# Patient Record
Sex: MALE | Race: WHITE | NOT HISPANIC OR LATINO | Employment: OTHER | URBAN - METROPOLITAN AREA
[De-identification: names, ages, dates, MRNs, and addresses within clinical notes are randomized per-mention and may not be internally consistent; named-entity substitution may affect disease eponyms.]

---

## 2017-01-23 LAB
CHOLEST/HDLC SERPL: NORMAL {RATIO}
NON-HDL-CHOL (CHOL-HDL) (HISTORICAL): NORMAL

## 2017-01-24 ENCOUNTER — ALLSCRIPTS OFFICE VISIT (OUTPATIENT)
Dept: OTHER | Facility: OTHER | Age: 77
End: 2017-01-24

## 2017-01-24 ENCOUNTER — HOSPITAL ENCOUNTER (OUTPATIENT)
Dept: RADIOLOGY | Facility: CLINIC | Age: 77
Discharge: HOME | End: 2017-01-24
Payer: MEDICARE

## 2017-01-24 DIAGNOSIS — M54.9 DORSALGIA: ICD-10-CM

## 2017-01-24 DIAGNOSIS — M54.41 LOW BACK PAIN WITH RIGHT-SIDED SCIATICA: ICD-10-CM

## 2017-01-24 DIAGNOSIS — M47.26 OTHER SPONDYLOSIS WITH RADICULOPATHY, LUMBAR REGION: ICD-10-CM

## 2017-01-24 DIAGNOSIS — M41.56 OTHER SECONDARY SCOLIOSIS, LUMBAR REGION: ICD-10-CM

## 2017-01-24 PROCEDURE — 72110 X-RAY EXAM L-2 SPINE 4/>VWS: CPT

## 2017-01-25 ENCOUNTER — ALLSCRIPTS OFFICE VISIT (OUTPATIENT)
Dept: OTHER | Facility: OTHER | Age: 77
End: 2017-01-25

## 2017-03-07 ENCOUNTER — ALLSCRIPTS OFFICE VISIT (OUTPATIENT)
Dept: OTHER | Facility: OTHER | Age: 77
End: 2017-03-07

## 2017-06-06 ENCOUNTER — ALLSCRIPTS OFFICE VISIT (OUTPATIENT)
Dept: OTHER | Facility: OTHER | Age: 77
End: 2017-06-06

## 2017-06-16 ENCOUNTER — ALLSCRIPTS OFFICE VISIT (OUTPATIENT)
Dept: OTHER | Facility: OTHER | Age: 77
End: 2017-06-16

## 2017-06-16 DIAGNOSIS — J44.9 CHRONIC OBSTRUCTIVE PULMONARY DISEASE (HCC): ICD-10-CM

## 2017-06-16 DIAGNOSIS — E78.5 HYPERLIPIDEMIA: ICD-10-CM

## 2017-06-16 DIAGNOSIS — Z12.5 ENCOUNTER FOR SCREENING FOR MALIGNANT NEOPLASM OF PROSTATE: ICD-10-CM

## 2017-06-16 DIAGNOSIS — I10 ESSENTIAL (PRIMARY) HYPERTENSION: ICD-10-CM

## 2017-12-04 ENCOUNTER — ALLSCRIPTS OFFICE VISIT (OUTPATIENT)
Dept: OTHER | Facility: OTHER | Age: 77
End: 2017-12-04

## 2017-12-04 DIAGNOSIS — E78.00 PURE HYPERCHOLESTEROLEMIA: ICD-10-CM

## 2017-12-04 DIAGNOSIS — J44.9 CHRONIC OBSTRUCTIVE PULMONARY DISEASE (HCC): ICD-10-CM

## 2017-12-04 DIAGNOSIS — I10 ESSENTIAL (PRIMARY) HYPERTENSION: ICD-10-CM

## 2017-12-05 NOTE — PROGRESS NOTES
Assessment    1  Benign essential hypertension (401 1) (I10)   2  Hyperlipidemia (272 4) (E78 5)   · MIXED   3  Chronic obstructive pulmonary disease (496) (J44 9)    Plan  Benign essential hypertension, Chronic obstructive pulmonary disease, Hypercholesterolemia    · (1) TSH WITH FT4 REFLEX; Status:Active; Requested for:45Zyt8322;    · Follow-up visit in 4 Months Evaluation and Treatment  Follow-up  Status: Hold For - Scheduling Requested for: 56DXK9393  Chronic obstructive pulmonary disease    · Complete PFT; Status:Active; Requested for:53Cmd9936;     Discussion/Summary  Discussion Summary:   See med list discussed  Counseling Documentation With Imm: The patient was counseled regarding diagnostic results,-- instructions for management,-- risk factor reductions,-- prognosis  Chief Complaint  Chief Complaint Free Text Note Form: pt here for f/u HTN, review blood work      History of Present Illness  Hyperlipidemia (Follow-Up): The patient states his hyperlipidemia has been stable since the last visit  Comorbid Illnesses: hypertension  Symptoms: The patient is currently asymptomatic  Associated symptoms include no focal neurologic deficits-- and-- no memory loss  Lifestyle: Diet: He consumes a diverse and healthy diet  Weight Issues: He does not have any weight concerns  Exercise: He does not exercise regularly  Smoking: He does not use tobacco Alcohol: He denies alcohol use  Drug Use: He denies drug use  Medications: the patient is not adherent with his medication regimen  -- He denies medication side effects  Medication(s): a statin  The patient is doing well with his hyperlipidemia goals  The patient is due for a lipid panel  Hypertension (Follow-Up): The patient presents for follow-up of essential hypertension  The patient states he has been doing well with his blood pressure control since the last visit  He has no significant interval events  Symptoms: The patient is currently asymptomatic    Home monitoring: The patient checks his blood pressure sporadically  Medications: the patient is adherent with his medication regimen  -- He denies medication side effects  The patient is due for a serum creatinine  Review of Systems  Complete-Male:  Constitutional: No fever or chills, feels well, no tiredness, no recent weight gain or weight loss  Eyes: No complaints of eye pain, no red eyes, no discharge from eyes, no itchy eyes  ENT: no complaints of earache, no hearing loss, no nosebleeds, no nasal discharge, no sore throat, no hoarseness  Cardiovascular: No complaints of slow heart rate, no fast heart rate, no chest pain, no palpitations, no leg claudication, no lower extremity  Respiratory: No complaints of shortness of breath, no wheezing, no cough, no SOB on exertion, no orthopnea or PND  Gastrointestinal: No complaints of abdominal pain, no constipation, no nausea or vomiting, no diarrhea or bloody stools  Genitourinary: No complaints of dysuria, no incontinence, no hesitancy, no nocturia, no genital lesion, no testicular pain  Musculoskeletal: joint stiffness, but-- no arthralgias  Neurological: No compliants of headache, no confusion, no convulsions, no numbness or tingling, no dizziness or fainting, no limb weakness, no difficulty walking  Psychiatric: Is not suicidal, no sleep disturbances, no anxiety or depression, no change in personality, no emotional problems  Endocrine: No complaints of proptosis, no hot flashes, no muscle weakness, no erectile dysfunction, no deepening of the voice, no feelings of weakness  Hematologic/Lymphatic: No complaints of swollen glands, no swollen glands in the neck, does not bleed easily, no easy bruising  PHQ-9 Depression Scale: Over the past 2 weeks, how often have you been bothered by the following problems? 1 ) Little interest or pleasure in doing things? Not at all   2 ) Feeling down, depressed or hopeless?  Not at all   3 ) Trouble falling asleep or sleeping too much? Not at all   4 ) Feeling tired or having little energy? Not at all   5 ) Poor appetite or overeating? Not at all   6 ) Feeling bad about yourself, or that you are a failure, or have let yourself or your family down? Not at all   7 ) Trouble concentrating on things, such as reading a newspaper or watching television? Not at all   8 ) Moving or speaking so slowly that other people could have noticed, or the opposite, moving or speaking faster than usual? Not at all  How difficult have these problems made it for you to do your work, take care of things at home, or get along with people? Not at all  Score 0    Preventive Quality 65 Older:  Preventive Quality 65 and Older: Falls Risk: The patient fell 1 times in the past 12 months  The patient is currently asymptomatic Symptoms Include: Urinary Incontinence Symptoms includes: nocturia, but no urinary incontinence, no incomplete bladder emptying, no urinary frequency, no urinary urgency, no urinary hesitancy, no dysuria, no straining, no weak stream, no intermittent stream, no post-void dribbling, no vaginal pressure and no vaginal dryness       Active Problems  1  Acute left-sided low back pain with left-sided sciatica (724 2,724 3) (M54 42)   2  Back pain (724 5) (M54 9)   3  Benign essential hypertension (401 1) (I10)   4  Bradycardia (427 89) (R00 1)   5  Chronic obstructive pulmonary disease (496) (J44 9)   6  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 (724 2,724 3,338 29) (M54 41,G89 29)   7  Hypercholesterolemia (272 0) (E78 00)   8  Hyperlipidemia (272 4) (E78 5)   9  Hypothyroidism (244 9) (E03 9)   10  Osteoarthritis of spine with radiculopathy, lumbar region (721 3) (M47 26)   11  Other secondary scoliosis, 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)   12  S/P CABG (coronary artery bypass graft) (V45 81) (Z95 1)    Past Medical History    1  History of Acute bronchitis, unspecified organism (466 0) (J20 9)   2   History of Benign Prostatic Hypertrophy Without Urinary Obstruction With Other Lower Urinary Tract Symptoms (600 00)   3  History of Hip pain, unspecified laterality   4  History of chest pain (V13 89) (Z87 898)   5  History of chronic obstructive lung disease (V12 69) (Z87 09)   6  History of coronary atherosclerosis (V12 59) (Z86 79)   7  History of dehydration (V12 29) (Z86 39)   8  History of psoriasis (V13 3) (Z87 2)   9  History of shortness of breath (V13 89) (Z87 898)   10  History of tinea cruris (V12 09) (Z86 19)   11  History of Screening PSA (prostate specific antigen) (V76 44) (Z12 5)   12  History of Trochanteric bursitis, unspecified laterality  Active Problems And Past Medical History Reviewed: The active problems and past medical history were reviewed and updated today  Surgical History  1  History of CABG   2  History of Transurethral Resection Of Prostate (TURP)  Surgical History Reviewed: The surgical history was reviewed and updated today  Family History  Mother    1  Family history of Lung Cancer (V16 1)  Father    2  Family history of    3  Family history of sepsis (V18 8) (Z83 1)  Family History Reviewed: The family history was reviewed and updated today  Social History     · Caffeine Use   · Daily alcohol use   · Former smoker (V15 82) (Y84 828)   · Marital History - Currently    · Never Used Drugs   · Occupation:  Social History Reviewed: The social history was reviewed and updated today  Current Meds   1  Aspirin 81 MG Oral Tablet Chewable; USE AS DIRECTED; Therapy: (Recorded:25Wmr6166) to Recorded   2  Atorvastatin Calcium 40 MG Oral Tablet; TAKE 1 TABLET DAILY; Therapy: 53Qwj6967 to (Evaluate:54Ksa5410)  Requested for: 70Vpt1898; Last Rx:13Zfs5465 Ordered   3  Lisinopril 10 MG Oral Tablet; Take 1 tablet daily; Therapy: 21Mar4433 to (Evaluate:89Ilb8694)  Requested for: 67Afn9569; Last Rx:14Qgf4357 Ordered   4   Metoprolol Tartrate 25 MG Oral Tablet; TAKE 1 TABLET TWICE DAILY  Requested for: 87Yvb4324; Last Rx:85Eun8641 Ordered   5  Omeprazole 20 MG Oral Capsule Delayed Release Recorded  Medication List Reviewed: The medication list was reviewed and updated today  Allergies  1  Penicillins  2  No Known Environmental Allergies   3  No Known Food Allergies    Vitals  Vital Signs    Recorded: 24GMB1126 10:04AM   Temperature 98 3 F, Tympanic   Heart Rate 76   Systolic 372, LUE, Sitting   Diastolic 60, LUE, Sitting   Height 5 ft 6 5 in   Weight 181 lb 2 oz   BMI Calculated 28 8   BSA Calculated 1 93   O2 Saturation 99, RA       Physical Exam   Constitutional  General appearance: No acute distress, well appearing and well nourished  Eyes  Conjunctiva and lids: No swelling, erythema, or discharge  Pupils and irises: Equal, round and reactive to light  Ears, Nose, Mouth, and Throat  External inspection of ears and nose: Normal    Otoscopic examination: Tympanic membrance translucent with normal light reflex  Canals patent without erythema  Nasal mucosa, septum, and turbinates: Normal without edema or erythema  Oropharynx: Abnormal  -- upper dentures  Pulmonary  Auscultation of lungs: Abnormal  -- decreased breath sounds  Cardiovascular  Palpation of heart: Normal PMI, no thrills  Auscultation of heart: Normal rate and rhythm, normal S1 and S2, without murmurs  Examination of extremities for edema and/or varicosities: Normal    Carotid pulses: Normal    Abdomen  Abdomen: Non-tender, no masses  Liver and spleen: No hepatomegaly or splenomegaly  Skin  Skin and subcutaneous tissue: Normal without rashes or lesions  Neurologic  Cranial nerves: Cranial nerves 2-12 intact  Reflexes: 2+ and symmetric  Sensation: No sensory loss     Psychiatric  Orientation to person, place and time: Normal    Mood and affect: Normal          Results/Data  (1) PSA (SCREEN) (Dx V76 44 Screen for Prostate Cancer) 87IDU0494 12:00AM Pavan Beavers     Test Name Result Flag Reference   PSA 0 39         Summary / No summary entered :    No summary entered  Documents attached :    [address]  Work - [facility], [facility]; Enc: [id number] - Image Encounter - [facility], team -    (Interventional Medicine) (Additional Information Document)  (1) LIPID PANEL, FASTING 93BEH1023 12:00AM Milagro Poll     Test Name Result Flag Reference   LDL CHOLESTEROL CALCULATED 66         Summary / No summary entered :    No summary entered  Documents attached :    [address]  Work - [facility], team; Enc: [id number] - Image Encounter - [facility], team -    (Interventional Medicine) (Additional Information Document)  Summary / No summary entered :    No summary entered  Documents attached :    [address]  Work - [facility], team; Enc: [id number] - Image Encounter - [facility], team -    (Interventional Medicine) (Additional Information Document)  (1) COMPREHENSIVE METABOLIC PANEL [id number] 56:00LW Milagro Poll     Test Name Result Flag Reference   GLUCOSE,RANDM 85         Summary / No summary entered :    No summary entered  Documents attached :    [address]  [facility]; Enc: [id number] - [facility] -    (Interventional Medicine) (Additional Information Document)  (1) PSA (SCREEN) (Dx V76 44 Screen for Prostate Cancer) 09OUN4456 12:00AM Milagro Poll     Test Name Result Flag Reference   PSA 0 39         Summary / No summary entered :    No summary entered  Documents attached :    [address]  [facility]; Enc: [id number] - [facility] -    (Interventional Medicine) (Additional Information Document)  (1) LIPID PANEL, FASTING [id number] 12:00AM Milagro Poll     Test Name Result Flag Reference   LDL CHOLESTEROL CALCULATED 66         Health Management  Depression screening   *[facility]-Depression Screening; every 1 year; Last 63NWV9960; Next Due: 92TAM7924;  Overdue  History of Need for diphtheria-tetanus-pertussis (Tdap) vaccine, adult/adolescent   Adacel 5-2-15 5 LF-MCG/0 5 Intramuscular Suspension; every 1 year; Next Due: 62UXT2418; Overdue  History of Screening for other and unspecified genitourinary condition   *[facility] - Urinary Incontinence Screen (Dx Z13 89 Screen for UI); every 1 year; Last 02TQY5392; NextDue: 83XOZ9367; Overdue  History of Special screening for other neurological conditions   *[facility] - Fall Risk Assessment  (Dx Z13 89 Screen for Neurologic Disorder); every 1 year; DPTB20BYZ2344; Next Due: 06AET8162; Overdue    Signatures   Electronically signed by :  Cristóbal Cordon MD; Dec  4 2017 10:37AM EST                       (Author)

## 2018-01-12 VITALS
WEIGHT: 179.5 LBS | DIASTOLIC BLOOD PRESSURE: 78 MMHG | BODY MASS INDEX: 28.17 KG/M2 | SYSTOLIC BLOOD PRESSURE: 140 MMHG | HEIGHT: 67 IN | HEART RATE: 66 BPM

## 2018-01-12 NOTE — RESULT NOTES
Verified Results  * MRI LUMBAR SPINE [address] [id number] 05:28PM César Edmund     Test Name Result Flag Reference   MRI LUMBAR SPINE WO CONTRAST (Report)     MRI LUMBAR SPINE WITHOUT CONTRAST     INDICATION: Low back pain going down both legs     COMPARISON: None  TECHNIQUE: Sagittal T1, sagittal T2, sagittal inversion recovery, axial T1 and axial T2, coronal T2       IMAGE QUALITY: Diagnostic     FINDINGS:     ALIGNMENT: Mild dextroscoliosis thoracolumbar junction  Moderate levoscoliosis mid lumbar spine  MARROW SIGNAL: Multilevel degenerative endplate changes noted  No bone marrow edema or focally suspicious marrow lesions  DISTAL CORD AND CONUS: Normal size and signal within the distal cord and conus  The conus ends at the L1 level  PARASPINAL SOFT TISSUES: 1 7 cm T2 hyperintense lesion left kidney likely a renal cyst      SACRUM: Normal signal within the sacrum  No evidence of insufficiency or stress fracture  LOWER THORACIC DISC SPACES: There is loss of disc height and signal  There is no focal disc herniation, central canal or neural foraminal narrowing  LUMBAR DISC SPACES:        L1-L2: Left paracentral disc herniation, protrusion type  Mild central canal narrowing  Neural foramina bilaterally patent  L2-L3: There is a left neural foraminal disc herniation, protrusion type  There is bilateral facet hypertrophy  Mild left neural foraminal narrowing  Central canal and right neural foramen patent  L3-L4: There is a left neural foraminal disc herniation, protrusion type  Moderate left neural foraminal narrowing  No central canal narrowing  Right neural foramen minimally narrowed  L4-L5: Bilateral facet hypertrophy noted, right greater than left  There is a right neural foraminal disc protrusion  Severe right neural foraminal narrowing  Moderate central canal narrowing  Mild left neural foraminal narrowing  L5-S1:  There is bilateral facet hypertrophy   There is a disc osteophyte complex  Central canal patent  Mild to moderate bilateral neural foraminal narrowing  IMPRESSION:     Mild to moderate S-shaped scoliosis and scattered spondylotic changes       Workstation performed: [id number]     Signed by:    Hunter Dowling MD   11/4/16

## 2018-01-13 ENCOUNTER — HOSPITAL ENCOUNTER (INPATIENT)
Facility: HOSPITAL | Age: 78
LOS: 2 days | Discharge: HOME | DRG: 068 | End: 2018-01-15
Attending: EMERGENCY MEDICINE | Admitting: INTERNAL MEDICINE
Payer: MEDICARE

## 2018-01-13 ENCOUNTER — APPOINTMENT (INPATIENT)
Dept: CT IMAGING | Facility: HOSPITAL | Age: 78
DRG: 068 | End: 2018-01-13
Payer: MEDICARE

## 2018-01-13 ENCOUNTER — OFFICE VISIT (OUTPATIENT)
Dept: URGENT CARE | Facility: MEDICAL CENTER | Age: 78
End: 2018-01-13
Payer: MEDICARE

## 2018-01-13 ENCOUNTER — APPOINTMENT (INPATIENT)
Dept: MRI IMAGING | Facility: HOSPITAL | Age: 78
DRG: 068 | End: 2018-01-13
Payer: MEDICARE

## 2018-01-13 ENCOUNTER — APPOINTMENT (EMERGENCY)
Dept: RADIOLOGY | Facility: HOSPITAL | Age: 78
DRG: 068 | End: 2018-01-13
Payer: MEDICARE

## 2018-01-13 ENCOUNTER — GENERIC CONVERSION - ENCOUNTER (OUTPATIENT)
Dept: OTHER | Facility: OTHER | Age: 78
End: 2018-01-13

## 2018-01-13 ENCOUNTER — APPOINTMENT (EMERGENCY)
Dept: CT IMAGING | Facility: HOSPITAL | Age: 78
DRG: 068 | End: 2018-01-13
Payer: MEDICARE

## 2018-01-13 VITALS
WEIGHT: 177.8 LBS | SYSTOLIC BLOOD PRESSURE: 130 MMHG | HEART RATE: 58 BPM | BODY MASS INDEX: 27.91 KG/M2 | HEIGHT: 67 IN | TEMPERATURE: 96.1 F | OXYGEN SATURATION: 95 % | DIASTOLIC BLOOD PRESSURE: 60 MMHG

## 2018-01-13 VITALS
TEMPERATURE: 97.8 F | BODY MASS INDEX: 28.93 KG/M2 | SYSTOLIC BLOOD PRESSURE: 116 MMHG | HEIGHT: 66 IN | RESPIRATION RATE: 18 BRPM | HEART RATE: 58 BPM | WEIGHT: 180 LBS | DIASTOLIC BLOOD PRESSURE: 64 MMHG | OXYGEN SATURATION: 98 %

## 2018-01-13 DIAGNOSIS — G45.9 TIA (TRANSIENT ISCHEMIC ATTACK): Primary | ICD-10-CM

## 2018-01-13 DIAGNOSIS — I25.10 ATHEROSCLEROSIS OF CORONARY ARTERY WITHOUT ANGINA PECTORIS, UNSPECIFIED VESSEL OR LESION TYPE, UNSPECIFIED WHETHER NATIVE OR TRANSPLANTED HEART: ICD-10-CM

## 2018-01-13 DIAGNOSIS — I65.22 CAROTID STENOSIS, LEFT: ICD-10-CM

## 2018-01-13 DIAGNOSIS — H53.9 VISUAL CHANGES: ICD-10-CM

## 2018-01-13 PROBLEM — E03.9 HYPOTHYROID: Status: RESOLVED | Noted: 2018-01-13 | Resolved: 2018-01-13

## 2018-01-13 PROBLEM — E03.9 HYPOTHYROID: Status: ACTIVE | Noted: 2018-01-13

## 2018-01-13 PROBLEM — J43.9 PULMONARY EMPHYSEMA (HCC): Status: ACTIVE | Noted: 2018-01-13

## 2018-01-13 PROBLEM — K21.9 GERD (GASTROESOPHAGEAL REFLUX DISEASE): Status: ACTIVE | Noted: 2018-01-13

## 2018-01-13 PROBLEM — M54.31 SCIATICA OF RIGHT SIDE: Status: ACTIVE | Noted: 2018-01-13

## 2018-01-13 LAB
ANION GAP SERPL CALCULATED.3IONS-SCNC: 8 MMOL/L (ref 4–13)
BASOPHILS # BLD AUTO: 0.02 THOUSANDS/ΜL (ref 0–0.1)
BASOPHILS NFR BLD AUTO: 0 % (ref 0–1)
BILIRUB UR QL STRIP: NEGATIVE
BUN SERPL-MCNC: 21 MG/DL (ref 5–25)
CALCIUM SERPL-MCNC: 8.9 MG/DL (ref 8.3–10.1)
CHLORIDE SERPL-SCNC: 103 MMOL/L (ref 100–108)
CLARITY UR: CLEAR
CO2 SERPL-SCNC: 29 MMOL/L (ref 21–32)
COLOR UR: YELLOW
CREAT SERPL-MCNC: 1.25 MG/DL (ref 0.6–1.3)
CRP SERPL QL: 3.1 MG/L
EOSINOPHIL # BLD AUTO: 0.1 THOUSAND/ΜL (ref 0–0.61)
EOSINOPHIL NFR BLD AUTO: 2 % (ref 0–6)
ERYTHROCYTE [DISTWIDTH] IN BLOOD BY AUTOMATED COUNT: 13.3 % (ref 11.6–15.1)
EST. AVERAGE GLUCOSE BLD GHB EST-MCNC: 97 MG/DL
GFR SERPL CREATININE-BSD FRML MDRD: 55 ML/MIN/1.73SQ M
GLUCOSE SERPL-MCNC: 113 MG/DL (ref 65–140)
GLUCOSE SERPL-MCNC: 78 MG/DL (ref 65–140)
GLUCOSE UR STRIP-MCNC: NEGATIVE MG/DL
HBA1C MFR BLD: 5 % (ref 4.2–6.3)
HCT VFR BLD AUTO: 43.3 % (ref 36.5–49.3)
HCYS SERPL-SCNC: 11.4 UMOL/L (ref 5.3–14.2)
HGB BLD-MCNC: 13.7 G/DL (ref 12–17)
HGB UR QL STRIP.AUTO: NEGATIVE
KETONES UR STRIP-MCNC: NEGATIVE MG/DL
LEUKOCYTE ESTERASE UR QL STRIP: NEGATIVE
LYMPHOCYTES # BLD AUTO: 0.98 THOUSANDS/ΜL (ref 0.6–4.47)
LYMPHOCYTES NFR BLD AUTO: 20 % (ref 14–44)
MCH RBC QN AUTO: 30 PG (ref 26.8–34.3)
MCHC RBC AUTO-ENTMCNC: 31.6 G/DL (ref 31.4–37.4)
MCV RBC AUTO: 95 FL (ref 82–98)
MONOCYTES # BLD AUTO: 0.47 THOUSAND/ΜL (ref 0.17–1.22)
MONOCYTES NFR BLD AUTO: 10 % (ref 4–12)
NEUTROPHILS # BLD AUTO: 3.32 THOUSANDS/ΜL (ref 1.85–7.62)
NEUTS SEG NFR BLD AUTO: 68 % (ref 43–75)
NITRITE UR QL STRIP: NEGATIVE
NT-PROBNP SERPL-MCNC: 73 PG/ML
PH UR STRIP.AUTO: 6 [PH] (ref 4.5–8)
PLATELET # BLD AUTO: 206 THOUSANDS/UL (ref 149–390)
PMV BLD AUTO: 10.1 FL (ref 8.9–12.7)
POTASSIUM SERPL-SCNC: 4.2 MMOL/L (ref 3.5–5.3)
PROT UR STRIP-MCNC: NEGATIVE MG/DL
RBC # BLD AUTO: 4.57 MILLION/UL (ref 3.88–5.62)
SODIUM SERPL-SCNC: 140 MMOL/L (ref 136–145)
SP GR UR STRIP.AUTO: 1.01 (ref 1–1.03)
TROPONIN I SERPL-MCNC: <0.02 NG/ML
UROBILINOGEN UR QL STRIP.AUTO: 0.2 E.U./DL
WBC # BLD AUTO: 4.89 THOUSAND/UL (ref 4.31–10.16)

## 2018-01-13 PROCEDURE — 70498 CT ANGIOGRAPHY NECK: CPT

## 2018-01-13 PROCEDURE — 85025 COMPLETE CBC W/AUTO DIFF WBC: CPT | Performed by: EMERGENCY MEDICINE

## 2018-01-13 PROCEDURE — 99285 EMERGENCY DEPT VISIT HI MDM: CPT

## 2018-01-13 PROCEDURE — 70496 CT ANGIOGRAPHY HEAD: CPT

## 2018-01-13 PROCEDURE — 81003 URINALYSIS AUTO W/O SCOPE: CPT | Performed by: NURSE PRACTITIONER

## 2018-01-13 PROCEDURE — 83520 IMMUNOASSAY QUANT NOS NONAB: CPT | Performed by: NURSE PRACTITIONER

## 2018-01-13 PROCEDURE — 82948 REAGENT STRIP/BLOOD GLUCOSE: CPT

## 2018-01-13 PROCEDURE — 80048 BASIC METABOLIC PNL TOTAL CA: CPT | Performed by: EMERGENCY MEDICINE

## 2018-01-13 PROCEDURE — 86140 C-REACTIVE PROTEIN: CPT | Performed by: NURSE PRACTITIONER

## 2018-01-13 PROCEDURE — 70551 MRI BRAIN STEM W/O DYE: CPT

## 2018-01-13 PROCEDURE — 93005 ELECTROCARDIOGRAM TRACING: CPT | Performed by: NURSE PRACTITIONER

## 2018-01-13 PROCEDURE — 96360 HYDRATION IV INFUSION INIT: CPT

## 2018-01-13 PROCEDURE — 93005 ELECTROCARDIOGRAM TRACING: CPT

## 2018-01-13 PROCEDURE — 83090 ASSAY OF HOMOCYSTEINE: CPT | Performed by: NURSE PRACTITIONER

## 2018-01-13 PROCEDURE — 71045 X-RAY EXAM CHEST 1 VIEW: CPT

## 2018-01-13 PROCEDURE — 83036 HEMOGLOBIN GLYCOSYLATED A1C: CPT | Performed by: NURSE PRACTITIONER

## 2018-01-13 PROCEDURE — 36415 COLL VENOUS BLD VENIPUNCTURE: CPT | Performed by: EMERGENCY MEDICINE

## 2018-01-13 PROCEDURE — 96361 HYDRATE IV INFUSION ADD-ON: CPT

## 2018-01-13 PROCEDURE — 83880 ASSAY OF NATRIURETIC PEPTIDE: CPT | Performed by: EMERGENCY MEDICINE

## 2018-01-13 PROCEDURE — 84484 ASSAY OF TROPONIN QUANT: CPT | Performed by: EMERGENCY MEDICINE

## 2018-01-13 PROCEDURE — 70450 CT HEAD/BRAIN W/O DYE: CPT

## 2018-01-13 RX ORDER — PANTOPRAZOLE SODIUM 40 MG/1
40 TABLET, DELAYED RELEASE ORAL
Status: DISCONTINUED | OUTPATIENT
Start: 2018-01-14 | End: 2018-01-15 | Stop reason: HOSPADM

## 2018-01-13 RX ORDER — ASPIRIN 81 MG/1
81 TABLET, CHEWABLE ORAL DAILY
Status: DISCONTINUED | OUTPATIENT
Start: 2018-01-13 | End: 2018-01-15 | Stop reason: HOSPADM

## 2018-01-13 RX ORDER — ASPIRIN 81 MG/1
81 TABLET, CHEWABLE ORAL DAILY
COMMUNITY
End: 2019-01-25 | Stop reason: SDUPTHER

## 2018-01-13 RX ORDER — DOCUSATE SODIUM 100 MG/1
100 CAPSULE, LIQUID FILLED ORAL 2 TIMES DAILY
Status: DISCONTINUED | OUTPATIENT
Start: 2018-01-13 | End: 2018-01-15 | Stop reason: HOSPADM

## 2018-01-13 RX ORDER — ONDANSETRON 2 MG/ML
4 INJECTION INTRAMUSCULAR; INTRAVENOUS EVERY 6 HOURS PRN
Status: DISCONTINUED | OUTPATIENT
Start: 2018-01-13 | End: 2018-01-15 | Stop reason: HOSPADM

## 2018-01-13 RX ORDER — LISINOPRIL 10 MG/1
10 TABLET ORAL DAILY
COMMUNITY
Start: 2016-09-15 | End: 2018-08-14 | Stop reason: SDUPTHER

## 2018-01-13 RX ORDER — ALBUTEROL SULFATE 90 UG/1
2 AEROSOL, METERED RESPIRATORY (INHALATION) EVERY 4 HOURS PRN
Status: DISCONTINUED | OUTPATIENT
Start: 2018-01-13 | End: 2018-01-15 | Stop reason: HOSPADM

## 2018-01-13 RX ORDER — ATORVASTATIN CALCIUM 40 MG/1
40 TABLET, FILM COATED ORAL EVERY EVENING
Status: DISCONTINUED | OUTPATIENT
Start: 2018-01-13 | End: 2018-01-15 | Stop reason: HOSPADM

## 2018-01-13 RX ORDER — ACETAMINOPHEN 325 MG/1
650 TABLET ORAL EVERY 4 HOURS PRN
Status: DISCONTINUED | OUTPATIENT
Start: 2018-01-13 | End: 2018-01-15 | Stop reason: HOSPADM

## 2018-01-13 RX ORDER — ASPIRIN 81 MG/1
81 TABLET, CHEWABLE ORAL DAILY
Status: ON HOLD | COMMUNITY
End: 2018-01-13 | Stop reason: SDUPTHER

## 2018-01-13 RX ORDER — ATORVASTATIN CALCIUM 40 MG/1
40 TABLET, FILM COATED ORAL DAILY
COMMUNITY
End: 2018-03-30 | Stop reason: SDUPTHER

## 2018-01-13 RX ORDER — LISINOPRIL 10 MG/1
10 TABLET ORAL DAILY
Status: DISCONTINUED | OUTPATIENT
Start: 2018-01-13 | End: 2018-01-15 | Stop reason: HOSPADM

## 2018-01-13 RX ORDER — ASPIRIN 81 MG/1
324 TABLET, CHEWABLE ORAL ONCE
Status: COMPLETED | OUTPATIENT
Start: 2018-01-13 | End: 2018-01-13

## 2018-01-13 RX ADMIN — ASPIRIN 81 MG 324 MG: 81 TABLET ORAL at 11:19

## 2018-01-13 RX ADMIN — LISINOPRIL 10 MG: 10 TABLET ORAL at 16:17

## 2018-01-13 RX ADMIN — IOHEXOL 85 ML: 350 INJECTION, SOLUTION INTRAVENOUS at 20:09

## 2018-01-13 RX ADMIN — ENOXAPARIN SODIUM 40 MG: 40 INJECTION SUBCUTANEOUS at 16:17

## 2018-01-13 RX ADMIN — ASPIRIN 81 MG 81 MG: 81 TABLET ORAL at 16:17

## 2018-01-13 RX ADMIN — ATORVASTATIN CALCIUM 40 MG: 40 TABLET, FILM COATED ORAL at 17:37

## 2018-01-13 RX ADMIN — SODIUM CHLORIDE 1000 ML: 0.9 INJECTION, SOLUTION INTRAVENOUS at 11:10

## 2018-01-13 RX ADMIN — METOPROLOL TARTRATE 12.5 MG: 25 TABLET ORAL at 20:31

## 2018-01-13 RX ADMIN — SODIUM CHLORIDE 500 ML: 0.9 INJECTION, SOLUTION INTRAVENOUS at 18:07

## 2018-01-13 RX ADMIN — DOCUSATE SODIUM 100 MG: 100 CAPSULE, LIQUID FILLED ORAL at 17:37

## 2018-01-13 NOTE — CONSULTS
Consultation - Neurology   Antonia Saini 68 y o  male MRN: [id number]  Unit/Bed#: -01 Encounter: [id number]      Assessment/Plan   Assessment/Plan:   3 68year old male admitted after episode of L sided visual disturbance   - Visual disturbance resolved, but noted on exam to have slight drift of LUE and L deltoid weakness noted  - Will check MRI brain to evaluate for acute ischemia  - Check CTA head and neck with and without contrast to evaluate vasculature  - Check TSH, fasting lipid panel, A1c  Will hold off on checking API as patient received loading dose of ASA in ED     - Check echocardiogram    - Continue on ASA 81 mg QD and atorvastatin 40 mg QPM     - Monitor on telemetry for arrhythmia     - Allow for permissive hypertension, recommend decrease Metoprolol to  12 5 mg BID for now     - PT/OT evaluations  - Monitor exam and notify with changes  History of Present Illness     Reason for Consult / Principal Problem: TIA    HPI: Antonia Saini is a 68 y o  right handed male who presents with complaints of visual disturbance  He states that he was sitting talking with his brother and friend and noticed that he could not see out of the top portion of his eye  He notes that it seemed as if a curtain was covering his eye  He notes that he was seeing silver spots with his right eye at the same time  He states that the symptoms resolved on their own  He is not sure if his face was drooped at the time as he did not look at himself in the mirror and no one mentioned it to him  He states he thought it might be his blood pressure being elevated so he went to an urgent care and from there EMS was called and he was brought to the hospital for evaluation  He does not think he had any weakness of his arms or legs at that time and has never had similar symptoms  He states he has never had stroke or TIA and he takes ASA 81 mg QD       Inpatient consult to Neurology  Consult performed by: Romina Kelly JOANNE  Consult ordered by: Dennis Wagner          Review of Systems   Constitutional: Negative for chills, fatigue and fever  HENT: Negative for trouble swallowing  Eyes: Positive for visual disturbance  Respiratory: Negative for shortness of breath  Gastrointestinal: Negative for abdominal pain, nausea and vomiting  Musculoskeletal: Negative for back pain and neck pain  Neurological: Negative for dizziness, syncope, speech difficulty, weakness, light-headedness, numbness and headaches  Psychiatric/Behavioral: Negative for confusion  Historical Information   Past Medical History:   Diagnosis Date    Arthritis     Emphysema lung ([address] )     Hyperlipidemia     Hypertension      Past Surgical History:   Procedure Laterality Date    CORONARY ARTERY BYPASS GRAFT      x2 2001    HERNIA REPAIR      TRANSURETHRAL RESECTION OF PROSTATE       Social History   History   Alcohol Use    0 6 oz/week    1 Glasses of wine per week     History   Drug Use No     History   Smoking Status    Former Smoker    Packs/day: 1 00    Years: 3 00    Types: Cigarettes   Smokeless Tobacco    Never Used     Family History:   Family History   Problem Relation Age of Onset    Lung cancer Mother     Other Father      sepsis       Review of previous medical records was completed  Per notes, patient follows with PCP closely for HTN, HLD and prior CABG   He also follows with cardiology team      Meds/Allergies   Scheduled Meds:  aspirin 81 mg Oral Daily   atorvastatin 40 mg Oral QPM   docusate sodium 100 mg Oral BID   enoxaparin 40 mg Subcutaneous Daily   lisinopril 10 mg Oral Daily   metoprolol tartrate 25 mg Oral Q12H [address]   [START ON 1/14/2018] pantoprazole 40 mg Oral Early Morning     Continuous Infusions:   PRN Meds:   acetaminophen    albuterol    ondansetron      Allergies   Allergen Reactions    Penicillins        Objective   Vitals:Blood pressure 135/65, pulse 72, temperature 97 9 °F (36 6 °C), temperature source Oral, resp  rate 18, height 5' 8" (1 727 m), weight 82 3 kg (181 lb 7 oz), SpO2 100 %  ,Body mass index is 27 59 kg/m²  No intake or output data in the 24 hours ending 01/13/18 1536    Invasive Devices: Invasive Devices     Peripheral Intravenous Line            Peripheral IV 01/13/18 Left Antecubital less than 1 day    Peripheral IV 01/13/18 Right Antecubital less than 1 day                Physical Exam   Constitutional: He is oriented to person, place, and time  He appears well-developed and well-nourished  No distress  HENT:   Head: Normocephalic and atraumatic  Eyes: Conjunctivae and EOM are normal  Pupils are equal, round, and reactive to light  No scleral icterus  Neck: Normal range of motion  Neck supple  No bruits noted     Cardiovascular: Normal rate and regular rhythm  Pulmonary/Chest: Effort normal and breath sounds normal    Abdominal: Soft  He exhibits no distension  There is no tenderness  Musculoskeletal: Normal range of motion  He exhibits no edema  Neurological: He is alert and oriented to person, place, and time  He has a normal Finger-Nose-Finger Test and a normal Heel to [employer]    Reflex Scores:       Bicep reflexes are 2+ on the right side and 2+ on the left side  Brachioradialis reflexes are 2+ on the right side and 2+ on the left side  Patellar reflexes are 2+ on the right side and 2+ on the left side  Achilles reflexes are 0 on the right side and 0 on the left side  Skin: Skin is warm and dry  No rash noted  He is not diaphoretic  No erythema  No pallor  Psychiatric: He has a normal mood and affect  His speech is normal and behavior is normal      Neurologic Exam     Mental Status   Oriented to person, place, and time  Oriented to person  Oriented to place  Oriented to time  Registration: recalls 3 of 3 objects  Recall at 5 minutes: recalls 3 of 3 objects (With cues)     Attention: normal  Concentration: normal    Speech: speech is normal   Level of consciousness: alert  Knowledge: good  Normal comprehension  Able to spell word "world" forward but not backward  Cranial Nerves     CN II   Right visual field deficit: none  Left visual field deficit: none     CN III, IV, VI   Pupils are equal, round, and reactive to light  Extraocular motions are normal    Right pupil: Size: 3 mm  Shape: regular  Reactivity: brisk  Consensual response: intact  Left pupil: Size: 3 mm  Shape: regular  Reactivity: brisk  Consensual response: intact  Nystagmus: none   Diplopia: none  Ophthalmoparesis: none  Upgaze: normal  Downgaze: normal  Conjugate gaze: present    CN V   Right facial sensation deficit: none  Left facial sensation deficit: none    CN VII   Right facial weakness: none  Left facial weakness: Slight flattening of nasolabial fold      CN VIII   Hearing: intact    CN IX, X   Palate: symmetric    CN XI   Right sternocleidomastoid strength: normal  Left sternocleidomastoid strength: normal    CN XII   Tongue: not atrophic  Fasciculations: absent  Tongue deviation: none    Motor Exam   Muscle bulk: normal  Overall muscle tone: normal  Right arm tone: normal  Left arm tone: normal  Right arm pronator drift: absent  Left arm pronator drift: present  Right leg tone: normal  Left leg tone: normalMotor strength 5/5 B/L UE and LE except for L deltoid 4/5     Sensory Exam   Light touch normal    Right arm light touch: normal  Left arm light touch: normal  Right leg light touch: normal  Left leg light touch: normal  Vibration normal    Right arm vibration: normal  Left arm vibration: normal  Right leg vibration: normal  Left leg vibration: normal  Pinprick normal    Right arm pinprick: normal  Left arm pinprick: normal  Right leg pinprick: normal  Left leg pinprick: normal    Gait, Coordination, and Reflexes     Coordination   Finger to nose coordination: normal  Heel to shin coordination: normal    Tremor   Resting tremor: absent  Intention tremor: absent  Action tremor: absent    Reflexes   Right brachioradialis: 2+  Left brachioradialis: 2+  Right biceps: 2+  Left biceps: 2+  Right patellar: 2+  Left patellar: 2+  Right achilles: 0  Left achilles: 0  Right plantar: normal  Left plantar: upgoingGait deferred          Lab Results:   Recent Results (from the past 24 hour(s))   Fingerstick Glucose (POCT)    Collection Time: 01/13/18 10:18 AM   Result Value Ref Range    POC Glucose 113 65 - 140 mg/dl   CBC and differential    Collection Time: 01/13/18 11:18 AM   Result Value Ref Range    WBC 4 89 4 31 - 10 16 Thousand/uL    RBC 4 57 3 88 - 5 62 Million/uL    Hemoglobin 13 7 12 0 - 17 0 g/dL    Hematocrit 43 3 36 5 - 49 3 %    MCV 95 82 - 98 fL    MCH 30 0 26 8 - 34 3 pg    MCHC 31 6 31 4 - 37 4 g/dL    RDW 13 3 11 6 - 15 1 %    MPV 10 1 8 9 - 12 7 fL    Platelets 350 130 - 954 Thousands/uL    Neutrophils Relative 68 43 - 75 %    Lymphocytes Relative 20 14 - 44 %    Monocytes Relative 10 4 - 12 %    Eosinophils Relative 2 0 - 6 %    Basophils Relative 0 0 - 1 %    Neutrophils Absolute 3 32 1 85 - 7 62 Thousands/µL    Lymphocytes Absolute 0 98 0 60 - 4 47 Thousands/µL    Monocytes Absolute 0 47 0 17 - 1 22 Thousand/µL    Eosinophils Absolute 0 10 0 00 - 0 61 Thousand/µL    Basophils Absolute 0 02 0 00 - 0 10 Thousands/µL   Basic metabolic panel    Collection Time: 01/13/18 11:18 AM   Result Value Ref Range    Sodium 140 136 - 145 mmol/L    Potassium 4 2 3 5 - 5 3 mmol/L    Chloride 103 100 - 108 mmol/L    CO2 29 21 - 32 mmol/L    Anion Gap 8 4 - 13 mmol/L    BUN 21 5 - 25 mg/dL    Creatinine 1 25 0 60 - 1 30 mg/dL    Glucose 78 65 - 140 mg/dL    Calcium 8 9 8 3 - 10 1 mg/dL    eGFR 55 ml/min/1 73sq m   Troponin I    Collection Time: 01/13/18 11:18 AM   Result Value Ref Range    Troponin I <0 02 <=0 04 ng/mL   B-type natriuretic peptide    Collection Time: 01/13/18 11:18 AM   Result Value Ref Range    NT-proBNP 73 <450 pg/mL       Imaging Studies: I have personally reviewed pertinent reports  and I have personally reviewed pertinent films in PACS  CTH- No acute intracranial abnormality       VTE Prophylaxis: Enoxaparin (Lovenox)

## 2018-01-13 NOTE — CONSULTS
This 59-year-old man presented today with visual disturbance in his left eye and possibly some left-sided weakness  He was admitted for evaluation of possible CVA  Past ocular history includes a retinal detachment in the right eye followed by cataract and cataract extraction followed by cornea problems which were addressed with a partial lateral tarsorrhaphy  He also had laser done in his left eye  The patient reports that his symptoms have resolved and his vision is now baseline which is fairly limited in both eyes  He reports that his left eye visual acuity has gradually worsened over the last year  He is seen in [facility]  He was last seen 1 year ago  On examination is visual acuity is count fingers in each eye  His pupils are 1 5 mm in the right eye 2 5 mm in the left eye  There is no afferent pupil defect  There is a 6 mm partial lateral tarsorrhaphy noted in the right eye  The remaining penlight exam is unremarkable  Intra-ocular pressures are 12 in the right and 13 in the left  The eyes were dilated at 16 30  The right eye exhibits a posterior chamber intraocular lens the vitreous cavity is clear the optic nerve is pink and flat the macula is unremarkable the periphery shows widespread pan retinal photocoagulation  The left eye shows a moderate nuclear sclerotic cataract  The vitreous cavity is clear  The disc macula and vessels are unremarkable  He has widespread panretinal photocoagulation in the left eye as well  Impression amaurosis fugax  Cataract OS,  Corneal exposure OD  I see no acute ocular pathology  He can be discharged to office follow-up from my standpoint  Please reconsult prn for new sx

## 2018-01-13 NOTE — ASSESSMENT & PLAN NOTE
· Status post CABG x4 in 2001  · C/w asa and statin   · The patient follow up with Dr Candido Jett after discharge

## 2018-01-13 NOTE — H&P
Progress Note - Lenny Fleming 1940, 68 y o  male MRN: [id number]    Unit/Bed#: -01 Encounter: [id number]    Primary Care Provider: Cynthia Navarro MD   Date and time admitted to hospital: 1/13/2018 10:55 AM        Visual changes   Assessment & Plan    · Visual changes with left sided hemiparesis   · Stroke pathway  · Consult neurology  · Consult Ophthalmology  · CT head (-)  · Check MRI of the brain  · C/w asa 81 mg and Lipitor 40 mg  · Check lipid panel  · Monitor neuro checks  · Check A1c, CRP, homocystine level, UA, TSH  · Check echo  · PT/OT eval  · Monitor on telemetry, troponin on admission 0 02        Sciatica of right side   Assessment & Plan    · Supportive care  · Cane while ambulating        GERD (gastroesophageal reflux disease)   Assessment & Plan    · C/w PPI        Pulmonary emphysema (HCC)   Assessment & Plan    · Add albuterol p r n  Hyperlipemia   Assessment & Plan    · Check lipid panel  · C/w lipitor 40mg daily         Hypertension   Assessment & Plan    · C/w lisinopril and metoprolol        CAD (coronary atherosclerotic disease)   Assessment & Plan    · Status post CABG x4 in 2001  · C/w asa and statin   · The patient follow up with Dr Suman Orozco after discharge            VTE Prophylaxis: Enoxaparin (Lovenox)  / sequential compression device   Code Status: full code   POLST: POLST form is not discussed and not completed at this time  Discussion with family: message left for luz maria gaspar     Anticipated Length of Stay:  Patient will be admitted on an Inpatient basis with an anticipated length of stay of  Greater than 2 midnights  Justification for Hospital Stay: CVA r/o     Total Time for Visit, including Counseling / Coordination of Care: 1 hour  Greater than 50% of this total time spent on direct patient counseling and coordination of care  Chief Complaint:   Visual changes     History of Present Illness:     Lenny Fleming is a 68 y o  male with a PMH of HTN, HLD, Former smoker, COPD/emphysema who presents with visual changes  Patient reports that he was sitting with his brother and friend around 10:00 a m  when he noticed that he could no longer see on the top portion of his left eye and the bottom portion was blurry, his right eye he was seeing different colors mainly Silver when he would look at different objects  He reported that he kept rubbing his eyes to see if this would improve and it lasted approximately 10 minutes  The symptoms resolved spontaneously  He did become diaphoretic with the event  He went to a care now facility to be examined and they called EMS to bring him to the hospital for further evaluation  The patient noted after the fact that he was feeling cold on his whole left side from the top of his head to the bottom of his toes  The patient now reports that his left side just feels funny    Patient reports that he wears glasses and his last eye exam was approximately 2 years ago  Patient denies any lightheadedness, dizziness, syncope, speech difficulty, swallowing difficulties, tremors or seziures  Review of Systems:    Review of Systems   Constitutional: Negative for appetite change, chills, fever and unexpected weight change  HENT: Negative for congestion, ear pain, sore throat and trouble swallowing  Eyes: Positive for visual disturbance  Respiratory: Negative for cough, shortness of breath and wheezing  Cardiovascular: Negative for chest pain, palpitations and leg swelling  Gastrointestinal: Negative for abdominal pain, blood in stool, constipation, diarrhea, nausea and vomiting  Genitourinary: Negative for difficulty urinating, dysuria, frequency and hematuria  Musculoskeletal: Positive for back pain (hx of sciatica )  Negative for joint swelling and myalgias  Skin: Negative for rash  Allergic/Immunologic: Negative for environmental allergies and food allergies     Neurological: Positive for weakness (left sided) and numbness (left sided)  Negative for dizziness, tremors, seizures, syncope, speech difficulty, light-headedness and headaches  Psychiatric/Behavioral: Negative for suicidal ideas  The patient is not nervous/anxious  Past Medical and Surgical History:     Past Medical History:   Diagnosis Date    Arthritis     Emphysema lung ([address] )     Hyperlipidemia     Hypertension        Past Surgical History:   Procedure Laterality Date    CORONARY ARTERY BYPASS GRAFT      x2 2001    HERNIA REPAIR      TRANSURETHRAL RESECTION OF PROSTATE         Meds/Allergies:    Prior to Admission medications    Medication Sig Start Date End Date Taking? Authorizing Provider   esomeprazole (NexIUM) 20 mg capsule Take 20 mg by mouth every morning before breakfast   Yes Historical Provider, MD   lisinopril (ZESTRIL) 10 mg tablet Take by mouth 9/15/16  Yes Historical Provider, MD   aspirin 81 mg chewable tablet Chew 81 mg daily  1/13/18 Yes Historical Provider, MD   aspirin 81 mg chewable tablet Chew    Historical Provider, MD   atorvastatin (LIPITOR) 40 mg tablet Take by mouth    Historical Provider, MD   metoprolol tartrate (LOPRESSOR) 25 mg tablet Take by mouth    Historical Provider, MD   atorvastatin (LIPITOR) 40 mg tablet Take 40 mg by mouth daily  1/13/18  Historical Provider, MD   metoprolol tartrate (LOPRESSOR) 50 mg tablet Take 50 mg by mouth 2 (two) times a day  1/13/18  Historical Provider, MD   pantoprazole (PROTONIX) 40 mg tablet Take 40 mg by mouth daily  1/13/18  Historical Provider, MD     I have reviewed home medications with patient personally  Allergies: Allergies   Allergen Reactions    Penicillins        Social History:     Marital Status:     Occupation: retired  Patient Pre-hospital Living Situation: lives with granddaughter and daughter  Patient Pre-hospital Level of Mobility: walks with a cane   Patient Pre-hospital Diet Restrictions: none  Substance Use History:   History   Alcohol Use    0 6 oz/week  1 Glasses of wine per week     History   Smoking Status    Former Smoker    Packs/day: 1 00    Years: 3 00    Types: Cigarettes   Smokeless Tobacco    Never Used     History   Drug Use No       Family History:    non-contributory    Physical Exam:     Vitals:   Blood Pressure: 135/65 (01/13/18 1453)  Pulse: 72 (01/13/18 1453)  Temperature: 97 9 °F (36 6 °C) (01/13/18 1453)  Temp Source: Oral (01/13/18 1453)  Respirations: 18 (01/13/18 1453)  Height: 5' 8" (172 7 cm) (01/13/18 1453)  Weight - Scale: 82 3 kg (181 lb 7 oz) (01/13/18 1101)  SpO2: 100 % (01/13/18 1453)    Physical Exam   Constitutional: He is oriented to person, place, and time  He appears well-developed  He is cooperative  No distress  HENT:   Head: Normocephalic and atraumatic  Mouth/Throat: Oropharynx is clear and moist  No oropharyngeal exudate  Eyes: Conjunctivae and EOM are normal  Pupils are equal, round, and reactive to light  Right eye exhibits no discharge  No scleral icterus  Neck: Neck supple  No JVD present  Carotid bruit is not present  Cardiovascular: Normal rate, regular rhythm, S1 normal, S2 normal, normal heart sounds and intact distal pulses  Exam reveals no gallop and no friction rub  No murmur heard  Pulmonary/Chest: Effort normal  No respiratory distress  He has no wheezes  He has no rhonchi  He has no rales  Diminished b/l upper and lower    Abdominal: Soft  Bowel sounds are normal  He exhibits no distension  There is no tenderness  There is no guarding  Musculoskeletal: He exhibits edema (mild lower extremity)  He exhibits no tenderness  Neurological: He is alert and oriented to person, place, and time  Left sided hemiparesis upper extremity  Weak left hand    Skin: Skin is warm and dry  No rash noted  He is not diaphoretic  No erythema  Scab midline-middle back from previous staph infection   Psychiatric: He has a normal mood and affect  His behavior is normal  His mood appears not anxious  He does not exhibit a depressed mood  Additional Data:     Lab Results: I have personally reviewed pertinent reports  Results from last 7 days  Lab Units 01/13/18  1118   WBC Thousand/uL 4 89   HEMOGLOBIN g/dL 13 7   HEMATOCRIT % 43 3   PLATELETS Thousands/uL 206   NEUTROS PCT % 68   LYMPHS PCT % 20   MONOS PCT % 10   EOS PCT % 2       Results from last 7 days  Lab Units 01/13/18  1118   SODIUM mmol/L 140   POTASSIUM mmol/L 4 2   CHLORIDE mmol/L 103   CO2 mmol/L 29   BUN mg/dL 21   CREATININE mg/dL 1 25   CALCIUM mg/dL 8 9   GLUCOSE RANDOM mg/dL 78           Imaging: I have personally reviewed pertinent reports  XR chest 1 view portable   Final Result by Lord Bayron MD (01/13 3083)      No active disease  Emphysema  Workstation performed: [id number]         CT head without contrast   Final Result by Aby Michael MD (01/13 7991)      No acute intracranial abnormality  Workstation performed: [id number]             EKG, Pathology, and Other Studies Reviewed on Admission:   EKG: Sinus rhythm with sinus arrhythmia, Right bundle branch block-  No significant changes from August 2016    [facility] Reviewed: Yes     ** Please Note: This note has been constructed using a voice recognition system   **

## 2018-01-13 NOTE — ASSESSMENT & PLAN NOTE
· Visual changes with left sided hemiparesis   · Stroke pathway  · Consult neurology  · Consult Ophthalmology  · CT head (-)  · Check MRI of the brain  · C/w asa 81 mg and Lipitor 40 mg  · Check lipid panel  · Monitor neuro checks  · Check A1c, CRP, homocystine level, UA, TSH  · Check echo  · PT/OT eval  · Monitor on telemetry, troponin on admission 0 02

## 2018-01-13 NOTE — ED NOTES
Spoke with pt daughter Melissa Cintron, updated on pts status        Axel Quiroga, RN  01/13/18 8121

## 2018-01-13 NOTE — ED PROVIDER NOTES
History  Chief Complaint   Patient presents with    Visual Field Change     pt reports vision changes approx 1000 when he states "i saw metal in my vision"  pt went to [facility] ems [facility] called  Pt noted coldness felling on left side  repoerts trouble expressing words  pt has weakened  on left side  History provided by:  Patient  Neurologic Problem   Location:  Left sided sxs, visual field changes noted, lasted for 15-30mins and resolved at this time,   Severity:  Moderate  Onset quality:  Gradual  Duration:  15 minutes  Timing:  Rare  Progression:  Resolved  Chronicity:  New  Context:  Symptoms started earlier today  Last 15-30 minutes  Associated symptoms: no abdominal pain, no chest pain, no congestion, no cough, no diarrhea, no fever, no headaches, no loss of consciousness, no myalgias, no nausea, no rash, no rhinorrhea, no shortness of breath, no sore throat and no wheezing        Prior to Admission Medications   Prescriptions Last Dose Informant Patient Reported? Taking?   aspirin 81 mg chewable tablet   Yes No   Sig: Chew   atorvastatin (LIPITOR) 40 mg tablet   Yes No   Sig: Take by mouth   esomeprazole (NexIUM) 20 mg capsule   Yes Yes   Sig: Take 20 mg by mouth every morning before breakfast   lisinopril (ZESTRIL) 10 mg tablet   Yes Yes   Sig: Take by mouth   metoprolol tartrate (LOPRESSOR) 25 mg tablet   Yes No   Sig: Take by mouth      Facility-Administered Medications: None       Past Medical History:   Diagnosis Date    Arthritis     Emphysema lung ([address] )     Hyperlipidemia     Hypertension        Past Surgical History:   Procedure Laterality Date    CORONARY ARTERY BYPASS GRAFT      x2 2001    HERNIA REPAIR      TRANSURETHRAL RESECTION OF PROSTATE         Family History   Problem Relation Age of Onset    Lung cancer Mother     Other Father      sepsis     I have reviewed and agree with the history as documented      Social History   Substance Use Topics    Smoking status: Former Smoker     Packs/day: 1 00     Years: 3 00     Types: Cigarettes    Smokeless tobacco: Never Used    Alcohol use 0 6 oz/week     1 Glasses of wine per week        Review of Systems   Constitutional: Negative for chills and fever  HENT: Negative for congestion, rhinorrhea, sore throat and trouble swallowing  Eyes: Negative for pain  Respiratory: Negative for cough, shortness of breath, wheezing and stridor  Cardiovascular: Negative for chest pain and leg swelling  Gastrointestinal: Negative for abdominal pain, diarrhea and nausea  Endocrine: Negative for polyuria  Genitourinary: Negative for dysuria, flank pain and urgency  Musculoskeletal: Negative for joint swelling, myalgias and neck stiffness  Skin: Negative for rash  Allergic/Immunologic: Negative for immunocompromised state  Neurological: Positive for facial asymmetry (Since resolved) and numbness  Negative for dizziness, loss of consciousness, syncope, weakness and headaches  Psychiatric/Behavioral: Negative for confusion and suicidal ideas  All other systems reviewed and are negative  Physical Exam  ED Triage Vitals [01/13/18 1055]   Temperature Pulse Respirations Blood Pressure SpO2   98 °F (36 7 °C) 72 18 141/67 100 %      Temp Source Heart Rate Source Patient Position - Orthostatic VS BP Location FiO2 (%)   Oral Monitor Lying Right arm --      Pain Score       No Pain           Orthostatic Vital Signs  Vitals:    01/13/18 1200 01/13/18 1315 01/13/18 1453 01/13/18 1600   BP: 119/65 141/68 135/65 138/66   Pulse: 56 70 72 72   Patient Position - Orthostatic VS: Lying Lying Sitting Lying       Physical Exam   Constitutional: He is oriented to person, place, and time  He appears well-developed and well-nourished  HENT:   Head: Normocephalic and atraumatic  Eyes: EOM are normal  Pupils are equal, round, and reactive to light  Neck: Normal range of motion  Neck supple     Cardiovascular: Normal rate and regular rhythm  Exam reveals no friction rub  No murmur heard  Pulmonary/Chest: Breath sounds normal  No respiratory distress  He has no wheezes  He has no rales  Abdominal: Soft  Bowel sounds are normal  He exhibits no distension  There is no tenderness  Musculoskeletal: Normal range of motion  He exhibits no edema or tenderness  Neurological: He is alert and oriented to person, place, and time  GCS 15  AAOx4  No focal neuro deficits  CN II-XII intact  PERRL  EOMI  Selena Burow No pronator drift   strength 5/5 bilaterally  B/L UE strength 5/5 throughout  Finger to nose normal  Cerebellar function normal  Ambulates without difficulty  B/L LE strength 5/5 throughout  Gross sensation to b/l upper and lower extremities intact  Skin: Skin is warm  No rash noted  Psychiatric: He has a normal mood and affect  Nursing note and vitals reviewed        ED Medications  Medications   acetaminophen (TYLENOL) tablet 650 mg (not administered)   docusate sodium (COLACE) capsule 100 mg (not administered)   ondansetron (ZOFRAN) injection 4 mg (not administered)   atorvastatin (LIPITOR) tablet 40 mg (not administered)   aspirin chewable tablet 81 mg (81 mg Oral Given 1/13/18 1617)   enoxaparin (LOVENOX) subcutaneous injection 40 mg (40 mg Subcutaneous Given 1/13/18 1617)   lisinopril (ZESTRIL) tablet 10 mg (10 mg Oral Given 1/13/18 1617)   pantoprazole (PROTONIX) EC tablet 40 mg (not administered)   albuterol (PROVENTIL HFA,VENTOLIN HFA) inhaler 2 puff (not administered)   metoprolol tartrate (LOPRESSOR) partial tablet 12 5 mg (not administered)   sodium chloride 0 9 % bolus 1,000 mL (1,000 mL Intravenous New Bag 1/13/18 1110)   aspirin chewable tablet 324 mg (324 mg Oral Given 1/13/18 1119)       Diagnostic Studies  Results Reviewed     Procedure Component Value Units Date/Time    Basic metabolic panel [[id number]] Collected:  01/13/18 1118    Lab Status:  Final result Specimen:  Blood from Arm, Left Updated:  01/13/18 1151 Sodium 140 mmol/L      Potassium 4 2 mmol/L      Chloride 103 mmol/L      CO2 29 mmol/L      Anion Gap 8 mmol/L      BUN 21 mg/dL      Creatinine 1 25 mg/dL      Glucose 78 mg/dL      Calcium 8 9 mg/dL      eGFR 55 ml/min/1 73sq m     Narrative:         National Kidney Disease Education Program recommendations are as follows:  GFR calculation is accurate only with a steady state creatinine  Chronic Kidney disease less than 60 ml/min/1 73 sq  meters  Kidney failure less than 15 ml/min/1 73 sq  meters  B-type natriuretic peptide [[id number]]  (Normal) Collected:  01/13/18 1118    Lab Status:  Final result Specimen:  Blood from Arm, Left Updated:  01/13/18 1151     NT-proBNP 73 pg/mL     Troponin I [[id number]]  (Normal) Collected:  01/13/18 1118    Lab Status:  Final result Specimen:  Blood from Arm, Left Updated:  01/13/18 1149     Troponin I <0 02 ng/mL     Narrative:         [employer] Chemistry analyzer 99% cutoff is > 0 04 ng/mL in network labs    o cTnI 99% cutoff is useful only when applied to patients in the clinical setting of myocardial ischemia  o cTnI 99% cutoff should be interpreted in the context of clinical history, ECG findings and possibly cardiac imaging to establish correct diagnosis  o cTnI 99% cutoff may be suggestive but clearly not indicative of a coronary event without the clinical setting of myocardial ischemia      CBC and differential [[id number]]  (Normal) Collected:  01/13/18 1118    Lab Status:  Final result Specimen:  Blood from Arm, Left Updated:  01/13/18 1125     WBC 4 89 Thousand/uL      RBC 4 57 Million/uL      Hemoglobin 13 7 g/dL      Hematocrit 43 3 %      MCV 95 fL      MCH 30 0 pg      MCHC 31 6 g/dL      RDW 13 3 %      MPV 10 1 fL      Platelets 575 Thousands/uL      Neutrophils Relative 68 %      Lymphocytes Relative 20 %      Monocytes Relative 10 %      Eosinophils Relative 2 %      Basophils Relative 0 %      Neutrophils Absolute 3 32 Thousands/µL      Lymphocytes Absolute 0 98 Thousands/µL      Monocytes Absolute 0 47 Thousand/µL      Eosinophils Absolute 0 10 Thousand/µL      Basophils Absolute 0 02 Thousands/µL                  XR chest 1 view portable   Final Result by Brenda Fermin MD (01/13 5447)      No active disease  Emphysema  Workstation performed: [id number]         CT head without contrast   Final Result by Tami Rangel MD (01/13 115)      No acute intracranial abnormality  Workstation performed: [id number]         CTA head and neck w wo contrast    (Results Pending)   MRI brain wo contrast    (Results Pending)              Procedures  Procedures       Phone Contacts  ED Phone Contact    ED Course  ED Course              NIH Stroke Scale    Flowsheet Row Most Recent Value   Level of Consciousness (1a )  0 Filed at: 01/13/2018 1600   LOC Questions (1b )  0 Filed at: 01/13/2018 1600   LOC Commands (1c )  0 Filed at: 01/13/2018 1600   Best Gaze (2 )  0 Filed at: 01/13/2018 1600   Visual (3 )  0 Filed at: 01/13/2018 1600   Facial Palsy (4 )  0 Filed at: 01/13/2018 1600   Motor Arm, Left (5a )  0 [pt is slightly weaker than right ] Filed at: 01/13/2018 1600   Motor Arm, Right (5b )  0 Filed at: 01/13/2018 1600   Motor Leg, Left (6a )  0 [pt is slighty weaker than right ] Filed at: 01/13/2018 1600   Motor Leg, Right (6b )  0 Filed at: 01/13/2018 1600   Limb Ataxia (7 )  0 Filed at: 01/13/2018 1600   Sensory (8 )  0 Filed at: 01/13/2018 1600   Best Language (9 )  0 Filed at: 01/13/2018 1600   Dysarthria (10 )  0 Filed at: 01/13/2018 1600   Extinction and Inattention (11 ) (Formerly Neglect)  0 Filed at: 01/13/2018 1600   Total  0 Filed at: 01/13/2018 1600                        MDM  Number of Diagnoses or Management Options  TIA (transient ischemic attack): new and requires workup  Diagnosis management comments: 49-year-old male presents emergency department noted symptoms of TIA the last approximately 15-30 minutes    Doubt initial stroke at this point time since the Symptoms have since resolved  Aspirin given in the emergency department CT head of the contrast essentially unremarkable plan  inpatient management for workup and evaluation           Amount and/or Complexity of Data Reviewed  Clinical lab tests: reviewed and ordered  Tests in the radiology section of CPT®: ordered and reviewed  Review and summarize past medical records: yes      CritCare Time    Disposition  Final diagnoses:   TIA (transient ischemic attack)     Time reflects when diagnosis was documented in both MDM as applicable and the Disposition within this note     Time User Action Codes Description Comment    1/13/2018 12:47 PM Zechariah House Add [G45 9] TIA (transient ischemic attack)     1/13/2018  2:53 PM Jere Stewart Add [H53 9] Visual changes       ED Disposition     ED Disposition Condition Comment    Admit        Follow-up Information    None       Current Discharge Medication List      CONTINUE these medications which have NOT CHANGED    Details   esomeprazole (NexIUM) 20 mg capsule Take 20 mg by mouth every morning before breakfast      lisinopril (ZESTRIL) 10 mg tablet Take by mouth      aspirin 81 mg chewable tablet Chew      atorvastatin (LIPITOR) 40 mg tablet Take by mouth      metoprolol tartrate (LOPRESSOR) 25 mg tablet Take by mouth           No discharge procedures on file      ED Provider  Electronically Signed by           Brian Herrera DO  01/13/18 9908

## 2018-01-14 ENCOUNTER — APPOINTMENT (INPATIENT)
Dept: ULTRASOUND IMAGING | Facility: HOSPITAL | Age: 78
DRG: 068 | End: 2018-01-14
Payer: MEDICARE

## 2018-01-14 VITALS
WEIGHT: 180.38 LBS | BODY MASS INDEX: 28.99 KG/M2 | DIASTOLIC BLOOD PRESSURE: 76 MMHG | HEART RATE: 85 BPM | SYSTOLIC BLOOD PRESSURE: 149 MMHG | HEIGHT: 66 IN

## 2018-01-14 VITALS
HEIGHT: 67 IN | WEIGHT: 180.25 LBS | DIASTOLIC BLOOD PRESSURE: 83 MMHG | BODY MASS INDEX: 28.29 KG/M2 | SYSTOLIC BLOOD PRESSURE: 128 MMHG | HEART RATE: 69 BPM

## 2018-01-14 PROBLEM — G45.9 TIA (TRANSIENT ISCHEMIC ATTACK): Status: ACTIVE | Noted: 2018-01-13

## 2018-01-14 PROBLEM — I65.22 STENOSIS OF LEFT CAROTID ARTERY: Status: ACTIVE | Noted: 2018-01-14

## 2018-01-14 LAB
ANION GAP SERPL CALCULATED.3IONS-SCNC: 6 MMOL/L (ref 4–13)
BUN SERPL-MCNC: 18 MG/DL (ref 5–25)
CALCIUM SERPL-MCNC: 8.6 MG/DL (ref 8.3–10.1)
CHLORIDE SERPL-SCNC: 109 MMOL/L (ref 100–108)
CHOLEST SERPL-MCNC: 165 MG/DL (ref 50–200)
CO2 SERPL-SCNC: 28 MMOL/L (ref 21–32)
CREAT SERPL-MCNC: 1.05 MG/DL (ref 0.6–1.3)
ERYTHROCYTE [DISTWIDTH] IN BLOOD BY AUTOMATED COUNT: 13.2 % (ref 11.6–15.1)
GFR SERPL CREATININE-BSD FRML MDRD: 68 ML/MIN/1.73SQ M
GLUCOSE SERPL-MCNC: 79 MG/DL (ref 65–140)
HCT VFR BLD AUTO: 39.1 % (ref 36.5–49.3)
HDLC SERPL-MCNC: 44 MG/DL (ref 40–60)
HGB BLD-MCNC: 12.2 G/DL (ref 12–17)
LDLC SERPL CALC-MCNC: 105 MG/DL (ref 0–100)
MCH RBC QN AUTO: 29.7 PG (ref 26.8–34.3)
MCHC RBC AUTO-ENTMCNC: 31.2 G/DL (ref 31.4–37.4)
MCV RBC AUTO: 95 FL (ref 82–98)
PLATELET # BLD AUTO: 164 THOUSANDS/UL (ref 149–390)
PMV BLD AUTO: 10.5 FL (ref 8.9–12.7)
POTASSIUM SERPL-SCNC: 4.2 MMOL/L (ref 3.5–5.3)
RBC # BLD AUTO: 4.11 MILLION/UL (ref 3.88–5.62)
SODIUM SERPL-SCNC: 143 MMOL/L (ref 136–145)
T4 SERPL-MCNC: 9 UG/DL (ref 4.7–13.3)
TRIGL SERPL-MCNC: 81 MG/DL
TSH SERPL DL<=0.05 MIU/L-ACNC: 4.47 UIU/ML (ref 0.36–3.74)
WBC # BLD AUTO: 3.69 THOUSAND/UL (ref 4.31–10.16)

## 2018-01-14 PROCEDURE — 80048 BASIC METABOLIC PNL TOTAL CA: CPT | Performed by: NURSE PRACTITIONER

## 2018-01-14 PROCEDURE — 84436 ASSAY OF TOTAL THYROXINE: CPT | Performed by: INTERNAL MEDICINE

## 2018-01-14 PROCEDURE — G8979 MOBILITY GOAL STATUS: HCPCS

## 2018-01-14 PROCEDURE — 84443 ASSAY THYROID STIM HORMONE: CPT | Performed by: PHYSICIAN ASSISTANT

## 2018-01-14 PROCEDURE — 80061 LIPID PANEL: CPT | Performed by: NURSE PRACTITIONER

## 2018-01-14 PROCEDURE — 93880 EXTRACRANIAL BILAT STUDY: CPT

## 2018-01-14 PROCEDURE — G8978 MOBILITY CURRENT STATUS: HCPCS

## 2018-01-14 PROCEDURE — 97163 PT EVAL HIGH COMPLEX 45 MIN: CPT

## 2018-01-14 PROCEDURE — 85027 COMPLETE CBC AUTOMATED: CPT | Performed by: NURSE PRACTITIONER

## 2018-01-14 PROCEDURE — G8980 MOBILITY D/C STATUS: HCPCS

## 2018-01-14 RX ORDER — SIMETHICONE 80 MG
80 TABLET,CHEWABLE ORAL EVERY 6 HOURS PRN
Status: DISCONTINUED | OUTPATIENT
Start: 2018-01-14 | End: 2018-01-15 | Stop reason: HOSPADM

## 2018-01-14 RX ADMIN — ATORVASTATIN CALCIUM 40 MG: 40 TABLET, FILM COATED ORAL at 17:35

## 2018-01-14 RX ADMIN — PANTOPRAZOLE SODIUM 40 MG: 40 TABLET, DELAYED RELEASE ORAL at 05:37

## 2018-01-14 RX ADMIN — SIMETHICONE CHEW TAB 80 MG 80 MG: 80 TABLET ORAL at 22:12

## 2018-01-14 RX ADMIN — METOPROLOL TARTRATE 12.5 MG: 25 TABLET ORAL at 08:05

## 2018-01-14 RX ADMIN — ASPIRIN 81 MG 81 MG: 81 TABLET ORAL at 08:05

## 2018-01-14 RX ADMIN — LISINOPRIL 10 MG: 10 TABLET ORAL at 08:05

## 2018-01-14 NOTE — PROGRESS NOTES
Progress Note - Lissa Goldsmith 1940, 68 y o  male MRN: [id number]    Unit/Bed#: -01 Encounter: [id number]    Primary Care Provider: Eli Lambert MD   Date and time admitted to hospital: 1/13/2018 10:55 AM        * TIA (transient ischemic attack)   Assessment & Plan    · Visual changes with left sided hemiparesis   · Stroke pathway  · Neurology consult appreciated  · Ophthalmology consult appreciated- amaurosis fugax  · CT head (-)  · MRI of the brain-no acute intracranial abnormality  · CTA of the head and neck- No acute intracranial disease  72% stenosis just above the left ICA origin   No large vessel flow restrictive disease elsewhere within the head or neck   Anterior communicating artery patent, small left posterior communicating artery patent  · Vascular consulted- pt is amendable to elective Left CEA  · Check carotid duplex  · C/w asa 81 mg and Lipitor 40 mg  · Cholesterol- 165; trig 81; HDL- 44;   · Monitor neuro checks  · A1c 5 0  · CRP- 3 1  · homocystine level 11 4  · UA (-)  · TSH- 4 46, check t4  · Echo pending  · PT eval appreciated, pulmonary family's report  · Continue monitoring on telemetry        Stenosis of left carotid artery   Assessment & Plan    · CTA of the head and neck showing 72% stenosis of the left ICA  · Vascular consult appreciated planned for elective left CEA  · Pending carotid duplex  · Continue with aspirin statin        Hyperlipidemia   Assessment & Plan    · Cholesterol- 165; trig 81; HDL- 44;   · Continue with Lipitor 40 mg         Sciatica of right side   Assessment & Plan    · Supportive care  · Cane while ambulating        GERD (gastroesophageal reflux disease)   Assessment & Plan    · C/w PPI        Pulmonary emphysema (HCC)   Assessment & Plan    · Stable  ·  albuterol p r n          Hypertension   Assessment & Plan    · C/w lisinopril and metoprolol (metoprolol decreased to allow for permissive htn)        CAD (coronary atherosclerotic disease)   Assessment & Plan    · Status post CABG x4 in   · C/w asa and statin   · The patient follow up with Dr June Washington after discharge          VTE Pharmacologic Prophylaxis:   Pharmacologic: Enoxaparin (Lovenox)  Mechanical VTE Prophylaxis in Place: Yes    Patient Centered Rounds: I have performed bedside rounds with nursing staff today  Discussions with Specialists or Other Care Team Provider: d/w RN  D/w Pipe BATRES with neurology     Education and Discussions with Family / Patient: d/w patient  Message left for granddaughter Krista Scott- [phone]    Time Spent for Care: 30 minutes  More than 50% of total time spent on counseling and coordination of care as described above  Current Length of Stay: 1 day(s)    Current Patient Status: Inpatient   Certification Statement: The patient will continue to require additional inpatient hospital stay due to w/u for CEA    Discharge Plan: not stable for discharge    Code Status: Level 1 - Full Code      Subjective:   Pt denies any complaints  Visual changes resolved and left sided weakness and coldness resolved  Objective:     Vitals:   Temp (24hrs), Av 1 °F (36 7 °C), Min:97 8 °F (36 6 °C), Max:98 5 °F (36 9 °C)    HR:  [55-85] 55  Resp:  [18] 18  BP: (108-149)/(53-74) 110/53  SpO2:  [97 %-100 %] 97 %  Body mass index is 27 59 kg/m²  Input and Output Summary (last 24 hours): Intake/Output Summary (Last 24 hours) at 18 1337  Last data filed at 18 1300   Gross per 24 hour   Intake              540 ml   Output             2600 ml   Net            -2060 ml       Physical Exam:     Physical Exam   Constitutional: He is oriented to person, place, and time  He appears well-developed  No distress  Neck: Neck supple  Cardiovascular: Normal rate, regular rhythm, normal heart sounds and intact distal pulses  No murmur heard  Pulmonary/Chest: Effort normal and breath sounds normal  No respiratory distress  He has no wheezes   He has no rales  Abdominal: Soft  Bowel sounds are normal  He exhibits no distension  There is no tenderness  There is no rebound  Musculoskeletal: He exhibits no edema or tenderness  Neurological: He is alert and oriented to person, place, and time  No cranial nerve deficit  Skin: Skin is warm and dry  No rash noted  He is not diaphoretic  No erythema  Psychiatric: He has a normal mood and affect  Additional Data:     Labs:      Results from last 7 days  Lab Units 01/14/18  0434 01/13/18  1118   WBC Thousand/uL 3 69* 4 89   HEMOGLOBIN g/dL 12 2 13 7   HEMATOCRIT % 39 1 43 3   PLATELETS Thousands/uL 164 206   NEUTROS PCT %  --  68   LYMPHS PCT %  --  20   MONOS PCT %  --  10   EOS PCT %  --  2       Results from last 7 days  Lab Units 01/14/18  0434   SODIUM mmol/L 143   POTASSIUM mmol/L 4 2   CHLORIDE mmol/L 109*   CO2 mmol/L 28   BUN mg/dL 18   CREATININE mg/dL 1 05   CALCIUM mg/dL 8 6   GLUCOSE RANDOM mg/dL 79           * I Have Reviewed All Lab Data Listed Above  * Additional Pertinent Lab Tests Reviewed: All Labs Within Last 24 Hours Reviewed    Imaging:    Imaging Reports Reviewed Today Include:  CT head, CTA of the head and neck, MRI of the brain    Recent Cultures (last 7 days):           Last 24 Hours Medication List:     aspirin 81 mg Oral Daily   atorvastatin 40 mg Oral QPM   docusate sodium 100 mg Oral BID   enoxaparin 40 mg Subcutaneous Daily   lisinopril 10 mg Oral Daily   metoprolol tartrate 12 5 mg Oral Q12H ADRIANE   pantoprazole 40 mg Oral Early Morning        Today, Patient Was Seen By: MYLENE Connelly    ** Please Note: Dictation voice to text software may have been used in the creation of this document   **

## 2018-01-14 NOTE — ASSESSMENT & PLAN NOTE
· Visual changes with left sided hemiparesis   · Stroke pathway  · Neurology consult appreciated  · Ophthalmology consult appreciated- amaurosis fugax  · CT head (-)  · MRI of the brain-no acute intracranial abnormality  · CTA of the head and neck- No acute intracranial disease   72% stenosis just above the left ICA origin   No large vessel flow restrictive disease elsewhere within the head or neck   Anterior communicating artery patent, small left posterior communicating artery patent  · Vascular consulted- pt is amendable to elective Left CEA  · Check carotid duplex  · C/w asa 81 mg and Lipitor 40 mg  · Cholesterol- 165; trig 81; HDL- 44;   · Monitor neuro checks  · A1c 5 0  · CRP- 3 1  · homocystine level 11 4  · UA (-)  · TSH- 4 46, check t4  · Echo pending  · PT eval appreciated, pulmonary family's report  · Continue monitoring on telemetry

## 2018-01-14 NOTE — PHYSICAL THERAPY NOTE
PHYSICAL THERAPY EVALUATION  NAME:  Sp Rg  DATE: 01/14/18    AGE:   68 y o  Mrn:   [id number]  ADMIT DX:  TIA (transient ischemic attack) [G45 9]  Weakness [R53 1]    Past Medical History:   Diagnosis Date    Arthritis     Emphysema lung ([address] )     Hyperlipidemia     Hypertension      Length Of Stay: 1  Performed at least 2 patient identifiers during session: Name, Pasquale Butler and wristband    PHYSICAL THERAPY EVALUATION :    01/14/18 1052   Note Type   Note type Eval only   Pain Assessment   Pain Assessment No/denies pain  (at rest, but reports increasing pain with ambulation)   Effect of Pain on Daily Activities limits amb distance  Pt reports trunk flexion usually decreases pain   Home Living   Type of [address] [facility] Two level; Able to live on main level with bedroom/bathroom; Performs ADLs on one level; Other (Comment)  (no ARLENE)   Home Equipment Cane  (PRN)   Additional Comments Pt reports that he plans to move in to a senior apt in [facility] in the future   Prior Function   Level of [place] Independent with ADLs and functional mobility   Lives With Family;Daughter  (granddaughter and daughter)   ADL Assistance Independent   IADLs Independent  (drives)   Falls in the last 6 months >10  (reports >20 falls in year, attributes it to "Sciatic nerve")   Vocational Retired   Restrictions/Precautions   [place] Bearing Precautions Per Order No   Other Precautions Fall Risk;Impulsive   General   Additional Pertinent History Per surgery consult: 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; does appear anatomically amenable to L CEA   Family/Caregiver Present No   Cognition   Overall Cognitive Status WFL   Arousal/Participation Cooperative   Orientation Level Oriented X4   Following Commands Follows one step commands with increased time or repetition  (w/MMT)   RUE Strength   RUE Overall Strength Within Functional Limits - able to perform ADL tasks with strength   LUE Strength   LUE Overall Strength Within Functional Limits - able to perform ADL tasks with strength   RLE Assessment   RLE Assessment (negative SLR and piriformis)   Strength RLE   R Hip Flexion 4+/5   R Knee Extension 4+/5   R Ankle Dorsiflexion 4+/5   LLE Assessment   LLE Assessment (negative SLR and piriformis)   Strength LLE   L Hip Flexion 4+/5   L Knee Extension 4+/5   L Ankle Dorsiflexion 4+/5   Coordination   Movements are Fluid and Coordinated 1   Sensation WFL   Light Touch   RLE Light Touch Grossly intact   LLE Light Touch Grossly intact   Bed Mobility   Supine to Sit 6  Modified independent   Additional items Assist x 1   Sit to Supine 6  Modified independent   Additional items Assist x 1   Transfers   Sit to Stand 6  Modified independent   Additional items Assist x 1   Stand to Sit 6  Modified independent   Additional items Assist x 1   Ambulation/Elevation   Gait pattern Decreased R stance; Inconsistent johnathan; Forward Flexion   Gait Assistance 5  Supervision   Additional items Assist x 1   Assistive Device Straight cane  (in R hand)   Distance 250'   Stair Management Assistance Not tested  (as pt reports not having stairs at home)   Balance   Static Sitting Normal   Static Standing Fair +   Ambulatory Fair +   Endurance Deficit   Endurance Deficit No   Activity Tolerance   Activity Tolerance Patient limited by pain   Nurse Made Aware spoke to RN prior to session   Assessment   Prognosis Fair   Problem List Decreased endurance; Impaired balance;Decreased mobility;Pain  (gait deviations)   Goals   Patient Goals to go home   Plan   Treatment/Interventions Spoke to nursing   PT Frequency Other (Comment)  (DC from IPPT services)   Recommendation   Recommendation Home with family support  (recommend physican f/u re: chronic "back pain"/falls upon DC)   Equipment Recommended Cane   Barthel Index   Feeding 10   Bathing 5   Grooming Score 5   Dressing Score 5   Bladder Score 10   Bowels Score 10   Toilet Use Score 10   Transfers (Bed/Chair) Score 15   Mobility (Level Surface) Score 10   Stairs Score 0   Barthel Index Score 80   (Please find full objective findings from PT assessment regarding body systems outlined above)  Assessment: Pt is a 68 y o  male seen for PT evaluation s/p admit to [facility] on 1/13/2018 w/ Visual changes  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  Order placed for PT  Prior to admission, pt was independent w/ all functional mobility w/ prn use of cane, lived with daughter and granddaughter and had one floor set up w/o stairs, drove  Upon evaluation: Pt requires no physical assistance for bed mobility, transfers, and ambulation with cane  Pt's clinical presentation is currently unstable/unpredictable given the functional mobility deficits above, especially impaired balance, gait deviations, pain and fall risk, combined with medical complications of abnormal WBCs and multiple falls  From PT/mobility standpoint, recommendation at time of d/c would be home with family support and with cane in order to maximize pt's functional independence and consistency w/ mobility in order to facilitate return to PLOF  Additionally would recommend pt follow up with physician re: his back pain and multiple falls  No further IPPT indicated at this time as pt is close to his mobility baseline    The following objective measures were performed on IE: Barthel Index 80/100  Comorbidities affecting pt's physical performance at time of assessment include: HTN, COPD and former smoker, and back pain with multiple falls (about 20 per year)  Personal factors affecting pt at time of IE include: recent fall(s)    Yaritza Li, PT, DPT

## 2018-01-14 NOTE — PROGRESS NOTES
Progress Note - Neurology   Munir Layton 68 y o  male MRN: [id number]  Unit/Bed#: -01 Encounter: [id number]    Assessment/Plan:   3 68year old male admitted after episode of L sided visual disturbance likely amaurosis fugax   - MRI brain completed and no acute ischemia noted  Await formal radiology reading, but likely will need vascular surgery consultation if CTA shows L carotid artery narrowing     - Hemoglobin A1c noted to be 5 0  Fasting lipid panel reviewed, cholesterol 165, triglycerides 81, HDL 44,   - Continue on ASA and statin  - Blood pressures reviewed, continue with anti-hypertensive regimen  Avoid hypotension     - Await echocardiogram   - Continue to monitor on telemetry for arrhythmia     - PT/OT evaluations  - Monitor exam and notify with changes  Subjective: Munir Layton is a 68year old male admitted to the hospital after episode of L sided visual disturbance which has resolved  He notes that he slept well overnight and feels as though his left side is stronger today  He denies any visual complaints       ROS:  History obtained from the patient  General ROS: negative for - chills, fatigue or fever  Ophthalmic ROS: negative for - blurry vision, decreased vision or double vision  Respiratory ROS: negative for - shortness of breath  Cardiovascular ROS: negative for - chest pain  Gastrointestinal ROS: negative for - abdominal pain or nausea/vomiting  Musculoskeletal ROS: negative for - muscular weakness  Neurological ROS: negative for - confusion, dizziness, headaches, numbness/tingling, speech problems, visual changes or weakness    Medications  Scheduled Meds:  aspirin 81 mg Oral Daily   atorvastatin 40 mg Oral QPM   docusate sodium 100 mg Oral BID   enoxaparin 40 mg Subcutaneous Daily   lisinopril 10 mg Oral Daily   metoprolol tartrate 12 5 mg Oral Q12H ADRIANE   pantoprazole 40 mg Oral Early Morning     Continuous Infusions:   PRN Meds:   acetaminophen    albuterol   ondansetron    Vitals: Blood pressure 145/74, pulse 58, temperature 97 8 °F (36 6 °C), temperature source Oral, resp  rate 18, height 5' 8" (1 727 m), weight 82 3 kg (181 lb 7 oz), SpO2 99 %  ,Body mass index is 27 59 kg/m²      Physical Exam: /74   Pulse 58   Temp 97 8 °F (36 6 °C) (Oral)   Resp 18   Ht 5' 8" (1 727 m)   Wt 82 3 kg (181 lb 7 oz)   SpO2 99%   BMI 27 59 kg/m²   General appearance: alert and oriented, in no acute distress  Head: Normocephalic, without obvious abnormality, atraumatic  Eyes: negative findings: lids and lashes normal, conjunctivae and sclerae normal, pupils equal, round, reactive to light and accomodation and visual fields full to confrontation  Lungs: clear to auscultation bilaterally  Heart: regular rate and rhythm  Abdomen: soft, non-tender; bowel sounds normal; no masses,  no organomegaly  Extremities: extremities normal, warm and well-perfused; no cyanosis, clubbing, or edema  Skin: Skin color, texture, turgor normal  No rashes or lesions  Neurologic: Mental status: Alert, oriented, thought content appropriate  Cranial nerves: II: visual field normal, II: pupils equal, round, reactive to light and accommodation, III,VII: ptosis no ptosis noted, III,IV,VI: extraocular muscles extra-ocular motions intact, VII: upper facial muscle function normal bilaterally, VII: lower facial muscle function normal bilaterally, XII: tongue strength normal  Sensory: normal, Grossly intact to crude touch  Motor: Motor strength 5/5 B/L UE and LE except for LUE deltoid 4+/5  Coordination: finger to nose normal bilaterally    Labs  Recent Results (from the past 24 hour(s))   Fingerstick Glucose (POCT)    Collection Time: 01/13/18 10:18 AM   Result Value Ref Range    POC Glucose 113 65 - 140 mg/dl   CBC and differential    Collection Time: 01/13/18 11:18 AM   Result Value Ref Range    WBC 4 89 4 31 - 10 16 Thousand/uL    RBC 4 57 3 88 - 5 62 Million/uL    Hemoglobin 13 7 12 0 - 17 0 g/dL Hematocrit 43 3 36 5 - 49 3 %    MCV 95 82 - 98 fL    MCH 30 0 26 8 - 34 3 pg    MCHC 31 6 31 4 - 37 4 g/dL    RDW 13 3 11 6 - 15 1 %    MPV 10 1 8 9 - 12 7 fL    Platelets 587 194 - 035 Thousands/uL    Neutrophils Relative 68 43 - 75 %    Lymphocytes Relative 20 14 - 44 %    Monocytes Relative 10 4 - 12 %    Eosinophils Relative 2 0 - 6 %    Basophils Relative 0 0 - 1 %    Neutrophils Absolute 3 32 1 85 - 7 62 Thousands/µL    Lymphocytes Absolute 0 98 0 60 - 4 47 Thousands/µL    Monocytes Absolute 0 47 0 17 - 1 22 Thousand/µL    Eosinophils Absolute 0 10 0 00 - 0 61 Thousand/µL    Basophils Absolute 0 02 0 00 - 0 10 Thousands/µL   Basic metabolic panel    Collection Time: 01/13/18 11:18 AM   Result Value Ref Range    Sodium 140 136 - 145 mmol/L    Potassium 4 2 3 5 - 5 3 mmol/L    Chloride 103 100 - 108 mmol/L    CO2 29 21 - 32 mmol/L    Anion Gap 8 4 - 13 mmol/L    BUN 21 5 - 25 mg/dL    Creatinine 1 25 0 60 - 1 30 mg/dL    Glucose 78 65 - 140 mg/dL    Calcium 8 9 8 3 - 10 1 mg/dL    eGFR 55 ml/min/1 73sq m   Troponin I    Collection Time: 01/13/18 11:18 AM   Result Value Ref Range    Troponin I <0 02 <=0 04 ng/mL   B-type natriuretic peptide    Collection Time: 01/13/18 11:18 AM   Result Value Ref Range    NT-proBNP 73 <450 pg/mL   C-reactive protein    Collection Time: 01/13/18  5:59 PM   Result Value Ref Range    CRP 3 1 (H) <3 0 mg/L   Homocysteine, serum    Collection Time: 01/13/18  5:59 PM   Result Value Ref Range    Homocyst(e)ine, P/S 11 4 5 3 - 14 2 umol/L   Hemoglobin A1c    Collection Time: 01/13/18  5:59 PM   Result Value Ref Range    Hemoglobin A1C 5 0 4 2 - 6 3 %    EAG 97 mg/dl   Urinalysis with reflex to microscopic    Collection Time: 01/13/18  8:16 PM   Result Value Ref Range    Color, UA Yellow     Clarity, UA Clear     Specific [place], UA 1 010 1 003 - 1 030    pH, UA 6 0 4 5 - 8 0    Leukocytes, UA Negative Negative    Nitrite, UA Negative Negative    Protein, UA Negative Negative mg/dl Glucose, UA Negative Negative mg/dl    Ketones, UA Negative Negative mg/dl    Urobilinogen, UA 0 2 0 2, 1 0 E U /dl E U /dl    Bilirubin, UA Negative Negative    Blood, UA Negative Negative   Lipid Panel with Direct LDL reflex    Collection Time: 01/14/18  4:34 AM   Result Value Ref Range    Cholesterol 165 50 - 200 mg/dL    Triglycerides 81 <=150 mg/dL    HDL, Direct 44 40 - 60 mg/dL    LDL Calculated 105 (H) 0 - 100 mg/dL   Basic metabolic panel    Collection Time: 01/14/18  4:34 AM   Result Value Ref Range    Sodium 143 136 - 145 mmol/L    Potassium 4 2 3 5 - 5 3 mmol/L    Chloride 109 (H) 100 - 108 mmol/L    CO2 28 21 - 32 mmol/L    Anion Gap 6 4 - 13 mmol/L    BUN 18 5 - 25 mg/dL    Creatinine 1 05 0 60 - 1 30 mg/dL    Glucose 79 65 - 140 mg/dL    Calcium 8 6 8 3 - 10 1 mg/dL    eGFR 68 ml/min/1 73sq m   CBC (With Platelets)    Collection Time: 01/14/18  4:34 AM   Result Value Ref Range    WBC 3 69 (L) 4 31 - 10 16 Thousand/uL    RBC 4 11 3 88 - 5 62 Million/uL    Hemoglobin 12 2 12 0 - 17 0 g/dL    Hematocrit 39 1 36 5 - 49 3 %    MCV 95 82 - 98 fL    MCH 29 7 26 8 - 34 3 pg    MCHC 31 2 (L) 31 4 - 37 4 g/dL    RDW 13 2 11 6 - 15 1 %    Platelets 944 801 - 123 Thousands/uL    MPV 10 5 8 9 - 12 7 fL   TSH, 3rd generation    Collection Time: 01/14/18  4:34 AM   Result Value Ref Range    TSH 3RD GENERATON 4 466 (H) 0 358 - 3 740 uIU/mL     Imaging   Personally reviewed images with attending neurologist, MRI brain with no evidence of acute ischemia  CTA head and neck completed, appears to show narrowing of L carotid artery near bulb  Will await formal radiology reading  VTE Prophylaxis: Enoxaparin (Lovenox)    Counseling / Coordination of Care  Total time spent today 25 minutes  Greater than 50% of total time was spent with the patient and / or family counseling and / or coordination of care  A description of the counseling / coordination of care: Discussed results of imaging thus far   Discussed that will await formal radiology read of CTA and at that point will determine need for vascular surgery consultation  He expressed understanding  Will continue on ASA and statin for now

## 2018-01-14 NOTE — CONSULTS
Consult Note - Vascular Surgery   [facility]: [phone]    Assessment:  Left eye amaurosis fugax  HTN  HLD  CAD s/p CABG (2001)  Emphysema    Plan:  -Patient describes symptoms consistent with amaurosis fugax to the left eye  In setting of L ICA stenosis would recommend proceeding with L CEA  Will arrange outpt followup for surgical planning  Case discussed with neurology  -Due to cardiac history of HTN and CABG would require cardiac risk stratification  No history of cardiac arrhythmia/Afib/PAfib echocardiogram pending  -Continue aspirin and statin  ______________________________________________________________________    Consulting Service: [facility]    Chief Complaint: TIA/Left carotid stenosis    HPI: Arleth Sifuenteskeeper is a 68 y o  male with HTN, HLD, CAD s/p CABG, emphysema, chronic lower back pain with right sided sciatica requiring cane for ambulation, currently admitted for stroke workup  Patient states that yesterday at 10am he suddenly lost vision only to the top half of left eye visual field  He says it was like his eye lid was half closed, but he doesn't think it was  At this same time he states his vision was out of focus in both eyes  He states he rubbed his eyes and the vision returned to normal within 15 minutes  He came directly to [facility] for evaluation  He states he drove himself after his vision returned to normal   He denies any lateralizing weakness or numbness  He states that he was told by doctors that his left side was not as strong, but he feels normal   Denies any chest pain, SOB, palpitations  No history of Afib/PAFib  He is a former smoker, quit 1952      Review of Systems:  General: Left eye visual disturbance, resolved  Cardiovascular: no chest pain or dyspnea on exertion  Respiratory: no cough, shortness of breath, or wheezing  Gastrointestinal: no abdominal pain, change in bowel habits, or black or bloody stools  Genitourinary ROS: no dysuria, trouble voiding, or hematuria  Musculoskeletal ROS: negative  Neurological ROS: Left eye visual disturbance consistent with amaurosis fugax  Hematological and Lymphatic ROS: negative  Dermatological ROS: negative  Psychological ROS: negative  Ophthalmic ROS: Left eye visual disturbance  ENT ROS: negative    Past Medical History:  Past Medical History:   Diagnosis Date    Arthritis     Emphysema lung ([address] )     Hyperlipidemia     Hypertension        Past Surgical History:  Past Surgical History:   Procedure Laterality Date    CORONARY ARTERY BYPASS GRAFT      x2 2001    HERNIA REPAIR      TRANSURETHRAL RESECTION OF PROSTATE         Social History:  History   Alcohol Use    0 6 oz/week    1 Glasses of wine per week     History   Drug Use No     History   Smoking Status    Former Smoker    Packs/day: 1 00    Years: 3 00    Types: Cigarettes   Smokeless Tobacco    Never Used       Family History:  Family History   Problem Relation Age of Onset    Lung cancer Mother     Other Father      sepsis       Allergies:   Allergies   Allergen Reactions    Penicillins        Medications:  Current Facility-Administered Medications   Medication Dose Route Frequency    acetaminophen (TYLENOL) tablet 650 mg  650 mg Oral Q4H PRN    albuterol (PROVENTIL HFA,VENTOLIN HFA) inhaler 2 puff  2 puff Inhalation Q4H PRN    aspirin chewable tablet 81 mg  81 mg Oral Daily    atorvastatin (LIPITOR) tablet 40 mg  40 mg Oral QPM    docusate sodium (COLACE) capsule 100 mg  100 mg Oral BID    enoxaparin (LOVENOX) subcutaneous injection 40 mg  40 mg Subcutaneous Daily    lisinopril (ZESTRIL) tablet 10 mg  10 mg Oral Daily    metoprolol tartrate (LOPRESSOR) partial tablet 12 5 mg  12 5 mg Oral Q12H ADRIANE    ondansetron (ZOFRAN) injection 4 mg  4 mg Intravenous Q6H PRN    pantoprazole (PROTONIX) EC tablet 40 mg  40 mg Oral Early Morning       Vitals:  BP      Temp      Pulse     Resp      SpO2        I/Os:  I/O last 3 completed shifts:  In: -   Out: 1000 [Urine:1000]  I/O this shift:  In: 240 [P O :240]  Out: -     Lab Results and Cultures:   CBC with diff:   Lab Results   Component Value Date    WBC 3 69 (L) 2018    HGB 12 2 2018    HCT 39 1 2018    MCV 95 2018     2018    MCH 29 7 2018    MCHC 31 2 (L) 2018    RDW 13 2 2018    MPV 10 5 2018   ,   BMP/CMP:  Lab Results   Component Value Date     2018    K 4 2 2018     (H) 2018    CO2 28 2018    ANIONGAP 6 2018    BUN 18 2018    CREATININE 1 05 2018    GLUCOSE 79 2018    CALCIUM 8 6 2018    AST 17 2016    ALT 23 2016    ALKPHOS 56 2016    PROT 6 2 (L) 2016    ALBUMIN 3 2 (L) 2016    BILITOT 0 80 2016    EGFR 68 2018   ,   Lipid Panel:   Lab Results   Component Value Date    CHOL 165 2018    CHOL 154 2017   ,   Coags: No results found for: PT, PTT, INR,     Blood Culture: No results found for: BLOODCX,   Urinalysis:   Lab Results   Component Value Date    COLORU Yellow 2018    CLARITYU Clear 2018    SPECGRAV 1 010 2018    PHUR 6 0 2018    LEUKOCYTESUR Negative 2018    NITRITE Negative 2018    PROTEINUA Negative 2018    GLUCOSEU Negative 2018    KETONESU Negative 2018    BILIRUBINUR Negative 2018    BLOODU Negative 2018   ,   Urine Culture: No results found for: URINECX,   Wound Culure: No results found for: WOUNDCULT    Imagin17 CTA head/neck  FINDINGS:  NONCONTRAST BRAIN     PARENCHYMA:  No acute disease    Dense vascular calcification, age-appropriate volume loss           VENTRICLES AND EXTRA-AXIAL SPACES:  Normal for patient's age      VISUALIZED ORBITS AND PARANASAL SINUSES:  Opacified hypoplastic left maxillary sinus      CALVARIUM AND EXTRACRANIAL SOFT TISSUES:   Normal         CERVICAL VASCULATURE     AORTIC ARCH AND GREAT VESSELS:  Normal aortic arch and great vessel origins  Normal visualized subclavian vessels           RIGHT VERTEBRAL ARTERY CERVICAL SEGMENT:  Normal origin  The vessel is normal in caliber throughout the neck      LEFT VERTEBRAL ARTERY CERVICAL SEGMENT:  Left vertebral artery arises independently from the arch  The vessel is normal in caliber throughout the neck      RIGHT EXTRACRANIAL CAROTID SEGMENT:  Normal caliber common carotid artery  Minor thickening of the vessel walls of the sense of the bifurcation where dense calcification in the common and internal carotid artery are noted without significant stenosis  Cervical ICA normal      LEFT EXTRACRANIAL CAROTID SEGMENT:  Normal origin  Minor wall thickening as the vessel ascends to the bifurcation  Calcification within the distal common carotid artery extending asymmetrically, 16 mm into the internal carotid where complex calcific and lucent plaque narrows the vessel to a minimum of 1 2 mm on image 154  Vessel above the stenosis approaches 4 3 mm on image 117  This equates to a 72% origin stenosis           NASCET criteria was used to determine the degree of internal carotid artery diameter stenosis        INTRACRANIAL VASCULATURE      INTERNAL CAROTID ARTERIES:  Advanced atherosclerotic changes of both cavernous carotid arteries was scattered calcification  Ophthalmic artery origins normal   ICA terminus normal      ANTERIOR CIRCULATION:  Right A1 is dominant, anterior communicating arteries patent  Proximal A2 segments normal      MIDDLE CEREBRAL ARTERY CIRCULATION:  M1 segment and middle cerebral artery branches demonstrate normal enhancement bilaterally      DISTAL VERTEBRAL ARTERIES:  Right vertebral artery dominant, minor calcification intracranially  PICA origins and vertebrobasilar junction normal      BASILAR ARTERY:  Basilar artery is normal in caliber    Normal superior cerebellar arteries      POSTERIOR CEREBRAL ARTERIES: Both posterior cerebral arteries arises from the basilar tip  Both arteries demonstrate normal flow-related enhancement  Left posterior communicating artery normal      DURAL VENOUS SINUSES:  Normal         NON VASCULAR ANATOMY     BONY STRUCTURES:  No acute osseous abnormality      SOFT TISSUES OF THE NECK:  Normal              THORACIC INLET: Emphysematous changes with bilateral apical pleural thickening     IMPRESSION:     No acute intracranial disease      72% stenosis just above the left ICA origin  No large vessel flow restrictive disease elsewhere within the head or neck  Anterior communicating artery patent, small left posterior communicating artery patent  1/13/18 MRI brain  FINDINGS:     BRAIN PARENCHYMA:  No acute disease  There is no acute ischemia, intracranial mass, mass effect or edema  No abnormal hemosiderin deposition        VENTRICLES:  The ventricles are normal in size and contour      SELLA AND PITUITARY GLAND:  Normal      ORBITS:  Normal      PARANASAL SINUSES:  Left maxillary sinus is opacified, and hypoplastic  Trace mucosal disease elsewhere      VASCULATURE:  Evaluation of the major intracranial vasculature demonstrates appropriate flow voids      CALVARIUM AND SKULL BASE:  Normal      EXTRACRANIAL SOFT TISSUES:  Normal      IMPRESSION:     No acute ischemia, no intracranial mass  No overt orbital pathology  Physical Exam:    General appearance: alert and oriented, in no acute distress  Head: Normocephalic, without obvious abnormality, atraumatic  Eyes: conjunctivae/corneas clear  PERRL, EOM's intact  Fundi benign  Throat: lips, mucosa, and tongue normal; teeth and gums normal  Neck: no adenopathy, no JVD and supple, symmetrical, trachea midline  Back: symmetric, no curvature  ROM normal  No CVA tenderness    Lungs: clear to auscultation bilaterally  Chest wall: no tenderness  Heart: regular rate and rhythm, S1, S2 normal, no murmur, click, rub or gallop  Abdomen: soft, non-tender; bowel sounds normal; no masses,  no organomegaly  Genitalia: deferred  Rectal: deferred  Extremities: extremities normal, warm and well-perfused; no cyanosis, clubbing, or edema  Skin: Skin color, texture, turgor normal  No rashes or lesions  Neurologic: No focal deficits observed, moves both upper and lower extremities equal and strong with muscle strength 5/5, no dysarthria, tongue is midline     Pulse exam:  Radial: Right: 2+ Left[de-identified] 2+  DP: Right: 2+ Left: 2+      MYLENE Donohue  1/14/2018

## 2018-01-14 NOTE — ASSESSMENT & PLAN NOTE
· CTA of the head and neck showing 72% stenosis of the left ICA  · Vascular consult appreciated planned for elective left CEA  · Pending carotid duplex  · Continue with aspirin statin

## 2018-01-14 NOTE — ASSESSMENT & PLAN NOTE
· Status post CABG x4 in 2001  · C/w asa and statin   · The patient follow up with Dr Maren Cowan after discharge

## 2018-01-15 ENCOUNTER — APPOINTMENT (INPATIENT)
Dept: NON INVASIVE DIAGNOSTICS | Facility: HOSPITAL | Age: 78
DRG: 068 | End: 2018-01-15
Payer: MEDICARE

## 2018-01-15 VITALS
RESPIRATION RATE: 18 BRPM | OXYGEN SATURATION: 97 % | TEMPERATURE: 98.4 F | BODY MASS INDEX: 27.5 KG/M2 | HEIGHT: 68 IN | HEART RATE: 63 BPM | SYSTOLIC BLOOD PRESSURE: 115 MMHG | DIASTOLIC BLOOD PRESSURE: 56 MMHG | WEIGHT: 181.44 LBS

## 2018-01-15 LAB
ATRIAL RATE: 74 BPM
ATRIAL RATE: 74 BPM
P AXIS: 78 DEGREES
P AXIS: 87 DEGREES
PR INTERVAL: 130 MS
PR INTERVAL: 136 MS
QRS AXIS: 78 DEGREES
QRS AXIS: 78 DEGREES
QRSD INTERVAL: 122 MS
QRSD INTERVAL: 122 MS
QT INTERVAL: 394 MS
QT INTERVAL: 398 MS
QTC INTERVAL: 426 MS
QTC INTERVAL: 441 MS
T WAVE AXIS: 73 DEGREES
T WAVE AXIS: 78 DEGREES
VENTRICULAR RATE: 70 BPM
VENTRICULAR RATE: 74 BPM

## 2018-01-15 PROCEDURE — C8929 TTE W OR WO FOL WCON,DOPPLER: HCPCS

## 2018-01-15 RX ORDER — ALBUTEROL SULFATE 90 UG/1
2 AEROSOL, METERED RESPIRATORY (INHALATION) EVERY 4 HOURS PRN
Qty: 1 INHALER | Refills: 0 | Status: SHIPPED | OUTPATIENT
Start: 2018-01-15 | End: 2018-10-07

## 2018-01-15 RX ADMIN — LISINOPRIL 10 MG: 10 TABLET ORAL at 10:44

## 2018-01-15 RX ADMIN — ASPIRIN 81 MG 81 MG: 81 TABLET ORAL at 10:44

## 2018-01-15 RX ADMIN — METOPROLOL TARTRATE 12.5 MG: 25 TABLET ORAL at 10:44

## 2018-01-15 RX ADMIN — ENOXAPARIN SODIUM 40 MG: 40 INJECTION SUBCUTANEOUS at 10:45

## 2018-01-15 RX ADMIN — PANTOPRAZOLE SODIUM 40 MG: 40 TABLET, DELAYED RELEASE ORAL at 05:41

## 2018-01-15 RX ADMIN — PERFLUTREN 0.5 ML/MIN: 6.52 INJECTION, SUSPENSION INTRAVENOUS at 10:31

## 2018-01-15 NOTE — ASSESSMENT & PLAN NOTE
· CTA of the head and neck showing 72% stenosis of the left ICA  · appreciate involvement from vascular team   Patient will follow up in their office in the next 1 week and will have outpatient cardiac clearance and subsequently will be planned for his surgery  · Pending carotid duplex  · Continue with aspirin statin

## 2018-01-15 NOTE — ASSESSMENT & PLAN NOTE
· Status post CABG x4 in 2001, no recent concerns  · 2D echocardiogram done today can be followed up with Cardiology as an outpatient  · C/w asa and statin   · Preoperative clearance can be obtained as an outpatient and an appointment has been made by Cardiology for January 31st

## 2018-01-15 NOTE — DISCHARGE SUMMARY
Discharge- Chadwick Torres 1940, 68 y o  male MRN: [id number]    Unit/Bed#: -01 Encounter: [id number]    Primary Care Provider: Madeline Lynch MD   Date and time admitted to hospital: 1/13/2018 10:55 AM  * TIA (transient ischemic attack)   Assessment & Plan    · Visual changes with left sided hemiparesis   · Stroke pathway  · Neurology consult appreciated  · Ophthalmology consult appreciated- amaurosis fugax  · CT head (-)  · MRI of the brain-no acute intracranial abnormality  · CTA of the head and neck- No acute intracranial disease  72% stenosis just above the left ICA origin   No large vessel flow restrictive disease elsewhere within the head or neck   Anterior communicating artery patent, small left posterior communicating artery patent  · Vascular consulted- pt is amendable to elective Left CEA  · Awaiting results of carotid duplex  · C/w asa 81 mg and Lipitor 40 mg  · Cholesterol- 165; trig 81; HDL- 44;   · Monitor neuro checks  · A1c 5 0  · CRP- 3 1  · homocystine level 11 4  · UA (-)  · TSH- 4 46, check t4  · Echo pending result  · PT eval appreciated, pulmonary family's report          Stenosis of left carotid artery   Assessment & Plan    · CTA of the head and neck showing 72% stenosis of the left ICA  · appreciate involvement from vascular team   Patient will follow up in their office in the next 1 week and will have outpatient cardiac clearance and subsequently will be planned for his surgery  · Pending carotid duplex  · Continue with aspirin statin        Pulmonary emphysema (HCC)   Assessment & Plan    · Stable  ·  albuterol p r n          Hypertension   Assessment & Plan    · C/w lisinopril and metoprolol (metoprolol decreased to allow for permissive htn)        CAD (coronary atherosclerotic disease)   Assessment & Plan    · Status post CABG x4 in 2001, no recent concerns  · 2D echocardiogram done today can be followed up with Cardiology as an outpatient  · C/w asa and statin · Preoperative clearance can be obtained as an outpatient and an appointment has been made by Cardiology for January 31st            Consultations During Hospital Stay:  · Neurology  · Vascular surgery  · Ophthalmology    Procedures Performed:     · See above    Significant Findings / Test Results:     · See above    Incidental Findings:   · None     Test Results Pending at Discharge (will require follow up):   · 2D echocardiogram results  · Carotid Doppler results     Outpatient Tests Requested:  · Outpatient cardiac clearance already scheduled    Complications:  None    Reason for Admission:  Left eye amaurosis    Hospital Course: Lana Hall is a 68 y o  male patient who originally presented to the hospital on 1/13/2018 due to visual disturbance consistent with amaurosis fugax of the left eye  He was admitted to the hospital and placed on stroke pathway, seen in consultation by Neurology and also Ophthalmology  He had workup including CT scan of the head which was unremarkable, MRI of the brain which was unremarkable, and then a CTA showed left carotid stenosis of 72%  Carotid Dopplers were subsequently performed and vascular surgery is planning for an elective left carotid endarterectomy as an outpatient  He will need cardiac clearance given his prior history of CABG and this has been arranged in 2 weeks  A 2D echocardiogram was also performed here but can be interpreted and followed up at that cardiology visit  He had no evidence of paroxysmal AFib on monitoring  He will continue with aspirin and statin  Patient is feeling completely fine and is anxious for discharge  Physical therapy felt he would be stable for return home with family support  Please see above list of diagnoses and related plan for additional information       Condition at Discharge: stable     Discharge Day Visit / Exam:     Subjective:  Patient says he had a great night sleep and has absolutely no complaints or concerns including any change in vision, headache, change in strength or sensation  Vitals: Blood Pressure: 115/56 (01/15/18 0702)  Pulse: 63 (01/15/18 0702)  Temperature: 98 4 °F (36 9 °C) (01/15/18 0702)  Temp Source: Oral (01/15/18 2957)  Respirations: 18 (01/15/18 0702)  Height: 5' 8" (172 7 cm) (01/13/18 1453)  Weight - Scale: 82 3 kg (181 lb 7 oz) (01/13/18 1101)  SpO2: 97 % (01/15/18 0702)  Exam:   Physical Exam   Constitutional: He appears well-developed and well-nourished  No distress  HENT:   Head: Normocephalic and atraumatic  Eyes: Conjunctivae are normal  Right eye exhibits no discharge  Left eye exhibits no discharge  No scleral icterus  Neck: Neck supple  Cardiovascular: Normal rate, regular rhythm and normal heart sounds  No murmur heard  Pulmonary/Chest: Effort normal  No respiratory distress  He has no wheezes  He has no rales  Decreased breath sounds throughout all lung fields consistent with emphysema but no evidence of respiratory distress, cough, wheezing   Abdominal: Soft  Bowel sounds are normal  He exhibits no distension  There is no tenderness  Musculoskeletal: He exhibits no edema, tenderness or deformity  Neurological: He is alert  Awake alert and interactive he has no focal deficits noted   Skin: Skin is warm and dry  No rash noted  He is not diaphoretic  No erythema  No pallor  Psychiatric: He has a normal mood and affect  His behavior is normal  Judgment and thought content normal    Nursing note and vitals reviewed  Discussion with Family:  Called patient's son and left a message on the machine; also called granddaughter Aaliyah Brown and left a message    Discharge instructions/Information to patient and family:   See after visit summary for information provided to patient and family  Provisions for Follow-Up Care:  See after visit summary for information related to follow-up care and any pertinent home health orders        Disposition:     Home    For Discharges to [facility]F:   · Not Applicable to this Patient - Not Applicable to this Patient    Planned Readmission: for CEA, date depending on Vascular     Discharge Statement:  I spent 50 minutes discharging the patient  This time was spent on the day of discharge  I had direct contact with the patient on the day of discharge  Greater than 50% of the total time was spent examining patient, answering all patient questions, arranging and discussing plan of care with patient as well as directly providing post-discharge instructions  Additional time then spent on discharge activities  Bedside nursing rounds performed  Case discussed with Cardiology, Neurology and vascular surgery and case management  Discharge Medications:  See after visit summary for reconciled discharge medications provided to patient and family        ** Please Note: This note has been constructed using a voice recognition system **

## 2018-01-15 NOTE — PROGRESS NOTES
Progress Note - Neurology   Javier Tapia 68 y o  male MRN: [id number]  Unit/Bed#: -01 Encounter: [id number]    Assessment/Plan:  3 68year old male admitted after episode of L sided visual disturbance likely amaurosis fugax   - Carotid dopplers pending, vascular surgery team following and planning for CEA   - Cardiology consult pending for pre-surgical eval given prior history of CABG   - Continue on ASA 81 mg QD and atorvastatin 40 mg QPM     - Continue to monitor on telemetry    - Echocardiogram pending     - Blood pressure goal normotension, blood pressures reviewed and are acceptable on current anti-hypertensive regimen    - Reviewed labs, hemoglobin A1c 5 0, fasting lipid panel: total cholesterol 165, triglycerides 81, HDL 44,      - PT/OT    - Monitor exam and notify with changes  - Attempted to contact POA via telephone but unable to reach her, will try again this afternoon  Subjective: Javier Tapia is a 68year old male admitted to the hospital after episode of L sided visual disturbance which has resolved  He notes he is feeling well and denies complaints  Denies recurrence of symptoms       ROS:  History obtained from the patient  General ROS: negative for - chills, fatigue or fever  Ophthalmic ROS: negative for - blurry vision, decreased vision or double vision  Respiratory ROS: negative for - shortness of breath  Cardiovascular ROS: negative for - chest pain  Gastrointestinal ROS: negative for - abdominal pain or nausea/vomiting  Musculoskeletal ROS: negative for - muscular weakness  Neurological ROS: negative for - confusion, dizziness, headaches, numbness/tingling, speech problems, visual changes or weakness    Medications  Scheduled Meds:  aspirin 81 mg Oral Daily   atorvastatin 40 mg Oral QPM   docusate sodium 100 mg Oral BID   enoxaparin 40 mg Subcutaneous Daily   lisinopril 10 mg Oral Daily   metoprolol tartrate 12 5 mg Oral Q12H ADRIANE   pantoprazole 40 mg Oral Early Morning Continuous Infusions:   PRN Meds:   acetaminophen    albuterol    ondansetron    simethicone    Vitals: Blood pressure 115/56, pulse 63, temperature 98 4 °F (36 9 °C), temperature source Oral, resp  rate 18, height 5' 8" (1 727 m), weight 82 3 kg (181 lb 7 oz), SpO2 97 %  ,Body mass index is 27 59 kg/m²  Physical Exam: /56   Pulse 63   Temp 98 4 °F (36 9 °C) (Oral)   Resp 18   Ht 5' 8" (1 727 m)   Wt 82 3 kg (181 lb 7 oz)   SpO2 97%   BMI 27 59 kg/m²   General appearance: alert and oriented, in no acute distress  Head: Normocephalic, without obvious abnormality, atraumatic  Eyes: negative findings: lids and lashes normal, conjunctivae and sclerae normal, pupils equal, round, reactive to light and accomodation and visual fields full to confrontation  Lungs: clear to auscultation bilaterally  Heart: regular rate and rhythm  Abdomen: soft, non-tender; bowel sounds normal; no masses,  no organomegaly  Extremities: extremities normal, warm and well-perfused; no cyanosis, clubbing, or edema  Skin: Skin color, texture, turgor normal  No rashes or lesions  Neurologic: Mental status: Alert, oriented, thought content appropriate  Cranial nerves: II: visual field normal, II: pupils equal, round, reactive to light and accommodation, III,VII: ptosis no ptosis noted, III,IV,VI: extraocular muscles extra-ocular motions intact, V: facial light touch sensation normal bilaterally, VII: upper facial muscle function normal bilaterally, VII: lower facial muscle function normal bilaterally, XII: tongue strength normal  Sensory: normal, Grossly intact to crude touch  Motor: Motor strength 5/5 B/L UE and LE  Coordination: finger to nose normal bilaterally    Labs  No results found for this or any previous visit (from the past 24 hour(s))  Imaging   Personally reviewed images and reports in PACs  CTA head and neck with and without contrast- No acute intracranial disease  72% stenosis just above the L ICA origin  No large vessel flow restrictive disease elsewhere within the head or neck  Anterior communicating artery patent, small left posterior communicating artery patent  VTE Prophylaxis: Enoxaparin (Lovenox)    Counseling / Coordination of Care  Total time spent today 20 minutes  Greater than 50% of total time was spent with the patient and / or family counseling and / or coordination of care  A description of the counseling / coordination of care: Discussed plan of care with patient at bedside  Will plan to continue with pre-surgical work-up as per vascular surgery team  Carotid dopplers completed and results pending, echocardiogram pending as well as cardiology eval  He expressed understanding  Did attempt to contact patient's POA via telephone but unable to reach her, will try again this afternoon

## 2018-01-15 NOTE — PROGRESS NOTES
Progress Note - Vascular Surgery     Assessment:    1  Left eye amaurosis fugax  2  Carotid artery stenosis with significant left internal carotid artery disease greater than 70% by CTA       Plan for elective left carotid endarterectomy to be scheduled on the outpatient setting  3  Coronary artery disease status S/P CABG x 3 1991  4  Hypertension  5  Hypercholesterolemia   6  Emphysema  7  Ambulatory dysfunction/limited functional status due to sciatica    Plan: This is a 45-year-old gentleman with a history of cardiovascular and pulmonary disease who was admitted to the hospital for transient ischemic attack  The patient presented with complaint of transient visual field defect involving in the left upper visual field  His symptoms lasted for 15 minutes and resolved prior to admission to the hospital   Workup in the hospital is significant for high-grade left internal carotid artery stenosis on CTA of head and neck for which elective left carotid endarterectomy is recommended  He has not had a stroke by imaging studies  The patient is being seen by Neurology and Cardiology  There is no evidence of atrial fibrillation  Plan for elective left carotid endarterectomy to be scheduled on the outpatient setting  CTA reviewed and appears to show adequate anatomy for planned surgery  Carotid duplex is pending  The patient will be seen in our office by Dr Ghislaine Crockett, vascular surgery 01/17/2018 at 4:00 p m  at the [facility] for evaluation and coordination of elective L CEA  Patient's appointment was entered in his discharge information and he was given an appointment card  The surgery can be scheduled once he is risk stratified from cardiology  2D echo Doppler ordered for today and he has a cardiology appointment on 01/31/2018  Patient will continue with optimal medical therapy on aspirin, statin and ACE-I  The case/ thoughts/ recommendations were discussed with ILEANA  Subjective:    Re:  Transient LEFT eye visual field defect on day of presentation (1/13/2018) with loss of vision in the top half of the left eye for 15 minutes now resolved  Patient offers no new complaints  He has no further visual complaints  No dizziness, lightheadedness or weakness  He gives no history of prior TIAs or strokes  He has baseline ambulatory dysfunction but no new balance problems  He is out of bed ambulating  There he has known history of acute cardiopulmonary disease  No overt chest pain, pressure, tightness or increased shortness of breath  Cardiology evaluation to be performed on the outpatient setting  A 2D echo Doppler ordered for today      Vitals:  /56   Pulse 63   Temp 98 4 °F (36 9 °C) (Oral)   Resp 18   Ht 5' 8" (1 727 m)   Wt 82 3 kg (181 lb 7 oz)   SpO2 97%   BMI 27 59 kg/m²     I/Os: 1200/2600    Lab Results and Cultures:   Lab Results   Component Value Date    WBC 3 69 (L) 01/14/2018    HGB 12 2 01/14/2018    HCT 39 1 01/14/2018    MCV 95 01/14/2018     01/14/2018     Lab Results   Component Value Date    GLUCOSE 79 01/14/2018    CALCIUM 8 6 01/14/2018     01/14/2018    K 4 2 01/14/2018    CO2 28 01/14/2018     (H) 01/14/2018    BUN 18 01/14/2018    CREATININE 1 05 01/14/2018     No results found for: INR, PROTIME     Blood Culture: No results found for: BLOODCX,   Urinalysis:   Lab Results   Component Value Date    COLORU Yellow 01/13/2018    CLARITYU Clear 01/13/2018    SPECGRAV 1 010 01/13/2018    PHUR 6 0 01/13/2018    LEUKOCYTESUR Negative 01/13/2018    NITRITE Negative 01/13/2018    PROTEINUA Negative 01/13/2018    GLUCOSEU Negative 01/13/2018    KETONESU Negative 01/13/2018    BILIRUBINUR Negative 01/13/2018    BLOODU Negative 01/13/2018   ,   Urine Culture: No results found for: URINECX,   Wound Culure: No results found for: WOUNDCULT    Medications:  Current Facility-Administered Medications   Medication Dose Route Frequency    acetaminophen (TYLENOL) tablet 650 mg  650 mg Oral Q4H PRN    albuterol (PROVENTIL HFA,VENTOLIN HFA) inhaler 2 puff  2 puff Inhalation Q4H PRN    aspirin chewable tablet 81 mg  81 mg Oral Daily    atorvastatin (LIPITOR) tablet 40 mg  40 mg Oral QPM    docusate sodium (COLACE) capsule 100 mg  100 mg Oral BID    enoxaparin (LOVENOX) subcutaneous injection 40 mg  40 mg Subcutaneous Daily    lisinopril (ZESTRIL) tablet 10 mg  10 mg Oral Daily    metoprolol tartrate (LOPRESSOR) partial tablet 12 5 mg  12 5 mg Oral Q12H ADRIANE    ondansetron (ZOFRAN) injection 4 mg  4 mg Intravenous Q6H PRN    pantoprazole (PROTONIX) EC tablet 40 mg  40 mg Oral Early Morning    simethicone (MYLICON) chewable tablet 80 mg  80 mg Oral Q6H PRN       Imaging:    VAS Carotid 1/14:     CTA Head and Neck w/wo contrast 1/13/2018 was reviewed and shows high grade LICA lesion  The official report as follows:  No acute intracranial disease      72% stenosis just above the left ICA origin  No large vessel flow restrictive disease elsewhere within the head or neck  Anterior communicating artery patent, small left posterior communicating artery patent      MRI Brain 1/13/2018 review and negative  Official report: No acute ischemia, no intracranial mass  No overt orbital pathology  CT head 1/13/2018: no acute pathology per reportNormal study after vasodilation and low level exercise  There was  image artifact, without diagnostic evidence for perfusion abnormality  Left  ventricular systolic function was normal     MPI 08/15/2016 reported:  Normal study after vasodilation and low level exercise  There was  image artifact, without diagnostic evidence for perfusion abnormality     Left ventricular systolic function was normal       Physical Exam:    General appearance: alert and oriented, in no acute distress  Skin: Skin color, texture, turgor normal  No rashes or lesions  Neurologic: Grossly normal  Head: Normocephalic, without obvious abnormality, atraumatic  Eyes: conjunctivae/corneas clear  PERRL, EOM's intact  Fundi benign  Throat: lips, mucosa, and tongue normal; teeth and gums normal  Neck: no adenopathy, no JVD, supple, symmetrical, trachea midline, thyroid not enlarged, symmetric, no tenderness/mass/nodules and + LEFT carotid bruit  Lungs: Decreased BS L> R  No w/r/r  Chest wall: no tenderness  Heart: regular rate and rhythm, S1, S2 normal and 1-2/6 sm aortic area  Abdomen: soft, non-tender; bowel sounds normal; no masses,  no organomegaly  Extremities: extremities normal, warm and well-perfused; no cyanosis, clubbing, or edema        Out of bed and and ambulating  Gait stable  No ataxia        Pulse exam:  Radial: Right: 2+ Left[de-identified] 2+  Femoral: Right: 1+ Left: 1+  DP: Right: 2+ Left: 2+    [employer]  1/15/2018   [facility]

## 2018-01-15 NOTE — PLAN OF CARE
Problem: Potential for Falls  Goal: Patient will remain free of falls  INTERVENTIONS:  - Assess patient frequently for physical needs  -  Identify cognitive and physical deficits and behaviors that affect risk of falls  -  [place] fall precautions as indicated by assessment   - Educate patient/family on patient safety including physical limitations  - Instruct patient to call for assistance with activity based on assessment  - Modify environment to reduce risk of injury  - Consider OT/PT consult to assist with strengthening/mobility   Outcome: Progressing      Problem: NEUROSENSORY - ADULT  Goal: Achieves stable or improved neurological status  INTERVENTIONS  - Monitor and report changes in neurological status  - Initiate measures to prevent increased intracranial pressure  - Maintain blood pressure and fluid volume within ordered parameters to optimize cerebral perfusion  - Monitor temperature, glucose, and sodium or any other associated labs   Initiate appropriate interventions as ordered  - Monitor for seizure activity   - Administer anti-seizure medications as ordered   Outcome: Progressing    Goal: Achieves maximal functionality and self care  INTERVENTIONS  - Monitor swallowing and airway patency with patient fatigue and changes in neurological status  - Encourage and assist patient to increase activity and self care with guidance from rehab services  - Encourage visually impaired, hearing impaired and aphasic patients to use assistive/communication devices   Outcome: Progressing      Problem: CARDIOVASCULAR - ADULT  Goal: Maintains optimal cardiac output and hemodynamic stability  INTERVENTIONS:  - Monitor I/O, vital signs and rhythm  - Monitor for S/S and trends of decreased cardiac output i e  bleeding, hypotension  - Administer and titrate ordered vasoactive medications to optimize hemodynamic stability  - Assess quality of pulses, skin color and temperature  - Assess for signs of decreased coronary artery perfusion - ex  Angina  - Instruct patient to report change in severity of symptoms   Outcome: Progressing    Goal: Absence of cardiac dysrhythmias or at baseline rhythm  INTERVENTIONS:  - Continuous cardiac monitoring, monitor vital signs, obtain 12 lead EKG if indicated  - Administer antiarrhythmic and heart rate control medications as ordered  - Monitor electrolytes and administer replacement therapy as ordered   Outcome: Progressing      Problem: Neurological Deficit  Goal: Neurological status is stable or improving  Interventions:  - Monitor and assess patient's level of consciousness, motor function, sensory function, and level of assistance needed for ADLs  - Monitor and report changes from baseline  Collaborate with interdisciplinary team to initiate plan and implement interventions as ordered  - Provide and maintain a safe environment  - Utilize seizure precautions  - Utilize fall precautions  - Utilize aspiration precautions  - Utilize bleeding precautions  Outcome: Progressing      Problem: Activity Intolerance/Impaired Mobility  Goal: Mobility/activity is maintained at optimum level for patient  Interventions:  - Assess and monitor patient  barriers to mobility and need for assistive/adaptive devices  - Assess patient's emotional response to limitations  - Collaborate with interdisciplinary team and initiate plans and interventions as ordered  - Encourage independent activity per ability   - Maintain proper body alignment  - Perform active/passive rom as tolerated/ordered  - Plan activities to conserve energy   - Turn patient  Outcome: Progressing      Problem: Communication Impairment  Goal: Ability to express needs and understand communication  Assess patient's communication skills and ability to understand information  Patient will demonstrate use of effective communication techniques, alternative methods of communication and understanding even if not able to speak       - Encourage communication and provide alternate methods of communication as needed  - Collaborate with case management/ for discharge needs  - Include patient/family/caregiver in decisions related to communication  Outcome: Completed Date Met: 01/15/18      Problem: Potential for Aspiration  Goal: Non-ventilated patient's risk of aspiration is minimized  Assess and monitor vital signs, respiratory status, and labs (WBC)  Monitor for signs of aspiration (tachypnea, cough, rales, wheezing, cyanosis, fever)  - Assess and monitor patient's ability to swallow  - Place patient up in chair to eat if possible  - HOB up at 90 degrees to eat if unable to get patient up into chair   - Supervise patient during oral intake  - Instruct patient to take small bites  - Instruct patient to take small single sips when taking liquids  - Follow patient-specific strategies generated by speech pathologist   Outcome: Progressing      Problem: Nutrition  Goal: Nutrition/Hydration status is improving  Monitor and assess patient's nutrition/hydration status for malnutrition (ex- brittle hair, bruises, dry skin, pale skin and conjunctiva, muscle wasting, smooth red tongue, and disorientation)  Collaborate with interdisciplinary team and initiate plan and interventions as ordered  Monitor patient's weight and dietary intake as ordered or per policy  Utilize nutrition screening tool and intervene per policy  Determine patient's food preferences and provide high-protein, high-caloric foods as appropriate  - Assist patient with eating   - Allow adequate time for meals   - Encourage patient to take dietary supplement as ordered  - Collaborate with clinical nutritionist   - Include patient/family/caregiver in decisions related to nutrition    Outcome: Progressing

## 2018-01-15 NOTE — ASSESSMENT & PLAN NOTE
· Visual changes with left sided hemiparesis   · Stroke pathway  · Neurology consult appreciated  · Ophthalmology consult appreciated- amaurosis fugax  · CT head (-)  · MRI of the brain-no acute intracranial abnormality  · CTA of the head and neck- No acute intracranial disease   72% stenosis just above the left ICA origin   No large vessel flow restrictive disease elsewhere within the head or neck   Anterior communicating artery patent, small left posterior communicating artery patent  · Vascular consulted- pt is amendable to elective Left CEA  · Awaiting results of carotid duplex  · C/w asa 81 mg and Lipitor 40 mg  · Cholesterol- 165; trig 81; HDL- 44;   · Monitor neuro checks  · A1c 5 0  · CRP- 3 1  · homocystine level 11 4  · UA (-)  · TSH- 4 46, check t4  · Echo pending result  · PT eval appreciated, pulmonary family's report

## 2018-01-15 NOTE — CASE MANAGEMENT
Initial Clinical Review    Admission: Date/Time/Statement: 1/13/18 @ 1307     Orders Placed This Encounter   Procedures    Inpatient Admission (expected length of stay for this patient is greater than two midnights)     Standing Status:   Standing     Number of Occurrences:   1     Order Specific Question:   Admitting Physician     Answer:   Catrina Villa     Order Specific Question:   Level of Care     Answer:   Med Surg [16]     Order Specific Question:   Estimated length of stay     Answer:   More than 2 Midnights     Order Specific Question:   Certification     Answer:   I certify that inpatient services are medically necessary for this patient for a duration of greater than two midnights  See H&P and MD Progress Notes for additional information about the patient's course of treatment  ED: Date/Time/Mode of Arrival:   ED Arrival Information     Expected Arrival Acuity Means of Arrival Escorted By Service Admission Type    - 1/13/2018 10:55 [facility] [id number] [address]  Emergency    Arrival Complaint    -          Chief Complaint:   Chief Complaint   Patient presents with    Visual Field Change     pt reports vision changes approx 1000 when he states "i saw metal in my vision"  [facility]  Pt noted coldness felling on left side  repoerts trouble expressing words  pt has weakened  on left side  History of Illness: 68 y o  male with a PMH of HTN, HLD, Former smoker, COPD/emphysema who presents with visual changes  Patient reports that he was sitting with his brother and friend around 10:00 a m  when he noticed that he could no longer see on the top portion of his left eye and the bottom portion was blurry, his right eye he was seeing different colors mainly Silver when he would look at different objects  He reported that he kept rubbing his eyes to see if this would improve and it lasted approximately 10 minutes    The symptoms resolved spontaneously  He did become diaphoretic with the event  [facility] and they called EMS to bring him to the hospital for further evaluation  The patient noted after the fact that he was feeling cold on his whole left side from the top of his head to the bottom of his toes  The patient now reports that his left side just feels funny    Patient reports that he wears glasses and his last eye exam was approximately 2 years ago  Patient denies any lightheadedness, dizziness, syncope, speech difficulty, swallowing difficulties, tremors or seziures  ED Vital Signs:   ED Triage Vitals [01/13/18 1055]   Temperature Pulse Respirations Blood Pressure SpO2   98 °F (36 7 °C) 72 18 141/67 100 %      Temp Source Heart Rate Source Patient Position - Orthostatic VS BP Location FiO2 (%)   Oral Monitor Lying Right arm --      Pain Score       No Pain        Wt Readings from Last 1 Encounters:   01/13/18 82 3 kg (181 lb 7 oz)     Abnormal Labs/Diagnostic Test Results:     CXR: No active disease   emphysema  ED Treatment:   Medication Administration from 01/13/2018 1055 to 01/13/2018 1354       Date/Time Order Dose Route Action Action by Comments     01/13/2018 1110 sodium chloride 0 9 % bolus 1,000 mL 1,000 mL Intravenous New Bag Ella Shafer RN      01/13/2018 1119 aspirin chewable tablet 324 mg 324 mg Oral Given Ella Shafer RN         Physical Exam   Constitutional: He is oriented to person, place, and time  He appears well-developed and well-nourished  Cardiovascular: Normal rate and regular rhythm  Exam reveals no friction rub  No murmur heard  Pulmonary/Chest: Breath sounds normal  No respiratory distress  He has no wheezes  He has no rales  Neurological: He is alert and oriented to person, place, and time  GCS 15  AAOx4  No focal neuro deficits  CN II-XII intact  PERRL  EOMI  Sondra Pisano No pronator drift   strength 5/5 bilaterally   B/L UE strength 5/5 throughout  Finger to nose normal  Cerebellar function normal  Ambulates without difficulty  B/L LE strength 5/5 throughout  Gross sensation to b/l upper and lower extremities intact  Past Medical/Surgical History:    Active Ambulatory Problems     Diagnosis Date Noted    CAD (coronary atherosclerotic disease) 08/13/2016    Hypertension 08/13/2016    Hyperlipemia 08/13/2016    Atypical chest pain 08/13/2016    Hypothyroid 01/13/2018    GERD (gastroesophageal reflux disease) 01/13/2018    Sciatica of right side 01/13/2018    Hyperlipidemia     Em[facility] lung ([facility] [address] [facility]     Arthritis      Resolved Ambulatory Problems     Diagnosis Date Noted    No Resolved Ambulatory Problems     Past Medical History:   Diagnosis Date    Arthritis     Emphysema lung ([facility] [address] [facility]     Hyperlipidemia     Hypertension        Admitting Diagnosis: TIA (transient ischemic attack) [G45 9]  Weakness [R53 1]    Age/Sex: 68 y o  male    Assessment/Plan:     Visual changes   Assessment & Plan     · Visual changes with left sided hemiparesis   · Stroke pathway  · Consult neurology  · Consult Ophthalmology  · CT head (-)  · Check MRI of the brain  · C/w asa 81 mg and Lipitor 40 mg  § Check lipid panel  · Monitor neuro checks  · Check A1c, CRP, homocystine level, UA, TSH  · Check echo  · PT/OT eval  · Monitor on telemetry, troponin on admission 0 02       Sciatica of right side   Assessment & Plan     · Supportive care  · Cane while ambulating       GERD (gastroesophageal reflux disease)   Assessment & Plan     · C/w PPI       Pulmonary emphysema (HCC)   Assessment & Plan     · Add albuterol p r n        Hyperlipemia   Assessment & Plan     · Check lipid panel  · C/w lipitor 40mg daily        Hypertension   Assessment & Plan     · C/w lisinopril and metoprolol       CAD (coronary atherosclerotic disease)   Assessment & Plan     · Status post CABG x4 in 2001  · C/w asa and statin   · The patient follow up with Dr Miriam Granado after discharge           VTE Prophylaxis: Enoxaparin (Lovenox)  / sequential compression device   Code Status: full code   POLST: POLST form is not discussed and not completed at this time  Discussion with family: message left for son hubert      Anticipated Length of Stay:  Patient will be admitted on an Inpatient basis with an anticipated length of stay of  Greater than 2 midnights     Justification for Hospital Stay: CVA r/o        Admission Orders:  Inpatient/Tele  Continuous Cardiac Monitoring  Serial Cardiac Enzymes q6h x 3  Consult neuro r/e TIA  Bilateral Sequential Compression Device  OT/PT Consult  Echocardiogram  MRI of Brain  Carotid Dopplar  Dysphagia evaluation  Neuro Check q4h    Scheduled Meds:   aspirin 81 mg Oral Daily   atorvastatin 40 mg Oral QPM   docusate sodium 100 mg Oral BID   enoxaparin 40 mg Subcutaneous Daily   lisinopril 10 mg Oral Daily   metoprolol tartrate 12 5 mg Oral Q12H ADRIANE   pantoprazole 40 mg Oral Early Morning

## 2018-01-16 ENCOUNTER — GENERIC CONVERSION - ENCOUNTER (OUTPATIENT)
Dept: OTHER | Facility: OTHER | Age: 78
End: 2018-01-16

## 2018-01-16 LAB — TSH RECEP AB SER-ACNC: <0.5 IU/L (ref 0–1.75)

## 2018-01-16 NOTE — PROGRESS NOTES
Assessment   1  Vision changes (368 9) (H53 9)    Plan   Due to vision changes, weakness in left side he was sent to the ER via EMS  He also presented 10 minutes after symptoms started so we quickly referred him to emergency services  Chief Complaint   1  Eyesight Problems  Chief Complaint Free Text Note Form: Pt presents with L eye blurred vision that started at 10am , and felt tingling in head along with heaviness in head  BS- 112, EKG- NSR, VSS      History of Present Illness   HPI: 80-year-old male presents to the urgent care complaining of left eye blurriness in vision changes starting today  He reports an about 10 minutes before presentation at 10:00 a m  he [place] [employer] [place] and felt his left eye go blurry and could not see  He reports some pressure on his eye  He also reports feeling facial tingling on the left side of his face and his left arm feels weak  He denies a history of stroke  He does have a history he reports of some irregular heartbeat  He takes a baby aspirin  He does not feel short of breath or any chest pain right now  No nausea  Hospital Based Practices Required Assessment:      Pain Assessment      the patient states they do not have pain  Active Problems   1  Acute left-sided low back pain with left-sided sciatica (724 2,724 3) (M54 42)   2  Back pain (724 5) (M54 9)   3  Benign essential hypertension (401 1) (I10)   4  Bradycardia (427 89) (R00 1)   5  Chronic obstructive pulmonary disease (496) (J44 9)   6  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 (724 2,724 3,338 29)     (M54 41,G89 29)   7  Hypercholesterolemia (272 0) (E78 00)   8  Hyperlipidemia (272 4) (E78 5)   9  Hypothyroidism (244 9) (E03 9)   10  Need for influenza vaccination (V04 81) (Z23)   11  Osteoarthritis of spine with radiculopathy, lumbar region (721 3) (M47 26)   12  Other secondary scoliosis, 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)   13   S/P CABG (coronary artery bypass graft) (V45 81) (Z95 1)   14  Screening for genitourinary condition (V81 6) (Z13 89)   15  Special screening for other neurological conditions (V80 09) (Z13 89)    Past Medical History   1  History of Acute bronchitis, unspecified organism (466 0) (J20 9)   2  History of Benign Prostatic Hypertrophy Without Urinary Obstruction With Other Lower     Urinary Tract Symptoms (600 00)   3  Depression screen (V79 0) (Z13 89)   4  History of Hip pain, unspecified laterality   5  History of chest pain (V13 89) (Z87 898)   6  History of chronic obstructive lung disease (V12 69) (Z87 09)   7  History of coronary atherosclerosis (V12 59) (Z86 79)   8  History of dehydration (V12 29) (Z86 39)   9  History of psoriasis (V13 3) (Z87 2)   10  History of shortness of breath (V13 89) (Z87 898)   11  History of tinea cruris (V12 09) (Z86 19)   12  History of Screening PSA (prostate specific antigen) (V76 44) (Z12 5)   13  History of Trochanteric bursitis, unspecified laterality    Family History   Mother    1  Family history of Lung Cancer (V16 1)  Father    2  Family history of    3  Family history of sepsis (V18 8) (Z83 1)    Social History    · Caffeine Use   · Daily alcohol use   · Former smoker (V15 82) (X92 783)   · Marital History - Currently    · Never Used Drugs   · Occupation:    Surgical History   1  History of CABG   2  History of Transurethral Resection Of Prostate (TURP)    Current Meds    1  Aspirin 81 MG Oral Tablet Chewable; USE AS DIRECTED; Therapy: (Recorded:90Qov7956) to Recorded   2  Atorvastatin Calcium 40 MG Oral Tablet; TAKE 1 TABLET DAILY; Therapy: 43Sjo8222 to (Evaluate:97Key0644)  Requested for: 04Bkg4814; Last     Rx:37Cnj3593 Ordered   3  Lisinopril 10 MG Oral Tablet; Take 1 tablet daily; Therapy: 31Twi6229 to (Evaluate:78Vni1265)  Requested for: 76Yzc0717; Last     Rx:70Gkt3684 Ordered   4   Metoprolol Tartrate 25 MG Oral Tablet; TAKE 1 TABLET TWICE DAILY  Requested for:     98JTQ1272; Last Rx:19Kra8340; Status: ACTIVE - Retrospective Authorization Ordered   5  NexIUM 20 MG Oral Capsule Delayed Release; Therapy: (Recorded:67Kmf9265) to Recorded    Allergies   1  Penicillins  2  No Known Environmental Allergies   3  No Known Food Allergies    Vitals   Signs   Recorded: 71JGB7941 10:19AM   Heart Rate: 77  Respiration: 18  Systolic: 175  Diastolic: 68  O2 Saturation: 100  Pain Scale: 0    Physical Exam        Constitutional      General appearance: No acute distress, well appearing and well nourished  Eyes      Conjunctiva and lids: No swelling, erythema, or discharge  Pupils and irises: Equal, round and reactive to light  Pulmonary Some increased work of breathing but he says this is his baseline due to some emphysema  Auscultation of lungs: Clear to auscultation  Cardiovascular      Palpation of heart: Normal PMI, no thrills  Auscultation of heart: Abnormal  -- He is irregular on auscultation with what sounds like PVCs but became regular on EKG  Examination of extremities for edema and/or varicosities: Normal        Abdomen      Abdomen: Non-tender, no masses  Musculoskeletal      Gait and station: Abnormal  -- He required assistance to ambulate in and was unsteady  Neurologic      Cranial nerves: Cranial nerves 2-12 intact  Psychiatric      Orientation to person, place and time: Normal        Additional Exam:  He is weak 2 left  strength versus right  No facial droop her arm drop  He does have some weakness with left dorsiflexion versus right dorsiflexion  Sensation intact bilateral upper and lower extremities  Results/Data   ECG: A 12 lead ECG was performed and was normal -- No acute ischemia  Rhythm and rate: normal sinus rhythm        Future Appointments      Date/Time Provider Specialty Site   03/01/2018 09:30 AM Margot Mead MD Internal Medicine [facility]     Signatures    Electronically signed by : Constance Kim, [facility]; Jan 13 2018 10:34AM EST                       (Author)     Electronically signed by : ADRIANA Canchola ; Supa 15 2018 10:48AM EST                       (Co-author)

## 2018-01-17 ENCOUNTER — GENERIC CONVERSION - ENCOUNTER (OUTPATIENT)
Dept: OTHER | Facility: OTHER | Age: 78
End: 2018-01-17

## 2018-01-23 VITALS
SYSTOLIC BLOOD PRESSURE: 128 MMHG | RESPIRATION RATE: 18 BRPM | OXYGEN SATURATION: 100 % | HEART RATE: 77 BPM | DIASTOLIC BLOOD PRESSURE: 68 MMHG

## 2018-01-23 VITALS
HEART RATE: 76 BPM | BODY MASS INDEX: 28.43 KG/M2 | SYSTOLIC BLOOD PRESSURE: 128 MMHG | OXYGEN SATURATION: 99 % | TEMPERATURE: 98.3 F | WEIGHT: 181.13 LBS | HEIGHT: 67 IN | DIASTOLIC BLOOD PRESSURE: 60 MMHG

## 2018-01-23 NOTE — MISCELLANEOUS
Message   Recorded as Task   Date: 01/17/2018 04:27 PM, Created By: Juana Rosario   Task Name: Miscellaneous   Assigned To: vascular cardiac clearances,Team   Regarding Patient: Amy Neri, Status: Active   Comment:    Juana Rosario - 17 Jan 2018 4:27 PM     TASK CREATED  pt being scheduled for left cea   scheduled for cardiac clearance 3/31/18 with dr Tania Medina - 17 Jan 2018 4:29 PM     TASK EDITED   Beean Radha - 17 Jan 2018 4:41 PM     TASK EDITED  on board and in [address] [place] [address]        Active Problems    1  Acute left-sided low back pain with left-sided sciatica (724 2,724 3) (M54 42)   2  Back pain (724 5) (M54 9)   3  Benign essential hypertension (401 1) (I10)   4  Bradycardia (427 89) (R00 1)   5  CAD (coronary artery disease) (414 00) (I25 10)   6  Carotid stenosis (433 10) (I65 29)   7  Chronic obstructive pulmonary disease (496) (J44 9)   8  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 (724 2,724 3,338 29)   (M54 41,G89 29)   9  Hemiparesis (342 90) (G81 90)   10  Hypercholesterolemia (272 0) (E78 00)   11  Hyperlipidemia (272 4) (E78 5)   12  Hypothyroidism (244 9) (E03 9)   13  Need for influenza vaccination (V04 81) (Z23)   14  Osteoarthritis of spine with radiculopathy, lumbar region (721 3) (M47 26)   15  Other secondary scoliosis, 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)   16  Pulmonary emphysema (492 8) (J43 9)   17  S/P CABG (coronary artery bypass graft) (V45 81) (Z95 1)   18  Screening for genitourinary condition (V81 6) (Z13 89)   19  Special screening for other neurological conditions (V80 09) (Z13 89)   20  Stenosis of left carotid artery (433 10) (I65 22)   21  TIA (transient ischemic attack) (435 9) (G45 9)   22  Vision changes (368 9) (H53 9)    Current Meds   1  Albuterol 90 MCG/ACT AERS; INHALE 2 PUFFS Every 4 hours PRN; Therapy: (Recorded:16Jan2018) to Recorded   2  Aspirin 81 MG Oral Tablet Chewable; USE AS DIRECTED;    Therapy: (Recorded:15Sep2016) to Recorded   3  Atorvastatin Calcium 40 MG Oral Tablet; TAKE 1 TABLET DAILY; Therapy: 26Xtw7448 to (Evaluate:18Feb2018)  Requested for: 89Yiv1696; Last   Rx:15Cmb5407 Ordered   4  Lisinopril 10 MG Oral Tablet; Take 1 tablet daily; Therapy: 15Sep2016 to (Evaluate:37Jse2766)  Requested for: 58Ylk5297; Last   Rx:50Pfk9653 Ordered   5  Metoprolol Tartrate 25 MG Oral Tablet; TAKE 0 5 TABLET Every twelve hours; Therapy: (Recorded:16Jan2018) to Recorded   6  NexIUM 20 MG Oral Capsule Delayed Release (Esomeprazole Magnesium); Therapy: (Recorded:13Jan2018) to Recorded    Allergies    1  Penicillins    2  No Known Environmental Allergies   3   No Known Food Allergies    Signatures   Electronically signed by : Linda Jacques RN; Jan 17 2018  4:41PM EST                       (Author)

## 2018-01-23 NOTE — MISCELLANEOUS
History of Present Illness  TCM Communication [facility]: The patient is being contacted for follow-up after hospitalization and FARHAD scheduled 1/24/18 w/ [facility] in [facility]  He was hospitalized at [facility]  The date of admission: 1/13/18, date of discharge: 1/15/18  Diagnosis: TIA, stenosis of left Carotid artery, pulmonary emphysema, HTN, CAD  He was discharged to home  Medications reviewed and updated today  He scheduled a follow up appointment  Follow-up appointments with other specialists: cardiologist  The patient is currently asymptomatic  Counseling was provided to the patient  Communication performed and completed by kb      Active Problems    1  Acute left-sided low back pain with left-sided sciatica (724 2,724 3) (M54 42)   2  Back pain (724 5) (M54 9)   3  Benign essential hypertension (401 1) (I10)   4  Bradycardia (427 89) (R00 1)   5  CAD (coronary artery disease) (414 00) (I25 10)   6  Chronic obstructive pulmonary disease (496) (J44 9)   7  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 (724 2,724 3,338 29)   (M54 41,G89 29)   8  Hemiparesis (342 90) (G81 90)   9  Hypercholesterolemia (272 0) (E78 00)   10  Hyperlipidemia (272 4) (E78 5)   11  Hypothyroidism (244 9) (E03 9)   12  Need for influenza vaccination (V04 81) (Z23)   13  Osteoarthritis of spine with radiculopathy, lumbar region (721 3) (M47 26)   14  Other secondary scoliosis, 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)   15  Pulmonary emphysema (492 8) (J43 9)   16  S/P CABG (coronary artery bypass graft) (V45 81) (Z95 1)   17  Screening for genitourinary condition (V81 6) (Z13 89)   18  Special screening for other neurological conditions (V80 09) (Z13 89)   19  Stenosis of left carotid artery (433 10) (I65 22)   20  Vision changes (368 9) (H53 9)    Past Medical History    1  History of Acute bronchitis, unspecified organism (466 0) (J20 9)   2   History of Benign Prostatic Hypertrophy Without Urinary Obstruction With Other Lower   Urinary Tract Symptoms (600 00)   3  Depression screen (V79 0) (Z13 89)   4  History of Hip pain, unspecified laterality   5  History of chest pain (V13 89) (Z87 898)   6  History of chronic obstructive lung disease (V12 69) (Z87 09)   7  History of coronary atherosclerosis (V12 59) (Z86 79)   8  History of dehydration (V12 29) (Z86 39)   9  History of psoriasis (V13 3) (Z87 2)   10  History of shortness of breath (V13 89) (Z87 898)   11  History of tinea cruris (V12 09) (Z86 19)   12  History of Screening PSA (prostate specific antigen) (V76 44) (Z12 5)   13  History of Trochanteric bursitis, unspecified laterality    Surgical History    1  History of CABG   2  History of Transurethral Resection Of Prostate (TURP)    Family History  Mother    1  Family history of Lung Cancer (V16 1)  Father    2  Family history of    3  Family history of sepsis (V18 8) (Z83 1)    Social History    · Caffeine Use   · Daily alcohol use   · Former smoker (W26 89) (E97 061)   · Marital History - Currently    · Never Used Drugs   · Occupation:    Current Meds   1  Albuterol 90 MCG/ACT AERS; INHALE 2 PUFFS Every 4 hours PRN; Therapy: (Recorded:2018) to Recorded   2  Aspirin 81 MG Oral Tablet Chewable; USE AS DIRECTED; Therapy: (Recorded:2016) to Recorded   3  Atorvastatin Calcium 40 MG Oral Tablet; TAKE 1 TABLET DAILY; Therapy: 25Ebk2774 to (Evaluate:2018)  Requested for: 03Iln5743; Last   Rx:71Nji2088 Ordered   4  Lisinopril 10 MG Oral Tablet; Take 1 tablet daily; Therapy: 33Auz8652 to (Evaluate:35Zqh1796)  Requested for: 05Npt5760; Last   Rx:16Sms3201 Ordered   5  Metoprolol Tartrate 25 MG Oral Tablet; TAKE 0 5 TABLET Every twelve hours; Therapy: (Recorded:2018) to Recorded   6  NexIUM 20 MG Oral Capsule Delayed Release; Therapy: (Recorded:2018) to Recorded    Allergies    1  Penicillins    2  No Known Environmental Allergies   3   No Known Food Allergies    Health Management  Depression screening   *VB-Depression Screening; every 1 year; Last 69WWL4793; Next Due: 84DFJ9795; Active  History of Need for diphtheria-tetanus-pertussis (Tdap) vaccine, adult/adolescent   Adacel 5-2-15 5 LF-MCG/0 5 Intramuscular Suspension; every 1 year; Next Due: 04CCI2930; Overdue  History of Screening for other and unspecified genitourinary condition   *VB - Urinary Incontinence Screen (Dx Z13 89 Screen for UI); every 1 year; Last 56IDK5378; Next  Due: 03FNU1981; Active  Special screening for other neurological conditions   *VB - Fall Risk Assessment  (Dx Z13 89 Screen for Neurologic Disorder); every 1 year; Last  33LXZ7992; Next Due: 04JXN9088; Active    Future Appointments    Date/Time Provider Specialty Site   03/01/2018 09:30 AM Shari Barrera MD Internal Medicine [facility]     Signatures   Electronically signed by : Shar Peter, ; Jan 16 2018  5:15PM EST                       (Co-author)    Electronically signed by :  Fran Dandy, MD; Jan 23 2018 12:40PM EST                       (Author)

## 2018-01-24 ENCOUNTER — TELEPHONE (OUTPATIENT)
Dept: VASCULAR SURGERY | Facility: CLINIC | Age: 78
End: 2018-01-24

## 2018-01-24 ENCOUNTER — OFFICE VISIT (OUTPATIENT)
Dept: INTERNAL MEDICINE CLINIC | Age: 78
End: 2018-01-24
Payer: MEDICARE

## 2018-01-24 VITALS
HEIGHT: 67 IN | BODY MASS INDEX: 28.22 KG/M2 | SYSTOLIC BLOOD PRESSURE: 124 MMHG | DIASTOLIC BLOOD PRESSURE: 68 MMHG | HEART RATE: 58 BPM | TEMPERATURE: 98.3 F | WEIGHT: 179.8 LBS | OXYGEN SATURATION: 98 %

## 2018-01-24 VITALS
TEMPERATURE: 96.8 F | HEART RATE: 76 BPM | HEIGHT: 67 IN | SYSTOLIC BLOOD PRESSURE: 114 MMHG | RESPIRATION RATE: 14 BRPM | DIASTOLIC BLOOD PRESSURE: 72 MMHG | BODY MASS INDEX: 28.13 KG/M2 | WEIGHT: 179.25 LBS

## 2018-01-24 DIAGNOSIS — I10 ESSENTIAL HYPERTENSION: Primary | ICD-10-CM

## 2018-01-24 DIAGNOSIS — E78.2 MIXED HYPERLIPIDEMIA: ICD-10-CM

## 2018-01-24 PROCEDURE — 99496 TRANSJ CARE MGMT HIGH F2F 7D: CPT | Performed by: INTERNAL MEDICINE

## 2018-01-24 NOTE — PROGRESS NOTES
Assessment/Plan:    TIA   recently patient was admitted to [facility] with TIA symptoms  Found to have left carotid artery stenosis  Patient is scheduled for carotid artery  End arterectomy  patient is on echo training 81 mg and statins  Diagnoses and all orders for this visit:    Essential hypertension    Mixed hyperlipidemia         Continue present dose of statins  Will check lipid profile on next visit  Subjective:   No new complaints today  Patient ID: Nolvia Stuart is a 68 y o  male  Hypertension   This is a chronic problem  The current episode started more than 1 year ago  The problem is controlled  Pertinent negatives include no blurred vision, chest pain, headaches, malaise/fatigue, neck pain, palpitations or shortness of breath  Risk factors for coronary artery disease include dyslipidemia  There are no compliance problems  Hypertensive end-organ damage includes CVA  The following portions of the patient's history were reviewed and updated as appropriate: allergies, current medications, past family history, past medical history, past social history, past surgical history and problem list     Review of Systems   Constitutional: Negative for fatigue, fever and malaise/fatigue  HENT: Negative for congestion, ear discharge, ear pain, postnasal drip, sinus pressure, sore throat, tinnitus and trouble swallowing  Eyes: Negative for blurred vision, discharge, itching and visual disturbance  Respiratory: Negative for cough and shortness of breath  Cardiovascular: Negative for chest pain and palpitations  Gastrointestinal: Negative for abdominal pain, diarrhea, nausea and vomiting  Endocrine: Negative for cold intolerance and polyuria  Genitourinary: Negative for difficulty urinating, dysuria and urgency  Musculoskeletal: Negative for arthralgias and neck pain  Skin: Negative for rash  Allergic/Immunologic: Negative for environmental allergies  Neurological: Negative for dizziness, weakness and headaches  Psychiatric/Behavioral: The patient is not nervous/anxious  Objective:  /68 (BP Location: Left arm, Patient Position: Sitting, Cuff Size: Standard)   Pulse 58   Temp 98 3 °F (36 8 °C) (Tympanic)   Ht 5' 7" (1 702 m)   Wt 81 6 kg (179 lb 12 8 oz)   SpO2 98%   BMI 28 16 kg/m²      Physical Exam   Constitutional: He is oriented to person, place, and time  He appears well-developed  HENT:   Head: Normocephalic  Right Ear: External ear normal    Mouth/Throat: Oropharynx is clear and moist    Eyes: Pupils are equal, round, and reactive to light  No scleral icterus  Neck: Normal range of motion  Neck supple  No tracheal deviation present  No thyromegaly present  Cardiovascular: Normal rate, regular rhythm and normal heart sounds  Pulses:       Carotid pulses are 3+ on the right side, and 2+ on the left side with bruit  Pulmonary/Chest: Effort normal and breath sounds normal  No respiratory distress  He exhibits no tenderness  Abdominal: Soft  Bowel sounds are normal  He exhibits no mass  There is no tenderness  Musculoskeletal: Normal range of motion  Lymphadenopathy:     He has no cervical adenopathy  Neurological: He is alert and oriented to person, place, and time  No cranial nerve deficit  Skin: Skin is warm  Rash noted  Small rounded hyperemic area over mid back noted   Psychiatric: He has a normal mood and affect   His behavior is normal

## 2018-01-24 NOTE — TELEPHONE ENCOUNTER
Recorded as Task   Date: 01/17/2018 04:27 PM, Created By: Darleen Patel   Task Name: Miscellaneous   Assigned To: vascular cardiac clearances,Team   Regarding Patient: Brian Sierra, Status: Active   Comment:   Darleen Patel - 17 Jan 2018 4:27 PM    TASK CREATED  pt being scheduled for left cea  scheduled for cardiac clearance 3/31/18 with dr Bindu Covarrubias - 17 Jan 2018 4:29 PM    TASK EDITED   Jaqueline Grimm - 17 Jan 2018 4:41 PM    TASK EDITED  on board and in [address] [place] [address]

## 2018-01-30 PROBLEM — Z95.1 S/P CABG X 4: Status: ACTIVE | Noted: 2018-01-30

## 2018-01-30 PROBLEM — Z01.810 PRE-OPERATIVE CARDIOVASCULAR EXAMINATION: Status: ACTIVE | Noted: 2018-01-30

## 2018-01-31 ENCOUNTER — OFFICE VISIT (OUTPATIENT)
Dept: CARDIOLOGY CLINIC | Facility: CLINIC | Age: 78
End: 2018-01-31
Payer: MEDICARE

## 2018-01-31 VITALS
BODY MASS INDEX: 28.39 KG/M2 | DIASTOLIC BLOOD PRESSURE: 66 MMHG | HEART RATE: 60 BPM | HEIGHT: 67 IN | WEIGHT: 180.9 LBS | SYSTOLIC BLOOD PRESSURE: 104 MMHG

## 2018-01-31 DIAGNOSIS — I25.10 ATHEROSCLEROSIS OF NATIVE CORONARY ARTERY WITHOUT ANGINA PECTORIS, UNSPECIFIED WHETHER NATIVE OR TRANSPLANTED HEART: ICD-10-CM

## 2018-01-31 DIAGNOSIS — Z01.810 PRE-OPERATIVE CARDIOVASCULAR EXAMINATION: ICD-10-CM

## 2018-01-31 DIAGNOSIS — I10 ESSENTIAL HYPERTENSION: ICD-10-CM

## 2018-01-31 DIAGNOSIS — Z95.1 S/P CABG X 4: Primary | ICD-10-CM

## 2018-01-31 DIAGNOSIS — E78.2 MIXED HYPERLIPIDEMIA: ICD-10-CM

## 2018-01-31 DIAGNOSIS — I65.22 STENOSIS OF LEFT CAROTID ARTERY: ICD-10-CM

## 2018-01-31 PROCEDURE — 99214 OFFICE O/P EST MOD 30 MIN: CPT | Performed by: INTERNAL MEDICINE

## 2018-01-31 NOTE — PROGRESS NOTES
Cardiology Follow Up Visit    Teresa Tobar  1940  [id number]  [address] [facility] [address] [address]    1  S/P CABG x 4     2  Mixed hyperlipidemia     3  Stenosis of left carotid artery     4  Essential hypertension     5  Atherosclerosis of native coronary artery without angina pectoris, unspecified whether native or transplanted heart     6  Pre-operative cardiovascular examination         Interval History:   Patient is here for preoperative   CVA 1/2018 w amaurosis, admitted 1/13/18 cardiovascular consultation for left carotid endarterectomy  On January 13, 2018 he presented with amaurosis from the ophthalmologist's office  He was found to have significant left carotid stenosis and plans for symptomatic carotid endarterectomy is planned  From a cardiac standpoint he presented presumably with an inferior wall myocardial infarction in 2001  He underwent 4 vessel coronary bypass grafting surgery  He has been rather asymptomatic since    Nuclear stress test in 2016 no evidence of ischemia normal LVEF  Recent echocardiogram LVEF 55 percent with inferior wall hypokinesis  No significant valvar disease  Remains on hypo and statin atorvastatin 40 milligrams daily  Recent LDL as listed 102  Had been as low as 70        Patient Active Problem List   Diagnosis    CAD (coronary atherosclerotic disease)    Hypertension    Hyperlipemia    Atypical chest pain    Pulmonary emphysema ([address] )    Hypothyroid    GERD (gastroesophageal reflux disease)    TIA (transient ischemic attack)    Sciatica of right side    Hyperlipidemia    Emphysema lung ([address] )    Arthritis    Stenosis of left carotid artery    S/P CABG x 4    Pre-operative cardiovascular examination     Past Medical History:   Diagnosis Date    Arthritis     Benign prostatic hyperplasia without lower urinary tract symptoms     without Urinary Obstruction    Chronic obstructive lung disease (HCC)     Coronary arteriosclerosis     Emphysema lung (HCC)     Hyperlipidemia     Hypertension     Psoriasis      Social History     Social History    Marital status:       Spouse name: N/A    Number of children: 3    Years of education: N/A     Occupational History    retired       Social History Main Topics    Smoking status: Former Smoker     Packs/day: 1 00     Years: 3 00     Types: Cigarettes     Quit date: 1956    Smokeless tobacco: Never Used      Comment: Has a past history of cigarette smoking;Quit date 1956; 5 packs year history, per [employer]      Alcohol use 0 6 oz/week     1 Glasses of wine per week      Comment: Daily alchol use, per [employer]    Drug use: No    Sexual activity: Not on file     Other Topics Concern    Not on file     Social History Narrative    Lives with granddaughter and daughter     Caffeine use, He admits to consuming caffeine VIA coffee ( 8 servings per day), per [employer]    Martial History: Currently , per [employer]    Occupation: Retired (prior occupational:  repairman), per [employer]       Family History   Problem Relation Age of Onset    Lung cancer Mother     Other Father      sepsis     Past Surgical History:   Procedure Laterality Date    CORONARY ARTERY BYPASS GRAFT      x2 2001, 2005 per [employer]    HERNIA REPAIR      TRANSURETHRAL RESECTION OF PROSTATE         Current Outpatient Prescriptions:     albuterol (PROVENTIL HFA,VENTOLIN HFA) 90 mcg/act inhaler, Inhale 2 puffs every 4 (four) hours as needed for wheezing, Disp: 1 Inhaler, Rfl: 0    aspirin 81 mg chewable tablet, Chew, Disp: , Rfl:     atorvastatin (LIPITOR) 40 mg tablet, Take by mouth, Disp: , Rfl:     esomeprazole (NexIUM) 20 mg capsule, Take 20 mg by mouth every morning before breakfast, Disp: , Rfl:     lisinopril (ZESTRIL) 10 mg tablet, Take by mouth, Disp: , Rfl:     metoprolol tartrate (LOPRESSOR) 25 mg tablet, Take 0 5 tablets by mouth every 12 (twelve) hours (Patient taking differently: Take 25 mg by mouth every 12 (twelve) hours Take one full tablet every 12 hours ), Disp: 60 tablet, Rfl: 0  Allergies   Allergen Reactions    Penicillins        Labs:             Lab Results   Component Value Date    CHOL 165 01/14/2018    CHOL See scanned summary  08/15/2016     Lab Results   Component Value Date    HDL 44 01/14/2018    HDL See scanned summary  08/15/2016     Lab Results   Component Value Date    LDLCALC 105 (H) 01/14/2018    LDLCALC 72 08/14/2016     Lab Results   Component Value Date    TRIG 81 01/14/2018    TRIG See scanned summary  08/15/2016       Lab Results   Component Value Date    ALT 23 08/14/2016    ALT 24 08/13/2016    AST 17 08/14/2016    AST 21 08/13/2016             Lab Results   Component Value Date    NTBNP 73 01/13/2018     Imaging: Reviewed in epic        Review of Systems:  14 systems reviewed and negative, recent CVA, otherwise negative    Review of Systems    PHYSICAL EXAM:    Physical Exam    Vitals:    01/31/18 0943   BP: 104/66   Pulse: 60     Weight (last 2 days)     Date/Time   Weight    01/31/18 0943  82 1 (180 9)              Gen: No acute distress  HEENT: anicteric, mucous membranes moist  Neck: supple, no jugular venous distention, or carotid bruit  Heart: regular, normal s1 and s2, no murmur/rub or gallop  Lungs :clear to auscultation bilaterally, no rales/rhonchi or wheeze  Abdomen: soft nontender, normoactive bowel sounds, no organomegaly  Ext: warm and perfused, normal femoral pulses, no edema, clubbing  Skin: warm, no rashes  Neuro: AAO x 3, no focal findings  Psychiatric: normal affect  Musculoskeletal: no obvious joint deformities  Discussion/Summary:     1  Coronary artery disease, stable    A  Coronary bypass grafting x4, 2001, normal LVEF, echocardiogram January 2018  B  nuclear stress test 2016 no ischemia    2    Preoperative left carotid arterectomy, presented with amaurosis found to have left carotid artery stenosis    3  Hyperlipidemia, on potent statin atorvastatin 40    4  Hypertension, controlled    Plan  From a cardiac standpoint, would proceed as planned with left carotid arterectomy with no cardiac contraindications  He has normal left ventricular systolic function  No angina, of recent congestive heart failure or unstable arrhythmia  Continue current medications as listed  Encouraged sensible diet and continued exercise which he does  Routine follow-up recommended

## 2018-02-06 ENCOUNTER — OFFICE VISIT (OUTPATIENT)
Dept: NEUROLOGY | Facility: CLINIC | Age: 78
End: 2018-02-06
Payer: MEDICARE

## 2018-02-06 VITALS
WEIGHT: 180 LBS | HEART RATE: 57 BPM | DIASTOLIC BLOOD PRESSURE: 62 MMHG | HEIGHT: 67 IN | SYSTOLIC BLOOD PRESSURE: 124 MMHG | BODY MASS INDEX: 28.25 KG/M2

## 2018-02-06 DIAGNOSIS — G45.3 AMAUROSIS FUGAX: ICD-10-CM

## 2018-02-06 DIAGNOSIS — I65.22 STENOSIS OF LEFT CAROTID ARTERY: Primary | ICD-10-CM

## 2018-02-06 PROCEDURE — 99213 OFFICE O/P EST LOW 20 MIN: CPT | Performed by: PHYSICIAN ASSISTANT

## 2018-02-06 NOTE — PROGRESS NOTES
Patient ID: Claudia Robertson is a 68 y o  male  Assessment/Plan:    Stenosis of left carotid artery  He will plan to f/u with Dr Mei Getting re: Lilian Apgar  He is already cleared from a cardiac perspective on 18, Dr Sidra Boogie (h/o CABG)  He is neurologically cleared for this procedure as benefits of the procedure certainly outweigh risks  TIA (transient ischemic attack)  Continue 81 mg aspirin qd and 40 mg atorvastatin qd  F/u stroke specialist after CEA  We discussed the importance of keep BP around 342U/828E systolic  He is currently taking metoprolol and lisinopril for this  Diagnoses and all orders for this visit:    Stenosis of left carotid artery    Amaurosis fugax       Subjective:    HPI   Mr  Claudia Robertson is a 67 yo right handed male who presents for neurological f/u after a hospital admission for left sided amaurosis fugax  He has a h/o CABG x4, pulmonary emphysema and stenosis of LCA  He was admitted to [facility] through the ED, - 1/15/18, for left visual changes c/w AF of the left eye  Noted by neurology, "The left eye disturbance is suggestive of amaurosis fugax with transient retinal branch artery occlusion, resolved  The right eye a visual disturbance with positive phenomena is less well explained "    He states that he was sitting talking with his brother and friend and noticed that he could not see out of the top portion of his eye  He states he felt like his eyelid would not open  He notes that it seemed as if a curtain was covering his eye  He notes that he was seeing silver spots and blurriness with his right eye at the same time  He states that the symptoms resolved on their own, lasted probably several minutes per his account  He is not sure if his face was drooped at the time as he did not look at himself in the mirror and no one mentioned it to him   He states he thought it might be his blood pressure being elevated so he went to an urgent care and from there EMS was called and he was brought to the hospital for evaluation  He does not think he had any weakness of his arms or legs at that time and has never had similar symptoms  He states he has never had stroke or TIA and he takes ASA 81 mg QD  He feels well since d/c from hospital, with no recurrent signs/ sxs of stroke/ TIA and vision disturbance has completely resolved  He was seen by Ophthalmology in the hospital; no acute retinal detachment  CT scan of the head which was unremarkable, MRI of the brain which was unremarkable, and then a CTA showed left carotid stenosis of 72%  Carotid Dopplers were subsequently performed and vascular surgery is planning for an elective left carotid endarterectomy as an outpatient  LDL on 1/14/18 105, chol 165, trig 81, HDL 44   ---    The following portions of the patient's history were reviewed and updated as appropriate:   He  has a past medical history of Arthritis; Benign prostatic hyperplasia without lower urinary tract symptoms; Chronic obstructive lung disease ([address] ); Coronary arteriosclerosis; Emphysema lung ([address] ); Hyperlipidemia; Hypertension; and Psoriasis  He  does not have any pertinent problems on file  He  has a past surgical history that includes Hernia repair; Coronary artery bypass graft; and Transurethral resection of prostate  His family history includes Lung cancer in his mother; Other in his father  He  reports that he quit smoking about 62 years ago  His smoking use included Cigarettes  He has a 3 00 pack-year smoking history  He has never used smokeless tobacco  He reports that he drinks about 0 6 oz of alcohol per week   He reports that he does not use drugs    Current Outpatient Prescriptions   Medication Sig Dispense Refill    albuterol (PROVENTIL HFA,VENTOLIN HFA) 90 mcg/act inhaler Inhale 2 puffs every 4 (four) hours as needed for wheezing 1 Inhaler 0    aspirin 81 mg chewable tablet Chew      atorvastatin (LIPITOR) 40 mg tablet Take by mouth      esomeprazole (NexIUM) 20 mg capsule Take 20 mg by mouth every morning before breakfast      lisinopril (ZESTRIL) 10 mg tablet Take by mouth      metoprolol tartrate (LOPRESSOR) 25 mg tablet Take 0 5 tablets by mouth every 12 (twelve) hours (Patient taking differently: Take 25 mg by mouth every 12 (twelve) hours Take one full tablet every 12 hours ) 60 tablet 0     No current facility-administered medications for this visit  Current Outpatient Prescriptions on File Prior to Visit   Medication Sig    albuterol (PROVENTIL HFA,VENTOLIN HFA) 90 mcg/act inhaler Inhale 2 puffs every 4 (four) hours as needed for wheezing    aspirin 81 mg chewable tablet Chew    atorvastatin (LIPITOR) 40 mg tablet Take by mouth    esomeprazole (NexIUM) 20 mg capsule Take 20 mg by mouth every morning before breakfast    lisinopril (ZESTRIL) 10 mg tablet Take by mouth    metoprolol tartrate (LOPRESSOR) 25 mg tablet Take 0 5 tablets by mouth every 12 (twelve) hours (Patient taking differently: Take 25 mg by mouth every 12 (twelve) hours Take one full tablet every 12 hours )     No current facility-administered medications on file prior to visit  He is allergic to penicillins            Objective:    Blood pressure 124/62, pulse 57, height 5' 7" (1 702 m), weight 81 6 kg (180 lb)  Physical Exam  The patient is well-developed and well-nourished, and is in no apparent distress  The patient is pleasant and cooperative with the examination  Head is normocephalic  Oropharynx is clear and mucous membranes are moist  Neck is supple and non-tender, and there are no cervical bruits  There are some audible wheezes; respiration rate is normal at rest     Neurological Exam  On neurologic exam, the patient is alert and oriented to time and place  Speech is fluent and articulate, and the patient follows commands appropriately   Judgment and affect appear normal  Pupils are 2- 3 mm b/l, equally round and reactive to light, extraocular muscles are intact without nystagmus, visual fields are full to confrontation, and optic discs are sharp and flat bilaterally although there is cataract in the right eye  Face is symmetric, and tongue, uvula, and palate are midline  Facial sensation is normal and symmetric, in all 3 divisions of the trigeminal nerve  Hearing is intact  Neck flexor and extensor strength is 5/5  Motor examination reveals intact strength, tone, and bulk throughout  Negative pronator drift on both sides  Reflexes are 2+ and symmetric throughout, except 1+ in the ankles b/l  Toes are down going b/l  Sensation is intact to light touch in all 4 extremities  Proprioception is intact t/o  Coordination is intact on rapid alternating movement and finger-to-nose testing  Normal gait is WB; no ataxia  ROS:    Review of Systems   Constitutional: Negative  HENT: Negative  Eyes: Negative  Respiratory: Negative  Cardiovascular: Negative  Gastrointestinal: Negative  Endocrine: Negative  Genitourinary: Negative  Musculoskeletal: Negative  Skin: Negative  Allergic/Immunologic: Negative  Neurological: Negative  Hematological: Negative  Psychiatric/Behavioral: Negative  Review of systems, Past medical history, Surgical history, Family history, Social history and Medication history were reviewed and otherwise unremarkable from a neurological perspective

## 2018-02-06 NOTE — ASSESSMENT & PLAN NOTE
He will plan to f/u with Dr Tash Shah re: Darby Barbour  He is already cleared from a cardiac perspective on 1/31/18, Dr Gomes Necessary (h/o CABG)  He is neurologically cleared for this procedure as benefits of the procedure certainly outweigh risks

## 2018-02-07 NOTE — TELEPHONE ENCOUNTER
In Dr Francois Durham office note from 1/31,patient is cleared for his carotid endarterectomy  Notified surgery scheduling

## 2018-02-13 ENCOUNTER — PREP FOR PROCEDURE (OUTPATIENT)
Dept: VASCULAR SURGERY | Facility: CLINIC | Age: 78
End: 2018-02-13

## 2018-02-13 DIAGNOSIS — I65.22 STENOSIS OF LEFT CAROTID ARTERY: Primary | ICD-10-CM

## 2018-02-15 ENCOUNTER — ANESTHESIA EVENT (OUTPATIENT)
Dept: PERIOP | Facility: HOSPITAL | Age: 78
DRG: 039 | End: 2018-02-15
Payer: MEDICARE

## 2018-02-20 ENCOUNTER — ANESTHESIA (OUTPATIENT)
Dept: PERIOP | Facility: HOSPITAL | Age: 78
DRG: 039 | End: 2018-02-20
Payer: MEDICARE

## 2018-02-20 ENCOUNTER — HOSPITAL ENCOUNTER (INPATIENT)
Facility: HOSPITAL | Age: 78
LOS: 2 days | Discharge: HOME HEALTH | DRG: 039 | End: 2018-02-22
Attending: SURGERY | Admitting: SURGERY
Payer: MEDICARE

## 2018-02-20 ENCOUNTER — APPOINTMENT (OUTPATIENT)
Dept: NON INVASIVE DIAGNOSTICS | Facility: HOSPITAL | Age: 78
DRG: 039 | End: 2018-02-20
Payer: MEDICARE

## 2018-02-20 DIAGNOSIS — Z95.1 S/P CABG X 4: ICD-10-CM

## 2018-02-20 DIAGNOSIS — R33.9 URINARY RETENTION: Primary | ICD-10-CM

## 2018-02-20 DIAGNOSIS — I65.22 STENOSIS OF LEFT INTERNAL CAROTID ARTERY: ICD-10-CM

## 2018-02-20 DIAGNOSIS — R07.9 CHEST PAIN AT REST: ICD-10-CM

## 2018-02-20 LAB
ABO GROUP BLD: NORMAL
APTT PPP: 31 SECONDS (ref 23–35)
BLD GP AB SCN SERPL QL: POSITIVE
BLOOD GROUP ANTIBODIES SERPL: NORMAL
GLUCOSE SERPL-MCNC: 119 MG/DL (ref 65–140)
INR PPP: 1.06 (ref 0.86–1.16)
KCT BLD-ACNC: 194 SEC (ref 89–137)
KCT BLD-ACNC: 285 SEC (ref 89–137)
KELL GROUP AG RBC: NEGATIVE
PROTHROMBIN TIME: 13.8 SECONDS (ref 12.1–14.4)
RH BLD: NEGATIVE
SPECIMEN EXPIRATION DATE: NORMAL
SPECIMEN SOURCE: ABNORMAL
SPECIMEN SOURCE: ABNORMAL

## 2018-02-20 PROCEDURE — 86901 BLOOD TYPING SEROLOGIC RH(D): CPT | Performed by: SURGERY

## 2018-02-20 PROCEDURE — 93882 EXTRACRANIAL UNI/LTD STUDY: CPT

## 2018-02-20 PROCEDURE — 85610 PROTHROMBIN TIME: CPT | Performed by: STUDENT IN AN ORGANIZED HEALTH CARE EDUCATION/TRAINING PROGRAM

## 2018-02-20 PROCEDURE — 86850 RBC ANTIBODY SCREEN: CPT | Performed by: SURGERY

## 2018-02-20 PROCEDURE — 85730 THROMBOPLASTIN TIME PARTIAL: CPT | Performed by: STUDENT IN AN ORGANIZED HEALTH CARE EDUCATION/TRAINING PROGRAM

## 2018-02-20 PROCEDURE — 35301 RECHANNELING OF ARTERY: CPT | Performed by: SURGERY

## 2018-02-20 PROCEDURE — 86905 BLOOD TYPING RBC ANTIGENS: CPT

## 2018-02-20 PROCEDURE — 86900 BLOOD TYPING SEROLOGIC ABO: CPT | Performed by: SURGERY

## 2018-02-20 PROCEDURE — 03CL0ZZ EXTIRPATION OF MATTER FROM LEFT INTERNAL CAROTID ARTERY, OPEN APPROACH: ICD-10-PCS | Performed by: SURGERY

## 2018-02-20 PROCEDURE — 03UL0KZ SUPPLEMENT LEFT INTERNAL CAROTID ARTERY WITH NONAUTOLOGOUS TISSUE SUBSTITUTE, OPEN APPROACH: ICD-10-PCS | Performed by: SURGERY

## 2018-02-20 PROCEDURE — 82948 REAGENT STRIP/BLOOD GLUCOSE: CPT

## 2018-02-20 PROCEDURE — 86870 RBC ANTIBODY IDENTIFICATION: CPT | Performed by: SURGERY

## 2018-02-20 PROCEDURE — C1781 MESH (IMPLANTABLE): HCPCS | Performed by: SURGERY

## 2018-02-20 PROCEDURE — 85347 COAGULATION TIME ACTIVATED: CPT

## 2018-02-20 DEVICE — XENOSURE BIOLOGIC PATCH, 0.8CM X 8CM, EIFU
Type: IMPLANTABLE DEVICE | Site: CAROTID | Status: FUNCTIONAL
Brand: XENOSURE BIOLOGIC PATCH

## 2018-02-20 RX ORDER — CLINDAMYCIN PHOSPHATE 150 MG/ML
INJECTION, SOLUTION INTRAVENOUS AS NEEDED
Status: DISCONTINUED | OUTPATIENT
Start: 2018-02-20 | End: 2018-02-20 | Stop reason: SURG

## 2018-02-20 RX ORDER — BUPIVACAINE HYDROCHLORIDE AND EPINEPHRINE 5; 5 MG/ML; UG/ML
INJECTION, SOLUTION PERINEURAL AS NEEDED
Status: DISCONTINUED | OUTPATIENT
Start: 2018-02-20 | End: 2018-02-20 | Stop reason: HOSPADM

## 2018-02-20 RX ORDER — SODIUM CHLORIDE, SODIUM LACTATE, POTASSIUM CHLORIDE, CALCIUM CHLORIDE 600; 310; 30; 20 MG/100ML; MG/100ML; MG/100ML; MG/100ML
INJECTION, SOLUTION INTRAVENOUS CONTINUOUS PRN
Status: DISCONTINUED | OUTPATIENT
Start: 2018-02-20 | End: 2018-02-20 | Stop reason: SURG

## 2018-02-20 RX ORDER — EPHEDRINE SULFATE 50 MG/ML
INJECTION, SOLUTION INTRAVENOUS AS NEEDED
Status: DISCONTINUED | OUTPATIENT
Start: 2018-02-20 | End: 2018-02-20 | Stop reason: SURG

## 2018-02-20 RX ORDER — OXYCODONE HYDROCHLORIDE AND ACETAMINOPHEN 5; 325 MG/1; MG/1
1 TABLET ORAL EVERY 4 HOURS PRN
Status: DISCONTINUED | OUTPATIENT
Start: 2018-02-20 | End: 2018-02-21

## 2018-02-20 RX ORDER — HEPARIN SODIUM 1000 [USP'U]/ML
INJECTION, SOLUTION INTRAVENOUS; SUBCUTANEOUS AS NEEDED
Status: DISCONTINUED | OUTPATIENT
Start: 2018-02-20 | End: 2018-02-20 | Stop reason: SURG

## 2018-02-20 RX ORDER — GLYCOPYRROLATE 0.2 MG/ML
INJECTION INTRAMUSCULAR; INTRAVENOUS AS NEEDED
Status: DISCONTINUED | OUTPATIENT
Start: 2018-02-20 | End: 2018-02-20 | Stop reason: SURG

## 2018-02-20 RX ORDER — CHLORHEXIDINE GLUCONATE 0.12 MG/ML
15 RINSE ORAL ONCE
Status: COMPLETED | OUTPATIENT
Start: 2018-02-20 | End: 2018-02-20

## 2018-02-20 RX ORDER — LIDOCAINE HYDROCHLORIDE 10 MG/ML
INJECTION, SOLUTION INFILTRATION; PERINEURAL AS NEEDED
Status: DISCONTINUED | OUTPATIENT
Start: 2018-02-20 | End: 2018-02-20 | Stop reason: HOSPADM

## 2018-02-20 RX ORDER — LABETALOL HYDROCHLORIDE 5 MG/ML
10 INJECTION, SOLUTION INTRAVENOUS
Status: DISCONTINUED | OUTPATIENT
Start: 2018-02-20 | End: 2018-02-20 | Stop reason: HOSPADM

## 2018-02-20 RX ORDER — ONDANSETRON 2 MG/ML
4 INJECTION INTRAMUSCULAR; INTRAVENOUS EVERY 6 HOURS PRN
Status: DISCONTINUED | OUTPATIENT
Start: 2018-02-20 | End: 2018-02-22 | Stop reason: HOSPADM

## 2018-02-20 RX ORDER — FENTANYL CITRATE/PF 50 MCG/ML
25 SYRINGE (ML) INJECTION
Status: DISCONTINUED | OUTPATIENT
Start: 2018-02-20 | End: 2018-02-20 | Stop reason: HOSPADM

## 2018-02-20 RX ORDER — LABETALOL HYDROCHLORIDE 5 MG/ML
5 INJECTION, SOLUTION INTRAVENOUS
Status: DISCONTINUED | OUTPATIENT
Start: 2018-02-20 | End: 2018-02-21

## 2018-02-20 RX ORDER — CLOPIDOGREL BISULFATE 75 MG/1
75 TABLET ORAL DAILY
Status: DISCONTINUED | OUTPATIENT
Start: 2018-02-20 | End: 2018-02-22 | Stop reason: HOSPADM

## 2018-02-20 RX ORDER — ALBUTEROL SULFATE 90 UG/1
2 AEROSOL, METERED RESPIRATORY (INHALATION) EVERY 4 HOURS PRN
Status: DISCONTINUED | OUTPATIENT
Start: 2018-02-20 | End: 2018-02-22 | Stop reason: HOSPADM

## 2018-02-20 RX ORDER — ROCURONIUM BROMIDE 10 MG/ML
INJECTION, SOLUTION INTRAVENOUS AS NEEDED
Status: DISCONTINUED | OUTPATIENT
Start: 2018-02-20 | End: 2018-02-20 | Stop reason: SURG

## 2018-02-20 RX ORDER — LISINOPRIL 10 MG/1
10 TABLET ORAL DAILY
Status: DISCONTINUED | OUTPATIENT
Start: 2018-02-20 | End: 2018-02-21

## 2018-02-20 RX ORDER — METOCLOPRAMIDE HYDROCHLORIDE 5 MG/ML
5 INJECTION INTRAMUSCULAR; INTRAVENOUS ONCE AS NEEDED
Status: DISCONTINUED | OUTPATIENT
Start: 2018-02-20 | End: 2018-02-20 | Stop reason: HOSPADM

## 2018-02-20 RX ORDER — ONDANSETRON 2 MG/ML
4 INJECTION INTRAMUSCULAR; INTRAVENOUS ONCE AS NEEDED
Status: DISCONTINUED | OUTPATIENT
Start: 2018-02-20 | End: 2018-02-20 | Stop reason: HOSPADM

## 2018-02-20 RX ORDER — ONDANSETRON 2 MG/ML
INJECTION INTRAMUSCULAR; INTRAVENOUS AS NEEDED
Status: DISCONTINUED | OUTPATIENT
Start: 2018-02-20 | End: 2018-02-20 | Stop reason: SURG

## 2018-02-20 RX ORDER — PROPOFOL 10 MG/ML
INJECTION, EMULSION INTRAVENOUS AS NEEDED
Status: DISCONTINUED | OUTPATIENT
Start: 2018-02-20 | End: 2018-02-20 | Stop reason: SURG

## 2018-02-20 RX ORDER — HYDRALAZINE HYDROCHLORIDE 20 MG/ML
15 INJECTION INTRAMUSCULAR; INTRAVENOUS
Status: DISCONTINUED | OUTPATIENT
Start: 2018-02-20 | End: 2018-02-21

## 2018-02-20 RX ORDER — SODIUM CHLORIDE 9 MG/ML
75 INJECTION, SOLUTION INTRAVENOUS CONTINUOUS
Status: DISCONTINUED | OUTPATIENT
Start: 2018-02-20 | End: 2018-02-21

## 2018-02-20 RX ORDER — ATORVASTATIN CALCIUM 40 MG/1
40 TABLET, FILM COATED ORAL
Status: DISCONTINUED | OUTPATIENT
Start: 2018-02-21 | End: 2018-02-22 | Stop reason: HOSPADM

## 2018-02-20 RX ORDER — FENTANYL CITRATE 50 UG/ML
INJECTION, SOLUTION INTRAMUSCULAR; INTRAVENOUS AS NEEDED
Status: DISCONTINUED | OUTPATIENT
Start: 2018-02-20 | End: 2018-02-20 | Stop reason: SURG

## 2018-02-20 RX ORDER — PANTOPRAZOLE SODIUM 20 MG/1
20 TABLET, DELAYED RELEASE ORAL
Status: DISCONTINUED | OUTPATIENT
Start: 2018-02-20 | End: 2018-02-22 | Stop reason: HOSPADM

## 2018-02-20 RX ORDER — ASPIRIN 81 MG/1
81 TABLET, CHEWABLE ORAL DAILY
Status: DISCONTINUED | OUTPATIENT
Start: 2018-02-21 | End: 2018-02-22 | Stop reason: HOSPADM

## 2018-02-20 RX ORDER — LIDOCAINE HYDROCHLORIDE 10 MG/ML
INJECTION, SOLUTION INFILTRATION; PERINEURAL AS NEEDED
Status: DISCONTINUED | OUTPATIENT
Start: 2018-02-20 | End: 2018-02-20 | Stop reason: SURG

## 2018-02-20 RX ORDER — HEPARIN SODIUM 5000 [USP'U]/ML
5000 INJECTION, SOLUTION INTRAVENOUS; SUBCUTANEOUS EVERY 8 HOURS SCHEDULED
Status: DISCONTINUED | OUTPATIENT
Start: 2018-02-20 | End: 2018-02-22 | Stop reason: HOSPADM

## 2018-02-20 RX ORDER — SODIUM CHLORIDE 9 MG/ML
INJECTION, SOLUTION INTRAVENOUS CONTINUOUS PRN
Status: DISCONTINUED | OUTPATIENT
Start: 2018-02-20 | End: 2018-02-20 | Stop reason: SURG

## 2018-02-20 RX ADMIN — PHENYLEPHRINE HYDROCHLORIDE 50 MCG: 10 INJECTION INTRAVENOUS at 08:40

## 2018-02-20 RX ADMIN — GLYCOPYRROLATE 0.4 MG: 0.2 INJECTION, SOLUTION INTRAMUSCULAR; INTRAVENOUS at 10:59

## 2018-02-20 RX ADMIN — SODIUM CHLORIDE: 0.9 INJECTION, SOLUTION INTRAVENOUS at 07:09

## 2018-02-20 RX ADMIN — FENTANYL CITRATE 25 MCG: 50 INJECTION INTRAMUSCULAR; INTRAVENOUS at 14:16

## 2018-02-20 RX ADMIN — FENTANYL CITRATE 25 MCG: 50 INJECTION INTRAMUSCULAR; INTRAVENOUS at 12:23

## 2018-02-20 RX ADMIN — HEPARIN SODIUM 5000 UNITS: 5000 INJECTION, SOLUTION INTRAVENOUS; SUBCUTANEOUS at 21:44

## 2018-02-20 RX ADMIN — FENTANYL CITRATE 50 MCG: 50 INJECTION, SOLUTION INTRAMUSCULAR; INTRAVENOUS at 07:48

## 2018-02-20 RX ADMIN — HEPARIN SODIUM 5000 UNITS: 5000 INJECTION, SOLUTION INTRAVENOUS; SUBCUTANEOUS at 15:29

## 2018-02-20 RX ADMIN — OXYCODONE HYDROCHLORIDE AND ACETAMINOPHEN 1 TABLET: 5; 325 TABLET ORAL at 19:52

## 2018-02-20 RX ADMIN — SODIUM CHLORIDE, SODIUM LACTATE, POTASSIUM CHLORIDE, AND CALCIUM CHLORIDE: .6; .31; .03; .02 INJECTION, SOLUTION INTRAVENOUS at 07:55

## 2018-02-20 RX ADMIN — CHLORHEXIDINE GLUCONATE 15 ML: 1.2 RINSE ORAL at 07:11

## 2018-02-20 RX ADMIN — SODIUM CHLORIDE 75 ML/HR: 0.9 INJECTION, SOLUTION INTRAVENOUS at 13:11

## 2018-02-20 RX ADMIN — LIDOCAINE HYDROCHLORIDE 50 MG: 10 INJECTION, SOLUTION INFILTRATION; PERINEURAL at 07:48

## 2018-02-20 RX ADMIN — PROPOFOL 150 MG: 10 INJECTION, EMULSION INTRAVENOUS at 07:48

## 2018-02-20 RX ADMIN — DEXAMETHASONE SODIUM PHOSPHATE 10 MG: 10 INJECTION INTRAMUSCULAR; INTRAVENOUS at 09:30

## 2018-02-20 RX ADMIN — HEPARIN SODIUM 7000 UNITS: 1000 INJECTION INTRAVENOUS; SUBCUTANEOUS at 08:36

## 2018-02-20 RX ADMIN — FENTANYL CITRATE 50 MCG: 50 INJECTION, SOLUTION INTRAMUSCULAR; INTRAVENOUS at 08:21

## 2018-02-20 RX ADMIN — ROCURONIUM BROMIDE 50 MG: 10 INJECTION INTRAVENOUS at 07:48

## 2018-02-20 RX ADMIN — GLYCOPYRROLATE 0.2 MG: 0.2 INJECTION, SOLUTION INTRAMUSCULAR; INTRAVENOUS at 09:05

## 2018-02-20 RX ADMIN — ONDANSETRON 4 MG: 2 INJECTION INTRAMUSCULAR; INTRAVENOUS at 17:18

## 2018-02-20 RX ADMIN — CLINDAMYCIN PHOSPHATE 900 MG: 150 INJECTION, SOLUTION INTRAMUSCULAR; INTRAVENOUS at 08:15

## 2018-02-20 RX ADMIN — HEPARIN SODIUM 3000 UNITS: 1000 INJECTION INTRAVENOUS; SUBCUTANEOUS at 08:47

## 2018-02-20 RX ADMIN — EPHEDRINE SULFATE 5 MG: 50 INJECTION, SOLUTION INTRAMUSCULAR; INTRAVENOUS; SUBCUTANEOUS at 08:10

## 2018-02-20 RX ADMIN — FENTANYL CITRATE 25 MCG: 50 INJECTION INTRAMUSCULAR; INTRAVENOUS at 12:39

## 2018-02-20 RX ADMIN — CLOPIDOGREL BISULFATE 75 MG: 75 TABLET ORAL at 13:36

## 2018-02-20 RX ADMIN — ONDANSETRON 4 MG: 2 INJECTION INTRAMUSCULAR; INTRAVENOUS at 09:30

## 2018-02-20 RX ADMIN — NEOSTIGMINE METHYLSULFATE 4 MG: 1 INJECTION, SOLUTION INTRAMUSCULAR; INTRAVENOUS; SUBCUTANEOUS at 10:59

## 2018-02-20 NOTE — OP NOTE
OPERATIVE REPORT  PATIENT NAME: Haroon Jackson    :  1940  MRN: [id number]  Pt Location:  OR ROOM 06    SURGERY DATE: 2018    Surgeon(s) and Role:     * Moni Saunders MD - Primary     * Blanca Keyes - Assisting    Stenosis of left carotid artery [I65 22]    Post-Op Diagnosis Codes:     * Stenosis of left carotid artery [I65 22]      Procedure(s):  ENDARTERECTOMY ARTERY CAROTID WITH PATCH ANGIOPLASTY    Specimen(s):  * No specimens in log *    Estimated Blood Loss:   Minimal    Drains: none       Anesthesia Type:   General    Operative Indications:  Stenosis of left carotid artery []  a33-year-old with a history of a left visual event suspicious for amaurosis fugax found to have a critical left carotid artery stenosis  Operative Findings:  Critical stenosis of the proximal internal carotid artery secondary to friable hemorrhagic plaque  Of note 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 and early branching of the external carotid artery  Following endarterectomy intraoperative duplex showed wide patency of the common, internal and external carotid arteries without evidence of significant residual stenosis or intraluminal pathology  Upon awakening the patient was following commands and moving all 4 extremities with no evidence of neurologic deficit  Complications:   None    Procedure and Technique:    The patient was brought to the operating room and placed on the operating table in the supine position  The patient was identified by verbal confirmation and armband identification  The patient was prepped and draped in usual sterile fashion and a formal timeout was called  Chlorhexidine prep was utilized and an [place]an drape was placed  A transverse incision was made in a skin fold overlying the previously marked left carotid bifurcation  Dissection was carried through the subcutaneous tissue and platysma to the anterior border the sternocleidomastoid muscle   The Weitleaner retractor was inserted exposing the facial vein  The vein was doubly ligated and divided exposing the carotid artery  IV heparin was administered and an ACT obtained  Further heparin was administered as need to obtain a therapeutic ACT  With careful mobilization the proximal and distal carotid artery were dissected and encircled with Vesseloops  The external carotid artery was separately encircled with a vessel loop as was the superior Thyroid artery  Traction was placed on the vessel loops and an anterolateral arteriotomy was created in the common carotid artery extending it into the internal carotid artery with the Clinton scissors  The 8 [place] Suches Shunt was easily inserted first into the distal internal carotid artery with good backbleeding noted  The proximal end was placed in the common carotid artery with restoration of shunted flow  Standard endarterectomy was then performed  Plaque was removed from the common external and internal carotid artery to a good endpoint  A bovine pericardial patch was then sewn into the arteriotomy with running 6-0 Prolene suture  When the suture line was nearly completed the shunt was removed the vessels were flushed vigorously in both directions and the suture line tied down  Flow was restored first into the external and then the internal carotid artery  Duplex ultrasound was then brought to the field and insonation of the external/internal and common carotid arteries showed excellent waveforms and no technical defects  Fibrillar coagulant was placed in the operative field for added hemostasis  40mg of protamine was then administered and the wound edges were infiltrated with half percent Marcaine with epinephrine  When hemostasis was secured closure was performed with 3-0 Monocryl for the platysma and 4-0 Monocryl subcuticular skin closure with a Histoacryl bandage  The patient awoke moving all 4 extremities and following commandc       I was present for the entire procedure    Patient Disposition:  PACU      Vascular Quality Initiative - Carotid Endarterectomy     Urgency:  Elective    Anesthesia: General Type: Conventional    Side: left    Patch Type: Bovine Pericardial     If Prosthetic, Patch : Sharan    Shunt: Yes- Routine    Skin Prep: Chlorhexidine    Drain: no  Heparin: yes    Protamine: yes      Dextran: no     Re-explore artery after closure: no    Total Procedure time: 156min    Monitoring:   EEG: no   Stump Pressure: no   Other: no    Completion Study:   Doppler: no   Duplex: yes   Arteriogram: no    Concomitant Procedure:   Proximal Endovascular: no   Distal Endovascular: no   CABG: no   Other Arterial Op: no      SIGNATURE: Genesis Lomax MD  DATE: February 20, 2018  TIME: 11:07 AM

## 2018-02-20 NOTE — H&P (VIEW-ONLY)
Patient ID: Vladislav Deluna is a 68 y o  male  Assessment/Plan:    Stenosis of left carotid artery  He will plan to f/u with Dr No Reynolds re: Cinthia Guevara  He is already cleared from a cardiac perspective on 1/31/18, Dr Kathy Edwards (h/o CABG)  He is neurologically cleared for this procedure as benefits of the procedure certainly outweigh risks  TIA (transient ischemic attack)  Continue 81 mg aspirin qd and 40 mg atorvastatin qd  F/u stroke specialist after CEA  We discussed the importance of keep BP around 290F/328A systolic  He is currently taking metoprolol and lisinopril for this  Diagnoses and all orders for this visit:    Stenosis of left carotid artery    Amaurosis fugax       Subjective:    HPI   Mr  Vladislav Deluna is a 69 yo right handed male who presents for neurological f/u after a hospital admission for left sided amaurosis fugax  He has a h/o CABG x4, pulmonary emphysema and stenosis of LCA  He was admitted to [facility] through the ED, 1/13- 1/15/18, for left visual changes c/w AF of the left eye  Noted by neurology, "The left eye disturbance is suggestive of amaurosis fugax with transient retinal branch artery occlusion, resolved  The right eye a visual disturbance with positive phenomena is less well explained "    He states that he was sitting talking with his brother and friend and noticed that he could not see out of the top portion of his eye  He states he felt like his eyelid would not open  He notes that it seemed as if a curtain was covering his eye  He notes that he was seeing silver spots and blurriness with his right eye at the same time  He states that the symptoms resolved on their own, lasted probably several minutes per his account  He is not sure if his face was drooped at the time as he did not look at himself in the mirror and no one mentioned it to him   He states he thought it might be his blood pressure being elevated so he went to an urgent care and from there EMS was called and he was brought to the hospital for evaluation  He does not think he had any weakness of his arms or legs at that time and has never had similar symptoms  He states he has never had stroke or TIA and he takes ASA 81 mg QD  He feels well since d/c from hospital, with no recurrent signs/ sxs of stroke/ TIA and vision disturbance has completely resolved  He was seen by Ophthalmology in the hospital; no acute retinal detachment  CT scan of the head which was unremarkable, MRI of the brain which was unremarkable, and then a CTA showed left carotid stenosis of 72%  Carotid Dopplers were subsequently performed and vascular surgery is planning for an elective left carotid endarterectomy as an outpatient  LDL on 1/14/18 105, chol 165, trig 81, HDL 44   ---    The following portions of the patient's history were reviewed and updated as appropriate:   He  has a past medical history of Arthritis; Benign prostatic hyperplasia without lower urinary tract symptoms; Chronic obstructive lung disease ([address] ); Coronary arteriosclerosis; Emphysema lung ([address] ); Hyperlipidemia; Hypertension; and Psoriasis  He  does not have any pertinent problems on file  He  has a past surgical history that includes Hernia repair; Coronary artery bypass graft; and Transurethral resection of prostate  His family history includes Lung cancer in his mother; Other in his father  He  reports that he quit smoking about 62 years ago  His smoking use included Cigarettes  He has a 3 00 pack-year smoking history  He has never used smokeless tobacco  He reports that he drinks about 0 6 oz of alcohol per week   He reports that he does not use drugs    Current Outpatient Prescriptions   Medication Sig Dispense Refill    albuterol (PROVENTIL HFA,VENTOLIN HFA) 90 mcg/act inhaler Inhale 2 puffs every 4 (four) hours as needed for wheezing 1 Inhaler 0    aspirin 81 mg chewable tablet Chew      atorvastatin (LIPITOR) 40 mg tablet Take by mouth      esomeprazole (NexIUM) 20 mg capsule Take 20 mg by mouth every morning before breakfast      lisinopril (ZESTRIL) 10 mg tablet Take by mouth      metoprolol tartrate (LOPRESSOR) 25 mg tablet Take 0 5 tablets by mouth every 12 (twelve) hours (Patient taking differently: Take 25 mg by mouth every 12 (twelve) hours Take one full tablet every 12 hours ) 60 tablet 0     No current facility-administered medications for this visit  Current Outpatient Prescriptions on File Prior to Visit   Medication Sig    albuterol (PROVENTIL HFA,VENTOLIN HFA) 90 mcg/act inhaler Inhale 2 puffs every 4 (four) hours as needed for wheezing    aspirin 81 mg chewable tablet Chew    atorvastatin (LIPITOR) 40 mg tablet Take by mouth    esomeprazole (NexIUM) 20 mg capsule Take 20 mg by mouth every morning before breakfast    lisinopril (ZESTRIL) 10 mg tablet Take by mouth    metoprolol tartrate (LOPRESSOR) 25 mg tablet Take 0 5 tablets by mouth every 12 (twelve) hours (Patient taking differently: Take 25 mg by mouth every 12 (twelve) hours Take one full tablet every 12 hours )     No current facility-administered medications on file prior to visit  He is allergic to penicillins            Objective:    Blood pressure 124/62, pulse 57, height 5' 7" (1 702 m), weight 81 6 kg (180 lb)  Physical Exam  The patient is well-developed and well-nourished, and is in no apparent distress  The patient is pleasant and cooperative with the examination  Head is normocephalic  Oropharynx is clear and mucous membranes are moist  Neck is supple and non-tender, and there are no cervical bruits  There are some audible wheezes; respiration rate is normal at rest     Neurological Exam  On neurologic exam, the patient is alert and oriented to time and place  Speech is fluent and articulate, and the patient follows commands appropriately   Judgment and affect appear normal  Pupils are 2- 3 mm b/l, equally round and reactive to light, extraocular muscles are intact without nystagmus, visual fields are full to confrontation, and optic discs are sharp and flat bilaterally although there is cataract in the right eye  Face is symmetric, and tongue, uvula, and palate are midline  Facial sensation is normal and symmetric, in all 3 divisions of the trigeminal nerve  Hearing is intact  Neck flexor and extensor strength is 5/5  Motor examination reveals intact strength, tone, and bulk throughout  Negative pronator drift on both sides  Reflexes are 2+ and symmetric throughout, except 1+ in the ankles b/l  Toes are down going b/l  Sensation is intact to light touch in all 4 extremities  Proprioception is intact t/o  Coordination is intact on rapid alternating movement and finger-to-nose testing  Normal gait is WB; no ataxia  ROS:    Review of Systems   Constitutional: Negative  HENT: Negative  Eyes: Negative  Respiratory: Negative  Cardiovascular: Negative  Gastrointestinal: Negative  Endocrine: Negative  Genitourinary: Negative  Musculoskeletal: Negative  Skin: Negative  Allergic/Immunologic: Negative  Neurological: Negative  Hematological: Negative  Psychiatric/Behavioral: Negative  Review of systems, Past medical history, Surgical history, Family history, Social history and Medication history were reviewed and otherwise unremarkable from a neurological perspective

## 2018-02-20 NOTE — ANESTHESIA PROCEDURE NOTES
Arterial Line Insertion  Date/Time: 2/20/2018 8:00 AM  Performed by: Umesh Sneed  Authorized by: NARCISO Jeffries   Consent: Verbal consent obtained  Written consent obtained  Risks and benefits: risks, benefits and alternatives were discussed  Consent given by: patient  Patient understanding: patient states understanding of the procedure being performed  Patient consent: the patient's understanding of the procedure matches consent given  Procedure consent: procedure consent matches procedure scheduled  Required items: required blood products, implants, devices, and special equipment available  Patient identity confirmed: verbally with patient and arm band  Time out: Immediately prior to procedure a "time out" was called to verify the correct patient, procedure, equipment, support staff and site/side marked as required  Preparation: Patient was prepped and draped in the usual sterile fashion  Indications: multiple ABGs and hemodynamic monitoring  Orientation:  RightLocation: radial artery  Anesthesia: see MAR for details (GA)    Sedation:  Patient sedated: yes (GA)  Vitals: Vital signs were monitored during sedation    Remington's test normal: yes  Needle gauge: 20  Seldinger technique: Seldinger technique used  Number of attempts: 1  Post-procedure: dressing applied and chlorhexidine patch applied  Post-procedure CNS: normal  Patient tolerance: Patient tolerated the procedure well with no immediate complications

## 2018-02-20 NOTE — ANESTHESIA POSTPROCEDURE EVALUATION
Post-Op Assessment Note      CV Status:  Stable    Mental Status:  Alert and awake    Hydration Status:  Euvolemic    PONV Controlled:  Controlled    Airway Patency:  Patent    Post Op Vitals Reviewed: Yes          Staff: CRNA, other anesthesia staff       Comments: vss sv nonobstructed uneventful, maex4, smile equally, speech normal           /59 (02/20/18 1121)    Temp (!) 97 °F (36 1 °C) (02/20/18 1121)    Pulse 76 (02/20/18 1121)   Resp 18 (02/20/18 1121)    SpO2 99 % (02/20/18 1121)

## 2018-02-20 NOTE — ANESTHESIA PREPROCEDURE EVALUATION
Review of Systems/Medical History  Patient summary reviewed  Chart reviewed      Cardiovascular  Hyperlipidemia, Hypertension , Past MI > 6 months, CAD, CAD status: native vessel or graft,    Pulmonary  COPD severe- O2 dependent , No asthma: ,        GI/Hepatic            Endo/Other  History of thyroid disease , hyperthyroidism,      GYN       Hematology   Musculoskeletal    Arthritis     Neurology    TIA, CVA , no residual symptoms,    Psychology   Depression ,              Physical Exam    Airway    Mallampati score: II  TM Distance: >3 FB  Neck ROM: full     Dental       Cardiovascular      Pulmonary      Other Findings        Anesthesia Plan  ASA Score- 3     Anesthesia Type- general with ASA Monitors  Additional Monitors: arterial line  Airway Plan: ETT  Plan Factors- Patient instructed to abstain from smoking on day of procedure       Induction- intravenous  Postoperative Plan-     Informed Consent- Anesthetic plan and risks discussed with patient  I personally reviewed this patient with the CRNA  Discussed and agreed on the Anesthesia Plan with the CRNA  Arianna Rodrigez

## 2018-02-20 NOTE — CASE MANAGEMENT
Initial Clinical Review    Age/Sex: 68 y o  male admitted on 2/20/18 for elective surgery    Surgery Date: 2/20/18    Procedure: ENDARTERECTOMY ARTERY CAROTID WITH PATCH ANGIOPLASTY (Left Neck)    Anesthesia: General    Admission Orders: Date/Time/Statement: 2/20/18 @ 1106     Orders Placed This Encounter   Procedures    Inpatient Admission     Standing Status:   Standing     Number of Occurrences:   1     Order Specific Question:   Admitting Physician     Answer:   Ashia Rodriguez     Order Specific Question:   Level of Care     Answer:   Level 1 Stepdown [13]     Order Specific Question:   Estimated length of stay     Answer:   Inpatient Only Surgery       Vital Signs: BP 96/50 (BP Location: Left arm) Comment: Map71  Pulse 61   Temp 98 1 °F (36 7 °C) (Oral)   Resp 19   Ht 5' 7" (1 702 m)   Wt 78 kg (172 lb)   SpO2 97%   BMI 26 94 kg/m²     Diet:         Start     Ordered    02/20/18 1123  Diet Cardiac; Cardiac Step 1  Diet effective now     Question Answer Comment   Diet Type Cardiac    Cardiac Cardiac Step 1    RD to adjust diet per protocol? Yes        02/20/18 1129          Mobility: Up as tolerated       DVT Prophylaxis: Sequential compression device B/L    Scheduled Meds: Current Facility-Administered Medications:  [START ON 2/21/2018] aspirin 81 mg Oral Daily    [START ON 2/21/2018] atorvastatin 40 mg Oral Daily With Dinner    clopidogrel 75 mg Oral Daily    heparin (porcine) 5,000 Units Subcutaneous Q8H [address]    And       lisinopril 10 mg Oral Daily    metoprolol tartrate 12 5 mg Oral Q12H ADRIANE    pantoprazole 20 mg Oral Early Morning    sodium chloride 500 mL Intravenous Once for SBP <80      Continuous Infusions:  niCARdipine 1-15 mg/hr    phenylephine  mcg/min    sodium chloride 75 mL/hr Last Rate: 75 mL/hr (02/20/18 1311)     PRN Meds:       albuterol    labetalol **AND** hydrALAZINE **AND** niCARdipine    ondansetron

## 2018-02-21 ENCOUNTER — TELEPHONE (OUTPATIENT)
Dept: UROLOGY | Facility: AMBULATORY SURGERY CENTER | Age: 78
End: 2018-02-21

## 2018-02-21 LAB
ANION GAP SERPL CALCULATED.3IONS-SCNC: 7 MMOL/L (ref 4–13)
ATRIAL RATE: 62 BPM
BUN SERPL-MCNC: 16 MG/DL (ref 5–25)
CALCIUM SERPL-MCNC: 8.2 MG/DL (ref 8.3–10.1)
CHLORIDE SERPL-SCNC: 107 MMOL/L (ref 100–108)
CO2 SERPL-SCNC: 26 MMOL/L (ref 21–32)
CREAT SERPL-MCNC: 0.89 MG/DL (ref 0.6–1.3)
ERYTHROCYTE [DISTWIDTH] IN BLOOD BY AUTOMATED COUNT: 13.2 % (ref 11.6–15.1)
GFR SERPL CREATININE-BSD FRML MDRD: 82 ML/MIN/1.73SQ M
GLUCOSE SERPL-MCNC: 115 MG/DL (ref 65–140)
HCT VFR BLD AUTO: 33.8 % (ref 36.5–49.3)
HGB BLD-MCNC: 11.2 G/DL (ref 12–17)
MCH RBC QN AUTO: 30.8 PG (ref 26.8–34.3)
MCHC RBC AUTO-ENTMCNC: 33.1 G/DL (ref 31.4–37.4)
MCV RBC AUTO: 93 FL (ref 82–98)
P AXIS: 62 DEGREES
PLATELET # BLD AUTO: 165 THOUSANDS/UL (ref 149–390)
PMV BLD AUTO: 10.3 FL (ref 8.9–12.7)
POTASSIUM SERPL-SCNC: 4.3 MMOL/L (ref 3.5–5.3)
PR INTERVAL: 108 MS
QRS AXIS: 63 DEGREES
QRSD INTERVAL: 118 MS
QT INTERVAL: 430 MS
QTC INTERVAL: 436 MS
RBC # BLD AUTO: 3.64 MILLION/UL (ref 3.88–5.62)
SODIUM SERPL-SCNC: 140 MMOL/L (ref 136–145)
T WAVE AXIS: 70 DEGREES
TROPONIN I SERPL-MCNC: <0.02 NG/ML
VENTRICULAR RATE: 62 BPM
WBC # BLD AUTO: 8.88 THOUSAND/UL (ref 4.31–10.16)

## 2018-02-21 PROCEDURE — 51702 INSERT TEMP BLADDER CATH: CPT | Performed by: PHYSICIAN ASSISTANT

## 2018-02-21 PROCEDURE — 84484 ASSAY OF TROPONIN QUANT: CPT | Performed by: PHYSICIAN ASSISTANT

## 2018-02-21 PROCEDURE — 93005 ELECTROCARDIOGRAM TRACING: CPT

## 2018-02-21 PROCEDURE — 0T9B70Z DRAINAGE OF BLADDER WITH DRAINAGE DEVICE, VIA NATURAL OR ARTIFICIAL OPENING: ICD-10-PCS | Performed by: UROLOGY

## 2018-02-21 PROCEDURE — 99221 1ST HOSP IP/OBS SF/LOW 40: CPT | Performed by: NURSE PRACTITIONER

## 2018-02-21 PROCEDURE — 99222 1ST HOSP IP/OBS MODERATE 55: CPT | Performed by: INTERNAL MEDICINE

## 2018-02-21 PROCEDURE — 93010 ELECTROCARDIOGRAM REPORT: CPT | Performed by: INTERNAL MEDICINE

## 2018-02-21 PROCEDURE — 80048 BASIC METABOLIC PNL TOTAL CA: CPT | Performed by: SURGERY

## 2018-02-21 PROCEDURE — 85027 COMPLETE CBC AUTOMATED: CPT | Performed by: SURGERY

## 2018-02-21 RX ORDER — SIMETHICONE 80 MG
80 TABLET,CHEWABLE ORAL EVERY 6 HOURS PRN
Status: DISCONTINUED | OUTPATIENT
Start: 2018-02-21 | End: 2018-02-22 | Stop reason: HOSPADM

## 2018-02-21 RX ORDER — TAMSULOSIN HYDROCHLORIDE 0.4 MG/1
0.4 CAPSULE ORAL DAILY
Status: DISCONTINUED | OUTPATIENT
Start: 2018-02-21 | End: 2018-02-22 | Stop reason: HOSPADM

## 2018-02-21 RX ORDER — OXYCODONE HYDROCHLORIDE 5 MG/1
5 TABLET ORAL EVERY 4 HOURS PRN
Status: DISCONTINUED | OUTPATIENT
Start: 2018-02-21 | End: 2018-02-22 | Stop reason: HOSPADM

## 2018-02-21 RX ORDER — LIDOCAINE HYDROCHLORIDE 20 MG/ML
JELLY TOPICAL ONCE
Status: COMPLETED | OUTPATIENT
Start: 2018-02-21 | End: 2018-02-21

## 2018-02-21 RX ORDER — SODIUM CHLORIDE 9 MG/ML
100 INJECTION, SOLUTION INTRAVENOUS CONTINUOUS
Status: DISCONTINUED | OUTPATIENT
Start: 2018-02-21 | End: 2018-02-22 | Stop reason: HOSPADM

## 2018-02-21 RX ORDER — ACETAMINOPHEN 325 MG/1
650 TABLET ORAL EVERY 6 HOURS PRN
Status: DISCONTINUED | OUTPATIENT
Start: 2018-02-21 | End: 2018-02-22 | Stop reason: HOSPADM

## 2018-02-21 RX ADMIN — HEPARIN SODIUM 5000 UNITS: 5000 INJECTION, SOLUTION INTRAVENOUS; SUBCUTANEOUS at 21:44

## 2018-02-21 RX ADMIN — ATORVASTATIN CALCIUM 40 MG: 40 TABLET, FILM COATED ORAL at 17:04

## 2018-02-21 RX ADMIN — SODIUM CHLORIDE 75 ML/HR: 0.9 INJECTION, SOLUTION INTRAVENOUS at 02:56

## 2018-02-21 RX ADMIN — ACETAMINOPHEN 650 MG: 325 TABLET, FILM COATED ORAL at 21:44

## 2018-02-21 RX ADMIN — HEPARIN SODIUM 5000 UNITS: 5000 INJECTION, SOLUTION INTRAVENOUS; SUBCUTANEOUS at 14:33

## 2018-02-21 RX ADMIN — SODIUM CHLORIDE 125 ML/HR: 0.9 INJECTION, SOLUTION INTRAVENOUS at 11:12

## 2018-02-21 RX ADMIN — TAMSULOSIN HYDROCHLORIDE 0.4 MG: 0.4 CAPSULE ORAL at 09:42

## 2018-02-21 RX ADMIN — CLOPIDOGREL BISULFATE 75 MG: 75 TABLET ORAL at 09:42

## 2018-02-21 RX ADMIN — HEPARIN SODIUM 5000 UNITS: 5000 INJECTION, SOLUTION INTRAVENOUS; SUBCUTANEOUS at 05:31

## 2018-02-21 RX ADMIN — LIDOCAINE HYDROCHLORIDE: 20 JELLY TOPICAL at 09:34

## 2018-02-21 RX ADMIN — METOPROLOL TARTRATE 12.5 MG: 25 TABLET ORAL at 09:42

## 2018-02-21 RX ADMIN — PANTOPRAZOLE SODIUM 20 MG: 20 TABLET, DELAYED RELEASE ORAL at 05:31

## 2018-02-21 RX ADMIN — OXYCODONE HYDROCHLORIDE AND ACETAMINOPHEN 1 TABLET: 5; 325 TABLET ORAL at 05:40

## 2018-02-21 RX ADMIN — LISINOPRIL 10 MG: 10 TABLET ORAL at 09:41

## 2018-02-21 RX ADMIN — SODIUM CHLORIDE 100 ML/HR: 0.9 INJECTION, SOLUTION INTRAVENOUS at 20:05

## 2018-02-21 RX ADMIN — SIMETHICONE CHEW TAB 80 MG 80 MG: 80 TABLET ORAL at 12:15

## 2018-02-21 RX ADMIN — ASPIRIN 81 MG 81 MG: 81 TABLET ORAL at 09:41

## 2018-02-21 NOTE — PROGRESS NOTES
Pt was bladder scanned for 794 mL  Attempted to straight cath the pt after failing to void for a third time  Tried twice with no success  Catheter appeared to be coiling and patient was complaining of increased soreness  Spoke to blue sx and then to the house surgical PA on call to come and put a archibald in the patient   Urology cart obtained and positioned outside pt's room per house surgical PA's request

## 2018-02-21 NOTE — TELEPHONE ENCOUNTER
----- Message from Marisel Raman, [address] sent at 2/21/2018  1:02 PM EST -----  Please contact patient for non urgent hospital follow-up regarding postoperative urinary retention  Patient will have a void trial with [facility] visiting nurses but does have history of BPH status post TURP in the past and and will benefit from hospital follow-up in approximately 4 weeks  Provider is not prefer  Patient will be new to service  Known to Dr Niurka Fry and Dr Willie Ortiz remotely  Thank you

## 2018-02-21 NOTE — PROGRESS NOTES
Daily rounding completed with Yonatan Rodriguez  And vascular surgeon  Aware pt  Has had two failed DTV trials after straight caths  Per Elisabeth Mc with [facility] is to contact surgical PA to come place cristela  Surgical PA contact now, orders to be placed and will be up to see pt

## 2018-02-21 NOTE — PROGRESS NOTES
Vascular Surgery Note     Called by nursing stating that the pt's BP was 87/44  IVF of normal saline was order to be hung @125ml/hr  On recheck approx 30 minutes later BP was 103/42 w/ HR of 59  Pt denies any dizziness or light-headness at this time      Requesting [facility] for home help with the archibald catheter upon discharge which was discussed with case management     Rudy Lopez  11:40 AM  02/21/18

## 2018-02-21 NOTE — SOCIAL WORK
MCG Guide Used for Initial Round: CEA  Optimal GLOS: 1  Hospital Day: 1 day  DC Readiness:   Goal Length of Stay: 1 day postoperative  Note: Goal Length of Stay assumes optimal recovery, decision making, and care  Patients may be discharged to a lower level of care (either later than or sooner than the goal) when it is appropriate for their clinical status and care needs  Discharge Readiness  Return to top of Carotid Endarterectomy RRG - [facility]   Discharge readiness is indicated by patient meeting Recovery Milestones, including ALL of the following:   Hemodynamic stability   No evidence of myocardial ischemia or infarct   No bleeding or growing hematoma at operative site   No new neurologic deficits   No swallowing difficulty   No evidence of postoperative or surgical site infection   Pain absent or managed   Ambulatory   Oral hydration, medications, and diet   Discharge plans and education understood    Identified Barriers:  Urine retention, coude catheter, hypotension  Postoperative hypotension   Anticipate IV fluids and possible need for vasoactive agents  Expect brief stay extension    Urinary complications   Extended stay beyond goal length of stay for primary condition may be needed until ALL of the following are present:   Renal function (creatinine) at baseline, or decrease in creatinine consistent with renal function return   Voiding adequately or with urinary catheter, percutaneous suprapubic tube, or plan for intermittent self-catheterization, and management regimen in place that is performable at lower level of care   Urine output adequate   Fever absent or reduced   Infection absent or treatable at next level of care        Discussion Date (Time): 02/21/18 with Dr Ghislaine Crockett

## 2018-02-21 NOTE — CONSULTS
CONSULT    Patient Name: Antonia Saini  Patient MRN: [id number]  Date of Service: 2/21/2018   Date / Time Note Created: 2/21/2018 12:00 PM   Referring Provider: Toñito Lopez  Provider Creating Note: MYLENE Rothman    PCP: Israel Tyler  Attending Provider:  Bard Dot MD    Reason for Consult: Urinary Retention    HPI--Mr Grayson Morris is a 72-year-old male with  history of TURP by Dr Oliveira [facility] postoperative day 1 CEA  by vascular surgery who developed urinary retention requiring intermittent catheterizations several times for volumes exceeding 1 L  Nursing staff intermittently catheterized patient  per protocol and surgical PA inserted 18 Tristanian coude tip catheter  Due to multiple unsuccessful attempts  Patient denies any chronic  irritative or obstructive  symptoms since surgery 9 years ago; including urgency, frequency, nocturia, hesitancy, poor weak urinary stream, stranguria, urinary incontinence, hematuria, flank, testicular or groin pain, prior  history of nephrolithiasis or urologic surgical manipulation  Patient is nearing end of hospital stay and urologic consultation was requested for Fortune management  Patient is meant started on alpha blockade by primary team   Medication is new to patient's pharmacologic profile        Source:chart review and the patient         Patient Active Problem List   Diagnosis    CAD (coronary atherosclerotic disease)    Hypertension    Hyperlipemia    Atypical chest pain    Pulmonary emphysema ([address] )    Hypothyroid    GERD (gastroesophageal reflux disease)    TIA (transient ischemic attack)    Sciatica of right side    Hyperlipidemia    Emphysema lung ([facility] [address]    Arthritis    Stenosis of left carotid artery    S/P CABG x 4    Pre-operative cardiovascular examination       Impressions  Post-Operative Urinary Retention (Multi-factorial) secondary to recent administration of anesthesia, narcotics, recumbency and constipation  Recommendations  1  Maintain archibald catheter  2  Do not remove  Not Nsg managed 3  Continue Flomax 4  In interim, increase ambulation, wean narcotics, hydrate and treat constipation  5  Void trial to be determined by our group when patient is clinically stronger  6  Patient will require archibald catheter at home with visiting nurses service  No further  intervention is indicated during this hospital stay  Order was placed for justification of Archibald catheter; as well as catheter care for visiting nurses to follow  Void trial in 1 week with the [facility]  Patient can follow up with us at our [facility] or [facility] per request in several weeks  Continue Flomax as an outpatient  Past Medical History:   Diagnosis Date    Arthritis     Benign prostatic hyperplasia without lower urinary tract symptoms     without Urinary Obstruction    Chronic obstructive lung disease (HCC)     Coronary arteriosclerosis     Depression     Emphysema lung (HCC)     Emphysema of lung (HCC)     GERD (gastroesophageal reflux disease)     Hyperlipidemia     Hypertension     Myocardial infarction     Psoriasis     Psoriasis     Requires supplemental oxygen     at bedtime during high humid days only    Stroke [facility])     TIA 1/2018       Past Surgical History:   Procedure Laterality Date    COLONOSCOPY      CORONARY ARTERY BYPASS GRAFT      x2 2001, 2005 per Allscripts    HERNIA REPAIR      [place] THROMBOENDARTECTMY Mavis Deyvi INCIS Left 2/20/2018    Procedure: ENDARTERECTOMY ARTERY CAROTID WITH PATCH ANGIOPLASTY;  Surgeon: Edwin Gandhi MD;  [facility];  Service: Vascular    TRANSURETHRAL RESECTION OF PROSTATE         Family History   Problem Relation Age of Onset    Lung cancer Mother     Cancer Mother     Other Father      sepsis       Social History     Social History    Marital status:       Spouse name: N/A    Number of children: 3    Years of education: N/A     Occupational History    retired sergio      Social History Main Topics    Smoking status: Former Smoker     Packs/day: 1 00     Years: 3 00     Types: Cigarettes     Quit date: 1956    Smokeless tobacco: Never Used      Comment: Has a past history of cigarette smoking;Quit date 1956; 5 packs year history, per Allscripts      Alcohol use 0 6 oz/week     1 Glasses of wine per week      Comment: Daily alchol use,     Drug use: No    Sexual activity: Yes     Other Topics Concern    Not on file     Social History Narrative    Lives with granddaughter and daughter     Caffeine use, He admits to consuming caffeine VIA coffee ( 8 servings per day), per Allscripts    Martial History: Currently , per Allscripts    Occupation: Retired (prior occupational:  repairman), per [employer]        Allergies   Allergen Reactions    Penicillins Swelling       Review of Systems  10 point review of systems negative except as noted in HPI     Chart Review   Allergies, current medications, history, problem list    Vital Signs  Vitals:    02/21/18 1154   BP: (!) 103/42   Pulse: 63   Resp:    Temp:    SpO2:        Physical Exam  General appearance: alert and oriented, in no acute distress, appears stated age and cooperative  Head: Normocephalic, without obvious abnormality, atraumatic  Neck: no adenopathy, no carotid bruit, no JVD, supple, symmetrical, trachea midline, thyroid not enlarged, symmetric, no tenderness/mass/nodules and Incision clean dry and intact  Lungs: clear to auscultation bilaterally  Heart: regular rate and rhythm, S1, S2 normal, no murmur, click, rub or gallop  Abdomen: soft, non-tender; bowel sounds normal; no masses,  no organomegaly  Extremities: extremities normal, warm and well-perfused; no cyanosis, clubbing, or edema  Pulses: 2+ and symmetric  Neurologic: Grossly normal  Fortune patent for clear damien urine     Laboratory Studies  Lab Results   Component Value Date    HGBA1C 5 0 01/13/2018     02/21/2018    K 4 3 02/21/2018     02/21/2018    CO2 26 02/21/2018    GLUCOSE 115 02/21/2018    CREATININE 0 89 02/21/2018    BUN 16 02/21/2018    MG 2 2 08/14/2016         Imaging and Other Studies  )Vas Carotid Limited Study    Result Date: 2/20/2018  Narrative:  [facility] REPORT CLINICAL: Indications: Intra-operative ultrasound for Left carotid artery endarterectomy  Operative History CABG Risk Factors The patient has history of HTN, hyperlipidemia, carotid artery stenosis, stroke, MI, GERD, emphysema, COPD, CAD, and previous smoking (quit >10 years ago)  The patient's current BMI is 26 94, Weight (lb) is 172 lb and Height (in) is 67 in  Clinical: Intraoperative blood pressure: 117/60  FINDINGS:  Left         Impression     PSV  EDV (cm/s)  Prox  ICA    [place]idely Patent   62          27  Dist CCA                     83          21  Ext Carotid                 116          15     CONCLUSION:  Impression LEFT SIDE: Intra-op evaluation shows no evidence of mural thrombus, intimal flap or significant residual disease in the endarterectomized carotid arteries    SIGNATURE: Electronically Signed by: Mavis Dietz on 2018-02-20 02:53:21 PM        Medications   Scheduled Meds:  Current Facility-Administered Medications:  albuterol 2 puff Inhalation Q4H PRN Ab Daniels    aspirin 81 mg Oral Daily Ab Daniels    atorvastatin 40 mg Oral Daily With [facility]    clopidogrel 75 mg Oral Daily Ab Daniels    heparin (porcine) 5,000 Units Subcutaneous Q8H [address]    labetalol 5 mg Intravenous Q15 Min PRN Ab Daniels    And        hydrALAZINE 15 mg Intravenous Q15 Min PRN Ab Daniels    And        niCARdipine 1-15 mg/hr Intravenous Continuous PRN Ab Daniels    HYDROmorphone 0 5 mg Intravenous Q3H PRN Ene Linton MD    lisinopril 10 mg Oral Daily Ab Daniels    metoprolol tartrate 12 5 mg Oral Q12H [address]    ondansetron 4 mg Intravenous Q6H PRN Ab Daniels oxyCODONE-acetaminophen 1 tablet Oral Q4H PRN Gallo Amador MD    pantoprazole 20 mg Oral Early Morning Driscilla Mote    sodium chloride 500 mL Intravenous Once Driscilla Mote    Followed by        phenylephine  mcg/min Intravenous Titrated Driscilla Mote    simethicone 80 mg Oral Q6H PRN Driscilla Mote    sodium chloride 125 mL/hr Intravenous Continuous Driscilla Mote Last Rate: 125 mL/hr (02/21/18 1112)   tamsulosin 0 4 mg Oral Daily Gallo Amador MD      Continuous Infusions:  niCARdipine 1-15 mg/hr    phenylephine  mcg/min    sodium chloride 125 mL/hr Last Rate: 125 mL/hr (02/21/18 1112)     PRN Meds:   albuterol    labetalol **AND** hydrALAZINE **AND** niCARdipine    HYDROmorphone    ondansetron    oxyCODONE-acetaminophen    simethicone      Total time spent with patient 25 minutes, >50% spent counseling and/or coordination of care           )MYLENE Sandoval

## 2018-02-21 NOTE — CONSULTS
Consultation - Cardiology   Vanesa Mason 68 y o  male MRN: [id number]  Unit/Bed#: [facility] 408-01 Encounter: [id number]    Assessment/Plan     Assessment/Recommendations:  1  Chest Pain: no acute EKG changes and troponin level is negative thus far  Would continue to follow his troponin levels serially for every 8 hours x3 sets to rule out any evidence of ACS  2   CAD:  With history of MI and PCI to the RCA in 2001 and also bypass surgery x4 in 2001  Continued on medical therapy with aspirin, Plavix, statin, and beta-blocker  As above we will continue to follow serial troponins  3   Hypertension:  With variable values in blood pressure  Occasionally very low  Patient feels that it is related to his increased vagal response on bearing down  I have asked him to stop doing that so we can see if that is the real culprit  For now would continue on his cardiac medications  4   Hyperlipidemia:  Continued on statin  5  Carotid disease: status post CEA    History of Present Illness   Physician Requesting Consult: Escobar Arredondo MD  Reason for Consult / Principal Problem: evaluate chest pain    HPI: Vanesa Mason is a 68y o  year old male who presents with elective CEA with prior history of coronary artery disease with prior MI in 2001 requiring both PCI to the RCA along with 4 vessel bypass surgery both in the same year  He has been continued on medical therapy with aspirin, Plavix, statin, and beta-blocker  He is now postop day 1 after CEA and was telling the nursing staff that he had a sudden onset of chest pain that felt very similar to the pain he had with his heart attack  He associated no shortness of breath, lightheadedness, dizziness, nausea, diaphoresis with his chest pain  He has noted no recent orthopnea, PND, syncope, lower extremity edema  No recent weight changes   He was recently evaluated by Dr Aviva Cisneros at our practice to evaluate for any preop cardiovascular workup that may have been recommended  His last cardiac testing included a nuclear stress test and echocardiogram both in 2016 that were relatively unremarkable other than a mild wall motion abnormality in the inferior wall on the echocardiogram   He is currently now status pain free and the episode overall lasted about 10-15 minutes  EKG was completed at the time of pain along with troponin value  Inpatient consult to Cardiology  Consult performed by: Isra Parry ordered by: Jaz Isaacs          Review of Systems   Constitutional: Negative for activity change, appetite change, fatigue and fever  HENT: Negative for nosebleeds and sore throat  Eyes: Negative for photophobia and visual disturbance  Respiratory: Negative for cough, chest tightness, shortness of breath and wheezing  Cardiovascular: Positive for chest pain  Negative for palpitations and leg swelling  Gastrointestinal: Negative for abdominal pain, diarrhea, nausea and vomiting  Endocrine: Negative for polyuria  Genitourinary: Negative for dysuria, frequency and hematuria  Musculoskeletal: Negative for arthralgias, back pain and gait problem  Skin: Negative for pallor and rash  Neurological: Negative for dizziness, syncope, speech difficulty and light-headedness  Hematological: Does not bruise/bleed easily  Psychiatric/Behavioral: Negative for agitation, behavioral problems and confusion         Historical Information   Past Medical History:   Diagnosis Date    Arthritis     Benign prostatic hyperplasia without lower urinary tract symptoms     without Urinary Obstruction    CAD (coronary artery disease)     Chronic obstructive lung disease (HCC)     Coronary arteriosclerosis     Depression     Emphysema lung (HCC)     GERD (gastroesophageal reflux disease)     Hyperlipidemia     Hypertension     Myocardial infarction     Psoriasis     Requires supplemental oxygen     at bedtime during high humid days only    Stroke [facility]) TIA 1/2018     Past Surgical History:   Procedure Laterality Date    COLONOSCOPY      CORONARY ANGIOPLASTY  02/03/2001    PTCA of RCA    CORONARY ARTERY BYPASS GRAFT  02/07/2001    x4- [place]    HERNIA REPAIR      [place] THROMBOENDARTECTMY NECK,NECK INCIS Left 2/20/2018    Procedure: ENDARTERECTOMY ARTERY CAROTID WITH PATCH ANGIOPLASTY;  Surgeon: Zahra Pearl MD;  [facility];  Service: Vascular    TRANSURETHRAL RESECTION OF PROSTATE       History   Alcohol Use    0 6 oz/week    1 Glasses of wine per week     Comment: Daily alchol use,      History   Drug Use No     History   Smoking Status    Former Smoker    Packs/day: 1 00    Years: 3 00    Types: Cigarettes    Quit date: 1956   Smokeless Tobacco    Never Used     Comment: Has a past history of cigarette smoking;Quit date 1956; 5 packs year history, per Allscripts       Family History:   Family History   Problem Relation Age of Onset    Lung cancer Mother     Cancer Mother     Other Father      sepsis       Meds/Allergies   all current active meds have been reviewed and current meds:   Current Facility-Administered Medications   Medication Dose Route Frequency    acetaminophen (TYLENOL) tablet 650 mg  650 mg Oral Q6H PRN    albuterol (PROVENTIL HFA,VENTOLIN HFA) inhaler 2 puff  2 puff Inhalation Q4H PRN    aspirin chewable tablet 81 mg  81 mg Oral Daily    atorvastatin (LIPITOR) tablet 40 mg  40 mg Oral Daily With Dinner    clopidogrel (PLAVIX) tablet 75 mg  75 mg Oral Daily    heparin (porcine) subcutaneous injection 5,000 Units  5,000 Units Subcutaneous Q8H [facility]    metoprolol tartrate (LOPRESSOR) partial tablet 12 5 mg  12 5 mg Oral Q12H [facility]    ondansetron (ZOFRAN) injection 4 mg  4 mg Intravenous Q6H PRN    oxyCODONE (ROXICODONE) IR tablet 5 mg  5 mg Oral Q4H PRN    pantoprazole (PROTONIX) EC tablet 20 mg  20 mg Oral Early Morning    simethicone (MYLICON) chewable tablet 80 mg  80 mg Oral Q6H PRN    sodium chloride 0 9 % infusion  100 mL/hr Intravenous Continuous    tamsulosin (FLOMAX) capsule 0 4 mg  0 4 mg Oral Daily     Allergies   Allergen Reactions    Penicillins Swelling       Objective   Vitals: Blood pressure 103/60, pulse 82, temperature 98 2 °F (36 8 °C), temperature source Oral, resp  rate 20, height 5' 7" (1 702 m), weight 78 kg (172 lb), SpO2 96 %  Orthostatic Blood Pressures    Flowsheet Row Most Recent Value   Blood Pressure  103/60 [[id number]] filed at 02/21/2018 1509   Patient Position - Orthostatic VS  Sitting filed at 02/21/2018 1509            Intake/Output Summary (Last 24 hours) at 02/21/18 1539  Last data filed at 02/21/18 1429   Gross per 24 hour   Intake             2445 ml   Output             2400 ml   Net               45 ml       Invasive Devices     Peripheral Intravenous Line            Peripheral IV 02/20/18 Left Hand 1 day    Peripheral IV 02/20/18 Right Hand 1 day          Drain            Urethral Catheter Coude 18 Fr  less than 1 day                Physical Exam   Constitutional: He is oriented to person, place, and time  He appears well-developed and well-nourished  HENT:   Head: Normocephalic and atraumatic  Nose: Nose normal    Eyes: EOM are normal  Pupils are equal, round, and reactive to light  No scleral icterus  Neck: Normal range of motion  Neck supple  No JVD present  Cardiovascular: Normal rate and regular rhythm  Exam reveals no gallop and no friction rub  Murmur heard  Systolic murmur is present with a grade of 2/6   Pulmonary/Chest: Effort normal and breath sounds normal  No respiratory distress  He has no wheezes  He has no rales  Abdominal: Soft  Bowel sounds are normal  He exhibits no distension  There is no tenderness  Musculoskeletal: Normal range of motion  He exhibits no edema or deformity  Neurological: He is alert and oriented to person, place, and time  No cranial nerve deficit  Skin: Skin is warm and dry  No rash noted  He is not diaphoretic  Psychiatric: He has a normal mood and affect  His behavior is normal    Vitals reviewed  Lab Results:   I have personally reviewed pertinent lab results  CBC with diff:   Results from last 7 days  Lab Units 02/21/18  0530   WBC Thousand/uL 8 88   RBC Million/uL 3 64*   HEMOGLOBIN g/dL 11 2*   HEMATOCRIT % 33 8*   MCV fL 93   MCH pg 30 8   MCHC g/dL 33 1   RDW % 13 2   MPV fL 10 3   PLATELETS Thousands/uL 165     CMP:   Results from last 7 days  Lab Units 02/21/18  0530   SODIUM mmol/L 140   POTASSIUM mmol/L 4 3   CHLORIDE mmol/L 107   CO2 mmol/L 26   ANION GAP mmol/L 7   BUN mg/dL 16   CREATININE mg/dL 0 89   GLUCOSE RANDOM mg/dL 115   CALCIUM mg/dL 8 2*   EGFR ml/min/1 73sq m 82     Troponin:   0  Lab Value Date/Time   TROPONINI <0 02 02/21/2018 1419   TROPONINI <0 02 01/13/2018 1118   TROPONINI <0 02 08/14/2016 0247   TROPONINI <0 02 08/13/2016 2340   TROPONINI <0 02 08/13/2016 1910     BNP:   Results from last 7 days  Lab Units 02/21/18  0530   SODIUM mmol/L 140   POTASSIUM mmol/L 4 3   CHLORIDE mmol/L 107   CO2 mmol/L 26   ANION GAP mmol/L 7   BUN mg/dL 16   CREATININE mg/dL 0 89   GLUCOSE RANDOM mg/dL 115   CALCIUM mg/dL 8 2*   EGFR ml/min/1 73sq m 82     Coags:   Results from last 7 days  Lab Units 02/20/18  0634   PTT seconds 31   INR  1 06     Imaging: I have personally reviewed pertinent reports  and I have personally reviewed pertinent films in PACS  EKG: personally reviewed - normal sinus rhythm, RBBB    Code Status: Prior    Counseling / Coordination of Care  Total floor / unit time spent today 45 minutes  Greater than 50% of total time was spent with the patient and / or family counseling and / or coordination of care

## 2018-02-21 NOTE — RESTORATIVE TECHNICIAN NOTE
Restorative Specialist Mobility Note       Activity: Ambulate in mendoza, Chair     Assistive Device: Front wheel walker

## 2018-02-21 NOTE — PROGRESS NOTES
Pt  With low BP of 87/44  Pt  Without symptoms  Blue sx  Aware, IVF to be ordered  Pt  Provided with hand outs regarding archibald care at home  Pt  Declining to be educated on using leg bag

## 2018-02-21 NOTE — PROGRESS NOTES
Progress Note - General Surgery   Gena Emery 68 y o  male MRN: [id number]  Unit/Bed#: [facility] 408-01 Encounter: [id number]    Assessment:  Pt is a 68y o  M with Left carotid artery stenosis s/p 2/20 Left carotid endarterectomy    Plan:  Cardiac diet  D/c IVF  D/c radha  Urinary retention protocol  Flomax  IS/OOB/ambulate/Wean 02  ASA/Plavix/statin  SCD/SQH    Subjective/Objective   Chief Complaint:     Subjective: Straight cath'd x2 overnight  Complains of mild frontal HA which developed over the night and that he hurts  Has a small soft hematoma over left neck  On 2L NC, says he has home O2 and wears it at needed PRN  Objective:     Blood pressure 103/56, pulse 58, temperature 97 8 °F (36 6 °C), temperature source Oral, resp  rate 18, height 5' 7" (1 702 m), weight 78 kg (172 lb), SpO2 100 %  ,Body mass index is 26 94 kg/m²  Intake/Output Summary (Last 24 hours) at 02/21/18 0356  Last data filed at 02/21/18 0255   Gross per 24 hour   Intake             2380 ml   Output             1675 ml   Net              705 ml       Invasive Devices     Peripheral Intravenous Line            Peripheral IV 02/20/18 Left Hand less than 1 day    Peripheral IV 02/20/18 Right Hand less than 1 day          Arterial Line            Arterial Line 02/20/18 Right Radial less than 1 day                Physical Exam: NAD  Left neck incision c/d/i, small soft hematoma  CN II-VII intact  No motor-sensory deficits  No c/c/e    Lab, Imaging and other studies:I have personally reviewed pertinent lab results    , CBC: No results found for: WBC, HGB, HCT, MCV, PLT, ADJUSTEDWBC, MCH, MCHC, RDW, MPV, NRBC, CMP: No results found for: NA, K, CL, CO2, ANIONGAP, BUN, CREATININE, GLUCOSE, CALCIUM, AST, ALT, ALKPHOS, PROT, ALBUMIN, BILITOT, EGFR  VTE Pharmacologic Prophylaxis: Heparin  VTE Mechanical Prophylaxis: sequential compression device

## 2018-02-21 NOTE — PROGRESS NOTES
Pt  With c/o chest pain/jaw pain/left elbow pain  Valentina with blue sx  Notified  STAT EKG and troponin ordered and completed  Lizbeth Mantilla at bedside  Dr Deirdre Duncan with cardiology made aware  Fluids to be dropped down to 100ml/hr per blue sx

## 2018-02-21 NOTE — PROGRESS NOTES
Pt  C/o seeing "grey spots"  Pt  States he was in the chair in a diff  Position when he moved he saw the spots  BP check was 85/37  Neuro exam is WNL  Yvonne Wiley  With blue sx  Aware, per sx -continue with IVF and monitor pt  At this time

## 2018-02-21 NOTE — PROCEDURES
Called to see patient to insert a coude tip archibald catheter after several unsuccessful attempts to insert a catheter for urinary retention  Patient was bladder scanned for greater than 700 ml  I inserted an 18 Nepali coude tip catheter with no difficulty under sterile technique  Patient tolerated procedure well      Deion Medina PA-C

## 2018-02-21 NOTE — POST OP PROGRESS NOTES
Vascular Surgery  Progress Note    L CEA- POD #1    Called by Maryetta Meigs, RN -- pt c/o chest, jaw and arm pain  Pt seen & evaluated  He notes chest pain, b/l jaw pain, L elbow pain, and R arm numbness that started "like bam" while he was speaking to nutrition services  He denies SOB and notes having been using his IS w/o difficulty  He denies N/V and stated he tolerated his cottage cheese and fruit lunch just fine  His pain is noted to be similar to his heart attack years ago  Previously today, he has been borderline hypotensive w/ SBP 80-90s after receiving Metoprolol 12 5, Lisinopril 10mg, and Flomax w/ urinary retention/ failed void trial requiring archibald catheter insertion w/ mild improvement upon resumption of IVF hydration  He has been asymptomatic up to this point  Review of vitals since admission reveals SBPs 90s- 120s at best   Razia Sands notes checking his BPs daily= 125/65 w/ HR 60s  [address] Up office visit BPs 100-115s    Cardiac h/o & risk factors  · MI/ CAD w/ RCA PCI 2/3/2001 followed by CABG x4 2/7/2001  · HTN  · HLD  · Remote tobacco  · Age  · Sex    Echo 1/15/18- EF 55%  HK of basal inf wall  Tr MR  NM stess 8/15/16- neg  EF 79%    Last cardiac evaluation: office visit Dr Gregory Nelson 1/31/18-- cleared to proceed w/ L CEA       VSS- 92/52  HR 63   (-) O2 requiring w/O2 sat 94%  Gen: A &O x3  Nontoxic appearing sitting in bedside chair  Heart: S1/S2 (-) murmur/ rub/ gallop  Lungs: CTA b/l  Abd: soft, NT/ND  Extr: Nonthreatened  B/L Palp DP pulses  (-) edema  Neuro: Grossly intact  L neck incision: C/D/I w/ Histacryl- stable  EKG (reviewed w/ [facility]- Dr ADRIANA Lantigua): (-) overt ischemia  Trop <0 02      Imp/Plan:  Chest/ jaw/ arm pain  Borderline Hypotension  S/P L CEA- POD #1  -Serial trops  -Cardiology consult  -Cont to monitor  -already on ASA/ Plavix  -Cont Bblocker w/ BP parameters  -Lisinopril already on hold  -NTG nor Morphine given concern for worsening of BPs      Moshe Fernández, GABRIEL  2/21/2018

## 2018-02-21 NOTE — SOCIAL WORK
68yo male is POD#1 left CEA  He was hypotensive last night and this am  He is alert and oriented, independent ADLs, drives  He resides in [place] with his 2 granddaughters  Granddaughter/POA is Erica Alatorre at [phone]  Other granddaughter is Colten Hickman at [phone]  They reside in 2 story home with first floor setup and 0 ARLENE  He has a cane, RW, and electric wheelchair  His PCP is Dr Alphonse Pizano, [phone]  He has never had [address] or inpt rehab  He had coude archibald inserted today and anticipate he will d/c home with it  Jaun Jade He chose [facility] for home care, referral sent  Denies hx mental illness or D&A problems  Pt  Is home alone during the day as family works  [facility] [address] [facility] [place]  He is interested in [facility] to beds at discharge  He has a son Prashant Contreras  Who resides in [place], phone [phone]  CM reviewed d/c planning process including the following: identifying help at home, patient preference for d/c planning needs, Discharge Lounge, [facility] to [facility] program, availability of treatment team to discuss questions or concerns patient and/or family may have regarding understanding medications and recognizing signs and symptoms once discharged  CM also encouraged patient to follow up with all recommended appointments after discharge  Patient advised of importance for patient and family to participate in managing patients medical well being

## 2018-02-22 VITALS
TEMPERATURE: 97.9 F | OXYGEN SATURATION: 96 % | RESPIRATION RATE: 18 BRPM | WEIGHT: 172 LBS | BODY MASS INDEX: 27 KG/M2 | HEART RATE: 58 BPM | DIASTOLIC BLOOD PRESSURE: 58 MMHG | HEIGHT: 67 IN | SYSTOLIC BLOOD PRESSURE: 119 MMHG

## 2018-02-22 LAB
ANION GAP SERPL CALCULATED.3IONS-SCNC: 5 MMOL/L (ref 4–13)
BUN SERPL-MCNC: 14 MG/DL (ref 5–25)
CALCIUM SERPL-MCNC: 7.7 MG/DL (ref 8.3–10.1)
CHLORIDE SERPL-SCNC: 112 MMOL/L (ref 100–108)
CO2 SERPL-SCNC: 26 MMOL/L (ref 21–32)
CREAT SERPL-MCNC: 0.93 MG/DL (ref 0.6–1.3)
ERYTHROCYTE [DISTWIDTH] IN BLOOD BY AUTOMATED COUNT: 13.7 % (ref 11.6–15.1)
GFR SERPL CREATININE-BSD FRML MDRD: 79 ML/MIN/1.73SQ M
GLUCOSE SERPL-MCNC: 77 MG/DL (ref 65–140)
HCT VFR BLD AUTO: 30.4 % (ref 36.5–49.3)
HGB BLD-MCNC: 9.7 G/DL (ref 12–17)
MCH RBC QN AUTO: 30.8 PG (ref 26.8–34.3)
MCHC RBC AUTO-ENTMCNC: 31.9 G/DL (ref 31.4–37.4)
MCV RBC AUTO: 97 FL (ref 82–98)
PLATELET # BLD AUTO: 130 THOUSANDS/UL (ref 149–390)
PMV BLD AUTO: 10 FL (ref 8.9–12.7)
POTASSIUM SERPL-SCNC: 4 MMOL/L (ref 3.5–5.3)
RBC # BLD AUTO: 3.15 MILLION/UL (ref 3.88–5.62)
SODIUM SERPL-SCNC: 143 MMOL/L (ref 136–145)
WBC # BLD AUTO: 5.1 THOUSAND/UL (ref 4.31–10.16)

## 2018-02-22 PROCEDURE — G8988 SELF CARE GOAL STATUS: HCPCS

## 2018-02-22 PROCEDURE — G8989 SELF CARE D/C STATUS: HCPCS

## 2018-02-22 PROCEDURE — G8978 MOBILITY CURRENT STATUS: HCPCS

## 2018-02-22 PROCEDURE — G8979 MOBILITY GOAL STATUS: HCPCS

## 2018-02-22 PROCEDURE — G8987 SELF CARE CURRENT STATUS: HCPCS

## 2018-02-22 PROCEDURE — 80048 BASIC METABOLIC PNL TOTAL CA: CPT | Performed by: PHYSICIAN ASSISTANT

## 2018-02-22 PROCEDURE — 97162 PT EVAL MOD COMPLEX 30 MIN: CPT

## 2018-02-22 PROCEDURE — 85027 COMPLETE CBC AUTOMATED: CPT | Performed by: PHYSICIAN ASSISTANT

## 2018-02-22 PROCEDURE — 97110 THERAPEUTIC EXERCISES: CPT

## 2018-02-22 PROCEDURE — 97166 OT EVAL MOD COMPLEX 45 MIN: CPT

## 2018-02-22 PROCEDURE — 99024 POSTOP FOLLOW-UP VISIT: CPT | Performed by: SURGERY

## 2018-02-22 RX ORDER — OXYCODONE HYDROCHLORIDE 5 MG/1
5 TABLET ORAL EVERY 4 HOURS PRN
Qty: 30 TABLET | Refills: 0 | Status: SHIPPED | OUTPATIENT
Start: 2018-02-22 | End: 2018-03-04

## 2018-02-22 RX ORDER — CLOPIDOGREL BISULFATE 75 MG/1
75 TABLET ORAL DAILY
Qty: 60 TABLET | Refills: 0 | Status: SHIPPED | OUTPATIENT
Start: 2018-02-23 | End: 2018-03-30 | Stop reason: ALTCHOICE

## 2018-02-22 RX ORDER — ACETAMINOPHEN 325 MG/1
650 TABLET ORAL EVERY 6 HOURS PRN
Qty: 30 TABLET | Refills: 0 | Status: SHIPPED | OUTPATIENT
Start: 2018-02-22 | End: 2019-01-04

## 2018-02-22 RX ORDER — TAMSULOSIN HYDROCHLORIDE 0.4 MG/1
0.4 CAPSULE ORAL DAILY
Qty: 30 CAPSULE | Refills: 0 | Status: SHIPPED | OUTPATIENT
Start: 2018-02-23 | End: 2018-03-01

## 2018-02-22 RX ADMIN — TAMSULOSIN HYDROCHLORIDE 0.4 MG: 0.4 CAPSULE ORAL at 09:13

## 2018-02-22 RX ADMIN — HEPARIN SODIUM 5000 UNITS: 5000 INJECTION, SOLUTION INTRAVENOUS; SUBCUTANEOUS at 06:27

## 2018-02-22 RX ADMIN — SODIUM CHLORIDE 100 ML/HR: 0.9 INJECTION, SOLUTION INTRAVENOUS at 05:36

## 2018-02-22 RX ADMIN — ASPIRIN 81 MG 81 MG: 81 TABLET ORAL at 09:12

## 2018-02-22 RX ADMIN — PANTOPRAZOLE SODIUM 20 MG: 20 TABLET, DELAYED RELEASE ORAL at 06:27

## 2018-02-22 RX ADMIN — CLOPIDOGREL BISULFATE 75 MG: 75 TABLET ORAL at 09:12

## 2018-02-22 RX ADMIN — METOPROLOL TARTRATE 12.5 MG: 25 TABLET ORAL at 09:12

## 2018-02-22 NOTE — PROGRESS NOTES
Spoke with Deidra Montaño  With blue sx regarding pt  D/c medications  Some medications such as Lipitor and ASA does not have frequency listed  Nursing to instruct pt  To have him follow his at home schedule, will notify pt  Also, pt  Is to be discharged with archibald in place, verified with Deidra Montaño

## 2018-02-22 NOTE — SOCIAL WORK
Pt is cleared for d/c  Pt is accepted for services by [facility] for his aftercare  The pt and his granddaughter Cathy Pérez were both informed of d/c  Granddaughter will transport pt home later this day, pickup time TBD  No IMM required due to pt's LOS < 3 days  No chart copy required  CM to follow

## 2018-02-22 NOTE — DISCHARGE INSTRUCTIONS
Archibald Cath Care and void trial instructions---Maintain archibald catheter to straight drainage  May use leg bag and shower  May flush daily prn using Rochelle syringe and 120 ml NSS  May use more saline ad denise to prevent/treat cath obstruction  Remove catheter on 8th day post-hospital discharge by 0800 hrs  (If on weekend, wait until next business day)  Straight cath if no void or less than 200ml in by next AM & repeat process  If patient requires straight cath x2days, re-insert archibald and call for Urologist follow-up  Information above  Archibald can remain in place for up to 4 weeks at a time  Archibald placed at [facility] February 21, 2018               DISCHARGE INSTRUCTIONS                       CAROTID ENDARTERECTOMY  Following discharge from the hospital, you may have some questions about your operation, your activities or your general condition  These instructions may answer some of your questions and help you adjust during the first few weeks following your operation  ACTIVITY: Resume your normal activities and exercise schedule as tolerated  If you were driving prior to surgery, you may resume driving one week following discharge from the hospital  You may ride in a car upon discharge  DIET: Resume your normal diet  Try to eat low fat and low cholesterol foods  RECURRENT SYMPTOMS: If you develop any new numbness, weakness, blindness or difficulty with speech after discharge call 911 or go to an emergency department immediately  INCISION: Your surgeon may have chosen to use a type of adhesive glue to close your incision  The glue is used to cover the incision, assist in closure, and prevent contamination  This adhesive will darken and peel away on its own within one to two weeks  You may shower the day after your surgery, but do not scrub the incision  If you do not have this adhesive glue, you may include the operated area in a shower on the third day following surgery      It is normal to have swelling or discoloration around the incision  If increasing redness or pain develops, call our office immediately  Numbness in the region of the incision may occur following the surgery  This normally resolves in six to twelve months  FOLLOW UP STUDIES: Doppler ultrasound studies are very important to your post-operative care  Your surgeon will arrange them at your first postoperative visit  The first study is due 3 months after surgery  [phone] [facility] [phone]  [address] [facility] , [address], [facility] [place], [address]  [address], [place], [address]  [address], [facility], [address]  [address] [facility], [facility], [address]  [address]st  Fl[address], [address]  [facility], [address] [facility], [facility] [facility], [facility], [facility] [address]   [address] [facility], [address] [facility], [facility] [place], [address]  [address] [facility], [address] [facility], [address] [facility], [address]

## 2018-02-22 NOTE — PHYSICAL THERAPY NOTE
PHYSICAL THERAPY TREATMENT       02/22/18 1049   Pain Assessment   Pain Assessment No/denies pain   Pain Score No Pain   Restrictions/Precautions   Weight Bearing Precautions Per Order No   Other Precautions Fall Risk;Telemetry;Multiple lines;Hard of hearing; Impulsive   Cognition   Overall Cognitive Status WFL   Attention Attends with cues to redirect   Orientation Level Oriented X4   Memory Decreased recall of precautions   Following Commands Follows one step commands without difficulty   Subjective   Subjective agreable to therex   Endurance Deficit   Endurance Deficit No   Activity Tolerance   Activity Tolerance Patient limited by fatigue   Nurse Made Aware yes- jasmin- Georgie   Exercises   Hip Flexion Standing;10 reps   Knee AROM Long Arc Quad Sitting;15 reps;Right;Left  (x2)   Ankle Pumps Sitting;25 reps;Right;Left  (x2)   Balance Training 5 reps  (sit<>stand x 5 reps from chair )   Marching Sitting;15 reps  (x2)   Assessment   Prognosis Good   Problem List Decreased endurance; Impaired balance; Impaired judgement;Decreased safety awareness;Decreased mobility   Assessment Pt seen for skilled PT session following evaluation consisting of instruction in  therex/ HEP instruction; for increased LE strengthening to assist w/ increasing independence w/ transfers and gait  Pt tolerates therex w/o complaints or increase in pain  Will continue to  benefit from repeated instruction for correct technique and compliance     Goals   Patient Goals go home    STG Expiration Date 03/01/18   Treatment Day 0   Plan   Treatment/Interventions Therapeutic exercise;Patient/family training;Spoke to nursing;Spoke to case management;OT   PT Frequency 5x/wk   Recommendation   Recommendation Home with family support   Equipment Recommended Keila De La Paz   PT - OK to Discharge Yes     Neville Mead PT

## 2018-02-22 NOTE — PROGRESS NOTES
Progress Note - General Surgery   Teresa Tobar 68 y o  male MRN: [id number]  Unit/Bed#: [facility] 408-01 Encounter: [id number]    Assessment:  Pt is a 68y o  M with Left carotid artery stenosis s/p 2/20 Left carotid endarterectomy    Plan:  Cardiac diet  D/c IVF  Flomax/Fortune-outpt urology f/u  Cardiology-Troponin/EKG negative from yesterday  IS/OOB/ambulate/Wean 02  ASA/Plavix/statin  SCD/SQH  dispo planning    Subjective/Objective   Chief Complaint:     Subjective: Had chest and jaw pain yesterday, workup negative  DARCY overnight  Feels well this am, no SOB/CP, neck pain controlled  Ambulated on own  No neuro deficits  Objective:     Blood pressure 117/58, pulse 84, temperature 98 2 °F (36 8 °C), temperature source Oral, resp  rate 18, height 5' 7" (1 702 m), weight 78 kg (172 lb), SpO2 93 %  ,Body mass index is 26 94 kg/m²  Intake/Output Summary (Last 24 hours) at 02/22/18 0540  Last data filed at 02/22/18 0301   Gross per 24 hour   Intake          3400 83 ml   Output             3600 ml   Net          -199 17 ml       Invasive Devices     Peripheral Intravenous Line            Peripheral IV 02/20/18 Left Hand 1 day    Peripheral IV 02/20/18 Right Hand 1 day          Drain            Urethral Catheter Coude 18 Fr  less than 1 day                Physical Exam:   DARCY  AAOX3  Left neck incision c/d/i with small soft hematoma  Normal respiratory effort  Soft, NT, ND  No c/c/e    Lab, Imaging and other studies:I have personally reviewed pertinent lab results    , CBC: No results found for: WBC, HGB, HCT, MCV, PLT, ADJUSTEDWBC, MCH, MCHC, RDW, MPV, NRBC, CMP: No results found for: NA, K, CL, CO2, ANIONGAP, BUN, CREATININE, GLUCOSE, CALCIUM, AST, ALT, ALKPHOS, PROT, ALBUMIN, BILITOT, EGFR  VTE Pharmacologic Prophylaxis: Heparin  VTE Mechanical Prophylaxis: sequential compression device

## 2018-02-22 NOTE — PHYSICAL THERAPY NOTE
Physical Therapy Evaluation:     Patient Name: Vladislav CALABRESE Date: 2/22/2018     Problem List  Patient Active Problem List   Diagnosis    CAD (coronary atherosclerotic disease)    Hypertension    Hyperlipemia    Atypical chest pain    Pulmonary emphysema ([address] )    Hypothyroid    GERD (gastroesophageal reflux disease)    TIA (transient ischemic attack)    Sciatica of right side    Hyperlipidemia    Emphysema lung ([address] )    Arthritis    Stenosis of left carotid artery    S/P CABG x 4    Pre-operative cardiovascular examination        Past Medical History  Past Medical History:   Diagnosis Date    Arthritis     Benign prostatic hyperplasia without lower urinary tract symptoms     without Urinary Obstruction    CAD (coronary artery disease)     Chronic obstructive lung disease (HCC)     Coronary arteriosclerosis     Depression     Emphysema lung (HCC)     GERD (gastroesophageal reflux disease)     Hyperlipidemia     Hypertension     Myocardial infarction     Psoriasis     Requires supplemental oxygen     at bedtime during high humid days only    Stroke [facility])     TIA 1/2018        Past Surgical History  Past Surgical History:   Procedure Laterality Date    COLONOSCOPY      CORONARY ANGIOPLASTY  02/03/2001    PTCA of RCA    CORONARY ARTERY BYPASS GRAFT  02/07/2001    x4- [place]    HERNIA REPAIR      VT THROMBOENDARTECTMY Katarina Judit INCIS Left 2/20/2018    Procedure: ENDARTERECTOMY ARTERY CAROTID WITH PATCH ANGIOPLASTY;  Surgeon: Hima Longo MD;  [facility];  Service: Vascular    TRANSURETHRAL RESECTION OF PROSTATE           02/22/18 1040   Note Type   Note type Eval only   Pain Assessment   Pain Assessment No/denies pain   Pain Score No Pain   Home Living   Type of Home House   Home Layout Two level; Able to live on main level with bedroom/bathroom  (pt on 1st floor)   Home Equipment Walker;Cane;Wheelchair-electric;Electric scooter   Prior Function   Level of [place] Independent with ADLs and functional mobility   Lives With Family;Daughter  (monisha)   Receives Help From Family   ADL Assistance Independent   IADLs Independent   Falls in the last 6 months 0   Vocational Retired   Comments pt reports using RW vs "holding onto things in home"- uses scooter vs electric W/C in community at baseline- pt reports ongoing sciatic pain that is chronic    Restrictions/Precautions   Weight Bearing Precautions Per Order No   Other Precautions Telemetry; Fall Risk;Pain   Cognition   Overall Cognitive Status WFL   Attention Attends with cues to redirect   Orientation Level Oriented X4   Memory Decreased recall of precautions   Following Commands Follows one step commands without difficulty   Comments pleasant- impulsive w/ decerased safety awareness that is most likely baseline   RLE Assessment   RLE Assessment WFL   LLE Assessment   LLE Assessment WFL   Coordination   Movements are Fluid and Coordinated 1   Light Touch   RLE Light Touch Grossly intact   LLE Light Touch Grossly intact   Bed Mobility   Additional Comments OOB  to chair on arrival    Transfers   Sit to Stand 6  Modified independent   Additional items Verbal cues   Stand to Sit 6  Modified independent   Additional items Impulsive;Verbal cues   Stand pivot 6  Modified independent  (RW)   Ambulation/Elevation   Gait pattern Inconsistent johnathan   Gait Assistance 5  Supervision  (max cues for safety- impulsive )   Additional items Verbal cues   Assistive Device Rolling walker   Distance 200'x1 w/ RW- (DASH)- impulsive at times- cues for safety- attempts to leave go of RW at times- cues for safety only- poor management and awareness of lines (bahavior is likely baseline)-    Balance   Static Sitting Good   Dynamic Sitting Good   Static Standing Fair +   Dynamic Standing Fair +   Ambulatory Fair  (rw)   Endurance Deficit   Endurance Deficit No   Activity Tolerance   Activity Tolerance Patient limited by fatigue   Assessment   Prognosis Good   Problem List Decreased endurance; Impaired balance; Impaired judgement;Decreased safety awareness;Decreased mobility   Assessment Pt is 68 y o  male seen for PT evaluation s/p admit [facility]/20/2018  Pt presenting for neurological f/u after a hospital admission for left sided amaurosis fugax  He was admitted to [facility] through the ED, 1/13- 1/15/18, for left visual changes c/w AF of the left eye  He feels well since d/c from hospital, with no recurrent signs/ sxs of stroke/ TIA and vision disturbance has completely resolved  Pt is s/p ENDARTERECTOMY ARTERY CAROTID WITH PATCH ANGIOPLASTY dx'd w/ stenosis of left carotid artery  PT now consulted for assessment of mobility and d/c needs  PMhxh/o CABG x4, sciatic pain;  pulmonary emphysema and stenosis of LCA; arthritis, chronic obstructive lung disease, depression, emphysema, GERD, HLD, HTN, MI psoriasis, CVA  Prior to admission, pt was living w/ daughter and granddaughter in a multilevel home w/ o ARLENE and 1st setup- pt reports furniture walking in home vs using RW- uses scooter for  longer community distances pt denies falls in 6 month period but reports legs sometimes "gives out" 2* R sciatica  Upon evaluation, pt currently is requiring S for functional transfers and S for ambulation w/ RW  Pt is impulsive and has poor safety/ judgement that is likely baseline- PT instructed proper safety w/ RW throughout eval     Pt presents functioning mildly below baseline and currently w/ overall mobility deficits 2* to: limited flexibility;  generalized weakness/ deconditioning; decreased endurance; decreased activity tolerance; decreased coordination; impaired balance; gait deviations; decreased safety awareness; impaired safety and judgement; limited insight into current deficits; multiple lines  Pt currently at risk for falls    (Please find additional objective findings from PT assessment regarding body systems outlined above ) Pt will continue to benefit from skilled PT interventions to address stated impairments; to maximize functional potential; for ongoing pt/ family training; and DME needs  PT is currently recommending d/c home w/ ongoing family support when medically cleared- continued use of RW for bal ance and safety on d/c   Pt agreeable to plan and goals as stated on evaluation  Goals   Patient Goals go home    STG Expiration Date 03/01/18   Short Term Goal #1 In 10 days pt will complete: 1) Bed mobility skills with MI2) Functional transfers with MII 3) Ambulation with least restrictive AD MI x 350' without LOB and stable vitals  4) Stair training up/ down flight step/s with 1 rail/s  and S  5) Improve balance grades to Good 6) Improve LE strength grades by 1   7) LE HEP independently  8) PT for ongoing pt and family education; DME needs and D/C planning to promote highest level of function in least restrictive environment  Treatment Day 0   Plan   Treatment/Interventions Functional transfer training;LE strengthening/ROM; Elevations; Therapeutic exercise; Endurance training;Patient/family training;Equipment eval/education; Bed mobility;Gait training;Spoke to nursing;Spoke to MD;Spoke to case management;OT   PT Frequency 5x/wk   Recommendation   Recommendation Home with family support  (use of RW for safety on d/c )   Equipment Recommended Walker  (has at home )   PT - OK to Discharge Yes  (w/ family support when medically cleared )   Modified Sargent Scale   Modified Ander Scale 3   Barthel Index   Feeding 10   Bathing 5   Grooming Score 5   Dressing Score 10   Bladder Score 10   Bowels Score 10   Toilet Use Score 5   Transfers (Bed/Chair) Score 10   Mobility (Level Surface) Score 10   Stairs Score 0   Barthel Index Score 75   Umang Hung, PT

## 2018-02-22 NOTE — PLAN OF CARE
Problem: PHYSICAL THERAPY ADULT  Goal: Performs mobility at highest level of function for planned discharge setting  See evaluation for individualized goals  Treatment/Interventions: Functional transfer training, LE strengthening/ROM, Elevations, Therapeutic exercise, Endurance training, Patient/family training, Equipment eval/education, Bed mobility, Gait training, Spoke to nursing, Spoke to MD, Spoke to case management, OT  Equipment Recommended: Dede Garcia (has at home )       See flowsheet documentation for full assessment, interventions and recommendations  Prognosis: Good  Problem List: Decreased endurance, Impaired balance, Impaired judgement, Decreased safety awareness, Decreased mobility  Assessment: Pt is 68 y o  male seen for PT evaluation s/p admit to [facility] on 2/20/2018  Pt presenting for neurological f/u after a hospital admission for left sided amaurosis fugax  He was admitted to [facility] through the ED, 1/13- 1/15/18, for left visual changes c/w AF of the left eye  He feels well since d/c from hospital, with no recurrent signs/ sxs of stroke/ TIA and vision disturbance has completely resolved  Pt is s/p ENDARTERECTOMY ARTERY CAROTID WITH PATCH ANGIOPLASTY dx'd w/ stenosis of left carotid artery  PT now consulted for assessment of mobility and d/c needs  PMhxh/o CABG x4, sciatic pain;  pulmonary emphysema and stenosis of LCA; arthritis, chronic obstructive lung disease, depression, emphysema, GERD, HLD, HTN, MI psoriasis, CVA  Prior to admission, pt was living w/ daughter and granddaughter in a multilevel home w/ o ARLENE and 1st setup- pt reports furniture walking in home vs using RW- uses scooter for  longer community distances pt denies falls in 6 month period but reports legs sometimes "gives out" 2* R sciatica  Upon evaluation, pt currently is requiring S for functional transfers and S for ambulation w/ RW   Pt is impulsive and has poor safety/ judgement that is likely baseline- PT instructed proper safety w/ RW throughout eval     Pt presents functioning mildly below baseline and currently w/ overall mobility deficits 2* to: limited flexibility;  generalized weakness/ deconditioning; decreased endurance; decreased activity tolerance; decreased coordination; impaired balance; gait deviations; decreased safety awareness; impaired safety and judgement; limited insight into current deficits; multiple lines  Pt currently at risk for falls  (Please find additional objective findings from PT assessment regarding body systems outlined above ) Pt will continue to benefit from skilled PT interventions to address stated impairments; to maximize functional potential; for ongoing pt/ family training; and DME needs  PT is currently recommending d/c home w/ ongoing family support when medically cleared- continued use of RW for bal ance and safety on d/c   Pt agreeable to plan and goals as stated on evaluation  Recommendation: Home with family support (use of RW for safety on d/c )     PT - OK to Discharge: Yes (w/ family support when medically cleared )    See flowsheet documentation for full assessment

## 2018-02-22 NOTE — OCCUPATIONAL THERAPY NOTE
[address]  Evaluation     Patient Name: Arturo Arellano  RMDDT'Z Date: 2/22/2018  Problem List  Patient Active Problem List   Diagnosis    CAD (coronary atherosclerotic disease)    Hypertension    Hyperlipemia    Atypical chest pain    Pulmonary emphysema ([address] )    Hypothyroid    GERD (gastroesophageal reflux disease)    TIA (transient ischemic attack)    Sciatica of right side    Hyperlipidemia    Emphysema lung ([address] )    Arthritis    Stenosis of left carotid artery    S/P CABG x 4    Pre-operative cardiovascular examination     Past Medical History  Past Medical History:   Diagnosis Date    Arthritis     Benign prostatic hyperplasia without lower urinary tract symptoms     without Urinary Obstruction    CAD (coronary artery disease)     Chronic obstructive lung disease (HCC)     Coronary arteriosclerosis     Depression     Emphysema lung (HCC)     GERD (gastroesophageal reflux disease)     Hyperlipidemia     Hypertension     Myocardial infarction     Psoriasis     Requires supplemental oxygen     at bedtime during high humid days only    Stroke [facility])     TIA 1/2018     Past Surgical History  Past Surgical History:   Procedure Laterality Date    COLONOSCOPY      CORONARY ANGIOPLASTY  02/03/2001    PTCA of RCA    CORONARY ARTERY BYPASS GRAFT  02/07/2001    x4- [place]    HERNIA REPAIR      [place] THROMBOENDARTECTMY [place] [facility]iss[place] INCIS Left 2/20/2018    Procedure: ENDARTERECTOMY ARTERY CAROTID WITH PATCH ANGIOPLASTY;  Surgeon: Tc Marquez MD;  [facility];  Service: Vascular    TRANSURETHRAL RESECTION OF PROSTATE             02/22/18 0943   Note Type   Note type Eval only   Restrictions/Precautions   Weight Bearing Precautions Per Order No   Other Precautions Telemetry; Fall Risk;Pain   Pain Assessment   Pain Assessment No/denies pain   Pain Score No Pain   Home Living   Type of [address] [place] [address] Two level; Able to live on main level with bedroom/bathroom   Bathroom Shower/Tub Walk-in shower   Bathroom Toilet Raised   Bathroom Equipment Grab bars in shower;Grab bars around toilet; Shower chair   Bathroom Accessibility Accessible   Home Equipment Walker;Cane;Wheelchair-electric   Additional Comments Pt reports living in 2 story home w/ no ARLENE, and 1st floor setup   Prior Function   Level of [place] Independent with ADLs and functional mobility   Lives With Family;Daughter   Receives Help From Family   ADL Assistance Independent   IADLs Needs assistance   Falls in the last 6 months 0   Vocational Retired   Comments Pt reports being I w/ ADLS, assist for IADLS, Mod I w/ transfers and functional mobility PTA   Lifestyle   Autonomy Pt reports being I w/ ADLS, assist for IADLS, Mod I w/ transfers and functional mobility PTA   Reciprocal Relationships Pt lives w/ daughter and granddaughter - both work night shift and are home sleeping during the day   Service to Others Pt is retired   Intrinsic Gratification Pt enjoys going to [employer] [place]   Psychosocial   Psychosocial (WDL) WDL   ADL   Eating Assistance 7  Independent   Grooming Assistance 6  Modified Independent   [address] 6  Modified Independent   LB [address] 6  Modified Independent   [address] 6  Modified independent   [address] 6  Modified independent   [address]  6  Modified independent   [address] 6  Modified independent   Bed Mobility   Additional Comments Unable to assess, pt seated in chair prior to and end of session   Transfers   Sit to Stand 6  Modified independent   Additional items Increased time required;Armrests; Verbal cues   Stand to Sit 6  Modified independent   Additional items Increased time required   Toilet transfer 6  Modified independent   Additional items Increased time required;Verbal cues;Standard toilet   Functional Mobility   Functional Mobility 6  Modified independent   Additional Comments Pt ambulated short in room distance w/ Mod I , use of RW   Additional items Rolling walker   Balance   Static Sitting Good   Dynamic Sitting Fair   Static Standing Fair +   Ambulatory Fair   Activity Tolerance   Activity Tolerance Patient tolerated treatment well   Nurse Made Aware yes, Cheri Choi Assessment   RUE Assessment WFL   LUE Assessment   LUE Assessment WFL   Hand Function   Gross Motor Coordination Functional   Fine Motor Coordination Functional   Cognition   Overall Cognitive Status WFL   Arousal/Participation Alert; Responsive; Cooperative   Attention Within functional limits   Orientation Level Oriented X4   Memory Within functional limits   Following Commands Follows one step commands without difficulty   Comments Pt is pleasant and cooperative   Assessment   Limitation Decreased endurance;Decreased high-level ADLs   Prognosis Fair   Assessment Pt is a 67 y/o male seen for OT eval s/p adm to [facility] for neurological f/u after a hospital admission for left sided amaurosis fugax  He was admitted to [facility] through the ED, 1/13- 1/15/18, for left visual changes c/w AF of the left eye  He feels well since d/c from hospital, with no recurrent signs/ sxs of stroke/ TIA and vision disturbance has completely resolved  Pt is s/p ENDARTERECTOMY ARTERY CAROTID WITH PATCH ANGIOPLASTY dx'd w/ stenosis of left carotid artery  Comorbidities include a h/o arthritis, chronic obstructive lung disease, depression, emphysema, GERD, HLD, HTN, Mi, psoriasis, stroke  Pt with active OT orders and up as tolerated  Pt lives with daughter and granddaughter in 2 story home w/ no ARLENE and 1st floor setup  Pt was I w/  ADLS, requires assist for IADLS, drove, & required use of DME PTA including a shower chair, raised toilet seat, walker, cane and electric w/c  Pt is currently demonstrating the following occupational deficits: Mod I UB and LB ADLS, Mod I transfers and functional mobility   Pt with deficits and limitations in all baseline areas of occupation 2* decreased endurance, fatigue, decreased balance, and decreased activity tolerance and functional mobility  Pt scored overall 75/100 on the Barthel Index  Based on the aforementioned OT evaluation, functional performance deficits, and assessments, pt has been identified as a moderate complexity evaluation  Recommend pt d/c home w/ increased family support when medically stable  Pt appears to be functioning at baseline and will no longer benefit from immediate acute skilled OT servicse   D/C OT   Goals   Patient Goals to go home   Recommendation   OT Discharge Recommendation Home with family support   OT - OK to Discharge Yes  (when medically stable)   Barthel Index   Feeding 10   Bathing 5   Grooming Score 5   Dressing Score 10   Bladder Score 10   Bowels Score 10   Toilet Use Score 10   Transfers (Bed/Chair) Score 15   Mobility (Level Surface) Score 0   Stairs Score 0   Barthel Index Score 75   Modified Ander Scale   Modified Orlando Scale 2       Hanny Wright MOT, OTR/L

## 2018-02-22 NOTE — DISCHARGE SUMMARY
Discharge Summary    Arturo Arellano 68 y o  male MRN: [id number]    Unit/Bed#: [facility] 408-01 Encounter: [id number]    Admission Date: 2/20/2018     Discharge Date: 2/22/2018    Attending: Britney Francois    Consultants:     · Urology- Dr Jesenia Morgan  · [facility]    Admitting Diagnosis: Stenosis of left carotid artery [I65 22]    Principle Diagnosis: Stenosis of left carotid artery [I65 22]    Secondary Diagnosis:    · Urinary retention  · Chest Pain- work-up negative    Past Medical History:   Diagnosis Date    Arthritis     Benign prostatic hyperplasia without lower urinary tract symptoms     without Urinary Obstruction    CAD (coronary artery disease)     Chronic obstructive lung disease ([facility] )     Coronary arteriosclerosis     Depression     Em[facility]sema lung ([facility] )     GERD (gastroesophageal reflux disease)     Hyperlipidemia     Hypertension     Myocardial infarction     Psoriasis     Requires supplemental oxygen     at bedtime during high humid days only    Stroke [facility])     TIA 1/2018     Past Surgical History:   Procedure Laterality Date    COLONOSCOPY      CORONARY ANGIOPLASTY  02/03/2001    PTCA of RCA    CORONARY ARTERY BYPASS GRAFT  02/07/2001    x4- [place]    HERNIA REPAIR      [place] THROMBOENDARTECTMY [place] [employer] INCIS Left 2/20/2018    Procedure: ENDARTERECTOMY ARTERY CAROTID WITH PATCH ANGIOPLASTY;  Surgeon: Tc Marquez MD;  [facility];  Service: Vascular    TRANSURETHRAL RESECTION OF PROSTATE          Procedures Performed:     · Left CEA w/ bovine patch angioplasty 2/20- [place]    Orders Placed This Encounter   Procedures    Bladder catheterization       Discharge Medications:  See after visit summary for reconciled discharge medications provided to patient and family  Hospital Course:  41-year-old gentleman admitted electively to [facility] back on him on his 2/20/18 for treatment of left carotid stenosis    He underwent left carotid endarterectomy on the day of his admission as scheduled by Dr Earl Ambrose  He tolerated surgery well and following surgery he was transferred to step-down unit for recovery  His postoperative course was complicated by urinary retention which required straight catheterization time several and eventual coude placement  He was seen in consultation by Urology team and was recommended to continue with in place to leg bag upon discharge with VNA services  He will have catheter removed in one week and follow up with Dr Eryn Baeza as an outpatient  On postoperative day 1  He had complaints of chest pain radiating to his jaw  Cardiac workup was obtained  EKG was found to be without acute changes and troponin level was negative  He was seen in consultation by [facility] and was recommended to continue with his medical therapy  He also had some episodes of hypotension which were felt to be secondary to increased vagal response from bearing down due to sensation of air in the bladder  The patient's blood pressure remained normal following discontinuation of this practice  On pod 2  The patient was found to be hemodynamically stable and neurovascular exam was appropriate  He was able to ambulate without difficulty  He was tolerating a diet and his pain was well controlled  He was deemed appropriate for discharge home in the care of his family  He was given instructions for his discharge medications and prescription for pain medication  He was instructed to call with any questions concerns or changes in his condition  Condition at Discharge: stable     Provisions for Follow-Up Care:  See after visit summary for information related to follow-up care and any pertinent home health orders  Disposition: Home    Discharge instructions/Information to patient and family:   See after visit summary for information provided to patient and family      Planned Readmission: No    Discharge Statement   I spent 30 minutes discharging the patient  This time was spent on the day of discharge  I had direct contact with the patient on the day of discharge  Additional documentation is required if more than 30 minutes were spent on discharge

## 2018-02-23 ENCOUNTER — TRANSITIONAL CARE MANAGEMENT (OUTPATIENT)
Dept: INTERNAL MEDICINE CLINIC | Facility: CLINIC | Age: 78
End: 2018-02-23

## 2018-02-23 NOTE — TELEPHONE ENCOUNTER
Spoke with PT and he needs to check with his granddaughter because right now she transports him  PT asked if I can email him the details of an appointment and he will get back to me if he is able to do it  I scheduled for 3/7/2018 at 11:30am with Edilberto Hansen in [facility]

## 2018-02-24 DIAGNOSIS — R39.15 URGENCY OF URINATION: Primary | ICD-10-CM

## 2018-02-24 RX ORDER — OXYBUTYNIN CHLORIDE 10 MG/1
10 TABLET, EXTENDED RELEASE ORAL
Qty: 30 TABLET | Refills: 1 | Status: SHIPPED | OUTPATIENT
Start: 2018-02-24 | End: 2018-03-30 | Stop reason: ALTCHOICE

## 2018-02-25 ENCOUNTER — HOSPITAL ENCOUNTER (EMERGENCY)
Facility: HOSPITAL | Age: 78
Discharge: HOME | End: 2018-02-25
Attending: EMERGENCY MEDICINE
Payer: MEDICARE

## 2018-02-25 VITALS
BODY MASS INDEX: 27.72 KG/M2 | DIASTOLIC BLOOD PRESSURE: 61 MMHG | HEART RATE: 73 BPM | OXYGEN SATURATION: 98 % | RESPIRATION RATE: 16 BRPM | TEMPERATURE: 98.7 F | WEIGHT: 177 LBS | SYSTOLIC BLOOD PRESSURE: 134 MMHG

## 2018-02-25 DIAGNOSIS — R33.9 URINARY RETENTION: Primary | ICD-10-CM

## 2018-02-25 DIAGNOSIS — T83.9XXA FOLEY CATHETER PROBLEM (HCC): ICD-10-CM

## 2018-02-25 PROCEDURE — 99283 EMERGENCY DEPT VISIT LOW MDM: CPT

## 2018-02-25 NOTE — ED PROVIDER NOTES
History  Chief Complaint   Patient presents with    Penis Pain     Pt presents to ER with c/o's penile pain & wanting Archibald catheter out  Pt describes what sounds like bladder spasms  Pt also reports 5 lbs weight increase in 3 days  Healthy appearing adult presents in no distress  Patient had a surgery 5 days ago, archibald was placed due to post op retention  Now with irritation from archibald  They replaced the archibald 3 times because he failed multiple voiding trials  I discussed the plan with the patient and that there are 2 options, we can attempt a voiding trial for now, given that his chief complaint is simply irritation from the Archibald  Otherwise, if he feels avoiding trial we can either put the Archibald back in or do intermittent straight caths  No abdominal pain currently  No fevers, chills, sweats, CVA tenderness, shortness of breath  He did note some weight gain postop but is well-appearing, no crackles, no shortness of breath  The patient (and any family present) verbalized understanding of the discharge instructions and warnings that would necessitate return to the Emergency Department  Gave verbal in addition to written discharge instructions  Specifically highlighted areas of special concern regarding the written and verbal discharge instructions and return precautions  All questions were answered prior to discharge  Patient passed a voiding trial, will dc with archibald out  Prior to Admission Medications   Prescriptions Last Dose Informant Patient Reported?  Taking?   acetaminophen (TYLENOL) 325 mg tablet   No No   Sig: Take 2 tablets (650 mg total) by mouth every 6 (six) hours as needed for mild pain   albuterol (PROVENTIL HFA,VENTOLIN HFA) 90 mcg/act inhaler  Self No No   Sig: Inhale 2 puffs every 4 (four) hours as needed for wheezing   aspirin 81 mg chewable tablet  Self Yes No   Sig: Chew   atorvastatin (LIPITOR) 40 mg tablet  Self Yes No   Sig: Take by mouth clopidogrel (PLAVIX) 75 mg tablet   No No   Sig: Take 1 tablet (75 mg total) by mouth daily   esomeprazole (NexIUM) 20 mg capsule  Self Yes No   Sig: Take 20 mg by mouth every morning before breakfast   lisinopril (ZESTRIL) 10 mg tablet  Self Yes No   Sig: Take by mouth   metoprolol tartrate (LOPRESSOR) 25 mg tablet  Self No No   Sig: Take 0 5 tablets by mouth every 12 (twelve) hours   Patient taking differently: Take 25 mg by mouth every 12 (twelve) hours Take one full tablet every 12 hours    oxyCODONE (ROXICODONE) 5 mg immediate release tablet   No No   Sig: Take 1 tablet (5 mg total) by mouth every 4 (four) hours as needed for moderate pain or severe pain for up to 10 days Max Daily Amount: 30 mg   oxybutynin (DITROPAN-XL) 10 MG 24 hr tablet   No No   Sig: Take 1 tablet (10 mg total) by mouth daily at bedtime   tamsulosin (FLOMAX) 0 4 mg   No No   Sig: Take 1 capsule (0 4 mg total) by mouth daily      Facility-Administered Medications: None       Past Medical History:   Diagnosis Date    Arthritis     Benign prostatic hyperplasia without lower urinary tract symptoms     without Urinary Obstruction    CAD (coronary artery disease)     Chronic obstructive lung disease (HCC)     Coronary arteriosclerosis     Depression     Emphysema lung (HCC)     GERD (gastroesophageal reflux disease)     Hyperlipidemia     Hypertension     Myocardial infarction     Psoriasis     Requires supplemental oxygen     at bedtime during high humid days only    Stroke [facility])     TIA 1/2018       Past Surgical History:   Procedure Laterality Date    COLONOSCOPY      CORONARY ANGIOPLASTY  02/03/2001    PTCA of RCA    CORONARY ARTERY BYPASS GRAFT  02/07/2001    x4- [place]    HERNIA REPAIR      [place] THROMBOENDARTECTMY NECK,NECK INCIS Left 2/20/2018    Procedure: ENDARTERECTOMY ARTERY CAROTID WITH PATCH ANGIOPLASTY;  Surgeon: Crystal Ribera MD;  [facility];  Service: Vascular    TRANSURETHRAL RESECTION OF PROSTATE Family History   Problem Relation Age of Onset    Lung cancer Mother     Cancer Mother     Other Father      sepsis     I have reviewed and agree with the history as documented  Social History   Substance Use Topics    Smoking status: Former Smoker     Packs/day: 1 00     Years: 3 00     Types: Cigarettes     Quit date: 1956    Smokeless tobacco: Never Used      Comment: Has a past history of cigarette smoking;Quit date 1956; 5 packs year history, per [employer]      Alcohol use 0 6 oz/week     1 Glasses of wine per week      Comment: Daily alchol use,         Review of Systems   Constitutional: Negative for chills and fever  Genitourinary: Positive for penile pain  Negative for discharge, frequency, genital sores and hematuria  All other systems reviewed and are negative  Physical Exam  ED Triage Vitals [02/25/18 1824]   Temperature Pulse Respirations Blood Pressure SpO2   98 7 °F (37 1 °C) 73 16 134/61 98 %      Temp Source Heart Rate Source Patient Position - Orthostatic VS BP Location FiO2 (%)   Oral Monitor Sitting Left arm --      Pain Score       --           Orthostatic Vital Signs  Vitals:    02/25/18 1824   BP: 134/61   Pulse: 73   Patient Position - Orthostatic VS: Sitting       Physical Exam   Constitutional: He is oriented to person, place, and time  He appears well-developed and well-nourished  HENT:   Head: Normocephalic and atraumatic  Eyes: EOM are normal  Pupils are equal, round, and reactive to light  Neck: Normal range of motion  Neck supple  Cardiovascular: Normal rate, regular rhythm and normal heart sounds  No murmur heard  Pulmonary/Chest: Effort normal and breath sounds normal  No respiratory distress  He has no wheezes  Abdominal: Soft  Bowel sounds are normal  He exhibits no distension  There is no tenderness  Genitourinary: Penis normal    Genitourinary Comments: Fortune removed, will attempt voiding trial   Musculoskeletal: Normal range of motion  He exhibits no edema or tenderness  Neurological: He is alert and oriented to person, place, and time  No cranial nerve deficit  Coordination normal    Skin: Skin is warm and dry  He is not diaphoretic  No erythema  Psychiatric: He has a normal mood and affect  His behavior is normal    Nursing note and vitals reviewed  ED Medications  Medications - No data to display    Diagnostic Studies  Results Reviewed     None                 No orders to display              Procedures  Procedures       Phone Contacts  ED Phone Contact    ED Course  ED Course as of Feb 25 1930   Eladio Fill Feb 25, 2018 1927 Patient passed voiding trial will dc home  MDM  CritCare Time    Disposition  Final diagnoses:   Urinary retention   Fortune catheter problem ([facility] [address] )     Time reflects when diagnosis was documented in both MDM as applicable and the Disposition within this note     Time User Action Codes Description Comment    2/25/2018  7:28 PM Louie Fleming, [address] [R33 9] Urinary retention     2/25/2018  7:28 PM Louie Fleming, [address] [facility]  9XXA] Fortune catheter problem [facility])       ED Disposition     ED Disposition Condition Comment    Discharge  Clayton Nath discharge to home/self care  Condition at discharge: Good        Follow-up Information     Follow up With Specialties Details Why Contact Info    Owen Gregory MD Internal Medicine In 1 day  [address] [place] [address]  [phone]          Patient's Medications   Discharge Prescriptions    No medications on file     No discharge procedures on file      ED Provider  Electronically Signed by           Tammie Funez MD  02/25/18 8142

## 2018-02-27 ENCOUNTER — OFFICE VISIT (OUTPATIENT)
Dept: URGENT CARE | Facility: CLINIC | Age: 78
End: 2018-02-27
Payer: MEDICARE

## 2018-02-27 VITALS
SYSTOLIC BLOOD PRESSURE: 130 MMHG | TEMPERATURE: 98.7 F | HEIGHT: 68 IN | BODY MASS INDEX: 27.58 KG/M2 | WEIGHT: 182 LBS | DIASTOLIC BLOOD PRESSURE: 78 MMHG | RESPIRATION RATE: 18 BRPM | OXYGEN SATURATION: 97 %

## 2018-02-27 DIAGNOSIS — I10 ESSENTIAL HYPERTENSION: Primary | ICD-10-CM

## 2018-02-27 PROCEDURE — G0463 HOSPITAL OUTPT CLINIC VISIT: HCPCS | Performed by: PHYSICIAN ASSISTANT

## 2018-02-27 PROCEDURE — 99213 OFFICE O/P EST LOW 20 MIN: CPT | Performed by: PHYSICIAN ASSISTANT

## 2018-02-27 NOTE — PATIENT INSTRUCTIONS
Blood pressure ok here  Discussed checking at home, same time every day or if you don't fell well  Write them down and write down how you feel

## 2018-02-27 NOTE — PROGRESS NOTES
[facility]        NAME: Cindy Zurita is a 68 y o  male  : 1940    MRN: [id number]  DATE: 2018  TIME: 9:11 AM    Assessment and Plan   Essential hypertension [I10]  1  Essential hypertension           Patient Instructions      blood pressures are adequate here  We discussed how to use his wrist blood pressure cuff and when to take his blood pressure  He will take at the same time every day  He will also take it if he feels symptoms such as lightheaded or dizziness or headache  He has a family doctor appointment on Thursday  Follow up with PCP in 3-5 days  Proceed to  ER if symptoms worsen  Chief Complaint     Chief Complaint   Patient presents with    Hypertension     Pt states he wants his BP checked  D/c d from [facility] for stroke 3  days ago         History of Present Illness        40-year-old male reports for blood pressure check today  He has a wrist monitor home he has been using it but it was high in the 160s and he was concerned  He was recently discharged after TIA / amaurosis fugax  He had a carotid endarterectomy but he says there was not anything in his arteries  He has been checking his pressure  Multiple times a day  He denies chest pain shortness of breath or palpitations  No vision changes or headache  No facial weakness  No numbness or tingling  He says he feels very good but is concerned          Review of Systems   Review of Systems      Current Medications       Current Outpatient Prescriptions:     acetaminophen (TYLENOL) 325 mg tablet, Take 2 tablets (650 mg total) by mouth every 6 (six) hours as needed for mild pain, Disp: 30 tablet, Rfl: 0    albuterol (PROVENTIL HFA,VENTOLIN HFA) 90 mcg/act inhaler, Inhale 2 puffs every 4 (four) hours as needed for wheezing, Disp: 1 Inhaler, Rfl: 0    aspirin 81 mg chewable tablet, Chew, Disp: , Rfl:     atorvastatin (LIPITOR) 40 mg tablet, Take by mouth, Disp: , Rfl:     clopidogrel (PLAVIX) 75 mg tablet, Take 1 tablet (75 mg total) by mouth daily, Disp: 60 tablet, Rfl: 0    esomeprazole (NexIUM) 20 mg capsule, Take 20 mg by mouth every morning before breakfast, Disp: , Rfl:     lisinopril (ZESTRIL) 10 mg tablet, Take by mouth, Disp: , Rfl:     metoprolol tartrate (LOPRESSOR) 25 mg tablet, Take 0 5 tablets by mouth every 12 (twelve) hours (Patient taking differently: Take 25 mg by mouth every 12 (twelve) hours Take one full tablet every 12 hours ), Disp: 60 tablet, Rfl: 0    oxybutynin (DITROPAN-XL) 10 MG 24 hr tablet, Take 1 tablet (10 mg total) by mouth daily at bedtime, Disp: 30 tablet, Rfl: 1    oxyCODONE (ROXICODONE) 5 mg immediate release tablet, Take 1 tablet (5 mg total) by mouth every 4 (four) hours as needed for moderate pain or severe pain for up to 10 days Max Daily Amount: 30 mg, Disp: 30 tablet, Rfl: 0    tamsulosin (FLOMAX) 0 4 mg, Take 1 capsule (0 4 mg total) by mouth daily, Disp: 30 capsule, Rfl: 0    Current Allergies     Allergies as of 02/27/2018 - Reviewed 02/27/2018   Allergen Reaction Noted    Penicillins Swelling 08/13/2016            The following portions of the patient's history were reviewed and updated as appropriate: allergies, current medications, past family history, past medical history, past social history, past surgical history and problem list      Past Medical History:   Diagnosis Date    Arthritis     Benign prostatic hyperplasia without lower urinary tract symptoms     without Urinary Obstruction    CAD (coronary artery disease)     Chronic obstructive lung disease (HCC)     Coronary arteriosclerosis     Depression     Emphysema lung ([address] )     GERD (gastroesophageal reflux disease)     Hyperlipidemia     Hypertension     Myocardial infarction     Psoriasis     Requires supplemental oxygen     at bedtime during high humid days only    Stroke [facility])     TIA 1/2018       Past Surgical History:   Procedure Laterality Date    COLONOSCOPY      CORONARY ANGIOPLASTY  02/03/2001    PTCA of RCA    CORONARY ARTERY BYPASS GRAFT  02/07/2001    x4- [place]    HERNIA REPAIR      SD THROMBOENDARTECTMY [place] [facility] INCIS Left 2/20/2018    Procedure: ENDARTERECTOMY ARTERY CAROTID WITH PATCH ANGIOPLASTY;  Surgeon: Moni Saunders MD;  [facility];  Service: Vascular    TRANSURETHRAL RESECTION OF PROSTATE         Family History   Problem Relation Age of Onset    Lung cancer Mother     Cancer Mother     Other Father      sepsis         Medications have been verified  Objective   /78   Temp 98 7 °F (37 1 °C) (Oral)   Resp 18   Ht 5' 8" (1 727 m)   Wt 82 6 kg (182 lb)   SpO2 97%   BMI 27 67 kg/m²        Physical Exam     Physical Exam   Constitutional: He is oriented to person, place, and time  He appears well-developed and well-nourished  He appears distressed  Eyes: Pupils are equal, round, and reactive to light  Neck:     Left neck well-healed incision from carotid endarterectomy   Cardiovascular: Normal rate, regular rhythm and normal heart sounds  Pulmonary/Chest: Effort normal and breath sounds normal    Neurological: He is alert and oriented to person, place, and time  No cranial nerve deficit  Coordination normal    Skin: He is not diaphoretic

## 2018-03-01 ENCOUNTER — OFFICE VISIT (OUTPATIENT)
Dept: VASCULAR SURGERY | Facility: CLINIC | Age: 78
End: 2018-03-01

## 2018-03-01 ENCOUNTER — OFFICE VISIT (OUTPATIENT)
Dept: INTERNAL MEDICINE CLINIC | Age: 78
End: 2018-03-01
Payer: MEDICARE

## 2018-03-01 VITALS
HEART RATE: 60 BPM | WEIGHT: 184.4 LBS | DIASTOLIC BLOOD PRESSURE: 60 MMHG | SYSTOLIC BLOOD PRESSURE: 128 MMHG | HEIGHT: 67 IN | OXYGEN SATURATION: 98 % | BODY MASS INDEX: 28.94 KG/M2 | TEMPERATURE: 97.6 F

## 2018-03-01 VITALS
BODY MASS INDEX: 29.03 KG/M2 | RESPIRATION RATE: 20 BRPM | HEART RATE: 68 BPM | HEIGHT: 67 IN | DIASTOLIC BLOOD PRESSURE: 76 MMHG | WEIGHT: 185 LBS | SYSTOLIC BLOOD PRESSURE: 132 MMHG

## 2018-03-01 DIAGNOSIS — I25.10 ATHEROSCLEROSIS OF NATIVE CORONARY ARTERY WITHOUT ANGINA PECTORIS, UNSPECIFIED WHETHER NATIVE OR TRANSPLANTED HEART: ICD-10-CM

## 2018-03-01 DIAGNOSIS — I65.22 STENOSIS OF LEFT CAROTID ARTERY: Primary | ICD-10-CM

## 2018-03-01 DIAGNOSIS — I10 BENIGN ESSENTIAL HYPERTENSION: ICD-10-CM

## 2018-03-01 PROBLEM — G81.90 HEMIPARESIS (HCC): Status: ACTIVE | Noted: 2018-01-16

## 2018-03-01 PROBLEM — H53.9 VISION CHANGES: Status: ACTIVE | Noted: 2018-01-13

## 2018-03-01 PROBLEM — M54.9 BACK PAIN: Status: ACTIVE | Noted: 2017-01-24

## 2018-03-01 PROCEDURE — 99496 TRANSJ CARE MGMT HIGH F2F 7D: CPT | Performed by: NURSE PRACTITIONER

## 2018-03-01 PROCEDURE — 99024 POSTOP FOLLOW-UP VISIT: CPT | Performed by: SURGERY

## 2018-03-01 NOTE — PROGRESS NOTES
Assessment/Plan:    Stenosis of left carotid artery  Doing well following left carotid endarterectomy  Continue antiplatelet therapy for at least 2 months  There are no limitations at this time  Will plan standard duplex follow-up which will be scheduled in 3 months  Diagnoses and all orders for this visit:    Stenosis of left carotid artery  -     VAS carotid complete study; Future          Subjective:      Patient ID: Monica Nugent is a 68 y o  male  77-year-old approximately 2 weeks status post left carotid endarterectomy  He has no complaints  He has returned to his normal level of activity  The following portions of the patient's history were reviewed and updated as appropriate: allergies, current medications, past family history, past medical history, past social history, past surgical history and problem list     Review of Systems   Constitutional: Negative  HENT: Negative  Eyes: Negative  Respiratory: Negative  Cardiovascular: Negative  Gastrointestinal: Negative  Endocrine: Negative  Genitourinary: Negative  Musculoskeletal: Negative  Skin: Negative  Allergic/Immunologic: Negative  Negative for environmental allergies  Neurological: Negative  Hematological: Negative  Psychiatric/Behavioral: Negative  Objective:      /76 (BP Location: Right arm, Patient Position: Sitting, Cuff Size: Standard)   Pulse 68   Resp 20   Ht 5' 7" (1 702 m)   Wt 83 9 kg (185 lb)   BMI 28 98 kg/m²          Physical Exam   Constitutional: He is oriented to person, place, and time  Neck: Normal range of motion  No JVD present  Carotid bruit is not present  Left cervical incision is healing well  Cardiovascular: Normal rate, regular rhythm and normal heart sounds  Pulmonary/Chest: Effort normal and breath sounds normal    Neurological: He is alert and oriented to person, place, and time     Motor and sensory function is symmetric and grossly intact

## 2018-03-01 NOTE — TELEPHONE ENCOUNTER
Spoke with PT and confirmed appointment - he stated he will be there  I called Dr Misael Boss office and they will be faxing records to my attention

## 2018-03-01 NOTE — ASSESSMENT & PLAN NOTE
Doing well following left carotid endarterectomy  Continue antiplatelet therapy for at least 2 months  There are no limitations at this time  Will plan standard duplex follow-up which will be scheduled in 3 months

## 2018-03-01 NOTE — PATIENT INSTRUCTIONS
Continue your current medications  Keep your follow-up with vascular surgeon today  You will see urology on 3/7/18  Schedule a routine follow-up with Dr Marcellus Liriano

## 2018-03-01 NOTE — LETTER
March 1, 2018     Blanca Kam MD  [address] [place] [address]    Patient: Mumtaz Shin   YOB: 1940   Date of Visit: 3/1/2018       Dear Dr Donal Holt: Thank you for referring Mumtaz Shin to me for evaluation  Below are the relevant portions of my assessment and plan of care  Stenosis of left carotid artery  Doing well following left carotid endarterectomy  Continue antiplatelet therapy for at least 2 months  There are no limitations at this time  Will plan standard duplex follow-up which will be scheduled in 3 months  If you have questions, please do not hesitate to call me  I look forward to following Razia Sands along with you           Sincerely,        Aida Kirkpatrick MD        CC: No Recipients

## 2018-03-01 NOTE — PATIENT INSTRUCTIONS
Stenosis of left carotid artery  Doing well following left carotid endarterectomy  Continue antiplatelet therapy for at least 2 months  There are no limitations at this time  Will plan standard duplex follow-up which will be scheduled in 3 months

## 2018-03-01 NOTE — PROGRESS NOTES
Assessment/Plan:    Patient presents today for hospital follow-up  He was admitted to [facility] on 2/20/18 for an elective left carotid endarterectomy  The procedure went well  Patient did have a postop complications of urinary retention  He initially had a Fortune catheter in place  However since his discharge he was seen in the emergency department at that time he completed voiding trials and had a Fortune catheter discontinued  Patient has had no difficulties voiding since discontinuation of Fortune catheter  He has a follow-up with Urology scheduled  He will continue his follow-up with vascular surgery today    Patient is doing well  He will continue his current medications to include aspirin and Plavix  He will follow up with his specialist   And will schedule a routine follow-up with Dr Gabby Giang     Diagnoses and all orders for this visit:    Stenosis of left carotid artery    Atherosclerosis of native coronary artery without angina pectoris, unspecified whether native or transplanted heart    Benign essential hypertension        Subjective:      Patient ID: Yara Sabillon is a 68 y o  male  Patient presents today for a hospital follow-up  He was admitted to [facility] on 02/20/2018 for an elective left carotid stenosis  Patient underwent a left carotid endarterectomy by Dr Parul Kaufman  The procedure went well and patient was transferred to the step-down unit  His postop was complicated by urinary retention which required straight catheterization several times and eventually a coude catheter was placed  Patient was seen by Urology, with recommendations to maintain catheter and follow up outpatient with Urology  Additional complication on postoperative day 1 patient complained of chest pain radiating into his jaw  Cardiac workup was obtained EKG was found to be without acute changes and troponin level was negative    Patient was seen by [facility] with recommendations to continue current medication regimen  Additionally patient had episode of hypotension which was felt to be secondary to increased vagal response  On postop day 2  The patient was found hemodynamically stable and neurovascular exam was appropriate  Patient is ambulating without difficulty he was tolerating a diet and patient was discharged on 02/22/2018  On 02/25/2018 patient was seen at [facility] Emergency Department complaining of irritation from Archibald catheter  At this time his catheter was DC any voiding trial was completed  Patient was able to void without difficulty catheter remained out and patient was sent home  Since discharge from hospital patient has been urinating without difficulty  He has a good appetite  Denies fevers, chills, cough, cold, congestion  Denies abdominal pain, nausea, vomiting, diarrhea, constipation  Denies hematuria, dysuria, difficulty urinating  Denies chest pain, shortness of breath, dizziness, lightheadedness    Patient is doing well without any concerns  He has a follow-up appointment with vascular surgery today  He has a follow-up with Urology on 03/07/2018      HPI    Date and time hospital follow up call was made:  2/23/2018 12:32 PM  Hospital care reviewed:  Records reviewed  Patient was hopsitalized at:  [facility]  Date of admission:  2/20/18  Date of discharge:  2/22/18  Diagnosis:  stenosis of left Carotid artery, s/p left Carotid endarterectomy, urinary retention, chest pain  Were the patients medicaitons reviewed and updated:  Yes  Current symptoms:  Fatigue (Comment: Pt has indwelling archibald catheter)  Fatigue severity:  Mild  Post hospital issues:  None  Should patient be enrolled in anticoag monitoring?:  No  Scheduled for follow up?:  Yes  Patients specialists:  Urologist, Other (comment) (Comment: Surgeon)  Urologist's Name:  Dr Yomaira Loco  Other specialists Name:  Dr Derek Gunn  Did you obtain your prescribed medications:  Yes  Do you need help managing your perscriptions or medications:  No  Is transportation to your appointments needed:  No  I have advised the patient to call PCP with any new or worsening symptoms (please type in name along with any credentials):  Alonso Amor RN  Are you recieving outpatient services:  No  Are you recieving home care services:  Yes  Types of home care services:  Nurse visit  Are you using any community resources:  No  Have you fallen in the last 12 months:  No  Interperter language line required?:  No  Counseling:  Patient  Counseling topics:  Importance of RX compliance, Home health agency benefits       The following portions of the patient's history were reviewed and updated as appropriate: allergies, current medications, past family history, past medical history, past social history, past surgical history and problem list     Review of Systems   Constitutional: Negative for chills, fatigue and fever  HENT: Negative for congestion, ear pain, sinus pressure and sore throat  Respiratory: Negative for cough, chest tightness, shortness of breath and wheezing  Cardiovascular: Negative for chest pain, palpitations and leg swelling  Gastrointestinal: Negative for abdominal distention, abdominal pain, blood in stool, constipation, diarrhea, nausea and vomiting  Genitourinary: Negative for difficulty urinating, dysuria, frequency, hematuria, penile pain and urgency  Musculoskeletal: Negative for myalgias  Skin: Negative for rash  Neurological: Negative for dizziness, syncope, weakness, light-headedness and headaches  Psychiatric/Behavioral: Negative for confusion           Past Medical History:   Diagnosis Date    Arthritis     Benign prostatic hyperplasia without lower urinary tract symptoms     without Urinary Obstruction    CAD (coronary artery disease)     Chronic obstructive lung disease (HCC)     Coronary arteriosclerosis     Depression     Emphysema lung (HCC)     GERD (gastroesophageal reflux disease)     Hyperlipidemia     Hypertension     Myocardial infarction     Psoriasis     Requires supplemental oxygen     at bedtime during high humid days only    Stroke [facility])     TIA 1/2018       Current Outpatient Prescriptions:     acetaminophen (TYLENOL) 325 mg tablet, Take 2 tablets (650 mg total) by mouth every 6 (six) hours as needed for mild pain, Disp: 30 tablet, Rfl: 0    albuterol (PROVENTIL HFA,VENTOLIN HFA) 90 mcg/act inhaler, Inhale 2 puffs every 4 (four) hours as needed for wheezing, Disp: 1 Inhaler, Rfl: 0    aspirin 81 mg chewable tablet, Chew, Disp: , Rfl:     atorvastatin (LIPITOR) 40 mg tablet, Take by mouth, Disp: , Rfl:     clopidogrel (PLAVIX) 75 mg tablet, Take 1 tablet (75 mg total) by mouth daily, Disp: 60 tablet, Rfl: 0    esomeprazole (NexIUM) 20 mg capsule, Take 20 mg by mouth every morning before breakfast, Disp: , Rfl:     lisinopril (ZESTRIL) 10 mg tablet, Take by mouth, Disp: , Rfl:     metoprolol tartrate (LOPRESSOR) 25 mg tablet, Take 0 5 tablets by mouth every 12 (twelve) hours (Patient taking differently: Take 25 mg by mouth every 12 (twelve) hours Take one full tablet every 12 hours ), Disp: 60 tablet, Rfl: 0    oxybutynin (DITROPAN-XL) 10 MG 24 hr tablet, Take 1 tablet (10 mg total) by mouth daily at bedtime, Disp: 30 tablet, Rfl: 1    oxyCODONE (ROXICODONE) 5 mg immediate release tablet, Take 1 tablet (5 mg total) by mouth every 4 (four) hours as needed for moderate pain or severe pain for up to 10 days Max Daily Amount: 30 mg, Disp: 30 tablet, Rfl: 0    tamsulosin (FLOMAX) 0 4 mg, Take 1 capsule (0 4 mg total) by mouth daily, Disp: 30 capsule, Rfl: 0    Allergies   Allergen Reactions    Penicillins Swelling       Social History   Past Surgical History:   Procedure Laterality Date    COLONOSCOPY      CORONARY ANGIOPLASTY  02/03/2001    PTCA of RCA    CORONARY ARTERY BYPASS GRAFT  02/07/2001    x4- [place]    HERNIA REPAIR      [place] Mabel Jiménez INCIS Left 2/20/2018    Procedure: ENDARTERECTOMY ARTERY CAROTID WITH PATCH ANGIOPLASTY;  Surgeon: Dane Rodriguez MD;  [facility];  Service: Vascular    TRANSURETHRAL RESECTION OF PROSTATE       Family History   Problem Relation Age of Onset    Lung cancer Mother     Cancer Mother     Other Father      sepsis       Objective:  /60 (BP Location: Left arm, Patient Position: Sitting, Cuff Size: Large)   Pulse 60   Temp 97 6 °F (36 4 °C) (Tympanic)   Ht 5' 7" (1 702 m)   Wt 83 6 kg (184 lb 6 4 oz)   SpO2 98%   BMI 28 88 kg/m²      Physical Exam   Constitutional: He is oriented to person, place, and time  He appears well-developed and well-nourished  No distress  HENT:   Head: Normocephalic and atraumatic  Right Ear: Hearing, tympanic membrane, external ear and ear canal normal    Left Ear: Hearing, tympanic membrane, external ear and ear canal normal    Nose: Nose normal    Mouth/Throat: Uvula is midline, oropharynx is clear and moist and mucous membranes are normal    Neck: Normal range of motion  Neck supple  No tracheal deviation present  Cardiovascular: Normal rate and regular rhythm  Murmur (2/6 systolic) heard  Pulses:       Carotid pulses are 2+ on the right side, and 2+ on the left side  Well healing incision to left carotid  No sutures  No erythema  No drainage  No bruit  No pedal edema   Pulmonary/Chest: Effort normal and breath sounds normal  No respiratory distress  He has no wheezes  Musculoskeletal: Normal range of motion  Neurological: He is alert and oriented to person, place, and time  Skin: Skin is warm and dry  No rash noted  Psychiatric: He has a normal mood and affect  His behavior is normal  Judgment and thought content normal    Nursing note and vitals reviewed

## 2018-03-12 ENCOUNTER — TELEPHONE (OUTPATIENT)
Dept: VASCULAR SURGERY | Facility: CLINIC | Age: 78
End: 2018-03-12

## 2018-03-12 NOTE — TELEPHONE ENCOUNTER
Pt aware of Dr Mary Elliott response below  From: Sybil Barger   Sent: Monday, March 12, 2018 11:31 AM  To: Myriam Lipscomb  Subject: RE: Juan Peñaloza 1940     He can stop it  Please make sure he is taking 81mg ASA     Thanks               From: Myriam Lipscomb  Sent: Monday, March 12, 2018 11:16 AM  To: Sybil Barger  Subject: Juan Peñaloza 1940  Pt called and said that since starting the Plavix 5 days ago he has been having side effects  He said he gets chills, h/a, blurred vision and nightmares about him taking all his pills at once and then being with his wife once again  He said he can not take this medication  Is there something else he can take? Thanks!

## 2018-03-14 ENCOUNTER — OFFICE VISIT (OUTPATIENT)
Dept: UROLOGY | Facility: AMBULATORY SURGERY CENTER | Age: 78
End: 2018-03-14
Payer: MEDICARE

## 2018-03-14 VITALS
HEART RATE: 59 BPM | DIASTOLIC BLOOD PRESSURE: 78 MMHG | WEIGHT: 181.8 LBS | HEIGHT: 67 IN | SYSTOLIC BLOOD PRESSURE: 136 MMHG | BODY MASS INDEX: 28.53 KG/M2

## 2018-03-14 DIAGNOSIS — N40.1 BPH WITH OBSTRUCTION/LOWER URINARY TRACT SYMPTOMS: Primary | ICD-10-CM

## 2018-03-14 DIAGNOSIS — N13.8 BPH WITH OBSTRUCTION/LOWER URINARY TRACT SYMPTOMS: Primary | ICD-10-CM

## 2018-03-14 PROCEDURE — 99214 OFFICE O/P EST MOD 30 MIN: CPT | Performed by: PHYSICIAN ASSISTANT

## 2018-03-14 RX ORDER — TAMSULOSIN HYDROCHLORIDE 0.4 MG/1
0.4 CAPSULE ORAL
Qty: 30 CAPSULE | Refills: 11 | Status: SHIPPED | OUTPATIENT
Start: 2018-03-14 | End: 2018-10-07

## 2018-03-14 NOTE — PROGRESS NOTES
3/14/2018      Chief Complaint   Patient presents with   [facility] Urinary Retention     Hospital F/u       Assessment and Plan    68 y o  male seen in consultation by Maya Beard     1  Post operative urinary retention, likely chronic   - Patient defers all forms of bladder decompression, dicussed the risks of this including bladder infections, bladder stones, further damage to his detrusor muscle, and kidney disease, patient verbalizes understanding  - Will start Flomax and FU in 6 weeks for PVR  If PVR improves will continue Flomax, if not improved can discuss discontinuing  2  History of bladder cancer  - s/p biopsy with Dr Sudarshan Rodriguez revealing low grade urothelial papillary carcinoma with no definite evidence of invasion in 3/2012  - will need to discuss appropriate FU at his visit in 6 weeks    3  BPH s/p TURP in 2012    4  History of difficult archibald insertion requiring bladder neck incision for successful archibald placement 8/1/12      History of Present Illness  Monica Nugent is a 68 y o  male here for follow up evaluation of post op urinary retention status post left carotid endarterectemy 2/20/18  Had his archibald catheter removed prior to his visit today and presented with a PVR of 578mL  Did not urinate prior to his bladder scan and would not attempt urination  He has already failed multiple void trials  Denies all LUTs  Was given alpha blockade which he has not taken  Per the records obtained after the patients visit, the patient has had a prior urinary bladder biopsy revealing low-grade urothelial papule carcinoma with no definitive evidence of invasion 3/2012, BPH status post TURP 3/20/12, and a history of a difficult Archibald placement requiring bladder neck incision to place 8/1/12  Review of Systems   Constitutional: Negative for activity change, chills and fever  Gastrointestinal: Negative for abdominal distention and abdominal pain  Musculoskeletal: Negative for back pain and gait problem  Psychiatric/Behavioral: Negative for behavioral problems and confusion  Urinary Incontinence Screening    Flowsheet Row Most Recent Value   Urinary Incontinence   Urinary Incontinence? No   Incomplete emptying? No   Urinary frequency? No   Urinary urgency? No   Urinary hesitancy? No   Dysuria (painful difficult urination)? No   Nocturia (waking up to use the bathroom)? No   Straining (having to push to go)? No   Weak stream?  No   Intermittent stream?  No   Post void dribbling?   No        Past Medical History  Past Medical History:   Diagnosis Date    Arthritis     Benign prostatic hyperplasia without lower urinary tract symptoms     without Urinary Obstruction    CAD (coronary artery disease)     Chronic obstructive lung disease (HCC)     Coronary arteriosclerosis     Depression     Emphysema lung (HCC)     GERD (gastroesophageal reflux disease)     Hyperlipidemia     Hypertension     Myocardial infarction     Psoriasis     Requires supplemental oxygen     at bedtime during high humid days only    Stroke [facility])     TIA 1/2018       Past Social History  Past Surgical History:   Procedure Laterality Date    COLONOSCOPY      CORONARY ANGIOPLASTY  02/03/2001    PTCA of RCA    CORONARY ARTERY BYPASS GRAFT  02/07/2001    x4- [facility]    HERNIA REPAIR      [place] THROMBOENDARTECTMY NECK,NECK INCIS Left 2/20/2018    Procedure: ENDARTERECTOMY ARTERY CAROTID WITH PATCH ANGIOPLASTY;  Surgeon: Leanne Belcher MD;  [facility];  Service: Vascular    TRANSURETHRAL RESECTION OF PROSTATE       History   Smoking Status    Former Smoker    Packs/day: 1 00    Years: 3 00    Types: Cigarettes    Quit date: 1956   Smokeless Tobacco    Never Used     Comment: Has a past history of cigarette smoking;Quit date 1956; 5 packs year history, per [employer]         Past Family History  Family History   Problem Relation Age of Onset    Lung cancer Mother     Cancer Mother     Other Father      sepsis Past Social history  Social History     Social History    Marital status:       Spouse name: N/A    Number of children: 3    Years of education: N/A     Occupational History    retired       Social History Main Topics    Smoking status: Former Smoker     Packs/day: 1 00     Years: 3 00     Types: Cigarettes     Quit date: 1956    Smokeless tobacco: Never Used      Comment: Has a past history of cigarette smoking;Quit date 1956; 5 packs year history, per [employer]      Alcohol use No    Drug use: No    Sexual activity: Yes     Other Topics Concern    Not on file     Social History Narrative    Lives with granddaughter and daughter     Caffeine use, He admits to consuming caffeine VIA coffee ( 8 servings per day), per [employer]    Martial History: Currently , per [employer]    Occupation: Retired (prior occupational:  repairman), per [employer]        Current Medications  Current Outpatient Prescriptions   Medication Sig Dispense Refill    acetaminophen (TYLENOL) 325 mg tablet Take 2 tablets (650 mg total) by mouth every 6 (six) hours as needed for mild pain 30 tablet 0    albuterol (PROVENTIL HFA,VENTOLIN HFA) 90 mcg/act inhaler Inhale 2 puffs every 4 (four) hours as needed for wheezing 1 Inhaler 0    aspirin 81 mg chewable tablet Chew      atorvastatin (LIPITOR) 40 mg tablet Take by mouth      esomeprazole (NexIUM) 20 mg capsule Take 20 mg by mouth every morning before breakfast      lisinopril (ZESTRIL) 10 mg tablet Take by mouth      metoprolol tartrate (LOPRESSOR) 25 mg tablet Take 0 5 tablets by mouth every 12 (twelve) hours (Patient taking differently: Take 25 mg by mouth every 12 (twelve) hours Take one full tablet every 12 hours ) 60 tablet 0    clopidogrel (PLAVIX) 75 mg tablet Take 1 tablet (75 mg total) by mouth daily 60 tablet 0    oxybutynin (DITROPAN-XL) 10 MG 24 hr tablet Take 1 tablet (10 mg total) by mouth daily at bedtime 30 tablet 1     No current facility-administered medications for this visit  Allergies  Allergies   Allergen Reactions    Penicillins Swelling         The following portions of the patient's history were reviewed and updated as appropriate: allergies, current medications, past medical history, past social history, past surgical history and problem list       Vitals  Vitals:    03/14/18 1021   BP: 136/78   Pulse: 59   Weight: 82 5 kg (181 lb 12 8 oz)   Height: 5' 7" (1 702 m)       Physical Exam    Constitutional   General appearance: Patient is seated and in no acute distress, well appearing and well nourished  Head and Face   Head and face: Normal     Eyes   Conjunctiva and lids: No erythema, swelling or discharge  Ears, Nose, Mouth, and Throat   Hearing: Normal     Pulmonary   Respiratory effort: No increased work of breathing or signs of respiratory distress  Cardiovascular   Examination of extremities for edema and/or varicosities: Normal     Abdomen   Abdomen: Non-tender, no masses  Musculoskeletal   Gait and station: Normal  Skin   Skin and subcutaneous tissue: Warm, dry, and intact  No visible lesions or rashes  Psychiatric   Judgment and insight: Normal  Recent and remote memory:  Normal  Mood and affect: Normal      Results  No results found for this or any previous visit (from the past 1 hour(s))  ]  No results found for: PSA  Lab Results   Component Value Date    GLUCOSE 77 02/22/2018    CALCIUM 7 7 (L) 02/22/2018     02/22/2018    K 4 0 02/22/2018    CO2 26 02/22/2018     (H) 02/22/2018    BUN 14 02/22/2018    CREATININE 0 93 02/22/2018     Lab Results   Component Value Date    WBC 5 10 02/22/2018    HGB 9 7 (L) 02/22/2018    HCT 30 4 (L) 02/22/2018    MCV 97 02/22/2018     (L) 02/22/2018       Orders  No orders of the defined types were placed in this encounter

## 2018-03-23 ENCOUNTER — OFFICE VISIT (OUTPATIENT)
Dept: CARDIOLOGY CLINIC | Facility: CLINIC | Age: 78
End: 2018-03-23
Payer: MEDICARE

## 2018-03-23 VITALS
BODY MASS INDEX: 28.8 KG/M2 | HEIGHT: 67 IN | HEART RATE: 72 BPM | SYSTOLIC BLOOD PRESSURE: 118 MMHG | RESPIRATION RATE: 24 BRPM | WEIGHT: 183.5 LBS | DIASTOLIC BLOOD PRESSURE: 56 MMHG | OXYGEN SATURATION: 95 %

## 2018-03-23 DIAGNOSIS — I25.10 ATHEROSCLEROSIS OF NATIVE CORONARY ARTERY WITHOUT ANGINA PECTORIS, UNSPECIFIED WHETHER NATIVE OR TRANSPLANTED HEART: ICD-10-CM

## 2018-03-23 DIAGNOSIS — R07.9 CHEST PAIN, UNSPECIFIED TYPE: ICD-10-CM

## 2018-03-23 DIAGNOSIS — Z95.1 S/P CABG X 4: Primary | ICD-10-CM

## 2018-03-23 DIAGNOSIS — I10 ESSENTIAL HYPERTENSION: ICD-10-CM

## 2018-03-23 PROCEDURE — 99214 OFFICE O/P EST MOD 30 MIN: CPT | Performed by: INTERNAL MEDICINE

## 2018-03-23 PROCEDURE — 93000 ELECTROCARDIOGRAM COMPLETE: CPT | Performed by: INTERNAL MEDICINE

## 2018-03-23 NOTE — PROGRESS NOTES
Cardiology Follow Up Visit    Teresa Tobar  1940  [id number]  [address] [facility]  [address] [facility] [address]    1  S/P CABG x 4  POCT ECG   2  Atherosclerosis of native coronary artery without angina pectoris, unspecified whether native or transplanted heart  POCT ECG   3  Essential hypertension     4  Chest pain, unspecified type  POCT ECG       Interval History:      patient seen in routine follow-up  Had left carotid endarterectomy following an episode of amaurosis fugax  From a cardiac standpoint, had coronary bypass grafting surgery x4 in 2001 after presenting to  [facility] with inferior wall myocardial infarction   Per his report  Been rather asymptomatic since, has COPD and emphysema  Had nuclear stress testing 2016 no ischemia, ejection fraction 55% by echocardiogram January 2018  States on occasion, he feels the urge to take a deep breath  Patient with no angina or symptoms of coronary artery disease  Remains on high potent  statin for his vascular disease and aspirin therapy      Patient Active Problem List   Diagnosis    CAD (coronary atherosclerotic disease)    Hypertension    Hyperlipemia    Atypical chest pain    Pulmonary emphysema ([address] )    Hypothyroid    GERD (gastroesophageal reflux disease)    TIA (transient ischemic attack)    Sciatica of right side    Hyperlipidemia    Emphysema lung ([address] )    Arthritis    Stenosis of left carotid artery    S/P CABG x 4    Pre-operative cardiovascular examination    Acute left-sided low back pain with left-sided sciatica    Back pain    Benign essential hypertension    Chronic obstructive pulmonary disease (HCC)    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    Hemiparesis (HCC)    Bradycardia    Vision changes     Past Medical History:   Diagnosis Date    Arthritis     Benign prostatic hyperplasia without lower urinary tract symptoms     without Urinary Obstruction    CAD (coronary artery disease)     Chronic obstructive lung disease (HCC)     Coronary arteriosclerosis     Depression     Emphysema lung (HCC)     GERD (gastroesophageal reflux disease)     Hyperlipidemia     Hypertension     Myocardial infarction     Psoriasis     Requires supplemental oxygen     at bedtime during high humid days only    Stroke [facility])     TIA 1/2018     Social History     Social History    Marital status:       Spouse name: N/A    Number of children: 3    Years of education: N/A     Occupational History    retired       Social History Main Topics    Smoking status: Former Smoker     Packs/day: 1 00     Years: 3 00     Types: Cigarettes     Quit date: 1956    Smokeless tobacco: Never Used      Comment: Has a past history of cigarette smoking;Quit date 1956; 5 packs year history, per [employer]      Alcohol use No    Drug use: No    Sexual activity: Yes     Other Topics Concern    Not on file     Social History Narrative    Lives with granddaughter and daughter     Caffeine use, He admits to consuming caffeine VIA coffee ( 8 servings per day), per [employer]    Martial History: Currently , per [employer]    Occupation: Retired (prior occupational:  repairman), 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[employer]       Family History   Problem Relation Age of Onset    Lung cancer Mother     Cancer Mother     Other Father      sepsis     Past Surgical History:   Procedure Laterality Date    COLONOSCOPY      CORONARY ANGIOPLASTY  02/03/2001    PTCA of RCA    CORONARY ARTERY BYPASS GRAFT  02/07/2001    x4- [facility]    HERNIA REPAIR      [place] THROMBOENDARTECTMY [facility] INCIS Left 2/20/2018    Procedure: ENDARTERECTOMY ARTERY CAROTID WITH PATCH ANGIOPLASTY;  Surgeon: Jose Majano MD;  [facility];  Service: Vascular    TRANSURETHRAL RESECTION OF PROSTATE         Current Outpatient Prescriptions:     acetaminophen (TYLENOL) 325 mg tablet, Take 2 tablets (650 mg total) by mouth every 6 (six) hours as needed for mild pain, Disp: 30 tablet, Rfl: 0    albuterol (PROVENTIL HFA,VENTOLIN HFA) 90 mcg/act inhaler, Inhale 2 puffs every 4 (four) hours as needed for wheezing, Disp: 1 Inhaler, Rfl: 0    aspirin 81 mg chewable tablet, Chew, Disp: , Rfl:     atorvastatin (LIPITOR) 40 mg tablet, Take by mouth, Disp: , Rfl:     esomeprazole (NexIUM) 20 mg capsule, Take 20 mg by mouth every morning before breakfast, Disp: , Rfl:     lisinopril (ZESTRIL) 10 mg tablet, Take by mouth, Disp: , Rfl:     metoprolol tartrate (LOPRESSOR) 25 mg tablet, Take 0 5 tablets by mouth every 12 (twelve) hours (Patient taking differently: Take 25 mg by mouth every 12 (twelve) hours Take one full tablet every 12 hours ), Disp: 60 tablet, Rfl: 0    oxybutynin (DITROPAN-XL) 10 MG 24 hr tablet, Take 1 tablet (10 mg total) by mouth daily at bedtime, Disp: 30 tablet, Rfl: 1    tamsulosin (FLOMAX) 0 4 mg, Take 1 capsule (0 4 mg total) by mouth daily with dinner, Disp: 30 capsule, Rfl: 11    clopidogrel (PLAVIX) 75 mg tablet, Take 1 tablet (75 mg total) by mouth daily, Disp: 60 tablet, Rfl: 0  Allergies   Allergen Reactions    Penicillins Swelling       Labs:     Lab Results   Component Value Date     02/22/2018    K 4 0 02/22/2018     (H) 02/22/2018    CO2 26 02/22/2018    BUN 14 02/22/2018    CREATININE 0 93 02/22/2018    CREATININE 0 89 02/21/2018    GLUCOSE 77 02/22/2018    CALCIUM 7 7 (L) 02/22/2018       Lab Results   Component Value Date    WBC 5 10 02/22/2018    HGB 9 7 (L) 02/22/2018    HGB 11 2 (L) 02/21/2018    HCT 30 4 (L) 02/22/2018    HCT 33 8 (L) 02/21/2018     (L) 02/22/2018     02/21/2018       Lab Results   Component Value Date    CHOL 165 01/14/2018    CHOL See scanned summary  08/15/2016     Lab Results   Component Value Date    HDL 44 01/14/2018    HDL See scanned summary  08/15/2016     Lab Results   Component Value Date    LDLCALC 105 (H) 01/14/2018    LDLCALC 72 08/14/2016     Lab Results   Component Value Date    TRIG 81 01/14/2018    TRIG See scanned summary  08/15/2016       Lab Results   Component Value Date    ALT 23 08/14/2016    ALT 24 08/13/2016    AST 17 08/14/2016    AST 21 08/13/2016       Lab Results   Component Value Date    INR 1 06 02/20/2018       Lab Results   Component Value Date    NTBNP 73 01/13/2018       Lab Results   Component Value Date    HGBA1C 5 0 01/13/2018    HGBA1C 4 8 08/15/2016       Imaging: Reviewed in epic    ECG: Normal sinus rhythm, irbb, old ipwmi    Review of Systems:  14 systems reviewed and negative with exception of the above and the following fatigue    Review of Systems    PHYSICAL EXAM:    Physical Exam    Vitals:    03/23/18 1051   BP: 118/56   Pulse: 72   Resp: (!) 24   SpO2: 95%     Body mass index is 28 74 kg/m²  Weight (last 2 days)     Date/Time   Weight    03/23/18 1051  83 2 (183 5)              Gen: No acute distress  HEENT: anicteric, mucous membranes moist  Neck: supple, no jugular venous distention, or carotid bruit  Heart: regular, normal s1 and s2, no murmur/rub or gallop  Lungs :clear to auscultation bilaterally, no rales/rhonchi or wheeze  Abdomen: soft nontender, normoactive bowel sounds, no organomegaly  Ext: warm and perfused, normal femoral pulses, no edema, or clubbing  Skin: warm, no rashes  Neuro: AAO x 3, no focal findings  Psychiatric: normal affect  Musculoskeletal: no obvious joint deformities  Discussion/Summary:    1  CAD, asymptomatic  A  CABG x 4 2001, [facility], in setting inferoposterior wall MI  B  Normal LVEF echo 1/2018, no ischemia nuclear stress 2016    2  Recent left CEA for amaurosis fugax, stable    3  Hypertension, controlled    4  Hyperlipidemia, on high potent statin atorva 40    5  COPD, no recent exacerbations    Plan:  Cardiac status is stable, no med changes required at this point  Sensible diet and exercise as much as possible encouraged  Routine follow-up recommended with annual lipids

## 2018-03-27 RX ORDER — ATORVASTATIN CALCIUM 40 MG/1
TABLET, FILM COATED ORAL
Qty: 30 TABLET | Refills: 5 | OUTPATIENT
Start: 2018-03-27

## 2018-03-30 ENCOUNTER — OFFICE VISIT (OUTPATIENT)
Dept: INTERNAL MEDICINE CLINIC | Age: 78
End: 2018-03-30
Payer: MEDICARE

## 2018-03-30 VITALS
SYSTOLIC BLOOD PRESSURE: 98 MMHG | DIASTOLIC BLOOD PRESSURE: 60 MMHG | OXYGEN SATURATION: 94 % | RESPIRATION RATE: 22 BRPM | TEMPERATURE: 97.5 F | HEIGHT: 67 IN | BODY MASS INDEX: 28.94 KG/M2 | HEART RATE: 52 BPM | WEIGHT: 184.4 LBS

## 2018-03-30 DIAGNOSIS — E78.5 HYPERLIPIDEMIA, UNSPECIFIED HYPERLIPIDEMIA TYPE: Primary | ICD-10-CM

## 2018-03-30 DIAGNOSIS — I10 ESSENTIAL HYPERTENSION: ICD-10-CM

## 2018-03-30 DIAGNOSIS — J44.9 CHRONIC OBSTRUCTIVE PULMONARY DISEASE, UNSPECIFIED COPD TYPE (HCC): ICD-10-CM

## 2018-03-30 DIAGNOSIS — K21.9 GASTROESOPHAGEAL REFLUX DISEASE WITHOUT ESOPHAGITIS: ICD-10-CM

## 2018-03-30 DIAGNOSIS — I65.22 STENOSIS OF LEFT CAROTID ARTERY: ICD-10-CM

## 2018-03-30 DIAGNOSIS — D64.9 ANEMIA, UNSPECIFIED TYPE: ICD-10-CM

## 2018-03-30 PROCEDURE — 99214 OFFICE O/P EST MOD 30 MIN: CPT | Performed by: INTERNAL MEDICINE

## 2018-03-30 RX ORDER — ATORVASTATIN CALCIUM 40 MG/1
40 TABLET, FILM COATED ORAL DAILY
Qty: 90 TABLET | Refills: 1 | Status: SHIPPED | OUTPATIENT
Start: 2018-03-30 | End: 2018-11-20 | Stop reason: SDUPTHER

## 2018-03-30 RX ORDER — CHOLECALCIFEROL (VITAMIN D3) 125 MCG
1 CAPSULE ORAL DAILY
COMMUNITY
End: 2018-10-07

## 2018-03-30 NOTE — PROGRESS NOTES
Assessment/Plan:    1  Essential hypertension   repeat blood pressure is 100/62  We will continue with present regimen of metoprolol and lisinopril  Will continue to observe blood pressure  2  Postop anemia   will repeat CBC  And patient is already on multi vitamins daily  3  COPD   continue with Proventil inhaler     4  Status post left carotid endarterectomy   scar is healing well  Follow with the vascular surgeon as scheduled  5  Hyperlipidemia   continue with the Lipitor 40 mg daily  Will check lipid profile before next visit     Diagnoses and all orders for this visit:    Hyperlipidemia, unspecified hyperlipidemia type  -     atorvastatin (LIPITOR) 40 mg tablet; Take 1 tablet (40 mg total) by mouth daily  -     Lipid panel; Future    Gastroesophageal reflux disease without esophagitis    Chronic obstructive pulmonary disease, unspecified COPD type ([facility] )    Essential hypertension  -     Basic metabolic panel; Future    Stenosis of left carotid artery    Anemia, unspecified type  -     Iron; Future  -     CBC and differential; Future    Other orders  -     Lactobacillus (PROBIOTIC ACIDOPHILUS) CAPS; Take 1 capsule by mouth daily          Subjective:          Patient ID: Lissa Goldsmith is a 68 y o  male  Hypertension   This is a chronic problem  The current episode started more than 1 year ago  The problem is controlled  Pertinent negatives include no anxiety, chest pain, headaches, neck pain, palpitations, peripheral edema or shortness of breath  There are no associated agents to hypertension  Risk factors for coronary artery disease include dyslipidemia, male gender and smoking/tobacco exposure  Past treatments include beta blockers and ACE inhibitors  The current treatment provides significant improvement  Hypertensive end-organ damage includes CVA and PVD         The following portions of the patient's history were reviewed and updated as appropriate: allergies, current medications, past family history, past medical history, past social history, past surgical history and problem list     Review of Systems   Constitutional: Negative for fatigue and fever  HENT: Negative for congestion, ear discharge, ear pain, postnasal drip, sinus pressure, sore throat, tinnitus and trouble swallowing  Eyes: Negative for discharge, itching and visual disturbance  Respiratory: Negative for cough and shortness of breath  Cardiovascular: Negative for chest pain and palpitations  Gastrointestinal: Negative for abdominal pain, diarrhea, nausea and vomiting  Endocrine: Negative for cold intolerance and polyuria  Genitourinary: Negative for difficulty urinating, dysuria and urgency  Musculoskeletal: Negative for arthralgias and neck pain  Skin: Negative for rash  Allergic/Immunologic: Negative for environmental allergies  Neurological: Negative for dizziness, weakness and headaches  Psychiatric/Behavioral: Negative  The patient is not nervous/anxious            Past Medical History:   Diagnosis Date    Arthritis     Benign prostatic hyperplasia without lower urinary tract symptoms     without Urinary Obstruction    CAD (coronary artery disease)     Chronic obstructive lung disease (HCC)     Coronary arteriosclerosis     Depression     Emphysema lung (HCC)     GERD (gastroesophageal reflux disease)     Hyperlipidemia     Hypertension     Myocardial infarction     Psoriasis     Requires supplemental oxygen     at bedtime during high humid days only    Stroke [facility])     TIA 1/2018         Current Outpatient Prescriptions:     acetaminophen (TYLENOL) 325 mg tablet, Take 2 tablets (650 mg total) by mouth every 6 (six) hours as needed for mild pain, Disp: 30 tablet, Rfl: 0    albuterol (PROVENTIL HFA,VENTOLIN HFA) 90 mcg/act inhaler, Inhale 2 puffs every 4 (four) hours as needed for wheezing, Disp: 1 Inhaler, Rfl: 0    aspirin 81 mg chewable tablet, Chew 81 mg daily  , Disp: , Rfl:    atorvastatin (LIPITOR) 40 mg tablet, Take 1 tablet (40 mg total) by mouth daily, Disp: 90 tablet, Rfl: 1    esomeprazole (NexIUM) 20 mg capsule, Take 20 mg by mouth every morning before breakfast, Disp: , Rfl:     Lactobacillus (PROBIOTIC ACIDOPHILUS) CAPS, Take 1 capsule by mouth daily, Disp: , Rfl:     lisinopril (ZESTRIL) 10 mg tablet, Take 10 mg by mouth daily  , Disp: , Rfl:     metoprolol tartrate (LOPRESSOR) 25 mg tablet, Take 0 5 tablets by mouth every 12 (twelve) hours (Patient taking differently: Take 25 mg by mouth every 12 (twelve) hours Take one full tablet every 12 hours ), Disp: 60 tablet, Rfl: 0    tamsulosin (FLOMAX) 0 4 mg, Take 1 capsule (0 4 mg total) by mouth daily with dinner, Disp: 30 capsule, Rfl: 11    Allergies   Allergen Reactions    Penicillins Swelling       Social History   Past Surgical History:   Procedure Laterality Date    COLONOSCOPY      CORONARY ANGIOPLASTY  02/03/2001    PTCA of RCA    CORONARY ARTERY BYPASS GRAFT  02/07/2001    x4- [facility]    HERNIA REPAIR      [place] THROMBOENDARTECTMY NECK,NECK INCIS Left 2/20/2018    Procedure: ENDARTERECTOMY ARTERY CAROTID WITH PATCH ANGIOPLASTY;  Surgeon: Crystal Ribera MD;  [facility];  Service: Vascular    TRANSURETHRAL RESECTION OF PROSTATE       Family History   Problem Relation Age of Onset    Lung cancer Mother     Cancer Mother     Other Father      sepsis       Objective:  BP 98/60 (BP Location: Left arm, Patient Position: Sitting, Cuff Size: Adult)   Pulse (!) 52   Temp 97 5 °F (36 4 °C) (Oral)   Resp 22 Comment: QUINN noted when ambulating from waiting room to scale  Ht 5' 6 61" (1 692 m)   Wt 83 6 kg (184 lb 6 4 oz)   SpO2 94% Comment: Room Air  BMI 29 22 kg/m²   Body mass index is 29 22 kg/m²  Physical Exam   Constitutional: He appears well-developed  HENT:   Head: Normocephalic     Right Ear: External ear normal    Left Ear: External ear normal    Mouth/Throat: Oropharynx is clear and moist  Eyes: Pupils are equal, round, and reactive to light  No scleral icterus  Neck: Normal range of motion  Neck supple  No tracheal deviation present  No thyromegaly present  Cardiovascular: Normal rate, regular rhythm and normal heart sounds  No murmur heard  Pulmonary/Chest: Effort normal and breath sounds normal  No respiratory distress  He exhibits no tenderness  Decreased breath sounds throughout   Abdominal: Soft  Bowel sounds are normal  He exhibits no mass  There is no tenderness  Musculoskeletal: Normal range of motion  He exhibits no edema  Lymphadenopathy:     He has no cervical adenopathy  Neurological: He is alert  No cranial nerve deficit  Skin: Skin is warm  Left side of the neck scar from carotid surgery is healing well   Psychiatric: He has a normal mood and affect

## 2018-04-19 ENCOUNTER — TRANSCRIBE ORDERS (OUTPATIENT)
Dept: ADMINISTRATIVE | Facility: HOSPITAL | Age: 78
End: 2018-04-19

## 2018-04-19 ENCOUNTER — OFFICE VISIT (OUTPATIENT)
Dept: NEUROLOGY | Facility: CLINIC | Age: 78
End: 2018-04-19
Payer: MEDICARE

## 2018-04-19 ENCOUNTER — TELEPHONE (OUTPATIENT)
Dept: ADMINISTRATIVE | Facility: HOSPITAL | Age: 78
End: 2018-04-19

## 2018-04-19 VITALS
HEIGHT: 67 IN | DIASTOLIC BLOOD PRESSURE: 72 MMHG | SYSTOLIC BLOOD PRESSURE: 116 MMHG | WEIGHT: 182.5 LBS | HEART RATE: 47 BPM | BODY MASS INDEX: 28.64 KG/M2

## 2018-04-19 DIAGNOSIS — I10 ESSENTIAL HYPERTENSION: ICD-10-CM

## 2018-04-19 DIAGNOSIS — I65.22 OCCLUSION OF LEFT CAROTID ARTERY: Primary | ICD-10-CM

## 2018-04-19 DIAGNOSIS — I65.22 STENOSIS OF LEFT CAROTID ARTERY: ICD-10-CM

## 2018-04-19 DIAGNOSIS — J43.8 OTHER EMPHYSEMA (HCC): ICD-10-CM

## 2018-04-19 DIAGNOSIS — G45.3 AMAUROSIS FUGAX: Primary | ICD-10-CM

## 2018-04-19 DIAGNOSIS — E78.49 OTHER HYPERLIPIDEMIA: ICD-10-CM

## 2018-04-19 PROCEDURE — 99215 OFFICE O/P EST HI 40 MIN: CPT | Performed by: PSYCHIATRY & NEUROLOGY

## 2018-04-19 NOTE — PATIENT INSTRUCTIONS
Transient ischemic attack:  Delia Alex presents for follow-up with regard to his prior TIA which was described as left eye amaurosis fugax  He has not had any recurrent events  In the interval since his hospitalization he has been seen in our office, and has been following with the vascular surgery team who did perform a left carotid endarterectomy  He reports no new events concerning for recurrent TIA or stroke  He did have significant side effects on Plavix and this was stopped under the direction of the vascular surgery team   After stopping the Plavix his side effects dissipated after 2-3 days  Upon further review of his records, according to the [facility] system he does carry a diagnosis of atrial fibrillation  He reports that he does have a history of both a fast heart rate and a slow heart rate but we are not certain whether he has had any prior irregularity  Obviously the presence of atrial fibrillation may affect his overall stroke risk although I agree with the hospital records in that his amaurosis was likely related to the left internal carotid artery  - for secondary stroke prevention he should continue his current combination of aspirin, statin, and appropriate blood pressure control  -I will send a note to his cardiologist to ask that they look into whether not he actually has had any atrial fibrillation in order to determine if any medication changes necessary   - he should that he does get headaches at home from time to time and has purchased a pulse oximeter which indicates that his oxygen saturations may be in the 70s or high 60s with activity, and that that is when he tends to get his headaches  He intends to call his primary care physician in this regard    I did offer to refer him to see pulmonology formally but he would prefer to work with his family physician, and I agree that that is reasonable at this point     I will plan for him to return to the office to see us in 6 months time but I would be happy to see him sooner if the need should arise  If he has any stroke-like symptoms including sudden painless loss of vision, difficulty with speaking or swallowing, vertigo/ room spinning that does not quickly resolve, or weakness /numbness on 1 side of the body he should proceed to the nearest emergency room immediately  If, while working with his cardiologist, we find that he does have a history of documented atrial fibrillation we may have to discuss whether it would be appropriate to either monitor his heart rhythms moving forward or to make an adjustment in his medication to try and limit his risk for an ischemic stroke

## 2018-04-19 NOTE — PROGRESS NOTES
Patient ID: Layla Ugalde is a 68 y o  male  Assessment/Plan:    No problem-specific Assessment & Plan notes found for this encounter  Diagnoses and all orders for this visit:    Amaurosis fugax    Stenosis of left carotid artery    Other emphysema (HCC)    Essential hypertension    Other hyperlipidemia       Patient Instructions   Transient ischemic attack:  Vania Pratt presents for follow-up with regard to his prior TIA which was described as left eye amaurosis fugax  He has not had any recurrent events  In the interval since his hospitalization he has been seen in our office, and has been following with the vascular surgery team who did perform a left carotid endarterectomy  He reports no new events concerning for recurrent TIA or stroke  He did have significant side effects on Plavix and this was stopped under the direction of the vascular surgery team   After stopping the Plavix his side effects dissipated after 2-3 days  Upon further review of his records, according to the [facility] system he does carry a diagnosis of atrial fibrillation  He reports that he does have a history of both a fast heart rate and a slow heart rate but we are not certain whether he has had any prior irregularity  Obviously the presence of atrial fibrillation may affect his overall stroke risk although I agree with the hospital records in that his amaurosis was likely related to the left internal carotid artery      - for secondary stroke prevention he should continue his current combination of aspirin, statin, and appropriate blood pressure control  -I will send a note to his cardiologist to ask that they look into whether not he actually has had any atrial fibrillation in order to determine if any medication changes necessary   - he should that he does get headaches at home from time to time and has purchased a pulse oximeter which indicates that his oxygen saturations may be in the 70s or high 60s with activity, and that that is when he tends to get his headaches  He intends to call his primary care physician in this regard  I did offer to refer him to see pulmonology formally but he would prefer to work with his family physician, and I agree that that is reasonable at this point     I will plan for him to return to the office to see us in 6 months time but I would be happy to see him sooner if the need should arise  If he has any stroke-like symptoms including sudden painless loss of vision, difficulty with speaking or swallowing, vertigo/ room spinning that does not quickly resolve, or weakness /numbness on 1 side of the body he should proceed to the nearest emergency room immediately  If, while working with his cardiologist, we find that he does have a history of documented atrial fibrillation we may have to discuss whether it would be appropriate to either monitor his heart rhythms moving forward or to make an adjustment in his medication to try and limit his risk for an ischemic stroke  Subjective:    STEWART    Carey Seo presents for follow-up with regard to his prior TIA  He was initially admitted to the hospital with amaurosis fugax  His symptoms resolved but he was found to have significant left internal carotid artery stenosis which was felt to be causative  He received he subsequently received a left carotid endarterectomy and has followed up with the vascular surgery team     In reviewing his records from [facility] there is a mention of atrial fibrillation  This is not mentioned in his most recent cardiology note and the patient reports a prior history of both rapid heart beat and slow heartbeat, but is unclear in terms of whether not he had any irregular heartbeat/atrial fibrillation  He reports good medication compliance with no significant side effects/bleeding/bruising  He denies any new symptoms concerning for recurrent TIA or stroke    Originally, postoperatively, he was on a combination of aspirin and Plavix  He unfortunately experienced significant side effects on Plavix and therefore the medication was discontinued under the direction of the vascular surgery team     He reports that he has been getting intermittent headaches  He purchased a pulse oximeter and typically gets headaches and dizziness when his oxygen saturation drops into the low 70s or below 70%  His intention is to follow up with his primary care physician  I did offer to refer him to see a pulmonologist but he would prefer to see his primary care physician  He has an appointment the next month but I encouraged him to call prior to that for a potential evaluation and/or to receive home oxygen therapy  He does have a history of known emphysema  The following portions of the patient's history were reviewed and updated as appropriate: He  has a past medical history of Arthritis; Benign prostatic hyperplasia without lower urinary tract symptoms; CAD (coronary artery disease); Chronic obstructive lung disease ([address] ); Coronary arteriosclerosis; Depression; Emphysema lung ([address] ); GERD (gastroesophageal reflux disease); Hyperlipidemia; Hypertension; Myocardial infarction ([address] ); Psoriasis; Requires supplemental oxygen; and Stroke ([facility] [address] )    He   Patient Active Problem List    Diagnosis Date Noted    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    Pre-operative cardiovascular examination 01/30/2018    Hemiparesis ([address] ) 01/16/2018    Stenosis of left carotid artery 01/14/2018    Pulmonary emphysema ([address] ) 01/13/2018    Hypothyroid 01/13/2018    GERD (gastroesophageal reflux disease) 01/13/2018    TIA (transient ischemic attack) 01/13/2018    Sciatica of right side 01/13/2018    Vision changes 01/13/2018    Hyperlipidemia     Emphysema lung ([address] )     Arthritis     Back pain 01/24/2017    Acute left-sided low back pain with left-sided sciatica 10/25/2016    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 10/25/2016    Bradycardia 09/15/2016    CAD (coronary atherosclerotic disease) 08/13/2016    Hypertension 08/13/2016    Hyperlipemia 08/13/2016    Atypical chest pain 08/13/2016    Benign essential hypertension 04/24/2015    Chronic obstructive pulmonary disease ([facility] [address] 11/05/2013     He  has a past surgical history that includes Hernia repair; Transurethral resection of prostate; Colonoscopy; pr thromboendartectmy neck,neck incis (Left, 2/20/2018); Coronary angioplasty (02/03/2001); and Coronary artery bypass graft (02/07/2001)  His family history includes Cancer in his mother; Lung cancer in his mother; Other in his father  He  reports that he quit smoking about 62 years ago  His smoking use included Cigarettes  He has a 3 00 pack-year smoking history  He has never used smokeless tobacco  He reports that he does not drink alcohol or use drugs    Current Outpatient Prescriptions   Medication Sig Dispense Refill    acetaminophen (TYLENOL) 325 mg tablet Take 2 tablets (650 mg total) by mouth every 6 (six) hours as needed for mild pain 30 tablet 0    albuterol (PROVENTIL HFA,VENTOLIN HFA) 90 mcg/act inhaler Inhale 2 puffs every 4 (four) hours as needed for wheezing 1 Inhaler 0    aspirin 81 mg chewable tablet Chew 81 mg daily        atorvastatin (LIPITOR) 40 mg tablet Take 1 tablet (40 mg total) by mouth daily 90 tablet 1    esomeprazole (NexIUM) 20 mg capsule Take 20 mg by mouth every morning before breakfast      Lactobacillus (PROBIOTIC ACIDOPHILUS) CAPS Take 1 capsule by mouth daily      lisinopril (ZESTRIL) 10 mg tablet Take 10 mg by mouth daily        metoprolol tartrate (LOPRESSOR) 25 mg tablet Take 0 5 tablets by mouth every 12 (twelve) hours (Patient taking differently: Take 25 mg by mouth every 12 (twelve) hours Take one full tablet every 12 hours ) 60 tablet 0    tamsulosin (FLOMAX) 0 4 mg Take 1 capsule (0 4 mg total) by mouth daily with dinner 30 capsule 11     No current facility-administered medications for this visit  Current Outpatient Prescriptions on File Prior to Visit   Medication Sig    acetaminophen (TYLENOL) 325 mg tablet Take 2 tablets (650 mg total) by mouth every 6 (six) hours as needed for mild pain    albuterol (PROVENTIL HFA,VENTOLIN HFA) 90 mcg/act inhaler Inhale 2 puffs every 4 (four) hours as needed for wheezing    aspirin 81 mg chewable tablet Chew 81 mg daily      atorvastatin (LIPITOR) 40 mg tablet Take 1 tablet (40 mg total) by mouth daily    esomeprazole (NexIUM) 20 mg capsule Take 20 mg by mouth every morning before breakfast    Lactobacillus (PROBIOTIC ACIDOPHILUS) CAPS Take 1 capsule by mouth daily    lisinopril (ZESTRIL) 10 mg tablet Take 10 mg by mouth daily      metoprolol tartrate (LOPRESSOR) 25 mg tablet Take 0 5 tablets by mouth every 12 (twelve) hours (Patient taking differently: Take 25 mg by mouth every 12 (twelve) hours Take one full tablet every 12 hours )    tamsulosin (FLOMAX) 0 4 mg Take 1 capsule (0 4 mg total) by mouth daily with dinner     No current facility-administered medications on file prior to visit  He is allergic to penicillins            Objective:    Blood pressure 116/72, pulse (!) 47, height 5' 7" (1 702 m), weight 82 8 kg (182 lb 8 oz)  Physical Exam    Neurological Exam    At the time of my evaluation Marlyn Eye was awake, alert, and in no acute distress  Cranial nerves 2-12 were symmetrically intact  Strength was intact in the bilateral upper lower extremities  There was no ataxia  There were no lateralizing signs  His pulse was regular in the office today  His gait was narrow based and stable  ROS:    Review of Systems   Constitutional: Negative  Negative for appetite change and fever  HENT: Negative  Negative for hearing loss, tinnitus, trouble swallowing and voice change  Eyes: Negative  Negative for photophobia and pain  Respiratory: Positive for shortness of breath  Cardiovascular: Negative    Negative for palpitations  Gastrointestinal: Negative  Negative for nausea and vomiting  Endocrine: Negative  Negative for cold intolerance and heat intolerance  Genitourinary: Negative  Negative for dysuria, frequency and urgency  Musculoskeletal: Negative  Negative for myalgias and neck pain  Skin: Negative  Negative for rash  Neurological: Positive for headaches  Negative for dizziness, tremors, seizures, syncope, facial asymmetry, speech difficulty, weakness, light-headedness and numbness  Hematological: Negative  Does not bruise/bleed easily  Psychiatric/Behavioral: Negative  Negative for confusion, hallucinations and sleep disturbance

## 2018-04-19 NOTE — TELEPHONE ENCOUNTER
VQI:/LTFU:    Called patient to schedule 9 month CV and OV w/ Dr Angelo Holland  spoke w/ patient I was able to schedule 9 month CV patient started getting a little agitated over the phone and started to scream and seemed a bit confused as to why I was calling  I explained multiple times the reason for call  Patient was not being compliant over the phone he made it difficult to schedule OV   I asked patient if he had family member I could speak to and patient was getting upset I verbalized to patient office will call him back

## 2018-04-27 ENCOUNTER — PROCEDURE VISIT (OUTPATIENT)
Dept: UROLOGY | Facility: CLINIC | Age: 78
End: 2018-04-27
Payer: MEDICARE

## 2018-04-27 VITALS
BODY MASS INDEX: 27.89 KG/M2 | DIASTOLIC BLOOD PRESSURE: 70 MMHG | WEIGHT: 184 LBS | HEART RATE: 60 BPM | HEIGHT: 68 IN | SYSTOLIC BLOOD PRESSURE: 110 MMHG

## 2018-04-27 DIAGNOSIS — C67.9 MALIGNANT NEOPLASM OF URINARY BLADDER, UNSPECIFIED SITE (HCC): ICD-10-CM

## 2018-04-27 DIAGNOSIS — R33.8 URINARY RETENTION DUE TO BENIGN PROSTATIC HYPERPLASIA: Primary | ICD-10-CM

## 2018-04-27 DIAGNOSIS — N40.1 URINARY RETENTION DUE TO BENIGN PROSTATIC HYPERPLASIA: Primary | ICD-10-CM

## 2018-04-27 PROCEDURE — 99214 OFFICE O/P EST MOD 30 MIN: CPT | Performed by: UROLOGY

## 2018-04-27 NOTE — PATIENT INSTRUCTIONS
Cystoscopy   WHAT YOU NEED TO KNOW:   What do I need to know about a cystoscopy? A cystoscopy is a procedure to look inside of your urethra and bladder using a cystoscope  A cystoscope is a small tube with a light and magnifying camera on the end  The procedure is used to diagnose and treat conditions of the bladder, urethra, and prostate  The procedure is also done to remove stones or blood clots from the urethra or bladder  Your healthcare provider may do other tests, such as ureteroscopy, during a cystoscopy  How do I prepare for a cystoscopy? You may need to stop smoking several days before your procedure, if you are having general anesthesia  Tell your healthcare provider what medicines you take  Your healthcare provider will tell you what medicines to take and not to take on the day of your procedure  You may need to stop taking medicines such as anticoagulants, aspirin, and ibuprofen several days before your procedure  He may tell you stop eating after midnight the night before your procedure  You may be asked to drink a large amount of liquids before your procedure  Make plans for someone to drive you home after your procedure  What will happen during a cystoscopy? · You may be given general anesthesia to keep you asleep and pain free during your procedure  Your healthcare provider may give you anesthesia in your spine  With spinal anesthesia the lower part of your body will be numb  You will not feel pain during your procedure  Your healthcare provider may instead use local anesthesia that is put into your urethra and bladder  You will not feel pain, but you may be able to feel some pressure during your procedure  With local anesthesia, you may feel burning or need to urinate when the cystoscope is put in and removed  · You will be placed on your back and your feet may be placed in stirrups  The cystoscope will be will be placed through your urethra and into your bladder   The urologist will look at the walls of your urethra as the scope goes through to your bladder  Your bladder may be filled with an irrigation liquid to help your urologist see inside of your bladder more clearly  Medical tools may be used to remove tissue or stones  Your urologist may use a special tool to stop bleeding in your bladder  If there are blood clots in your bladder, your healthcare provider will inject an irrigation fluid into your bladder  Then he will use suction to remove the fluid and blood clots  What will happen after the cystoscopy? After you are fully awake, you will go home  After your cystoscopy, it is normal to have pink-colored urine  It is also normal to have an increased need to urinate  You may have burning when you urinate  If you had general anesthesia, it may take at least 24 hours before you feel like your usual self  What are the risks of a cystoscopy? You may bleed more than expected or develop an infection  Swelling caused by the cystoscopy may cause a blockage or slow urine flow  CARE AGREEMENT:   You have the right to help plan your care  Learn about your health condition and how it may be treated  Discuss treatment options with your caregivers to decide what care you want to receive  You always have the right to refuse treatment  The above information is an  only  It is not intended as medical advice for individual conditions or treatments  Talk to your doctor, nurse or pharmacist before following any medical regimen to see if it is safe and effective for you  © 2017 [facility] Information is for End User's use only and may not be sold, redistributed or otherwise used for commercial purposes  All illustrations and images included in CareNotes® are the copyrighted property of A D A M [employer] Inc  or [employer]ay[employer]

## 2018-04-27 NOTE — PROGRESS NOTES
Problem List Items Addressed This Visit     Urinary retention due to benign prostatic hyperplasia - Primary     Patient with a history of urinary retention and difficulty voiding, status post transurethral resection of prostate by Dr Cara Real some years ago  He was seen as an inpatient for large volume urinary retention which he attributes to a carotid endarterectomy  He is currently having no symptoms and states that he is voiding well with nocturia only once per night  He repeatedly states why am I here!?  Throughout today's visit  Plan:  I did recommend to the patient that we continue to follow him on a yearly basis but he does not wish to do this and will call us if he needs us  He insisted upon this plan of action and does not agree to come back for scheduled follow-up visits  Bladder cancer [facility])     Patient with a history of low-grade papillary urothelial carcinoma diagnosed by Dr Cara Real in 2012  He did undergo a surveillance cystoscopy regimen on a every 3 months basis then every 6 month basis that a yearly basis for some time and was then told that he no longer needed surveillance cystoscopy  I discussed with the patient today the natural history of bladder cancer and the 70% recurrence rate of all bladder cancers  I told him the benefits of cystoscopy and the need for this to ensure that his disease has not returned  After this discussion he does not want to have cystoscopy done as he feels that there is nothing wrong with him and he does not want to come for a follow-up visit with me and less he develops symptoms  I told him that this could mean that we miss disease within his bladder and he acknowledges this potential risk      Plan:  He will follow up on a p r n  basis per his insistence on this plan                   Assessment and plan:       Please see problem oriented charting for the assessment plan of today's urological complaints    Raiza Helm MD      Chief Complaint     Chief Complaint   Patient presents with    Benign Prostatic Hypertrophy    Bladder Cancer         History of Present Illness     Damian Henderson is a 68 y o  man with a past medical history as listed below who has a recent episode of postoperative urinary retention after carotid endarterectomy at the end of February 2018  He was started on Flomax as an inpatient and seen by our nurse practitioners and physician's assistant's while in the hospital for large volume urinary retention  His urologic history is significant for previous Transurethral resection of prostate as well as Transurethral resection of bladder tumor which revealed low-grade urothelial papillary carcinoma  He did undergo surveillance cystoscopies for a period of time but was discharged from surveillance cystoscopy per his report by Dr Abilio Cobos  I made him a visit to see me today for follow-up and for cystoscopy for his history of bladder cancer but upon coming to today's visit he is quite frustrated as to why he is even here in the Urology Office  He states multiple times I do not need this visit and he states that he is urinating well  He states that he urinates between 3 and 05:00 in the morning and then does not urinate again until 1 or 15:00 and has no bother  He attributes his urinary retention to the medications he received after surgery and states that this is fine now  He refuses a postvoid residual urine volume today and cystoscopy  He denies dysuria, incontinence, hesitancy of urination, gross hematuria, urgency, he has nocturia times once, he feels empty after voiding and states that his stream quality is good  I did discuss with him at length the natural history of bladder cancer and the need for continued surveillance cystoscopy but he refuses this recommendation  Detailed Urologic History     - please refer to HPI    Review of Systems     Review of Systems   Constitutional: Negative      HENT: Negative  Eyes: Negative  Respiratory: Negative  Cardiovascular: Negative  Gastrointestinal: Negative  Endocrine: Negative  Genitourinary:        As per HPI   Musculoskeletal: Negative  Skin: Negative  Allergic/Immunologic: Negative  Neurological: Negative  Hematological: Negative  Psychiatric/Behavioral: Negative  AUA SYMPTOM SCORE      Most Recent Value   AUA SYMPTOM SCORE   How often have you had a sensation of not emptying your bladder completely after you finished urinating? 0   How often have you had to urinate again less than two hours after you finished urinating? 0   How often have you found you stopped and started again several times when you urinate?  0   How often have you found it difficult to postpone urination? 0   How often have you had a weak urinary stream?  0   How often have you had to push or strain to begin urination? 0   How many times did you most typically get up to urinate from the time you went to bed at night until the time you got up in the morning?  0   Quality of Life: If you were to spend the rest of your life with your urinary condition just the way it is now, how would you feel about that?  1   AUA SYMPTOM SCORE  0            Allergies     Allergies   Allergen Reactions    Penicillins Swelling       Physical Exam     Physical Exam   Constitutional: He is oriented to person, place, and time  He appears well-developed and well-nourished  No distress  Irritated and cantankerous   HENT:   Head: Normocephalic and atraumatic  Right Ear: External ear normal    Left Ear: External ear normal    Nose: Nose normal    Scars present from left endarterectomy   Eyes: EOM are normal  Pupils are equal, round, and reactive to light  Right eye exhibits no discharge  Left eye exhibits no discharge  Neck: Normal range of motion  Neck supple  No tracheal deviation present  No thyromegaly present     Cardiovascular: Normal rate, regular rhythm and intact distal pulses  Pulmonary/Chest: Effort normal and breath sounds normal  No stridor  No respiratory distress  He has no wheezes  He exhibits no tenderness  Genitourinary:   Genitourinary Comments: The patient refuses multiple parts of the physical examination today despite my attempts to perform a full exam   Musculoskeletal: Normal range of motion  He exhibits no edema, tenderness or deformity  Lymphadenopathy:     He has no cervical adenopathy  Neurological: He is alert and oriented to person, place, and time  No cranial nerve deficit or sensory deficit  He exhibits normal muscle tone  Coordination normal    Skin: Skin is warm and dry  No rash noted  He is not diaphoretic  No erythema  No pallor  Psychiatric:   After some reassurance that I was listening to his story, he did become less agitated   Nursing note and vitals reviewed            Vital Signs  Vitals:    04/27/18 0930   BP: 110/70   BP Location: Left arm   Patient Position: Sitting   Cuff Size: Adult   Pulse: 60   Weight: 83 5 kg (184 lb)   Height: 5' 8" (1 727 m)         Current Medications       Current Outpatient Prescriptions:     acetaminophen (TYLENOL) 325 mg tablet, Take 2 tablets (650 mg total) by mouth every 6 (six) hours as needed for mild pain, Disp: 30 tablet, Rfl: 0    albuterol (PROVENTIL HFA,VENTOLIN HFA) 90 mcg/act inhaler, Inhale 2 puffs every 4 (four) hours as needed for wheezing, Disp: 1 Inhaler, Rfl: 0    aspirin 81 mg chewable tablet, Chew 81 mg daily  , Disp: , Rfl:     atorvastatin (LIPITOR) 40 mg tablet, Take 1 tablet (40 mg total) by mouth daily, Disp: 90 tablet, Rfl: 1    esomeprazole (NexIUM) 20 mg capsule, Take 20 mg by mouth every morning before breakfast, Disp: , Rfl:     Lactobacillus (PROBIOTIC ACIDOPHILUS) CAPS, Take 1 capsule by mouth daily, Disp: , Rfl:     lisinopril (ZESTRIL) 10 mg tablet, Take 10 mg by mouth daily  , Disp: , Rfl:     metoprolol tartrate (LOPRESSOR) 25 mg tablet, Take 0 5 tablets by mouth every 12 (twelve) hours (Patient taking differently: Take 25 mg by mouth every 12 (twelve) hours Take one full tablet every 12 hours ), Disp: 60 tablet, Rfl: 0    tamsulosin (FLOMAX) 0 4 mg, Take 1 capsule (0 4 mg total) by mouth daily with dinner, Disp: 30 capsule, Rfl: 11      Active Problems     Patient Active Problem List   Diagnosis    CAD (coronary atherosclerotic disease)    Hypertension    Hyperlipemia    Atypical chest pain    Pulmonary emphysema ([address] )    Hypothyroid    GERD (gastroesophageal reflux disease)    TIA (transient ischemic attack)    Sciatica of right side    Hyperlipidemia    Emphysema lung (HCC)    Arthritis    Stenosis of left carotid artery    S/P CABG x 4    Pre-operative cardiovascular examination    Acute left-sided low back pain with left-sided sciatica    Back pain    Benign essential hypertension    Chronic obstructive pulmonary disease (HCC)    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    Hemiparesis (HCC)    Bradycardia    Vision changes    Urinary retention due to benign prostatic hyperplasia    Bladder cancer [facility])         Past Medical History     Past Medical History:   Diagnosis Date    Arthritis     Benign prostatic hyperplasia without lower urinary tract symptoms     without Urinary Obstruction    CAD (coronary artery disease)     Chronic obstructive lung disease (HCC)     Coronary arteriosclerosis     Depression     Emphysema lung (HCC)     GERD (gastroesophageal reflux disease)     Hyperlipidemia     Hypertension     Myocardial infarction (HCC)     Psoriasis     Requires supplemental oxygen     at bedtime during high humid days only    Stroke [facility])     TIA 1/2018         Surgical History     Past Surgical History:   Procedure Laterality Date    COLONOSCOPY      CORONARY ANGIOPLASTY  02/03/2001    PTCA of RCA    CORONARY ARTERY BYPASS GRAFT  02/07/2001    x4- [place]    HERNIA REPAIR      ME THROMBOENDARTECTMY NECK,NECK INCIS Left 2/20/2018    Procedure: ENDARTERECTOMY ARTERY CAROTID WITH PATCH ANGIOPLASTY;  Surgeon: Alta Villagran MD;  [facility];  Service: Vascular    TRANSURETHRAL RESECTION OF PROSTATE           Family History     Family History   Problem Relation Age of Onset    Lung cancer Mother     Cancer Mother     Other Father      sepsis         Social History     Social History     History   Smoking Status    Former Smoker    Packs/day: 1 00    Years: 3 00    Types: Cigarettes    Quit date: 1956   Smokeless Tobacco    Never Used     Comment: Has a past history of cigarette smoking;Quit date 1956; 5 packs year history, per [employer]           Pertinent Lab Values     Lab Results   Component Value Date    CREATININE 0 93 02/22/2018       No results found for: PSA          Pertinent Imaging      No pertinentimaging for my review

## 2018-04-27 NOTE — ASSESSMENT & PLAN NOTE
Patient with a history of urinary retention and difficulty voiding, status post transurethral resection of prostate by Dr Nikki rPuett some years ago  He was seen as an inpatient for large volume urinary retention which he attributes to a carotid endarterectomy  He is currently having no symptoms and states that he is voiding well with nocturia only once per night  He repeatedly states why am I here!?  Throughout today's visit  Plan:  I did recommend to the patient that we continue to follow him on a yearly basis but he does not wish to do this and will call us if he needs us  He insisted upon this plan of action and does not agree to come back for scheduled follow-up visits

## 2018-04-27 NOTE — ASSESSMENT & PLAN NOTE
Patient with a history of low-grade papillary urothelial carcinoma diagnosed by Dr Soledad Polo in 2012  He did undergo a surveillance cystoscopy regimen on a every 3 months basis then every 6 month basis that a yearly basis for some time and was then told that he no longer needed surveillance cystoscopy  I discussed with the patient today the natural history of bladder cancer and the 70% recurrence rate of all bladder cancers  I told him the benefits of cystoscopy and the need for this to ensure that his disease has not returned  After this discussion he does not want to have cystoscopy done as he feels that there is nothing wrong with him and he does not want to come for a follow-up visit with me and less he develops symptoms  I told him that this could mean that we miss disease within his bladder and he acknowledges this potential risk      Plan:  He will follow up on a p r n  basis per his insistence on this plan

## 2018-04-27 NOTE — LETTER
April 27, 2018     Luisito Gregory, [address] [place] [address]    Patient: Vee Kong   YOB: 1940   Date of Visit: 4/27/2018       Dear Dr Agustin Bailey: Thank you for referring Vee Kong to me for evaluation  Below are my notes for this consultation  If you have questions, please do not hesitate to call me  I look forward to following your patient along with you  Sincerely,        Mira Lynn MD        CC: Keaton Vann, MYLENE  [address] [facility] GABRIEL Sheldon MD  4/27/2018 10:22 AM  Sign at close encounter       Problem List Items Addressed This Visit     Urinary retention due to benign prostatic hyperplasia - Primary     Patient with a history of urinary retention and difficulty voiding, status post transurethral resection of prostate by Dr Angel Denson some years ago  He was seen as an inpatient for large volume urinary retention which he attributes to a carotid endarterectomy  He is currently having no symptoms and states that he is voiding well with nocturia only once per night  He repeatedly states why am I here!?  Throughout today's visit  Plan:  I did recommend to the patient that we continue to follow him on a yearly basis but he does not wish to do this and will call us if he needs us  He insisted upon this plan of action and does not agree to come back for scheduled follow-up visits  Bladder cancer [facility])     Patient with a history of low-grade papillary urothelial carcinoma diagnosed by Dr Angel Denson in 2012  He did undergo a surveillance cystoscopy regimen on a every 3 months basis then every 6 month basis that a yearly basis for some time and was then told that he no longer needed surveillance cystoscopy  I discussed with the patient today the natural history of bladder cancer and the 70% recurrence rate of all bladder cancers    I told him the benefits of cystoscopy and the need for this to ensure that his disease has not returned  After this discussion he does not want to have cystoscopy done as he feels that there is nothing wrong with him and he does not want to come for a follow-up visit with me and less he develops symptoms  I told him that this could mean that we miss disease within his bladder and he acknowledges this potential risk  Plan:  He will follow up on a p r n  basis per his insistence on this plan                   Assessment and plan:       Please see problem oriented charting for the assessment plan of today's urological complaints    Alvaro Reese MD      Chief Complaint     Chief Complaint   Patient presents with    Benign Prostatic Hypertrophy    Bladder Cancer         History of Present Illness     Quinn Nava is a 68 y o  man with a past medical history as listed below who has a recent episode of postoperative urinary retention after carotid endarterectomy at the end of February 2018  He was started on Flomax as an inpatient and seen by our nurse practitioners and physician's assistant's while in the hospital for large volume urinary retention  His urologic history is significant for previous Transurethral resection of prostate as well as Transurethral resection of bladder tumor which revealed low-grade urothelial papillary carcinoma  He did undergo surveillance cystoscopies for a period of time but was discharged from surveillance cystoscopy per his report by Dr Sin Gavin  I made him a visit to see me today for follow-up and for cystoscopy for his history of bladder cancer but upon coming to today's visit he is quite frustrated as to why he is even here in the Urology Office  He states multiple times I do not need this visit and he states that he is urinating well  He states that he urinates between 3 and 05:00 in the morning and then does not urinate again until 1 or 15:00 and has no bother    He attributes his urinary retention to the medications he received after surgery and states that this is fine now  He refuses a postvoid residual urine volume today and cystoscopy  He denies dysuria, incontinence, hesitancy of urination, gross hematuria, urgency, he has nocturia times once, he feels empty after voiding and states that his stream quality is good  I did discuss with him at length the natural history of bladder cancer and the need for continued surveillance cystoscopy but he refuses this recommendation  Detailed Urologic History     - please refer to HPI    Review of Systems     Review of Systems   Constitutional: Negative  HENT: Negative  Eyes: Negative  Respiratory: Negative  Cardiovascular: Negative  Gastrointestinal: Negative  Endocrine: Negative  Genitourinary:        As per HPI   Musculoskeletal: Negative  Skin: Negative  Allergic/Immunologic: Negative  Neurological: Negative  Hematological: Negative  Psychiatric/Behavioral: Negative  AUA SYMPTOM SCORE      Most Recent Value   AUA SYMPTOM SCORE   How often have you had a sensation of not emptying your bladder completely after you finished urinating? 0   How often have you had to urinate again less than two hours after you finished urinating? 0   How often have you found you stopped and started again several times when you urinate?  0   How often have you found it difficult to postpone urination? 0   How often have you had a weak urinary stream?  0   How often have you had to push or strain to begin urination?   0   How many times did you most typically get up to urinate from the time you went to bed at night until the time you got up in the morning?  0   Quality of Life: If you were to spend the rest of your life with your urinary condition just the way it is now, how would you feel about that?  1   AUA SYMPTOM SCORE  0            Allergies     Allergies   Allergen Reactions    Penicillins Swelling       Physical Exam     Physical Exam   Constitutional: He is oriented to person, place, and time  He appears well-developed and well-nourished  No distress  Irritated and cantankerous   HENT:   Head: Normocephalic and atraumatic  Right Ear: External ear normal    Left Ear: External ear normal    Nose: Nose normal    Scars present from left endarterectomy   Eyes: EOM are normal  Pupils are equal, round, and reactive to light  Right eye exhibits no discharge  Left eye exhibits no discharge  Neck: Normal range of motion  Neck supple  No tracheal deviation present  No thyromegaly present  Cardiovascular: Normal rate, regular rhythm and intact distal pulses  Pulmonary/Chest: Effort normal and breath sounds normal  No stridor  No respiratory distress  He has no wheezes  He exhibits no tenderness  Genitourinary:   Genitourinary Comments: The patient refuses multiple parts of the physical examination today despite my attempts to perform a full exam   Musculoskeletal: Normal range of motion  He exhibits no edema, tenderness or deformity  Lymphadenopathy:     He has no cervical adenopathy  Neurological: He is alert and oriented to person, place, and time  No cranial nerve deficit or sensory deficit  He exhibits normal muscle tone  Coordination normal    Skin: Skin is warm and dry  No rash noted  He is not diaphoretic  No erythema  No pallor  Psychiatric:   After some reassurance that I was listening to his story, he did become less agitated   Nursing note and vitals reviewed            Vital Signs  Vitals:    04/27/18 0930   BP: 110/70   BP Location: Left arm   Patient Position: Sitting   Cuff Size: Adult   Pulse: 60   Weight: 83 5 kg (184 lb)   Height: 5' 8" (1 727 m)         Current Medications       Current Outpatient Prescriptions:     acetaminophen (TYLENOL) 325 mg tablet, Take 2 tablets (650 mg total) by mouth every 6 (six) hours as needed for mild pain, Disp: 30 tablet, Rfl: 0    albuterol (PROVENTIL HFA,VENTOLIN HFA) 90 mcg/act inhaler, Inhale 2 puffs every 4 (four) hours as needed for wheezing, Disp: 1 Inhaler, Rfl: 0    aspirin 81 mg chewable tablet, Chew 81 mg daily  , Disp: , Rfl:     atorvastatin (LIPITOR) 40 mg tablet, Take 1 tablet (40 mg total) by mouth daily, Disp: 90 tablet, Rfl: 1    esomeprazole (NexIUM) 20 mg capsule, Take 20 mg by mouth every morning before breakfast, Disp: , Rfl:     Lactobacillus (PROBIOTIC ACIDOPHILUS) CAPS, Take 1 capsule by mouth daily, Disp: , Rfl:     lisinopril (ZESTRIL) 10 mg tablet, Take 10 mg by mouth daily  , Disp: , Rfl:     metoprolol tartrate (LOPRESSOR) 25 mg tablet, Take 0 5 tablets by mouth every 12 (twelve) hours (Patient taking differently: Take 25 mg by mouth every 12 (twelve) hours Take one full tablet every 12 hours ), Disp: 60 tablet, Rfl: 0    tamsulosin (FLOMAX) 0 4 mg, Take 1 capsule (0 4 mg total) by mouth daily with dinner, Disp: 30 capsule, Rfl: 11      Active Problems     Patient Active Problem List   Diagnosis    CAD (coronary atherosclerotic disease)    Hypertension    Hyperlipemia    Atypical chest pain    Pulmonary emphysema ([address] )    Hypothyroid    GERD (gastroesophageal reflux disease)    TIA (transient ischemic attack)    Sciatica of right side    Hyperlipidemia    Emphysema lung (HCC)    Arthritis    Stenosis of left carotid artery    S/P CABG x 4    Pre-operative cardiovascular examination    Acute left-sided low back pain with left-sided sciatica    Back pain    Benign essential hypertension    Chronic obstructive pulmonary disease (HCC)    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    Hemiparesis (HCC)    Bradycardia    Vision changes    Urinary retention due to benign prostatic hyperplasia    Bladder cancer [facility])         Past Medical History     Past Medical History:   Diagnosis Date    Arthritis     Benign prostatic hyperplasia without lower urinary tract symptoms     without Urinary Obstruction    CAD (coronary artery disease)     Chronic obstructive lung disease ([facility] [address] )     Coronary arteriosclerosis     Depression     Emphysema lung (HCC)     GERD (gastroesophageal reflux disease)     Hyperlipidemia     Hypertension     Myocardial infarction (HCC)     Psoriasis     Requires supplemental oxygen     at bedtime during high humid days only    Stroke [facility])     TIA 1/2018         Surgical History     Past Surgical History:   Procedure Laterality Date    COLONOSCOPY      CORONARY ANGIOPLASTY  02/03/2001    PTCA of RCA    CORONARY ARTERY BYPASS GRAFT  02/07/2001    x4- [place]    HERNIA REPAIR      ID THROMBOENDARTECTMY Shayna Eans INCIS Left 2/20/2018    Procedure: ENDARTERECTOMY ARTERY CAROTID WITH PATCH ANGIOPLASTY;  Surgeon: Grace Ganser, MD;  [facility];  Service: Vascular    TRANSURETHRAL RESECTION OF PROSTATE           Family History     Family History   Problem Relation Age of Onset    Lung cancer Mother     Cancer Mother     Other Father      sepsis         Social History     Social History     History   Smoking Status    Former Smoker    Packs/day: 1 00    Years: 3 00    Types: Cigarettes    Quit date: 1956   Smokeless Tobacco    Never Used     Comment: Has a past history of cigarette smoking;Quit date 1956; 5 packs year history, per [employer]           Pertinent Lab Values     Lab Results   Component Value Date    CREATININE 0 93 02/22/2018       No results found for: PSA          Pertinent Imaging      No pertinentimaging for my review

## 2018-05-16 ENCOUNTER — TELEPHONE (OUTPATIENT)
Dept: VASCULAR SURGERY | Facility: CLINIC | Age: 78
End: 2018-05-16

## 2018-05-16 NOTE — TELEPHONE ENCOUNTER
Called pt, instructed that he no longer needs to take plavix and to continue taking 81 mg aspirin, per order from Dr Mason Hung  Pt verbalized understanding

## 2018-05-16 NOTE — TELEPHONE ENCOUNTER
Received patient refill request for plavix 75 mg, but according to last office visit note pt was supposed to stop plavix 2 moths after office visit, which would be 5/1  Please advise if okay to discontinue

## 2018-05-16 NOTE — TELEPHONE ENCOUNTER
Yes, from a vascular standpoint we try to use Plavix for 2 months post-op and then just continue on 81 ASA      Thanks, Rodolfo Gonzalez

## 2018-06-01 ENCOUNTER — OFFICE VISIT (OUTPATIENT)
Dept: INTERNAL MEDICINE CLINIC | Age: 78
End: 2018-06-01
Payer: MEDICARE

## 2018-06-01 VITALS
TEMPERATURE: 98.1 F | SYSTOLIC BLOOD PRESSURE: 118 MMHG | DIASTOLIC BLOOD PRESSURE: 62 MMHG | OXYGEN SATURATION: 97 % | HEIGHT: 67 IN | BODY MASS INDEX: 29.1 KG/M2 | WEIGHT: 185.4 LBS | HEART RATE: 53 BPM

## 2018-06-01 DIAGNOSIS — E78.2 MIXED HYPERLIPIDEMIA: ICD-10-CM

## 2018-06-01 DIAGNOSIS — I10 ESSENTIAL HYPERTENSION: Primary | ICD-10-CM

## 2018-06-01 DIAGNOSIS — J44.9 CHRONIC OBSTRUCTIVE PULMONARY DISEASE, UNSPECIFIED COPD TYPE (HCC): ICD-10-CM

## 2018-06-01 DIAGNOSIS — N18.2 CKD (CHRONIC KIDNEY DISEASE), STAGE II: ICD-10-CM

## 2018-06-01 PROCEDURE — 99214 OFFICE O/P EST MOD 30 MIN: CPT | Performed by: INTERNAL MEDICINE

## 2018-06-01 RX ORDER — BUDESONIDE AND FORMOTEROL FUMARATE DIHYDRATE 160; 4.5 UG/1; UG/1
2 AEROSOL RESPIRATORY (INHALATION) 2 TIMES DAILY
Qty: 1 INHALER | Refills: 0 | Status: SHIPPED | OUTPATIENT
Start: 2018-06-01 | End: 2018-06-01 | Stop reason: SDUPTHER

## 2018-06-01 RX ORDER — BUDESONIDE AND FORMOTEROL FUMARATE DIHYDRATE 160; 4.5 UG/1; UG/1
2 AEROSOL RESPIRATORY (INHALATION) 2 TIMES DAILY
Qty: 1 INHALER | Refills: 6 | Status: SHIPPED | OUTPATIENT
Start: 2018-06-01 | End: 2018-10-07

## 2018-06-01 NOTE — PROGRESS NOTES
Assessment/Plan:    1  Essential hypertension   blood pressure is well controlled will continue with present regimen of lisinopril, metoprolol     2  COPD   patient was supposed to be on albuterol inhaler but he only uses the p r n  Basis and also complains that make him sick  Will start on Symbicort 2 puffs b I d   Will also refer him to with the pulmonologist for further recommendation  3  Hyperlipidemia   continue with atorvastatin 40 mg daily     4  CKD stage 2   continue to monitor renal function       Diagnoses and all orders for this visit:    Essential hypertension  -     Basic metabolic panel; Future    Chronic obstructive pulmonary disease, unspecified COPD type ([facility] [address] )  -     Discontinue: budesonide-formoterol (SYMBICORT) 160-4 5 mcg/act inhaler; Inhale 2 puffs 2 (two) times a day  -     budesonide-formoterol (SYMBICORT) 160-4 5 mcg/act inhaler; Inhale 2 puffs 2 (two) times a day  -     Ambulatory referral to Pulmonology; Future    Mixed hyperlipidemia    CKD (chronic kidney disease), stage II  -     Basic metabolic panel; Future          Subjective:          Patient ID: Sumaya Rasmussen is a 68 y o  male  Hypertension   This is a chronic problem  The current episode started more than 1 year ago  The problem is controlled  Pertinent negatives include no anxiety, chest pain, headaches, malaise/fatigue, neck pain, palpitations, peripheral edema or shortness of breath  There are no associated agents to hypertension  Risk factors for coronary artery disease include dyslipidemia, smoking/tobacco exposure and male gender  Past treatments include ACE inhibitors and calcium channel blockers  Compliance problems include exercise  Hypertensive end-organ damage includes kidney disease, CAD/MI, CVA and PVD         The following portions of the patient's history were reviewed and updated as appropriate: allergies, current medications, past family history, past medical history, past social history, past surgical history and problem list     Review of Systems   Constitutional: Negative for fatigue, fever and malaise/fatigue  HENT: Negative for congestion, ear discharge, ear pain, postnasal drip, sinus pressure, sore throat, tinnitus and trouble swallowing  Eyes: Negative for discharge, itching and visual disturbance  Respiratory: Negative for cough and shortness of breath  Cardiovascular: Negative for chest pain and palpitations  Gastrointestinal: Negative for abdominal pain, diarrhea, nausea and vomiting  Endocrine: Negative for cold intolerance and polyuria  Genitourinary: Negative for difficulty urinating, dysuria and urgency  Musculoskeletal: Negative for arthralgias and neck pain  Skin: Negative for rash  Allergic/Immunologic: Negative for environmental allergies  Neurological: Negative for dizziness, weakness and headaches  Psychiatric/Behavioral: Negative  The patient is not nervous/anxious            Past Medical History:   Diagnosis Date    Arthritis     Benign prostatic hyperplasia without lower urinary tract symptoms     without Urinary Obstruction    CAD (coronary artery disease)     Chronic obstructive lung disease (HCC)     Coronary arteriosclerosis     Depression     Emphysema lung (HCC)     GERD (gastroesophageal reflux disease)     Hyperlipidemia     Hypertension     Myocardial infarction (HCC)     Psoriasis     Requires supplemental oxygen     at bedtime during high humid days only    Stroke [facility])     TIA 1/2018         Current Outpatient Prescriptions:     acetaminophen (TYLENOL) 325 mg tablet, Take 2 tablets (650 mg total) by mouth every 6 (six) hours as needed for mild pain, Disp: 30 tablet, Rfl: 0    albuterol (PROVENTIL HFA,VENTOLIN HFA) 90 mcg/act inhaler, Inhale 2 puffs every 4 (four) hours as needed for wheezing, Disp: 1 Inhaler, Rfl: 0    aspirin 81 mg chewable tablet, Chew 81 mg daily  , Disp: , Rfl:     atorvastatin (LIPITOR) 40 mg tablet, Take 1 tablet (40 mg total) by mouth daily, Disp: 90 tablet, Rfl: 1    budesonide-formoterol (SYMBICORT) 160-4 5 mcg/act inhaler, Inhale 2 puffs 2 (two) times a day, Disp: 1 Inhaler, Rfl: 6    esomeprazole (NexIUM) 20 mg capsule, Take 20 mg by mouth every morning before breakfast, Disp: , Rfl:     Lactobacillus (PROBIOTIC ACIDOPHILUS) CAPS, Take 1 capsule by mouth daily, Disp: , Rfl:     lisinopril (ZESTRIL) 10 mg tablet, Take 10 mg by mouth daily  , Disp: , Rfl:     metoprolol tartrate (LOPRESSOR) 25 mg tablet, Take 0 5 tablets by mouth every 12 (twelve) hours (Patient taking differently: Take 25 mg by mouth every 12 (twelve) hours Take one full tablet every 12 hours ), Disp: 60 tablet, Rfl: 0    tamsulosin (FLOMAX) 0 4 mg, Take 1 capsule (0 4 mg total) by mouth daily with dinner, Disp: 30 capsule, Rfl: 11    Allergies   Allergen Reactions    Penicillins Swelling       Social History   Past Surgical History:   Procedure Laterality Date    COLONOSCOPY      CORONARY ANGIOPLASTY  02/03/2001    PTCA of RCA    CORONARY ARTERY BYPASS GRAFT  02/07/2001    x4- [place]n    HERNIA REPAIR      PA THROMBOENDARTECTMY [place] INCIS Left 2/20/2018    Procedure: ENDARTERECTOMY ARTERY CAROTID WITH PATCH ANGIOPLASTY;  Surgeon: Johnny Ty MD;  [facility];  Service: Vascular    TRANSURETHRAL RESECTION OF PROSTATE       Family History   Problem Relation Age of Onset    Lung cancer Mother     Cancer Mother     Other Father      sepsis       Objective:  /62 (BP Location: Left arm, Patient Position: Sitting, Cuff Size: Adult)   Pulse (!) 53   Temp 98 1 °F (36 7 °C) (Tympanic)   Ht 5' 7 32" (1 71 m)   Wt 84 1 kg (185 lb 6 4 oz)   SpO2 97% Comment: room air  BMI 28 76 kg/m²   Body mass index is 28 76 kg/m²  Physical Exam   Constitutional: He appears well-developed  HENT:   Head: Normocephalic     Right Ear: External ear normal    Left Ear: External ear normal    Mouth/Throat: Oropharynx is clear and moist  Eyes: Pupils are equal, round, and reactive to light  No scleral icterus  Neck: Normal range of motion  Neck supple  No tracheal deviation present  No thyromegaly present  Cardiovascular: Normal rate, regular rhythm and normal heart sounds  No murmur heard  Pulmonary/Chest: Effort normal and breath sounds normal  No respiratory distress  He exhibits no tenderness  Abdominal: Soft  Bowel sounds are normal  He exhibits no mass  There is no tenderness  Musculoskeletal: Normal range of motion  He exhibits edema  Trace bilateral pedal edema noted   Lymphadenopathy:     He has no cervical adenopathy  Neurological: He is alert  No cranial nerve deficit  Skin: Skin is warm  Psychiatric: He has a normal mood and affect   His behavior is normal

## 2018-06-22 ENCOUNTER — HOSPITAL ENCOUNTER (OUTPATIENT)
Dept: NON INVASIVE DIAGNOSTICS | Facility: CLINIC | Age: 78
Discharge: HOME | End: 2018-06-22
Payer: MEDICARE

## 2018-06-22 DIAGNOSIS — I65.22 STENOSIS OF LEFT CAROTID ARTERY: ICD-10-CM

## 2018-06-22 PROCEDURE — 93880 EXTRACRANIAL BILAT STUDY: CPT

## 2018-06-22 PROCEDURE — 93880 EXTRACRANIAL BILAT STUDY: CPT | Performed by: SURGERY

## 2018-08-14 DIAGNOSIS — I10 ESSENTIAL HYPERTENSION: Primary | ICD-10-CM

## 2018-08-14 RX ORDER — LISINOPRIL 10 MG/1
TABLET ORAL
Qty: 90 TABLET | Refills: 3 | Status: SHIPPED | OUTPATIENT
Start: 2018-08-14 | End: 2018-10-17 | Stop reason: SINTOL

## 2018-09-10 ENCOUNTER — OFFICE VISIT (OUTPATIENT)
Dept: INTERNAL MEDICINE CLINIC | Age: 78
End: 2018-09-10
Payer: MEDICARE

## 2018-09-10 VITALS
SYSTOLIC BLOOD PRESSURE: 124 MMHG | WEIGHT: 181.6 LBS | BODY MASS INDEX: 28.5 KG/M2 | OXYGEN SATURATION: 98 % | DIASTOLIC BLOOD PRESSURE: 72 MMHG | HEIGHT: 67 IN | HEART RATE: 92 BPM | TEMPERATURE: 97.8 F

## 2018-09-10 DIAGNOSIS — N18.2 CKD (CHRONIC KIDNEY DISEASE), STAGE II: ICD-10-CM

## 2018-09-10 DIAGNOSIS — K21.9 GASTROESOPHAGEAL REFLUX DISEASE WITHOUT ESOPHAGITIS: Primary | ICD-10-CM

## 2018-09-10 DIAGNOSIS — E03.9 HYPOTHYROIDISM, UNSPECIFIED TYPE: ICD-10-CM

## 2018-09-10 DIAGNOSIS — Z23 NEED FOR INFLUENZA VACCINATION: ICD-10-CM

## 2018-09-10 DIAGNOSIS — J43.8 OTHER EMPHYSEMA (HCC): ICD-10-CM

## 2018-09-10 DIAGNOSIS — I10 ESSENTIAL HYPERTENSION: ICD-10-CM

## 2018-09-10 DIAGNOSIS — J44.9 CHRONIC OBSTRUCTIVE PULMONARY DISEASE, UNSPECIFIED COPD TYPE (HCC): ICD-10-CM

## 2018-09-10 PROBLEM — G81.90 HEMIPARESIS (HCC): Status: RESOLVED | Noted: 2018-01-16 | Resolved: 2018-09-10

## 2018-09-10 PROCEDURE — 99214 OFFICE O/P EST MOD 30 MIN: CPT | Performed by: INTERNAL MEDICINE

## 2018-09-10 PROCEDURE — 90662 IIV NO PRSV INCREASED AG IM: CPT

## 2018-09-10 PROCEDURE — G0008 ADMIN INFLUENZA VIRUS VAC: HCPCS

## 2018-09-10 NOTE — PROGRESS NOTES
Assessment/Plan:    1  Essential hypertension   blood pressure is well controlled on present regimen of lisinopril metoprolol  2  High TSH   will repeat thyroid function test before next visit  Patient is not on any medicine presently  3  Hyperlipidemia   lipid profile is in acceptable range  Will continue with Lipitor 40 mg daily  Will check lipid profile before next visit   4  COPD   continue with present regimen of Symbicort and albuterol inhaler  Patient is also being followed by pulmonologist   5  GERD   doing well on Nexium 20 mg capsule every other day  Advised to quit for the next few weeks and  And will decide whether he needs to continue it or not  Diagnoses and all orders for this visit:    Gastroesophageal reflux disease without esophagitis    Hypothyroidism, unspecified type  -     TSH, 3rd generation with Free T4 reflex; Future    Other emphysema (HCC)    [facility] disease, [facility]    Essential hypertension  -     Basic metabolic panel; Future  -     CBC; Future  -     Lipid Panel with Direct LDL reflex; Future    CKD (chronic kidney disease), stage II  -     Basic metabolic panel; Future          Subjective:          Patient ID: Zak Peña is a 66 y o  male  Hypertension   This is a chronic problem  The current episode started more than 1 year ago  The problem is controlled  Pertinent negatives include no anxiety, chest pain, headaches, neck pain, palpitations, peripheral edema, PND or shortness of breath  There are no associated agents to hypertension  Risk factors for coronary artery disease include dyslipidemia, male gender and smoking/tobacco exposure  Past treatments include ACE inhibitors and lifestyle changes  Compliance problems include exercise  Hypertensive end-organ damage includes kidney disease, CAD/MI and PVD  Identifiable causes of hypertension include chronic renal disease         The following portions of the patient's history were reviewed and updated as appropriate: allergies, current medications, past family history, past medical history, past social history, past surgical history and problem list     Review of Systems   Constitutional: Negative for fatigue and fever  HENT: Negative for congestion, ear discharge, ear pain, postnasal drip, sinus pressure, sore throat, tinnitus and trouble swallowing  Eyes: Negative for discharge, itching and visual disturbance  Respiratory: Negative for cough and shortness of breath  Cardiovascular: Negative for chest pain, palpitations and PND  Gastrointestinal: Negative for abdominal pain, diarrhea, nausea and vomiting  Endocrine: Negative for cold intolerance and polyuria  Genitourinary: Negative for difficulty urinating, dysuria and urgency  Musculoskeletal: Positive for arthralgias  Negative for neck pain  Skin: Negative for rash  Allergic/Immunologic: Negative for environmental allergies  Neurological: Negative for dizziness, weakness and headaches  Psychiatric/Behavioral: Negative for agitation and behavioral problems  The patient is not nervous/anxious            Past Medical History:   Diagnosis Date    Arthritis     Benign prostatic hyperplasia without lower urinary tract symptoms     without Urinary Obstruction    CAD (coronary artery disease)     Chronic obstructive lung disease (HCC)     Coronary arteriosclerosis     Depression     Emphysema lung (HCC)     GERD (gastroesophageal reflux disease)     Hyperlipidemia     Hypertension     Myocardial infarction (HCC)     Psoriasis     Requires supplemental oxygen     at bedtime during high humid days only    Stroke [facility])     TIA 1/2018         Current Outpatient Prescriptions:     acetaminophen (TYLENOL) 325 mg tablet, Take 2 tablets (650 mg total) by mouth every 6 (six) hours as needed for mild pain, Disp: 30 tablet, Rfl: 0    aspirin 81 mg chewable tablet, Chew 81 mg daily  , Disp: , Rfl:     atorvastatin (LIPITOR) 40 mg tablet, Take 1 tablet (40 mg total) by mouth daily, Disp: 90 tablet, Rfl: 1    budesonide-formoterol (SYMBICORT) 160-4 5 mcg/act inhaler, Inhale 2 puffs 2 (two) times a day, Disp: 1 Inhaler, Rfl: 6    doxycycline hyclate (VIBRAMYCIN) 100 mg capsule, Take 100 mg by mouth 2 (two) times a day Take with food, Disp: , Rfl: 0    esomeprazole (NexIUM) 20 mg capsule, Take 20 mg by mouth every morning before breakfast, Disp: , Rfl:     Lactobacillus (PROBIOTIC ACIDOPHILUS) CAPS, Take 1 capsule by mouth daily, Disp: , Rfl:     lisinopril (ZESTRIL) 10 mg tablet, TAKE 1 TABLET DAILY, Disp: 90 tablet, Rfl: 3    metoprolol tartrate (LOPRESSOR) 25 mg tablet, Take 0 5 tablets by mouth every 12 (twelve) hours (Patient taking differently: Take 25 mg by mouth every 12 (twelve) hours Take one full tablet every 12 hours ), Disp: 60 tablet, Rfl: 0    tamsulosin (FLOMAX) 0 4 mg, Take 1 capsule (0 4 mg total) by mouth daily with dinner, Disp: 30 capsule, Rfl: 11    albuterol (PROVENTIL HFA,VENTOLIN HFA) 90 mcg/act inhaler, Inhale 2 puffs every 4 (four) hours as needed for wheezing (Patient not taking: Reported on 9/10/2018 ), Disp: 1 Inhaler, Rfl: 0    Allergies   Allergen Reactions    Penicillins Swelling and Itching       Social History   Past Surgical History:   Procedure Laterality Date    COLONOSCOPY      CORONARY ANGIOPLASTY  02/03/2001    PTCA of RCA    CORONARY ARTERY BYPASS GRAFT  02/07/2001    x4- Alpern    HERNIA REPAIR      HI THROMBOENDARTECTMY NECK,NECK INCIS Left 2/20/2018    Procedure: ENDARTERECTOMY ARTERY CAROTID WITH PATCH ANGIOPLASTY;  Surgeon: Alta Villagran MD;  [facility];  Service: Vascular    TRANSURETHRAL RESECTION OF PROSTATE       Family History   Problem Relation Age of Onset    Lung cancer Mother     Cancer Mother     Other Father         sepsis       Objective:  /72 (BP Location: Left arm, Patient Position: Sitting, Cuff Size: Standard) Pulse 92   Temp 97 8 °F (36 6 °C) (Tympanic)   Ht 5' 7 32" (1 71 m)   Wt 82 4 kg (181 lb 9 6 oz)   SpO2 98%   BMI 28 17 kg/m²   Body mass index is 28 17 kg/m²  Physical Exam   Constitutional: He appears well-developed  HENT:   Head: Normocephalic  Right Ear: External ear normal    Left Ear: External ear normal    Mouth/Throat: Oropharynx is clear and moist    Eyes: Pupils are equal, round, and reactive to light  No scleral icterus  Neck: Normal range of motion  Neck supple  No tracheal deviation present  No thyromegaly present  Cardiovascular: Normal rate, regular rhythm and normal heart sounds  Pulmonary/Chest: Effort normal  No respiratory distress  He has no wheezes  He exhibits no tenderness  Decreased breath sounds throughout chest noted   Abdominal: Soft  Bowel sounds are normal  He exhibits no mass  There is no tenderness  Musculoskeletal: Normal range of motion  He exhibits no edema  Lymphadenopathy:     He has no cervical adenopathy  Neurological: He is alert  No cranial nerve deficit  Skin: Skin is warm  No erythema  Psychiatric: He has a normal mood and affect   His behavior is normal

## 2018-10-07 ENCOUNTER — APPOINTMENT (EMERGENCY)
Dept: RADIOLOGY | Facility: HOSPITAL | Age: 78
DRG: 871 | End: 2018-10-07
Payer: MEDICARE

## 2018-10-07 ENCOUNTER — HOSPITAL ENCOUNTER (INPATIENT)
Facility: HOSPITAL | Age: 78
LOS: 3 days | Discharge: HOME | DRG: 871 | End: 2018-10-10
Attending: EMERGENCY MEDICINE | Admitting: HOSPITALIST
Payer: MEDICARE

## 2018-10-07 DIAGNOSIS — J18.9 COMMUNITY ACQUIRED PNEUMONIA, UNSPECIFIED LATERALITY: ICD-10-CM

## 2018-10-07 DIAGNOSIS — I49.9 IRREGULAR HEART BEAT: ICD-10-CM

## 2018-10-07 DIAGNOSIS — J18.9 PNEUMONIA: Primary | ICD-10-CM

## 2018-10-07 DIAGNOSIS — J44.1 COPD EXACERBATION (HCC): ICD-10-CM

## 2018-10-07 DIAGNOSIS — J43.9 PULMONARY EMPHYSEMA, UNSPECIFIED EMPHYSEMA TYPE (HCC): ICD-10-CM

## 2018-10-07 DIAGNOSIS — R00.0 TACHYCARDIA: ICD-10-CM

## 2018-10-07 PROBLEM — A41.9 SEPSIS (HCC): Status: ACTIVE | Noted: 2018-10-07

## 2018-10-07 LAB
ALBUMIN SERPL BCP-MCNC: 2.8 G/DL (ref 3.5–5)
ALP SERPL-CCNC: 67 U/L (ref 46–116)
ALT SERPL W P-5'-P-CCNC: 24 U/L (ref 12–78)
ANION GAP SERPL CALCULATED.3IONS-SCNC: 7 MMOL/L (ref 4–13)
AST SERPL W P-5'-P-CCNC: 19 U/L (ref 5–45)
ATRIAL RATE: 153 BPM
BACTERIA UR QL AUTO: ABNORMAL /HPF
BASOPHILS # BLD AUTO: 0.02 THOUSANDS/ΜL (ref 0–0.1)
BASOPHILS NFR BLD AUTO: 0 % (ref 0–1)
BILIRUB SERPL-MCNC: 1.1 MG/DL (ref 0.2–1)
BILIRUB UR QL STRIP: NEGATIVE
BUN SERPL-MCNC: 19 MG/DL (ref 5–25)
CALCIUM SERPL-MCNC: 8.4 MG/DL (ref 8.3–10.1)
CHLORIDE SERPL-SCNC: 101 MMOL/L (ref 100–108)
CLARITY UR: CLEAR
CO2 SERPL-SCNC: 29 MMOL/L (ref 21–32)
COLOR UR: YELLOW
CREAT SERPL-MCNC: 1.29 MG/DL (ref 0.6–1.3)
EOSINOPHIL # BLD AUTO: 0.24 THOUSAND/ΜL (ref 0–0.61)
EOSINOPHIL NFR BLD AUTO: 3 % (ref 0–6)
ERYTHROCYTE [DISTWIDTH] IN BLOOD BY AUTOMATED COUNT: 13.2 % (ref 11.6–15.1)
GFR SERPL CREATININE-BSD FRML MDRD: 53 ML/MIN/1.73SQ M
GLUCOSE SERPL-MCNC: 113 MG/DL (ref 65–140)
GLUCOSE UR STRIP-MCNC: NEGATIVE MG/DL
HCT VFR BLD AUTO: 39.1 % (ref 36.5–49.3)
HGB BLD-MCNC: 12.3 G/DL (ref 12–17)
HGB UR QL STRIP.AUTO: NEGATIVE
IMM GRANULOCYTES # BLD AUTO: 0.03 THOUSAND/UL (ref 0–0.2)
IMM GRANULOCYTES NFR BLD AUTO: 0 % (ref 0–2)
KETONES UR STRIP-MCNC: NEGATIVE MG/DL
LACTATE SERPL-SCNC: 0.9 MMOL/L (ref 0.5–2)
LACTATE SERPL-SCNC: 2.3 MMOL/L (ref 0.5–2)
LEUKOCYTE ESTERASE UR QL STRIP: ABNORMAL
LYMPHOCYTES # BLD AUTO: 0.85 THOUSANDS/ΜL (ref 0.6–4.47)
LYMPHOCYTES NFR BLD AUTO: 11 % (ref 14–44)
MCH RBC QN AUTO: 30.1 PG (ref 26.8–34.3)
MCHC RBC AUTO-ENTMCNC: 31.5 G/DL (ref 31.4–37.4)
MCV RBC AUTO: 96 FL (ref 82–98)
MONOCYTES # BLD AUTO: 0.72 THOUSAND/ΜL (ref 0.17–1.22)
MONOCYTES NFR BLD AUTO: 9 % (ref 4–12)
NEUTROPHILS # BLD AUTO: 5.91 THOUSANDS/ΜL (ref 1.85–7.62)
NEUTS SEG NFR BLD AUTO: 77 % (ref 43–75)
NITRITE UR QL STRIP: NEGATIVE
NON-SQ EPI CELLS URNS QL MICRO: ABNORMAL /HPF
NRBC BLD AUTO-RTO: 0 /100 WBCS
NT-PROBNP SERPL-MCNC: 453 PG/ML
PH UR STRIP.AUTO: 6.5 [PH] (ref 4.5–8)
PLATELET # BLD AUTO: 246 THOUSANDS/UL (ref 149–390)
PLATELET # BLD AUTO: 270 THOUSANDS/UL (ref 149–390)
PMV BLD AUTO: 9.7 FL (ref 8.9–12.7)
PMV BLD AUTO: 9.8 FL (ref 8.9–12.7)
POTASSIUM SERPL-SCNC: 4.3 MMOL/L (ref 3.5–5.3)
PR INTERVAL: 134 MS
PROCALCITONIN SERPL-MCNC: 0.08 NG/ML
PROT SERPL-MCNC: 6.9 G/DL (ref 6.4–8.2)
PROT UR STRIP-MCNC: NEGATIVE MG/DL
QRS AXIS: 150 DEGREES
QRSD INTERVAL: 114 MS
QT INTERVAL: 256 MS
QTC INTERVAL: 408 MS
RBC # BLD AUTO: 4.08 MILLION/UL (ref 3.88–5.62)
RBC #/AREA URNS AUTO: ABNORMAL /HPF
SODIUM SERPL-SCNC: 137 MMOL/L (ref 136–145)
SP GR UR STRIP.AUTO: <=1.005 (ref 1–1.03)
T WAVE AXIS: 56 DEGREES
T4 FREE SERPL-MCNC: 1.19 NG/DL (ref 0.76–1.46)
TROPONIN I SERPL-MCNC: <0.02 NG/ML
TSH SERPL DL<=0.05 MIU/L-ACNC: 5.35 UIU/ML (ref 0.36–3.74)
UROBILINOGEN UR QL STRIP.AUTO: 4 E.U./DL
VENTRICULAR RATE: 153 BPM
WBC # BLD AUTO: 7.77 THOUSAND/UL (ref 4.31–10.16)
WBC #/AREA URNS AUTO: ABNORMAL /HPF

## 2018-10-07 PROCEDURE — 83605 ASSAY OF LACTIC ACID: CPT | Performed by: EMERGENCY MEDICINE

## 2018-10-07 PROCEDURE — 93005 ELECTROCARDIOGRAM TRACING: CPT

## 2018-10-07 PROCEDURE — 93010 ELECTROCARDIOGRAM REPORT: CPT | Performed by: INTERNAL MEDICINE

## 2018-10-07 PROCEDURE — 84439 ASSAY OF FREE THYROXINE: CPT | Performed by: PHYSICIAN ASSISTANT

## 2018-10-07 PROCEDURE — 87631 RESP VIRUS 3-5 TARGETS: CPT | Performed by: EMERGENCY MEDICINE

## 2018-10-07 PROCEDURE — 96374 THER/PROPH/DIAG INJ IV PUSH: CPT

## 2018-10-07 PROCEDURE — 83880 ASSAY OF NATRIURETIC PEPTIDE: CPT | Performed by: EMERGENCY MEDICINE

## 2018-10-07 PROCEDURE — 84443 ASSAY THYROID STIM HORMONE: CPT | Performed by: HOSPITALIST

## 2018-10-07 PROCEDURE — 96361 HYDRATE IV INFUSION ADD-ON: CPT

## 2018-10-07 PROCEDURE — 94760 N-INVAS EAR/PLS OXIMETRY 1: CPT

## 2018-10-07 PROCEDURE — 36415 COLL VENOUS BLD VENIPUNCTURE: CPT | Performed by: EMERGENCY MEDICINE

## 2018-10-07 PROCEDURE — 71045 X-RAY EXAM CHEST 1 VIEW: CPT

## 2018-10-07 PROCEDURE — 84145 PROCALCITONIN (PCT): CPT | Performed by: HOSPITALIST

## 2018-10-07 PROCEDURE — 99223 1ST HOSP IP/OBS HIGH 75: CPT | Performed by: HOSPITALIST

## 2018-10-07 PROCEDURE — 85025 COMPLETE CBC W/AUTO DIFF WBC: CPT | Performed by: EMERGENCY MEDICINE

## 2018-10-07 PROCEDURE — 80053 COMPREHEN METABOLIC PANEL: CPT | Performed by: EMERGENCY MEDICINE

## 2018-10-07 PROCEDURE — 99285 EMERGENCY DEPT VISIT HI MDM: CPT

## 2018-10-07 PROCEDURE — 85049 AUTOMATED PLATELET COUNT: CPT | Performed by: HOSPITALIST

## 2018-10-07 PROCEDURE — 87040 BLOOD CULTURE FOR BACTERIA: CPT | Performed by: EMERGENCY MEDICINE

## 2018-10-07 PROCEDURE — 81001 URINALYSIS AUTO W/SCOPE: CPT | Performed by: EMERGENCY MEDICINE

## 2018-10-07 PROCEDURE — 84484 ASSAY OF TROPONIN QUANT: CPT | Performed by: EMERGENCY MEDICINE

## 2018-10-07 RX ORDER — ACETAMINOPHEN 325 MG/1
650 TABLET ORAL EVERY 6 HOURS PRN
Status: DISCONTINUED | OUTPATIENT
Start: 2018-10-07 | End: 2018-10-10 | Stop reason: HOSPADM

## 2018-10-07 RX ORDER — ATORVASTATIN CALCIUM 40 MG/1
40 TABLET, FILM COATED ORAL DAILY
Status: DISCONTINUED | OUTPATIENT
Start: 2018-10-07 | End: 2018-10-10 | Stop reason: HOSPADM

## 2018-10-07 RX ORDER — LISINOPRIL 10 MG/1
10 TABLET ORAL DAILY
Status: DISCONTINUED | OUTPATIENT
Start: 2018-10-07 | End: 2018-10-10 | Stop reason: HOSPADM

## 2018-10-07 RX ORDER — ACETAMINOPHEN 325 MG/1
650 TABLET ORAL EVERY 6 HOURS PRN
Status: DISCONTINUED | OUTPATIENT
Start: 2018-10-07 | End: 2018-10-07 | Stop reason: SDUPTHER

## 2018-10-07 RX ORDER — LEVOFLOXACIN 5 MG/ML
750 INJECTION, SOLUTION INTRAVENOUS ONCE
Status: COMPLETED | OUTPATIENT
Start: 2018-10-07 | End: 2018-10-07

## 2018-10-07 RX ORDER — PREDNISONE 20 MG/1
40 TABLET ORAL DAILY
Status: DISCONTINUED | OUTPATIENT
Start: 2018-10-07 | End: 2018-10-10 | Stop reason: HOSPADM

## 2018-10-07 RX ORDER — PANTOPRAZOLE SODIUM 40 MG/1
40 TABLET, DELAYED RELEASE ORAL
Status: DISCONTINUED | OUTPATIENT
Start: 2018-10-07 | End: 2018-10-10 | Stop reason: HOSPADM

## 2018-10-07 RX ORDER — SODIUM CHLORIDE 9 MG/ML
100 INJECTION, SOLUTION INTRAVENOUS CONTINUOUS
Status: DISCONTINUED | OUTPATIENT
Start: 2018-10-07 | End: 2018-10-08

## 2018-10-07 RX ORDER — LEVALBUTEROL INHALATION SOLUTION 0.63 MG/3ML
0.63 SOLUTION RESPIRATORY (INHALATION) EVERY 8 HOURS PRN
Status: DISCONTINUED | OUTPATIENT
Start: 2018-10-07 | End: 2018-10-10 | Stop reason: HOSPADM

## 2018-10-07 RX ORDER — ACETAMINOPHEN 325 MG/1
650 TABLET ORAL EVERY 6 HOURS PRN
Status: DISCONTINUED | OUTPATIENT
Start: 2018-10-07 | End: 2018-10-07

## 2018-10-07 RX ORDER — ASPIRIN 81 MG/1
81 TABLET, CHEWABLE ORAL DAILY
Status: DISCONTINUED | OUTPATIENT
Start: 2018-10-07 | End: 2018-10-10 | Stop reason: HOSPADM

## 2018-10-07 RX ORDER — LEVOFLOXACIN 750 MG/1
750 TABLET ORAL EVERY OTHER DAY
Status: DISCONTINUED | OUTPATIENT
Start: 2018-10-09 | End: 2018-10-10 | Stop reason: HOSPADM

## 2018-10-07 RX ADMIN — SODIUM CHLORIDE 100 ML/HR: 0.9 INJECTION, SOLUTION INTRAVENOUS at 06:58

## 2018-10-07 RX ADMIN — METOPROLOL TARTRATE 25 MG: 25 TABLET ORAL at 08:30

## 2018-10-07 RX ADMIN — ACETAMINOPHEN 650 MG: 325 TABLET, FILM COATED ORAL at 12:36

## 2018-10-07 RX ADMIN — ASPIRIN 81 MG 81 MG: 81 TABLET ORAL at 08:30

## 2018-10-07 RX ADMIN — METOPROLOL TARTRATE 25 MG: 25 TABLET ORAL at 21:09

## 2018-10-07 RX ADMIN — LISINOPRIL 10 MG: 10 TABLET ORAL at 08:30

## 2018-10-07 RX ADMIN — LEVOFLOXACIN 750 MG: 5 INJECTION, SOLUTION INTRAVENOUS at 05:32

## 2018-10-07 RX ADMIN — SODIUM CHLORIDE 1000 ML: 0.9 INJECTION, SOLUTION INTRAVENOUS at 04:36

## 2018-10-07 RX ADMIN — ATORVASTATIN CALCIUM 40 MG: 40 TABLET, FILM COATED ORAL at 08:30

## 2018-10-07 RX ADMIN — ENOXAPARIN SODIUM 40 MG: 40 INJECTION SUBCUTANEOUS at 08:30

## 2018-10-07 RX ADMIN — SODIUM CHLORIDE 100 ML/HR: 0.9 INJECTION, SOLUTION INTRAVENOUS at 16:24

## 2018-10-07 RX ADMIN — PREDNISONE 40 MG: 20 TABLET ORAL at 08:30

## 2018-10-07 NOTE — H&P
H&P- Nolvia Stuart 1940, 66 y o  male MRN: [id number]    Unit/Bed#: ED 26 Encounter: [id number]    Primary Care Provider: Faustino Jon MD   Date and time admitted to hospital: 10/7/2018  4:08 AM        Sepsis [facility])   Assessment & Plan    Present on admission  Related to pneumonia  Management outlined in pneumonia section       CAP (community acquired pneumonia)   Assessment & Plan    Admit to the floor  Will start the patien on p o  Levaquin  Will send for culture and flu swab  Start patient on Xopenex and Atrovent around the clock  Patient has history of COPD, symptoms consistent with COPD exacerbation  Will start patient on p o  Prednisone 40 mg for 5 days     COPD exacerbation [facility])   Assessment & Plan    Patient has worsening shortness breath, and cough  Patient has COPD exacerbation related to pneumonia  Will start patient on prednisone and breathing treatment  Will treat pneumonia with PO Levaquin  Uses his wife oxygen during the night which gave him some improvement in his symptoms  Will obtain home oxygen protocol before discharge       Tachycardia   Assessment & Plan    Will check TSH  Will increase metoprolol 25 mg b i d  Hypertension   Assessment & Plan    - Increase metoprolol to 25mg BID for better control of HR  - Monitor and titrate as needed      CAD (coronary atherosclerotic disease)   Assessment & Plan    Continue with home medication     Pulmonary emphysema (HCC)resolved as of 10/7/2018   Assessment & Plan                 VTE Prophylaxis: Enoxaparin (Lovenox)  / sequential compression device   Code Status: full  Discussion with family:  No family at bedside    Anticipated Length of Stay:  Patient will be admitted on an Inpatient basis with an anticipated length of stay of   2 midnights  Justification for Hospital Stay:  Pneumonia/COPD exacerbation    Total Time for Visit, including Counseling / Coordination of Care: 45 minutes    Greater than 50% of this total time spent on direct patient counseling and coordination of care  Chief Complaint:   Shortness of breath for the last 2 days    History of Present Illness: Rehana Fletcher is a 66 y o  male with past medical history of COPD, CAD, hypertension, who presented to the ER with complain of shortness breath for the past 2 days  Current patient started as congestion and or runny nose, followed by nonproductive cough, shortness of breath  Patient complain fever and chills where he took ibuprofen for the arrival to ED  Workup was done in the ER which showed pneumonia and sepsis  Review of Systems:    Review of Systems   Constitutional: Negative  HENT: Positive for congestion and postnasal drip  Negative for sore throat  Eyes: Negative  Respiratory: Positive for cough and shortness of breath  Cardiovascular: Positive for palpitations  Negative for chest pain and leg swelling  Gastrointestinal: Negative  Genitourinary: Negative  Musculoskeletal: Negative  Neurological: Negative  Psychiatric/Behavioral: Negative          Past Medical and Surgical History:     Past Medical History:   Diagnosis Date    Arthritis     Benign prostatic hyperplasia without lower urinary tract symptoms     without Urinary Obstruction    CAD (coronary artery disease)     Chronic obstructive lung disease (HCC)     Coronary arteriosclerosis     Depression     Emphysema lung (HCC)     GERD (gastroesophageal reflux disease)     Hyperlipidemia     Hypertension     Myocardial infarction ([address] )     Psoriasis     Requires supplemental oxygen     at bedtime during high humid days only    Stroke [facility])     TIA 1/2018       Past Surgical History:   Procedure Laterality Date    COLONOSCOPY      CORONARY ANGIOPLASTY  02/03/2001    PTCA of RCA    CORONARY ARTERY BYPASS GRAFT  02/07/2001    x4- [place]pern    HERNIA REPAIR      AL THROMBOENDARTECTMY NECK,NECK INCIS Left 2/20/2018    Procedure: ENDARTERECTOMY ARTERY CAROTID WITH PATCH ANGIOPLASTY;  Surgeon: Angelica Zurita MD;  [facility];  Service: Vascular    TRANSURETHRAL RESECTION OF PROSTATE         Meds/Allergies:    Prior to Admission medications    Medication Sig Start Date End Date Taking? Authorizing Provider   acetaminophen (TYLENOL) 325 mg tablet Take 2 tablets (650 mg total) by mouth every 6 (six) hours as needed for mild pain 2/22/18  Yes Calsherine Lázaro, DO   aspirin 81 mg chewable tablet Chew 81 mg daily     Yes Historical Provider, MD   atorvastatin (LIPITOR) 40 mg tablet Take 1 tablet (40 mg total) by mouth daily 3/30/18  Yes Bharati Coker MD   esomeprazole (NexIUM) 20 mg capsule Take 20 mg by mouth every morning before breakfast   Yes Historical Provider, MD   lisinopril (ZESTRIL) 10 mg tablet TAKE 1 TABLET DAILY 8/14/18  Yes Bre Harvey,    metoprolol tartrate (LOPRESSOR) 25 mg tablet Take 0 5 tablets by mouth every 12 (twelve) hours  Patient taking differently: Take 25 mg by mouth every 12 (twelve) hours Take one full tablet every 12 hours  1/15/18  Yes Gal Rodriguez MD   albuterol (PROVENTIL HFA,VENTOLIN HFA) 90 mcg/act inhaler Inhale 2 puffs every 4 (four) hours as needed for wheezing  Patient not taking: Reported on 9/10/2018  1/15/18 10/7/18  Gal Rodriguez MD   budesonide-formoterol (SYMBICORT) 160-4 5 mcg/act inhaler Inhale 2 puffs 2 (two) times a day 6/1/18 10/7/18  Bharati Coker MD   doxycycline hyclate (VIBRAMYCIN) 100 mg capsule Take 100 mg by mouth 2 (two) times a day Take with food 7/19/18 10/7/18  Historical Provider, MD   Lactobacillus (PROBIOTIC ACIDOPHILUS) CAPS Take 1 capsule by mouth daily  10/7/18  Historical Provider, MD   tamsulosin (FLOMAX) 0 4 mg Take 1 capsule (0 4 mg total) by mouth daily with dinner 3/14/18 10/7/18  Rosario Beatty PA-C     I have reviewed home medications using allscripts  Allergies:    Allergies   Allergen Reactions    Penicillins Swelling and Itching       Social History:     Marital Status:      Substance Use History:   History   Alcohol Use No     History   Smoking Status    Former Smoker    Packs/day: 1 00    Years: 3 00    Types: Cigarettes    Quit date: 1956   Smokeless Tobacco    Never Used     Comment: Has a past history of cigarette smoking;Quit date 1956; 5 packs year history, per Allscripts       History   Drug Use No       Family History:    Family History   Problem Relation Age of Onset    Lung cancer Mother     Cancer Mother     Other Father         sepsis       Physical Exam:     Vitals:   Blood Pressure: 163/86 (10/07/18 0600)  Pulse: (!) 146 (10/07/18 0600)  Temperature: 98 5 °F (36 9 °C) (10/07/18 0424)  Respirations: 22 (10/07/18 0600)  Weight - Scale: 80 5 kg (177 lb 7 5 oz) (10/07/18 0417)  SpO2: 100 % (10/07/18 0600)    Physical Exam   Constitutional: He is oriented to person, place, and time  He appears well-developed and well-nourished  HENT:   Head: Normocephalic and atraumatic  Eyes: Pupils are equal, round, and reactive to light  EOM are normal    Neck: Normal range of motion  Neck supple  No JVD present  Cardiovascular: Exam reveals no friction rub  No murmur heard  Tachycardia   Pulmonary/Chest: Effort normal  He has wheezes (Expiratory)  Decreased breath sounds in the left lower lung zone   Abdominal: Soft  Bowel sounds are normal    Musculoskeletal: He exhibits no edema or deformity  Neurological: He is alert and oriented to person, place, and time  Skin: Skin is warm and dry  Psychiatric: He has a normal mood and affect  His behavior is normal          Additional Data:     Lab Results: I have personally reviewed pertinent reports          Results from last 7 days  Lab Units 10/07/18  0424   WBC Thousand/uL 7 77   HEMOGLOBIN g/dL 12 3   HEMATOCRIT % 39 1   PLATELETS Thousands/uL 270   NEUTROS ABS Thousands/µL 5 91   NEUTROS PCT % 77*   LYMPHS PCT % 11*   MONOS PCT % 9   EOS PCT % 3       Results from last 7 days  Lab Units 10/07/18  0424 SODIUM mmol/L 137   POTASSIUM mmol/L 4 3   CHLORIDE mmol/L 101   CO2 mmol/L 29   BUN mg/dL 19   CREATININE mg/dL 1 29   ANION GAP mmol/L 7   CALCIUM mg/dL 8 4   ALBUMIN g/dL 2 8*   TOTAL BILIRUBIN mg/dL 1 10*   ALK PHOS U/L 67   ALT U/L 24   AST U/L 19                   Results from last 7 days  Lab Units 10/07/18  0435   LACTIC ACID mmol/L 2 3*       Imaging: I have personally reviewed pertinent reports  and I have personally reviewed pertinent films in PACS    XR chest 1 view portable   ED Interpretation by Vaughn Zarate MD (10/07 2864)   Left sided PNA      Final Result by Francisco Javier Rivero DO (10/07 5793)      Superimposed on chronic pleural/parenchymal and emphysematous changes, there is a large dense ill-defined opacity in the left lower lung field, suspicious for infection in the appropriate clinical setting  Correlation with the patient's symptoms and    laboratory values recommended  Other nonacute findings as above  Workstation performed: [id number]             EKG, Pathology, and Other Studies Reviewed on Admission:   · EKG:  Sinus tachycardia    Allscripts / Epic Records Reviewed: Yes     ** Please Note: This note has been constructed using a voice recognition system   **

## 2018-10-07 NOTE — PROGRESS NOTES
Post admit progress note:    S:  Patient admitted overnight for community-acquired pneumonia, rule out flu  Sudden-onset chills, body aches, weakness on Friday  Progressed to shortness of breath, COPD exacerbation, and cough  Patient did endorse receiving his flu vaccination already this year  Currently feels okay, short of breath without chest pain or palpitations  Has a cough, not super productive  O:   Vitals:    10/07/18 0641 10/07/18 0900 10/07/18 1002 10/07/18 1100   BP:    96/69   BP Location:    Right arm   Pulse:    78   Resp:    20   Temp:    98 6 °F (37 °C)   TempSrc:    Oral   SpO2:  96% 96% 97%   Weight: 87 5 kg (192 lb 14 4 oz)      Height:         GEN: A+O x 3, WNWD in NAD  HEENT: MMM  CV: RRR, no obvious murmur  PULM: some end-exp wheezes, decreased generally at the bases L>R; supplemental O2 in place  ABDO: soft NTND NABSx4  EXT: no significant edema  NEURO: nonfocal    A/P:   #1 CAP - continue levaquin, follow up on cultures; respiratory protocol, ISB, wean O2 as able and assess needs prior to discharge as he is on O2 as needed and nocturnal at home (uses wife's); sepsis criteria met; lactic acidosis resolved with IVF  #2 COPD exacerbation - secondary to above  #3 Tachy - related to above, TSH elevated, T4 pending  #4 HTN - controlled, monitor  #5 CAD - asymptomatic, normal troponin    Total time spent approximately 40 minutes, non-billable services  I did speak to his daughter Laureano Thomason by phone to review current plan of care

## 2018-10-07 NOTE — ASSESSMENT & PLAN NOTE
Admit to the floor  Will start the patien on p o  Levaquin  Will send for culture and flu swab  Start patient on Xopenex and Atrovent around the clock  Patient has history of COPD, symptoms consistent with COPD exacerbation  Will start patient on p o   Prednisone 40 mg for 5 days

## 2018-10-07 NOTE — ASSESSMENT & PLAN NOTE
Patient has worsening shortness breath, and cough    Patient has COPD exacerbation related to pneumonia  Will start patient on prednisone and breathing treatment  Will treat pneumonia with PO Yecenia  Uses his wife oxygen during the night which gave him some improvement in his symptoms  Will obtain home oxygen protocol before discharge

## 2018-10-07 NOTE — RESPIRATORY THERAPY NOTE
RT Protocol Note  Arturo Arellano 66 y o  male MRN: [id number]  Unit/Bed#: -01 Encounter: [id number]    Assessment    Principal Problem:    Sepsis ([facility] )  Active Problems:    CAD (coronary atherosclerotic disease)    Hypertension    COPD exacerbation ([facility] )    CAP (community acquired pneumonia)    Tachycardia      Home Pulmonary Medications:  None        Past Medical History:   Diagnosis Date    Arthritis     Benign prostatic hyperplasia without lower urinary tract symptoms     without Urinary Obstruction    CAD (coronary artery disease)     Chronic obstructive lung disease (HCC)     Coronary arteriosclerosis     Depression     Emphysema lung (HCC)     GERD (gastroesophageal reflux disease)     Hyperlipidemia     Hypertension     Myocardial infarction ([place] [id number] )     Psoriasis     Requires supplemental oxygen     at bedtime during high humid days only    [facility]roke [facility])     TIA 1/2018     Social History     Social History    Marital status:       Spouse name: N/A    Number of children: 3    Years of education: N/A     Occupational History    retired sergio      Social History Main Topics    Smoking status: Former Smoker     Packs/day: 1 00     Years: 3 00     Types: Cigarettes     Quit date: 1956    Smokeless tobacco: Never Used      Comment: Has a past history of cigarette smoking;Quit date 1956; 5 packs year history, per [employer]      Alcohol use No    Drug use: No    Sexual activity: Yes     Other Topics Concern    None     Social History Narrative    Lives with granddaughter and daughter     Caffeine use, He admits to consuming caffeine VIA coffee ( 8 servings per day), per [employer]    Martial History: Currently , per [employer]    Occupation: Retired (prior occupational:  repairman), per [employer]        Subjective         Objective    Physical Exam:   Assessment Type: Assess only  General Appearance: Alert, Awake  Respiratory Pattern: Dyspnea with exertion  Chest Assessment: Chest expansion symmetrical  R Breath Sounds: Clear  L Breath Sounds: Diminished    Vitals:  Blood pressure 106/77, pulse 94, temperature 98 2 °F (36 8 °C), temperature source Oral, resp  rate 20, height 5' 8" (1 727 m), weight 87 5 kg (192 lb 14 4 oz), SpO2 96 %  Imaging and other studies: I have personally reviewed pertinent reports  Plan    Respiratory Plan: Mild Distress pathway        Assessed patient according to respiratory protocol  Patient admitted for sepsis, pneumonia, sob  Breath sounds are diminished on left and clear on right  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

## 2018-10-07 NOTE — ED PROVIDER NOTES
History  Chief Complaint   Patient presents with    Shortness of Breath     Pt c/o increased SOB since yesterday  Pt states that he also has been feeling achy and has been taking OTC medicine       History provided by:  Patient  Shortness of Breath   Severity:  Moderate  Onset quality:  Gradual  Duration:  2 days  Timing:  Constant  Progression:  Worsening  Chronicity:  New  Context: URI and weather changes    Relieved by:  Nothing  Worsened by:  Exertion  Ineffective treatments:  None tried  Associated symptoms: cough, fever and sputum production    Associated symptoms: no abdominal pain, no chest pain, no diaphoresis, no headaches, no hemoptysis, no syncope and no vomiting    Risk factors: no tobacco use        Prior to Admission Medications   Prescriptions Last Dose Informant Patient Reported? Taking?    Lactobacillus (PROBIOTIC ACIDOPHILUS) CAPS  Self Yes No   Sig: Take 1 capsule by mouth daily   acetaminophen (TYLENOL) 325 mg tablet  Self No No   Sig: Take 2 tablets (650 mg total) by mouth every 6 (six) hours as needed for mild pain   albuterol (PROVENTIL HFA,VENTOLIN HFA) 90 mcg/act inhaler  Self No No   Sig: Inhale 2 puffs every 4 (four) hours as needed for wheezing   Patient not taking: Reported on 9/10/2018    aspirin 81 mg chewable tablet  Self Yes No   Sig: Chew 81 mg daily     atorvastatin (LIPITOR) 40 mg tablet  Self No No   Sig: Take 1 tablet (40 mg total) by mouth daily   budesonide-formoterol (SYMBICORT) 160-4 5 mcg/act inhaler  Self No No   Sig: Inhale 2 puffs 2 (two) times a day   doxycycline hyclate (VIBRAMYCIN) 100 mg capsule   Yes No   Sig: Take 100 mg by mouth 2 (two) times a day Take with food   esomeprazole (NexIUM) 20 mg capsule  Self Yes No   Sig: Take 20 mg by mouth every morning before breakfast   lisinopril (ZESTRIL) 10 mg tablet   No No   Sig: TAKE 1 TABLET DAILY   metoprolol tartrate (LOPRESSOR) 25 mg tablet  Self No No   Sig: Take 0 5 tablets by mouth every 12 (twelve) hours Patient taking differently: Take 25 mg by mouth every 12 (twelve) hours Take one full tablet every 12 hours    tamsulosin (FLOMAX) 0 4 mg  Self No No   Sig: Take 1 capsule (0 4 mg total) by mouth daily with dinner      Facility-Administered Medications: None       Past Medical History:   Diagnosis Date    Arthritis     Benign prostatic hyperplasia without lower urinary tract symptoms     without Urinary Obstruction    CAD (coronary artery disease)     Chronic obstructive lung disease (HCC)     Coronary arteriosclerosis     Depression     Emphysema lung (HCC)     GERD (gastroesophageal reflux disease)     Hyperlipidemia     Hypertension     Myocardial infarction (HCC)     Psoriasis     Requires supplemental oxygen     at bedtime during high humid days only    Stroke [facility])     TIA 1/2018       Past Surgical History:   Procedure Laterality Date    COLONOSCOPY      CORONARY ANGIOPLASTY  02/03/2001    PTCA of RCA    CORONARY ARTERY BYPASS GRAFT  02/07/2001    x4- [place]    HERNIA REPAIR      AL THROMBOENDARTECTMY Elva Ho INCIS Left 2/20/2018    Procedure: ENDARTERECTOMY ARTERY CAROTID WITH PATCH ANGIOPLASTY;  Surgeon: Marian Jackson MD;  [facility]: [facility];  Service: Vascular    TRANSURETHRAL RESECTION OF PROSTATE         Family History   Problem Relation Age of Onset    Lung cancer Mother     Cancer Mother     Other Father         sepsis     I have reviewed and agree with the history as documented  Social History   Substance Use Topics    Smoking status: Former Smoker     Packs/day: 1 00     Years: 3 00     Types: Cigarettes     Quit date: 1956    Smokeless tobacco: Never Used      Comment: Has a past history of cigarette smoking;Quit date 1956; 5 packs year history, per [employer]      Alcohol use No        Review of Systems   Constitutional: Positive for fever  Negative for diaphoresis  Respiratory: Positive for cough, sputum production and shortness of breath   Negative for hemoptysis  Cardiovascular: Negative for chest pain and syncope  Gastrointestinal: Negative for abdominal pain and vomiting  Neurological: Negative for headaches  All other systems reviewed and are negative  Physical Exam  Physical Exam   Constitutional: He appears well-developed and well-nourished  HENT:   Head: Normocephalic and atraumatic  Mouth/Throat: Mucous membranes are dry  Eyes: Pupils are equal, round, and reactive to light  EOM are normal    Neck: Neck supple  Cardiovascular: An irregular rhythm present  Tachycardia present  Pulmonary/Chest: Tachypnea noted  No respiratory distress  Abdominal: Soft  Bowel sounds are normal  He exhibits no distension  There is no tenderness  Musculoskeletal: Normal range of motion  He exhibits no edema  Neurological: He is alert  No cranial nerve deficit  He exhibits normal muscle tone  Skin: Skin is warm  No rash noted  No erythema  Psychiatric: He has a normal mood and affect  Vitals reviewed        Vital Signs  ED Triage Vitals [10/07/18 0417]   Temp Pulse Respirations Blood Pressure SpO2   -- (!) 153 22 140/83 94 %      Temp src Heart Rate Source Patient Position - Orthostatic VS BP Location FiO2 (%)   -- Monitor Sitting Left arm --      Pain Score       No Pain           Vitals:    10/07/18 0417   BP: 140/83   Pulse: (!) 153   Patient Position - Orthostatic VS: Sitting       Visual Acuity      ED Medications  Medications - No data to display    Diagnostic Studies  Results Reviewed     Procedure Component Value Units Date/Time    Comprehensive metabolic panel [[id number]]     Lab Status:  No result Specimen:  Blood     CBC and differential [[id number]]     Lab Status:  No result Specimen:  Blood     Troponin I [[id number]]     Lab Status:  No result Specimen:  Blood                  X-ray chest 2 views    (Results Pending)              Procedures  ECG 12 Lead Documentation  Date/Time: 10/7/2018 4:32 AM  Performed by: Deep Fonseca DHIRAJ  Authorized by: Chani Barbosa     Indications / Diagnosis:  Sob, flu  Patient location:  ED  Rate:     ECG rate:  153    ECG rate assessment: tachycardic    Rhythm:     Rhythm: sinus tachycardia    Conduction:     Conduction: abnormal      Abnormal conduction: bifascicular block    T waves:     T waves: non-specific             Phone Contacts  ED Phone Contact    ED Course           Identification of Seniors at Risk      Most Recent Value   (ISAR) Identification of Seniors at Risk   Before the illness or injury that brought you to the Emergency, did you need someone to help you on a regular basis? 1 Filed at: 10/07/2018 0418   In the last 24 hours, have you needed more help than usual?  1 Filed at: 10/07/2018 0451   Have you been hospitalized for one or more nights during the past 6 months? 1 Filed at: 10/07/2018 0418   In general, do you see well?  0 Filed at: 10/07/2018 0418   In general, do you have serious problems with your memory? 1 Filed at: 10/07/2018 7110   Do you take more than three different medications every day? 1 Filed at: 10/07/2018 0418   ISAR Score  5 Filed at: 10/07/2018 0418                          MDM  Number of Diagnoses or Management Options  Pneumonia: new and requires workup  Tachycardia: new and requires workup     Amount and/or Complexity of Data Reviewed  Clinical lab tests: ordered and reviewed  Tests in the radiology section of CPT®: ordered and reviewed  Independent visualization of images, tracings, or specimens: yes (RIght sided PNA)    Risk of Complications, Morbidity, and/or Mortality  Presenting problems: high    Patient Progress  Patient progress: improved    CritCare Time    Disposition  Final diagnoses:   None     ED Disposition     None      Follow-up Information    None         Patient's Medications   Discharge Prescriptions    No medications on file     No discharge procedures on file      ED Provider  Electronically Signed by           Delano Flores MD  10/12/18 [place]

## 2018-10-08 LAB
ALBUMIN SERPL BCP-MCNC: 2.3 G/DL (ref 3.5–5)
ALP SERPL-CCNC: 54 U/L (ref 46–116)
ALT SERPL W P-5'-P-CCNC: 24 U/L (ref 12–78)
ANION GAP SERPL CALCULATED.3IONS-SCNC: 7 MMOL/L (ref 4–13)
AST SERPL W P-5'-P-CCNC: 18 U/L (ref 5–45)
BASOPHILS # BLD AUTO: 0.01 THOUSANDS/ΜL (ref 0–0.1)
BASOPHILS NFR BLD AUTO: 0 % (ref 0–1)
BILIRUB SERPL-MCNC: 0.5 MG/DL (ref 0.2–1)
BUN SERPL-MCNC: 16 MG/DL (ref 5–25)
CALCIUM SERPL-MCNC: 8.1 MG/DL (ref 8.3–10.1)
CHLORIDE SERPL-SCNC: 107 MMOL/L (ref 100–108)
CO2 SERPL-SCNC: 28 MMOL/L (ref 21–32)
CREAT SERPL-MCNC: 1.07 MG/DL (ref 0.6–1.3)
EOSINOPHIL # BLD AUTO: 0.08 THOUSAND/ΜL (ref 0–0.61)
EOSINOPHIL NFR BLD AUTO: 1 % (ref 0–6)
ERYTHROCYTE [DISTWIDTH] IN BLOOD BY AUTOMATED COUNT: 13 % (ref 11.6–15.1)
FLUAV AG SPEC QL: NORMAL
FLUBV AG SPEC QL: NORMAL
GFR SERPL CREATININE-BSD FRML MDRD: 66 ML/MIN/1.73SQ M
GLUCOSE SERPL-MCNC: 109 MG/DL (ref 65–140)
HCT VFR BLD AUTO: 33.7 % (ref 36.5–49.3)
HGB BLD-MCNC: 10.6 G/DL (ref 12–17)
IMM GRANULOCYTES # BLD AUTO: 0.04 THOUSAND/UL (ref 0–0.2)
IMM GRANULOCYTES NFR BLD AUTO: 1 % (ref 0–2)
LYMPHOCYTES # BLD AUTO: 0.84 THOUSANDS/ΜL (ref 0.6–4.47)
LYMPHOCYTES NFR BLD AUTO: 11 % (ref 14–44)
MCH RBC QN AUTO: 30.3 PG (ref 26.8–34.3)
MCHC RBC AUTO-ENTMCNC: 31.5 G/DL (ref 31.4–37.4)
MCV RBC AUTO: 96 FL (ref 82–98)
MONOCYTES # BLD AUTO: 0.57 THOUSAND/ΜL (ref 0.17–1.22)
MONOCYTES NFR BLD AUTO: 8 % (ref 4–12)
NEUTROPHILS # BLD AUTO: 5.96 THOUSANDS/ΜL (ref 1.85–7.62)
NEUTS SEG NFR BLD AUTO: 79 % (ref 43–75)
NRBC BLD AUTO-RTO: 0 /100 WBCS
PLATELET # BLD AUTO: 209 THOUSANDS/UL (ref 149–390)
PMV BLD AUTO: 9.7 FL (ref 8.9–12.7)
POTASSIUM SERPL-SCNC: 4.7 MMOL/L (ref 3.5–5.3)
PROT SERPL-MCNC: 6.1 G/DL (ref 6.4–8.2)
RBC # BLD AUTO: 3.5 MILLION/UL (ref 3.88–5.62)
RSV B RNA SPEC QL NAA+PROBE: NORMAL
SODIUM SERPL-SCNC: 142 MMOL/L (ref 136–145)
WBC # BLD AUTO: 7.5 THOUSAND/UL (ref 4.31–10.16)

## 2018-10-08 PROCEDURE — 85025 COMPLETE CBC W/AUTO DIFF WBC: CPT | Performed by: HOSPITALIST

## 2018-10-08 PROCEDURE — 80053 COMPREHEN METABOLIC PANEL: CPT | Performed by: HOSPITALIST

## 2018-10-08 PROCEDURE — 99232 SBSQ HOSP IP/OBS MODERATE 35: CPT | Performed by: PHYSICIAN ASSISTANT

## 2018-10-08 PROCEDURE — 93005 ELECTROCARDIOGRAM TRACING: CPT

## 2018-10-08 RX ORDER — METOPROLOL TARTRATE 5 MG/5ML
5 INJECTION INTRAVENOUS EVERY 6 HOURS PRN
Status: DISCONTINUED | OUTPATIENT
Start: 2018-10-08 | End: 2018-10-10 | Stop reason: HOSPADM

## 2018-10-08 RX ADMIN — LISINOPRIL 10 MG: 10 TABLET ORAL at 09:18

## 2018-10-08 RX ADMIN — METOPROLOL TARTRATE 25 MG: 25 TABLET ORAL at 21:54

## 2018-10-08 RX ADMIN — METOPROLOL TARTRATE 5 MG: 1 INJECTION, SOLUTION INTRAVENOUS at 16:32

## 2018-10-08 RX ADMIN — ATORVASTATIN CALCIUM 40 MG: 40 TABLET, FILM COATED ORAL at 09:18

## 2018-10-08 RX ADMIN — PREDNISONE 40 MG: 20 TABLET ORAL at 09:18

## 2018-10-08 RX ADMIN — PANTOPRAZOLE SODIUM 40 MG: 40 TABLET, DELAYED RELEASE ORAL at 05:32

## 2018-10-08 RX ADMIN — ASPIRIN 81 MG 81 MG: 81 TABLET ORAL at 09:18

## 2018-10-08 RX ADMIN — SODIUM CHLORIDE 100 ML/HR: 0.9 INJECTION, SOLUTION INTRAVENOUS at 13:00

## 2018-10-08 RX ADMIN — ENOXAPARIN SODIUM 40 MG: 40 INJECTION SUBCUTANEOUS at 09:18

## 2018-10-08 RX ADMIN — METOPROLOL TARTRATE 25 MG: 25 TABLET ORAL at 09:18

## 2018-10-08 NOTE — ASSESSMENT & PLAN NOTE
· Exacerbation secondary to CAP  · Continue prednisone, respiratory protocol, incentive spirometer  · Check ambulatory O2 assessment in the morning

## 2018-10-08 NOTE — ASSESSMENT & PLAN NOTE
· Continue oral Levaquin, renally adjusted, influenza/RSV (-) can discontinue precautions  · Continue treatment for acute COPD exacerbation as below, related to pneumonia  · Clinically improved, anticipate discharge in next 24 hours

## 2018-10-08 NOTE — PLAN OF CARE
CARDIOVASCULAR - ADULT     Maintains optimal cardiac output and hemodynamic stability Progressing     Absence of cardiac dysrhythmias or at baseline rhythm Progressing        DISCHARGE PLANNING     Discharge to home or other facility with appropriate resources Progressing        INFECTION - ADULT     Absence or prevention of progression during hospitalization Progressing     Absence of fever/infection during neutropenic period Progressing        Knowledge Deficit     Patient/family/caregiver demonstrates understanding of disease process, treatment plan, medications, and discharge instructions Progressing        MUSCULOSKELETAL - ADULT     Maintain or return mobility to safest level of function Progressing     Maintain proper alignment of affected body part Progressing        PAIN - ADULT     Verbalizes/displays adequate comfort level or baseline comfort level Progressing        Potential for Falls     Patient will remain free of falls Progressing        SAFETY ADULT     Maintain or return to baseline ADL function Progressing     Maintain or return mobility status to optimal level Progressing

## 2018-10-08 NOTE — PROGRESS NOTES
Progress Note - Malgorzata Singer 1940, 66 y o  male MRN: [id number]    Unit/Bed#: -01 Encounter: [id number]    Primary Care Provider: Tana Ward MD   Date and time admitted to hospital: 10/7/2018  4:08 AM    * Sepsis [facility])   Assessment & Plan    · Present on admission, secondary to pneumonia  · Flu/RSV negative by PCR  · Further care as below     CAP (community acquired pneumonia)   Assessment & Plan    · Continue oral Levaquin, renally adjusted, influenza/RSV (-) can discontinue precautions  · Continue treatment for acute COPD exacerbation as below, related to pneumonia  · Clinically improved, anticipate discharge in next 24 hours     COPD exacerbation (HCC)   Assessment & Plan    · Exacerbation secondary to CAP  · Continue prednisone, respiratory protocol, incentive spirometer  · Check ambulatory O2 assessment in the morning     CAD (coronary atherosclerotic disease)   Assessment & Plan    · Continue with home medication, asymptomatic     Tachycardia   Assessment & Plan    · TSH elevated, Free T4 normal     Hypertension   Assessment & Plan    · Increase metoprolol to 25mg BID for better control of HR  · Monitor and titrate as needed        VTE Pharmacologic Prophylaxis:   Pharmacologic: Enoxaparin (Lovenox)  Mechanical VTE Prophylaxis in Place: Yes    Patient Centered Rounds: I have performed bedside rounds with nursing staff today  Discussions with Specialists or Other Care Team Provider: CM    Education and Discussions with Family / Patient: d/w patient, no family present     Time Spent for Care: 20 minutes  More than 50% of total time spent on counseling and coordination of care as described above      Current Length of Stay: 1 day(s)    Current Patient Status: Inpatient   Certification Statement: The patient will continue to require additional inpatient hospital stay due to CAP treatment    Discharge Plan: anticipate d/c in 24 hours     Code Status: Level 1 - Full Code      Subjective: Patient seen examined, feeling better  Still on oxygen  He is worried that if we checked his oxygen in the hospital, that it will show "99 percent because there is more oxygen in the hospital"  He was advised that flu/rsv are negative, he responded "well that's because it's out of my system already"  Objective:     Vitals:   Temp (24hrs), Av 3 °F (36 8 °C), Min:97 6 °F (36 4 °C), Max:98 9 °F (37 2 °C)    HR:  [75-99] 78  Resp:  [20-22] 22  BP: (105-139)/(63-78) 135/63  SpO2:  [96 %-100 %] 100 %  Body mass index is 29 33 kg/m²  Input and Output Summary (last 24 hours): Intake/Output Summary (Last 24 hours) at 10/08/18 1348  Last data filed at 10/08/18 1300   Gross per 24 hour   Intake          3423 33 ml   Output             2750 ml   Net           673 33 ml       Physical Exam:     Physical Exam   Constitutional: Vital signs are normal  He appears well-developed and well-nourished  No distress  Cardiovascular: Normal rate, regular rhythm, S1 normal, S2 normal, normal heart sounds and intact distal pulses  No murmur heard  Pulmonary/Chest: Effort normal  No accessory muscle usage  No tachypnea  No respiratory distress  He has decreased breath sounds (improved)  He has no wheezes  He has no rhonchi  He has no rales  O2 via NC   Abdominal: Soft  Bowel sounds are normal  He exhibits no distension  There is no tenderness  Musculoskeletal: He exhibits no edema  Psychiatric: He has a normal mood and affect  Nursing note and vitals reviewed        Additional Data:     Labs:      Results from last 7 days  Lab Units 10/08/18  0456   WBC Thousand/uL 7 50   HEMOGLOBIN g/dL 10 6*   HEMATOCRIT % 33 7*   PLATELETS Thousands/uL 209   NEUTROS PCT % 79*   LYMPHS PCT % 11*   MONOS PCT % 8   EOS PCT % 1       Results from last 7 days  Lab Units 10/08/18  0456   SODIUM mmol/L 142   POTASSIUM mmol/L 4 7   CHLORIDE mmol/L 107   CO2 mmol/L 28   BUN mg/dL 16   CREATININE mg/dL 1 07   ANION GAP mmol/L 7 CALCIUM mg/dL 8 1*   ALBUMIN g/dL 2 3*   TOTAL BILIRUBIN mg/dL 0 50   ALK PHOS U/L 54   ALT U/L 24   AST U/L 18                   Results from last 7 days  Lab Units 10/07/18  0921 10/07/18  0622 10/07/18  0435   LACTIC ACID mmol/L  --  0 9 2 3*   PROCALCITONIN ng/ml 0 08  --   --            * I Have Reviewed All Lab Data Listed Above  * Additional Pertinent Lab Tests Reviewed: All Labs Within Last 24 Hours Reviewed    Imaging:    Imaging Reports Reviewed Today Include: none  Imaging Personally Reviewed by Myself Includes:  none    Recent Cultures (last 7 days):       Results from last 7 days  Lab Units 10/07/18  0435   INFLUENZA A PCR  None Detected   INFLUENZA B PCR  None Detected   RSV PCR  None Detected       Last 24 Hours Medication List:     Current Facility-Administered Medications:  acetaminophen 650 mg Oral Q6H PRN Jeremías Delgado MD    aspirin 81 mg Oral Daily Jeremías Delgado MD    atorvastatin 40 mg Oral Daily Jeremías Delgado MD    enoxaparin 40 mg Subcutaneous Daily Jeremías Delgado MD    levalbuterol 0 63 mg Nebulization Q8H PRN MD Marcelle Marsical ON 10/9/2018] levofloxacin 750 mg Oral Every Other Day Jeremías Delgado MD    lisinopril 10 mg Oral Daily Jeremías Delgado MD    metoprolol tartrate 25 mg Oral Q12H 52 [facility], MD    pantoprazole 40 mg Oral Early Morning Jeremías Delgado MD    predniSONE 40 mg Oral Daily Jeremías Delgado MD    sodium chloride 100 mL/hr Intravenous Continuous Jeremías Delgado MD Last Rate: 100 mL/hr (10/08/18 1300)        Today, Patient Was Seen By: Linden Uriarte PA-C    ** Please Note: Dictation voice to text software may have been used in the creation of this document   **

## 2018-10-08 NOTE — CASE MANAGEMENT
Initial Clinical Review    Admission: Date/Time/Statement: inpatient 10/7/18 @ 0544     Orders Placed This Encounter   Procedures    Inpatient Admission (expected length of stay for this patient is greater than two midnights)     Standing Status:   Standing     Number of Occurrences:   1     Order Specific Question:   Admitting Physician     Answer:   Sonia Driver     Order Specific Question:   Level of Care     Answer:   Med Surg [16]     Order Specific Question:   Estimated length of stay     Answer:   More than 2 Midnights     Order Specific Question:   Certification     Answer:   I certify that inpatient services are medically necessary for this patient for a duration of greater than two midnights  See H&P and MD Progress Notes for additional information about the patient's course of treatment  ED Arrival Information     Expected Arrival Acuity Means of Arrival Escorted By Service Admission Type    - 10/7/2018 04:04 Emergent Walk-In Self Hospitalist Emergency    Arrival Complaint    Difficulty Breathing        Chief Complaint   Patient presents with    Shortness of Breath     Pt c/o increased SOB since yesterday  Pt states that he also has been feeling achy and has been taking OTC medicine       History of Illness: 67 yo m to ED from home c/o sob x 2 days  Began with congestion & runny nose, then nonprod cough, and sob  +fever & chills, took ibuprofen  Workup showed sepsis & pneumonia     Tachycardia Decreased breath sounds in the left lower lung zone     Exp wheezes    ED Vital Signs:   ED Triage Vitals   Temperature Pulse Respirations Blood Pressure SpO2   10/07/18 0424 10/07/18 0417 10/07/18 0417 10/07/18 0417 10/07/18 0417   98 5 °F (36 9 °C) (!) 153 22 140/83 94 %      Temp Source Heart Rate Source Patient Position - Orthostatic VS BP Location FiO2 (%)   10/07/18 0637 10/07/18 0417 10/07/18 0417 10/07/18 0417 --   Oral Monitor Sitting Left arm       Pain Score       10/07/18 0417 No Pain        Wt Readings from Last 1 Encounters:   10/07/18 87 5 kg (192 lb 14 4 oz)       Vital Signs (abnormal): HR 92, 146, 94  RR 22, 22, 20, 20, 22, 22, 22, 20, 22    Abnormal Labs/Diagnostic Test Results:   Alb 2 8, 2 3   t bili 1 1   probnp 453    Lactic acid 2 3      Ca 8 1    t prot 6 1     hgb 12 3, 10 6   hct 39 1, 33 7  tsh 5 348     Flu negative  UA: tr leuks   4 urobilin   0-1 rbc   4-10 wbc  occ bacteria/epithelials   occ bacteria/epithelials  bld c&s pending  Flu negative  PCXR: Superimposed on chronic pleural/parenchymal and emphysematous changes, there is a large dense ill-defined opacity in the left lower lung field, suspicious for infection in the appropriate clinical setting    ED Treatment:   Medication Administration from 10/07/2018 0404 to 10/07/2018 1303       Date/Time Order Dose Route Action Action by Comments     10/07/2018 0436 sodium chloride 0 9 % bolus 1,000 mL 1,000 mL Intravenous [address] Iraida Simmons 25eight      10/07/2018 0532 levofloxacin (LEVAQUIN) IVPB (premix) 750 mg 750 mg Intravenous New Bag Iraida Simmons RN           Past Medical/Surgical History:    Active Ambulatory Problems     Diagnosis Date Noted    CAD (coronary atherosclerotic disease) 08/13/2016    Hypertension 08/13/2016    Hyperlipemia 08/13/2016    Atypical chest pain 08/13/2016    Hypothyroid 01/13/2018    GERD (gastroesophageal reflux disease) 01/13/2018    TIA (transient ischemic attack) 01/13/2018    Sciatica of right side 01/13/2018    Hyperlipidemia     Emphysema lung (HCC)     Arthritis     Stenosis of left carotid artery 01/14/2018    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    Pre-operative cardiovascular examination 01/30/2018    Acute left-sided low back pain with left-sided sciatica 10/25/2016    Back pain 01/24/2017    COPD exacerbation ([facility] [address] ) 11/05/2013    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 10/25/2016    Bradycardia 09/15/2016    Vision changes 01/13/2018    Urinary retention due to benign prostatic hyperplasia 04/27/2018    Bladder cancer ([place] [address] ) 04/27/2018    CKD (chronic kidney disease), stage II 06/01/2018     Resolved Ambulatory Problems     Diagnosis Date Noted    Pulmonary emphysema ([place] [address] ) 01/13/2018    Benign essential hypertension 04/24/2015    Hemiparesis ([place] [address] ) 01/16/2018     Past Medical History:   Diagnosis Date    Arthritis     Benign prostatic hyperplasia without lower urinary tract symptoms     CAD (coronary artery disease)     Chronic obstructive lung disease (HCC)     Coronary arteriosclerosis     Depression     Emphysema lung (HCC)     GERD (gastroesophageal reflux disease)     Hyperlipidemia     Hypertension     Myocardial infarction ([place] [address] )     Psoriasis     Requires supplemental oxygen     Stroke ([place] [address] )        Admitting Diagnosis: Pneumonia [J18 9]  SOB (shortness of breath) [R06 02]  Tachycardia [R00 0]    Age/Sex: 66 y o  male    Assessment/Plan: 67 yo m to ED from home admitted due to sepsis and pneumonia  PO levaquin, cultures, flu swab, xopenex/atrovent UDN's ATC, also has COPD exac, start prednisone  Evaluate for home o2  Patient will be admitted on an Inpatient basis with an anticipated length of stay of   2 midnights     Justification for Hospital Stay:  Pneumonia/COPD exacerbation       Admission Orders:  Scheduled Meds:   Current Facility-Administered Medications:  acetaminophen 650 mg Oral Q6H PRN   aspirin 81 mg Oral Daily   atorvastatin 40 mg Oral Daily   enoxaparin 40 mg Subcutaneous Daily   levalbuterol 0 63 mg Nebulization Q8H PRN   [START ON 10/9/2018] levofloxacin 750 mg Oral Every Other Day   lisinopril 10 mg Oral Daily   metoprolol tartrate 25 mg Oral Q12H ADRIANE   pantoprazole 40 mg Oral Early Morning   predniSONE 40 mg Oral Daily   sodium chloride 100 mL/hr Intravenous Continuous     Tele  oob as augusto  Elevate hob 30 degr or >  Aspiration prec  Incentive spirom hourly wa  I/O  scd's  Contact isolation  procalcitonin daily  respiratory protocol  Cardiac 2 3gm na diet

## 2018-10-08 NOTE — SOCIAL WORK
Met with pt at bedside  Pt alert  Pt reported that he lives in two story house  He stays on the first level  He stated that his granddaughter lives on the second level  He stated that there is not a need to go to the second floor  He is ADL independent  No DME needed  He uses a scooter PRN  He said that he has no hx of VNA and feels it is not needed at this time  He said that he has O2 at home  He said that he has a supplier but would not provide CM with his name  He said that he will not use DME company because he will get denied  He stated that he pays $25 a week for O2  He said that he will not get respiratory tested in the hospital because he knows his O2 will be stable  He said, "There is a ton of oxygen in the hospital and in the lung doctor office  Someone needs to follow me for a month at home and see why I need oxygen " CM explained the sats testing and encouraged him to work with respiratory  He uses [facility] in [place]  He manages his own meds  His granddaughter, Frida Santoyo is POA  Pt drives  CM reviewed discharge planning process including the following: identifying help at home, patient preference for discharge planning needs, pharmacy preference, and availability of treatment team to discuss questions or concerns patient and/or family may have regarding understanding medications and recognizing signs and symptoms once discharged  CM also encouraged patient to follow up with all recommended appointments after discharge  Patient advised of importance for patient and family to participate in managing patients medical well being  CM name and role reviewed  Discharge Checklist reviewed and CM will continue to monitor for progress toward discharge goals in nursing and provider rounds

## 2018-10-09 LAB
ATRIAL RATE: 308 BPM
ATRIAL RATE: 326 BPM
P AXIS: 238 DEGREES
P AXIS: 252 DEGREES
QRS AXIS: 12 DEGREES
QRS AXIS: 44 DEGREES
QRSD INTERVAL: 114 MS
QRSD INTERVAL: 138 MS
QT INTERVAL: 292 MS
QT INTERVAL: 390 MS
QTC INTERVAL: 426 MS
QTC INTERVAL: 441 MS
T WAVE AXIS: 0 DEGREES
T WAVE AXIS: 9 DEGREES
VENTRICULAR RATE: 128 BPM
VENTRICULAR RATE: 77 BPM

## 2018-10-09 PROCEDURE — 93010 ELECTROCARDIOGRAM REPORT: CPT | Performed by: INTERNAL MEDICINE

## 2018-10-09 PROCEDURE — 99232 SBSQ HOSP IP/OBS MODERATE 35: CPT | Performed by: PHYSICIAN ASSISTANT

## 2018-10-09 PROCEDURE — 93005 ELECTROCARDIOGRAM TRACING: CPT

## 2018-10-09 PROCEDURE — 99222 1ST HOSP IP/OBS MODERATE 55: CPT | Performed by: INTERNAL MEDICINE

## 2018-10-09 RX ORDER — DILTIAZEM HYDROCHLORIDE 120 MG/1
120 CAPSULE, COATED, EXTENDED RELEASE ORAL DAILY
Status: DISCONTINUED | OUTPATIENT
Start: 2018-10-09 | End: 2018-10-10

## 2018-10-09 RX ADMIN — LEVOFLOXACIN 750 MG: 750 TABLET, FILM COATED ORAL at 09:05

## 2018-10-09 RX ADMIN — DILTIAZEM HYDROCHLORIDE 120 MG: 120 CAPSULE, COATED, EXTENDED RELEASE ORAL at 11:52

## 2018-10-09 RX ADMIN — PANTOPRAZOLE SODIUM 40 MG: 40 TABLET, DELAYED RELEASE ORAL at 05:12

## 2018-10-09 RX ADMIN — ENOXAPARIN SODIUM 40 MG: 40 INJECTION SUBCUTANEOUS at 09:05

## 2018-10-09 RX ADMIN — ASPIRIN 81 MG 81 MG: 81 TABLET ORAL at 09:06

## 2018-10-09 RX ADMIN — METOPROLOL TARTRATE 5 MG: 1 INJECTION, SOLUTION INTRAVENOUS at 09:10

## 2018-10-09 RX ADMIN — RIVAROXABAN 20 MG: 20 TABLET, FILM COATED ORAL at 17:30

## 2018-10-09 RX ADMIN — PREDNISONE 40 MG: 20 TABLET ORAL at 09:05

## 2018-10-09 RX ADMIN — LISINOPRIL 10 MG: 10 TABLET ORAL at 09:05

## 2018-10-09 RX ADMIN — METOPROLOL TARTRATE 25 MG: 25 TABLET ORAL at 09:05

## 2018-10-09 RX ADMIN — ATORVASTATIN CALCIUM 40 MG: 40 TABLET, FILM COATED ORAL at 09:05

## 2018-10-09 NOTE — ASSESSMENT & PLAN NOTE
· Continue oral Levaquin, renally adjusted, influenza/RSV (-)   · Continue treatment for acute COPD exacerbation as below, related to pneumonia  · Clinically improved, though still tachy and awaiting recs from cardiology

## 2018-10-09 NOTE — ASSESSMENT & PLAN NOTE
· Increased metoprolol to 25mg BID for better control of HR @ admit, consider CCB instead due to respiratory issues  · Monitor and titrate as needed

## 2018-10-09 NOTE — PROGRESS NOTES
Progress Note - Arturo Arellano 1940, 66 y o  male MRN: [id number]    Unit/Bed#: -01 Encounter: [id number]    Primary Care Provider: Isaiah Ray MD   Date and time admitted to hospital: 10/7/2018  4:08 AM        * Sepsis [facility])   Assessment & Plan    · Present on admission, secondary to pneumonia  · Flu/RSV negative by PCR  · Further care as below     CAP (community acquired pneumonia)   Assessment & Plan    · Continue oral Levaquin, renally adjusted, influenza/RSV (-)   · Continue treatment for acute COPD exacerbation as below, related to pneumonia  · Clinically improved, though still tachy and awaiting recs from cardiology     COPD exacerbation ([facility] [address] )   Assessment & Plan    · Exacerbation secondary to CAP  · Continue prednisone x 5 days, respiratory protocol, incentive spirometer  · Check ambulatory O2 assessment today     CAD (coronary atherosclerotic disease)   Assessment & Plan    · Continue with home medication, asymptomatic     Tachycardia   Assessment & Plan    · TSH elevated, Free T4 normal  · ? afib on tele - cardiology consulted     Hypertension   Assessment & Plan    · Increased metoprolol to 25mg BID for better control of HR @ admit, consider CCB instead due to respiratory issues  · Monitor and titrate as needed        VTE Pharmacologic Prophylaxis:   Pharmacologic: Enoxaparin (Lovenox)  Mechanical VTE Prophylaxis in Place: Yes    Patient Centered Rounds: I have performed bedside rounds with nursing staff today  Discussions with Specialists or Other Care Team Provider: d/w cardiology re: rate control     Education and Discussions with Family / Patient: d/w patient re: current treatment plan    Time Spent for Care: 20 minutes  More than 50% of total time spent on counseling and coordination of care as described above      Current Length of Stay: 2 day(s)    Current Patient Status: Inpatient   Certification Statement: The patient will continue to require additional inpatient hospital stay due to cardiology consult today    Discharge Plan: pending cardiology eval today     Code Status: Level 1 - Full Code      Subjective:   Patient seen sitting at the side of bed, he feels well  Reports SOB better, denies CP or palpitations  Wants to go home, states "I expected to be going home"  Objective:     Vitals:   Temp (24hrs), Av 1 °F (36 7 °C), Min:97 6 °F (36 4 °C), Max:98 3 °F (36 8 °C)    Temp:  [97 6 °F (36 4 °C)-98 3 °F (36 8 °C)] 97 6 °F (36 4 °C)  HR:  [74-92] 76  Resp:  [16-20] 18  BP: ()/(53-83) 118/68  SpO2:  [95 %-100 %] 100 %  Body mass index is 29 33 kg/m²  Input and Output Summary (last 24 hours): Intake/Output Summary (Last 24 hours) at 10/09/18 0947  Last data filed at 10/09/18 0514   Gross per 24 hour   Intake             1685 ml   Output             1375 ml   Net              310 ml       Physical Exam:     Physical Exam   Constitutional: Vital signs are normal  He appears well-developed and well-nourished  No distress  Cardiovascular: S1 normal, S2 normal and normal heart sounds  Tachycardia present  No murmur heard  Pulmonary/Chest: [place] normal  No respiratory distress  He has decreased breath sounds  He has no wheezes  He has no rhonchi  He has no rales  Abdominal: Soft  Bowel sounds are normal  He exhibits no distension  There is no tenderness  Musculoskeletal: He exhibits no edema  Psychiatric: He has a normal mood and affect  Nursing note and vitals reviewed      Additional Data:     Labs:      Results from last 7 days  Lab Units 10/08/18  0456   WBC Thousand/uL 7 50   HEMOGLOBIN g/dL 10 6*   HEMATOCRIT % 33 7*   PLATELETS Thousands/uL 209   NEUTROS PCT % 79*   LYMPHS PCT % 11*   MONOS PCT % 8   EOS PCT % 1       Results from last 7 days  Lab Units 10/08/18  0456   SODIUM mmol/L 142   POTASSIUM mmol/L 4 7   CHLORIDE mmol/L 107   CO2 mmol/L 28   BUN mg/dL 16   CREATININE mg/dL 1 07   ANION GAP mmol/L 7   CALCIUM mg/dL 8 1*   ALBUMIN g/dL 2 3*   TOTAL BILIRUBIN mg/dL 0 50   ALK PHOS U/L 54   ALT U/L 24   AST U/L 18                   Results from last 7 days  Lab Units 10/07/18  0921 10/07/18  0622 10/07/18  0435   LACTIC ACID mmol/L  --  0 9 2 3*   PROCALCITONIN ng/ml 0 08  --   --            * I Have Reviewed All Lab Data Listed Above  * Additional Pertinent Lab Tests Reviewed: All Labs Within Last 24 Hours Reviewed    Imaging:    Imaging Reports Reviewed Today Include: none  Imaging Personally Reviewed by Myself Includes:  Tele - afib vs flutter? Tachy 150-160s    Recent Cultures (last 7 days):       Results from last 7 days  Lab Units 10/07/18  0435   BLOOD CULTURE  No Growth at 24 hrs  No Growth at 24 hrs  INFLUENZA A PCR  None Detected   INFLUENZA B PCR  None Detected   RSV PCR  None Detected       Last 24 Hours Medication List:     Current Facility-Administered Medications:  acetaminophen 650 mg Oral Q6H PRN Barb White MD   aspirin 81 mg Oral Daily Barb White MD   atorvastatin 40 mg Oral Daily Barb White MD   enoxaparin 40 mg Subcutaneous Daily Barb White MD   levalbuterol 0 63 mg Nebulization Q8H PRN Barb White MD   levofloxacin 750 mg Oral Every Other Day Barb White MD   lisinopril 10 mg Oral Daily Barb White MD   metoprolol 5 mg Intravenous Q6H PRN Jami Montano MD   metoprolol tartrate 25 mg Oral Q12H [facility]   pantoprazole 40 mg Oral Early Morning Barb White MD   predniSONE 40 mg Oral Daily Barb White MD        Today, Patient Was Seen By: Amira Garcia PA-C    ** Please Note: Dictation voice to text software may have been used in the creation of this document   **

## 2018-10-09 NOTE — PHYSICIAN ADVISOR
Current patient class: Inpatient  The patient is currently on Hospital Day: 2 at [address]      The patient was admitted to the hospital at [phone] on 10/7/18 for the following diagnosis:  Pneumonia [J18 9]  SOB (shortness of breath) [R06 02]  Tachycardia [R00 0]       There is documentation in the medical record of an expected length of stay of at least 2 midnights  The patient is therefore expected to satisfy the 2 midnight benchmark and given the 2 midnight presumption is appropriate for INPATIENT ADMISSION  Given this expectation of a satisfying stay, CMS instructs us that the patient is most often appropriate for inpatient admission under part A provided medical necessity is documented in the chart  After review of the relevant documentation, labs, vital signs and test results, the patient is appropriate for INPATIENT ADMISSION  Admission to the hospital as an inpatient is a complex decision making process which requires the practitioner to consider the patients presenting complaint, history and physical examination and all relevant testing  With this in mind, in this case, the patient was deemed appropriate for INPATIENT ADMISSION  After review of the documentation and testing available at the time of the admission I concur with this clinical determination of medical necessity  Rationale is as follows: The patient is a 66 yrs old Male who presented to the ED at 10/7/2018  4:08 AM with a chief complaint of Shortness of Breath (Pt c/o increased SOB since yesterday  Pt states that he also has been feeling achy and has been taking OTC medicine)     Given the need for further hospitalization, and along with the documentation of medical necessity present in the chart, the patient is appropriate for inpatient admission  The patient is expected to satisfy the 2 midnight benchmark, and will require further acute medical care   The patient does have comorbid conditions which increases the risk for significant adverse outcome  Given this the patient is appropriate for inpatient admission        The patients vitals on arrival were ED Triage Vitals   Temperature Pulse Respirations Blood Pressure SpO2   10/07/18 0424 10/07/18 0417 10/07/18 0417 10/07/18 0417 10/07/18 0417   98 5 °F (36 9 °C) (!) 153 22 140/83 94 %      Temp Source Heart Rate Source Patient Position - Orthostatic VS BP Location FiO2 (%)   10/07/18 0637 10/07/18 0417 10/07/18 0417 10/07/18 0417 --   Oral Monitor Sitting Left arm       Pain Score       10/07/18 0417       No Pain           Past Medical History:   Diagnosis Date    Arthritis     Benign prostatic hyperplasia without lower urinary tract symptoms     without Urinary Obstruction    CAD (coronary artery disease)     Chronic obstructive lung disease (HCC)     Coronary arteriosclerosis     Depression     Emphysema lung (HCC)     GERD (gastroesophageal reflux disease)     Hyperlipidemia     Hypertension     Myocardial infarction ([facility] [address] )     Psoriasis     Requires supplemental oxygen     at bedtime during high humid days only    Stroke [facility])     TIA 1/2018     Past Surgical History:   Procedure Laterality Date    COLONOSCOPY      CORONARY ANGIOPLASTY  02/03/2001    PTCA of RCA    CORONARY ARTERY BYPASS GRAFT  02/07/2001    x4- [place]    HERNIA REPAIR      MO THROMBOENDARTECTMY NECK,NECK INCIS Left 2/20/2018    Procedure: ENDARTERECTOMY ARTERY CAROTID WITH PATCH ANGIOPLASTY;  Surgeon: Joanna Herr MD;  Location: [facility];  Service: Vascular    TRANSURETHRAL RESECTION OF PROSTATE             Consults have been placed to:   None    Vitals:    10/08/18 1500 10/08/18 1900 10/08/18 2154 10/08/18 2300   BP: 128/71 110/62 121/67 95/53   BP Location: Right arm Right arm  Right arm   Pulse: 92 79 91 74   Resp: 20 18 18   Temp: 98 2 °F (36 8 °C) 98 3 °F (36 8 °C)  98 3 °F (36 8 °C)   TempSrc: Oral Oral  Oral   SpO2: 99% 95%  98%   Weight:       Height: Most recent labs:    Recent Labs      10/07/18   0424   10/08/18   0456   WBC  7 77   --   7 50   HGB  12 3   --   10 6*   HCT  39 1   --   33 7*   PLT  270   < >  209   K  4 3   --   4 7   NA  137   --   142   CALCIUM  8 4   --   8 1*   BUN  19   --   16   CREATININE  1 29   --   1 07   TROPONINI  <0 02   --    --    AST  19   --   18   ALT  24   --   24   ALKPHOS  67   --   54    < > = values in this interval not displayed         Scheduled Meds:  Current Facility-Administered Medications:  acetaminophen 650 mg Oral Q6H PRN Jillian Fried MD   aspirin 81 mg Oral Daily Jillian Fried MD   atorvastatin 40 mg Oral Daily Jillian Fried MD   enoxaparin 40 mg Subcutaneous Daily Jillian Fried MD   levalbuterol 0 63 mg Nebulization Q8H PRN MD Janet Razace Nadine ON 10/9/2018] levofloxacin 750 mg Oral Every Other Day Jillian Fried MD   lisinopril 10 mg Oral Daily Jillian Fried MD   metoprolol 5 mg Intravenous Q6H PRN Jackie Sosa MD   metoprolol tartrate 25 mg Oral Q12H 52 Celestina Leigh MD   pantoprazole 40 mg Oral Early Morning Jillian Fried MD   predniSONE 40 mg Oral Daily Jillian Fried MD     Continuous Infusions:   PRN Meds:   acetaminophen    levalbuterol    metoprolol    Surgical procedures (if appropriate):

## 2018-10-09 NOTE — CONSULTS
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 is a 66y o  year old male who has a history of CAD, CABG, PCI, COPD, depression, hypertension, hyperlipidemia, stroke  Patient came to the emergency room with complaints of shortness of breath for the past couple of days  Patient states he also had congestion, runny nose, nonproductive cough  He also had fevers and chills  In the emergency room he was found to have pneumonia/sepsis  Currently patient states he is feeling much better  Denies any chest pain, chest pressure, chest heaviness  Patient follows with Dr Matias Rodriguez as an outpt       REVIEW OF SYSTEMS:  Constitutional:  + fevers, chills  Eyes:  Denies change in visual acuity   HENT:  + congestion, runny nose  Respiratory:  + cough, shortness of breath  Cardiovascular:  Denies chest pain or edema   GI:  Denies abdominal pain, nausea, vomiting, bloody stools or diarrhea   :  Denies dysuria, frequency, difficulty in micturition and nocturia  Musculoskeletal:  Denies back pain or joint pain   Neurologic:  Denies headache, focal weakness or sensory changes   Endocrine:  Denies polyuria or polydipsia   Lymphatic:  Denies swollen glands   Psychiatric:  Denies depression or anxiety     Historical Information   Past Medical History:   Diagnosis Date    Arthritis     Benign prostatic hyperplasia without lower urinary tract symptoms     without Urinary Obstruction    CAD (coronary artery disease)     Chronic obstructive lung disease (HCC)     Coronary arteriosclerosis     Depression     Emphysema lung (HCC)     GERD (gastroesophageal reflux disease)  Hyperlipidemia     Hypertension     Myocardial infarction ([facility] [address] )     Psoriasis     Requires supplemental oxygen     at bedtime during high humid days only    Stroke [facility])     TIA 1/2018     Past Surgical History:   Procedure Laterality Date    COLONOSCOPY      CORONARY ANGIOPLASTY  02/03/2001    PTCA of RCA    CORONARY ARTERY BYPASS GRAFT  02/07/2001    x4- [place]    HERNIA REPAIR      GA THROMBOENDARTECTMY NECK,NECK INCIS Left 2/20/2018    Procedure: ENDARTERECTOMY ARTERY CAROTID WITH PATCH ANGIOPLASTY;  Surgeon: Leanne Belcher MD;  Location: [facility];  Service: Vascular    TRANSURETHRAL RESECTION OF PROSTATE       History   Alcohol Use No     History   Drug Use No     History   Smoking Status    Former Smoker    Packs/day: 1 00    Years: 3 00    Types: Cigarettes    Quit date: 1956   Smokeless Tobacco    Never Used     Comment: Has a past history of cigarette smoking;Quit date 1956; 5 packs year history, per [employer]         Family History: non-contributory    MEDS & ALLERGIES:  all current active meds have been reviewed and current meds: Current Facility-Administered Medications   Medication Dose Route Frequency    acetaminophen (TYLENOL) tablet 650 mg  650 mg Oral Q6H PRN    aspirin chewable tablet 81 mg  81 mg Oral Daily    atorvastatin (LIPITOR) tablet 40 mg  40 mg Oral Daily    diltiazem (CARDIZEM CD) 24 hr capsule 120 mg  120 mg Oral Daily    levalbuterol (XOPENEX) inhalation solution 0 63 mg  0 63 mg Nebulization Q8H PRN    levofloxacin (LEVAQUIN) tablet 750 mg  750 mg Oral Every Other Day    lisinopril (ZESTRIL) tablet 10 mg  10 mg Oral Daily    metoprolol (LOPRESSOR) injection 5 mg  5 mg Intravenous Q6H PRN    pantoprazole (PROTONIX) EC tablet 40 mg  40 mg Oral Early Morning    predniSONE tablet 40 mg  40 mg Oral Daily    rivaroxaban (XARELTO) tablet 20 mg  20 mg Oral Daily With Dinner        Allergies   Allergen Reactions    Penicillins Swelling and Itching OBJECTIVE:  Vitals:   Vitals:    10/09/18 0700   BP: 118/68   Pulse: 76   Resp: 18   Temp: 97 6 °F (36 4 °C)   SpO2: 100%     Body mass index is 29 33 kg/m²  Systolic (05YHY), CY , Min:95 , JMS:597     Diastolic (17JOU), LHZ:67, Min:53, Max:83      Intake/Output Summary (Last 24 hours) at 10/09/18 1117  Last data filed at 10/09/18 0514   Gross per 24 hour   Intake             1685 ml   Output             1375 ml   Net              310 ml     Weight (last 2 days)     Date/Time   Weight    10/07/18 0641  87 5 (192 9)    10/07/18 0417  80 5 (177 47)            Invasive Devices     Peripheral Intravenous Line            Peripheral IV 10/07/18 Left Antecubital 2 days                PHYSICAL EXAMS:  General:  Patient is not in acute distress, laying in the bed comfortably, awake, alert responding to commands  Head: Normocephalic, Atraumatic  HEENT: White sclera, pink conjunctiva,  PERRLA,pharynx benign  Neck:  Supple, no neck vein distention, carotids+2/+2 no bruits, thyromegaly, adenopathy  Respiratory:  Decreased breath sounds bilaterally  Cardiovascular:  PMI normal, S1-S2 normal, No  Murmurs, thrills, gallops, rubs  Irregularly irregular, tachycardic  GI:  Abdomen soft nontender   No hepatosplenomegaly, adenopathy, ascites,or rebound tenderness  Extremities: No edema, normal pulses, no calf tenderness, no joint deformities, no venous disease   Integument:  No skin rashes or ulceration  Lymphatic:  No cervical or inguinal lymphadenopathy  Neurologic:  Patient is awake alert, responding to command, well-oriented to time and place and person moving all extremities    LABORATORY RESULTS:    Results from last 7 days  Lab Units 10/07/18  0424   TROPONIN I ng/mL <0 02     CBC with diff:   Results from last 7 days  Lab Units 10/08/18  0456 10/07/18  0921 10/07/18  0424   WBC Thousand/uL 7 50  --  7 77   HEMOGLOBIN g/dL 10 6*  --  12 3   HEMATOCRIT % 33 7*  --  39 1   MCV fL 96  --  96   PLATELETS Thousands/uL 209 246 270   MCH pg 30 3  --  30 1   MCHC g/dL 31 5  --  31 5   RDW % 13 0  --  13 2   MPV fL 9 7 9 8 9 7   NRBC AUTO /100 WBCs 0  --  0       CMP:  Results from last 7 days  Lab Units 10/08/18  0456 10/07/18  0424   SODIUM mmol/L 142 137   POTASSIUM mmol/L 4 7 4 3   CHLORIDE mmol/L 107 101   CO2 mmol/L 28 29   BUN mg/dL 16 19   CREATININE mg/dL 1 07 1 29   CALCIUM mg/dL 8 1* 8 4   AST U/L 18 19   ALT U/L 24 24   ALK PHOS U/L 54 67   EGFR ml/min/1 73sq m 66 53       BMP:  Results from last 7 days  Lab Units 10/08/18  0456 10/07/18  0424   SODIUM mmol/L 142 137   POTASSIUM mmol/L 4 7 4 3   CHLORIDE mmol/L 107 101   CO2 mmol/L 28 29   BUN mg/dL 16 19   CREATININE mg/dL 1 07 1 29   CALCIUM mg/dL 8 1* 8 4         Results from last 7 days  Lab Units 10/07/18  0424   NT-PRO BNP pg/mL 453*                Results from last 7 days  Lab Units 10/07/18  0921 10/07/18  0424   TSH 3RD GENERATON uIU/mL  --  5 348*   FREE T4 ng/dL 1 19  --            Lipid Profile:   Lab Results   Component Value Date    CHOL See scanned summary  08/15/2016    CHOL See scanned summary  2016     Lab Results   Component Value Date    HDL 44 2018    HDL See scanned summary  08/15/2016    HDL 46 2016     Lab Results   Component Value Date    LDLCALC 105 (H) 2018    LDLCALC 72 2016     Lab Results   Component Value Date    TRIG 81 2018    TRIG See scanned summary   08/15/2016    TRIG 110 2016       Cardiac testing:   Results for orders placed during the hospital encounter of 18   Echo complete with contrast if indicated    Narrative [facility] [address] [facility]  [phone]    Transthoracic Echocardiogram  2D, M-mode, Doppler, and Color Doppler    Study date:  15-Supa-2018    Patient: Brendon Pizano  MR number: [id number]  Account number: [de-identified]  : 1940  Age: 68 years  Gender: Male  Status: Inpatient  Location: Bedside  Height: 68 in  Weight: 181 lb  BP: 115/ 56 mmHg    Indications: TIA    Diagnoses: G45 9 - Transient cerebral ischemic attack, unspecified    Sonographer:  PARVIN Kaufman  Primary Physician:  Ra Marcelo MD  Referring Physician:  Bustamante CRNP  Group:  [facility]  Interpreting Physician:  Stacie Wren MD    SUMMARY    LEFT VENTRICLE:  Systolic function was normal  Ejection fraction was estimated to be 55 %  There was hypokinesis of the basal inferior wall(s)  RIGHT VENTRICLE:  The size was normal   Systolic function was normal     IVC, HEPATIC VEINS:  The inferior vena cava was dilated  HISTORY: PRIOR HISTORY: HTN, HLD, Emphysema, CAD, TIA, CABG    PROCEDURE: The procedure was performed at the bedside  This was a routine study  The transthoracic approach was used  The study included complete 2D imaging, M-mode, complete spectral Doppler, and color Doppler  The heart rate was 54 bpm,  at the start of the study  Images were obtained from the parasternal, apical, subcostal, and suprasternal notch acoustic windows  Intravenous contrast ( 0 5 mL Definity in NSS) was administered to opacify the left ventricle  Echocardiographic views were limited due to lung interference  Image quality was adequate  LEFT VENTRICLE: Size was normal  Systolic function was normal  Ejection fraction was estimated to be 55 %  There was hypokinesis of the basal inferior wall(s)  Wall thickness was normal  No evidence of apical thrombus  RIGHT VENTRICLE: The size was normal  Systolic function was normal  Wall thickness was normal     LEFT ATRIUM: Size was normal     RIGHT ATRIUM: Size was normal     MITRAL VALVE: Valve structure was normal  There was normal leaflet separation  DOPPLER: The transmitral velocity was within the normal range  There was no evidence for stenosis  There was trace regurgitation  AORTIC VALVE: The valve was trileaflet   Leaflets exhibited mildly increased thickness, normal cuspal separation, and sclerosis  DOPPLER: Transaortic velocity was within the normal range  There was no evidence for stenosis  There was no  significant regurgitation  TRICUSPID VALVE: The valve structure was normal  There was normal leaflet separation  DOPPLER: The transtricuspid velocity was within the normal range  There was no evidence for stenosis  There was no significant regurgitation  The  tricuspid jet envelope definition was inadequate for estimation of RV systolic pressure  PULMONIC VALVE: Leaflets exhibited normal thickness, no calcification, and normal cuspal separation  DOPPLER: The transpulmonic velocity was within the normal range  There was no significant regurgitation  PERICARDIUM: There was no pericardial effusion  AORTA: The root exhibited normal size  SYSTEMIC VEINS: IVC: The inferior vena cava was dilated  SYSTEM MEASUREMENT TABLES    2D  %FS: 27 41 %  AV Diam: 3 43 cm  EDV(Teich): 102 58 ml  EF(Cube): 61 75 %  EF(Teich): 53 26 %  ESV(Cube): 39 83 ml  ESV(Teich): 47 94 ml  IVSd: 0 88 cm  LA Area: 12 14 cm2  LA Diam: 3 52 cm  LVEDV MOD A4C: 82 76 ml  LVEF MOD A4C: 48 86 %  LVESV MOD A4C: 42 32 ml  LVIDd: 4 7 cm  LVIDs: 3 42 cm  LVLd A4C: 7 74 cm  LVLs A4C: 6 38 cm  LVOT Diam: 2 2 cm  LVPWd: 1 11 cm  RA Area: 16 11 cm2  RV Diam: 3 41 cm  SV MOD A4C: 40 44 ml  SV(Cube): 64 29 ml  SV(Teich): 54 64 ml    MM  TAPSE: 2 08 cm    PW  MV A Dylan: 0 64 m/s  MV Dec [place]: 3 26 m/s2  MV DecT: 232 64 ms  MV E Dylan: 0 76 m/s  MV E/A Ratio: 1 18    In[employer]oci[employer] Commission Accredited Echocardiography Laboratory    Prepared and electronically signed by    Kelsey Cornell MD  Signed 15-Supa-2018 14:05:56       No results found for this or any previous visit  No procedure found  No results found for this or any previous visit  Imaging:   I have personally reviewed pertinent reports          EKG reviewed personally:  A flutter    Telemetry reviewed personally:   A flutter    Assessment/Plan:  1-new onset of atrial flutter, tachycardic at times  Will stop beta-blockers  Start Cardizem 120 mg  Discussed the need for anticoagulation to reduce the risk of stroke  Will start Xarelto  Pt CHADSVASC score = 6 (age 2, htn 1, stroke 2, cad 1)  Patient had echocardiogram January 2018 EF 55%, hypokinesis of the basal inferior wall noted  2-history of CAD/CABG/PCI, stable  Without anginal symptoms  Continue all medications  Monitor telemetry  Last echo January 2018  Last stress test 2016, negative for ischemia  3-hypertension, stable  Continue all medications    4-hyperlipidemia, continue with statins    Code Status: Level 1 - Full Code    Counseling / Coordination of Care  Total floor / unit time spent today 35 minutes  Greater than 50% of total time was spent with the patient and / or family counseling and / or coordination of care  A description of the counseling / coordination of care: Review of history, current assessment, development of a plan      GABRIEL Taylor  10/9/2018,11:17 AM

## 2018-10-09 NOTE — ASSESSMENT & PLAN NOTE
· Exacerbation secondary to CAP  · Continue prednisone x 5 days, respiratory protocol, incentive spirometer  · Check ambulatory O2 assessment today

## 2018-10-10 ENCOUNTER — TELEPHONE (OUTPATIENT)
Dept: INTERNAL MEDICINE CLINIC | Facility: CLINIC | Age: 78
End: 2018-10-10

## 2018-10-10 VITALS
DIASTOLIC BLOOD PRESSURE: 81 MMHG | OXYGEN SATURATION: 96 % | TEMPERATURE: 98.4 F | SYSTOLIC BLOOD PRESSURE: 152 MMHG | HEART RATE: 80 BPM | HEIGHT: 68 IN | RESPIRATION RATE: 20 BRPM | BODY MASS INDEX: 29.24 KG/M2 | WEIGHT: 192.9 LBS

## 2018-10-10 PROBLEM — A41.9 SEPSIS (HCC): Status: RESOLVED | Noted: 2018-10-07 | Resolved: 2018-10-10

## 2018-10-10 PROCEDURE — 99239 HOSP IP/OBS DSCHRG MGMT >30: CPT | Performed by: PHYSICIAN ASSISTANT

## 2018-10-10 PROCEDURE — 99232 SBSQ HOSP IP/OBS MODERATE 35: CPT | Performed by: INTERNAL MEDICINE

## 2018-10-10 PROCEDURE — 94761 N-INVAS EAR/PLS OXIMETRY MLT: CPT

## 2018-10-10 RX ORDER — DILTIAZEM HYDROCHLORIDE 240 MG/1
240 CAPSULE, COATED, EXTENDED RELEASE ORAL DAILY
Qty: 30 CAPSULE | Refills: 0 | Status: SHIPPED | OUTPATIENT
Start: 2018-10-11 | End: 2018-11-05 | Stop reason: SDUPTHER

## 2018-10-10 RX ORDER — DILTIAZEM HYDROCHLORIDE 120 MG/1
120 CAPSULE, COATED, EXTENDED RELEASE ORAL ONCE
Status: COMPLETED | OUTPATIENT
Start: 2018-10-10 | End: 2018-10-10

## 2018-10-10 RX ORDER — DILTIAZEM HYDROCHLORIDE 240 MG/1
240 CAPSULE, COATED, EXTENDED RELEASE ORAL DAILY
Status: DISCONTINUED | OUTPATIENT
Start: 2018-10-11 | End: 2018-10-10 | Stop reason: HOSPADM

## 2018-10-10 RX ORDER — PREDNISONE 20 MG/1
40 TABLET ORAL DAILY
Qty: 2 TABLET | Refills: 0 | Status: SHIPPED | OUTPATIENT
Start: 2018-10-11 | End: 2018-10-12 | Stop reason: ALTCHOICE

## 2018-10-10 RX ORDER — LEVOFLOXACIN 750 MG/1
750 TABLET ORAL EVERY 24 HOURS
Qty: 3 TABLET | Refills: 0 | Status: SHIPPED | OUTPATIENT
Start: 2018-10-10 | End: 2018-10-13

## 2018-10-10 RX ADMIN — PANTOPRAZOLE SODIUM 40 MG: 40 TABLET, DELAYED RELEASE ORAL at 05:21

## 2018-10-10 RX ADMIN — DILTIAZEM HYDROCHLORIDE 120 MG: 120 CAPSULE, COATED, EXTENDED RELEASE ORAL at 08:15

## 2018-10-10 RX ADMIN — PREDNISONE 40 MG: 20 TABLET ORAL at 08:15

## 2018-10-10 RX ADMIN — LISINOPRIL 10 MG: 10 TABLET ORAL at 08:15

## 2018-10-10 RX ADMIN — ATORVASTATIN CALCIUM 40 MG: 40 TABLET, FILM COATED ORAL at 08:16

## 2018-10-10 RX ADMIN — DILTIAZEM HYDROCHLORIDE 120 MG: 120 CAPSULE, COATED, EXTENDED RELEASE ORAL at 09:31

## 2018-10-10 RX ADMIN — ASPIRIN 81 MG 81 MG: 81 TABLET ORAL at 08:15

## 2018-10-10 NOTE — SOCIAL WORK
CM met with pt at bedside accompanied by his daughter  Pt needs home O2  Pt agreeable to [facility]  Pt wants O2 delivered to house  O2 script and resp  sat testing sent to [facility]  CM reviewed IMM  IMM placed in medical records  Pt's daughter transporting pt home upon discharge

## 2018-10-10 NOTE — ASSESSMENT & PLAN NOTE
· Exacerbation secondary to CAP  · Continue prednisone x 5 days total, respiratory protocol, incentive spirometer  · Check ambulatory O2 assessment today prior to discharge

## 2018-10-10 NOTE — PROGRESS NOTES
General Cardiology   Progress Note   Antonia Saini 66 y o  male MRN: [id number]  Unit/Bed#: -01 Encounter: [id number]      SUBJECTIVE:   No significant events overnight  I am feeling much better  I want to go home  REVIEW OF SYSTEMS:  Constitutional:  Denies fever or chills   Eyes:  Denies change in visual acuity   HENT:  Denies nasal congestion or sore throat   Respiratory:  Denies cough or shortness of breath   Cardiovascular:  Denies chest pain or edema   GI:  Denies abdominal pain, nausea, vomiting, bloody stools or diarrhea   :  Denies dysuria, frequency, difficulty in micturition and nocturia  Musculoskeletal:  Denies back pain or joint pain   Neurologic:  Denies headache, focal weakness or sensory changes   Endocrine:  Denies polyuria or polydipsia   Lymphatic:  Denies swollen glands   Psychiatric:  Denies depression or anxiety     OBJECTIVE:   Vitals:  Vitals:    10/10/18 0700   BP: 152/81   Pulse: 80   Resp: 20   Temp: 98 4 °F (36 9 °C)   SpO2: 96%     Body mass index is 29 33 kg/m²  Systolic (67XFG), UTX:385 , Min:128 , KOS:901     Diastolic (61VAS), WMI:80, Min:56, Max:81      Intake/Output Summary (Last 24 hours) at 10/10/18 1457  Last data filed at 10/10/18 0739   Gross per 24 hour   Intake             1080 ml   Output             1575 ml   Net             -495 ml     Weight (last 2 days) before discharge     None          Telemetry Review:  AFib/flutter    PHYSICAL EXAMS:  General:  Patient is not in acute distress, laying in the bed comfortably, awake, alert responding to commands  Head: Normocephalic, Atraumatic  HEENT:  Both pupils normal-size atraumatic, normocephalic, nonicteric  Neck:  JVP not raised  Trachea central  Respiratory:  Bronchovascular breathing all over the chest without any accompaniment  Cardiovascular:  S1-S2 normal   Irregularly irregular  GI:  Abdomen soft nontender   Liver and spleen normal size  Lymphatic:  No cervical or inguinal lymphadenopathy  Neurologic: Patient is awake alert, responding to command, well-oriented to time and place and person moving     LABORATORY RESULTS:    Results from last 7 days  Lab Units 10/07/18  0424   TROPONIN I ng/mL <0 02       CBC with diff:   Results from last 7 days  Lab Units 10/08/18  0456 10/07/18  0921 10/07/18  0424   WBC Thousand/uL 7 50  --  7 77   HEMOGLOBIN g/dL 10 6*  --  12 3   HEMATOCRIT % 33 7*  --  39 1   MCV fL 96  --  96   PLATELETS Thousands/uL 209 246 270   MCH pg 30 3  --  30 1   MCHC g/dL 31 5  --  31 5   RDW % 13 0  --  13 2   MPV fL 9 7 9 8 9 7   NRBC AUTO /100 WBCs 0  --  0       CMP:  Results from last 7 days  Lab Units 10/08/18  0456 10/07/18  0424   SODIUM mmol/L 142 137   POTASSIUM mmol/L 4 7 4 3   CHLORIDE mmol/L 107 101   CO2 mmol/L 28 29   BUN mg/dL 16 19   CREATININE mg/dL 1 07 1 29   CALCIUM mg/dL 8 1* 8 4   AST U/L 18 19   ALT U/L 24 24   ALK PHOS U/L 54 67   EGFR ml/min/1 73sq m 66 53       BMP:  Results from last 7 days  Lab Units 10/08/18  0456 10/07/18  0424   SODIUM mmol/L 142 137   POTASSIUM mmol/L 4 7 4 3   CHLORIDE mmol/L 107 101   CO2 mmol/L 28 29   BUN mg/dL 16 19   CREATININE mg/dL 1 07 1 29   CALCIUM mg/dL 8 1* 8 4         Results from last 7 days  Lab Units 10/07/18  0424   NT-PRO BNP pg/mL 453*                Results from last 7 days  Lab Units 10/07/18  0921 10/07/18  0424   TSH 3RD GENERATON uIU/mL  --  5 348*   FREE T4 ng/dL 1 19  --            Lipid Profile:   Lab Results   Component Value Date    CHOL See scanned summary  08/15/2016    CHOL See scanned summary  03/17/2016     Lab Results   Component Value Date    HDL 44 01/14/2018    HDL See scanned summary  08/15/2016    HDL 46 08/14/2016     Lab Results   Component Value Date    LDLCALC 105 (H) 01/14/2018    LDLCALC 72 08/14/2016     Lab Results   Component Value Date    TRIG 81 01/14/2018    TRIG See scanned summary   08/15/2016    TRIG 110 08/14/2016       Cardiac testing:  Results for orders placed during the hospital encounter of 18   Echo complete with contrast if indicated    Narrative [address] [facility]  [phone]    Transthoracic Echocardiogram  2D, M-mode, Doppler, and Color Doppler    Study date:  15-Supa-2018    Patient: Leeanne Reed  MR number: [id number]  Account number: [de-identified]  : 1940  Age: 68 years  Gender: Male  Status: Inpatient  Location: Bedside  Height: 68 in  Weight: 181 lb  BP: 115/ 56 mmHg    Indications: TIA    Diagnoses: G45 9 - Transient cerebral ischemic attack, unspecified    Sonographer:  PARVIN Vaughan  Primary Physician:  Joana Berman MD  Referring Physician:  Kirk CRNP  Group:  [facility]  Interpreting Physician:  Tyler Parish MD    SUMMARY    LEFT VENTRICLE:  Systolic function was normal  Ejection fraction was estimated to be 55 %  There was hypokinesis of the basal inferior wall(s)  RIGHT VENTRICLE:  The size was normal   Systolic function was normal     IVC, HEPATIC VEINS:  The inferior vena cava was dilated  HISTORY: PRIOR HISTORY: HTN, HLD, Emphysema, CAD, TIA, CABG    PROCEDURE: The procedure was performed at the bedside  This was a routine study  The transthoracic approach was used  The study included complete 2D imaging, M-mode, complete spectral Doppler, and color Doppler  The heart rate was 54 bpm,  at the start of the study  Images were obtained from the parasternal, apical, subcostal, and suprasternal notch acoustic windows  Intravenous contrast ( 0 5 mL Definity in NSS) was administered to opacify the left ventricle  Echocardiographic views were limited due to lung interference  Image quality was adequate  LEFT VENTRICLE: Size was normal  Systolic function was normal  Ejection fraction was estimated to be 55 %  There was hypokinesis of the basal inferior wall(s)  Wall thickness was normal  No evidence of apical thrombus      RIGHT VENTRICLE: The size was normal  Systolic function was normal  Wall thickness was normal     LEFT ATRIUM: Size was normal     RIGHT ATRIUM: Size was normal     MITRAL VALVE: Valve structure was normal  There was normal leaflet separation  DOPPLER: The transmitral velocity was within the normal range  There was no evidence for stenosis  There was trace regurgitation  AORTIC VALVE: The valve was trileaflet  Leaflets exhibited mildly increased thickness, normal cuspal separation, and sclerosis  DOPPLER: Transaortic velocity was within the normal range  There was no evidence for stenosis  There was no  significant regurgitation  TRICUSPID VALVE: The valve structure was normal  There was normal leaflet separation  DOPPLER: The transtricuspid velocity was within the normal range  There was no evidence for stenosis  There was no significant regurgitation  The  tricuspid jet envelope definition was inadequate for estimation of RV systolic pressure  PULMONIC VALVE: Leaflets exhibited normal thickness, no calcification, and normal cuspal separation  DOPPLER: The transpulmonic velocity was within the normal range  There was no significant regurgitation  PERICARDIUM: There was no pericardial effusion  AORTA: The root exhibited normal size  SYSTEMIC VEINS: IVC: The inferior vena cava was dilated      SYSTEM MEASUREMENT TABLES    2D  %FS: 27 41 %  AV Diam: 3 43 cm  EDV(Teich): 102 58 ml  EF(Cube): 61 75 %  EF(Teich): 53 26 %  ESV(Cube): 39 83 ml  ESV(Teich): 47 94 ml  IVSd: 0 88 cm  LA Area: 12 14 cm2  LA Diam: 3 52 cm  LVEDV MOD A4C: 82 76 ml  LVEF MOD A4C: 48 86 %  LVESV MOD A4C: 42 32 ml  LVIDd: 4 7 cm  LVIDs: 3 42 cm  LVLd A4C: 7 74 cm  LVLs A4C: 6 38 cm  LVOT Diam: 2 2 cm  LVPWd: 1 11 cm  RA Area: 16 11 cm2  RV Diam: 3 41 cm  SV MOD A4C: 40 44 ml  SV(Cube): 64 29 ml  SV(Teich): 54 64 ml    MM  TAPSE: 2 08 cm    PW  MV A Dylan: 0 64 m/s  MV Dec [place]: 3 26 m/s2  MV DecT: 232 64 ms  MV E Dylan: 0 76 m/s  MV E/A Ratio: 1 18    [employer] Commission Accredited Echocardiography Laboratory    Prepared and electronically signed by    Stacie Wren MD  Signed 15-Supa-2018 14:05:56       No results found for this or any previous visit  No results found for this or any previous visit  No procedure found  No results found for this or any previous visit  Meds/Allergies   all current active meds have been reviewed  No prescriptions prior to admission  No current facility-administered medications for this encounter  ASSESSMENT & PLAN   1-new onset of atrial flutter, tachycardic at times  Increase Cardizem from 120-240  Discussed the need for anticoagulation  Xarelto was started  Patient had echocardiogram January 2018, EF 55%  No need to repeat  2-history of CAD/CABG/PCI  Stable  Without anginal symptoms  Continue medications  3-hypertension, stable  Continue medications    4-hyperlipidemia, continue with statins    Patient is stable from a cardiac standpoint for discharge  Will follow up in the office            Caryn Ziegler PA-C  10/10/2018,2:57 PM    Portions of the record may have been created with voice recognition software   Occasional wrong word or "sound a like" substitutions may have occurred due to the inherent limitations of voice recognition software   Read the chart carefully and recognize, using context, where substitutions have occurred

## 2018-10-10 NOTE — TELEPHONE ENCOUNTER
FARHAD Scheduled for 10/19/18 in [facility] [address] with [address]  Admitted to [place] [address] in [facility] from 10/7-10/10 for possible pneumonia

## 2018-10-10 NOTE — ASSESSMENT & PLAN NOTE
· TSH elevated, Free T4 normal  · Tele showing A FLUTTER - cardiology input appreciated  · Now on Xarelto due to high CHADSVASC  · Metoprolol d/c'd and CCB started - increased to 240 mg Diltiazem at discharge  · Follow up cardiology in 2-3 weeks

## 2018-10-10 NOTE — DISCHARGE INSTRUCTIONS
Atrial Flutter   WHAT YOU NEED TO KNOW:   Atrial flutter is an irregular heartbeat  It reduces your heart's ability to pump blood, which means you do not get enough oxygen  An irregular heartbeat could lead to a life-threatening blood clot or stroke  DISCHARGE INSTRUCTIONS:   Call 911 for any of the following:   · You have any of the following signs of a stroke:      ¨ Numbness or drooping on one side of your face     ¨ Weakness in an arm or leg    ¨ Confusion or difficulty speaking    ¨ Dizziness, a severe headache, or vision loss    · You have any of the following signs of a heart attack:      ¨ Squeezing, pressure, or pain in your chest that lasts longer than 5 minutes or returns    ¨ Discomfort or pain in your back, neck, jaw, stomach, or arm     ¨ Trouble breathing    ¨ Nausea or vomiting    ¨ Lightheadedness or a sudden cold sweat, especially with chest pain or trouble breathing  Seek care immediately if:  You have any of the following signs of a blood clot:  · You feel lightheaded, are short of breath, and have chest pain  · You cough up blood  · You have swelling, redness, pain, or warmth in your arm or leg  Contact your cardiologist or healthcare provider if:   · Your heart rate is higher or lower than your cardiologist says it should be  · You are bruising and bleeding more easily  · You have questions or concerns about your condition or care  Medicines: You may need any of the following:  · Heart medicines  help control your heart rate and rhythm  · Blood thinners    help prevent blood clots  Examples of blood thinners include heparin and warfarin  Clots can cause strokes, heart attacks, and death  The following are general safety guidelines to follow while you are taking a blood thinner:    ¨ Watch for bleeding and bruising while you take blood thinners  Watch for bleeding from your gums or nose  Watch for blood in your urine and bowel movements   Use a soft washcloth on your skin, and a soft toothbrush to brush your teeth  This can keep your skin and gums from bleeding  If you shave, use an electric shaver  Do not play contact sports  ¨ Tell your dentist and other healthcare providers that you take anticoagulants  Wear a bracelet or necklace that says you take this medicine  ¨ Do not start or stop any medicines unless your healthcare provider tells you to  Many medicines cannot be used with blood thinners  ¨ Tell your healthcare provider right away if you forget to take the medicine, or if you take too much  ¨ Warfarin  is a blood thinner that you may need to take  The following are things you should be aware of if you take warfarin  § Foods and medicines can affect the amount of warfarin in your blood  Do not make major changes to your diet while you take warfarin  Warfarin works best when you eat about the same amount of vitamin K every day  Vitamin K is found in green leafy vegetables and certain other foods  Ask for more information about what to eat when you are taking warfarin  § You will need to see your healthcare provider for follow-up visits when you are on warfarin  You will need regular blood tests  These tests are used to decide how much medicine you need  · Take your medicine as directed  Contact your healthcare provider if you think your medicine is not helping or if you have side effects  Tell him or her if you are allergic to any medicine  Keep a list of the medicines, vitamins, and herbs you take  Include the amounts, and when and why you take them  Bring the list or the pill bottles to follow-up visits  Carry your medicine list with you in case of an emergency  Follow up with your cardiologist as directed: You may need ongoing tests or treatment  Write down your questions so you remember to ask them during your visits  Manage atrial flutter:   · Know your target heart rate    Learn how to take your pulse and monitor your heart rate  · Control your blood pressure  Take your blood pressure medicine as directed  · Do not smoke  Nicotine and other chemicals in cigarettes and cigars can cause heart and lung damage  Ask your healthcare provider for information if you currently smoke and need help to quit  E-cigarettes or smokeless tobacco still contain nicotine  Talk to your healthcare provider before you use these products  · Limit alcohol  Women should limit alcohol to 1 drink a day  Men should limit alcohol to 2 drinks a day  A drink of alcohol is 12 ounces of beer, 5 ounces of wine, or 1½ ounces of liquor  · Eat heart-healthy foods  Heart-healthy foods include fruits, vegetables, whole-grain breads, low-fat dairy products, beans, lean meats, and fish  Replace butter and margarine with heart-healthy oils such as olive oil and canola oil  · Maintain a healthy weight  Ask your healthcare provider how much you should weigh  Ask him to help you create a weight loss plan if you are overweight  © 2017 [employer]  Information is for End User's use only and may not be sold, redistributed or otherwise used for commercial purposes  All illustrations and images included in CareNotes® are the copyrighted property of [employer] D [employer] M [employer]  or [employer]  The above information is an  only  It is not intended as medical advice for individual conditions or treatments  Talk to your doctor, nurse or pharmacist before following any medical regimen to see if it is safe and effective for you  COPD: Prevent Exacerbations   AMBULATORY CARE:   An exacerbation of COPD  means your symptoms get much worse very quickly  Exacerbations can be triggered by infections such as a cold or the flu  Lung irritants such as air pollution, dust, fumes, or smoke can also trigger an exacerbation  Exacerbations of COPD can be life-threatening     Protect yourself from germs:  Germs can get into your lungs and cause an infection  An infection in your lungs can create more mucus and make it harder to breathe  An infection can also create swelling in your airways and prevent air from getting in  You can decrease your risk for infection by doing the following:  · Wash your hands often with soap and water  Carry germ-killing gel with you  You can use the gel to clean your hands when soap and water are not available  · Do not touch your eyes, nose, or mouth unless you have washed your hands first      · Always cover your mouth when you cough  Cough into a tissue or your shirtsleeve so you do not spread germs from your hands  · Try to avoid people who have a cold or the flu  If you are sick, stay away from others as much as possible  Do not smoke, and avoid others who smoke:  Nicotine and other substances can cause lung irritation or damage and make it harder for you to breathe  Do not use e-cigarettes or smokeless tobacco  They still contain nicotine  Ask your healthcare provider for information if you currently smoke and need help to quit  For support and more information:  · [employer]  Phone: 5- 030 - 471-1080  Web Address: [employer]  Use pursed-lip breathing any time you feel short of breath: Take a deep breath in through your nose  Slowly breathe out through your mouth with your lips pursed for twice as long as you inhaled  You can also practice this breathing pattern while you bend, lift, climb stairs, or exercise  It slows down your breathing and helps move more air in and out of your lungs  Take your medicines as directed: Your healthcare provider may prescribe medicine to treat an infection or help you breathe easier  It is important that you take your medicines as directed to prevent an exacerbation  Refill your medicines before you are out so that you do not miss a dose  Ask your healthcare provider if you have any questions on how to take your medicines     Avoid lung irritants:  Stay out of high altitudes and places with high humidity  Stay inside, or cover your mouth and nose with a scarf when you are outside during cold weather  Stay inside on days when air pollution or pollen counts are high  Do not use aerosol sprays such as deodorant, bug spray, and hair spray  Drink more liquids: This will help to keep your air passages moist and help you cough up mucus  Ask how much liquid to drink each day and which liquids are best for you  Ask about vaccines: Your healthcare provider may recommend that you get regular flu and pneumonia vaccines  Pneumonia can become life-threatening for a person who has COPD  Ask about other vaccines you may need  Ask your healthcare provider about the flu and pneumonia vaccines  All adults should get the flu (influenza) vaccine every year as soon as it becomes available  The pneumonia vaccine is given to adults aged 72 or older to prevent pneumococcal disease, such as pneumonia  Adults aged 23 to 59 years who are at high risk for pneumococcal disease also should get the pneumococcal vaccine  It may need to be repeated 1 or 5 years later  © 2017 [facility] [facility] Information is for End User's use only and may not be sold, redistributed or otherwise used for commercial purposes  All illustrations and images included in CareNotes® are the copyrighted property of [employer] M [employer] Inc  or [employer]  The above information is an  only  It is not intended as medical advice for individual conditions or treatments  Talk to your doctor, nurse or pharmacist before following any medical regimen to see if it is safe and effective for you  COPD: Breathing Exercises   AMBULATORY CARE:   Breathing exercises  may help manage your symptoms of COPD, such as shortness of breath and difficulty breathing  The exercises may strengthen the muscles you use to breathe  They may also help you get air easier when you are having trouble breathing   Your healthcare provider will tell you how often to do these exercises  Deep breathing and coughing:  Take a deep breath and hold it for as long as you can  Let the air out and then cough strongly  Deep breaths help open your airway  You may be given an incentive spirometer to help you take deep breaths  Put the plastic piece in your mouth and take a slow, deep breath  Then let the air out and cough  Repeat these steps 10 times every hour  This will decrease your risk for a lung infection  Pursed-lip breathing:  Pursed-lip breathing can be especially helpful before you start an activity  It can also be used any time you feel short of breath  You can do this exercise by following these steps:  · Breathe in through your nose  Use the muscles in your abdomen to help fill your lungs with air  · Slowly breathe out through your mouth with your lips slightly puckered  You should make a quiet hissing sound as you breathe out  · Try to take twice as long to breathe out as it did to breathe in  This helps you get rid of as much air from your lungs as possible  · Repeat this exercise several times  Once you are used to doing pursed-lip breathing, you can do it any time you need more air  Diaphragmatic breathing: This exercise will help strengthen the muscles that you use to breathe  You can do this exercise by following these steps:  · Place one hand just below your ribs  Place your other hand in the middle of your chest over your breastbone  · Breathe in slowly through your nose, as deeply as you can  · Breathe out slowly through pursed lips  As you breathe out, tighten the muscles just below your ribs  Use your hand to gently push in and up while you tighten the muscles  · Diaphragmatic breathing takes practice  You may need to practice this many times a day  Slowly increase the amount of time you spend during each practice session    Follow up with your healthcare provider as directed: Write down your questions so you remember to ask them during your visits  © 2017 [employer] [employer] Information is for End User's use only and may not be sold, redistributed or otherwise used for commercial purposes  All illustrations and images included in CareNotes® are the copyrighted property of [employer]  or [employer]  The above information is an  only  It is not intended as medical advice for individual conditions or treatments  Talk to your doctor, nurse or pharmacist before following any medical regimen to see if it is safe and effective for you

## 2018-10-10 NOTE — ASSESSMENT & PLAN NOTE
· Continue with home medications, asymptomatic  · BB switched to CCB due to reports of chronic respiratory issues, which could be exacerbated by the BB

## 2018-10-10 NOTE — RESPIRATORY THERAPY NOTE
Home Oxygen Qualifying Test       Patient name: Adriana Patel        : 1940   Date of Test:  October 10, 2018  Diagnosis:      Home Oxygen Test:    **Medicare Guidelines require item(s) 1-5 on all ambulatory patients or 1 and 2 on non-ambulatory patients  1   Baseline SPO2 on Room Air at rest 96 %  2   SPO2 during exercise on Room Air 85 %  During exercise monitor SpO2  If SPO2 increases >=89% with ambulation do not add supplemental             oxygen  If <= 88% on room air add O2 via NC and titrate patient  Patient must be ambulated with O2 and titrated to > 88% with exertion  3   SPO2 on Oxygen at rest 97 % 2 lpm     4   SPO2 during exercise on Oxygen  97% a liter flow of 2lpm     5   Exercise performed:          walking          [x]  Supplemental Home Oxygen is indicated  []  Client does not qualify for home oxygen  Respiratory Additional Notes- Pt sat dropped to 85% on RA during exercise  Put pt on 2L O2 and pt sat increased to 97%    Pt qualifies for home O2    Pradeep Diamond, RT

## 2018-10-10 NOTE — ASSESSMENT & PLAN NOTE
· Present on admission, secondary to pneumonia, likely precipitated by viral illness  · Flu/RSV negative by PCR  · Further care as below

## 2018-10-10 NOTE — DISCHARGE SUMMARY
Discharge- Lissa Goldsmith 1940, 66 y o  male MRN: [id number]    Unit/Bed#: -01 Encounter: [id number]    Primary Care Provider: Eli Lambert MD   Date and time admitted to hospital: 10/7/2018  4:08 AM      * Sepsis (HCC)resolved as of 10/10/2018   Assessment & Plan    · Present on admission, secondary to pneumonia, likely precipitated by viral illness  · Flu/RSV negative by PCR  · Further care as below     CAP (community acquired pneumonia)   Assessment & Plan    · Continue oral Levaquin 3 more days, influenza/RSV (-)   · Continue treatment for acute COPD exacerbation as below, related to pneumonia  · Clinically improved, tachycardia better     COPD exacerbation (HCC)   Assessment & Plan    · Exacerbation secondary to CAP  · Continue prednisone x 5 days total, respiratory protocol, incentive spirometer  · Check ambulatory O2 assessment today prior to discharge     CAD (coronary atherosclerotic disease)   Assessment & Plan    · Continue with home medications, asymptomatic  · BB switched to CCB due to reports of chronic respiratory issues, which could be exacerbated by the BB     Tachycardia   Assessment & Plan    · TSH elevated, Free T4 normal  · Tele showing A FLUTTER - cardiology input appreciated  · Now on Xarelto due to high CHADSVASC  · Metoprolol d/c'd and CCB started - increased to 240 mg Diltiazem at discharge  · Follow up cardiology in 2-3 weeks     Hypertension   Assessment & Plan    · Better, BB switched to CCB as above  · Follow up PCP for BP monitoring       Discharging Physician / Practitioner: Dominic Simpson PA-C  PCP: Eli Lambert MD  Admission Date:   Admission Orders     Ordered        10/07/18 0544  Inpatient Admission (expected length of stay for this patient is greater than two midnights)  Once             Discharge Date: 10/10/18    Resolved Problems  Date Reviewed: 10/10/2018          Resolved    * (Principal)Sepsis ([address] ) 10/10/2018     Resolved by  Dominic Simpson PA-C Consultations During Hospital Stay:  · Cardiology - aflutter     Procedures Performed:     · CXR    Significant Findings / Test Results:     · Aflutter  · LLL PNA, emphysematous changes    Incidental Findings:   · A flutter     Test Results Pending at Discharge (will require follow up):   · none     Outpatient Tests Requested:  · Follow up CXR in 6 weeks  · Follow up cardiology in 2-3 weeks  · Follow up PCP in 1-07 days    Complications:  A flutter    Reason for Admission: shortness of breath, "I have the flu"     Hospital Course: Vanesa Mason is a 66 y o  male patient who originally presented to the hospital on 10/7/2018 due to concerns that he had the flu  Patient reports sudden onset of fever, body aches, joint pains and muscle achiness the Friday prior to admission  He also reported increased oxygen requirement, though the patient is not prescribed oxygen  CAD, COPD, hypertension, hyperlipidemia, carotid artery disease status post carotid artery endarterectomy, stroke  Please see above list of diagnoses and related plan for additional information  Condition at Discharge: fair     Discharge Day Visit / Exam:     Subjective:  Patient seen examined, feeling much better  Denies any chest pain, shortness of breath or palpitations  He still has a cough, but overall is ready to go home  No dizziness or presyncopal sensations  Vitals: Blood Pressure: 152/81 (10/10/18 0700)  Pulse: 80 (10/10/18 0700)  Temperature: 98 4 °F (36 9 °C) (10/10/18 0700)  Temp Source: Oral (10/10/18 0700)  Respirations: 20 (10/10/18 0700)  Height: 5' 8" (172 7 cm) (10/07/18 8500)  Weight - Scale: 87 5 kg (192 lb 14 4 oz) (10/07/18 0641)  SpO2: 96 % (10/10/18 0700)  Exam:   Physical Exam   Constitutional: He is oriented to person, place, and time  Vital signs are normal  He appears well-developed  No distress  Cardiovascular: Normal rate, S1 normal, S2 normal and normal heart sounds  An irregular rhythm present  No murmur heard  Pulmonary/Chest: Effort normal  No respiratory distress  He has decreased breath sounds in the right lower field and the left lower field  He has no wheezes  He has no rhonchi  He has no rales  Seen at rest, no O2 in place   Abdominal: Soft  Bowel sounds are normal  He exhibits no distension  There is no tenderness  Musculoskeletal: He exhibits no edema  Neurological: He is alert and oriented to person, place, and time  Psychiatric: He has a normal mood and affect  Nursing note and vitals reviewed  Discussion with Family: d/w Nicole Burton, by phone    Discharge instructions/Information to patient and family:   See after visit summary for information provided to patient and family  Provisions for Follow-Up Care:  See after visit summary for information related to follow-up care and any pertinent home health orders  Disposition:     Home    Planned Readmission: none     Discharge Statement:  I spent approximately 40 minutes discharging the patient  This time was spent on the day of discharge  I had direct contact with the patient on the day of discharge  Greater than 50% of the total time was spent examining patient, answering all patient questions, arranging and discussing plan of care with patient as well as directly providing post-discharge instructions  Additional time then spent on discharge activities  Discharge Medications:  See after visit summary for reconciled discharge medications provided to patient and family        ** Please Note: This note has been constructed using a voice recognition system **

## 2018-10-11 ENCOUNTER — TRANSITIONAL CARE MANAGEMENT (OUTPATIENT)
Dept: INTERNAL MEDICINE CLINIC | Facility: CLINIC | Age: 78
End: 2018-10-11

## 2018-10-12 LAB
BACTERIA BLD CULT: NORMAL
BACTERIA BLD CULT: NORMAL

## 2018-10-15 ENCOUNTER — OFFICE VISIT (OUTPATIENT)
Dept: NEUROLOGY | Facility: CLINIC | Age: 78
End: 2018-10-15
Payer: MEDICARE

## 2018-10-15 VITALS
HEIGHT: 68 IN | BODY MASS INDEX: 28.64 KG/M2 | DIASTOLIC BLOOD PRESSURE: 68 MMHG | SYSTOLIC BLOOD PRESSURE: 108 MMHG | WEIGHT: 189 LBS | HEART RATE: 96 BPM

## 2018-10-15 DIAGNOSIS — I65.22 STENOSIS OF LEFT CAROTID ARTERY: ICD-10-CM

## 2018-10-15 DIAGNOSIS — Z86.73 HISTORY OF STROKE: ICD-10-CM

## 2018-10-15 DIAGNOSIS — I49.9 IRREGULAR HEART BEAT: ICD-10-CM

## 2018-10-15 DIAGNOSIS — R60.0 LOCALIZED EDEMA: ICD-10-CM

## 2018-10-15 DIAGNOSIS — E78.2 MIXED HYPERLIPIDEMIA: ICD-10-CM

## 2018-10-15 DIAGNOSIS — I10 ESSENTIAL HYPERTENSION: Primary | ICD-10-CM

## 2018-10-15 PROCEDURE — 99214 OFFICE O/P EST MOD 30 MIN: CPT | Performed by: PSYCHIATRY & NEUROLOGY

## 2018-10-15 NOTE — PROGRESS NOTES
Patient ID: Clayton Nath is a 66 y o  male  Assessment/Plan:    No problem-specific Assessment & Plan notes found for this encounter  Diagnoses and all orders for this visit:    Essential hypertension    Stenosis of left carotid artery    History of stroke    Mixed hyperlipidemia  -     Lipid Panel with Direct LDL reflex; Future    Irregular heart beat    Localized edema  -     Comprehensive metabolic panel; Future  -     B-Type Natriuretic Peptide; Future         Patient Instructions   Stroke: Baron Frausto presents for follow-up with regard to his prior stroke  In the interval since his last visit to the office he was recently hospitalized with pneumonia and sepsis  At that time he was documented to be in atrial flutter and he was transitioned to a combination of aspirin and Xarelto  He reports taking all of his medication as prescribed without any significant bleeding or bruising issues  It is reported that he has bilateral lower extremity edema although this seems somewhat better at this point in time  This may be related to his recent steroid use  He denies any symptoms concerning for recurrent TIA or stroke   -for secondary stroke prevention he should continue his current combination of aspirin, Xarelto (which must be taken with food), and appropriate blood pressure and glycemic control  He should also continue his statin medication   -I have sent a message to his cardiologist and vascular surgeon in order to determine if he still needs to take aspirin in addition to Xarelto but considering his history of both coronary artery and carotid artery disease I think it is likely that he will   -he should continue to be physically active in as much as he feels capable   -he should keep an eye on his salt intake I would like him to check his weight every day this week and reported to his primary care physician when he sees him on Friday    I will also request a cardiac BNP to be evaluated   -they reports some possible memory changes, but nothing over the significant  We discussed the importance of keeping an eye offer any dangerous behaviors and will plan for him to return to the office in 4 months time for memory testing in addition to a stroke follow-up  -his cholesterol panel was last checked in January of 2018 and was outside of the therapeutic range with an LDL cholesterol greater than 100  I will request that it be repeated and likely increase his statin medication if it remains elevated  Will plan to have him back in the office in 4 months time but we would be happy to see him sooner if the need should arise  If he has any stroke-like symptoms including sudden painless loss of vision, difficulty speaking or swallowing, vertigo/room spinning that does not quickly resolve, sudden weakness/numbness affecting 1 side of the body he should proceed to the nearest emergency room immediately  Considering his combination of Xarelto an aspirin, if he were to fall and strike his head and have any residual symptoms or to developed a sudden extremely severe unusual headache he should likewise proceed to the nearest emergency room  Subjective:    HPI    Aylin Theodore presents with his wife for follow-up with regard to his prior stroke  In the interval since his last visit to the office he was recently hospitalized for pneumonia/sepsis  During that hospitalization he was found to have atrial flutter and was transition to Xarelto  At this point in time he is also taking aspirin  He denies any significant bleeding or bruising issues  It is not clear but he does not think that he takes the Xarelto with food overall  They report no new symptoms concerning for recurrent TIA or stroke  He did have some concerns with bilateral lower extremity edema although he reports that this is somewhat better    He also reports some concerns with his memory overall, although his granddaughter who accompanied him today denied any dangerous behaviors (leaving the stove on, leaving the water running, etc), although he is forgetting how to get certain places and recently misplaced his check book  I personally reviewed his prior lab work including his most recent cholesterol panel from January of 2018 which showed an LDL outside of the therapeutic range  Objective:    Blood pressure 108/68, pulse 96, height 5' 8" (1 727 m), weight 85 7 kg (189 lb)  Physical Exam    Neurological Exam    General: Awake and alert, no acute distress  Eyes: Sclerae and conjunctiva clear  ENT: Normal appearing oropharynx  Neck:Supple  Cardiovascular:  No severe peripheral edema Respiratory:  Some pursed lip breathing with a degree of dyspnea and a minimally productive cough      Neurologic exam:  Awake and alert with appropriate mental status and language function  Cranial nerves II-XII were symmetrically intact bilaterally  Motor testing reveals 5/5 strength in the bilateral upper and lower extremities with no drift  Sensation is intact to light touch in the bilateral upper and lower extremities  Coordination testing is unremarkable  ROS:    Review of Systems   Constitutional: Negative  HENT: Negative  Eyes: Negative  Respiratory: Negative  Cardiovascular: Positive for leg swelling (just start )  Gastrointestinal: Negative  Musculoskeletal: Negative  Allergic/Immunologic: Negative  Neurological: Negative  Hematological: Negative  Psychiatric/Behavioral: Positive for confusion

## 2018-10-15 NOTE — PATIENT INSTRUCTIONS
Stroke: Bin Loyola presents for follow-up with regard to his prior stroke  In the interval since his last visit to the office he was recently hospitalized with pneumonia and sepsis  At that time he was documented to be in atrial flutter and he was transitioned to a combination of aspirin and Xarelto  He reports taking all of his medication as prescribed without any significant bleeding or bruising issues  It is reported that he has bilateral lower extremity edema although this seems somewhat better at this point in time  This may be related to his recent steroid use  He denies any symptoms concerning for recurrent TIA or stroke   -for secondary stroke prevention he should continue his current combination of aspirin, Xarelto (which must be taken with food), and appropriate blood pressure and glycemic control  He should also continue his statin medication   -I have sent a message to his cardiologist and vascular surgeon in order to determine if he still needs to take aspirin in addition to Xarelto but considering his history of both coronary artery and carotid artery disease I think it is likely that he will   -he should continue to be physically active in as much as he feels capable   -he should keep an eye on his salt intake I would like him to check his weight every day this week and reported to his primary care physician when he sees him on Friday  I will also request a cardiac BNP to be evaluated   -they reports some possible memory changes, but nothing over the significant  We discussed the importance of keeping an eye offer any dangerous behaviors and will plan for him to return to the office in 4 months time for memory testing in addition to a stroke follow-up  -his cholesterol panel was last checked in January of 2018 and was outside of the therapeutic range with an LDL cholesterol greater than 100   I will request that it be repeated and likely increase his statin medication if it remains elevated  Will plan to have him back in the office in 4 months time but we would be happy to see him sooner if the need should arise  If he has any stroke-like symptoms including sudden painless loss of vision, difficulty speaking or swallowing, vertigo/room spinning that does not quickly resolve, sudden weakness/numbness affecting 1 side of the body he should proceed to the nearest emergency room immediately  Considering his combination of Xarelto an aspirin, if he were to fall and strike his head and have any residual symptoms or to developed a sudden extremely severe unusual headache he should likewise proceed to the nearest emergency room

## 2018-10-17 ENCOUNTER — OFFICE VISIT (OUTPATIENT)
Dept: INTERNAL MEDICINE CLINIC | Age: 78
End: 2018-10-17
Payer: MEDICARE

## 2018-10-17 ENCOUNTER — TELEPHONE (OUTPATIENT)
Dept: INTERNAL MEDICINE CLINIC | Facility: CLINIC | Age: 78
End: 2018-10-17

## 2018-10-17 VITALS
DIASTOLIC BLOOD PRESSURE: 56 MMHG | OXYGEN SATURATION: 90 % | BODY MASS INDEX: 28.19 KG/M2 | SYSTOLIC BLOOD PRESSURE: 120 MMHG | TEMPERATURE: 98.8 F | WEIGHT: 185.4 LBS | HEART RATE: 75 BPM

## 2018-10-17 DIAGNOSIS — J01.10 SUBACUTE FRONTAL SINUSITIS: Primary | ICD-10-CM

## 2018-10-17 DIAGNOSIS — J30.9 ALLERGIC RHINITIS, UNSPECIFIED SEASONALITY, UNSPECIFIED TRIGGER: ICD-10-CM

## 2018-10-17 DIAGNOSIS — R05.3 CHRONIC COUGH: ICD-10-CM

## 2018-10-17 DIAGNOSIS — I48.0 PAROXYSMAL ATRIAL FIBRILLATION (HCC): ICD-10-CM

## 2018-10-17 PROCEDURE — 99214 OFFICE O/P EST MOD 30 MIN: CPT | Performed by: PHYSICIAN ASSISTANT

## 2018-10-17 RX ORDER — LORATADINE 10 MG/1
10 TABLET ORAL DAILY
Qty: 30 TABLET | Refills: 0
Start: 2018-10-17 | End: 2019-01-04 | Stop reason: ALTCHOICE

## 2018-10-17 RX ORDER — PREDNISONE 10 MG/1
TABLET ORAL
Qty: 20 TABLET | Refills: 0 | Status: SHIPPED | OUTPATIENT
Start: 2018-10-17 | End: 2018-11-07

## 2018-10-17 NOTE — PROGRESS NOTES
Assessment/Plan:       Diagnoses and all orders for this visit:    Subacute frontal sinusitis  Comments:  pt just completed course of levaquin  short pred taper  instructed to take with food in am   Orders:  -     loratadine (CLARITIN) 10 mg tablet; Take 1 tablet (10 mg total) by mouth daily  -     predniSONE 10 mg tablet; 4 tabs daily for 2 days then 3 tabs daily x 2 days then 2 tabs daily x 2 days then 1 tabs for 2 days  Take with food in am    Chronic cough  Comments:  d/c lisinopril  bp well controlled  monitor at home  ov friday, if increased would add losartan, will d/w Jammie    Allergic rhinitis, unspecified seasonality, unspecified trigger  Comments:  +antihistamine (claritin)    Paroxysmal atrial fibrillation (HCC)  Comments:  started on xarelto in hospital  denies excessive bruising or bleeding         Family members are present during exam and asking questions, all questions answered, no need for add'l pneumonia shot, pt had flu shot for season  Written instruction given to granddgt   Subjective:      Patient ID: Lana Hall is a 66 y o  male  65 y/o here for c/o sinus pressure and congestion  Pt reports this has been ongoing "for the whole season"  Pt has a cough - phlegm in back of throat, able to rinse it away with hot beverage, unable to expectorate  Patient was in hospital for pneumonia 10/7 - 10/10          The following portions of the patient's history were reviewed and updated as appropriate: allergies, current medications, past family history, past medical history, past social history, past surgical history and problem list     Review of Systems   Constitutional: Positive for activity change  Negative for appetite change, chills, diaphoresis, fatigue and fever  HENT: Positive for congestion, postnasal drip, rhinorrhea and sinus pressure  Negative for ear pain and facial swelling  Eyes: Negative for visual disturbance  Respiratory: Positive for cough   Negative for shortness of breath and wheezing  Cardiovascular: Negative for chest pain  Gastrointestinal: Negative for abdominal pain, constipation, diarrhea and nausea  Musculoskeletal: Positive for gait problem  Skin: Negative for rash and wound  Neurological: Negative for dizziness, light-headedness and headaches  Psychiatric/Behavioral: Negative for sleep disturbance  Past Medical History:   Diagnosis Date    Arthritis     Benign prostatic hyperplasia without lower urinary tract symptoms     without Urinary Obstruction    CAD (coronary artery disease)     Chronic obstructive lung disease (HCC)     Coronary arteriosclerosis     Depression     Emphysema lung (HCC)     GERD (gastroesophageal reflux disease)     Hyperlipidemia     Hypertension     Myocardial infarction (HCC)     Psoriasis     Requires supplemental oxygen     at bedtime during high humid days only    Stroke [facility])     TIA 1/2018         Current Outpatient Prescriptions:     acetaminophen (TYLENOL) 325 mg tablet, Take 2 tablets (650 mg total) by mouth every 6 (six) hours as needed for mild pain, Disp: 30 tablet, Rfl: 0    aspirin 81 mg chewable tablet, Chew 81 mg daily  , Disp: , Rfl:     atorvastatin (LIPITOR) 40 mg tablet, Take 1 tablet (40 mg total) by mouth daily, Disp: 90 tablet, Rfl: 1    diltiazem (CARDIZEM CD) 240 mg 24 hr capsule, Take 1 capsule (240 mg total) by mouth daily, Disp: 30 capsule, Rfl: 0    esomeprazole (NexIUM) 20 mg capsule, Take 20 mg by mouth as needed  , Disp: , Rfl:     rivaroxaban (XARELTO) 20 mg tablet, Take 1 tablet (20 mg total) by mouth daily with dinner, Disp: 30 tablet, Rfl: 1    loratadine (CLARITIN) 10 mg tablet, Take 1 tablet (10 mg total) by mouth daily, Disp: 30 tablet, Rfl: 0    predniSONE 10 mg tablet, 4 tabs daily for 2 days then 3 tabs daily x 2 days then 2 tabs daily x 2 days then 1 tabs for 2 days   Take with food in am, Disp: 20 tablet, Rfl: 0    Allergies   Allergen Reactions    Penicillins Swelling and Itching       Social History   Past Surgical History:   Procedure Laterality Date    COLONOSCOPY      CORONARY ANGIOPLASTY  02/03/2001    PTCA of RCA    CORONARY ARTERY BYPASS GRAFT  02/07/2001    x4- [place]    HERNIA REPAIR      MO THROMBOENDARTECTMY [facility]ar[facility] [place] INCIS Left 2/20/2018    Procedure: ENDARTERECTOMY ARTERY CAROTID WITH PATCH ANGIOPLASTY;  Surgeon: Hima Longo MD;  Location: [facility];  Service: Vascular    TRANSURETHRAL RESECTION OF PROSTATE       Family History   Problem Relation Age of Onset    Lung cancer Mother     Cancer Mother     Other Father         sepsis       Objective:  /56 (BP Location: Left arm, Patient Position: Sitting, Cuff Size: Standard)   Pulse 75   Temp 98 8 °F (37 1 °C) (Tympanic)   Wt 84 1 kg (185 lb 6 4 oz) Comment: with shoes  SpO2 90%   BMI 28 19 kg/m²        Physical Exam   Constitutional: He is oriented to person, place, and time  He appears well-developed and well-nourished  No distress  HENT:   Head: Normocephalic and atraumatic  Right Ear: External ear normal    Left Ear: External ear normal    TM with clear fluid R side    Eyes: Pupils are equal, round, and reactive to light  Conjunctivae and EOM are normal    Neck: Normal range of motion  Neck supple  Cardiovascular:   Murmur heard  Rate controlled    Pulmonary/Chest: Effort normal and breath sounds normal  No respiratory distress  He has no wheezes (no sig wheeze, rales )  He has no rales  Musculoskeletal: Normal range of motion  He exhibits no edema  Neurological: He is alert and oriented to person, place, and time  Skin: Skin is warm and dry  No rash noted  He is not diaphoretic  No erythema  Psychiatric: He has a normal mood and affect  His behavior is normal  Judgment and thought content normal    Vitals reviewed

## 2018-10-17 NOTE — TELEPHONE ENCOUNTER
Pt granddaughter called she said he is struggling with allergies  She uses claritin  She is asking if he is allowed to take that also  She didn't want it to interfere with his other medications  Please call her at [phone]

## 2018-10-18 ENCOUNTER — TELEPHONE (OUTPATIENT)
Dept: NEUROLOGY | Facility: CLINIC | Age: 78
End: 2018-10-18

## 2018-10-19 ENCOUNTER — OFFICE VISIT (OUTPATIENT)
Dept: INTERNAL MEDICINE CLINIC | Age: 78
End: 2018-10-19
Payer: MEDICARE

## 2018-10-19 VITALS
SYSTOLIC BLOOD PRESSURE: 124 MMHG | TEMPERATURE: 97.6 F | OXYGEN SATURATION: 88 % | BODY MASS INDEX: 27.7 KG/M2 | WEIGHT: 182.2 LBS | HEART RATE: 108 BPM | DIASTOLIC BLOOD PRESSURE: 70 MMHG

## 2018-10-19 DIAGNOSIS — R00.0 TACHYCARDIA: ICD-10-CM

## 2018-10-19 DIAGNOSIS — J18.9 COMMUNITY ACQUIRED PNEUMONIA, UNSPECIFIED LATERALITY: ICD-10-CM

## 2018-10-19 DIAGNOSIS — J44.1 COPD EXACERBATION (HCC): ICD-10-CM

## 2018-10-19 DIAGNOSIS — J43.9 PULMONARY EMPHYSEMA, UNSPECIFIED EMPHYSEMA TYPE (HCC): ICD-10-CM

## 2018-10-19 DIAGNOSIS — J18.9 PNEUMONIA DUE TO INFECTIOUS ORGANISM, UNSPECIFIED LATERALITY, UNSPECIFIED PART OF LUNG: Primary | ICD-10-CM

## 2018-10-19 DIAGNOSIS — E87.5 HYPERKALEMIA: ICD-10-CM

## 2018-10-19 DIAGNOSIS — I10 ESSENTIAL HYPERTENSION: ICD-10-CM

## 2018-10-19 DIAGNOSIS — I25.10 ATHEROSCLEROSIS OF NATIVE CORONARY ARTERY WITHOUT ANGINA PECTORIS, UNSPECIFIED WHETHER NATIVE OR TRANSPLANTED HEART: ICD-10-CM

## 2018-10-19 PROCEDURE — 99495 TRANSJ CARE MGMT MOD F2F 14D: CPT | Performed by: NURSE PRACTITIONER

## 2018-10-19 NOTE — ASSESSMENT & PLAN NOTE
Patient's COPD exacerbation was secondary to patient's community-acquired pneumonia  Patient is finishing up prednisone taper, and patient is encouraged to use incentive spirometer while at home  Patient continues to use 2 L oxygen while at home as needed  Patient and granddaughter requesting portable oxygen machine, will give script

## 2018-10-19 NOTE — ASSESSMENT & PLAN NOTE
Patient's potassium noted to be mildly elevated at 5 5, will continue to monitor will advise patient to get follow-up BMP in 2 weeks

## 2018-10-19 NOTE — ASSESSMENT & PLAN NOTE
Patient's TSH was elevated during hospitalization,  Patient's most recent TSH 2 68, will continue to monitor  Patient had episode of a flutter while in the hospital cardiology was consulted, patient is to continue Xarelto, patient's metoprolol was cc, and calcium channel blocker was started, patient was started on Cardizem 240 mg  Patient will follow up with Cardiology on Monday 10/22/2018 Dr America Norris

## 2018-10-19 NOTE — ASSESSMENT & PLAN NOTE
Patient is to continue with home medications  Patient's beta blocker, metoprolol was switched to a calcium channel blocker, due to reports of chronic respiratory issues which may have been exacerbated by beta blocker

## 2018-10-19 NOTE — PROGRESS NOTES
Assessment/Plan:    CAP (community acquired pneumonia)   The patient finished full course of oral Levaquin, influenza/ RSV was negative  Patient is to continue treatment for acute COPD exacerbation, related to pneumonia  Patient is to get follow-up chest x-ray in 6 weeks to ensure resolution of pneumonia  Patient's repeat blood work showed white blood cells 10 3, within normal limits  COPD exacerbation (HCC)    Patient's COPD exacerbation was secondary to patient's community-acquired pneumonia  Patient is finishing up prednisone taper, and patient is encouraged to use incentive spirometer while at home  Patient continues to use 2 L oxygen while at home as needed  Patient and granddaughter requesting portable oxygen machine, will give script  CAD (coronary atherosclerotic disease)    Patient is to continue with home medications  Patient's beta blocker, metoprolol was switched to a calcium channel blocker, due to reports of chronic respiratory issues which may have been exacerbated by beta blocker  Tachycardia    Patient's TSH was elevated during hospitalization,  Patient's most recent TSH 2 68, will continue to monitor  Patient had episode of a flutter while in the hospital cardiology was consulted, patient is to continue Xarelto, patient's metoprolol was cc, and calcium channel blocker was started, patient was started on Cardizem 240 mg  Patient will follow up with Cardiology on Monday 10/22/2018 Dr Miles Shin  Hypertension    Patient will follow up with Cardiology  Hyperkalemia   Patient's potassium noted to be mildly elevated at 5 5, will continue to monitor will advise patient to get follow-up BMP in 2 weeks         Diagnoses and all orders for this visit:    Pneumonia due to infectious organism, unspecified laterality, unspecified part of lung  -     XR chest pa & lateral; Future    Pulmonary emphysema, unspecified emphysema type ([facility] [address] )  -     Home Oxygen with Portability    Hyperkalemia  -     Basic metabolic panel; Future    Community acquired pneumonia, unspecified laterality    COPD exacerbation ([facility] [address] )    Atherosclerosis of native coronary artery without angina pectoris, unspecified whether native or transplanted heart    Tachycardia    Essential hypertension          Subjective:      Patient ID: Lenny Fleming is a 66 y o  male  Patient presents today for a transition of care management appointment, he presents today with his granddaughter who is the currently lives with  Patient was admitted to [facility] on 10/07/2018 and discharged on 10/10/2018  Patient presented to the hospital due to concerns that he may have had the flu  Patient had reports of sudden onset of fever, body aches, joint pains and muscle achiness the Friday prior to his admission to the hospital   He also reported increased oxygen requirement, however patient was not currently prescribed oxygen  Patient has history of CAD, COPD, hypertension, hyperlipidemia, carotid artery disease status post carotid artery endarterectomy, and stroke  Of note patient was noted to have a flutter while in the hospital, and Cardiology was consulted, metoprolol was DC dim patient was started on a calcium channel blocker patient will have follow-up with Cardiology on Monday 10/22/18  Patient states overall he is feeling much better today  Patient is requesting a more portable oxygen machine, as his old one is to heavy for him  The following portions of the patient's history were reviewed and updated as appropriate: allergies, current medications, past family history, past medical history, past social history, past surgical history and problem list     Review of Systems   Constitutional: Negative for activity change, appetite change, chills, diaphoresis and fever     HENT: Negative for congestion, ear discharge, ear pain, postnasal drip, rhinorrhea, sinus pain, sinus pressure and sore throat  Eyes: Negative for pain, discharge, itching and visual disturbance  Respiratory: Positive for shortness of breath (at times)  Negative for cough, chest tightness and wheezing  Cardiovascular: Negative for chest pain, palpitations and leg swelling  Gastrointestinal: Negative for abdominal pain, constipation, diarrhea, nausea and vomiting  Endocrine: Negative for polydipsia, polyphagia and polyuria  Genitourinary: Negative for difficulty urinating, dysuria and urgency  Musculoskeletal: Negative for arthralgias, back pain and neck pain  Skin: Negative for rash and wound  Neurological: Negative for dizziness, weakness, numbness and headaches           Past Medical History:   Diagnosis Date    Arthritis     Benign prostatic hyperplasia without lower urinary tract symptoms     without Urinary Obstruction    CAD (coronary artery disease)     Chronic obstructive lung disease (HCC)     Coronary arteriosclerosis     Depression     Emphysema lung (HCC)     GERD (gastroesophageal reflux disease)     Hyperlipidemia     Hypertension     Myocardial infarction (HCC)     Psoriasis     Requires supplemental oxygen     at bedtime during high humid days only    Stroke [facility])     TIA 1/2018         Current Outpatient Prescriptions:     acetaminophen (TYLENOL) 325 mg tablet, Take 2 tablets (650 mg total) by mouth every 6 (six) hours as needed for mild pain, Disp: 30 tablet, Rfl: 0    aspirin 81 mg chewable tablet, Chew 81 mg daily  , Disp: , Rfl:     atorvastatin (LIPITOR) 40 mg tablet, Take 1 tablet (40 mg total) by mouth daily, Disp: 90 tablet, Rfl: 1    diltiazem (CARDIZEM CD) 240 mg 24 hr capsule, Take 1 capsule (240 mg total) by mouth daily, Disp: 30 capsule, Rfl: 0    esomeprazole (NexIUM) 20 mg capsule, Take 20 mg by mouth every other day  , Disp: , Rfl:     loratadine (CLARITIN) 10 mg tablet, Take 1 tablet (10 mg total) by mouth daily, Disp: 30 tablet, Rfl: 0    predniSONE 10 mg tablet, 4 tabs daily for 2 days then 3 tabs daily x 2 days then 2 tabs daily x 2 days then 1 tabs for 2 days   Take with food in am, Disp: 20 tablet, Rfl: 0    rivaroxaban (XARELTO) 20 mg tablet, Take 1 tablet (20 mg total) by mouth daily with dinner, Disp: 30 tablet, Rfl: 1    Allergies   Allergen Reactions    Penicillins Swelling and Itching       Social History   Past Surgical History:   Procedure Laterality Date    COLONOSCOPY      CORONARY ANGIOPLASTY  02/03/2001    PTCA of RCA    CORONARY ARTERY BYPASS GRAFT  02/07/2001    x4- [facility]    HERNIA REPAIR      NV THROMBOENDARTECTMY NECK,NECK INCIS Left 2/20/2018    Procedure: ENDARTERECTOMY ARTERY CAROTID WITH PATCH ANGIOPLASTY;  Surgeon: Dene Carrel, MD;  Location: [facility];  Service: Vascular    TRANSURETHRAL RESECTION OF PROSTATE       Family History   Problem Relation Age of Onset    Lung cancer Mother     Cancer Mother     Other Father         sepsis       Objective:  /70 (BP Location: Left arm, Patient Position: Sitting, Cuff Size: Standard)   Pulse (!) 108   Temp 97 6 °F (36 4 °C) (Tympanic)   Wt 82 6 kg (182 lb 3 2 oz) Comment: with shoes  SpO2 (!) 88%   BMI 27 70 kg/m²     Recent Results (from the past 1344 hour(s))   ECG 12 lead    Collection Time: 10/07/18  4:15 AM   Result Value Ref Range    Ventricular Rate 153 BPM    Atrial Rate 153 BPM    NV Interval 134 ms    QRSD Interval 114 ms    QT Interval 256 ms    QTC Interval 408 ms    P Axis  degrees    QRS Axis 150 degrees    T Wave Axis 56 degrees   Comprehensive metabolic panel    Collection Time: 10/07/18  4:24 AM   Result Value Ref Range    Sodium 137 136 - 145 mmol/L    Potassium 4 3 3 5 - 5 3 mmol/L    Chloride 101 100 - 108 mmol/L    CO2 29 21 - 32 mmol/L    ANION GAP 7 4 - 13 mmol/L    BUN 19 5 - 25 mg/dL    Creatinine 1 29 0 60 - 1 30 mg/dL    Glucose 113 65 - 140 mg/dL    Calcium 8 4 8 3 - 10 1 mg/dL    AST 19 5 - 45 U/L    ALT 24 12 - 78 U/L    Alkaline Phosphatase 67 46 - 116 U/L    Total Protein 6 9 6 4 - 8 2 g/dL    Albumin 2 8 (L) 3 5 - 5 0 g/dL    Total Bilirubin 1 10 (H) 0 20 - 1 00 mg/dL    eGFR 53 ml/min/1 73sq m   CBC and differential    Collection Time: 10/07/18  4:24 AM   Result Value Ref Range    WBC 7 77 4 31 - 10 16 Thousand/uL    RBC 4 08 3 88 - 5 62 Million/uL    Hemoglobin 12 3 12 0 - 17 0 g/dL    Hematocrit 39 1 36 5 - 49 3 %    MCV 96 82 - 98 fL    MCH 30 1 26 8 - 34 3 pg    MCHC 31 5 31 4 - 37 4 g/dL    RDW 13 2 11 6 - 15 1 %    MPV 9 7 8 9 - 12 7 fL    Platelets 570 074 - 865 Thousands/uL    nRBC 0 /100 WBCs    Neutrophils Relative 77 (H) 43 - 75 %    Immat GRANS % 0 0 - 2 %    Lymphocytes Relative 11 (L) 14 - 44 %    Monocytes Relative 9 4 - 12 %    Eosinophils Relative 3 0 - 6 %    Basophils Relative 0 0 - 1 %    Neutrophils Absolute 5 91 1 85 - 7 62 Thousands/µL    Immature Grans Absolute 0 03 0 00 - 0 20 Thousand/uL    Lymphocytes Absolute 0 85 0 60 - 4 47 Thousands/µL    Monocytes Absolute 0 72 0 17 - 1 22 Thousand/µL    Eosinophils Absolute 0 24 0 00 - 0 61 Thousand/µL    Basophils Absolute 0 02 0 00 - 0 10 Thousands/µL   Troponin I    Collection Time: 10/07/18  4:24 AM   Result Value Ref Range    Troponin I <0 02 <=0 04 ng/mL   B-type natriuretic peptide    Collection Time: 10/07/18  4:24 AM   Result Value Ref Range    NT-proBNP 453 (H) <450 pg/mL   TSH, 3rd generation    Collection Time: 10/07/18  4:24 AM   Result Value Ref Range    TSH 3RD GENERATON 5 348 (H) 0 358 - 3 740 uIU/mL   Lactic acid, plasma    Collection Time: 10/07/18  4:35 AM   Result Value Ref Range    LACTIC ACID 2 3 (HH) 0 5 - 2 0 mmol/L   Blood culture #1    Collection Time: 10/07/18  4:35 AM   Result Value Ref Range    Blood Culture No Growth After 5 Days  Blood culture #2    Collection Time: 10/07/18  4:35 AM   Result Value Ref Range    Blood Culture No Growth After 5 Days      Influenza A/B and RSV by PCR (indicated for patients >2 mo of age)    Collection Time: 10/07/18  4:35 AM Result Value Ref Range    INFLU A PCR None Detected None Detected    INFLU B PCR None Detected None Detected    RSV PCR None Detected None Detected   Lactic acid, plasma    Collection Time: 10/07/18  6:22 AM   Result Value Ref Range    LACTIC ACID 0 9 0 5 - 2 0 mmol/L   Procalcitonin    Collection Time: 10/07/18  9:21 AM   Result Value Ref Range    Procalcitonin 0 08 <=0 25 ng/ml   Platelet count    Collection Time: 10/07/18  9:21 AM   Result Value Ref Range    Platelets 259 396 - 293 Thousands/uL    MPV 9 8 8 9 - 12 7 fL   T4, free    Collection Time: 10/07/18  9:21 AM   Result Value Ref Range    Free T4 1 19 0 76 - 1 46 ng/dL   UA w Reflex to Microscopic    Collection Time: 10/07/18 10:47 AM   Result Value Ref Range    Color, UA Yellow     Clarity, UA Clear     Specific Gravity, UA <=1 005 1 003 - 1 030    pH, UA 6 5 4 5 - 8 0    Leukocytes, UA Trace (A) Negative    Nitrite, UA Negative Negative    Protein, UA Negative Negative mg/dl    Glucose, UA Negative Negative mg/dl    Ketones, UA Negative Negative mg/dl    Urobilinogen, UA 4 0 (A) 0 2, 1 0 E U /dl E U /dl    Bilirubin, UA Negative Negative    Blood, UA Negative Negative   Urine Microscopic    Collection Time: 10/07/18 10:47 AM   Result Value Ref Range    RBC, UA 0-1 (A) None Seen, 0-5 /hpf    WBC, UA 4-10 (A) None Seen, 0-5, 5-55, 5-65 /hpf    Epithelial Cells Occasional None Seen, Occasional /hpf    Bacteria, UA Occasional None Seen, Occasional /hpf   CBC and differential    Collection Time: 10/08/18  4:56 AM   Result Value Ref Range    WBC 7 50 4 31 - 10 16 Thousand/uL    RBC 3 50 (L) 3 88 - 5 62 Million/uL    Hemoglobin 10 6 (L) 12 0 - 17 0 g/dL    Hematocrit 33 7 (L) 36 5 - 49 3 %    MCV 96 82 - 98 fL    MCH 30 3 26 8 - 34 3 pg    MCHC 31 5 31 4 - 37 4 g/dL    RDW 13 0 11 6 - 15 1 %    MPV 9 7 8 9 - 12 7 fL    Platelets 408 721 - 575 Thousands/uL    nRBC 0 /100 WBCs    Neutrophils Relative 79 (H) 43 - 75 %    Immat GRANS % 1 0 - 2 %    Lymphocytes Relative 11 (L) 14 - 44 %    Monocytes Relative 8 4 - 12 %    Eosinophils Relative 1 0 - 6 %    Basophils Relative 0 0 - 1 %    Neutrophils Absolute 5 96 1 85 - 7 62 Thousands/µL    Immature Grans Absolute 0 04 0 00 - 0 20 Thousand/uL    Lymphocytes Absolute 0 84 0 60 - 4 47 Thousands/µL    Monocytes Absolute 0 57 0 17 - 1 22 Thousand/µL    Eosinophils Absolute 0 08 0 00 - 0 61 Thousand/µL    Basophils Absolute 0 01 0 00 - 0 10 Thousands/µL   Comprehensive metabolic panel    Collection Time: 10/08/18  4:56 AM   Result Value Ref Range    Sodium 142 136 - 145 mmol/L    Potassium 4 7 3 5 - 5 3 mmol/L    Chloride 107 100 - 108 mmol/L    CO2 28 21 - 32 mmol/L    ANION GAP 7 4 - 13 mmol/L    BUN 16 5 - 25 mg/dL    Creatinine 1 07 0 60 - 1 30 mg/dL    Glucose 109 65 - 140 mg/dL    Calcium 8 1 (L) 8 3 - 10 1 mg/dL    AST 18 5 - 45 U/L    ALT 24 12 - 78 U/L    Alkaline Phosphatase 54 46 - 116 U/L    Total Protein 6 1 (L) 6 4 - 8 2 g/dL    Albumin 2 3 (L) 3 5 - 5 0 g/dL    Total Bilirubin 0 50 0 20 - 1 00 mg/dL    eGFR 66 ml/min/1 73sq m   ECG 12 lead    Collection Time: 10/08/18  2:53 PM   Result Value Ref Range    Ventricular Rate 128 BPM    Atrial Rate 308 BPM    NH Interval  ms    QRSD Interval 114 ms    QT Interval 292 ms    QTC Interval 426 ms    P Axis 252 degrees    QRS Axis 44 degrees    T Wave Axis 9 degrees   ECG 12 lead    Collection Time: 10/09/18 10:55 AM   Result Value Ref Range    Ventricular Rate 77 BPM    Atrial Rate 326 BPM    NH Interval  ms    QRSD Interval 138 ms    QT Interval 390 ms    QTC Interval 441 ms    P Axis 238 degrees    QRS Axis 12 degrees    T Wave Axis 0 degrees            Physical Exam   Constitutional: He is oriented to person, place, and time  He appears well-developed and well-nourished  No distress  HENT:   Head: Normocephalic and atraumatic     Right Ear: External ear normal    Left Ear: External ear normal    Nose: Nose normal    Mouth/Throat: Oropharynx is clear and moist  No oropharyngeal exudate  TM with clear fluid R side    Eyes: Pupils are equal, round, and reactive to light  Conjunctivae and EOM are normal  Right eye exhibits no discharge  Left eye exhibits no discharge  Neck: Normal range of motion  Neck supple  No thyromegaly present  Cardiovascular: Normal rate, regular rhythm, normal heart sounds and intact distal pulses  Exam reveals no gallop and no friction rub  No murmur heard  Rate controlled    Pulmonary/Chest: Effort normal  No stridor  No respiratory distress  He has decreased breath sounds in the left upper field, the left middle field and the left lower field  He has no wheezes  He has no rales  Abdominal: Soft  Bowel sounds are normal  He exhibits no distension  There is no tenderness  Musculoskeletal: Normal range of motion  He exhibits no edema  Lymphadenopathy:     He has no cervical adenopathy  Neurological: He is alert and oriented to person, place, and time  Skin: Skin is warm and dry  No rash noted  He is not diaphoretic  No erythema  Psychiatric: He has a normal mood and affect  His behavior is normal  Judgment and thought content normal    Vitals reviewed

## 2018-10-19 NOTE — ASSESSMENT & PLAN NOTE
The patient finished full course of oral Levaquin, influenza/ RSV was negative  Patient is to continue treatment for acute COPD exacerbation, related to pneumonia  Patient is to get follow-up chest x-ray in 6 weeks to ensure resolution of pneumonia  Patient's repeat blood work showed white blood cells 10 3, within normal limits

## 2018-10-22 ENCOUNTER — OFFICE VISIT (OUTPATIENT)
Dept: CARDIOLOGY CLINIC | Facility: CLINIC | Age: 78
End: 2018-10-22
Payer: MEDICARE

## 2018-10-22 VITALS
OXYGEN SATURATION: 99 % | BODY MASS INDEX: 27.37 KG/M2 | SYSTOLIC BLOOD PRESSURE: 120 MMHG | RESPIRATION RATE: 18 BRPM | HEIGHT: 68 IN | WEIGHT: 180.6 LBS | HEART RATE: 80 BPM | DIASTOLIC BLOOD PRESSURE: 70 MMHG

## 2018-10-22 DIAGNOSIS — Z76.89 ENCOUNTER TO ESTABLISH CARE: ICD-10-CM

## 2018-10-22 DIAGNOSIS — I48.92 ATRIAL FLUTTER (HCC): Primary | ICD-10-CM

## 2018-10-22 DIAGNOSIS — I48.92 ATRIAL FLUTTER, UNSPECIFIED TYPE (HCC): Primary | ICD-10-CM

## 2018-10-22 DIAGNOSIS — R09.02 HYPOXIA: ICD-10-CM

## 2018-10-22 DIAGNOSIS — I49.9 IRREGULAR HEART BEAT: ICD-10-CM

## 2018-10-22 PROCEDURE — 99215 OFFICE O/P EST HI 40 MIN: CPT | Performed by: INTERNAL MEDICINE

## 2018-10-22 RX ORDER — FUROSEMIDE 40 MG/1
40 TABLET ORAL DAILY
Qty: 30 TABLET | Refills: 3 | Status: SHIPPED | OUTPATIENT
Start: 2018-10-22 | End: 2018-11-19 | Stop reason: SDUPTHER

## 2018-10-22 NOTE — PROGRESS NOTES
Cardiology Outpatient Follow up Note    Cindy Zurita 66 y o  male MRN: [id number]    10/22/18          Assessment/Plan:  1  Atrial flutter with variable block  1  Daphnie Manzano is still currently in atrial flutter today with progressive symptoms of decreased exercise tolerance and hypoxia  His rate is controlled in the 80s  2  Will schedule for RENEE/DCCV next week  3  Continue diltiazem  and Xarelto for cva prevention   4  [id number]: 6  5  Will consider definitive management with flutter ablation if unsuccessful DCCV or recurrence    2  CAD with prior CABG ×4 2/2001  · LIMA to LAD, SVG to the RCA, sequential SVG to the diagonal and OM1  · Lexiscan 2016: No ischemia; normal LVEF  · Cont aspirin and atorvastatin  BB discontinued due to reported emphysema    3  Left carotid endarterectomy following an episode of amaurosis fugax  · Cont aspirin and atorvastatin  No acute concerns  4  Chronic hypoxic respiraotry failure- on 2L O2  · He was recently started on O2 in the hospital and is using 2L almost continuously  · Will start lasix 40mg PO daily  The atrial flutter could be contributing to his volume overload  · Additionally his recent PNA can be contributing factor  5  COPD/Emphysema  · Reported hx of coal exposure; no significant tobacco use reported  · Will refer to  Pulmonary associates    6  Dyslipidemia- cont atorvastatin        1  Irregular heart beat  Ambulatory referral to Cardiology       HPI:   Daphnie Manzano is a very pleasant 72-year-old man with a history of CAD s/p CABG and PCI as above who presents to establish care following a recent hospitalization  He was admitted from 10/7-10/10 with sepsis secondary to pneumonia and was found to be in atrial flutter with rapid ventricular response and variable block  His home dose of metoprolol was discontinued and he was started on diltiazem  mg daily at discharge as well as Xarelto for CVA prevention   Additionally he was treated with a course of Jimyaquin and was started on O2 prior to discharge for hypoxia  On presentation in the office today he is still in atrial flutter with variable block with heart rates in the 80s  He complains of progressive dyspnea on exertion and lower extremity edema to the point that he cannot walk to from the house to the car without the O2  He denies overt chest pain or palpitations  His daughter states that he is using the O2 continuously throughout the day  He does have a history of emphysema which he states is secondary to coal exposure and not tobacco abuse  He admits to only occasional tobacco use in the 1950s  He denies any other concerns today         Patient Active Problem List   Diagnosis    CAD (coronary atherosclerotic disease)    Hypertension    Hyperlipemia    Atypical chest pain    Hypothyroid    GERD (gastroesophageal reflux disease)    History of stroke    Sciatica of right side    Hyperlipidemia    Emphysema lung ([facility] [address] )    Arthritis    Stenosis of left carotid artery    S/P CABG x 4    Pre-operative cardiovascular examination    Acute left-sided low back pain with left-sided sciatica    Back pain    COPD exacerbation (HCC)    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    Bradycardia    Vision changes    Urinary retention due to benign prostatic hyperplasia    Bladder cancer ([facility] [address] )    CKD (chronic kidney disease), stage II    CAP (community acquired pneumonia)    Tachycardia    Pneumonia due to infectious organism    Irregular heart beat    Localized edema    Hyperkalemia       Allergies   Allergen Reactions    Penicillins Swelling and Itching         Current Outpatient Prescriptions:     acetaminophen (TYLENOL) 325 mg tablet, Take 2 tablets (650 mg total) by mouth every 6 (six) hours as needed for mild pain, Disp: 30 tablet, Rfl: 0    aspirin 81 mg chewable tablet, Chew 81 mg daily  , Disp: , Rfl:     atorvastatin (LIPITOR) 40 mg tablet, Take 1 tablet (40 mg total) by mouth daily, Disp: 90 tablet, Rfl: 1    diltiazem (CARDIZEM CD) 240 mg 24 hr capsule, Take 1 capsule (240 mg total) by mouth daily, Disp: 30 capsule, Rfl: 0    esomeprazole (NexIUM) 20 mg capsule, Take 20 mg by mouth every other day  , Disp: , Rfl:     loratadine (CLARITIN) 10 mg tablet, Take 1 tablet (10 mg total) by mouth daily, Disp: 30 tablet, Rfl: 0    predniSONE 10 mg tablet, 4 tabs daily for 2 days then 3 tabs daily x 2 days then 2 tabs daily x 2 days then 1 tabs for 2 days  Take with food in am, Disp: 20 tablet, Rfl: 0    rivaroxaban (XARELTO) 20 mg tablet, Take 1 tablet (20 mg total) by mouth daily with dinner, Disp: 30 tablet, Rfl: 1    Past Medical History:   Diagnosis Date    Arthritis     Benign prostatic hyperplasia without lower urinary tract symptoms     without Urinary Obstruction    CAD (coronary artery disease)     Chronic obstructive lung disease (HCC)     Coronary arteriosclerosis     Depression     Emphysema lung (HCC)     GERD (gastroesophageal reflux disease)     Hyperlipidemia     Hypertension     Myocardial infarction (HCC)     Psoriasis     Requires supplemental oxygen     at bedtime during high humid days only    Stroke [facility])     TIA 1/2018       Family History   Problem Relation Age of Onset    Lung cancer Mother     Cancer Mother     Other Father         sepsis       Past Surgical History:   Procedure Laterality Date    COLONOSCOPY      CORONARY ANGIOPLASTY  02/03/2001    PTCA of RCA    CORONARY ARTERY BYPASS GRAFT  02/07/2001    x4- [place]    HERNIA REPAIR      [place] THROMBOENDARTECTMY Keyla Gola INCIS Left 2/20/2018    Procedure: ENDARTERECTOMY ARTERY CAROTID WITH PATCH ANGIOPLASTY;  Surgeon: Mendel Angel, MD;  Location: [facility];  Service: Vascular    TRANSURETHRAL RESECTION OF PROSTATE         Social History     Social History    Marital status:       Spouse name: N/A    Number of children: 3    Years of education: N/A     Occupational History    retired sergio      Social History Main Topics    Smoking status: Former Smoker     Packs/day: 1 00     Years: 3 00     Types: Cigarettes     Quit date: 1956    Smokeless tobacco: Never Used      Comment: Has a past history of cigarette smoking;Quit date 1956; 5 packs year history, per [employer]      Alcohol use No    Drug use: No    Sexual activity: Yes     Other Topics Concern    Not on file     Social History Narrative    Lives with granddaughter and daughter     Caffeine use, He admits to consuming caffeine VIA coffee ( 8 servings per day), per [employer]    Martial History: Currently , per [employer]    Occupation: Retired (prior occupational:  repairman), per [employer]        Review of Systems   Constitution: Negative for chills, diaphoresis, fever, weakness, weight gain and weight loss  Cardiovascular: Positive for dyspnea on exertion and leg swelling  Negative for chest pain, claudication, irregular heartbeat, near-syncope, orthopnea, palpitations, paroxysmal nocturnal dyspnea and syncope  Respiratory: Positive for shortness of breath  Gastrointestinal: Negative for nausea and vomiting  Vitals: /70   Pulse 80   Resp 18   Ht 5' 8" (1 727 m)   Wt 81 9 kg (180 lb 9 6 oz)   SpO2 99%   BMI 27 46 kg/m²       Physical Exam:     GEN: Alert and oriented x 3, in no acute distress  Well appearing and well nourished  HEENT: Sclera anicteric, conjunctivae pink, mucous membranes moist  Oropharynx clear  NECK: Supple, no carotid bruits, minimal JVD  Trachea midline, no thyromegaly  HEART: Regular rhythm, normal S1 and S2, no murmurs, clicks, gallops or rubs  PMI nondisplaced, no thrills  LUNGS: Clear to auscultation bilaterally; no wheezes, rales, or rhonchi  No increased work of breathing or signs of respiratory distress  ABDOMEN: Soft, nontender, nondistended, normoactive bowel sounds     EXTREMITIES: Skin warm and well perfused, no clubbing, or cyanosis, 2+ edema  Lab Results:       Lab Results   Component Value Date    HGBA1C 5 0 01/13/2018    HGBA1C 4 8 08/15/2016     Lab Results   Component Value Date    CHOL See scanned summary  08/15/2016    CHOL See scanned summary  03/17/2016     Lab Results   Component Value Date    HDL 44 01/14/2018    HDL See scanned summary  08/15/2016    HDL 46 08/14/2016     Lab Results   Component Value Date    LDLCALC 105 (H) 01/14/2018    LDLCALC 72 08/14/2016     Lab Results   Component Value Date    TRIG 81 01/14/2018    TRIG See scanned summary   08/15/2016    TRIG 110 08/14/2016     No components found for: CHOLHDL

## 2018-10-23 ENCOUNTER — TELEPHONE (OUTPATIENT)
Dept: CARDIOLOGY CLINIC | Facility: CLINIC | Age: 78
End: 2018-10-23

## 2018-10-25 ENCOUNTER — TELEPHONE (OUTPATIENT)
Dept: CARDIOLOGY CLINIC | Facility: CLINIC | Age: 78
End: 2018-10-25

## 2018-10-25 DIAGNOSIS — I48.92 ATRIAL FLUTTER, UNSPECIFIED TYPE (HCC): Primary | ICD-10-CM

## 2018-10-31 ENCOUNTER — TELEPHONE (OUTPATIENT)
Dept: NON INVASIVE DIAGNOSTICS | Facility: HOSPITAL | Age: 78
End: 2018-10-31

## 2018-10-31 RX ORDER — SODIUM CHLORIDE 9 MG/ML
75 INJECTION, SOLUTION INTRAVENOUS CONTINUOUS
Status: CANCELLED | OUTPATIENT
Start: 2018-10-31

## 2018-11-01 ENCOUNTER — TELEPHONE (OUTPATIENT)
Dept: SURGERY | Facility: HOSPITAL | Age: 78
End: 2018-11-01

## 2018-11-02 ENCOUNTER — TELEPHONE (OUTPATIENT)
Dept: SURGERY | Facility: HOSPITAL | Age: 78
End: 2018-11-02

## 2018-11-02 ENCOUNTER — HOSPITAL ENCOUNTER (OUTPATIENT)
Dept: INTERVENTIONAL RADIOLOGY/VASCULAR | Facility: HOSPITAL | Age: 78
Discharge: HOME | End: 2018-11-02
Attending: INTERNAL MEDICINE | Admitting: INTERNAL MEDICINE
Payer: MEDICARE

## 2018-11-02 ENCOUNTER — ANESTHESIA (OUTPATIENT)
Dept: INTERVENTIONAL RADIOLOGY/VASCULAR | Facility: HOSPITAL | Age: 78
End: 2018-11-02

## 2018-11-02 ENCOUNTER — HOSPITAL ENCOUNTER (OUTPATIENT)
Dept: INTERVENTIONAL RADIOLOGY/VASCULAR | Facility: HOSPITAL | Age: 78
Discharge: HOME | End: 2018-11-02
Attending: INTERNAL MEDICINE
Payer: MEDICARE

## 2018-11-02 ENCOUNTER — ANESTHESIA EVENT (OUTPATIENT)
Dept: INTERVENTIONAL RADIOLOGY/VASCULAR | Facility: HOSPITAL | Age: 78
End: 2018-11-02

## 2018-11-02 VITALS
DIASTOLIC BLOOD PRESSURE: 65 MMHG | SYSTOLIC BLOOD PRESSURE: 121 MMHG | HEIGHT: 68 IN | TEMPERATURE: 99.3 F | BODY MASS INDEX: 26.13 KG/M2 | HEART RATE: 103 BPM | OXYGEN SATURATION: 96 % | RESPIRATION RATE: 18 BRPM | WEIGHT: 172.4 LBS

## 2018-11-02 DIAGNOSIS — I48.92 ATRIAL FLUTTER, UNSPECIFIED TYPE (HCC): ICD-10-CM

## 2018-11-02 DIAGNOSIS — I48.92 ATRIAL FLUTTER (HCC): ICD-10-CM

## 2018-11-02 LAB
ANION GAP SERPL CALCULATED.3IONS-SCNC: 6 MMOL/L (ref 4–13)
ATRIAL RATE: 101 BPM
ATRIAL RATE: 326 BPM
ATRIAL RATE: 326 BPM
ATRIAL RATE: 97 BPM
BASOPHILS # BLD AUTO: 0.03 THOUSANDS/ΜL (ref 0–0.1)
BASOPHILS NFR BLD AUTO: 0 % (ref 0–1)
BUN SERPL-MCNC: 22 MG/DL (ref 5–25)
CALCIUM SERPL-MCNC: 9 MG/DL (ref 8.3–10.1)
CHLORIDE SERPL-SCNC: 98 MMOL/L (ref 100–108)
CO2 SERPL-SCNC: 33 MMOL/L (ref 21–32)
CREAT SERPL-MCNC: 1.19 MG/DL (ref 0.6–1.3)
EOSINOPHIL # BLD AUTO: 0.34 THOUSAND/ΜL (ref 0–0.61)
EOSINOPHIL NFR BLD AUTO: 3 % (ref 0–6)
ERYTHROCYTE [DISTWIDTH] IN BLOOD BY AUTOMATED COUNT: 14.2 % (ref 11.6–15.1)
GFR SERPL CREATININE-BSD FRML MDRD: 58 ML/MIN/1.73SQ M
GLUCOSE P FAST SERPL-MCNC: 100 MG/DL (ref 65–99)
GLUCOSE SERPL-MCNC: 100 MG/DL (ref 65–140)
HCT VFR BLD AUTO: 37.9 % (ref 36.5–49.3)
HGB BLD-MCNC: 11.9 G/DL (ref 12–17)
IMM GRANULOCYTES # BLD AUTO: 0.08 THOUSAND/UL (ref 0–0.2)
IMM GRANULOCYTES NFR BLD AUTO: 1 % (ref 0–2)
LYMPHOCYTES # BLD AUTO: 0.76 THOUSANDS/ΜL (ref 0.6–4.47)
LYMPHOCYTES NFR BLD AUTO: 7 % (ref 14–44)
MCH RBC QN AUTO: 28.1 PG (ref 26.8–34.3)
MCHC RBC AUTO-ENTMCNC: 31.4 G/DL (ref 31.4–37.4)
MCV RBC AUTO: 90 FL (ref 82–98)
MONOCYTES # BLD AUTO: 0.88 THOUSAND/ΜL (ref 0.17–1.22)
MONOCYTES NFR BLD AUTO: 8 % (ref 4–12)
NEUTROPHILS # BLD AUTO: 8.47 THOUSANDS/ΜL (ref 1.85–7.62)
NEUTS SEG NFR BLD AUTO: 81 % (ref 43–75)
NRBC BLD AUTO-RTO: 0 /100 WBCS
P AXIS: 257 DEGREES
P AXIS: 269 DEGREES
P AXIS: 87 DEGREES
P AXIS: 88 DEGREES
PLATELET # BLD AUTO: 335 THOUSANDS/UL (ref 149–390)
PMV BLD AUTO: 9.3 FL (ref 8.9–12.7)
POTASSIUM SERPL-SCNC: 4.2 MMOL/L (ref 3.5–5.3)
PR INTERVAL: 140 MS
PR INTERVAL: 142 MS
QRS AXIS: 101 DEGREES
QRS AXIS: 102 DEGREES
QRS AXIS: 97 DEGREES
QRS AXIS: 98 DEGREES
QRSD INTERVAL: 104 MS
QRSD INTERVAL: 106 MS
QRSD INTERVAL: 116 MS
QRSD INTERVAL: 116 MS
QT INTERVAL: 268 MS
QT INTERVAL: 306 MS
QT INTERVAL: 310 MS
QT INTERVAL: 320 MS
QTC INTERVAL: 394 MS
QTC INTERVAL: 396 MS
QTC INTERVAL: 406 MS
QTC INTERVAL: 448 MS
RBC # BLD AUTO: 4.23 MILLION/UL (ref 3.88–5.62)
SODIUM SERPL-SCNC: 137 MMOL/L (ref 136–145)
T WAVE AXIS: -16 DEGREES
T WAVE AXIS: -20 DEGREES
T WAVE AXIS: 50 DEGREES
T WAVE AXIS: 59 DEGREES
VENTRICULAR RATE: 101 BPM
VENTRICULAR RATE: 126 BPM
VENTRICULAR RATE: 130 BPM
VENTRICULAR RATE: 97 BPM
WBC # BLD AUTO: 10.56 THOUSAND/UL (ref 4.31–10.16)

## 2018-11-02 PROCEDURE — 93010 ELECTROCARDIOGRAM REPORT: CPT | Performed by: INTERNAL MEDICINE

## 2018-11-02 PROCEDURE — 80048 BASIC METABOLIC PNL TOTAL CA: CPT | Performed by: INTERNAL MEDICINE

## 2018-11-02 PROCEDURE — 85025 COMPLETE CBC W/AUTO DIFF WBC: CPT | Performed by: INTERNAL MEDICINE

## 2018-11-02 PROCEDURE — 92960 CARDIOVERSION ELECTRIC EXT: CPT | Performed by: INTERNAL MEDICINE

## 2018-11-02 PROCEDURE — 93005 ELECTROCARDIOGRAM TRACING: CPT

## 2018-11-02 PROCEDURE — 93312 ECHO TRANSESOPHAGEAL: CPT

## 2018-11-02 RX ORDER — SODIUM CHLORIDE, SODIUM LACTATE, POTASSIUM CHLORIDE, CALCIUM CHLORIDE 600; 310; 30; 20 MG/100ML; MG/100ML; MG/100ML; MG/100ML
INJECTION, SOLUTION INTRAVENOUS CONTINUOUS PRN
Status: DISCONTINUED | OUTPATIENT
Start: 2018-11-02 | End: 2018-11-02 | Stop reason: SURG

## 2018-11-02 RX ORDER — PROPOFOL 10 MG/ML
INJECTION, EMULSION INTRAVENOUS AS NEEDED
Status: DISCONTINUED | OUTPATIENT
Start: 2018-11-02 | End: 2018-11-02 | Stop reason: SURG

## 2018-11-02 RX ORDER — SODIUM CHLORIDE 9 MG/ML
75 INJECTION, SOLUTION INTRAVENOUS CONTINUOUS
Status: DISCONTINUED | OUTPATIENT
Start: 2018-11-02 | End: 2018-11-06 | Stop reason: HOSPADM

## 2018-11-02 RX ADMIN — PROPOFOL 100 MG: 10 INJECTION, EMULSION INTRAVENOUS at 11:55

## 2018-11-02 RX ADMIN — PROPOFOL 20 MG: 10 INJECTION, EMULSION INTRAVENOUS at 11:58

## 2018-11-02 RX ADMIN — SODIUM CHLORIDE, SODIUM LACTATE, POTASSIUM CHLORIDE, AND CALCIUM CHLORIDE: .6; .31; .03; .02 INJECTION, SOLUTION INTRAVENOUS at 11:15

## 2018-11-02 RX ADMIN — PROPOFOL 20 MG: 10 INJECTION, EMULSION INTRAVENOUS at 12:01

## 2018-11-02 RX ADMIN — TOPICAL ANESTHETIC 1 SPRAY: 200 SPRAY DENTAL; PERIODONTAL at 11:50

## 2018-11-02 NOTE — ANESTHESIA PREPROCEDURE EVALUATION
Review of Systems/Medical History  Patient summary reviewed  Chart reviewed      Cardiovascular  EKG reviewed, Hyperlipidemia, Hypertension , Past MI > 6 months, CAD , CAD status: native vessel or graft,   Comment: LEFT VENTRICLE:  Systolic function was normal  Ejection fraction was estimated to be 55 %  There was hypokinesis of the basal inferior wall(s)      RIGHT VENTRICLE:  The size was normal   Systolic function was normal      IVC, HEPATIC VEINS:  The inferior vena cava was dilated ,  Pulmonary  Pneumonia, COPD severe- O2 dependent , No asthma ,        GI/Hepatic    GERD ,        Negative  ROS        Endo/Other  History of thyroid disease , hyperthyroidism,      GYN  Negative gynecology ROS          Hematology  Negative hematology ROS      Musculoskeletal    Arthritis     Neurology    TIA, CVA , no residual symptoms,    Psychology   Depression ,              Physical Exam    Airway    Mallampati score: II  TM Distance: >3 FB  Neck ROM: full     Dental       Cardiovascular      Pulmonary      Other Findings        Anesthesia Plan  ASA Score- 3     Anesthesia Type- IV sedation with anesthesia with ASA Monitors  Additional Monitors:   Airway Plan:         Plan Factors- Patient instructed to abstain from smoking on day of procedure       Induction- intravenous  Postoperative Plan-     Informed Consent- Anesthetic plan and risks discussed with patient  I personally reviewed this patient with the CRNA  Discussed and agreed on the Anesthesia Plan with the CRNA  Ben Barrett

## 2018-11-02 NOTE — H&P (VIEW-ONLY)
Cardiology Outpatient Follow up Note    Vladislav Deluna 66 y o  male MRN: [id number]    10/22/18          Assessment/Plan:  1  Atrial flutter with variable block  1  Junior Harvey is still currently in atrial flutter today with progressive symptoms of decreased exercise tolerance and hypoxia  His rate is controlled in the 80s  2  Will schedule for RENEE/DCCV next week  3  Continue diltiazem  and Xarelto for cva prevention   4  Kfjod4cain: 6  5  Will consider definitive management with flutter ablation if unsuccessful DCCV or recurrence    2  CAD with prior CABG ×4 2/2001  · LIMA to LAD, SVG to the RCA, sequential SVG to the diagonal and OM1  · Lexiscan 2016: No ischemia; normal LVEF  · Cont aspirin and atorvastatin  BB discontinued due to reported emphysema    3  Left carotid endarterectomy following an episode of amaurosis fugax  · Cont aspirin and atorvastatin  No acute concerns  4  Chronic hypoxic respiraotry failure- on 2L O2  · He was recently started on O2 in the hospital and is using 2L almost continuously  · Will start lasix 40mg PO daily  The atrial flutter could be contributing to his volume overload  · Additionally his recent PNA can be contributing factor  5  COPD/Emphysema  · Reported hx of coal exposure; no significant tobacco use reported  · Will refer to  Pulmonary associates    6  Dyslipidemia- cont atorvastatin        1  Irregular heart beat  Ambulatory referral to Cardiology       HPI:   Junior Harvey is a very pleasant 61-year-old man with a history of CAD s/p CABG and PCI as above who presents to establish care following a recent hospitalization  He was admitted from 10/7-10/10 with sepsis secondary to pneumonia and was found to be in atrial flutter with rapid ventricular response and variable block  His home dose of metoprolol was discontinued and he was started on diltiazem  mg daily at discharge as well as Xarelto for CVA prevention   Additionally he was treated with a course of Jimyaquin and was started on O2 prior to discharge for hypoxia  On presentation in the office today he is still in atrial flutter with variable block with heart rates in the 80s  He complains of progressive dyspnea on exertion and lower extremity edema to the point that he cannot walk to from the house to the car without the O2  He denies overt chest pain or palpitations  His daughter states that he is using the O2 continuously throughout the day  He does have a history of emphysema which he states is secondary to coal exposure and not tobacco abuse  He admits to only occasional tobacco use in the 1950s  He denies any other concerns today         Patient Active Problem List   Diagnosis    CAD (coronary atherosclerotic disease)    Hypertension    Hyperlipemia    Atypical chest pain    Hypothyroid    GERD (gastroesophageal reflux disease)    History of stroke    Sciatica of right side    Hyperlipidemia    Em[facility] lung [facility] )    Arthritis    Stenosis of left carotid artery    S/P CABG x 4    Pre-operative cardiovascular examination    Acute left-sided low back pain with left-sided sciatica    Back pain    COPD exacerbation (HCC)    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    Bradycardia    Vision changes    Urinary retention due to benign prostatic hyperplasia    Bladder cancer ([facility] )    CKD (chronic kidney disease), stage II    CAP (community acquired pneumonia)    Tachycardia    Pneumonia due to infectious organism    Irregular heart beat    Localized edema    Hyperkalemia       Allergies   Allergen Reactions    Penicillins Swelling and Itching         Current Outpatient Prescriptions:     acetaminophen (TYLENOL) 325 mg tablet, Take 2 tablets (650 mg total) by mouth every 6 (six) hours as needed for mild pain, Disp: 30 tablet, Rfl: 0    aspirin 81 mg chewable tablet, Chew 81 mg daily  , Disp: , Rfl:     atorvastatin (LIPITOR) 40 mg tablet, Take 1 tablet (40 mg total) by mouth daily, Disp: 90 tablet, Rfl: 1    diltiazem (CARDIZEM CD) 240 mg 24 hr capsule, Take 1 capsule (240 mg total) by mouth daily, Disp: 30 capsule, Rfl: 0    esomeprazole (NexIUM) 20 mg capsule, Take 20 mg by mouth every other day  , Disp: , Rfl:     loratadine (CLARITIN) 10 mg tablet, Take 1 tablet (10 mg total) by mouth daily, Disp: 30 tablet, Rfl: 0    predniSONE 10 mg tablet, 4 tabs daily for 2 days then 3 tabs daily x 2 days then 2 tabs daily x 2 days then 1 tabs for 2 days  Take with food in am, Disp: 20 tablet, Rfl: 0    rivaroxaban (XARELTO) 20 mg tablet, Take 1 tablet (20 mg total) by mouth daily with dinner, Disp: 30 tablet, Rfl: 1    Past Medical History:   Diagnosis Date    Arthritis     Benign prostatic hyperplasia without lower urinary tract symptoms     without Urinary Obstruction    CAD (coronary artery disease)     Chronic obstructive lung disease (HCC)     Coronary arteriosclerosis     Depression     Emphysema lung (HCC)     GERD (gastroesophageal reflux disease)     Hyperlipidemia     Hypertension     Myocardial infarction (HCC)     Psoriasis     Requires supplemental oxygen     at bedtime during high humid days only    Stroke [facility])     TIA 1/2018       Family History   Problem Relation Age of Onset    Lung cancer Mother     Cancer Mother     Other Father         sepsis       Past Surgical History:   Procedure Laterality Date    COLONOSCOPY      CORONARY ANGIOPLASTY  02/03/2001    PTCA of RCA    CORONARY ARTERY BYPASS GRAFT  02/07/2001    x4- [place]    HERNIA REPAIR      CA THROMBOENDARTECTMY Ozell Bongo INCIS Left 2/20/2018    Procedure: ENDARTERECTOMY ARTERY CAROTID WITH PATCH ANGIOPLASTY;  Surgeon: Jake Arguelles MD;  [facility]: [facility];  Service: Vascular    TRANSURETHRAL RESECTION OF PROSTATE         Social History     Social History    Marital status:       Spouse name: N/A    Number of children: 3    Years of education: N/A     Occupational History    retired sergio      Social History Main Topics    Smoking status: Former Smoker     Packs/day: 1 00     Years: 3 00     Types: Cigarettes     Quit date: 1956    Smokeless tobacco: Never Used      Comment: Has a past history of cigarette smoking;Quit date 1956; 5 packs year history, per [employer]      Alcohol use No    Drug use: No    Sexual activity: Yes     Other Topics Concern    Not on file     Social History Narrative    Lives with granddaughter and daughter     Caffeine use, He admits to consuming caffeine VIA coffee ( 8 servings per day), per [employer]    Martial History: Currently , per [employer]    Occupation: Retired (prior occupational:  repairman), per [employer]        Review of Systems   Constitution: Negative for chills, diaphoresis, fever, weakness, weight gain and weight loss  Cardiovascular: Positive for dyspnea on exertion and leg swelling  Negative for chest pain, claudication, irregular heartbeat, near-syncope, orthopnea, palpitations, paroxysmal nocturnal dyspnea and syncope  Respiratory: Positive for shortness of breath  Gastrointestinal: Negative for nausea and vomiting  Vitals: /70   Pulse 80   Resp 18   Ht 5' 8" (1 727 m)   Wt 81 9 kg (180 lb 9 6 oz)   SpO2 99%   BMI 27 46 kg/m²       Physical Exam:     GEN: Alert and oriented x 3, in no acute distress  Well appearing and well nourished  HEENT: Sclera anicteric, conjunctivae pink, mucous membranes moist  Oropharynx clear  NECK: Supple, no carotid bruits, minimal JVD  Trachea midline, no thyromegaly  HEART: Regular rhythm, normal S1 and S2, no murmurs, clicks, gallops or rubs  PMI nondisplaced, no thrills  LUNGS: Clear to auscultation bilaterally; no wheezes, rales, or rhonchi  No increased work of breathing or signs of respiratory distress  ABDOMEN: Soft, nontender, nondistended, normoactive bowel sounds     EXTREMITIES: Skin warm and well perfused, no clubbing, or cyanosis, 2+ edema  Lab Results:       Lab Results   Component Value Date    HGBA1C 5 0 01/13/2018    HGBA1C 4 8 08/15/2016     Lab Results   Component Value Date    CHOL See scanned summary  08/15/2016    CHOL See scanned summary  03/17/2016     Lab Results   Component Value Date    HDL 44 01/14/2018    HDL See scanned summary  08/15/2016    HDL 46 08/14/2016     Lab Results   Component Value Date    LDLCALC 105 (H) 01/14/2018    LDLCALC 72 08/14/2016     Lab Results   Component Value Date    TRIG 81 01/14/2018    TRIG See scanned summary   08/15/2016    TRIG 110 08/14/2016     No components found for: CHOLHDL

## 2018-11-02 NOTE — ANESTHESIA POSTPROCEDURE EVALUATION
Post-Op Assessment Note      CV Status:  Stable    Mental Status:  Alert and awake    Hydration Status:  Stable    PONV Controlled:  None    Airway Patency:  Patent and adequate    Post Op Vitals Reviewed: Yes          Staff: Anesthesiologist, CRNA           BP   102/59   Temp      Pulse 94 NSR   Resp   18   SpO2   100%

## 2018-11-02 NOTE — DISCHARGE INSTRUCTIONS
Cardioversion   WHAT YOU NEED TO KNOW:   Cardioversion is a procedure that uses medicine or electrical shocks to correct arrhythmias  An arrhythmias is a heartbeat that is too slow, too fast, or irregular  It may prevent your body from getting the blood and oxygen it needs  Your heart has 4 chambers, called the atria and ventricles  The atria are at the top of your heart, and the ventricles are at the bottom of your heart  Most arrhythmias that need cardioversion start in the atria  DISCHARGE INSTRUCTIONS:   Call 911 for any of the following:   · You have any of the following signs of a heart attack:      ¨ Squeezing, pressure, or pain in your chest that lasts longer than 5 minutes or returns    ¨ Discomfort or pain in your back, neck, jaw, stomach, or arm     ¨ Trouble breathing    ¨ Nausea or vomiting    ¨ Lightheadedness or a sudden cold sweat, especially with chest pain or trouble breathing    · You have any of the following signs of a stroke:      ¨ Numbness or drooping on one side of your face     ¨ Weakness in an arm or leg    ¨ Confusion or difficulty speaking    ¨ Dizziness, a severe headache, or vision loss    · You feel lightheaded, short of breath, and have chest pain  · You cough up blood  · You have trouble breathing  Seek care immediately if:   · You feel your heart beating fast or fluttering  · You feel weak or faint  · Your leg or arm is larger than usual, painful, and warm  Contact your healthcare provider if:   · Your skin is itchy, swollen, or you have a rash  · You have questions or concerns about your condition or care  Medicines: You may need any of the following:  · Heart medicines  help control your heart rate and rhythm  · Blood thinners    help prevent blood clots  Examples of blood thinners include heparin and warfarin  Clots can cause strokes, heart attacks, and death   The following are general safety guidelines to follow while you are taking a blood thinner:    ¨ Watch for bleeding and bruising while you take blood thinners  Watch for bleeding from your gums or nose  Watch for blood in your urine and bowel movements  Use a soft washcloth on your skin, and a soft toothbrush to brush your teeth  This can keep your skin and gums from bleeding  If you shave, use an electric shaver  Do not play contact sports  ¨ Tell your dentist and other healthcare providers that you take anticoagulants  Wear a bracelet or necklace that says you take this medicine  ¨ Do not start or stop any medicines unless your healthcare provider tells you to  Many medicines cannot be used with blood thinners  ¨ Tell your healthcare provider right away if you forget to take the medicine, or if you take too much  ¨ Warfarin  is a blood thinner that you may need to take  The following are things you should be aware of if you take warfarin  § Foods and medicines can affect the amount of warfarin in your blood  Do not make major changes to your diet while you take warfarin  Warfarin works best when you eat about the same amount of vitamin K every day  Vitamin K is found in green leafy vegetables and certain other foods  Ask for more information about what to eat when you are taking warfarin  § You will need to see your healthcare provider for follow-up visits when you are on warfarin  You will need regular blood tests  These tests are used to decide how much medicine you need  · Take your medicine as directed  Contact your healthcare provider if you think your medicine is not helping or if you have side effects  Tell him or her if you are allergic to any medicine  Keep a list of the medicines, vitamins, and herbs you take  Include the amounts, and when and why you take them  Bring the list or the pill bottles to follow-up visits  Carry your medicine list with you in case of an emergency  Self-care:   · Rest as directed  Do not drive for at least 24 hours   Ask your healthcare provider when you can return to your normal activities  · Check your heart rate and blood pressure as directed  Ask your healthcare provider what your heart rate and blood pressure should be  · Do not smoke  Nicotine and other chemicals in cigarettes and cigars can cause heart and lung damage  They can also increase your risk for another arrhythmia  Ask your healthcare provider for information if you currently smoke and need help to quit  E-cigarettes or smokeless tobacco still contain nicotine  Talk to your healthcare provider before you use these products  · Eat heart healthy foods  These include fruits, vegetables, whole-grain breads, low-fat dairy products, beans, lean meats, and fish  Replace butter and margarine with heart-healthy oils such as olive oil and canola oil  · Maintain a healthy weight  Ask your healthcare provider how much you should weigh  Ask him to help you create a weight loss plan if you are overweight  Follow up with your healthcare provider as directed:  Write down your questions so you remember to ask them during your visits  © 2017 [facility] Information is for End User's use only and may not be sold, redistributed or otherwise used for commercial purposes  All illustrations and images included in CareNotes® are the copyrighted property of [employer] , [employer]  or [employer]  The above information is an  only  It is not intended as medical advice for individual conditions or treatments  Talk to your doctor, nurse or pharmacist before following any medical regimen to see if it is safe and effective for you

## 2018-11-02 NOTE — INTERVAL H&P NOTE
Update: (This section must be completed if the H&P was completed greater than 24 hrs to procedure or admission)    H&P reviewed  After examining the patient, I find no changed to the H&P since it had been written  Patient re-evaluated   Accept as history and physical     Neal Magana MD/November 2, 2018/11:38 AM

## 2018-11-02 NOTE — PROCEDURES
Procedures     PRE-OP DIAGNOSIS:  Atrial flutter    PROCEDURE: ERNEE/DCCV    CONSENT: The nature of the procedure, risks and alternatives were discussed with the patient who gave informed consent  :  Preston Murray MD    ANESTHESIA:  Sedation provided by anesthesiology  ANTI-COAGULATION: Xarelto    ELECTRICAL PAD PLACEMENT: Anteroposteriorly interscapular region and upper sternum  IMPRESSION: There was no echocardiographic evidence of left atrial or left atrial appendage thrombus  Please see report for full details  Successful cardioversion following a single synchronized biphasic shock at 200J  Post Procedure Findings: Sinus rhythm documented by 12 lead ECG  COMPLICATIONS:  None

## 2018-11-05 DIAGNOSIS — I49.9 IRREGULAR HEART BEAT: ICD-10-CM

## 2018-11-05 RX ORDER — DILTIAZEM HYDROCHLORIDE 240 MG/1
240 CAPSULE, COATED, EXTENDED RELEASE ORAL DAILY
Qty: 30 CAPSULE | Refills: 11 | Status: ON HOLD | OUTPATIENT
Start: 2018-11-05 | End: 2018-11-09

## 2018-11-07 ENCOUNTER — HOSPITAL ENCOUNTER (INPATIENT)
Facility: HOSPITAL | Age: 78
LOS: 2 days | Discharge: HOME | DRG: 177 | End: 2018-11-09
Attending: EMERGENCY MEDICINE | Admitting: INTERNAL MEDICINE
Payer: MEDICARE

## 2018-11-07 ENCOUNTER — OFFICE VISIT (OUTPATIENT)
Dept: PULMONOLOGY | Facility: MEDICAL CENTER | Age: 78
End: 2018-11-07
Payer: MEDICARE

## 2018-11-07 ENCOUNTER — APPOINTMENT (EMERGENCY)
Dept: CT IMAGING | Facility: HOSPITAL | Age: 78
DRG: 177 | End: 2018-11-07
Payer: MEDICARE

## 2018-11-07 VITALS
SYSTOLIC BLOOD PRESSURE: 122 MMHG | HEIGHT: 68 IN | TEMPERATURE: 99.2 F | OXYGEN SATURATION: 82 % | BODY MASS INDEX: 26.07 KG/M2 | WEIGHT: 172 LBS | HEART RATE: 94 BPM | RESPIRATION RATE: 12 BRPM | DIASTOLIC BLOOD PRESSURE: 62 MMHG

## 2018-11-07 DIAGNOSIS — R93.89 ABNORMAL CT SCAN, CHEST: ICD-10-CM

## 2018-11-07 DIAGNOSIS — R06.02 SOB (SHORTNESS OF BREATH): Primary | ICD-10-CM

## 2018-11-07 DIAGNOSIS — J96.21 ACUTE ON CHRONIC RESPIRATORY FAILURE WITH HYPOXIA (HCC): ICD-10-CM

## 2018-11-07 DIAGNOSIS — D64.9 ANEMIA: ICD-10-CM

## 2018-11-07 DIAGNOSIS — J18.9 LEFT LOWER LOBE PNEUMONIA: ICD-10-CM

## 2018-11-07 DIAGNOSIS — J18.9 COMMUNITY ACQUIRED PNEUMONIA OF LEFT LOWER LOBE OF LUNG: ICD-10-CM

## 2018-11-07 DIAGNOSIS — J43.2 CENTRILOBULAR EMPHYSEMA (HCC): ICD-10-CM

## 2018-11-07 DIAGNOSIS — J44.1 COPD EXACERBATION (HCC): ICD-10-CM

## 2018-11-07 DIAGNOSIS — I49.9 IRREGULAR HEART BEAT: ICD-10-CM

## 2018-11-07 DIAGNOSIS — E87.1 HYPONATREMIA: ICD-10-CM

## 2018-11-07 DIAGNOSIS — J18.9 LEFT UPPER LOBE PNEUMONIA: Primary | ICD-10-CM

## 2018-11-07 DIAGNOSIS — J90 PLEURAL EFFUSION, LEFT: ICD-10-CM

## 2018-11-07 PROBLEM — I48.92 ATRIAL FLUTTER (HCC): Status: ACTIVE | Noted: 2018-10-07

## 2018-11-07 PROBLEM — E87.5 HYPERKALEMIA: Status: RESOLVED | Noted: 2018-10-19 | Resolved: 2018-11-07

## 2018-11-07 PROBLEM — J96.11 CHRONIC RESPIRATORY FAILURE WITH HYPOXIA (HCC): Status: ACTIVE | Noted: 2018-11-07

## 2018-11-07 LAB
ALBUMIN SERPL BCP-MCNC: 2 G/DL (ref 3.5–5)
ALP SERPL-CCNC: 82 U/L (ref 46–116)
ALT SERPL W P-5'-P-CCNC: 51 U/L (ref 12–78)
ANION GAP SERPL CALCULATED.3IONS-SCNC: 7 MMOL/L (ref 4–13)
APTT PPP: 44 SECONDS (ref 24–36)
AST SERPL W P-5'-P-CCNC: 30 U/L (ref 5–45)
BASOPHILS # BLD MANUAL: 0 THOUSAND/UL (ref 0–0.1)
BASOPHILS NFR MAR MANUAL: 0 % (ref 0–1)
BILIRUB SERPL-MCNC: 0.8 MG/DL (ref 0.2–1)
BUN SERPL-MCNC: 21 MG/DL (ref 5–25)
CALCIUM SERPL-MCNC: 8.6 MG/DL (ref 8.3–10.1)
CHLORIDE SERPL-SCNC: 97 MMOL/L (ref 100–108)
CO2 SERPL-SCNC: 28 MMOL/L (ref 21–32)
CREAT SERPL-MCNC: 1.1 MG/DL (ref 0.6–1.3)
EOSINOPHIL # BLD MANUAL: 0 THOUSAND/UL (ref 0–0.4)
EOSINOPHIL NFR BLD MANUAL: 0 % (ref 0–6)
ERYTHROCYTE [DISTWIDTH] IN BLOOD BY AUTOMATED COUNT: 14.2 % (ref 11.6–15.1)
GFR SERPL CREATININE-BSD FRML MDRD: 64 ML/MIN/1.73SQ M
GLUCOSE SERPL-MCNC: 113 MG/DL (ref 65–140)
HCT VFR BLD AUTO: 33.4 % (ref 36.5–49.3)
HGB BLD-MCNC: 10.6 G/DL (ref 12–17)
INR PPP: 2 (ref 0.86–1.17)
LACTATE SERPL-SCNC: 1.2 MMOL/L (ref 0.5–2)
LYMPHOCYTES # BLD AUTO: 0.35 THOUSAND/UL (ref 0.6–4.47)
LYMPHOCYTES # BLD AUTO: 3 % (ref 14–44)
MCH RBC QN AUTO: 27.5 PG (ref 26.8–34.3)
MCHC RBC AUTO-ENTMCNC: 31.7 G/DL (ref 31.4–37.4)
MCV RBC AUTO: 87 FL (ref 82–98)
MONOCYTES # BLD AUTO: 0.23 THOUSAND/UL (ref 0–1.22)
MONOCYTES NFR BLD: 2 % (ref 4–12)
NEUTROPHILS # BLD MANUAL: 11.14 THOUSAND/UL (ref 1.85–7.62)
NEUTS BAND NFR BLD MANUAL: 2 % (ref 0–8)
NEUTS SEG NFR BLD AUTO: 93 % (ref 43–75)
NRBC BLD AUTO-RTO: 0 /100 WBCS
NT-PROBNP SERPL-MCNC: 334 PG/ML
PLATELET # BLD AUTO: 337 THOUSANDS/UL (ref 149–390)
PLATELET BLD QL SMEAR: ADEQUATE
PMV BLD AUTO: 9.7 FL (ref 8.9–12.7)
POTASSIUM SERPL-SCNC: 4.4 MMOL/L (ref 3.5–5.3)
PROT SERPL-MCNC: 6.8 G/DL (ref 6.4–8.2)
PROTHROMBIN TIME: 22.1 SECONDS (ref 11.8–14.2)
RBC # BLD AUTO: 3.85 MILLION/UL (ref 3.88–5.62)
SODIUM SERPL-SCNC: 132 MMOL/L (ref 136–145)
TOTAL CELLS COUNTED SPEC: 100
TROPONIN I SERPL-MCNC: <0.02 NG/ML
WBC # BLD AUTO: 11.73 THOUSAND/UL (ref 4.31–10.16)

## 2018-11-07 PROCEDURE — 85027 COMPLETE CBC AUTOMATED: CPT | Performed by: EMERGENCY MEDICINE

## 2018-11-07 PROCEDURE — 94664 DEMO&/EVAL PT USE INHALER: CPT

## 2018-11-07 PROCEDURE — 83605 ASSAY OF LACTIC ACID: CPT | Performed by: EMERGENCY MEDICINE

## 2018-11-07 PROCEDURE — 93005 ELECTROCARDIOGRAM TRACING: CPT

## 2018-11-07 PROCEDURE — 99285 EMERGENCY DEPT VISIT HI MDM: CPT

## 2018-11-07 PROCEDURE — 71250 CT THORAX DX C-: CPT

## 2018-11-07 PROCEDURE — 94010 BREATHING CAPACITY TEST: CPT | Performed by: NURSE PRACTITIONER

## 2018-11-07 PROCEDURE — 87040 BLOOD CULTURE FOR BACTERIA: CPT | Performed by: EMERGENCY MEDICINE

## 2018-11-07 PROCEDURE — 96374 THER/PROPH/DIAG INJ IV PUSH: CPT

## 2018-11-07 PROCEDURE — 85610 PROTHROMBIN TIME: CPT | Performed by: EMERGENCY MEDICINE

## 2018-11-07 PROCEDURE — 87631 RESP VIRUS 3-5 TARGETS: CPT | Performed by: EMERGENCY MEDICINE

## 2018-11-07 PROCEDURE — 85730 THROMBOPLASTIN TIME PARTIAL: CPT | Performed by: EMERGENCY MEDICINE

## 2018-11-07 PROCEDURE — 96361 HYDRATE IV INFUSION ADD-ON: CPT

## 2018-11-07 PROCEDURE — 83880 ASSAY OF NATRIURETIC PEPTIDE: CPT | Performed by: EMERGENCY MEDICINE

## 2018-11-07 PROCEDURE — 94760 N-INVAS EAR/PLS OXIMETRY 1: CPT

## 2018-11-07 PROCEDURE — 80053 COMPREHEN METABOLIC PANEL: CPT | Performed by: EMERGENCY MEDICINE

## 2018-11-07 PROCEDURE — 99214 OFFICE O/P EST MOD 30 MIN: CPT | Performed by: NURSE PRACTITIONER

## 2018-11-07 PROCEDURE — 36415 COLL VENOUS BLD VENIPUNCTURE: CPT | Performed by: EMERGENCY MEDICINE

## 2018-11-07 PROCEDURE — 85007 BL SMEAR W/DIFF WBC COUNT: CPT | Performed by: EMERGENCY MEDICINE

## 2018-11-07 PROCEDURE — 99223 1ST HOSP IP/OBS HIGH 75: CPT | Performed by: INTERNAL MEDICINE

## 2018-11-07 PROCEDURE — 84484 ASSAY OF TROPONIN QUANT: CPT | Performed by: EMERGENCY MEDICINE

## 2018-11-07 RX ORDER — ACETAMINOPHEN 325 MG/1
650 TABLET ORAL EVERY 6 HOURS PRN
Status: DISCONTINUED | OUTPATIENT
Start: 2018-11-07 | End: 2018-11-09 | Stop reason: HOSPADM

## 2018-11-07 RX ORDER — FUROSEMIDE 40 MG/1
40 TABLET ORAL DAILY
Status: DISCONTINUED | OUTPATIENT
Start: 2018-11-08 | End: 2018-11-09 | Stop reason: HOSPADM

## 2018-11-07 RX ORDER — DOCUSATE SODIUM 100 MG/1
100 CAPSULE, LIQUID FILLED ORAL 2 TIMES DAILY PRN
Status: DISCONTINUED | OUTPATIENT
Start: 2018-11-07 | End: 2018-11-09 | Stop reason: HOSPADM

## 2018-11-07 RX ORDER — BENZONATATE 100 MG/1
100 CAPSULE ORAL 3 TIMES DAILY PRN
Status: DISCONTINUED | OUTPATIENT
Start: 2018-11-07 | End: 2018-11-09 | Stop reason: HOSPADM

## 2018-11-07 RX ORDER — LEVOFLOXACIN 5 MG/ML
750 INJECTION, SOLUTION INTRAVENOUS EVERY 24 HOURS
Status: DISCONTINUED | OUTPATIENT
Start: 2018-11-08 | End: 2018-11-09

## 2018-11-07 RX ORDER — DILTIAZEM HYDROCHLORIDE 180 MG/1
360 CAPSULE, COATED, EXTENDED RELEASE ORAL DAILY
Status: DISCONTINUED | OUTPATIENT
Start: 2018-11-08 | End: 2018-11-09 | Stop reason: HOSPADM

## 2018-11-07 RX ORDER — PANTOPRAZOLE SODIUM 20 MG/1
20 TABLET, DELAYED RELEASE ORAL
Status: DISCONTINUED | OUTPATIENT
Start: 2018-11-08 | End: 2018-11-09 | Stop reason: HOSPADM

## 2018-11-07 RX ORDER — LEVOFLOXACIN 5 MG/ML
750 INJECTION, SOLUTION INTRAVENOUS ONCE
Status: COMPLETED | OUTPATIENT
Start: 2018-11-07 | End: 2018-11-07

## 2018-11-07 RX ORDER — DILTIAZEM HYDROCHLORIDE 5 MG/ML
10 INJECTION INTRAVENOUS EVERY 4 HOURS PRN
Status: DISCONTINUED | OUTPATIENT
Start: 2018-11-07 | End: 2018-11-09 | Stop reason: HOSPADM

## 2018-11-07 RX ORDER — ONDANSETRON 2 MG/ML
4 INJECTION INTRAMUSCULAR; INTRAVENOUS EVERY 6 HOURS PRN
Status: DISCONTINUED | OUTPATIENT
Start: 2018-11-07 | End: 2018-11-09 | Stop reason: HOSPADM

## 2018-11-07 RX ORDER — SODIUM CHLORIDE FOR INHALATION 0.9 %
3 VIAL, NEBULIZER (ML) INHALATION EVERY 6 HOURS PRN
Status: DISCONTINUED | OUTPATIENT
Start: 2018-11-07 | End: 2018-11-09 | Stop reason: HOSPADM

## 2018-11-07 RX ORDER — GUAIFENESIN/DEXTROMETHORPHAN 100-10MG/5
10 SYRUP ORAL EVERY 4 HOURS PRN
Status: DISCONTINUED | OUTPATIENT
Start: 2018-11-07 | End: 2018-11-09 | Stop reason: HOSPADM

## 2018-11-07 RX ORDER — LEVALBUTEROL 1.25 MG/.5ML
1.25 SOLUTION, CONCENTRATE RESPIRATORY (INHALATION) EVERY 6 HOURS PRN
Status: DISCONTINUED | OUTPATIENT
Start: 2018-11-07 | End: 2018-11-09 | Stop reason: HOSPADM

## 2018-11-07 RX ORDER — LORATADINE 10 MG/1
10 TABLET ORAL DAILY
Status: DISCONTINUED | OUTPATIENT
Start: 2018-11-08 | End: 2018-11-09 | Stop reason: HOSPADM

## 2018-11-07 RX ORDER — ASPIRIN 81 MG/1
81 TABLET, CHEWABLE ORAL DAILY
Status: DISCONTINUED | OUTPATIENT
Start: 2018-11-08 | End: 2018-11-09 | Stop reason: HOSPADM

## 2018-11-07 RX ORDER — MAGNESIUM HYDROXIDE/ALUMINUM HYDROXICE/SIMETHICONE 120; 1200; 1200 MG/30ML; MG/30ML; MG/30ML
30 SUSPENSION ORAL EVERY 6 HOURS PRN
Status: DISCONTINUED | OUTPATIENT
Start: 2018-11-07 | End: 2018-11-09 | Stop reason: HOSPADM

## 2018-11-07 RX ORDER — ATORVASTATIN CALCIUM 40 MG/1
40 TABLET, FILM COATED ORAL DAILY
Status: DISCONTINUED | OUTPATIENT
Start: 2018-11-08 | End: 2018-11-09 | Stop reason: HOSPADM

## 2018-11-07 RX ADMIN — LEVOFLOXACIN 750 MG: 5 INJECTION, SOLUTION INTRAVENOUS at 11:53

## 2018-11-07 RX ADMIN — GUAIFENESIN AND DEXTROMETHORPHAN 10 ML: 100; 10 SYRUP ORAL at 23:52

## 2018-11-07 RX ADMIN — ACETAMINOPHEN 650 MG: 325 TABLET, FILM COATED ORAL at 23:52

## 2018-11-07 RX ADMIN — RIVAROXABAN 20 MG: 20 TABLET, FILM COATED ORAL at 19:28

## 2018-11-07 RX ADMIN — SODIUM CHLORIDE 1000 ML: 0.9 INJECTION, SOLUTION INTRAVENOUS at 10:23

## 2018-11-07 NOTE — RESPIRATORY THERAPY NOTE
RT Protocol Note  Malgorzata Singer 66 y o  male MRN: [id number]  Unit/Bed#: -01 Encounter: [id number]    Assessment    Principal Problem:    Atrial flutter ([facility] )  Active Problems:    Centrilobular emphysema ([facility] )    Chronic respiratory failure with hypoxia (HCC)    Abnormal CT scan, chest      Past Medical History:   Diagnosis Date    A-fib ([place] [address] )     Arthritis     Benign prostatic hyperplasia without lower urinary tract symptoms     without Urinary Obstruction    CAD (coronary artery disease)     Chronic obstructive lung disease (HCC)     Coronary arteriosclerosis     Depression     Emphysema lung (HCC)     GERD (gastroesophageal reflux disease)     Hyperlipidemia     Hypertension     Myocardial infarction ([place] [address] )     Psoriasis     Requires supplemental oxygen     at bedtime during high humid days only    Stroke [facility])     TIA 1/2018     Social History     Social History    Marital status:       Spouse name: N/A    Number of children: 3    Years of education: N/A     Occupational History    retired       Social History Main Topics    Smoking status: Former Smoker     Packs/day: 1 00     Years: 3 00     Types: Cigarettes     Quit date: 1956    Smokeless tobacco: Never Used      Comment: Has a past history of cigarette smoking;Quit date 1956; 5 packs year history, per [employer]      Alcohol use No    Drug use: No    Sexual activity: Yes     Other Topics Concern    None     Social History Narrative    Lives with granddaughter and daughter     Caffeine use, He admits to consuming caffeine VIA coffee ( 8 servings per day), per [employer]    Martial History: Currently , per [employer]    Occupation: Retired (prior occupational:  repairman), 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[employer]        Subjective         Objective    Physical Exam:   Assessment Type: Assess only  General Appearance: Alert, Awake  Respiratory Pattern: Dyspnea with exertion  Bilateral Breath Sounds: Diminished  Location Specific: Yes  Cough: Non-productive    Vitals:  Blood pressure 127/58, pulse 89, temperature 98 1 °F (36 7 °C), temperature source Oral, resp  rate 18, weight 78 kg (171 lb 15 3 oz), SpO2 98 %  Imaging and other studies: I have personally reviewed pertinent reports  Plan    Respiratory Plan: Home Bronchodilator Patient pathway        Resp Comments: Assessed patient per respiratory protocol  Patient denies for SOB at rest  No respiratory distress is noted at this time  Order  Q6PRN 1 25mg Xopenex with 3ml NSS for SOB and wheezing

## 2018-11-07 NOTE — ASSESSMENT & PLAN NOTE
Patient was discharged with supplemental oxygen  This has been acute and was hypoxic today when he presented into office  He also has irregular heart rate  I did note that he had is successful cardioversion on November 2nd  However heart rate is irregular and was up to 120 beats per minute  I am concerned that he is possibly back in atrial fibrillation  He is agreed to go to ER

## 2018-11-07 NOTE — ASSESSMENT & PLAN NOTE
Patient has history of centrilobular emphysema  He smoked for a very short time  He is currently not on any inhalers and this will be evaluated at his next visit  PFTs that were done shows severe obstructive defect on flow volume loop  Forced vital capacity is 1 61 L or 43% of predicted, FEV1 0 68 L or 25% and obstruction ratio is 42%  According to patient he has previously been on inhalers but has not used in some time  He refused any kind of inhaled maintenance corticosteroid and this will be evaluated next visit

## 2018-11-07 NOTE — PROGRESS NOTES
Progress Note - Gena Emery 1940, 66 y o  male MRN: [id number]    Unit/Bed#: ED 09 Encounter: [id number]    Primary Care Provider: Cathy Clifford MD   Date and time admitted to hospital: 11/7/2018  9:36 AM        * Atrial flutter [facility])   Assessment & Plan    · Patient transferred from pulmonary office to ER for tachycardia  · Patient reports tachycardia with exertion  · Will adjust cardiac meds for underlying a flutter  · Continue Xarelto     Abnormal CT scan, chest   Assessment & Plan    · CT scan noted for new changes from x-ray earlier last month  · Patient given CT does not have pneumonia  He feels he has a postnasal drip causing his cough  · No fevers chills night sweats  · Will continue IV antibiotics and check procalcitonin in a m  To assist with antibiotic management  · Patient agreeable to this plan     Centrilobular emphysema ([address] )   Assessment & Plan    · O2 dependent chronic hypoxic respiratory failure  · Continue pulmonary medications     Chronic respiratory failure with hypoxia (HCC)   Assessment & Plan    · Continue home oxygen       VTE Prophylaxis: Rivaroxaban (Xarelto)  / sequential compression device   Code Status:  DNR level 3  POLST: POLST form is not discussed and not completed at this time  Discussion with family:  Offer to call patient's family patient refused    Anticipated Length of Stay:  Patient will be admitted on an Inpatient basis with an anticipated length of stay of  greater than 2 midnights  Justification for Hospital Stay:  Patient with tachycardia and possible pneumonia on CT scanning requiring IV antibiotics will most likely require greater than 2 midnight stay    Total Time for Visit, including Counseling / Coordination of Care: 30 minutes  Greater than 50% of this total time spent on direct patient counseling and coordination of care  Chief Complaint:   My heart was beating fast    History of Present Illness:     Gena Emery is a 66 y o  male who presents with tachycardia  Patient recently discharged from [address] [place] [address] on October 10th for pneumonia  During the hospitalization was diagnosed with a flutter started on Xarelto and Cardizem  He was seen in the outpatient Cardiology Office on October 22nd continued on Xarelto and Cardizem  40 mg daily with plans for an outpatient RENEE and cardioversion  Patient was at outpatient Pulmonary office today noted to be tachycardic in the 120s and referred for admission  In the ER he was noted to have a abnormal CT scan of the chest   He does report a cough since discharge  He believes it is from postnasal drip  He is convinced that he does not have pneumonia  We did discuss new findings on CT scanning  Patient given antibiotics in the emergency room plan is to continue for now check procalcitonin a m  And also control heart rate    Review of Systems:    Review of Systems   Constitutional: Negative for chills, diaphoresis, fatigue, fever and unexpected weight change  HENT: Positive for postnasal drip, rhinorrhea and sinus pressure  Negative for sinus pain, sneezing, sore throat, tinnitus, trouble swallowing and voice change  Eyes: Negative for photophobia and visual disturbance  Respiratory: Positive for cough (Cough positive for clear phlegm ) and shortness of breath ( some shortness of breath with exertion)  Negative for choking, chest tightness, wheezing and stridor  Cardiovascular: Negative for chest pain, palpitations and leg swelling  Gastrointestinal: Negative for abdominal pain, blood in stool, constipation, diarrhea, nausea and vomiting  Endocrine: Negative for polyuria  Genitourinary: Negative for dysuria, frequency and urgency  Musculoskeletal: Negative for back pain, neck pain and neck stiffness  Skin: Negative for rash  Neurological: Negative for tremors, syncope and weakness  Hematological: Negative for adenopathy     Psychiatric/Behavioral: Negative for agitation and confusion  Past Medical and Surgical History:     Past Medical History:   Diagnosis Date    A-fib [facility])     Arthritis     Benign prostatic hyperplasia without lower urinary tract symptoms     without Urinary Obstruction    CAD (coronary artery disease)     Chronic obstructive lung disease (HCC)     Coronary arteriosclerosis     Depression     Emphysema lung (HCC)     GERD (gastroesophageal reflux disease)     Hyperlipidemia     Hypertension     Myocardial infarction (HCC)     Psoriasis     Requires supplemental oxygen     at bedtime during high humid days only    Stroke [facility])     TIA 1/2018       Past Surgical History:   Procedure Laterality Date    COLONOSCOPY      CORONARY ANGIOPLASTY  02/03/2001    PTCA of RCA    CORONARY ARTERY BYPASS GRAFT  02/07/2001    x4- [place]    HERNIA REPAIR      ME THROMBOENDARTECTMY Lynnette Broach INCIS Left 2/20/2018    Procedure: ENDARTERECTOMY ARTERY CAROTID WITH PATCH ANGIOPLASTY;  Surgeon: Rafia Coker MD;  [facility];  Service: Vascular    TRANSURETHRAL RESECTION OF PROSTATE         Meds/Allergies:    Prior to Admission medications    Medication Sig Start Date End Date Taking?  Authorizing Provider   acetaminophen (TYLENOL) 325 mg tablet Take 2 tablets (650 mg total) by mouth every 6 (six) hours as needed for mild pain 2/22/18  Yes Brian Sesay,    aspirin 81 mg chewable tablet Chew 81 mg daily     Yes Historical Provider, MD   atorvastatin (LIPITOR) 40 mg tablet Take 1 tablet (40 mg total) by mouth daily 3/30/18  Yes Liudmila Trejo MD   diltiazem (CARDIZEM CD) 240 mg 24 hr capsule Take 1 capsule (240 mg total) by mouth daily 11/5/18  Yes Tamiko Kaba MD   esomeprazole (NexIUM) 20 mg capsule Take 20 mg by mouth every other day     Yes Historical Provider, MD   furosemide (LASIX) 40 mg tablet Take 1 tablet (40 mg total) by mouth daily 10/22/18  Yes Louis Terry MD   loratadine (CLARITIN) 10 mg tablet Take 1 tablet (10 mg total) by mouth daily 10/17/18  Yes Inna Enriquez PA-C   rivaroxaban (XARELTO) 20 mg tablet Take 1 tablet (20 mg total) by mouth daily with dinner 10/10/18  Yes Malinda Kerr PA-C   predniSONE 10 mg tablet 4 tabs daily for 2 days then 3 tabs daily x 2 days then 2 tabs daily x 2 days then 1 tabs for 2 days  Take with food in am  Patient not taking: Reported on 11/7/2018  10/17/18 11/7/18  Elsa Cornelius PA-C     I have reviewed home medications using allscripts  Allergies: Allergies   Allergen Reactions    Penicillins Swelling and Itching       Social History:     Marital Status:    Occupation: no  Patient Pre-hospital Living Situation: no  Patient Pre-hospital Level of Mobility: no  Patient Pre-hospital Diet Restrictions: no  Substance Use History:   History   Alcohol Use No     History   Smoking Status    Former Smoker    Packs/day: 1 00    Years: 3 00    Types: Cigarettes    Quit date: 1956   Smokeless Tobacco    Never Used     Comment: Has a past history of cigarette smoking;Quit date 1956; 5 packs year history, per Allscripts       History   Drug Use No       Family History:    Family History   Problem Relation Age of Onset    Lung cancer Mother     Cancer Mother     Other Father         sepsis       Physical Exam:     Vitals:   Blood Pressure: 133/60 (11/07/18 1234)  Pulse: 101 (11/07/18 1234)  Temperature: 98 1 °F (36 7 °C) (11/07/18 0944)  Temp Source: Oral (11/07/18 0944)  Respirations: 18 (11/07/18 1234)  Weight - Scale: 78 kg (171 lb 15 3 oz) (11/07/18 0943)  SpO2: 92 % (11/07/18 1234)    Physical Exam   Constitutional: He is oriented to person, place, and time  No distress  HENT:   Head: Normocephalic and atraumatic  Mouth/Throat: No oropharyngeal exudate  Eyes: Pupils are equal, round, and reactive to light  EOM are normal  No scleral icterus  Neck: Neck supple  No JVD present  Cardiovascular: Normal heart sounds  Tachycardia present    Exam reveals no gallop and no friction rub  No murmur heard  Pulmonary/Chest: Effort normal  No respiratory distress  He has no wheezes  He has no rales  Abdominal: Soft  He exhibits no distension  There is no tenderness  There is no rebound and no guarding  Musculoskeletal: Normal range of motion  He exhibits no edema or tenderness  Lymphadenopathy:     He has no cervical adenopathy  Neurological: He is alert and oriented to person, place, and time  Skin: Skin is warm and dry  He is not diaphoretic  Psychiatric: He has a normal mood and affect  Additional Data:     Lab Results: I have personally reviewed pertinent reports  Results from last 7 days  Lab Units 11/07/18  1022 11/02/18  1058   WBC Thousand/uL 11 73* 10 56*   HEMOGLOBIN g/dL 10 6* 11 9*   HEMATOCRIT % 33 4* 37 9   PLATELETS Thousands/uL 337 335   BANDS PCT % 2  --    NEUTROS ABS Thousands/µL  --  8 47*   NEUTROS PCT %  --  81*   LYMPHS PCT %  --  7*   LYMPHO PCT % 3*  --    MONOS PCT %  --  8   MONO PCT % 2*  --    EOS PCT % 0 3       Results from last 7 days  Lab Units 11/07/18  1022   POTASSIUM mmol/L 4 4   CHLORIDE mmol/L 97*   CO2 mmol/L 28   BUN mg/dL 21   CREATININE mg/dL 1 10   ANION GAP mmol/L 7   CALCIUM mg/dL 8 6   ALBUMIN g/dL 2 0*   TOTAL BILIRUBIN mg/dL 0 80   ALK PHOS U/L 82   ALT U/L 51   AST U/L 30       Results from last 7 days  Lab Units 11/07/18  1022   INR  2 00*               Results from last 7 days  Lab Units 11/07/18  1131   LACTIC ACID mmol/L 1 2       Imaging: I have personally reviewed pertinent reports  CT chest without contrast   Final Result by Clau Reyes MD (11/07 1119)      Airspace consolidation seen in the left upper lobe, superior and posterior aspect of the left lower lobe suggest pneumonia          As compared to the previous radiograph from October 17, 2018 the airspace consolidation in the left upper and mid lung is new      Emphysema      Small to moderate left effusion   Follow-up suggested at 3 months to demonstrate resolution         Workstation performed: [id number]             EKG, Pathology, and Other Studies Reviewed on Admission:   · EKG:  Sinus rhythm with APCs tachycardic    Allscripts / Epic Records Reviewed: Yes     ** Please Note: This note has been constructed using a voice recognition system   **

## 2018-11-07 NOTE — ASSESSMENT & PLAN NOTE
· O2 dependent chronic hypoxic respiratory failure  · Continue pulmonary medications  · FEV1 noted to be 25% per last Pulmonary office visit today    · Patient currently on no maintenance inhalers and refuses maintenance steroids  · Will start respiratory protocol

## 2018-11-07 NOTE — ED PROVIDER NOTES
History  Chief Complaint   Patient presents with    Shortness of Breath     pt presents via hospital wheelchair for evaluation of low O2 sat  pt admitted for pneumonia approx 1 month ago and has been weak and short of breath since  sent from pulmonologist      68-year-old male presents to the emergency department for evaluation of shortness of breath  Patient states that he has had a hacking nonproductive cough  He coughs so hard overnight that he vomited  He is also having loose watery nonbloody stools  Patient has a history of emphysema  He was evaluated at the pulmonologist's office today and directed to the emergency department for concerns of pneumonia  He reports having a subjective fever last night  He denies chest pain  He does feel short of breath chronically and feels that his breathing is worse today  He does have a history of atrial flutter  He was admitted to the hospital in October for sepsis and community-acquired pneumonia  Patient denies increase in weight  No edema  History provided by:  Patient and medical records   used: No    Shortness of Breath   Severity:  Severe  Onset quality:  Gradual  Timing:  Constant  Progression:  Worsening  Chronicity:  Recurrent  Context: activity and URI    Relieved by:  Rest  Worsened by: Activity and coughing  Ineffective treatments:  Inhaler and sitting up  Associated symptoms: cough, fever and wheezing    Associated symptoms: no abdominal pain, no chest pain, no diaphoresis, no sore throat, no sputum production and no vomiting    Risk factors: no tobacco use        Prior to Admission Medications   Prescriptions Last Dose Informant Patient Reported?  Taking?   acetaminophen (TYLENOL) 325 mg tablet  Self No Yes   Sig: Take 2 tablets (650 mg total) by mouth every 6 (six) hours as needed for mild pain   aspirin 81 mg chewable tablet  Self Yes Yes   Sig: Chew 81 mg daily     atorvastatin (LIPITOR) 40 mg tablet  Self No Yes Sig: Take 1 tablet (40 mg total) by mouth daily   diltiazem (CARDIZEM CD) 240 mg 24 hr capsule   No Yes   Sig: Take 1 capsule (240 mg total) by mouth daily   esomeprazole (NexIUM) 20 mg capsule  Self Yes Yes   Sig: Take 20 mg by mouth every other day     furosemide (LASIX) 40 mg tablet   No Yes   Sig: Take 1 tablet (40 mg total) by mouth daily   loratadine (CLARITIN) 10 mg tablet  Self No Yes   Sig: Take 1 tablet (10 mg total) by mouth daily   rivaroxaban (XARELTO) 20 mg tablet  Self No Yes   Sig: Take 1 tablet (20 mg total) by mouth daily with dinner      Facility-Administered Medications: None       Past Medical History:   Diagnosis Date    [facility]fib ([facility] )     Arthritis     Benign prostatic hyperplasia without lower urinary tract symptoms     without Urinary Obstruction    CAD (coronary artery disease)     Chronic obstructive lung disease (HCC)     Coronary arteriosclerosis     Depression     Emphysema lung (HCC)     GERD (gastroesophageal reflux disease)     Hyperlipidemia     Hypertension     Myocardial infarction (HCC)     Psoriasis     Requires supplemental oxygen     at bedtime during high humid days only    Stroke [facility])     TIA 1/2018       Past Surgical History:   Procedure Laterality Date    COLONOSCOPY      CORONARY ANGIOPLASTY  02/03/2001    PTCA of RCA    CORONARY ARTERY BYPASS GRAFT  02/07/2001    x4- [facility]    HERNIA REPAIR      IA THROMBOENDARTECTMY NECK,NECK INCIS Left 2/20/2018    Procedure: ENDARTERECTOMY ARTERY CAROTID WITH PATCH ANGIOPLASTY;  Surgeon: Sheree Guadarrama MD;  Location: [facility];  Service: Vascular    TRANSURETHRAL RESECTION OF PROSTATE         Family History   Problem Relation Age of Onset    Lung cancer Mother     Cancer Mother     Other Father         sepsis     I have reviewed and agree with the history as documented      Social History   Substance Use Topics    Smoking status: Former Smoker     Packs/day: 1 00     Years: 3 00     Types: Cigarettes     Quit date: 26    Smokeless tobacco: Never Used      Comment: Has a past history of cigarette smoking;Quit date 1956; 5 packs year history, per Allscripts      Alcohol use No        Review of Systems   Constitutional: Positive for fever  Negative for appetite change and diaphoresis  HENT: Negative for sore throat  Respiratory: Positive for cough, shortness of breath and wheezing  Negative for sputum production  Cardiovascular: Negative for chest pain  Gastrointestinal: Negative for abdominal pain, nausea and vomiting  All other systems reviewed and are negative  Physical Exam  Physical Exam   Constitutional: He is oriented to person, place, and time  Vital signs are normal  He appears well-developed and well-nourished  No distress  HENT:   Head: Normocephalic and atraumatic  Eyes: Pupils are equal, round, and reactive to light  Conjunctivae and EOM are normal    Neck: Normal range of motion  Neck supple  Cardiovascular: Normal rate, regular rhythm, normal heart sounds and intact distal pulses  Pulmonary/Chest: Effort normal  No accessory muscle usage  No respiratory distress  He has decreased breath sounds in the left middle field and the left lower field  He has wheezes  He has rhonchi  He exhibits no tenderness  Abdominal: Soft  Normal appearance and bowel sounds are normal  He exhibits no distension  There is no tenderness  There is no rebound and no guarding  Musculoskeletal: Normal range of motion  He exhibits no edema, tenderness or deformity  Lymphadenopathy:     He has no cervical adenopathy  Neurological: He is alert and oriented to person, place, and time  He has normal strength and normal reflexes  Coordination normal    Skin: Skin is warm, dry and intact  No rash noted  Psychiatric: He has a normal mood and affect  His behavior is normal  Judgment and thought content normal    Nursing note and vitals reviewed        Vital Signs  ED Triage Vitals   Temperature Pulse Respirations Blood Pressure SpO2   11/07/18 0944 11/07/18 0943 11/07/18 0943 11/07/18 0943 11/07/18 0943   98 1 °F (36 7 °C) (!) 113 18 115/76 (!) 85 %      Temp Source Heart Rate Source Patient Position - Orthostatic VS BP Location FiO2 (%)   11/07/18 0944 11/07/18 0943 11/07/18 0943 11/07/18 0943 --   Oral Monitor Lying Left arm       Pain Score       11/07/18 0943       No Pain           Vitals:    11/07/18 1400 11/07/18 1430 11/07/18 1500 11/07/18 1620   BP: 109/58 123/64 141/68 127/58   Pulse: 102 102 102 100   Patient Position - Orthostatic VS: Lying Lying  Lying       Visual Acuity      ED Medications  Medications   sodium chloride 0 9 % bolus 1,000 mL (0 mL Intravenous Stopped 11/7/18 1340)   levofloxacin (LEVAQUIN) IVPB (premix) 750 mg (0 mg Intravenous Stopped 11/7/18 1323)       Diagnostic Studies  Results Reviewed     Procedure Component Value Units Date/Time    Lactic acid, plasma [[id number]]  (Normal) Collected:  11/07/18 1131    Lab Status:  Final result Specimen:  Blood from Arm, Right Updated:  11/07/18 1155     LACTIC ACID 1 2 mmol/L     Narrative:         Result may be elevated if tourniquet was used during collection  B-type natriuretic peptide [[id number]]  (Normal) Collected:  11/07/18 1022    Lab Status:  Final result Specimen:  Blood from Arm, Left Updated:  11/07/18 1152     NT-proBNP 334 pg/mL     Blood culture #2 [[id number]] Collected:  11/07/18 1131    Lab Status: In process Specimen:  Blood from Arm, Right Updated:  11/07/18 1135    CBC and differential [[id number]]  (Abnormal) Collected:  11/07/18 1022    Lab Status:  Final result Specimen:  Blood from Arm, Left Updated:  11/07/18 1113     WBC 11 73 (H) Thousand/uL      RBC 3 85 (L) Million/uL      Hemoglobin 10 6 (L) g/dL      Hematocrit 33 4 (L) %      MCV 87 fL      MCH 27 5 pg      MCHC 31 7 g/dL      RDW 14 2 %      MPV 9 7 fL      Platelets 433 Thousands/uL      nRBC 0 /100 WBCs     Narrative: This is an appended report  These results have been appended to a previously verified report  Troponin I [[id number]]  (Normal) Collected:  11/07/18 1022    Lab Status:  Final result Specimen:  Blood from Arm, Left Updated:  11/07/18 1103     Troponin I <0 02 ng/mL     Comprehensive metabolic panel [[id number]]  (Abnormal) Collected:  11/07/18 1022    Lab Status:  Final result Specimen:  Blood from Arm, Left Updated:  11/07/18 1056     Sodium 132 (L) mmol/L      Potassium 4 4 mmol/L      Chloride 97 (L) mmol/L      CO2 28 mmol/L      ANION GAP 7 mmol/L      BUN 21 mg/dL      Creatinine 1 10 mg/dL      Glucose 113 mg/dL      Calcium 8 6 mg/dL      AST 30 U/L      ALT 51 U/L      Alkaline Phosphatase 82 U/L      Total Protein 6 8 g/dL      Albumin 2 0 (L) g/dL      Total Bilirubin 0 80 mg/dL      eGFR 64 ml/min/1 73sq m     Narrative:         National Kidney Disease Education Program recommendations are as follows:  GFR calculation is accurate only with a steady state creatinine  Chronic Kidney disease less than 60 ml/min/1 73 sq  meters  Kidney failure less than 15 ml/min/1 73 sq  meters  Protime-INR [[id number]]  (Abnormal) Collected:  11/07/18 1022    Lab Status:  Final result Specimen:  Blood from Arm, Left Updated:  11/07/18 1052     Protime 22 1 (H) seconds      INR 2 00 (H)    APTT [[id number]]  (Abnormal) Collected:  11/07/18 1022    Lab Status:  Final result Specimen:  Blood from Arm, Left Updated:  11/07/18 1052     PTT 44 (H) seconds     Blood culture #1 [[id number]] Collected:  11/07/18 1021    Lab Status: In process Specimen:  Blood from Arm, Left Updated:  11/07/18 1032    Influenza A/B and RSV by PCR (indicated for patients >2 mo of age) [[id number]] Collected:  11/07/18 1022    Lab Status:   In process Specimen:  Nasopharyngeal from Nasopharyngeal Swab Updated:  11/07/18 1032                 CT chest without contrast   Final Result by Stephanie Sun MD (11/07 1119)      Airspace consolidation seen in the left upper lobe, superior and posterior aspect of the left lower lobe suggest pneumonia          As compared to the previous radiograph from October 17, 2018 the airspace consolidation in the left upper and mid lung is new      Emphysema      Small to moderate left effusion   Follow-up suggested at 3 months to demonstrate resolution         Workstation performed: [id number]                    Procedures  ECG 12 Lead Documentation  Date/Time: 11/7/2018 10:52 AM  Performed by: Sean Allred  Authorized by: SAMIRA MCPHERSON     Indications / Diagnosis:  SOB  ECG reviewed by me, the ED Provider: yes    Patient location:  ED  Previous ECG:     Previous ECG:  Compared to current    Comparison ECG info:  11/2/18    Similarity:  Changes noted  Interpretation:     Interpretation: abnormal    Rate:     ECG rate:  106    ECG rate assessment: tachycardic    Rhythm:     Rhythm: sinus tachycardia    Ectopy:     Ectopy: PVCs    Conduction:     Conduction: abnormal      Abnormal conduction: incomplete RBBB    T waves:     T waves: non-specific             Phone Contacts  ED Phone Contact    ED Course                               MDM  Number of Diagnoses or Management Options  Anemia: new and requires workup  Centrilobular emphysema ([address] ): new and requires workup  Hyponatremia: new and requires workup  Left lower lobe pneumonia ([address] ): new and requires workup  Left upper lobe pneumonia ([address] ): new and requires workup  Pleural effusion, left: established and worsening     Amount and/or Complexity of Data Reviewed  Clinical lab tests: ordered and reviewed  Tests in the radiology section of CPT®: ordered and reviewed  Tests in the medicine section of CPT®: ordered and reviewed  Decide to obtain previous medical records or to obtain history from someone other than the patient: yes  Discuss the patient with other providers: yes  Independent visualization of images, tracings, or specimens: yes    Patient Progress  Patient progress: stable    CritCare Time    Disposition  Final diagnoses:   Left upper lobe pneumonia (HCC)   Left lower lobe pneumonia (HCC)   Hyponatremia   Pleural effusion, left   Centrilobular emphysema (HCC)   Anemia     Time reflects when diagnosis was documented in both MDM as applicable and the Disposition within this note     Time User Action Codes Description Comment    11/7/2018 11:51 AM Anneliese Balbuenay Add [J18 1] Left upper lobe pneumonia ([address] )     11/7/2018 11:51 AM WhiteAnneliesey Add [J18 1] Left lower lobe pneumonia ([address] )     11/7/2018 11:51 AM White, Myra Add [T81 82XA] Emphysema (subcutaneous) (surgical) resulting from a procedure     11/7/2018 11:51 AM Anneliese Balbuenay Add [E87 1] Hyponatremia     11/7/2018 11:51 AM Anneliese Balbuenay Add [J90] Pleural effusion, left     11/7/2018 11:52 AM Esha, Myra Remove [T81 82XA] Emphysema (subcutaneous) (surgical) resulting from a procedure     11/7/2018 11:52 AM Anneliese Balbuenay Add [J43 2] Centrilobular emphysema ([facility] )     11/7/2018 11:52 AM Anneliese Balbuenay Add [D64 9] Anemia       ED Disposition     ED Disposition Condition Comment    Admit  Case was discussed with Dr Mahad Rangel and the patient's admission status was agreed to be Admission Status: inpatient status to the service of Dr Mahad Rangel     Follow-up Information    None         Patient's Medications   Discharge Prescriptions    No medications on file     No discharge procedures on file      ED Provider  Electronically Signed by           Tangela Troy DO  11/07/18 [id number]

## 2018-11-07 NOTE — ASSESSMENT & PLAN NOTE
· Patient transferred from pulmonary office to ER for tachycardia    · Patient reports tachycardia with exertion  · Will adjust cardiac meds for underlying a flutter  · Continue Xarelto

## 2018-11-07 NOTE — H&P
History physical - Adriana Resides 1940, 66 y o  male MRN: [id number]     Unit/Bed#: ED 09 Encounter: [id number]     Primary Care Provider: Tim Ponce MD   Date and time admitted to hospital: 11/7/2018  9:36 AM               * Atrial flutter [facility])   Assessment & Plan     · Patient transferred from pulmonary office to ER for tachycardia  · Patient reports tachycardia with exertion  · Will adjust cardiac meds for underlying a flutter  · Continue Xarelto      Abnormal CT scan, chest   Assessment & Plan     · CT scan noted for new changes from x-ray earlier last month  · Patient given CT does not have pneumonia  He feels he has a postnasal drip causing his cough  · No fevers chills night sweats  · Will continue IV antibiotics and check procalcitonin in a m  To assist with antibiotic management  · Patient agreeable to this plan      Centrilobular emphysema (HCC)   Assessment & Plan     · O2 dependent chronic hypoxic respiratory failure  · Continue pulmonary medications      Chronic respiratory failure with hypoxia (HCC)   Assessment & Plan     · Continue home oxygen         VTE Prophylaxis: Rivaroxaban (Xarelto)  / sequential compression device   Code Status:  DNR level 3  POLST: POLST form is not discussed and not completed at this time  Discussion with family:  Offer to call patient's family patient refused     Anticipated Length of Stay:  Patient will be admitted on an Inpatient basis with an anticipated length of stay of  greater than 2 midnights  Justification for Hospital Stay:  Patient with tachycardia and possible pneumonia on CT scanning requiring IV antibiotics will most likely require greater than 2 midnight stay     Total Time for Visit, including Counseling / Coordination of Care: 30 minutes    Greater than 50% of this total time spent on direct patient counseling and coordination of care      Chief Complaint:   My heart was beating fast     History of Present Illness:     Adriana Patel is a 66 y o  male who presents with tachycardia  Patient recently discharged from [address] [place] [address] on October 10th for pneumonia  During the hospitalization was diagnosed with a flutter started on Xarelto and Cardizem  He was seen in the outpatient Cardiology Office on October 22nd continued on Xarelto and Cardizem  40 mg daily with plans for an outpatient RENEE and cardioversion  Patient was at outpatient Pulmonary office today noted to be tachycardic in the 120s and referred for admission  In the ER he was noted to have a abnormal CT scan of the chest   He does report a cough since discharge  He believes it is from postnasal drip  He is convinced that he does not have pneumonia  We did discuss new findings on CT scanning  Patient given antibiotics in the emergency room plan is to continue for now check procalcitonin a m  And also control heart rate     Review of Systems:     Review of Systems   Constitutional: Negative for chills, diaphoresis, fatigue, fever and unexpected weight change  HENT: Positive for postnasal drip, rhinorrhea and sinus pressure  Negative for sinus pain, sneezing, sore throat, tinnitus, trouble swallowing and voice change  Eyes: Negative for photophobia and visual disturbance  Respiratory: Positive for cough (Cough positive for clear phlegm ) and shortness of breath ( some shortness of breath with exertion)  Negative for choking, chest tightness, wheezing and stridor  Cardiovascular: Negative for chest pain, palpitations and leg swelling  Gastrointestinal: Negative for abdominal pain, blood in stool, constipation, diarrhea, nausea and vomiting  Endocrine: Negative for polyuria  Genitourinary: Negative for dysuria, frequency and urgency  Musculoskeletal: Negative for back pain, neck pain and neck stiffness  Skin: Negative for rash  Neurological: Negative for tremors, syncope and weakness  Hematological: Negative for adenopathy  Psychiatric/Behavioral: Negative for agitation and confusion          Past Medical and Surgical History:      Medical History        Past Medical History:   Diagnosis Date    A-fib ([facility] [address] )      Arthritis      Benign prostatic hyperplasia without lower urinary tract symptoms       without Urinary Obstruction    CAD (coronary artery disease)      Chronic obstructive lung disease (HCC)      Coronary arteriosclerosis      Depression      Emphysema lung (HCC)      GERD (gastroesophageal reflux disease)      Hyperlipidemia      Hypertension      Myocardial infarction (HCC)      Psoriasis      Requires supplemental oxygen       at bedtime during high humid days only    Stroke [facility])       TIA 1/2018            Surgical History         Past Surgical History:   Procedure Laterality Date    COLONOSCOPY        CORONARY ANGIOPLASTY   02/03/2001     PTCA of RCA    CORONARY ARTERY BYPASS GRAFT   02/07/2001     x4- [place]    HERNIA REPAIR        IL THROMBOENDARTECTMY NECK,NECK INCIS Left 2/20/2018     Procedure: ENDARTERECTOMY ARTERY CAROTID WITH PATCH ANGIOPLASTY;  Surgeon: George Nieto MD;  Location: [facility];  Service: Vascular    TRANSURETHRAL RESECTION OF PROSTATE                Meds/Allergies:             Prior to Admission medications    Medication Sig Start Date End Date Taking?  Authorizing Provider   acetaminophen (TYLENOL) 325 mg tablet Take 2 tablets (650 mg total) by mouth every 6 (six) hours as needed for mild pain 2/22/18   Yes Calogero Lázaro, DO   aspirin 81 mg chewable tablet Chew 81 mg daily       Yes Historical Provider, MD   atorvastatin (LIPITOR) 40 mg tablet Take 1 tablet (40 mg total) by mouth daily 3/30/18   Yes Madeline Lynch MD   diltiazem (CARDIZEM CD) 240 mg 24 hr capsule Take 1 capsule (240 mg total) by mouth daily 11/5/18   Yes Familia Ryan MD   esomeprazole (NexIUM) 20 mg capsule Take 20 mg by mouth every other day       Yes Historical Provider, MD   furosemide (LASIX) 40 mg tablet Take 1 tablet (40 mg total) by mouth daily 10/22/18   Yes Daniel Leon MD   loratadine (CLARITIN) 10 mg tablet Take 1 tablet (10 mg total) by mouth daily 10/17/18   Yes Melchor Schuster PA-C   rivaroxaban (XARELTO) 20 mg tablet Take 1 tablet (20 mg total) by mouth daily with dinner 10/10/18   Yes Malinda Kerr PA-C   predniSONE 10 mg tablet 4 tabs daily for 2 days then 3 tabs daily x 2 days then 2 tabs daily x 2 days then 1 tabs for 2 days  Take with food in am  Patient not taking: Reported on 11/7/2018  10/17/18 11/7/18   Melchor Schuster PA-C      I have reviewed home medications using allscripts      Allergies: Allergies   Allergen Reactions    Penicillins Swelling and Itching         Social History:     Marital Status:    Occupation: no  Patient Pre-hospital Living Situation: no  Patient Pre-hospital Level of Mobility: no  Patient Pre-hospital Diet Restrictions: no  Substance Use History:       History   Alcohol Use No            History   Smoking Status    Former Smoker    Packs/day: 1 00    Years: 3 00    Types: Cigarettes    Quit date: 1956   Smokeless Tobacco    Never Used       Comment: Has a past history of cigarette smoking;Quit date 1956; 5 packs year history, per Allscripts            History   Drug Use No         Family History:           Family History   Problem Relation Age of Onset    Lung cancer Mother      Cancer Mother      Other Father           sepsis         Physical Exam:      Vitals:   Blood Pressure: 133/60 (11/07/18 1234)  Pulse: 101 (11/07/18 1234)  Temperature: 98 1 °F (36 7 °C) (11/07/18 0944)  Temp Source: Oral (11/07/18 0944)  Respirations: 18 (11/07/18 1234)  Weight - Scale: 78 kg (171 lb 15 3 oz) (11/07/18 0943)  SpO2: 92 % (11/07/18 1234)     Physical Exam   Constitutional: He is oriented to person, place, and time  No distress  HENT:   Head: Normocephalic and atraumatic  Mouth/Throat: No oropharyngeal exudate     Eyes: Pupils are equal, round, and reactive to light  EOM are normal  No scleral icterus  Neck: Neck supple  No JVD present  Cardiovascular: Normal heart sounds  Tachycardia present  Exam reveals no gallop and no friction rub  No murmur heard  Pulmonary/Chest: Effort normal  No respiratory distress  He has no wheezes  He has no rales  Abdominal: Soft  He exhibits no distension  There is no tenderness  There is no rebound and no guarding  Musculoskeletal: Normal range of motion  He exhibits no edema or tenderness  Lymphadenopathy:     He has no cervical adenopathy  Neurological: He is alert and oriented to person, place, and time  Skin: Skin is warm and dry  He is not diaphoretic     Psychiatric: He has a normal mood and affect       Additional Data:      Lab Results: I have personally reviewed pertinent reports           Results from last 7 days  Lab Units 11/07/18  1022 11/02/18  1058   WBC Thousand/uL 11 73* 10 56*   HEMOGLOBIN g/dL 10 6* 11 9*   HEMATOCRIT % 33 4* 37 9   PLATELETS Thousands/uL 337 335   BANDS PCT % 2  --    NEUTROS ABS Thousands/µL  --  8 47*   NEUTROS PCT %  --  81*   LYMPHS PCT %  --  7*   LYMPHO PCT % 3*  --    MONOS PCT %  --  8   MONO PCT % 2*  --    EOS PCT % 0 3         Results from last 7 days  Lab Units 11/07/18  1022   POTASSIUM mmol/L 4 4   CHLORIDE mmol/L 97*   CO2 mmol/L 28   BUN mg/dL 21   CREATININE mg/dL 1 10   ANION GAP mmol/L 7   CALCIUM mg/dL 8 6   ALBUMIN g/dL 2 0*   TOTAL BILIRUBIN mg/dL 0 80   ALK PHOS U/L 82   ALT U/L 51   AST U/L 30         Results from last 7 days  Lab Units 11/07/18  1022   INR   2 00*                 Results from last 7 days  Lab Units 11/07/18  1131   LACTIC ACID mmol/L 1 2         Imaging: I have personally reviewed pertinent reports        CT chest without contrast   Final Result by Radha Grissom MD (11/07 1119)       Airspace consolidation seen in the left upper lobe, superior and posterior aspect of the left lower lobe suggest pneumonia        As compared to the previous radiograph from October 17, 2018 the airspace consolidation in the left upper and mid lung is new       Emphysema       Small to moderate left effusion   Follow-up suggested at 3 months to demonstrate resolution           Workstation performed: [id number]                 EKG, Pathology, and Other Studies Reviewed on Admission:   · EKG:  Sinus rhythm with APCs tachycardic     Allscripts / Epic Records Reviewed: Yes      ** Please Note: This note has been constructed using a voice recognition system   **

## 2018-11-07 NOTE — PATIENT INSTRUCTIONS
I am sending you to the emergency room for evaluation  Year oxygen is somewhat low and heart rate is increased  You recently were treated for pneumonia  While I do not suspect that you have pneumonia you do need at least a chest x-ray and possibly CT of chest   Please follow-up 2 weeks

## 2018-11-07 NOTE — ASSESSMENT & PLAN NOTE
· CT scan noted for new changes from x-ray earlier last month  · Patient given CT does not have pneumonia  He feels he has a postnasal drip causing his cough  · No fevers chills night sweats  · Will continue IV antibiotics and check procalcitonin in a m  To assist with antibiotic management    · Patient agreeable to this plan

## 2018-11-07 NOTE — ASSESSMENT & PLAN NOTE
Patient was recently admitted to [facility] for pneumonia  He was treated with antibiotic and completed Levaquin  He was not a smoker but apparently was exposed to coal dust   His last chest x-ray that was done while hospitalized showed a large dense ill-defined opacity in the left lower lung field  He has had no repeat chest x-ray since that time although it is nearly 1 month  According to patient he has had cough that has worsened  He is not able to produce any sputum  I am sending him to ER for evaluation as he has been weak and mostly in bed  He is short of breath

## 2018-11-07 NOTE — PROGRESS NOTES
Assessment/Plan:  HPI:  Juan Borrego is a 20-year-old male who is new to the practice  He was hospitalized at [facility] [place] on October 7, 2018 for sepsis and community-acquired pneumonia  He has a history of coronary artery disease and noted to have tachycardia  His TSH was elevated T4 was normal and he was in a flutter  He was placed on Xarelto and Cardizem and was to follow up with Cardiology in 2-3 weeks  He was admitted as an inpatient  It was noted he had a left lower lobe pneumonia with emphysematous changes  It was suggested that he have a follow-up chest x-ray in 6 weeks and was also to be seen by his cardiologist   He did have a chest x-ray done on October 7, 2018  Problem List Items Addressed This Visit        Respiratory    Centrilobular emphysema ([address] )     Patient has history of centrilobular emphysema  He smoked for a very short time  He is currently not on any inhalers and this will be evaluated at his next visit  PFTs that were done shows severe obstructive defect on flow volume loop  Forced vital capacity is 1 61 L or 43% of predicted, FEV1 0 68 L or 25% and obstruction ratio is 42%  According to patient he has previously been on inhalers but has not used in some time  He refused any kind of inhaled maintenance corticosteroid and this will be evaluated next visit  CAP (community acquired pneumonia)     Patient was recently admitted to [facility] for pneumonia  He was treated with antibiotic and completed Levaquin  He was not a smoker but apparently was exposed to coal dust   His last chest x-ray that was done while hospitalized showed a large dense ill-defined opacity in the left lower lung field  He has had no repeat chest x-ray since that time although it is nearly 1 month  According to patient he has had cough that has worsened  He is not able to produce any sputum  I am sending him to ER for evaluation as he has been weak and mostly in bed    He is short of breath  Acute on chronic respiratory failure with hypoxia [facility])     Patient was discharged with supplemental oxygen  This has been acute and was hypoxic today when he presented into office  He also has irregular heart rate  I did note that he had is successful cardioversion on November 2nd  However heart rate is irregular and was up to 120 beats per minute  I am concerned that he is possibly back in atrial fibrillation  He is agreed to go to ER  Other Visit Diagnoses     SOB (shortness of breath)    -  Primary    Relevant Orders    POCT spirometry        there was a superimposed on chronic pleural parenchymal in emphysematous changes as well as it large dense ill-defined opacity in left lower lung field suspicious for infection  It is noted that during his admission he was seen by Cardiology  2D echo was done with ejection fraction 55%  Left ventricle thickness was normal   Right ventricle was normal systolic function normal   Tricuspid valve was normal   There was no significant regurgitation and tricuspid jet definition was an adequate for estimation of RV systolic pressure  Patient was seen as outpatient on October 22nd  He had a flutter with variable block  He was scheduled for RENEE  It is noted that on November 2, 2018 that T was done  There was no echo cardiographic evidence of left atrial left atrial thrombus  It is noted that he had successful cardioversion following a single synchronized biphasic shock  According to patient he was exposed to dust created from cold burning  Return in about 4 weeks (around 12/5/2018)  All questions are answered to the patient's satisfaction and understanding  He verbalizes understanding  He is encouraged to call with any further questions or concerns  Portions of the record may have been created with voice recognition software    Occasional wrong word or "sound a like" substitutions may have occurred due to the inherent limitations of voice recognition software  Read the chart carefully and recognize, using context, where substitutions have occurred  a    Electronically Signed by MYLENE Kruger    ______________________________________________________________________    Chief Complaint:   Chief Complaint   Patient presents with    Cough     productive ( clear )  pt states that he also has tickle in his throat     Shortness of Breath     on exertion    sinus issues        Patient ID: Rahat Moreira is a 66 y o  y o  male has a past medical history of A-fib ([facility] [address] ); Arthritis; Benign prostatic hyperplasia without lower urinary tract symptoms; CAD (coronary artery disease); Chronic obstructive lung disease ([facility] [address] ); Coronary arteriosclerosis; Depression; Emphysema lung ([facility] [address] ); GERD (gastroesophageal reflux disease); Hyperlipidemia; Hypertension; Myocardial infarction ([facility] [address] ); Psoriasis; Requires supplemental oxygen; and Stroke ([facility] [address] )  11/7/2018  Patient presents today for initial visit  Cough   This is a chronic problem  The current episode started more than 1 year ago  The problem has been gradually worsening  The cough is non-productive  Associated symptoms include shortness of breath and wheezing  The symptoms are aggravated by exercise  Risk factors for lung disease include occupational exposure  The treatment provided mild relief  His past medical history is significant for emphysema and pneumonia  Shortness of Breath   This is a chronic problem  The current episode started more than 1 year ago  The problem occurs constantly  The problem has been gradually worsening  Associated symptoms include wheezing  The symptoms are aggravated by fumes  The patient has no known risk factors for DVT/PE  He has tried rest for the symptoms  His past medical history is significant for pneumonia  Occupational/Exposure history:  Pets/Enviroment:  Travel history:  Review of Systems   Constitutional: Positive for appetite change and fatigue  HENT: Negative  Eyes: Negative  Respiratory: Positive for cough, shortness of breath and wheezing  Cardiovascular: Negative  Gastrointestinal: Negative  Endocrine: Negative  Genitourinary: Negative  Musculoskeletal: Negative  Skin: Negative  Allergic/Immunologic: Negative  Neurological: Negative  Hematological: Negative  Psychiatric/Behavioral: Negative  Social history: He reports that he quit smoking about 62 years ago  His smoking use included Cigarettes  He has a 3 00 pack-year smoking history  He has never used smokeless tobacco  He reports that he does not drink alcohol or use drugs      Past surgical history:   Past Surgical History:   Procedure Laterality Date    COLONOSCOPY      CORONARY ANGIOPLASTY  02/03/2001    PTCA of RCA    CORONARY ARTERY BYPASS GRAFT  02/07/2001    x4- [place]    HERNIA REPAIR      CT THROMBOENDARTECTMY Thuy Marco INCIS Left 2/20/2018    Procedure: ENDARTERECTOMY ARTERY CAROTID WITH PATCH ANGIOPLASTY;  Surgeon: Presley Underwood MD;  Location: [facility];  Service: Vascular    TRANSURETHRAL RESECTION OF PROSTATE       Family history:   Family History   Problem Relation Age of Onset    Lung cancer Mother     Cancer Mother     Other Father         sepsis       Immunization History   Administered Date(s) Administered    H1N1, All Formulations 1940, 01/09/2010    Influenza 1940, 09/30/2013, 09/10/2014, 08/26/2015, 09/10/2018    Influenza Split High Dose Preservative Free IM 07/18/2015, 10/25/2016, 12/04/2017    Influenza, high dose seasonal 0 5 mL 09/10/2018    Pneumococcal Conjugate 13-Valent 08/26/2015, 02/18/2016    Pneumococcal Polysaccharide PPV23 05/02/2008    Zoster 10/25/2013     Current Outpatient Prescriptions   Medication Sig Dispense Refill    acetaminophen (TYLENOL) 325 mg tablet Take 2 tablets (650 mg total) by mouth every 6 (six) hours as needed for mild pain 30 tablet 0    aspirin 81 mg chewable tablet Chew 81 mg daily        atorvastatin (LIPITOR) 40 mg tablet Take 1 tablet (40 mg total) by mouth daily 90 tablet 1    diltiazem (CARDIZEM CD) 240 mg 24 hr capsule Take 1 capsule (240 mg total) by mouth daily 30 capsule 11    esomeprazole (NexIUM) 20 mg capsule Take 20 mg by mouth every other day        furosemide (LASIX) 40 mg tablet Take 1 tablet (40 mg total) by mouth daily 30 tablet 3    loratadine (CLARITIN) 10 mg tablet Take 1 tablet (10 mg total) by mouth daily 30 tablet 0    rivaroxaban (XARELTO) 20 mg tablet Take 1 tablet (20 mg total) by mouth daily with dinner 30 tablet 1    predniSONE 10 mg tablet 4 tabs daily for 2 days then 3 tabs daily x 2 days then 2 tabs daily x 2 days then 1 tabs for 2 days  Take with food in am (Patient not taking: Reported on 11/7/2018 ) 20 tablet 0     No current facility-administered medications for this visit  Allergies: Penicillins    Objective:  Vitals:    11/07/18 0814 11/07/18 0816   BP: 122/62    BP Location: Left arm    Patient Position: Sitting    Cuff Size: Standard    Pulse: 94    Resp: 12    Temp: 99 2 °F (37 3 °C)    TempSrc: Oral    SpO2: 100% (!) 82%   Weight: 78 kg (172 lb)    Height: 5' 8" (1 727 m)    Oxygen Therapy  SpO2: (!) 82 %  Oxygen Therapy: Supplemental oxygen  O2 Delivery Method: Nasal cannula  O2 Flow Rate (L/min): 2 L/min    Wt Readings from Last 3 Encounters:   11/07/18 78 kg (172 lb)   11/02/18 78 2 kg (172 lb 6 4 oz)   10/22/18 81 9 kg (180 lb 9 6 oz)     Body mass index is 26 15 kg/m²  Physical Exam   Constitutional: He is oriented to person, place, and time  He appears well-developed  HENT:   Head: Atraumatic  Eyes: Pupils are equal, round, and reactive to light  Conjunctivae are normal    Neck: Normal range of motion  Cardiovascular:   Irregular   Pulmonary/Chest: Effort normal and breath sounds normal    Abdominal: Soft  Musculoskeletal: Normal range of motion     Neurological: He is alert and oriented to person, place, and time  Skin: Skin is dry  Psychiatric: He has a normal mood and affect   His behavior is normal  Thought content normal        Lab Review:   Hospital Outpatient Visit on 11/02/2018   Component Date Value    Ventricular Rate 11/02/2018 130     Atrial Rate 11/02/2018 326     QRSD Interval 11/02/2018 116     QT Interval 11/02/2018 268     QTC Interval 11/02/2018 394     P Axis 11/02/2018 257     QRS Axis 11/02/2018 102     T Wave [place] 11/02/2018 -16     Ventricular Rate 11/02/2018 101     Atrial Rate 11/02/2018 101     OK Interval 11/02/2018 140     QRSD Interval 11/02/2018 104     QT Interval 11/02/2018 306     QTC Interval 11/02/2018 396     P Axis 11/02/2018 87     QRS Axis 11/02/2018 98     T Wave Devine 11/02/2018 50     Ventricular Rate 11/02/2018 97     Atrial Rate 11/02/2018 97     OK Interval 11/02/2018 142     QRSD Interval 11/02/2018 106     QT Interval 11/02/2018 320     QTC Interval 11/02/2018 406     P Axis 11/02/2018 88     QRS Axis 11/02/2018 97     T Wave Axis 11/02/2018 59     Ventricular Rate 11/02/2018 126     Atrial Rate 11/02/2018 326     QRSD Interval 11/02/2018 116     QT Interval 11/02/2018 310     QTC Interval 11/02/2018 448     P Axis 11/02/2018 269     QRS Axis 11/02/2018 101     T Wave Devine 11/02/2018 -20    Hospital Outpatient Visit on 11/02/2018   Component Date Value    Sodium 11/02/2018 137     Potassium 11/02/2018 4 2     Chloride 11/02/2018 98*    CO2 11/02/2018 33*    ANION GAP 11/02/2018 6     BUN 11/02/2018 22     Creatinine 11/02/2018 1 19     Glucose 11/02/2018 100     Glucose, Fasting 11/02/2018 100*    Calcium 11/02/2018 9 0     eGFR 11/02/2018 58     WBC 11/02/2018 10 56*    RBC 11/02/2018 4 23     Hemoglobin 11/02/2018 11 9*    Hematocrit 11/02/2018 37 9     MCV 11/02/2018 90     MCH 11/02/2018 28 1     MCHC 11/02/2018 31 4     RDW 11/02/2018 14 2     MPV 11/02/2018 9 3     Platelets 86/81/9586 335     nRBC 11/02/2018 0     Neutrophils Relative 11/02/2018 81*    Immat GRANS % 11/02/2018 1     Lymphocytes Relative 11/02/2018 7*    Monocytes Relative 11/02/2018 8     Eosinophils Relative 11/02/2018 3     Basophils Relative 11/02/2018 0     Neutrophils Absolute 11/02/2018 8 47*    Immature Grans Absolute 11/02/2018 0 08     Lymphocytes Absolute 11/02/2018 0 76     Monocytes Absolute 11/02/2018 0 88     Eosinophils Absolute 11/02/2018 0 34     Basophils Absolute 11/02/2018 0 03    Admission on 10/07/2018, Discharged on 10/10/2018   Component Date Value    Sodium 10/07/2018 137     Potassium 10/07/2018 4 3     Chloride 10/07/2018 101     CO2 10/07/2018 29     ANION GAP 10/07/2018 7     BUN 10/07/2018 19     Creatinine 10/07/2018 1 29     Glucose 10/07/2018 113     Calcium 10/07/2018 8 4     AST 10/07/2018 19     ALT 10/07/2018 24     Alkaline Phosphatase 10/07/2018 67     Total Protein 10/07/2018 6 9     Albumin 10/07/2018 2 8*    Total Bilirubin 10/07/2018 1 10*    eGFR 10/07/2018 53     WBC 10/07/2018 7 77     RBC 10/07/2018 4 08     Hemoglobin 10/07/2018 12 3     Hematocrit 10/07/2018 39 1     MCV 10/07/2018 96     MCH 10/07/2018 30 1     MCHC 10/07/2018 31 5     RDW 10/07/2018 13 2     MPV 10/07/2018 9 7     Platelets 61/14/1546 270     nRBC 10/07/2018 0     Neutrophils Relative 10/07/2018 77*    Immat GRANS % 10/07/2018 0     Lymphocytes Relative 10/07/2018 11*    Monocytes Relative 10/07/2018 9     Eosinophils Relative 10/07/2018 3     Basophils Relative 10/07/2018 0     Neutrophils Absolute 10/07/2018 5 91     Immature Grans Absolute 10/07/2018 0 03     Lymphocytes Absolute 10/07/2018 0 85     Monocytes Absolute 10/07/2018 0 72     Eosinophils Absolute 10/07/2018 0 24     Basophils Absolute 10/07/2018 0 02     Troponin I 10/07/2018 <0 02     LACTIC ACID 10/07/2018 2 3*    Blood Culture 10/07/2018 No Growth After 5 Days       Blood Culture 10/07/2018 No Growth After 5 Days       Color, UA 10/07/2018 Yellow     Clarity, UA 10/07/2018 Clear     Specific Gravity, UA 10/07/2018 <=1 005     pH, UA 10/07/2018 6 5     Leukocytes, UA 10/07/2018 Trace*    Nitrite, UA 10/07/2018 Negative     Protein, UA 10/07/2018 Negative     Glucose, UA 10/07/2018 Negative     Ketones, UA 10/07/2018 Negative     Urobilinogen, UA 10/07/2018 4 0*    Bilirubin, UA 10/07/2018 Negative     Blood, UA 10/07/2018 Negative     NT-proBNP 10/07/2018 453*    INFLU A PCR 10/07/2018 None Detected     INFLU B PCR 10/07/2018 None Detected     RSV PCR 10/07/2018 None Detected     LACTIC ACID 10/07/2018 0 9     TSH 3RD GENERATON 10/07/2018 5 348*    Procalcitonin 10/07/2018 0 08     Platelets 82/85/2419 246     MPV 10/07/2018 9 8     Free T4 10/07/2018 1 19     RBC, UA 10/07/2018 0-1*    WBC, UA 10/07/2018 4-10*    Epithelial Cells 10/07/2018 Occasional     Bacteria, UA 10/07/2018 Occasional     Ventricular Rate 10/07/2018 153     Atrial Rate 10/07/2018 153     MN Interval 10/07/2018 134     QRSD Interval 10/07/2018 114     QT Interval 10/07/2018 256     QTC Interval 10/07/2018 408     QRS Axis 10/07/2018 150     T Wave Axis 10/07/2018 56     WBC 10/08/2018 7 50     RBC 10/08/2018 3 50*    Hemoglobin 10/08/2018 10 6*    Hematocrit 10/08/2018 33 7*    MCV 10/08/2018 96     MCH 10/08/2018 30 3     MCHC 10/08/2018 31 5     RDW 10/08/2018 13 0     MPV 10/08/2018 9 7     Platelets 02/58/4991 209     nRBC 10/08/2018 0     Neutrophils Relative 10/08/2018 79*    Immat GRANS % 10/08/2018 1     Lymphocytes Relative 10/08/2018 11*    Monocytes Relative 10/08/2018 8     Eosinophils Relative 10/08/2018 1     Basophils Relative 10/08/2018 0     Neutrophils Absolute 10/08/2018 5 96     Immature Grans Absolute 10/08/2018 0 04     Lymphocytes Absolute 10/08/2018 0 84     Monocytes Absolute 10/08/2018 0 57     Eosinophils Absolute 10/08/2018 0 08     Basophils Absolute 10/08/2018 0 01     Sodium 10/08/2018 142     Potassium 10/08/2018 4 7     Chloride 10/08/2018 107     CO2 10/08/2018 28     ANION GAP 10/08/2018 7     BUN 10/08/2018 16     Creatinine 10/08/2018 1 07     Glucose 10/08/2018 109     Calcium 10/08/2018 8 1*    AST 10/08/2018 18     ALT 10/08/2018 24     Alkaline Phosphatase 10/08/2018 54     Total Protein 10/08/2018 6 1*    Albumin 10/08/2018 2 3*    Total Bilirubin 10/08/2018 0 50     eGFR 10/08/2018 66     Ventricular Rate 10/09/2018 77     Atrial Rate 10/09/2018 326     QRSD Interval 10/09/2018 138     QT Interval 10/09/2018 390     QTC Interval 10/09/2018 441     P Axis 10/09/2018 238     QRS Axis 10/09/2018 12     T Wave Axis 10/09/2018 0     Ventricular Rate 10/08/2018 128     Atrial Rate 10/08/2018 308     QRSD Interval 10/08/2018 114     QT Interval 10/08/2018 292     QTC Interval 10/08/2018 426     P Axis 10/08/2018 252     QRS Axis 10/08/2018 44     T Wave Axis 10/08/2018 9        Diagnostics:  I have personally reviewed pertinent reports  *  Office Spirometry Results:  FEV1: 0 68 liters  FVC: 1 61 liters (43%)  FEV1/FVC: 42 2 %  ESS:    No results found

## 2018-11-08 LAB
ANION GAP SERPL CALCULATED.3IONS-SCNC: 4 MMOL/L (ref 4–13)
ATRIAL RATE: 106 BPM
BUN SERPL-MCNC: 19 MG/DL (ref 5–25)
CALCIUM SERPL-MCNC: 8.7 MG/DL (ref 8.3–10.1)
CHLORIDE SERPL-SCNC: 101 MMOL/L (ref 100–108)
CO2 SERPL-SCNC: 31 MMOL/L (ref 21–32)
CREAT SERPL-MCNC: 1.29 MG/DL (ref 0.6–1.3)
ERYTHROCYTE [DISTWIDTH] IN BLOOD BY AUTOMATED COUNT: 14.4 % (ref 11.6–15.1)
FLUAV AG SPEC QL: NORMAL
FLUBV AG SPEC QL: NORMAL
GFR SERPL CREATININE-BSD FRML MDRD: 53 ML/MIN/1.73SQ M
GLUCOSE SERPL-MCNC: 97 MG/DL (ref 65–140)
HCT VFR BLD AUTO: 29.4 % (ref 36.5–49.3)
HGB BLD-MCNC: 9.1 G/DL (ref 12–17)
L PNEUMO1 AG UR QL IA.RAPID: NEGATIVE
MCH RBC QN AUTO: 27.2 PG (ref 26.8–34.3)
MCHC RBC AUTO-ENTMCNC: 31 G/DL (ref 31.4–37.4)
MCV RBC AUTO: 88 FL (ref 82–98)
P AXIS: 81 DEGREES
PLATELET # BLD AUTO: 287 THOUSANDS/UL (ref 149–390)
PMV BLD AUTO: 9.7 FL (ref 8.9–12.7)
POTASSIUM SERPL-SCNC: 4.7 MMOL/L (ref 3.5–5.3)
PR INTERVAL: 126 MS
PROCALCITONIN SERPL-MCNC: 0.34 NG/ML
QRS AXIS: 100 DEGREES
QRSD INTERVAL: 108 MS
QT INTERVAL: 308 MS
QTC INTERVAL: 409 MS
RBC # BLD AUTO: 3.34 MILLION/UL (ref 3.88–5.62)
RSV B RNA SPEC QL NAA+PROBE: NORMAL
S PNEUM AG UR QL: NEGATIVE
SODIUM SERPL-SCNC: 136 MMOL/L (ref 136–145)
T WAVE AXIS: 58 DEGREES
VENTRICULAR RATE: 106 BPM
WBC # BLD AUTO: 7.93 THOUSAND/UL (ref 4.31–10.16)

## 2018-11-08 PROCEDURE — 87449 NOS EACH ORGANISM AG IA: CPT | Performed by: INTERNAL MEDICINE

## 2018-11-08 PROCEDURE — 93010 ELECTROCARDIOGRAM REPORT: CPT | Performed by: INTERNAL MEDICINE

## 2018-11-08 PROCEDURE — 99232 SBSQ HOSP IP/OBS MODERATE 35: CPT | Performed by: NURSE PRACTITIONER

## 2018-11-08 PROCEDURE — 99223 1ST HOSP IP/OBS HIGH 75: CPT | Performed by: INTERNAL MEDICINE

## 2018-11-08 PROCEDURE — 84145 PROCALCITONIN (PCT): CPT | Performed by: INTERNAL MEDICINE

## 2018-11-08 PROCEDURE — 80048 BASIC METABOLIC PNL TOTAL CA: CPT | Performed by: INTERNAL MEDICINE

## 2018-11-08 PROCEDURE — 85027 COMPLETE CBC AUTOMATED: CPT | Performed by: INTERNAL MEDICINE

## 2018-11-08 RX ORDER — GUAIFENESIN 600 MG
600 TABLET, EXTENDED RELEASE 12 HR ORAL EVERY 12 HOURS SCHEDULED
Status: DISCONTINUED | OUTPATIENT
Start: 2018-11-08 | End: 2018-11-09 | Stop reason: HOSPADM

## 2018-11-08 RX ADMIN — RIVAROXABAN 20 MG: 20 TABLET, FILM COATED ORAL at 17:13

## 2018-11-08 RX ADMIN — DILTIAZEM HYDROCHLORIDE 360 MG: 180 CAPSULE, EXTENDED RELEASE ORAL at 08:42

## 2018-11-08 RX ADMIN — FUROSEMIDE 40 MG: 40 TABLET ORAL at 08:42

## 2018-11-08 RX ADMIN — ATORVASTATIN CALCIUM 40 MG: 40 TABLET, FILM COATED ORAL at 08:42

## 2018-11-08 RX ADMIN — ASPIRIN 81 MG 81 MG: 81 TABLET ORAL at 08:42

## 2018-11-08 RX ADMIN — LEVOFLOXACIN 750 MG: 5 INJECTION, SOLUTION INTRAVENOUS at 11:39

## 2018-11-08 RX ADMIN — PANTOPRAZOLE SODIUM 20 MG: 20 TABLET, DELAYED RELEASE ORAL at 05:50

## 2018-11-08 RX ADMIN — GUAIFENESIN 600 MG: 600 TABLET, EXTENDED RELEASE ORAL at 14:35

## 2018-11-08 RX ADMIN — LORATADINE 10 MG: 10 TABLET ORAL at 08:42

## 2018-11-08 NOTE — ASSESSMENT & PLAN NOTE
· CT scan noted for new changes from x-ray earlier last month    · Continue IV Levaquin 750 mg daily, day #2  · Consulted pulmonology   · Added Mucinex  · Continue Robitussin, Xopenex nebs

## 2018-11-08 NOTE — UTILIZATION REVIEW
[address]  Utilization Review Department  Phone: [phone]; Fax [phone]  [email or web address]  ATTENTION: Please call with any questions or concerns to [phone]  and carefully listen to the prompts so that you are directed to the right person  Send all requests for admission clinical reviews, approved or denied determinations and any other requests to fax [phone]  All voicemails are confidential   Initial Clinical Review    Admission: Date/Time/Statement: INPATIENT ADMISSION 11/07/18 1153    Orders Placed This Encounter   Procedures    Inpatient Admission (expected length of stay for this patient is greater than two midnights)     Standing Status:   Standing     Number of Occurrences:   1     Order Specific Question:   Admitting Physician     Answer:   Joao Blackmon [[id number]]     Order Specific Question:   Level of Care     Answer:   Med Surg [[id number]]     Order Specific Question:   Estimated length of stay     Answer:   More than 2 Midnights     Order Specific Question:   Certification     Answer:   I certify that inpatient services are medically necessary for this patient for a duration of greater than two midnights  See H&P and MD Progress Notes for additional information about the patient's course of treatment  ED: Date/Time/Mode of Arrival:   ED Arrival Information     Expected Arrival Acuity Means of Arrival Escorted By Service Admission Type    - 11/7/2018 09:31 Emergent Walk-In Family Member General Medicine Emergency    Arrival Complaint    low o2 sat/heart rate increased          Chief Complaint:   Chief Complaint   Patient presents with    Shortness of Breath     pt presents via hospital wheelchair for evaluation of low O2 sat  pt admitted for pneumonia approx 1 month ago and has been weak and short of breath since  sent from pulmonologist        History of Illness: 66 y  o  male who presents with tachycardia   Patient recently discharged from [address] [facility] on October 10th for pneumonia   During the hospitalization was diagnosed with a flutter started on Xarelto and Karly Liu was seen in the outpatient Cardiology Office on October 22nd continued on Xarelto and Cardizem  40 mg daily with plans for an outpatient RENEE and cardioversion   Patient was at outpatient Pulmonary office today noted to be tachycardic in the 120s and referred for admission      ED Vital Signs:   ED Triage Vitals   Temperature Pulse Respirations Blood Pressure SpO2   11/07/18 0944 11/07/18 0943 11/07/18 0943 11/07/18 0943 11/07/18 0943   98 1 °F (36 7 °C) (!) 113 18 115/76 (!) 85 %      Temp Source Heart Rate Source Patient Position - Orthostatic VS BP Location FiO2 (%)   11/07/18 0944 11/07/18 0943 11/07/18 0943 11/07/18 0943 --   Oral Monitor Lying Left arm       Pain Score       11/07/18 0943       No Pain        Wt Readings from Last 1 Encounters:   11/07/18 78 kg (171 lb 15 3 oz)     Vital Signs (abnormal): 11/7 HR: 113, SpO2 85%     Abnormal Labs/Diagnostic Test Results:  11/7 , chloride 97, albumin 2 0,   WBC 11 73     RBC 3 85     Hemoglobin 10 6     Hematocrit 33 4      Protime 11 8 - 14 2 seconds 22 1     INR 0 86 - 1 17 2 00     PTT 44,   CT CHEST: Airspace consolidation seen in the left upper lobe, superior and posterior aspect of the left lower lobe suggest pneumonia     As compared to the previous radiograph from October 17, 2018 the airspace consolidation in the left upper and mid lung is new  Emphysema  Small to moderate left effusion  EKG: ECG rate:  106    ECG rate assessment: tachycardic    Rhythm:     Rhythm: sinus tachycardia    Ectopy:     Ectopy: PVCs    Conduction:     Conduction: abnormal      Abnormal conduction: incomplete RBBB    T waves:     T waves: non-specific      11/8:  RBC 3 88 - 5 62 Million/uL 3 34     Hemoglobin 12 0 - 17 0 g/dL 9 1     Hematocrit 36 5 - 49 3 % 29 4         ED Treatment:   Medication Administration from 11/07/2018 0931 to 11/07/2018 1818       Date/Time Order Dose Route Action Action by Comments     11/07/2018 1340 sodium chloride 0 9 % bolus 1,000 mL 0 mL Intravenous Stopped Rosangela Bush RN      11/07/2018 1023 sodium chloride 0 9 % bolus 1,000 mL 1,000 mL Intravenous New Bag Rosangela Bush RN      11/07/2018 1323 levofloxacin (LEVAQUIN) IVPB (premix) 750 mg 0 mg Intravenous Stopped Rosangela Bush RN      11/07/2018 1153 levofloxacin (LEVAQUIN) IVPB (premix) 750 mg 750 mg Intravenous New Bag Rosangela Bush RN           Past Medical/Surgical History:    Active Ambulatory Problems     Diagnosis Date Noted    CAD (coronary atherosclerotic disease) 08/13/2016    Hypertension 08/13/2016    Hyperlipemia 08/13/2016    Atypical chest pain 08/13/2016    Hypothyroid 01/13/2018    GERD (gastroesophageal reflux disease) 01/13/2018    History of stroke 01/13/2018    Sciatica of right side 01/13/2018    Hyperlipidemia     Centrilobular emphysema (HCC)     Arthritis     Stenosis of left carotid artery 01/14/2018    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    Pre-operative cardiovascular examination 01/30/2018    Acute left-sided low back pain with left-sided sciatica 10/25/2016    Back pain 01/24/2017    COPD exacerbation ([facility] ) 11/05/2013    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 10/25/2016    Vision changes 01/13/2018    Urinary retention due to benign prostatic hyperplasia 04/27/2018    Bladder cancer ([facility] [address] ) 04/27/2018    CKD (chronic kidney disease), stage II 06/01/2018    CAP (community acquired pneumonia) 10/07/2018    Atrial flutter ([address] ) 10/07/2018    Pneumonia due to infectious organism     Irregular heart beat     Localized edema 10/15/2018    Acute on chronic respiratory failure with hypoxia ([facility] [address] ) 11/07/2018       Past Medical History:   Diagnosis Date    A-fib ([facility] )     Arthritis     Benign prostatic hyperplasia without lower urinary tract symptoms     CAD (coronary artery disease)  Chronic obstructive lung disease (HCC)     Coronary arteriosclerosis     Depression     Emphysema lung (HCC)     GERD (gastroesophageal reflux disease)     Hyperlipidemia     Hypertension     Myocardial infarction (HCC)     Psoriasis     Requires supplemental oxygen     Stroke [facility])        Admitting Diagnosis: Hyponatremia [E87 1]  Anemia [D64 9]  Left upper lobe pneumonia (HCC) [J18 1]  Left lower lobe pneumonia (HCC) [J18 1]  Pleural effusion, left [J90]  Low O2 saturation [R79 81]  Centrilobular emphysema (HCC) [J43 2]    Age/Sex: 66 y o  male    Assessment/Plan: 11/7 Attending MD:  Atrial flutter [facility])   Assessment & Plan     · Patient transferred from pulmonary office to ER for tachycardia  · Patient reports tachycardia with exertion  · Will adjust cardiac meds for underlying a flutter  · Continue Xarelto      Abnormal CT scan, chest   Assessment & Plan     · CT scan noted for new changes from x-ray earlier last month  · Patient given CT does not have Tim Trina feels he has a postnasal drip causing his cough  · No fevers chills night sweats  · Will continue IV antibiotics and check procalcitonin in a m  To assist with antibiotic management    · Patient agreeable to this plan      Centrilobular emphysema (HCC)   Assessment & Plan     · O2 dependent chronic hypoxic respiratory failure  · Continue pulmonary medications      Chronic respiratory failure with hypoxia (HCC)   Assessment & Plan     · Continue home oxygen       Admission Orders:  Scheduled Meds:   Current Facility-Administered Medications:  acetaminophen 650 mg Oral Q6H PRN    aluminum-magnesium hydroxide-simethicone 30 mL Oral Q6H PRN    aspirin 81 mg Oral Daily    atorvastatin 40 mg Oral Daily    benzonatate 100 mg Oral TID PRN    dextromethorphan-guaiFENesin 10 mL Oral Q4H PRN    diltiazem 360 mg Oral Daily    diltiazem 10 mg Intravenous Q4H PRN    docusate sodium 100 mg Oral BID PRN    furosemide 40 mg Oral Daily guaiFENesin 600 mg Oral Q12H [facility]    levalbuterol 1 25 mg Nebulization Q6H PRN    And       sodium chloride 3 mL Nebulization Q6H PRN    levofloxacin 750 mg Intravenous Q24H Last Rate: 750 mg (11/08/18 1139)   loratadine 10 mg Oral Daily    ondansetron 4 mg Intravenous Q6H PRN    pantoprazole 20 mg Oral Early Morning    rivaroxaban 20 mg Oral Daily With Dinner      Continuous Infusions:    PRN Meds:   48 hr telemetry  CBC&Diff  cmp  Mag  Pro calcitonin  Sputum culture & gram stain  Inpatient to Pulmonology  Activity as tolerated

## 2018-11-08 NOTE — PHYSICIAN ADVISOR
Current patient class: Inpatient  The patient is currently on Hospital Day: 2 at [facility]        The patient was admitted to the hospital  on 11/7/18 at [phone] for the following diagnosis:  Hyponatremia [E87 1]  Anemia [D64 9]  Left upper lobe pneumonia (HCC) [J18 1]  Left lower lobe pneumonia ([address] ) [J18 1]  Pleural effusion, left [J90]  Low O2 saturation [R79 81]  Centrilobular emphysema ([address] ) [J43 2]       There is documentation in the medical record of an expected length of stay of at least 2 midnights  The patient is therefore expected to satisfy the 2 midnight benchmark and given the 2 midnight presumption is appropriate for INPATIENT ADMISSION  Given this expectation of a satisfying stay, CMS instructs us that the patient is most often appropriate for inpatient admission under part A provided medical necessity is documented in the chart  After review of the relevant documentation, labs, vital signs and test results, the patient is appropriate for INPATIENT ADMISSION  Admission to the hospital as an inpatient is a complex decision making process which requires the practitioner to consider the patients presenting complaint, history and physical examination and all relevant testing  With this in mind, in this case, the patient was deemed appropriate for INPATIENT ADMISSION  After review of the documentation and testing available at the time of the admission I concur with this clinical determination of medical necessity  The patient does have an inpatient admission within the previous 30 days  The patient was admitted on 10/7/18 and discharged on 10/10/18 as an inpatient  The patient therefore required readmission review  Rationale is as follows: The patient is a 66 yrs   Male who presented to the ED at 11/7/2018  9:36 AM with a chief complaint of Shortness of Breath (pt presents via hospital wheelchair for evaluation of low O2 sat   pt admitted for pneumonia approx 1 month ago and has been weak and short of breath since  sent from pulmonologist )     Patient admitted with a report of shortness of breath and a rapid heart rate  He was recently hospitalized for sepsis secondary to pneumonia and also had A  Flutter during that stay  He was seen by Cardiology and possible cardioversion was to be scheduled in the future  On this admission, he was again noted to be in rapid A  Flutter  CT of the chest revealed finding consistent with pneumonia  It would be appropriate to consider this admission as RELATED to the previous admission      The patients vitals on arrival were ED Triage Vitals   Temperature Pulse Respirations Blood Pressure SpO2   11/07/18 0944 11/07/18 0943 11/07/18 0943 11/07/18 0943 11/07/18 0943   98 1 °F (36 7 °C) (!) 113 18 115/76 (!) 85 %      Temp Source Heart Rate Source Patient Position - Orthostatic VS BP Location FiO2 (%)   11/07/18 0944 11/07/18 0943 11/07/18 0943 11/07/18 0943 --   Oral Monitor Lying Left arm       Pain Score       11/07/18 0943       No Pain           Past Medical History:   Diagnosis Date    A-fib [facility])     Arthritis     Benign prostatic hyperplasia without lower urinary tract symptoms     without Urinary Obstruction    CAD (coronary artery disease)     Chronic obstructive lung disease (HCC)     Coronary arteriosclerosis     Depression     Emphysema lung (HCC)     GERD (gastroesophageal reflux disease)     Hyperlipidemia     Hypertension     Myocardial infarction ([address] )     Psoriasis     Requires supplemental oxygen     at bedtime during high humid days only    Stroke [facility])     TIA 1/2018     Past Surgical History:   Procedure Laterality Date    COLONOSCOPY      CORONARY ANGIOPLASTY  02/03/2001    PTCA of RCA    CORONARY ARTERY BYPASS GRAFT  02/07/2001    x4- [place]    HERNIA REPAIR      [place] THROMBOENDARTECTMY Darolyn Hole INCIS Left 2/20/2018    Procedure: ENDARTERECTOMY ARTERY CAROTID WITH PATCH ANGIOPLASTY;  Surgeon: Remington Almaraz MD;  [facility]: [facility];  Service: Vascular    TRANSURETHRAL RESECTION OF PROSTATE             Consults have been placed to:   IP CONSULT TO PULMONOLOGY    Vitals:    11/07/18 1836 11/07/18 1848 11/07/18 2203 11/08/18 0714   BP:  111/59 109/56 116/56   BP Location:  Right arm Right arm Left arm   Pulse: 89 94 91 93   Resp: 18 18 18 18   Temp:  98 6 °F (37 °C) 98 6 °F (37 °C) 98 °F (36 7 °C)   TempSrc:  Oral Oral Oral   SpO2: 98% 94% 93% 97%   Weight:  78 kg (171 lb 15 3 oz)     Height:  5' 8" (1 727 m)         Most recent labs:    Recent Labs      11/07/18   1022  11/08/18   0443   WBC  11 73*  7 93   HGB  10 6*  9 1*   HCT  33 4*  29 4*   PLT  337  287   K  4 4  4 7   CALCIUM  8 6  8 7   BUN  21  19   CREATININE  1 10  1 29   INR  2 00*   --    TROPONINI  <0 02   --    AST  30   --    ALT  51   --    ALKPHOS  82   --        Scheduled Meds:  Current Facility-Administered Medications:  acetaminophen 650 mg Oral Q6H PRN Chelsea Marie MD    aluminum-magnesium hydroxide-simethicone 30 mL Oral Q6H PRN Alfredo Bass MD    aspirin 81 mg Oral Daily Alfredo Bass MD    atorvastatin 40 mg Oral Daily Alfredo Bass MD    benzonatate 100 mg Oral TID PRN Migdalia Hester PA-C    dextromethorphan-guaiFENesin 10 mL Oral Q4H PRN Migdalia Hester PA-C    diltiazem 360 mg Oral Daily Alfredo Bass MD    diltiazem 10 mg Intravenous Q4H PRN Alfredo Bass MD    docusate sodium 100 mg Oral BID PRN Alfredo Bass MD    furosemide 40 mg Oral Daily Alfredo Bass MD    guaiFENesin 600 mg Oral Q12H [facility] MYLENE Mejia    levalbuterol 1 25 mg Nebulization Q6H PRN Alfredo Bass MD    And        sodium chloride 3 mL Nebulization Q6H PRN Alfredo Bass MD    levofloxacin 750 mg Intravenous Q24H Alfredo Bass MD Last Rate: 750 mg (11/08/18 1139)   loratadine 10 mg Oral Daily Jermaine Marie MD    ondansetron 4 mg Intravenous Q6H PRN Alfredo Bass MD    pantoprazole 20 mg Oral Early Morning Elsa Devine MD    rivaroxaban 20 mg Oral Daily With Ese Queen MD      Continuous Infusions:   PRN Meds:   acetaminophen    aluminum-magnesium hydroxide-simethicone    benzonatate    dextromethorphan-guaiFENesin    diltiazem    docusate sodium    levalbuterol **AND** sodium chloride    ondansetron    Surgical procedures (if appropriate):

## 2018-11-08 NOTE — ASSESSMENT & PLAN NOTE
As evidenced by oxygen sat 85% on room air requiring supplemental oxygen and bronchodilators as needed  CT chest: Airspace consolidation seen in the left upper lobe, superior and posterior aspect of the left lower lobe suggest pneumonia  As compared to the previous radiograph from 10/17/18 the airspace consolidation in the left upper and mid lung is new  Emphysema  Small to moderate left effusion   Follow-up suggested at 3 months to demonstrate resolution  · Continue home oxygen  · Consulted pulmonology as patient not on home inhalers/ nebulizer

## 2018-11-08 NOTE — PROGRESS NOTES
Progress Note - Gena Emery 1940, 66 y o  male MRN: [id number]    Unit/Bed#: -01 Encounter: [id number]    Primary Care Provider: Cathy Clifford MD   Date and time admitted to hospital: 11/7/2018  9:36 AM        * Atrial flutter [facility])   Assessment & Plan    · Patient transferred from pulmonary office to ER for tachycardia  · Patient reports tachycardia with exertion  · EKG: Sinus tachycardia with PVCs  · Recent diagnosed of Atrial Flutter, s/p successful cardioversion, on Cardizem 24 hr capsule 240 mg daily  · Adjusted cardiac meds for underlying A flutter, increased Cardizem to 360 mg daily   · Continue Xarelto  · Outpatient cardiology follow-up      Acute on chronic respiratory failure with hypoxia ([address] )   Assessment & Plan    As evidenced by oxygen sat 85% on room air requiring supplemental oxygen and bronchodilators as needed  CT chest: Airspace consolidation seen in the left upper lobe, superior and posterior aspect of the left lower lobe suggest pneumonia  As compared to the previous radiograph from 10/17/18 the airspace consolidation in the left upper and mid lung is new  Emphysema  Small to moderate left effusion  Follow-up suggested at 3 months to demonstrate resolution  · Continue home oxygen  · Consulted pulmonology as patient not on home inhalers/ nebulizer      Abnormal CT scan, chest   Assessment & Plan    · CT scan noted for new changes from x-ray earlier last month  · Continue IV Levaquin 750 mg daily, day #2  · Consulted pulmonology   · Added Mucinex  · Continue Robitussin, Xopenex nebs      Centrilobular emphysema ([address] )   Assessment & Plan    · O2 dependent, chronic hypoxic respiratory failure  · Not on home pulmonary medications  · FEV1 noted to be 25% per last Pulmonary office visit    · Patient currently refusing maintenance inhalers or steroids as he thinks they will make him worse   · Patient with significant SOB during conversation  · Consulted pulmonology   · Consider need for steroids  · Continue respiratory protocol       VTE Pharmacologic Prophylaxis:   Pharmacologic: Rivaroxaban (Xarelto)  Mechanical VTE Prophylaxis in Place: Yes    Patient Centered Rounds: I have performed bedside rounds with nursing staff today  Discussions with Specialists or Other Care Team Provider: Nursing, KELVIN, discussed with pulm    Education and Discussions with Family / Patient: I have answered all questions to the best of my ability  Time Spent for Care: 20 minutes  More than 50% of total time spent on counseling and coordination of care as described above  Current Length of Stay: 1 day(s)    Current Patient Status: Inpatient   Certification Statement: The patient will continue to require additional inpatient hospital stay due to acute on chronic resp failure, PNA on IV ABX    Discharge Plan: Patient is not medically stable for discharge today  He is a readmission  Pending pulm consult - possible discharge in 24-48 hours  Code Status: Level 3 - DNAR and DNI      Subjective:   Continues with SOB, worse with exertion and conversation  Appetite fair  Denies HA, lightheadedness, or chest pain  Moist, non productive cough  Refusing inhalers or steroids as he thinks they will make 'my good lungs worse '     Objective:     Vitals:   Temp (24hrs), Av 4 °F (36 9 °C), Min:98 °F (36 7 °C), Max:98 6 °F (37 °C)    Temp:  [98 °F (36 7 °C)-98 6 °F (37 °C)] 98 °F (36 7 °C)  HR:  [] 93  Resp:  [17-18] 18  BP: (109-141)/(56-68) 116/56  SpO2:  [92 %-98 %] 97 %  Body mass index is 26 15 kg/m²  Input and Output Summary (last 24 hours): Intake/Output Summary (Last 24 hours) at 18 1214  Last data filed at 18 0714   Gross per 24 hour   Intake             1370 ml   Output              515 ml   Net              855 ml       Physical Exam:     Physical Exam   Constitutional: He is oriented to person, place, and time  He appears well-developed  No distress     HENT:   Head: Normocephalic  Neck: Normal range of motion  Cardiovascular: Normal rate and regular rhythm  Pulmonary/Chest: Effort normal  No respiratory distress  He has decreased breath sounds in the right lower field and the left lower field  He has wheezes in the right lower field and the left lower field  He has rhonchi in the left upper field and the left lower field  He has no rales  Abdominal: Soft  Bowel sounds are normal  He exhibits no distension  There is no tenderness  Musculoskeletal: Normal range of motion  He exhibits no edema or tenderness  Neurological: He is alert and oriented to person, place, and time  He has normal reflexes  Skin: Skin is warm and dry  No rash noted  He is not diaphoretic  Psychiatric: He has a normal mood and affect  Judgment normal    Nursing note and vitals reviewed  Additional Data:     Labs:      Results from last 7 days  Lab Units 11/08/18 0443 11/07/18  1022 11/02/18  1058   WBC Thousand/uL 7 93 11 73* 10 56*   HEMOGLOBIN g/dL 9 1* 10 6* 11 9*   HEMATOCRIT % 29 4* 33 4* 37 9   PLATELETS Thousands/uL 287 337 335   NEUTROS PCT %  --   --  81*   LYMPHS PCT %  --   --  7*   LYMPHO PCT %  --  3*  --    MONOS PCT %  --   --  8   MONO PCT %  --  2*  --    EOS PCT %  --  0 3       Results from last 7 days  Lab Units 11/08/18  0443 11/07/18  1022   POTASSIUM mmol/L 4 7 4 4   CHLORIDE mmol/L 101 97*   CO2 mmol/L 31 28   BUN mg/dL 19 21   CREATININE mg/dL 1 29 1 10   CALCIUM mg/dL 8 7 8 6   ALK PHOS U/L  --  82   ALT U/L  --  51   AST U/L  --  30       Results from last 7 days  Lab Units 11/07/18  1022   INR  2 00*       * I Have Reviewed All Lab Data Listed Above  * Additional Pertinent Lab Tests Reviewed:  All Labs Within Last 24 Hours Reviewed    Imaging:    Imaging Reports Reviewed Today Include: CT chest   Imaging Personally Reviewed by Myself Includes:  None     Recent Cultures (last 7 days):       Results from last 7 days  Lab Units 11/07/18  1022   INFLUENZA B PCR None Detected   RSV PCR  None Detected       Last 24 Hours Medication List:     Current Facility-Administered Medications:  acetaminophen 650 mg Oral Q6H PRN David Swanson MD    aluminum-magnesium hydroxide-simethicone 30 mL Oral Q6H PRN David Swanson MD    aspirin 81 mg Oral Daily David Swanson MD    atorvastatin 40 mg Oral Daily David Swanson MD    benzonatate 100 mg Oral TID PRN Carrington Montalvo PA-C    dextromethorphan-guaiFENesin 10 mL Oral Q4H PRN Carrington Montalvo PA-C    diltiazem 360 mg Oral Daily David Swanson MD    diltiazem 10 mg Intravenous Q4H PRN David Swanson MD    docusate sodium 100 mg Oral BID PRN David Swanson MD    furosemide 40 mg Oral Daily David Swanson MD    guaiFENesin 600 mg Oral Q12H [facility] MYLENE Sheehan    levalbuterol 1 25 mg Nebulization Q6H PRN David Swanson MD    And        sodium chloride 3 mL Nebulization Q6H PRN David Swanson MD    levofloxacin 750 mg Intravenous Q24H David Swanson MD Last Rate: 750 mg (11/08/18 1139)   loratadine 10 mg Oral Daily Jermaine Marie MD    ondansetron 4 mg Intravenous Q6H PRN David Swanson MD    pantoprazole 20 mg Oral Early Morning David Swanson MD    rivaroxaban 20 mg Oral Daily With Tawana Pinedo MD         Today, Patient Was Seen By: MYLENE Sheehan    ** Please Note: Dictation voice to text software may have been used in the creation of this document   **

## 2018-11-08 NOTE — ASSESSMENT & PLAN NOTE
· O2 dependent, chronic hypoxic respiratory failure  · Not on home pulmonary medications  · FEV1 noted to be 25% per last Pulmonary office visit    · Patient currently refusing maintenance inhalers or steroids as he thinks they will make him worse   · Patient with significant SOB during conversation  · Consulted pulmonology   · Consider need for steroids  · Continue respiratory protocol

## 2018-11-08 NOTE — ASSESSMENT & PLAN NOTE
· Patient transferred from pulmonary office to ER for tachycardia    · Patient reports tachycardia with exertion  · EKG: Sinus tachycardia with PVCs  · Recent diagnosed of Atrial Flutter, s/p successful cardioversion, on Cardizem 24 hr capsule 240 mg daily  · Adjusted cardiac meds for underlying A flutter, increased Cardizem to 360 mg daily   · Continue Xarelto  · Outpatient cardiology follow-up

## 2018-11-08 NOTE — CONSULTS
Consultation - Pulmonary Medicine   Lana Hall 66 y o  male MRN: [id number]  Unit/Bed#: -01 Encounter: [id number]      Assessment/Plan:    1  Abnormal CT of the chest secondary to emphysema and pneumonia      *  LLL small to moderate loculated effusions, parenchymal and emphysematous changes    2  Left upper lobe pneumonia      *  patient is agreeable to completing a 14 day course of PO Levaquin      *  continue to trend procalcitonin, if continues to decrease, will be suitable for discharge with p o  Antibiotics on 11/09/18      *  continue to monitor temperature/WBC      *  will need repeat imaging in 4-6 weeks      *  patient would like to follow up with Dr Effie Vazquez outpatient    3  Very severe COPD without exacerbation       *  patient refuses to use any inhalers or nebulizer treatments       *  will continue to monitor for any exacerbation        History of Present Illness   Physician Requesting Consult: Miroslava Trujillo MD  Reason for Consult / Principal Problem:  Emphysema, pleural effusion, left lower lobe pneumonia  Hx and PE limited by:  Nothing  Chief Complaint:  "I am a little short of breath when I walk around "  HPI: Lana Hall is a 66 y o  male who presented to [address] [place] [address] after being evaluated at his pulmonologist's office today  Harshal Ledbetter was having concern that he may have developed another pneumonia  Patient was recently treated for pneumonia on October 10, 2018, in which he completed a course of antibiotics  Patient has a history of AFib, CAD, emphysema, TIA, CABAG x4  Patient reports that he has been short of breath on exertion and has developed a productive cough  The patient appears frustrated that he was hospitalized for a recurring pneumonia  Patient stated' "I know you have pneumonia, and right now I do not " Patient stated he is very aware of the severity of his lung disease    He stated he became aware of this approximately 20 years ago, when he had open heart surgery  Patient stated that this disease began as a child as he would ride his bike through the [facility]  Patient reports that he refuses all inhalers or nebulizer treatments, "because it does not go into my bad lung and only goes into my good lung " Patient reports that he does not qualify for home O2 however he did acquire it, and he will use it as he deems necessary  Patient currently denies any fevers, chills, hemoptysis, chest pain, edema, palpitations       Inpatient consult to Pulmonology  Consult performed by: Genevieve Fernández ordered by: Chen Alvarenga          Review of Systems   Constitutional: Negative for activity change, appetite change and chills  HENT: Positive for congestion, postnasal drip and sinus pain  Respiratory: Positive for cough and shortness of breath  Negative for apnea, choking, chest tightness, wheezing and stridor  Cardiovascular: Negative for chest pain, palpitations and leg swelling  Gastrointestinal: Negative for abdominal distention, abdominal pain and anal bleeding  Genitourinary: Negative for difficulty urinating, dysuria and flank pain  Musculoskeletal: Negative for back pain and gait problem  Skin: Negative for color change and pallor  Neurological: Negative for dizziness, facial asymmetry and headaches  Psychiatric/Behavioral: Negative for agitation and behavioral problems         Historical Information   Past Medical History:   Diagnosis Date    A-fib [facility])     Arthritis     Benign prostatic hyperplasia without lower urinary tract symptoms     without Urinary Obstruction    CAD (coronary artery disease)     Chronic obstructive lung disease (HCC)     Coronary arteriosclerosis     Depression     Emphysema lung (HCC)     GERD (gastroesophageal reflux disease)     Hyperlipidemia     Hypertension     Myocardial infarction (HCC)     Psoriasis     Requires supplemental oxygen     at bedtime during high humid days only    Stroke [facility])     TIA 1/2018     Past Surgical History:   Procedure Laterality Date    COLONOSCOPY      CORONARY ANGIOPLASTY  02/03/2001    PTCA of RCA    CORONARY ARTERY BYPASS GRAFT  02/07/2001    x4- [place]    HERNIA REPAIR      WA THROMBOENDARTECTMY Eleno Rachel INCIS Left 2/20/2018    Procedure: ENDARTERECTOMY ARTERY CAROTID WITH PATCH ANGIOPLASTY;  Surgeon: Dasia Stewart MD;  Location: [facility];  Service: Vascular    TRANSURETHRAL RESECTION OF PROSTATE       Social History   History   Alcohol Use No     History   Drug Use No     History   Smoking Status    Former Smoker    Packs/day: 1 00    Years: 3 00    Types: Cigarettes    Quit date: 1956   Smokeless Tobacco    Never Used     Comment: Has a past history of cigarette smoking;Quit date 1956; 5 packs year history, per [employer]       Occupational History:      Family History: non-contributory    Meds/Allergies   pertinent pulmonary meds have been reviewed    Allergies   Allergen Reactions    Penicillins Swelling and Itching       Objective   Vitals: Blood pressure 116/56, pulse 93, temperature 98 °F (36 7 °C), temperature source Oral, resp  rate 18, height 5' 8" (1 727 m), weight 78 kg (171 lb 15 3 oz), SpO2 97 %  2L,Body mass index is 26 15 kg/m²  Intake/Output Summary (Last 24 hours) at 11/08/18 1524  Last data filed at 11/08/18 7782   Gross per 24 hour   Intake              220 ml   Output              515 ml   Net             -295 ml     Invasive Devices     Peripheral Intravenous Line            Peripheral IV 11/07/18 Left Forearm 1 day                Physical Exam   Constitutional: He is oriented to person, place, and time  He appears well-developed and well-nourished  HENT:   Head: Normocephalic and atraumatic  Neck: Normal range of motion  No tracheal deviation present  Cardiovascular: An irregular rhythm present  Tachycardia present  Exam reveals no gallop and no friction rub      No murmur heard   Pulmonary/Chest: No stridor  No respiratory distress  He has decreased breath sounds  He has no wheezes  He has no rales  He exhibits no tenderness  Abdominal: Soft  Bowel sounds are normal  He exhibits no distension  There is no tenderness  Musculoskeletal: Normal range of motion  He exhibits edema  He exhibits no deformity  +1 B/L LE edema   Neurological: He is alert and oriented to person, place, and time  Skin: Skin is warm and dry  Psychiatric: He has a normal mood and affect  His behavior is normal        Lab Results:   I have personally reviewed pertinent lab results  , ABG: No results found for: PHART, GDS7LWN, PO2ART, ULH6VOB, T8RMXINA, BEART, SOURCE, BNP: No results found for: BNP, CBC:   Lab Results   Component Value Date    WBC 7 93 11/08/2018    HGB 9 1 (L) 11/08/2018    HCT 29 4 (L) 11/08/2018    MCV 88 11/08/2018     11/08/2018    MCH 27 2 11/08/2018    MCHC 31 0 (L) 11/08/2018    RDW 14 4 11/08/2018    MPV 9 7 11/08/2018   , CMP:   Lab Results   Component Value Date    SODIUM 136 11/08/2018    K 4 7 11/08/2018     11/08/2018    CO2 31 11/08/2018    BUN 19 11/08/2018    CREATININE 1 29 11/08/2018    CALCIUM 8 7 11/08/2018    EGFR 53 11/08/2018     Imaging Studies: I have personally reviewed pertinent films in PACS     SHARONA airspace consolidation, left small to moderate loculated effusions, emphysema,     EKG, Pathology, and Other Studies: I have personally reviewed pertinent films in PACS     EKG 11/7/18- Sinus tachycardia    Pulmonary Results (PFTs, PSG): I have personally reviewed pertinent films in PACS     Spirometry 11/7/18- FVC- 43%  FEV1-25%   FEV1/FVC- 59%    VTE Prophylaxis: Venodynes    Code Status: Level 3 - DNAR and DNI    None    Portions of the record may have been created with voice recognition software  Occasional wrong word or "sound a like" substitutions may have occurred due to the inherent limitations of voice recognition software    Read the chart carefully and recognize, using context, where substitutions have occurred

## 2018-11-09 VITALS
WEIGHT: 171.96 LBS | RESPIRATION RATE: 18 BRPM | OXYGEN SATURATION: 96 % | HEART RATE: 86 BPM | SYSTOLIC BLOOD PRESSURE: 129 MMHG | DIASTOLIC BLOOD PRESSURE: 61 MMHG | BODY MASS INDEX: 26.06 KG/M2 | TEMPERATURE: 98.3 F | HEIGHT: 68 IN

## 2018-11-09 PROBLEM — J15.9 COMMUNITY ACQUIRED BACTERIAL PNEUMONIA: Status: ACTIVE | Noted: 2018-11-07

## 2018-11-09 LAB
ALBUMIN SERPL BCP-MCNC: 1.6 G/DL (ref 3.5–5)
ALP SERPL-CCNC: 92 U/L (ref 46–116)
ALT SERPL W P-5'-P-CCNC: 44 U/L (ref 12–78)
ANION GAP SERPL CALCULATED.3IONS-SCNC: 6 MMOL/L (ref 4–13)
AST SERPL W P-5'-P-CCNC: 39 U/L (ref 5–45)
BASOPHILS # BLD AUTO: 0.01 THOUSANDS/ΜL (ref 0–0.1)
BASOPHILS NFR BLD AUTO: 0 % (ref 0–1)
BILIRUB SERPL-MCNC: 0.5 MG/DL (ref 0.2–1)
BUN SERPL-MCNC: 16 MG/DL (ref 5–25)
CALCIUM SERPL-MCNC: 8.8 MG/DL (ref 8.3–10.1)
CHLORIDE SERPL-SCNC: 100 MMOL/L (ref 100–108)
CO2 SERPL-SCNC: 28 MMOL/L (ref 21–32)
CREAT SERPL-MCNC: 1.04 MG/DL (ref 0.6–1.3)
EOSINOPHIL # BLD AUTO: 0.41 THOUSAND/ΜL (ref 0–0.61)
EOSINOPHIL NFR BLD AUTO: 6 % (ref 0–6)
ERYTHROCYTE [DISTWIDTH] IN BLOOD BY AUTOMATED COUNT: 14.1 % (ref 11.6–15.1)
GFR SERPL CREATININE-BSD FRML MDRD: 68 ML/MIN/1.73SQ M
GLUCOSE SERPL-MCNC: 96 MG/DL (ref 65–140)
HCT VFR BLD AUTO: 31.5 % (ref 36.5–49.3)
HGB BLD-MCNC: 10 G/DL (ref 12–17)
IMM GRANULOCYTES # BLD AUTO: 0.06 THOUSAND/UL (ref 0–0.2)
IMM GRANULOCYTES NFR BLD AUTO: 1 % (ref 0–2)
LYMPHOCYTES # BLD AUTO: 0.6 THOUSANDS/ΜL (ref 0.6–4.47)
LYMPHOCYTES NFR BLD AUTO: 9 % (ref 14–44)
MAGNESIUM SERPL-MCNC: 2.3 MG/DL (ref 1.6–2.6)
MCH RBC QN AUTO: 27.3 PG (ref 26.8–34.3)
MCHC RBC AUTO-ENTMCNC: 31.7 G/DL (ref 31.4–37.4)
MCV RBC AUTO: 86 FL (ref 82–98)
MONOCYTES # BLD AUTO: 0.57 THOUSAND/ΜL (ref 0.17–1.22)
MONOCYTES NFR BLD AUTO: 9 % (ref 4–12)
NEUTROPHILS # BLD AUTO: 5.02 THOUSANDS/ΜL (ref 1.85–7.62)
NEUTS SEG NFR BLD AUTO: 75 % (ref 43–75)
NRBC BLD AUTO-RTO: 0 /100 WBCS
PLATELET # BLD AUTO: 333 THOUSANDS/UL (ref 149–390)
PMV BLD AUTO: 9.6 FL (ref 8.9–12.7)
POTASSIUM SERPL-SCNC: 3.9 MMOL/L (ref 3.5–5.3)
PROCALCITONIN SERPL-MCNC: 0.26 NG/ML
PROT SERPL-MCNC: 6.2 G/DL (ref 6.4–8.2)
RBC # BLD AUTO: 3.66 MILLION/UL (ref 3.88–5.62)
SODIUM SERPL-SCNC: 134 MMOL/L (ref 136–145)
WBC # BLD AUTO: 6.67 THOUSAND/UL (ref 4.31–10.16)

## 2018-11-09 PROCEDURE — 84145 PROCALCITONIN (PCT): CPT | Performed by: NURSE PRACTITIONER

## 2018-11-09 PROCEDURE — 80053 COMPREHEN METABOLIC PANEL: CPT | Performed by: NURSE PRACTITIONER

## 2018-11-09 PROCEDURE — 85025 COMPLETE CBC W/AUTO DIFF WBC: CPT | Performed by: NURSE PRACTITIONER

## 2018-11-09 PROCEDURE — 83735 ASSAY OF MAGNESIUM: CPT | Performed by: NURSE PRACTITIONER

## 2018-11-09 PROCEDURE — 99232 SBSQ HOSP IP/OBS MODERATE 35: CPT | Performed by: INTERNAL MEDICINE

## 2018-11-09 PROCEDURE — 99239 HOSP IP/OBS DSCHRG MGMT >30: CPT | Performed by: NURSE PRACTITIONER

## 2018-11-09 RX ORDER — LEVOFLOXACIN 750 MG/1
750 TABLET ORAL EVERY 24 HOURS
Qty: 11 TABLET | Refills: 0 | Status: SHIPPED | OUTPATIENT
Start: 2018-11-10 | End: 2018-11-21

## 2018-11-09 RX ORDER — DILTIAZEM HYDROCHLORIDE 360 MG/1
360 CAPSULE, EXTENDED RELEASE ORAL DAILY
Qty: 30 CAPSULE | Refills: 0 | Status: SHIPPED | OUTPATIENT
Start: 2018-11-09 | End: 2018-11-19 | Stop reason: SDUPTHER

## 2018-11-09 RX ORDER — LEVOFLOXACIN 5 MG/ML
750 INJECTION, SOLUTION INTRAVENOUS EVERY 24 HOURS
Status: COMPLETED | OUTPATIENT
Start: 2018-11-09 | End: 2018-11-09

## 2018-11-09 RX ORDER — ALBUTEROL SULFATE 90 UG/1
2 AEROSOL, METERED RESPIRATORY (INHALATION) EVERY 6 HOURS PRN
Qty: 8.5 G | Refills: 0 | Status: SHIPPED | OUTPATIENT
Start: 2018-11-09 | End: 2021-09-27

## 2018-11-09 RX ADMIN — LORATADINE 10 MG: 10 TABLET ORAL at 08:33

## 2018-11-09 RX ADMIN — ASPIRIN 81 MG 81 MG: 81 TABLET ORAL at 08:33

## 2018-11-09 RX ADMIN — FUROSEMIDE 40 MG: 40 TABLET ORAL at 08:33

## 2018-11-09 RX ADMIN — DILTIAZEM HYDROCHLORIDE 360 MG: 180 CAPSULE, EXTENDED RELEASE ORAL at 08:34

## 2018-11-09 RX ADMIN — PANTOPRAZOLE SODIUM 20 MG: 20 TABLET, DELAYED RELEASE ORAL at 05:19

## 2018-11-09 RX ADMIN — LEVOFLOXACIN 750 MG: 5 INJECTION, SOLUTION INTRAVENOUS at 10:30

## 2018-11-09 RX ADMIN — GUAIFENESIN 600 MG: 600 TABLET, EXTENDED RELEASE ORAL at 08:34

## 2018-11-09 RX ADMIN — ATORVASTATIN CALCIUM 40 MG: 40 TABLET, FILM COATED ORAL at 08:33

## 2018-11-09 NOTE — PROGRESS NOTES
Progress Note - Pulmonary   Ollen Slider 66 y o  male MRN: [id number]  Unit/Bed#: -01 Encounter: [id number]    Assessment/Plan:  Chronic hypoxic respiratory failure  Severe bronchiectatic lung with superimposed infection  Severe COPD   -given the procalcitonin decreased on Levaquin I feel comfortable discharging on a prolonged course of Levaquin  I would finish 14 days of antibiotics  He is certainly at risk for opportunistic infections that lung particularly Pseudomonas, MRSA, Aspergillus  He is not interested in pursuing any bronchoscopy or invasive procedures at this time  Will get a follow-up CT scan in 6-8 weeks  He certainly high risk for any bronchoscopy given the fact that he is an FEV1 o 23% predicted and refuses to take any inhaled therapy      Chief Complaint:    "I was coughing a lot throughout the evening "    Subjective: Junior Harvey was sitting in the chair eating his breakfast in a pleasant mood  He reports that he was coughing throughout the evening in which he brought up yellow/brown sputum  He reports that he feels his respiratory status is at his baseline  Patient is agreeable with 14 day course of antibiotic therapy  He expressed concern that if his lung does not improve, he is concerned it will become cancerous  Patient denies fevers, chills, hemoptysis, chest pain, palpations, and headache  Objective:    Vitals: Blood pressure 129/61, pulse 86, temperature 98 3 °F (36 8 °C), temperature source Oral, resp  rate 18, height 5' 8" (1 727 m), weight 78 kg (171 lb 15 3 oz), SpO2 96 %  3L,Body mass index is 26 15 kg/m²        Intake/Output Summary (Last 24 hours) at 11/09/18 0755  Last data filed at 11/09/18 0520   Gross per 24 hour   Intake                0 ml   Output             1275 ml   Net            -1275 ml       Invasive Devices     Peripheral Intravenous Line            Peripheral IV 11/07/18 Left Forearm 1 day                Physical Exam:   Physical Exam   Constitutional: He appears well-developed and well-nourished  HENT:   Head: Normocephalic and atraumatic  Neck: Normal range of motion  Neck supple  Cardiovascular: Normal rate and regular rhythm  Exam reveals no gallop and no friction rub  No murmur heard  Pulmonary/Chest: Effort normal  No accessory muscle usage  No tachypnea  No respiratory distress  He has decreased breath sounds  He has no wheezes  He has no rhonchi  He has no rales  Abdominal: Soft  Bowel sounds are normal  He exhibits no distension  There is no tenderness  Musculoskeletal: Normal range of motion  He exhibits edema  +1 B/L edema       Labs: I have personally reviewed pertinent lab results  , ABG: No results found for: PHART, TAT3FAI, PO2ART, ZGO9PNO, F0GZKYCJ, BEART, SOURCE, BNP: No results found for: BNP, CBC:   Lab Results   Component Value Date    WBC 6 67 11/09/2018    HGB 10 0 (L) 11/09/2018    HCT 31 5 (L) 11/09/2018    MCV 86 11/09/2018     11/09/2018    MCH 27 3 11/09/2018    MCHC 31 7 11/09/2018    RDW 14 1 11/09/2018    MPV 9 6 11/09/2018    NRBC 0 11/09/2018   , CMP:   Lab Results   Component Value Date    SODIUM 134 (L) 11/09/2018    K 3 9 11/09/2018     11/09/2018    CO2 28 11/09/2018    BUN 16 11/09/2018    CREATININE 1 04 11/09/2018    CALCIUM 8 8 11/09/2018    AST 39 11/09/2018    ALT 44 11/09/2018    ALKPHOS 92 11/09/2018    EGFR 68 11/09/2018     Imaging and other studies: I have personally reviewed pertinent films in PACS        SHARONA airspace consolidation, left small to moderate loculated effusions, emphysema

## 2018-11-09 NOTE — ASSESSMENT & PLAN NOTE
· O2 dependent, chronic hypoxic respiratory failure  · Not on home pulmonary medications as he refuses   · FEV1 noted to be 25% per last Pulmonary office visit    · Patient currently refusing maintenance inhalers or steroids as he thinks they will make him worse   · Patient with significant SOB during conversation  · Appreciate pulmonology consult    · Outpatient pulm follow-up

## 2018-11-09 NOTE — PLAN OF CARE
Problem: DISCHARGE PLANNING - CARE MANAGEMENT  Goal: Discharge to post-acute care or home with appropriate resources  INTERVENTIONS:  - Conduct assessment to determine patient/family and health care team treatment goals, and need for post-acute services based on payer coverage, community resources, and patient preferences, and barriers to discharge  - Address psychosocial, clinical, and financial barriers to discharge as identified in assessment in conjunction with the patient/family and health care team  - Arrange appropriate level of post-acute services according to patients   needs and preference and payer coverage in collaboration with the physician and health care team  - Communicate with and update the patient/family, physician, and health care team regarding progress on the discharge plan  - Arrange appropriate transportation to post-acute venues  Outcome: Progressing  LOS 2  BOT A BUNDLE;  READMISSION  Pt had been diagnosed with a flutter during last admission  Pt returned to the ED with tachycardia  Medications will be adjusted during this stay  CM reviewed discharge planning process including the following: identifying caregivers at home, preference for d/c planning needs, [facility] Meds to Bed program, availability of treatment team to discuss questions or concerns patient and/or family may have regarding diagnosis, plan of care, old or new medications and discharge planning   CM will continue to follow for care coordination and update assessment as necessary  CM name and role reviewed  Discharge Checklist reviewed and CM will continue to monitor for progress toward discharge goals in nursing and provider rounds  Cm met with pt to discuss DCP specifically transportation home  Pt states he needs transportation home as he does not have his O2 tank  Cm contacted [facility]  Cm will follow up with transport time

## 2018-11-09 NOTE — DISCHARGE INSTRUCTIONS
Tachycardia (Fast heartbeat) and Atrial Fibrillation:  Increase Cardizem to 360 mg daily and follow-up with outpatient cardiologist     Severe COPD/ community-acquired pneumonia:   Continue antibiotic (Levaquin 750 mg) daily for 11 more days to complete a 14-day course of therapy   Repeat a CT scan of the chest in 4-6 weeks  Follow-up with pulmonology in 2-4 weeks   Follow-up with primary care provider

## 2018-11-09 NOTE — ASSESSMENT & PLAN NOTE
· Patient transferred from pulmonary office to ER for tachycardia    · EKG: Sinus tachycardia with PVCs  · Recent diagnosed of Atrial Flutter, s/p successful cardioversion, on Cardizem 24 hr capsule 240 mg daily  · Adjusted cardiac meds for underlying A flutter  · Increased Cardizem to 360 mg daily   · Continue Xarelto  · Outpatient cardiology follow-up

## 2018-11-09 NOTE — PLAN OF CARE
Potential for Falls     Patient will remain free of falls Adequate for Discharge        Prexisting or High Potential for Compromised Skin Integrity     Skin integrity is maintained or improved Adequate for Discharge        RESPIRATORY - ADULT     Achieves optimal ventilation and oxygenation Adequate for Discharge

## 2018-11-09 NOTE — ASSESSMENT & PLAN NOTE
· POA, likely gram negative bacteria   · CT scan noted for new changes from x-ray earlier last month  · Blood cultures NTD  · Received IV Levaquin 750 mg daily for 3 doses  · Procalcitonin : 0 34 -> 0 26  · Afebrile without leukocytosis   · Per pulmonology, will discharge on Levaquin 750 mg po x11 more days to complete a 10-day course of therapy   · Will need repeat CT chest in 4-6 weeks  · Outpatient pulm follow-up

## 2018-11-09 NOTE — SOCIAL WORK
LOS 2   BOT A BUNDLE;  READMISSION  Pt had been diagnosed with a flutter during last admission  Pt returned to the ED with tachycardia  Medications will be adjusted during this stay  CM reviewed discharge planning process including the following: identifying caregivers at home, preference for d/c planning needs, [facility] Meds to Bed program, availability of treatment team to discuss questions or concerns patient and/or family may have regarding diagnosis, plan of care, old or new medications and discharge planning   CM will continue to follow for care coordination and update assessment as necessary  CM name and role reviewed  Discharge Checklist reviewed and CM will continue to monitor for progress toward discharge goals in nursing and provider rounds  Cm met with pt to discuss DCP specifically transportation home  Pt states he needs transportation home as he does not have his O2 tank  Cm contacted [facility]  Pt qualifies for HRR however he has an appointment already scheduled with his [facility] doctor on Friday 11/16  Patient identified as high risk for readmission (HRR)  PCP follow-up appointment information provided and transportation arrangements verified with patient and/or caregiver  AVS reviewed for appointment documentation prior to discharge  OP Care Coordination Team notified to support transitions of care

## 2018-11-09 NOTE — ASSESSMENT & PLAN NOTE
As evidenced by oxygen sat 85% on room air requiring supplemental oxygen and bronchodilators as needed  CT chest: Airspace consolidation seen in the left upper lobe, superior and posterior aspect of the left lower lobe suggest pneumonia  As compared to the previous radiograph from 10/17/18 the airspace consolidation in the left upper and mid lung is new  Emphysema  Small to moderate left effusion   Follow-up suggested at 3 months to demonstrate resolution  · Continue home oxygen  · Refusing nebulizer treatments or maintenance inhalers  · Outpatient pulmonology follow-up

## 2018-11-09 NOTE — SOCIAL WORK
[facility] is able to transport pt at 1330  Nursing and pt aware  No further cm interventions needed at this time

## 2018-11-09 NOTE — DISCHARGE SUMMARY
Discharge- Arleth  1940, 66 y o  male MRN: [id number]    Unit/Bed#: -01 Encounter: [id number]    Primary Care Provider: Kimberlee Nelson MD   Date and time admitted to hospital: 11/7/2018  9:36 AM        * Atrial flutter [facility])   Assessment & Plan    · Patient transferred from pulmonary office to ER for tachycardia  · EKG: Sinus tachycardia with PVCs  · Recent diagnosed of Atrial Flutter, s/p successful cardioversion, on Cardizem 24 hr capsule 240 mg daily  · Adjusted cardiac meds for underlying A flutter  · Increased Cardizem to 360 mg daily   · Continue Xarelto  · Outpatient cardiology follow-up      Community acquired bacterial pneumonia   Assessment & Plan    · POA, likely gram negative bacteria   · CT scan noted for new changes from x-ray earlier last month  · Blood cultures NTD  · Received IV Levaquin 750 mg daily for 3 doses  · Procalcitonin : 0 34 -> 0 26  · Afebrile without leukocytosis   · Per pulmonology, will discharge on Levaquin 750 mg po x11 more days to complete a 10-day course of therapy   · Will need repeat CT chest in 4-6 weeks  · Outpatient pulm follow-up        Acute on chronic respiratory failure with hypoxia ([address] )   Assessment & Plan    As evidenced by oxygen sat 85% on room air requiring supplemental oxygen and bronchodilators as needed  CT chest: Airspace consolidation seen in the left upper lobe, superior and posterior aspect of the left lower lobe suggest pneumonia  As compared to the previous radiograph from 10/17/18 the airspace consolidation in the left upper and mid lung is new  Emphysema  Small to moderate left effusion   Follow-up suggested at 3 months to demonstrate resolution  · Continue home oxygen  · Refusing nebulizer treatments or maintenance inhalers  · Outpatient pulmonology follow-up      Centrilobular emphysema ([address] )   Assessment & Plan    · O2 dependent, chronic hypoxic respiratory failure  · Not on home pulmonary medications as he refuses   · FEV1 noted to be 25% per last Pulmonary office visit  · Patient currently refusing maintenance inhalers or steroids as he thinks they will make him worse   · Patient with significant SOB during conversation  · Appreciate pulmonology consult    · Outpatient pulm follow-up          Discharging Physician / Practitioner: MYLENE Pruitt  PCP: Anthony Villagran MD  Admission Date:   Admission Orders     Ordered        11/07/18 1153  Inpatient Admission (expected length of stay for this patient is greater than two midnights)  Once             Discharge Date: 11/09/18    Resolved Problems  Date Reviewed: 11/9/2018    None          Consultations During Hospital Stay:  · Pulmonology : Dr Hamida Sanchez     Procedures Performed:     CT chest: Airspace consolidation seen in the left upper lobe, superior and posterior aspect of the left lower lobe suggest pneumonia       As compared to the previous radiograph from October 17, 2018 the airspace consolidation in the left upper and mid lung is new    Emphysema    Small to moderate left effusion  Follow-up suggested at 3 months to demonstrate resolution    Significant Findings / Test Results:     · As compared to the previous radiograph from October 17, 2018 the airspace consolidation in the left upper and mid lung is new  · Elevated procalcitonin level     Incidental Findings:   · None    Test Results Pending at Discharge (will require follow up): · Final blood cultures      Outpatient Tests Requested:  · None    Complications:  None    Reason for Admission: Tachycardia    Hospital Course: Cindy Zurita is a 66 y o  male patient who originally presented to the hospital on 11/7/2018 after referral from outpatient pulmonologist for tachycardia  Patient recently admitted to [address] [place] [address] on October 10th for Community Acquired Pneumonia and new on-set Atrial Flutter  He was managed with Levaquin and Cardizem  He underwent successful cardioversion   He returned to [facility] after outpatient pulmonologist noted tachycardia during follow-up appointment  Workup in [facility] ED included an EKG which revealed sinus tachycardia and PACs  His Cardizem 24 hr tablet was increased from 240 to 360 mg with good results  He was noted to have severe COPD with an FEV of 23% predicted, but he refuses to take any inhalers  A CT scan of the chest revealed new left upper and lower infiltrates along with an elevated procalcitonin level  A pulmonology consultation was obtained  He was managed on Levaquin and recommended to complete a longer course of therapy for 14 days  He is to repeat an outpatient CT chest in 6-8 weeks, continue supplemental oxygen, and follow-up with his pulmonologist  He is also to follow-up with his cardiologist      Please see above list of diagnoses and related plan for additional information  Condition at Discharge: stable     Discharge Day Visit / Exam:     Subjective:  Patient observed resting OOB in chair  Pleasant  States he feels better than when he first came to the hospital  He is agreeing to continue antibiotics and follow-up as an outpatient  He states he would like his 'left lung removed because it's bad and I don't want it to turn into cancer  It's best to take it out before it gets worse ' Denies HA, dizziness, lightheadedness, chest pain  Appetite good  Vitals: Blood Pressure: 129/61 (11/09/18 0655)  Pulse: 86 (11/09/18 0655)  Temperature: 98 3 °F (36 8 °C) (11/09/18 0655)  Temp Source: Oral (11/09/18 0655)  Respirations: 18 (11/09/18 0655)  Height: 5' 8" (172 7 cm) (11/07/18 1848)  Weight - Scale: 78 kg (171 lb 15 3 oz) (11/07/18 1848)  SpO2: 96 % (11/09/18 0655)  Exam:   Physical Exam   Constitutional: He is oriented to person, place, and time  He appears well-developed  No distress  HENT:   Head: Normocephalic  Neck: Normal range of motion  Cardiovascular: Normal rate and regular rhythm      Pulmonary/Chest: Effort normal  No respiratory distress  He has decreased breath sounds in the right lower field and the left lower field  He has no wheezes  He has no rhonchi  He has no rales  Abdominal: Soft  Bowel sounds are normal  He exhibits no distension  There is no tenderness  Musculoskeletal: Normal range of motion  He exhibits edema (+1 BLE)  He exhibits no tenderness  Neurological: He is alert and oriented to person, place, and time  Forgetful at times   Skin: Skin is warm and dry  No rash noted  He is not diaphoretic  Psychiatric: He has a normal mood and affect  His behavior is normal    Nursing note and vitals reviewed  Discussion with Family: Daughter     Discharge instructions/Information to patient and family:   See after visit summary for information provided to patient and family  Provisions for Follow-Up Care:  See after visit summary for information related to follow-up care and any pertinent home health orders  Disposition:     Home    For Discharges to [facility]:   · Not Applicable to this Patient - Not Applicable to this Patient    Planned Readmission: None     Discharge Statement:  I spent > 30 minutes discharging the patient  This time was spent on the day of discharge  I had direct contact with the patient on the day of discharge  Greater than 50% of the total time was spent examining patient, answering all patient questions, arranging and discussing plan of care with patient as well as directly providing post-discharge instructions  Additional time then spent on discharge activities  Discharge Medications:  See after visit summary for reconciled discharge medications provided to patient and family        ** Please Note: This note has been constructed using a voice recognition system **

## 2018-11-12 ENCOUNTER — TRANSITIONAL CARE MANAGEMENT (OUTPATIENT)
Dept: INTERNAL MEDICINE CLINIC | Facility: CLINIC | Age: 78
End: 2018-11-12

## 2018-11-12 LAB
BACTERIA BLD CULT: NORMAL
BACTERIA BLD CULT: NORMAL

## 2018-11-12 NOTE — PROGRESS NOTES
Office staff called Khushboo Mckeon to schedule hospital follow-up appointment  He voices that he is not tolerating his antibiotics and requested a call back  I called Khushboo Mckeon to discuss his symptoms  Khushboo Mckeon reports that when he takes his antibiotic he gets some abdominal discomfort  He also reports that he has intermittent dizziness and headaches  I advised him that he should be taking the Levaquin with food and he reported to me that he has been taking it with buttered bread  I advised him he should be taking a more substantial meal with his Levaquin  Given his headaches and dizziness, as well as abdominal discomfort, I advised him that if symptoms do not improve, he should call his PCP, present to an urgent care, or go to the ER for further evaluation  He declined at this time  I offered him a sick visit tomorrow in our office and he will call our office back later today once he knows if he can get a ride or not  He is aware to call us with further questions or concerns      MYLENE Muhammad

## 2018-11-13 ENCOUNTER — OFFICE VISIT (OUTPATIENT)
Dept: PULMONOLOGY | Facility: CLINIC | Age: 78
End: 2018-11-13
Payer: MEDICARE

## 2018-11-13 VITALS
OXYGEN SATURATION: 100 % | BODY MASS INDEX: 25.91 KG/M2 | TEMPERATURE: 98.7 F | HEIGHT: 68 IN | WEIGHT: 171 LBS | SYSTOLIC BLOOD PRESSURE: 128 MMHG | DIASTOLIC BLOOD PRESSURE: 68 MMHG | HEART RATE: 90 BPM

## 2018-11-13 DIAGNOSIS — J96.11 CHRONIC RESPIRATORY FAILURE WITH HYPOXIA (HCC): Primary | ICD-10-CM

## 2018-11-13 DIAGNOSIS — J44.9 COPD, SEVERE (HCC): ICD-10-CM

## 2018-11-13 DIAGNOSIS — J47.0 BRONCHIECTASIS WITH ACUTE LOWER RESPIRATORY INFECTION (HCC): ICD-10-CM

## 2018-11-13 PROBLEM — J96.12 CHRONIC RESPIRATORY FAILURE WITH HYPOXIA AND HYPERCAPNIA (HCC): Status: ACTIVE | Noted: 2018-11-13

## 2018-11-13 PROCEDURE — 99214 OFFICE O/P EST MOD 30 MIN: CPT | Performed by: PHYSICIAN ASSISTANT

## 2018-11-13 NOTE — ASSESSMENT & PLAN NOTE
· Continue Levaquin to complete full 14 days  · Overall, he seems to be tolerating the antibiotics fairly well  I have encouraged him to eat both prior to taking the Levaquin as well as after taking the Levaquin  He seems to feel better than he did a few days ago  He states he will try and call us if he feels that he is unable to tolerate the rest of this medication  · Images were reviewed with the patient's granddaughter today  · He continues to refuse bronchoscopy or any other invasive procedures at this time    · I have ordered a repeat CT of the chest in 6-8 weeks

## 2018-11-13 NOTE — PROGRESS NOTES
Pulmonary Follow Up Note   Cindy Lewis 66 y o  male MRN: [id number]  11/13/2018      Assessment:    Bronchiectasis with acute lower respiratory infection ([address] )  · Continue Levaquin to complete full 14 days  · Overall, he seems to be tolerating the antibiotics fairly well  I have encouraged him to eat both prior to taking the Levaquin as well as after taking the Levaquin  He seems to feel better than he did a few days ago  He states he will try and call us if he feels that he is unable to tolerate the rest of this medication  · Images were reviewed with the patient's granddaughter today  · He continues to refuse bronchoscopy or any other invasive procedures at this time  · I have ordered a repeat CT of the chest in 6-8 weeks    COPD, severe ([address] )  · Continues to refuse any inhalers  · He does have a rescue inhaler at home that he has not used  Chronic respiratory failure with hypoxia (HCC)  · Continue supplemental oxygen at 2 L nasal cannula to keep saturations greater than equal to 88%  Plan:    Diagnoses and all orders for this visit:    Chronic respiratory failure with hypoxia (HCC)    COPD, severe ([address] )    Bronchiectasis with acute lower respiratory infection ([address] )      ~He will follow-up as scheduled next month with myself  CT of the chest has been ordered  All questions were answered  He was instructed to call the office with any other questions he may have or if he develops any changes in his breathing  ~Granddaughter was updated as she was present for appointment today  History of Present Illness   HPI:  Cindy Lewis is a 66 y o  male who presents to the office this afternoon with his granddaughter as a sick visit  He was recently admitted to [facility] from November 7th through November 9th  Initially, he was transferred from [facility] pulmonary office to the emergency room for tachycardia    He was admitted to our [facility] in early October for community-acquired pneumonia new onset atrial flutter  At that time, he was managed with Levaquin and Cardizem and underwent successful cardioversion  During his most recent admission his Cardizem was adjusted and he was seen by us secondary to abnormal CT of the chest   He was noted to have severe bronchiectatic lung with a superimposed infection as well as severe COPD with an FEV1 of 23% of predicted  His procalcitonin decreased on Levaquin and it was felt that he was stable to be discharged home on a prolonged course to include 14 days  It was felt that he was at risk for opportunistic infections particularly Pseudomonas, MRSA and or Aspergillus  Bronchoscopy was discussed with him, however, he was not interested in pursuing this or any other invasive procedures  He has refused to take any inhalers in the past for his breathing  He does use supplemental oxygen at 2 L nasal cannula around the clock  Since being home, he is doing well from a breathing standpoint, however, he tells me the antibiotics are tear in me up    He has completed approximately half of the antibiotics that were given to him  He tells me 5-10 minutes after taking his pill his ears pop, he becomes dizzy and gets stomach cramps  He is not really eating much will taking this medication  He feels pretty good otherwise  He denies cough, sputum production, hemoptysis or bronchospasm  He denies resting shortness of breath, chest pain, pleurisy or lower extremity edema  He has chronic dyspnea on exertion which is about at its baseline  He has been afebrile  Review of Systems   Constitutional: Negative  HENT: Negative  Eyes: Negative  Respiratory: Positive for shortness of breath  Negative for apnea, cough, choking, chest tightness, wheezing and stridor  Cardiovascular: Negative  Gastrointestinal: Negative  Endocrine: Negative  Genitourinary: Negative  Musculoskeletal: Negative  Skin: Negative  Allergic/Immunologic: Negative  Neurological: Negative  Psychiatric/Behavioral: Negative          Historical Information   Past Medical History:   Diagnosis Date    A-[facility]     Arthritis     Benign prostatic hyperplasia without lower urinary tract symptoms     without Urinary Obstruction    CAD (coronary artery disease)     Chronic obstructive lung disease (HCC)     Coronary arteriosclerosis     Depression     Emphysema lung (HCC)     GERD (gastroesophageal reflux disease)     Hyperlipidemia     Hypertension     Myocardial infarction (HCC)     Psoriasis     Requires supplemental oxygen     at bedtime during high humid days only    Stroke [facility])     TIA 1/2018     Past Surgical History:   Procedure Laterality Date    COLONOSCOPY      CORONARY ANGIOPLASTY  02/03/2001    PTCA of RCA    CORONARY ARTERY BYPASS GRAFT  02/07/2001    x4- [place]    HERNIA REPAIR      IN THROMBOENDARTECTMY NECK,NECK INCIS Left 2/20/2018    Procedure: ENDARTERECTOMY ARTERY CAROTID WITH PATCH ANGIOPLASTY;  Surgeon: Hima Longo MD;  Location: [facility];  Service: Vascular    TRANSURETHRAL RESECTION OF PROSTATE       Family History   Problem Relation Age of Onset    Lung cancer Mother     Cancer Mother     Other Father         sepsis       History   Smoking Status    Former Smoker    Packs/day: 1 00    Years: 3 00    Types: Cigarettes    Quit date: 1956   Smokeless Tobacco    Never Used     Comment: Has a past history of cigarette smoking;Quit date 1956; 5 packs year history, per [employer]           Meds/Allergies     Current Outpatient Prescriptions:     acetaminophen (TYLENOL) 325 mg tablet, Take 2 tablets (650 mg total) by mouth every 6 (six) hours as needed for mild pain, Disp: 30 tablet, Rfl: 0    albuterol (PROAIR HFA) 90 mcg/act inhaler, Inhale 2 puffs every 6 (six) hours as needed for wheezing or shortness of breath, Disp: 8 5 g, Rfl: 0    aspirin 81 mg chewable tablet, Chew 81 mg daily  , Disp: , Rfl:     atorvastatin (LIPITOR) 40 mg tablet, Take 1 tablet (40 mg total) by mouth daily, Disp: 90 tablet, Rfl: 1    diltiazem (CARDIZEM CD) 360 MG 24 hr capsule, Take 1 capsule (360 mg total) by mouth daily, Disp: 30 capsule, Rfl: 0    esomeprazole (NexIUM) 20 mg capsule, Take 20 mg by mouth every other day  , Disp: , Rfl:     furosemide (LASIX) 40 mg tablet, Take 1 tablet (40 mg total) by mouth daily, Disp: 30 tablet, Rfl: 3    levofloxacin (LEVAQUIN) 750 mg tablet, Take 1 tablet (750 mg total) by mouth every 24 hours for 11 days, Disp: 11 tablet, Rfl: 0    loratadine (CLARITIN) 10 mg tablet, Take 1 tablet (10 mg total) by mouth daily (Patient taking differently: Take 10 mg by mouth daily as needed  ), Disp: 30 tablet, Rfl: 0    rivaroxaban (XARELTO) 20 mg tablet, Take 1 tablet (20 mg total) by mouth daily with dinner, Disp: 30 tablet, Rfl: 1  Allergies   Allergen Reactions    Penicillins Swelling and Itching       Vitals: Blood pressure 128/68, pulse 90, temperature 98 7 °F (37 1 °C), temperature source Tympanic, height 5' 8" (1 727 m), weight 77 6 kg (171 lb), SpO2 100 %  Body mass index is 26 kg/m²  Oxygen Therapy  SpO2: 100 %  Oxygen Therapy: Supplemental oxygen  O2 Delivery Method: Nasal cannula  O2 Flow Rate (L/min): 2 L/min    Physical Exam  Physical Exam   Constitutional: He is oriented to person, place, and time  He appears well-developed and well-nourished  No distress  HENT:   Head: Normocephalic and atraumatic  Wearing O2 via nasal cannula   Eyes: Pupils are equal, round, and reactive to light  Conjunctivae and EOM are normal  No scleral icterus  Neck: Normal range of motion  Neck supple  No JVD present  No tracheal deviation present  Cardiovascular: Regular rhythm and normal heart sounds  Exam reveals no gallop and no friction rub  No murmur heard  Tachycardic   Pulmonary/Chest: Effort normal  No stridor  No respiratory distress  He has no wheezes  He has no rales   He exhibits no tenderness  Decreased breath sounds throughout bilaterally  Abdominal: Soft  Bowel sounds are normal    Musculoskeletal: Normal range of motion  He exhibits no edema  Neurological: He is alert and oriented to person, place, and time  Skin: Skin is warm and dry  No rash noted  He is not diaphoretic  No pallor  Psychiatric: He has a normal mood and affect  His behavior is normal  Judgment and thought content normal    Vitals reviewed  Labs: I have personally reviewed pertinent lab results  , ABG: No results found for: PHART, [id number], PO2ART, CIF0NPG, H5SIOZPO, BEART, SOURCE, BNP: No results found for: BNP, CBC: No results found for: WBC, HGB, HCT, MCV, PLT, ADJUSTEDWBC, MCH, MCHC, RDW, MPV, NRBC, CMP: No results found for: SODIUM, K, CL, CO2, ANIONGAP, BUN, CREATININE, GLUCOSE, CALCIUM, AST, ALT, ALKPHOS, PROT, BILITOT, EGFR, PT/INR: No results found for: PT, INR, Troponin: No results found for: TROPONINI  Lab Results   Component Value Date    WBC 6 67 11/09/2018    HGB 10 0 (L) 11/09/2018    HCT 31 5 (L) 11/09/2018    MCV 86 11/09/2018     11/09/2018     Lab Results   Component Value Date    CALCIUM 8 8 11/09/2018    K 3 9 11/09/2018    CO2 28 11/09/2018     11/09/2018    BUN 16 11/09/2018    CREATININE 1 04 11/09/2018     No results found for: IGE  Lab Results   Component Value Date    ALT 44 11/09/2018    AST 39 11/09/2018    ALKPHOS 92 11/09/2018       Imaging and other studies: I have personally reviewed pertinent films in PACS     CTChest 11/7/18  Left upper lobe consolidation this is new compared to previous imaging done in October  Emphysema also noted small left pleural effusion  Pulmonary function testing:     In office spirometry done November 7, 2018 revealed FEV1 of 23% predicted

## 2018-11-13 NOTE — PATIENT INSTRUCTIONS
1  Continue Levaquin daily until gone  Please call us if you are unable to tolerate  Please eat prior to taking the pill as well as after taking the pill  2  Continue supplemental oxygen at 2 L via nasal cannula to keep saturations greater than equal to 88%  3  Obtain repeat CT of the chest in 6-8 weeks

## 2018-11-19 ENCOUNTER — OFFICE VISIT (OUTPATIENT)
Dept: CARDIOLOGY CLINIC | Facility: CLINIC | Age: 78
End: 2018-11-19
Payer: MEDICARE

## 2018-11-19 VITALS
RESPIRATION RATE: 18 BRPM | SYSTOLIC BLOOD PRESSURE: 120 MMHG | BODY MASS INDEX: 26.07 KG/M2 | HEIGHT: 68 IN | WEIGHT: 172 LBS | DIASTOLIC BLOOD PRESSURE: 66 MMHG | HEART RATE: 93 BPM | OXYGEN SATURATION: 98 %

## 2018-11-19 DIAGNOSIS — Z09 FOLLOW UP: Primary | ICD-10-CM

## 2018-11-19 DIAGNOSIS — I48.3 TYPICAL ATRIAL FLUTTER (HCC): ICD-10-CM

## 2018-11-19 DIAGNOSIS — I49.9 IRREGULAR HEART BEAT: ICD-10-CM

## 2018-11-19 PROCEDURE — 99215 OFFICE O/P EST HI 40 MIN: CPT | Performed by: INTERNAL MEDICINE

## 2018-11-19 PROCEDURE — 93000 ELECTROCARDIOGRAM COMPLETE: CPT | Performed by: INTERNAL MEDICINE

## 2018-11-19 RX ORDER — FUROSEMIDE 40 MG/1
40 TABLET ORAL DAILY
Qty: 6 TABLET | Refills: 6 | Status: SHIPPED | OUTPATIENT
Start: 2018-11-19 | End: 2019-01-25 | Stop reason: SDUPTHER

## 2018-11-19 RX ORDER — DILTIAZEM HYDROCHLORIDE 240 MG/1
240 CAPSULE, COATED, EXTENDED RELEASE ORAL DAILY
Qty: 60 CAPSULE | Refills: 6 | Status: SHIPPED | OUTPATIENT
Start: 2018-11-19 | End: 2018-11-19 | Stop reason: ALTCHOICE

## 2018-11-19 RX ORDER — DILTIAZEM HYDROCHLORIDE 240 MG/1
240 CAPSULE, COATED, EXTENDED RELEASE ORAL DAILY
Qty: 60 CAPSULE | Refills: 6 | Status: SHIPPED | OUTPATIENT
Start: 2018-11-19 | End: 2018-11-19 | Stop reason: SDUPTHER

## 2018-11-19 RX ORDER — DILTIAZEM HYDROCHLORIDE 360 MG/1
360 CAPSULE, EXTENDED RELEASE ORAL DAILY
Qty: 60 CAPSULE | Refills: 6 | Status: SHIPPED | OUTPATIENT
Start: 2018-11-19 | End: 2018-11-19 | Stop reason: SDUPTHER

## 2018-11-19 RX ORDER — DILTIAZEM HYDROCHLORIDE 240 MG/1
240 CAPSULE, COATED, EXTENDED RELEASE ORAL DAILY
Qty: 60 CAPSULE | Refills: 6 | Status: SHIPPED | OUTPATIENT
Start: 2018-11-19 | End: 2018-11-20 | Stop reason: SDUPTHER

## 2018-11-19 NOTE — PROGRESS NOTES
Cardiology Outpatient Follow up Note    Arturo Arellano 66 y o  male MRN: [id number]    11/19/18          Assessment/Plan:  1  Atrial flutter s/p DCCV- now in NSR  · Continue diltiazem  and Xarelto for cva prevention   · [id number]: 6  · Will consider definitive management with flutter ablation if recurrence    2  CAD with prior CABG ×4 2/2001  · LIMA to LAD, SVG to the RCA, sequential SVG to the diagonal and OM1  · Lexiscan 2016: No ischemia; normal LVEF  · Cont aspirin and atorvastatin  BB discontinued due to emphysema    3  Hx of Left CEA following an episode of amaurosis fugax  · Cont aspirin and atorvastatin  No acute concerns  4  Severe COPD/Bronchiectatic lung with chronic hypoxic respiraotry failure- on 2L O2  · On continuous O2-  2L  · Cont lasix 40mg PO daily  · Following with [facility]    5  Dyslipidemia- cont atorvastatin    Follow up: 3 months    1  Follow up  POCT ECG   2  Irregular heart beat  rivaroxaban (XARELTO) 20 mg tablet    DISCONTINUED: diltiazem (CARDIZEM CD) 360 MG 24 hr capsule    DISCONTINUED: diltiazem (CARDIZEM CD) 240 mg 24 hr capsule    DISCONTINUED: diltiazem (CARDIZEM CD) 240 mg 24 hr capsule    DISCONTINUED: diltiazem (CARDIZEM CD) 240 mg 24 hr capsule   3  Typical atrial flutter (HCC)  furosemide (LASIX) 40 mg tablet    diltiazem (CARDIZEM CD) 240 mg 24 hr capsule       HPI:   Joseph Davies is a very pleasant 68-year-old man with a history of CAD s/p CABG and PCI as above who presents for a routine follow up  Past medical hx:   · Typical Atrial flutter diagnosed during a hospitalization for for pneumonia from 10/7 to 10/10/18 and was started on diltiazem and Xarelto  He underwent elective RENEE/DCCV on 11/2  · RENEE: normal LV systolic function with no evidence of left atrial or left atrial appendage thrombus  He underwent a successful DCCV with conversion to normal sinus rhythm      On November 7th 2 seen by his outpatient pulmonologist who noted that he was short of breath and tachycardic  EKG reveals sinus tachycardia with PACs  He was sent to the emergency department at [facility] for further evaluation  At that time he was found to have a pneumonia and was seen by Pulmonary treated with Levaquin  He does have a history of severe COPD with an FEV1 of 23% predicted  He was discharged on a tapering course of Levaquin and the dose of his Cardizem was increased to 360 mg daily  He is seen as an outpatient felt to have bronchiectasis  He is not using oxygen at 2 L continuously  He denies any specific chest pain, progressive dyspnea, or palpitations  He has not taken his Lasix for few days as he ran out  On ECG today he is in normal sinus rhythm with right bundle branch block  He has had no recent palpitations  He denies any other active cardiac concerns at this time  Social hx: occasional tobacco use in the 1950s         Patient Active Problem List   Diagnosis    CAD (coronary atherosclerotic disease)    Hypertension    Hyperlipemia    Atypical chest pain    Hypothyroid    GERD (gastroesophageal reflux disease)    History of stroke    Sciatica of right side    Hyperlipidemia    Centrilobular emphysema (HCC)    Arthritis    Stenosis of left carotid artery    S/P CABG x 4    Pre-operative cardiovascular examination    Acute left-sided low back pain with left-sided sciatica    Back pain    COPD exacerbation ([address] )    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    Vision changes    Urinary retention due to benign prostatic hyperplasia    Bladder cancer ([address] )    CKD (chronic kidney disease), stage II    CAP (community acquired pneumonia)    Atrial flutter ([address] )    Pneumonia due to infectious organism    Irregular heart beat    Localized edema    Acute on chronic respiratory failure with hypoxia ([address] )    Community acquired bacterial pneumonia    Chronic respiratory failure with hypoxia (HCC)    COPD, severe ([address] )    Bronchiectasis with acute lower respiratory infection (HCC)       Allergies   Allergen Reactions    Penicillins Swelling and Itching         Current Outpatient Prescriptions:     acetaminophen (TYLENOL) 325 mg tablet, Take 2 tablets (650 mg total) by mouth every 6 (six) hours as needed for mild pain, Disp: 30 tablet, Rfl: 0    aspirin 81 mg chewable tablet, Chew 81 mg daily  , Disp: , Rfl:     atorvastatin (LIPITOR) 40 mg tablet, Take 1 tablet (40 mg total) by mouth daily, Disp: 90 tablet, Rfl: 1    esomeprazole (NexIUM) 20 mg capsule, Take 20 mg by mouth every other day  , Disp: , Rfl:     furosemide (LASIX) 40 mg tablet, Take 1 tablet (40 mg total) by mouth daily, Disp: 6 tablet, Rfl: 6    rivaroxaban (XARELTO) 20 mg tablet, Take 1 tablet (20 mg total) by mouth daily with dinner, Disp: 30 tablet, Rfl: 6    albuterol (PROAIR HFA) 90 mcg/act inhaler, Inhale 2 puffs every 6 (six) hours as needed for wheezing or shortness of breath (Patient not taking: Reported on 11/19/2018 ), Disp: 8 5 g, Rfl: 0    diltiazem (CARDIZEM CD) 240 mg 24 hr capsule, Take 1 capsule (240 mg total) by mouth daily, Disp: 60 capsule, Rfl: 6    levofloxacin (LEVAQUIN) 750 mg tablet, Take 1 tablet (750 mg total) by mouth every 24 hours for 11 days (Patient not taking: Reported on 11/19/2018 ), Disp: 11 tablet, Rfl: 0    loratadine (CLARITIN) 10 mg tablet, Take 1 tablet (10 mg total) by mouth daily (Patient not taking: Reported on 11/19/2018 ), Disp: 30 tablet, Rfl: 0    Past Medical History:   Diagnosis Date    A-fib ([facility] )     Arthritis     Benign prostatic hyperplasia without lower urinary tract symptoms     without Urinary Obstruction    CAD (coronary artery disease)     Chronic obstructive lung disease (HCC)     Coronary arteriosclerosis     Depression     Emphysema lung (HCC)     GERD (gastroesophageal reflux disease)     Hyperlipidemia     Hypertension     Myocardial infarction (HCC)     Psoriasis     Requires supplemental oxygen     at bedtime during high humid days only    Stroke [facility])     TIA 1/2018       Family History   Problem Relation Age of Onset    Lung cancer Mother     Cancer Mother     Other Father         sepsis       Past Surgical History:   Procedure Laterality Date    COLONOSCOPY      CORONARY ANGIOPLASTY  02/03/2001    PTCA of RCA    CORONARY ARTERY BYPASS GRAFT  02/07/2001    x4- [place]    HERNIA REPAIR      [facility] THROMBOENDARTECTMY [facility] [place] [facility] Left 2/20/2018    Procedure: ENDARTERECTOMY ARTERY CAROTID WITH PATCH ANGIOPLASTY;  Surgeon: Escobar Arredondo MD;  [facility];  Service: Vascular    TRANSURETHRAL RESECTION OF PROSTATE         Social History     Social History    Marital status:      Spouse name: N/A    Number of children: 3    Years of education: N/A     Occupational History    retired [employer]      Social History Main Topics    Smoking status: Former Smoker     Packs/day: 1 00     Years: 3 00     Types: Cigarettes     Quit date: 1956    Smokeless tobacco: Never Used      Comment: Has a past history of cigarette smoking;Quit date 1956; 5 packs year history, per [employer]      Alcohol use No    Drug use: No    Sexual activity: Yes     Other Topics Concern    Not on file     Social History Narrative    Lives with granddaughter and daughter     Caffeine use, He admits to consuming caffeine VIA coffee ( 8 servings per day), per [employer]    Martial History: Currently , per [employer]    Occupation: Retired (prior occupational:  repairman), per [employer]        Review of Systems   Constitution: Negative for chills, diaphoresis, fever, weakness, weight gain and weight loss  Cardiovascular: Positive for leg swelling  Negative for chest pain, claudication, dyspnea on exertion, irregular heartbeat, near-syncope, orthopnea, palpitations, paroxysmal nocturnal dyspnea and syncope  Respiratory: Negative for shortness of breath      Gastrointestinal: Negative for nausea and vomiting  Vitals: /66   Pulse 93   Resp 18   Ht 5' 8" (1 727 m)   Wt 78 kg (172 lb)   SpO2 98%   BMI 26 15 kg/m²       Physical Exam:     GEN: Alert and oriented x 3, in no acute distress  Well appearing and well nourished  HEENT: Sclera anicteric, conjunctivae pink, mucous membranes moist  Oropharynx clear  NECK: Supple, no carotid bruits, minimal JVD  Trachea midline, no thyromegaly  HEART: Regular rhythm, normal S1 and S2, no murmurs, clicks, gallops or rubs  PMI nondisplaced, no thrills  LUNGS: Clear to auscultation bilaterally; no wheezes, rales, or rhonchi  No increased work of breathing or signs of respiratory distress  On O2  ABDOMEN: Soft, nontender, nondistended, normoactive bowel sounds  EXTREMITIES: Skin warm and well perfused, no clubbing, or cyanosis, trace edema  Lab Results:       Lab Results   Component Value Date    HGBA1C 5 0 01/13/2018    HGBA1C 4 8 08/15/2016     Lab Results   Component Value Date    CHOL See scanned summary  08/15/2016    CHOL See scanned summary  03/17/2016     Lab Results   Component Value Date    HDL 44 01/14/2018    HDL See scanned summary  08/15/2016    HDL 46 08/14/2016     Lab Results   Component Value Date    LDLCALC 105 (H) 01/14/2018    LDLCALC 72 08/14/2016     Lab Results   Component Value Date    TRIG 81 01/14/2018    TRIG See scanned summary   08/15/2016    TRIG 110 08/14/2016     No results found for: CHOLHDL

## 2018-11-20 DIAGNOSIS — E78.5 HYPERLIPIDEMIA, UNSPECIFIED HYPERLIPIDEMIA TYPE: ICD-10-CM

## 2018-11-20 DIAGNOSIS — I48.3 TYPICAL ATRIAL FLUTTER (HCC): ICD-10-CM

## 2018-11-20 RX ORDER — ATORVASTATIN CALCIUM 40 MG/1
40 TABLET, FILM COATED ORAL DAILY
Qty: 90 TABLET | Refills: 3 | Status: SHIPPED | OUTPATIENT
Start: 2018-11-20 | End: 2019-01-25 | Stop reason: SDUPTHER

## 2018-11-20 RX ORDER — DILTIAZEM HYDROCHLORIDE 240 MG/1
240 CAPSULE, COATED, EXTENDED RELEASE ORAL DAILY
Qty: 90 CAPSULE | Refills: 3 | Status: SHIPPED | OUTPATIENT
Start: 2018-11-20 | End: 2019-01-25 | Stop reason: SDUPTHER

## 2018-11-20 NOTE — TELEPHONE ENCOUNTER
PT GRANDDAUGHTER RICHARD CALLED AND SAID [facility] GAVE PT DILTIAZEM 360MG (DR ALMARAZ CALLED [facility] YESTERDAY IN FRONT OF PT AND ASKED FOR 240MG) PLEASE SEND SCRIPT IN AGAIN THAT PT IS TO GET DILTIAZEM 240MG  RICHARD ALSO WANTS TO KNOW IF DR ALMARAZ WILL SEND IN A SCRIPT FOR ATORVASTATIN 40MG, IT IS CURRENTLY PRESCRIBED BY DR Jerry MEDEIROS  PLEASE CALL RICHARD AND LET HER KNOW IF DR ALMARAZ WILL SEND IN ATORVASTATIN FOR PT   Arden Nesbitt

## 2018-11-20 NOTE — TELEPHONE ENCOUNTER
S/w Kelsey and informed her that Diltiazem 240 mg was corrected and sent to pharmacy along with Atorvastatin 40 mg  Craig Borrero verbally understood

## 2018-11-27 ENCOUNTER — TRANSCRIBE ORDERS (OUTPATIENT)
Dept: ADMINISTRATIVE | Age: 78
End: 2018-11-27

## 2018-11-27 ENCOUNTER — APPOINTMENT (OUTPATIENT)
Dept: RADIOLOGY | Age: 78
End: 2018-11-27
Payer: MEDICARE

## 2018-11-27 DIAGNOSIS — J18.9 PNEUMONIA DUE TO INFECTIOUS ORGANISM, UNSPECIFIED LATERALITY, UNSPECIFIED PART OF LUNG: ICD-10-CM

## 2018-11-27 PROCEDURE — 71046 X-RAY EXAM CHEST 2 VIEWS: CPT

## 2018-11-30 ENCOUNTER — PATIENT OUTREACH (OUTPATIENT)
Dept: INTERNAL MEDICINE CLINIC | Facility: CLINIC | Age: 78
End: 2018-11-30

## 2018-12-03 ENCOUNTER — PATIENT OUTREACH (OUTPATIENT)
Dept: OTHER | Facility: HOSPITAL | Age: 78
End: 2018-12-03

## 2018-12-06 ENCOUNTER — OFFICE VISIT (OUTPATIENT)
Dept: INTERNAL MEDICINE CLINIC | Age: 78
End: 2018-12-06
Payer: MEDICARE

## 2018-12-06 VITALS
WEIGHT: 172.4 LBS | HEART RATE: 70 BPM | TEMPERATURE: 98.1 F | SYSTOLIC BLOOD PRESSURE: 104 MMHG | OXYGEN SATURATION: 98 % | BODY MASS INDEX: 26.13 KG/M2 | HEIGHT: 68 IN | DIASTOLIC BLOOD PRESSURE: 60 MMHG

## 2018-12-06 DIAGNOSIS — J43.2 CENTRILOBULAR EMPHYSEMA (HCC): ICD-10-CM

## 2018-12-06 DIAGNOSIS — E03.9 HYPOTHYROIDISM, UNSPECIFIED TYPE: Primary | ICD-10-CM

## 2018-12-06 DIAGNOSIS — E78.2 MIXED HYPERLIPIDEMIA: ICD-10-CM

## 2018-12-06 DIAGNOSIS — N18.2 CKD (CHRONIC KIDNEY DISEASE), STAGE II: ICD-10-CM

## 2018-12-06 DIAGNOSIS — I48.92 ATRIAL FLUTTER, UNSPECIFIED TYPE (HCC): ICD-10-CM

## 2018-12-06 PROCEDURE — 99214 OFFICE O/P EST MOD 30 MIN: CPT | Performed by: INTERNAL MEDICINE

## 2018-12-06 RX ORDER — DILTIAZEM HYDROCHLORIDE 360 MG/1
CAPSULE, EXTENDED RELEASE ORAL
Refills: 6 | COMMUNITY
Start: 2018-11-19 | End: 2019-01-04

## 2018-12-06 NOTE — ASSESSMENT & PLAN NOTE
Status post cardioversion and controlled on 240mg cardizem per cardiology  Rate is regular in the office today    - continue Cardizem 240 daily  - continue Xarelto with CHADsVASV score of 6

## 2018-12-06 NOTE — ASSESSMENT & PLAN NOTE
With chronic hypoxic respiratory failure  Stable on 2L NC  Not on any maintenance inhalers and only uses rescue inhaler in the summer time  Has been refusing any invasive procedures offered by Pulmonary team including bronchoscopy    RA sat 97% at rest   - continue to follow with pulmonolory  - no change to current regimen  - continue with oxygen supplementation

## 2018-12-06 NOTE — PROGRESS NOTES
[facility]    NAME: Michelle Moran  AGE: 66 y o  SEX: male    DATE OF ENCOUNTER: 12/6/2018    Assessment and Plan     Problem List Items Addressed This Visit        Endocrine    Hypothyroid - Primary     TSH was slightly high while hospitalized but T4 was WNL  - recheck TSH at next visit         Relevant Orders    TSH, 3rd generation with Free T4 reflex       Respiratory    Centrilobular emphysema ([address] )     With chronic hypoxic respiratory failure  Stable on 2L NC  Not on any maintenance inhalers and only uses rescue inhaler in the summer time  Has been refusing any invasive procedures offered by Pulmonary team including bronchoscopy  RA sat 97% at rest   - continue to follow with pulmonolory  - no change to current regimen  - continue with oxygen supplementation         Relevant Orders    CBC and differential       Cardiovascular and Mediastinum    Atrial flutter ([address] )     Status post cardioversion and controlled on 240mg cardizem per cardiology  Rate is regular in the office today  - continue Cardizem 240 daily  - continue Xarelto with CHADsVASV score of 6           Relevant Medications    diltiazem (CARDIZEM CD) 360 MG 24 hr capsule       Genitourinary    CKD (chronic kidney disease), stage II     Most recent BMP during hospitalization shows Cr WNL    - continue to monitor Cr regularly         Relevant Orders    Basic metabolic panel       Other    Hyperlipidemia     - check lipid panel  - continue statin         Relevant Orders    Lipid Panel with Direct LDL reflex        Health maintenance:  - UTD with colonoscopy this year  - received flu vaccine this year  - UTD with pneumococcal vaccines    Orders Placed This Encounter   Procedures    Lipid Panel with Direct LDL reflex    TSH, 3rd generation with Free T4 reflex    Basic metabolic panel    CBC and differential       - Barriers to treatment: none    Chief Complaint     Chief Complaint   Patient presents with    Follow-up pt  presents for 3 month follow up for HTN  Review Labs 11/9/18 and chest XRAY 11/27/18   pt  unsure what dose of Cardizem taking  History of Present Illness     Here for follow up after hospitalization  He was hospitalized one month ago at [facility] for tachycardia and was found to have frequent PVCs and recurrent PNA  In October he was diagnosed with new onset Aflutter and PNA  He was started on cardizem and underwent successful cardioversion  Since most recent hospitalization he completed a 14 day course of Levaquin  He uses 2L NC when walking around  He denies any SOB or palpitations  He has been following with pulmonlogy and cardiology  He reports he is doing well overall and has no complaints or concerns  The following portions of the patient's history were reviewed and updated as appropriate: allergies, current medications, past family history, past medical history, past social history, past surgical history and problem list     Review of Systems     Review of Systems   Constitutional: Negative for chills and fever  HENT: Negative for congestion and rhinorrhea  Eyes: Negative for visual disturbance  Respiratory: Positive for wheezing  Negative for cough and shortness of breath  Cardiovascular: Negative for chest pain, palpitations and leg swelling  Gastrointestinal: Negative for constipation and diarrhea  Genitourinary: Negative for dysuria and hematuria  Musculoskeletal: Negative for gait problem  Skin: Negative for rash  Neurological: Negative for dizziness and headaches  Psychiatric/Behavioral: Negative for behavioral problems and confusion         Active Problem List     Patient Active Problem List   Diagnosis    CAD (coronary atherosclerotic disease)    Hypertension    Hyperlipemia    Atypical chest pain    Hypothyroid    GERD (gastroesophageal reflux disease)    History of stroke    Sciatica of right side    Hyperlipidemia    Centrilobular emphysema (HCC)    Arthritis    Stenosis of left carotid artery    S/P CABG x 4    Pre-operative cardiovascular examination    Acute left-sided low back pain with left-sided sciatica    Back pain    COPD exacerbation (HCC)    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    Vision changes    Urinary retention due to benign prostatic hyperplasia    Bl[facility]    CKD (chronic kidney disease), stage II    CAP (community acquired pneumonia)    Atrial flutter ([facility] )    Pneumonia due to infectious organism    Irregular heart beat    Localized edema    Acute on chronic respiratory failure with hypoxia ([facility] )    Community acquired bacterial pneumonia    Chronic respiratory failure with hypoxia (HCC)    COPD, severe (HCC)    Bronchiectasis with acute lower respiratory infection ([facility] )       Objective     /60 (BP Location: Left arm, Patient Position: Sitting, Cuff Size: Standard)   Pulse 70   Temp 98 1 °F (36 7 °C) (Tympanic)   Ht 5' 8" (1 727 m)   Wt 78 2 kg (172 lb 6 4 oz)   SpO2 98%   BMI 26 21 kg/m²     Physical Exam   Constitutional: He is oriented to person, place, and time  He appears well-developed and well-nourished  No distress  HENT:   Head: Normocephalic and atraumatic  Eyes: Pupils are equal, round, and reactive to light  EOM are normal    Neck: No tracheal deviation present  Cardiovascular: Normal rate, regular rhythm and normal heart sounds  No murmur heard  Pulmonary/Chest: Effort normal  Stridor present  No respiratory distress  He has no wheezes  Diffuse decreased breath sounds   Abdominal: Soft  Bowel sounds are normal  He exhibits no distension  There is no tenderness  Musculoskeletal: He exhibits no edema or deformity  Neurological: He is alert and oriented to person, place, and time  Skin: Skin is warm and dry  No rash noted  Psychiatric: He has a normal mood and affect   His behavior is normal    Nursing note and vitals reviewed        Pertinent Laboratory/Diagnostic Studies:  CBC:   Lab Results   Component Value Date/Time    WBC 6 67 11/09/2018 04:46 AM    RBC 3 66 (L) 11/09/2018 04:46 AM    HGB 10 0 (L) 11/09/2018 04:46 AM    HCT 31 5 (L) 11/09/2018 04:46 AM    MCV 86 11/09/2018 04:46 AM    MCH 27 3 11/09/2018 04:46 AM    MCHC 31 7 11/09/2018 04:46 AM    RDW 14 1 11/09/2018 04:46 AM    MPV 9 6 11/09/2018 04:46 AM     11/09/2018 04:46 AM    NRBC 0 11/09/2018 04:46 AM    NEUTOPHILPCT 75 11/09/2018 04:46 AM    LYMPHOPCT 9 (L) 11/09/2018 04:46 AM    MONOPCT 9 11/09/2018 04:46 AM    EOSPCT 6 11/09/2018 04:46 AM    BASOPCT 0 11/09/2018 04:46 AM    NEUTROABS 5 02 11/09/2018 04:46 AM    LYMPHSABS 0 60 11/09/2018 04:46 AM    MONOSABS 0 57 11/09/2018 04:46 AM    EOSABS 0 41 11/09/2018 04:46 AM     Chemistry Profile:   Lab Results   Component Value Date/Time    K 3 9 11/09/2018 04:46 AM     11/09/2018 04:46 AM    CO2 28 11/09/2018 04:46 AM    BUN 16 11/09/2018 04:46 AM    CREATININE 1 04 11/09/2018 04:46 AM    GLUC 96 11/09/2018 04:46 AM    GLUF 100 (H) 11/02/2018 10:58 AM    CALCIUM 8 8 11/09/2018 04:46 AM    MG 2 3 11/09/2018 04:46 AM    AST 39 11/09/2018 04:46 AM    ALT 44 11/09/2018 04:46 AM    ALKPHOS 92 11/09/2018 04:46 AM    EGFR 68 11/09/2018 04:46 AM     Coagulation Studies:   Lab Results   Component Value Date/Time    PROTIME 22 1 (H) 11/07/2018 10:22 AM    INR 2 00 (H) 11/07/2018 10:22 AM    PTT 44 (H) 11/07/2018 10:22 AM     Endocrine Studies:     Results from last 6 Months  Lab Units 10/07/18  0921 10/07/18  0424   TSH 3RD GENERATON uIU/mL  --  5 348*   FREE T4 ng/dL 1 19  --        Current Medications     Current Outpatient Prescriptions:     acetaminophen (TYLENOL) 325 mg tablet, Take 2 tablets (650 mg total) by mouth every 6 (six) hours as needed for mild pain, Disp: 30 tablet, Rfl: 0    albuterol (PROAIR HFA) 90 mcg/act inhaler, Inhale 2 puffs every 6 (six) hours as needed for wheezing or shortness of breath, Disp: 8 5 g, Rfl: 0    aspirin 81 mg chewable tablet, Chew 81 mg daily  , Disp: , Rfl:     atorvastatin (LIPITOR) 40 mg tablet, Take 1 tablet (40 mg total) by mouth daily, Disp: 90 tablet, Rfl: 3    esomeprazole (NexIUM) 20 mg capsule, Take 20 mg by mouth every other day  , Disp: , Rfl:     furosemide (LASIX) 40 mg tablet, Take 1 tablet (40 mg total) by mouth daily, Disp: 6 tablet, Rfl: 6    rivaroxaban (XARELTO) 20 mg tablet, Take 1 tablet (20 mg total) by mouth daily with dinner, Disp: 30 tablet, Rfl: 6    diltiazem (CARDIZEM CD) 240 mg 24 hr capsule, Take 1 capsule (240 mg total) by mouth daily, Disp: 90 capsule, Rfl: 3    diltiazem (CARDIZEM CD) 360 MG 24 hr capsule, TAKE 1 CAPSULE (360 MG TOTAL) BY MOUTH DAILY, Disp: , Rfl: 6    loratadine (CLARITIN) 10 mg tablet, Take 1 tablet (10 mg total) by mouth daily (Patient not taking: Reported on 11/19/2018 ), Disp: 30 tablet, Rfl: 0    Health Maintenance     Health Maintenance   Topic Date Due    DTaP,Tdap,and Td Vaccines (1 - Tdap) 06/21/1961    Depression Screening PHQ  12/04/2018    Fall Risk  12/06/2019    INFLUENZA VACCINE  Completed    Pneumococcal PPSV23/PCV13 65+ Years / High and Highest Risk  Completed     Immunization History   Administered Date(s) Administered    H1N1, All Formulations 1940, 01/09/2010    Influenza 1940, 09/30/2013, 09/10/2014, 08/26/2015, 09/10/2018    Influenza Split High Dose Preservative Free IM 07/18/2015, 10/25/2016, 12/04/2017    Influenza, high dose seasonal 0 5 mL 09/10/2018    Pneumococcal Conjugate 13-Valent 08/26/2015, 02/18/2016    Pneumococcal Polysaccharide PPV23 05/02/2008    Zoster 10/25/2013       Ray Rosario, PGY 3  Internal Medicine Residency  12/6/2018 9:50 AM

## 2018-12-11 ENCOUNTER — OFFICE VISIT (OUTPATIENT)
Dept: PULMONOLOGY | Facility: CLINIC | Age: 78
End: 2018-12-11
Payer: MEDICARE

## 2018-12-11 VITALS
HEIGHT: 68 IN | TEMPERATURE: 98 F | DIASTOLIC BLOOD PRESSURE: 72 MMHG | BODY MASS INDEX: 26.22 KG/M2 | WEIGHT: 173 LBS | SYSTOLIC BLOOD PRESSURE: 113 MMHG | HEART RATE: 88 BPM | OXYGEN SATURATION: 99 %

## 2018-12-11 DIAGNOSIS — J44.9 COPD, SEVERE (HCC): ICD-10-CM

## 2018-12-11 DIAGNOSIS — J96.11 CHRONIC RESPIRATORY FAILURE WITH HYPOXIA (HCC): Primary | ICD-10-CM

## 2018-12-11 DIAGNOSIS — J47.0 BRONCHIECTASIS WITH ACUTE LOWER RESPIRATORY INFECTION (HCC): ICD-10-CM

## 2018-12-11 PROCEDURE — 99213 OFFICE O/P EST LOW 20 MIN: CPT | Performed by: PHYSICIAN ASSISTANT

## 2018-12-11 NOTE — PATIENT INSTRUCTIONS
1  Follow-up CT scan January 3, 2019 at 11:45 a m  at our [facility]  2  Continue supplemental oxygen at 2 L via nasal cannula with all activities an as needed to keep saturations greater than or equal to 88%  3  Rescue inhaler as needed  4   Continue incentive spirometry daily

## 2018-12-11 NOTE — ASSESSMENT & PLAN NOTE
· Appears stable in at his baseline  · He remains on no maintenance therapy at this time  He does have a rescue inhaler which she has not used since September    · Continue incentive spirometry at home

## 2018-12-11 NOTE — PROGRESS NOTES
Pulmonary Follow Up Note   Mumtaz Shin 66 y o  male MRN: [id number]  12/11/2018      Assessment:    Bronchiectasis with acute lower respiratory infection ([address] )  · He is much improved after completing 14 days of Levaquin  · He did have a repeat chest x-ray on November 27th that was unchanged from previous abnormal imaging  He currently is scheduled to undergo follow-up CT of the chest on January 3rd  I will call him with results when they are available to me  · He tells me that if imaging were to remain abnormal he would not want to pursue bronchoscopy or any other invasive procedures  COPD, severe ([address] )  · Appears stable in at his baseline  · He remains on no maintenance therapy at this time  He does have a rescue inhaler which she has not used since September  · Continue incentive spirometry at home    Chronic respiratory failure with hypoxia (HCC)  · Continue supplemental oxygen at 2 L nasal cannula as needed to keep saturations greater than or equal to 88%  He mainly uses this with all strenuous activities  He will continue to monitor her saturations intermittently at home  Plan:    Diagnoses and all orders for this visit:    Chronic respiratory failure with hypoxia (HCC)    COPD, severe ([address] )    Bronchiectasis with acute lower respiratory infection ([address] )      ~He will follow-up in 3 months or sooner if problems arise  I will call him with results of CT scan when they are available  All his questions were answered  He was instructed to call the office with any questions he may have or if he develops any changes in his breathing  History of Present Illness   HPI:  Mumtaz Shin is a 66 y o  male who presents to the office this morning for 1 month follow-up after recent acute lower respiratory infection  He was seen by myself on November 13th due to some mild intolerance to Levaquin    He again was admitted to [facility] from November 7th through November 9th after being transferred from our [place] pulmonary office due to tachycardia  Imaging showed severe bronchiectatic lung with a superimposed infection and was found to have severe COPD with an FEV1 of 23% of predicted  During his most recent admission, procalcitonin level decreased on Levaquin and it was felt that he was stable to be discharged home on a prolonged 14 day course of Levaquin  Bronchoscopy was discussed with him, however, he was not interested in pursuing this or any other invasive procedures  He was felt to be at risk for opportunistic infections including Pseudomonas, MRSA and Aspergillus  He has been refusing inhalers for his breathing and does use supplemental oxygen 2 L with all activities and as needed  Today he tells me I am 100% better    He is extremely happy and notes that he is back to his pulmonary baseline  He was able to complete the 14 days of antibiotics without any issues  He remains off all inhaler therapy and has only been using his supplemental oxygen with activities and as he feels he needs  He does intermittently check his pulse ox at home and it always remained above 88%  He has weaned himself from Claritin and has been using his incentive spirometer daily at home which he feels is helping him  He has not used a rescue inhaler since September  He is able to go shopping without any issues but notes that he does wear a mask when out in public to prevent spread of germs  He denies cough, sputum production, hemoptysis or bronchospasm  He denies resting shortness of breath, chest pain, pleurisy or lower extremity edema  He does have some mild dyspnea on exertion but this is unchanged and chronic  He has been afebrile  He has a minimal remote tobacco history smoking a pack a day for approximately 3 years, quitting in 1956  His mother did have lung cancer  A repeat CT of the chest is currently scheduled for January 3rd  Review of Systems   Constitutional: Negative      HENT: Negative  Eyes: Negative  Respiratory: Positive for shortness of breath  Negative for apnea, cough, choking, chest tightness, wheezing and stridor  Cardiovascular: Negative  Gastrointestinal: Negative  Endocrine: Negative  Genitourinary: Negative  Musculoskeletal: Negative  Skin: Negative  Allergic/Immunologic: Negative  Neurological: Negative  Psychiatric/Behavioral: Negative          Historical Information   Past Medical History:   Diagnosis Date    A-fib [facility])     Arthritis     Benign prostatic hyperplasia without lower urinary tract symptoms     without Urinary Obstruction    CAD (coronary artery disease)     Chronic obstructive lung disease (HCC)     Coronary arteriosclerosis     Depression     Emphysema lung (HCC)     GERD (gastroesophageal reflux disease)     Hyperlipidemia     Hypertension     Myocardial infarction (HCC)     Psoriasis     Requires supplemental oxygen     at bedtime during high humid days only    Stroke [facility])     TIA 1/2018     Past Surgical History:   Procedure Laterality Date    COLONOSCOPY      CORONARY ANGIOPLASTY  02/03/2001    PTCA of RCA    CORONARY ARTERY BYPASS GRAFT  02/07/2001    x4- [facility]    HERNIA REPAIR      [place] THROMBOENDARTECTMY NECK,NECK INCIS Left 2/20/2018    Procedure: ENDARTERECTOMY ARTERY CAROTID WITH PATCH ANGIOPLASTY;  Surgeon: George Nieto MD;  [facility];  Service: Vascular    TRANSURETHRAL RESECTION OF PROSTATE       Family History   Problem Relation Age of Onset    Lung cancer Mother     Cancer Mother     Other Father         sepsis       History   Smoking Status    Former Smoker    Packs/day: 1 00    Years: 3 00    Types: Cigarettes    Quit date: 1956   Smokeless Tobacco    Never Used     Comment: Has a past history of cigarette smoking;Quit date 1956; 5 packs year history, per [employer]           Meds/Allergies     Current Outpatient Prescriptions:     acetaminophen (TYLENOL) 325 mg tablet, Take 2 tablets (650 mg total) by mouth every 6 (six) hours as needed for mild pain, Disp: 30 tablet, Rfl: 0    albuterol (PROAIR HFA) 90 mcg/act inhaler, Inhale 2 puffs every 6 (six) hours as needed for wheezing or shortness of breath, Disp: 8 5 g, Rfl: 0    aspirin 81 mg chewable tablet, Chew 81 mg daily  , Disp: , Rfl:     atorvastatin (LIPITOR) 40 mg tablet, Take 1 tablet (40 mg total) by mouth daily, Disp: 90 tablet, Rfl: 3    diltiazem (CARDIZEM CD) 240 mg 24 hr capsule, Take 1 capsule (240 mg total) by mouth daily, Disp: 90 capsule, Rfl: 3    diltiazem (CARDIZEM CD) 360 MG 24 hr capsule, TAKE 1 CAPSULE (360 MG TOTAL) BY MOUTH DAILY, Disp: , Rfl: 6    esomeprazole (NexIUM) 20 mg capsule, Take 20 mg by mouth every other day  , Disp: , Rfl:     furosemide (LASIX) 40 mg tablet, Take 1 tablet (40 mg total) by mouth daily, Disp: 6 tablet, Rfl: 6    loratadine (CLARITIN) 10 mg tablet, Take 1 tablet (10 mg total) by mouth daily, Disp: 30 tablet, Rfl: 0    rivaroxaban (XARELTO) 20 mg tablet, Take 1 tablet (20 mg total) by mouth daily with dinner, Disp: 30 tablet, Rfl: 6  Allergies   Allergen Reactions    Penicillins Swelling and Itching       Vitals: Blood pressure 113/72, pulse 88, temperature 98 °F (36 7 °C), temperature source Tympanic, height 5' 8" (1 727 m), weight 78 5 kg (173 lb), SpO2 99 %  Body mass index is 26 3 kg/m²  Oxygen Therapy  SpO2: 99 %  Oxygen Therapy: None (Room air)    Physical Exam  Physical Exam   Constitutional: He is oriented to person, place, and time  He appears well-developed and well-nourished  No distress  HENT:   Head: Normocephalic and atraumatic  Eyes: Pupils are equal, round, and reactive to light  Conjunctivae and EOM are normal  No scleral icterus  Neck: Normal range of motion  Neck supple  No JVD present  No tracheal deviation present  Cardiovascular: Normal rate, regular rhythm and normal heart sounds  Exam reveals no gallop and no friction rub      No murmur heard  Pulmonary/Chest: Effort normal  No stridor  No respiratory distress  He has no wheezes  He has no rales  He exhibits no tenderness  Decreased breath sounds at the bases but clear to auscultation bilaterally   Abdominal: Soft  Bowel sounds are normal    Musculoskeletal: Normal range of motion  He exhibits no edema  Neurological: He is alert and oriented to person, place, and time  Skin: Skin is warm and dry  No rash noted  He is not diaphoretic  No pallor  Psychiatric: He has a normal mood and affect  His behavior is normal  Judgment and thought content normal    Vitals reviewed  Labs: I have personally reviewed pertinent lab results  , ABG: No results found for: PHART, JEI8PYR, PO2ART, NJM7FJL, T4WOKFFR, BEART, SOURCE, BNP: No results found for: BNP, CBC: No results found for: WBC, HGB, HCT, MCV, PLT, ADJUSTEDWBC, MCH, MCHC, RDW, MPV, NRBC, CMP: No results found for: SODIUM, K, CL, CO2, ANIONGAP, BUN, CREATININE, GLUCOSE, CALCIUM, AST, ALT, ALKPHOS, PROT, BILITOT, EGFR, PT/INR: No results found for: PT, INR, Troponin: No results found for: TROPONINI  Lab Results   Component Value Date    WBC 6 67 11/09/2018    HGB 10 0 (L) 11/09/2018    HCT 31 5 (L) 11/09/2018    MCV 86 11/09/2018     11/09/2018     Lab Results   Component Value Date    CALCIUM 8 8 11/09/2018    K 3 9 11/09/2018    CO2 28 11/09/2018     11/09/2018    BUN 16 11/09/2018    CREATININE 1 04 11/09/2018     No results found for: IGE  Lab Results   Component Value Date    ALT 44 11/09/2018    AST 39 11/09/2018    ALKPHOS 92 11/09/2018       Imaging and other studies: I have personally reviewed pertinent films in PACS     Follow up [facility] is scheduled for 1/3/19     PA & Lat CXR 11/27/18  Persistent left upper lobe consolidation  These findings were felt to be similar to the prior CT done in November  CTChest 11/7/18  New left upper lobe consolidation compared to previous imaging done in October    Small left pleural effusion and emphysema seen    Pulmonary function testing:      In office spirometry done November 7, 2018 revealed FEV1 of 23% of predicted

## 2018-12-11 NOTE — ASSESSMENT & PLAN NOTE
· He is much improved after completing 14 days of Levaquin  · He did have a repeat chest x-ray on November 27th that was unchanged from previous abnormal imaging  He currently is scheduled to undergo follow-up CT of the chest on January 3rd  I will call him with results when they are available to me  · He tells me that if imaging were to remain abnormal he would not want to pursue bronchoscopy or any other invasive procedures

## 2018-12-11 NOTE — ASSESSMENT & PLAN NOTE
· Continue supplemental oxygen at 2 L nasal cannula as needed to keep saturations greater than or equal to 88%  He mainly uses this with all strenuous activities  He will continue to monitor her saturations intermittently at home

## 2018-12-13 ENCOUNTER — PATIENT OUTREACH (OUTPATIENT)
Dept: INTERNAL MEDICINE CLINIC | Facility: CLINIC | Age: 78
End: 2018-12-13

## 2018-12-13 NOTE — PROGRESS NOTES
Outpatient Care Management Note: Outreached to pt and discussed the purpose of my calls  He declines outreach at this time  Stating that he needed a nurse in his home at one point when he had a reaction to an antibiotic, but otherwise feels like he is "back to his old self"  He has been going to scheduled appts  according to his EHR  He denies any needs

## 2019-01-03 ENCOUNTER — HOSPITAL ENCOUNTER (OUTPATIENT)
Dept: CT IMAGING | Facility: HOSPITAL | Age: 79
Discharge: HOME | End: 2019-01-03
Payer: MEDICARE

## 2019-01-03 DIAGNOSIS — R93.89 ABNORMAL CT SCAN, CHEST: ICD-10-CM

## 2019-01-03 PROCEDURE — 71250 CT THORAX DX C-: CPT

## 2019-01-04 ENCOUNTER — HOSPITAL ENCOUNTER (INPATIENT)
Facility: HOSPITAL | Age: 79
LOS: 4 days | Discharge: HOME HEALTH | DRG: 167 | End: 2019-01-08
Attending: EMERGENCY MEDICINE | Admitting: INTERNAL MEDICINE
Payer: MEDICARE

## 2019-01-04 ENCOUNTER — TELEPHONE (OUTPATIENT)
Dept: INTERNAL MEDICINE CLINIC | Age: 79
End: 2019-01-04

## 2019-01-04 DIAGNOSIS — J47.0 BRONCHIECTASIS WITH ACUTE LOWER RESPIRATORY INFECTION (HCC): ICD-10-CM

## 2019-01-04 DIAGNOSIS — I48.92 ATRIAL FLUTTER, UNSPECIFIED TYPE (HCC): ICD-10-CM

## 2019-01-04 DIAGNOSIS — J44.9 COPD, SEVERE (HCC): ICD-10-CM

## 2019-01-04 DIAGNOSIS — R93.89 ABNORMAL CT OF THE CHEST: Primary | ICD-10-CM

## 2019-01-04 LAB
ALBUMIN SERPL BCP-MCNC: 3.6 G/DL (ref 3.5–5)
ALP SERPL-CCNC: 87 U/L (ref 46–116)
ALT SERPL W P-5'-P-CCNC: 23 U/L (ref 12–78)
ANION GAP SERPL CALCULATED.3IONS-SCNC: 6 MMOL/L (ref 4–13)
AST SERPL W P-5'-P-CCNC: 17 U/L (ref 5–45)
BASOPHILS # BLD AUTO: 0.03 THOUSANDS/ΜL (ref 0–0.1)
BASOPHILS NFR BLD AUTO: 1 % (ref 0–1)
BILIRUB SERPL-MCNC: 0.89 MG/DL (ref 0.2–1)
BUN SERPL-MCNC: 24 MG/DL (ref 5–25)
CALCIUM SERPL-MCNC: 9.2 MG/DL (ref 8.3–10.1)
CHLORIDE SERPL-SCNC: 101 MMOL/L (ref 100–108)
CO2 SERPL-SCNC: 29 MMOL/L (ref 21–32)
CREAT SERPL-MCNC: 1.2 MG/DL (ref 0.6–1.3)
EOSINOPHIL # BLD AUTO: 0.09 THOUSAND/ΜL (ref 0–0.61)
EOSINOPHIL NFR BLD AUTO: 2 % (ref 0–6)
ERYTHROCYTE [DISTWIDTH] IN BLOOD BY AUTOMATED COUNT: 15.9 % (ref 11.6–15.1)
GFR SERPL CREATININE-BSD FRML MDRD: 58 ML/MIN/1.73SQ M
GLUCOSE SERPL-MCNC: 109 MG/DL (ref 65–140)
HCT VFR BLD AUTO: 36.2 % (ref 36.5–49.3)
HGB BLD-MCNC: 10.8 G/DL (ref 12–17)
IMM GRANULOCYTES # BLD AUTO: 0.03 THOUSAND/UL (ref 0–0.2)
IMM GRANULOCYTES NFR BLD AUTO: 1 % (ref 0–2)
LACTATE SERPL-SCNC: 1.8 MMOL/L (ref 0.5–2)
LYMPHOCYTES # BLD AUTO: 1.2 THOUSANDS/ΜL (ref 0.6–4.47)
LYMPHOCYTES NFR BLD AUTO: 20 % (ref 14–44)
MCH RBC QN AUTO: 25.2 PG (ref 26.8–34.3)
MCHC RBC AUTO-ENTMCNC: 29.8 G/DL (ref 31.4–37.4)
MCV RBC AUTO: 85 FL (ref 82–98)
MONOCYTES # BLD AUTO: 0.5 THOUSAND/ΜL (ref 0.17–1.22)
MONOCYTES NFR BLD AUTO: 8 % (ref 4–12)
NEUTROPHILS # BLD AUTO: 4.08 THOUSANDS/ΜL (ref 1.85–7.62)
NEUTS SEG NFR BLD AUTO: 68 % (ref 43–75)
NRBC BLD AUTO-RTO: 0 /100 WBCS
PLATELET # BLD AUTO: 303 THOUSANDS/UL (ref 149–390)
PMV BLD AUTO: 9.5 FL (ref 8.9–12.7)
POTASSIUM SERPL-SCNC: 4.1 MMOL/L (ref 3.5–5.3)
PROCALCITONIN SERPL-MCNC: <0.05 NG/ML
PROT SERPL-MCNC: 8.2 G/DL (ref 6.4–8.2)
RBC # BLD AUTO: 4.28 MILLION/UL (ref 3.88–5.62)
SODIUM SERPL-SCNC: 136 MMOL/L (ref 136–145)
WBC # BLD AUTO: 5.93 THOUSAND/UL (ref 4.31–10.16)

## 2019-01-04 PROCEDURE — 99223 1ST HOSP IP/OBS HIGH 75: CPT | Performed by: FAMILY MEDICINE

## 2019-01-04 PROCEDURE — 85025 COMPLETE CBC W/AUTO DIFF WBC: CPT | Performed by: EMERGENCY MEDICINE

## 2019-01-04 PROCEDURE — 83605 ASSAY OF LACTIC ACID: CPT | Performed by: EMERGENCY MEDICINE

## 2019-01-04 PROCEDURE — 80053 COMPREHEN METABOLIC PANEL: CPT | Performed by: EMERGENCY MEDICINE

## 2019-01-04 PROCEDURE — 84145 PROCALCITONIN (PCT): CPT | Performed by: EMERGENCY MEDICINE

## 2019-01-04 PROCEDURE — 99284 EMERGENCY DEPT VISIT MOD MDM: CPT

## 2019-01-04 PROCEDURE — 36415 COLL VENOUS BLD VENIPUNCTURE: CPT | Performed by: EMERGENCY MEDICINE

## 2019-01-04 RX ORDER — FUROSEMIDE 40 MG/1
40 TABLET ORAL DAILY
Status: DISCONTINUED | OUTPATIENT
Start: 2019-01-05 | End: 2019-01-08 | Stop reason: HOSPADM

## 2019-01-04 RX ORDER — ASPIRIN 81 MG/1
81 TABLET, CHEWABLE ORAL DAILY
Status: DISCONTINUED | OUTPATIENT
Start: 2019-01-05 | End: 2019-01-08 | Stop reason: HOSPADM

## 2019-01-04 RX ORDER — ALBUTEROL SULFATE 90 UG/1
2 AEROSOL, METERED RESPIRATORY (INHALATION) EVERY 6 HOURS PRN
Status: DISCONTINUED | OUTPATIENT
Start: 2019-01-04 | End: 2019-01-08 | Stop reason: HOSPADM

## 2019-01-04 RX ORDER — DILTIAZEM HYDROCHLORIDE 240 MG/1
240 CAPSULE, COATED, EXTENDED RELEASE ORAL DAILY
Status: DISCONTINUED | OUTPATIENT
Start: 2019-01-05 | End: 2019-01-08 | Stop reason: HOSPADM

## 2019-01-04 RX ORDER — PANTOPRAZOLE SODIUM 20 MG/1
20 TABLET, DELAYED RELEASE ORAL EVERY OTHER DAY
Status: DISCONTINUED | OUTPATIENT
Start: 2019-01-05 | End: 2019-01-08 | Stop reason: HOSPADM

## 2019-01-04 RX ORDER — ATORVASTATIN CALCIUM 40 MG/1
40 TABLET, FILM COATED ORAL
Status: DISCONTINUED | OUTPATIENT
Start: 2019-01-04 | End: 2019-01-08 | Stop reason: HOSPADM

## 2019-01-04 RX ORDER — ACETAMINOPHEN 325 MG/1
650 TABLET ORAL EVERY 6 HOURS PRN
Status: DISCONTINUED | OUTPATIENT
Start: 2019-01-04 | End: 2019-01-08 | Stop reason: HOSPADM

## 2019-01-04 RX ADMIN — RIVAROXABAN 20 MG: 20 TABLET, FILM COATED ORAL at 22:22

## 2019-01-04 RX ADMIN — ATORVASTATIN CALCIUM 40 MG: 40 TABLET, FILM COATED ORAL at 16:44

## 2019-01-04 NOTE — H&P
INTERNAL MEDICINE HISTORY AND PHYSICAL  ED 05 SOD Team C     NAME: Rubens Banks  AGE: 66 y o  SEX: male  : 1940   MRN: [id number]  ENCOUNTER: [id number]    DATE: 2019  TIME: 3:45 PM    Primary Care Physician: Chris Fernandez MD  Admitting Provider: Adina Severin, MD    Chief complaint: Abnormal CT scan     History of Present Illness     Rubens Banks 66year-old past medical history atrial flutter status post cardioversion on Xarelto, CAD status post CABG x4 in , history of left CEA, severe COPD/bronchiectasis with chronic hypoxic respiratory failure on 2 L nasal cannula presents for evaluation of abnormal CT chest   Patient was recently admitted at [facility] under [facility] service 2018 to 2018  At that time patient was treated for community-acquired pneumonia and completed 10 day course of therapy  Patient followed up outpatient with pulmonology and had repeat CT scan performed  CT chest showed 2 large air fluid filled cavities in left upper lobe consistent with infected bullae with increased fluid content from prior CT scanned as well as a small left-sided pleural effusion  These findings were discussed with Dr Basil Cottrell who recommended transfer to [facility] ED for evaluation  On admission, initial vital signs temperature 98 1°, pulse 75, respirations 18, /67, saturating 98% on room air  Initial labs unremarkable comparison to baseline values, procalcitonin less than 0 05  Review of Systems   Review of Systems   Constitutional: Negative for chills and fever  HENT: Negative for sore throat and trouble swallowing  Eyes: Negative for photophobia and visual disturbance  Respiratory: Negative for shortness of breath and wheezing  Cardiovascular: Negative for chest pain and palpitations  Gastrointestinal: Negative for constipation, diarrhea, nausea and vomiting  Genitourinary: Negative for difficulty urinating and dysuria     Musculoskeletal: Negative for arthralgias and myalgias  Skin: Negative for rash and wound  Neurological: Negative for dizziness, light-headedness and headaches  Past Medical History     Past Medical History:   Diagnosis Date    A-fib [facility])     Arthritis     Benign prostatic hyperplasia without lower urinary tract symptoms     without Urinary Obstruction    CAD (coronary artery disease)     Chronic obstructive lung disease (HCC)     Coronary arteriosclerosis     Depression     Emphysema lung (HCC)     GERD (gastroesophageal reflux disease)     Hyperlipidemia     Hypertension     Myocardial infarction (HCC)     Psoriasis     Requires supplemental oxygen     at bedtime during high humid days only    Stroke [facility])     TIA 1/2018       Past Surgical History     Past Surgical History:   Procedure Laterality Date    COLONOSCOPY      CORONARY ANGIOPLASTY  02/03/2001    PTCA of RCA    CORONARY ARTERY BYPASS GRAFT  02/07/2001    x4- [facility]    HERNIA REPAIR      [place] THROMBOENDARTECTMY Alexa Marcelino INCIS Left 2/20/2018    Procedure: ENDARTERECTOMY ARTERY CAROTID WITH PATCH ANGIOPLASTY;  Surgeon: Remington Almaraz MD;  [facility];  Service: Vascular    TRANSURETHRAL RESECTION OF PROSTATE         Social History     History   Alcohol Use    Yes     Comment: occasional wine  History   Drug Use No     History   Smoking Status    Former Smoker    Packs/day: 1 00    Years: 3 00    Types: Cigarettes    Quit date: 1956   Smokeless Tobacco    Never Used     Comment: Has a past history of cigarette smoking;Quit date 1956; 5 packs year history, per [employer]         Family History     Family History   Problem Relation Age of Onset    Lung cancer Mother     Cancer Mother     Other Father         sepsis       Medications Prior to Admission     Prior to Admission medications    Medication Sig Start Date End Date Taking?  Authorizing Provider   aspirin 81 mg chewable tablet Chew 81 mg daily     Yes Historical Provider, MD atorvastatin (LIPITOR) 40 mg tablet Take 1 tablet (40 mg total) by mouth daily 11/20/18  Yes Fabiola Arriaga MD   diltiazem (CARDIZEM CD) 240 mg 24 hr capsule Take 1 capsule (240 mg total) by mouth daily 11/20/18  Yes Fabiola Arriaga MD   esomeprazole (NexIUM) 20 mg capsule Take 20 mg by mouth every other day     Yes Historical Provider, MD   furosemide (LASIX) 40 mg tablet Take 1 tablet (40 mg total) by mouth daily 11/19/18  Yes Fabiola Arriaga MD   rivaroxaban (XARELTO) 20 mg tablet Take 1 tablet (20 mg total) by mouth daily with dinner 11/19/18  Yes Fabiola Arriaga MD   loratadine (CLARITIN) 10 mg tablet Take 1 tablet (10 mg total) by mouth daily 10/17/18 1/4/19 Yes Linda Santamaria PA-C   albuterol (PROAIR HFA) 90 mcg/act inhaler Inhale 2 puffs every 6 (six) hours as needed for wheezing or shortness of breath 11/9/18   MYLENE Minor   acetaminophen (TYLENOL) 325 mg tablet Take 2 tablets (650 mg total) by mouth every 6 (six) hours as needed for mild pain 2/22/18 1/4/19  Brian Sesay DO   diltiazem (CARDIZEM CD) 360 MG 24 hr capsule TAKE 1 CAPSULE (360 MG TOTAL) BY MOUTH DAILY 11/19/18 1/4/19  Historical Provider, MD       Allergies     Allergies   Allergen Reactions    Penicillins Swelling and Itching       Objective     Vitals:    01/04/19 1330 01/04/19 1400 01/04/19 1500 01/04/19 1530   BP: 127/77 128/73 116/57 122/70   BP Location: Right arm Right arm Right arm Right arm   Pulse: 76 76 74 76   Resp: 18 18 18 18   Temp:       TempSrc:       SpO2: 97% 98% 97% 97%   Weight:       Height:         Body mass index is 24 78 kg/m²  No intake or output data in the 24 hours ending 01/04/19 1545  Invasive Devices     Peripheral Intravenous Line            Peripheral IV 01/04/19 Right Antecubital less than 1 day                Physical Exam  Physical Exam   Constitutional: He is oriented to person, place, and time  He appears well-developed and well-nourished  No distress     HENT:   Head: Normocephalic and atraumatic  Mouth/Throat: Oropharynx is clear and moist  No oropharyngeal exudate  Eyes: Pupils are equal, round, and reactive to light  Conjunctivae and EOM are normal  No scleral icterus  Neck: Normal range of motion  Neck supple  Cardiovascular: Normal rate, regular rhythm and normal heart sounds  No murmur heard  Pulmonary/Chest: Effort normal  No respiratory distress  Decreased breath sounds right upper lobe   Abdominal: Soft  Bowel sounds are normal  He exhibits no distension  There is no tenderness  There is no guarding  Musculoskeletal: He exhibits no edema, tenderness or deformity  Lymphadenopathy:     He has no cervical adenopathy  Neurological: He is alert and oriented to person, place, and time  Skin: Skin is warm and dry  Capillary refill takes less than 2 seconds  He is not diaphoretic  Psychiatric: He has a normal mood and affect  His behavior is normal  Judgment and thought content normal    Vitals reviewed  Lab Results: I have personally reviewed pertinent reports  Imaging: I have personally reviewed pertinent films in PACS  Ct Chest Wo Contrast    Result Date: 1/4/2019  Narrative: CT CHEST WITHOUT IV CONTRAST INDICATION:   R93 89: Abnormal findings on diagnostic imaging of other specified body structures  History of COPD and bronchiectasis  Patient is a former smoker  Pneumonia complicated/unresolved  COMPARISON:  Chest CT from 11/7/2018, and chest x-ray from 11/27/2018  TECHNIQUE: CT examination of the chest was performed without intravenous contrast   Axial, sagittal, and coronal 2D reformatted images were created from the source data and submitted for interpretation  Radiation dose length product (DLP) for this visit:  237 mGy-cm     This examination, like all CT scans [facility], was performed utilizing techniques to minimize radiation dose exposure, including the use of iterative reconstruction and automated exposure control  FINDINGS: LUNGS:  Compared to 11/7/2018 chest CT, left upper lobe airspace consolidation has improved, and the left lower lobe airspace consolidation has resolved  However there are 2 large air-fluid filled cavities in the left upper lobe apex, consistent with infected bulla, with increased fluid content compared to prior chest x-rays and chest CT from November 2018 (series 2 images 10 through 28 ) The larger bulla measures 8 5 x 3 4 x 6 1 cm, and the smaller bulla measures 4 1 x 1 9 x 3 2 cm  Right lung remains clear  PLEURA:  Small water density left-sided pleural effusion  HEART/GREAT VESSELS:  Unremarkable for patient's age  MEDIASTINUM AND JOSE ARMANDO:  Unremarkable  CHEST WALL AND LOWER NECK:   Unremarkable  VISUALIZED STRUCTURES IN THE UPPER ABDOMEN:  Moderate hiatal hernia  OSSEOUS STRUCTURES:  No acute fracture or destructive osseous lesion  Impression: Compared to 11/7/2018 chest CT, left upper lobe airspace consolidation has improved, and the left lower lobe airspace consolidation has resolved  However there are 2 large air-fluid filled cavities in the left upper lobe apex, consistent with infected bulla, with increased fluid content compared to prior chest x-rays and chest CT from November 2018 (series 2 images 10 through 28 ) Small water density left-sided pleural effusion  I personally discussed this study with Dr Marcellus Liriano on 1/4/2019 at 9:38 AM  Workstation performed: [id number]       Microbiology: I have personally reviewed pertinent reports  Urinalysis:       Invalid input(s): URIBILINOGEN     Urine Micro:        EKG, Pathology, and Other Studies: I have personally reviewed pertinent reports        Medications given in Emergency Department       Assessment and Plan   Principal Problem:    Abnormal CT of the chest  Active Problems:    CAD (coronary atherosclerotic disease)    Hypertension    Hyperlipemia    Hypothyroid    GERD (gastroesophageal reflux disease)    History of stroke    Centrilobular emphysema (HCC)    S/P CABG x 4    CKD (chronic kidney disease), stage II    Chronic respiratory failure with hypoxia (HCC)    COPD, severe (HCC)    Abnormal CT scan chest  CT shows 2 large air fluid filled cavities in left upper lobe apex consistent with infected bulla and increased fluid content, left-sided pleural effusion when compared to prior  Currently patient is asymptomatic, nontoxic  Procalcitonin low  Bronchoscopy has been recommended in the past the patient but he has refused  Plan:  -hold off on antibiotics at this time  -consult pulmonology to discuss possible bronchoscopy/thoracentesis/biopsy for evaluation    Severe COPD with chronic hypoxic respiratory failure  Maintained with 2 L nasal cannula at baseline  Plan:  -continue albuterol as needed  -continue nasal cannula oxygen to maintain saturations greater than 88%    Atrial flutter  Status post cardioversion  Plan:  -continue Cardizem 240 mg daily  -continue Xarelto    CKD 2/3  Creatinine at baseline  Plan:  -follow-up BMP tomorrow    Hyperlipidemia  Plan:  -continue statin    CAD status post CABG x4  Stable, no signs of ACS  Plan:  -continue aspirin, statin    GERD  Plan:  -continue Nexium every other day      Code Status: Level 1 - Full Code  VTE Pharmacologic Prophylaxis: Reason for no pharmacologic prophylaxis xarelto   VTE Mechanical Prophylaxis: On xarelto  Admission Status: INPATIENT      Admission Time  I spent 30 mins admitting the patient  This involved direct patient contact where I performed a full history and physical, reviewing previous records, and reviewing laboratory and other diagnostic studies      Rome Reyes DO  Internal Medicine  PGY-2

## 2019-01-04 NOTE — ED PROVIDER NOTES
ASSESSMENT AND PLAN    Cindy Lewis is a 66 y o  male with a history of COPD, multiple episodes of pneumonia over the last several months, who presents for evaluation of an abnormal CT scan, which was obtained for routine follow-up given his multiple pneumonias and chronic lung disease  On arrival, the patient is hemodynamically stable, well-appearing, does not appear to be in acute distress  His oxygen saturations persistently in the high 90s  From a symptomatic standpoint, the patient is currently asymptomatic, however his outpatient CT scan is concerning, with air-fluid levels in the left apex, concerning for mass versus infection versus mucous plugging  The patient is not appropriate for discharge home given the CT finding, instead the patient will be admitted for further evaluation to the medicine team   He may benefit from bronchoscopy versus biopsy, as well as pulmonology consult if deemed appropriate by the admitting team   No emergency department treatment or interventions currently indicated based on my evaluation  I discussed this case in detail with the admitting medicine physician  I discussed this plan with the patient, and he is agreeable  History  Chief Complaint   Patient presents with    Evaluation of Abnormal Diagnostic Test     Patient received a phone call that he has infection or fluid overload in his lungs from yesterday  Patient states no discomfort at this time  This is a 70-year-old male who presents for evaluation of an abnormal CT scan  The patient currently has no symptoms  He was recently admitted for a pneumonia approximately a month ago, requiring antibiotics and a approximately a week long hospitalization  He follows with a pulmonologist, who ordered a noncontrast CT scan to follow his lungs status post a pneumonia episode  The patient currently states that he feels well, and actually feels better than he has in several weeks    He denies any cough or shortness of breath  He denies fevers or chills  on CT, the patient does have 2 large air-filled cavities and left upper lobe apex  There are air-fluid levels, as well as diffuse surrounding atelectasis  The official radiology read was as possible multiple infected bullae  Prior to Admission Medications   Prescriptions Last Dose Informant Patient Reported? Taking?    albuterol (PROAIR HFA) 90 mcg/act inhaler  Self No Yes   Sig: Inhale 2 puffs every 6 (six) hours as needed for wheezing or shortness of breath   aspirin 81 mg chewable tablet  Self Yes Yes   Sig: Chew 81 mg daily     atorvastatin (LIPITOR) 40 mg tablet  Self No Yes   Sig: Take 1 tablet (40 mg total) by mouth daily   diltiazem (CARDIZEM CD) 240 mg 24 hr capsule  Self No Yes   Sig: Take 1 capsule (240 mg total) by mouth daily   esomeprazole (NexIUM) 20 mg capsule  Self Yes Yes   Sig: Take 20 mg by mouth every other day     furosemide (LASIX) 40 mg tablet  Self No Yes   Sig: Take 1 tablet (40 mg total) by mouth daily   loratadine (CLARITIN) 10 mg tablet  Self No Yes   Sig: Take 1 tablet (10 mg total) by mouth daily   rivaroxaban (XARELTO) 20 mg tablet  Self No Yes   Sig: Take 1 tablet (20 mg total) by mouth daily with dinner      Facility-Administered Medications: None       Past Medical History:   Diagnosis Date    [facility]fib ([facility] )     Arthritis     Benign prostatic hyperplasia without lower urinary tract symptoms     without Urinary Obstruction    CAD (coronary artery disease)     Chronic obstructive lung disease (HCC)     Coronary arteriosclerosis     Depression     Emphysema lung (HCC)     GERD (gastroesophageal reflux disease)     Hyperlipidemia     Hypertension     Myocardial infarction (HCC)     Psoriasis     Requires supplemental oxygen     at bedtime during high humid days only    Stroke [facility])     TIA 1/2018       Past Surgical History:   Procedure Laterality Date    COLONOSCOPY      CORONARY ANGIOPLASTY  02/03/2001    PTCA of RCA    CORONARY ARTERY BYPASS GRAFT  02/07/2001    x4- [place]    HERNIA REPAIR      MN THROMBOENDARTECTMY Roberta Lu INCIS Left 2/20/2018    Procedure: ENDARTERECTOMY ARTERY CAROTID WITH PATCH ANGIOPLASTY;  Surgeon: Mone Mercedes MD;  Location: [facility];  Service: Vascular    TRANSURETHRAL RESECTION OF PROSTATE         Family History   Problem Relation Age of Onset    Lung cancer Mother     Cancer Mother     Other Father         sepsis     I have reviewed and agree with the history as documented  Social History   Substance Use Topics    Smoking status: Former Smoker     Packs/day: 1 00     Years: 3 00     Types: Cigarettes     Quit date: 1956    Smokeless tobacco: Never Used      Comment: Has a past history of cigarette smoking;Quit date 1956; 5 packs year history, per [employer]      Alcohol use Yes      Comment: occasional wine  Review of Systems   Constitutional: Negative for chills and fever  HENT: Negative for congestion and sinus pain  Eyes: Negative for photophobia and visual disturbance  Respiratory: Negative for cough and shortness of breath  Cardiovascular: Negative for chest pain and palpitations  Gastrointestinal: Negative for abdominal pain, diarrhea, nausea and vomiting  Genitourinary: Negative for dysuria and hematuria  Musculoskeletal: Negative for neck pain and neck stiffness  Skin: Negative for pallor and rash  Neurological: Negative for light-headedness and headaches         Physical Exam  ED Triage Vitals [01/04/19 1202]   Temperature Pulse Respirations Blood Pressure SpO2   98 1 °F (36 7 °C) 75 18 163/67 98 %      Temp Source Heart Rate Source Patient Position - Orthostatic VS BP Location FiO2 (%)   Oral Monitor Sitting Left arm --      Pain Score       No Pain           Orthostatic Vital Signs  Vitals:    01/04/19 1202 01/04/19 1305   BP: 163/67 136/75   Pulse: 75 78   Patient Position - Orthostatic VS: Sitting Lying       Physical Exam   Constitutional: He is oriented to person, place, and time  Resting comfortably on stretcher  Awake alert, well-appearing, no acute distress  Nontoxic in appearance  Appears stated age   HENT:   Head: Normocephalic and atraumatic  Mouth/Throat: Oropharynx is clear and moist  No oropharyngeal exudate  Eyes: Pupils are equal, round, and reactive to light  No scleral icterus  Neck: Normal range of motion  No JVD present  Cardiovascular: Normal rate, regular rhythm and normal heart sounds  No murmur heard  Pulmonary/Chest: [place] normal  No respiratory distress  He has no wheezes  He has no rales  Abdominal: Soft  He exhibits no distension  There is no tenderness  Musculoskeletal: Normal range of motion  He exhibits no edema  Neurological: He is alert and oriented to person, place, and time  He exhibits normal muscle tone  Skin: Skin is warm and dry  No rash noted  No pallor  ED Medications  Medications - No data to display    Diagnostic Studies  Results Reviewed     Procedure Component Value Units Date/Time    Comprehensive metabolic panel [[id number]] Collected:  01/04/19 1306    Lab Status:  Final result Specimen:  Blood from Arm, Right Updated:  01/04/19 1338     Sodium 136 mmol/L      Potassium 4 1 mmol/L      Chloride 101 mmol/L      CO2 29 mmol/L      ANION GAP 6 mmol/L      BUN 24 mg/dL      Creatinine 1 20 mg/dL      Glucose 109 mg/dL      Calcium 9 2 mg/dL      AST 17 U/L      ALT 23 U/L      Alkaline Phosphatase 87 U/L      Total Protein 8 2 g/dL      Albumin 3 6 g/dL      Total Bilirubin 0 89 mg/dL      eGFR 58 ml/min/1 73sq m     Narrative:         National Kidney Disease Education Program recommendations are as follows:  GFR calculation is accurate only with a steady state creatinine  Chronic Kidney disease less than 60 ml/min/1 73 sq  meters  Kidney failure less than 15 ml/min/1 73 sq  meters      CBC and differential [[id number]]  (Abnormal) Collected:  01/04/19 1306    Lab Status:  Final result Specimen:  Blood from Arm, Right Updated:  01/04/19 1320     WBC 5 93 Thousand/uL      RBC 4 28 Million/uL      Hemoglobin 10 8 (L) g/dL      Hematocrit 36 2 (L) %      MCV 85 fL      MCH 25 2 (L) pg      MCHC 29 8 (L) g/dL      RDW 15 9 (H) %      MPV 9 5 fL      Platelets 523 Thousands/uL      nRBC 0 /100 WBCs      Neutrophils Relative 68 %      Immat GRANS % 1 %      Lymphocytes Relative 20 %      Monocytes Relative 8 %      Eosinophils Relative 2 %      Basophils Relative 1 %      Neutrophils Absolute 4 08 Thousands/µL      Immature Grans Absolute 0 03 Thousand/uL      Lymphocytes Absolute 1 20 Thousands/µL      Monocytes Absolute 0 50 Thousand/µL      Eosinophils Absolute 0 09 Thousand/µL      Basophils Absolute 0 03 Thousands/µL     Lactic acid, plasma [[id number]] Collected:  01/04/19 1306    Lab Status: In process Specimen:  Blood from Arm, Right Updated:  01/04/19 1312    Procalcitonin [[id number]] Collected:  01/04/19 1306    Lab Status: In process Specimen:  Blood from Arm, Right Updated:  01/04/19 1312                 No orders to display         Procedures  Procedures      Phone Consults  ED Phone Contact    ED Course           Identification of Seniors at Risk      Most Recent Value   (ISAR) Identification of Seniors at Risk   Before the illness or injury that brought you to the Emergency, did you need someone to help you on a regular basis? 1 Filed at: 01/04/2019 1203   In the last 24 hours, have you needed more help than usual?  0 Filed at: 01/04/2019 1203   Have you been hospitalized for one or more nights during the past 6 months? 1 Filed at: 01/04/2019 1203   In general, do you see well?  0 Filed at: 01/04/2019 1203   In general, do you have serious problems with your memory? 0 Filed at: 01/04/2019 1203   Do you take more than three different medications every day?   1 Filed at: 01/04/2019 1203   ISAR Score  3 Filed at: 01/04/2019 1203                          [facility]  [place] Time    Disposition  Final diagnoses:   None     ED Disposition     None      Follow-up Information    None         Patient's Medications   Discharge Prescriptions    No medications on file     No discharge procedures on file  ED Provider  Attending physically available and evaluated Noman Erazo I managed the patient along with the ED Attending      Electronically Signed by         Eliane Carlisle MD  01/05/19 1096

## 2019-01-04 NOTE — TELEPHONE ENCOUNTER
Called patient and no response  message left that I did discuss CT scan finding with granddaughter  Advised her that Mr Grayson Morris should go to the emergency room for worsening  infectious cavities in lungs

## 2019-01-04 NOTE — TELEPHONE ENCOUNTER
Lanny Orellana called this morning and stated that he received a text message from our office to call us back  I stated to him that we don't send text messages out  He was very confused as to what to do  But he wanted to review the CT of the chest that he had done yesterday  I know we received a call from the radiology department this morning which Dr Romero Connor did speak with someone  If someone can please look into this and give Lanny Orellana a call back to discuss the results, thank you,

## 2019-01-04 NOTE — ED ATTENDING ATTESTATION
Vi Mclaughlin MD, saw and evaluated the patient  I have discussed the patient with the resident/non-physician practitioner and agree with the resident's/non-physician practitioner's findings, Plan of Care, and MDM as documented in the resident's/non-physician practitioner's note, except where noted  All available labs and Radiology studies were reviewed  At this point I agree with the current assessment done in the Emergency Department  I have conducted an independent evaluation of this patient a history and physical is as follows:  Pt hd fu CT of the chest to evaluate for the resolution of pneumonia  Patient states that he has been feeling well and has no complaints whatsoever  Patient denies cough or shortness of breath or fevers  Patient states that he was called with the results of the CT scan and called to come to the emergency room for question of continued infection    PE alert no distress heart regular lungs decreased breath sounds abdomen soft nondistended nontender extremities NAD MDM:  Will review CT check labs admit    Critical Care Time  CritCare Time    Procedures

## 2019-01-04 NOTE — PLAN OF CARE
DISCHARGE PLANNING     Discharge to home or other facility with appropriate resources Progressing        INFECTION - ADULT     Absence or prevention of progression during hospitalization Progressing     Absence of fever/infection during neutropenic period Progressing        Knowledge Deficit     Patient/family/caregiver demonstrates understanding of disease process, treatment plan, medications, and discharge instructions Progressing        PAIN - ADULT     Verbalizes/displays adequate comfort level or baseline comfort level Progressing

## 2019-01-05 LAB
ANION GAP SERPL CALCULATED.3IONS-SCNC: 6 MMOL/L (ref 4–13)
ATRIAL RATE: 64 BPM
ATRIAL RATE: 64 BPM
ATRIAL RATE: 66 BPM
BASOPHILS # BLD AUTO: 0.03 THOUSANDS/ΜL (ref 0–0.1)
BASOPHILS NFR BLD AUTO: 1 % (ref 0–1)
BUN SERPL-MCNC: 21 MG/DL (ref 5–25)
CALCIUM SERPL-MCNC: 9 MG/DL (ref 8.3–10.1)
CHLORIDE SERPL-SCNC: 104 MMOL/L (ref 100–108)
CO2 SERPL-SCNC: 29 MMOL/L (ref 21–32)
CREAT SERPL-MCNC: 1.08 MG/DL (ref 0.6–1.3)
EOSINOPHIL # BLD AUTO: 0.19 THOUSAND/ΜL (ref 0–0.61)
EOSINOPHIL NFR BLD AUTO: 4 % (ref 0–6)
ERYTHROCYTE [DISTWIDTH] IN BLOOD BY AUTOMATED COUNT: 15.8 % (ref 11.6–15.1)
GFR SERPL CREATININE-BSD FRML MDRD: 65 ML/MIN/1.73SQ M
GLUCOSE SERPL-MCNC: 79 MG/DL (ref 65–140)
HCT VFR BLD AUTO: 33.3 % (ref 36.5–49.3)
HGB BLD-MCNC: 10.1 G/DL (ref 12–17)
IMM GRANULOCYTES # BLD AUTO: 0.01 THOUSAND/UL (ref 0–0.2)
IMM GRANULOCYTES NFR BLD AUTO: 0 % (ref 0–2)
LYMPHOCYTES # BLD AUTO: 1.15 THOUSANDS/ΜL (ref 0.6–4.47)
LYMPHOCYTES NFR BLD AUTO: 27 % (ref 14–44)
MCH RBC QN AUTO: 25.6 PG (ref 26.8–34.3)
MCHC RBC AUTO-ENTMCNC: 30.3 G/DL (ref 31.4–37.4)
MCV RBC AUTO: 84 FL (ref 82–98)
MONOCYTES # BLD AUTO: 0.51 THOUSAND/ΜL (ref 0.17–1.22)
MONOCYTES NFR BLD AUTO: 12 % (ref 4–12)
NEUTROPHILS # BLD AUTO: 2.39 THOUSANDS/ΜL (ref 1.85–7.62)
NEUTS SEG NFR BLD AUTO: 56 % (ref 43–75)
NRBC BLD AUTO-RTO: 0 /100 WBCS
P AXIS: 91 DEGREES
P AXIS: 91 DEGREES
P AXIS: 94 DEGREES
PLATELET # BLD AUTO: 265 THOUSANDS/UL (ref 149–390)
PMV BLD AUTO: 10.5 FL (ref 8.9–12.7)
POTASSIUM SERPL-SCNC: 4 MMOL/L (ref 3.5–5.3)
PR INTERVAL: 132 MS
PR INTERVAL: 138 MS
PR INTERVAL: 142 MS
QRS AXIS: 90 DEGREES
QRS AXIS: 91 DEGREES
QRS AXIS: 93 DEGREES
QRSD INTERVAL: 118 MS
QRSD INTERVAL: 118 MS
QRSD INTERVAL: 122 MS
QT INTERVAL: 422 MS
QT INTERVAL: 426 MS
QT INTERVAL: 438 MS
QTC INTERVAL: 439 MS
QTC INTERVAL: 442 MS
QTC INTERVAL: 451 MS
RBC # BLD AUTO: 3.95 MILLION/UL (ref 3.88–5.62)
SODIUM SERPL-SCNC: 139 MMOL/L (ref 136–145)
T WAVE AXIS: 50 DEGREES
T WAVE AXIS: 51 DEGREES
T WAVE AXIS: 55 DEGREES
VENTRICULAR RATE: 64 BPM
VENTRICULAR RATE: 64 BPM
VENTRICULAR RATE: 66 BPM
WBC # BLD AUTO: 4.28 THOUSAND/UL (ref 4.31–10.16)

## 2019-01-05 PROCEDURE — 93010 ELECTROCARDIOGRAM REPORT: CPT | Performed by: INTERNAL MEDICINE

## 2019-01-05 PROCEDURE — 99222 1ST HOSP IP/OBS MODERATE 55: CPT | Performed by: INTERNAL MEDICINE

## 2019-01-05 PROCEDURE — 80048 BASIC METABOLIC PNL TOTAL CA: CPT | Performed by: INTERNAL MEDICINE

## 2019-01-05 PROCEDURE — 99223 1ST HOSP IP/OBS HIGH 75: CPT | Performed by: INTERNAL MEDICINE

## 2019-01-05 PROCEDURE — 85025 COMPLETE CBC W/AUTO DIFF WBC: CPT | Performed by: INTERNAL MEDICINE

## 2019-01-05 PROCEDURE — 93005 ELECTROCARDIOGRAM TRACING: CPT

## 2019-01-05 RX ADMIN — FUROSEMIDE 40 MG: 40 TABLET ORAL at 09:06

## 2019-01-05 RX ADMIN — ASPIRIN 81 MG 81 MG: 81 TABLET ORAL at 09:06

## 2019-01-05 RX ADMIN — ACETAMINOPHEN 650 MG: 325 TABLET, FILM COATED ORAL at 09:10

## 2019-01-05 RX ADMIN — DILTIAZEM HYDROCHLORIDE 240 MG: 240 CAPSULE, COATED, EXTENDED RELEASE ORAL at 09:06

## 2019-01-05 RX ADMIN — ATORVASTATIN CALCIUM 40 MG: 40 TABLET, FILM COATED ORAL at 16:37

## 2019-01-05 RX ADMIN — PANTOPRAZOLE SODIUM 20 MG: 20 TABLET, DELAYED RELEASE ORAL at 05:32

## 2019-01-05 RX ADMIN — ENOXAPARIN SODIUM 80 MG: 80 INJECTION SUBCUTANEOUS at 15:02

## 2019-01-05 NOTE — SOCIAL WORK
CM met with pt at bedside and introduced self and role with dcp  Pt reports he resides with his daughter and granddaughter/RAFITA Barry  Pt reports home is 2 stories with bedroom/bathroom on first floor  Pt independent with ADLs and independent with ADLs  Pt reports he uses a RW, scooter, or electric chair in the community depending the distance  Pt has O2 at home which is supplied by [facility]  Pt reports he is on 1 5-2L at night time and uses O2 PRN during the day  Pt reports he is retired and able to drive  Pt reports no hx of VNA or STR  No hx of MH or drug/alcohol abuse  Pt reports he currently uses [facility] in [place] but is going to change to [facility] in [place]  Pt requesting a visiting nurse at d/c  Pt preference is for [facility]  CM sent referral via [facility]  CM to follow  CM reviewed d/c planning process including the following: identifying help at home, patient preference for d/c planning needs, Discharge Lounge, [facility] Meds to Bed program, availability of treatment team to discuss questions or concerns patient and/or family may have regarding understanding medications and recognizing signs and symptoms once discharged  CM also encouraged patient to follow up with all recommended appointments after discharge  Patient advised of importance for patient and family to participate in managing patients medical well being

## 2019-01-05 NOTE — CONSULTS
Consultation - Electrophysiology-Cardiology (EP)   Lenny Fleming 66 y o  male MRN: [id number]  Unit/Bed#: [facility] 706-01 Encounter: [id number]      Inpatient consult to Cardiology  Consult performed by: [employer] Pain[employer]  Consult ordered by: Mercy Hawks          Assessment/Plan   1  Pre op risk stratification: using RcRI patient is at elevated risk for any invasive surgical procedures given his hx of CABG and CVA  His O2 dependence limiting his functional capacity to a MET <4 also elevated his risk  At this time there are no contraindications to proceeding with bronch from cardiac standpoint  Obtain ECG now  Will discuss further work up with attending  Likely nothing else to be done at this time  Unable to tolerate BB's due to his COPD  OK to hold Xarelto prior to any procedures and restart when OK with pulm  2  Pulmonary fluid cavities    * pulm evaluating    * planning bronch     3  CAD s/p CABG x 4   4  HLD  5  COPD  6  Typical atrial flutter    * s/p DCCV into NSR in nov 2018    * ecg pending    * CV is 6 ( Age >75, CAD, HTN, TIA)    * cardizem for rate control not BB's      History of Present Illness   Physician Requesting Consult: Dinora Mijares MD  Reason for Consult / Principal Problem: pre op risk evaluation     HPI: Lenny Fleming is a 66y o  year old male with a history of CAD s/p CABG x4 (2001 LIMA to LAD, SVG to RCA, D1 and OM1), typical atrial flutter s/p DCCV () AC w/ eliquis, ischemic TIA, severe COPD limiting BB use on 2L NC, hx left CEA who is HOD#1 after presenting to \[facility]"" after an abnormal CT chest result showed 2 large air fluid filled cavities in left upper lobe concerning for infection  Cardiology is being consulted for pre op risk stratification  Patient hospitalized in 2018 after being diagnosed with CAP in November 2018  During this hospitalization he underwent DCCV into NSR from typical atrial flutter being discharged home on Xarelto and antibiotics   He followed up with pulmonology in December 2018 where a CT of his chest was ordered and completed on 1-3-18  After this was read he was found to have 2 large air fluid filled cavities in left upper lobe apex consistent with infected bulba prompting ED eval and admission for further w/u  Patient denies any recent chest discomfort, LH, dizziness, fevers, palpitations, night sweats  Cardiology is being asked to see patient per family request prior to any invasive procedures  Review of Systems  ROS as noted above, otherwise 12 point review of systems was performed and is negative  Historical Information   Past Medical History:   Diagnosis Date    A-fib [facility])     Arthritis     Benign prostatic hyperplasia without lower urinary tract symptoms     without Urinary Obstruction    CAD (coronary artery disease)     Chronic obstructive lung disease (HCC)     Coronary arteriosclerosis     Depression     Emphysema lung (HCC)     GERD (gastroesophageal reflux disease)     Hyperlipidemia     Hypertension     Myocardial infarction (HCC)     Psoriasis     Requires supplemental oxygen     at bedtime during high humid days only    Stroke [facility])     TIA 1/2018     Past Surgical History:   Procedure Laterality Date    COLONOSCOPY      CORONARY ANGIOPLASTY  02/03/2001    PTCA of RCA    CORONARY ARTERY BYPASS GRAFT  02/07/2001    x4- [place]pern    HERNIA REPAIR      TX THROMBOENDARTECTMY Charlynn Mort INCIS Left 2/20/2018    Procedure: ENDARTERECTOMY ARTERY CAROTID WITH PATCH ANGIOPLASTY;  Surgeon: Genesis Lomax MD;  [facility];  Service: Vascular    TRANSURETHRAL RESECTION OF PROSTATE       History   Alcohol Use    Yes     Comment: occasional wine         History   Drug Use No     History   Smoking Status    Former Smoker    Packs/day: 1 00    Years: 3 00    Types: Cigarettes    Quit date: 1956   Smokeless Tobacco    Never Used     Comment: Has a past history of cigarette smoking;Quit date 1956; 5 packs year history, per [employer]       Family History: non-contributory    Meds/Allergies   Hospital Medications: Current Facility-Administered Medications   Medication Dose Route Frequency    acetaminophen (TYLENOL) tablet 650 mg  650 mg Oral Q6H PRN    albuterol (PROVENTIL HFA,VENTOLIN HFA) inhaler 2 puff  2 puff Inhalation Q6H PRN    aspirin chewable tablet 81 mg  81 mg Oral Daily    atorvastatin (LIPITOR) tablet 40 mg  40 mg Oral Daily With Dinner    diltiazem (CARDIZEM CD) 24 hr capsule 240 mg  240 mg Oral Daily    furosemide (LASIX) tablet 40 mg  40 mg Oral Daily    pantoprazole (PROTONIX) EC tablet 20 mg  20 mg Oral Every Other Day    rivaroxaban (XARELTO) tablet 20 mg  20 mg Oral Daily With Dinner     Home Medications:   Prescriptions Prior to Admission   Medication    aspirin 81 mg chewable tablet    atorvastatin (LIPITOR) 40 mg tablet    diltiazem (CARDIZEM CD) 240 mg 24 hr capsule    esomeprazole (NexIUM) 20 mg capsule    furosemide (LASIX) 40 mg tablet    rivaroxaban (XARELTO) 20 mg tablet    albuterol (PROAIR HFA) 90 mcg/act inhaler       Allergies   Allergen Reactions    Penicillins Swelling and Itching       Objective   Vitals: Blood pressure 131/72, pulse 75, temperature 97 8 °F (36 6 °C), temperature source Oral, resp  rate 20, height 5' 8" (1 727 m), weight 78 1 kg (172 lb 3 2 oz), SpO2 95 %  Orthostatic Blood Pressures      Most Recent Value   Blood Pressure  131/72 filed at 01/05/2019 0732   Patient Position - Orthostatic VS  Lying filed at 01/05/2019 0732            Intake/Output Summary (Last 24 hours) at 01/05/19 1235  Last data filed at 01/05/19 1234   Gross per 24 hour   Intake              418 ml   Output             2150 ml   Net            -1732 ml       Invasive Devices     Peripheral Intravenous Line            Peripheral IV 01/04/19 Right Antecubital less than 1 day                Physical Exam   Constitutional: He is oriented to person, place, and time   He appears well-developed and well-nourished  HENT:   Head: Normocephalic and atraumatic  Eyes: Pupils are equal, round, and reactive to light  EOM are normal    Neck: Normal range of motion  Neck supple  Cardiovascular: Normal rate and regular rhythm  Pulmonary/Chest: Effort normal and breath sounds normal    Abdominal: Soft  Bowel sounds are normal    Musculoskeletal: Normal range of motion  Neurological: He is alert and oriented to person, place, and time  Skin: Skin is warm and dry  Psychiatric: He has a normal mood and affect  Lab Results: I have personally reviewed pertinent lab results  Results from last 7 days  Lab Units 19  0548 19  1306   WBC Thousand/uL 4 28* 5 93   HEMOGLOBIN g/dL 10 1* 10 8*   HEMATOCRIT % 33 3* 36 2*   PLATELETS Thousands/uL 265 303       Results from last 7 days  Lab Units 19  0548 19  1306   POTASSIUM mmol/L 4 0 4 1   CHLORIDE mmol/L 104 101   CO2 mmol/L 29 29   BUN mg/dL 21 24   CREATININE mg/dL 1 08 1 20   CALCIUM mg/dL 9 0 9 2               Imaging: I have personally reviewed pertinent reports      ECHO:   Results for orders placed during the hospital encounter of 18   Echo complete with contrast if indicated    Narrative [facility] [address] [facility]  [phone]    Transthoracic Echocardiogram  2D, M-mode, Doppler, and Color Doppler    Study date:  15-Supa-2018    Patient: Dev Sahu  MR number: [id number]  Account number: [de-identified]  : 1940  Age: 68 years  Gender: Male  Status: Inpatient  Location: Bedside  Height: 68 in  Weight: 181 lb  BP: 115/ 56 mmHg    Indications: TIA    Diagnoses: G45 9 - Transient cerebral ischemic attack, unspecified    Sonographer:  Karry Phoenix, RCS  Primary Physician:  Donn Collazo MD  Referring Physician:  Contreras CRNP  Group:  [id number]  Interpreting Physician:  Alex Evans MD    SUMMARY    LEFT VENTRICLE:  Systolic function was normal  Ejection fraction was estimated to be 55 %  There was hypokinesis of the basal inferior wall(s)  RIGHT VENTRICLE:  The size was normal   Systolic function was normal     IVC, HEPATIC VEINS:  The inferior vena cava was dilated  HISTORY: PRIOR HISTORY: HTN, HLD, Emphysema, CAD, TIA, CABG    PROCEDURE: The procedure was performed at the bedside  This was a routine study  The transthoracic approach was used  The study included complete 2D imaging, M-mode, complete spectral Doppler, and color Doppler  The heart rate was 54 bpm,  at the start of the study  Images were obtained from the parasternal, apical, subcostal, and suprasternal notch acoustic windows  Intravenous contrast ( 0 5 mL Definity in NSS) was administered to opacify the left ventricle  Echocardiographic views were limited due to lung interference  Image quality was adequate  LEFT VENTRICLE: Size was normal  Systolic function was normal  Ejection fraction was estimated to be 55 %  There was hypokinesis of the basal inferior wall(s)  Wall thickness was normal  No evidence of apical thrombus  RIGHT VENTRICLE: The size was normal  Systolic function was normal  Wall thickness was normal     LEFT ATRIUM: Size was normal     RIGHT ATRIUM: Size was normal     MITRAL VALVE: Valve structure was normal  There was normal leaflet separation  DOPPLER: The transmitral velocity was within the normal range  There was no evidence for stenosis  There was trace regurgitation  AORTIC VALVE: The valve was trileaflet  Leaflets exhibited mildly increased thickness, normal cuspal separation, and sclerosis  DOPPLER: Transaortic velocity was within the normal range  There was no evidence for stenosis  There was no  significant regurgitation  TRICUSPID VALVE: The valve structure was normal  There was normal leaflet separation  DOPPLER: The transtricuspid velocity was within the normal range  There was no evidence for stenosis   There was no significant regurgitation  The  tricuspid jet envelope definition was inadequate for estimation of RV systolic pressure  PULMONIC VALVE: Leaflets exhibited normal thickness, no calcification, and normal cuspal separation  DOPPLER: The transpulmonic velocity was within the normal range  There was no significant regurgitation  PERICARDIUM: There was no pericardial effusion  AORTA: The root exhibited normal size  SYSTEMIC VEINS: IVC: The inferior vena cava was dilated  SYSTEM MEASUREMENT TABLES    2D  %FS: 27 41 %  AV Diam: 3 43 cm  EDV(Teich): 102 58 ml  EF(Cube): 61 75 %  EF(Teich): 53 26 %  ESV(Cube): 39 83 ml  ESV(Teich): 47 94 ml  IVSd: 0 88 cm  LA Area: 12 14 cm2  LA Diam: 3 52 cm  LVEDV MOD A4C: 82 76 ml  LVEF MOD A4C: 48 86 %  LVESV MOD A4C: 42 32 ml  LVIDd: 4 7 cm  LVIDs: 3 42 cm  LVLd A4C: 7 74 cm  LVLs A4C: 6 38 cm  LVOT Diam: 2 2 cm  LVPWd: 1 11 cm  RA Area: 16 11 cm2  RV Diam: 3 41 cm  SV MOD A4C: 40 44 ml  SV(Cube): 64 29 ml  SV(Teich): 54 64 ml    MM  TAPSE: 2 08 cm    PW  MV A Dylan: 0 64 m/s  MV Dec [place]: 3 26 m/s2  MV DecT: 232 64 ms  MV E Dylan: 0 76 m/s  MV E/A Ratio: 1 18    [facility] Accredited Echocardiography Laboratory    Prepared and electronically signed by    Marco Fontaine MD  Signed 15-Supa-2018 14:05:56         CATH/STRESS TEST (2016)   SUMMARY:  -  Stress results: There was resting hypertension with an appropriate blood  pressure response to stress  There was no chest pain during stress  -  ECG conclusions: The stress ECG was negative for ischemia  -  Perfusion imaging: There was a small, mildly severe, fixed myocardial  perfusion defect of the basal inferior wall due to diaphragm attenuation  -   Gated SPECT: The calculated left ventricular ejection fraction was 79 %  Left  ventricular ejection fraction was within normal limits by visual estimate  There was no left ventricular regional abnormality      IMPRESSIONS: Normal study after vasodilation and low level exercise  There was  image artifact, without diagnostic evidence for perfusion abnormality   Left  ventricular systolic function was normal      EKG:   Pending

## 2019-01-05 NOTE — PLAN OF CARE
DISCHARGE PLANNING     Discharge to home or other facility with appropriate resources Progressing        DISCHARGE PLANNING - CARE MANAGEMENT     Discharge to post-acute care or home with appropriate resources Progressing        INFECTION - ADULT     Absence or prevention of progression during hospitalization Progressing     Absence of fever/infection during neutropenic period Progressing        Knowledge Deficit     Patient/family/caregiver demonstrates understanding of disease process, treatment plan, medications, and discharge instructions Progressing        PAIN - ADULT     Verbalizes/displays adequate comfort level or baseline comfort level Progressing        Potential for Falls     Patient will remain free of falls Progressing        Prexisting or High Potential for Compromised Skin Integrity     Skin integrity is maintained or improved Progressing

## 2019-01-05 NOTE — CONSULTS
Consult Note - Pulmonary   Tonya Khan 66 y o  male MRN: [id number]  Unit/Bed#: [facility] 706-01 Encounter: [id number]      Reason for consultation: Severe left lung consolidations    Requesting physician: Dr Shelly Beard and plan:  The patient has the following medical problems:  1  Left lung consolidation and airspaces  Might be secondary to the pneumonia the patient had around October and November 2018  His 1st presentation was then  He does not have any significant smoking history  He worked as an   He had a bird that he gave away but the symptoms stayed the same  However, it was not suspicious for hypersensitivity pneumonitis  I suspect the changes are secondary to a pneumonia on top of history of significant pneumonias as a young man  He reports he was hospitalized in the 76s for what he called double pneumonia and needed the procedure, may bronchoscopy, for suction of the secretions in his lung  I recommend to continue with bronchoscopy for lavage to see what is the pathogen and if this is an atypical pathogen or acid-fast bacteria  Plan to do bronchoscopy on Monday as an inpatient  We will stop Xarelto now today Saturday 2:00 p m  and will start Lovenox full dose  2   Cardiac  Patient has reach history of ischemic heart disease status post CABG and atrial flutter status post cardioversion in November 2018  I recommend cardiac evaluation before the bronchoscopy and this is also requested by the patient per hour for  which is his granddaughter for consent for the procedure  Dann Montiel MD/PhD,  [facility]    History of Present Illness  Tonya Khan is a 66 y o  male with previous medical history of ischemic heart disease status post CABG, atrial flutter status post cardioversion on November 2018, hypertension, history of stroke, and shortness of Breath in the last months    He was hospitalized for community-acquired pneumonia on October 2018 in [facility]  On chest x-ray back then he did have this lung changes that we see today  He was seen in our clinic and he had another chest x-ray on November 2018 that showed severe left upper lobe consolidation with air space  This time he was hospitalized on January 4, 2018 due to a repeat chest CT that raised the concern for infected bulla  Occupational History:   Worked as an , retired    Social History:   Never smoked significantly  Smoked few cigarettes when he was younger  No pack years  Review of systems  Review of Systems   Constitutional: Negative for chills, fatigue and fever  HENT: Negative  Eyes: Negative  Respiratory: Positive for cough and shortness of breath  Negative for chest tightness  Cardiovascular: Negative  Gastrointestinal: Negative  Endocrine: Negative  Genitourinary: Negative  Musculoskeletal: Negative  Skin: Negative  Allergic/Immunologic: Negative  Neurological: Negative  Hematological: Negative  Psychiatric/Behavioral: Negative          Historical Information   Past Medical History:   Diagnosis Date    A-fib [facility])     Arthritis     Benign prostatic hyperplasia without lower urinary tract symptoms     without Urinary Obstruction    CAD (coronary artery disease)     Chronic obstructive lung disease (HCC)     Coronary arteriosclerosis     Depression     Emphysema lung (HCC)     GERD (gastroesophageal reflux disease)     Hyperlipidemia     Hypertension     Myocardial infarction ([address] )     Psoriasis     Requires supplemental oxygen     at bedtime during high humid days only    Stroke [facility])     TIA 1/2018     Past Surgical History:   Procedure Laterality Date    COLONOSCOPY      CORONARY ANGIOPLASTY  02/03/2001    PTCA of RCA    CORONARY ARTERY BYPASS GRAFT  02/07/2001    x4- Alpern    HERNIA REPAIR      VT THROMBOENDARTECTMY Katarina Judit INCIS Left 2/20/2018    Procedure: ENDARTERECTOMY ARTERY CAROTID WITH PATCH ANGIOPLASTY;  Surgeon: Mone Mercedes MD;  Location: [facility];  Service: Vascular    TRANSURETHRAL RESECTION OF PROSTATE       Family History   Problem Relation Age of Onset    Lung cancer Mother     Cancer Mother     Other Father         sepsis       Meds/Allergies   Current Facility-Administered Medications   Medication Dose Route Frequency    acetaminophen (TYLENOL) tablet 650 mg  650 mg Oral Q6H PRN    albuterol (PROVENTIL HFA,VENTOLIN HFA) inhaler 2 puff  2 puff Inhalation Q6H PRN    aspirin chewable tablet 81 mg  81 mg Oral Daily    atorvastatin (LIPITOR) tablet 40 mg  40 mg Oral Daily With Dinner    diltiazem (CARDIZEM CD) 24 hr capsule 240 mg  240 mg Oral Daily    furosemide (LASIX) tablet 40 mg  40 mg Oral Daily    pantoprazole (PROTONIX) EC tablet 20 mg  20 mg Oral Every Other Day    rivaroxaban (XARELTO) tablet 20 mg  20 mg Oral Daily With Dinner     Prescriptions Prior to Admission   Medication    aspirin 81 mg chewable tablet    atorvastatin (LIPITOR) 40 mg tablet    diltiazem (CARDIZEM CD) 240 mg 24 hr capsule    esomeprazole (NexIUM) 20 mg capsule    furosemide (LASIX) 40 mg tablet    rivaroxaban (XARELTO) 20 mg tablet    albuterol (PROAIR HFA) 90 mcg/act inhaler     Allergies   Allergen Reactions    Penicillins Swelling and Itching       Vitals: Blood pressure 131/72, pulse 75, temperature 97 8 °F (36 6 °C), temperature source Oral, resp  rate 20, height 5' 8" (1 727 m), weight 78 1 kg (172 lb 3 2 oz), SpO2 95 %  , Body mass index is 26 18 kg/m²  Physical Exam  Physical Exam   Constitutional: He is oriented to person, place, and time  He appears well-developed and well-nourished  He appears distressed  HENT:   Head: Normocephalic  Eyes: Pupils are equal, round, and reactive to light  Neck: Normal range of motion  JVD present  Cardiovascular: Normal rate and regular rhythm  Exam reveals no friction rub  Murmur heard    Pulmonary/Chest: Effort normal  No respiratory distress  He has rales  Abdominal: Soft  Musculoskeletal: Normal range of motion  He exhibits no edema  Neurological: He is alert and oriented to person, place, and time  Skin: Skin is warm  He is not diaphoretic  Psychiatric: He has a normal mood and affect  Intake/Output Summary (Last 24 hours) at 01/05/19 1330  Last data filed at 01/05/19 1234   Gross per 24 hour   Intake              418 ml   Output             2150 ml   Net            -1732 ml       Labs: I have personally reviewed pertinent lab results  Results from last 7 days  Lab Units 01/05/19  0548 01/04/19  1306   WBC Thousand/uL 4 28* 5 93   HEMOGLOBIN g/dL 10 1* 10 8*   HEMATOCRIT % 33 3* 36 2*   PLATELETS Thousands/uL 265 303   NEUTROS PCT % 56 68   MONOS PCT % 12 8      Results from last 7 days  Lab Units 01/05/19  0548 01/04/19  1306   POTASSIUM mmol/L 4 0 4 1   CHLORIDE mmol/L 104 101   CO2 mmol/L 29 29   BUN mg/dL 21 24   CREATININE mg/dL 1 08 1 20   CALCIUM mg/dL 9 0 9 2   ALK PHOS U/L  --  87   ALT U/L  --  23   AST U/L  --  17                    Results from last 7 days  Lab Units 01/04/19  1306   LACTIC ACID mmol/L 1 8       0  Lab Value Date/Time   TROPONINI <0 02 11/07/2018 1022   TROPONINI <0 02 10/07/2018 0424   TROPONINI <0 02 02/21/2018 2137   TROPONINI <0 02 02/21/2018 1753   TROPONINI <0 02 02/21/2018 1419   TROPONINI <0 02 01/13/2018 1118   TROPONINI <0 02 08/14/2016 0247   TROPONINI <0 02 08/13/2016 2340   TROPONINI <0 02 08/13/2016 1910       Imaging and other studies: I have personally reviewed pertinent films in PACS  The patient had chest CT on January 3, 2018 that showed left upper lobe consolidation with airspace  The left lower lobe consolidation seen previously in November 2018 resolved  There are 2 large air-fluid cavities in the left upper lobe apex which looks like infected bulla      Chuck Tejada MD/PhD,  [facility]

## 2019-01-05 NOTE — RESTORATIVE TECHNICIAN NOTE
Restorative Specialist Mobility Note       Activity: Ambulate in mendoza, Ambulate in room, Bathroom privileges, Dangle, Stand at bedside (Educated/encouraged pt to ambulate with assistance 3-4 x's/day  Bed alarm on   Pt callbell, phone/tray within reach )     Assistive Device: None       Thais Mina BS, Restorative Technician, [employer] [facility]

## 2019-01-06 LAB
APTT PPP: 43 SECONDS (ref 26–38)
INR PPP: 1.24 (ref 0.86–1.17)
PROTHROMBIN TIME: 15.7 SECONDS (ref 11.8–14.2)

## 2019-01-06 PROCEDURE — 85730 THROMBOPLASTIN TIME PARTIAL: CPT | Performed by: INTERNAL MEDICINE

## 2019-01-06 PROCEDURE — 99232 SBSQ HOSP IP/OBS MODERATE 35: CPT | Performed by: INTERNAL MEDICINE

## 2019-01-06 PROCEDURE — 85610 PROTHROMBIN TIME: CPT | Performed by: INTERNAL MEDICINE

## 2019-01-06 PROCEDURE — 99232 SBSQ HOSP IP/OBS MODERATE 35: CPT | Performed by: FAMILY MEDICINE

## 2019-01-06 RX ADMIN — ASPIRIN 81 MG 81 MG: 81 TABLET ORAL at 07:39

## 2019-01-06 RX ADMIN — ENOXAPARIN SODIUM 80 MG: 80 INJECTION SUBCUTANEOUS at 14:04

## 2019-01-06 RX ADMIN — ATORVASTATIN CALCIUM 40 MG: 40 TABLET, FILM COATED ORAL at 16:55

## 2019-01-06 RX ADMIN — DILTIAZEM HYDROCHLORIDE 240 MG: 240 CAPSULE, COATED, EXTENDED RELEASE ORAL at 07:39

## 2019-01-06 RX ADMIN — ENOXAPARIN SODIUM 80 MG: 80 INJECTION SUBCUTANEOUS at 00:12

## 2019-01-06 RX ADMIN — FUROSEMIDE 40 MG: 40 TABLET ORAL at 07:39

## 2019-01-06 NOTE — PROGRESS NOTES
Progress Note - Hosseni Overall 66 y o  male MRN: [id number]    Unit/Bed#: [facility] 706-01 Encounter: [id number]      Assessment:  Principal Problem:    Abnormal CT of the chest  Active Problems:    CAD (coronary atherosclerotic disease)    Hypertension    Hyperlipemia    Hypothyroid    GERD (gastroesophageal reflux disease)    History of stroke    Centrilobular emphysema (HCC)    S/P CABG x 4    CKD (chronic kidney disease), stage II    Chronic respiratory failure with hypoxia (HCC)    COPD, severe (HCC)     Abnormal CT scan chest  CT shows 2 large air fluid filled cavities in left upper lobe apex consistent with infected bulla and increased fluid content, left-sided pleural effusion when compared to prior  Currently patient is asymptomatic, nontoxic  Procalcitonin low  Bronchoscopy has been recommended in the past the patient but he has refused  Plan:  -hold off on antibiotics at this time  -consult pulmonology to discuss possible bronchoscopy/thoracentesis/biopsy for evaluation     Severe COPD with chronic hypoxic respiratory failure  Maintained with 2 L nasal cannula at baseline  Plan:  -continue albuterol as needed  -continue nasal cannula oxygen to maintain saturations greater than 88%     Atrial flutter  Status post cardioversion  Plan:  -continue Cardizem 240 mg daily  -continue Xarelto     CKD 2/3  Creatinine at baseline  Plan:  -follow-up BMP tomorrow     Hyperlipidemia  Plan:  -continue statin     CAD status post CABG x4  Stable, no signs of ACS  Plan:  -continue aspirin, statin     GERD  Plan:  -continue Nexium every other day        Disposition, continue with present medical care as above  For bronchoscopy  tomorrow  Not on any antibiotics and doing well  Has been cleared by Cardiology  Discussed with nurse on rounds today  Subjective:   Doing well, no events overnight  Ambulating without any problems  Denies shortness of breath chest pain or nausea vomiting  Eating and drinking well  Cardiology note seen and appreciated  Schedule for bronchoscopy tomorrow  Afebrile off antibiotics  Objective:     Vitals: Blood pressure 107/74, pulse 70, temperature 98 3 °F (36 8 °C), temperature source Oral, resp  rate 16, height 5' 8" (1 727 m), weight 78 1 kg (172 lb 3 2 oz), SpO2 98 %  ,Body mass index is 26 18 kg/m²  Intake/Output Summary (Last 24 hours) at 01/06/19 1301  Last data filed at 01/06/19 1155   Gross per 24 hour   Intake              330 ml   Output             1185 ml   Net             -855 ml       Physical Exam:     Constitutional:  Well developed, well nourished, no acute distress, non-toxic appearance   Eyes:  PERRL, conjunctiva normal , non icteric sclera  HENT:  Atraumatic, oropharynx moist  Neck-  supple   Respiratory:  CTA b/l, normal breath sounds, no rales, no wheezing   Cardiovascular:  RRR, no murmurs, no LE edema b/l  GI:  Soft, nondistended, normal bowel sounds x 4, nontender, no organomegaly, no mass, no rebound, no guarding   Neurologic:  no focal deficits noted   Psychiatric:  Speech and behavior appropriate , AAO x 3      Invasive Devices     Peripheral Intravenous Line            Peripheral IV 01/04/19 Right Antecubital 2 days                Lab, Imaging and other studies: I have personally reviewed pertinent reports      VTE Pharmacologic Prophylaxis: Reason for no pharmacologic prophylaxis Xarelto, currently on hold for procedure  VTE Mechanical Prophylaxis: sequential compression device

## 2019-01-06 NOTE — PROGRESS NOTES
Pulmonary follow up   Hossein Overall 66 y o  male MRN: [id number]  Unit/Bed#: [facility] 706-01 Encounter: [id number]      Subjective  No change from yesterday  Objective  Vitals: Blood pressure 107/74, pulse 70, temperature 98 3 °F (36 8 °C), temperature source Oral, resp  rate 16, height 5' 8" (1 727 m), weight 78 1 kg (172 lb 3 2 oz), SpO2 98 %  , Body mass index is 26 18 kg/m²  Physical Exam   Constitutional: He is oriented to person, place, and time  He appears well-developed and well-nourished  No distress  HENT:   Head: Normocephalic and atraumatic  Eyes: Pupils are equal, round, and reactive to light  EOM are normal    Neck: Normal range of motion  Neck supple  No JVD present  Cardiovascular: Normal rate, regular rhythm and normal heart sounds  Pulmonary/Chest: Effort normal  No respiratory distress  He has rales  Abdominal: Soft  Musculoskeletal: Normal range of motion  He exhibits no edema or deformity  Neurological: He is alert and oriented to person, place, and time  Skin: Skin is warm  He is not diaphoretic  Psychiatric: He has a normal mood and affect  Assessment  1  Left left consolidations and suspected infected bulla  Status post 14 days of Levaquin as an outpatient  2   History of cardiac disease including ischemic heart disease and atrial flutter status post cardioversion in November 2018  Plan  1  Plan bronchoscopy tomorrow, cleared from the cardiac standpoint  2   NPO starting minute  3   Last dose of Lovenox tonight  4   Hold Lovenox tomorrow morning  Resume Xarelto and stop Lovenox after the bronchoscopy  5   After bronchoscopy the patient can be discharged and continue his management as an outpatient with the results of the bronchoscopy and antibiotic treatment per sensitivity  I have personally seen and examined the patient       Nito Rodriguez MD/PhD  [facility]

## 2019-01-06 NOTE — RESTORATIVE TECHNICIAN NOTE
Restorative Specialist Mobility Note       Activity: Ambulate in mendoza, Ambulate in room, Bathroom privileges, Dangle, Stand at bedside (Educated/encouraged pt to ambulate with assistance 3-4 x's/day   Pt callbell, phone/tray within reach )     Assistive Device: None       Mell FUNES, Restorative Technician, [employer]

## 2019-01-07 ENCOUNTER — ANESTHESIA (INPATIENT)
Dept: GASTROENTEROLOGY | Facility: HOSPITAL | Age: 79
DRG: 167 | End: 2019-01-07
Payer: MEDICARE

## 2019-01-07 ENCOUNTER — ANESTHESIA EVENT (INPATIENT)
Dept: GASTROENTEROLOGY | Facility: HOSPITAL | Age: 79
DRG: 167 | End: 2019-01-07
Payer: MEDICARE

## 2019-01-07 LAB — GLUCOSE SERPL-MCNC: 92 MG/DL (ref 65–140)

## 2019-01-07 PROCEDURE — 31629 BRONCHOSCOPY/NEEDLE BX EACH: CPT | Performed by: INTERNAL MEDICINE

## 2019-01-07 PROCEDURE — 88305 TISSUE EXAM BY PATHOLOGIST: CPT | Performed by: PATHOLOGY

## 2019-01-07 PROCEDURE — 99233 SBSQ HOSP IP/OBS HIGH 50: CPT | Performed by: INTERNAL MEDICINE

## 2019-01-07 PROCEDURE — 0B9G8ZX DRAINAGE OF LEFT UPPER LUNG LOBE, VIA NATURAL OR ARTIFICIAL OPENING ENDOSCOPIC, DIAGNOSTIC: ICD-10-PCS | Performed by: INTERNAL MEDICINE

## 2019-01-07 PROCEDURE — 88112 CYTOPATH CELL ENHANCE TECH: CPT | Performed by: PATHOLOGY

## 2019-01-07 PROCEDURE — 0B9H8ZX DRAINAGE OF LUNG LINGULA, VIA NATURAL OR ARTIFICIAL OPENING ENDOSCOPIC, DIAGNOSTIC: ICD-10-PCS | Performed by: INTERNAL MEDICINE

## 2019-01-07 PROCEDURE — 87116 MYCOBACTERIA CULTURE: CPT | Performed by: INTERNAL MEDICINE

## 2019-01-07 PROCEDURE — 87070 CULTURE OTHR SPECIMN AEROBIC: CPT | Performed by: INTERNAL MEDICINE

## 2019-01-07 PROCEDURE — 82948 REAGENT STRIP/BLOOD GLUCOSE: CPT

## 2019-01-07 PROCEDURE — 0BD98ZX EXTRACTION OF LINGULA BRONCHUS, VIA NATURAL OR ARTIFICIAL OPENING ENDOSCOPIC, DIAGNOSTIC: ICD-10-PCS | Performed by: INTERNAL MEDICINE

## 2019-01-07 PROCEDURE — 99232 SBSQ HOSP IP/OBS MODERATE 35: CPT | Performed by: INTERNAL MEDICINE

## 2019-01-07 PROCEDURE — 31624 DX BRONCHOSCOPE/LAVAGE: CPT | Performed by: INTERNAL MEDICINE

## 2019-01-07 PROCEDURE — 31623 DX BRONCHOSCOPE/BRUSH: CPT | Performed by: INTERNAL MEDICINE

## 2019-01-07 PROCEDURE — 87206 SMEAR FLUORESCENT/ACID STAI: CPT | Performed by: INTERNAL MEDICINE

## 2019-01-07 PROCEDURE — 87102 FUNGUS ISOLATION CULTURE: CPT | Performed by: INTERNAL MEDICINE

## 2019-01-07 PROCEDURE — 87252 VIRUS INOCULATION TISSUE: CPT | Performed by: INTERNAL MEDICINE

## 2019-01-07 RX ORDER — LIDOCAINE HYDROCHLORIDE 10 MG/ML
INJECTION, SOLUTION INFILTRATION; PERINEURAL AS NEEDED
Status: DISCONTINUED | OUTPATIENT
Start: 2019-01-07 | End: 2019-01-07 | Stop reason: SURG

## 2019-01-07 RX ORDER — SODIUM CHLORIDE 9 MG/ML
INJECTION, SOLUTION INTRAVENOUS CONTINUOUS PRN
Status: DISCONTINUED | OUTPATIENT
Start: 2019-01-07 | End: 2019-01-07 | Stop reason: SURG

## 2019-01-07 RX ORDER — PROPOFOL 10 MG/ML
INJECTION, EMULSION INTRAVENOUS AS NEEDED
Status: DISCONTINUED | OUTPATIENT
Start: 2019-01-07 | End: 2019-01-07 | Stop reason: SURG

## 2019-01-07 RX ADMIN — PROPOFOL 25 MG: 10 INJECTION, EMULSION INTRAVENOUS at 12:18

## 2019-01-07 RX ADMIN — PROPOFOL 25 MG: 10 INJECTION, EMULSION INTRAVENOUS at 12:11

## 2019-01-07 RX ADMIN — PROPOFOL 25 MG: 10 INJECTION, EMULSION INTRAVENOUS at 12:03

## 2019-01-07 RX ADMIN — PROPOFOL 25 MG: 10 INJECTION, EMULSION INTRAVENOUS at 12:01

## 2019-01-07 RX ADMIN — PROPOFOL 25 MG: 10 INJECTION, EMULSION INTRAVENOUS at 11:51

## 2019-01-07 RX ADMIN — ACETAMINOPHEN 650 MG: 325 TABLET, FILM COATED ORAL at 22:23

## 2019-01-07 RX ADMIN — PROPOFOL 25 MG: 10 INJECTION, EMULSION INTRAVENOUS at 11:45

## 2019-01-07 RX ADMIN — PROPOFOL 50 MG: 10 INJECTION, EMULSION INTRAVENOUS at 11:44

## 2019-01-07 RX ADMIN — PROPOFOL 25 MG: 10 INJECTION, EMULSION INTRAVENOUS at 11:59

## 2019-01-07 RX ADMIN — PROPOFOL 25 MG: 10 INJECTION, EMULSION INTRAVENOUS at 12:24

## 2019-01-07 RX ADMIN — PROPOFOL 25 MG: 10 INJECTION, EMULSION INTRAVENOUS at 12:26

## 2019-01-07 RX ADMIN — PANTOPRAZOLE SODIUM 20 MG: 20 TABLET, DELAYED RELEASE ORAL at 06:29

## 2019-01-07 RX ADMIN — ATORVASTATIN CALCIUM 40 MG: 40 TABLET, FILM COATED ORAL at 15:36

## 2019-01-07 RX ADMIN — ASPIRIN 81 MG 81 MG: 81 TABLET ORAL at 15:35

## 2019-01-07 RX ADMIN — PROPOFOL 25 MG: 10 INJECTION, EMULSION INTRAVENOUS at 11:55

## 2019-01-07 RX ADMIN — PROPOFOL 25 MG: 10 INJECTION, EMULSION INTRAVENOUS at 12:07

## 2019-01-07 RX ADMIN — DILTIAZEM HYDROCHLORIDE 240 MG: 240 CAPSULE, COATED, EXTENDED RELEASE ORAL at 08:48

## 2019-01-07 RX ADMIN — ACETAMINOPHEN 650 MG: 325 TABLET, FILM COATED ORAL at 15:58

## 2019-01-07 RX ADMIN — PROPOFOL 25 MG: 10 INJECTION, EMULSION INTRAVENOUS at 11:53

## 2019-01-07 RX ADMIN — SODIUM CHLORIDE: 0.9 INJECTION, SOLUTION INTRAVENOUS at 12:27

## 2019-01-07 RX ADMIN — PROPOFOL 25 MG: 10 INJECTION, EMULSION INTRAVENOUS at 11:47

## 2019-01-07 RX ADMIN — PROPOFOL 25 MG: 10 INJECTION, EMULSION INTRAVENOUS at 11:49

## 2019-01-07 RX ADMIN — PROPOFOL 25 MG: 10 INJECTION, EMULSION INTRAVENOUS at 11:57

## 2019-01-07 RX ADMIN — SODIUM CHLORIDE: 0.9 INJECTION, SOLUTION INTRAVENOUS at 10:50

## 2019-01-07 RX ADMIN — LIDOCAINE HYDROCHLORIDE 80 MG: 10 INJECTION, SOLUTION INFILTRATION; PERINEURAL at 11:44

## 2019-01-07 RX ADMIN — FUROSEMIDE 40 MG: 40 TABLET ORAL at 15:35

## 2019-01-07 NOTE — OP NOTE
OPERATIVE REPORT  PATIENT NAME: Hossein Rosas    :  1940  MRN: [id number]  Pt Location: [facility] GI ROOM 03    SURGERY DATE: 2019    Surgeon(s) and Role:     Tunde Curtis MD - Primary    Preop Diagnosis:  Abnormal CT of the chest [R93 89]    Post-Op Diagnosis Codes:     * Abnormal CT of the chest [R93 89]    Procedure(s) (LRB):  BRONCHOSCOPY FLEXIBLE (Left)    Specimen(s):  ID Type Source Tests Collected by Time Destination   1 : left lingula endobronchial lesion FNA Tissue FNA NON-GYNECOLOGIC CYTOLOGY Harry Hayden MD 2019 1213    2 : endobronchial lesion bx Tissue Lung, Left Lingula TISSUE EXAM Harry Hayden MD 2019 1216    3 : endobronchial lesion Brushing Lung, Left Lingula Brushing NON-GYNECOLOGIC CYTOLOGY Harry Hayden MD 2019 1222    4 :  Washing Lung, Left Washing NON-GYNECOLOGIC CYTOLOGY Harry Hayden MD 2019 1228    A :  Lavage Lung, Left Upper Lobe Bronchial Lavage FUNGAL CULTURE, VIRUS CULTURE, BRONCHIAL CULTURE AND GRAM STAIN, AFB CULTURE WITH STAIN Harry Hayden MD 2019 1227            Anesthesia Type:   Conscious Sedation     Operative Indications:  Abnormal CT of the chest [C86 40]    Complications:   None    Procedure and Technique:  After informed consent is obtained from the patient, the patient was brought to the bronchoscopy room  A time-out was taken and the patient was identified by myself and nursing  The CT scan of the chest was reviewed prior to the start of the procedure  The upper airways were anesthetized using aerosolized lidocaine  Both nostrils were anesthetized using viscous lidocaine and Q-tips  Cardiges sedation was provided by Anesthesia  The scope was passed through the right nares without difficulty  The vocal cords are normal   The trachea is normal   The right bronchial tree was inspected  There was no endobronchial lesions or secretions  The left bronchial tree was inspected    The left main bronchus is normal   No endobronchial lesions or secretions  The left upper lobe bronchus was patent and there was no endobronchial lesions or secretions  The lingula bronchus had endobronchial lesion  Several passes were done by the fine-needle  Also biopsies were taken by the forceps  Bronchial washings were also done  Finally cytology brushing was done from that area  There was bleeding at times which was controlled by saline washings and saline mixed with epinephrine  After that the scope was wedged into the apical posterior segment of the left upper lobe  Bronchioalveolar lavage was done  The left lower lobe bronchus mucosa was thick and a regular due to chronic inflammation  Lesions or secretions  Was sent for cultures  The patient tolerated the procedure well  Patient Disposition:  The patient will be observed in the recovery area and transferred to the [facility] floor when stable      SIGNATURE: Rudy Bernard MD  DATE: January 7, 2019  TIME: 5:46 PM

## 2019-01-07 NOTE — ANESTHESIA PREPROCEDURE EVALUATION
Review of Systems/Medical History  Patient summary reviewed  Chart reviewed      Cardiovascular  Hyperlipidemia, Hypertension , Past MI > 6 months, CAD , CAD status: 3VD, History of CABG,    Pulmonary  Pneumonia, COPD moderate- medication dependent ,        GI/Hepatic    GERD ,             Endo/Other  History of thyroid disease , hypothyroidism,      GYN       Hematology  Negative hematology ROS      Musculoskeletal    Arthritis     Neurology    CVA ,    Psychology   Depression , being treated for depression,              Physical Exam    Airway    Mallampati score: II  TM Distance: >3 FB  Neck ROM: full     Dental   upper dentures,     Cardiovascular  Rhythm: regular, Rate: normal,     Pulmonary  Breath sounds clear to auscultation, Decreased breath sounds,     Other Findings        Anesthesia Plan  ASA Score- 3     Anesthesia Type- IV sedation with anesthesia with ASA Monitors  Additional Monitors:   Airway Plan:     Comment: CONSCIOUS sedation  Plan Factors-    Induction- intravenous  Postoperative Plan- Plan for postoperative opioid use  Informed Consent- Anesthetic plan and risks discussed with patient  I personally reviewed this patient with the CRNA  Discussed and agreed on the Anesthesia Plan with the AVANI Garcia

## 2019-01-07 NOTE — PROGRESS NOTES
Progress Note - Pulmonary   Rehana Fletcher 66 y o  male MRN: [id number]  Unit/Bed#: -01 Encounter: [id number]    Assessment/Plan:    1  Chronic hypoxic respiratory failure likely secondary to left lung consolidations and air spacing       *  patient currently on 2 L- 100%, home O2-2 L upon ambulation and p r n        *  titrate O2 accordingly keeping O2 sat above 88%       *  will need aggressive pulmonary toileting including IS, deep breathing coughing, and OOB as tolerated    2  Abnormal chest CT secondary to atypical vs AFB      *  left upper lobe airspace consolidation, 2 large air fluid filled cavities left upper lobe apex consistent with infected bulla, left-sided pleural effusion      *  patient reports black coal exposure throughout his childhood      *  bronchoscopy with lavage schedule for today to determine pathogen      *  can likely be discharged post bronchoscopy, will determine outpatient management pending pathology    3  Very severe COPD without acute exacerbation       *  FEV1 23%       *  patient often times refuses to use/prescribed schedule inhalers or nebulizers       *  home regimen ProAir q 6h p r n  Outpatient per discharge instructions      Chief Complaint:    "I want to be discharged"    Subjective: Nick Mark it was sitting comfortably on his bed  He reports that he feels that he is at his respiratory baseline  He is anxious to be discharged today and reports that he follows closely with Dr Jaron Parnell outpatient  Patient reports that he occasion becomes dyspneic upon exertion which is his baseline  He currently denies any fever, chills, cough, hemoptysis, nausea, vomiting, chest pain, palpitations, or headache  Objective:    Vitals: Blood pressure 127/62, pulse 70, temperature 97 5 °F (36 4 °C), temperature source Oral, resp  rate 18, height 5' 8" (1 727 m), weight 78 1 kg (172 lb 3 2 oz), SpO2 100 %  2L,Body mass index is 26 18 kg/m²        Intake/Output Summary (Last 24 hours) at 01/07/19 1873  Last data filed at 01/07/19 0600   Gross per 24 hour   Intake             1210 ml   Output              750 ml   Net              460 ml       Invasive Devices     Peripheral Intravenous Line            Peripheral IV 01/04/19 Right Antecubital 2 days                Physical Exam:   Physical Exam   Constitutional: He is oriented to person, place, and time  He appears well-developed  HENT:   Head: Normocephalic and atraumatic  Neck: Normal range of motion  Neck supple  No JVD present  Cardiovascular: Normal rate, regular rhythm and normal heart sounds  Exam reveals no gallop and no friction rub  No murmur heard  Pulmonary/Chest: No accessory muscle usage or stridor  No tachypnea and no bradypnea  No respiratory distress  He has no decreased breath sounds  He has no wheezes  He has no rhonchi  He has rales in the left lower field  He exhibits no tenderness  Abdominal: Soft  Bowel sounds are normal  He exhibits no distension  There is no tenderness  Musculoskeletal: Normal range of motion  He exhibits no edema or deformity  Neurological: He is alert and oriented to person, place, and time  Skin: Skin is warm and dry  Psychiatric: He has a normal mood and affect  His behavior is normal        Labs:  I have personally reviewed pertinent lab results from 01/05/2015    Imaging and other studies: I have personally reviewed pertinent films in PACS     CT chest 01/03/2019 left upper lobe airspace consolidation, 2 large air fluid filled cavities left upper lobe apex consistent with infected bulla, left-sided pleural effusion

## 2019-01-07 NOTE — DISCHARGE INSTRUCTIONS
Flexible Bronchoscopy   WHAT YOU NEED TO KNOW:   A flexible bronchoscopy is a procedure to look inside your respiratory system  Healthcare providers use a bronchoscope, which is a soft, bendable tube with a light and tiny camera on the end  Pictures of your respiratory system appear on a monitor during the procedure  DISCHARGE INSTRUCTIONS:   Medicines:   · Pain medicine: You may be given medicine to take away or decrease pain  Do not wait until the pain is severe before you take your medicine  · Take your medicine as directed  Contact your healthcare provider if you think your medicine is not helping or if you have side effects  Tell him or her if you are allergic to any medicine  Keep a list of the medicines, vitamins, and herbs you take  Include the amounts, and when and why you take them  Bring the list or the pill bottles to follow-up visits  Carry your medicine list with you in case of an emergency  Follow up with your healthcare provider as directed: Ask when you should expect the results of your procedure  Write down your questions so you remember to ask them during your visits  Self-care:   · Do not cough or clear your throat for a few days  This will help prevent bleeding  · Do not smoke  Smoking can cause severe damage to your airway  Ask your healthcare provider for information if you need help quitting  Contact your healthcare provider if:   · You have a fever  · Your pain does not go away, even after you take medicine  · You have new symptoms or your symptoms are worse than before  · You have questions or concerns about your condition or care  Seek care immediately or call 911 if:   · You cannot swallow  · You cough up blood  · You are confused and dizzy or feel like you are going to faint  · You have shortness of breath  · Your lips or nails turn blue      · You have chest pain or feel like your heart is beating faster than normal   © 2017 [employer] [address] [facility] is for End User's use only and may not be sold, redistributed or otherwise used for commercial purposes  All illustrations and images included in CareNotes® are the copyrighted property of A D A M [employer] Inc  or [employer]  The above information is an  only  It is not intended as medical advice for individual conditions or treatments  Talk to your doctor, nurse or pharmacist before following any medical regimen to see if it is safe and effective for you

## 2019-01-07 NOTE — PROGRESS NOTES
IM Residency Progress Note   Unit/Bed#: -01 Encounter: [id number]  SOD Team C       Tonya Khan 66 y o  male [id number]    Hospital Stay Days: 3      Assessment/Plan:    Principal Problem:    Abnormal CT of the chest  Active Problems:    CAD (coronary atherosclerotic disease)    Hypertension    Hyperlipemia    Hypothyroid    GERD (gastroesophageal reflux disease)    History of stroke    Centrilobular emphysema (HCC)    S/P CABG x 4    CKD (chronic kidney disease), stage II    Chronic respiratory failure with hypoxia (HCC)    COPD, severe (HCC)    Abnormal CT chest   -Bronchoscopy today     Severe COPD with chronic hypoxic respiratory failure   -Maintain SpO2 on his home O2   -Continue home meds     Atrial flutter   -Continue home meds   -Restart Xarelto tonight after the procedure     CKD 3  -No labs for today   -Follow BMP one week after DC     Hyperlipidemia   -Continue Statin     CAD sp CABG x4   -Continue Aspirin and Statin     GERD   -continue nexium every other day as home med     Disposition: Bronchoscopy today, likely DC later today or jess morning  Subjective: Pt was seen and examined this morning  He reported that he is doing well  He denied any complaints  Reported that his truck is in the parking lot and he would like to drive home himself  I explained to him that is not possible  Vitals: Temp (24hrs), Av °F (36 7 °C), Min:98 °F (36 7 °C), Max:98 °F (36 7 °C)  Current: Temperature: 98 °F (36 7 °C)  Vitals:    19 2131 19 0723 19 1537 19 0030   BP: 125/61 107/74 117/68 128/59   BP Location: Left arm Left arm Left arm Left arm   Pulse: 60 70 59 57   Resp: 16 16 16 18   Temp: 97 8 °F (36 6 °C) 98 3 °F (36 8 °C) 98 °F (36 7 °C) 98 °F (36 7 °C)   TempSrc: Oral Oral Oral Oral   SpO2: 100% 98% 99% 100%   Weight:       Height:        Body mass index is 26 18 kg/m²  I/O last 24 hours:   In: 1330 [P O :1330]  Out: 750 [Urine:750]    Physical Exam   Constitutional: He is oriented to person, place, and time  He appears well-developed and well-nourished  No distress  Hard of hearing    HENT:   Head: Normocephalic and atraumatic  Eyes: Conjunctivae are normal  Right eye exhibits no discharge  Left eye exhibits no discharge  Neck: Neck supple  No JVD present  Cardiovascular: Normal rate and regular rhythm  No murmur heard  Pulmonary/Chest: No respiratory distress  He has no wheezes  Abdominal: Soft  He exhibits no distension  There is no tenderness  There is no rebound and no guarding  Musculoskeletal: Normal range of motion  He exhibits no edema  Neurological: He is alert and oriented to person, place, and time  No cranial nerve deficit  Skin: Skin is warm and dry  No rash noted  He is not diaphoretic  No erythema  Invasive Devices     Peripheral Intravenous Line            Peripheral IV 01/04/19 Right Antecubital 2 days                          Labs: No results found for this or any previous visit (from the past 24 hour(s))  Radiology Results: I have personally reviewed pertinent reports  Other Diagnostic Testing:   I have personally reviewed pertinent reports          Active Meds:   Current Facility-Administered Medications   Medication Dose Route Frequency    acetaminophen (TYLENOL) tablet 650 mg  650 mg Oral Q6H PRN    albuterol (PROVENTIL HFA,VENTOLIN HFA) inhaler 2 puff  2 puff Inhalation Q6H PRN    aspirin chewable tablet 81 mg  81 mg Oral Daily    atorvastatin (LIPITOR) tablet 40 mg  40 mg Oral Daily With Dinner    diltiazem (CARDIZEM CD) 24 hr capsule 240 mg  240 mg Oral Daily    furosemide (LASIX) tablet 40 mg  40 mg Oral Daily    pantoprazole (PROTONIX) EC tablet 20 mg  20 mg Oral Every Other Day         VTE Pharmacologic Prophylaxis: Enoxaparin (Lovenox)  VTE Mechanical Prophylaxis: sequential compression device    Yolanda Rincon DO

## 2019-01-07 NOTE — ANESTHESIA POSTPROCEDURE EVALUATION
Post-Op Assessment Note      CV Status:  Stable    Mental Status:  Awake    Hydration Status:  Euvolemic    PONV Controlled:  Controlled    Airway Patency:  Patent    Post Op Vitals Reviewed: Yes          Staff: CRNA           /55 (01/07/19 1238)    Temp      Pulse 65 (01/07/19 1238)   Resp 18 (01/07/19 1238)    SpO2 95 % (01/07/19 1238)

## 2019-01-07 NOTE — UTILIZATION REVIEW
Initial Clinical Review    Admission: Date/Time/Statement: 1/4/19 @ 1514     Orders Placed This Encounter   Procedures    Inpatient Admission (expected length of stay for this patient is greater than two midnights)     Standing Status:   Standing     Number of Occurrences:   1     Order Specific Question:   Admitting Physician     Answer:   Rohith Longoria [[id number]]     Order Specific Question:   Level of Care     Answer:   Med Surg [[id number]]     Order Specific Question:   Estimated length of stay     Answer:   More than 2 Midnights     Order Specific Question:   Certification     Answer:   I certify that inpatient services are medically necessary for this patient for a duration of greater than two midnights  See H&P and MD Progress Notes for additional information about the patient's course of treatment  ED: Date/Time/Mode of Arrival:   ED Arrival Information     Expected Arrival Acuity Means of Arrival Escorted By Service Admission Type    - 1/4/2019 11:52 Urgent Walk-In Self General Medicine Urgent    Arrival Complaint    fluid overload          Chief Complaint:   Chief Complaint   Patient presents with    Evaluation of Abnormal Diagnostic Test     Patient received a phone call that he has infection or fluid overload in his lungs from yesterday  Patient states no discomfort at this time  History of Illness: 35-year-old past medical history atrial flutter status post cardioversion on Xarelto, CAD status post CABG x4 in 2001, history of left CEA, severe COPD/bronchiectasis with chronic hypoxic respiratory failure on 2 L nasal cannula presents for evaluation of abnormal CT chest   Patient was recently admitted at [facility] under [facility] service 11/7/2018 to 11/9/2018  At that time patient was treated for community-acquired pneumonia and completed 10 day course of therapy  Patient followed up outpatient with pulmonology and had repeat CT scan performed    CT chest showed 2 large air fluid filled cavities in left upper lobe consistent with infected bullae with increased fluid content from prior CT scanned as well as a small left-sided pleural effusion  These findings were discussed with Dr Wilfred Rincon who recommended transfer to [facility] ED for evaluation        ED Vital Signs:   ED Triage Vitals [01/04/19 1202]   Temperature Pulse Respirations Blood Pressure SpO2   98 1 °F (36 7 °C) 75 18 163/67 98 %      Temp Source Heart Rate Source Patient Position - Orthostatic VS BP Location FiO2 (%)   Oral Monitor Sitting Left arm --      Pain Score       No Pain        Wt Readings from Last 1 Encounters:   01/04/19 78 1 kg (172 lb 3 2 oz)       Vital Signs (abnormal): WNL    Abnormal Labs/Diagnostic Test Results: HGB 10 8, HCT 36 2    CT CHEST 1/4 -- Compared to 11/7/2018 chest CT, left upper lobe airspace consolidation has improved, and the left lower lobe airspace consolidation has resolved  However there are 2 large air-fluid filled cavities in the left upper lobe apex, consistent with infected bulla, with increased fluid content compared to prior chest x-rays and chest CT from November 2018 (series 2 images 10 through 28 )   Small water density left-sided pleural effusion  ED Treatment:   Medication Administration from 01/04/2019 1152 to 01/04/2019 1722       Date/Time Order Dose Route Action     01/04/2019 1644 atorvastatin (LIPITOR) tablet 40 mg 40 mg Oral Given     01/04/2019 1644 rivaroxaban (XARELTO) tablet 20 mg 0 mg Oral Hold       Past Medical/Surgical History:    Active Ambulatory Problems     Diagnosis Date Noted    CAD (coronary atherosclerotic disease) 08/13/2016    Hypertension 08/13/2016    Hyperlipemia 08/13/2016    Atypical chest pain 08/13/2016    Hypothyroid 01/13/2018    GERD (gastroesophageal reflux disease) 01/13/2018    History of stroke 01/13/2018    Sciatica of right side 01/13/2018    Hyperlipidemia     Centrilobular emphysema (HCC)     Arthritis     Stenosis of left carotid artery 01/14/2018    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    Pre-operative cardiovascular examination 01/30/2018    Acute left-sided low back pain with left-sided sciatica 10/25/2016    Back pain 01/24/2017    COPD exacerbation ([address] ) 11/05/2013    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 10/25/2016    Vision changes 01/13/2018    Urinary retention due to benign prostatic hyperplasia 04/27/2018    Bladder cancer ([address] ) 04/27/2018    CKD (chronic kidney disease), stage II 06/01/2018    CAP (community acquired pneumonia) 10/07/2018    Atrial flutter ([address] ) 10/07/2018    Pneumonia due to infectious organism     Irregular heart beat     Localized edema 10/15/2018    Acute on chronic respiratory failure with hypoxia ([address] ) 11/07/2018    Community acquired bacterial pneumonia 11/07/2018    Chronic respiratory failure with hypoxia ([address] ) 11/13/2018    COPD, severe ([address] ) 11/13/2018    Bronchiectasis with acute lower respiratory infection ([address] ) 11/13/2018     Resolved Ambulatory Problems     Diagnosis Date Noted    Pulmonary emphysema ([address] ) 01/13/2018    Benign essential hypertension 04/24/2015    Hemiparesis ([address] ) 01/16/2018    Bradycardia 09/15/2016    Sepsis ([address] ) 10/07/2018    Hyperkalemia 10/19/2018     Past Medical History:   Diagnosis Date    A-fib ([address] )     Arthritis     Benign prostatic hyperplasia without lower urinary tract symptoms     CAD (coronary artery disease)     Chronic obstructive lung disease (HCC)     Coronary arteriosclerosis     Depression     Emphysema lung (HCC)     GERD (gastroesophageal reflux disease)     Hyperlipidemia     Hypertension     Myocardial infarction ([address] )     Psoriasis     Requires supplemental oxygen     Stroke ([address] )        Admitting Diagnosis: Abnormal CT of the chest [R93 89]  Bronchiectasis with acute lower respiratory infection ([address] ) [J47 0]  Fluid overload [E87 70]    Age/Sex: 66 y o  male    Assessment/Plan:   Abnormal CT scan chest  CT shows 2 large air fluid filled cavities in left upper lobe apex consistent with infected bulla and increased fluid content, left-sided pleural effusion when compared to prior  Currently patient is asymptomatic, nontoxic  Procalcitonin low  Bronchoscopy has been recommended in the past the patient but he has refused  Plan:  -hold off on antibiotics at this time  -consult pulmonology to discuss possible bronchoscopy/thoracentesis/biopsy for evaluation     Severe COPD with chronic hypoxic respiratory failure  Maintained with 2 L nasal cannula at baseline  Plan:  -continue albuterol as needed  -continue nasal cannula oxygen to maintain saturations greater than 88%     Atrial flutter  Status post cardioversion  Plan:  -continue Cardizem 240 mg daily  -continue Xarelto         Admission Orders:  Scheduled Meds:   Current Facility-Administered Medications:  acetaminophen 650 mg Oral Q6H PRN   albuterol 2 puff Inhalation Q6H PRN   aspirin 81 mg Oral Daily   atorvastatin 40 mg Oral Daily With Dinner   diltiazem 240 mg Oral Daily   enoxaparin 80 mg Subcutaneous Q12H ADRIANE   furosemide 40 mg Oral Daily   pantoprazole 20 mg Oral Every Other Day     TELEM  CONSULT PULMONARY, CARDIOLOGY  REG DIET  I/O  UP AS JUAN      PULM CONSULT:  1  Left lung consolidation and airspaces  Might be secondary to the pneumonia the patient had around October and November 2018  His 1st presentation was then  He does not have any significant smoking history  He worked as an   He had a bird that he gave away but the symptoms stayed the same  However, it was not suspicious for hypersensitivity pneumonitis  I suspect the changes are secondary to a pneumonia on top of history of significant pneumonias as a young man  He reports he was hospitalized in the 76s for what he called double pneumonia and needed the procedure, may bronchoscopy, for suction of the secretions in his lung    I recommend to continue with bronchoscopy for lavage to see what is the pathogen and if this is an atypical pathogen or acid-fast bacteria  Plan to do bronchoscopy on Monday as an inpatient  We will stop Xarelto now today Saturday 2:00 p m  and will start Lovenox full dose

## 2019-01-07 NOTE — PHYSICIAN ADVISOR
Current patient class: Inpatient  The patient is currently on Hospital Day: 4 at [facility]      The patient was admitted to the hospital at [phone] on 1/4/19 for the following diagnosis:  Abnormal CT of the chest [R93 89]  Bronchiectasis with acute lower respiratory infection ([address] ) [J47 0]  Fluid overload [E87 70]       There is documentation in the medical record of an expected length of stay of at least 2 midnights  The patient is therefore expected to satisfy the 2 midnight benchmark and given the 2 midnight presumption is appropriate for INPATIENT ADMISSION  Given this expectation of a satisfying stay, CMS instructs us that the patient is most often appropriate for inpatient admission under part A provided medical necessity is documented in the chart  After review of the relevant documentation, labs, vital signs and test results, the patient is appropriate for INPATIENT ADMISSION  Admission to the hospital as an inpatient is a complex decision making process which requires the practitioner to consider the patients presenting complaint, history and physical examination and all relevant testing  With this in mind, in this case, the patient was deemed appropriate for INPATIENT ADMISSION  After review of the documentation and testing available at the time of the admission I concur with this clinical determination of medical necessity  Rationale is as follows: The patient is a 66 yrs old Male who presented to the ED at 1/4/2019 12:46 PM with a chief complaint of Evaluation of Abnormal Diagnostic Test (Patient received a phone call that he has infection or fluid overload in his lungs from yesterday  Patient states no discomfort at this time )     Given the need for further hospitalization, and along with the documentation of medical necessity present in the chart, the patient is appropriate for inpatient admission    The patient is expected to satisfy the 2 midnight benchmark, and will require further acute medical care  The patient does have comorbid conditions which increases the risk for significant adverse outcome  Given this the patient is appropriate for inpatient admission        The patients vitals on arrival were ED Triage Vitals [01/04/19 1202]   Temperature Pulse Respirations Blood Pressure SpO2   98 1 °F (36 7 °C) 75 18 163/67 98 %      Temp Source Heart Rate Source Patient Position - Orthostatic VS BP Location FiO2 (%)   Oral Monitor Sitting Left arm --      Pain Score       No Pain           Past Medical History:   Diagnosis Date    A-fib (HCC)     Arthritis     Benign prostatic hyperplasia without lower urinary tract symptoms     without Urinary Obstruction    CAD (coronary artery disease)     Chronic obstructive lung disease (HCC)     Coronary arteriosclerosis     Depression     Emphysema lung (HCC)     GERD (gastroesophageal reflux disease)     Hyperlipidemia     Hypertension     Myocardial infarction (HCC)     Psoriasis     Requires supplemental oxygen     at bedtime during high humid days only    Stroke [facility])     TIA 1/2018     Past Surgical History:   Procedure Laterality Date    COLONOSCOPY      CORONARY ANGIOPLASTY  02/03/2001    PTCA of RCA    CORONARY ARTERY BYPASS GRAFT  02/07/2001    x4- [place]    HERNIA REPAIR      FL THROMBOENDARTECTMY NECK,NECK INCIS Left 2/20/2018    Procedure: ENDARTERECTOMY ARTERY CAROTID WITH PATCH ANGIOPLASTY;  Surgeon: Moni Saunders MD;  Location: [facility];  Service: Vascular    TRANSURETHRAL RESECTION OF PROSTATE             Consults have been placed to:   IP CONSULT TO CASE MANAGEMENT  IP CONSULT TO PULMONOLOGY  IP CONSULT TO CARDIOLOGY    Vitals:    01/05/19 1526 01/05/19 2131 01/06/19 0723 01/06/19 1537   BP: 121/72 125/61 107/74 117/68   BP Location: Left arm Left arm Left arm Left arm   Pulse: 66 60 70 59   Resp: 18 16 16 16   Temp: 97 5 °F (36 4 °C) 97 8 °F (36 6 °C) 98 3 °F (36 8 °C) 98 °F (36 7 °C)   TempSrc: Oral Oral Oral Oral   SpO2: 99% 100% 98% 99%   Weight:       Height:           Most recent labs:    Recent Labs      01/04/19   1306  01/05/19   0548  01/06/19   0531   WBC  5 93  4 28*   --    HGB  10 8*  10 1*   --    HCT  36 2*  33 3*   --    PLT  303  265   --    K  4 1  4 0   --    CALCIUM  9 2  9 0   --    BUN  24  21   --    CREATININE  1 20  1 08   --    INR   --    --   1 24*   AST  17   --    --    ALT  23   --    --    ALKPHOS  87   --    --        Scheduled Meds:  Current Facility-Administered Medications:  acetaminophen 650 mg Oral Q6H PRN Sharyle Laila, DO   albuterol 2 puff Inhalation Q6H PRN Inderyle Laila, DO   aspirin 81 mg Oral Daily Sharyle Laila, DO   atorvastatin 40 mg Oral Daily With [employer], DO   diltiazem 240 mg Oral Daily Sharyle Laila, DO   enoxaparin 80 mg Subcutaneous Q12H Macy Duarte MD   furosemide 40 mg Oral Daily Sharyle Laila, DO   pantoprazole 20 mg Oral Every Other Day Sharyle Laila, DO     Continuous Infusions:   PRN Meds:   acetaminophen    albuterol    Surgical procedures (if appropriate):  Procedure(s):  BRONCHOSCOPY FLEXIBLE

## 2019-01-08 VITALS
RESPIRATION RATE: 18 BRPM | BODY MASS INDEX: 26.1 KG/M2 | WEIGHT: 172.2 LBS | OXYGEN SATURATION: 100 % | DIASTOLIC BLOOD PRESSURE: 60 MMHG | HEART RATE: 86 BPM | TEMPERATURE: 99.3 F | HEIGHT: 68 IN | SYSTOLIC BLOOD PRESSURE: 124 MMHG

## 2019-01-08 PROCEDURE — 99239 HOSP IP/OBS DSCHRG MGMT >30: CPT | Performed by: INTERNAL MEDICINE

## 2019-01-08 RX ORDER — AMOXICILLIN 250 MG
1 CAPSULE ORAL
Status: DISCONTINUED | OUTPATIENT
Start: 2019-01-08 | End: 2019-01-08 | Stop reason: HOSPADM

## 2019-01-08 RX ADMIN — RIVAROXABAN 20 MG: 20 TABLET, FILM COATED ORAL at 11:17

## 2019-01-08 RX ADMIN — SENNOSIDES AND DOCUSATE SODIUM 1 TABLET: 8.6; 5 TABLET ORAL at 09:54

## 2019-01-08 RX ADMIN — DILTIAZEM HYDROCHLORIDE 240 MG: 240 CAPSULE, COATED, EXTENDED RELEASE ORAL at 09:41

## 2019-01-08 RX ADMIN — ASPIRIN 81 MG 81 MG: 81 TABLET ORAL at 09:40

## 2019-01-08 RX ADMIN — FUROSEMIDE 40 MG: 40 TABLET ORAL at 09:40

## 2019-01-08 NOTE — DISCHARGE SUMMARY
[facility] CENTRAL Discharge Summary - Guzman Mason 66 y o  male MRN: [id number]    [address] [facility]  Room / Bed: [place] 562/-32 Encounter: [id number]    BRIEF OVERVIEW    Admitting Provider: Harshal Love MD  Discharge Provider: No att  providers found  Primary Care Physician at Discharge: Dr Jennifer Fontanez     Discharge To: Home with family    Admission Date: 1/4/2019     Discharge Date: 1/8/2019 11:45 AM    Primary Discharge Diagnosis  Principal Problem:    Abnormal CT of the chest  Active Problems:    CAD (coronary atherosclerotic disease)    Hypertension    Hyperlipemia    Hypothyroid    GERD (gastroesophageal reflux disease)    History of stroke    Centrilobular emphysema (HCC)    S/P CABG x 4    CKD (chronic kidney disease), stage II    Chronic respiratory failure with hypoxia (HCC)    COPD, severe (HCC)  Resolved Problems:    * No resolved hospital problems   Esther Campbell- Cardiology      Therapeutic Operative Procedures Performed: none     Diagnostic Procedures Performed: Bronchoscopy 1/7/19    Discharge Disposition: Home/Self Care  Discharged With Lines: no    Test Results Pending at Discharge: Bronchoscopy results, cultures and tissue cultures     Outpatient Follow-Up: Please follow up with Dr Jennifer Fontanez PCP at his office within one week  Follow up with consulting providers: Please follow up with Dr Kenyatta Philip in her office as the appt shows up on AVS  Active Issues Requiring Follow-up: Labs     Code Status: Level 1 - Full Code  Advance Directive and Living Will: <no information>  Power of :    POLST:      Medications   Your Medications    Morning Afternoon Evening Bedtime As Needed   albuterol 90 mcg/act inhaler   Commonly known as: PROAIR HFA   Inhale 2 puffs every 6 (six) hours as needed for wheezing or shortness of breath   Refills: 0          aspirin 81 mg chewable tablet   Chew 81 mg daily Refills: 0   Next Dose Due: 1/9          atorvastatin 40 mg tablet   Commonly known as: LIPITOR   Take 1 tablet (40 mg total) by mouth daily   Refills: 3   Next Dose Due: 1/8          diltiazem 240 mg 24 hr capsule   Commonly known as: CARDIZEM CD   Take 1 capsule (240 mg total) by mouth daily   Refills: 3   Next Dose Due: 1/9          esomeprazole 20 mg capsule   Commonly known as: NexIUM   Take 20 mg by mouth every other day   Refills: 0   Notes to patient: Continue as before          furosemide 40 mg tablet   Commonly known as: LASIX   Take 1 tablet (40 mg total) by mouth daily   Refills: 6   Next Dose Due: 1/9          rivaroxaban 20 mg tablet   Commonly known as: XARELTO   Take 1 tablet (20 mg total) by mouth daily with dinner   Refills: 6   Next Dose Due: 1/9                 Allergies  Allergies   Allergen Reactions    Penicillins Swelling and Itching     Discharge Diet: carb controlled   Activity restrictions: none    [address] [facility]    This is a 69-year-old male with past medical history of atrial flutter status post cardioversion on Xarelto, CAD status post CABG x4 in 2001, history of left CEA, severe COPD with chronic hypoxic respiratory failure on 2 L nasal cannula who presented to [facility] as a direct admit for evaluation of abnormal CT  The patient had been admitted to [facility] for pneumonia after which he followed up with outpatient pulmonology  The patient had outpatient imaging to document resolution of the said pneumonia however it was noted the patient had 2 large air fluid filled cavities in left upper lobe consistent with infective be related with increased fluid content from prior CT showed increased left  Sided pleural effusion  This was discussed with patient's PCP who referred patient to [facility] for admission    At [facility] but the patient was evaluated by pulmonology who recommended the patient should have bronchoscopy, patient also had a cardiology evaluation because of his risk factors and prior medical history  Cardiology cleared the patient for the bronchoscopy, the patient had a bronchoscopy on January 7, 2019 without any complications  Patient was deemed stable to be discharged on January 8, 2019  This morning patient was seen and examined, he reported that he was doing well and did not feel sick anymore  Patient reported that his only complaint was that he had not had a bowel movement in the last 2 days  He stated that usually he goes to the bathroom every day and he was having bowel movement on another unit in the hospital however for the last 2 days he had to spend most of his time in bed and had not had a bowel movement  Patient did have breakfast this morning, he denied having nausea, vomiting, fever, chills  Patient reported that he felt like he did have to go and walking around with make him go  Patient was given 1 dose of this stool softeners which patient agreed to  At this time patient was deemed stable to be discharged, he was discharged home with home VNA and PT  Patient remained stable throughout his stay at the hospital, all parties were in agreement  Presenting Problem/History of Present Illness  Principal Problem:    Abnormal CT of the chest  Active Problems:    CAD (coronary atherosclerotic disease)    Hypertension    Hyperlipemia    Hypothyroid    GERD (gastroesophageal reflux disease)    History of stroke    Centrilobular emphysema (HCC)    S/P CABG x 4    CKD (chronic kidney disease), stage II    Chronic respiratory failure with hypoxia (HCC)    COPD, severe (HCC)  Resolved Problems:    * No resolved hospital problems  *    Physical Exam   Constitutional: He is oriented to person, place, and time  He appears well-developed and well-nourished  No distress  NC in place   Hard of hearing    HENT:   Head: Normocephalic and atraumatic     Eyes: Conjunctivae are normal  Right eye exhibits no discharge  Left eye exhibits no discharge  Neck: Neck supple  No JVD present  Cardiovascular: Normal rate and regular rhythm  No murmur heard  Pulmonary/Chest: No respiratory distress  He has no wheezes  Abdominal: Soft  He exhibits no distension  There is no tenderness  There is no rebound and no guarding  Musculoskeletal: Normal range of motion  He exhibits no edema  Neurological: He is alert and oriented to person, place, and time  No cranial nerve deficit  Skin: Skin is warm and dry  No rash noted  He is not diaphoretic  No erythema  Discharge Condition: good    Discharge  Statement   I spent 45 minutes minutes discharging the patient  This time was spent on the day of discharge  I had direct contact with the patient on the day of discharge  Additional documentation is required if more than 30 minutes were spent on discharge

## 2019-01-08 NOTE — PROGRESS NOTES
Pt c/o 10/10 left sided upper abd pain  VS stable  Resident with SOD was called and came to see the pt at bedside  See new orders  Will continue to monitor

## 2019-01-08 NOTE — PROGRESS NOTES
Patient with sudden 10/10 left-sided abdominal pain, nursing made us OD aware  Evaluated the patient bedside  Patient is unable to describe the pain, states it is worse with movement  He has never had pain like this before  Pain improving and is now 4/10  It is worse with motion  His abdominal exam is without rebound or guarding  Most consistent with muscle spasm  Aqua K pad for now, Tylenol  Continue to monitor clinically  Patient vital signs within normal limits still saturating well on nasal cannula  He is nontoxic appearing  Denies chest pain

## 2019-01-09 ENCOUNTER — TELEPHONE (OUTPATIENT)
Dept: INTERNAL MEDICINE CLINIC | Facility: CLINIC | Age: 79
End: 2019-01-09

## 2019-01-09 ENCOUNTER — TRANSITIONAL CARE MANAGEMENT (OUTPATIENT)
Dept: INTERNAL MEDICINE CLINIC | Facility: CLINIC | Age: 79
End: 2019-01-09

## 2019-01-09 LAB
BACTERIA BRONCH AEROBE CULT: NORMAL
GRAM STN SPEC: NORMAL
GRAM STN SPEC: NORMAL

## 2019-01-09 NOTE — TELEPHONE ENCOUNTER
[facility] called to make aware that he refused home care services and will be going back to work   # [phone] these services were ordered in the hospital by dr Mosqueda Serum

## 2019-01-11 NOTE — PROGRESS NOTES
Cardiology Progress Note - Monica Nugent 66 y o  male MRN: [id number]    Unit/Bed#: [facility] 706-01 Encounter: [id number]      Assessment/Recommendations:  1  Preop risk stratification:  Patient has elevated risk due to prior cardiac history, however at this time he is not in active heart failure, rhythm is stable, and does not appear to have symptoms suggestive of active ischemia  Proceed with low cardiac risk bronchoscopy without any further cardiac intervention  2  Pulmonary fluid cavities: For bronchoscopy by Pulmonary  3  Coronary artery disease:  Status post CABG x4  Continued on aspirin and statin  Severity of his COPD limits the use of beta-blockers  4  Hyperlipidemia:  Continue on statin  Number 5  COPD  6  Typical atrial flutter:  Status post cardioversion in November 2018  Continued on Xarelto as outpatient, however this is currently held for upcoming bronchoscopy  Continued on Cardizem for rate control  Subjective:   Patient seen and examined  No significant events overnight   ; pertinent negatives - chest pain, chest pressure/discomfort, dyspnea, irregular heart beat, near-syncope and palpitations  Objective:     Vitals: Blood pressure 107/74, pulse 70, temperature 98 3 °F (36 8 °C), temperature source Oral, resp  rate 16, height 5' 8" (1 727 m), weight 78 1 kg (172 lb 3 2 oz), SpO2 98 %  , Body mass index is 26 18 kg/m² , Orthostatic Blood Pressures      Most Recent Value   Blood Pressure  107/74 filed at 01/06/2019 5477   Patient Position - Orthostatic VS  Sitting filed at 01/06/2019 0723            Intake/Output Summary (Last 24 hours) at 01/06/19 0931  Last data filed at 01/06/19 0815   Gross per 24 hour   Intake              330 ml   Output             1185 ml   Net             -855 ml       Physical Exam:    GEN: Monica Nugent appears well, alert and oriented x 3, pleasant and cooperative   HEENT: pupils equal, round, and reactive to light; extraocular muscles intact  NECK: supple, no carotid bruits   HEART: regular rhythm, normal S1 and S2, no murmurs, clicks, gallops or rubs   LUNGS: clear to auscultation bilaterally; no wheezes, rales, or rhonchi   ABDOMEN: normal bowel sounds, soft, no tenderness, no distention  EXTREMITIES: peripheral pulses normal; no clubbing, cyanosis, or edema  NEURO: no focal findings   SKIN: normal without suspicious lesions on exposed skin    Medications:      Current Facility-Administered Medications:     acetaminophen (TYLENOL) tablet 650 mg, 650 mg, Oral, Q6H PRN, Evangelista Rosales DO, 650 mg at 01/05/19 0910    albuterol (PROVENTIL HFA,VENTOLIN HFA) inhaler 2 puff, 2 puff, Inhalation, Q6H PRN, Evangelista Rosales DO    aspirin chewable tablet 81 mg, 81 mg, Oral, Daily, Evangelista Rosales DO, 81 mg at 01/06/19 0739    atorvastatin (LIPITOR) tablet 40 mg, 40 mg, Oral, Daily With Joyce Roberts DO, 40 mg at 01/05/19 1637    diltiazem (CARDIZEM CD) 24 hr capsule 240 mg, 240 mg, Oral, Daily, Evangelista Rosales DO, 240 mg at 01/06/19 0739    enoxaparin (LOVENOX) subcutaneous injection 80 mg, 80 mg, Subcutaneous, Q12H [address], Jamal Ronquillo MD, 80 mg at 01/06/19 0012    furosemide (LASIX) tablet 40 mg, 40 mg, Oral, Daily, Evangelista Rosales DO, 40 mg at 01/06/19 0739    pantoprazole (PROTONIX) EC tablet 20 mg, 20 mg, Oral, Every Other Day, Evangelista Rosales DO, 20 mg at 01/05/19 0532     Labs & Results:          Results from last 7 days  Lab Units 01/05/19  0548 01/04/19  1306   WBC Thousand/uL 4 28* 5 93   HEMOGLOBIN g/dL 10 1* 10 8*   HEMATOCRIT % 33 3* 36 2*   PLATELETS Thousands/uL 265 303           Results from last 7 days  Lab Units 01/05/19  0548 01/04/19  1306   POTASSIUM mmol/L 4 0 4 1   CHLORIDE mmol/L 104 101   CO2 mmol/L 29 29   BUN mg/dL 21 24   CREATININE mg/dL 1 08 1 20   CALCIUM mg/dL 9 0 9 2   ALK PHOS U/L  --  87   ALT U/L  --  23   AST U/L  --  17       Results from last 7 days  Lab Units 01/06/19  0531   INR  1 24*   PTT seconds 43* Echo: personally reviewed - normal ejection fraction with hypokinesis of the basal inferior wall      EKG personally reviewed by Franny Bermudez MD  none...

## 2019-01-18 ENCOUNTER — OFFICE VISIT (OUTPATIENT)
Dept: NEUROLOGY | Facility: CLINIC | Age: 79
End: 2019-01-18
Payer: MEDICARE

## 2019-01-18 VITALS
SYSTOLIC BLOOD PRESSURE: 120 MMHG | BODY MASS INDEX: 26.64 KG/M2 | HEART RATE: 75 BPM | DIASTOLIC BLOOD PRESSURE: 60 MMHG | WEIGHT: 175.8 LBS | HEIGHT: 68 IN

## 2019-01-18 DIAGNOSIS — I65.22 STENOSIS OF LEFT CAROTID ARTERY: ICD-10-CM

## 2019-01-18 DIAGNOSIS — Z86.73 HISTORY OF STROKE: ICD-10-CM

## 2019-01-18 DIAGNOSIS — I10 ESSENTIAL HYPERTENSION: ICD-10-CM

## 2019-01-18 DIAGNOSIS — E78.2 MIXED HYPERLIPIDEMIA: ICD-10-CM

## 2019-01-18 DIAGNOSIS — I48.92 ATRIAL FLUTTER, UNSPECIFIED TYPE (HCC): Primary | ICD-10-CM

## 2019-01-18 PROCEDURE — 99214 OFFICE O/P EST MOD 30 MIN: CPT | Performed by: PSYCHIATRY & NEUROLOGY

## 2019-01-18 NOTE — PATIENT INSTRUCTIONS
Stroke: Daphnie Manzano presents for follow-up with regard to his prior stroke  He reports no new events concerning for recurrent TIA or stroke  Since his last visit in the office he has actually had 2 hospitalizations  Both of them are related to his pneumonia and lung issues as well as atrial flutter  He takes his medications as prescribed and remains under the care of his granddaughter  He reports no significant bleeding or bruising issues  He has not had any falls   -for secondary stroke prevention he should continue his current combination of Xarelto, statin, and appropriate blood pressure and glycemic control  Notably he should continue to take Xarelto with food   -I reviewed his recent lab work  His cholesterol panel from October of 2018 in the [facility] actually shows excellent control  I will continue to defer to the good judgment of his family physician for monitoring in terms of his blood sugar and cholesterol numbers   -he reports that he needs to get back to see his cardiologist in order to discuss having a possible ablation for his atrial flutter  I agree that this would not be unreasonable  He has an appointment with Dr Aylin Comer set for March of 2019    -I have asked him to bring this note with him when he sees the vascular surgery team on January 29th  I will continue to defer to their good judgment in terms of monitoring for his carotid arteries  I will look to their team as well as the cardiology team in terms of whether not he needs ongoing treatment with aspirin in addition to Xarelto  Strictly from a stroke prevention standpoint he would be able to continue with anticoagulation without the addition of aspirin, however considering his history of significant carotid artery and coronary artery disease I think it is likely he will also need aspirin      Headaches:  He reports that his sinuses are not a problem currently and that his headaches have resolved for the time being     I will plan for him to return to the office to see us in 6 months time but I would be happy to see him sooner if the need should arise  If he has any symptoms concerning for recurrent TIA or stroke such as sudden painless loss of vision or double vision, difficulty speaking or swallowing, vertigo/room spinning that does not quickly resolve, or weakness/numbness affecting 1 side of the body he should proceed to the nearest emergency room by ambulance immediately  If he has any fall in which he strikes his head and has residual symptoms or if he were to developed a sudden extremely severe unusual headache he should also be seen at the nearest emergency room

## 2019-01-18 NOTE — PROGRESS NOTES
Patient ID: Lenny Fleming is a 66 y o  male  Assessment/Plan:    No problem-specific Assessment & Plan notes found for this encounter  Diagnoses and all orders for this visit:    Atrial flutter, unspecified type [facility])    Essential hypertension    Stenosis of left carotid artery    History of stroke    Mixed hyperlipidemia         Patient Instructions   Stroke: Lanny Orellana presents for follow-up with regard to his prior stroke  He reports no new events concerning for recurrent TIA or stroke  Since his last visit in the office he has actually had 2 hospitalizations  Both of them are related to his pneumonia and lung issues as well as atrial flutter  He takes his medications as prescribed and remains under the care of his granddaughter  He reports no significant bleeding or bruising issues  He has not had any falls   -for secondary stroke prevention he should continue his current combination of Xarelto, statin, and appropriate blood pressure and glycemic control  Notably he should continue to take Xarelto with food   -I reviewed his recent lab work  His cholesterol panel from October of 2018 in the [facility] actually shows excellent control  I will continue to defer to the good judgment of his family physician for monitoring in terms of his blood sugar and cholesterol numbers   -he reports that he needs to get back to see his cardiologist in order to discuss having a possible ablation for his atrial flutter  I agree that this would not be unreasonable  He has an appointment with Dr Raul Moore set for March of 2019    -I have asked him to bring this note with him when he sees the vascular surgery team on January 29th  I will continue to defer to their good judgment in terms of monitoring for his carotid arteries  I will look to their team as well as the cardiology team in terms of whether not he needs ongoing treatment with aspirin in addition to Xarelto    Strictly from a stroke prevention standpoint he would be able to continue with anticoagulation without the addition of aspirin, however considering his history of significant carotid artery and coronary artery disease I think it is likely he will also need aspirin  Headaches:  He reports that his sinuses are not a problem currently and that his headaches have resolved for the time being  I will plan for him to return to the office to see us in 6 months time but I would be happy to see him sooner if the need should arise  If he has any symptoms concerning for recurrent TIA or stroke such as sudden painless loss of vision or double vision, difficulty speaking or swallowing, vertigo/room spinning that does not quickly resolve, or weakness/numbness affecting 1 side of the body he should proceed to the nearest emergency room by ambulance immediately  If he has any fall in which he strikes his head and has residual symptoms or if he were to developed a sudden extremely severe unusual headache he should also be seen at the nearest emergency room  Subjective:    STEWART     Susi Wasserman presents unaccompanied today for a follow-up evaluation with regard to his prior stroke  In the interval since his last visit to the office he presented twice to the hospital with shortness of breath  He had at least 1 bout of community-acquired pneumonia and also has had issues with atrial flutter  He has also been evaluated with bronchoscopy  He reports that he is overall doing well at home  He denies any new events concerning for recurrent TIA or stroke  He takes his medications as prescribed and reports that he remains under the care of his granddaughter  He denies any significant bleeding or bruising  He had questions with regard to his diagnosis of atrial fibrillation and the planned cardiac procedure that would help to decrease his burden of AFib    Upon review of the notes it appears that he is referring to a cardiac ablation and we reviewed some of the physiology behind that procedure  He has an appointment with Dr Vivek Silva in March of this year  He does have an appointment coming up with the vascular surgery team at the end of January and otherwise continues to work with his primary care physician  Past Medical History:   Diagnosis Date    A-fib [facility])     Arthritis     Benign prostatic hyperplasia without lower urinary tract symptoms     without Urinary Obstruction    CAD (coronary artery disease)     Chronic obstructive lung disease (HCC)     Coronary arteriosclerosis     Depression     Emphysema lung (HCC)     GERD (gastroesophageal reflux disease)     Hyperlipidemia     Hypertension     Myocardial infarction (HCC)     Psoriasis     Requires supplemental oxygen     at bedtime during high humid days only    Stroke [facility])     TIA 1/2018       Social History     Social History    Marital status:      Spouse name: N/A    Number of children: 3    Years of education: N/A     Occupational History    retired sergio      Social History Main Topics    Smoking status: Former Smoker     Packs/day: 1 00     Years: 3 00     Types: Cigarettes     Quit date: 1956    Smokeless tobacco: Never Used      Comment: Has a past history of cigarette smoking;Quit date 1956; 5 packs year history, per [employer]      Alcohol use Yes      Comment: occasional wine        Drug use: No    Sexual activity: Yes     Other Topics Concern    None     Social History Narrative    Lives with granddaughter and daughter     Caffeine use, He admits to consuming caffeine VIA coffee ( 8 servings per day), per [employer]    Martial History: Currently , per [employer]    Occupation: Retired (prior occupational:  repairman), per [employer]        Family History   Problem Relation Age of Onset    Lung cancer Mother     Cancer Mother     Other Father         sepsis         Current Outpatient Prescriptions:     albuterol (PROAIR HFA) 90 mcg/act inhaler, Inhale 2 puffs every 6 (six) hours as needed for wheezing or shortness of breath, Disp: 8 5 g, Rfl: 0    aspirin 81 mg chewable tablet, Chew 81 mg daily  , Disp: , Rfl:     atorvastatin (LIPITOR) 40 mg tablet, Take 1 tablet (40 mg total) by mouth daily, Disp: 90 tablet, Rfl: 3    diltiazem (CARDIZEM CD) 240 mg 24 hr capsule, Take 1 capsule (240 mg total) by mouth daily, Disp: 90 capsule, Rfl: 3    esomeprazole (NexIUM) 20 mg capsule, Take 20 mg by mouth every other day  , Disp: , Rfl:     furosemide (LASIX) 40 mg tablet, Take 1 tablet (40 mg total) by mouth daily, Disp: 6 tablet, Rfl: 6    rivaroxaban (XARELTO) 20 mg tablet, Take 1 tablet (20 mg total) by mouth daily with dinner, Disp: 30 tablet, Rfl: 6      Objective:    Blood pressure 120/60, pulse 75, height 5' 8" (1 727 m), weight 79 7 kg (175 lb 12 8 oz)  Physical Exam    Neurological Exam    The at the time of my evaluation he was awake, alert, and in no distress  His speech was fluent with no dysarthria or aphasia  Cranial nerves 2-12 were symmetrically intact  His thought process was circumferential but reasonable  Motor testing revealed 5/5 strength in the bilateral upper and lower extremities  There is no ataxia  There was no tremor  Sensation was intact to light touch in the bilateral upper and lower extremities  Notably he became minimally winded during the course of our exam even though he was seated throughout the entirety of the testing  He was able to carry on relatively animated conversation, however, without any significant difficulty  ROS:    Review of Systems   Constitutional: Negative  HENT: Negative  Eyes: Negative  Respiratory: Negative  Cardiovascular: Negative  Gastrointestinal: Negative  Endocrine: Negative  Genitourinary: Negative  Musculoskeletal: Negative  Skin: Negative  Neurological: Negative  Hematological: Negative  Psychiatric/Behavioral: Negative        As reported by MA

## 2019-01-21 ENCOUNTER — OFFICE VISIT (OUTPATIENT)
Dept: INTERNAL MEDICINE CLINIC | Age: 79
End: 2019-01-21
Payer: MEDICARE

## 2019-01-21 VITALS
WEIGHT: 173.8 LBS | SYSTOLIC BLOOD PRESSURE: 108 MMHG | DIASTOLIC BLOOD PRESSURE: 64 MMHG | HEART RATE: 70 BPM | BODY MASS INDEX: 26.43 KG/M2 | OXYGEN SATURATION: 98 % | TEMPERATURE: 97.8 F

## 2019-01-21 DIAGNOSIS — N18.2 CKD (CHRONIC KIDNEY DISEASE), STAGE II: ICD-10-CM

## 2019-01-21 DIAGNOSIS — R93.89 ABNORMAL CT OF THE CHEST: Primary | ICD-10-CM

## 2019-01-21 DIAGNOSIS — J44.9 COPD, SEVERE (HCC): ICD-10-CM

## 2019-01-21 DIAGNOSIS — I48.92 ATRIAL FLUTTER, UNSPECIFIED TYPE (HCC): ICD-10-CM

## 2019-01-21 PROCEDURE — 99495 TRANSJ CARE MGMT MOD F2F 14D: CPT | Performed by: NURSE PRACTITIONER

## 2019-01-21 NOTE — ASSESSMENT & PLAN NOTE
"It was felt that this might be secondary to the pneumonia the patient had around October and November 2018  He does not have any significant smoking history  He worked as an   However, it was not suspicious for hypersensitivity pneumonitis  I suspect the changes are secondary to a pneumonia on top of history of significant pneumonias as a young man  He reports he was hospitalized in the 76s for what he called double pneumonia and needed the procedure, may bronchoscopy, for suction of the secretions in his lung  I recommend to continue with bronchoscopy for lavage to see what is the pathogen and if this is an atypical pathogen or acid-fast bacteria " Dr Maria L Mendez awaiting rest of results, patient has follow-up set up with Pulmonary

## 2019-01-21 NOTE — PROGRESS NOTES
Assessment/Plan:    Abnormal CT of the chest  "It was felt that this might be secondary to the pneumonia the patient had around October and November 2018  He does not have any significant smoking history  He worked as an   However, it was not suspicious for hypersensitivity pneumonitis  I suspect the changes are secondary to a pneumonia on top of history of significant pneumonias as a young man  He reports he was hospitalized in the 76s for what he called double pneumonia and needed the procedure, may bronchoscopy, for suction of the secretions in his lung  I recommend to continue with bronchoscopy for lavage to see what is the pathogen and if this is an atypical pathogen or acid-fast bacteria " Dr Don Price awaiting rest of results, patient has follow-up set up with Pulmonary  COPD, severe ([address] )   The patient is maintained on 2 L of oxygen nasal cannula at baseline  Patient is to continue to use albuterol as needed and to continue to use nasal cannula oxygen to maintain saturations greater than 88%  Atrial flutter ([address] )    Patient is status post cardioversion, patient will continue Cardizem 240 mg daily, and continue with Xarelto  Patient has follow-up with Cardiology as well as Vascular surgery  CKD (chronic kidney disease), stage II    Patient's creatinine currently at baseline, plan will be to recheck BMP prior to patient's follow-up with Dr Sebastien Garcia  Diagnoses and all orders for this visit:    Abnormal CT of the chest    COPD, severe (HCC)    Atrial flutter, unspecified type ([address] )    CKD (chronic kidney disease), stage II          Subjective:      Patient ID: Antonia Saini is a 66 y o  male  Patient presents today for transition of care appointment, patient was admitted on 01/04/2019 and discharged on 01/08/2019  Patient has no new complaints today, denies shortness of breath, denies chest pain      Details of Hospital Course below from Dr Gardner:     "This is a 66-year-old male with past medical history of atrial flutter status post cardioversion on Xarelto, CAD status post CABG x4 in 2001, history of left CEA, severe COPD with chronic hypoxic respiratory failure on 2 L nasal cannula who presented to [facility] as a direct admit for evaluation of abnormal CT  The patient had been admitted to [facility] for pneumonia after which he followed up with outpatient pulmonology  The patient had outpatient imaging to document resolution of the said pneumonia however it was noted the patient had 2 large air fluid filled cavities in left upper lobe consistent with infective be related with increased fluid content from prior CT showed increased left  Sided pleural effusion  This was discussed with patient's PCP who referred patient to [facility] for admission  At [facility] but the patient was evaluated by pulmonology who recommended the patient should have bronchoscopy, patient also had a cardiology evaluation because of his risk factors and prior medical history  Cardiology cleared the patient for the bronchoscopy, the patient had a bronchoscopy on January 7, 2019 without any complications  Patient was deemed stable to be discharged on January 8, 2019       This morning patient was seen and examined, he reported that he was doing well and did not feel sick anymore  Patient reported that his only complaint was that he had not had a bowel movement in the last 2 days  He stated that usually he goes to the bathroom every day and he was having bowel movement on another unit in the hospital however for the last 2 days he had to spend most of his time in bed and had not had a bowel movement  Patient did have breakfast this morning, he denied having nausea, vomiting, fever, chills  Patient reported that he felt like he did have to go and walking around with make him go  Patient was given 1 dose of this stool softeners which patient agreed to     At this time patient was deemed stable to be discharged, he was discharged home with home VNA and PT  Patient remained stable throughout his stay at the hospital, all parties were in agreement "      TCM Call (since 12/21/2018)     Date and time call was made  1/9/2019 10:05 AM    Hospital care reviewed  Records reviewed    Patient was hospitialized at  [facility]    Date of Admission  01/04/19    Date of discharge  01/08/19    Diagnosis  Abnormal CT chest, CAD, HTN, HLD,hypothyroid,GERD,history of stroke, centrilobular emphysema, s/p CABG, COPD    Disposition  Home    Were the patients medications reviewed and updated  Yes (Med list updated with discharge instructions )    Current Symptoms  Shortness of breath (On home oxygen  He monitors his pulse ox and adjusts the oxygen  At the time of this call his pulse ox = 99% on oxygen 3 L/min   )      TCM Call (since 12/21/2018)     Post hospital issues  -- (He is not happy with his oxygen conserver  He would like a continuous flow unit  He will call [facility] to change equipment )    Should patient be enrolled in anticoag monitoring? No (Discharged on Xarelto)    Scheduled for follow up? Yes    Patients specialists  Neurologist; Other (comment)    Neurologist name  Dr Christina mosley 1/18/19    Neurologist contact #  [phone]    Other specialists names  [address] [place] [address]    Other specialists contcat #  [phone]    Did you obtain your prescribed medications  Yes    Do you need help managing your prescriptions or medications  No    Is transportation to your appointment needed  No    I have advised the patient to call PCP with any new or worsening symptoms  Driscilla Babinski, RN    Living Arrangements  Family members; Children    Are you recieving home care services  Yes    Types of home care services  Other (comment)    Comment  He is expecting a home eval for safety to be done    "Other than that, I don't need a nurse to come "    Have you fallen in the last 12 months  No    Interperter language line needed  No    Comments  TCM appt scheduled on 1/21/19 @ 1300 in [place] with Dr Ace Goodman  Office address given to pt  The following portions of the patient's history were reviewed and updated as appropriate: allergies, current medications, past family history, past medical history, past social history, past surgical history and problem list     Review of Systems   Constitutional: Negative for activity change, appetite change, chills, diaphoresis and fever  HENT: Negative for congestion, ear discharge, ear pain, postnasal drip, rhinorrhea, sinus pain, sinus pressure and sore throat  Eyes: Negative for pain, discharge, itching and visual disturbance  Respiratory: Negative for cough, chest tightness, shortness of breath and wheezing  Cardiovascular: Negative for chest pain, palpitations and leg swelling  Gastrointestinal: Negative for abdominal pain, blood in stool, constipation, diarrhea, nausea and vomiting  Endocrine: Negative for polydipsia, polyphagia and polyuria  Genitourinary: Negative for difficulty urinating, dysuria, hematuria and urgency  Musculoskeletal: Negative for arthralgias, back pain and neck pain  Skin: Negative for rash and wound  Neurological: Negative for dizziness, weakness, numbness and headaches           Past Medical History:   Diagnosis Date    A-fib [facility])     Arthritis     Benign prostatic hyperplasia without lower urinary tract symptoms     without Urinary Obstruction    CAD (coronary artery disease)     Chronic obstructive lung disease (HCC)     Coronary arteriosclerosis     Depression     Emphysema lung (HCC)     GERD (gastroesophageal reflux disease)     Hyperlipidemia     Hypertension     Myocardial infarction (HCC)     Psoriasis     Requires supplemental oxygen     at bedtime during high humid days only    Stroke [facility])     TIA 1/2018 Current Outpatient Prescriptions:     albuterol (PROAIR HFA) 90 mcg/act inhaler, Inhale 2 puffs every 6 (six) hours as needed for wheezing or shortness of breath, Disp: 8 5 g, Rfl: 0    aspirin 81 mg chewable tablet, Chew 81 mg daily  , Disp: , Rfl:     atorvastatin (LIPITOR) 40 mg tablet, Take 1 tablet (40 mg total) by mouth daily, Disp: 90 tablet, Rfl: 3    diltiazem (CARDIZEM CD) 240 mg 24 hr capsule, Take 1 capsule (240 mg total) by mouth daily, Disp: 90 capsule, Rfl: 3    esomeprazole (NexIUM) 20 mg capsule, Take 20 mg by mouth every other day  , Disp: , Rfl:     furosemide (LASIX) 40 mg tablet, Take 1 tablet (40 mg total) by mouth daily, Disp: 6 tablet, Rfl: 6    rivaroxaban (XARELTO) 20 mg tablet, Take 1 tablet (20 mg total) by mouth daily with dinner, Disp: 30 tablet, Rfl: 6    Allergies   Allergen Reactions    Penicillins Swelling and Itching       Social History   Past Surgical History:   Procedure Laterality Date    COLONOSCOPY      CORONARY ANGIOPLASTY  02/03/2001    PTCA of RCA    CORONARY ARTERY BYPASS GRAFT  02/07/2001    x4- [place]    HERNIA REPAIR      AL BRONCHOSCOPY,DIAGNOSTIC Left 1/7/2019    Procedure: BRONCHOSCOPY FLEXIBLE;  Surgeon: Karly Lizama MD;  [facility]: [facility];   Service: Pulmonary    AL THROMBOENDARTECTMY NECK,NECK INCIS Left 2/20/2018    Procedure: ENDARTERECTOMY ARTERY CAROTID WITH PATCH ANGIOPLASTY;  Surgeon: Zana Jett MD;  Location: [facility];  Service: Vascular    TRANSURETHRAL RESECTION OF PROSTATE       Family History   Problem Relation Age of Onset    Lung cancer Mother     Cancer Mother     Other Father         sepsis       Objective:  /64 (BP Location: Left arm, Patient Position: Sitting, Cuff Size: Standard)   Pulse 70   Temp 97 8 °F (36 6 °C) (Tympanic)   Wt 78 8 kg (173 lb 12 8 oz)   SpO2 98%   BMI 26 43 kg/m²     Recent Results (from the past 1344 hour(s))   CBC and differential    Collection Time: 01/04/19  1:06 PM   Result Value Ref Range    WBC 5 93 4 31 - 10 16 Thousand/uL    RBC 4 28 3 88 - 5 62 Million/uL    Hemoglobin 10 8 (L) 12 0 - 17 0 g/dL    Hematocrit 36 2 (L) 36 5 - 49 3 %    MCV 85 82 - 98 fL    MCH 25 2 (L) 26 8 - 34 3 pg    MCHC 29 8 (L) 31 4 - 37 4 g/dL    RDW 15 9 (H) 11 6 - 15 1 %    MPV 9 5 8 9 - 12 7 fL    Platelets 454 182 - 688 Thousands/uL    nRBC 0 /100 WBCs    Neutrophils Relative 68 43 - 75 %    Immat GRANS % 1 0 - 2 %    Lymphocytes Relative 20 14 - 44 %    Monocytes Relative 8 4 - 12 %    Eosinophils Relative 2 0 - 6 %    Basophils Relative 1 0 - 1 %    Neutrophils Absolute 4 08 1 85 - 7 62 Thousands/µL    Immature Grans Absolute 0 03 0 00 - 0 20 Thousand/uL    Lymphocytes Absolute 1 20 0 60 - 4 47 Thousands/µL    Monocytes Absolute 0 50 0 17 - 1 22 Thousand/µL    Eosinophils Absolute 0 09 0 00 - 0 61 Thousand/µL    Basophils Absolute 0 03 0 00 - 0 10 Thousands/µL   Comprehensive metabolic panel    Collection Time: 01/04/19  1:06 PM   Result Value Ref Range    Sodium 136 136 - 145 mmol/L    Potassium 4 1 3 5 - 5 3 mmol/L    Chloride 101 100 - 108 mmol/L    CO2 29 21 - 32 mmol/L    ANION GAP 6 4 - 13 mmol/L    BUN 24 5 - 25 mg/dL    Creatinine 1 20 0 60 - 1 30 mg/dL    Glucose 109 65 - 140 mg/dL    Calcium 9 2 8 3 - 10 1 mg/dL    AST 17 5 - 45 U/L    ALT 23 12 - 78 U/L    Alkaline Phosphatase 87 46 - 116 U/L    Total Protein 8 2 6 4 - 8 2 g/dL    Albumin 3 6 3 5 - 5 0 g/dL    Total Bilirubin 0 89 0 20 - 1 00 mg/dL    eGFR 58 ml/min/1 73sq m   Lactic acid, plasma    Collection Time: 01/04/19  1:06 PM   Result Value Ref Range    LACTIC ACID 1 8 0 5 - 2 0 mmol/L   Procalcitonin    Collection Time: 01/04/19  1:06 PM   Result Value Ref Range    Procalcitonin <0 05 <=0 25 ng/ml   Basic metabolic panel    Collection Time: 01/05/19  5:48 AM   Result Value Ref Range    Sodium 139 136 - 145 mmol/L    Potassium 4 0 3 5 - 5 3 mmol/L    Chloride 104 100 - 108 mmol/L    CO2 29 21 - 32 mmol/L    ANION GAP 6 4 - 13 mmol/L BUN 21 5 - 25 mg/dL    Creatinine 1 08 0 60 - 1 30 mg/dL    Glucose 79 65 - 140 mg/dL    Calcium 9 0 8 3 - 10 1 mg/dL    eGFR 65 ml/min/1 73sq m   CBC and differential    Collection Time: 01/05/19  5:48 AM   Result Value Ref Range    WBC 4 28 (L) 4 31 - 10 16 Thousand/uL    RBC 3 95 3 88 - 5 62 Million/uL    Hemoglobin 10 1 (L) 12 0 - 17 0 g/dL    Hematocrit 33 3 (L) 36 5 - 49 3 %    MCV 84 82 - 98 fL    MCH 25 6 (L) 26 8 - 34 3 pg    MCHC 30 3 (L) 31 4 - 37 4 g/dL    RDW 15 8 (H) 11 6 - 15 1 %    MPV 10 5 8 9 - 12 7 fL    Platelets 536 491 - 337 Thousands/uL    nRBC 0 /100 WBCs    Neutrophils Relative 56 43 - 75 %    Immat GRANS % 0 0 - 2 %    Lymphocytes Relative 27 14 - 44 %    Monocytes Relative 12 4 - 12 %    Eosinophils Relative 4 0 - 6 %    Basophils Relative 1 0 - 1 %    Neutrophils Absolute 2 39 1 85 - 7 62 Thousands/µL    Immature Grans Absolute 0 01 0 00 - 0 20 Thousand/uL    Lymphocytes Absolute 1 15 0 60 - 4 47 Thousands/µL    Monocytes Absolute 0 51 0 17 - 1 22 Thousand/µL    Eosinophils Absolute 0 19 0 00 - 0 61 Thousand/µL    Basophils Absolute 0 03 0 00 - 0 10 Thousands/µL   ECG 12 lead    Collection Time: 01/05/19  1:07 PM   Result Value Ref Range    Ventricular Rate 64 BPM    Atrial Rate 64 BPM    HI Interval 142 ms    QRSD Interval 118 ms    QT Interval 426 ms    QTC Interval 439 ms    P Axis 94 degrees    QRS Axis 93 degrees    T Wave Axis 51 degrees   ECG 12 lead    Collection Time: 01/05/19  1:13 PM   Result Value Ref Range    Ventricular Rate 66 BPM    Atrial Rate 66 BPM    HI Interval 132 ms    QRSD Interval 118 ms    QT Interval 422 ms    QTC Interval 442 ms    P Axis 91 degrees    QRS Axis 91 degrees    T Wave Axis 55 degrees   ECG 12 lead    Collection Time: 01/05/19  1:16 PM   Result Value Ref Range    Ventricular Rate 64 BPM    Atrial Rate 64 BPM    HI Interval 138 ms    QRSD Interval 122 ms    QT Interval 438 ms    QTC Interval 451 ms    P Axis 91 degrees    QRS Axis 90 degrees    T Wave Axis 50 degrees   Protime-INR    Collection Time: 01/06/19  5:31 AM   Result Value Ref Range    Protime 15 7 (H) 11 8 - 14 2 seconds    INR 1 24 (H) 0 86 - 1 17   APTT    Collection Time: 01/06/19  5:31 AM   Result Value Ref Range    PTT 43 (H) 26 - 38 seconds   Non-gynecologic cytology    Collection Time: 01/07/19 12:13 PM   Result Value Ref Range    Case Report       Non-gynecologic Cytology                          Case: [id number]                                  Authorizing Provider:  Bhanu Stover MD          Collected:           01/07/2019 1213              Ordering Location:     [address] [place] [address]      Received:            01/07/2019 [address] [place] [address] Endoscopy                                                           Pathologist:           Lisa Lee MD                                                                 Specimens:   A) - Lung, Left Lingula, endobronchial lesion                                                       B) - Lung, Left Lingula Brushing, endobronchial lesion                                              C) - Lung, Left Washing                                                                             D) - Lung, Left Lingula, endobronchial lesion, cell block                                           E) - Lung, Left Washing, cell block                                                        Final Diagnosis       A,D  Lung, Left Lingula, endobronchial lesion (ThinPrep and cell block preparations):  Negative for malignancy  Few benign bronchial cells, benign squamous cells and macrophages  Satisfactory for evaluation  B  Lung, Left Lingula Brushing, endobronchial lesion:  Negative for malignancy  Benign bronchial cells  Satisfactory for evaluation  C,E  Lung, Left Washing (ThinPrep and cell block preparations): Atypical cellular changes seen    Atypical epithelial cells in a background of benign bronchial cells, benign squamous cells and macrophages  Satisfactory for evaluation  Gross Description       A  Lung, Left Lingula, endobronchial lesion: 20ml, slightly bloody, clear, received in CytoLyt     B  Lung, Left Lingula Brushing, endobronchial lesion: 20ml, colorless, clear, received in CytoLyt     C  Lung, Left Washing, : 40ml, slightly bloody, cloudy, received in CytoLyt     D  Lung, Left Lingula, endobronchial lesion, cell block: Minimal cell button, white  E  Lung, Left Washing, cell block: Minimal cell button, red  Additional Information       [employer]'s FDA approved ,  and ThinPrep Imaging Duo System are utilized with strict adherence to the 's instruction manual to prepare gynecologic and non-gynecologic cytology specimens for the production of ThinPrep slides as well as for gynecologic ThinPrep imaging  These processes have been validated by our laboratory and/or by the   These tests were developed and their performance characteristics determined by [facility] or [address] [place] [address]  They may not be cleared or approved by the [place]  Food and Drug Administration  The FDA has determined that such clearance or approval is not necessary  These tests are used for clinical purposes  They should not be regarded as investigational or for research  This laboratory has been approved by [place], designated as a high-complexity laboratory and is qualified to perform these tests         Tissue Exam    Collection Time: 01/07/19 12:16 PM   Result Value Ref Range    Case Report       Surgical Pathology Report                         Case: [id number]                                   Authorizing Provider:  Anirudh Burrell MD          Collected:           01/07/2019 1216              Ordering Location:     [address] [place] [address]      Received:            01/07/2019 [address] [place] [address] Pathologist:           Kalani Russell MD                                                         Specimen:    Lung, Left Lingula, endobronchial lesion bx                                                Final Diagnosis       A  Endobronchial lesion biopsy:  - Scant portions of benign bronchial mucosa & submucosa with foci of thickened basement membrane, a nonspecific finding often seen in chronic bronchitis  - No granulomata and no evidence of malignancy  Additional Information       All controls performed with the immunohistochemical stains reported above reacted appropriately  These tests were developed and their performance characteristics determined by [facility] or [facility]  They may not be cleared or approved by the [place]  Food and Drug Administration  The FDA has determined that such clearance or approval is not necessary  These tests are used for clinical purposes  They should not be regarded as investigational or for research  This laboratory has been approved by [place] [id number], designated as a high-complexity laboratory and is qualified to perform these tests  Gross Description       A  The specimen is received in formalin, labeled with the patient's name and hospital number, and is designated "endobronchial lesion biopsy  The specimen consists of 4 tan-red, focally friable soft tissue fragments ranging from 0 1-0 2 cm in greatest dimension  The entire specimen may not survive processing due to the size and delicate nature of the tissue  The specimen is entirely submitted in a screened cassette  Note: The estimated total formalin fixation time based upon information provided by the submitting clinician and the standard processing schedule is 14 hours         Mejia     Fungal culture    Collection Time: 01/07/19 12:27 PM   Result Value Ref Range    Fungus (Mycology) Culture No growth to date    Bronchial culture and Gram stain    Collection Time: 01/07/19 12:27 PM   Result Value Ref Range    Bronchial Culture 1+ Growth of      Gram Stain Result Rare Polys     Gram Stain Result No bacteria seen    AFB Culture with Stain    Collection Time: 01/07/19 12:27 PM   Result Value Ref Range    AFB Culture No growth to date     AFB Stain No acid fast bacilli seen    Fingerstick Glucose (POCT)    Collection Time: 01/07/19  2:15 PM   Result Value Ref Range    POC Glucose 92 65 - 140 mg/dl            Physical Exam   Constitutional: He is oriented to person, place, and time  He appears well-developed and well-nourished  No distress  Hard of hearing    HENT:   Head: Normocephalic and atraumatic  Right Ear: External ear normal    Left Ear: External ear normal    Nose: Nose normal    Mouth/Throat: Oropharynx is clear and moist  No oropharyngeal exudate  Eyes: Pupils are equal, round, and reactive to light  Conjunctivae and EOM are normal  Right eye exhibits no discharge  Left eye exhibits no discharge  Neck: Normal range of motion  Neck supple  No thyromegaly present  Cardiovascular: Normal rate, regular rhythm, normal heart sounds and intact distal pulses  Exam reveals no gallop and no friction rub  No murmur heard  Pulmonary/Chest: Effort normal and breath sounds normal  No stridor  No respiratory distress  He has no wheezes  He has no rales  Abdominal: Soft  Bowel sounds are normal  He exhibits no distension  There is no tenderness  Lymphadenopathy:     He has no cervical adenopathy  Neurological: He is alert and oriented to person, place, and time  Skin: Skin is warm and dry  No rash noted  He is not diaphoretic  No erythema  Psychiatric: He has a normal mood and affect   His behavior is normal  Judgment and thought content normal

## 2019-01-21 NOTE — ASSESSMENT & PLAN NOTE
Patient's creatinine currently at baseline, plan will be to recheck BMP prior to patient's follow-up with Dr Kim Stephenson

## 2019-01-21 NOTE — ASSESSMENT & PLAN NOTE
Patient is status post cardioversion, patient will continue Cardizem 240 mg daily, and continue with Xarelto  Patient has follow-up with Cardiology as well as Vascular surgery

## 2019-01-21 NOTE — ASSESSMENT & PLAN NOTE
The patient is maintained on 2 L of oxygen nasal cannula at baseline  Patient is to continue to use albuterol as needed and to continue to use nasal cannula oxygen to maintain saturations greater than 88%

## 2019-01-25 ENCOUNTER — OFFICE VISIT (OUTPATIENT)
Dept: CARDIOLOGY CLINIC | Facility: CLINIC | Age: 79
End: 2019-01-25
Payer: MEDICARE

## 2019-01-25 VITALS
HEART RATE: 88 BPM | WEIGHT: 174.6 LBS | BODY MASS INDEX: 26.46 KG/M2 | RESPIRATION RATE: 18 BRPM | SYSTOLIC BLOOD PRESSURE: 138 MMHG | DIASTOLIC BLOOD PRESSURE: 66 MMHG | HEIGHT: 68 IN | OXYGEN SATURATION: 98 %

## 2019-01-25 DIAGNOSIS — I49.9 IRREGULAR HEART BEAT: ICD-10-CM

## 2019-01-25 DIAGNOSIS — Z09 FOLLOW UP: Primary | ICD-10-CM

## 2019-01-25 DIAGNOSIS — E78.5 HYPERLIPIDEMIA, UNSPECIFIED HYPERLIPIDEMIA TYPE: ICD-10-CM

## 2019-01-25 DIAGNOSIS — I48.3 TYPICAL ATRIAL FLUTTER (HCC): ICD-10-CM

## 2019-01-25 PROCEDURE — 93000 ELECTROCARDIOGRAM COMPLETE: CPT | Performed by: INTERNAL MEDICINE

## 2019-01-25 PROCEDURE — 99214 OFFICE O/P EST MOD 30 MIN: CPT | Performed by: INTERNAL MEDICINE

## 2019-01-25 RX ORDER — FUROSEMIDE 40 MG/1
40 TABLET ORAL DAILY
Qty: 60 TABLET | Refills: 3 | Status: SHIPPED | OUTPATIENT
Start: 2019-01-25 | End: 2019-07-29 | Stop reason: SDUPTHER

## 2019-01-25 RX ORDER — ASPIRIN 81 MG/1
81 TABLET, CHEWABLE ORAL DAILY
Qty: 60 TABLET | Refills: 3 | Status: SHIPPED | OUTPATIENT
Start: 2019-01-25 | End: 2019-07-29 | Stop reason: SDUPTHER

## 2019-01-25 RX ORDER — DILTIAZEM HYDROCHLORIDE 240 MG/1
240 CAPSULE, COATED, EXTENDED RELEASE ORAL DAILY
Qty: 90 CAPSULE | Refills: 3 | Status: SHIPPED | OUTPATIENT
Start: 2019-01-25 | End: 2019-07-29 | Stop reason: SDUPTHER

## 2019-01-25 RX ORDER — ATORVASTATIN CALCIUM 40 MG/1
40 TABLET, FILM COATED ORAL DAILY
Qty: 90 TABLET | Refills: 3 | Status: SHIPPED | OUTPATIENT
Start: 2019-01-25 | End: 2019-07-29 | Stop reason: SDUPTHER

## 2019-01-25 NOTE — PROGRESS NOTES
Cardiology Outpatient Follow up Note    Gena Emery 66 y o  male MRN: [id number]    01/25/19          Assessment/Plan:  1  Atrial flutter s/p DCCV- now in NSR  · Continue diltiazem  and Xarelto for cva prevention   · [id number]: 6  · Will consider definitive management with flutter ablation if recurrence     2  CAD with prior CABG ×4 2/2001  · LIMA to LAD, SVG to the RCA, sequential SVG to the diagonal and OM1  · Lexiscan 2016: No ischemia; normal LVEF  · Cont aspirin and atorvastatin  BB discontinued due to emphysema     3  Hx of Left CEA following an episode of amaurosis fugax  · Cont aspirin and atorvastatin  No acute concerns      4  Chronic diastolic heart failure  · Currently euvolemic  Cont lasix 40mg PO daily  5  Severe COPD/Bronchiectatic lung with chronic hypoxic respiraotry failure- on 2L O2  · On continuous O2-  2L  · Recent admission for bronchoscopy- tissue pathology negative for malignancy and granuloma  · Following with [facility]     5  Dyslipidemia- cont atorvastatin    6  Hx of prior CVA    Follow up: 3    1  Follow up  POCT ECG       HPI: Gena Emery is a 66y o  year old male with a history of CAD s/p CABG and PCI as above who presents for a routine follow up       Past medical hx:   · Typical Atrial flutter diagnosed during a hospitalization for pneumonia from 10/7 to 10/10/18 and was started on diltiazem and Xarelto  He underwent elective RENEE/DCCV on 11/2  · RENEE: normal LV systolic function with no evidence of left atrial or left atrial appendage thrombus  He underwent a successful DCCV with conversion to normal sinus rhythm      Bin Loyola was recently admitted at [facility] after an outpatient CT scan revealed 2 large air-fluid filled cavities in the left upper lobe consistent with infected bullae  He underwent bronchoscopy and lavage  Tissue pathology was negative for granuloma or malignancy  He did well following his procedure and was discharged    He is otherwise doing well from a cardiac perspective today and denies chest pain, dyspnea, and palpitations  On ECG today he is in normal sinus rhythm with right bundle branch block  He denies any other active cardiac concerns at this time      Social hx: occasional tobacco use in the 1950s       Patient Active Problem List   Diagnosis    CAD (coronary atherosclerotic disease)    Hypertension    Hyperlipemia    Atypical chest pain    Hypothyroid    GERD (gastroesophageal reflux disease)    History of stroke    Sciatica of right side    Hyperlipidemia    [facility]rilobular emphysema ([facility]    Arthritis    Stenosis of left carotid artery    S/P CABG x 4    Pre-operative cardiovascular examination    Acute left-sided low back pain with left-sided sciatica    Back pain    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    Vision changes    Urinary retention due to benign prostatic hyperplasia    Bladder cancer ([facility] [address] )    CKD (chronic kidney disease), stage II    CAP (community acquired pneumonia)    [facility]    Pneumonia due to infectious organism    Irregular heart beat    Localized edema    Acute on chronic respiratory failure with hypoxia [facility]    Community acquired bacterial pneumonia    Chronic respiratory failure with hypoxia (HCC)    COPD, severe (HCC)    Bronchiectasis with acute lower respiratory infection (HCC)    Abnormal CT of the chest       Allergies   Allergen Reactions    Penicillins Swelling and Itching         Current Outpatient Prescriptions:     albuterol (PROAIR HFA) 90 mcg/act inhaler, Inhale 2 puffs every 6 (six) hours as needed for wheezing or shortness of breath, Disp: 8 5 g, Rfl: 0    aspirin 81 mg chewable tablet, Chew 81 mg daily  , Disp: , Rfl:     atorvastatin (LIPITOR) 40 mg tablet, Take 1 tablet (40 mg total) by mouth daily, Disp: 90 tablet, Rfl: 3    diltiazem (CARDIZEM CD) 240 mg 24 hr capsule, Take 1 capsule (240 mg total) by mouth daily, Disp: 90 capsule, Rfl: 3    esomeprazole (NexIUM) 20 mg capsule, Take 20 mg by mouth every other day  , Disp: , Rfl:     furosemide (LASIX) 40 mg tablet, Take 1 tablet (40 mg total) by mouth daily, Disp: 6 tablet, Rfl: 6    rivaroxaban (XARELTO) 20 mg tablet, Take 1 tablet (20 mg total) by mouth daily with dinner, Disp: 30 tablet, Rfl: 6    Past Medical History:   Diagnosis Date    [facility]     Arthritis     Benign prostatic hyperplasia without lower urinary tract symptoms     without Urinary Obstruction    CAD (coronary artery disease)     Chronic obstructive lung disease (HCC)     Coronary arteriosclerosis     Depression     Emphysema lung (HCC)     GERD (gastroesophageal reflux disease)     Hyperlipidemia     Hypertension     Myocardial infarction (HCC)     Psoriasis     Requires supplemental oxygen     at bedtime during high humid days only    Stroke [facility])     TIA 1/2018       Family History   Problem Relation Age of Onset    Lung cancer Mother     Cancer Mother     Other Father         sepsis       Past Surgical History:   Procedure Laterality Date    COLONOSCOPY      CORONARY ANGIOPLASTY  02/03/2001    PTCA of RCA    CORONARY ARTERY BYPASS GRAFT  02/07/2001    x4- [facility]    HERNIA REPAIR      IA BRONCHOSCOPY,DIAGNOSTIC Left 1/7/2019    Procedure: BRONCHOSCOPY FLEXIBLE;  Surgeon: Manan Jose MD;  [facility]; Service: Pulmonary    IA THROMBOENDARTECTMY NECK,NECK INCIS Left 2/20/2018    Procedure: ENDARTERECTOMY ARTERY CAROTID WITH PATCH ANGIOPLASTY;  Surgeon: Tc Marquez MD;  [facility];  Service: Vascular    TRANSURETHRAL RESECTION OF PROSTATE         Social History     Social History    Marital status:       Spouse name: N/A    Number of children: 3    Years of education: N/A     Occupational History    retired       Social History Main Topics    Smoking status: Former Smoker     Packs/day: 1 00     Years: 3 00     Types: Cigarettes     Quit date: 26    Smokeless tobacco: Never Used      Comment: Has a past history of cigarette smoking;Quit date 1956; 5 packs year history, per [employer]      Alcohol use Yes      Comment: occasional wine   Drug use: No    Sexual activity: Yes     Other Topics Concern    Not on file     Social History Narrative    Lives with granddaughter and daughter     Caffeine use, He admits to consuming caffeine VIA coffee ( 8 servings per day), per [employer]    Martial History: Currently , per [employer]    Occupation: Retired (prior occupational:  repairman), per [employer]        Review of Systems   Constitution: Negative for diaphoresis, weight gain and weight loss  HENT: Negative for congestion  Cardiovascular: Negative for chest pain, dyspnea on exertion, irregular heartbeat, leg swelling, near-syncope, orthopnea, palpitations, paroxysmal nocturnal dyspnea and syncope  Respiratory: Negative for shortness of breath  Hematologic/Lymphatic: Does not bruise/bleed easily  Skin: Negative for rash  Musculoskeletal: Negative for myalgias  Gastrointestinal: Negative for nausea and vomiting  Neurological: Negative for excessive daytime sleepiness and light-headedness  Psychiatric/Behavioral: The patient is not nervous/anxious  Vitals: /66   Pulse 88   Resp 18   Ht 5' 8" (1 727 m)   Wt 79 2 kg (174 lb 9 6 oz)   SpO2 98%   BMI 26 55 kg/m²       Physical Exam:     GEN: Alert and oriented x 3, in no acute distress  Well appearing and well nourished  HEENT: Sclera anicteric, conjunctivae pink, mucous membranes moist  Oropharynx clear  NECK: Supple, no carotid bruits, no significant JVD  Trachea midline, no thyromegaly  HEART: Regular rhythm, normal S1 and S2, no murmurs, clicks, gallops or rubs  PMI nondisplaced, no thrills  LUNGS: Clear to auscultation bilaterally; no wheezes, rales, or rhonchi   No increased work of breathing or signs of respiratory distress  ABDOMEN: Soft, nontender, nondistended, normoactive bowel sounds  EXTREMITIES: Skin warm and well perfused, no clubbing, cyanosis, or edema  NEURO: No focal findings  Normal speech  Mood and affect normal    SKIN: Normal without suspicious lesions on exposed skin  Lab Results:       Lab Results   Component Value Date    HGBA1C 5 0 01/13/2018    HGBA1C 4 8 08/15/2016     Lab Results   Component Value Date    CHOL See scanned summary  08/15/2016    CHOL See scanned summary  03/17/2016     Lab Results   Component Value Date    HDL 44 01/14/2018    HDL See scanned summary  08/15/2016    HDL 46 08/14/2016     Lab Results   Component Value Date    LDLCALC 105 (H) 01/14/2018    LDLCALC 72 08/14/2016     Lab Results   Component Value Date    TRIG 81 01/14/2018    TRIG See scanned summary   08/15/2016    TRIG 110 08/14/2016     No results found for: CHOLHDL

## 2019-01-28 ENCOUNTER — TELEPHONE (OUTPATIENT)
Dept: ADMINISTRATIVE | Facility: HOSPITAL | Age: 79
End: 2019-01-28

## 2019-01-28 NOTE — TELEPHONE ENCOUNTER
L/m to cx pts   appt for 1/29 in [facility] due to weather, offered 9-1 on Wednesday if available for make up day

## 2019-02-11 LAB — FUNGUS SPEC CULT: NORMAL

## 2019-02-26 LAB
MYCOBACTERIUM SPEC CULT: NORMAL
RHODAMINE-AURAMINE STN SPEC: NORMAL

## 2019-02-27 ENCOUNTER — OFFICE VISIT (OUTPATIENT)
Dept: PULMONOLOGY | Facility: CLINIC | Age: 79
End: 2019-02-27
Payer: MEDICARE

## 2019-02-27 VITALS
HEART RATE: 76 BPM | WEIGHT: 173.2 LBS | TEMPERATURE: 98.1 F | SYSTOLIC BLOOD PRESSURE: 128 MMHG | DIASTOLIC BLOOD PRESSURE: 64 MMHG | HEIGHT: 68 IN | BODY MASS INDEX: 26.25 KG/M2 | OXYGEN SATURATION: 97 %

## 2019-02-27 DIAGNOSIS — R93.89 ABNORMAL CT OF THE CHEST: ICD-10-CM

## 2019-02-27 DIAGNOSIS — J96.11 CHRONIC RESPIRATORY FAILURE WITH HYPOXIA (HCC): Primary | ICD-10-CM

## 2019-02-27 DIAGNOSIS — J44.9 COPD, SEVERE (HCC): ICD-10-CM

## 2019-02-27 PROCEDURE — 99214 OFFICE O/P EST MOD 30 MIN: CPT | Performed by: PHYSICIAN ASSISTANT

## 2019-02-27 NOTE — ASSESSMENT & PLAN NOTE
· CT scan done January 3, 2019 was reviewed today  · Bronchoscopy results were reviewed with the patient and his granddaughter  AFB has not been isolated after 6 weeks  Culture showed 1+ growth of mixed respiratory narda and Gram stain was negative for bacteria  Viral and fungal cultures were normal   Tissue exam showed nonspecific findings often seen in chronic bronchitis  No malignancy was noted  Cytology was negative for malignancy, however, washing of the left upper lobe did show some atypical cellular changes  · It has been approximately 2 months since his last imaging and I will repeat CT of the chest in the next few weeks to compare  He has known bronchiectasis

## 2019-02-27 NOTE — ASSESSMENT & PLAN NOTE
· He will continue supplemental oxygen at 2 L with all activities  Saturation should be kept greater than equal to 88%  He will continue to spot check his pulse ox at home

## 2019-02-27 NOTE — PATIENT INSTRUCTIONS
1  Obtain follow-up CT of the chest  2  Rescue inhaler as needed  3  If postnasal drip continues to be an issue can start Flonase 2 sprays each nostril daily  4  Continue supplemental oxygen at 2 L with all activities and keep saturations greater than or equal to 88%

## 2019-02-27 NOTE — PROGRESS NOTES
Pulmonary Follow Up Note   Vanesa Mason 66 y o  male MRN: [id number]  2/27/2019      Assessment:    Abnormal CT of the chest  · CT scan done January 3, 2019 was reviewed today  · Bronchoscopy results were reviewed with the patient and his granddaughter  AFB has not been isolated after 6 weeks  Culture showed 1+ growth of mixed respiratory narda and Gram stain was negative for bacteria  Viral and fungal cultures were normal   Tissue exam showed nonspecific findings often seen in chronic bronchitis  No malignancy was noted  Cytology was negative for malignancy, however, washing of the left upper lobe did show some atypical cellular changes  · It has been approximately 2 months since his last imaging and I will repeat CT of the chest in the next few weeks to compare  He has known bronchiectasis  COPD, severe ([address] )  · Appears at baseline  · He has been refusing all inhaler therapy  He does not even use a rescue inhaler at this point  He is not interested in starting any new medications at this time  · He will continue using incentive spirometer at home    Chronic respiratory failure with hypoxia ([address] )  · He will continue supplemental oxygen at 2 L with all activities  Saturation should be kept greater than equal to 88%  He will continue to spot check his pulse ox at home  Plan:    Diagnoses and all orders for this visit:    Chronic respiratory failure with hypoxia (HCC)    COPD, severe (HCC)    Abnormal CT of the chest  -     CT chest without contrast; Future      -Pt wishes to follow-up soon after CT is complete  He will call us if he develops any problems in the interim  All his questions were answered  -Granddaughter was updated today appointment  History of Present Illness   HPI:  Vanesa Mason is a 66 y o  male who presents to the office this morning with his granddaughter for bronchoscopy results  Mr Tom Sands was last seen by myself in December 2018   At that time, he was 1 month post acute lower respiratory infection  He has known bronchiectasis and severe COPD with an FEV1 of 23% of predicted  He was hospitalized in November and treated with a 14 day course of Levaquin with improvement in his symptoms  Bronchoscopy was discussed with him at that time, however, he was not interested in pursuing that or any other invasive procedures  There was concern as it was felt he was at risk for opportunistic infections including Pseudomonas, MRSA and Aspergillus  During his appointment with me, he was doing well from a pulmonary standpoint and was at his baseline  He was scheduled for a follow-up CT in early January which he had  Per the patient, he was called not soon after his scan by his PCP and told to get to the hospital immediately  He was admitted on January 4th to [facility] and seen by us in pulmonary consultation  His repeat CT scan showed improvement of previous pneumonia, however, had 2 large air-fluid filled cavities in the left upper lobe thought to be infected bulla which was increased from previous imaging  He underwent bronchoscopy on January 7th without any complications and was discharged home on January 8th  Overall, he has been doing well from a pulmonary standpoint and feels that he is at his baseline  He denies cough, sputum production, hemoptysis or bronchospasm  He denies resting shortness of breath, chest pain, pleurisy or lower extremity edema  He has been afebrile  He does have dyspnea on exertion and does wear oxygen with ambulation  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 significant issues  He does complain of some sinus issues and runny nose, however, he refuses to take any medications for this  He has refused all inhalers in the past and does not even use a rescue inhaler    He does intermittently check his pulse ox at home in saturations typically remain above 88% unless he is ambulating without of supplemental oxygen and then he does occasionally dropped to 86%  He has minimal remote tobacco history smoking pack a day for approximately 3 years  He quit in 1956  He does have a family history of lung cancer in his mother  Review of Systems   Constitutional: Negative  HENT: Positive for postnasal drip and rhinorrhea  Negative for congestion, sinus pressure, sinus pain, sneezing, sore throat, trouble swallowing and voice change  Eyes: Negative  Wearing glasses   Respiratory: Positive for shortness of breath  Negative for apnea, cough, choking, chest tightness, wheezing and stridor  Chronic dyspnea on exertion   Cardiovascular: Negative  Gastrointestinal: Negative  Endocrine: Negative  Genitourinary: Negative  Musculoskeletal: Negative  Skin: Negative  Allergic/Immunologic: Negative  Neurological: Negative  Psychiatric/Behavioral: Negative  Historical Information   Past Medical History:   Diagnosis Date    A-fib [facility])     Arthritis     Benign prostatic hyperplasia without lower urinary tract symptoms     without Urinary Obstruction    CAD (coronary artery disease)     Chronic obstructive lung disease (HCC)     Coronary arteriosclerosis     Depression     Emphysema lung (HCC)     GERD (gastroesophageal reflux disease)     Hyperlipidemia     Hypertension     Myocardial infarction (HCC)     Psoriasis     Requires supplemental oxygen     at bedtime during high humid days only    Stroke [facility])     TIA 1/2018     Past Surgical History:   Procedure Laterality Date    COLONOSCOPY      CORONARY ANGIOPLASTY  02/03/2001    PTCA of RCA    CORONARY ARTERY BYPASS GRAFT  02/07/2001    x4- [facility]    HERNIA REPAIR      LA BRONCHOSCOPY,DIAGNOSTIC Left 1/7/2019    Procedure: BRONCHOSCOPY FLEXIBLE;  Surgeon: Paulino Rene MD;  [facility];   Service: Pulmonary    LA THROMBOENDARTECTMY Danna Carnes INCIS Left 2/20/2018 Procedure: ENDARTERECTOMY ARTERY CAROTID WITH PATCH ANGIOPLASTY;  Surgeon: Angelica Zurita MD;  [facility];  Service: Vascular    TRANSURETHRAL RESECTION OF PROSTATE       Family History   Problem Relation Age of Onset    Lung cancer Mother     Cancer Mother     Other Father         sepsis       Social History     Tobacco Use   Smoking Status Former Smoker    Packs/day: 1 00    Years: 3 00    Pack years: 3 00    Types: Cigarettes    Last attempt to quit: Amy Nava Years since quittin 2   Smokeless Tobacco Never Used   Tobacco Comment    Has a past history of cigarette smoking;Quit date ; 5 packs year history, per [employer]           Meds/Allergies     Current Outpatient Medications:     albuterol (PROAIR HFA) 90 mcg/act inhaler, Inhale 2 puffs every 6 (six) hours as needed for wheezing or shortness of breath, Disp: 8 5 g, Rfl: 0    aspirin 81 mg chewable tablet, Chew 1 tablet (81 mg total) daily, Disp: 60 tablet, Rfl: 3    atorvastatin (LIPITOR) 40 mg tablet, Take 1 tablet (40 mg total) by mouth daily, Disp: 90 tablet, Rfl: 3    diltiazem (CARDIZEM CD) 240 mg 24 hr capsule, Take 1 capsule (240 mg total) by mouth daily, Disp: 90 capsule, Rfl: 3    esomeprazole (NexIUM) 20 mg capsule, Take 20 mg by mouth every other day  , Disp: , Rfl:     furosemide (LASIX) 40 mg tablet, Take 1 tablet (40 mg total) by mouth daily, Disp: 60 tablet, Rfl: 3    rivaroxaban (XARELTO) 20 mg tablet, Take 1 tablet (20 mg total) by mouth daily with dinner, Disp: 30 tablet, Rfl: 3  Allergies   Allergen Reactions    Penicillins Swelling and Itching       Vitals: Blood pressure 128/64, pulse 76, temperature 98 1 °F (36 7 °C), temperature source Tympanic, height 5' 8" (1 727 m), weight 78 6 kg (173 lb 3 2 oz), SpO2 97 %  Body mass index is 26 33 kg/m²  Oxygen Therapy  SpO2: 97 %  Oxygen Therapy: None (Room air)    Physical Exam  Physical Exam   Constitutional: He is oriented to person, place, and time   He appears well-developed and well-nourished  No distress  HENT:   Head: Normocephalic and atraumatic  Eyes: Pupils are equal, round, and reactive to light  Conjunctivae and EOM are normal  No scleral icterus  Wearing glasses   Neck: Normal range of motion  Neck supple  No JVD present  No tracheal deviation present  Cardiovascular: Normal rate, regular rhythm and normal heart sounds  Exam reveals no gallop and no friction rub  No murmur heard  Pulmonary/Chest: Effort normal  No stridor  No respiratory distress  He has no wheezes  He has no rales  He exhibits no tenderness  Decreased breath sounds throughout bilaterally   Abdominal: Soft  Bowel sounds are normal    Musculoskeletal: Normal range of motion  He exhibits no edema  Neurological: He is alert and oriented to person, place, and time  Skin: Skin is warm and dry  No rash noted  He is not diaphoretic  No pallor  Psychiatric: He has a normal mood and affect  His behavior is normal  Judgment and thought content normal    Vitals reviewed  Labs: I have personally reviewed pertinent lab results  , ABG: No results found for: PHART, XAH1VWN, PO2ART, QAP6VFS, C2JGTEKM, BEART, SOURCE, BNP: No results found for: BNP, CBC: No results found for: WBC, HGB, HCT, MCV, PLT, ADJUSTEDWBC, MCH, MCHC, RDW, MPV, NRBC, CMP: No results found for: SODIUM, K, CL, CO2, ANIONGAP, BUN, CREATININE, GLUCOSE, CALCIUM, AST, ALT, ALKPHOS, PROT, BILITOT, EGFR, PT/INR: No results found for: PT, INR, Troponin: No results found for: TROPONINI  Lab Results   Component Value Date    WBC 4 28 (L) 01/05/2019    HGB 10 1 (L) 01/05/2019    HCT 33 3 (L) 01/05/2019    MCV 84 01/05/2019     01/05/2019     Lab Results   Component Value Date    CALCIUM 9 0 01/05/2019    K 4 0 01/05/2019    CO2 29 01/05/2019     01/05/2019    BUN 21 01/05/2019    CREATININE 1 08 01/05/2019     No results found for: IGE  Lab Results   Component Value Date    ALT 23 01/04/2019    AST 17 01/04/2019    ALKPHOS 87 01/04/2019       Bronchoscopy results  AFB negative x 6 weeks  Culture with 1+ growth of mixed respiratory narda  Gram stain showing no bacteria  Viral and fungal cultures normal  Tissue exam shows nonspecific findings often seen in chronic bronchitis, no malignancy  Cytology negative for malignancy, however, washing of SHARONA with some atypical cellular changes    Imaging and other studies: I have personally reviewed pertinent films in PACS     CTChest 1/3/19  When compared to previous scan done in early November left upper lobe consolidation has improved and left lower lobe consolidation has resolved  There are 2 large air-fluid filled cavities in the left upper lobe thought to be consistent with infected bulla  Small left pleural effusion  Pulmonary function testing:    In office spirometry done November 7, 2018 revealed FEV1 of 23% of predicted

## 2019-02-27 NOTE — ASSESSMENT & PLAN NOTE
· Appears at baseline  · He has been refusing all inhaler therapy  He does not even use a rescue inhaler at this point  He is not interested in starting any new medications at this time    · He will continue using incentive spirometer at home

## 2019-03-06 ENCOUNTER — HOSPITAL ENCOUNTER (OUTPATIENT)
Dept: CT IMAGING | Facility: HOSPITAL | Age: 79
Discharge: HOME | End: 2019-03-06
Payer: MEDICARE

## 2019-03-06 DIAGNOSIS — R93.89 ABNORMAL CT OF THE CHEST: ICD-10-CM

## 2019-03-06 PROCEDURE — 71250 CT THORAX DX C-: CPT

## 2019-04-03 ENCOUNTER — OFFICE VISIT (OUTPATIENT)
Dept: PULMONOLOGY | Facility: CLINIC | Age: 79
End: 2019-04-03
Payer: MEDICARE

## 2019-04-03 VITALS
HEIGHT: 68 IN | SYSTOLIC BLOOD PRESSURE: 118 MMHG | OXYGEN SATURATION: 97 % | RESPIRATION RATE: 18 BRPM | HEART RATE: 72 BPM | DIASTOLIC BLOOD PRESSURE: 62 MMHG | TEMPERATURE: 98.2 F | WEIGHT: 182 LBS | BODY MASS INDEX: 27.58 KG/M2

## 2019-04-03 DIAGNOSIS — J96.11 CHRONIC RESPIRATORY FAILURE WITH HYPOXIA (HCC): Primary | ICD-10-CM

## 2019-04-03 DIAGNOSIS — R93.89 ABNORMAL CT OF THE CHEST: ICD-10-CM

## 2019-04-03 DIAGNOSIS — J44.9 COPD, SEVERE (HCC): ICD-10-CM

## 2019-04-03 PROCEDURE — 99214 OFFICE O/P EST MOD 30 MIN: CPT | Performed by: PHYSICIAN ASSISTANT

## 2019-04-29 ENCOUNTER — OFFICE VISIT (OUTPATIENT)
Dept: INTERNAL MEDICINE CLINIC | Age: 79
End: 2019-04-29
Payer: MEDICARE

## 2019-04-29 VITALS
SYSTOLIC BLOOD PRESSURE: 122 MMHG | DIASTOLIC BLOOD PRESSURE: 64 MMHG | BODY MASS INDEX: 27.25 KG/M2 | WEIGHT: 179.2 LBS | TEMPERATURE: 98.4 F | OXYGEN SATURATION: 96 % | HEART RATE: 62 BPM

## 2019-04-29 DIAGNOSIS — I48.92 ATRIAL FLUTTER, UNSPECIFIED TYPE (HCC): ICD-10-CM

## 2019-04-29 DIAGNOSIS — I10 ESSENTIAL HYPERTENSION: ICD-10-CM

## 2019-04-29 DIAGNOSIS — K21.9 GASTROESOPHAGEAL REFLUX DISEASE WITHOUT ESOPHAGITIS: Primary | ICD-10-CM

## 2019-04-29 DIAGNOSIS — J44.9 COPD, SEVERE (HCC): ICD-10-CM

## 2019-04-29 DIAGNOSIS — J43.2 CENTRILOBULAR EMPHYSEMA (HCC): ICD-10-CM

## 2019-04-29 DIAGNOSIS — H91.93 BILATERAL HEARING LOSS, UNSPECIFIED HEARING LOSS TYPE: ICD-10-CM

## 2019-04-29 PROBLEM — J96.21 ACUTE ON CHRONIC RESPIRATORY FAILURE WITH HYPOXIA (HCC): Status: RESOLVED | Noted: 2018-11-07 | Resolved: 2019-04-29

## 2019-04-29 PROCEDURE — 99214 OFFICE O/P EST MOD 30 MIN: CPT | Performed by: INTERNAL MEDICINE

## 2019-05-31 PROBLEM — H93.13 TINNITUS AURIUM, BILATERAL: Status: ACTIVE | Noted: 2019-05-31

## 2019-05-31 PROBLEM — H90.3 SENSORINEURAL HEARING LOSS (SNHL) OF BOTH EARS: Status: ACTIVE | Noted: 2019-05-31

## 2019-06-05 ENCOUNTER — TELEPHONE (OUTPATIENT)
Dept: CARDIOLOGY CLINIC | Facility: CLINIC | Age: 79
End: 2019-06-05

## 2019-06-06 ENCOUNTER — HOSPITAL ENCOUNTER (OUTPATIENT)
Dept: CT IMAGING | Facility: HOSPITAL | Age: 79
Discharge: HOME | End: 2019-06-06
Payer: MEDICARE

## 2019-06-06 DIAGNOSIS — R93.89 ABNORMAL CT OF THE CHEST: ICD-10-CM

## 2019-06-06 PROCEDURE — 71250 CT THORAX DX C-: CPT

## 2019-06-24 ENCOUNTER — OFFICE VISIT (OUTPATIENT)
Dept: INTERNAL MEDICINE CLINIC | Age: 79
End: 2019-06-24
Payer: MEDICARE

## 2019-06-24 VITALS
SYSTOLIC BLOOD PRESSURE: 166 MMHG | DIASTOLIC BLOOD PRESSURE: 60 MMHG | WEIGHT: 177.6 LBS | OXYGEN SATURATION: 98 % | BODY MASS INDEX: 27 KG/M2 | HEART RATE: 78 BPM | TEMPERATURE: 97.8 F

## 2019-06-24 DIAGNOSIS — Z00.00 MEDICARE ANNUAL WELLNESS VISIT, SUBSEQUENT: Primary | ICD-10-CM

## 2019-06-24 PROCEDURE — G0439 PPPS, SUBSEQ VISIT: HCPCS | Performed by: NURSE PRACTITIONER

## 2019-07-29 ENCOUNTER — OFFICE VISIT (OUTPATIENT)
Dept: CARDIOLOGY CLINIC | Facility: CLINIC | Age: 79
End: 2019-07-29
Payer: MEDICARE

## 2019-07-29 VITALS
DIASTOLIC BLOOD PRESSURE: 68 MMHG | BODY MASS INDEX: 26.98 KG/M2 | RESPIRATION RATE: 18 BRPM | OXYGEN SATURATION: 99 % | HEIGHT: 68 IN | HEART RATE: 63 BPM | WEIGHT: 178 LBS | SYSTOLIC BLOOD PRESSURE: 118 MMHG

## 2019-07-29 DIAGNOSIS — I49.9 IRREGULAR HEART BEAT: ICD-10-CM

## 2019-07-29 DIAGNOSIS — I48.3 TYPICAL ATRIAL FLUTTER (HCC): ICD-10-CM

## 2019-07-29 DIAGNOSIS — D64.9 ANEMIA: Primary | ICD-10-CM

## 2019-07-29 DIAGNOSIS — E78.5 HYPERLIPIDEMIA, UNSPECIFIED HYPERLIPIDEMIA TYPE: ICD-10-CM

## 2019-07-29 DIAGNOSIS — I25.10 ATHEROSCLEROSIS OF NATIVE CORONARY ARTERY OF NATIVE HEART WITHOUT ANGINA PECTORIS: ICD-10-CM

## 2019-07-29 PROCEDURE — 99214 OFFICE O/P EST MOD 30 MIN: CPT | Performed by: INTERNAL MEDICINE

## 2019-07-29 RX ORDER — ASPIRIN 81 MG/1
81 TABLET, CHEWABLE ORAL DAILY
Qty: 60 TABLET | Refills: 6 | Status: SHIPPED | OUTPATIENT
Start: 2019-07-29

## 2019-07-29 RX ORDER — DILTIAZEM HYDROCHLORIDE 240 MG/1
240 CAPSULE, COATED, EXTENDED RELEASE ORAL DAILY
Qty: 90 CAPSULE | Refills: 6 | Status: SHIPPED | OUTPATIENT
Start: 2019-07-29 | End: 2020-01-27

## 2019-07-29 RX ORDER — FUROSEMIDE 40 MG/1
40 TABLET ORAL DAILY
Qty: 60 TABLET | Refills: 6 | Status: SHIPPED | OUTPATIENT
Start: 2019-07-29 | End: 2020-05-19 | Stop reason: SDUPTHER

## 2019-07-29 RX ORDER — ATORVASTATIN CALCIUM 40 MG/1
40 TABLET, FILM COATED ORAL DAILY
Qty: 90 TABLET | Refills: 6 | Status: SHIPPED | OUTPATIENT
Start: 2019-07-29 | End: 2020-05-19 | Stop reason: SDUPTHER

## 2019-07-29 NOTE — Clinical Note
Hi he has some microcytic anemia  His hemoglobin appears stable for the past year, but overall decreased  He denies melena or hematochezia  My concern is that he is on blood thinners  He may just need iron   I believe he has followed up with GI in the past  Thanks Maricarmen Wayne

## 2019-07-29 NOTE — PROGRESS NOTES
Cardiology Outpatient Follow up Note    Monika Capone 78 y o  male MRN: [id number]    07/29/19          Assessment/Plan:  1  Microcytic anemia- his hemoglobin has been stable for the past year, will repeat CBC; he was advised to follow up closely with his PCP on 7/31    2  Atrial flutter s/p DCCV- now in NSR  · Continue diltiazem    · [id number]: 6- Cont Xarelto  · Will consider definitive management with flutter ablation if recurrence     2  CAD with prior CABG ×4 2/2001  · LIMA to LAD, SVG to the RCA, sequential SVG to the diagonal and OM1  · Lexiscan 2016: No ischemia; normal LVEF  · Cont aspirin and atorvastatin  BB discontinued due to emphysema     3  Hx of Left CEA following an episode of amaurosis fugax  · Cont aspirin and atorvastatin  No acute concerns      4  Chronic diastolic heart failure  · Currently euvolemic  Cont lasix 40mg PO daily  5  Severe COPD/Bronchiectatic lung with chronic hypoxic respiraotry failure- on 2L O2  · On continuous O2-  2L  · Following with [facility]     5  Dyslipidemia- cont atorvastatin    6  Hx of prior CVA- Cont aspirin and atorvastatin      1  Anemia  CBC   2  Irregular heart beat  rivaroxaban (XARELTO) 20 mg tablet   3  Typical atrial flutter (HCC)  furosemide (LASIX) 40 mg tablet    diltiazem (CARDIZEM CD) 240 mg 24 hr capsule    aspirin 81 mg chewable tablet   4  Hyperlipidemia, unspecified hyperlipidemia type  atorvastatin (LIPITOR) 40 mg tablet   5  Atherosclerosis of native coronary artery of native heart without angina pectoris         HPI: Monika Capone is a 78y o  year old male with a history of CAD s/p CABG and PCI as above who presents for a routine follow up       Past medical hx:   · Typical Atrial flutter diagnosed during a hospitalization for pneumonia from 10/7 to 10/10/18 and was started on diltiazem and Xarelto  He underwent elective RENEE/DCCV on 11/2     · RENEE: normal LV systolic function with no evidence of left atrial or left atrial appendage thrombus  He underwent a successful DCCV with conversion to normal sinus rhythm  · He was [facility] at [facility] in 1/2019 after a CT scan revealed 2 large air-fluid filled cavities in the left upper lobe consistent with infected bullae  He underwent bronchoscopy and lavage  Tissue pathology was negative for granuloma or malignancy  He is doing well from a cardiac perspective today  He has been complaint with and tolerating his medications  Recent blood work in June revealed microcytic anemia although his hemoglobin has been stable at about 10 for the past year  He denies chest pain, dyspnea on exertion, palpitations, presyncope, melena, and hematochezia  He is able to accomplish his ADLs without limitation  He denies any other concerns at this time          Patient Active Problem List   Diagnosis    CAD (coronary atherosclerotic disease)    Hypertension    Hyperlipemia    Atypical chest pain    Hypothyroid    GERD (gastroesophageal reflux disease)    History of stroke    Sciatica of right side    Hyperlipidemia    Centrilobular emphysema (HCC)    Arthritis    Stenosis of left carotid artery    S/P CABG x 4    Pre-operative cardiovascular examination    Acute left-sided low back pain with left-sided sciatica    Back pain    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    Vision changes    Urinary retention due to benign prostatic hyperplasia    Bladder cancer ([address] )    CKD (chronic kidney disease), stage II    CAP (community acquired pneumonia)    Atrial flutter ([address] )    Pneumonia due to infectious organism    Irregular heart beat    Localized edema    Community acquired bacterial pneumonia    Chronic respiratory failure with hypoxia (HCC)    COPD, severe ([address] )    Bronchiectasis with acute lower respiratory infection ([address] )    Abnormal CT of the chest    Tinnitus aurium, bilateral    Sensorineural hearing loss (SNHL) of both ears    Medicare annual wellness visit, subsequent       Allergies   Allergen Reactions    Penicillins Swelling and Itching         Current Outpatient Medications:     albuterol (PROAIR HFA) 90 mcg/act inhaler, Inhale 2 puffs every 6 (six) hours as needed for wheezing or shortness of breath, Disp: 8 5 g, Rfl: 0    aspirin 81 mg chewable tablet, Chew 1 tablet (81 mg total) daily, Disp: 60 tablet, Rfl: 6    atorvastatin (LIPITOR) 40 mg tablet, Take 1 tablet (40 mg total) by mouth daily, Disp: 90 tablet, Rfl: 6    diltiazem (CARDIZEM CD) 240 mg 24 hr capsule, Take 1 capsule (240 mg total) by mouth daily, Disp: 90 capsule, Rfl: 6    esomeprazole (NexIUM) 20 mg capsule, Take 20 mg by mouth every other day  , Disp: , Rfl:     furosemide (LASIX) 40 mg tablet, Take 1 tablet (40 mg total) by mouth daily, Disp: 60 tablet, Rfl: 6    rivaroxaban (XARELTO) 20 mg tablet, Take 1 tablet (20 mg total) by mouth daily with dinner, Disp: 30 tablet, Rfl: 6    Past Medical History:   Diagnosis Date    A-fib ([facility] )     Arthritis     Benign prostatic hyperplasia without lower urinary tract symptoms     without Urinary Obstruction    CAD (coronary artery disease)     Chronic obstructive lung disease (HCC)     Coronary arteriosclerosis     Depression     Emphysema lung (HCC)     GERD (gastroesophageal reflux disease)     Hyperlipidemia     Hypertension     Myocardial infarction (HCC)     Psoriasis     Requires supplemental oxygen     at bedtime during high humid days only    Stroke [facility])     TIA 1/2018       Family History   Problem Relation Age of Onset    Lung cancer Mother     Cancer Mother     Other Father         sepsis       Past Surgical History:   Procedure Laterality Date    COLONOSCOPY      CORONARY ANGIOPLASTY  02/03/2001    PTCA of RCA    CORONARY ARTERY BYPASS GRAFT  02/07/2001    x4- [place]    HERNIA REPAIR      NC BRONCHOSCOPY,DIAGNOSTIC Left 1/7/2019    Procedure: BRONCHOSCOPY FLEXIBLE;  Surgeon: Be Hussein MD;  Location: [facility]; Service: Pulmonary    AK THROMBOENDARTECTMY NECK,NECK INCIS Left 2018    Procedure: ENDARTERECTOMY ARTERY CAROTID WITH PATCH ANGIOPLASTY;  Surgeon: Coty Armstrong MD;  [facility];  Service: Vascular    TRANSURETHRAL RESECTION OF PROSTATE         Social History     Socioeconomic History    Marital status:      Spouse name: Not on file    Number of children: 3    Years of education: Not on file    Highest education level: Not on file   Occupational History    Occupation: retired    Social Needs    Financial resource strain: Not on file    Food insecurity:     Worry: Not on file     Inability: Not on file   Chef Surfing needs:     Medical: Not on file     Non-medical: Not on file   Tobacco Use    Smoking status: Former Smoker     Packs/day: 1 00     Years: 3 00     Pack years: 3 00     Types: Cigarettes     Last attempt to quit:      Years since quittin 6    Smokeless tobacco: Never Used    Tobacco comment: Has a past history of cigarette smoking;Quit date ; 5 packs year history, per [employer]     Substance and Sexual Activity    Alcohol use: Yes     Comment: occasional wine        Drug use: No    Sexual activity: Yes   Lifestyle    Physical activity:     Days per week: Not on file     Minutes per session: Not on file    Stress: Not on file   Relationships    Social connections:     Talks on phone: Not on file     Gets together: Not on file     Attends Denominational service: Not on file     Active member of club or organization: Not on file     Attends meetings of clubs or organizations: Not on file     Relationship status: Not on file    Intimate partner violence:     Fear of current or ex partner: Not on file     Emotionally abused: Not on file     Physically abused: Not on file     Forced sexual activity: Not on file   Other Topics Concern    Not on file   Social History Narrative    Lives with granddaughter and daughter     Caffeine use, He admits to consuming caffeine VIA coffee ( 8 servings per day), per [employer]    Martial History: Currently , per [employer]    Occupation: Retired (prior occupational:  repairman), per [employer]        Review of Systems   Constitution: Negative for diaphoresis, weight gain and weight loss  HENT: Negative for congestion  Cardiovascular: Negative for chest pain, dyspnea on exertion, irregular heartbeat, leg swelling, near-syncope, orthopnea, palpitations, paroxysmal nocturnal dyspnea and syncope  Respiratory: Negative for shortness of breath, sleep disturbances due to breathing and snoring  Hematologic/Lymphatic: Does not bruise/bleed easily  Skin: Negative for rash  Musculoskeletal: Negative for myalgias  Gastrointestinal: Negative for nausea and vomiting  Neurological: Negative for excessive daytime sleepiness and light-headedness  Psychiatric/Behavioral: The patient is not nervous/anxious  Vitals: /68   Pulse 63   Resp 18   Ht 5' 8" (1 727 m)   Wt 80 7 kg (178 lb)   SpO2 99%   BMI 27 06 kg/m²       Physical Exam:     GEN: Alert and oriented x 3, in no acute distress  Well appearing and well nourished  HEENT: Sclera anicteric, conjunctivae pink, mucous membranes moist  Oropharynx clear  NECK: Supple, no carotid bruits, no significant JVD  Trachea midline, no thyromegaly  HEART: Regular rhythm, normal S1 and S2, no murmurs, clicks, gallops or rubs  PMI nondisplaced, no thrills  LUNGS: Clear to auscultation bilaterally; no wheezes, rales, or rhonchi  No increased work of breathing or signs of respiratory distress  ABDOMEN: Soft, nontender, nondistended, normoactive bowel sounds  EXTREMITIES: Skin warm and well perfused, no clubbing, cyanosis, or edema  NEURO: No focal findings  Normal speech  Mood and affect normal    SKIN: Normal without suspicious lesions on exposed skin        Lab Results:       Lab Results   Component Value Date    HGBA1C 5 0 01/13/2018    HGBA1C 4 8 08/15/2016     Lab Results   Component Value Date    CHOL See scanned summary  08/15/2016    CHOL See scanned summary  03/17/2016     Lab Results   Component Value Date    HDL 44 01/14/2018    HDL See scanned summary  08/15/2016    HDL 46 08/14/2016     Lab Results   Component Value Date    LDLCALC 105 (H) 01/14/2018    LDLCALC 72 08/14/2016     Lab Results   Component Value Date    TRIG 81 01/14/2018    TRIG See scanned summary   08/15/2016    TRIG 110 08/14/2016     No results found for: CHOLHDL

## 2019-07-30 ENCOUNTER — LAB (OUTPATIENT)
Dept: LAB | Facility: CLINIC | Age: 79
End: 2019-07-30
Payer: MEDICARE

## 2019-07-30 ENCOUNTER — OFFICE VISIT (OUTPATIENT)
Dept: NEUROLOGY | Facility: CLINIC | Age: 79
End: 2019-07-30
Payer: MEDICARE

## 2019-07-30 VITALS — SYSTOLIC BLOOD PRESSURE: 130 MMHG | HEART RATE: 71 BPM | DIASTOLIC BLOOD PRESSURE: 70 MMHG

## 2019-07-30 DIAGNOSIS — I10 ESSENTIAL HYPERTENSION: ICD-10-CM

## 2019-07-30 DIAGNOSIS — D64.9 ANEMIA: ICD-10-CM

## 2019-07-30 DIAGNOSIS — I48.92 ATRIAL FLUTTER, UNSPECIFIED TYPE (HCC): Primary | ICD-10-CM

## 2019-07-30 DIAGNOSIS — Z86.73 HISTORY OF STROKE: ICD-10-CM

## 2019-07-30 DIAGNOSIS — E78.2 MIXED HYPERLIPIDEMIA: ICD-10-CM

## 2019-07-30 DIAGNOSIS — I65.22 STENOSIS OF LEFT CAROTID ARTERY: ICD-10-CM

## 2019-07-30 LAB
ERYTHROCYTE [DISTWIDTH] IN BLOOD BY AUTOMATED COUNT: 19.6 % (ref 11.6–15.1)
HCT VFR BLD AUTO: 36.6 % (ref 36.5–49.3)
HGB BLD-MCNC: 10.1 G/DL (ref 12–17)
MCH RBC QN AUTO: 21.5 PG (ref 26.8–34.3)
MCHC RBC AUTO-ENTMCNC: 27.6 G/DL (ref 31.4–37.4)
MCV RBC AUTO: 78 FL (ref 82–98)
PLATELET # BLD AUTO: 305 THOUSANDS/UL (ref 149–390)
PMV BLD AUTO: 10.4 FL (ref 8.9–12.7)
RBC # BLD AUTO: 4.69 MILLION/UL (ref 3.88–5.62)
WBC # BLD AUTO: 5.28 THOUSAND/UL (ref 4.31–10.16)

## 2019-07-30 PROCEDURE — 36415 COLL VENOUS BLD VENIPUNCTURE: CPT

## 2019-07-30 PROCEDURE — 85027 COMPLETE CBC AUTOMATED: CPT

## 2019-07-30 PROCEDURE — 99213 OFFICE O/P EST LOW 20 MIN: CPT | Performed by: PSYCHIATRY & NEUROLOGY

## 2019-07-30 NOTE — PATIENT INSTRUCTIONS
Stroke: Amrita Ansari presents for follow-up with regard to his prior history of stroke  In the interval since his last visit to the office he reports no new events concerning for recurrent TIA or stroke  He takes his medications as prescribed  He denies any bleeding or bruising issues or melanotic stools  He has followed up with his cardiologist to indicated in their note that the Xarelto is related to stroke prevention for atrial fibrillation but that the aspirin is related to coronary artery disease  Going on the premise that he requires both for cardiac reasons then I suggest that dual therapy is reasonable for him in spite of the increased bleeding risk   -for ongoing stroke prevention he should continue his current combination of Xarelto taken once daily with food, Lipitor, and appropriate blood pressure and glycemic control  -we will defer to the good judgment of his primary care team for monitoring of his cholesterol panel and blood sugar numbers  -we will defer to his cardiologist with regard to the need for aspirin ongoing basis but I agree that it appears reasonable at this point in time  He is being evaluated currently for some potential low iron/anemia  He should continue to follow this with his primary care team   -he should continue to be physically active in as much as he feels capable of doing so  -his most recent lab work shows very good control of his cholesterol on his current regimen of Lipitor  This does not need to be increased at this point in time  From my standpoint he is doing extremely well  I will plan for him to return to the office to see us in 8 months time but I would be happy to see him sooner if the need should arise    If he has any symptoms concerning for TIA or stroke such as sudden painless loss of vision or double vision, difficulty speaking or swallowing, vertigo/room spinning that does not quickly resolve, or weakness/numbness affecting 1 side of the face or body he should proceed by ambulance to the nearest emergency room immediately  If he were to fall and strike his head and have any residual symptoms or to developed a sudden extremely severe unusual headache he should also be seen at the nearest emergency room

## 2019-07-31 ENCOUNTER — OFFICE VISIT (OUTPATIENT)
Dept: INTERNAL MEDICINE CLINIC | Age: 79
End: 2019-07-31
Payer: MEDICARE

## 2019-07-31 VITALS
TEMPERATURE: 98.1 F | OXYGEN SATURATION: 98 % | SYSTOLIC BLOOD PRESSURE: 130 MMHG | WEIGHT: 180 LBS | HEART RATE: 70 BPM | DIASTOLIC BLOOD PRESSURE: 64 MMHG | BODY MASS INDEX: 27.37 KG/M2

## 2019-07-31 DIAGNOSIS — Z87.19 HISTORY OF DIVERTICULOSIS: ICD-10-CM

## 2019-07-31 DIAGNOSIS — E53.8 DEFICIENCY OF OTHER SPECIFIED B GROUP VITAMINS: ICD-10-CM

## 2019-07-31 DIAGNOSIS — D64.9 ANEMIA, UNSPECIFIED TYPE: Primary | ICD-10-CM

## 2019-07-31 DIAGNOSIS — D53.9 NUTRITIONAL ANEMIA: ICD-10-CM

## 2019-07-31 DIAGNOSIS — Z86.010 HISTORY OF COLONIC POLYPS: ICD-10-CM

## 2019-07-31 DIAGNOSIS — K21.9 GASTROESOPHAGEAL REFLUX DISEASE WITHOUT ESOPHAGITIS: ICD-10-CM

## 2019-07-31 PROCEDURE — 99214 OFFICE O/P EST MOD 30 MIN: CPT | Performed by: INTERNAL MEDICINE

## 2019-07-31 RX ORDER — FERROUS SULFATE TAB EC 324 MG (65 MG FE EQUIVALENT) 324 (65 FE) MG
324 TABLET DELAYED RESPONSE ORAL
Qty: 30 TABLET | Refills: 1 | Status: SHIPPED | OUTPATIENT
Start: 2019-07-31 | End: 2019-10-23 | Stop reason: SDUPTHER

## 2019-07-31 NOTE — PATIENT INSTRUCTIONS
Anemia   AMBULATORY CARE:   Anemia  is a low number of red blood cells or a low amount of hemoglobin in your red blood cells  Hemoglobin is a protein that helps carry oxygen throughout your body  Red blood cells use iron to create hemoglobin  Anemia may develop if your body does not have enough iron  It may also develop if your body does not make enough red blood cells or they die faster than your body can make them  Common symptoms include the following:   · Chest pain or a fast heartbeat    · Lightheadedness, dizziness, or shortness of breath    · Cold or pale skin    · Tiredness, weakness, or confusion  Call 911 or have someone call 911 for any of the following:   · You lose consciousness  · You have severe chest pain  Seek care immediately if:   · You have dark or bloody bowel movements  Contact your healthcare provider if:   · Your symptoms are worse, even after treatment  · You have questions or concerns about your condition or care  Treatment for anemia  may include any of the following:  · Iron or folic acid supplements  help increase your red blood cell and hemoglobin levels  · Vitamin B12 injections  may help boost your red blood cell level and decrease your symptoms  Ask your healthcare provider how to inject B12  Prevent anemia:  Eat healthy foods rich in iron and vitamin C  Nuts, meat, dark leafy green vegetables, and beans are high in iron and protein  Vitamin C helps your body absorb iron  Foods rich in vitamin C include oranges and other citrus fruits  Ask your healthcare provider for a list of other foods that are high in iron or vitamin C  Ask if you need to be on a special diet  Follow up with your healthcare provider as directed:  Write down your questions so you remember to ask them during your visits  © 2017 [facility] Information is for End User's use only and may not be sold, redistributed or otherwise used for commercial purposes   All illustrations and images included in CareNotes® are the copyrighted property of [employer]  or [employer]  The above information is an  only  It is not intended as medical advice for individual conditions or treatments  Talk to your doctor, nurse or pharmacist before following any medical regimen to see if it is safe and effective for you

## 2019-07-31 NOTE — PROGRESS NOTES
Assessment/Plan:    Anemia  - hemoglobin is down to 10 1 and has gradually been reducing over the years  His hemoglobin was normal at 13 8 on May 31st, 2018  - MCV is low at 78, RDW is elevated at 19 6 this is consistent with iron deficiency anemia   -will order an iron panel, ferritin, folate, vitamin B12   - will start patient on ferrous sulfate 325 mg daily before breakfast  - patient was counseled on the importance of taking a multivitamin tablet and he was also counseled to eat a more healthy diet  -follow-up in 1 month     GERD  - stable  -continue with esomeprazole    History of colonic polyps  - status post polypectomy 3 years ago  - patient however denies a history of black tarry stools and rectal bleeding  -will order Hemoccult test     Diagnoses and all orders for this visit:    Anemia, unspecified type  -     Iron Panel (Includes Iron Saturation, Iron, and TIBC); Future  -     Ferritin; Future  -     Folate; Future  -     Vitamin B12; Future  -     ferrous sulfate 324 (65 Fe) mg; Take 1 tablet (324 mg total) by mouth daily before breakfast  -     Occult blood 1-3, stool; Future    Deficiency of other specified B group vitamins   -     Folate; Future    Nutritional anemia   -     Vitamin B12; Future    History of colonic polyps  -     Occult blood 1-3, stool; Future    History of diverticulosis  -     Occult blood 1-3, stool; Future    Gastroesophageal reflux disease without esophagitis          Subjective:      Patient ID: Corie Suero is a 78 y o  male  HPI  Patient presents because he went to see his neurologist yesterday and was told that the result of his CBC was abnormal and that he should go and see his PCP  A review of his labs shows that his hemoglobin is down to 10 1  He admits to shortness of breath which is chronic and secondary to his emphysema but he denies worsening of his shortness of breath    He denies palpitations, fatigue, weight loss, dizziness, headaches, chest pain, nausea, vomiting, abdominal pain, diarrhea, constipation, black tarry stools, rectal bleeding, hematuria, hemoptysis, epistaxis or other bleeding  He is on Xarelto and aspirin  Patient states that for the past month he has not been eating as well as he used to because his granddaughter has not been cooking for him and she used to in the past   He also states that he is not on any multivitamins  He used to drink Ensure which he does not drink anymore also  He does have a history of bladder cancer but states that he has been cancer free for years  Of note, patient had a colonoscopy done on October 11th, 2016 and was found to have a polyp status post polypectomy and diverticulosis  He does have a history of GERD and currently takes esomeprazole  Patient also wants his hearing aid form signed to enable him get hearing aids  The following portions of the patient's history were reviewed and updated as appropriate:   He  has a past medical history of A-fib ([address] ), Arthritis, Benign prostatic hyperplasia without lower urinary tract symptoms, CAD (coronary artery disease), Chronic obstructive lung disease ([address] ), Coronary arteriosclerosis, Depression, Emphysema lung ([address] ), GERD (gastroesophageal reflux disease), Hyperlipidemia, Hypertension, Myocardial infarction ([address] ), Psoriasis, Requires supplemental oxygen, and Stroke ([address] )    He   Patient Active Problem List    Diagnosis Date Noted    Anemia 07/31/2019    Medicare annual wellness visit, subsequent 06/24/2019    Tinnitus aurium, bilateral 05/31/2019    Sensorineural hearing loss (SNHL) of both ears 05/31/2019    Abnormal CT of the chest 01/04/2019    Chronic respiratory failure with hypoxia ([address] ) 11/13/2018    COPD, severe ([address] ) 11/13/2018    Bronchiectasis with acute lower respiratory infection ([address] ) 11/13/2018    Community acquired bacterial pneumonia 11/07/2018    Localized edema 10/15/2018    Pneumonia due to infectious organism     Irregular heart beat  CAP (community acquired pneumonia) 10/07/2018    Atrial flutter ([facility] ) 10/07/2018    CKD (chronic kidney disease), stage II 06/01/2018    Urinary retention due to benign prostatic hyperplasia 04/27/2018    [facility] 04/27/2018    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    Pre-operative cardiovascular examination 01/30/2018    Stenosis of left carotid artery 01/14/2018    Hypothyroid 01/13/2018    GERD (gastroesophageal reflux disease) 01/13/2018    History of stroke 01/13/2018    Sciatica of right side 01/13/2018    Vision changes 01/13/2018    Hyperlipidemia     Centrilobular emphysema (HCC)     Arthritis     Back pain 01/24/2017    Acute left-sided low back pain with left-sided sciatica 10/25/2016    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 10/25/2016    CAD (coronary atherosclerotic disease) 08/13/2016    Hypertension 08/13/2016    Hyperlipemia 08/13/2016    Atypical chest pain 08/13/2016     He  has a past surgical history that includes Hernia repair; Transurethral resection of prostate; Colonoscopy; pr thromboendartectmy neck,neck incis (Left, 2/20/2018); Coronary angioplasty (02/03/2001); Coronary artery bypass graft (02/07/2001); and pr bronchoscopy,diagnostic (Left, 1/7/2019)  His family history includes Cancer in his mother; Lung cancer in his mother; Other in his father  He  reports that he quit smoking about 63 years ago  His smoking use included cigarettes  He has a 3 00 pack-year smoking history  He has never used smokeless tobacco  He reports that he drinks alcohol  He reports that he does not use drugs    Current Outpatient Medications   Medication Sig Dispense Refill    albuterol (PROAIR HFA) 90 mcg/act inhaler Inhale 2 puffs every 6 (six) hours as needed for wheezing or shortness of breath 8 5 g 0    aspirin 81 mg chewable tablet Chew 1 tablet (81 mg total) daily 60 tablet 6    atorvastatin (LIPITOR) 40 mg tablet Take 1 tablet (40 mg total) by mouth daily 90 tablet 6    diltiazem (CARDIZEM CD) 240 mg 24 hr capsule Take 1 capsule (240 mg total) by mouth daily 90 capsule 6    esomeprazole (NexIUM) 20 mg capsule Take 20 mg by mouth every other day        furosemide (LASIX) 40 mg tablet Take 1 tablet (40 mg total) by mouth daily 60 tablet 6    rivaroxaban (XARELTO) 20 mg tablet Take 1 tablet (20 mg total) by mouth daily with dinner 30 tablet 6    ferrous sulfate 324 (65 Fe) mg Take 1 tablet (324 mg total) by mouth daily before breakfast 30 tablet 1     No current facility-administered medications for this visit  Current Outpatient Medications on File Prior to Visit   Medication Sig    albuterol (PROAIR HFA) 90 mcg/act inhaler Inhale 2 puffs every 6 (six) hours as needed for wheezing or shortness of breath    aspirin 81 mg chewable tablet Chew 1 tablet (81 mg total) daily    atorvastatin (LIPITOR) 40 mg tablet Take 1 tablet (40 mg total) by mouth daily    diltiazem (CARDIZEM CD) 240 mg 24 hr capsule Take 1 capsule (240 mg total) by mouth daily    esomeprazole (NexIUM) 20 mg capsule Take 20 mg by mouth every other day      furosemide (LASIX) 40 mg tablet Take 1 tablet (40 mg total) by mouth daily    rivaroxaban (XARELTO) 20 mg tablet Take 1 tablet (20 mg total) by mouth daily with dinner     No current facility-administered medications on file prior to visit  He is allergic to penicillins       Review of Systems   Constitutional: Negative for activity change, chills, fatigue, fever and unexpected weight change  HENT: Positive for hearing loss  Negative for ear pain, postnasal drip, rhinorrhea, sinus pressure and sore throat  Eyes: Negative for pain  Respiratory: Positive for shortness of breath (Chronic)  Negative for cough, choking, chest tightness and wheezing  Cardiovascular: Negative for chest pain, palpitations and leg swelling  Gastrointestinal: Negative for abdominal pain, constipation, diarrhea, nausea and vomiting     Genitourinary: Negative for dysuria and hematuria  Musculoskeletal: Negative for arthralgias, back pain, gait problem, joint swelling, myalgias and neck stiffness  Skin: Negative for pallor and rash  Neurological: Negative for dizziness, tremors, seizures, syncope, light-headedness and headaches  Hematological: Negative for adenopathy  Psychiatric/Behavioral: Negative for behavioral problems           Past Medical History:   Diagnosis Date    A-fib [facility])     Arthritis     Benign prostatic hyperplasia without lower urinary tract symptoms     without Urinary Obstruction    CAD (coronary artery disease)     Chronic obstructive lung disease (HCC)     Coronary arteriosclerosis     Depression     Emphysema lung (HCC)     GERD (gastroesophageal reflux disease)     Hyperlipidemia     Hypertension     Myocardial infarction (HCC)     Psoriasis     Requires supplemental oxygen     at bedtime during high humid days only    Stroke [facility])     TIA 1/2018         Current Outpatient Medications:     albuterol (PROAIR HFA) 90 mcg/act inhaler, Inhale 2 puffs every 6 (six) hours as needed for wheezing or shortness of breath, Disp: 8 5 g, Rfl: 0    aspirin 81 mg chewable tablet, Chew 1 tablet (81 mg total) daily, Disp: 60 tablet, Rfl: 6    atorvastatin (LIPITOR) 40 mg tablet, Take 1 tablet (40 mg total) by mouth daily, Disp: 90 tablet, Rfl: 6    diltiazem (CARDIZEM CD) 240 mg 24 hr capsule, Take 1 capsule (240 mg total) by mouth daily, Disp: 90 capsule, Rfl: 6    esomeprazole (NexIUM) 20 mg capsule, Take 20 mg by mouth every other day  , Disp: , Rfl:     furosemide (LASIX) 40 mg tablet, Take 1 tablet (40 mg total) by mouth daily, Disp: 60 tablet, Rfl: 6    rivaroxaban (XARELTO) 20 mg tablet, Take 1 tablet (20 mg total) by mouth daily with dinner, Disp: 30 tablet, Rfl: 6    ferrous sulfate 324 (65 Fe) mg, Take 1 tablet (324 mg total) by mouth daily before breakfast, Disp: 30 tablet, Rfl: 1    Allergies   Allergen Reactions    Penicillins Swelling and Itching       Social History   Past Surgical History:   Procedure Laterality Date    COLONOSCOPY      CORONARY ANGIOPLASTY  02/03/2001    PTCA of RCA    CORONARY ARTERY BYPASS GRAFT  02/07/2001    x4- [place]    HERNIA REPAIR      MI BRONCHOSCOPY,DIAGNOSTIC Left 1/7/2019    Procedure: Joseph Mac;  Surgeon: Uziel Jha MD;  [facility]; Service: Pulmonary    MI THROMBOENDARTECTMY NECK,NECK INCIS Left 2/20/2018    Procedure: ENDARTERECTOMY ARTERY CAROTID WITH PATCH ANGIOPLASTY;  Surgeon: Grace Ganser, MD;  [facility];  Service: Vascular    TRANSURETHRAL RESECTION OF PROSTATE       Family History   Problem Relation Age of Onset    Lung cancer Mother     Cancer Mother     Other Father         sepsis       Objective:  /64 (BP Location: Left arm, Patient Position: Sitting, Cuff Size: Standard)   Pulse 70   Temp 98 1 °F (36 7 °C) (Tympanic)   Wt 81 6 kg (180 lb)   SpO2 98%   BMI 27 37 kg/m²        Physical Exam   Constitutional: He is oriented to person, place, and time  He appears well-developed and well-nourished  No distress  HENT:   Head: Normocephalic and atraumatic  Right Ear: External ear normal    Left Ear: External ear normal    Nose: Nose normal    Mouth/Throat: Oropharynx is clear and moist  No oropharyngeal exudate  Patient's hearing is impaired   Eyes: Pupils are equal, round, and reactive to light  Conjunctivae and EOM are normal  Right eye exhibits no discharge  Left eye exhibits no discharge  No scleral icterus  Neck: Normal range of motion  Neck supple  No JVD present  No tracheal deviation present  No thyromegaly present  Cardiovascular: Normal rate, regular rhythm and intact distal pulses  Exam reveals no gallop and no friction rub  Murmur (3/6 systolic murmur maximal in aortic valve region) heard  Pulmonary/Chest: Effort normal and breath sounds normal  No respiratory distress  He has no wheezes   He has no rales  He exhibits no tenderness  Abdominal: Soft  Bowel sounds are normal  He exhibits no distension and no mass  There is no tenderness  There is no rebound and no guarding  Musculoskeletal: Normal range of motion  He exhibits no edema, tenderness or deformity  Lymphadenopathy:     He has no cervical adenopathy  Neurological: He is alert and oriented to person, place, and time  He has normal reflexes  A cranial nerve deficit (Impaired hearing) is present  He exhibits normal muscle tone  Coordination normal    Skin: Skin is warm and dry  No rash noted  He is not diaphoretic  No erythema  No pallor  Psychiatric: He has a normal mood and affect   His behavior is normal

## 2019-08-05 DIAGNOSIS — R93.89 ABNORMAL CHEST CT: Primary | ICD-10-CM

## 2019-08-05 NOTE — PROGRESS NOTES
Spoke with patient by phone  He was to see me later this afternoon and unfortunately he showed up for and 1/2 hours prior to his appointment  Unfortunately, I was unable to see him as I had patient is scheduled  He initially told staff he would return later for his appointment at schedule time  He then called the office stating he was not returning this afternoon to be seen because I do not have any money    I called the patient and spoke with him by phone  He is having a hard time financially at this time as he has been low needing quite a bit of money to his granddaughter and other relatives and now is unable to pay his own bills  He states his medical insurance will be taken away from him  I reviewed recent CT scan with him which shows continued clearing of left upper lobe process  I previously reviewed these images with Dr Maria R Bañuelos  Plan was to repeat a CT scan in 6 months  He states he otherwise is feeling well from a pulmonary standpoint  Plan at this time will be to repeat a CT scan in early December with follow-up after  He was instructed to call the office with any questions or if he develops any changes in his breathing  We will schedule CT scan for him at [facility] and send him paperwork  All his questions were answered

## 2019-08-06 ENCOUNTER — TELEPHONE (OUTPATIENT)
Dept: PULMONOLOGY | Facility: CLINIC | Age: 79
End: 2019-08-06

## 2019-08-06 NOTE — TELEPHONE ENCOUNTER
Pt showed up at the [address] upset that [facility] would not give him his oxygen  I called [facility] and spoke with Blaise Holland, who explained to me the pt was told he needs to call billing to make a payment arrangement on a balance that he has  Blaise Holland also stated that pt needs a 6 minute walk for oxygen recert    I  Called pt leaving him a message explaining what has to be done and also informing him that I have scheduled him a 6 min walk test for tomorrow at the [facility] office

## 2019-09-09 ENCOUNTER — OFFICE VISIT (OUTPATIENT)
Dept: INTERNAL MEDICINE CLINIC | Age: 79
End: 2019-09-09
Payer: MEDICARE

## 2019-09-09 VITALS
SYSTOLIC BLOOD PRESSURE: 124 MMHG | HEIGHT: 68 IN | TEMPERATURE: 97.6 F | HEART RATE: 69 BPM | OXYGEN SATURATION: 96 % | BODY MASS INDEX: 27.83 KG/M2 | DIASTOLIC BLOOD PRESSURE: 60 MMHG | WEIGHT: 183.6 LBS

## 2019-09-09 DIAGNOSIS — C67.9 MALIGNANT NEOPLASM OF URINARY BLADDER, UNSPECIFIED SITE (HCC): ICD-10-CM

## 2019-09-09 DIAGNOSIS — I10 ESSENTIAL HYPERTENSION: ICD-10-CM

## 2019-09-09 DIAGNOSIS — Z95.1 S/P CABG X 4: ICD-10-CM

## 2019-09-09 DIAGNOSIS — E03.9 HYPOTHYROIDISM, UNSPECIFIED TYPE: ICD-10-CM

## 2019-09-09 DIAGNOSIS — K21.9 GASTROESOPHAGEAL REFLUX DISEASE WITHOUT ESOPHAGITIS: Primary | ICD-10-CM

## 2019-09-09 DIAGNOSIS — I48.92 ATRIAL FLUTTER, UNSPECIFIED TYPE (HCC): ICD-10-CM

## 2019-09-09 DIAGNOSIS — J44.9 COPD, SEVERE (HCC): ICD-10-CM

## 2019-09-09 DIAGNOSIS — N18.2 CKD (CHRONIC KIDNEY DISEASE), STAGE II: ICD-10-CM

## 2019-09-09 DIAGNOSIS — J43.2 CENTRILOBULAR EMPHYSEMA (HCC): ICD-10-CM

## 2019-09-09 DIAGNOSIS — D64.9 ANEMIA, UNSPECIFIED TYPE: ICD-10-CM

## 2019-09-09 PROBLEM — Z01.810 PRE-OPERATIVE CARDIOVASCULAR EXAMINATION: Status: RESOLVED | Noted: 2018-01-30 | Resolved: 2019-09-09

## 2019-09-09 PROBLEM — J18.9 CAP (COMMUNITY ACQUIRED PNEUMONIA): Status: RESOLVED | Noted: 2018-10-07 | Resolved: 2019-09-09

## 2019-09-09 PROBLEM — J15.9 COMMUNITY ACQUIRED BACTERIAL PNEUMONIA: Status: RESOLVED | Noted: 2018-11-07 | Resolved: 2019-09-09

## 2019-09-09 PROCEDURE — 1124F ACP DISCUSS-NO DSCNMKR DOCD: CPT | Performed by: INTERNAL MEDICINE

## 2019-09-09 PROCEDURE — 99214 OFFICE O/P EST MOD 30 MIN: CPT | Performed by: INTERNAL MEDICINE

## 2019-09-09 NOTE — PROGRESS NOTES
Assessment/Plan:    1  Chronic atrial fibrillation  Continue with the Xarelto and diltiazem    2  Iron deficiency anemia  An level is still 30  Advised to take 2 iron tablets instead of 1  Will repeat CBC before next visit    3  Gastroesophageal reflux disease  Continue with the Nexium 20 mg every other day    4  Hyperlipidemia  Continue with Lipitor 40 mg daily  Will check lipid profile before next visit    5  Severe COPD  Will continue with present inhaler  Patient is also being followed by pulmonologist        There are no diagnoses linked to this encounter  Subjective:          Patient ID: Yanet Bullard is a 78 y o  male  Patient is here for regular follow-up  Does have blood work done last week and would like to discuss results  Still with mild shortness of breath with exertion  He does use home oxygen when hematology level is high  Patient also being followed by pulmonologist       The following portions of the patient's history were reviewed and updated as appropriate: allergies, current medications, past family history, past medical history, past social history, past surgical history and problem list     Review of Systems   Constitutional: Negative for fatigue and fever  HENT: Negative for congestion, ear discharge, ear pain, postnasal drip, sinus pressure, sore throat, tinnitus and trouble swallowing  Eyes: Negative for discharge, itching and visual disturbance  Respiratory: Positive for shortness of breath  Negative for cough  Cardiovascular: Negative for chest pain and palpitations  Gastrointestinal: Negative for abdominal pain, diarrhea, nausea and vomiting  Endocrine: Negative for cold intolerance and polyuria  Genitourinary: Negative for difficulty urinating, dysuria and urgency  Musculoskeletal: Negative for arthralgias and neck pain  Skin: Negative for rash  Allergic/Immunologic: Negative for environmental allergies     Neurological: Negative for dizziness, weakness and headaches  Psychiatric/Behavioral: The patient is not nervous/anxious            Past Medical History:   Diagnosis Date    A-fib [facility])     Arthritis     Benign prostatic hyperplasia without lower urinary tract symptoms     without Urinary Obstruction    CAD (coronary artery disease)     Chronic obstructive lung disease (HCC)     Coronary arteriosclerosis     Depression     Emphysema lung (HCC)     GERD (gastroesophageal reflux disease)     Hyperlipidemia     Hypertension     Myocardial infarction (HCC)     Psoriasis     Requires supplemental oxygen     at bedtime during high humid days only    Stroke [facility])     TIA 1/2018         Current Outpatient Medications:     albuterol (PROAIR HFA) 90 mcg/act inhaler, Inhale 2 puffs every 6 (six) hours as needed for wheezing or shortness of breath, Disp: 8 5 g, Rfl: 0    aspirin 81 mg chewable tablet, Chew 1 tablet (81 mg total) daily, Disp: 60 tablet, Rfl: 6    atorvastatin (LIPITOR) 40 mg tablet, Take 1 tablet (40 mg total) by mouth daily, Disp: 90 tablet, Rfl: 6    diltiazem (CARDIZEM CD) 240 mg 24 hr capsule, Take 1 capsule (240 mg total) by mouth daily, Disp: 90 capsule, Rfl: 6    esomeprazole (NexIUM) 20 mg capsule, Take 20 mg by mouth every other day  , Disp: , Rfl:     ferrous sulfate 324 (65 Fe) mg, Take 1 tablet (324 mg total) by mouth daily before breakfast, Disp: 30 tablet, Rfl: 1    furosemide (LASIX) 40 mg tablet, Take 1 tablet (40 mg total) by mouth daily, Disp: 60 tablet, Rfl: 6    rivaroxaban (XARELTO) 20 mg tablet, Take 1 tablet (20 mg total) by mouth daily with dinner, Disp: 30 tablet, Rfl: 6    Allergies   Allergen Reactions    Penicillins Swelling and Itching       Social History   Past Surgical History:   Procedure Laterality Date    COLONOSCOPY      CORONARY ANGIOPLASTY  02/03/2001    PTCA of RCA    CORONARY ARTERY BYPASS GRAFT  02/07/2001    x4- [place]    HERNIA REPAIR      IN BRONCHOSCOPY,DIAGNOSTIC Left 1/7/2019    Procedure: Natty Stigler;  Surgeon: Pj Armando MD;  [facility]; Service: Pulmonary    TX THROMBOENDARTECTMY NECK,NECK INCIS Left 2/20/2018    Procedure: ENDARTERECTOMY ARTERY CAROTID WITH PATCH ANGIOPLASTY;  Surgeon: Helena Duncan MD;  [facility];  Service: Vascular    TRANSURETHRAL RESECTION OF PROSTATE       Family History   Problem Relation Age of Onset    Lung cancer Mother     Cancer Mother     Other Father         sepsis       Objective:  /60 (BP Location: Left arm, Patient Position: Sitting, Cuff Size: Standard)   Pulse 69   Temp 97 6 °F (36 4 °C) (Tympanic)   Ht 5' 8" (1 727 m)   Wt 83 3 kg (183 lb 9 6 oz)   SpO2 96%   BMI 27 92 kg/m²   Body mass index is 27 92 kg/m²  Physical Exam   Constitutional: He appears well-developed  HENT:   Head: Normocephalic  Right Ear: External ear normal    Left Ear: External ear normal    Mouth/Throat: Oropharynx is clear and moist    Eyes: Pupils are equal, round, and reactive to light  No scleral icterus  Neck: Normal range of motion  Neck supple  No tracheal deviation present  No thyromegaly present  Cardiovascular: Normal rate, regular rhythm and normal heart sounds  Pulmonary/Chest: Effort normal  No respiratory distress  He exhibits no tenderness  Decreased breath sounds throughout the lungs   Abdominal: Soft  Bowel sounds are normal  He exhibits no mass  There is no tenderness  Musculoskeletal: Normal range of motion  He exhibits no edema  Lymphadenopathy:     He has no cervical adenopathy  Neurological: He is alert  No cranial nerve deficit  Skin: Skin is warm  Psychiatric: He has a normal mood and affect

## 2019-10-02 DIAGNOSIS — D64.9 ANEMIA, UNSPECIFIED TYPE: ICD-10-CM

## 2019-10-02 RX ORDER — FERROUS SULFATE TAB EC 324 MG (65 MG FE EQUIVALENT) 324 (65 FE) MG
TABLET DELAYED RESPONSE ORAL
Qty: 30 TABLET | Refills: 1 | OUTPATIENT
Start: 2019-10-02

## 2019-10-04 ENCOUNTER — TELEPHONE (OUTPATIENT)
Dept: VASCULAR SURGERY | Facility: CLINIC | Age: 79
End: 2019-10-04

## 2019-10-04 DIAGNOSIS — I65.23 CAROTID STENOSIS, BILATERAL: Primary | ICD-10-CM

## 2019-10-10 ENCOUNTER — TELEPHONE (OUTPATIENT)
Dept: INTERNAL MEDICINE CLINIC | Facility: CLINIC | Age: 79
End: 2019-10-10

## 2019-10-23 ENCOUNTER — OFFICE VISIT (OUTPATIENT)
Dept: INTERNAL MEDICINE CLINIC | Age: 79
End: 2019-10-23
Payer: MEDICARE

## 2019-10-23 VITALS
TEMPERATURE: 98.9 F | HEIGHT: 68 IN | WEIGHT: 185.8 LBS | BODY MASS INDEX: 28.16 KG/M2 | OXYGEN SATURATION: 99 % | DIASTOLIC BLOOD PRESSURE: 80 MMHG | HEART RATE: 90 BPM | SYSTOLIC BLOOD PRESSURE: 122 MMHG

## 2019-10-23 DIAGNOSIS — I48.92 ATRIAL FLUTTER, UNSPECIFIED TYPE (HCC): ICD-10-CM

## 2019-10-23 DIAGNOSIS — R93.89 ABNORMAL CT OF THE CHEST: ICD-10-CM

## 2019-10-23 DIAGNOSIS — J44.9 COPD, SEVERE (HCC): Primary | ICD-10-CM

## 2019-10-23 DIAGNOSIS — Z23 NEED FOR INFLUENZA VACCINATION: ICD-10-CM

## 2019-10-23 DIAGNOSIS — D64.9 ANEMIA, UNSPECIFIED TYPE: ICD-10-CM

## 2019-10-23 DIAGNOSIS — I10 ESSENTIAL HYPERTENSION: ICD-10-CM

## 2019-10-23 PROCEDURE — 99214 OFFICE O/P EST MOD 30 MIN: CPT | Performed by: INTERNAL MEDICINE

## 2019-10-23 PROCEDURE — 90662 IIV NO PRSV INCREASED AG IM: CPT | Performed by: INTERNAL MEDICINE

## 2019-10-23 PROCEDURE — G0008 ADMIN INFLUENZA VIRUS VAC: HCPCS | Performed by: INTERNAL MEDICINE

## 2019-10-23 RX ORDER — FERROUS SULFATE TAB EC 324 MG (65 MG FE EQUIVALENT) 324 (65 FE) MG
324 TABLET DELAYED RESPONSE ORAL
Qty: 30 TABLET | Refills: 1 | Status: SHIPPED | OUTPATIENT
Start: 2019-10-23 | End: 2020-05-19 | Stop reason: HOSPADM

## 2019-10-23 RX ORDER — FERROUS SULFATE TAB EC 324 MG (65 MG FE EQUIVALENT) 324 (65 FE) MG
324 TABLET DELAYED RESPONSE ORAL
Qty: 90 TABLET | Refills: 1 | Status: CANCELLED | OUTPATIENT
Start: 2019-10-23

## 2019-10-23 NOTE — PATIENT INSTRUCTIONS
COPD, Ambulatory Care   GENERAL INFORMATION:   COPD (chronic obstructive pulmonary disease)  is a lung disease that makes it hard for you to breathe  COPD is usually a result of lung damage caused by years of irritation and inflammation  COPD limits air flow in your lungs  Smoking, pollution, genetics, or a history of lung infections can increase your risk for COPD  Common symptoms include the following:   · Shortness of breath     · A dry cough     · Coughing fits that bring up mucus from your lungs     · Wheezing and chest tightness  Seek immediate care for the following symptoms:   · Confusion, dizziness, or lightheadedness    · Red, swollen, warm arm or leg    · Shortness of breath or chest pain    · Coughing up blood  Treatment for COPD  may include medicines to help decrease swelling and inflammation in your lungs  Medicines may also help open your airways or treat and infection  You may need pulmonary rehabilitation to help you manage your symptoms and improve your quality of life  You may need extra oxygen to help you breathe easier  Manage COPD and prevent an exacerbation:   · Do not smoke, and avoid others who smoke  If you smoke, it is never too late to quit  You may have fewer exacerbations  Ask for information about medicines and support programs that can help you quit  · Avoid triggers that make your symptoms worse  Cold weather and sudden temperature changes can trigger an exacerbation  Fumes from cars and chemicals, air pollution, and perfume can also increase your symptoms  · Use pursed-lip breathing when you feel short of breath  Take a deep breath in through your nose  Slowly breathe out through your mouth with your lips pursed for twice as long as you inhaled  You can also practice this breathing pattern while you bend, lift, climb stairs, or exercise  Pursed-lip breathing slows down your breathing and helps move more air in and out of your lungs             · Exercise for at least 20 minutes each day  Exercise can help increase your energy and decrease shortness of breath  Ask about the best exercise plan for you  · Prevent infections that can be dangerous when you have COPD  Get a flu vaccine every year as soon as it becomes available  Ask if you should also get other vaccines, such as those given to prevent pneumonia and tetanus  Avoid people who are sick, and wash your hands often  Follow up with your healthcare provider as directed:  Write down your questions so you remember to ask them during your visits  CARE AGREEMENT:   You have the right to help plan your care  Learn about your health condition and how it may be treated  Discuss treatment options with your caregivers to decide what care you want to receive  You always have the right to refuse treatment  The above information is an  only  It is not intended as medical advice for individual conditions or treatments  Talk to your doctor, nurse or pharmacist before following any medical regimen to see if it is safe and effective for you  © 2014 [address] is for End User's use only and may not be sold, redistributed or otherwise used for commercial purposes  All illustrations and images included in CareNotes® are the copyrighted property of A D [employer] M [employer] Inc  or [employer]

## 2019-10-23 NOTE — PROGRESS NOTES
INTERNAL MEDICINE OFFICE VISIT    NAME: Morena Gomez  AGE: 78 y o  SEX: male    DATE OF ENCOUNTER: 10/23/2019    Assessment and Plan     1  Anemia, unspecified type  Hgb 13  MCV 80  Asymptomatic  · Ferrous sulfate 324 mg daily    2  COPD, severe ([address] )  Asymptomatic  Currently uses 2L NC at home during the winter  · Albuterol 2 puff every 6 hours as needed for wheezing/SOB    3  Essential hypertension   mmHg  · Cardizem 240 mg daily  · Lasix 40 mg daily     4  Atrial flutter, unspecified type ([facility] )  Rate controlled  Asymptomatic  Xarelto not covered by insurance  Was given 90-day supply of alternative NOAC by Cardiology  · Cardizem as above  · ASA 81 mg daily  · Follow-up with Cardiology   · Will likely start Yash    Chief Complaint     Chief Complaint   Patient presents with    Follow-up     6 week follow up     GERD    Hypothyroidism    Hypertension       History of Present Illness     Patient is a very pleasant 72-year-old male with a past medical history significant for COPD, hypothyroidism, GERD, hypertension, atrial flutter who presents today for a follow-up on chronic conditions  Recent laboratory analysis grossly unremarkable  The patient has no complaints at this time  He is up-to-date on all screening and vaccination  He reports compliance with all medications as prescribed  The patient is a never smoker  He reports occasional alcohol use  He denies illicit drug use  Review of Systems     Review of Systems   Constitutional: Negative  HENT: Negative  Eyes: Negative  Respiratory: Negative  Cardiovascular: Negative  Gastrointestinal: Negative  Endocrine: Negative  Genitourinary: Negative  Musculoskeletal: Negative  Skin: Negative  Allergic/Immunologic: Negative  Neurological: Negative  Hematological: Negative  Psychiatric/Behavioral: Negative          Active Problem List     Patient Active Problem List   Diagnosis    CAD (coronary atherosclerotic disease)    Hypertension    Hyperlipemia    Atypical chest pain    Hypothyroid    GERD (gastroesophageal reflux disease)    History of stroke    Sciatica of right side    Hyperlipidemia    Centrilobular emphysema (HCC)    Arthritis    Stenosis of left carotid artery    S/P CABG x 4    Acute left-sided low back pain with left-sided sciatica    Back pain    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    Vision changes    Urinary retention due to benign prostatic hyperplasia    Bladder cancer (HCC)    CKD (chronic kidney disease), stage II    Atrial flutter (HCC)    Irregular heart beat    Localized edema    Chronic respiratory failure with hypoxia (HCC)    COPD, severe (HCC)    Bronchiectasis with acute lower respiratory infection (HCC)    Abnormal CT of the chest    Tinnitus aurium, bilateral    Sensorineural hearing loss (SNHL) of both ears    Medicare annual wellness visit, subsequent    Anemia       Objective     /80 (BP Location: Left arm, Patient Position: Sitting, Cuff Size: Adult)   Pulse 90   Temp 98 9 °F (37 2 °C) (Oral)   Ht 5' 8" (1 727 m)   Wt 84 3 kg (185 lb 12 8 oz)   SpO2 99%   BMI 28 25 kg/m²     Physical Exam   Constitutional: He is oriented to person, place, and time  He appears well-developed and well-nourished  No distress  HENT:   Head: Normocephalic and atraumatic  Nose: Nose normal    Mouth/Throat: No oropharyngeal exudate  Eyes: Conjunctivae are normal  Right eye exhibits no discharge  Left eye exhibits no discharge  No scleral icterus  Neck: Neck supple  No JVD present  Cardiovascular: Normal rate, regular rhythm, normal heart sounds and intact distal pulses  Exam reveals no gallop and no friction rub  No murmur heard  Pulmonary/Chest: Effort normal and breath sounds normal  No respiratory distress  He has no wheezes  He has no rales  Abdominal: Soft  Bowel sounds are normal  He exhibits no distension  There is no tenderness  There is no rebound and no guarding  Musculoskeletal: Normal range of motion  He exhibits no edema  Neurological: He is alert and oriented to person, place, and time  Skin: Skin is warm and dry  He is not diaphoretic  No erythema  Psychiatric: He has a normal mood and affect           Current Medications     Current Outpatient Medications:     aspirin 81 mg chewable tablet, Chew 1 tablet (81 mg total) daily, Disp: 60 tablet, Rfl: 6    atorvastatin (LIPITOR) 40 mg tablet, Take 1 tablet (40 mg total) by mouth daily, Disp: 90 tablet, Rfl: 6    diltiazem (CARDIZEM CD) 240 mg 24 hr capsule, Take 1 capsule (240 mg total) by mouth daily, Disp: 90 capsule, Rfl: 6    esomeprazole (NexIUM) 20 mg capsule, Take 20 mg by mouth every other day  , Disp: , Rfl:     ferrous sulfate 324 (65 Fe) mg, Take 1 tablet (324 mg total) by mouth daily before breakfast, Disp: 30 tablet, Rfl: 1    furosemide (LASIX) 40 mg tablet, Take 1 tablet (40 mg total) by mouth daily, Disp: 60 tablet, Rfl: 6    albuterol (PROAIR HFA) 90 mcg/act inhaler, Inhale 2 puffs every 6 (six) hours as needed for wheezing or shortness of breath (Patient not taking: Reported on 10/23/2019), Disp: 8 5 g, Rfl: 0    rivaroxaban (XARELTO) 20 mg tablet, Take 1 tablet (20 mg total) by mouth daily with dinner (Patient not taking: Reported on 10/23/2019), Disp: 30 tablet, Rfl: 6    Health Maintenance     Health Maintenance   Topic Date Due    HEPATITIS B VACCINES (1 of 3 - Risk 3-dose series) 06/21/1959    INFLUENZA VACCINE  07/01/2019    DTaP,Tdap,and Td Vaccines (1 - Tdap) 04/24/2020 (Originally 6/21/1961)    BMI: Followup Plan  04/29/2020    Fall Risk  06/24/2020    Depression Screening PHQ  06/24/2020    Medicare Annual Wellness Visit (AWV)  06/24/2020    BMI: Adult  09/09/2020    Pneumococcal Vaccine: 65+ Years  Completed    Pneumococcal Vaccine: Pediatrics (0 to 5 Years) and At-Risk Patients (6 to 59 Years)  Aged Miki Immunization History   Administered Date(s) Administered    H1N1, All Formulations 1940, 01/09/2010    INFLUENZA 1940, 09/30/2013, 09/10/2014, 08/26/2015, 09/10/2018    Influenza Split High Dose Preservative Free IM 07/18/2015, 10/25/2016, 12/04/2017    Influenza, high dose seasonal 0 5 mL 09/10/2018    Pneumococcal Conjugate 13-Valent 08/26/2015, 02/18/2016    Pneumococcal Polysaccharide PPV23 05/02/2008    Zoster 10/25/2013       Danny Macdonald, DO  Internal Medicine  PGY-2  10/23/2019 2:07 PM

## 2019-10-30 ENCOUNTER — OFFICE VISIT (OUTPATIENT)
Dept: OBGYN CLINIC | Facility: CLINIC | Age: 79
End: 2019-10-30
Payer: MEDICARE

## 2019-10-30 VITALS
HEIGHT: 68 IN | SYSTOLIC BLOOD PRESSURE: 130 MMHG | BODY MASS INDEX: 28.04 KG/M2 | WEIGHT: 185 LBS | DIASTOLIC BLOOD PRESSURE: 88 MMHG | HEART RATE: 90 BPM

## 2019-10-30 DIAGNOSIS — M51.36 DDD (DEGENERATIVE DISC DISEASE), LUMBAR: Primary | ICD-10-CM

## 2019-10-30 PROCEDURE — 99213 OFFICE O/P EST LOW 20 MIN: CPT | Performed by: ORTHOPAEDIC SURGERY

## 2019-10-30 NOTE — ASSESSMENT & PLAN NOTE
Patient presents for evaluation of chronic lower back and leg symptoms  He reports primarily lower back pain with intermittent pain down the right leg  He states in the past he has been diagnosed with spinal stenosis and was recommended to consider an injection  His current pain is worsened is packed and it is in his right leg  Denies kwabena weakness  He does report increased pain with activity including walking and prolonged standing  Does not report incontinence of bowel or bladder  Has not tried any injections recently  On physical exam he appears stated age in well-developed in no acute distress  Awake alert and oriented x3  Affect is appropriate  Mood is appropriate  He is tender to palpation minimally over the center lumbosacral spine without crepitus  No step-offs appreciated  5/5 strength L2-S1 bilaterally  Sensation is grossly intact to light touch L2-S1 bilaterally  Negative modified straight leg raise bilaterally  Negative Julian's test bilaterally  Reflexes are hypoactive at L4 and S1 symmetrically  Lower extremities are warm with brisk capillary refill    MRI lumbar spine from 2016 is reviewed and this demonstrates multilevel lumbar DDD from L2-S1 with areas of scattered foraminal stenosis  Assessment and plan  Axial back pain in the setting of multilevel lumbar DDD    Current MRIs outdated and patient will require an updated MRI in preparation for injections with pain management

## 2019-10-30 NOTE — PROGRESS NOTES
Assessment/Plan:    Multilevel lumbar DDD  · MRI of lumbar spine ordered for further evaluation  · Consider referral to pain management for injections  · Follow up after lumbar MRI       Problem List Items Addressed This Visit        Musculoskeletal and Integument    DDD (degenerative disc disease), lumbar - Primary     Patient presents for evaluation of chronic lower back and leg symptoms  He reports primarily lower back pain with intermittent pain down the right leg  He states in the past he has been diagnosed with spinal stenosis and was recommended to consider an injection  His current pain is worsened is packed and it is in his right leg  Denies kwabena weakness  He does report increased pain with activity including walking and prolonged standing  Does not report incontinence of bowel or bladder  Has not tried any injections recently  On physical exam he appears stated age in well-developed in no acute distress  Awake alert and oriented x3  Affect is appropriate  Mood is appropriate  He is tender to palpation minimally over the center lumbosacral spine without crepitus  No step-offs appreciated  5/5 strength L2-S1 bilaterally  Sensation is grossly intact to light touch L2-S1 bilaterally  Negative modified straight leg raise bilaterally  Negative Julian's test bilaterally  Reflexes are hypoactive at L4 and S1 symmetrically  Lower extremities are warm with brisk capillary refill    MRI lumbar spine from 2016 is reviewed and this demonstrates multilevel lumbar DDD from L2-S1 with areas of scattered foraminal stenosis  Assessment and plan  Axial back pain in the setting of multilevel lumbar DDD  Current MRIs outdated and patient will require an updated MRI in preparation for injections with pain management         Relevant Orders    MRI lumbar spine wo contrast            Subjective:      Patient ID: Joel Ruiz is a 78 y o  male      STEWART Henderson is a 78year old male who presents to the office for evaluation of low back pain ongoing for multiple years  He is experiencing midline low back pain and numbness down his right leg  He is experiencing constant low back pain  His pain increases with activities including prolonged ambulation and is relieved with forward bending  He denies any lower extremity weakness  He has tried oral medications and chiropractor without relief  He also notes history of cervical trauma with bilateral hand numbness  The following portions of the patient's history were reviewed and updated as appropriate: current medications, past family history, past medical history, past social history, past surgical history and problem list     Review of Systems   Constitutional: Negative for fever and unexpected weight change  HENT: Negative for hearing loss, nosebleeds and sore throat  Eyes: Negative for pain, redness and visual disturbance  Respiratory: Negative for cough, shortness of breath and wheezing  Cardiovascular: Negative for chest pain, palpitations and leg swelling  Gastrointestinal: Negative for abdominal pain, nausea and vomiting  Endocrine: Negative for polydipsia and polyuria  Genitourinary: Negative for dysuria and hematuria  Musculoskeletal: Positive for back pain  Skin: Negative for rash and wound  Neurological: Negative for dizziness and headaches  Psychiatric/Behavioral: Negative for agitation and suicidal ideas  Objective:      /88   Pulse 90   Ht 5' 8" (1 727 m)   Wt 83 9 kg (185 lb)   BMI 28 13 kg/m²          Physical Exam   Constitutional: He is oriented to person, place, and time  He appears well-developed and well-nourished  HENT:   Head: Normocephalic and atraumatic  Eyes: Pupils are equal, round, and reactive to light  Neck: Neck supple  Cardiovascular: Intact distal pulses  Pulmonary/Chest: Breath sounds normal    Neurological: He is alert and oriented to person, place, and time     Skin: Skin is warm and dry    Psychiatric: He has a normal mood and affect   His behavior is normal        Patient ambulates without assistance  Non-tender to palpation   Modified straight leg raise negative bilaterally  Strength L2-S1 5/5 bilaterally  Sensation L2-S1 intact bilaterally  Reflexes hypoactive at L4 and at S1    Imaging  Lumbar MRI from 2015 was reviewed    Scribe Attestation    I,:   Darryle Dikes am acting as a scribe while in the presence of the attending physician :        I,:   Jackson Gamble MD personally performed the services described in this documentation    as scribed in my presence :

## 2019-11-11 ENCOUNTER — OFFICE VISIT (OUTPATIENT)
Dept: CARDIOLOGY CLINIC | Facility: CLINIC | Age: 79
End: 2019-11-11
Payer: MEDICARE

## 2019-11-11 ENCOUNTER — TELEPHONE (OUTPATIENT)
Dept: INTERNAL MEDICINE CLINIC | Facility: CLINIC | Age: 79
End: 2019-11-11

## 2019-11-11 ENCOUNTER — TELEPHONE (OUTPATIENT)
Dept: CARDIOLOGY CLINIC | Facility: CLINIC | Age: 79
End: 2019-11-11

## 2019-11-11 ENCOUNTER — TELEPHONE (OUTPATIENT)
Dept: INTERNAL MEDICINE CLINIC | Age: 79
End: 2019-11-11

## 2019-11-11 ENCOUNTER — ANTICOAG VISIT (OUTPATIENT)
Dept: CARDIOLOGY CLINIC | Facility: CLINIC | Age: 79
End: 2019-11-11

## 2019-11-11 VITALS
BODY MASS INDEX: 28.49 KG/M2 | HEIGHT: 68 IN | RESPIRATION RATE: 18 BRPM | SYSTOLIC BLOOD PRESSURE: 114 MMHG | DIASTOLIC BLOOD PRESSURE: 68 MMHG | OXYGEN SATURATION: 96 % | WEIGHT: 188 LBS | HEART RATE: 68 BPM

## 2019-11-11 DIAGNOSIS — R01.1 MURMUR: ICD-10-CM

## 2019-11-11 DIAGNOSIS — Z79.01 LONG TERM (CURRENT) USE OF ANTICOAGULANTS: Primary | ICD-10-CM

## 2019-11-11 DIAGNOSIS — R01.1 MURMUR, CARDIAC: ICD-10-CM

## 2019-11-11 DIAGNOSIS — I48.92 ATRIAL FLUTTER (HCC): Primary | ICD-10-CM

## 2019-11-11 DIAGNOSIS — I50.30 DIASTOLIC HEART FAILURE (HCC): ICD-10-CM

## 2019-11-11 DIAGNOSIS — I48.3 TYPICAL ATRIAL FLUTTER (HCC): ICD-10-CM

## 2019-11-11 DIAGNOSIS — I25.810 ATHEROSCLEROSIS OF CORONARY ARTERY BYPASS GRAFT OF NATIVE HEART WITHOUT ANGINA PECTORIS: ICD-10-CM

## 2019-11-11 PROCEDURE — 99214 OFFICE O/P EST MOD 30 MIN: CPT | Performed by: INTERNAL MEDICINE

## 2019-11-11 RX ORDER — WARFARIN SODIUM 5 MG/1
TABLET ORAL
Qty: 30 TABLET | Refills: 6 | Status: SHIPPED | OUTPATIENT
Start: 2019-11-11 | End: 2020-05-07

## 2019-11-11 NOTE — TELEPHONE ENCOUNTER
Jerry Lyn from [address] [place] [address] Isabella called on 11/6/19 with request for recent office notes and PFT test results from patient indicating the need for oxygen  The patient has not had PFT done in our office  I called the patient on 11/6/19 and left a voicemail explaining the need for PFT in order to have Medicare cover his oxygen costs  Patient has not returned my call as of 11/11/19  Form from [address] [place], [place] [address]antonio Mason has been scanned into his chart

## 2019-11-11 NOTE — PROGRESS NOTES
Cardiology Outpatient Follow up Note    Moises Aguirre 78 y o  male MRN: [id number]    11/11/19          Assessment/Plan:  1  Atrial flutter s/p DCCV- now in NSR  · Continue diltiazem    · [id number]: 6- due to financial reasons, he is interested in switching to coumadin  Will enroll him in coumadin clinic      2  CAD with prior CABG ×4 2/2001  · LIMA to LAD, SVG to the RCA, sequential SVG to the diagonal and OM1  · Lexiscan 2016: No ischemia; normal LVEF  · Cont aspirin and atorvastatin  BB discontinued due to emphysema     3  Hx of Left CEA following an episode of amaurosis fugax  · Cont aspirin and atorvastatin  No acute concerns      4  Chronic diastolic heart failure  · Currently euvolemic  Cont lasix 40mg PO daily  5  Severe COPD/Bronchiectatic lung- follows with [facility]     5  Dyslipidemia- cont atorvastatin    6  Hx of prior CVA- Cont aspirin and atorvastatin    7  Systolic murmur- check TTE      1  Atrial flutter (HCC)  warfarin (COUMADIN) 5 mg tablet   2  Murmur  Echo complete with contrast if indicated   3  Typical atrial flutter ([address] )     4  Murmur, cardiac     5  Atherosclerosis of coronary artery bypass graft of native heart without angina pectoris     6  Diastolic heart failure (HCC)         HPI: Moises Aguirre is a 78y o  year old male with a history of CAD s/p CABG and PCI as above who presents for a routine follow up       Past medical hx:   · Typical Atrial flutter diagnosed during a hospitalization for pneumonia from 10/7 to 10/10/18 and was started on diltiazem and Xarelto  He underwent elective RENEE/DCCV on 11/2  · RENEE: normal LV systolic function with no evidence of left atrial or left atrial appendage thrombus  He underwent a successful DCCV with conversion to normal sinus rhythm  · He was admitted at [facility] in 1/2019 after a CT scan revealed 2 large air-fluid filled cavities in the left upper lobe consistent with infected bullae  He underwent bronchoscopy and lavage    Tissue pathology was negative for granuloma or malignancy  He is doing reasonably well from a cardiac perspective today  He states that Xarelto is too expensive and wishes to switch to Coumadin  He denies chest pain, palpitations, dyspnea on exertion, lower extremity edema or any other concerns at this time  He has been compliant with his medications and tolerating them without side effect      Murmur        Patient Active Problem List   Diagnosis    CAD (coronary atherosclerotic disease)    Hypertension    Hyperlipemia    Atypical chest pain    Hypothyroid    GERD (gastroesophageal reflux disease)    History of stroke    Sciatica of right side    Hyperlipidemia    [facility]    Arthritis    Stenosis of left carotid artery    S/P CABG x 4    Acute left-sided low back pain with left-sided sciatica    Back pain    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    Vision changes    Urinary retention due to benign prostatic hyperplasia    Bladder cancer ([facility] )    CKD (chronic kidney disease), stage II    Atrial flutter (HCC)    Irregular heart beat    Localized edema    Chronic respiratory failure with hypoxia (HCC)    COPD, severe (HCC)    Bronchiectasis with acute lower respiratory infection (HCC)    Abnormal CT of the chest    Tinnitus aurium, bilateral    Sensorineural hearing loss (SNHL) of both ears    Medicare annual wellness visit, subsequent    Anemia    DDD (degenerative disc disease), lumbar       Allergies   Allergen Reactions    Penicillins Swelling and Itching         Current Outpatient Medications:     aspirin 81 mg chewable tablet, Chew 1 tablet (81 mg total) daily, Disp: 60 tablet, Rfl: 6    atorvastatin (LIPITOR) 40 mg tablet, Take 1 tablet (40 mg total) by mouth daily, Disp: 90 tablet, Rfl: 6    diltiazem (CARDIZEM CD) 240 mg 24 hr capsule, Take 1 capsule (240 mg total) by mouth daily, Disp: 90 capsule, Rfl: 6    esomeprazole (NexIUM) 20 mg capsule, Take 20 mg by mouth every other day  , Disp: , Rfl:     ferrous sulfate 324 (65 Fe) mg, Take 1 tablet (324 mg total) by mouth daily before breakfast, Disp: 30 tablet, Rfl: 1    furosemide (LASIX) 40 mg tablet, Take 1 tablet (40 mg total) by mouth daily, Disp: 60 tablet, Rfl: 6    albuterol (PROAIR HFA) 90 mcg/act inhaler, Inhale 2 puffs every 6 (six) hours as needed for wheezing or shortness of breath (Patient not taking: Reported on 11/11/2019), Disp: 8 5 g, Rfl: 0    warfarin (COUMADIN) 5 mg tablet, Take as instructed by the coumadin clinic, Disp: 30 tablet, Rfl: 6    Past Medical History:   Diagnosis Date    [facility]     Arthritis     Benign prostatic hyperplasia without lower urinary tract symptoms     without Urinary Obstruction    CAD (coronary artery disease)     Chronic obstructive lung disease (HCC)     Coronary arteriosclerosis     Depression     Emphysema lung (HCC)     GERD (gastroesophageal reflux disease)     Hyperlipidemia     Hypertension     Myocardial infarction (HCC)     Psoriasis     Requires supplemental oxygen     at bedtime during high humid days only    Stroke [facility])     TIA 1/2018       Family History   Problem Relation Age of Onset    Lung cancer Mother     Cancer Mother     Other Father         sepsis       Past Surgical History:   Procedure Laterality Date    COLONOSCOPY      CORONARY ANGIOPLASTY  02/03/2001    PTCA of RCA    CORONARY ARTERY BYPASS GRAFT  02/07/2001    x4- [place]    HERNIA REPAIR      UT BRONCHOSCOPY,DIAGNOSTIC Left 1/7/2019    Procedure: BRONCHOSCOPY FLEXIBLE;  Surgeon: Karyna Knott MD;  Location: [facility];   Service: Pulmonary    UT THROMBOENDARTECTMY NECK,NECK INCIS Left 2/20/2018    Procedure: ENDARTERECTOMY ARTERY CAROTID WITH PATCH ANGIOPLASTY;  Surgeon: Igor Alvarenga MD;  [facility];  Service: Vascular    TRANSURETHRAL RESECTION OF PROSTATE         Social History     Socioeconomic History    Marital status:      Spouse name: Not on file    Number of children: 3    Years of education: Not on file    Highest education level: Not on file   Occupational History    Occupation: retired    Social Needs    Financial resource strain: Not on file    Food insecurity:     Worry: Not on file     Inability: Not on file   Truzip needs:     Medical: Not on file     Non-medical: Not on file   Tobacco Use    Smoking status: Former Smoker     Packs/day: 1 00     Years: 3 00     Pack years: 3 00     Types: Cigarettes     Last attempt to quit:      Years since quittin 9    Smokeless tobacco: Never Used    Tobacco comment: Has a past history of cigarette smoking;Quit date ; 5 packs year history, per [employer]     Substance and Sexual Activity    Alcohol use: Yes     Comment: occasional wine   Drug use: No    Sexual activity: Yes   Lifestyle    Physical activity:     Days per week: Not on file     Minutes per session: Not on file    Stress: Not on file   Relationships    Social connections:     Talks on phone: Not on file     Gets together: Not on file     Attends [employer] service: Not on file     Active member of club or organization: Not on file     Attends meetings of clubs or organizations: Not on file     Relationship status: Not on file    Intimate partner violence:     Fear of current or ex partner: Not on file     Emotionally abused: Not on file     Physically abused: Not on file     Forced sexual activity: Not on file   Other Topics Concern    Not on file   Social History Narrative    Lives with granddaughter and daughter     Caffeine use, He admits to consuming caffeine VIA coffee ( 8 servings per day), per [employer]    Martial History: Currently , per [employer]    Occupation: Retired (prior occupational:  repairman), per [employer]        Review of Systems   Constitution: Negative for diaphoresis, weight gain and weight loss     HENT: Negative for congestion  Cardiovascular: Negative for chest pain, dyspnea on exertion, irregular heartbeat, leg swelling, near-syncope, orthopnea, palpitations, paroxysmal nocturnal dyspnea and syncope  Respiratory: Negative for shortness of breath, sleep disturbances due to breathing and snoring  Hematologic/Lymphatic: Does not bruise/bleed easily  Skin: Negative for rash  Musculoskeletal: Negative for myalgias  Gastrointestinal: Negative for nausea and vomiting  Neurological: Negative for excessive daytime sleepiness and light-headedness  Psychiatric/Behavioral: The patient is not nervous/anxious  Vitals: /68   Pulse 68   Resp 18   Ht 5' 8" (1 727 m)   Wt 85 3 kg (188 lb)   SpO2 96%   BMI 28 59 kg/m²       Physical Exam:     GEN: Alert and oriented x 3, in no acute distress  Well appearing and well nourished  HEENT: Sclera anicteric, conjunctivae pink, mucous membranes moist  Oropharynx clear  NECK: Supple, no carotid bruits, no significant JVD  Trachea midline, no thyromegaly  HEART: Regular rhythm, normal S1 and T3,7/7 CHANDAN, no clicks, gallops or rubs  PMI nondisplaced, no thrills  LUNGS: Clear to auscultation bilaterally; no wheezes, rales, or rhonchi  No increased work of breathing or signs of respiratory distress  ABDOMEN: Soft, nontender, nondistended, normoactive bowel sounds  EXTREMITIES: Skin warm and well perfused, no clubbing, cyanosis, or edema  NEURO: No focal findings  Normal speech  Mood and affect normal    SKIN: Normal without suspicious lesions on exposed skin  Lab Results:       Lab Results   Component Value Date    HGBA1C 5 0 01/13/2018    HGBA1C 4 8 08/15/2016     Lab Results   Component Value Date    CHOL See scanned summary  08/15/2016    CHOL See scanned summary  03/17/2016     Lab Results   Component Value Date    HDL 44 01/14/2018    HDL See scanned summary   08/15/2016    HDL 46 08/14/2016     Lab Results   Component Value Date    LDLCALC 105 (H) 01/14/2018    LDLCALC 72 08/14/2016     Lab Results   Component Value Date    TRIG 81 01/14/2018    TRIG See scanned summary   08/15/2016    TRIG 110 08/14/2016     No results found for: CHOLHDL

## 2019-11-14 ENCOUNTER — TELEPHONE (OUTPATIENT)
Dept: CARDIOLOGY CLINIC | Facility: CLINIC | Age: 79
End: 2019-11-14

## 2019-11-14 ENCOUNTER — APPOINTMENT (OUTPATIENT)
Dept: LAB | Facility: CLINIC | Age: 79
End: 2019-11-14
Payer: MEDICARE

## 2019-11-14 LAB
INR PPP: 1.14 (ref 0.84–1.19)
PROTHROMBIN TIME: 14.2 SECONDS (ref 11.6–14.5)

## 2019-11-14 PROCEDURE — 85610 PROTHROMBIN TIME: CPT

## 2019-11-14 PROCEDURE — 36415 COLL VENOUS BLD VENIPUNCTURE: CPT

## 2019-11-14 NOTE — TELEPHONE ENCOUNTER
Pt came in the office today confused when he should go for his INR  I told him that he is to go on Monday  He also said that he had diarrhea last night and this am  Told him he can take imodium if need be

## 2019-11-26 ENCOUNTER — TELEPHONE (OUTPATIENT)
Dept: CARDIOLOGY CLINIC | Facility: CLINIC | Age: 79
End: 2019-11-26

## 2019-11-27 ENCOUNTER — APPOINTMENT (OUTPATIENT)
Dept: LAB | Facility: CLINIC | Age: 79
End: 2019-11-27
Payer: MEDICARE

## 2019-11-27 ENCOUNTER — TRANSCRIBE ORDERS (OUTPATIENT)
Dept: LAB | Facility: HOSPITAL | Age: 79
End: 2019-11-27

## 2019-11-29 ENCOUNTER — ANTICOAG VISIT (OUTPATIENT)
Dept: CARDIOLOGY CLINIC | Facility: CLINIC | Age: 79
End: 2019-11-29

## 2019-11-29 NOTE — PROGRESS NOTES
Notes recorded by Jay Rae MD on 11/29/2019 at 10:50 AM EST  Stay on same dose of Coumadin   Recheck on Monday  LM for patient to stay on same dose and recheck on Monday

## 2019-12-04 ENCOUNTER — TELEPHONE (OUTPATIENT)
Dept: ADMINISTRATIVE | Facility: HOSPITAL | Age: 79
End: 2019-12-04

## 2019-12-04 ENCOUNTER — TELEPHONE (OUTPATIENT)
Dept: CARDIOLOGY CLINIC | Facility: CLINIC | Age: 79
End: 2019-12-04

## 2019-12-04 NOTE — TELEPHONE ENCOUNTER
[]Select All               Information and warnings  Appointment selection checkbox  Date Time Mundo Providers Departments Visit Type                    [x] 12/17/19 11:30  [facility] [[id number]] [facility] [[id number]] RES REVIEW [[id number]]     pt was a no show for testing - called pt to rs testing and ov - left message

## 2019-12-04 NOTE — TELEPHONE ENCOUNTER
Pt walked in to [facility], wanted to make everyone aware of his new Phone number [phone]  Pt also wanted to make [facility] aware that he is getting his INR drawn tomorrow

## 2019-12-05 ENCOUNTER — APPOINTMENT (OUTPATIENT)
Dept: LAB | Facility: HOSPITAL | Age: 79
End: 2019-12-05
Attending: INTERNAL MEDICINE
Payer: MEDICARE

## 2019-12-05 ENCOUNTER — ANTICOAG VISIT (OUTPATIENT)
Dept: CARDIOLOGY CLINIC | Facility: CLINIC | Age: 79
End: 2019-12-05

## 2019-12-05 DIAGNOSIS — Z79.01 LONG TERM (CURRENT) USE OF ANTICOAGULANTS: ICD-10-CM

## 2019-12-05 LAB
INR PPP: 2.78 (ref 0.84–1.19)
PROTHROMBIN TIME: 29.7 SECONDS (ref 11.6–14.5)

## 2019-12-05 PROCEDURE — 85610 PROTHROMBIN TIME: CPT

## 2019-12-05 PROCEDURE — 36415 COLL VENOUS BLD VENIPUNCTURE: CPT

## 2019-12-06 ENCOUNTER — HOSPITAL ENCOUNTER (OUTPATIENT)
Dept: CT IMAGING | Facility: HOSPITAL | Age: 79
Discharge: HOME | End: 2019-12-06
Payer: MEDICARE

## 2019-12-06 DIAGNOSIS — R93.89 ABNORMAL CHEST CT: ICD-10-CM

## 2019-12-06 PROCEDURE — 71250 CT THORAX DX C-: CPT

## 2019-12-12 ENCOUNTER — TELEPHONE (OUTPATIENT)
Dept: CARDIOLOGY CLINIC | Facility: CLINIC | Age: 79
End: 2019-12-12

## 2019-12-12 NOTE — TELEPHONE ENCOUNTER
Pt called & said that he has no transportation to go to the [facility] to get his blood drawn for his PT INR & would like to be set up to do it at home

## 2019-12-12 NOTE — TELEPHONE ENCOUNTER
lmom stating that he can't get the home machine until after the 90day ankur period of being on Coumadin  It is a medicare guideline  I also told him that I can't set him up for vna because it is not skilled nursing and they won't get reimbursed  Also can't set him up with home phlebotomy because he needs to be home bound  Told pt that until he completes the 90 ankur period he will have to go to the lab  Will start the paper work for the home machine so it is ready to released on day 91

## 2019-12-16 ENCOUNTER — TELEPHONE (OUTPATIENT)
Dept: PULMONOLOGY | Facility: CLINIC | Age: 79
End: 2019-12-16

## 2019-12-16 NOTE — TELEPHONE ENCOUNTER
Left message for Mr  Jalil Servin as he was a no show for his appointment earlier this afternoon with me  He did not show for his last scheduled appointment with me either  I briefly reviewed CT findings on message, telling him that scan continues to show improvement in SHARONA cavitary consolidation & decreased airspace opacity & gas  Left effusion has resolved  I have asked him to call the office to reschedule an appointment to further review imaging and decide upon timing of repeat imaging (6months -1 year with earliest imaging to be done in June 2020)  Office number left on machine

## 2019-12-17 ENCOUNTER — APPOINTMENT (OUTPATIENT)
Dept: LAB | Facility: HOSPITAL | Age: 79
End: 2019-12-17
Attending: INTERNAL MEDICINE
Payer: MEDICARE

## 2019-12-17 ENCOUNTER — ANTICOAG VISIT (OUTPATIENT)
Dept: CARDIOLOGY CLINIC | Facility: CLINIC | Age: 79
End: 2019-12-17

## 2019-12-17 DIAGNOSIS — Z79.01 LONG TERM (CURRENT) USE OF ANTICOAGULANTS: ICD-10-CM

## 2019-12-17 LAB
INR PPP: 2.32 (ref 0.84–1.19)
PROTHROMBIN TIME: 25.8 SECONDS (ref 11.6–14.5)

## 2019-12-17 PROCEDURE — 85610 PROTHROMBIN TIME: CPT

## 2019-12-17 PROCEDURE — 36415 COLL VENOUS BLD VENIPUNCTURE: CPT

## 2019-12-19 ENCOUNTER — TELEPHONE (OUTPATIENT)
Dept: PULMONOLOGY | Facility: CLINIC | Age: 79
End: 2019-12-19

## 2019-12-19 NOTE — TELEPHONE ENCOUNTER
Telephone note- Pulmonary Medicine   Katlyn [place] 78 y o  male MRN: [id number]    Reason for call:    Call back   Pertinent details:    Left message    Again stated   Left upper lobe cavitary consolidation improvement as well as decrease in airspace opacity in gas and left effusion has resolved    I did re-emphasize importance of him following in the office for repeat imaging    MalcomMYLENE Connolly

## 2019-12-19 NOTE — TELEPHONE ENCOUNTER
Patient called asking about his results  I advised him Tae Bridges called him on Monday and left a message  He said he never got it  I did schedule him for a follow up on 1/7  Can you call him to go over the results

## 2019-12-26 ENCOUNTER — TELEPHONE (OUTPATIENT)
Dept: ADMINISTRATIVE | Facility: HOSPITAL | Age: 79
End: 2019-12-26

## 2020-01-07 ENCOUNTER — OFFICE VISIT (OUTPATIENT)
Dept: PULMONOLOGY | Facility: CLINIC | Age: 80
End: 2020-01-07
Payer: MEDICARE

## 2020-01-07 ENCOUNTER — DOCUMENTATION (OUTPATIENT)
Dept: PULMONOLOGY | Facility: CLINIC | Age: 80
End: 2020-01-07

## 2020-01-07 VITALS
HEIGHT: 68 IN | WEIGHT: 186 LBS | BODY MASS INDEX: 28.19 KG/M2 | HEART RATE: 71 BPM | TEMPERATURE: 97.4 F | OXYGEN SATURATION: 96 % | DIASTOLIC BLOOD PRESSURE: 82 MMHG | SYSTOLIC BLOOD PRESSURE: 130 MMHG

## 2020-01-07 DIAGNOSIS — J44.9 COPD, SEVERE (HCC): ICD-10-CM

## 2020-01-07 DIAGNOSIS — R93.89 ABNORMAL CHEST CT: Primary | ICD-10-CM

## 2020-01-07 DIAGNOSIS — R93.89 ABNORMAL CT OF THE CHEST: ICD-10-CM

## 2020-01-07 DIAGNOSIS — J96.11 CHRONIC RESPIRATORY FAILURE WITH HYPOXIA (HCC): ICD-10-CM

## 2020-01-07 PROCEDURE — 99214 OFFICE O/P EST MOD 30 MIN: CPT | Performed by: PHYSICIAN ASSISTANT

## 2020-01-07 PROCEDURE — 94618 PULMONARY STRESS TESTING: CPT | Performed by: PHYSICIAN ASSISTANT

## 2020-01-07 NOTE — ASSESSMENT & PLAN NOTE
· He does have supplemental oxygen at home which she uses at 2 L as he feels he needs  · He has been contacted by his supply company stating he will need to start pain for his oxygen  We called [facility] today and they informed us he needed a 6 MWT to re-qualify for oxygen  · 6 minute walk test was done on room air  Results were reviewed with patient  He started at 98% on room air with a heart rate of 73  He was able to ambulate for a total of 207 meters and saturations ranged between 99 and 95%  Does not need supplemental oxygen and order was placed for it to be discontinued

## 2020-01-07 NOTE — ASSESSMENT & PLAN NOTE
· Stable and at baseline  · He continues to refuse all inhaler therapy  He is not interested in starting any medications  · He will continue to use his incentive spirometer intermittently at home

## 2020-01-07 NOTE — PROGRESS NOTES
Pulmonary Follow Up Note   Ambrosio Chappell 78 y o  male MRN: [id number]  1/7/2020      Assessment:    Abnormal CT of the chest  · Most recent CT scan done in early December was reviewed with the patient today  There continues to be further clearing of the left upper lobe consolidation when compared to previous imaging done in June  Prior left effusion has now resolved  · I discussed repeat imaging with Mr Heidy Bloom today  I would recommend a repeat CT scan in 6-12 months  He would prefer to reimage closer to the six-month chris  I have placed an order  He will follow up after that scan is complete  I told him that if left upper lobe consolidation continues to improve then at that time we will plan to reimage him 1 year from then  COPD, severe ([address] )  · Stable and at baseline  · He continues to refuse all inhaler therapy  He is not interested in starting any medications  · He will continue to use his incentive spirometer intermittently at home  Chronic respiratory failure with hypoxia (HCC)  · He does have supplemental oxygen at home which she uses at 2 L as he feels he needs  · He has been contacted by his supply company stating he will need to start pain for his oxygen  We called [facility] today and they informed us he needed a 6 MWT to re-qualify for oxygen  · 6 minute walk test was done on room air  Results were reviewed with patient  He started at 98% on room air with a heart rate of 73  He was able to ambulate for a total of 207 meters and saturations ranged between 99 and 95%  Does not need supplemental oxygen and order was placed for it to be discontinued          Plan:    Diagnoses and all orders for this visit:    Abnormal chest CT  -     CT chest without contrast; Future    Chronic respiratory failure with hypoxia (HCC)  -     POCT 6 minute walk  -     Discontinue home oxygen    COPD, severe ([address] )  -     Discontinue home oxygen    Abnormal CT of the chest      -He will follow up in 6 months after CT scan is complete or sooner if problems arise  All questions were answered  He was instructed to call the office with any questions or changes in his breathing  History of Present Illness   HPI:  Stalin Tsang is a 78 y o  male who presents to the office this afternoon for follow-up of his recent CT scan  He was last seen by myself in April and at that time was doing very well from a pulmonary standpoint  His CT scan continued to improve and it was recommended that he have a repeat scan in 6 months with follow-up  He has a significant past medical history notable for a hospitalization in December of 2018 which was secondary to a lower respiratory infection  He has known bronchiectasis and severe COPD with an FEV1 of 23% of predicted back in November of 2018  During that hospitalization, he was treated with a 14 day course of Levaquin and his symptoms improved  Bronchoscopy was discussed, however, he was uninterested in pursuing that or any other invasive procedures  At that time, he was felt to be at high risk for opportunistic infections including MRSA, Pseudomonas and Aspergillus  He underwent a follow-up CT scan in early January 2019 and later that day was called by his family physician and told to go directly to the emergency room  He was admitted on January 4th and seen by us in pulmonary consultation due to his worsening abnormal CT of the chest   He was found to have areas of pneumonia that had improved, however, he had a large air-fluid cavity in his left upper lobe which was thought to be an infected bulla  He ultimately ended up undergoing bronchoscopy on January 7th without any complications and was discharged home the next day  All cultures and cytology from bronchoscopy were negative  He has undergone multiple repeat CT scans that continue to slowly show further clearing of the left upper lobe consolidation      Again, he continues to do well from a pulmonary standpoint and feels he is at his pulmonary baseline  He still is quite active, however, 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  He does work at a local flea market in [employer]  He denies cough, sputum production, hemoptysis or bronchospasm  He denies resting shortness of breath, pleurisy or fevers  He denies chest pain  His lower extremity edema is much improved with water pills  He has chronic dyspnea on exertion especially when ambulating stairs  He does have oxygen at home which she rarely uses and tells me that his oxygen supply company has been requesting money from him  He continues to refuse inhalers and has done so for quite some time  He does not even have a rescue inhaler  He has a minimal remote tobacco history smoking a pack a day for 3 years  He quit in 1956  He does have a family history of lung cancer as his mother passed from this  Review of Systems   Constitutional: Negative  HENT: Negative  Eyes: Negative  Respiratory: Positive for shortness of breath  Negative for apnea, cough, choking, chest tightness, wheezing and stridor  Cardiovascular: Negative  Gastrointestinal: Negative  Endocrine: Negative  Genitourinary: Negative  Musculoskeletal: Negative  Skin: Negative  Allergic/Immunologic: Negative  Neurological: Negative  Psychiatric/Behavioral: Negative          Historical Information   Past Medical History:   Diagnosis Date    A-fib ([facility] )     Arthritis     Benign prostatic hyperplasia without lower urinary tract symptoms     without Urinary Obstruction    CAD (coronary artery disease)     Chronic obstructive lung disease (HCC)     Coronary arteriosclerosis     Depression     Emphysema lung (HCC)     GERD (gastroesophageal reflux disease)     Hyperlipidemia     Hypertension     Myocardial infarction (HCC)     Psoriasis     Requires supplemental oxygen     at bedtime during high humid days only    Stroke [facility])     TIA 2018     Past Surgical History:   Procedure Laterality Date    COLONOSCOPY      CORONARY ANGIOPLASTY  2001    PTCA of RCA    CORONARY ARTERY BYPASS GRAFT  02/07/2001    x4- [facility]    HERNIA REPAIR      FL BRONCHOSCOPY,DIAGNOSTIC Left 2019    Procedure: Oral Alicia;  Surgeon: Elenita Ortega MD;  [facility];   Service: Pulmonary    FL THROMBOENDARTECTMY NECK,NECK INCIS Left 2018    Procedure: ENDARTERECTOMY ARTERY CAROTID WITH PATCH ANGIOPLASTY;  Surgeon: Lucien Ward MD;  [facility];  Service: Vascular    TRANSURETHRAL RESECTION OF PROSTATE       Family History   Problem Relation Age of Onset    Lung cancer Mother     Cancer Mother     Other Father         sepsis       Social History     Tobacco Use   Smoking Status Former Smoker    Packs/day: 1 00    Years: 3     Pack years: 3 00    Types: Cigarettes    Last attempt to quit: Teresa Temple Years since quittin 0   Smokeless Tobacco Never Used   Tobacco Comment    Has a past history of cigarette smoking;Quit date ; 5 packs year history, per [employer]           Meds/Allergies     Current Outpatient Medications:     albuterol (PROAIR HFA) 90 mcg/act inhaler, Inhale 2 puffs every 6 (six) hours as needed for wheezing or shortness of breath (Patient not taking: Reported on 2019), Disp: 8 5 g, Rfl: 0    aspirin 81 mg chewable tablet, Chew 1 tablet (81 mg total) daily, Disp: 60 tablet, Rfl: 6    atorvastatin (LIPITOR) 40 mg tablet, Take 1 tablet (40 mg total) by mouth daily, Disp: 90 tablet, Rfl: 6    diltiazem (CARDIZEM CD) 240 mg 24 hr capsule, Take 1 capsule (240 mg total) by mouth daily, Disp: 90 capsule, Rfl: 6    esomeprazole (NexIUM) 20 mg capsule, Take 20 mg by mouth every other day  , Disp: , Rfl:     ferrous sulfate 324 (65 Fe) mg, Take 1 tablet (324 mg total) by mouth daily before breakfast, Disp: 30 tablet, Rfl: 1    furosemide (LASIX) 40 mg tablet, Take 1 tablet (40 mg total) by mouth daily, Disp: 60 tablet, Rfl: 6    warfarin (COUMADIN) 5 mg tablet, Take as instructed by the coumadin clinic, Disp: 30 tablet, Rfl: 6  Allergies   Allergen Reactions    Penicillins Swelling and Itching       Vitals: Blood pressure 130/82, pulse 71, temperature (!) 97 4 °F (36 3 °C), temperature source Oral, height 5' 8" (1 727 m), weight 84 4 kg (186 lb), SpO2 96 %  Body mass index is 28 28 kg/m²  Oxygen Therapy  SpO2: 96 %  Oxygen Therapy: None (Room air)    Physical Exam  Physical Exam   Constitutional: He is oriented to person, place, and time  He appears well-developed and well-nourished  No distress  HENT:   Head: Normocephalic and atraumatic  Eyes: Pupils are equal, round, and reactive to light  Conjunctivae and EOM are normal  No scleral icterus  Neck: Normal range of motion  Neck supple  No JVD present  No tracheal deviation present  Cardiovascular: Normal rate, regular rhythm and normal heart sounds  Exam reveals no gallop and no friction rub  No murmur heard  Pulmonary/Chest: Effort normal  No stridor  No respiratory distress  He has no wheezes  He has no rales  He exhibits no tenderness  Decreased breath sounds throughout bilaterally but clear to auscultation   Abdominal: Soft  Bowel sounds are normal    Musculoskeletal: Normal range of motion  He exhibits no edema  Neurological: He is alert and oriented to person, place, and time  Skin: Skin is warm and dry  No rash noted  He is not diaphoretic  No pallor  Psychiatric: He has a normal mood and affect  His behavior is normal  Judgment and thought content normal    Vitals reviewed  Labs: I have personally reviewed pertinent lab results  , ABG: No results found for: PHART, KHB0LEX, PO2ART, DSH5CZO, B7ASGPTE, BEART, SOURCE, BNP: No results found for: BNP, CBC: No results found for: WBC, HGB, HCT, MCV, PLT, ADJUSTEDWBC, MCH, MCHC, RDW, MPV, NRBC, CMP: No results found for: SODIUM, K, CL, CO2, ANIONGAP, BUN, CREATININE, GLUCOSE, CALCIUM, AST, ALT, ALKPHOS, PROT, BILITOT, EGFR, PT/INR: No results found for: PT, INR, Troponin: No results found for: TROPONINI,   Lab Results   Component Value Date    WBC 5 28 07/30/2019    HGB 10 1 (L) 07/30/2019    HCT 36 6 07/30/2019    MCV 78 (L) 07/30/2019     07/30/2019     Lab Results   Component Value Date    CALCIUM 9 0 01/05/2019    K 4 0 01/05/2019    CO2 29 01/05/2019     01/05/2019    BUN 21 01/05/2019    CREATININE 1 08 01/05/2019     No results found for: IGE  Lab Results   Component Value Date    ALT 23 01/04/2019    AST 17 01/04/2019    ALKPHOS 87 01/04/2019       Imaging and other studies: I have personally reviewed pertinent films in PACS     CTChest 12/6/19  Improving left upper lobe consolidation with decreased airspace opacity in gas  Previously seen left pleural effusion has now resolved  Severe central lobar emphysema large hiatal hernia which are unchanged  Pulmonary function testing: In office spirometry done 11/7/18  FEV1 23% predicted    6 MWT done today 1/7/20  6 minute walk test was done in the office today on room air  Room air resting saturation 98% with a heart rate of 73  He was able to ambulate for a total of 207 meters or 6 minutes on room air  Saturations ranged between 95 and 99%  He does not need supplemental oxygen at rest or with activities

## 2020-01-07 NOTE — ASSESSMENT & PLAN NOTE
· Most recent CT scan done in early December was reviewed with the patient today  There continues to be further clearing of the left upper lobe consolidation when compared to previous imaging done in June  Prior left effusion has now resolved  · I discussed repeat imaging with Mr Jimy Chance today  I would recommend a repeat CT scan in 6-12 months  He would prefer to reimage closer to the six-month chris  I have placed an order  He will follow up after that scan is complete  I told him that if left upper lobe consolidation continues to improve then at that time we will plan to reimage him 1 year from then

## 2020-01-09 ENCOUNTER — ANTICOAG VISIT (OUTPATIENT)
Dept: CARDIOLOGY CLINIC | Facility: CLINIC | Age: 80
End: 2020-01-09

## 2020-01-09 ENCOUNTER — APPOINTMENT (OUTPATIENT)
Dept: LAB | Facility: HOSPITAL | Age: 80
End: 2020-01-09
Attending: INTERNAL MEDICINE
Payer: MEDICARE

## 2020-01-21 ENCOUNTER — ANTICOAG VISIT (OUTPATIENT)
Dept: CARDIOLOGY CLINIC | Facility: CLINIC | Age: 80
End: 2020-01-21

## 2020-01-21 ENCOUNTER — APPOINTMENT (OUTPATIENT)
Dept: LAB | Facility: HOSPITAL | Age: 80
End: 2020-01-21
Attending: INTERNAL MEDICINE
Payer: MEDICARE

## 2020-01-21 NOTE — PROGRESS NOTES
lmom asking if he was holding for some reason   Instructed to take 10 mg x 2 days then resume reg dose of 5mg daily and retest again in 1 week

## 2020-01-26 DIAGNOSIS — I48.3 TYPICAL ATRIAL FLUTTER (HCC): ICD-10-CM

## 2020-01-27 RX ORDER — DILTIAZEM HYDROCHLORIDE 240 MG/1
CAPSULE, EXTENDED RELEASE ORAL
Qty: 90 CAPSULE | Refills: 0 | Status: SHIPPED | OUTPATIENT
Start: 2020-01-27 | End: 2020-02-10 | Stop reason: SDUPTHER

## 2020-01-28 ENCOUNTER — OFFICE VISIT (OUTPATIENT)
Dept: INTERNAL MEDICINE CLINIC | Age: 80
End: 2020-01-28
Payer: MEDICARE

## 2020-01-28 VITALS
BODY MASS INDEX: 29.66 KG/M2 | SYSTOLIC BLOOD PRESSURE: 124 MMHG | OXYGEN SATURATION: 95 % | HEIGHT: 67 IN | TEMPERATURE: 98.3 F | DIASTOLIC BLOOD PRESSURE: 64 MMHG | WEIGHT: 189 LBS | HEART RATE: 72 BPM

## 2020-01-28 DIAGNOSIS — N18.2 CKD (CHRONIC KIDNEY DISEASE), STAGE II: ICD-10-CM

## 2020-01-28 DIAGNOSIS — I25.810 ATHEROSCLEROSIS OF CORONARY ARTERY BYPASS GRAFT OF NATIVE HEART WITHOUT ANGINA PECTORIS: ICD-10-CM

## 2020-01-28 DIAGNOSIS — I48.92 ATRIAL FLUTTER, UNSPECIFIED TYPE (HCC): ICD-10-CM

## 2020-01-28 DIAGNOSIS — I10 ESSENTIAL HYPERTENSION: ICD-10-CM

## 2020-01-28 DIAGNOSIS — C67.9 MALIGNANT NEOPLASM OF URINARY BLADDER, UNSPECIFIED SITE (HCC): ICD-10-CM

## 2020-01-28 DIAGNOSIS — I65.22 STENOSIS OF LEFT CAROTID ARTERY: ICD-10-CM

## 2020-01-28 DIAGNOSIS — I50.32 CHRONIC DIASTOLIC HEART FAILURE (HCC): ICD-10-CM

## 2020-01-28 DIAGNOSIS — J43.2 CENTRILOBULAR EMPHYSEMA (HCC): ICD-10-CM

## 2020-01-28 DIAGNOSIS — K21.9 GASTROESOPHAGEAL REFLUX DISEASE WITHOUT ESOPHAGITIS: Primary | ICD-10-CM

## 2020-01-28 PROBLEM — E03.9 HYPOTHYROID: Status: RESOLVED | Noted: 2018-01-13 | Resolved: 2020-01-28

## 2020-01-28 PROCEDURE — 99214 OFFICE O/P EST MOD 30 MIN: CPT | Performed by: INTERNAL MEDICINE

## 2020-01-28 NOTE — PROGRESS NOTES
Assessment/Plan:    1  Essential hypertension  Blood pressure is well controlled on present dose of diltiazem 240 mg along with the Lasix 40 mg daily  2  Chronic atrial fibrillation/flutter  Heart rate is well controlled  Will continue with the present dose of diltiazem  Patient is also on Coumadin for long-term anticoagulation  Patient is also being followed by cardiologist    3  COPD/emphysema  Stable on present inhaler  Also being followed by pulmonologist    4  CKD stage 2  Renal function is stable  Slightly better than previous 1  Will continue to monitor     Diagnoses and all orders for this visit:    Gastroesophageal reflux disease without esophagitis    Centrilobular emphysema (HCC)    Atherosclerosis of coronary artery bypass graft of native heart without angina pectoris  -     Lipid Panel with Direct LDL reflex; Future    Essential hypertension  -     Lipid Panel with Direct LDL reflex; Future    Stenosis of left carotid artery    Atrial flutter, unspecified type (HCC)    CKD (chronic kidney disease), stage II  -     Basic metabolic panel; Future  -     CBC; Future    Chronic diastolic heart failure (HCC)    Malignant neoplasm of urinary bladder, unspecified site [facility])               Subjective:          Patient ID: Clark Vega is a 78 y o  male  Patient came to the office for regular follow-up  Does have blood work last month and would like to discuss results  He also kept his appointment with his other specialists including pulmonologist   No new complaints  The following portions of the patient's history were reviewed and updated as appropriate: allergies, current medications, past family history, past medical history, past social history, past surgical history and problem list     Review of Systems   Constitutional: Negative for fatigue and fever  HENT: Positive for hearing loss   Negative for congestion, ear discharge, ear pain, postnasal drip, sinus pressure, sore throat, tinnitus and trouble swallowing  Eyes: Negative for discharge, itching and visual disturbance  Respiratory: Positive for shortness of breath  Negative for cough  Cardiovascular: Negative for chest pain and palpitations  Gastrointestinal: Negative for abdominal pain, diarrhea, nausea and vomiting  Endocrine: Negative for cold intolerance and polyuria  Genitourinary: Negative for difficulty urinating, dysuria and urgency  Musculoskeletal: Negative for arthralgias and neck pain  Skin: Negative for rash  Allergic/Immunologic: Negative for environmental allergies  Neurological: Negative for dizziness, weakness and headaches  Psychiatric/Behavioral: Negative for agitation and behavioral problems  The patient is not nervous/anxious            Past Medical History:   Diagnosis Date    A-fib [facility])     Arthritis     Benign prostatic hyperplasia without lower urinary tract symptoms     without Urinary Obstruction    CAD (coronary artery disease)     Chronic obstructive lung disease (HCC)     Coronary arteriosclerosis     Depression     Emphysema lung (HCC)     GERD (gastroesophageal reflux disease)     Hyperlipidemia     Hypertension     Myocardial infarction (HCC)     Psoriasis     Requires supplemental oxygen     at bedtime during high humid days only    Stroke [facility])     TIA 1/2018         Current Outpatient Medications:     albuterol (PROAIR HFA) 90 mcg/act inhaler, Inhale 2 puffs every 6 (six) hours as needed for wheezing or shortness of breath, Disp: 8 5 g, Rfl: 0    aspirin 81 mg chewable tablet, Chew 1 tablet (81 mg total) daily, Disp: 60 tablet, Rfl: 6    atorvastatin (LIPITOR) 40 mg tablet, Take 1 tablet (40 mg total) by mouth daily, Disp: 90 tablet, Rfl: 6    CARTIA  MG 24 hr capsule, take 1 capsule by mouth once daily, Disp: 90 capsule, Rfl: 0    esomeprazole (NexIUM) 20 mg capsule, Take 20 mg by mouth every other day  , Disp: , Rfl:     ferrous sulfate 324 (65 Fe) mg, Take 1 tablet (324 mg total) by mouth daily before breakfast, Disp: 30 tablet, Rfl: 1    furosemide (LASIX) 40 mg tablet, Take 1 tablet (40 mg total) by mouth daily, Disp: 60 tablet, Rfl: 6    warfarin (COUMADIN) 5 mg tablet, Take as instructed by the coumadin clinic, Disp: 30 tablet, Rfl: 6    Allergies   Allergen Reactions    Penicillins Swelling and Itching       Social History   Past Surgical History:   Procedure Laterality Date    COLONOSCOPY      CORONARY ANGIOPLASTY  02/03/2001    PTCA of RCA    CORONARY ARTERY BYPASS GRAFT  02/07/2001    x4- [facility]    HERNIA REPAIR      ID BRONCHOSCOPY,DIAGNOSTIC Left 1/7/2019    Procedure: BRONCHOSCOPY FLEXIBLE;  Surgeon: Sterling Barrera MD;  [facility]; Service: Pulmonary    ID THROMBOENDARTECTMY NECK,NECK INCIS Left 2/20/2018    Procedure: ENDARTERECTOMY ARTERY CAROTID WITH PATCH ANGIOPLASTY;  Surgeon: Stuart Gaines MD;  [facility];  Service: Vascular    TRANSURETHRAL RESECTION OF PROSTATE       Family History   Problem Relation Age of Onset    Lung cancer Mother     Cancer Mother     Other Father         sepsis       Objective:  /64 (BP Location: Left arm, Patient Position: Sitting, Cuff Size: Adult)   Pulse 72   Temp 98 3 °F (36 8 °C) (Tympanic)   Ht 5' 7 01" (1 702 m)   Wt 85 7 kg (189 lb)   SpO2 95%   BMI 29 59 kg/m²   Body mass index is 29 59 kg/m²  Physical Exam   Constitutional: He appears well-developed  HENT:   Head: Normocephalic  Right Ear: External ear normal    Left Ear: External ear normal    Nose: Nose normal    Mouth/Throat: Oropharynx is clear and moist    Bilateral hearing aid noted  Eyes: Pupils are equal, round, and reactive to light  Conjunctivae and EOM are normal  No scleral icterus  Neck: Normal range of motion  Neck supple  No tracheal deviation present  No thyromegaly present  Cardiovascular: Normal rate, regular rhythm and normal heart sounds  No murmur heard    Pulmonary/Chest: Effort normal  No respiratory distress  He has no wheezes  He has rales  He exhibits no tenderness  Decreased breath sounds throughout lung field noted   Abdominal: Soft  Bowel sounds are normal  He exhibits no mass  There is no tenderness  Musculoskeletal: Normal range of motion  He exhibits no edema  Lymphadenopathy:     He has no cervical adenopathy  Neurological: He is alert  No cranial nerve deficit  Skin: Skin is warm and dry  No rash noted  No erythema  Psychiatric: He has a normal mood and affect   His behavior is normal  Judgment and thought content normal

## 2020-02-04 ENCOUNTER — APPOINTMENT (OUTPATIENT)
Dept: LAB | Facility: HOSPITAL | Age: 80
End: 2020-02-04
Attending: INTERNAL MEDICINE
Payer: MEDICARE

## 2020-02-04 ENCOUNTER — ANTICOAG VISIT (OUTPATIENT)
Dept: CARDIOLOGY CLINIC | Facility: CLINIC | Age: 80
End: 2020-02-04

## 2020-02-04 NOTE — PROGRESS NOTES
S/w pt  States he did not miss any pills or is holding   Instructed to take 7 5mg m/w/f and 5mg the rest and retest again in 1 week

## 2020-02-10 DIAGNOSIS — I48.3 TYPICAL ATRIAL FLUTTER (HCC): ICD-10-CM

## 2020-02-10 NOTE — TELEPHONE ENCOUNTER
Patient came into office, stating that he received the wrong medication, which was Diltiazem ER(CD) 240 mg and previous bottle stated patient was taking Cartia  mg  Patient stated that Diltiazem ER(CD) is giving him symptoms such as diarrhea and that pharmacy stated meds were both the same  Advised patient meds were the same as well and demanded that med was changed  Denied any other symptoms  Patient stated that he wanted med sent with how it was sent in before  Pending

## 2020-02-11 ENCOUNTER — ANTICOAG VISIT (OUTPATIENT)
Dept: CARDIOLOGY CLINIC | Facility: CLINIC | Age: 80
End: 2020-02-11

## 2020-02-11 ENCOUNTER — APPOINTMENT (OUTPATIENT)
Dept: LAB | Facility: HOSPITAL | Age: 80
End: 2020-02-11
Attending: INTERNAL MEDICINE
Payer: MEDICARE

## 2020-02-11 DIAGNOSIS — Z79.01 LONG TERM (CURRENT) USE OF ANTICOAGULANTS: ICD-10-CM

## 2020-02-11 LAB
INR PPP: 3.24 (ref 0.84–1.19)
PROTHROMBIN TIME: 33.5 SECONDS (ref 11.6–14.5)

## 2020-02-11 PROCEDURE — 36415 COLL VENOUS BLD VENIPUNCTURE: CPT

## 2020-02-11 PROCEDURE — 85610 PROTHROMBIN TIME: CPT

## 2020-02-11 NOTE — PROGRESS NOTES
S/w pt   Instructed to take 7 5mg on Monday as he did already and 5 mg the rest and retest again in 1 week

## 2020-02-12 RX ORDER — DILTIAZEM HYDROCHLORIDE 240 MG/1
240 CAPSULE, COATED, EXTENDED RELEASE ORAL DAILY
Qty: 90 CAPSULE | Refills: 3 | Status: SHIPPED | OUTPATIENT
Start: 2020-02-12 | End: 2020-04-02

## 2020-02-13 ENCOUNTER — TELEPHONE (OUTPATIENT)
Dept: NEUROLOGY | Facility: CLINIC | Age: 80
End: 2020-02-13

## 2020-02-13 NOTE — TELEPHONE ENCOUNTER
1st attempt to reschedule appointment from 3/3/2020 to 3/17/2020 or 5/19/2020 with Ana Hernandez in [facility]  No answer  LMOM

## 2020-02-14 NOTE — TELEPHONE ENCOUNTER
2nd attempt to reschedule appointment from 3/3/2020 to 3/17/2020 or 5/19/2020 with Teresa Stallworth in [facility]   No answer   LMOM

## 2020-02-18 ENCOUNTER — ANTICOAG VISIT (OUTPATIENT)
Dept: CARDIOLOGY CLINIC | Facility: CLINIC | Age: 80
End: 2020-02-18

## 2020-02-18 ENCOUNTER — APPOINTMENT (OUTPATIENT)
Dept: LAB | Facility: HOSPITAL | Age: 80
End: 2020-02-18
Attending: INTERNAL MEDICINE
Payer: MEDICARE

## 2020-02-18 DIAGNOSIS — Z79.01 LONG TERM (CURRENT) USE OF ANTICOAGULANTS: ICD-10-CM

## 2020-02-18 LAB
INR PPP: 2.99 (ref 0.84–1.19)
PROTHROMBIN TIME: 31.5 SECONDS (ref 11.6–14.5)

## 2020-02-18 PROCEDURE — 36415 COLL VENOUS BLD VENIPUNCTURE: CPT

## 2020-02-18 PROCEDURE — 85610 PROTHROMBIN TIME: CPT

## 2020-02-18 NOTE — PROGRESS NOTES
Called to check order status on his home machine  Pt was called Friday by [employer] asking  the pt call them back to discuss costs before sending out the machine   I called the pt back and lmom asking him to call [facility] at [phone] opt 1

## 2020-03-03 ENCOUNTER — APPOINTMENT (OUTPATIENT)
Dept: LAB | Facility: HOSPITAL | Age: 80
End: 2020-03-03
Attending: INTERNAL MEDICINE
Payer: MEDICARE

## 2020-03-03 ENCOUNTER — ANTICOAG VISIT (OUTPATIENT)
Dept: CARDIOLOGY CLINIC | Facility: CLINIC | Age: 80
End: 2020-03-03

## 2020-03-03 DIAGNOSIS — Z79.01 LONG TERM (CURRENT) USE OF ANTICOAGULANTS: ICD-10-CM

## 2020-03-03 LAB
INR PPP: 2.09 (ref 0.84–1.19)
PROTHROMBIN TIME: 23.7 SECONDS (ref 11.6–14.5)

## 2020-03-03 PROCEDURE — 85610 PROTHROMBIN TIME: CPT

## 2020-03-03 PROCEDURE — 36415 COLL VENOUS BLD VENIPUNCTURE: CPT

## 2020-03-24 ENCOUNTER — TELEPHONE (OUTPATIENT)
Dept: NEUROLOGY | Facility: CLINIC | Age: 80
End: 2020-03-24

## 2020-03-25 ENCOUNTER — OFFICE VISIT (OUTPATIENT)
Dept: OBGYN CLINIC | Facility: CLINIC | Age: 80
End: 2020-03-25
Payer: MEDICARE

## 2020-03-25 VITALS
HEART RATE: 114 BPM | WEIGHT: 185 LBS | SYSTOLIC BLOOD PRESSURE: 117 MMHG | BODY MASS INDEX: 29.03 KG/M2 | DIASTOLIC BLOOD PRESSURE: 73 MMHG | HEIGHT: 67 IN

## 2020-03-25 DIAGNOSIS — M51.36 DDD (DEGENERATIVE DISC DISEASE), LUMBAR: Primary | ICD-10-CM

## 2020-03-25 DIAGNOSIS — M48.061 SPINAL STENOSIS OF LUMBAR REGION, UNSPECIFIED WHETHER NEUROGENIC CLAUDICATION PRESENT: ICD-10-CM

## 2020-03-25 PROCEDURE — 4040F PNEUMOC VAC/ADMIN/RCVD: CPT | Performed by: ORTHOPAEDIC SURGERY

## 2020-03-25 PROCEDURE — 3074F SYST BP LT 130 MM HG: CPT | Performed by: ORTHOPAEDIC SURGERY

## 2020-03-25 PROCEDURE — 3008F BODY MASS INDEX DOCD: CPT | Performed by: ORTHOPAEDIC SURGERY

## 2020-03-25 PROCEDURE — 1160F RVW MEDS BY RX/DR IN RCRD: CPT | Performed by: ORTHOPAEDIC SURGERY

## 2020-03-25 PROCEDURE — 1036F TOBACCO NON-USER: CPT | Performed by: ORTHOPAEDIC SURGERY

## 2020-03-25 PROCEDURE — 3078F DIAST BP <80 MM HG: CPT | Performed by: ORTHOPAEDIC SURGERY

## 2020-03-25 PROCEDURE — 99213 OFFICE O/P EST LOW 20 MIN: CPT | Performed by: ORTHOPAEDIC SURGERY

## 2020-03-25 NOTE — PROGRESS NOTES
Assessment/Plan:    Patient presents for follow-up regarding chronic axial back pain  He reports pain that is worse when he ambulates and states that he has to lean forward to get relief    He has not done physical therapy recently  He has not updated MRI from 2016    On clinical exam today he has no reproducible tenderness to palpation over the lumbosacral spine  He is neurologically stable with 5/5 strength L2-S1  Negative straight leg raise  I recommend physical therapy  If no improvement we will consider lumbar injections in the way of trigger point/facet blocks and possibly epidurals if his MRI is updated    No problem-specific Assessment & Plan notes found for this encounter  Chronic low back pain with right leg pain  · Start physical therapy once able pending Covid-19  · Follow up in 6 weeks       Problem List Items Addressed This Visit        Musculoskeletal and Integument    DDD (degenerative disc disease), lumbar - Primary      Other Visit Diagnoses     Spinal stenosis of lumbar region, unspecified whether neurogenic claudication present                Subjective:      Patient ID: Carlos Adams is a 78 y o  male  HPI   The patient presents for follow up of low back and right leg  He did not have new MRI with last lumbar MRI in 2016  Today he complains of low back pain with right leg pain  Most activity aggravates  Lumbar flexion alleviates  He has tried oral medications and chiropractic care with limited benefit  The following portions of the patient's history were reviewed and updated as appropriate: allergies, current medications, past family history, past medical history, past social history, past surgical history and problem list     Review of Systems   Constitutional: Negative for chills, fever and unexpected weight change  HENT: Negative for hearing loss, nosebleeds and sore throat  Eyes: Negative for pain, redness and visual disturbance     Respiratory: Negative for cough, shortness of breath and wheezing  Cardiovascular: Negative for chest pain, palpitations and leg swelling  Gastrointestinal: Negative for abdominal pain, nausea and vomiting  Endocrine: Negative for polydipsia and polyuria  Genitourinary: Negative for difficulty urinating and hematuria  Skin: Negative for rash and wound  Psychiatric/Behavioral: Negative for decreased concentration and suicidal ideas  The patient is not nervous/anxious  Objective:      /73   Pulse (!) 114   Ht 5' 7" (1 702 m)   Wt 83 9 kg (185 lb)   BMI 28 98 kg/m²          Physical Exam   Constitutional: He is oriented to person, place, and time  He appears well-developed and well-nourished  HENT:   Right Ear: External ear normal    Left Ear: External ear normal    Nose: Nose normal    Eyes: Conjunctivae are normal    Neck: Normal range of motion  Pulmonary/Chest: Effort normal    Neurological: He is alert and oriented to person, place, and time  Skin: Skin is warm and dry  Psychiatric: He has a normal mood and affect  His behavior is normal  Judgment and thought content normal        Patient ambulates without assistance  Tender to palpation: none  Modified straight leg raise negative bilaterally   Strength L2-S1 5/5 bilaterally   Sensation L2-S1 intact bilaterally     Imagin lumbar MRI reviewed        Scribe Attestation    I,:   Joni Rodrigues am acting as a scribe while in the presence of the attending physician :        I,:   Supa Dowling MD personally performed the services described in this documentation    as scribed in my presence :

## 2020-03-26 ENCOUNTER — ANTICOAG VISIT (OUTPATIENT)
Dept: CARDIOLOGY CLINIC | Facility: CLINIC | Age: 80
End: 2020-03-26

## 2020-03-26 ENCOUNTER — APPOINTMENT (OUTPATIENT)
Dept: LAB | Facility: HOSPITAL | Age: 80
End: 2020-03-26
Attending: INTERNAL MEDICINE
Payer: MEDICARE

## 2020-03-26 DIAGNOSIS — Z79.01 LONG TERM (CURRENT) USE OF ANTICOAGULANTS: ICD-10-CM

## 2020-03-26 LAB
INR PPP: 2.39 (ref 0.84–1.19)
PROTHROMBIN TIME: 26.4 SECONDS (ref 11.6–14.5)

## 2020-03-26 PROCEDURE — 85610 PROTHROMBIN TIME: CPT

## 2020-03-26 PROCEDURE — 36415 COLL VENOUS BLD VENIPUNCTURE: CPT

## 2020-03-31 NOTE — TELEPHONE ENCOUNTER
I see she has albuterol on her list but states she is not taking it. She needs to take it and if no improvement she will need to have a visit.    monisha aware  She also wants Dr Chandni Spear to know that PCP has d/c lisinopril

## 2020-04-02 DIAGNOSIS — I48.3 TYPICAL ATRIAL FLUTTER (HCC): ICD-10-CM

## 2020-04-02 RX ORDER — DILTIAZEM HYDROCHLORIDE 240 MG/1
CAPSULE, EXTENDED RELEASE ORAL
Qty: 90 CAPSULE | Refills: 3 | Status: SHIPPED | OUTPATIENT
Start: 2020-04-02 | End: 2020-04-27

## 2020-04-08 ENCOUNTER — TELEPHONE (OUTPATIENT)
Dept: NEUROLOGY | Facility: CLINIC | Age: 80
End: 2020-04-08

## 2020-04-14 ENCOUNTER — TELEPHONE (OUTPATIENT)
Dept: NEUROLOGY | Facility: CLINIC | Age: 80
End: 2020-04-14

## 2020-04-14 ENCOUNTER — APPOINTMENT (OUTPATIENT)
Dept: LAB | Facility: HOSPITAL | Age: 80
End: 2020-04-14
Attending: INTERNAL MEDICINE
Payer: MEDICARE

## 2020-04-14 ENCOUNTER — ANTICOAG VISIT (OUTPATIENT)
Dept: CARDIOLOGY CLINIC | Facility: CLINIC | Age: 80
End: 2020-04-14

## 2020-04-15 ENCOUNTER — TELEPHONE (OUTPATIENT)
Dept: CARDIOLOGY CLINIC | Facility: CLINIC | Age: 80
End: 2020-04-15

## 2020-04-25 DIAGNOSIS — I48.3 TYPICAL ATRIAL FLUTTER (HCC): ICD-10-CM

## 2020-04-27 RX ORDER — DILTIAZEM HYDROCHLORIDE 240 MG/1
CAPSULE, EXTENDED RELEASE ORAL
Qty: 90 CAPSULE | Refills: 3 | Status: SHIPPED | OUTPATIENT
Start: 2020-04-27 | End: 2021-05-03

## 2020-04-28 ENCOUNTER — ANTICOAG VISIT (OUTPATIENT)
Dept: CARDIOLOGY CLINIC | Facility: CLINIC | Age: 80
End: 2020-04-28

## 2020-04-28 ENCOUNTER — APPOINTMENT (OUTPATIENT)
Dept: LAB | Facility: HOSPITAL | Age: 80
End: 2020-04-28
Attending: INTERNAL MEDICINE
Payer: MEDICARE

## 2020-05-07 DIAGNOSIS — I48.92 ATRIAL FLUTTER (HCC): ICD-10-CM

## 2020-05-07 RX ORDER — WARFARIN SODIUM 5 MG/1
TABLET ORAL
Qty: 30 TABLET | Refills: 6 | Status: SHIPPED | OUTPATIENT
Start: 2020-05-07 | End: 2020-06-01

## 2020-05-14 ENCOUNTER — TELEPHONE (OUTPATIENT)
Dept: CARDIOLOGY CLINIC | Facility: CLINIC | Age: 80
End: 2020-05-14

## 2020-05-18 ENCOUNTER — APPOINTMENT (OUTPATIENT)
Dept: LAB | Facility: HOSPITAL | Age: 80
End: 2020-05-18
Payer: MEDICARE

## 2020-05-18 ENCOUNTER — TELEPHONE (OUTPATIENT)
Dept: CARDIOLOGY CLINIC | Facility: CLINIC | Age: 80
End: 2020-05-18

## 2020-05-18 DIAGNOSIS — N18.2 CKD (CHRONIC KIDNEY DISEASE), STAGE II: ICD-10-CM

## 2020-05-18 DIAGNOSIS — I25.810 ATHEROSCLEROSIS OF CORONARY ARTERY BYPASS GRAFT OF NATIVE HEART WITHOUT ANGINA PECTORIS: ICD-10-CM

## 2020-05-18 DIAGNOSIS — I10 ESSENTIAL HYPERTENSION: ICD-10-CM

## 2020-05-18 LAB
ANION GAP SERPL CALCULATED.3IONS-SCNC: 10 MMOL/L (ref 4–13)
BUN SERPL-MCNC: 30 MG/DL (ref 5–25)
CALCIUM SERPL-MCNC: 9.1 MG/DL (ref 8.3–10.1)
CHLORIDE SERPL-SCNC: 105 MMOL/L (ref 100–108)
CHOLEST SERPL-MCNC: 145 MG/DL (ref 50–200)
CO2 SERPL-SCNC: 30 MMOL/L (ref 21–32)
CREAT SERPL-MCNC: 1.29 MG/DL (ref 0.6–1.3)
ERYTHROCYTE [DISTWIDTH] IN BLOOD BY AUTOMATED COUNT: 14.8 % (ref 11.6–15.1)
GFR SERPL CREATININE-BSD FRML MDRD: 52 ML/MIN/1.73SQ M
GLUCOSE P FAST SERPL-MCNC: 73 MG/DL (ref 65–99)
HCT VFR BLD AUTO: 45.4 % (ref 36.5–49.3)
HDLC SERPL-MCNC: 53 MG/DL
HGB BLD-MCNC: 13.9 G/DL (ref 12–17)
LDLC SERPL CALC-MCNC: 74 MG/DL (ref 0–100)
MCH RBC QN AUTO: 28.4 PG (ref 26.8–34.3)
MCHC RBC AUTO-ENTMCNC: 30.6 G/DL (ref 31.4–37.4)
MCV RBC AUTO: 93 FL (ref 82–98)
PLATELET # BLD AUTO: 243 THOUSANDS/UL (ref 149–390)
PMV BLD AUTO: 10.6 FL (ref 8.9–12.7)
POTASSIUM SERPL-SCNC: 4 MMOL/L (ref 3.5–5.3)
RBC # BLD AUTO: 4.89 MILLION/UL (ref 3.88–5.62)
SODIUM SERPL-SCNC: 145 MMOL/L (ref 136–145)
TRIGL SERPL-MCNC: 89 MG/DL
WBC # BLD AUTO: 5.27 THOUSAND/UL (ref 4.31–10.16)

## 2020-05-18 PROCEDURE — 80048 BASIC METABOLIC PNL TOTAL CA: CPT

## 2020-05-18 PROCEDURE — 85027 COMPLETE CBC AUTOMATED: CPT

## 2020-05-18 PROCEDURE — 80061 LIPID PANEL: CPT

## 2020-05-19 ENCOUNTER — OFFICE VISIT (OUTPATIENT)
Dept: INTERNAL MEDICINE CLINIC | Age: 80
End: 2020-05-19
Payer: MEDICARE

## 2020-05-19 VITALS
OXYGEN SATURATION: 97 % | TEMPERATURE: 98.4 F | BODY MASS INDEX: 28.5 KG/M2 | DIASTOLIC BLOOD PRESSURE: 68 MMHG | WEIGHT: 181.6 LBS | HEART RATE: 70 BPM | HEIGHT: 67 IN | SYSTOLIC BLOOD PRESSURE: 112 MMHG

## 2020-05-19 DIAGNOSIS — I10 ESSENTIAL HYPERTENSION: ICD-10-CM

## 2020-05-19 DIAGNOSIS — E78.2 MIXED HYPERLIPIDEMIA: ICD-10-CM

## 2020-05-19 DIAGNOSIS — I50.32 CHRONIC DIASTOLIC HEART FAILURE (HCC): ICD-10-CM

## 2020-05-19 DIAGNOSIS — K21.9 GASTROESOPHAGEAL REFLUX DISEASE WITHOUT ESOPHAGITIS: Primary | ICD-10-CM

## 2020-05-19 DIAGNOSIS — E78.5 HYPERLIPIDEMIA, UNSPECIFIED HYPERLIPIDEMIA TYPE: ICD-10-CM

## 2020-05-19 DIAGNOSIS — I48.92 ATRIAL FLUTTER, UNSPECIFIED TYPE (HCC): ICD-10-CM

## 2020-05-19 DIAGNOSIS — J43.2 CENTRILOBULAR EMPHYSEMA (HCC): ICD-10-CM

## 2020-05-19 DIAGNOSIS — I48.3 TYPICAL ATRIAL FLUTTER (HCC): ICD-10-CM

## 2020-05-19 PROCEDURE — 3008F BODY MASS INDEX DOCD: CPT | Performed by: INTERNAL MEDICINE

## 2020-05-19 PROCEDURE — 3078F DIAST BP <80 MM HG: CPT | Performed by: INTERNAL MEDICINE

## 2020-05-19 PROCEDURE — 1160F RVW MEDS BY RX/DR IN RCRD: CPT | Performed by: INTERNAL MEDICINE

## 2020-05-19 PROCEDURE — 1036F TOBACCO NON-USER: CPT | Performed by: INTERNAL MEDICINE

## 2020-05-19 PROCEDURE — 3074F SYST BP LT 130 MM HG: CPT | Performed by: INTERNAL MEDICINE

## 2020-05-19 PROCEDURE — 99214 OFFICE O/P EST MOD 30 MIN: CPT | Performed by: INTERNAL MEDICINE

## 2020-05-19 PROCEDURE — 4040F PNEUMOC VAC/ADMIN/RCVD: CPT | Performed by: INTERNAL MEDICINE

## 2020-05-19 RX ORDER — ATORVASTATIN CALCIUM 40 MG/1
40 TABLET, FILM COATED ORAL DAILY
Qty: 90 TABLET | Refills: 3 | Status: SHIPPED | OUTPATIENT
Start: 2020-05-19 | End: 2020-05-29 | Stop reason: SDUPTHER

## 2020-05-19 RX ORDER — DILTIAZEM HYDROCHLORIDE 240 MG/1
240 CAPSULE, COATED, EXTENDED RELEASE ORAL DAILY
Qty: 90 CAPSULE | Refills: 3 | Status: CANCELLED | OUTPATIENT
Start: 2020-05-19

## 2020-05-19 RX ORDER — FUROSEMIDE 40 MG/1
40 TABLET ORAL DAILY
Qty: 60 TABLET | Refills: 6 | Status: SHIPPED | OUTPATIENT
Start: 2020-05-19 | End: 2021-05-27 | Stop reason: SDUPTHER

## 2020-05-29 ENCOUNTER — OFFICE VISIT (OUTPATIENT)
Dept: CARDIOLOGY CLINIC | Facility: CLINIC | Age: 80
End: 2020-05-29
Payer: MEDICARE

## 2020-05-29 VITALS
HEART RATE: 71 BPM | OXYGEN SATURATION: 95 % | HEIGHT: 67 IN | BODY MASS INDEX: 28.56 KG/M2 | RESPIRATION RATE: 18 BRPM | WEIGHT: 182 LBS | SYSTOLIC BLOOD PRESSURE: 120 MMHG | DIASTOLIC BLOOD PRESSURE: 70 MMHG

## 2020-05-29 DIAGNOSIS — I25.810 ATHEROSCLEROSIS OF CORONARY ARTERY BYPASS GRAFT OF NATIVE HEART WITHOUT ANGINA PECTORIS: ICD-10-CM

## 2020-05-29 DIAGNOSIS — I48.92 ATRIAL FLUTTER, UNSPECIFIED TYPE (HCC): Primary | ICD-10-CM

## 2020-05-29 DIAGNOSIS — E78.5 HYPERLIPIDEMIA, UNSPECIFIED HYPERLIPIDEMIA TYPE: ICD-10-CM

## 2020-05-29 DIAGNOSIS — I50.32 CHRONIC DIASTOLIC HEART FAILURE (HCC): ICD-10-CM

## 2020-05-29 PROCEDURE — 3008F BODY MASS INDEX DOCD: CPT | Performed by: INTERNAL MEDICINE

## 2020-05-29 PROCEDURE — 99214 OFFICE O/P EST MOD 30 MIN: CPT | Performed by: INTERNAL MEDICINE

## 2020-05-29 PROCEDURE — 3074F SYST BP LT 130 MM HG: CPT | Performed by: INTERNAL MEDICINE

## 2020-05-29 PROCEDURE — 1036F TOBACCO NON-USER: CPT | Performed by: INTERNAL MEDICINE

## 2020-05-29 PROCEDURE — 1160F RVW MEDS BY RX/DR IN RCRD: CPT | Performed by: INTERNAL MEDICINE

## 2020-05-29 PROCEDURE — 3078F DIAST BP <80 MM HG: CPT | Performed by: INTERNAL MEDICINE

## 2020-05-29 PROCEDURE — 4040F PNEUMOC VAC/ADMIN/RCVD: CPT | Performed by: INTERNAL MEDICINE

## 2020-05-29 RX ORDER — ATORVASTATIN CALCIUM 40 MG/1
40 TABLET, FILM COATED ORAL DAILY
Qty: 90 TABLET | Refills: 3 | Status: SHIPPED | OUTPATIENT
Start: 2020-05-29 | End: 2021-06-29 | Stop reason: SDUPTHER

## 2020-06-01 DIAGNOSIS — I48.92 ATRIAL FLUTTER (HCC): ICD-10-CM

## 2020-06-01 RX ORDER — WARFARIN SODIUM 5 MG/1
TABLET ORAL
Qty: 30 TABLET | Refills: 6 | Status: SHIPPED | OUTPATIENT
Start: 2020-06-01 | End: 2021-04-05 | Stop reason: SDUPTHER

## 2020-06-02 ENCOUNTER — APPOINTMENT (OUTPATIENT)
Dept: LAB | Facility: HOSPITAL | Age: 80
End: 2020-06-02
Attending: INTERNAL MEDICINE
Payer: MEDICARE

## 2020-06-02 ENCOUNTER — ANTICOAG VISIT (OUTPATIENT)
Dept: CARDIOLOGY CLINIC | Facility: CLINIC | Age: 80
End: 2020-06-02

## 2020-06-23 ENCOUNTER — APPOINTMENT (OUTPATIENT)
Dept: LAB | Facility: HOSPITAL | Age: 80
End: 2020-06-23
Attending: INTERNAL MEDICINE
Payer: MEDICARE

## 2020-06-23 ENCOUNTER — ANTICOAG VISIT (OUTPATIENT)
Dept: CARDIOLOGY CLINIC | Facility: CLINIC | Age: 80
End: 2020-06-23

## 2020-07-07 ENCOUNTER — HOSPITAL ENCOUNTER (OUTPATIENT)
Dept: CT IMAGING | Facility: HOSPITAL | Age: 80
Discharge: HOME | End: 2020-07-07
Payer: MEDICARE

## 2020-07-07 DIAGNOSIS — R93.89 ABNORMAL CHEST CT: ICD-10-CM

## 2020-07-07 PROCEDURE — 71250 CT THORAX DX C-: CPT

## 2020-08-08 ENCOUNTER — APPOINTMENT (OUTPATIENT)
Dept: LAB | Facility: HOSPITAL | Age: 80
End: 2020-08-08
Attending: INTERNAL MEDICINE
Payer: MEDICARE

## 2020-08-10 ENCOUNTER — TELEPHONE (OUTPATIENT)
Dept: CARDIOLOGY CLINIC | Facility: CLINIC | Age: 80
End: 2020-08-10

## 2020-08-10 NOTE — TELEPHONE ENCOUNTER
His phone # is not a working number  I left a message on his granddaughters phone to have him call me

## 2020-08-12 ENCOUNTER — ANTICOAG VISIT (OUTPATIENT)
Dept: CARDIOLOGY CLINIC | Facility: CLINIC | Age: 80
End: 2020-08-12

## 2020-08-12 NOTE — PROGRESS NOTES
Called x 3  # is still not working   Sent my chart message to take 7 5mg x 2 days then resume reg dose and retest again in 2 weeks

## 2020-08-19 ENCOUNTER — OFFICE VISIT (OUTPATIENT)
Dept: INTERNAL MEDICINE CLINIC | Age: 80
End: 2020-08-19
Payer: MEDICARE

## 2020-08-19 VITALS
TEMPERATURE: 99.3 F | DIASTOLIC BLOOD PRESSURE: 70 MMHG | WEIGHT: 181.8 LBS | HEART RATE: 63 BPM | HEIGHT: 67 IN | OXYGEN SATURATION: 98 % | SYSTOLIC BLOOD PRESSURE: 130 MMHG | BODY MASS INDEX: 28.53 KG/M2

## 2020-08-19 DIAGNOSIS — J96.11 CHRONIC RESPIRATORY FAILURE WITH HYPOXIA (HCC): ICD-10-CM

## 2020-08-19 DIAGNOSIS — I25.810 ATHEROSCLEROSIS OF CORONARY ARTERY BYPASS GRAFT OF NATIVE HEART WITHOUT ANGINA PECTORIS: ICD-10-CM

## 2020-08-19 DIAGNOSIS — Z95.1 S/P CABG X 4: ICD-10-CM

## 2020-08-19 DIAGNOSIS — E78.2 MIXED HYPERLIPIDEMIA: ICD-10-CM

## 2020-08-19 DIAGNOSIS — I10 ESSENTIAL HYPERTENSION: ICD-10-CM

## 2020-08-19 DIAGNOSIS — I48.92 ATRIAL FLUTTER, UNSPECIFIED TYPE (HCC): ICD-10-CM

## 2020-08-19 DIAGNOSIS — J43.2 CENTRILOBULAR EMPHYSEMA (HCC): ICD-10-CM

## 2020-08-19 DIAGNOSIS — Z00.00 MEDICARE ANNUAL WELLNESS VISIT, SUBSEQUENT: ICD-10-CM

## 2020-08-19 DIAGNOSIS — K21.9 GASTROESOPHAGEAL REFLUX DISEASE WITHOUT ESOPHAGITIS: Primary | ICD-10-CM

## 2020-08-19 PROCEDURE — 1160F RVW MEDS BY RX/DR IN RCRD: CPT | Performed by: INTERNAL MEDICINE

## 2020-08-19 PROCEDURE — 1036F TOBACCO NON-USER: CPT | Performed by: INTERNAL MEDICINE

## 2020-08-19 PROCEDURE — 1170F FXNL STATUS ASSESSED: CPT | Performed by: INTERNAL MEDICINE

## 2020-08-19 PROCEDURE — 99214 OFFICE O/P EST MOD 30 MIN: CPT | Performed by: INTERNAL MEDICINE

## 2020-08-19 PROCEDURE — 3075F SYST BP GE 130 - 139MM HG: CPT | Performed by: INTERNAL MEDICINE

## 2020-08-19 PROCEDURE — G0438 PPPS, INITIAL VISIT: HCPCS | Performed by: INTERNAL MEDICINE

## 2020-08-19 PROCEDURE — 4040F PNEUMOC VAC/ADMIN/RCVD: CPT | Performed by: INTERNAL MEDICINE

## 2020-08-19 PROCEDURE — 3078F DIAST BP <80 MM HG: CPT | Performed by: INTERNAL MEDICINE

## 2020-08-19 PROCEDURE — 3008F BODY MASS INDEX DOCD: CPT | Performed by: INTERNAL MEDICINE

## 2020-08-19 PROCEDURE — 1125F AMNT PAIN NOTED PAIN PRSNT: CPT | Performed by: INTERNAL MEDICINE

## 2020-08-19 NOTE — PROGRESS NOTES
Assessment/Plan:    1  COPD  S stable  Will continue with present combination of inhalers    2  Chronic atrial fibrillation/flutter  Rate is well controlled  Patient is on Coumadin for long-term anticoagulation  Also being followed by cardiologist    3  CKD stage III  Renal function is relatively stable  Will continue to monitor    4  Hyperlipidemia  Continue with Lipitor 40 mg daily  Will check lipid profile before next visit    5  GERD  On Nexium 20 mg daily  Being followed by gastroenterologist         Diagnoses and all orders for this visit:    Gastroesophageal reflux disease without esophagitis    Chronic respiratory failure with hypoxia ([address] )    Centrilobular emphysema ([facility] [address] )    Atherosclerosis of coronary artery bypass graft of native heart without angina pectoris    Essential hypertension  -     CBC; Future  -     Basic metabolic panel; Future    Atrial flutter, unspecified type ([facility] [address] )    Mixed hyperlipidemia  -     Lipid Panel with Direct LDL reflex; Future    S/P CABG x 4    Medicare annual wellness visit, subsequent               Subjective:          Patient ID: Malgorzata Singer is a [de-identified] y o  male  Patient is here for regular follow-up  No blood work prior to this visit  No new complaints  The following portions of the patient's history were reviewed and updated as appropriate: allergies, current medications, past family history, past medical history, past social history, past surgical history and problem list     Review of Systems   Constitutional: Negative for fatigue and fever  HENT: Negative for congestion, ear discharge, ear pain, postnasal drip, sinus pressure, sore throat, tinnitus and trouble swallowing  Eyes: Negative for discharge, itching and visual disturbance  Respiratory: Negative for cough and shortness of breath  Cardiovascular: Negative for chest pain and palpitations  Gastrointestinal: Negative for abdominal pain, diarrhea, nausea and vomiting     Endocrine: Negative for cold intolerance and polyuria  Genitourinary: Negative for difficulty urinating, dysuria and urgency  Musculoskeletal: Positive for arthralgias  Negative for neck pain  Skin: Negative for rash  Allergic/Immunologic: Negative for environmental allergies  Neurological: Negative for dizziness, weakness and headaches  Psychiatric/Behavioral: The patient is not nervous/anxious            Past Medical History:   Diagnosis Date    A-fib [facility])     Arthritis     Benign prostatic hyperplasia without lower urinary tract symptoms     without Urinary Obstruction    CAD (coronary artery disease)     Chronic obstructive lung disease (HCC)     Coronary arteriosclerosis     Depression     Emphysema lung (HCC)     GERD (gastroesophageal reflux disease)     Hyperlipidemia     Hypertension     Myocardial infarction (HCC)     Psoriasis     Requires supplemental oxygen     at bedtime during high humid days only    Stroke [facility])     TIA 1/2018         Current Outpatient Medications:     aspirin 81 mg chewable tablet, Chew 1 tablet (81 mg total) daily, Disp: 60 tablet, Rfl: 6    atorvastatin (LIPITOR) 40 mg tablet, Take 1 tablet (40 mg total) by mouth daily, Disp: 90 tablet, Rfl: 3    CARTIA  MG 24 hr capsule, take 1 capsule by mouth once daily, Disp: 90 capsule, Rfl: 3    esomeprazole (NexIUM) 20 mg capsule, Take 20 mg by mouth every other day  , Disp: , Rfl:     furosemide (LASIX) 40 mg tablet, Take 1 tablet (40 mg total) by mouth daily, Disp: 60 tablet, Rfl: 6    warfarin (Jantoven) 5 mg tablet, take 1 tablet by mouth once daily or as directed BY COUMADIN CLINIC, Disp: 30 tablet, Rfl: 6    albuterol (PROAIR HFA) 90 mcg/act inhaler, Inhale 2 puffs every 6 (six) hours as needed for wheezing or shortness of breath (Patient not taking: Reported on 5/19/2020), Disp: 8 5 g, Rfl: 0    Allergies   Allergen Reactions    Penicillins Swelling and Itching       Social History   Past Surgical History:   Procedure Laterality Date    COLONOSCOPY      CORONARY ANGIOPLASTY  02/03/2001    PTCA of RCA    CORONARY ARTERY BYPASS GRAFT  02/07/2001    x4- [place]    HERNIA REPAIR      IN BRONCHOSCOPY,DIAGNOSTIC Left 1/7/2019    Procedure: Marcia Mccarthy;  Surgeon: Reinaldo Valles MD;  [facility]; Service: Pulmonary    IN THROMBOENDARTECTMY NECK,NECK INCIS Left 2/20/2018    Procedure: ENDARTERECTOMY ARTERY CAROTID WITH PATCH ANGIOPLASTY;  Surgeon: Mendel Angel, MD;  [facility];  Service: Vascular    TRANSURETHRAL RESECTION OF PROSTATE       Family History   Problem Relation Age of Onset    Lung cancer Mother     Cancer Mother     Other Father         sepsis       Objective:  /70 (BP Location: Left arm, Patient Position: Sitting, Cuff Size: Large)   Pulse 63   Temp 99 3 °F (37 4 °C) (Temporal)   Ht 5' 7" (1 702 m) Comment: shoes on  Wt 82 5 kg (181 lb 12 8 oz) Comment: shoes on  SpO2 98%   BMI 28 47 kg/m²   Body mass index is 28 47 kg/m²  Physical Exam  Constitutional:       Appearance: He is well-developed  HENT:      Head: Normocephalic  Right Ear: Tympanic membrane and ear canal normal       Left Ear: Tympanic membrane and ear canal normal       Nose: Nose normal       Mouth/Throat:      Mouth: Mucous membranes are moist    Eyes:      General: No scleral icterus  Conjunctiva/sclera: Conjunctivae normal       Pupils: Pupils are equal, round, and reactive to light  Neck:      Musculoskeletal: Normal range of motion and neck supple  Thyroid: No thyromegaly  Trachea: No tracheal deviation  Cardiovascular:      Rate and Rhythm: Normal rate  Rhythm irregular  Heart sounds: Normal heart sounds  No murmur  Pulmonary:      Effort: Pulmonary effort is normal  No respiratory distress  Breath sounds: Normal breath sounds  Chest:      Chest wall: No tenderness     Abdominal:      General: Bowel sounds are normal       Palpations: Abdomen is soft  There is no mass  Tenderness: There is no abdominal tenderness  Musculoskeletal: Normal range of motion  Lymphadenopathy:      Cervical: No cervical adenopathy  Skin:     General: Skin is warm  Neurological:      General: No focal deficit present  Mental Status: He is alert and oriented to person, place, and time  Cranial Nerves: No cranial nerve deficit  Psychiatric:         Mood and Affect: Mood normal          Behavior: Behavior normal          Thought Content:  Thought content normal          Judgment: Judgment normal

## 2020-08-19 NOTE — PROGRESS NOTES
Assessment and Plan:     Problem List Items Addressed This Visit     None           Preventive health issues were discussed with patient, and age appropriate screening tests were ordered as noted in patient's After Visit Summary  Personalized health advice and appropriate referrals for health education or preventive services given if needed, as noted in patient's After Visit Summary       History of Present Illness:     Patient presents for Medicare Annual Wellness visit    Patient Care Team:  Eli Lambert MD as PCP - GABRIEL Sylvester MD     Problem List:     Patient Active Problem List   Diagnosis    CAD (coronary atherosclerotic disease)    Hypertension    Hyperlipemia    Atypical chest pain    GERD (gastroesophageal reflux disease)    History of stroke    Sciatica of right side    Hyperlipidemia    Centrilobular emphysema ([address] )    Arthritis    Stenosis of left carotid artery    S/P CABG x 4    Acute left-sided low back pain with left-sided sciatica    Back pain    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    Vision changes    Urinary retention due to benign prostatic hyperplasia    Bladder cancer ([address] )    CKD (chronic kidney disease), stage II    Atrial flutter ([address] )    Irregular heart beat    Localized edema    Chronic respiratory failure with hypoxia ([address] )    COPD, severe ([address] )    Bronchiectasis with acute lower respiratory infection ([address] )    Abnormal CT of the chest    Tinnitus aurium, bilateral    Sensorineural hearing loss (SNHL) of both ears    Medicare annual wellness visit, subsequent    Anemia    DDD (degenerative disc disease), lumbar    Chronic diastolic heart failure ([address] )      Past Medical and Surgical History:     Past Medical History:   Diagnosis Date    A-fib [facility] [address] )     Arthritis     Benign prostatic hyperplasia without lower urinary tract symptoms     without Urinary Obstruction    CAD (coronary artery disease)     Chronic obstructive lung disease ([facility] [address] )     Coronary arteriosclerosis     Depression     Emphysema lung (HCC)     GERD (gastroesophageal reflux disease)     Hyperlipidemia     Hypertension     Myocardial infarction (HCC)     Psoriasis     Requires supplemental oxygen     at bedtime during high humid days only    Stroke [facility])     TIA 2018     Past Surgical History:   Procedure Laterality Date    COLONOSCOPY      CORONARY ANGIOPLASTY  2001    PTCA of RCA    CORONARY ARTERY BYPASS GRAFT  02/07/2001    x4- [place]    HERNIA REPAIR      MI BRONCHOSCOPY,DIAGNOSTIC Left 2019    Procedure: BRONCHOSCOPY FLEXIBLE;  Surgeon: Jillian Farris MD;  [facility]; Service: Pulmonary    MI THROMBOENDARTECTMY NECK,NECK INCIS Left 2018    Procedure: ENDARTERECTOMY ARTERY CAROTID WITH PATCH ANGIOPLASTY;  Surgeon: Moni Saunders MD;  [facility];  Service: Vascular    TRANSURETHRAL RESECTION OF PROSTATE        Family History:     Family History   Problem Relation Age of Onset    Lung cancer Mother     Cancer Mother     Other Father         sepsis      Social History:        Social History     Socioeconomic History    Marital status:       Spouse name: Not on file    Number of children: 3    Years of education: Not on file    Highest education level: Not on file   Occupational History    Occupation: retired [employer]   Social Needs    Financial resource strain: Not on file    Food insecurity     Worry: Not on file     Inability: Not on file   [employer] needs     Medical: Not on file     Non-medical: Not on file   Tobacco Use    Smoking status: Former Smoker     Packs/day: 1 00     Years: 3 00     Pack years: 3 00     Types: Cigarettes     Last attempt to quit:      Years since quittin 6    Smokeless tobacco: Never Used    Tobacco comment: Has a past history of cigarette smoking;Quit date ; 5 packs year history, per [employer]     Substance and Sexual Activity    Alcohol use: Yes     Frequency: Monthly or less     Comment: occasional wine        Drug use: No    Sexual activity: Yes   Lifestyle    Physical activity     Days per week: Not on file     Minutes per session: Not on file    Stress: Not on file   Relationships    Social connections     Talks on phone: Not on file     Gets together: Not on file     Attends Confucianist service: Not on file     Active member of club or organization: Not on file     Attends meetings of clubs or organizations: Not on file     Relationship status: Not on file    Intimate partner violence     Fear of current or ex partner: Not on file     Emotionally abused: Not on file     Physically abused: Not on file     Forced sexual activity: Not on file   Other Topics Concern    Not on file   Social History Narrative    Lives with granddaughter and daughter     Caffeine use, He admits to consuming caffeine VIA coffee ( 8 servings per day), per [employer]    Martial History: Currently , per [employer]    Occupation: Retired (prior occupational:  repairman), per [employer]       Medications and Allergies:     Current Outpatient Medications   Medication Sig Dispense Refill    albuterol (PROAIR HFA) 90 mcg/act inhaler Inhale 2 puffs every 6 (six) hours as needed for wheezing or shortness of breath (Patient not taking: Reported on 5/19/2020) 8 5 g 0    aspirin 81 mg chewable tablet Chew 1 tablet (81 mg total) daily 60 tablet 6    atorvastatin (LIPITOR) 40 mg tablet Take 1 tablet (40 mg total) by mouth daily 90 tablet 3    CARTIA  MG 24 hr capsule take 1 capsule by mouth once daily 90 capsule 3    esomeprazole (NexIUM) 20 mg capsule Take 20 mg by mouth every other day        furosemide (LASIX) 40 mg tablet Take 1 tablet (40 mg total) by mouth daily 60 tablet 6    warfarin (Jantoven) 5 mg tablet take 1 tablet by mouth once daily or as directed BY COUMADIN CLINIC 30 tablet 6     No current facility-administered medications for this visit  Allergies   Allergen Reactions    Penicillins Swelling and Itching      Immunizations:     Immunization History   Administered Date(s) Administered    H1N1, All Formulations 1940, 01/09/2010    INFLUENZA 1940, 09/30/2013, 09/10/2014, 08/26/2015, 09/10/2018    Influenza Split High Dose Preservative Free IM 07/18/2015, 10/25/2016, 12/04/2017    Influenza, high dose seasonal 0 5 mL 09/10/2018, 10/23/2019    Pneumococcal Conjugate 13-Valent 08/26/2015, 02/18/2016    Pneumococcal Polysaccharide PPV23 05/02/2008    Zoster 10/25/2013      Health Maintenance: There are no preventive care reminders to display for this patient  Topic Date Due    DTaP,Tdap,and Td Vaccines (1 - Tdap) 06/21/1961    Influenza Vaccine  07/01/2020      Medicare Health Risk Assessment:     There were no vitals taken for this visit  Lashonda Zhu is here for his Subsequent Wellness visit  Last Medicare Wellness visit information reviewed, patient interviewed and updates made to the record today  Health Risk Assessment:   Patient rates overall health as excellent  Patient feels that their physical health rating is slightly better  Eyesight was rated as same  Hearing was rated as slightly worse  Patient feels that their emotional and mental health rating is same  Pain experienced in the last 7 days has been none  Patient states that he has experienced no weight loss or gain in last 6 months  Depression Screening:   PHQ-2 Score: 0      Fall Risk Screening: In the past year, patient has experienced: no history of falling in past year      Home Safety:  Patient does not have trouble with stairs inside or outside of their home  Patient has working smoke alarms and has no working carbon monoxide detector  Home safety hazards include: none  Nutrition:   Current diet is Regular   Avoids foods due to gas and diarrhea     Medications:   Patient is not currently taking any over-the-counter supplements  Patient is able to manage medications  Activities of Daily Living (ADLs)/Instrumental Activities of Daily Living (IADLs):   Walk and transfer into and out of bed and chair?: Yes  Dress and groom yourself?: Yes    Bathe or shower yourself?: Yes    Feed yourself? Yes  Do your laundry/housekeeping?: Yes  Manage your money, pay your bills and track your expenses?: Yes  Make your own meals?: Yes    Do your own shopping?: Yes    Previous Hospitalizations:   Any hospitalizations or ED visits within the last 12 months?: No      Advance Care Planning:   Living will: Yes    Durable POA for healthcare: Yes    Advanced directive: Yes    Five wishes given: Yes      Cognitive Screening:   Provider or family/friend/caregiver concerned regarding cognition?: No    PREVENTIVE SCREENINGS      Cardiovascular Screening:    General: Screening Not Indicated and History Lipid Disorder      Diabetes Screening:     General: Screening Current      Colorectal Cancer Screening:     General: Screening Not Indicated      Prostate Cancer Screening:    General: Screening Not Indicated      Osteoporosis Screening:    General: Patient Declines      Abdominal Aortic Aneurysm (AAA) Screening:    Risk factors include: tobacco use        General: Screening Not Indicated      Lung Cancer Screening:     General: Screening Not Indicated      Hepatitis C Screening:    General: Screening Not Indicated    Other Counseling Topics:   Alcohol use counseling, car/seat belt/driving safety, skin self-exam, sunscreen and calcium and vitamin D intake and regular weightbearing exercise         Tana Ward MD

## 2020-08-19 NOTE — PATIENT INSTRUCTIONS

## 2020-10-05 ENCOUNTER — CLINICAL SUPPORT (OUTPATIENT)
Dept: INTERNAL MEDICINE CLINIC | Age: 80
End: 2020-10-05
Payer: MEDICARE

## 2020-10-05 DIAGNOSIS — Z23 NEED FOR IMMUNIZATION AGAINST INFLUENZA: Primary | ICD-10-CM

## 2020-10-05 PROCEDURE — G0008 ADMIN INFLUENZA VIRUS VAC: HCPCS

## 2020-10-05 PROCEDURE — 90662 IIV NO PRSV INCREASED AG IM: CPT

## 2020-11-16 ENCOUNTER — OFFICE VISIT (OUTPATIENT)
Dept: INTERNAL MEDICINE CLINIC | Age: 80
End: 2020-11-16
Payer: MEDICARE

## 2020-11-16 VITALS
WEIGHT: 178 LBS | SYSTOLIC BLOOD PRESSURE: 122 MMHG | TEMPERATURE: 97.2 F | HEIGHT: 67 IN | BODY MASS INDEX: 27.94 KG/M2 | HEART RATE: 66 BPM | DIASTOLIC BLOOD PRESSURE: 56 MMHG | OXYGEN SATURATION: 97 %

## 2020-11-16 DIAGNOSIS — E78.2 MIXED HYPERLIPIDEMIA: ICD-10-CM

## 2020-11-16 DIAGNOSIS — I50.32 CHRONIC DIASTOLIC HEART FAILURE (HCC): ICD-10-CM

## 2020-11-16 DIAGNOSIS — K21.9 GASTROESOPHAGEAL REFLUX DISEASE WITHOUT ESOPHAGITIS: Primary | ICD-10-CM

## 2020-11-16 DIAGNOSIS — I25.810 ATHEROSCLEROSIS OF CORONARY ARTERY BYPASS GRAFT OF NATIVE HEART WITHOUT ANGINA PECTORIS: ICD-10-CM

## 2020-11-16 DIAGNOSIS — J44.9 COPD, SEVERE (HCC): ICD-10-CM

## 2020-11-16 DIAGNOSIS — N18.2 CKD (CHRONIC KIDNEY DISEASE), STAGE II: ICD-10-CM

## 2020-11-16 DIAGNOSIS — Z95.1 S/P CABG X 4: ICD-10-CM

## 2020-11-16 PROCEDURE — 99214 OFFICE O/P EST MOD 30 MIN: CPT | Performed by: INTERNAL MEDICINE

## 2021-01-01 ENCOUNTER — TELEPHONE (OUTPATIENT)
Dept: CARDIOLOGY CLINIC | Facility: CLINIC | Age: 81
End: 2021-01-01

## 2021-01-11 ENCOUNTER — TELEPHONE (OUTPATIENT)
Dept: CARDIOLOGY CLINIC | Facility: CLINIC | Age: 81
End: 2021-01-11

## 2021-01-11 NOTE — TELEPHONE ENCOUNTER
Pt dropped off form he needs Dr Daisy Castano to fill out for a handicap plate  He is requesting a plate only & not a tag   Please advise, call pt when complete     (form left on [facility] desk)

## 2021-01-26 ENCOUNTER — TELEPHONE (OUTPATIENT)
Dept: CARDIOLOGY CLINIC | Facility: CLINIC | Age: 81
End: 2021-01-26

## 2021-01-26 NOTE — TELEPHONE ENCOUNTER
Called pt to remind him that he is over due to have his INR checked  He states he stopped the Warfarin because he did not want to go to the outpt lab to have his blood work done due to the virus  I told him that was not a good idea to stop his medications on his own and advised to restart the Warfarin  I did offer him the home INR machine in the past, but pt declined due to the supply cost  I also offered him [facility] home phlebotomy  Pt states he is driving and can't take down the #  States he will call me back when he returns home

## 2021-01-29 DIAGNOSIS — Z79.01 LONG TERM (CURRENT) USE OF ANTICOAGULANTS: Primary | ICD-10-CM

## 2021-01-29 NOTE — TELEPHONE ENCOUNTER
S/w patient and informed that he should have form filled out by PCP or Pulmonologist since he did not fit criteria for cardiology  Patient verbally understood and stated that he will  from [facility]

## 2021-02-08 ENCOUNTER — OFFICE VISIT (OUTPATIENT)
Dept: INTERNAL MEDICINE CLINIC | Age: 81
End: 2021-02-08
Payer: MEDICARE

## 2021-02-08 VITALS
TEMPERATURE: 98.2 F | HEART RATE: 82 BPM | OXYGEN SATURATION: 97 % | WEIGHT: 183.1 LBS | BODY MASS INDEX: 28.74 KG/M2 | SYSTOLIC BLOOD PRESSURE: 102 MMHG | DIASTOLIC BLOOD PRESSURE: 56 MMHG | HEIGHT: 67 IN

## 2021-02-08 DIAGNOSIS — I25.810 ATHEROSCLEROSIS OF CORONARY ARTERY BYPASS GRAFT OF NATIVE HEART WITHOUT ANGINA PECTORIS: ICD-10-CM

## 2021-02-08 DIAGNOSIS — J43.2 CENTRILOBULAR EMPHYSEMA (HCC): ICD-10-CM

## 2021-02-08 DIAGNOSIS — I48.92 ATRIAL FLUTTER, UNSPECIFIED TYPE (HCC): ICD-10-CM

## 2021-02-08 DIAGNOSIS — J44.9 COPD, SEVERE (HCC): ICD-10-CM

## 2021-02-08 DIAGNOSIS — E78.2 MIXED HYPERLIPIDEMIA: Primary | ICD-10-CM

## 2021-02-08 DIAGNOSIS — I10 ESSENTIAL HYPERTENSION: ICD-10-CM

## 2021-02-08 DIAGNOSIS — K21.9 GASTROESOPHAGEAL REFLUX DISEASE WITHOUT ESOPHAGITIS: ICD-10-CM

## 2021-02-08 PROCEDURE — 99214 OFFICE O/P EST MOD 30 MIN: CPT | Performed by: INTERNAL MEDICINE

## 2021-02-08 NOTE — PROGRESS NOTES
Assessment/Plan:    GERD (gastroesophageal reflux disease)  Reports no symptoms in he last 6 months  Does not take medication for this   Controled with diet  Hyperlipidemia  Lipid profile from November within target range  Continue with atorvastatin 40 mg   Continue with low-fat diet    COPD, severe (HCC)  Uses albuterol during summer time  Refers no symptoms at the moment  CAD (coronary atherosclerotic disease)  Status post CABG  Stable  Being followed by cardiologist   -refers no chest pain    -symptom free  Hypertension  Stable on   furosemide 40mg  Diltiazem 240mg     Atrial flutter (HCC)  History of a fib/flutter   No palpitations presently  Takes warfarin        Diagnoses and all orders for this visit:    Mixed hyperlipidemia    Gastroesophageal reflux disease without esophagitis          Subjective: No complaints at this time  Chief Complaint   Patient presents with    Follow-up     3 month fu- BMI FU NEEDED--  COPD         Patient ID: Stalin Tsang is a [de-identified] y o  male  [de-identified] yo male with extensive pmh  Comes to regular check up  No complaints at the moment  Review of Systems   Constitutional: Negative  HENT: Negative  Eyes: Negative  Respiratory: Negative  Negative for cough, chest tightness, shortness of breath and wheezing  Cardiovascular: Negative  Negative for chest pain, palpitations and leg swelling  Gastrointestinal: Negative  Endocrine: Negative  Genitourinary: Negative  Musculoskeletal: Negative  Skin: Negative  Allergic/Immunologic: Negative  Neurological: Negative  Hematological: Negative  Psychiatric/Behavioral: Negative            Objective:      /56 (BP Location: Left arm, Patient Position: Sitting, Cuff Size: Standard)   Pulse 82   Temp 98 2 °F (36 8 °C) (Temporal)   Ht 5' 7" (1 702 m) Comment: shoes on  Wt 83 1 kg (183 lb 1 6 oz) Comment: shoes on  SpO2 97%   BMI 28 68 kg/m²          Physical Exam  Constitutional: General: He is not in acute distress  Appearance: Normal appearance  He is obese  HENT:      Head: Normocephalic  Nose: Nose normal       Mouth/Throat:      Mouth: Mucous membranes are moist    Eyes:      Extraocular Movements: Extraocular movements intact  Conjunctiva/sclera: Conjunctivae normal       Pupils: Pupils are equal, round, and reactive to light  Neck:      Musculoskeletal: Normal range of motion  Cardiovascular:      Rate and Rhythm: Normal rate and regular rhythm  Pulses: Normal pulses  Heart sounds: Normal heart sounds  No murmur  Pulmonary:      Effort: Pulmonary effort is normal  No respiratory distress  Breath sounds: Normal breath sounds  No wheezing or rales  Abdominal:      Palpations: Abdomen is soft  Musculoskeletal: Normal range of motion  Skin:     General: Skin is warm  Capillary Refill: Capillary refill takes less than 2 seconds  Neurological:      General: No focal deficit present  Mental Status: He is alert and oriented to person, place, and time     Psychiatric:         Mood and Affect: Mood normal          Behavior: Behavior normal

## 2021-02-08 NOTE — PROGRESS NOTES
Assessment/Plan:    GERD (gastroesophageal reflux disease)  Reports no symptoms in he last 6 months  Does not take medication for this   Controled with diet  Hyperlipidemia  Lipid profile from November within target range  Continue with atorvastatin 40 mg   Continue with low-fat diet    COPD, severe (HCC)  Uses albuterol during summer time  Refers no symptoms at the moment  CAD (coronary atherosclerotic disease)  Status post CABG  Stable  Being followed by cardiologist   -refers no chest pain    -symptom free  Hypertension  Stable on   furosemide 40mg  Diltiazem 240mg     Atrial flutter (HCC)  History of a fib/flutter   No palpitations presently  Takes warfarin        Diagnoses and all orders for this visit:    Mixed hyperlipidemia  -     Lipid Panel with Direct LDL reflex; Future  -     ALT; Future  -     AST; Future    Gastroesophageal reflux disease without esophagitis    Centrilobular emphysema (HCC)    COPD, severe (HCC)    Atherosclerosis of coronary artery bypass graft of native heart without angina pectoris    Essential hypertension  -     CBC; Future  -     Basic metabolic panel; Future    Atrial flutter, unspecified type ([address] )          BMI Counseling: Body mass index is 28 68 kg/m²  The BMI is above normal  Nutrition recommendations include decreasing portion sizes, encouraging healthy choices of fruits and vegetables, decreasing fast food intake and consuming healthier snacks  Exercise recommendations include vigorous physical activity 75 minutes/week  No pharmacotherapy was ordered  Subjective:          Patient ID: Layton Bravo is a [de-identified] y o  male  Is here for regular follow-up  No blood work prior to this visit  No new complaints        The following portions of the patient's history were reviewed and updated as appropriate: allergies, current medications, past family history, past medical history, past social history, past surgical history and problem list     Review of Systems   Constitutional: Negative for fatigue and fever  HENT: Negative for congestion, ear discharge, ear pain, postnasal drip, sinus pressure, sore throat, tinnitus and trouble swallowing  Eyes: Negative for discharge, itching and visual disturbance  Respiratory: Negative for cough and shortness of breath  Cardiovascular: Negative for chest pain and palpitations  Gastrointestinal: Negative for abdominal pain, diarrhea, nausea and vomiting  Endocrine: Negative for cold intolerance and polyuria  Genitourinary: Negative for difficulty urinating, dysuria and urgency  Musculoskeletal: Positive for arthralgias  Negative for neck pain  Skin: Negative for rash  Allergic/Immunologic: Negative for environmental allergies  Neurological: Negative for dizziness, weakness and headaches  Psychiatric/Behavioral: Negative for agitation, behavioral problems and confusion  The patient is not nervous/anxious            Past Medical History:   Diagnosis Date    A-fib [facility])     Arthritis     Benign prostatic hyperplasia without lower urinary tract symptoms     without Urinary Obstruction    CAD (coronary artery disease)     Chronic obstructive lung disease (HCC)     Coronary arteriosclerosis     Depression     Emphysema lung (HCC)     GERD (gastroesophageal reflux disease)     Hyperlipidemia     Hypertension     Myocardial infarction (HCC)     Psoriasis     Requires supplemental oxygen     at bedtime during high humid days only    Stroke [facility])     TIA 1/2018         Current Outpatient Medications:     aspirin 81 mg chewable tablet, Chew 1 tablet (81 mg total) daily, Disp: 60 tablet, Rfl: 6    atorvastatin (LIPITOR) 40 mg tablet, Take 1 tablet (40 mg total) by mouth daily, Disp: 90 tablet, Rfl: 3    CARTIA  MG 24 hr capsule, take 1 capsule by mouth once daily, Disp: 90 capsule, Rfl: 3    furosemide (LASIX) 40 mg tablet, Take 1 tablet (40 mg total) by mouth daily, Disp: 60 tablet, Rfl: 6    warfarin (Jantoven) 5 mg tablet, take 1 tablet by mouth once daily or as directed BY COUMADIN CLINIC, Disp: 30 tablet, Rfl: 6    albuterol (PROAIR HFA) 90 mcg/act inhaler, Inhale 2 puffs every 6 (six) hours as needed for wheezing or shortness of breath (Patient not taking: Reported on 5/19/2020), Disp: 8 5 g, Rfl: 0    esomeprazole (NexIUM) 20 mg capsule, Take 20 mg by mouth every other day  , Disp: , Rfl:     Allergies   Allergen Reactions    Penicillins Swelling and Itching       Social History   Past Surgical History:   Procedure Laterality Date    COLONOSCOPY      CORONARY ANGIOPLASTY  02/03/2001    PTCA of RCA    CORONARY ARTERY BYPASS GRAFT  02/07/2001    x4- [place]    HERNIA REPAIR      SC BRONCHOSCOPY,DIAGNOSTIC Left 1/7/2019    Procedure: BRONCHOSCOPY FLEXIBLE;  Surgeon: Jaye Toribio MD;  Location: [facility]; Service: Pulmonary    SC THROMBOENDARTECTMY NECK,NECK INCIS Left 2/20/2018    Procedure: ENDARTERECTOMY ARTERY CAROTID WITH PATCH ANGIOPLASTY;  Surgeon: Danny Fallon MD;  Location: [facility];  Service: Vascular    TRANSURETHRAL RESECTION OF PROSTATE       Family History   Problem Relation Age of Onset    Lung cancer Mother     Cancer Mother     Other Father         sepsis       Objective:  /56 (BP Location: Left arm, Patient Position: Sitting, Cuff Size: Standard)   Pulse 82   Temp 98 2 °F (36 8 °C) (Temporal)   Ht 5' 7" (1 702 m) Comment: shoes on  Wt 83 1 kg (183 lb 1 6 oz) Comment: shoes on  SpO2 97%   BMI 28 68 kg/m²   Body mass index is 28 68 kg/m²  Physical Exam  Constitutional:       Appearance: He is well-developed  HENT:      Head: Normocephalic  Right Ear: External ear normal       Left Ear: External ear normal       Nose: Nose normal       Mouth/Throat:      Pharynx: No posterior oropharyngeal erythema  Eyes:      General: No scleral icterus  Pupils: Pupils are equal, round, and reactive to light     Neck:      Musculoskeletal: Normal range of motion and neck supple  Thyroid: No thyromegaly  Trachea: No tracheal deviation  Cardiovascular:      Rate and Rhythm: Normal rate and regular rhythm  Heart sounds: Normal heart sounds  Pulmonary:      Effort: Pulmonary effort is normal  No respiratory distress  Breath sounds: Normal breath sounds  Chest:      Chest wall: No tenderness  Abdominal:      General: Bowel sounds are normal       Palpations: Abdomen is soft  There is no mass  Tenderness: There is no abdominal tenderness  Musculoskeletal: Normal range of motion  Right lower leg: No edema  Left lower leg: No edema  Lymphadenopathy:      Cervical: No cervical adenopathy  Skin:     General: Skin is warm  Neurological:      Mental Status: He is alert and oriented to person, place, and time  Cranial Nerves: No cranial nerve deficit  Psychiatric:         Mood and Affect: Mood normal          Behavior: Behavior normal          Thought Content:  Thought content normal          Judgment: Judgment normal

## 2021-02-08 NOTE — ASSESSMENT & PLAN NOTE
Lipid profile from November within target range     Continue with atorvastatin 40 mg   Continue with low-fat diet

## 2021-02-12 ENCOUNTER — LAB (OUTPATIENT)
Dept: LAB | Facility: CLINIC | Age: 81
End: 2021-02-12
Payer: MEDICARE

## 2021-02-12 DIAGNOSIS — E78.2 MIXED HYPERLIPIDEMIA: ICD-10-CM

## 2021-02-12 DIAGNOSIS — I10 ESSENTIAL HYPERTENSION: ICD-10-CM

## 2021-02-12 DIAGNOSIS — Z23 ENCOUNTER FOR IMMUNIZATION: ICD-10-CM

## 2021-02-15 ENCOUNTER — ANTICOAG VISIT (OUTPATIENT)
Dept: CARDIOLOGY CLINIC | Facility: CLINIC | Age: 81
End: 2021-02-15

## 2021-03-11 ENCOUNTER — ANTICOAG VISIT (OUTPATIENT)
Dept: CARDIOLOGY CLINIC | Facility: CLINIC | Age: 81
End: 2021-03-11

## 2021-03-11 ENCOUNTER — APPOINTMENT (EMERGENCY)
Dept: CT IMAGING | Facility: HOSPITAL | Age: 81
End: 2021-03-11
Payer: MEDICARE

## 2021-03-11 ENCOUNTER — HOSPITAL ENCOUNTER (EMERGENCY)
Facility: HOSPITAL | Age: 81
Discharge: HOME | End: 2021-03-12
Attending: EMERGENCY MEDICINE
Payer: MEDICARE

## 2021-03-11 VITALS
SYSTOLIC BLOOD PRESSURE: 142 MMHG | DIASTOLIC BLOOD PRESSURE: 79 MMHG | HEART RATE: 90 BPM | OXYGEN SATURATION: 98 % | RESPIRATION RATE: 18 BRPM | TEMPERATURE: 97.6 F

## 2021-03-11 DIAGNOSIS — R31.9 HEMATURIA: Primary | ICD-10-CM

## 2021-03-11 LAB
ALBUMIN SERPL BCP-MCNC: 3.5 G/DL (ref 3.5–5)
ALP SERPL-CCNC: 78 U/L (ref 46–116)
ALT SERPL W P-5'-P-CCNC: 30 U/L (ref 12–78)
ANION GAP SERPL CALCULATED.3IONS-SCNC: 11 MMOL/L (ref 4–13)
APTT PPP: 41 SECONDS (ref 23–37)
AST SERPL W P-5'-P-CCNC: 24 U/L (ref 5–45)
BASOPHILS # BLD AUTO: 0.04 THOUSANDS/ΜL (ref 0–0.1)
BASOPHILS NFR BLD AUTO: 1 % (ref 0–1)
BILIRUB DIRECT SERPL-MCNC: 0.19 MG/DL (ref 0–0.2)
BILIRUB SERPL-MCNC: 0.79 MG/DL (ref 0.2–1)
BUN SERPL-MCNC: 41 MG/DL (ref 5–25)
CALCIUM SERPL-MCNC: 9.3 MG/DL (ref 8.3–10.1)
CHLORIDE SERPL-SCNC: 104 MMOL/L (ref 100–108)
CK SERPL-CCNC: 97 U/L (ref 39–308)
CO2 SERPL-SCNC: 29 MMOL/L (ref 21–32)
CREAT SERPL-MCNC: 1.26 MG/DL (ref 0.6–1.3)
EOSINOPHIL # BLD AUTO: 0.13 THOUSAND/ΜL (ref 0–0.61)
EOSINOPHIL NFR BLD AUTO: 2 % (ref 0–6)
ERYTHROCYTE [DISTWIDTH] IN BLOOD BY AUTOMATED COUNT: 14.4 % (ref 11.6–15.1)
FLUAV RNA RESP QL NAA+PROBE: NEGATIVE
FLUBV RNA RESP QL NAA+PROBE: NEGATIVE
GFR SERPL CREATININE-BSD FRML MDRD: 54 ML/MIN/1.73SQ M
GLUCOSE SERPL-MCNC: 106 MG/DL (ref 65–140)
HCT VFR BLD AUTO: 41.5 % (ref 36.5–49.3)
HGB BLD-MCNC: 13.5 G/DL (ref 12–17)
IMM GRANULOCYTES # BLD AUTO: 0.03 THOUSAND/UL (ref 0–0.2)
IMM GRANULOCYTES NFR BLD AUTO: 1 % (ref 0–2)
INR PPP: 2.41 (ref 0.84–1.19)
LACTATE SERPL-SCNC: 1 MMOL/L (ref 0.5–2)
LYMPHOCYTES # BLD AUTO: 1.34 THOUSANDS/ΜL (ref 0.6–4.47)
LYMPHOCYTES NFR BLD AUTO: 22 % (ref 14–44)
MCH RBC QN AUTO: 30.1 PG (ref 26.8–34.3)
MCHC RBC AUTO-ENTMCNC: 32.5 G/DL (ref 31.4–37.4)
MCV RBC AUTO: 93 FL (ref 82–98)
MONOCYTES # BLD AUTO: 0.65 THOUSAND/ΜL (ref 0.17–1.22)
MONOCYTES NFR BLD AUTO: 11 % (ref 4–12)
NEUTROPHILS # BLD AUTO: 3.96 THOUSANDS/ΜL (ref 1.85–7.62)
NEUTS SEG NFR BLD AUTO: 63 % (ref 43–75)
NRBC BLD AUTO-RTO: 0 /100 WBCS
PLATELET # BLD AUTO: 206 THOUSANDS/UL (ref 149–390)
PMV BLD AUTO: 9.9 FL (ref 8.9–12.7)
POTASSIUM SERPL-SCNC: 4.1 MMOL/L (ref 3.5–5.3)
PROT SERPL-MCNC: 7.5 G/DL (ref 6.4–8.2)
PROTHROMBIN TIME: 25.1 SECONDS (ref 11.6–14.5)
RBC # BLD AUTO: 4.48 MILLION/UL (ref 3.88–5.62)
RSV RNA RESP QL NAA+PROBE: NEGATIVE
SARS-COV-2 RNA RESP QL NAA+PROBE: NEGATIVE
SODIUM SERPL-SCNC: 144 MMOL/L (ref 136–145)
WBC # BLD AUTO: 6.15 THOUSAND/UL (ref 4.31–10.16)

## 2021-03-11 PROCEDURE — 80076 HEPATIC FUNCTION PANEL: CPT | Performed by: EMERGENCY MEDICINE

## 2021-03-11 PROCEDURE — 85025 COMPLETE CBC W/AUTO DIFF WBC: CPT | Performed by: EMERGENCY MEDICINE

## 2021-03-11 PROCEDURE — 85610 PROTHROMBIN TIME: CPT | Performed by: EMERGENCY MEDICINE

## 2021-03-11 PROCEDURE — 96360 HYDRATION IV INFUSION INIT: CPT

## 2021-03-11 PROCEDURE — 96361 HYDRATE IV INFUSION ADD-ON: CPT

## 2021-03-11 PROCEDURE — 0241U HB NFCT DS VIR RESP RNA 4 TRGT: CPT | Performed by: EMERGENCY MEDICINE

## 2021-03-11 PROCEDURE — 82550 ASSAY OF CK (CPK): CPT | Performed by: EMERGENCY MEDICINE

## 2021-03-11 PROCEDURE — 83605 ASSAY OF LACTIC ACID: CPT | Performed by: EMERGENCY MEDICINE

## 2021-03-11 PROCEDURE — 36415 COLL VENOUS BLD VENIPUNCTURE: CPT | Performed by: EMERGENCY MEDICINE

## 2021-03-11 PROCEDURE — 74176 CT ABD & PELVIS W/O CONTRAST: CPT

## 2021-03-11 PROCEDURE — 99284 EMERGENCY DEPT VISIT MOD MDM: CPT

## 2021-03-11 PROCEDURE — 85730 THROMBOPLASTIN TIME PARTIAL: CPT | Performed by: EMERGENCY MEDICINE

## 2021-03-11 PROCEDURE — 80048 BASIC METABOLIC PNL TOTAL CA: CPT | Performed by: EMERGENCY MEDICINE

## 2021-03-11 RX ADMIN — SODIUM CHLORIDE 1000 ML: 0.9 INJECTION, SOLUTION INTRAVENOUS at 22:37

## 2021-03-12 LAB
BACTERIA UR QL AUTO: ABNORMAL /HPF
BILIRUB UR QL STRIP: NEGATIVE
CLARITY UR: ABNORMAL
COLOR UR: YELLOW
GLUCOSE UR STRIP-MCNC: NEGATIVE MG/DL
HGB UR QL STRIP.AUTO: ABNORMAL
KETONES UR STRIP-MCNC: NEGATIVE MG/DL
LEUKOCYTE ESTERASE UR QL STRIP: NEGATIVE
NITRITE UR QL STRIP: NEGATIVE
NON-SQ EPI CELLS URNS QL MICRO: ABNORMAL /HPF
PH UR STRIP.AUTO: 6.5 [PH]
PROT UR STRIP-MCNC: ABNORMAL MG/DL
RBC #/AREA URNS AUTO: ABNORMAL /HPF
SP GR UR STRIP.AUTO: 1.02 (ref 1–1.03)
UROBILINOGEN UR QL STRIP.AUTO: 0.2 E.U./DL
WBC #/AREA URNS AUTO: ABNORMAL /HPF

## 2021-03-12 PROCEDURE — 96361 HYDRATE IV INFUSION ADD-ON: CPT

## 2021-03-12 PROCEDURE — 81001 URINALYSIS AUTO W/SCOPE: CPT | Performed by: EMERGENCY MEDICINE

## 2021-03-12 PROCEDURE — 99282 EMERGENCY DEPT VISIT SF MDM: CPT | Performed by: EMERGENCY MEDICINE

## 2021-03-12 NOTE — ED NOTES
Pt will not allow a cath for urine retaining  States he is not retaining and he knows if he has to go or not  Sent ua to lab        Charly Cano RN  03/12/21 [id number]

## 2021-03-12 NOTE — ED PROVIDER NOTES
History  Chief Complaint   Patient presents with    Blood in Urine     pt c/o blood in urine that started tonight, sample from home brown in color  pt denies any burning or pain when urinating at this time  pt denies any bladder or kidney problems       History provided by:  Patient   used: No    Blood in Urine  This is a new problem  The current episode started today  The problem has been gradually worsening since onset  He describes the hematuria as gross hematuria  The hematuria occurs during the initial portion of his urinary stream  He reports no clotting in his urine stream  His pain is at a severity of 0/10  He is experiencing no pain  He describes his urine color as tea colored  Irritative symptoms do not include frequency or urgency  Obstructive symptoms include dribbling  Pertinent negatives include no abdominal pain, chills, dysuria, fever, nausea or vomiting  Prior to Admission Medications   Prescriptions Last Dose Informant Patient Reported? Taking?    CARTIA  MG 24 hr capsule  Self No No   Sig: take 1 capsule by mouth once daily   albuterol (PROAIR HFA) 90 mcg/act inhaler  Self No No   Sig: Inhale 2 puffs every 6 (six) hours as needed for wheezing or shortness of breath   Patient not taking: Reported on 5/19/2020   aspirin 81 mg chewable tablet  Self No No   Sig: Chew 1 tablet (81 mg total) daily   atorvastatin (LIPITOR) 40 mg tablet   No No   Sig: Take 1 tablet (40 mg total) by mouth daily   esomeprazole (NexIUM) 20 mg capsule  Self Yes No   Sig: Take 20 mg by mouth every other day     furosemide (LASIX) 40 mg tablet  Self No No   Sig: Take 1 tablet (40 mg total) by mouth daily   warfarin (Jantoven) 5 mg tablet   No No   Sig: take 1 tablet by mouth once daily or as directed BY COUMADIN CLINIC      Facility-Administered Medications: None       Past Medical History:   Diagnosis Date    [facility]     Arthritis     Benign prostatic hyperplasia without lower urinary tract symptoms     without Urinary Obstruction    CAD (coronary artery disease)     Chronic obstructive lung disease (HCC)     Coronary arteriosclerosis     Depression     Emphysema lung (HCC)     GERD (gastroesophageal reflux disease)     Hyperlipidemia     Hypertension     Myocardial infarction (HCC)     Psoriasis     Requires supplemental oxygen     at bedtime during high humid days only    Stroke [facility])     TIA 2018       Past Surgical History:   Procedure Laterality Date    COLONOSCOPY      CORONARY ANGIOPLASTY  2001    PTCA of RCA    CORONARY ARTERY BYPASS GRAFT  02/07/2001    x4- [facility]    HERNIA REPAIR      MO BRONCHOSCOPY,DIAGNOSTIC Left 2019    Procedure: Tasia Loomis;  Surgeon: Jaye Toribio MD;  [facility]; Service: Pulmonary    MO THROMBOENDARTECTMY NECK,NECK INCIS Left 2018    Procedure: ENDARTERECTOMY ARTERY CAROTID WITH PATCH ANGIOPLASTY;  Surgeon: Danny Fallon MD;  [facility];  Service: Vascular    TRANSURETHRAL RESECTION OF PROSTATE         Family History   Problem Relation Age of Onset    Lung cancer Mother     Cancer Mother     Other Father         sepsis     I have reviewed and agree with the history as documented  E-Cigarette/Vaping    E-Cigarette Use Never User      E-Cigarette/Vaping Substances     Social History     Tobacco Use    Smoking status: Former Smoker     Packs/day: 1 00     Years: 3 00     Pack years: 3 00     Types: Cigarettes     Quit date:      Years since quittin 2    Smokeless tobacco: Never Used    Tobacco comment: Has a past history of cigarette smoking;Quit date ; 5 packs year history, per [employer]     Substance Use Topics    Alcohol use: Yes     Frequency: Monthly or less     Comment: occasional wine   Drug use: No       Review of Systems   Constitutional: Negative for chills and fever  HENT: Negative for ear pain and sore throat      Eyes: Negative for pain and visual disturbance  Respiratory: Negative for cough and shortness of breath  Cardiovascular: Negative for chest pain and palpitations  Gastrointestinal: Negative for abdominal pain, nausea and vomiting  Genitourinary: Positive for hematuria  Negative for dysuria, frequency and urgency  Musculoskeletal: Negative for arthralgias and back pain  Skin: Negative for color change and rash  Neurological: Negative for seizures and syncope  All other systems reviewed and are negative  Physical Exam  Physical Exam  Vitals signs and nursing note reviewed  Constitutional:       Appearance: He is well-developed  HENT:      Head: Normocephalic and atraumatic  Eyes:      Conjunctiva/sclera: Conjunctivae normal    Neck:      Musculoskeletal: Neck supple  Cardiovascular:      Rate and Rhythm: Normal rate and regular rhythm  Heart sounds: No murmur  Pulmonary:      Effort: Pulmonary effort is normal  No respiratory distress  Breath sounds: Normal breath sounds  Abdominal:      Palpations: Abdomen is soft  Tenderness: There is no abdominal tenderness  Musculoskeletal: Normal range of motion  General: No swelling or tenderness  Right lower leg: No edema  Left lower leg: No edema  Skin:     General: Skin is warm and dry  Capillary Refill: Capillary refill takes less than 2 seconds  Neurological:      General: No focal deficit present  Mental Status: He is alert and oriented to person, place, and time           Vital Signs  ED Triage Vitals [03/11/21 2138]   Temperature Pulse Respirations Blood Pressure SpO2   97 6 °F (36 4 °C) 90 18 142/79 98 %      Temp Source Heart Rate Source Patient Position - Orthostatic VS BP Location FiO2 (%)   Oral Monitor Sitting Left arm --      Pain Score       --           Vitals:    03/11/21 2138   BP: 142/79   Pulse: 90   Patient Position - Orthostatic VS: Sitting         Visual Acuity      ED Medications  Medications   sodium chloride 0 9 % bolus 1,000 mL (0 mL Intravenous Stopped 3/12/21 0225)       Diagnostic Studies  Results Reviewed     Procedure Component Value Units Date/Time    Urine Microscopic [[id number]]  (Abnormal) Collected: 03/12/21 0114    Lab Status: Final result Specimen: Urine, Clean Catch Updated: 03/12/21 0151     RBC, UA Innumerable /hpf      WBC, UA 0-1 /hpf      Epithelial Cells Occasional /hpf      Bacteria, UA Occasional /hpf     UA w Reflex to Microscopic w Reflex to Culture [[id number]]  (Abnormal) Collected: 03/12/21 0114    Lab Status: Final result Specimen: Urine, Clean Catch Updated: 03/12/21 0130     Color, UA Yellow     Clarity, UA Cloudy     Specific [place], UA 1 020     pH, UA 6 5     Leukocytes, UA Negative     Nitrite, UA Negative     Protein, UA Trace mg/dl      Glucose, UA Negative mg/dl      Ketones, UA Negative mg/dl      Urobilinogen, UA 0 2 E U /dl      Bilirubin, UA Negative     Blood, UA Large    APTT [[id number]]  (Abnormal) Collected: 03/11/21 2250    Lab Status: Final result Specimen: Blood from Arm, Right Updated: 03/11/21 2311     PTT 41 seconds     Protime-INR [[id number]]  (Abnormal) Collected: 03/11/21 2250    Lab Status: Final result Specimen: Blood from Arm, Right Updated: 03/11/21 2311     Protime 25 1 seconds      INR 2 41    COVID19, Influenza A/B, RSV PCR, [facility] [[id number]]  (Normal) Collected: 03/11/21 2220    Lab Status: Final result Specimen: Nares from Nasopharyngeal Swab Updated: 03/11/21 2308     SARS-CoV-2 Negative     INFLUENZA A PCR Negative     INFLUENZA B PCR Negative     RSV PCR Negative    Narrative: This test has been authorized by FDA under an EUA (Emergency Use Assay) for use by authorized laboratories  Clinical caution and judgement should be used with the interpretation of these results with consideration of the clinical impression and other laboratory testing    Testing reported as "Positive" or "Negative" has been proven to be accurate according to standard laboratory validation requirements  All testing is performed with control materials showing appropriate reactivity at standard intervals  Lactic acid [[id number]]  (Normal) Collected: 03/11/21 2217    Lab Status: Final result Specimen: Blood from Arm, Right Updated: 03/11/21 2244     LACTIC ACID 1 0 mmol/L     Narrative:      Result may be elevated if tourniquet was used during collection      Basic metabolic panel [[id number]]  (Abnormal) Collected: 03/11/21 2217    Lab Status: Final result Specimen: Blood from Arm, Right Updated: 03/11/21 2241     Sodium 144 mmol/L      Potassium 4 1 mmol/L      Chloride 104 mmol/L      CO2 29 mmol/L      ANION GAP 11 mmol/L      BUN 41 mg/dL      Creatinine 1 26 mg/dL      Glucose 106 mg/dL      Calcium 9 3 mg/dL      eGFR 54 ml/min/1 73sq m     Narrative:      [place]mber[place] guidelines for Chronic Kidney Disease (CKD):     Stage 1 with normal or high GFR (GFR > 90 mL/min/1 73 square meters)    Stage 2 Mild CKD (GFR = 60-89 mL/min/1 73 square meters)    Stage 3A Moderate CKD (GFR = 45-59 mL/min/1 73 square meters)    Stage 3B Moderate CKD (GFR = 30-44 mL/min/1 73 square meters)    Stage 4 Severe CKD (GFR = 15-29 mL/min/1 73 square meters)    Stage 5 End Stage CKD (GFR <15 mL/min/1 73 square meters)  Note: GFR calculation is accurate only with a steady state creatinine    Hepatic function panel [[id number]]  (Normal) Collected: 03/11/21 2217    Lab Status: Final result Specimen: Blood from Arm, Right Updated: 03/11/21 2241     Total Bilirubin 0 79 mg/dL      Bilirubin, Direct 0 19 mg/dL      Alkaline Phosphatase 78 U/L      AST 24 U/L      ALT 30 U/L      Total Protein 7 5 g/dL      Albumin 3 5 g/dL     CK Total with Reflex CKMB [[id number]]  (Normal) Collected: 03/11/21 2217    Lab Status: Final result Specimen: Blood from Arm, Right Updated: 03/11/21 2241     Total CK 97 U/L     CBC and differential [[id number]] Collected: 03/11/21 2217    Lab Status: Final result Specimen: Blood from Arm, Right Updated: 03/11/21 2224     WBC 6 15 Thousand/uL      RBC 4 48 Million/uL      Hemoglobin 13 5 g/dL      Hematocrit 41 5 %      MCV 93 fL      MCH 30 1 pg      MCHC 32 5 g/dL      RDW 14 4 %      MPV 9 9 fL      Platelets 138 Thousands/uL      nRBC 0 /100 WBCs      Neutrophils Relative 63 %      Immat GRANS % 1 %      Lymphocytes Relative 22 %      Monocytes Relative 11 %      Eosinophils Relative 2 %      Basophils Relative 1 %      Neutrophils Absolute 3 96 Thousands/µL      Immature Grans Absolute 0 03 Thousand/uL      Lymphocytes Absolute 1 34 Thousands/µL      Monocytes Absolute 0 65 Thousand/µL      Eosinophils Absolute 0 13 Thousand/µL      Basophils Absolute 0 04 Thousands/µL                  CT abdomen pelvis wo contrast   Final Result by Jose Ramon Evans MD (03/12 0054)   1  No evidence of nephrolithiasis or obstructive uropathy  2   Bladder diverticula without evidence of mass or cystitis  No hemorrhage within the bladder  Workstation performed: [id number]                 Procedures  Procedures         ED Course                             SBIRT 20yo+      Most Recent Value   SBIRT (22 yo +)   In order to provide better care to our patients, we are screening all of our patients for alcohol and drug use  Would it be okay to ask you these screening questions? No Filed at: 03/11/2021 2205                    [facility]  Number of Diagnoses or Management Options  Hematuria: new and requires workup  Diagnosis management comments: 61-year-old male presented for evaluation of new onset hematuria noticed today  He had no other complaints, denied abdominal pain  Patient states that he does not think he has any problem with obstructive uropathy and seems to empty his bladder several times daily  He was noted to have a full bladder on CT scan and a bladder diverticulum was also noted    In the emergency department he did seem to have some possible symptoms of obstructive uropathy with dribbling, and I discussed this possibility with the patient as well as the possibility of of Fortune catheter for decompression but patient refused  Other laboratories studies seem relatively unremarkable and there is no sign of hydronephrosis on CT scan  Patient was signed out to Dr Mike Joshi pending urinalysis  Hematuria may be secondary to Coumadin use  At this point will plan for discharge with Flomax and Urology follow-up provided there is no sign of UTI  Amount and/or Complexity of Data Reviewed  Clinical lab tests: reviewed and ordered  Tests in the radiology section of CPT®: ordered and reviewed  Review and summarize past medical records: yes  Independent visualization of images, tracings, or specimens: yes        Disposition  Final diagnoses:   Hematuria     Time reflects when diagnosis was documented in both MDM as applicable and the Disposition within this note     Time User Action Codes Description Comment    3/12/2021 12:58 AM Kim Mad L Add [R31 9] Hematuria     3/12/2021 12:58 AM Flint Lidia Add [R33 9] Urinary retention     3/12/2021  1:06 AM Bernardino Lidia Remove [R33 9] Urinary retention     3/12/2021  1:09 AM Bernardino Lidia Add [N39 0] UTI (urinary tract infection)     3/12/2021  1:10 AM Flint Lidia Remove [N39 0] UTI (urinary tract infection)       ED Disposition     ED Disposition Condition Date/Time Comment    Discharge Stable Fri Mar 12, 2021  1:09 AM Joel Ruiz discharge to home/self care              Follow-up Information     Follow up With Specialties Details Why Contact Info    Selena Serrato MD Internal Medicine   [address] [facility] [address]  [phone]            Discharge Medication List as of 3/12/2021  1:59 AM      CONTINUE these medications which have NOT CHANGED    Details   albuterol (PROAIR HFA) 90 mcg/act inhaler Inhale 2 puffs every 6 (six) hours as needed for wheezing or shortness of breath, Starting Fri 11/9/2018, Normal      aspirin 81 mg chewable tablet Chew 1 tablet (81 mg total) daily, Starting Mon 7/29/2019, Normal      atorvastatin (LIPITOR) 40 mg tablet Take 1 tablet (40 mg total) by mouth daily, Starting Fri 5/29/2020, Normal      CARTIA  MG 24 hr capsule take 1 capsule by mouth once daily, Normal      esomeprazole (NexIUM) 20 mg capsule Take 20 mg by mouth every other day  , Historical Med      furosemide (LASIX) 40 mg tablet Take 1 tablet (40 mg total) by mouth daily, Starting Tue 5/19/2020, Normal      warfarin (Jantoven) 5 mg tablet take 1 tablet by mouth once daily or as directed BY COUMADIN CLINIC, Normal           No discharge procedures on file      PDMP Review     None          ED Provider  Electronically Signed by           Keith Rader MD  03/12/21 0451

## 2021-03-15 ENCOUNTER — OFFICE VISIT (OUTPATIENT)
Dept: CARDIOLOGY CLINIC | Facility: CLINIC | Age: 81
End: 2021-03-15
Payer: MEDICARE

## 2021-03-15 VITALS
HEART RATE: 70 BPM | BODY MASS INDEX: 29.63 KG/M2 | DIASTOLIC BLOOD PRESSURE: 72 MMHG | OXYGEN SATURATION: 95 % | RESPIRATION RATE: 18 BRPM | WEIGHT: 188.8 LBS | SYSTOLIC BLOOD PRESSURE: 130 MMHG | HEIGHT: 67 IN

## 2021-03-15 DIAGNOSIS — I48.92 ATRIAL FLUTTER, UNSPECIFIED TYPE (HCC): Primary | ICD-10-CM

## 2021-03-15 DIAGNOSIS — I25.810 ATHEROSCLEROSIS OF CORONARY ARTERY BYPASS GRAFT OF NATIVE HEART WITHOUT ANGINA PECTORIS: ICD-10-CM

## 2021-03-15 DIAGNOSIS — R01.1 MURMUR, CARDIAC: ICD-10-CM

## 2021-03-15 DIAGNOSIS — R31.9 HEMATURIA, UNSPECIFIED TYPE: ICD-10-CM

## 2021-03-15 PROCEDURE — 99214 OFFICE O/P EST MOD 30 MIN: CPT | Performed by: INTERNAL MEDICINE

## 2021-03-15 NOTE — PROGRESS NOTES
Cardiology Outpatient Follow up Note    Poly Patino [address] o  male MRN: [id number]    03/15/21          Assessment:  1  Hematuria   2  Paroxysmal atrial flutter s/p DCCV   3  CAD s/p CABG ×4 (2/2001)  4  Chronic HFpEF  5  Hx of CVA  6  Hx of Left CEA   7  Dyslipidemia   8  Severe COPD  9  Systolic murmur     Plan:  · Will refer to Urology for evaluation for hematuria  He was advised to notify us with recurrent hematuria  · He is in NSR; Continue diltiazem ; [id number]: 6- cont coumadin   · Cont aspirin and atorvastatin  · He is euvolemic; cont lasix 40mg PO daily  · Repeat TTE and carotid duplex pending  1  Atrial flutter, unspecified type ([address] )     2  Atherosclerosis of coronary artery bypass graft of native heart without angina pectoris     3  Hematuria, unspecified type  Ambulatory referral to Urology   4  Murmur, cardiac  Echo complete with contrast if indicated       HPI: Poly Patino is a 89[address]  year old male with a history of CAD s/p CABG and PCI who presents for a routine follow up       Past medical hx:   · CAD s/p CABG x4 (2001): LIMA-LAD, SVG-RCA, sequential SVG-Diag/OM1  · [employer] [employer] 2016: No ischemia; normal LVEF  · Typical Atrial flutter diagnosed during a hospitalization for pneumonia in 10/2018 and was started on diltiazem and Xarelto  He underwent RENEE/DCCV with conversion to NSR on 11/2/2018  He recently presented to the ED on 3/12/21 for evaluation for hematuria  CT abdomen pelvis revealed no evidence of nephrolithiasis or obstructive uropathy  There was evidence of bladder diverticula without mass or cystitis  He was referred to Urology as an outpatient however has not yet established care  He denies recurrent episodes and has been tolerating oral anticoagulation  His hemoglobin was stable  He has otherwise been doing well from a cardiac standpoint  He is euvolemic on Lasix    TTE and carotid duplex were ordered during previous appointments however he has not scheduled testing  He denies any other concerns cardiac concerns at this time  He is tolerating his medications without side effect      Patient Active Problem List   Diagnosis    CAD (coronary atherosclerotic disease)    Hypertension    Hyperlipemia    Atypical chest pain    GERD (gastroesophageal reflux disease)    History of stroke    Sciatica of right side    Hyperlipidemia    Centrilobular emphysema (HCC)    Arthritis    Stenosis of left carotid artery    S/P CABG x 4    Acute left-sided low back pain with left-sided sciatica    Back pain    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    Vision changes    Urinary retention due to benign prostatic hyperplasia    Bladder cancer ([address] )    CKD (chronic kidney disease), stage II    Atrial flutter (HCC)    Irregular heart beat    Localized edema    Chronic respiratory failure with hypoxia (HCC)    COPD, severe (HCC)    Bronchiectasis with acute lower respiratory infection (HCC)    Abnormal CT of the chest    Tinnitus aurium, bilateral    Sensorineural hearing loss (SNHL) of both ears    Medicare annual wellness visit, subsequent    Anemia    DDD (degenerative disc disease), lumbar    Chronic diastolic heart failure (HCC)       Allergies   Allergen Reactions    Penicillins Swelling and Itching         Current Outpatient Medications:     albuterol (PROAIR HFA) 90 mcg/act inhaler, Inhale 2 puffs every 6 (six) hours as needed for wheezing or shortness of breath, Disp: 8 5 g, Rfl: 0    aspirin 81 mg chewable tablet, Chew 1 tablet (81 mg total) daily, Disp: 60 tablet, Rfl: 6    atorvastatin (LIPITOR) 40 mg tablet, Take 1 tablet (40 mg total) by mouth daily, Disp: 90 tablet, Rfl: 3    CARTIA  MG 24 hr capsule, take 1 capsule by mouth once daily, Disp: 90 capsule, Rfl: 3    furosemide (LASIX) 40 mg tablet, Take 1 tablet (40 mg total) by mouth daily, Disp: 60 tablet, Rfl: 6    warfarin (Jantoven) 5 mg tablet, take 1 tablet by mouth once daily or as directed BY COUMADIN CLINIC, Disp: 30 tablet, Rfl: 6    esomeprazole (NexIUM) 20 mg capsule, Take 20 mg by mouth every other day  , Disp: , Rfl:     Past Medical History:   Diagnosis Date    [facility]     Arthritis     Benign prostatic hyperplasia without lower urinary tract symptoms     without Urinary Obstruction    CAD (coronary artery disease)     Chronic obstructive lung disease (HCC)     Coronary arteriosclerosis     Depression     Emphysema lung (HCC)     GERD (gastroesophageal reflux disease)     Hyperlipidemia     Hypertension     Myocardial infarction (HCC)     Psoriasis     Requires supplemental oxygen     at bedtime during high humid days only    Stroke [facility])     TIA 1/2018       Family History   Problem Relation Age of Onset    Lung cancer Mother     Cancer Mother     Other Father         sepsis       Past Surgical History:   Procedure Laterality Date    COLONOSCOPY      CORONARY ANGIOPLASTY  02/03/2001    PTCA of RCA    CORONARY ARTERY BYPASS GRAFT  02/07/2001    x4- [place]    HERNIA REPAIR      CO BRONCHOSCOPY,DIAGNOSTIC Left 1/7/2019    Procedure: BRONCHOSCOPY FLEXIBLE;  Surgeon: Dada Treadwell MD;  [facility]; Service: Pulmonary    CO THROMBOENDARTECTMY NECK,NECK INCIS Left 2/20/2018    Procedure: ENDARTERECTOMY ARTERY CAROTID WITH PATCH ANGIOPLASTY;  Surgeon: Ric Gao MD;  [facility];  Service: Vascular    TRANSURETHRAL RESECTION OF PROSTATE         Social History     Socioeconomic History    Marital status:       Spouse name: Not on file    Number of children: 3    Years of education: Not on file    Highest education level: Not on file   Occupational History    Occupation: retired    Social Needs    Financial resource strain: Not on file    Food insecurity     Worry: Not on file     Inability: Not on file   [employer] needs     Medical: Not on file     Non-medical: Not on file   Tobacco Use    Smoking status: Former Smoker     Packs/day: 1 00     Years: 3 00     Pack years: 3 00     Types: Cigarettes     Quit date:      Years since quittin 2    Smokeless tobacco: Never Used    Tobacco comment: Has a past history of cigarette smoking;Quit date ; 5 packs year history, per [employer]     Substance and Sexual Activity    Alcohol use: Yes     Frequency: Monthly or less     Comment: occasional wine   Drug use: No    Sexual activity: Yes   Lifestyle    Physical activity     Days per week: Not on file     Minutes per session: Not on file    Stress: Not on file   Relationships    Social connections     Talks on phone: Not on file     Gets together: Not on file     Attends Oriental orthodox service: Not on file     Active member of club or organization: Not on file     Attends meetings of clubs or organizations: Not on file     Relationship status: Not on file    Intimate partner violence     Fear of current or ex partner: Not on file     Emotionally abused: Not on file     Physically abused: Not on file     Forced sexual activity: Not on file   Other Topics Concern    Not on file   Social History Narrative    Lives with granddaughter and daughter     Caffeine use, He admits to consuming caffeine VIA coffee ( 8 servings per day), per [employer]    Martial History: Currently , per [employer]    Occupation: Retired (prior occupational:  repairman), per [employer]        Review of Systems   Constitution: Negative for diaphoresis, weight gain and weight loss  HENT: Negative for congestion  Cardiovascular: Negative for chest pain, dyspnea on exertion, irregular heartbeat, leg swelling, near-syncope, orthopnea, palpitations, paroxysmal nocturnal dyspnea and syncope  Respiratory: Negative for shortness of breath, sleep disturbances due to breathing and snoring  Hematologic/Lymphatic: Does not bruise/bleed easily  Skin: Negative for rash     Musculoskeletal: Negative for myalgias  Gastrointestinal: Negative for nausea and vomiting  Genitourinary: Positive for hematuria  Neurological: Negative for excessive daytime sleepiness and light-headedness  Psychiatric/Behavioral: The patient is not nervous/anxious  Vitals: /72   Pulse 70   Resp 18   Ht 5' 7" (1 702 m)   Wt 85 6 kg (188 lb 12 8 oz)   SpO2 95%   BMI 29 57 kg/m²       Physical Exam:     GEN: Alert and oriented x 3, in no acute distress  Well appearing and well nourished  HEENT: Sclera anicteric, conjunctivae pink, mucous membranes moist  Oropharynx clear  NECK: Supple, no carotid bruits, no significant JVD  Trachea midline, no thyromegaly  HEART: Regular rhythm, normal S1 and S2, 3/6 CHANDAN RUSB, no clicks, gallops or rubs  PMI nondisplaced, no thrills  LUNGS: Clear to auscultation bilaterally; no wheezes, rales, or rhonchi  No increased work of breathing or signs of respiratory distress  ABDOMEN: Soft, nontender, nondistended, normoactive bowel sounds  EXTREMITIES: Skin warm and well perfused, no clubbing, cyanosis, or edema  NEURO: No focal findings  Normal speech  Mood and affect normal    SKIN: Normal without suspicious lesions on exposed skin  Lab Results:       Lab Results   Component Value Date    HGBA1C 5 0 01/13/2018    HGBA1C 4 8 08/15/2016     Lab Results   Component Value Date    CHOL See scanned summary  08/15/2016    CHOL See scanned summary  03/17/2016     Lab Results   Component Value Date    HDL 53 05/18/2020    HDL 44 01/14/2018    HDL See scanned summary  08/15/2016     Lab Results   Component Value Date    LDLCALC 74 05/18/2020    [address] (H) 01/14/2018    LDLCALC 72 08/14/2016     Lab Results   Component Value Date    TRIG 89 05/18/2020    TRIG 81 01/14/2018    TRIG See scanned summary   08/15/2016     No results found for: CHOLHDL

## 2021-03-26 ENCOUNTER — TELEPHONE (OUTPATIENT)
Dept: INTERNAL MEDICINE CLINIC | Age: 81
End: 2021-03-26

## 2021-04-03 ENCOUNTER — TELEPHONE (OUTPATIENT)
Dept: INTERNAL MEDICINE CLINIC | Facility: CLINIC | Age: 81
End: 2021-04-03

## 2021-04-03 NOTE — TELEPHONE ENCOUNTER
The patient was called, but was not available  Daughter will like me to call back tomorrow or Monday

## 2021-04-05 DIAGNOSIS — I48.92 ATRIAL FLUTTER (HCC): ICD-10-CM

## 2021-04-05 RX ORDER — WARFARIN SODIUM 5 MG/1
TABLET ORAL
Qty: 30 TABLET | Refills: 6 | Status: CANCELLED | OUTPATIENT
Start: 2021-04-05

## 2021-04-05 RX ORDER — WARFARIN SODIUM 5 MG/1
TABLET ORAL
Qty: 30 TABLET | Refills: 6 | Status: SHIPPED | OUTPATIENT
Start: 2021-04-05 | End: 2021-09-30

## 2021-05-01 DIAGNOSIS — I48.3 TYPICAL ATRIAL FLUTTER (HCC): ICD-10-CM

## 2021-05-03 ENCOUNTER — OFFICE VISIT (OUTPATIENT)
Dept: INTERNAL MEDICINE CLINIC | Age: 81
End: 2021-05-03
Payer: MEDICARE

## 2021-05-03 ENCOUNTER — APPOINTMENT (OUTPATIENT)
Dept: LAB | Facility: CLINIC | Age: 81
End: 2021-05-03
Payer: MEDICARE

## 2021-05-03 VITALS
HEIGHT: 67 IN | BODY MASS INDEX: 29.19 KG/M2 | TEMPERATURE: 97 F | SYSTOLIC BLOOD PRESSURE: 114 MMHG | OXYGEN SATURATION: 97 % | HEART RATE: 61 BPM | DIASTOLIC BLOOD PRESSURE: 66 MMHG | WEIGHT: 186 LBS

## 2021-05-03 DIAGNOSIS — I48.3 TYPICAL ATRIAL FLUTTER (HCC): ICD-10-CM

## 2021-05-03 DIAGNOSIS — I10 ESSENTIAL HYPERTENSION: ICD-10-CM

## 2021-05-03 DIAGNOSIS — K21.9 GASTROESOPHAGEAL REFLUX DISEASE WITHOUT ESOPHAGITIS: ICD-10-CM

## 2021-05-03 DIAGNOSIS — J44.9 COPD, SEVERE (HCC): ICD-10-CM

## 2021-05-03 DIAGNOSIS — I25.810 ATHEROSCLEROSIS OF CORONARY ARTERY BYPASS GRAFT OF NATIVE HEART WITHOUT ANGINA PECTORIS: ICD-10-CM

## 2021-05-03 DIAGNOSIS — I48.92 ATRIAL FLUTTER, UNSPECIFIED TYPE (HCC): ICD-10-CM

## 2021-05-03 DIAGNOSIS — E78.2 MIXED HYPERLIPIDEMIA: ICD-10-CM

## 2021-05-03 LAB
ALT SERPL W P-5'-P-CCNC: 24 U/L (ref 12–78)
ANION GAP SERPL CALCULATED.3IONS-SCNC: 2 MMOL/L (ref 4–13)
AST SERPL W P-5'-P-CCNC: 20 U/L (ref 5–45)
BUN SERPL-MCNC: 27 MG/DL (ref 5–25)
CALCIUM SERPL-MCNC: 9.1 MG/DL (ref 8.3–10.1)
CHLORIDE SERPL-SCNC: 109 MMOL/L (ref 100–108)
CHOLEST SERPL-MCNC: 135 MG/DL (ref 50–200)
CO2 SERPL-SCNC: 30 MMOL/L (ref 21–32)
CREAT SERPL-MCNC: 1.24 MG/DL (ref 0.6–1.3)
ERYTHROCYTE [DISTWIDTH] IN BLOOD BY AUTOMATED COUNT: 13.8 % (ref 11.6–15.1)
GFR SERPL CREATININE-BSD FRML MDRD: 55 ML/MIN/1.73SQ M
GLUCOSE SERPL-MCNC: 82 MG/DL (ref 65–140)
HCT VFR BLD AUTO: 44.3 % (ref 36.5–49.3)
HDLC SERPL-MCNC: 53 MG/DL
HGB BLD-MCNC: 13.5 G/DL (ref 12–17)
LDLC SERPL CALC-MCNC: 51 MG/DL (ref 0–100)
MCH RBC QN AUTO: 28.8 PG (ref 26.8–34.3)
MCHC RBC AUTO-ENTMCNC: 30.5 G/DL (ref 31.4–37.4)
MCV RBC AUTO: 95 FL (ref 82–98)
PLATELET # BLD AUTO: 259 THOUSANDS/UL (ref 149–390)
PMV BLD AUTO: 10.7 FL (ref 8.9–12.7)
POTASSIUM SERPL-SCNC: 4.3 MMOL/L (ref 3.5–5.3)
RBC # BLD AUTO: 4.68 MILLION/UL (ref 3.88–5.62)
SODIUM SERPL-SCNC: 141 MMOL/L (ref 136–145)
TRIGL SERPL-MCNC: 154 MG/DL
WBC # BLD AUTO: 5.18 THOUSAND/UL (ref 4.31–10.16)

## 2021-05-03 PROCEDURE — 80048 BASIC METABOLIC PNL TOTAL CA: CPT

## 2021-05-03 PROCEDURE — 85027 COMPLETE CBC AUTOMATED: CPT

## 2021-05-03 PROCEDURE — 36415 COLL VENOUS BLD VENIPUNCTURE: CPT

## 2021-05-03 PROCEDURE — 84450 TRANSFERASE (AST) (SGOT): CPT

## 2021-05-03 PROCEDURE — 99214 OFFICE O/P EST MOD 30 MIN: CPT | Performed by: INTERNAL MEDICINE

## 2021-05-03 PROCEDURE — 80061 LIPID PANEL: CPT

## 2021-05-03 PROCEDURE — 84460 ALANINE AMINO (ALT) (SGPT): CPT

## 2021-05-03 RX ORDER — DILTIAZEM HYDROCHLORIDE 240 MG/1
240 CAPSULE, COATED, EXTENDED RELEASE ORAL DAILY
Qty: 90 CAPSULE | Refills: 3 | Status: SHIPPED | OUTPATIENT
Start: 2021-05-03 | End: 2021-07-07 | Stop reason: ALTCHOICE

## 2021-05-03 RX ORDER — DILTIAZEM HYDROCHLORIDE 240 MG/1
CAPSULE, EXTENDED RELEASE ORAL
Qty: 90 CAPSULE | Refills: 3 | Status: SHIPPED | OUTPATIENT
Start: 2021-05-03 | End: 2021-07-07 | Stop reason: ALTCHOICE

## 2021-05-03 NOTE — PROGRESS NOTES
Assessment/Plan:    1  GERD  Has intermittent acid reflux with meals, not on medication and refuses to start any  Controlled with diet  Uses TUMS as needed     2  Hyperlipidemia  Continue Atorvastatin 40mg daily and a low-fat diet     3  COPD, severe  Asymptomatic   Continue albuterol inhaler as needed  Uses it most during summer time     4  CAD  S/p CABG  Stable  Follows with cardiology  Asymptomatic without chest pain   Continue ASA and Atorvastatin     5  Paroxysmal atrial flutter  No palpitations at the moment  On Coumadin and Diltiazem     6  Hypertension  Stable on Furosemide 40mg and Diltiazem 240mg         Diagnoses and all orders for this visit:    Mixed hyperlipidemia    Gastroesophageal reflux disease without esophagitis    COPD, severe ([address] )    Essential hypertension    Atrial flutter, unspecified type ([facility] [address] )    Atherosclerosis of coronary artery bypass graft of native heart without angina pectoris          BMI Counseling: Body mass index is 29 13 kg/m²  The BMI is above normal  Nutrition recommendations include encouraging healthy choices of fruits and vegetables and moderation in carbohydrate intake  Exercise recommendations include moderate physical activity 150 minutes/week  No pharmacotherapy was ordered  Subjective:          Patient ID: Nixon Lopez is a [de-identified] y o  male  [de-identified] male who presented for a follow-up visit  No complaints at today's visit  Labs were drawn earlier this morning and are currently unavailable so will follow-up with patient if any abnormality is noted  The following portions of the patient's history were reviewed and updated as appropriate: allergies, current medications, past family history, past medical history, past social history, past surgical history and problem list     Review of Systems   Constitutional: Negative for appetite change, chills, fatigue and fever     HENT: Negative for ear discharge, ear pain, facial swelling, postnasal drip, rhinorrhea, sinus pressure, sore throat and trouble swallowing  Eyes: Negative for pain, discharge, itching and visual disturbance  Respiratory: Negative for cough, shortness of breath and wheezing  Cardiovascular: Negative for chest pain and palpitations  Gastrointestinal: Negative for abdominal distention, abdominal pain, constipation, diarrhea, nausea and vomiting  Endocrine: Negative for cold intolerance, heat intolerance and polyuria  Genitourinary: Negative for difficulty urinating, dysuria, frequency, hematuria and urgency  Musculoskeletal: Negative for arthralgias, gait problem, joint swelling and myalgias  Skin: Negative for color change, pallor and rash  Neurological: Negative for dizziness, speech difficulty, weakness, light-headedness, numbness and headaches  Psychiatric/Behavioral: Negative for behavioral problems, confusion and sleep disturbance  The patient is not nervous/anxious            Past Medical History:   Diagnosis Date    A-fib [facility])     Arthritis     Benign prostatic hyperplasia without lower urinary tract symptoms     without Urinary Obstruction    CAD (coronary artery disease)     Chronic obstructive lung disease (HCC)     Coronary arteriosclerosis     Depression     Emphysema lung (HCC)     GERD (gastroesophageal reflux disease)     Hyperlipidemia     Hypertension     Myocardial infarction (HCC)     Psoriasis     Requires supplemental oxygen     at bedtime during high humid days only    Stroke [facility])     TIA 1/2018         Current Outpatient Medications:     albuterol (PROAIR HFA) 90 mcg/act inhaler, Inhale 2 puffs every 6 (six) hours as needed for wheezing or shortness of breath, Disp: 8 5 g, Rfl: 0    aspirin 81 mg chewable tablet, Chew 1 tablet (81 mg total) daily, Disp: 60 tablet, Rfl: 6    atorvastatin (LIPITOR) 40 mg tablet, Take 1 tablet (40 mg total) by mouth daily, Disp: 90 tablet, Rfl: 3    CARTIA  MG 24 hr capsule, take 1 capsule by mouth once daily, Disp: 90 capsule, Rfl: 3    furosemide (LASIX) 40 mg tablet, Take 1 tablet (40 mg total) by mouth daily, Disp: 60 tablet, Rfl: 6    warfarin (Jantoven) 5 mg tablet, take 1 tablet by mouth once daily or as directed BY COUMADIN CLINIC, Disp: 30 tablet, Rfl: 6    esomeprazole (NexIUM) 20 mg capsule, Take 20 mg by mouth every other day  , Disp: , Rfl:     Allergies   Allergen Reactions    Penicillins Swelling and Itching       Social History   Past Surgical History:   Procedure Laterality Date    COLONOSCOPY      CORONARY ANGIOPLASTY  02/03/2001    PTCA of RCA    CORONARY ARTERY BYPASS GRAFT  02/07/2001    x4- [facility]    HERNIA REPAIR      RI BRONCHOSCOPY,DIAGNOSTIC Left 1/7/2019    Procedure: BRONCHOSCOPY FLEXIBLE;  Surgeon: Janelle Kwon MD;  [facility]; Service: Pulmonary    RI THROMBOENDARTECTMY NECK,NECK INCIS Left 2/20/2018    Procedure: ENDARTERECTOMY ARTERY CAROTID WITH PATCH ANGIOPLASTY;  Surgeon: Rodríguez Mckeon MD;  [facility];  Service: Vascular    TRANSURETHRAL RESECTION OF PROSTATE       Family History   Problem Relation Age of Onset    Lung cancer Mother     Cancer Mother     Other Father         sepsis       Objective:  /66 (BP Location: Left arm, Patient Position: Sitting, Cuff Size: Standard)   Pulse 61   Temp (!) 97 °F (36 1 °C) (Temporal)   Ht 5' 7" (1 702 m) Comment: shoes on  Wt 84 4 kg (186 lb) Comment: shoes on  SpO2 97%   BMI 29 13 kg/m²   Body mass index is 29 13 kg/m²  Physical Exam  Constitutional:       Appearance: He is well-developed  HENT:      Head: Normocephalic and atraumatic  Right Ear: There is impacted cerumen  Left Ear: There is impacted cerumen  Eyes:      Conjunctiva/sclera: Conjunctivae normal       Pupils: Pupils are equal, round, and reactive to light  Neck:      Musculoskeletal: Normal range of motion  Cardiovascular:      Rate and Rhythm: Normal rate and regular rhythm        Heart sounds: Murmur (systolic) present  No friction rub  No gallop  Pulmonary:      Effort: Pulmonary effort is normal  No respiratory distress  Breath sounds: Normal breath sounds  No wheezing or rales  Abdominal:      General: Bowel sounds are normal  There is no distension  Palpations: Abdomen is soft  Tenderness: There is no abdominal tenderness  Musculoskeletal: Normal range of motion  General: No tenderness  Right lower leg: Edema (trace) present  Left lower leg: Edema (trace) present  Skin:     General: Skin is warm and dry  Capillary Refill: Capillary refill takes less than 2 seconds  Findings: No erythema  Neurological:      Mental Status: He is alert and oriented to person, place, and time  Cranial Nerves: No cranial nerve deficit  Sensory: No sensory deficit        Deep Tendon Reflexes: Reflexes normal

## 2021-05-03 NOTE — TELEPHONE ENCOUNTER
Ambar Bennett called from [facility] to request a refill for pt for CARTIA  MG 24 hr capsule     For a 90 day supply  Please send to pharmacy on file

## 2021-05-03 NOTE — PROGRESS NOTES
Assessment/Plan:    No problem-specific Assessment & Plan notes found for this encounter  1  GERD  Has intermittent acid reflux with meals, not on medication and refuses to start any  Controlled with diet  Uses TUMS as needed    2  Hyperlipidemia  Continue Atorvastatin 40mg daily and a low-fat diet    3  COPD, severe  Asymptomatic  Continue albuterol inhaler as needed  Uses it most during summer time    4  CAD  S/p CABG  Stable  Follows with cardiology  Asymptomatic without chest pain   Continue ASA and Atorvastatin    5  Paroxysmal atrial flutter  No palpitations at the moment  On Coumadin and Diltiazem    6  Hypertension  Stable on Furosemide 40mg and Diltiazem 240mg    Diagnoses and all orders for this visit:    Mixed hyperlipidemia    Gastroesophageal reflux disease without esophagitis    COPD, severe ([address] )    Essential hypertension    Atrial flutter, unspecified type ([address] )    Atherosclerosis of coronary artery bypass graft of native heart without angina pectoris        Subjective:      Patient ID: Sin Bell is a [de-identified] y o  male  [de-identified] male who presented for a follow-up visit  No complaints at today's visit  The following portions of the patient's history were reviewed and updated as appropriate: allergies, current medications, past family history, past medical history, past social history, past surgical history and problem list     Review of Systems   Constitutional: Negative for appetite change, chills, fatigue and fever  HENT: Negative for ear discharge, ear pain, facial swelling, postnasal drip, rhinorrhea, sinus pressure, sore throat and trouble swallowing  Eyes: Negative for pain, discharge, itching and visual disturbance  Respiratory: Negative for cough, shortness of breath and wheezing  Cardiovascular: Negative for chest pain and palpitations  Gastrointestinal: Negative for abdominal distention, abdominal pain, constipation, diarrhea, nausea and vomiting     Endocrine: Negative for cold intolerance, heat intolerance and polyuria  Genitourinary: Negative for difficulty urinating, dysuria, frequency, hematuria and urgency  Musculoskeletal: Negative for arthralgias, gait problem, joint swelling and myalgias  Skin: Negative for color change, pallor and rash  Neurological: Negative for dizziness, speech difficulty, weakness, light-headedness, numbness and headaches  Psychiatric/Behavioral: Negative for behavioral problems, confusion and sleep disturbance  The patient is not nervous/anxious  Objective:      /66 (BP Location: Left arm, Patient Position: Sitting, Cuff Size: Standard)   Pulse 61   Temp (!) 97 °F (36 1 °C) (Temporal)   Ht 5' 7" (1 702 m) Comment: shoes on  Wt 84 4 kg (186 lb) Comment: shoes on  SpO2 97%   BMI 29 13 kg/m²        Physical Exam  Constitutional:       Appearance: He is well-developed  HENT:      Head: Normocephalic and atraumatic  Eyes:      Conjunctiva/sclera: Conjunctivae normal       Pupils: Pupils are equal, round, and reactive to light  Comments: Mild cerumen bilaterally   Neck:      Musculoskeletal: Normal range of motion  Cardiovascular:      Rate and Rhythm: Normal rate and regular rhythm  Heart sounds: Murmur (systolic) present  No friction rub  No gallop  Pulmonary:      Effort: Pulmonary effort is normal  No respiratory distress  Breath sounds: Normal breath sounds  No wheezing or rales  Abdominal:      General: Bowel sounds are normal  There is no distension  Palpations: Abdomen is soft  Tenderness: There is no abdominal tenderness  Musculoskeletal: Normal range of motion  General: No swelling or tenderness  Right lower leg: Edema (trace) present  Left lower leg: Edema (trace) present  Skin:     General: Skin is warm and dry  Capillary Refill: Capillary refill takes less than 2 seconds  Findings: No erythema  Neurological:      General: No focal deficit present  Mental Status: He is alert and oriented to person, place, and time  Cranial Nerves: No cranial nerve deficit  Sensory: No sensory deficit        Deep Tendon Reflexes: Reflexes normal

## 2021-05-04 ENCOUNTER — ANTICOAG VISIT (OUTPATIENT)
Dept: CARDIOLOGY CLINIC | Facility: CLINIC | Age: 81
End: 2021-05-04

## 2021-05-27 DIAGNOSIS — I48.3 TYPICAL ATRIAL FLUTTER (HCC): ICD-10-CM

## 2021-05-27 RX ORDER — FUROSEMIDE 40 MG/1
40 TABLET ORAL DAILY
Qty: 60 TABLET | Refills: 6 | Status: SHIPPED | OUTPATIENT
Start: 2021-05-27 | End: 2022-01-24 | Stop reason: HOSPADM

## 2021-06-29 DIAGNOSIS — E78.5 HYPERLIPIDEMIA, UNSPECIFIED HYPERLIPIDEMIA TYPE: ICD-10-CM

## 2021-06-29 RX ORDER — ATORVASTATIN CALCIUM 40 MG/1
40 TABLET, FILM COATED ORAL DAILY
Qty: 90 TABLET | Refills: 3 | Status: SHIPPED | OUTPATIENT
Start: 2021-06-29

## 2021-07-06 ENCOUNTER — TELEPHONE (OUTPATIENT)
Dept: CARDIOLOGY CLINIC | Facility: CLINIC | Age: 81
End: 2021-07-06

## 2021-07-07 DIAGNOSIS — I48.91 ATRIAL FIBRILLATION, UNSPECIFIED TYPE (HCC): Primary | ICD-10-CM

## 2021-07-07 RX ORDER — DILTIAZEM HYDROCHLORIDE 240 MG/1
240 CAPSULE, COATED, EXTENDED RELEASE ORAL DAILY
Qty: 60 CAPSULE | Refills: 3 | Status: SHIPPED | OUTPATIENT
Start: 2021-07-07

## 2021-09-22 ENCOUNTER — APPOINTMENT (OUTPATIENT)
Dept: LAB | Facility: CLINIC | Age: 81
End: 2021-09-22
Payer: MEDICARE

## 2021-09-22 ENCOUNTER — HOSPITAL ENCOUNTER (EMERGENCY)
Facility: HOSPITAL | Age: 81
Discharge: HOME | End: 2021-09-22
Attending: EMERGENCY MEDICINE | Admitting: EMERGENCY MEDICINE
Payer: MEDICARE

## 2021-09-22 ENCOUNTER — APPOINTMENT (EMERGENCY)
Dept: CT IMAGING | Facility: HOSPITAL | Age: 81
End: 2021-09-22
Payer: MEDICARE

## 2021-09-22 ENCOUNTER — TELEPHONE (OUTPATIENT)
Dept: CARDIOLOGY CLINIC | Facility: CLINIC | Age: 81
End: 2021-09-22

## 2021-09-22 VITALS
DIASTOLIC BLOOD PRESSURE: 78 MMHG | HEIGHT: 67 IN | SYSTOLIC BLOOD PRESSURE: 142 MMHG | OXYGEN SATURATION: 98 % | RESPIRATION RATE: 20 BRPM | HEART RATE: 72 BPM | WEIGHT: 145.72 LBS | BODY MASS INDEX: 22.87 KG/M2 | TEMPERATURE: 98.6 F

## 2021-09-22 DIAGNOSIS — R51.9 HEADACHE: ICD-10-CM

## 2021-09-22 DIAGNOSIS — R31.9 HEMATURIA: Primary | ICD-10-CM

## 2021-09-22 LAB
ALBUMIN SERPL BCP-MCNC: 3.4 G/DL (ref 3.5–5)
ALP SERPL-CCNC: 81 U/L (ref 46–116)
ALT SERPL W P-5'-P-CCNC: 28 U/L (ref 12–78)
ANION GAP SERPL CALCULATED.3IONS-SCNC: 7 MMOL/L (ref 4–13)
APTT PPP: 63 SECONDS (ref 23–37)
AST SERPL W P-5'-P-CCNC: 20 U/L (ref 5–45)
BACTERIA UR QL AUTO: ABNORMAL /HPF
BASOPHILS # BLD AUTO: 0.04 THOUSANDS/ΜL (ref 0–0.1)
BASOPHILS NFR BLD AUTO: 1 % (ref 0–1)
BILIRUB DIRECT SERPL-MCNC: 0.15 MG/DL (ref 0–0.2)
BILIRUB SERPL-MCNC: 0.67 MG/DL (ref 0.2–1)
BILIRUB UR QL STRIP: NEGATIVE
BUN SERPL-MCNC: 36 MG/DL (ref 5–25)
CALCIUM SERPL-MCNC: 9.3 MG/DL (ref 8.3–10.1)
CHLORIDE SERPL-SCNC: 97 MMOL/L (ref 100–108)
CLARITY UR: ABNORMAL
CO2 SERPL-SCNC: 32 MMOL/L (ref 21–32)
COLOR UR: YELLOW
CREAT SERPL-MCNC: 1.5 MG/DL (ref 0.6–1.3)
EOSINOPHIL # BLD AUTO: 0.22 THOUSAND/ΜL (ref 0–0.61)
EOSINOPHIL NFR BLD AUTO: 3 % (ref 0–6)
ERYTHROCYTE [DISTWIDTH] IN BLOOD BY AUTOMATED COUNT: 15 % (ref 11.6–15.1)
GFR SERPL CREATININE-BSD FRML MDRD: 43 ML/MIN/1.73SQ M
GLUCOSE SERPL-MCNC: 91 MG/DL (ref 65–140)
GLUCOSE UR STRIP-MCNC: NEGATIVE MG/DL
HCT VFR BLD AUTO: 38.7 % (ref 36.5–49.3)
HGB BLD-MCNC: 12 G/DL (ref 12–17)
HGB UR QL STRIP.AUTO: ABNORMAL
IMM GRANULOCYTES # BLD AUTO: 0.02 THOUSAND/UL (ref 0–0.2)
IMM GRANULOCYTES NFR BLD AUTO: 0 % (ref 0–2)
INR PPP: 2.7 (ref 0.84–1.19)
KETONES UR STRIP-MCNC: NEGATIVE MG/DL
LACTATE SERPL-SCNC: 1.5 MMOL/L (ref 0.5–2)
LEUKOCYTE ESTERASE UR QL STRIP: ABNORMAL
LYMPHOCYTES # BLD AUTO: 1.49 THOUSANDS/ΜL (ref 0.6–4.47)
LYMPHOCYTES NFR BLD AUTO: 21 % (ref 14–44)
MCH RBC QN AUTO: 26.8 PG (ref 26.8–34.3)
MCHC RBC AUTO-ENTMCNC: 31 G/DL (ref 31.4–37.4)
MCV RBC AUTO: 87 FL (ref 82–98)
MONOCYTES # BLD AUTO: 0.66 THOUSAND/ΜL (ref 0.17–1.22)
MONOCYTES NFR BLD AUTO: 9 % (ref 4–12)
NEUTROPHILS # BLD AUTO: 4.64 THOUSANDS/ΜL (ref 1.85–7.62)
NEUTS SEG NFR BLD AUTO: 66 % (ref 43–75)
NITRITE UR QL STRIP: NEGATIVE
NON-SQ EPI CELLS URNS QL MICRO: ABNORMAL /HPF
NRBC BLD AUTO-RTO: 0 /100 WBCS
PH UR STRIP.AUTO: 5 [PH]
PLATELET # BLD AUTO: 265 THOUSANDS/UL (ref 149–390)
PMV BLD AUTO: 9.7 FL (ref 8.9–12.7)
POTASSIUM SERPL-SCNC: 4.2 MMOL/L (ref 3.5–5.3)
PROT SERPL-MCNC: 7.4 G/DL (ref 6.4–8.2)
PROT UR STRIP-MCNC: ABNORMAL MG/DL
PROTHROMBIN TIME: 27.3 SECONDS (ref 11.6–14.5)
RBC # BLD AUTO: 4.47 MILLION/UL (ref 3.88–5.62)
RBC #/AREA URNS AUTO: ABNORMAL /HPF
SODIUM SERPL-SCNC: 136 MMOL/L (ref 136–145)
SP GR UR STRIP.AUTO: <=1.005 (ref 1–1.03)
TROPONIN I SERPL-MCNC: <0.02 NG/ML
UROBILINOGEN UR QL STRIP.AUTO: 0.2 E.U./DL
WBC # BLD AUTO: 7.07 THOUSAND/UL (ref 4.31–10.16)
WBC #/AREA URNS AUTO: ABNORMAL /HPF

## 2021-09-22 PROCEDURE — 83605 ASSAY OF LACTIC ACID: CPT | Performed by: EMERGENCY MEDICINE

## 2021-09-22 PROCEDURE — 85025 COMPLETE CBC W/AUTO DIFF WBC: CPT | Performed by: EMERGENCY MEDICINE

## 2021-09-22 PROCEDURE — 93005 ELECTROCARDIOGRAM TRACING: CPT

## 2021-09-22 PROCEDURE — 81001 URINALYSIS AUTO W/SCOPE: CPT | Performed by: EMERGENCY MEDICINE

## 2021-09-22 PROCEDURE — 85730 THROMBOPLASTIN TIME PARTIAL: CPT | Performed by: EMERGENCY MEDICINE

## 2021-09-22 PROCEDURE — 85610 PROTHROMBIN TIME: CPT | Performed by: EMERGENCY MEDICINE

## 2021-09-22 PROCEDURE — 80048 BASIC METABOLIC PNL TOTAL CA: CPT | Performed by: EMERGENCY MEDICINE

## 2021-09-22 PROCEDURE — 84484 ASSAY OF TROPONIN QUANT: CPT | Performed by: EMERGENCY MEDICINE

## 2021-09-22 PROCEDURE — 99284 EMERGENCY DEPT VISIT MOD MDM: CPT

## 2021-09-22 PROCEDURE — 99284 EMERGENCY DEPT VISIT MOD MDM: CPT | Performed by: EMERGENCY MEDICINE

## 2021-09-22 PROCEDURE — 80076 HEPATIC FUNCTION PANEL: CPT | Performed by: EMERGENCY MEDICINE

## 2021-09-22 PROCEDURE — 70450 CT HEAD/BRAIN W/O DYE: CPT

## 2021-09-22 NOTE — ED PROVIDER NOTES
History  Chief Complaint   Patient presents with    Headache     pt presents stating he "feels faint, my urine is pink, i feel like im going to pass out"      80 male who presents to ED for several concerns  First is JENSEN  He does not recall onset  States it is mild diffuse and aching  No associated focal neuro deficit but his second concern is generalized weakness since yesterday  Occurs in context of his 3rd concern which is hematuria since last night  He notes one prior episode of hematuria without clear etiology  No difficulty urinating  No abd pain or distension  No fever, chills, vomiting, flank pain or dysuria/urgency/frequency  There are no aggravating or alleviating factors  He has no other concerns at this time  Prior to Admission Medications   Prescriptions Last Dose Informant Patient Reported? Taking?    albuterol (PROAIR HFA) 90 mcg/act inhaler  Self No No   Sig: Inhale 2 puffs every 6 (six) hours as needed for wheezing or shortness of breath   Patient not taking: Reported on 6/10/2021   aspirin 81 mg chewable tablet  Self No No   Sig: Chew 1 tablet (81 mg total) daily   atorvastatin (LIPITOR) 40 mg tablet   No No   Sig: Take 1 tablet (40 mg total) by mouth daily   diltiazem (CARDIZEM CD) 240 mg 24 hr capsule   No No   Sig: Take 1 capsule (240 mg total) by mouth daily   esomeprazole (NexIUM) 20 mg capsule  Self Yes No   Sig: Take 20 mg by mouth every other day     furosemide (LASIX) 40 mg tablet   No No   Sig: Take 1 tablet (40 mg total) by mouth daily   warfarin (Jantoven) 5 mg tablet   No No   Sig: take 1 tablet by mouth once daily or as directed BY COUMADIN CLINIC      Facility-Administered Medications: None       Past Medical History:   Diagnosis Date    [facility]     Arthritis     Benign prostatic hyperplasia without lower urinary tract symptoms     without Urinary Obstruction    CAD (coronary artery disease)     Chronic obstructive lung disease (HCC)     Coronary arteriosclerosis  Depression     Emphysema lung (HCC)     GERD (gastroesophageal reflux disease)     Hyperlipidemia     Hypertension     Myocardial infarction (HCC)     Psoriasis     Requires supplemental oxygen     at bedtime during high humid days only    Stroke [facility])     TIA 1/2018       Past Surgical History:   Procedure Laterality Date    COLONOSCOPY      CORONARY ANGIOPLASTY  02/03/2001    PTCA of RCA    CORONARY ARTERY BYPASS GRAFT  02/07/2001    x4- [facility]    HERNIA REPAIR      PA BRONCHOSCOPY,DIAGNOSTIC Left 1/7/2019    Procedure: Arlean Paci;  Surgeon: Gualberto Lee MD;  [facility]; Service: Pulmonary    PA THROMBOENDARTECTMY NECK,NECK INCIS Left 2/20/2018    Procedure: ENDARTERECTOMY ARTERY CAROTID WITH PATCH ANGIOPLASTY;  Surgeon: Hilton Harada, MD;  [facility];  Service: Vascular    TRANSURETHRAL RESECTION OF PROSTATE         Family History   Problem Relation Age of Onset    Lung cancer Mother     Cancer Mother     Other Father         sepsis     I have reviewed and agree with the history as documented  E-Cigarette/Vaping    E-Cigarette Use Never User      E-Cigarette/Vaping Substances     Social History     Tobacco Use    Smoking status: Unknown If Ever Smoked    Smokeless tobacco: Never Used   Vaping Use    Vaping Use: Never used   Substance Use Topics    Alcohol use: Yes     Comment: occasional wine   Drug use: No       Review of Systems   Genitourinary: Positive for hematuria  Neurological: Positive for weakness and headaches  All other systems reviewed and are negative  Physical Exam  Physical Exam  Vitals and nursing note reviewed  Constitutional:       General: He is not in acute distress  Appearance: Normal appearance  He is well-developed  He is not ill-appearing, toxic-appearing or diaphoretic  HENT:      Head: Normocephalic and atraumatic     Eyes:      Conjunctiva/sclera: Conjunctivae normal       Pupils: Pupils are equal, round, and reactive to light  Neck:      Vascular: No JVD  Cardiovascular:      Rate and Rhythm: Normal rate and regular rhythm  Heart sounds: Normal heart sounds  No murmur heard  No friction rub  No gallop  Pulmonary:      Effort: Pulmonary effort is normal  No respiratory distress  Breath sounds: Normal breath sounds  No stridor  No wheezing, rhonchi or rales  Chest:      Chest wall: No tenderness  Abdominal:      General: There is no distension  Palpations: Abdomen is soft  Tenderness: There is no abdominal tenderness  Musculoskeletal:         General: No tenderness or deformity  Normal range of motion  Cervical back: Normal range of motion and neck supple  Skin:     General: Skin is warm and dry  Capillary Refill: Capillary refill takes less than 2 seconds  Coloration: Skin is not jaundiced or pale  Findings: No bruising, erythema, lesion or rash  Neurological:      General: No focal deficit present  Mental Status: He is alert and oriented to person, place, and time  Cranial Nerves: No cranial nerve deficit  Sensory: No sensory deficit  Motor: No weakness or abnormal muscle tone        Coordination: Coordination normal       Gait: Gait normal          Vital Signs  ED Triage Vitals [09/22/21 1740]   Temperature Pulse Respirations Blood Pressure SpO2   98 6 °F (37 °C) 88 18 169/73 99 %      Temp Source Heart Rate Source Patient Position - Orthostatic VS BP Location FiO2 (%)   Temporal Monitor Sitting Left arm --      Pain Score       --           Vitals:    09/22/21 1740 09/22/21 2030   BP: 169/73 142/78   Pulse: 88 72   Patient Position - Orthostatic VS: Sitting Lying         Visual Acuity      ED Medications  Medications - No data to display    Diagnostic Studies  Results Reviewed     Procedure Component Value Units Date/Time    Urine Microscopic [[id number]]  (Abnormal) Collected: 09/22/21 1935    Lab Status: Final result Specimen: Urine, Other Updated: 09/22/21 1956     RBC, UA 4-10 /hpf      WBC, UA 2-4 /hpf      Epithelial Cells None Seen /hpf      Bacteria, UA None Seen /hpf     UA (URINE) with reflex to Scope [[id number]]  (Abnormal) Collected: 09/22/21 1935    Lab Status: Final result Specimen: Urine, Other Updated: 09/22/21 1943     Color, UA Yellow     Clarity, UA Slightly Cloudy     Specific Gravity, UA <=1 005     pH, UA 5 0     Leukocytes, UA Small     Nitrite, UA Negative     Protein, UA Trace mg/dl      Glucose, UA Negative mg/dl      Ketones, UA Negative mg/dl      Urobilinogen, UA 0 2 E U /dl      Bilirubin, UA Negative     Blood, UA Large    Troponin I [[id number]]  (Normal) Collected: 09/22/21 1804    Lab Status: Final result Specimen: Blood from Arm, Right Updated: 09/22/21 1828     Troponin I <0 02 ng/mL     Lactic acid [[id number]]  (Normal) Collected: 09/22/21 1804    Lab Status: Final result Specimen: Blood from Arm, Right Updated: 09/22/21 1828     LACTIC ACID 1 5 mmol/L     Narrative:      Result may be elevated if tourniquet was used during collection      Basic metabolic panel [[id number]]  (Abnormal) Collected: 09/22/21 1804    Lab Status: Final result Specimen: Blood from Arm, Right Updated: 09/22/21 1827     Sodium 136 mmol/L      Potassium 4 2 mmol/L      Chloride 97 mmol/L      CO2 32 mmol/L      ANION GAP 7 mmol/L      BUN 36 mg/dL      Creatinine 1 50 mg/dL      Glucose 91 mg/dL      Calcium 9 3 mg/dL      eGFR 43 ml/min/1 73sq m     Narrative:      [place]mberly guidelines for Chronic Kidney Disease (CKD):     Stage 1 with normal or high GFR (GFR > 90 mL/min/1 73 square meters)    Stage 2 Mild CKD (GFR = 60-89 mL/min/1 73 square meters)    Stage 3A Moderate CKD (GFR = 45-59 mL/min/1 73 square meters)    Stage 3B Moderate CKD (GFR = 30-44 mL/min/1 73 square meters)    Stage 4 Severe CKD (GFR = 15-29 mL/min/1 73 square meters)    Stage 5 End Stage CKD (GFR <15 mL/min/1 73 square meters)  Note: GFR calculation is accurate only with a steady state creatinine    Hepatic function panel [[id number]]  (Abnormal) Collected: 09/22/21 1804    Lab Status: Final result Specimen: Blood from Arm, Right Updated: 09/22/21 1827     Total Bilirubin 0 67 mg/dL      Bilirubin, Direct 0 15 mg/dL      Alkaline Phosphatase 81 U/L      AST 20 U/L      ALT 28 U/L      Total Protein 7 4 g/dL      Albumin 3 4 g/dL     Protime-INR [[id number]]  (Abnormal) Collected: 09/22/21 1804    Lab Status: Final result Specimen: Blood from Arm, Right Updated: 09/22/21 1822     Protime 27 3 seconds      INR 2 70    APTT [[id number]]  (Abnormal) Collected: 09/22/21 1804    Lab Status: Final result Specimen: Blood from Arm, Right Updated: 09/22/21 1822     PTT 63 seconds     CBC and differential [[id number]]  (Abnormal) Collected: 09/22/21 1804    Lab Status: Final result Specimen: Blood from Arm, Right Updated: 09/22/21 1813     WBC 7 07 Thousand/uL      RBC 4 47 Million/uL      Hemoglobin 12 0 g/dL      Hematocrit 38 7 %      MCV 87 fL      MCH 26 8 pg      MCHC 31 0 g/dL      RDW 15 0 %      MPV 9 7 fL      Platelets 158 Thousands/uL      nRBC 0 /100 WBCs      Neutrophils Relative 66 %      Immat GRANS % 0 %      Lymphocytes Relative 21 %      Monocytes Relative 9 %      Eosinophils Relative 3 %      Basophils Relative 1 %      Neutrophils Absolute 4 64 Thousands/µL      Immature Grans Absolute 0 02 Thousand/uL      Lymphocytes Absolute 1 49 Thousands/µL      Monocytes Absolute 0 66 Thousand/µL      Eosinophils Absolute 0 22 Thousand/µL      Basophils Absolute 0 04 Thousands/µL                  CT head without contrast   Final Result by Lovette Ganser, MD (09/22 1858)      No acute intracranial abnormality                    Workstation performed: [id number]                    Procedures  Procedures         ED Course                                           MDM    Disposition  Final diagnoses:   Hematuria   Headache     Time reflects when diagnosis was documented in both MDM as applicable and the Disposition within this note     Time User Action Codes Description Comment    9/22/2021  8:23 PM Cliff Radha Add [R31 9] Hematuria     9/22/2021  8:23 PM Cliff Radha Add [R51 9] Headache       ED Disposition     ED Disposition Condition Date/Time Comment    Discharge Stable Wed Sep 22, 2021  8:23 PM Dalphine Bulla discharge to home/self care  Follow-up Information     Follow up With Specialties Details Why Contact Info    Dane Cespedes MD Urology In 1 week for further evaluation of your bloody urine [id number]  [facility]  [phone]            Discharge Medication List as of 9/22/2021  8:23 PM      CONTINUE these medications which have NOT CHANGED    Details   albuterol (PROAIR HFA) 90 mcg/act inhaler Inhale 2 puffs every 6 (six) hours as needed for wheezing or shortness of breath, Starting Fri 11/9/2018, Normal      aspirin 81 mg chewable tablet Chew 1 tablet (81 mg total) daily, Starting Mon 7/29/2019, Normal      atorvastatin (LIPITOR) 40 mg tablet Take 1 tablet (40 mg total) by mouth daily, Starting Tue 6/29/2021, Normal      diltiazem (CARDIZEM CD) 240 mg 24 hr capsule Take 1 capsule (240 mg total) by mouth daily, Starting Wed 7/7/2021, Normal      esomeprazole (NexIUM) 20 mg capsule Take 20 mg by mouth every other day  , Historical Med      furosemide (LASIX) 40 mg tablet Take 1 tablet (40 mg total) by mouth daily, Starting Thu 5/27/2021, Normal      warfarin (Jantoven) 5 mg tablet take 1 tablet by mouth once daily or as directed BY COUMADIN CLINIC, Normal           No discharge procedures on file      PDMP Review     None          ED Provider  Electronically Signed by           Chace Giles MD  09/22/21 6726

## 2021-09-23 ENCOUNTER — ANTICOAG VISIT (OUTPATIENT)
Dept: CARDIOLOGY CLINIC | Facility: CLINIC | Age: 81
End: 2021-09-23

## 2021-09-23 NOTE — PROGRESS NOTES
Rec'd a 2nd result of 2 7 from the ed for small trace of hematuria  He was told by the ER not to hold the warfarin   Advised to start taking 2 5mg q Sun, 5mg the rest and retest in 1 week

## 2021-09-23 NOTE — PROGRESS NOTES
9/24/2021      Chief Complaint   Patient presents with   Alize Pearl Hematuria         Assessment and Plan    80 y o  male -- New patient    1  Gross Hematuria  2  History of low-grade urothelial papillary carcinoma   3  BPH s/p TURP many years ago  - UA today unable to be obtained   - Last scheduled cystoscopy (4/27/18) with Dr Marylu Franco, however, patient refused cystoscopy at that time  - Discussed recurrence of gross hematuria and need for repeat cystoscopy to assess bladder for potential recurrence  - Discussed adequate hydration  - Follow up for ASAP cysto and STAT CT scan  - Will call with any questions or concerns  - ER precautions given  - All questions answered; patient understands and agrees with plan        History of Present Illness  Megan Sky is a 80 y o  male new patient here for evaluation of gross hematuria  Patient has history of low grade urothelial carcinoma s/p negative surveillance cystoscopies  Patient elected to stop surveillance in 2017  Patient also has history of BPH with LUTS s/p TURP many years ago  Patient had abdominal pain 1-2 weeks ago  This was intermittent  States he has been having hematuria at the beginning of his stream since that time  Denies clots  Denies fever, chills, nausea, vomiting, weight loss, bone pain  States he drinks approximately 160 oz of water daily  This has helped with the hematuria  Still taking warfarin and aspirin  Doing well from urinary standpoint from TURP  UA unable to give sample today    Review of Systems   Constitutional: Negative for activity change, appetite change, chills and fever  HENT: Negative for congestion and trouble swallowing  Respiratory: Negative for cough and shortness of breath  Cardiovascular: Negative for chest pain, palpitations and leg swelling  Gastrointestinal: Negative for abdominal pain, constipation, diarrhea, nausea and vomiting  Genitourinary: Positive for hematuria   Negative for difficulty urinating, dysuria, flank pain, frequency and urgency  Musculoskeletal: Negative for back pain and gait problem  Skin: Negative for wound  Allergic/Immunologic: Negative for immunocompromised state  Neurological: Negative for dizziness and syncope  Hematological: Does not bruise/bleed easily  Psychiatric/Behavioral: Negative for confusion  All other systems reviewed and are negative  Vitals  Vitals:    09/24/21 0820   BP: 100/80   Weight: 66 2 kg (146 lb)   Height: 5' 7" (1 702 m)       Physical Exam  Constitutional:       Appearance: Normal appearance  HENT:      Head: Normocephalic  Pulmonary:      Effort: Pulmonary effort is normal    Musculoskeletal:      Cervical back: Normal range of motion  Skin:     General: Skin is warm and dry  Neurological:      General: No focal deficit present  Mental Status: He is alert and oriented to person, place, and time  Psychiatric:         Mood and Affect: Mood normal          Behavior: Behavior normal          Thought Content: Thought content normal          Judgment: Judgment normal            Past History  Past Medical History:   Diagnosis Date    [facility]     Arthritis     Benign prostatic hyperplasia without lower urinary tract symptoms     without Urinary Obstruction    CAD (coronary artery disease)     Chronic obstructive lung disease (HCC)     Coronary arteriosclerosis     Depression     Emphysema lung (HCC)     GERD (gastroesophageal reflux disease)     Hyperlipidemia     Hypertension     Myocardial infarction (HCC)     Psoriasis     Requires supplemental oxygen     at bedtime during high humid days only    Stroke [facility])     TIA 1/2018     Social History     Socioeconomic History    Marital status:       Spouse name: None    Number of children: 3    Years of education: None    Highest education level: None   Occupational History    Occupation: retired    Tobacco Use    Smoking status: Former Smoker Types: Cigarettes    Smokeless tobacco: Never Used   Vaping Use    Vaping Use: Never used   Substance and Sexual Activity    Alcohol use: Yes     Comment: occasional wine   Drug use: No    Sexual activity: Yes   Other Topics Concern    None   Social History Narrative    Lives with granddaughter and daughter     Caffeine use, He admits to consuming caffeine VIA coffee ( 8 servings per day), per [employer]    Martial History: Currently , per [employer]    Occupation: Retired (prior occupational:  repairman), per [address] Strain:     Difficulty of Paying Living Expenses:    Food Insecurity:     Worried About [address] in the Last Year:    [address] in the Last Year:    Transportation Needs:     Lack of Transportation (Medical):      Lack of Transportation (Non-Medical):    Physical Activity:     Days of Exercise per Week:     Minutes of Exercise per Session:    Stress:     Feeling of Stress :    Social Connections:     Frequency of Communication with Friends and Family:     Frequency of Social Gatherings with Friends and Family:     Attends Church Services:     Active Member of Clubs or Organizations:     Attends Club or Organization Meetings:     Marital Status:    Intimate Partner Violence:     Fear of Current or Ex-Partner:     Emotionally Abused:     Physically Abused:     Sexually Abused:      Social History     Tobacco Use   Smoking Status Former Smoker    Types: Cigarettes   Smokeless Tobacco Never Used     Family History   Problem Relation Age of Onset    Lung cancer Mother     Cancer Mother     Other Father         sepsis       The following portions of the patient's history were reviewed and updated as appropriate: allergies, current medications, past medical history, past social history, past surgical history and problem list     Results  No results found for this or any previous visit (from the past 1 hour(s))  ]  No results found for: PSA  Lab Results   Component Value Date    CALCIUM 9 3 09/22/2021    K 4 2 09/22/2021    CO2 32 09/22/2021    CL 97 (L) 09/22/2021    BUN 36 (H) 09/22/2021    CREATININE 1 50 (H) 09/22/2021     Lab Results   Component Value Date    WBC 7 07 09/22/2021    HGB 12 0 09/22/2021    HCT 38 7 09/22/2021    MCV 87 09/22/2021     09/22/2021       Michael Garrido PA-C

## 2021-09-24 ENCOUNTER — PREP FOR PROCEDURE (OUTPATIENT)
Dept: UROLOGY | Facility: CLINIC | Age: 81
End: 2021-09-24

## 2021-09-24 ENCOUNTER — HOSPITAL ENCOUNTER (OUTPATIENT)
Dept: CT IMAGING | Facility: HOSPITAL | Age: 81
Discharge: HOME | End: 2021-09-24
Payer: MEDICARE

## 2021-09-24 ENCOUNTER — TELEPHONE (OUTPATIENT)
Dept: UROLOGY | Facility: CLINIC | Age: 81
End: 2021-09-24

## 2021-09-24 ENCOUNTER — OFFICE VISIT (OUTPATIENT)
Dept: UROLOGY | Facility: CLINIC | Age: 81
End: 2021-09-24
Payer: MEDICARE

## 2021-09-24 VITALS
DIASTOLIC BLOOD PRESSURE: 80 MMHG | WEIGHT: 146 LBS | BODY MASS INDEX: 22.91 KG/M2 | SYSTOLIC BLOOD PRESSURE: 100 MMHG | HEIGHT: 67 IN

## 2021-09-24 DIAGNOSIS — Z01.810 PREOP CARDIOVASCULAR EXAM: ICD-10-CM

## 2021-09-24 DIAGNOSIS — C67.9 MALIGNANT NEOPLASM OF URINARY BLADDER, UNSPECIFIED SITE (HCC): Primary | ICD-10-CM

## 2021-09-24 DIAGNOSIS — Z01.818 PREOP EXAMINATION: ICD-10-CM

## 2021-09-24 DIAGNOSIS — R39.89 SUSPECTED UTI: ICD-10-CM

## 2021-09-24 DIAGNOSIS — C67.9 MALIGNANT NEOPLASM OF URINARY BLADDER, UNSPECIFIED SITE (HCC): ICD-10-CM

## 2021-09-24 PROCEDURE — G1004 CDSM NDSC: HCPCS

## 2021-09-24 PROCEDURE — 74178 CT ABD&PLV WO CNTR FLWD CNTR: CPT

## 2021-09-24 PROCEDURE — 99204 OFFICE O/P NEW MOD 45 MIN: CPT | Performed by: PHYSICIAN ASSISTANT

## 2021-09-24 RX ORDER — CIPROFLOXACIN 2 MG/ML
400 INJECTION, SOLUTION INTRAVENOUS ONCE
Status: CANCELLED | OUTPATIENT
Start: 2021-09-24 | End: 2021-09-24

## 2021-09-24 RX ADMIN — IOHEXOL 100 ML: 350 INJECTION, SOLUTION INTRAVENOUS at 11:11

## 2021-09-24 NOTE — TELEPHONE ENCOUNTER
Can offer 09/29/2021 at 1:00 or 3:30 with Dr Veronique Pavon in [facility]  If patient denies, please tell him that we will call him back with an appointment in the [facility]

## 2021-09-24 NOTE — TELEPHONE ENCOUNTER
----- Message from Jennifer Parker PA-C sent at 9/24/2021 12:45 PM EDT -----  Patient CT showing lesion in bladder, possible recurrence  Patient needs ASAP cysto sooner than 10/12, this can be with anyone  May need to travel to [facility]  I will also place orders for turbt and bilateral retrogrades per recommendation from Dr Red Loaiza   Thank you

## 2021-09-24 NOTE — TELEPHONE ENCOUNTER
Patient has Medicare primary and Masluciano Mack as secondary  NO authorization for CT scan is required

## 2021-09-26 LAB
ATRIAL RATE: 81 BPM
P AXIS: 90 DEGREES
PR INTERVAL: 146 MS
QRS AXIS: 102 DEGREES
QRSD INTERVAL: 122 MS
QT INTERVAL: 368 MS
QTC INTERVAL: 427 MS
T WAVE AXIS: 54 DEGREES
VENTRICULAR RATE: 81 BPM

## 2021-09-26 PROCEDURE — 93010 ELECTROCARDIOGRAM REPORT: CPT | Performed by: INTERNAL MEDICINE

## 2021-09-27 ENCOUNTER — TELEPHONE (OUTPATIENT)
Dept: UROLOGY | Facility: AMBULATORY SURGERY CENTER | Age: 81
End: 2021-09-27

## 2021-09-27 ENCOUNTER — OFFICE VISIT (OUTPATIENT)
Dept: INTERNAL MEDICINE CLINIC | Age: 81
End: 2021-09-27
Payer: MEDICARE

## 2021-09-27 VITALS
HEART RATE: 66 BPM | DIASTOLIC BLOOD PRESSURE: 74 MMHG | BODY MASS INDEX: 23.37 KG/M2 | SYSTOLIC BLOOD PRESSURE: 112 MMHG | HEIGHT: 67 IN | TEMPERATURE: 98 F | OXYGEN SATURATION: 99 % | WEIGHT: 148.9 LBS

## 2021-09-27 DIAGNOSIS — Z00.00 MEDICARE ANNUAL WELLNESS VISIT, SUBSEQUENT: ICD-10-CM

## 2021-09-27 DIAGNOSIS — J43.2 CENTRILOBULAR EMPHYSEMA (HCC): ICD-10-CM

## 2021-09-27 DIAGNOSIS — K21.9 GASTROESOPHAGEAL REFLUX DISEASE WITHOUT ESOPHAGITIS: ICD-10-CM

## 2021-09-27 DIAGNOSIS — Z23 NEED FOR INFLUENZA VACCINATION: ICD-10-CM

## 2021-09-27 DIAGNOSIS — I25.810 ATHEROSCLEROSIS OF CORONARY ARTERY BYPASS GRAFT OF NATIVE HEART WITHOUT ANGINA PECTORIS: Primary | ICD-10-CM

## 2021-09-27 DIAGNOSIS — I50.32 CHRONIC DIASTOLIC HEART FAILURE (HCC): ICD-10-CM

## 2021-09-27 DIAGNOSIS — C67.9 MALIGNANT NEOPLASM OF URINARY BLADDER, UNSPECIFIED SITE (HCC): ICD-10-CM

## 2021-09-27 DIAGNOSIS — N18.31 STAGE 3A CHRONIC KIDNEY DISEASE (HCC): ICD-10-CM

## 2021-09-27 DIAGNOSIS — E78.2 MIXED HYPERLIPIDEMIA: ICD-10-CM

## 2021-09-27 PROCEDURE — G0008 ADMIN INFLUENZA VIRUS VAC: HCPCS

## 2021-09-27 PROCEDURE — G0439 PPPS, SUBSEQ VISIT: HCPCS | Performed by: INTERNAL MEDICINE

## 2021-09-27 PROCEDURE — 99214 OFFICE O/P EST MOD 30 MIN: CPT | Performed by: INTERNAL MEDICINE

## 2021-09-27 PROCEDURE — 90662 IIV NO PRSV INCREASED AG IM: CPT

## 2021-09-27 PROCEDURE — 1123F ACP DISCUSS/DSCN MKR DOCD: CPT | Performed by: INTERNAL MEDICINE

## 2021-09-27 NOTE — PROGRESS NOTES
Assessment and Plan:     Problem List Items Addressed This Visit     None           Preventive health issues were discussed with patient, and age appropriate screening tests were ordered as noted in patient's After Visit Summary  Personalized health advice and appropriate referrals for health education or preventive services given if needed, as noted in patient's After Visit Summary       History of Present Illness:     Patient presents for Medicare Annual Wellness visit    Patient Care Team:  Maria T Burrell MD as PCP - GABRIEL Ballard MD     Problem List:     Patient Active Problem List   Diagnosis    CAD (coronary atherosclerotic disease)    Hypertension    Hyperlipemia    Atypical chest pain    GERD (gastroesophageal reflux disease)    History of stroke    Sciatica of right side    Hyperlipidemia    Centrilobular emphysema ([address] )    Arthritis    Stenosis of left carotid artery    S/P CABG x 4    Acute left-sided low back pain with left-sided sciatica    Back pain    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    Vision changes    Urinary retention due to benign prostatic hyperplasia    Bladder cancer ([address] )    CKD (chronic kidney disease), stage II    Atrial flutter ([address] )    Irregular heart beat    Localized edema    Chronic respiratory failure with hypoxia ([address] )    COPD, severe ([address] )    Bronchiectasis with acute lower respiratory infection ([address] )    Abnormal CT of the chest    Tinnitus aurium, bilateral    Sensorineural hearing loss (SNHL) of both ears    Medicare annual wellness visit, subsequent    Anemia    DDD (degenerative disc disease), lumbar    Chronic diastolic heart failure ([address] )      Past Medical and Surgical History:     Past Medical History:   Diagnosis Date    [facility]     Arthritis     Benign prostatic hyperplasia without lower urinary tract symptoms     without Urinary Obstruction    CAD (coronary artery disease)     [facility]     Coronary arteriosclerosis     Depression     Emphysema lung (HCC)     GERD (gastroesophageal reflux disease)     Hyperlipidemia     Hypertension     Myocardial infarction (HCC)     Psoriasis     Requires supplemental oxygen     at bedtime during high humid days only    Stroke [facility])     TIA 1/2018     Past Surgical History:   Procedure Laterality Date    COLONOSCOPY      CORONARY ANGIOPLASTY  02/03/2001    PTCA of RCA    CORONARY ARTERY BYPASS GRAFT  02/07/2001    x4- [facility]    HERNIA REPAIR      CT BRONCHOSCOPY,DIAGNOSTIC Left 1/7/2019    Procedure: BRONCHOSCOPY FLEXIBLE;  Surgeon: Jose Clements MD;  [facility]; Service: Pulmonary    CT THROMBOENDARTECTMY NECK,NECK INCIS Left 2/20/2018    Procedure: ENDARTERECTOMY ARTERY CAROTID WITH PATCH ANGIOPLASTY;  Surgeon: Estrellita Mcwilliams MD;  [facility];  Service: Vascular    TRANSURETHRAL RESECTION OF PROSTATE        Family History:     Family History   Problem Relation Age of Onset    Lung cancer Mother     Cancer Mother     Other Father         sepsis      Social History:     Social History     Socioeconomic History    Marital status:      Spouse name: Not on file    Number of children: 3    Years of education: Not on file    Highest education level: Not on file   Occupational History    Occupation: retired    Tobacco Use    Smoking status: Former Smoker     Types: Cigarettes    Smokeless tobacco: Never Used   Vaping Use    Vaping Use: Never used   Substance and Sexual Activity    Alcohol use: Yes     Comment: occasional wine        Drug use: No    Sexual activity: Yes   Other Topics Concern    Not on file   Social History Narrative    Lives with granddaughter and daughter     Caffeine use, He admits to consuming caffeine VIA coffee ( 8 servings per day), per [employer]    Martial History: Currently , per [employer]    Occupation: Retired (prior occupational:  repairman), per [employer]      Social Determinants of Health     Financial Resource Strain:     Difficulty of Paying Living Expenses:    Food Insecurity:     Worried About Running Out of Food in the Last Year:     [address] in the Last Year:    Transportation Needs:     Lack of Transportation (Medical):  Lack of Transportation (Non-Medical):    Physical Activity:     Days of Exercise per Week:     Minutes of Exercise per Session:    Stress:     Feeling of Stress :    Social Connections:     Frequency of Communication with Friends and Family:     Frequency of Social Gatherings with Friends and Family:     Attends [employer] Services:     Active Member of Clubs or Organizations:     Attends Club or Organization Meetings:     Marital Status:    Intimate Partner Violence:     Fear of Current or Ex-Partner:     Emotionally Abused:     Physically Abused:     Sexually Abused:       Medications and Allergies:     Current Outpatient Medications   Medication Sig Dispense Refill    albuterol (PROAIR HFA) 90 mcg/act inhaler Inhale 2 puffs every 6 (six) hours as needed for wheezing or shortness of breath (Patient not taking: Reported on 6/10/2021) 8 5 g 0    aspirin 81 mg chewable tablet Chew 1 tablet (81 mg total) daily 60 tablet 6    atorvastatin (LIPITOR) 40 mg tablet Take 1 tablet (40 mg total) by mouth daily 90 tablet 3    diltiazem (CARDIZEM CD) 240 mg 24 hr capsule Take 1 capsule (240 mg total) by mouth daily 60 capsule 3    esomeprazole (NexIUM) 20 mg capsule Take 20 mg by mouth every other day   (Patient not taking: Reported on 9/24/2021)      furosemide (LASIX) 40 mg tablet Take 1 tablet (40 mg total) by mouth daily 60 tablet 6    warfarin (Jantoven) 5 mg tablet take 1 tablet by mouth once daily or as directed BY COUMADIN CLINIC 30 tablet 6     No current facility-administered medications for this visit       Allergies   Allergen Reactions    Penicillins Swelling and Itching      Immunizations:     Immunization History   Administered Date(s) Administered    H1N1, All Formulations 1940, 01/09/2010    INFLUENZA 1940, 09/30/2013, 09/10/2014, 08/26/2015, 09/10/2018    Influenza Split High Dose Preservative Free IM 07/18/2015, 10/25/2016, 12/04/2017    Influenza, high dose seasonal 0 7 mL 09/10/2018, 10/23/2019, 10/05/2020    Pneumococcal Conjugate 13-Valent 08/26/2015, 02/18/2016    Pneumococcal Polysaccharide PPV23 05/02/2008    Zoster 10/25/2013      Health Maintenance: There are no preventive care reminders to display for this patient  Topic Date Due    COVID-19 Vaccine (1) Never done    DTaP,Tdap,and Td Vaccines (1 - Tdap) Never done    Influenza Vaccine (1) 09/01/2021      Medicare Health Risk Assessment:     There were no vitals taken for this visit  Remington Cortes is here for his Subsequent Wellness visit  Health Risk Assessment:   Patient rates overall health as good  Patient feels that their physical health rating is same  Patient is very satisfied with their life  Eyesight was rated as same  Hearing was rated as same  Patient feels that their emotional and mental health rating is much better  Patients states they are never, rarely angry  Patient states they are sometimes unusually tired/fatigued  Pain experienced in the last 7 days has been some  Patient's pain rating has been 5/10  Patient states that he has experienced no weight loss or gain in last 6 months  Depression Screening:   PHQ-2 Score: 0      Fall Risk Screening: In the past year, patient has experienced: no history of falling in past year      Home Safety:  Patient does not have trouble with stairs inside or outside of their home  Patient has working smoke alarms and has working carbon monoxide detector  Home safety hazards include: none  Nutrition:   Current diet is Regular  Medications:   Patient is currently taking over-the-counter supplements   OTC medications include: see medication list  Patient is able to manage medications  Activities of Daily Living (ADLs)/Instrumental Activities of Daily Living (IADLs):   Walk and transfer into and out of bed and chair?: Yes  Dress and groom yourself?: Yes    Bathe or shower yourself?: Yes    Feed yourself? Yes  Do your laundry/housekeeping?: Yes  Manage your money, pay your bills and track your expenses?: Yes  Make your own meals?: Yes    Do your own shopping?: Yes    Previous Hospitalizations:   Any hospitalizations or ED visits within the last 12 months?: No      Advance Care Planning:   Living will: No    Durable POA for healthcare: Yes    Advanced directive: No    Advanced directive counseling given: Yes      Cognitive Screening:   Provider or family/friend/caregiver concerned regarding cognition?: No    PREVENTIVE SCREENINGS      Cardiovascular Screening:    General: Screening Not Indicated and History Lipid Disorder      Diabetes Screening:     General: Screening Current      Colorectal Cancer Screening:     General: Screening Not Indicated      Prostate Cancer Screening:    General: Screening Not Indicated      Osteoporosis Screening:      Due for: DXA Axial      Abdominal Aortic Aneurysm (AAA) Screening:    Risk factors include: tobacco use        General: Screening Current      Lung Cancer Screening:     General: Screening Not Indicated      Hepatitis C Screening:    General: Screening Not Indicated    Screening, Brief Intervention, and Referral to Treatment (SBIRT)    Screening  Typical number of drinks in a day: 0  Typical number of drinks in a week: 3  Interpretation: Low risk drinking behavior      Single Item Drug Screening:  How often have you used an illegal drug (including marijuana) or a prescription medication for non-medical reasons in the past year? never    Single Item Drug Screen Score: 0  Interpretation: Negative screen for possible drug use disorder    Other Counseling Topics:   Calcium and vitamin D intake and regular weightbearing exercise         Za Brennan MD

## 2021-09-27 NOTE — PROGRESS NOTES
Assessment/Plan:     Diagnoses and all orders for this visit:    Atherosclerosis of coronary artery bypass graft of native heart without angina pectoris   stable coronary artery disease status post coronary artery bypass graft  Gastroesophageal reflux disease without esophagitis    Centrilobular emphysema (HCC)   patient is not smoking anymore no complaints of shortness of breath except on exertion  Mixed hyperlipidemia   follow-up the lipid panel  Malignant neoplasm of urinary bladder, unspecified site [facility])   he is followed up by the Urology  Stage 3a chronic kidney disease ([facility] )  -     Basic metabolic panel; Future  -     CBC and differential; Future   renal function need to be followed up  Chronic diastolic heart failure (HCC)   no signs or symptoms of CHF  Need for influenza vaccination  -     influenza vaccine, high-dose, PF 0 7 mL (FLUZONE HIGH-DOSE)             M*Modal software was used to dictate this note  It may contain errors with dictating incorrect words or incorrect spelling  Please contact the provider directly with any questions  Subjective:   Chief Complaint   Patient presents with    Follow-up    Medicare Wellness Visit        Patient ID: Azalea Abreu is a 80 y o  male  HPI   80 years young gentleman is here today for the regular follow-up and also for the Medicare wellness visit he is doing well no new complaints shortness of breath on exertion he is under the care of Urology for possible bladder cancer I have reviewed his CT scan which was done recently and also the blood workup which was done recently   mild renal insufficiency which is slightly more than the last time that need to be followed up   complaining of shortness of breath since he change his the blood pressure medication or heart medication he does not know which 1 he is talking about I think it is from diltiazem CD most likely different depression was given he will be talking to his cardiologist for that    The following portions of the patient's history were reviewed and updated as appropriate: allergies, current medications, past family history, past medical history, past social history, past surgical history and problem list     Review of Systems   Constitutional: Positive for fatigue  Negative for appetite change and fever  HENT: Negative for congestion, ear pain, hearing loss, nosebleeds, sneezing, tinnitus and voice change  Eyes: Negative for pain, discharge and redness  Respiratory: Positive for shortness of breath  Negative for cough, chest tightness and wheezing  Cardiovascular: Negative for chest pain, palpitations and leg swelling  Gastrointestinal: Negative for abdominal pain, blood in stool, constipation, diarrhea, nausea and vomiting  Genitourinary: Negative for difficulty urinating, dysuria, hematuria and urgency  Musculoskeletal: Negative for arthralgias, back pain, gait problem and joint swelling  Skin: Negative for rash and wound  Allergic/Immunologic: Negative for environmental allergies  Neurological: Negative for dizziness, tremors, seizures, weakness, light-headedness and numbness  Hematological: Negative for adenopathy  Does not bruise/bleed easily  Psychiatric/Behavioral: Negative for behavioral problems and confusion  The patient is not nervous/anxious            Past Medical History:   Diagnosis Date    A-fib [facility])     Arthritis     Benign prostatic hyperplasia without lower urinary tract symptoms     without Urinary Obstruction    CAD (coronary artery disease)     Chronic obstructive lung disease (HCC)     Coronary arteriosclerosis     Depression     Emphysema lung (HCC)     GERD (gastroesophageal reflux disease)     Hyperlipidemia     Hypertension     Myocardial infarction (HCC)     Psoriasis     Requires supplemental oxygen     at bedtime during high humid days only    Stroke [facility])     TIA 1/2018         Current Outpatient Medications:     aspirin 81 mg chewable tablet, Chew 1 tablet (81 mg total) daily, Disp: 60 tablet, Rfl: 6    atorvastatin (LIPITOR) 40 mg tablet, Take 1 tablet (40 mg total) by mouth daily, Disp: 90 tablet, Rfl: 3    diltiazem (CARDIZEM CD) 240 mg 24 hr capsule, Take 1 capsule (240 mg total) by mouth daily, Disp: 60 capsule, Rfl: 3    furosemide (LASIX) 40 mg tablet, Take 1 tablet (40 mg total) by mouth daily, Disp: 60 tablet, Rfl: 6    warfarin (Jantoven) 5 mg tablet, take 1 tablet by mouth once daily or as directed BY COUMADIN CLINIC, Disp: 30 tablet, Rfl: 6    albuterol (PROAIR HFA) 90 mcg/act inhaler, Inhale 2 puffs every 6 (six) hours as needed for wheezing or shortness of breath (Patient not taking: Reported on 6/10/2021), Disp: 8 5 g, Rfl: 0    esomeprazole (NexIUM) 20 mg capsule, Take 20 mg by mouth every other day   (Patient not taking: Reported on 9/24/2021), Disp: , Rfl:     Allergies   Allergen Reactions    Penicillins Swelling and Itching       Social History   Past Surgical History:   Procedure Laterality Date    COLONOSCOPY      CORONARY ANGIOPLASTY  02/03/2001    PTCA of RCA    CORONARY ARTERY BYPASS GRAFT  02/07/2001    x4- [facility]    HERNIA REPAIR      OR BRONCHOSCOPY,DIAGNOSTIC Left 1/7/2019    Procedure: Layla Warren;  Surgeon: Emilia Jara MD;  [facility];   Service: Pulmonary    OR THROMBOENDARTECTMY NECK,NECK INCIS Left 2/20/2018    Procedure: ENDARTERECTOMY ARTERY CAROTID WITH PATCH ANGIOPLASTY;  Surgeon: Albaro Carrillo MD;  [facility];  Service: Vascular    TRANSURETHRAL RESECTION OF PROSTATE       Family History   Problem Relation Age of Onset    Lung cancer Mother     Cancer Mother     Other Father         sepsis       Objective:  /74 (BP Location: Left arm, Patient Position: Sitting, Cuff Size: Adult)   Pulse 66   Temp 98 °F (36 7 °C) (Temporal)   Ht 5' 7" (1 702 m)   Wt 67 5 kg (148 lb 14 4 oz)   SpO2 99%   BMI 23 32 kg/m²        Physical Exam  Constitutional: Appearance: He is well-developed  HENT:      Right Ear: External ear normal       Nose: Nose normal    Eyes:      Conjunctiva/sclera: Conjunctivae normal       Pupils: Pupils are equal, round, and reactive to light  Neck:      Thyroid: No thyromegaly  Vascular: No JVD  Cardiovascular:      Rate and Rhythm: Normal rate  Rhythm irregular  Heart sounds: Murmur heard  Systolic murmur is present with a grade of 1/6  Pulmonary:      Breath sounds: Normal breath sounds  Abdominal:      General: Bowel sounds are normal       Palpations: Abdomen is soft  Musculoskeletal:         General: Normal range of motion  Cervical back: Normal range of motion  Lymphadenopathy:      Cervical: No cervical adenopathy  Skin:     General: Skin is dry  Neurological:      Mental Status: He is alert and oriented to person, place, and time  Deep Tendon Reflexes: Reflexes are normal and symmetric     Psychiatric:         Behavior: Behavior normal

## 2021-09-27 NOTE — PATIENT INSTRUCTIONS
Medicare Preventive Visit Patient Instructions  Thank you for completing your Welcome to Medicare Visit or Medicare Annual Wellness Visit today  Your next wellness visit will be due in one year (9/28/2022)  The screening/preventive services that you may require over the next 5-10 years are detailed below  Some tests may not apply to you based off risk factors and/or age  Screening tests ordered at today's visit but not completed yet may show as past due  Also, please note that scanned in results may not display below  Preventive Screenings:  Service Recommendations Previous Testing/Comments   Colorectal Cancer Screening  · Colonoscopy    · Fecal Occult Blood Test (FOBT)/Fecal Immunochemical Test (FIT)  · Fecal DNA/Cologuard Test  · Flexible Sigmoidoscopy Age: 54-65 years old   Colonoscopy: every 10 years (May be performed more frequently if at higher risk)  OR  FOBT/FIT: every 1 year  OR  Cologuard: every 3 years  OR  Sigmoidoscopy: every 5 years  Screening may be recommended earlier than age 48 if at higher risk for colorectal cancer  Also, an individualized decision between you and your healthcare provider will decide whether screening between the ages of 74-80 would be appropriate  Colonoscopy: Not on file  FOBT/FIT: Not on file  Cologuard: Not on file  Sigmoidoscopy: Not on file          Prostate Cancer Screening Individualized decision between patient and health care provider in men between ages of 53-78   Medicare will cover every 12 months beginning on the day after your 50th birthday PSA: No results in last 5 years           Hepatitis C Screening Once for adults born between [facility]  More frequently in patients at high risk for Hepatitis C Hep C Antibody: Not on file        Diabetes Screening 1-2 times per year if you're at risk for diabetes or have pre-diabetes Fasting glucose: 73 mg/dL   A1C: 5 0 %        Cholesterol Screening Once every 5 years if you don't have a lipid disorder   May order more often based on risk factors  Lipid panel: 05/03/2021           Other Preventive Screenings Covered by Medicare:  1  Abdominal Aortic Aneurysm (AAA) Screening: covered once if your at risk  You're considered to be at risk if you have a family history of AAA or a male between the age of 73-68 who smoking at least 100 cigarettes in your lifetime  2  Lung Cancer Screening: covers low dose CT scan once per year if you meet all of the following conditions: (1) Age 50-69; (2) No signs or symptoms of lung cancer; (3) Current smoker or have quit smoking within the last 15 years; (4) You have a tobacco smoking history of at least 30 pack years (packs per day x number of years you smoked); (5) You get a written order from a healthcare provider  3  Glaucoma Screening: covered annually if you're considered high risk: (1) You have diabetes OR (2) Family history of glaucoma OR (3)  aged 48 and older OR (3)  American aged 72 and older  3  Osteoporosis Screening: covered every 2 years if you meet one of the following conditions: (1) Have a vertebral abnormality; (2) On glucocorticoid therapy for more than 3 months; (3) Have primary hyperparathyroidism; (4) On osteoporosis medications and need to assess response to drug therapy  5  HIV Screening: covered annually if you're between the age of 12-76  Also covered annually if you are younger than 13 and older than 72 with risk factors for HIV infection  For pregnant patients, it is covered up to 3 times per pregnancy      Immunizations:  Immunization Recommendations   Influenza Vaccine Annual influenza vaccination during flu season is recommended for all persons aged >= 6 months who do not have contraindications   Pneumococcal Vaccine (Prevnar and Pneumovax)  * Prevnar = PCV13  * Pneumovax = PPSV23 Adults 25-60 years old: 1-3 doses may be recommended based on certain risk factors  Adults 72 years old: Prevnar (PCV13) vaccine recommended followed by Pneumovax (PPSV23) vaccine  If already received PPSV23 since turning 65, then PCV13 recommended at least one year after PPSV23 dose  Hepatitis B Vaccine 3 dose series if at intermediate or high risk (ex: diabetes, end stage renal disease, liver disease)   Tetanus (Td) Vaccine - COST NOT COVERED BY MEDICARE PART B Following completion of primary series, a booster dose should be given every 10 years to maintain immunity against tetanus  Td may also be given as tetanus wound prophylaxis  Tdap Vaccine - COST NOT COVERED BY MEDICARE PART B Recommended at least once for all adults  For pregnant patients, recommended with each pregnancy  Shingles Vaccine (Shingrix) - COST NOT COVERED BY MEDICARE PART B  2 shot series recommended in those aged 48 and above     Health Maintenance Due:  There are no preventive care reminders to display for this patient  Immunizations Due:      Topic Date Due    COVID-19 Vaccine (1) Never done    DTaP,Tdap,and Td Vaccines (1 - Tdap) Never done    Influenza Vaccine (1) 09/01/2021     Advance Directives   What are advance directives? Advance directives are legal documents that state your wishes and plans for medical care  These plans are made ahead of time in case you lose your ability to make decisions for yourself  Advance directives can apply to any medical decision, such as the treatments you want, and if you want to donate organs  What are the types of advance directives? There are many types of advance directives, and each state has rules about how to use them  You may choose a combination of any of the following:  · Living will: This is a written record of the treatment you want  You can also choose which treatments you do not want, which to limit, and which to stop at a certain time  This includes surgery, medicine, IV fluid, and tube feedings  · Durable power of  for healthcare [facility]):   This is a written record that states who you want to make healthcare choices for you when you are unable to make them for yourself  This person, called a proxy, is usually a family member or a friend  You may choose more than 1 proxy  · Do not resuscitate (DNR) order:  A DNR order is used in case your heart stops beating or you stop breathing  It is a request not to have certain forms of treatment, such as CPR  A DNR order may be included in other types of advance directives  · Medical directive: This covers the care that you want if you are in a coma, near death, or unable to make decisions for yourself  You can list the treatments you want for each condition  Treatment may include pain medicine, surgery, blood transfusions, dialysis, IV or tube feedings, and a ventilator (breathing machine)  · Values history: This document has questions about your views, beliefs, and how you feel and think about life  This information can help others choose the care that you would choose  Why are advance directives important? An advance directive helps you control your care  Although spoken wishes may be used, it is better to have your wishes written down  Spoken wishes can be misunderstood, or not followed  Treatments may be given even if you do not want them  An advance directive may make it easier for your family to make difficult choices about your care  © Copyright [employer] 2018 Information is for End User's use only and may not be sold, redistributed or otherwise used for commercial purposes   All illustrations and images included in CareNotes® are the copyrighted property of [employer]  or [address] [place] [address] [employer]

## 2021-09-27 NOTE — TELEPHONE ENCOUNTER
I called pt this afternoon to discuss scheduling his cysto/Nicolás  RGP/ TURBT with Dr Zeb Bennett at the [facility] on 10/21/21  There was no answer and pt 's voicemail has not been set up so I was not able to leave a message, I will try to reach pt again later this week  I then tried to contact pt 's emergency contact Adam Saunders to discuss possibly scheduling the procedure with her  There was no answer on her phone either but I was able to leave a voicemail asking her or Amrita Ansari to call our office back to schedule procedure

## 2021-09-28 ENCOUNTER — OFFICE VISIT (OUTPATIENT)
Dept: CARDIOLOGY CLINIC | Facility: CLINIC | Age: 81
End: 2021-09-28
Payer: MEDICARE

## 2021-09-28 VITALS
BODY MASS INDEX: 28.88 KG/M2 | SYSTOLIC BLOOD PRESSURE: 112 MMHG | WEIGHT: 184 LBS | DIASTOLIC BLOOD PRESSURE: 62 MMHG | HEART RATE: 74 BPM | OXYGEN SATURATION: 99 % | HEIGHT: 67 IN | RESPIRATION RATE: 19 BRPM

## 2021-09-28 DIAGNOSIS — R01.1 MURMUR, CARDIAC: ICD-10-CM

## 2021-09-28 DIAGNOSIS — C67.9 MALIGNANT NEOPLASM OF URINARY BLADDER, UNSPECIFIED SITE (HCC): ICD-10-CM

## 2021-09-28 DIAGNOSIS — Z01.818 PREOP EXAMINATION: ICD-10-CM

## 2021-09-28 DIAGNOSIS — I25.810 ATHEROSCLEROSIS OF CORONARY ARTERY BYPASS GRAFT OF NATIVE HEART WITHOUT ANGINA PECTORIS: Primary | ICD-10-CM

## 2021-09-28 DIAGNOSIS — Z01.810 PREOP CARDIOVASCULAR EXAM: ICD-10-CM

## 2021-09-28 PROCEDURE — 99215 OFFICE O/P EST HI 40 MIN: CPT | Performed by: INTERNAL MEDICINE

## 2021-09-28 NOTE — TELEPHONE ENCOUNTER
After speaking with Lakshmi Tellez at Dr Miguel Ángel Nolasco office I called pt 's granddaughter, Hosea Jay to discuss scheduling pt for surgery with Dr Seth Lozano at the [facility] on 10/21/21  There was no answer so I did leave another voicemail asking her to call our office back to schedule surgery  I then tried to call pt to discuss scheduling surgery and he did not answer either and his voicemail is not set up so I could not leave a voicemail  I will continue to try and reach out to pt and his granddaughter to schedule this procedure

## 2021-09-28 NOTE — PROGRESS NOTES
Cardiology Outpatient Follow up Note    Saravanan Watts 80 y o  male MRN: [id number]    09/28/21          Assessment:  1  Hematuria/bladder mass  2  Paroxysmal atrial flutter s/p DCCV   3  CAD s/p CABG ×4 (2/2001)  4  Chronic HFpEF  5  Hx of CVA  6  Hx of Left CEA   7  Dyslipidemia   8  Severe COPD  9  Systolic murmur     Plan:  · He follows with Urology for hematuria and bladder mass  He denies significant hematuria at this time  Referral for Watchman device was discussed to eventually discontinue oral anticoagulation  He wants to consider it further  · He is in NSR; Continue diltiazem   · [id number]: 6- cont coumadin   · Cont aspirin and atorvastatin  · He is euvolemic; cont lasix 40mg PO daily  · Repeat TTE and carotid duplex pending  Addendum 11/29/21: he is scheduled for cystoscopy  Recent TTE revealed EF: 60% with mild aortic stenosis  He is at moderate risk of MACE  No further cardiac testing indicated prior to the procedure  Aspirin and warfarin can be held 5 days prior to the procedure if needed    Follow up: 2 months or sooner as needed    1  Atherosclerosis of coronary artery bypass graft of native heart without angina pectoris         HPI: Saravanan Watts is a 80y o  year old male with a history of CAD s/p CABG and PCI who presents for a routine follow up       Past medical hx:   · CAD s/p CABG x4 (2001): LIMA-LAD, SVG-RCA, sequential SVG-Diag/OM1  · Manjit García 2016: No ischemia; normal LVEF  · Typical Atrial flutter diagnosed during a hospitalization for pneumonia in 10/2018 and was started on diltiazem and Xarelto  He underwent RENEE/DCCV with conversion to NSR on 11/2/2018  · He presented to the ED on 3/12/21 with hematuria  CT abdomen pelvis revealed no evidence of nephrolithiasis or obstructive uropathy  There was evidence of bladder diverticula without mass or cystitis  He recently established care with Urology  Repeat CT revealed a recurrent bladder mass   He has upcoming Urology evaluation for cystoscopy  He denies significant hematuria at this time  He has also noted mild dyspnea on exertion  TTE was ordered on his last visit to evaluate new systolic murmur however not performed  He has been euvolemic  He denies chest pain or any other cardiac concerns at this time  He has been tolerating his medications without side effect             Patient Active Problem List   Diagnosis    CAD (coronary atherosclerotic disease)    Hypertension    Hyperlipemia    Atypical chest pain    GERD (gastroesophageal reflux disease)    History of stroke    Sciatica of right side    Hyperlipidemia    Centrilobular emphysema (HCC)    Arthritis    Stenosis of left carotid artery    S/P CABG x 4    Acute left-sided low back pain with left-sided sciatica    Back pain    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    Vision changes    Urinary retention due to benign prostatic hyperplasia    Bladder cancer ([facility] [address] [facility]    CKD (chronic kidney disease), stage II    Atrial flutter (HCC)    Irregular heart beat    Localized edema    Chronic respiratory failure with hypoxia (HCC)    COPD, severe (HCC)    Bronchiectasis with acute lower respiratory infection (HCC)    Abnormal CT of the chest    Tinnitus aurium, bilateral    Sensorineural hearing loss (SNHL) of both ears    Medicare annual wellness visit, subsequent    Anemia    DDD (degenerative disc disease), lumbar    Chronic diastolic heart failure (HCC)       Allergies   Allergen Reactions    Penicillins Swelling and Itching         Current Outpatient Medications:     aspirin 81 mg chewable tablet, Chew 1 tablet (81 mg total) daily, Disp: 60 tablet, Rfl: 6    atorvastatin (LIPITOR) 40 mg tablet, Take 1 tablet (40 mg total) by mouth daily, Disp: 90 tablet, Rfl: 3    diltiazem (CARDIZEM CD) 240 mg 24 hr capsule, Take 1 capsule (240 mg total) by mouth daily, Disp: 60 capsule, Rfl: 3    furosemide (LASIX) 40 mg tablet, Take 1 tablet (40 mg total) by mouth daily, Disp: 60 tablet, Rfl: 6    warfarin (Jantoven) 5 mg tablet, take 1 tablet by mouth once daily or as directed BY COUMADIN CLINIC, Disp: 30 tablet, Rfl: 6    Past Medical History:   Diagnosis Date    A-fib ([facility] [address] )     Arthritis     Benign prostatic hyperplasia without lower urinary tract symptoms     without Urinary Obstruction    CAD (coronary artery disease)     Chronic obstructive lung disease (HCC)     Coronary arteriosclerosis     Depression     Emphysema lung (HCC)     GERD (gastroesophageal reflux disease)     Hyperlipidemia     Hypertension     Myocardial infarction (HCC)     Psoriasis     Requires supplemental oxygen     at bedtime during high humid days only    Stroke [facility])     TIA 1/2018       Family History   Problem Relation Age of Onset    Lung cancer Mother     Cancer Mother     Other Father         sepsis       Past Surgical History:   Procedure Laterality Date    COLONOSCOPY      CORONARY ANGIOPLASTY  02/03/2001    PTCA of RCA    CORONARY ARTERY BYPASS GRAFT  02/07/2001    x4- [place]    HERNIA REPAIR      IN BRONCHOSCOPY,DIAGNOSTIC Left 1/7/2019    Procedure: BRONCHOSCOPY FLEXIBLE;  Surgeon: Linda Mtz MD;  [facility]; Service: Pulmonary    IN THROMBOENDARTECTMY NECK,NECK INCIS Left 2/20/2018    Procedure: ENDARTERECTOMY ARTERY CAROTID WITH PATCH ANGIOPLASTY;  Surgeon: Osorio Mead MD;  [facility];  Service: Vascular    TRANSURETHRAL RESECTION OF PROSTATE         Social History     Socioeconomic History    Marital status:      Spouse name: Not on file    Number of children: 3    Years of education: Not on file    Highest education level: Not on file   Occupational History    Occupation: retired    Tobacco Use    Smoking status: Former Smoker     Types: Cigarettes    Smokeless tobacco: Never Used   Vaping Use    Vaping Use: Never used   Substance and Sexual Activity    Alcohol use:  Yes Comment: occasional wine   Drug use: No    Sexual activity: Yes   Other Topics Concern    Not on file   Social History Narrative    Lives with granddaughter and daughter     Caffeine use, He admits to consuming caffeine VIA coffee ( 8 servings per day), per [employer]    Martial History: Currently , per [employer]    Occupation: Retired (prior occupational:  repairman), per [address] Strain:     Difficulty of Paying Living Expenses:    Food Insecurity:     Worried About [address] in the Last Year:    [address] in the Last Year:    Transportation Needs:     Lack of Transportation (Medical):  Lack of Transportation (Non-Medical):    Physical Activity:     Days of Exercise per Week:     Minutes of Exercise per Session:    Stress:     Feeling of Stress :    Social Connections:     Frequency of Communication with Friends and Family:     Frequency of Social Gatherings with Friends and Family:     Attends Rastafari Services:     Active Member of Clubs or Organizations:     Attends Club or Organization Meetings:     Marital Status:    Intimate Partner Violence:     Fear of Current or Ex-Partner:     Emotionally Abused:     Physically Abused:     Sexually Abused:        Review of Systems   Constitutional: Negative for diaphoresis, weight gain and weight loss  HENT: Negative for congestion  Cardiovascular: Positive for dyspnea on exertion  Negative for chest pain, irregular heartbeat, leg swelling, near-syncope, orthopnea, palpitations, paroxysmal nocturnal dyspnea and syncope  Respiratory: Negative for shortness of breath, sleep disturbances due to breathing and snoring  Hematologic/Lymphatic: Does not bruise/bleed easily  Skin: Negative for rash  Musculoskeletal: Negative for myalgias  Gastrointestinal: Negative for nausea and vomiting     Neurological: Negative for excessive daytime sleepiness and light-headedness  Psychiatric/Behavioral: The patient is not nervous/anxious  Vitals: /62 (BP Location: Left arm, Patient Position: Sitting, Cuff Size: Standard)   Pulse 74   Resp 19   Ht 5' 7" (1 702 m)   Wt 83 5 kg (184 lb)   SpO2 99%   BMI 28 82 kg/m²       Physical Exam:     GEN: Alert and oriented x 3, in no acute distress  Well appearing and well nourished  HEENT: Sclera anicteric, conjunctivae pink, mucous membranes moist  Oropharynx clear  NECK: Supple, no carotid bruits, no significant JVD  Trachea midline, no thyromegaly  HEART: Regular rhythm, normal S1 and S2, 3/6 CHANDAN, no clicks, gallops or rubs  PMI nondisplaced, no thrills  LUNGS: Clear to auscultation bilaterally; no wheezes, rales, or rhonchi  No increased work of breathing or signs of respiratory distress  ABDOMEN: Soft, nontender, nondistended, normoactive bowel sounds  EXTREMITIES: Skin warm and well perfused, no clubbing, cyanosis, or edema  NEURO: No focal findings  Normal speech  Mood and affect normal    SKIN: Normal without suspicious lesions on exposed skin  Lab Results:       Lab Results   Component Value Date    HGBA1C 5 0 01/13/2018    HGBA1C 4 8 08/15/2016     Lab Results   Component Value Date    CHOL See scanned summary  08/15/2016    CHOL See scanned summary   03/17/2016     Lab Results   Component Value Date    HDL 53 05/03/2021    HDL 53 05/18/2020    HDL 44 01/14/2018     Lab Results   Component Value Date    LDLCALC 51 05/03/2021    LDLCALC 74 05/18/2020    LDLCALC 105 (H) 01/14/2018     Lab Results   Component Value Date    TRIG 154 (H) 05/03/2021    TRIG 89 05/18/2020    TRIG 81 01/14/2018     No results found for: CHOLHDL

## 2021-09-29 ENCOUNTER — TELEPHONE (OUTPATIENT)
Dept: CARDIOLOGY CLINIC | Facility: CLINIC | Age: 81
End: 2021-09-29

## 2021-09-29 ENCOUNTER — PREP FOR PROCEDURE (OUTPATIENT)
Dept: UROLOGY | Facility: AMBULATORY SURGERY CENTER | Age: 81
End: 2021-09-29

## 2021-09-29 DIAGNOSIS — R39.89 SUSPECTED UTI: ICD-10-CM

## 2021-09-29 DIAGNOSIS — Z01.818 PREOP EXAMINATION: ICD-10-CM

## 2021-09-29 DIAGNOSIS — C67.9 MALIGNANT NEOPLASM OF URINARY BLADDER, UNSPECIFIED SITE (HCC): Primary | ICD-10-CM

## 2021-09-29 DIAGNOSIS — Z01.810 PREOP CARDIOVASCULAR EXAM: ICD-10-CM

## 2021-09-29 NOTE — TELEPHONE ENCOUNTER
I spoke with pt this afternoon and scheduled him for surgery with Dr Ariadna Waddell at the [facility] on 10/21/21  I verbally went over all of pt s' pre op instructions and prep information with pt  He is aware that he needs to have a urine culture and labs done  The 1st week in October  Per pt 's request I am mailing him a copy of his surgical packet and instructed him to call our office with any questions or concerns regarding this procedure

## 2021-09-30 DIAGNOSIS — I48.92 ATRIAL FLUTTER (HCC): ICD-10-CM

## 2021-09-30 RX ORDER — WARFARIN SODIUM 5 MG/1
TABLET ORAL
Qty: 30 TABLET | Refills: 6 | Status: SHIPPED | OUTPATIENT
Start: 2021-09-30 | End: 2022-01-14 | Stop reason: ALTCHOICE

## 2021-10-04 NOTE — TELEPHONE ENCOUNTER
Called and spoke to patient  Informed patient I was calling to schedule 2 week post-op after surgery  Patient stated he never got the letter from Narinder Clark  Patient stated he put 25 cents in the mail for the mail man, mail man dropped mail off except for [facility] involving the surgery details  Informed patient I would have [place] [facility] resend them and call patient if he has any concerns  Patient asked when surgery was scheduled  Informed patient it is scheduled for 10/21/21  Patient thankful for call  Informed patient we will see him on 10/12/21 at 1:30 with Dr Amador for Cystoscopy  Patient confirmed  Also scheduled patient for 2 week post op for 11/10/21 at 11:00 with Dr Amador in [address] office  Patient confirmed appointments

## 2021-10-05 NOTE — TELEPHONE ENCOUNTER
I attempted to return pt 's call to inform him that his packet was mailed out last Thursday 9/30/21 and he should at least wait a week before requesting a new surgical packet  There was no answer on pt 's cell phone and his voicemail is not set up  I then called his grand daughter Rosette Burroughs to try and give her this information  Once again there was no answer so I did leave a message on Kelsey's voicemail and also informed her that pt 's cardiologist has confirmed that pt can hold hiw Warfarin and Aspirin 1 week prior to surgery  I asked Rosette Burroughs to call our office back to confirm that she has received this information

## 2021-10-08 ENCOUNTER — ANTICOAG VISIT (OUTPATIENT)
Dept: CARDIOLOGY CLINIC | Facility: CLINIC | Age: 81
End: 2021-10-08

## 2021-10-08 ENCOUNTER — APPOINTMENT (OUTPATIENT)
Dept: LAB | Facility: HOSPITAL | Age: 81
End: 2021-10-08
Payer: MEDICARE

## 2021-10-08 DIAGNOSIS — R39.89 SUSPECTED UTI: ICD-10-CM

## 2021-10-08 DIAGNOSIS — C67.9 MALIGNANT NEOPLASM OF URINARY BLADDER, UNSPECIFIED SITE (HCC): ICD-10-CM

## 2021-10-08 DIAGNOSIS — Z01.818 PREOP EXAMINATION: ICD-10-CM

## 2021-10-08 LAB
ANION GAP SERPL CALCULATED.3IONS-SCNC: 9 MMOL/L (ref 4–13)
BUN SERPL-MCNC: 25 MG/DL (ref 5–25)
CALCIUM SERPL-MCNC: 9 MG/DL (ref 8.3–10.1)
CHLORIDE SERPL-SCNC: 104 MMOL/L (ref 100–108)
CO2 SERPL-SCNC: 28 MMOL/L (ref 21–32)
CREAT SERPL-MCNC: 1.14 MG/DL (ref 0.6–1.3)
ERYTHROCYTE [DISTWIDTH] IN BLOOD BY AUTOMATED COUNT: 14.9 % (ref 11.6–15.1)
GFR SERPL CREATININE-BSD FRML MDRD: 60 ML/MIN/1.73SQ M
GLUCOSE P FAST SERPL-MCNC: 101 MG/DL (ref 65–99)
HCT VFR BLD AUTO: 36.8 % (ref 36.5–49.3)
HGB BLD-MCNC: 11 G/DL (ref 12–17)
MCH RBC QN AUTO: 26.3 PG (ref 26.8–34.3)
MCHC RBC AUTO-ENTMCNC: 29.9 G/DL (ref 31.4–37.4)
MCV RBC AUTO: 88 FL (ref 82–98)
PLATELET # BLD AUTO: 301 THOUSANDS/UL (ref 149–390)
PMV BLD AUTO: 9.5 FL (ref 8.9–12.7)
POTASSIUM SERPL-SCNC: 5 MMOL/L (ref 3.5–5.3)
RBC # BLD AUTO: 4.18 MILLION/UL (ref 3.88–5.62)
SODIUM SERPL-SCNC: 141 MMOL/L (ref 136–145)
WBC # BLD AUTO: 5.83 THOUSAND/UL (ref 4.31–10.16)

## 2021-10-08 PROCEDURE — 80048 BASIC METABOLIC PNL TOTAL CA: CPT

## 2021-10-08 PROCEDURE — 85027 COMPLETE CBC AUTOMATED: CPT

## 2021-10-08 PROCEDURE — 87086 URINE CULTURE/COLONY COUNT: CPT

## 2021-10-09 LAB — BACTERIA UR CULT: NORMAL

## 2021-10-12 ENCOUNTER — PROCEDURE VISIT (OUTPATIENT)
Dept: UROLOGY | Facility: CLINIC | Age: 81
End: 2021-10-12
Payer: MEDICARE

## 2021-10-12 VITALS
SYSTOLIC BLOOD PRESSURE: 178 MMHG | WEIGHT: 184 LBS | BODY MASS INDEX: 28.88 KG/M2 | HEIGHT: 67 IN | DIASTOLIC BLOOD PRESSURE: 80 MMHG

## 2021-10-12 DIAGNOSIS — R93.5 ABNORMAL CT SCAN, PELVIS: ICD-10-CM

## 2021-10-12 DIAGNOSIS — C67.9 MALIGNANT NEOPLASM OF URINARY BLADDER, UNSPECIFIED SITE (HCC): Primary | ICD-10-CM

## 2021-10-12 DIAGNOSIS — J44.9 COPD, SEVERE (HCC): ICD-10-CM

## 2021-10-12 DIAGNOSIS — Z95.1 S/P CABG X 4: ICD-10-CM

## 2021-10-12 DIAGNOSIS — Z71.2 ENCOUNTER TO DISCUSS TEST RESULTS: ICD-10-CM

## 2021-10-12 DIAGNOSIS — I25.810 ATHEROSCLEROSIS OF CORONARY ARTERY BYPASS GRAFT OF NATIVE HEART WITHOUT ANGINA PECTORIS: ICD-10-CM

## 2021-10-12 PROCEDURE — 52000 CYSTOURETHROSCOPY: CPT | Performed by: UROLOGY

## 2021-10-12 PROCEDURE — 88112 CYTOPATH CELL ENHANCE TECH: CPT | Performed by: PATHOLOGY

## 2021-10-12 PROCEDURE — 99214 OFFICE O/P EST MOD 30 MIN: CPT | Performed by: UROLOGY

## 2021-10-12 RX ORDER — CIPROFLOXACIN 500 MG/1
500 TABLET, FILM COATED ORAL EVERY 12 HOURS SCHEDULED
Qty: 6 TABLET | Refills: 0 | Status: SHIPPED | OUTPATIENT
Start: 2021-10-12 | End: 2021-10-15

## 2021-10-18 ENCOUNTER — ANESTHESIA EVENT (OUTPATIENT)
Dept: PERIOP | Facility: HOSPITAL | Age: 81
End: 2021-10-18
Payer: MEDICARE

## 2021-10-20 NOTE — TELEPHONE ENCOUNTER
I spoke with pt this afternoon and informed him that we can not proceed with his surgery that was scheduled or tomorrow because he refused to have the cardiac workup/Echo thatn was recommended by his cardiologist in order to clear him for surgery  Pt got upset and stated that he can not do the echo and explained that he can not run on a treadmill he will bleed to death  I did try to inform him that he will not need to run on a treadmill to have this echo done  Before I could finish explaining pt told me to stop talking and said he knows that in order to have this echo  done he will need to run on a treadmill and open the tumor on his bladder and he will bleed to death  I did try to explain that this will not happen  Pt then confirmed that he will call his cardiologist office and schedule the echo  For now pt has been rescheduled to 11/30 for his procedure  Pt will call our office back once the echo is scheduled to confirm when it is being done

## 2021-10-21 ENCOUNTER — ANESTHESIA (OUTPATIENT)
Dept: PERIOP | Facility: HOSPITAL | Age: 81
End: 2021-10-21
Payer: MEDICARE

## 2021-10-25 ENCOUNTER — PREP FOR PROCEDURE (OUTPATIENT)
Dept: UROLOGY | Facility: AMBULATORY SURGERY CENTER | Age: 81
End: 2021-10-25

## 2021-10-25 ENCOUNTER — TELEPHONE (OUTPATIENT)
Dept: OTHER | Facility: OTHER | Age: 81
End: 2021-10-25

## 2021-10-25 DIAGNOSIS — C67.9 MALIGNANT NEOPLASM OF URINARY BLADDER, UNSPECIFIED SITE (HCC): Primary | ICD-10-CM

## 2021-10-25 DIAGNOSIS — R39.89 SUSPECTED UTI: ICD-10-CM

## 2021-10-25 NOTE — TELEPHONE ENCOUNTER
I spoke with pt this afternoon and confirmed his new procedure date with Dr Catherine Vidal at the [facility] on 11/30/21  I verbally went over all of pt 's pre op instructions and prep information with him  He is aware that he needs to have another urine culture 3 weeks prior to surgery  Per pt 's request I am mailing him a new surgical packet and instructed him to call our office with any questions or concerns regarding this procedure

## 2021-10-25 NOTE — TELEPHONE ENCOUNTER
Returned call to patient  Patient reports this morning after urinating he developed a sharp pain in his bladder while walking out of the bathroom  Patient reports pain was short lived and has not had any further pain or additional symptoms since  Reports urinating without difficulty  Advised patient on proper hydration and to contact office if pain return  Patient also scheduled for follow up to surgery  12/15/21 at 1:00 in the [facility] office with Dr Serge Goff

## 2021-10-25 NOTE — TELEPHONE ENCOUNTER
Celia Vu MA [facility] 34 minutes ago (1:56 PM)   Celia Vu MA 35 minutes ago (1:56 PM)   BR     Pt is requesting a call back   States he is getting sharp bladder pains         Documentation    Dre Ivis [phone]  Celia Vu MA

## 2021-11-10 NOTE — TELEPHONE ENCOUNTER
Called and spoke to patient  Patient stated he had some blood in his urine  I Informed patient that is common with the diagnosis  Patient just wanted to ensure he wasn't loosing too much blood  Asked patient how many times he had blood in the urine  Patient stated once  Informed patient to continue staying hydrated with plenty of water  Patient stated he has surgery on 11/30/21 with Dr Boyd Obregon  Patient asked how does he talk to him  Informed patient that Dr Boyd Obregon is seeing patients in office or in surgery  If patient has questions for Dr Boyd Obregon he can ask us and we are able to route to provider for further recommendations  Patient had no questions at this time  Patient was thankful for call

## 2021-11-15 ENCOUNTER — HOSPITAL ENCOUNTER (OUTPATIENT)
Dept: NON INVASIVE DIAGNOSTICS | Facility: CLINIC | Age: 81
Discharge: HOME | End: 2021-11-15
Payer: MEDICARE

## 2021-11-15 VITALS
HEIGHT: 67 IN | DIASTOLIC BLOOD PRESSURE: 80 MMHG | SYSTOLIC BLOOD PRESSURE: 178 MMHG | HEART RATE: 74 BPM | WEIGHT: 184 LBS | BODY MASS INDEX: 28.88 KG/M2

## 2021-11-15 DIAGNOSIS — R01.1 MURMUR, CARDIAC: ICD-10-CM

## 2021-11-15 PROCEDURE — 93306 TTE W/DOPPLER COMPLETE: CPT

## 2021-11-15 NOTE — TELEPHONE ENCOUNTER
Patient with known bladder cancer history, pending TURBT with Gemcitabine instillation with Dr Omayra Granados on 11/30/21  Per notes patient to have urine culture 3 weeks prior to surgery  No recent results noted in chart  Active order for urine culture remains in chart  Returned call to patient  Patient reports urinary urgency/frequency  Reports that he had a terrible night last night and was up several times  When questioned about pain, patient reports he gets bladder pain when he doesn't urinate right after getting the first urge  Patient also reports only drinking about 48 ounces of water daily  Discussed importance of hydrating better to help with the symptoms  Patient to have pre op urine culture obtained tomorrow  Will continue to monitor symptoms and contact office with worsening symptoms or trouble emptying bladder  Otherwise will await urine culture results

## 2021-11-15 NOTE — TELEPHONE ENCOUNTER
Pls contact patient in regards to taking meds for discomfort, thank you  Johnson Chandler on echo being done

## 2021-11-15 NOTE — TELEPHONE ENCOUNTER
Patient called in to inform he took his Echo test today  Patient asked if he can take anything for the discomfort at night    Patient can be reached at [phone]

## 2021-11-16 ENCOUNTER — APPOINTMENT (OUTPATIENT)
Dept: LAB | Facility: CLINIC | Age: 81
End: 2021-11-16
Payer: MEDICARE

## 2021-11-16 DIAGNOSIS — C67.9 MALIGNANT NEOPLASM OF URINARY BLADDER, UNSPECIFIED SITE (HCC): ICD-10-CM

## 2021-11-16 DIAGNOSIS — Z01.818 PREOP EXAMINATION: ICD-10-CM

## 2021-11-16 DIAGNOSIS — R39.89 SUSPECTED UTI: ICD-10-CM

## 2021-11-16 LAB
AORTIC ROOT: 2.9 CM
AORTIC VALVE MEAN VELOCITY: 14 M/S
APICAL FOUR CHAMBER EJECTION FRACTION: 62 %
AV AREA BY CONTINUOUS VTI: 1.8 CM2
AV AREA PEAK VELOCITY: 1.6 CM2
AV LVOT MEAN GRADIENT: 2 MMHG
AV LVOT PEAK GRADIENT: 5 MMHG
AV MEAN GRADIENT: 9 MMHG
AV PEAK GRADIENT: 19 MMHG
AV VALVE AREA: 1.83 CM2
DOP CALC AO VTI: 42.55 CM
DOP CALC LVOT AREA: 3.14 CM2
DOP CALC LVOT DIAMETER: 2 CM
DOP CALC LVOT PEAK VEL VTI: 24.76 CM
DOP CALC LVOT PEAK VEL: 1.1 M/S
DOP CALC LVOT STROKE INDEX: 37.9 ML/M2
DOP CALC LVOT STROKE VOLUME: 77.75 CM3
E WAVE DECELERATION TIME: 286 MS
FRACTIONAL SHORTENING: 29 % (ref 28–44)
INTERVENTRICULAR SEPTUM IN DIASTOLE (PARASTERNAL SHORT AXIS VIEW): 1.3 CM
LEFT ATRIUM AREA SYSTOLE SINGLE PLANE A4C: 17.9 CM2
LEFT INTERNAL DIMENSION IN SYSTOLE: 2.9 CM (ref 2.1–4)
LEFT VENTRICULAR INTERNAL DIMENSION IN DIASTOLE: 4.1 CM (ref 4.83–7.19)
LEFT VENTRICULAR POSTERIOR WALL IN END DIASTOLE: 1.2 CM
LEFT VENTRICULAR STROKE VOLUME: 41 ML
MITRAL REGURGITATION PEAK VELOCITY: 4.01 M/S
MITRAL VALVE REGURGITANT PEAK GRADIENT: 64 MMHG
MV E'TISSUE VEL-LAT: 9 CM/S
MV E'TISSUE VEL-SEP: 10 CM/S
MV PEAK A VEL: 0.9 M/S
MV PEAK E VEL: 87 CM/S
MV STENOSIS PRESSURE HALF TIME: 0 MS
RIGHT ATRIUM AREA SYSTOLE A4C: 15.7 CM2
RIGHT VENTRICLE ID DIMENSION: 3.1 CM
SL CV LV EF: 60
SL CV PED ECHO LEFT VENTRICLE DIASTOLIC VOLUME (MOD BIPLANE) 2D: 73 ML
SL CV PED ECHO LEFT VENTRICLE SYSTOLIC VOLUME (MOD BIPLANE) 2D: 31 ML
TRICUSPID VALVE S': 1.1 CM/S
Z-SCORE OF LEFT VENTRICULAR DIMENSION IN END SYSTOLE: -3.62

## 2021-11-16 PROCEDURE — 87086 URINE CULTURE/COLONY COUNT: CPT

## 2021-11-16 PROCEDURE — 93306 TTE W/DOPPLER COMPLETE: CPT | Performed by: INTERNAL MEDICINE

## 2021-11-19 LAB — BACTERIA UR CULT: NORMAL

## 2021-11-30 ENCOUNTER — APPOINTMENT (OUTPATIENT)
Dept: RADIOLOGY | Facility: HOSPITAL | Age: 81
End: 2021-11-30
Payer: MEDICARE

## 2021-11-30 ENCOUNTER — TELEPHONE (OUTPATIENT)
Dept: UROLOGY | Facility: CLINIC | Age: 81
End: 2021-11-30

## 2021-11-30 ENCOUNTER — HOSPITAL ENCOUNTER (OUTPATIENT)
Facility: HOSPITAL | Age: 81
Setting detail: OUTPATIENT SURGERY
Discharge: HOME | End: 2021-11-30
Attending: UROLOGY | Admitting: UROLOGY
Payer: MEDICARE

## 2021-11-30 VITALS
SYSTOLIC BLOOD PRESSURE: 171 MMHG | DIASTOLIC BLOOD PRESSURE: 76 MMHG | OXYGEN SATURATION: 100 % | BODY MASS INDEX: 27.87 KG/M2 | WEIGHT: 183.86 LBS | TEMPERATURE: 97.8 F | HEART RATE: 78 BPM | HEIGHT: 68 IN | RESPIRATION RATE: 18 BRPM

## 2021-11-30 DIAGNOSIS — C67.9 MALIGNANT NEOPLASM OF URINARY BLADDER, UNSPECIFIED SITE (HCC): ICD-10-CM

## 2021-11-30 DIAGNOSIS — C67.8 MALIGNANT NEOPLASM OF OVERLAPPING SITES OF BLADDER (HCC): Primary | ICD-10-CM

## 2021-11-30 PROBLEM — I48.91 ATRIAL FIBRILLATION (HCC): Status: ACTIVE | Noted: 2021-11-30

## 2021-11-30 PROBLEM — Z99.81 OXYGEN DEPENDENT: Status: ACTIVE | Noted: 2021-11-30

## 2021-11-30 LAB
APTT PPP: 27 SECONDS (ref 23–37)
INR PPP: 1.08 (ref 0.84–1.19)
PROTHROMBIN TIME: 13.6 SECONDS (ref 11.6–14.5)

## 2021-11-30 PROCEDURE — NC001 PR NO CHARGE: Performed by: UROLOGY

## 2021-11-30 PROCEDURE — 88307 TISSUE EXAM BY PATHOLOGIST: CPT | Performed by: PATHOLOGY

## 2021-11-30 PROCEDURE — C1769 GUIDE WIRE: HCPCS | Performed by: UROLOGY

## 2021-11-30 PROCEDURE — 88342 IMHCHEM/IMCYTCHM 1ST ANTB: CPT | Performed by: PATHOLOGY

## 2021-11-30 PROCEDURE — C1758 CATHETER, URETERAL: HCPCS | Performed by: UROLOGY

## 2021-11-30 PROCEDURE — 85730 THROMBOPLASTIN TIME PARTIAL: CPT | Performed by: PHYSICIAN ASSISTANT

## 2021-11-30 PROCEDURE — 52235 CYSTOSCOPY AND TREATMENT: CPT | Performed by: UROLOGY

## 2021-11-30 PROCEDURE — 85610 PROTHROMBIN TIME: CPT | Performed by: PHYSICIAN ASSISTANT

## 2021-11-30 RX ORDER — ROCURONIUM BROMIDE 10 MG/ML
INJECTION, SOLUTION INTRAVENOUS AS NEEDED
Status: DISCONTINUED | OUTPATIENT
Start: 2021-11-30 | End: 2021-11-30

## 2021-11-30 RX ORDER — PROPOFOL 10 MG/ML
INJECTION, EMULSION INTRAVENOUS AS NEEDED
Status: DISCONTINUED | OUTPATIENT
Start: 2021-11-30 | End: 2021-11-30

## 2021-11-30 RX ORDER — SUCCINYLCHOLINE/SOD CL,ISO/PF 100 MG/5ML
SYRINGE (ML) INTRAVENOUS AS NEEDED
Status: DISCONTINUED | OUTPATIENT
Start: 2021-11-30 | End: 2021-11-30

## 2021-11-30 RX ORDER — OXYCODONE HYDROCHLORIDE 5 MG/1
5 TABLET ORAL EVERY 4 HOURS PRN
Status: DISCONTINUED | OUTPATIENT
Start: 2021-11-30 | End: 2021-11-30 | Stop reason: HOSPADM

## 2021-11-30 RX ORDER — OXYCODONE HYDROCHLORIDE 5 MG/1
10 TABLET ORAL EVERY 4 HOURS PRN
Status: DISCONTINUED | OUTPATIENT
Start: 2021-11-30 | End: 2021-11-30 | Stop reason: HOSPADM

## 2021-11-30 RX ORDER — FENTANYL CITRATE 50 UG/ML
INJECTION, SOLUTION INTRAMUSCULAR; INTRAVENOUS AS NEEDED
Status: DISCONTINUED | OUTPATIENT
Start: 2021-11-30 | End: 2021-11-30

## 2021-11-30 RX ORDER — FENTANYL CITRATE/PF 50 MCG/ML
25 SYRINGE (ML) INJECTION
Status: DISCONTINUED | OUTPATIENT
Start: 2021-11-30 | End: 2021-11-30 | Stop reason: HOSPADM

## 2021-11-30 RX ORDER — DOCUSATE SODIUM 100 MG/1
100 CAPSULE, LIQUID FILLED ORAL 3 TIMES DAILY
Qty: 21 CAPSULE | Refills: 0 | Status: SHIPPED | OUTPATIENT
Start: 2021-11-30 | End: 2021-12-09 | Stop reason: ALTCHOICE

## 2021-11-30 RX ORDER — HYDROMORPHONE HCL/PF 1 MG/ML
0.2 SYRINGE (ML) INJECTION
Status: DISCONTINUED | OUTPATIENT
Start: 2021-11-30 | End: 2021-11-30 | Stop reason: HOSPADM

## 2021-11-30 RX ORDER — ONDANSETRON 2 MG/ML
4 INJECTION INTRAMUSCULAR; INTRAVENOUS EVERY 6 HOURS PRN
Status: DISCONTINUED | OUTPATIENT
Start: 2021-11-30 | End: 2021-11-30 | Stop reason: HOSPADM

## 2021-11-30 RX ORDER — MAGNESIUM HYDROXIDE 1200 MG/15ML
LIQUID ORAL AS NEEDED
Status: DISCONTINUED | OUTPATIENT
Start: 2021-11-30 | End: 2021-11-30 | Stop reason: HOSPADM

## 2021-11-30 RX ORDER — ACETAMINOPHEN 325 MG/1
650 TABLET ORAL EVERY 6 HOURS PRN
Status: DISCONTINUED | OUTPATIENT
Start: 2021-11-30 | End: 2021-11-30 | Stop reason: HOSPADM

## 2021-11-30 RX ORDER — PHENAZOPYRIDINE HYDROCHLORIDE 100 MG/1
100 TABLET, FILM COATED ORAL
Status: DISCONTINUED | OUTPATIENT
Start: 2021-11-30 | End: 2021-11-30 | Stop reason: HOSPADM

## 2021-11-30 RX ORDER — OXYCODONE HYDROCHLORIDE 5 MG/1
5 TABLET ORAL EVERY 4 HOURS PRN
Qty: 8 TABLET | Refills: 0 | Status: SHIPPED | OUTPATIENT
Start: 2021-11-30 | End: 2021-12-05

## 2021-11-30 RX ORDER — SULFAMETHOXAZOLE AND TRIMETHOPRIM 400; 80 MG/1; MG/1
1 TABLET ORAL EVERY 12 HOURS SCHEDULED
Qty: 14 TABLET | Refills: 0 | Status: SHIPPED | OUTPATIENT
Start: 2021-11-30 | End: 2021-12-07

## 2021-11-30 RX ORDER — ONDANSETRON 2 MG/ML
4 INJECTION INTRAMUSCULAR; INTRAVENOUS ONCE AS NEEDED
Status: DISCONTINUED | OUTPATIENT
Start: 2021-11-30 | End: 2021-11-30 | Stop reason: HOSPADM

## 2021-11-30 RX ORDER — DEXAMETHASONE SODIUM PHOSPHATE 4 MG/ML
INJECTION, SOLUTION INTRA-ARTICULAR; INTRALESIONAL; INTRAMUSCULAR; INTRAVENOUS; SOFT TISSUE AS NEEDED
Status: DISCONTINUED | OUTPATIENT
Start: 2021-11-30 | End: 2021-11-30

## 2021-11-30 RX ORDER — ALBUTEROL SULFATE 2.5 MG/3ML
2.5 SOLUTION RESPIRATORY (INHALATION) ONCE AS NEEDED
Status: DISCONTINUED | OUTPATIENT
Start: 2021-11-30 | End: 2021-11-30 | Stop reason: HOSPADM

## 2021-11-30 RX ORDER — ONDANSETRON 2 MG/ML
INJECTION INTRAMUSCULAR; INTRAVENOUS AS NEEDED
Status: DISCONTINUED | OUTPATIENT
Start: 2021-11-30 | End: 2021-11-30

## 2021-11-30 RX ORDER — LIDOCAINE HYDROCHLORIDE 10 MG/ML
INJECTION, SOLUTION EPIDURAL; INFILTRATION; INTRACAUDAL; PERINEURAL AS NEEDED
Status: DISCONTINUED | OUTPATIENT
Start: 2021-11-30 | End: 2021-11-30

## 2021-11-30 RX ORDER — CIPROFLOXACIN 2 MG/ML
400 INJECTION, SOLUTION INTRAVENOUS ONCE
Status: COMPLETED | OUTPATIENT
Start: 2021-11-30 | End: 2021-11-30

## 2021-11-30 RX ORDER — SODIUM CHLORIDE, SODIUM LACTATE, POTASSIUM CHLORIDE, CALCIUM CHLORIDE 600; 310; 30; 20 MG/100ML; MG/100ML; MG/100ML; MG/100ML
125 INJECTION, SOLUTION INTRAVENOUS CONTINUOUS
Status: DISCONTINUED | OUTPATIENT
Start: 2021-11-30 | End: 2021-11-30 | Stop reason: HOSPADM

## 2021-11-30 RX ORDER — ATROPA BELLADONNA AND OPIUM 16.2; 6 MG/1; MG/1
SUPPOSITORY RECTAL AS NEEDED
Status: DISCONTINUED | OUTPATIENT
Start: 2021-11-30 | End: 2021-11-30 | Stop reason: HOSPADM

## 2021-11-30 RX ORDER — OXYBUTYNIN CHLORIDE 5 MG/1
5 TABLET ORAL 3 TIMES DAILY PRN
Status: DISCONTINUED | OUTPATIENT
Start: 2021-11-30 | End: 2021-11-30 | Stop reason: HOSPADM

## 2021-11-30 RX ORDER — PHENAZOPYRIDINE HYDROCHLORIDE 200 MG/1
200 TABLET, FILM COATED ORAL
Qty: 6 TABLET | Refills: 0 | Status: SHIPPED | OUTPATIENT
Start: 2021-11-30 | End: 2021-12-02

## 2021-11-30 RX ADMIN — FENTANYL CITRATE 50 MCG: 50 INJECTION, SOLUTION INTRAMUSCULAR; INTRAVENOUS at 10:37

## 2021-11-30 RX ADMIN — Medication 100 MG: at 10:24

## 2021-11-30 RX ADMIN — SUGAMMADEX 100 MG: 100 INJECTION, SOLUTION INTRAVENOUS at 11:10

## 2021-11-30 RX ADMIN — FENTANYL CITRATE 50 MCG: 50 INJECTION, SOLUTION INTRAMUSCULAR; INTRAVENOUS at 10:22

## 2021-11-30 RX ADMIN — ONDANSETRON 4 MG: 2 INJECTION INTRAMUSCULAR; INTRAVENOUS at 11:07

## 2021-11-30 RX ADMIN — DEXAMETHASONE SODIUM PHOSPHATE 4 MG: 4 INJECTION, SOLUTION INTRAMUSCULAR; INTRAVENOUS at 10:26

## 2021-11-30 RX ADMIN — CIPROFLOXACIN 400 MG: 2 INJECTION, SOLUTION INTRAVENOUS at 10:14

## 2021-11-30 RX ADMIN — SODIUM CHLORIDE, SODIUM LACTATE, POTASSIUM CHLORIDE, AND CALCIUM CHLORIDE 125 ML/HR: .6; .31; .03; .02 INJECTION, SOLUTION INTRAVENOUS at 09:23

## 2021-11-30 RX ADMIN — LIDOCAINE HYDROCHLORIDE 50 MG: 10 INJECTION, SOLUTION EPIDURAL; INFILTRATION; INTRACAUDAL; PERINEURAL at 10:22

## 2021-11-30 RX ADMIN — PROPOFOL 120 MG: 10 INJECTION, EMULSION INTRAVENOUS at 10:24

## 2021-11-30 RX ADMIN — ROCURONIUM BROMIDE 20 MG: 10 INJECTION, SOLUTION INTRAVENOUS at 10:37

## 2021-11-30 RX ADMIN — PROPOFOL 50 MG: 10 INJECTION, EMULSION INTRAVENOUS at 10:37

## 2021-12-01 ENCOUNTER — HOSPITAL ENCOUNTER (EMERGENCY)
Facility: HOSPITAL | Age: 81
Discharge: HOME | End: 2021-12-01
Attending: EMERGENCY MEDICINE | Admitting: EMERGENCY MEDICINE
Payer: MEDICARE

## 2021-12-01 VITALS
WEIGHT: 201.5 LBS | TEMPERATURE: 97.9 F | SYSTOLIC BLOOD PRESSURE: 122 MMHG | HEART RATE: 110 BPM | BODY MASS INDEX: 30.64 KG/M2 | OXYGEN SATURATION: 97 % | DIASTOLIC BLOOD PRESSURE: 65 MMHG | RESPIRATION RATE: 19 BRPM

## 2021-12-01 DIAGNOSIS — T83.9XXA PROBLEM WITH FOLEY CATHETER, INITIAL ENCOUNTER (HCC): Primary | ICD-10-CM

## 2021-12-01 PROCEDURE — 99282 EMERGENCY DEPT VISIT SF MDM: CPT

## 2021-12-01 PROCEDURE — 99284 EMERGENCY DEPT VISIT MOD MDM: CPT | Performed by: EMERGENCY MEDICINE

## 2021-12-02 ENCOUNTER — TELEPHONE (OUTPATIENT)
Dept: UROLOGY | Facility: AMBULATORY SURGERY CENTER | Age: 81
End: 2021-12-02

## 2021-12-03 ENCOUNTER — PROCEDURE VISIT (OUTPATIENT)
Dept: UROLOGY | Facility: CLINIC | Age: 81
End: 2021-12-03
Payer: MEDICARE

## 2021-12-03 VITALS — BODY MASS INDEX: 30.64 KG/M2 | HEIGHT: 68 IN

## 2021-12-03 DIAGNOSIS — C67.9 MALIGNANT NEOPLASM OF URINARY BLADDER, UNSPECIFIED SITE (HCC): Primary | ICD-10-CM

## 2021-12-03 PROCEDURE — 51700 IRRIGATION OF BLADDER: CPT

## 2021-12-06 ENCOUNTER — PROCEDURE VISIT (OUTPATIENT)
Dept: UROLOGY | Facility: CLINIC | Age: 81
End: 2021-12-06
Payer: MEDICARE

## 2021-12-06 VITALS
HEIGHT: 68 IN | DIASTOLIC BLOOD PRESSURE: 82 MMHG | BODY MASS INDEX: 27.43 KG/M2 | WEIGHT: 181 LBS | SYSTOLIC BLOOD PRESSURE: 142 MMHG

## 2021-12-06 DIAGNOSIS — C67.9 MALIGNANT NEOPLASM OF URINARY BLADDER, UNSPECIFIED SITE (HCC): Primary | ICD-10-CM

## 2021-12-06 LAB — POST-VOID RESIDUAL VOLUME, ML POC: 157 ML

## 2021-12-06 PROCEDURE — 51798 US URINE CAPACITY MEASURE: CPT

## 2021-12-09 ENCOUNTER — OFFICE VISIT (OUTPATIENT)
Dept: INTERNAL MEDICINE CLINIC | Age: 81
End: 2021-12-09
Payer: MEDICARE

## 2021-12-09 VITALS
HEIGHT: 68 IN | TEMPERATURE: 98.2 F | SYSTOLIC BLOOD PRESSURE: 122 MMHG | WEIGHT: 181.9 LBS | OXYGEN SATURATION: 98 % | BODY MASS INDEX: 27.57 KG/M2 | DIASTOLIC BLOOD PRESSURE: 64 MMHG | HEART RATE: 66 BPM

## 2021-12-09 DIAGNOSIS — K59.09 OTHER CONSTIPATION: Primary | ICD-10-CM

## 2021-12-09 DIAGNOSIS — L60.2 OVERGROWN TOENAILS: ICD-10-CM

## 2021-12-09 DIAGNOSIS — C67.8 MALIGNANT NEOPLASM OF OVERLAPPING SITES OF BLADDER (HCC): ICD-10-CM

## 2021-12-09 PROBLEM — B35.1 ONYCHOMYCOSIS OF TOENAIL: Status: RESOLVED | Noted: 2021-12-09 | Resolved: 2021-12-09

## 2021-12-09 PROBLEM — B35.1 ONYCHOMYCOSIS OF TOENAIL: Status: ACTIVE | Noted: 2021-12-09

## 2021-12-09 PROCEDURE — 99213 OFFICE O/P EST LOW 20 MIN: CPT | Performed by: INTERNAL MEDICINE

## 2021-12-09 RX ORDER — DOCUSATE SODIUM 100 MG/1
100 CAPSULE, LIQUID FILLED ORAL 3 TIMES DAILY
Qty: 21 CAPSULE | Refills: 0 | Status: SHIPPED | OUTPATIENT
Start: 2021-12-09 | End: 2021-12-13

## 2021-12-09 RX ORDER — POLYETHYLENE GLYCOL 3350 17 G/17G
17 POWDER, FOR SOLUTION ORAL DAILY
Qty: 7 EACH | Refills: 0 | Status: SHIPPED | OUTPATIENT
Start: 2021-12-09 | End: 2021-12-13

## 2021-12-10 ENCOUNTER — TELEPHONE (OUTPATIENT)
Dept: CARDIOLOGY CLINIC | Facility: CLINIC | Age: 81
End: 2021-12-10

## 2021-12-13 ENCOUNTER — OFFICE VISIT (OUTPATIENT)
Dept: INTERNAL MEDICINE CLINIC | Age: 81
End: 2021-12-13
Payer: MEDICARE

## 2021-12-13 ENCOUNTER — APPOINTMENT (OUTPATIENT)
Dept: RADIOLOGY | Facility: CLINIC | Age: 81
End: 2021-12-13
Payer: MEDICARE

## 2021-12-13 VITALS
OXYGEN SATURATION: 98 % | DIASTOLIC BLOOD PRESSURE: 58 MMHG | TEMPERATURE: 97.7 F | SYSTOLIC BLOOD PRESSURE: 124 MMHG | HEIGHT: 68 IN | BODY MASS INDEX: 27.13 KG/M2 | WEIGHT: 179 LBS | HEART RATE: 90 BPM

## 2021-12-13 DIAGNOSIS — Z98.890 HISTORY OF BILATERAL INGUINAL HERNIA REPAIR: ICD-10-CM

## 2021-12-13 DIAGNOSIS — K59.09 OTHER CONSTIPATION: ICD-10-CM

## 2021-12-13 DIAGNOSIS — K40.91 UNILATERAL RECURRENT INGUINAL HERNIA WITHOUT OBSTRUCTION OR GANGRENE: ICD-10-CM

## 2021-12-13 DIAGNOSIS — R10.32 LEFT LOWER QUADRANT ABDOMINAL PAIN: Primary | ICD-10-CM

## 2021-12-13 DIAGNOSIS — Z87.19 HISTORY OF BILATERAL INGUINAL HERNIA REPAIR: ICD-10-CM

## 2021-12-13 DIAGNOSIS — C67.8 MALIGNANT NEOPLASM OF OVERLAPPING SITES OF BLADDER (HCC): ICD-10-CM

## 2021-12-13 PROCEDURE — 74018 RADEX ABDOMEN 1 VIEW: CPT

## 2021-12-13 PROCEDURE — 99214 OFFICE O/P EST MOD 30 MIN: CPT | Performed by: INTERNAL MEDICINE

## 2021-12-15 ENCOUNTER — OFFICE VISIT (OUTPATIENT)
Dept: UROLOGY | Facility: CLINIC | Age: 81
End: 2021-12-15
Payer: MEDICARE

## 2021-12-15 VITALS
DIASTOLIC BLOOD PRESSURE: 78 MMHG | HEIGHT: 68 IN | SYSTOLIC BLOOD PRESSURE: 130 MMHG | BODY MASS INDEX: 26.83 KG/M2 | WEIGHT: 177 LBS

## 2021-12-15 DIAGNOSIS — Z71.2 ENCOUNTER TO DISCUSS TEST RESULTS: ICD-10-CM

## 2021-12-15 DIAGNOSIS — C67.8 CANCER OF OVERLAPPING SITES OF BLADDER (HCC): Primary | ICD-10-CM

## 2021-12-15 DIAGNOSIS — C67.9 MALIGNANT NEOPLASM OF URINARY BLADDER, UNSPECIFIED SITE (HCC): ICD-10-CM

## 2021-12-15 DIAGNOSIS — K40.30 NON-RECURRENT UNILATERAL INGUINAL HERNIA WITH OBSTRUCTION WITHOUT GANGRENE: ICD-10-CM

## 2021-12-15 PROCEDURE — 99214 OFFICE O/P EST MOD 30 MIN: CPT | Performed by: UROLOGY

## 2021-12-16 ENCOUNTER — TELEPHONE (OUTPATIENT)
Dept: HEMATOLOGY ONCOLOGY | Facility: CLINIC | Age: 81
End: 2021-12-16

## 2021-12-16 ENCOUNTER — TELEPHONE (OUTPATIENT)
Dept: OTHER | Facility: HOSPITAL | Age: 81
End: 2021-12-16

## 2021-12-17 ENCOUNTER — TELEPHONE (OUTPATIENT)
Dept: HEMATOLOGY ONCOLOGY | Facility: CLINIC | Age: 81
End: 2021-12-17

## 2021-12-18 ENCOUNTER — HOSPITAL ENCOUNTER (EMERGENCY)
Facility: HOSPITAL | Age: 81
Discharge: HOME | End: 2021-12-18
Attending: EMERGENCY MEDICINE | Admitting: EMERGENCY MEDICINE
Payer: MEDICARE

## 2021-12-18 ENCOUNTER — APPOINTMENT (EMERGENCY)
Dept: CT IMAGING | Facility: HOSPITAL | Age: 81
End: 2021-12-18
Payer: MEDICARE

## 2021-12-18 VITALS
DIASTOLIC BLOOD PRESSURE: 77 MMHG | OXYGEN SATURATION: 99 % | RESPIRATION RATE: 18 BRPM | SYSTOLIC BLOOD PRESSURE: 160 MMHG | TEMPERATURE: 98.2 F | HEART RATE: 82 BPM

## 2021-12-18 DIAGNOSIS — K59.00 CONSTIPATION, UNSPECIFIED CONSTIPATION TYPE: Primary | ICD-10-CM

## 2021-12-18 DIAGNOSIS — N13.30 HYDRONEPHROSIS, UNSPECIFIED HYDRONEPHROSIS TYPE: ICD-10-CM

## 2021-12-18 DIAGNOSIS — N32.89 BLADDER MASS: ICD-10-CM

## 2021-12-18 LAB
ANION GAP SERPL CALCULATED.3IONS-SCNC: 8 MMOL/L (ref 4–13)
BASOPHILS # BLD AUTO: 0.04 THOUSANDS/ΜL (ref 0–0.1)
BASOPHILS NFR BLD AUTO: 1 % (ref 0–1)
BUN SERPL-MCNC: 26 MG/DL (ref 5–25)
CALCIUM SERPL-MCNC: 9.4 MG/DL (ref 8.3–10.1)
CHLORIDE SERPL-SCNC: 97 MMOL/L (ref 100–108)
CO2 SERPL-SCNC: 30 MMOL/L (ref 21–32)
CREAT SERPL-MCNC: 1.67 MG/DL (ref 0.6–1.3)
EOSINOPHIL # BLD AUTO: 0.2 THOUSAND/ΜL (ref 0–0.61)
EOSINOPHIL NFR BLD AUTO: 3 % (ref 0–6)
ERYTHROCYTE [DISTWIDTH] IN BLOOD BY AUTOMATED COUNT: 15.8 % (ref 11.6–15.1)
GFR SERPL CREATININE-BSD FRML MDRD: 37 ML/MIN/1.73SQ M
GLUCOSE SERPL-MCNC: 105 MG/DL (ref 65–140)
HCT VFR BLD AUTO: 34 % (ref 36.5–49.3)
HGB BLD-MCNC: 9.8 G/DL (ref 12–17)
IMM GRANULOCYTES # BLD AUTO: 0.03 THOUSAND/UL (ref 0–0.2)
IMM GRANULOCYTES NFR BLD AUTO: 1 % (ref 0–2)
LYMPHOCYTES # BLD AUTO: 1.09 THOUSANDS/ΜL (ref 0.6–4.47)
LYMPHOCYTES NFR BLD AUTO: 17 % (ref 14–44)
MCH RBC QN AUTO: 21.6 PG (ref 26.8–34.3)
MCHC RBC AUTO-ENTMCNC: 28.8 G/DL (ref 31.4–37.4)
MCV RBC AUTO: 75 FL (ref 82–98)
MONOCYTES # BLD AUTO: 0.68 THOUSAND/ΜL (ref 0.17–1.22)
MONOCYTES NFR BLD AUTO: 10 % (ref 4–12)
NEUTROPHILS # BLD AUTO: 4.48 THOUSANDS/ΜL (ref 1.85–7.62)
NEUTS SEG NFR BLD AUTO: 68 % (ref 43–75)
NRBC BLD AUTO-RTO: 0 /100 WBCS
PLATELET # BLD AUTO: 399 THOUSANDS/UL (ref 149–390)
PMV BLD AUTO: 9.7 FL (ref 8.9–12.7)
POTASSIUM SERPL-SCNC: 3.8 MMOL/L (ref 3.5–5.3)
RBC # BLD AUTO: 4.53 MILLION/UL (ref 3.88–5.62)
SODIUM SERPL-SCNC: 135 MMOL/L (ref 136–145)
WBC # BLD AUTO: 6.52 THOUSAND/UL (ref 4.31–10.16)

## 2021-12-18 PROCEDURE — 74176 CT ABD & PELVIS W/O CONTRAST: CPT

## 2021-12-18 PROCEDURE — G1004 CDSM NDSC: HCPCS

## 2021-12-18 PROCEDURE — 96360 HYDRATION IV INFUSION INIT: CPT

## 2021-12-18 PROCEDURE — 99284 EMERGENCY DEPT VISIT MOD MDM: CPT | Performed by: EMERGENCY MEDICINE

## 2021-12-18 PROCEDURE — 99284 EMERGENCY DEPT VISIT MOD MDM: CPT

## 2021-12-18 PROCEDURE — 85025 COMPLETE CBC W/AUTO DIFF WBC: CPT | Performed by: EMERGENCY MEDICINE

## 2021-12-18 PROCEDURE — 36415 COLL VENOUS BLD VENIPUNCTURE: CPT | Performed by: EMERGENCY MEDICINE

## 2021-12-18 PROCEDURE — 80048 BASIC METABOLIC PNL TOTAL CA: CPT | Performed by: EMERGENCY MEDICINE

## 2021-12-18 RX ORDER — MAGNESIUM CARB/ALUMINUM HYDROX 105-160MG
296 TABLET,CHEWABLE ORAL ONCE
Status: COMPLETED | OUTPATIENT
Start: 2021-12-18 | End: 2021-12-18

## 2021-12-18 RX ORDER — BISACODYL 10 MG
10 SUPPOSITORY, RECTAL RECTAL DAILY PRN
Qty: 12 SUPPOSITORY | Refills: 0 | Status: SHIPPED | OUTPATIENT
Start: 2021-12-18

## 2021-12-18 RX ORDER — MAGNESIUM CARB/ALUMINUM HYDROX 105-160MG
296 TABLET,CHEWABLE ORAL ONCE AS NEEDED
Qty: 296 ML | Refills: 3 | Status: SHIPPED | OUTPATIENT
Start: 2021-12-18

## 2021-12-18 RX ADMIN — MAGNESIUM CITRATE 296 ML: 1.75 LIQUID ORAL at 22:24

## 2021-12-18 RX ADMIN — SODIUM CHLORIDE 1000 ML: 0.9 INJECTION, SOLUTION INTRAVENOUS at 19:39

## 2021-12-20 ENCOUNTER — TELEPHONE (OUTPATIENT)
Dept: UROLOGY | Facility: CLINIC | Age: 81
End: 2021-12-20

## 2021-12-21 ENCOUNTER — TELEPHONE (OUTPATIENT)
Dept: UROLOGY | Facility: AMBULATORY SURGERY CENTER | Age: 81
End: 2021-12-21

## 2021-12-28 PROCEDURE — 99284 EMERGENCY DEPT VISIT MOD MDM: CPT

## 2021-12-29 ENCOUNTER — CONSULT (OUTPATIENT)
Dept: HEMATOLOGY ONCOLOGY | Facility: CLINIC | Age: 81
End: 2021-12-29
Payer: MEDICARE

## 2021-12-29 ENCOUNTER — APPOINTMENT (EMERGENCY)
Dept: CT IMAGING | Facility: HOSPITAL | Age: 81
End: 2021-12-29
Payer: MEDICARE

## 2021-12-29 ENCOUNTER — HOSPITAL ENCOUNTER (EMERGENCY)
Facility: HOSPITAL | Age: 81
Discharge: HOME | End: 2021-12-29
Attending: EMERGENCY MEDICINE | Admitting: EMERGENCY MEDICINE
Payer: MEDICARE

## 2021-12-29 ENCOUNTER — TELEPHONE (OUTPATIENT)
Dept: HEMATOLOGY ONCOLOGY | Facility: CLINIC | Age: 81
End: 2021-12-29

## 2021-12-29 VITALS
BODY MASS INDEX: 27.46 KG/M2 | OXYGEN SATURATION: 97 % | TEMPERATURE: 99.3 F | SYSTOLIC BLOOD PRESSURE: 138 MMHG | HEART RATE: 113 BPM | RESPIRATION RATE: 18 BRPM | DIASTOLIC BLOOD PRESSURE: 82 MMHG | HEIGHT: 68 IN | WEIGHT: 181.2 LBS

## 2021-12-29 VITALS
HEART RATE: 90 BPM | OXYGEN SATURATION: 98 % | TEMPERATURE: 97.9 F | SYSTOLIC BLOOD PRESSURE: 131 MMHG | RESPIRATION RATE: 20 BRPM | DIASTOLIC BLOOD PRESSURE: 68 MMHG

## 2021-12-29 DIAGNOSIS — R33.8 ACUTE URINARY RETENTION: Primary | ICD-10-CM

## 2021-12-29 DIAGNOSIS — C67.8 CANCER OF OVERLAPPING SITES OF BLADDER (HCC): ICD-10-CM

## 2021-12-29 DIAGNOSIS — D50.9 MICROCYTIC ANEMIA: ICD-10-CM

## 2021-12-29 DIAGNOSIS — N18.2 CKD (CHRONIC KIDNEY DISEASE), STAGE II: ICD-10-CM

## 2021-12-29 DIAGNOSIS — N13.30 HYDROURETERONEPHROSIS: ICD-10-CM

## 2021-12-29 DIAGNOSIS — Z97.8 STATUS POST INSERTION OF FOLEY CATHETER: ICD-10-CM

## 2021-12-29 DIAGNOSIS — D50.9 MICROCYTIC ANEMIA: Primary | ICD-10-CM

## 2021-12-29 LAB
2HR DELTA HS TROPONIN: 2 NG/L
ALBUMIN SERPL BCP-MCNC: 3.6 G/DL (ref 3.5–5)
ALP SERPL-CCNC: 83 U/L (ref 46–116)
ALT SERPL W P-5'-P-CCNC: 21 U/L (ref 12–78)
ANION GAP SERPL CALCULATED.3IONS-SCNC: 7 MMOL/L (ref 4–13)
ANION GAP SERPL CALCULATED.3IONS-SCNC: 9 MMOL/L (ref 4–13)
APTT PPP: 30 SECONDS (ref 23–37)
AST SERPL W P-5'-P-CCNC: 14 U/L (ref 5–45)
BACTERIA UR QL AUTO: ABNORMAL /HPF
BASOPHILS # BLD AUTO: 0.05 THOUSANDS/ΜL (ref 0–0.1)
BASOPHILS NFR BLD AUTO: 1 % (ref 0–1)
BILIRUB SERPL-MCNC: 0.42 MG/DL (ref 0.2–1)
BILIRUB UR QL STRIP: NEGATIVE
BUN SERPL-MCNC: 21 MG/DL (ref 5–25)
BUN SERPL-MCNC: 23 MG/DL (ref 5–25)
CALCIUM SERPL-MCNC: 8 MG/DL (ref 8.3–10.1)
CALCIUM SERPL-MCNC: 8.9 MG/DL (ref 8.3–10.1)
CARDIAC TROPONIN I PNL SERPL HS: 5 NG/L
CARDIAC TROPONIN I PNL SERPL HS: 7 NG/L
CHLORIDE SERPL-SCNC: 102 MMOL/L (ref 100–108)
CHLORIDE SERPL-SCNC: 107 MMOL/L (ref 100–108)
CK SERPL-CCNC: 48 U/L (ref 39–308)
CLARITY UR: CLEAR
CO2 SERPL-SCNC: 27 MMOL/L (ref 21–32)
CO2 SERPL-SCNC: 28 MMOL/L (ref 21–32)
COLOR UR: YELLOW
CREAT SERPL-MCNC: 1.51 MG/DL (ref 0.6–1.3)
CREAT SERPL-MCNC: 1.72 MG/DL (ref 0.6–1.3)
EOSINOPHIL # BLD AUTO: 0.3 THOUSAND/ΜL (ref 0–0.61)
EOSINOPHIL NFR BLD AUTO: 4 % (ref 0–6)
ERYTHROCYTE [DISTWIDTH] IN BLOOD BY AUTOMATED COUNT: 16.2 % (ref 11.6–15.1)
GFR SERPL CREATININE-BSD FRML MDRD: 36 ML/MIN/1.73SQ M
GFR SERPL CREATININE-BSD FRML MDRD: 42 ML/MIN/1.73SQ M
GLUCOSE SERPL-MCNC: 103 MG/DL (ref 65–140)
GLUCOSE SERPL-MCNC: 94 MG/DL (ref 65–140)
GLUCOSE UR STRIP-MCNC: NEGATIVE MG/DL
HCT VFR BLD AUTO: 35.2 % (ref 36.5–49.3)
HGB BLD-MCNC: 10.1 G/DL (ref 12–17)
HGB UR QL STRIP.AUTO: ABNORMAL
IMM GRANULOCYTES # BLD AUTO: 0.02 THOUSAND/UL (ref 0–0.2)
IMM GRANULOCYTES NFR BLD AUTO: 0 % (ref 0–2)
INR PPP: 0.96 (ref 0.84–1.19)
KETONES UR STRIP-MCNC: NEGATIVE MG/DL
LEUKOCYTE ESTERASE UR QL STRIP: ABNORMAL
LIPASE SERPL-CCNC: 111 U/L (ref 73–393)
LYMPHOCYTES # BLD AUTO: 1.57 THOUSANDS/ΜL (ref 0.6–4.47)
LYMPHOCYTES NFR BLD AUTO: 21 % (ref 14–44)
MCH RBC QN AUTO: 21.5 PG (ref 26.8–34.3)
MCHC RBC AUTO-ENTMCNC: 28.7 G/DL (ref 31.4–37.4)
MCV RBC AUTO: 75 FL (ref 82–98)
MONOCYTES # BLD AUTO: 0.75 THOUSAND/ΜL (ref 0.17–1.22)
MONOCYTES NFR BLD AUTO: 10 % (ref 4–12)
MUCOUS THREADS UR QL AUTO: ABNORMAL
NEUTROPHILS # BLD AUTO: 4.81 THOUSANDS/ΜL (ref 1.85–7.62)
NEUTS SEG NFR BLD AUTO: 64 % (ref 43–75)
NITRITE UR QL STRIP: NEGATIVE
NON-SQ EPI CELLS URNS QL MICRO: ABNORMAL /HPF
NRBC BLD AUTO-RTO: 0 /100 WBCS
PH UR STRIP.AUTO: 5.5 [PH]
PLATELET # BLD AUTO: 369 THOUSANDS/UL (ref 149–390)
PMV BLD AUTO: 9.8 FL (ref 8.9–12.7)
POTASSIUM SERPL-SCNC: 3.9 MMOL/L (ref 3.5–5.3)
POTASSIUM SERPL-SCNC: 3.9 MMOL/L (ref 3.5–5.3)
PROT SERPL-MCNC: 7.6 G/DL (ref 6.4–8.2)
PROT UR STRIP-MCNC: NEGATIVE MG/DL
PROTHROMBIN TIME: 12.8 SECONDS (ref 11.6–14.5)
RBC # BLD AUTO: 4.7 MILLION/UL (ref 3.88–5.62)
RBC #/AREA URNS AUTO: ABNORMAL /HPF
SODIUM SERPL-SCNC: 139 MMOL/L (ref 136–145)
SODIUM SERPL-SCNC: 141 MMOL/L (ref 136–145)
SP GR UR STRIP.AUTO: 1.02 (ref 1–1.03)
UROBILINOGEN UR QL STRIP.AUTO: 0.2 E.U./DL
WBC # BLD AUTO: 7.5 THOUSAND/UL (ref 4.31–10.16)
WBC #/AREA URNS AUTO: ABNORMAL /HPF

## 2021-12-29 PROCEDURE — 99204 OFFICE O/P NEW MOD 45 MIN: CPT | Performed by: INTERNAL MEDICINE

## 2021-12-29 PROCEDURE — 82728 ASSAY OF FERRITIN: CPT

## 2021-12-29 PROCEDURE — 74178 CT ABD&PLV WO CNTR FLWD CNTR: CPT

## 2021-12-29 PROCEDURE — 85025 COMPLETE CBC W/AUTO DIFF WBC: CPT | Performed by: EMERGENCY MEDICINE

## 2021-12-29 PROCEDURE — 96361 HYDRATE IV INFUSION ADD-ON: CPT

## 2021-12-29 PROCEDURE — 83690 ASSAY OF LIPASE: CPT | Performed by: EMERGENCY MEDICINE

## 2021-12-29 PROCEDURE — 80048 BASIC METABOLIC PNL TOTAL CA: CPT | Performed by: PHYSICIAN ASSISTANT

## 2021-12-29 PROCEDURE — 85610 PROTHROMBIN TIME: CPT | Performed by: PHYSICIAN ASSISTANT

## 2021-12-29 PROCEDURE — 85730 THROMBOPLASTIN TIME PARTIAL: CPT | Performed by: PHYSICIAN ASSISTANT

## 2021-12-29 PROCEDURE — 36415 COLL VENOUS BLD VENIPUNCTURE: CPT

## 2021-12-29 PROCEDURE — 99285 EMERGENCY DEPT VISIT HI MDM: CPT | Performed by: PHYSICIAN ASSISTANT

## 2021-12-29 PROCEDURE — 83550 IRON BINDING TEST: CPT

## 2021-12-29 PROCEDURE — G1004 CDSM NDSC: HCPCS

## 2021-12-29 PROCEDURE — 87086 URINE CULTURE/COLONY COUNT: CPT | Performed by: PHYSICIAN ASSISTANT

## 2021-12-29 PROCEDURE — 83540 ASSAY OF IRON: CPT

## 2021-12-29 PROCEDURE — 84484 ASSAY OF TROPONIN QUANT: CPT | Performed by: EMERGENCY MEDICINE

## 2021-12-29 PROCEDURE — 99215 OFFICE O/P EST HI 40 MIN: CPT | Performed by: NURSE PRACTITIONER

## 2021-12-29 PROCEDURE — 96360 HYDRATION IV INFUSION INIT: CPT

## 2021-12-29 PROCEDURE — 80053 COMPREHEN METABOLIC PANEL: CPT | Performed by: EMERGENCY MEDICINE

## 2021-12-29 PROCEDURE — 81001 URINALYSIS AUTO W/SCOPE: CPT | Performed by: PHYSICIAN ASSISTANT

## 2021-12-29 PROCEDURE — 82550 ASSAY OF CK (CPK): CPT | Performed by: PHYSICIAN ASSISTANT

## 2021-12-29 RX ORDER — LIDOCAINE HYDROCHLORIDE 20 MG/ML
1 JELLY TOPICAL ONCE
Status: COMPLETED | OUTPATIENT
Start: 2021-12-29 | End: 2021-12-29

## 2021-12-29 RX ORDER — CEPHALEXIN 500 MG/1
500 CAPSULE ORAL 4 TIMES DAILY
Qty: 20 CAPSULE | Refills: 0 | Status: SHIPPED | OUTPATIENT
Start: 2021-12-29 | End: 2021-12-29 | Stop reason: CLARIF

## 2021-12-29 RX ORDER — CEPHALEXIN 250 MG/1
500 CAPSULE ORAL ONCE
Status: COMPLETED | OUTPATIENT
Start: 2021-12-29 | End: 2021-12-29

## 2021-12-29 RX ORDER — ONDANSETRON HYDROCHLORIDE 8 MG/1
8 TABLET, FILM COATED ORAL EVERY 8 HOURS PRN
Qty: 20 TABLET | Refills: 0 | Status: SHIPPED | OUTPATIENT
Start: 2021-12-29 | End: 2022-01-24 | Stop reason: HOSPADM

## 2021-12-29 RX ORDER — CEPHALEXIN 500 MG/1
500 CAPSULE ORAL EVERY 6 HOURS SCHEDULED
Qty: 20 CAPSULE | Refills: 0 | Status: SHIPPED | OUTPATIENT
Start: 2021-12-29 | End: 2022-01-04

## 2021-12-29 RX ADMIN — IODIXANOL 85 ML: 320 INJECTION, SOLUTION INTRAVASCULAR at 06:08

## 2021-12-29 RX ADMIN — CEPHALEXIN 500 MG: 250 CAPSULE ORAL at 10:02

## 2021-12-29 RX ADMIN — SODIUM CHLORIDE 1000 ML: 0.9 INJECTION, SOLUTION INTRAVENOUS at 05:27

## 2021-12-29 RX ADMIN — LIDOCAINE HYDROCHLORIDE 1 APPLICATION: 20 JELLY TOPICAL at 07:11

## 2021-12-30 ENCOUNTER — HOSPITAL ENCOUNTER (OUTPATIENT)
Dept: RADIOLOGY | Facility: HOSPITAL | Age: 81
Discharge: HOME | End: 2021-12-30
Payer: MEDICARE

## 2021-12-30 DIAGNOSIS — C67.8 CANCER OF OVERLAPPING SITES OF BLADDER (HCC): ICD-10-CM

## 2021-12-30 LAB
BACTERIA UR CULT: NORMAL
FERRITIN SERPL-MCNC: 6 NG/ML (ref 8–388)
IRON SATN MFR SERPL: 5 % (ref 20–50)
IRON SERPL-MCNC: 16 UG/DL (ref 65–175)
TIBC SERPL-MCNC: 341 UG/DL (ref 250–450)

## 2021-12-30 PROCEDURE — 71046 X-RAY EXAM CHEST 2 VIEWS: CPT

## 2022-01-03 ENCOUNTER — OFFICE VISIT (OUTPATIENT)
Dept: GASTROENTEROLOGY | Facility: CLINIC | Age: 82
End: 2022-01-03
Payer: MEDICARE

## 2022-01-03 ENCOUNTER — OFFICE VISIT (OUTPATIENT)
Dept: INTERNAL MEDICINE CLINIC | Age: 82
End: 2022-01-03
Payer: MEDICARE

## 2022-01-03 VITALS
SYSTOLIC BLOOD PRESSURE: 122 MMHG | OXYGEN SATURATION: 98 % | WEIGHT: 181 LBS | BODY MASS INDEX: 27.43 KG/M2 | DIASTOLIC BLOOD PRESSURE: 60 MMHG | TEMPERATURE: 98 F | HEART RATE: 65 BPM | HEIGHT: 68 IN

## 2022-01-03 VITALS
HEART RATE: 74 BPM | DIASTOLIC BLOOD PRESSURE: 70 MMHG | SYSTOLIC BLOOD PRESSURE: 142 MMHG | BODY MASS INDEX: 27.28 KG/M2 | WEIGHT: 180 LBS | HEIGHT: 68 IN

## 2022-01-03 DIAGNOSIS — R19.4 CHANGE IN BOWEL HABITS: Primary | ICD-10-CM

## 2022-01-03 DIAGNOSIS — E78.2 MIXED HYPERLIPIDEMIA: ICD-10-CM

## 2022-01-03 DIAGNOSIS — I10 PRIMARY HYPERTENSION: ICD-10-CM

## 2022-01-03 DIAGNOSIS — I48.11 LONGSTANDING PERSISTENT ATRIAL FIBRILLATION (HCC): ICD-10-CM

## 2022-01-03 DIAGNOSIS — R10.84 GENERALIZED ABDOMINAL PAIN: ICD-10-CM

## 2022-01-03 DIAGNOSIS — I50.32 CHRONIC DIASTOLIC HEART FAILURE (HCC): ICD-10-CM

## 2022-01-03 DIAGNOSIS — N18.31 STAGE 3A CHRONIC KIDNEY DISEASE (HCC): ICD-10-CM

## 2022-01-03 DIAGNOSIS — K21.9 GASTROESOPHAGEAL REFLUX DISEASE WITHOUT ESOPHAGITIS: Primary | ICD-10-CM

## 2022-01-03 DIAGNOSIS — Z86.010 HISTORY OF COLON POLYPS: ICD-10-CM

## 2022-01-03 DIAGNOSIS — I25.810 ATHEROSCLEROSIS OF CORONARY ARTERY BYPASS GRAFT OF NATIVE HEART WITHOUT ANGINA PECTORIS: ICD-10-CM

## 2022-01-03 DIAGNOSIS — N18.2 CKD (CHRONIC KIDNEY DISEASE), STAGE II: ICD-10-CM

## 2022-01-03 DIAGNOSIS — C67.8 CANCER OF OVERLAPPING SITES OF BLADDER (HCC): ICD-10-CM

## 2022-01-03 DIAGNOSIS — J43.2 CENTRILOBULAR EMPHYSEMA (HCC): ICD-10-CM

## 2022-01-03 DIAGNOSIS — D50.9 MICROCYTIC ANEMIA: ICD-10-CM

## 2022-01-03 PROCEDURE — 99204 OFFICE O/P NEW MOD 45 MIN: CPT | Performed by: PHYSICIAN ASSISTANT

## 2022-01-03 PROCEDURE — 99214 OFFICE O/P EST MOD 30 MIN: CPT | Performed by: INTERNAL MEDICINE

## 2022-01-03 NOTE — PROGRESS NOTES
[facility] - Outpatient Consultation  Parth Serrano 80 y o  male MRN: [id number]  Encounter: [id number]          ASSESSMENT AND PLAN:      1  Change in bowel habits  2  Generalized abdominal pain  3  Microcytic anemia  4  History of colon polyps  5  Dysphagia    Patient presents with complaints of constipation and gas pains x couple months  He is currently utilizing prune juice with benefit  He had a colonoscopy in 2016 and a polyp was removed  He also has bladder cancer and is s/p TURBT in November  He will be beginning chemotherapy in a few weeks  He has an iron deficiency anemia as well  Recent HGB 10 1  He also reports a history of dysphagia for solids which is generally alleviated by drinking water with his meals  CT A/P 12/29 showed hydroureteronephrosis and a distended bladder; large hiatal hernia, fecalization of the bowels  Will plan for EGD/colonoscopy to investigate  Recommend he begin taking Benefiber daily and if needed Miralax 1 capful daily  ______________________________________________________________________    HPI:  Patient is an 80year old male with a history of bladder cancer s/p TURBT in November, A-fib, CAD, and COPD who presents to the office with complaints of abdominal pain and constipation  Patient reports that he has been struggling with constipation over the past couple of months  He also reports abdominal discomfort and gas discomfort  He has a microcytic anemia  He denies rectal bleeding  He had a colonoscopy 5 years ago and a polyp was removed  He also reports a history of intermittent dysphagia which is generally alleviated by drinking water  He reports he began drinking prune juice which does seem to be helping his bowel movements  He denies hematuria  He was recently in the ER for urinary retention  He reports he is not currently on Coumadin  He denies any family history of esophageal, stomach, or colon cancer        REVIEW OF SYSTEMS:    CONSTITUTIONAL: Denies any fever, chills, rigors, and weight loss  HEENT: No earache or tinnitus  Denies hearing loss or visual disturbances  CARDIOVASCULAR: No chest pain or palpitations  RESPIRATORY: Denies any cough, hemoptysis, shortness of breath or dyspnea on exertion  GASTROINTESTINAL: As noted in the History of Present Illness  GENITOURINARY: No problems with urination  Denies any hematuria or dysuria  NEUROLOGIC: No dizziness or vertigo, denies headaches  MUSCULOSKELETAL: Denies any muscle or joint pain  SKIN: Denies skin rashes or itching  ENDOCRINE: Denies excessive thirst  Denies intolerance to heat or cold  PSYCHOSOCIAL: Denies depression or anxiety  Denies any recent memory loss  Historical Information   Past Medical History:   Diagnosis Date    A-fib [facility])     Arthritis     Benign prostatic hyperplasia without lower urinary tract symptoms     without Urinary Obstruction    Bladder cancer (HCC)     CAD (coronary artery disease)     Cancer (HCC)     Chronic obstructive lung disease (HCC)     Coronary arteriosclerosis     Depression     Emphysema lung (HCC)     GERD (gastroesophageal reflux disease)     Hyperlipidemia     Hypertension     Irregular heart beat     Myocardial infarction ([address] )     Psoriasis     Requires supplemental oxygen     at bedtime during high humid days only    Stroke [facility])     TIA 1/2018     Past Surgical History:   Procedure Laterality Date    COLONOSCOPY      CORONARY ANGIOPLASTY  02/03/2001    PTCA of RCA    CORONARY ARTERY BYPASS GRAFT  02/07/2001    x4- [place]    HERNIA REPAIR      CT BRONCHOSCOPY,DIAGNOSTIC Left 1/7/2019    Procedure: BRONCHOSCOPY FLEXIBLE;  Surgeon: Audie Bee MD;  [facility];   Service: Pulmonary    CT CYSTOURETHROSCOPY,FULGUR <0 5 CM LESN N/A 11/30/2021    Procedure: TRANSURETHRAL RESECTION OF BLADDER TUMOR (TURBT) with "large";  Surgeon: Alvarado Ho MD;  [facility]; Service: Urology    AK CYSTOURETHROSCOPY,URETER CATHETER Bilateral 11/30/2021    Procedure: Maureen Jadenjimenez;  Surgeon: Katy Taylor MD;  [facility];  Service: Urology    AK Eric Beverage Left 2/20/2018    Procedure: ENDARTERECTOMY ARTERY CAROTID WITH PATCH ANGIOPLASTY;  Surgeon: Jake Arguelles MD;  Location:  MAIN OR;  Service: Vascular    TRANSURETHRAL RESECTION OF PROSTATE       Social History   Social History     Substance and Sexual Activity   Alcohol Use Yes    Comment: occasional wine  Social History     Substance and Sexual Activity   Drug Use No     Social History     Tobacco Use   Smoking Status Former Smoker    Types: Cigarettes   Smokeless Tobacco Never Used     Family History   Problem Relation Age of Onset    Lung cancer Mother     Cancer Mother     Other Father         sepsis       Meds/Allergies       Current Outpatient Medications:     aspirin 81 mg chewable tablet    atorvastatin (LIPITOR) 40 mg tablet    bisacodyl (DULCOLAX) 10 mg suppository    cephalexin (KEFLEX) 500 mg capsule    diltiazem (CARDIZEM CD) 240 mg 24 hr capsule    furosemide (LASIX) 40 mg tablet    magnesium citrate (CITROMA) 1 745 g/30 mL oral solution    ondansetron (ZOFRAN) 8 mg tablet    SULFAMETHOXAZOLE-TRIMETHOPRIM PO    warfarin (COUMADIN) 5 mg tablet    Allergies   Allergen Reactions    Penicillins Swelling and Itching           Objective     Blood pressure 142/70, pulse 74, height 5' 8" (1 727 m), weight 81 6 kg (180 lb)  Body mass index is 27 37 kg/m²  PHYSICAL EXAM:      General Appearance:   Alert, cooperative, no distress   HEENT:   Normocephalic, atraumatic, anicteric    Neck:  Supple, symmetrical, trachea midline   Lungs:   Clear to auscultation bilaterally; no rales, rhonchi or wheezing; respirations unlabored    Heart[de-identified]   Regular rate and rhythm; + murmur; no rub or gallop     Abdomen:   Soft, non-tender, non-distended; normal bowel sounds; no masses, no organomegaly    Genitalia:   Deferred    Rectal:   Deferred    Extremities:  No cyanosis, clubbing   Pulses:  2+ and symmetric    Skin:  No jaundice, rashes, or lesions    Lymph nodes:  No palpable cervical lymphadenopathy        Lab Results:   No visits with results within 1 Day(s) from this visit     Latest known visit with results is:   Admission on 12/29/2021, Discharged on 12/29/2021   Component Date Value    WBC 12/29/2021 7 50     RBC 12/29/2021 4 70     Hemoglobin 12/29/2021 10 1*    Hematocrit 12/29/2021 35 2*    MCV 12/29/2021 75*    MCH 12/29/2021 21 5*    MCHC 12/29/2021 28 7*    RDW 12/29/2021 16 2*    MPV 12/29/2021 9 8     Platelets 13/35/2702 369     nRBC 12/29/2021 0     Neutrophils Relative 12/29/2021 64     Immat GRANS % 12/29/2021 0     Lymphocytes Relative 12/29/2021 21     Monocytes Relative 12/29/2021 10     Eosinophils Relative 12/29/2021 4     Basophils Relative 12/29/2021 1     Neutrophils Absolute 12/29/2021 4 81     Immature Grans Absolute 12/29/2021 0 02     Lymphocytes Absolute 12/29/2021 1 57     Monocytes Absolute 12/29/2021 0 75     Eosinophils Absolute 12/29/2021 0 30     Basophils Absolute 12/29/2021 0 05     Sodium 12/29/2021 139     Potassium 12/29/2021 3 9     Chloride 12/29/2021 102     CO2 12/29/2021 28     ANION GAP 12/29/2021 9     BUN 12/29/2021 23     Creatinine 12/29/2021 1 72*    Glucose 12/29/2021 103     Calcium 12/29/2021 8 9     AST 12/29/2021 14     ALT 12/29/2021 21     Alkaline Phosphatase 12/29/2021 83     Total Protein 12/29/2021 7 6     Albumin 12/29/2021 3 6     Total Bilirubin 12/29/2021 0 42     eGFR 12/29/2021 36     Lipase 12/29/2021 111     hs TnI 0hr 12/29/2021 5     hs TnI 2hr 12/29/2021 7     Delta 2hr hsTnI 12/29/2021 2     Color, UA 12/29/2021 Yellow     Clarity, UA 12/29/2021 Clear     Specific Gravity, UA 12/29/2021 1 020     pH, UA 12/29/2021 5 5     Leukocytes, UA 12/29/2021 Trace*    Nitrite, UA 12/29/2021 Negative     Protein, UA 12/29/2021 Negative     Glucose, UA 12/29/2021 Negative     Ketones, UA 12/29/2021 Negative     Urobilinogen, UA 12/29/2021 0 2     Bilirubin, UA 12/29/2021 Negative     Blood, UA 12/29/2021 Small*    Total CK 12/29/2021 48     Protime 12/29/2021 12 8     INR 12/29/2021 0 96     PTT 12/29/2021 30     Sodium 12/29/2021 141     Potassium 12/29/2021 3 9     Chloride 12/29/2021 107     CO2 12/29/2021 27     ANION GAP 12/29/2021 7     BUN 12/29/2021 21     Creatinine 12/29/2021 1 51*    Glucose 12/29/2021 94     Calcium 12/29/2021 8 0*    eGFR 12/29/2021 42     RBC, UA 12/29/2021 4-10*    WBC, UA 12/29/2021 4-10*    Epithelial Cells 12/29/2021 Occasional     Bacteria, UA 12/29/2021 Occasional     MUCUS THREADS 12/29/2021 Occasional*    Urine Culture 12/29/2021 No Growth <1000 cfu/mL     Iron Saturation 12/29/2021 5*    TIBC 12/29/2021 341     Iron 12/29/2021 16*    Ferritin 12/29/2021 6*         Radiology Results:   CT abdomen pelvis w wo contrast    Result Date: 12/29/2021  Narrative: CT ABDOMEN AND PELVIS WITH AND WITHOUT IV CONTRAST INDICATION:   Urinary retention  Urinary retention  pt states he has hx of bladder cancer, sharp pains in bladder that bring him to his knees, last urinated today, believes he is retaining urine Patient is an 79-year-old male with a history of bladder cancer, atrial fibrillation on Coumadin, GERD, hyperlipidemia, hypertension, CABG, TURP that presents to the emergency department with improving sharp and shooting nonradiating lower abdominal pain  for 1 day  Patient has associated symptoms of decreased urinary stream beginning with the current ED presentation of lower abdominal pain symptoms  Patient states that he is following up with Heme-Onc later today for information session on chemotherapy infusion  Patient denies palliative factors with provocative factors of pressure to lower abdomen    Patient denies not effective treatment  Patient denies fevers, chills, nausea, vomiting, diarrhea, and constipation  Patient's recent fall  or recent trauma  Patient denies sick contacts or recent travel  Patient denies chest pain and shortness of breath  The patient's entire past medical history was obtained directly from either the attending emergency room physicians notes and/or the resident/physician's assistant notes in [address] [place] [address]  The information was copied and pasted directly from [facility]  Upon further review of the patient's chart in [facility], the patient is status post cystoscopy and transurethral resection of bladder tumor November 30, 2021  COMPARISON:  Several prior studies, most recent of which is a noncontrast CT abdomen pelvis December 18, 2021 and CT renal study September 24, 2021 TECHNIQUE: Initial CT of the abdomen and pelvis was performed without intravenous contrast   Subsequent dynamic CT evaluation of the abdomen and pelvis was performed after the administration of intravenous contrast in both nephrographic and delayed phases after the administration of intravenous contrast   Additional 15 minute delayed images of the urinary bladder were also obtained  Axial, sagittal, and coronal 2D reformatted images were created from the source data and submitted for interpretation  Radiation dose length product (DLP) for this visit:  1096 mGy-cm   This examination, like all CT scans performed in the [facility], was performed utilizing techniques to minimize radiation dose exposure, including the use of iterative reconstruction and automated exposure control  IV Contrast:  85 mL of iodixanol (VISIPAQUE) Enteric Contrast:  Enteric contrast was not administered  FINDINGS: ABDOMEN RIGHT KIDNEY AND URETER: There are subcentimeter low-density lesions, too small to accurately characterize, however likely cysts  No solid renal mass  No detectable urothelial mass   There is mild hydroureteronephrosis, up to the level of the urinary bladder, new from the prior study  There is delayed excretion of contrast material into the collecting system  No urinary tract calculi  No perinephric collection  LEFT KIDNEY AND URETER: There are subcentimeter low-density lesions, too small to accurately characterize, however likely represent cysts  Exophytic simple cyst in the posterior interpolar region  No solid renal mass  Delayed excretion of contrast material into the collecting system, limiting evaluation for urothelial lesions  There is moderate to severe hydroureteronephrosis, up to the level of the urinary bladder  Moderate perinephric and periureteric inflammation, up to the level of the urinary bladder, increased from the prior study  No urinary tract calculi  No perinephric collection  URINARY BLADDER: Severely distended  The urinary bladder extends above the level of the iliac crests  Inadequately evaluated due to lack of excreted contrast material into the urinary bladder  There are postoperative changes along the left lateral aspect of the urinary bladder  Moderate and increasing inflammatory changes are present surrounding the urinary bladder, predominantly along the left lateral and posterolateral margins  There is a bladder diverticulum along the posterior, left lateral margin of the urinary bladder  No calculi  LOWER CHEST: Mild emphysematous changes  No clinically significant abnormality identified in the visualized lower chest  LIVER/BILIARY TREE: One or more simple appearing hepatic cysts are again identified and are similar  One or more subcentimeter sharply circumscribed low-density hepatic lesion(s) are noted, too small to accurately characterize, but statistically most likely to represent subcentimeter hepatic cysts  No suspicious solid hepatic lesion is identified  Hepatic contours are normal   No biliary dilatation  GALLBLADDER:  There are gallstone(s) within the gallbladder, without pericholecystic inflammatory changes  SPLEEN:  Unremarkable  PANCREAS:  Unremarkable  ADRENAL GLANDS:  Unremarkable  STOMACH AND BOWEL:  Evaluation of the GI tract limited due to lack of oral contrast material  Stomach incompletely evaluated  Large hiatal hernia/partially intrathoracic stomach  Fecalization of small bowel contents, compatible with delayed small bowel transit  No evidence of small bowel obstruction  The colon is segmentally distended with feces  Normal fecal burden in the colon  ABDOMINOPELVIC CAVITY:  No ascites  No free intraperitoneal air  Again identified are subcentimeter para-aortic, retroperitoneal, mesenteric and pelvic lymph nodes  No pathologic lymphadenopathy based on CT criteria  VESSELS:  Atherosclerotic changes are present in the abdominal aorta and its branch vessels  Fusiform ectasia of the infrarenal abdominal aorta, extending into the common iliac bifurcation, similar  PELVIS REPRODUCTIVE ORGANS:  Unremarkable for patient's age  APPENDIX: No findings to suggest appendicitis  ABDOMINAL WALL/INGUINAL REGIONS:  Prior laparoscopic bilateral inguinal hernia repair  There is a recurrent, large left inguinal hernia containing fat  OSSEOUS STRUCTURES:  There are age appropriate degenerative changes  No acute fracture or destructive osseous lesion  Impression: 1  Severely distended urinary bladder, extending above the iliac crests  Postoperative changes along the left lateral and posterior, left lateral wall of the urinary bladder  2   Mild right-sided hydroureteronephrosis and moderate to severe left-sided hydroureteronephrosis as described above  There is delayed excretion into the collecting systems bilaterally, likely related to vesicoureteral reflux from severely distended urinary bladder  There is worsening left-sided perinephric and periureteric inflammation  3   Additional, stable incidental findings as described above  Recommend follow-up urology consultation   The study was marked in [facility] for immediate notification  Workstation performed: [id number]     CT abdomen pelvis wo contrast    Result Date: 12/18/2021  Narrative: CT ABDOMEN AND PELVIS WITHOUT IV CONTRAST INDICATION:   Flank pain, kidney stone suspected left flank pain  COMPARISON:  September 24, 2021 TECHNIQUE:  CT examination of the abdomen and pelvis was performed without intravenous contrast   Axial, sagittal, and coronal 2D reformatted images were created from the source data and submitted for interpretation  Radiation dose length product (DLP) for this visit:  326 mGy-cm   This examination, like all CT scans performed in the [facility], was performed utilizing techniques to minimize radiation dose exposure, including the use of iterative reconstruction and automated exposure control  Enteric contrast was not administered  FINDINGS: Study is limited due to lack of intravenous and oral contrast to evaluate for solid abdominal organs and vascular structures  ABDOMEN LOWER CHEST:  Large hiatal hernia is seen  LIVER/BILIARY TREE:  Small hypodense lesions in the liver, too small to characterize  GALLBLADDER:  There are gallstone(s) within the gallbladder, without pericholecystic inflammatory changes  SPLEEN:  Unremarkable  PANCREAS:  Unremarkable  ADRENAL GLANDS:  Unremarkable  KIDNEYS/URETERS:  Moderate left-sided hydroureteronephrosis without evidence of obstructing stone  STOMACH AND BOWEL:  Unremarkable  APPENDIX:  No findings to suggest appendicitis  ABDOMINOPELVIC CAVITY:  No ascites  No pneumoperitoneum  No lymphadenopathy  VESSELS:  Unremarkable for patient's age  PELVIS REPRODUCTIVE ORGANS:  Unremarkable for patient's age  URINARY BLADDER:  Unchanged infiltrating lesion in the urinary bladder involving the posterior left lateral wall with perivascular infiltration and retraction of the bladder wall  ABDOMINAL WALL/INGUINAL REGIONS:  Postsurgical changes from prior hernia repair in the lower abdominal wall   OSSEOUS STRUCTURES: No acute fracture or destructive osseous lesion  Impression: Unchanged infiltrating lesion in the urinary bladder involving the posterior left lateral wall with perivesicular infiltration infraction of the bladder wall  This lesion was better evaluated on the prior study  Limited evaluation of this lesion due to  lack of intravenous contrast  Moderate left-sided hydroureteronephrosis without evidence of obstructing stone  Findings are likely due to the above process  The study was marked in [facility] for immediate notification  Workstation performed: [id number]     XR chest pa & lateral    Result Date: 1/1/2022  Narrative: CHEST INDICATION:   C67 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  COMPARISON:  Chest radiograph from 8/13/2016, abdomen CT from 12/29/2021, chest CT from 7/7/2020  EXAM PERFORMED/VIEWS:  XR CHEST PA & LATERAL  DUAL ENERGY SUBTRACTION  FINDINGS: Normal heart size, CABG  Moderate hiatal hernia  Redemonstration of 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, greater on the left, with superior retraction of both nani  No acute pulmonary disease  No effusion or pneumothorax  Osseous structures appear within normal limits for patient age  Impression: No acute cardiopulmonary disease  Workstation performed: [id number]     XR abdomen 1 view kub    Result Date: 12/13/2021  Narrative: ABDOMEN INDICATION:   K59 09: Other constipation C67 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  COMPARISON:  None VIEWS:  AP supine Images: 3 FINDINGS: There is a nonobstructive bowel gas pattern  No discernible free air on this supine study  Upright or left lateral decubitus imaging is more sensitive to detect subtle free air in the appropriate setting  Prior lower abdominal and pelvic mesh hernia repair  Gallstones are noted, discussed on a prior CT scan  No renal nor ureteral calculi apparent  Visualized lung bases are clear  Hiatal hernia noted  Degenerative changes lumbar spine  Impression: Cholelithiasis  No renal nor ureteral calculi  Nonobstructive bowel gas pattern  Hiatal hernia   Workstation performed: [id number]

## 2022-01-03 NOTE — H&P (VIEW-ONLY)
[facility] - Outpatient Consultation  Cindy Zurita 80 y o  male MRN: [id number]  Encounter: [id number]          ASSESSMENT AND PLAN:      1  Change in bowel habits  2  Generalized abdominal pain  3  Microcytic anemia  4  History of colon polyps  5  Dysphagia    Patient presents with complaints of constipation and gas pains x couple months  He is currently utilizing prune juice with benefit  He had a colonoscopy in 2016 and a polyp was removed  He also has bladder cancer and is s/p TURBT in November  He will be beginning chemotherapy in a few weeks  He has an iron deficiency anemia as well  Recent HGB 10 1  He also reports a history of dysphagia for solids which is generally alleviated by drinking water with his meals  CT A/P 12/29 showed hydroureteronephrosis and a distended bladder; large hiatal hernia, fecalization of the bowels  Will plan for EGD/colonoscopy to investigate  Recommend he begin taking Benefiber daily and if needed Miralax 1 capful daily  ______________________________________________________________________    HPI:  Patient is an 80year old male with a history of bladder cancer s/p TURBT in November, A-fib, CAD, and COPD who presents to the office with complaints of abdominal pain and constipation  Patient reports that he has been struggling with constipation over the past couple of months  He also reports abdominal discomfort and gas discomfort  He has a microcytic anemia  He denies rectal bleeding  He had a colonoscopy 5 years ago and a polyp was removed  He also reports a history of intermittent dysphagia which is generally alleviated by drinking water  He reports he began drinking prune juice which does seem to be helping his bowel movements  He denies hematuria  He was recently in the ER for urinary retention  He reports he is not currently on Coumadin  He denies any family history of esophageal, stomach, or colon cancer        REVIEW OF SYSTEMS:    CONSTITUTIONAL: Denies any fever, chills, rigors, and weight loss  HEENT: No earache or tinnitus  Denies hearing loss or visual disturbances  CARDIOVASCULAR: No chest pain or palpitations  RESPIRATORY: Denies any cough, hemoptysis, shortness of breath or dyspnea on exertion  GASTROINTESTINAL: As noted in the History of Present Illness  GENITOURINARY: No problems with urination  Denies any hematuria or dysuria  NEUROLOGIC: No dizziness or vertigo, denies headaches  MUSCULOSKELETAL: Denies any muscle or joint pain  SKIN: Denies skin rashes or itching  ENDOCRINE: Denies excessive thirst  Denies intolerance to heat or cold  PSYCHOSOCIAL: Denies depression or anxiety  Denies any recent memory loss  Historical Information   Past Medical History:   Diagnosis Date    A-fib [facility])     Arthritis     Benign prostatic hyperplasia without lower urinary tract symptoms     without Urinary Obstruction    Bladder cancer (HCC)     CAD (coronary artery disease)     Cancer (HCC)     Chronic obstructive lung disease (HCC)     Coronary arteriosclerosis     Depression     Emphysema lung (HCC)     GERD (gastroesophageal reflux disease)     Hyperlipidemia     Hypertension     Irregular heart beat     Myocardial infarction ([address] )     Psoriasis     Requires supplemental oxygen     at bedtime during high humid days only    Stroke [facility])     TIA 1/2018     Past Surgical History:   Procedure Laterality Date    COLONOSCOPY      CORONARY ANGIOPLASTY  02/03/2001    PTCA of RCA    CORONARY ARTERY BYPASS GRAFT  02/07/2001    x4- [place]    HERNIA REPAIR      HI BRONCHOSCOPY,DIAGNOSTIC Left 1/7/2019    Procedure: BRONCHOSCOPY FLEXIBLE;  Surgeon: Joaquin Kaufman MD;  [facility];   Service: Pulmonary    HI CYSTOURETHROSCOPY,FULGUR <0 5 CM LESN N/A 11/30/2021    Procedure: TRANSURETHRAL RESECTION OF BLADDER TUMOR (TURBT) with "large";  Surgeon: Shira Ronquillo MD;  Location: [facility] OR; Service: Urology    CA CYSTOURETHROSCOPY,URETER CATHETER Bilateral 11/30/2021    Procedure: Adela Díaz;  Surgeon: Ene Geiger MD;  Location: [facility];  Service: Urology    CA Marcella Boyd Left 2/20/2018    Procedure: ENDARTERECTOMY ARTERY CAROTID WITH PATCH ANGIOPLASTY;  Surgeon: Aida Kirkpatrick MD;  Location:  MAIN OR;  Service: Vascular    TRANSURETHRAL RESECTION OF PROSTATE       Social History   Social History     Substance and Sexual Activity   Alcohol Use Yes    Comment: occasional wine  Social History     Substance and Sexual Activity   Drug Use No     Social History     Tobacco Use   Smoking Status Former Smoker    Types: Cigarettes   Smokeless Tobacco Never Used     Family History   Problem Relation Age of Onset    Lung cancer Mother     Cancer Mother     Other Father         sepsis       Meds/Allergies       Current Outpatient Medications:     aspirin 81 mg chewable tablet    atorvastatin (LIPITOR) 40 mg tablet    bisacodyl (DULCOLAX) 10 mg suppository    cephalexin (KEFLEX) 500 mg capsule    diltiazem (CARDIZEM CD) 240 mg 24 hr capsule    furosemide (LASIX) 40 mg tablet    magnesium citrate (CITROMA) 1 745 g/30 mL oral solution    ondansetron (ZOFRAN) 8 mg tablet    SULFAMETHOXAZOLE-TRIMETHOPRIM PO    warfarin (COUMADIN) 5 mg tablet    Allergies   Allergen Reactions    Penicillins Swelling and Itching           Objective     Blood pressure 142/70, pulse 74, height 5' 8" (1 727 m), weight 81 6 kg (180 lb)  Body mass index is 27 37 kg/m²  PHYSICAL EXAM:      General Appearance:   Alert, cooperative, no distress   HEENT:   Normocephalic, atraumatic, anicteric    Neck:  Supple, symmetrical, trachea midline   Lungs:   Clear to auscultation bilaterally; no rales, rhonchi or wheezing; respirations unlabored    Heart[de-identified]   Regular rate and rhythm; + murmur; no rub or gallop     Abdomen:   Soft, non-tender, non-distended; normal bowel sounds; no masses, no organomegaly    Genitalia:   Deferred    Rectal:   Deferred    Extremities:  No cyanosis, clubbing   Pulses:  2+ and symmetric    Skin:  No jaundice, rashes, or lesions    Lymph nodes:  No palpable cervical lymphadenopathy        Lab Results:   No visits with results within 1 Day(s) from this visit     Latest known visit with results is:   Admission on 12/29/2021, Discharged on 12/29/2021   Component Date Value    WBC 12/29/2021 7 50     RBC 12/29/2021 4 70     Hemoglobin 12/29/2021 10 1*    Hematocrit 12/29/2021 35 2*    MCV 12/29/2021 75*    MCH 12/29/2021 21 5*    MCHC 12/29/2021 28 7*    RDW 12/29/2021 16 2*    MPV 12/29/2021 9 8     Platelets 47/59/7627 369     nRBC 12/29/2021 0     Neutrophils Relative 12/29/2021 64     Immat GRANS % 12/29/2021 0     Lymphocytes Relative 12/29/2021 21     Monocytes Relative 12/29/2021 10     Eosinophils Relative 12/29/2021 4     Basophils Relative 12/29/2021 1     Neutrophils Absolute 12/29/2021 4 81     Immature Grans Absolute 12/29/2021 0 02     Lymphocytes Absolute 12/29/2021 1 57     Monocytes Absolute 12/29/2021 0 75     Eosinophils Absolute 12/29/2021 0 30     Basophils Absolute 12/29/2021 0 05     Sodium 12/29/2021 139     Potassium 12/29/2021 3 9     Chloride 12/29/2021 102     CO2 12/29/2021 28     ANION GAP 12/29/2021 9     BUN 12/29/2021 23     Creatinine 12/29/2021 1 72*    Glucose 12/29/2021 103     Calcium 12/29/2021 8 9     AST 12/29/2021 14     ALT 12/29/2021 21     Alkaline Phosphatase 12/29/2021 83     Total Protein 12/29/2021 7 6     Albumin 12/29/2021 3 6     Total Bilirubin 12/29/2021 0 42     eGFR 12/29/2021 36     Lipase 12/29/2021 111     hs TnI 0hr 12/29/2021 5     hs TnI 2hr 12/29/2021 7     Delta 2hr hsTnI 12/29/2021 2     Color, UA 12/29/2021 Yellow     Clarity, UA 12/29/2021 Clear     Specific Gravity, UA 12/29/2021 1 020     pH, UA 12/29/2021 5 5     Leukocytes, UA 12/29/2021 Trace*    Nitrite, UA 12/29/2021 Negative     Protein, UA 12/29/2021 Negative     Glucose, UA 12/29/2021 Negative     Ketones, UA 12/29/2021 Negative     Urobilinogen, UA 12/29/2021 0 2     Bilirubin, UA 12/29/2021 Negative     Blood, UA 12/29/2021 Small*    Total CK 12/29/2021 48     Protime 12/29/2021 12 8     INR 12/29/2021 0 96     PTT 12/29/2021 30     Sodium 12/29/2021 141     Potassium 12/29/2021 3 9     Chloride 12/29/2021 107     CO2 12/29/2021 27     ANION GAP 12/29/2021 7     BUN 12/29/2021 21     Creatinine 12/29/2021 1 51*    Glucose 12/29/2021 94     Calcium 12/29/2021 8 0*    eGFR 12/29/2021 42     RBC, UA 12/29/2021 4-10*    WBC, UA 12/29/2021 4-10*    Epithelial Cells 12/29/2021 Occasional     Bacteria, UA 12/29/2021 Occasional     MUCUS THREADS 12/29/2021 Occasional*    Urine Culture 12/29/2021 No Growth <1000 cfu/mL     Iron Saturation 12/29/2021 5*    TIBC 12/29/2021 341     Iron 12/29/2021 16*    Ferritin 12/29/2021 6*         Radiology Results:   CT abdomen pelvis w wo contrast    Result Date: 12/29/2021  Narrative: CT ABDOMEN AND PELVIS WITH AND WITHOUT IV CONTRAST INDICATION:   Urinary retention  Urinary retention  pt states he has hx of bladder cancer, sharp pains in bladder that bring him to his knees, last urinated today, believes he is retaining urine Patient is an 60-year-old male with a history of bladder cancer, atrial fibrillation on Coumadin, GERD, hyperlipidemia, hypertension, CABG, TURP that presents to the emergency department with improving sharp and shooting nonradiating lower abdominal pain  for 1 day  Patient has associated symptoms of decreased urinary stream beginning with the current ED presentation of lower abdominal pain symptoms  Patient states that he is following up with Heme-Onc later today for information session on chemotherapy infusion  Patient denies palliative factors with provocative factors of pressure to lower abdomen    Patient denies not effective treatment  Patient denies fevers, chills, nausea, vomiting, diarrhea, and constipation  Patient's recent fall  or recent trauma  Patient denies sick contacts or recent travel  Patient denies chest pain and shortness of breath  The patient's entire past medical history was obtained directly from either the attending emergency room physicians notes and/or the resident/physician's assistant notes in [address] [place] [address]  The information was copied and pasted directly from [facility]  Upon further review of the patient's chart in [facility], the patient is status post cystoscopy and transurethral resection of bladder tumor November 30, 2021  COMPARISON:  Several prior studies, most recent of which is a noncontrast CT abdomen pelvis December 18, 2021 and CT renal study September 24, 2021 TECHNIQUE: Initial CT of the abdomen and pelvis was performed without intravenous contrast   Subsequent dynamic CT evaluation of the abdomen and pelvis was performed after the administration of intravenous contrast in both nephrographic and delayed phases after the administration of intravenous contrast   Additional 15 minute delayed images of the urinary bladder were also obtained  Axial, sagittal, and coronal 2D reformatted images were created from the source data and submitted for interpretation  Radiation dose length product (DLP) for this visit:  1096 mGy-cm   This examination, like all CT scans performed in the [facility], was performed utilizing techniques to minimize radiation dose exposure, including the use of iterative reconstruction and automated exposure control  IV Contrast:  85 mL of iodixanol (VISIPAQUE) Enteric Contrast:  Enteric contrast was not administered  FINDINGS: ABDOMEN RIGHT KIDNEY AND URETER: There are subcentimeter low-density lesions, too small to accurately characterize, however likely cysts  No solid renal mass  No detectable urothelial mass   There is mild hydroureteronephrosis, up to the level of the urinary bladder, new from the prior study  There is delayed excretion of contrast material into the collecting system  No urinary tract calculi  No perinephric collection  LEFT KIDNEY AND URETER: There are subcentimeter low-density lesions, too small to accurately characterize, however likely represent cysts  Exophytic simple cyst in the posterior interpolar region  No solid renal mass  Delayed excretion of contrast material into the collecting system, limiting evaluation for urothelial lesions  There is moderate to severe hydroureteronephrosis, up to the level of the urinary bladder  Moderate perinephric and periureteric inflammation, up to the level of the urinary bladder, increased from the prior study  No urinary tract calculi  No perinephric collection  URINARY BLADDER: Severely distended  The urinary bladder extends above the level of the iliac crests  Inadequately evaluated due to lack of excreted contrast material into the urinary bladder  There are postoperative changes along the left lateral aspect of the urinary bladder  Moderate and increasing inflammatory changes are present surrounding the urinary bladder, predominantly along the left lateral and posterolateral margins  There is a bladder diverticulum along the posterior, left lateral margin of the urinary bladder  No calculi  LOWER CHEST: Mild emphysematous changes  No clinically significant abnormality identified in the visualized lower chest  LIVER/BILIARY TREE: One or more simple appearing hepatic cysts are again identified and are similar  One or more subcentimeter sharply circumscribed low-density hepatic lesion(s) are noted, too small to accurately characterize, but statistically most likely to represent subcentimeter hepatic cysts  No suspicious solid hepatic lesion is identified  Hepatic contours are normal   No biliary dilatation  GALLBLADDER:  There are gallstone(s) within the gallbladder, without pericholecystic inflammatory changes  SPLEEN:  Unremarkable  PANCREAS:  Unremarkable  ADRENAL GLANDS:  Unremarkable  STOMACH AND BOWEL:  Evaluation of the GI tract limited due to lack of oral contrast material  Stomach incompletely evaluated  Large hiatal hernia/partially intrathoracic stomach  Fecalization of small bowel contents, compatible with delayed small bowel transit  No evidence of small bowel obstruction  The colon is segmentally distended with feces  Normal fecal burden in the colon  ABDOMINOPELVIC CAVITY:  No ascites  No free intraperitoneal air  Again identified are subcentimeter para-aortic, retroperitoneal, mesenteric and pelvic lymph nodes  No pathologic lymphadenopathy based on CT criteria  VESSELS:  Atherosclerotic changes are present in the abdominal aorta and its branch vessels  Fusiform ectasia of the infrarenal abdominal aorta, extending into the common iliac bifurcation, similar  PELVIS REPRODUCTIVE ORGANS:  Unremarkable for patient's age  APPENDIX: No findings to suggest appendicitis  ABDOMINAL WALL/INGUINAL REGIONS:  Prior laparoscopic bilateral inguinal hernia repair  There is a recurrent, large left inguinal hernia containing fat  OSSEOUS STRUCTURES:  There are age appropriate degenerative changes  No acute fracture or destructive osseous lesion  Impression: 1  Severely distended urinary bladder, extending above the iliac crests  Postoperative changes along the left lateral and posterior, left lateral wall of the urinary bladder  2   Mild right-sided hydroureteronephrosis and moderate to severe left-sided hydroureteronephrosis as described above  There is delayed excretion into the collecting systems bilaterally, likely related to vesicoureteral reflux from severely distended urinary bladder  There is worsening left-sided perinephric and periureteric inflammation  3   Additional, stable incidental findings as described above  Recommend follow-up urology consultation   The study was marked in [facility] for immediate notification  Workstation performed: [id number]     CT abdomen pelvis wo contrast    Result Date: 12/18/2021  Narrative: CT ABDOMEN AND PELVIS WITHOUT IV CONTRAST INDICATION:   Flank pain, kidney stone suspected left flank pain  COMPARISON:  September 24, 2021 TECHNIQUE:  CT examination of the abdomen and pelvis was performed without intravenous contrast   Axial, sagittal, and coronal 2D reformatted images were created from the source data and submitted for interpretation  Radiation dose length product (DLP) for this visit:  326 mGy-cm   This examination, like all CT scans performed in the [facility], was performed utilizing techniques to minimize radiation dose exposure, including the use of iterative reconstruction and automated exposure control  Enteric contrast was not administered  FINDINGS: Study is limited due to lack of intravenous and oral contrast to evaluate for solid abdominal organs and vascular structures  ABDOMEN LOWER CHEST:  Large hiatal hernia is seen  LIVER/BILIARY TREE:  Small hypodense lesions in the liver, too small to characterize  GALLBLADDER:  There are gallstone(s) within the gallbladder, without pericholecystic inflammatory changes  SPLEEN:  Unremarkable  PANCREAS:  Unremarkable  ADRENAL GLANDS:  Unremarkable  KIDNEYS/URETERS:  Moderate left-sided hydroureteronephrosis without evidence of obstructing stone  STOMACH AND BOWEL:  Unremarkable  APPENDIX:  No findings to suggest appendicitis  ABDOMINOPELVIC CAVITY:  No ascites  No pneumoperitoneum  No lymphadenopathy  VESSELS:  Unremarkable for patient's age  PELVIS REPRODUCTIVE ORGANS:  Unremarkable for patient's age  URINARY BLADDER:  Unchanged infiltrating lesion in the urinary bladder involving the posterior left lateral wall with perivascular infiltration and retraction of the bladder wall  ABDOMINAL WALL/INGUINAL REGIONS:  Postsurgical changes from prior hernia repair in the lower abdominal wall   OSSEOUS STRUCTURES: No acute fracture or destructive osseous lesion  Impression: Unchanged infiltrating lesion in the urinary bladder involving the posterior left lateral wall with perivesicular infiltration infraction of the bladder wall  This lesion was better evaluated on the prior study  Limited evaluation of this lesion due to  lack of intravenous contrast  Moderate left-sided hydroureteronephrosis without evidence of obstructing stone  Findings are likely due to the above process  The study was marked in [facility] for immediate notification  Workstation performed: [id number]     XR chest pa & lateral    Result Date: 1/1/2022  Narrative: CHEST INDICATION:   C67 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  COMPARISON:  Chest radiograph from 8/13/2016, abdomen CT from 12/29/2021, chest CT from 7/7/2020  EXAM PERFORMED/VIEWS:  XR CHEST PA & LATERAL  DUAL ENERGY SUBTRACTION  FINDINGS: Normal heart size, CABG  Moderate hiatal hernia  Redemonstration of 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, greater on the left, with superior retraction of both nani  No acute pulmonary disease  No effusion or pneumothorax  Osseous structures appear within normal limits for patient age  Impression: No acute cardiopulmonary disease  Workstation performed: [id number]     XR abdomen 1 view kub    Result Date: 12/13/2021  Narrative: ABDOMEN INDICATION:   K59 09: Other constipation C67 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  COMPARISON:  None VIEWS:  AP supine Images: 3 FINDINGS: There is a nonobstructive bowel gas pattern  No discernible free air on this supine study  Upright or left lateral decubitus imaging is more sensitive to detect subtle free air in the appropriate setting  Prior lower abdominal and pelvic mesh hernia repair  Gallstones are noted, discussed on a prior CT scan  No renal nor ureteral calculi apparent  Visualized lung bases are clear  Hiatal hernia noted  Degenerative changes lumbar spine  Impression: Cholelithiasis  No renal nor ureteral calculi  Nonobstructive bowel gas pattern  Hiatal hernia   Workstation performed: [id number]

## 2022-01-03 NOTE — PROGRESS NOTES
Assessment/Plan:    1  Essential hypertension  Blood pressure is stable on present regimen  2  Hyperlipidemia  Continue with Lipitor 40 mg daily  Will check lipid profile before next visit    3  Coronary artery disease  Stable  Being followed by cardiologist    4  Ca urinary bladder  Being managed by urologist    5  COPD  Continue with present combination of inhalers    6  CKD stage 3  Relatively stable  Will continue to monitor   Diagnoses and all orders for this visit:    Gastroesophageal reflux disease without esophagitis    Atherosclerosis of coronary artery bypass graft of native heart without angina pectoris    Primary hypertension    Longstanding persistent atrial fibrillation (HCC)    CKD (chronic kidney disease), stage II    Cancer of overlapping sites of bladder ([facility] [address] )    Mixed hyperlipidemia  -     Lipid Panel with Direct LDL reflex; Future    Stage 3a chronic kidney disease (HCC)    Centrilobular emphysema (HCC)    Chronic diastolic heart failure (HCC)          BMI Counseling: Body mass index is 27 52 kg/m²  The BMI is above normal  Nutrition recommendations include decreasing portion sizes, encouraging healthy choices of fruits and vegetables and decreasing fast food intake  Exercise recommendations include vigorous physical activity 75 minutes/week and exercising 3-5 times per week  No pharmacotherapy was ordered  Rationale for BMI follow-up plan is due to patient being overweight or obese  Depression Screening and Follow-up Plan:   Patient was screened for depression during today's encounter  They screened negative with a PHQ-2 score of 0  Subjective:          Patient ID: Juan Peñaloza is a 80 y o  male  Patient is here for follow-up  Presently he is undergoing evaluation for EGD and also by urologist for bladder cancer        The following portions of the patient's history were reviewed and updated as appropriate: allergies, current medications, past family history, past medical history, past social history, past surgical history and problem list     Review of Systems   Constitutional: Negative for fatigue and fever  HENT: Negative for congestion, ear discharge, ear pain, postnasal drip, sinus pressure, sore throat, tinnitus and trouble swallowing  Eyes: Negative for discharge, itching and visual disturbance  Respiratory: Positive for shortness of breath  Negative for cough  Cardiovascular: Negative for chest pain and palpitations  Gastrointestinal: Negative for abdominal pain, diarrhea, nausea and vomiting  Endocrine: Negative for cold intolerance and polyuria  Genitourinary: Negative for difficulty urinating, dysuria and urgency  Musculoskeletal: Negative for arthralgias and neck pain  Skin: Negative for rash  Allergic/Immunologic: Negative for environmental allergies  Neurological: Negative for dizziness, weakness and headaches  Psychiatric/Behavioral: Negative for agitation, behavioral problems and confusion  The patient is not nervous/anxious            Past Medical History:   Diagnosis Date    A-fib [facility])     Arthritis     Benign prostatic hyperplasia without lower urinary tract symptoms     without Urinary Obstruction    Bladder cancer (HCC)     CAD (coronary artery disease)     Cancer (HCC)     Chronic obstructive lung disease (HCC)     Coronary arteriosclerosis     Depression     Emphysema lung (HCC)     GERD (gastroesophageal reflux disease)     Hyperlipidemia     Hypertension     Irregular heart beat     Myocardial infarction (HCC)     Psoriasis     Requires supplemental oxygen     at bedtime during high humid days only    Stroke [facility])     TIA 1/2018         Current Outpatient Medications:     aspirin 81 mg chewable tablet, Chew 1 tablet (81 mg total) daily, Disp: 60 tablet, Rfl: 6    atorvastatin (LIPITOR) 40 mg tablet, Take 1 tablet (40 mg total) by mouth daily, Disp: 90 tablet, Rfl: 3    cephalexin (KEFLEX) 500 mg capsule, Take 1 capsule (500 mg total) by mouth every 6 (six) hours for 5 days, Disp: 20 capsule, Rfl: 0    diltiazem (CARDIZEM CD) 240 mg 24 hr capsule, Take 1 capsule (240 mg total) by mouth daily, Disp: 60 capsule, Rfl: 3    furosemide (LASIX) 40 mg tablet, Take 1 tablet (40 mg total) by mouth daily, Disp: 60 tablet, Rfl: 6    warfarin (COUMADIN) 5 mg tablet, take 1 tablet by mouth once daily or as directed BY COUMADIN CLINIC, Disp: 30 tablet, Rfl: 6    bisacodyl (DULCOLAX) 10 mg suppository, Insert 1 suppository (10 mg total) into the rectum daily as needed for constipation, Disp: 12 suppository, Rfl: 0    magnesium citrate (CITROMA) 1 745 g/30 mL oral solution, Take 296 mL by mouth once as needed (constipation) for up to 1 dose (Patient not taking: Reported on 1/3/2022 ), Disp: 296 mL, Rfl: 3    ondansetron (ZOFRAN) 8 mg tablet, Take 1 tablet (8 mg total) by mouth every 8 (eight) hours as needed for nausea or vomiting (Patient not taking: Reported on 1/3/2022 ), Disp: 20 tablet, Rfl: 0    polyethylene glycol (GOLYTELY) 4000 mL solution, Take 4,000 mL by mouth once for 1 dose, Disp: 4000 mL, Rfl: 0    SULFAMETHOXAZOLE-TRIMETHOPRIM PO, Take 1 tablet by mouth 2 (two) times a day (Patient not taking: Reported on 1/3/2022 ), Disp: , Rfl:     Allergies   Allergen Reactions    Penicillins Swelling and Itching       Social History   Past Surgical History:   Procedure Laterality Date    COLONOSCOPY      CORONARY ANGIOPLASTY  02/03/2001    PTCA of RCA    CORONARY ARTERY BYPASS GRAFT  02/07/2001    x4- [place]    HERNIA REPAIR      VT BRONCHOSCOPY,DIAGNOSTIC Left 1/7/2019    Procedure: BRONCHOSCOPY FLEXIBLE;  Surgeon: Janelle Kwon MD;  [facility]: [facility];   Service: Pulmonary    VT CYSTOURETHROSCOPY,FULGUR <0 5 CM LESN N/A 11/30/2021    Procedure: TRANSURETHRAL RESECTION OF BLADDER TUMOR (TURBT) with "large";  Surgeon: Vernon Goltz, MD;  [facility];  Service: Urology    VT [address] Bilateral 11/30/2021    Procedure: CYSTOSCOPY;  Surgeon: Mike Branham MD;  [facility];  Service: Urology    AR Cameron Rasheed INCIS Left 2/20/2018    Procedure: ENDARTERECTOMY ARTERY CAROTID WITH PATCH ANGIOPLASTY;  Surgeon: Angelica Zurita MD;  [facility];  Service: Vascular    TRANSURETHRAL RESECTION OF PROSTATE       Family History   Problem Relation Age of Onset    Lung cancer Mother     Cancer Mother     Other Father         sepsis       Objective:  /60 (BP Location: Left arm, Patient Position: Sitting, Cuff Size: Adult)   Pulse 65   Temp 98 °F (36 7 °C) (Temporal)   Ht 5' 8" (1 727 m)   Wt 82 1 kg (181 lb)   SpO2 98% Comment: Room Air  BMI 27 52 kg/m²   Body mass index is 27 52 kg/m²  Physical Exam  Constitutional:       Appearance: He is well-developed  HENT:      Head: Normocephalic  Right Ear: External ear normal       Left Ear: External ear normal       Nose: No rhinorrhea  Mouth/Throat:      Pharynx: No oropharyngeal exudate or posterior oropharyngeal erythema  Eyes:      General: No scleral icterus  Pupils: Pupils are equal, round, and reactive to light  Neck:      Thyroid: No thyromegaly  Trachea: No tracheal deviation  Cardiovascular:      Rate and Rhythm: Normal rate  Rhythm irregular  Heart sounds: Normal heart sounds  Pulmonary:      Effort: Pulmonary effort is normal  No respiratory distress  Breath sounds: Normal breath sounds  Chest:      Chest wall: No tenderness  Abdominal:      General: Bowel sounds are normal       Palpations: Abdomen is soft  There is no mass  Tenderness: There is no abdominal tenderness  Genitourinary:     Comments: Fortune's catheter present  Musculoskeletal:         General: Normal range of motion  Cervical back: Normal range of motion and neck supple  Right lower leg: Edema present  Left lower leg: Edema present        Comments: Trace bilateral lower extremity edema present   Lymphadenopathy:      Cervical: No cervical adenopathy  Skin:     General: Skin is warm  Neurological:      Mental Status: He is alert and oriented to person, place, and time  Cranial Nerves: No cranial nerve deficit  Psychiatric:         Mood and Affect: Mood normal          Behavior: Behavior normal          Thought Content:  Thought content normal

## 2022-01-04 ENCOUNTER — OFFICE VISIT (OUTPATIENT)
Dept: CARDIOLOGY CLINIC | Facility: CLINIC | Age: 82
End: 2022-01-04
Payer: MEDICARE

## 2022-01-04 ENCOUNTER — APPOINTMENT (OUTPATIENT)
Dept: RADIATION ONCOLOGY | Facility: CLINIC | Age: 82
End: 2022-01-04
Payer: MEDICARE

## 2022-01-04 VITALS
SYSTOLIC BLOOD PRESSURE: 158 MMHG | OXYGEN SATURATION: 99 % | WEIGHT: 181 LBS | HEIGHT: 68 IN | HEART RATE: 70 BPM | BODY MASS INDEX: 27.43 KG/M2 | DIASTOLIC BLOOD PRESSURE: 78 MMHG

## 2022-01-04 DIAGNOSIS — E78.2 MIXED HYPERLIPIDEMIA: ICD-10-CM

## 2022-01-04 DIAGNOSIS — I48.11 LONGSTANDING PERSISTENT ATRIAL FIBRILLATION (HCC): ICD-10-CM

## 2022-01-04 DIAGNOSIS — I25.810 ATHEROSCLEROSIS OF CORONARY ARTERY BYPASS GRAFT OF NATIVE HEART WITHOUT ANGINA PECTORIS: Primary | ICD-10-CM

## 2022-01-04 DIAGNOSIS — I10 PRIMARY HYPERTENSION: ICD-10-CM

## 2022-01-04 DIAGNOSIS — Z95.1 S/P CABG X 4: ICD-10-CM

## 2022-01-04 DIAGNOSIS — I50.32 CHRONIC DIASTOLIC HEART FAILURE (HCC): ICD-10-CM

## 2022-01-04 PROCEDURE — 99214 OFFICE O/P EST MOD 30 MIN: CPT | Performed by: PHYSICIAN ASSISTANT

## 2022-01-04 NOTE — PROGRESS NOTES
Cardiology Follow Up    Sin Bell  1940  [id number]  [facility] [place] [id number] [place] [address] [phone]    1  Atherosclerosis of coronary artery bypass graft of native heart without angina pectoris     2  S/P CABG x 4     3  Longstanding persistent atrial fibrillation ([address] )     4  Chronic diastolic heart failure ([facility] [address] )     5  Primary hypertension     6  Mixed hyperlipidemia         Interval History: Sin Bell is a 80year old with history of CAD s/p CABG x4, chronic diastolic CHF, CVA, left CEA, dyslipidemia, severe COPD, mild AS who presents for routine follow-up visit  Patient follows with Dr Kieran Pavon  Patient reports he is doing very well from a cardiovascular standpoint he denies any episodes of chest pain, discomfort, palpitations, shortness of breath, orthopnea, PND lower extremity edema  He was found with new systolic murmur on exam during his last office visit, he was sent for TTE which showed EF 60% with mild AS and mild MR  He reports he goes to the gym approximately 3 days per week and 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 without any issues  He denies any issues with his home medications  He has hx of bladder CA s/p TURBT showing  invasive bladder CA requiring radiation therapy and was referred to radiology oncologist and currently receiving workup  He was recently seen at [facility] for lower abdominal pain secondary to urinary retention  He declined workup and admission and was discharged with indwelling archibald catheter       Patient Active Problem List   Diagnosis    CAD (coronary atherosclerotic disease)    Hypertension    Hyperlipemia    GERD (gastroesophageal reflux disease)    History of stroke    Sciatica of right side    Hyperlipidemia    Centrilobular emphysema ([address] )    Arthritis    Stenosis of left carotid artery    S/P CABG x 4  Acute left-sided low back pain with left-sided sciatica    Back pain    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    Vision changes    Urinary retention due to benign prostatic hyperplasia    Bladder cancer (HCC)    CKD (chronic kidney disease), stage II    Atrial flutter (HCC)    Irregular heart beat    Localized edema    COPD, severe (HCC)    Bronchiectasis with acute lower respiratory infection (HCC)    Abnormal CT of the chest    Tinnitus aurium, bilateral    Sensorineural hearing loss (SNHL) of both ears    Medicare annual wellness visit, subsequent    Anemia    DDD (degenerative disc disease), lumbar    [facility] diastolic [facility] failure [facility]    Encounter to discuss test results    Atrial fibrillation ([facility] )    Other constipation    Overgrown toenails    Recurrent unilateral inguinal hernia    Left lower quadrant abdominal pain    History of bilateral inguinal hernia repair    Cancer of overlapping sites of bladder ([facility] )    Stage 3a chronic kidney disease ([facility] )     Past Medical History:   Diagnosis Date    A-fib ([facility] )     Arthritis     Benign prostatic hyperplasia without lower urinary tract symptoms     without Urinary Obstruction    Bladder cancer ([facility] )     CAD (coronary artery disease)     Cancer ([facility] )     Chronic obstructive lung disease (HCC)     Coronary arteriosclerosis     Depression     Emphysema lung (HCC)     GERD (gastroesophageal reflux disease)     Hyperlipidemia     Hypertension     Irregular heart beat     Myocardial infarction (HCC)     Psoriasis     Requires supplemental oxygen     at bedtime during high humid days only    Stroke [facility])     TIA 1/2018     Social History     Socioeconomic History    Marital status:       Spouse name: Not on file    Number of children: 3    Years of education: Not on file    Highest education level: Not on file   Occupational History    Occupation: retired    Tobacco Use    Smoking status: Former Smoker     Years: 1 00     Types: Cigarettes    Smokeless tobacco: Never Used    Tobacco comment: few cigarettes when playing cards  Vaping Use    Vaping Use: Never used   Substance and Sexual Activity    Alcohol use: Yes     Comment: special occasions only wine   Drug use: No    Sexual activity: Yes   Other Topics Concern    Not on file   Social History Narrative    Lives with granddaughter and daughter     Caffeine use, He admits to consuming caffeine VIA coffee ( 8 servings per day), per [employer]    Martial History: Currently , per [employer]    Occupation: Retired (prior occupational:  repairman), per [employer]      Social Determinants of Health     Financial Resource Strain: Not on file   Food Insecurity: Not on file   Transportation Needs: Not on file   Physical Activity: Not on file   Stress: Not on file   Social Connections: Not on file   Intimate Partner Violence: Not on file   Housing Stability: Not on file      Family History   Problem Relation Age of Onset    Lung cancer Mother     Cancer Mother     Other Father         sepsis     Past Surgical History:   Procedure Laterality Date    COLONOSCOPY      CORONARY ANGIOPLASTY  02/03/2001    PTCA of RCA    CORONARY ARTERY BYPASS GRAFT  02/07/2001    x4- [place]    HERNIA REPAIR      MD BRONCHOSCOPY,DIAGNOSTIC Left 1/7/2019    Procedure: BRONCHOSCOPY FLEXIBLE;  Surgeon: Zandra Moulton MD;  [facility];   Service: Pulmonary    MD CYSTOURETHROSCOPY,FULGUR <0 5 CM LESN N/A 11/30/2021    Procedure: TRANSURETHRAL RESECTION OF BLADDER TUMOR (TURBT) with "large";  Surgeon: Alvin Hirsch MD;  [facility];  Service: Urology    MD CYSTOURETHROSCOPY,URETER CATHETER Bilateral 11/30/2021    Procedure: Grecia Prado;  Surgeon: Alvin Hirsch MD;  [facility];  Service: Urology    MD THROMBOENDARTECTMY Natacha Esters Left 2/20/2018    Procedure: ENDARTERECTOMY ARTERY CAROTID WITH PATCH ANGIOPLASTY;  Surgeon: Zahra Pearl MD;  [facility];  Service: Vascular    TRANSURETHRAL RESECTION OF PROSTATE         Current Outpatient Medications:     aspirin 81 mg chewable tablet, Chew 1 tablet (81 mg total) daily, Disp: 60 tablet, Rfl: 6    atorvastatin (LIPITOR) 40 mg tablet, Take 1 tablet (40 mg total) by mouth daily, Disp: 90 tablet, Rfl: 3    cephalexin (KEFLEX) 500 mg capsule, Take 1 capsule (500 mg total) by mouth every 6 (six) hours for 5 days, Disp: 20 capsule, Rfl: 0    diltiazem (CARDIZEM CD) 240 mg 24 hr capsule, Take 1 capsule (240 mg total) by mouth daily, Disp: 60 capsule, Rfl: 3    furosemide (LASIX) 40 mg tablet, Take 1 tablet (40 mg total) by mouth daily, Disp: 60 tablet, Rfl: 6    polyethylene glycol (GOLYTELY) 4000 mL solution, Take 4,000 mL by mouth once for 1 dose, Disp: 4000 mL, Rfl: 0    warfarin (COUMADIN) 5 mg tablet, take 1 tablet by mouth once daily or as directed BY COUMADIN CLINIC, Disp: 30 tablet, Rfl: 6    bisacodyl (DULCOLAX) 10 mg suppository, Insert 1 suppository (10 mg total) into the rectum daily as needed for constipation (Patient not taking: Reported on 1/4/2022 ), Disp: 12 suppository, Rfl: 0    magnesium citrate (CITROMA) 1 745 g/30 mL oral solution, Take 296 mL by mouth once as needed (constipation) for up to 1 dose (Patient not taking: Reported on 1/3/2022 ), Disp: 296 mL, Rfl: 3    ondansetron (ZOFRAN) 8 mg tablet, Take 1 tablet (8 mg total) by mouth every 8 (eight) hours as needed for nausea or vomiting (Patient not taking: Reported on 1/3/2022 ), Disp: 20 tablet, Rfl: 0    SULFAMETHOXAZOLE-TRIMETHOPRIM PO, Take 1 tablet by mouth 2 (two) times a day (Patient not taking: Reported on 1/3/2022 ), Disp: , Rfl:   Allergies   Allergen Reactions    Penicillins Swelling and Itching       Labs:  Admission on 12/29/2021, Discharged on 12/29/2021   Component Date Value    WBC 12/29/2021 7 50     RBC 12/29/2021 4 70     Hemoglobin 12/29/2021 10 1*    Hematocrit 12/29/2021 35 2*    MCV 12/29/2021 75*    MCH 12/29/2021 21 5*    MCHC 12/29/2021 28 7*    RDW 12/29/2021 16 2*    MPV 12/29/2021 9 8     Platelets 58/30/0402 369     nRBC 12/29/2021 0     Neutrophils Relative 12/29/2021 64     Immat GRANS % 12/29/2021 0     Lymphocytes Relative 12/29/2021 21     Monocytes Relative 12/29/2021 10     Eosinophils Relative 12/29/2021 4     Basophils Relative 12/29/2021 1     Neutrophils Absolute 12/29/2021 4 81     Immature Grans Absolute 12/29/2021 0 02     Lymphocytes Absolute 12/29/2021 1 57     Monocytes Absolute 12/29/2021 0 75     Eosinophils Absolute 12/29/2021 0 30     Basophils Absolute 12/29/2021 0 05     Sodium 12/29/2021 139     Potassium 12/29/2021 3 9     Chloride 12/29/2021 102     CO2 12/29/2021 28     ANION GAP 12/29/2021 9     BUN 12/29/2021 23     Creatinine 12/29/2021 1 72*    Glucose 12/29/2021 103     Calcium 12/29/2021 8 9     AST 12/29/2021 14     ALT 12/29/2021 21     Alkaline Phosphatase 12/29/2021 83     Total Protein 12/29/2021 7 6     Albumin 12/29/2021 3 6     Total Bilirubin 12/29/2021 0 42     eGFR 12/29/2021 36     Lipase 12/29/2021 111     hs TnI 0hr 12/29/2021 5     hs TnI 2hr 12/29/2021 7     Delta 2hr hsTnI 12/29/2021 2     Color, UA 12/29/2021 Yellow     Clarity, UA 12/29/2021 Clear     Specific Gravity, UA 12/29/2021 1 020     pH, UA 12/29/2021 5 5     Leukocytes, UA 12/29/2021 Trace*    Nitrite, UA 12/29/2021 Negative     Protein, UA 12/29/2021 Negative     Glucose, UA 12/29/2021 Negative     Ketones, UA 12/29/2021 Negative     Urobilinogen, UA 12/29/2021 0 2     Bilirubin, UA 12/29/2021 Negative     Blood, UA 12/29/2021 Small*    Total CK 12/29/2021 48     Protime 12/29/2021 12 8     INR 12/29/2021 0 96     PTT 12/29/2021 30     Sodium 12/29/2021 141     Potassium 12/29/2021 3 9     Chloride 12/29/2021 107     CO2 12/29/2021 27     ANION GAP 12/29/2021 7     BUN 12/29/2021 21     Creatinine 12/29/2021 1 51*    Glucose 12/29/2021 94     Calcium 12/29/2021 8 0*    eGFR 12/29/2021 42     RBC, UA 12/29/2021 4-10*    WBC, UA 12/29/2021 4-10*    Epithelial Cells 12/29/2021 Occasional     Bacteria, UA 12/29/2021 Occasional     MUCUS THREADS 12/29/2021 Occasional*    Urine Culture 12/29/2021 No Growth <1000 cfu/mL     Iron Saturation 12/29/2021 5*    TIBC 12/29/2021 341     Iron 12/29/2021 16*    Ferritin 12/29/2021 6*   Admission on 12/18/2021, Discharged on 12/18/2021   Component Date Value    WBC 12/18/2021 6 52     RBC 12/18/2021 4 53     Hemoglobin 12/18/2021 9 8*    Hematocrit 12/18/2021 34 0*    MCV 12/18/2021 75*    MCH 12/18/2021 21 6*    MCHC 12/18/2021 28 8*    RDW 12/18/2021 15 8*    MPV 12/18/2021 9 7     Platelets 13/92/8080 399*    nRBC 12/18/2021 0     Neutrophils Relative 12/18/2021 68     Immat GRANS % 12/18/2021 1     Lymphocytes Relative 12/18/2021 17     Monocytes Relative 12/18/2021 10     Eosinophils Relative 12/18/2021 3     Basophils Relative 12/18/2021 1     Neutrophils Absolute 12/18/2021 4 48     Immature Grans Absolute 12/18/2021 0 03     Lymphocytes Absolute 12/18/2021 1 09     Monocytes Absolute 12/18/2021 0 68     Eosinophils Absolute 12/18/2021 0 20     Basophils Absolute 12/18/2021 0 04     Sodium 12/18/2021 135*    Potassium 12/18/2021 3 8     Chloride 12/18/2021 97*    CO2 12/18/2021 30     ANION GAP 12/18/2021 8     BUN 12/18/2021 26*    Creatinine 12/18/2021 1 67*    Glucose 12/18/2021 105     Calcium 12/18/2021 9 4     eGFR 12/18/2021 37    Procedure visit on 12/06/2021   Component Date Value    POST-VOID RESIDUAL VOLUM* 12/06/2021 157    Admission on 11/30/2021, Discharged on 11/30/2021   Component Date Value    PTT 11/30/2021 27     Protime 11/30/2021 13 6     INR 11/30/2021 1 08     Case Report 11/30/2021                      Value:Surgical Pathology Report                         Case: [id number]                                   Authorizing Provider:  Sam Garcia MD        Collected:           11/30/2021 1053              Ordering Location:     [facility] Received:            11/30/2021 1147                                     Operating Room                                                               Pathologist:           Senia Padilla MD                                                           Specimen:    Urinary Bladder, Bladder Tumor Left Floor of Bladder                                       Final Diagnosis 11/30/2021                      Value: This result contains rich text formatting which cannot be displayed here   Note 11/30/2021                      Value: This result contains rich text formatting which cannot be displayed here   Additional Information 11/30/2021                      Value: This result contains rich text formatting which cannot be displayed here  [facility] Description 11/30/2021                      Value: This result contains rich text formatting which cannot be displayed here      Clinical Information 11/30/2021                      Value:Bladder Tumor Left Floor of Bladder   Appointment on 11/16/2021   Component Date Value    Urine Culture 11/16/2021 70,000-79,000 cfu/ml     Hospital Outpatient Visit on 11/15/2021   Component Date Value    TV S' 11/15/2021 1 1     AV area peak nikos 11/15/2021 1 6     Aortic valve mean veloci* 11/15/2021 14 00     Mitral regurgitation pea* 11/15/2021 4 01     LVPWd 11/15/2021 1 20     MV E' Tissue Velocity Se* 11/15/2021 10     MV E' Tissue Velocity La* 11/15/2021 9     IVSd 11/15/2021 4 48     LV DIASTOLIC VOLUME (MOD* 91/33/8207 73     LEFT VENTRICLE SYSTOLIC * 91/42/6923 31     Left ventricular stroke * 11/15/2021 41 00     A4C EF 11/15/2021 62     LVIDd 11/15/2021 4 10     LVIDS 11/15/2021 2 90     FS 11/15/2021 29     Ao root 11/15/2021 2 90     RVID d 11/15/2021 3 1     LVOT mn grad 11/15/2021 2 0     AV area by cont VTI 11/15/2021 1 8     AV mean gradient antegra* 11/15/2021 9     AV LVOT peak gradient an* 11/15/2021 5     E wave deceleration time 11/15/2021 286     LVOT diameter 11/15/2021 2 0     LVOT peak dylan 11/15/2021 1 1     LVOT peak VTI 11/15/2021 24 76     Ao VTI antegrade 11/15/2021 42 55     LVOT stroke volume 11/15/2021 77 75     AV peak gradient antegra* 11/15/2021 19     MV Peak E Dylan 11/15/2021 87     MV Peak A Dylan 11/15/2021 0 9     IRIS A4C 11/15/2021 17 9     MR PG 11/15/2021 64     RAA A4C 11/15/2021 15 7     MV stenosis pressure 1/2* 11/15/2021 0     LVOT SI 11/15/2021 37 90     LVOT area 11/15/2021 3 14     AV valve area 11/15/2021 1 83     ZLVIDS 11/15/2021 -3 62     LV EF 11/15/2021 60      Imaging: CT abdomen pelvis w wo contrast    Result Date: 12/29/2021  Narrative: CT ABDOMEN AND PELVIS WITH AND WITHOUT IV CONTRAST INDICATION:   Urinary retention  Urinary retention  pt states he has hx of bladder cancer, sharp pains in bladder that bring him to his knees, last urinated today, believes he is retaining urine Patient is an 80-year-old male with a history of bladder cancer, atrial fibrillation on Coumadin, GERD, hyperlipidemia, hypertension, CABG, TURP that presents to the emergency department with improving sharp and shooting nonradiating lower abdominal pain  for 1 day  Patient has associated symptoms of decreased urinary stream beginning with the current ED presentation of lower abdominal pain symptoms  Patient states that he is following up with Heme-Onc later today for information session on chemotherapy infusion  Patient denies palliative factors with provocative factors of pressure to lower abdomen  Patient denies not effective treatment  Patient denies fevers, chills, nausea, vomiting, diarrhea, and constipation  Patient's recent fall  or recent trauma  Patient denies sick contacts or recent travel    Patient denies chest pain and shortness of breath  The patient's entire past medical history was obtained directly from either the attending emergency room physicians notes and/or the resident/physician's assistant notes in [address] [place] [address] Rd  The information was copied and pasted directly from [facility]  Upon further review of the patient's chart in [facility], the patient is status post cystoscopy and transurethral resection of bladder tumor November 30, 2021  COMPARISON:  Several prior studies, most recent of which is a noncontrast CT abdomen pelvis December 18, 2021 and CT renal study September 24, 2021 TECHNIQUE: Initial CT of the abdomen and pelvis was performed without intravenous contrast   Subsequent dynamic CT evaluation of the abdomen and pelvis was performed after the administration of intravenous contrast in both nephrographic and delayed phases after the administration of intravenous contrast   Additional 15 minute delayed images of the urinary bladder were also obtained  Axial, sagittal, and coronal 2D reformatted images were created from the source data and submitted for interpretation  Radiation dose length product (DLP) for this visit:  1096 mGy-cm   This examination, like all CT scans performed in the [facility], was performed utilizing techniques to minimize radiation dose exposure, including the use of iterative reconstruction and automated exposure control  IV Contrast:  85 mL of iodixanol (VISIPAQUE) Enteric Contrast:  Enteric contrast was not administered  FINDINGS: ABDOMEN RIGHT KIDNEY AND URETER: There are subcentimeter low-density lesions, too small to accurately characterize, however likely cysts  No solid renal mass  No detectable urothelial mass  There is mild hydroureteronephrosis, up to the level of the urinary bladder, new from the prior study  There is delayed excretion of contrast material into the collecting system  No urinary tract calculi  No perinephric collection   LEFT KIDNEY AND URETER: There are subcentimeter low-density lesions, too small to accurately characterize, however likely represent cysts  Exophytic simple cyst in the posterior interpolar region  No solid renal mass  Delayed excretion of contrast material into the collecting system, limiting evaluation for urothelial lesions  There is moderate to severe hydroureteronephrosis, up to the level of the urinary bladder  Moderate perinephric and periureteric inflammation, up to the level of the urinary bladder, increased from the prior study  No urinary tract calculi  No perinephric collection  URINARY BLADDER: Severely distended  The urinary bladder extends above the level of the iliac crests  Inadequately evaluated due to lack of excreted contrast material into the urinary bladder  There are postoperative changes along the left lateral aspect of the urinary bladder  Moderate and increasing inflammatory changes are present surrounding the urinary bladder, predominantly along the left lateral and posterolateral margins  There is a bladder diverticulum along the posterior, left lateral margin of the urinary bladder  No calculi  LOWER CHEST: Mild emphysematous changes  No clinically significant abnormality identified in the visualized lower chest  LIVER/BILIARY TREE: One or more simple appearing hepatic cysts are again identified and are similar  One or more subcentimeter sharply circumscribed low-density hepatic lesion(s) are noted, too small to accurately characterize, but statistically most likely to represent subcentimeter hepatic cysts  No suspicious solid hepatic lesion is identified  Hepatic contours are normal   No biliary dilatation  GALLBLADDER:  There are gallstone(s) within the gallbladder, without pericholecystic inflammatory changes  SPLEEN:  Unremarkable  PANCREAS:  Unremarkable  ADRENAL GLANDS:  Unremarkable   STOMACH AND BOWEL:  Evaluation of the GI tract limited due to lack of oral contrast material  Stomach incompletely evaluated  Large hiatal hernia/partially intrathoracic stomach  Fecalization of small bowel contents, compatible with delayed small bowel transit  No evidence of small bowel obstruction  The colon is segmentally distended with feces  Normal fecal burden in the colon  ABDOMINOPELVIC CAVITY:  No ascites  No free intraperitoneal air  Again identified are subcentimeter para-aortic, retroperitoneal, mesenteric and pelvic lymph nodes  No pathologic lymphadenopathy based on CT criteria  VESSELS:  Atherosclerotic changes are present in the abdominal aorta and its branch vessels  Fusiform ectasia of the infrarenal abdominal aorta, extending into the common iliac bifurcation, similar  PELVIS REPRODUCTIVE ORGANS:  Unremarkable for patient's age  APPENDIX: No findings to suggest appendicitis  ABDOMINAL WALL/INGUINAL REGIONS:  Prior laparoscopic bilateral inguinal hernia repair  There is a recurrent, large left inguinal hernia containing fat  OSSEOUS STRUCTURES:  There are age appropriate degenerative changes  No acute fracture or destructive osseous lesion  Impression: 1  Severely distended urinary bladder, extending above the iliac crests  Postoperative changes along the left lateral and posterior, left lateral wall of the urinary bladder  2   Mild right-sided hydroureteronephrosis and moderate to severe left-sided hydroureteronephrosis as described above  There is delayed excretion into the collecting systems bilaterally, likely related to vesicoureteral reflux from severely distended urinary bladder  There is worsening left-sided perinephric and periureteric inflammation  3   Additional, stable incidental findings as described above  Recommend follow-up urology consultation  The study was marked in [facility] for immediate notification   Workstation performed: [id number]     CT abdomen pelvis wo contrast    Result Date: 12/18/2021  Narrative: CT ABDOMEN AND PELVIS WITHOUT IV CONTRAST INDICATION:   Flank pain, kidney stone suspected left flank pain  COMPARISON:  September 24, 2021 TECHNIQUE:  CT examination of the abdomen and pelvis was performed without intravenous contrast   Axial, sagittal, and coronal 2D reformatted images were created from the source data and submitted for interpretation  Radiation dose length product (DLP) for this visit:  326 mGy-cm   This examination, like all CT scans performed in the [facility], was performed utilizing techniques to minimize radiation dose exposure, including the use of iterative reconstruction and automated exposure control  Enteric contrast was not administered  FINDINGS: Study is limited due to lack of intravenous and oral contrast to evaluate for solid abdominal organs and vascular structures  ABDOMEN LOWER CHEST:  Large hiatal hernia is seen  LIVER/BILIARY TREE:  Small hypodense lesions in the liver, too small to characterize  GALLBLADDER:  There are gallstone(s) within the gallbladder, without pericholecystic inflammatory changes  SPLEEN:  Unremarkable  PANCREAS:  Unremarkable  ADRENAL GLANDS:  Unremarkable  KIDNEYS/URETERS:  Moderate left-sided hydroureteronephrosis without evidence of obstructing stone  STOMACH AND BOWEL:  Unremarkable  APPENDIX:  No findings to suggest appendicitis  ABDOMINOPELVIC CAVITY:  No ascites  No pneumoperitoneum  No lymphadenopathy  VESSELS:  Unremarkable for patient's age  PELVIS REPRODUCTIVE ORGANS:  Unremarkable for patient's age  URINARY BLADDER:  Unchanged infiltrating lesion in the urinary bladder involving the posterior left lateral wall with perivascular infiltration and retraction of the bladder wall  ABDOMINAL WALL/INGUINAL REGIONS:  Postsurgical changes from prior hernia repair in the lower abdominal wall  OSSEOUS STRUCTURES:  No acute fracture or destructive osseous lesion       Impression: Unchanged infiltrating lesion in the urinary bladder involving the posterior left lateral wall with perivesicular infiltration infraction of the bladder wall  This lesion was better evaluated on the prior study  Limited evaluation of this lesion due to  lack of intravenous contrast  Moderate left-sided hydroureteronephrosis without evidence of obstructing stone  Findings are likely due to the above process  The study was marked in [facility] for immediate notification  Workstation performed: [id number]     XR chest pa & lateral    Result Date: 1/1/2022  Narrative: CHEST INDICATION:   C67 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  COMPARISON:  Chest radiograph from 8/13/2016, abdomen CT from 12/29/2021, chest CT from 7/7/2020  EXAM PERFORMED/VIEWS:  XR CHEST PA & LATERAL  DUAL ENERGY SUBTRACTION  FINDINGS: Normal heart size, CABG  Moderate hiatal hernia  Redemonstration of 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, greater on the left, with superior retraction of both nani  No acute pulmonary disease  No effusion or pneumothorax  Osseous structures appear within normal limits for patient age  Impression: No acute cardiopulmonary disease  Workstation performed: [id number]     XR abdomen 1 view kub    Result Date: 12/13/2021  Narrative: ABDOMEN INDICATION:   K59 09: Other constipation 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  COMPARISON:  None VIEWS:  AP supine Images: 3 FINDINGS: There is a nonobstructive bowel gas pattern  No discernible free air on this supine study  Upright or left lateral decubitus imaging is more sensitive to detect subtle free air in the appropriate setting  Prior lower abdominal and pelvic mesh hernia repair  Gallstones are noted, discussed on a prior CT scan  No renal nor ureteral calculi apparent  Visualized lung bases are clear  Hiatal hernia noted  Degenerative changes lumbar spine  Impression: Cholelithiasis  No renal nor ureteral calculi  Nonobstructive bowel gas pattern  Hiatal hernia   Workstation performed: [id number]       Review of Systems:  Review of Systems Constitutional: Negative for appetite change, chills, diaphoresis, fatigue and fever  Respiratory: Negative for cough, chest tightness and shortness of breath  Cardiovascular: Negative for chest pain, palpitations and leg swelling  Gastrointestinal: Negative for diarrhea, nausea and vomiting  Endocrine: Negative for cold intolerance and heat intolerance  Genitourinary: Negative for difficulty urinating, dysuria and enuresis  Musculoskeletal: Negative for arthralgias, back pain and gait problem  Allergic/Immunologic: Negative for environmental allergies and food allergies  Neurological: Negative for dizziness, facial asymmetry and headaches  Hematological: Negative for adenopathy  Does not bruise/bleed easily  Psychiatric/Behavioral: Negative for agitation, behavioral problems and confusion  Physical Exam:  Physical Exam  Constitutional:       Appearance: He is well-developed  HENT:      Right Ear: External ear normal       Left Ear: External ear normal    Eyes:      Pupils: Pupils are equal, round, and reactive to light  Cardiovascular:      Rate and Rhythm: Normal rate and regular rhythm  Heart sounds: Murmur heard  No friction rub  No gallop  Pulmonary:      Effort: Pulmonary effort is normal       Breath sounds: Normal breath sounds  Abdominal:      Palpations: Abdomen is soft  Musculoskeletal:         General: Normal range of motion  Cervical back: Normal range of motion  Skin:     General: Skin is warm and dry  Neurological:      Mental Status: He is alert and oriented to person, place, and time  Deep Tendon Reflexes: Reflexes are normal and symmetric  Psychiatric:         Behavior: Behavior normal          Thought Content: Thought content normal          Judgment: Judgment normal          Discussion/Summary:  1  Paroxysmal atrial fibrillation s/p DCCV - HR controlled  Denies cardiac symptoms  Continue Cardizem  mg daily  Continue Coumadin  Patient follows up with INR clinic with goal INR 2-3  Denies any overt bleeding or hematuria  2  CAD s/p CABG x4 (LIMA to LAD, SVG to RCA and sequential SVG to diagonal and OM1) - denies cardiac symptoms  Continue aspirin and atorvastatin  3  Chronic diastolic CHF - euvolemic at present time  Continue Lasix 40 mg daily  Continue daily weights and report any weight gain of more than 3 lb over 1 day or 5 lb over 1 week  Patient was advised to maintain a salt restricted diet  4  Dyslipidemia - continue atorvastatin 40 mg daily  5  Mild AS - serial monitoring at recommended intervals  RTO in 3 months or sooner if needed

## 2022-01-05 ENCOUNTER — TELEPHONE (OUTPATIENT)
Dept: RADIATION ONCOLOGY | Facility: CLINIC | Age: 82
End: 2022-01-05

## 2022-01-05 ENCOUNTER — CLINICAL SUPPORT (OUTPATIENT)
Dept: RADIATION ONCOLOGY | Facility: CLINIC | Age: 82
End: 2022-01-05
Attending: RADIOLOGY
Payer: MEDICARE

## 2022-01-05 ENCOUNTER — TELEPHONE (OUTPATIENT)
Dept: SURGERY | Facility: HOSPITAL | Age: 82
End: 2022-01-05

## 2022-01-05 VITALS
TEMPERATURE: 98.4 F | BODY MASS INDEX: 27.83 KG/M2 | DIASTOLIC BLOOD PRESSURE: 72 MMHG | WEIGHT: 183 LBS | SYSTOLIC BLOOD PRESSURE: 140 MMHG

## 2022-01-05 DIAGNOSIS — C67.8 MALIGNANT NEOPLASM OF OVERLAPPING SITES OF BLADDER (HCC): Primary | ICD-10-CM

## 2022-01-05 DIAGNOSIS — C67.9 MALIGNANT NEOPLASM OF URINARY BLADDER, UNSPECIFIED SITE (HCC): Primary | ICD-10-CM

## 2022-01-05 DIAGNOSIS — C67.8 CANCER OF OVERLAPPING SITES OF BLADDER (HCC): ICD-10-CM

## 2022-01-05 PROCEDURE — 77470 SPECIAL RADIATION TREATMENT: CPT | Performed by: RADIOLOGY

## 2022-01-05 PROCEDURE — 99204 OFFICE O/P NEW MOD 45 MIN: CPT | Performed by: RADIOLOGY

## 2022-01-05 PROCEDURE — 77263 THER RADIOLOGY TX PLNG CPLX: CPT | Performed by: RADIOLOGY

## 2022-01-05 PROCEDURE — 99211 OFF/OP EST MAY X REQ PHY/QHP: CPT | Performed by: RADIOLOGY

## 2022-01-05 NOTE — PROGRESS NOTES
Consultation - Radiation Oncology      CAA:[id number] : 1940  Encounter: [id number]  Patient Information: Adriana Patel      CHIEF COMPLAINT  Chief Complaint   Patient presents with    Bladder Cancer    Consult          History of Present Illness   Adriana Patel is a 80y o  year old male with multiple comorbidities including CAD, Afib, Stage III kidney disease, CHF, GERD, HTN, COPD, CAD status post CABG, BPH status post TURP 15 years ago and reportedly found with superficial bladder cancer with no residual and underwent surveillance cystoscopies until 2017 when he elected to stop  He was recently diagnosed with 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  He is not a surgical candidate and presents today for consideration for definitive chemoradiation  Briefly, the patient presented in 2021 with gross hematuria        2021 CT renal protocol:   No solid renal mass  No evidence of ureteric obstruction  There is broad-based area of lobular and nodular bladder wall thickening involving its the posterior and left lateral wall along with perivesical infiltration suspicious for a bladder neoplasia  A small perivesical lymph node measuring 6 mm is noted in image 123 series 3, new  Small left external iliac lymph node, measuring 7 mm, new, seen in image 120 series 3  Small left para-aortic and left common iliac lymph node do not meet the criteria for pathologic enlargement on the bases of size but are new from the previous study of 2021     10/12/2021 Urine cytology  - High grade urothelial carcinoma (HGUC)       2021 the patient underwent cystoscopy and TURBT under the care of Dr Nkechi Erazo  This demonstrated a large bladder tumor burden carpeting the left floor of the bladder and crossing over midline to the right hand side  Multiple bladder diverticula were noted  Total area of resection was over 8cm in size    Tumor noted to involve diverticula  Pathology:  - Papillary urothelial carcinoma, high grade  - Muscularis propria invasion present     12/13/2021 To ED with abdominal pain: inguinal hernia       12/15/2021 Urology: Recovering well  Refer to LUZ MARINA Rodriguez 51  Left sided inguinal hernia is reducible  Will refer to general surgery  F/U 3 months      12/18/2021 CT abd/pelvis: Unchanged infiltrating lesion in the urinary bladder involving the posterior left lateral wall with perivesicular infiltration infraction of the bladder wall   This lesion was better evaluated on the prior study   Limited evaluation of this lesion due to lack of intravenous contrast   Moderate left-sided hydroureteronephrosis without evidence of obstructing stone   Findings are likely due to the above process     12/29/2021 To ED with urinary retention     12/29/2021 CT abd/pelvis:    1  Severely distended urinary bladder, extending above the iliac crests   Postoperative changes along the left lateral and posterior, left lateral wall of the urinary bladder     2   Mild right-sided hydroureteronephrosis and moderate to severe left-sided hydroureteronephrosis as described above   There is delayed excretion into the collecting systems bilaterally, likely related to vesicoureteral reflux from severely distended   urinary bladder  Maikol Learn is worsening left-sided perinephric and periureteric inflammation     3   Additional, stable incidental findings as described above      12/29/2021 Dr Rebekah Reyes, [facility] Consult: Recommended definitive chemoradiation  Chemotherapy regimen planned is 5-FU (During days 1-5 and 16-20 of RT) + Mitomycin (day 1 of each cycle)     1/3/2021 GI: abdominal pain, dysphagia and constipation  Plan EGD and colonoscopy     The patient has occasional pelvic pain with certain movements  No hematuria  Fortune catheter in place at this time  Urology follow-up has been rescheduled as this was double booked with our appointment today    He denies dysuria, CVA tenderness or fever  He denies rectal bleeding or diarrhea  He has been having some constipation and gas pains as well as microcytic anemia and dysphagia  He is scheduled for EGD and colonoscopy tomorrow  Upcomin2022 Urology  2022 Colonoscopy/EGD  2022 Chemo  2022 [facility]    Historical Information   Oncology History   Bladder cancer ([place] [address] )   2018 Initial Diagnosis    Bladder cancer ([place] [address] )     10/12/2021 Biopsy    Urine, Cystoscopic (ThinPrep):  - High grade urothelial carcinoma (HGUC)  See Note       2021 Surgery    TRANSURETHRAL RESECTION OF BLADDER TUMOR (TURBT  A  Bladder, "Bladder tumor left floor of bladder," Resection:  - Papillary urothelial carcinoma, high grade  - Muscularis propria invasion present     Cancer of overlapping sites of bladder ([place] [address] )   2021 Initial Diagnosis    Cancer of overlapping sites of bladder ([place] [address] )     2022 -  Chemotherapy    mitoMYcin (MUTAMYCIN), 12 mg/m2 = 23 5 mg (original dose ), Intravenous, Once, 0 of 1 cycle  Dose modification: 12 mg/m2 (Cycle 1)  fluorouracil (ADRUCIL) ambulatory infusion Soln, 500 mg/m2/day = 4,900 mg (50 % of original dose 1,000 mg/m2/day), Intravenous, Over 120 hours, 0 of 1 cycle, Start date: --, End date: --  Dose modification: 500 mg/m2/day (original dose 1,000 mg/m2/day, Cycle 1, Reason: Dose modified as per discussion with consulting physician)     2022 - 2022 Chemotherapy    mitoMYcin (MUTAMYCIN), 10 mg/m2, Intravenous, Once, 0 of 1 cycle  fluorouracil (ADRUCIL) ambulatory infusion Soln, 500 mg/m2/day = 980 mg (50 % of original dose 1,000 mg/m2/day), Intravenous, Daily, 0 of 1 cycle, Start date: --, End date: --  Dose modification: 500 mg/m2/day (original dose 1,000 mg/m2/day, Cycle 1, Reason: Dose modified as per discussion with consulting physician)           Past Medical History:   Diagnosis Date    A-fib ([place] [address] )     Arthritis     Benign prostatic hyperplasia without lower urinary tract symptoms     without Urinary Obstruction    Bladder cancer (HCC)     CAD (coronary artery disease)     Cancer (HCC)     Chronic obstructive lung disease (HCC)     Coronary arteriosclerosis     Depression     Emphysema lung (HCC)     GERD (gastroesophageal reflux disease)     Hyperlipidemia     Hypertension     Irregular heart beat     Myocardial infarction ([address] )     Psoriasis     Requires supplemental oxygen     at bedtime during high humid days only    Stroke [facility])     TIA 1/2018     Past Surgical History:   Procedure Laterality Date    COLONOSCOPY      CORONARY ANGIOPLASTY  02/03/2001    PTCA of RCA    CORONARY ARTERY BYPASS GRAFT  02/07/2001    x4- [place]    HERNIA REPAIR      KS BRONCHOSCOPY,DIAGNOSTIC Left 1/7/2019    Procedure: BRONCHOSCOPY FLEXIBLE;  Surgeon: Harry Hayden MD;  [facility]; Service: Pulmonary    KS CYSTOURETHROSCOPY,FULGUR <0 5 CM LESN N/A 11/30/2021    Procedure: TRANSURETHRAL RESECTION OF BLADDER TUMOR (TURBT) with "large";  Surgeon: Kaylah Burgess MD;  [facility];  Service: Urology    KS CYSTOURETHROSCOPY,URETER CATHETER Bilateral 11/30/2021    Procedure: Johann Mcburney;  Surgeon: Kaylah Burgess MD;  [facility];  Service: Urology    KS Gwendalyn Kelsie INCIS Left 2/20/2018    Procedure: ENDARTERECTOMY ARTERY CAROTID WITH PATCH ANGIOPLASTY;  Surgeon: Jose Majano MD;  [facility];  Service: Vascular    TRANSURETHRAL RESECTION OF PROSTATE         Family History   Problem Relation Age of Onset    Lung cancer Mother     Cancer Mother     Other Father         sepsis       Social History   Social History     Substance and Sexual Activity   Alcohol Use Yes    Comment: special occasions only wine         Social History     Substance and Sexual Activity   Drug Use No     Social History     Tobacco Use   Smoking Status Former Smoker    Years: 1 00    Types: Cigarettes   Smokeless Tobacco Never Used   Tobacco Comment    few cigarettes when playing cards  Meds/Allergies     Current Outpatient Medications:     aspirin 81 mg chewable tablet, Chew 1 tablet (81 mg total) daily, Disp: 60 tablet, Rfl: 6    atorvastatin (LIPITOR) 40 mg tablet, Take 1 tablet (40 mg total) by mouth daily, Disp: 90 tablet, Rfl: 3    diltiazem (CARDIZEM CD) 240 mg 24 hr capsule, Take 1 capsule (240 mg total) by mouth daily, Disp: 60 capsule, Rfl: 3    furosemide (LASIX) 40 mg tablet, Take 1 tablet (40 mg total) by mouth daily, Disp: 60 tablet, Rfl: 6    bisacodyl (DULCOLAX) 10 mg suppository, Insert 1 suppository (10 mg total) into the rectum daily as needed for constipation (Patient not taking: Reported on 1/4/2022 ), Disp: 12 suppository, Rfl: 0    magnesium citrate (CITROMA) 1 745 g/30 mL oral solution, Take 296 mL by mouth once as needed (constipation) for up to 1 dose (Patient not taking: Reported on 1/3/2022 ), Disp: 296 mL, Rfl: 3    ondansetron (ZOFRAN) 8 mg tablet, Take 1 tablet (8 mg total) by mouth every 8 (eight) hours as needed for nausea or vomiting (Patient not taking: Reported on 1/3/2022 ), Disp: 20 tablet, Rfl: 0    polyethylene glycol (GOLYTELY) 4000 mL solution, Take 4,000 mL by mouth once for 1 dose, Disp: 4000 mL, Rfl: 0    SULFAMETHOXAZOLE-TRIMETHOPRIM PO, Take 1 tablet by mouth 2 (two) times a day (Patient not taking: Reported on 1/3/2022 ), Disp: , Rfl:     warfarin (COUMADIN) 5 mg tablet, take 1 tablet by mouth once daily or as directed BY COUMADIN CLINIC, Disp: 30 tablet, Rfl: 6  Allergies   Allergen Reactions    Penicillins Swelling and Itching         Review of Systems   Constitutional: Negative  HENT: Negative  Eyes:        Hearing loss   Respiratory: Negative  Cardiovascular: Negative  Gastrointestinal: Negative  Endocrine: Negative  Genitourinary: Fortune catheter   Musculoskeletal: Positive for arthralgias and back pain  Skin: Negative  Allergic/Immunologic: Negative  Neurological: Negative  Hematological: Negative  Psychiatric/Behavioral: The patient is nervous/anxious (current diagnosis)  OBJECTIVE:   /72   Temp 98 4 °F (36 9 °C)   Wt 83 kg (183 lb)   BMI 27 83 kg/m²   Performance Status: Karnofsky: 80 - Normal activity with effort; some signs or symptoms of disease    Physical Exam  Vitals and nursing note reviewed  Constitutional:       General: He is not in acute distress  Appearance: He is well-developed  Eyes:      General: No scleral icterus  Cardiovascular:      Rate and Rhythm: Normal rate and regular rhythm  Heart sounds: Murmur (holosystolic murmur throughout precordium) heard  Pulmonary:      Effort: No respiratory distress  Breath sounds: No wheezing, rhonchi or rales  Chest:   Breasts:      Right: No supraclavicular adenopathy  Left: No supraclavicular adenopathy  Abdominal:      General: There is no distension  Palpations: Abdomen is soft  Tenderness: There is no abdominal tenderness  Genitourinary:     Comments: Fortune catheter in place  Musculoskeletal:      Right lower leg: No edema  Left lower leg: No edema  Lymphadenopathy:      Cervical: No cervical adenopathy  Upper Body:      Right upper body: No supraclavicular adenopathy  Left upper body: No supraclavicular adenopathy  Lower Body: No right inguinal adenopathy  No left inguinal adenopathy  Neurological:      Mental Status: He is alert and oriented to person, place, and time        Gait: Gait normal           RESULTS  Lab Results    Chemistry        Component Value Date/Time    K 3 9 12/29/2021 0719     12/29/2021 0719    CO2 27 12/29/2021 0719    BUN 21 12/29/2021 0719    CREATININE 1 51 (H) 12/29/2021 0719        Component Value Date/Time    CALCIUM 8 0 (L) 12/29/2021 0719    ALKPHOS 83 12/29/2021 0024    AST 14 12/29/2021 0024    ALT 21 12/29/2021 0024            Lab Results   Component Value Date WBC 7 50 12/29/2021    HGB 10 1 (L) 12/29/2021    HCT 35 2 (L) 12/29/2021    MCV 75 (L) 12/29/2021     12/29/2021         Imaging Studies  CT abdomen pelvis w wo contrast    Result Date: 12/29/2021  Narrative: CT ABDOMEN AND PELVIS WITH AND WITHOUT IV CONTRAST INDICATION:   Urinary retention  Urinary retention  pt states he has hx of bladder cancer, sharp pains in bladder that bring him to his knees, last urinated today, believes he is retaining urine Patient is an 35-year-old male with a history of bladder cancer, atrial fibrillation on Coumadin, GERD, hyperlipidemia, hypertension, CABG, TURP that presents to the emergency department with improving sharp and shooting nonradiating lower abdominal pain  for 1 day  Patient has associated symptoms of decreased urinary stream beginning with the current ED presentation of lower abdominal pain symptoms  Patient states that he is following up with Heme-Onc later today for information session on chemotherapy infusion  Patient denies palliative factors with provocative factors of pressure to lower abdomen  Patient denies not effective treatment  Patient denies fevers, chills, nausea, vomiting, diarrhea, and constipation  Patient's recent fall  or recent trauma  Patient denies sick contacts or recent travel  Patient denies chest pain and shortness of breath  The patient's entire past medical history was obtained directly from either the attending emergency room physicians notes and/or the resident/physician's assistant notes in [address] [place] [address]  The information was copied and pasted directly from [facility]  Upon further review of the patient's chart in [facility], the patient is status post cystoscopy and transurethral resection of bladder tumor November 30, 2021   COMPARISON:  Several prior studies, most recent of which is a noncontrast CT abdomen pelvis December 18, 2021 and CT renal study September 24, 2021 TECHNIQUE: Initial CT of the abdomen and pelvis was performed without intravenous contrast   Subsequent dynamic CT evaluation of the abdomen and pelvis was performed after the administration of intravenous contrast in both nephrographic and delayed phases after the administration of intravenous contrast   Additional 15 minute delayed images of the urinary bladder were also obtained  Axial, sagittal, and coronal 2D reformatted images were created from the source data and submitted for interpretation  Radiation dose length product (DLP) for this visit:  1096 mGy-cm   This examination, like all CT scans performed in the [facility], was performed utilizing techniques to minimize radiation dose exposure, including the use of iterative reconstruction and automated exposure control  IV Contrast:  85 mL of iodixanol (VISIPAQUE) Enteric Contrast:  Enteric contrast was not administered  FINDINGS: ABDOMEN RIGHT KIDNEY AND URETER: There are subcentimeter low-density lesions, too small to accurately characterize, however likely cysts  No solid renal mass  No detectable urothelial mass  There is mild hydroureteronephrosis, up to the level of the urinary bladder, new from the prior study  There is delayed excretion of contrast material into the collecting system  No urinary tract calculi  No perinephric collection  LEFT KIDNEY AND URETER: There are subcentimeter low-density lesions, too small to accurately characterize, however likely represent cysts  Exophytic simple cyst in the posterior interpolar region  No solid renal mass  Delayed excretion of contrast material into the collecting system, limiting evaluation for urothelial lesions  There is moderate to severe hydroureteronephrosis, up to the level of the urinary bladder  Moderate perinephric and periureteric inflammation, up to the level of the urinary bladder, increased from the prior study  No urinary tract calculi  No perinephric collection  URINARY BLADDER: Severely distended    The urinary bladder extends above the level of the iliac crests  Inadequately evaluated due to lack of excreted contrast material into the urinary bladder  There are postoperative changes along the left lateral aspect of the urinary bladder  Moderate and increasing inflammatory changes are present surrounding the urinary bladder, predominantly along the left lateral and posterolateral margins  There is a bladder diverticulum along the posterior, left lateral margin of the urinary bladder  No calculi  LOWER CHEST: Mild emphysematous changes  No clinically significant abnormality identified in the visualized lower chest  LIVER/BILIARY TREE: One or more simple appearing hepatic cysts are again identified and are similar  One or more subcentimeter sharply circumscribed low-density hepatic lesion(s) are noted, too small to accurately characterize, but statistically most likely to represent subcentimeter hepatic cysts  No suspicious solid hepatic lesion is identified  Hepatic contours are normal   No biliary dilatation  GALLBLADDER:  There are gallstone(s) within the gallbladder, without pericholecystic inflammatory changes  SPLEEN:  Unremarkable  PANCREAS:  Unremarkable  ADRENAL GLANDS:  Unremarkable  STOMACH AND BOWEL:  Evaluation of the GI tract limited due to lack of oral contrast material  Stomach incompletely evaluated  Large hiatal hernia/partially intrathoracic stomach  Fecalization of small bowel contents, compatible with delayed small bowel transit  No evidence of small bowel obstruction  The colon is segmentally distended with feces  Normal fecal burden in the colon  ABDOMINOPELVIC CAVITY:  No ascites  No free intraperitoneal air  Again identified are subcentimeter para-aortic, retroperitoneal, mesenteric and pelvic lymph nodes  No pathologic lymphadenopathy based on CT criteria  VESSELS:  Atherosclerotic changes are present in the abdominal aorta and its branch vessels    Fusiform ectasia of the infrarenal abdominal aorta, extending into the common iliac bifurcation, similar  PELVIS REPRODUCTIVE ORGANS:  Unremarkable for patient's age  APPENDIX: No findings to suggest appendicitis  ABDOMINAL WALL/INGUINAL REGIONS:  Prior laparoscopic bilateral inguinal hernia repair  There is a recurrent, large left inguinal hernia containing fat  OSSEOUS STRUCTURES:  There are age appropriate degenerative changes  No acute fracture or destructive osseous lesion  Impression: 1  Severely distended urinary bladder, extending above the iliac crests  Postoperative changes along the left lateral and posterior, left lateral wall of the urinary bladder  2   Mild right-sided hydroureteronephrosis and moderate to severe left-sided hydroureteronephrosis as described above  There is delayed excretion into the collecting systems bilaterally, likely related to vesicoureteral reflux from severely distended urinary bladder  There is worsening left-sided perinephric and periureteric inflammation  3   Additional, stable incidental findings as described above  Recommend follow-up urology consultation  The study was marked in [facility] for immediate notification  Workstation performed: [id number]     CT abdomen pelvis wo contrast    Result Date: 12/18/2021  Narrative: CT ABDOMEN AND PELVIS WITHOUT IV CONTRAST INDICATION:   Flank pain, kidney stone suspected left flank pain  COMPARISON:  September 24, 2021 TECHNIQUE:  CT examination of the abdomen and pelvis was performed without intravenous contrast   Axial, sagittal, and coronal 2D reformatted images were created from the source data and submitted for interpretation  Radiation dose length product (DLP) for this visit:  326 mGy-cm   This examination, like all CT scans performed in the [facility], was performed utilizing techniques to minimize radiation dose exposure, including the use of iterative reconstruction and automated exposure control  Enteric contrast was not administered   FINDINGS: Study is limited due to lack of intravenous and oral contrast to evaluate for solid abdominal organs and vascular structures  ABDOMEN LOWER CHEST:  Large hiatal hernia is seen  LIVER/BILIARY TREE:  Small hypodense lesions in the liver, too small to characterize  GALLBLADDER:  There are gallstone(s) within the gallbladder, without pericholecystic inflammatory changes  SPLEEN:  Unremarkable  PANCREAS:  Unremarkable  ADRENAL GLANDS:  Unremarkable  KIDNEYS/URETERS:  Moderate left-sided hydroureteronephrosis without evidence of obstructing stone  STOMACH AND BOWEL:  Unremarkable  APPENDIX:  No findings to suggest appendicitis  ABDOMINOPELVIC CAVITY:  No ascites  No pneumoperitoneum  No lymphadenopathy  VESSELS:  Unremarkable for patient's age  PELVIS REPRODUCTIVE ORGANS:  Unremarkable for patient's age  URINARY BLADDER:  Unchanged infiltrating lesion in the urinary bladder involving the posterior left lateral wall with perivascular infiltration and retraction of the bladder wall  ABDOMINAL WALL/INGUINAL REGIONS:  Postsurgical changes from prior hernia repair in the lower abdominal wall  OSSEOUS STRUCTURES:  No acute fracture or destructive osseous lesion  Impression: Unchanged infiltrating lesion in the urinary bladder involving the posterior left lateral wall with perivesicular infiltration infraction of the bladder wall  This lesion was better evaluated on the prior study  Limited evaluation of this lesion due to  lack of intravenous contrast  Moderate left-sided hydroureteronephrosis without evidence of obstructing stone  Findings are likely due to the above process  The study was marked in [facility] for immediate notification  Workstation performed: [id number]     XR chest pa & lateral    Result Date: 1/1/2022  Narrative: CHEST INDICATION:   C67 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  COMPARISON:  Chest radiograph from 8/13/2016, abdomen CT from 12/29/2021, chest CT from 7/7/2020   EXAM PERFORMED/VIEWS:  XR CHEST PA & LATERAL  DUAL ENERGY SUBTRACTION  FINDINGS: Normal heart size, CABG  Moderate hiatal hernia  Redemonstration of 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, greater on the left, with superior retraction of both nani  No acute pulmonary disease  No effusion or pneumothorax  Osseous structures appear within normal limits for patient age  Impression: No acute cardiopulmonary disease  Workstation performed: [id number]     XR abdomen 1 view kub    Result Date: 12/13/2021  Narrative: ABDOMEN INDICATION:   K59 09: Other constipation C67 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  COMPARISON:  None VIEWS:  AP supine Images: 3 FINDINGS: There is a nonobstructive bowel gas pattern  No discernible free air on this supine study  Upright or left lateral decubitus imaging is more sensitive to detect subtle free air in the appropriate setting  Prior lower abdominal and pelvic mesh hernia repair  Gallstones are noted, discussed on a prior CT scan  No renal nor ureteral calculi apparent  Visualized lung bases are clear  Hiatal hernia noted  Degenerative changes lumbar spine  Impression: Cholelithiasis  No renal nor ureteral calculi  Nonobstructive bowel gas pattern  Hiatal hernia  Workstation performed: [id number]       Pathology:   11/30/2021 10:53     Status: Final result     Visible to patient: Yes (not seen)     Dx: Malignant neoplasm of urinary bladder        1 Result Note     1 Patient Communication    Component    Case Report   Surgical Pathology Report                         Case: [id number]                                    Authorizing Provider: Alyssa Snow MD        Collected:           11/30/2021 1053               Ordering Location:     [address] [place] [address] Received:            11/30/2021 1143                                      Operating Room                                                                Pathologist:           Roro Rivera MD                                                            Specimen:    Urinary Bladder, Bladder Tumor Left Floor of Bladder                                       Final Diagnosis   A  Bladder, "Bladder tumor left floor of bladder," Resection:  - Papillary urothelial carcinoma, high grade  - Muscularis propria invasion present   Electronically signed by Manjit Shepard MD on 12/6/2021 at 1003   Note    No definitive lymphovascular invasion is identified with D2-40 staining       Dr Hossein Grande notified via [employer] by Dr Migdalia Mohan on 12 6 2021 at 8:49am                ASSESSMENT  1  Cancer of overlapping sites of bladder [facility])  Ambulatory referral to Radiation Oncology     Cancer Staging  [id number], stage II Urothelial Carcinoma      PLAN/DISCUSSION  Rehana Fletcher is a 80y o  year old male with multiple co-morbidities who was recently diagnosed with at least 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  Imaging demonstrates subcentimeter pelvic and para-aortic lymph nodes, but these were not pathologically enlarged and there was no definite metastatic disease  He suffered urinary retention and Fortune Catheter was placed with improvement on 12/29/21  Despite his age and comorbidities he has a very good performance status  He is not a surgical candidate  As the patient is not a surgical candidate, I agree with definitive concurrent chemoradiation  To improve tolerability and given what is consistent with bladder confined disease we would plan bladder only radiation  Goal dose would be 55-64Gy in 20-32 fractions depending on normal tissue tolerance and clinical tolerance  I feel that this would offer the patient benefit in terms of local control    Ideally, this would be durable and extend survival     The rationale and potential benefits, as well as the risks and acute and late side effects and potential toxicities of radiation were discussed with the patient, his daughter, and granddaughter Christina Pierre on the phone) at length  Side effects discusseed included, but were not limited to: fatigue, acute and chronic radiation cystitis, diarrhea, bowel injury and radiation proctitis  They were given the opportunity to ask questions and all questions were answered to their satisfaction  They wished to pursue the recommended treatment plan  EGD and colonoscopy tomorrow  As long as there is no immediate concerns, then we will plan to move forward with CT planning and coordinate with Medical Oncology and Urology to begin treatment  Current plan is to keep Fortune Catheter in place for treatment  Marine Lilly MD  1/5/2022,1:38 PM      Portions of the record may have been created with voice recognition software  Occasional wrong word or "sound a like" substitutions may have occurred due to the inherent limitations of voice recognition software  Read the chart carefully and recognize, using context, where substitutions have occurred

## 2022-01-05 NOTE — LETTER
2022     [address]  [address] [address]    Patient: Yara Sabillon   YOB: 1940   Date of Visit: 2022       Dear Dr Sujit Jaime:    Thank you for referring Yara Sabillon to me for evaluation  Below are my notes for this consultation  If you have questions, please do not hesitate to call me  I look forward to following your patient along with you  Sincerely,        Gloria Martinez MD        CC: No Recipients  Gloria Martinez MD  2022  5:02 PM  Sign when Signing Visit  Consultation - Radiation Oncology      XH:[id number] : 1940  Encounter: [id number]  Patient Information: Yara Sabillon      CHIEF COMPLAINT  Chief Complaint   Patient presents with    Bladder Cancer    Consult          History of Present Illness   Yara Sabillon is a 80y o  year old male with multiple comorbidities including CAD, Afib, Stage III kidney disease, CHF, GERD, HTN, COPD, CAD status post CABG, BPH status post TURP 15 years ago and reportedly found with superficial bladder cancer with no residual and underwent surveillance cystoscopies until 2017 when he elected to stop  He was recently diagnosed with 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  He is not a surgical candidate and presents today for consideration for definitive chemoradiation      Briefly, the patient presented in 2021 with gross hematuria        2021 CT renal protocol:   No solid renal mass  No evidence of ureteric obstruction  There is broad-based area of lobular and nodular bladder wall thickening involving its the posterior and left lateral wall along with perivesical infiltration suspicious for a bladder neoplasia  A small perivesical lymph node measuring 6 mm is noted in image 123 series 3, new  Small left external iliac lymph node, measuring 7 mm, new, seen in image 120 series 3  Small left para-aortic and left common iliac lymph node do not meet the criteria for pathologic enlargement on the bases of size but are new from the previous study of March 11, 2021     10/12/2021 Urine cytology  - High grade urothelial carcinoma (HGUC)       11/30/2021 the patient underwent cystoscopy and TURBT under the care of Dr Lisa Hatfield  This demonstrated a large bladder tumor burden carpeting the left floor of the bladder and crossing over midline to the right hand side  Multiple bladder diverticula were noted  Total area of resection was over 8cm in size  Tumor noted to involve diverticula  Pathology:  - Papillary urothelial carcinoma, high grade  - Muscularis propria invasion present     12/13/2021 To ED with abdominal pain: inguinal hernia       12/15/2021 Urology: Recovering well  Refer to [facility]  Left sided inguinal hernia is reducible  Will refer to general surgery  F/U 3 months      12/18/2021 CT abd/pelvis: Unchanged infiltrating lesion in the urinary bladder involving the posterior left lateral wall with perivesicular infiltration infraction of the bladder wall   This lesion was better evaluated on the prior study   Limited evaluation of this lesion due to lack of intravenous contrast   Moderate left-sided hydroureteronephrosis without evidence of obstructing stone   Findings are likely due to the above process     12/29/2021 To ED with urinary retention     12/29/2021 CT abd/pelvis:    1   Severely distended urinary bladder, extending above the iliac crests   Postoperative changes along the left lateral and posterior, left lateral wall of the urinary bladder     2   Mild right-sided hydroureteronephrosis and moderate to severe left-sided hydroureteronephrosis as described above   There is delayed excretion into the collecting systems bilaterally, likely related to vesicoureteral reflux from severely distended   urinary bladder   There is worsening left-sided perinephric and periureteric inflammation     3   Additional, stable incidental findings as described above      2021 Dr Misa Whiteside, [facility] Consult: Recommended definitive chemoradiation  Chemotherapy regimen planned is 5-FU (During days 1-5 and 16-20 of RT) + Mitomycin (day 1 of each cycle)     1/3/2021 GI: abdominal pain, dysphagia and constipation  Plan EGD and colonoscopy     The patient has occasional pelvic pain with certain movements  No hematuria  Fortune catheter in place at this time  Urology follow-up has been rescheduled as this was double booked with our appointment today  He denies dysuria, CVA tenderness or fever  He denies rectal bleeding or diarrhea  He has been having some constipation and gas pains as well as microcytic anemia and dysphagia  He is scheduled for EGD and colonoscopy tomorrow  Upcomin2022 Urology  2022 Colonoscopy/EGD  2022 Chemo  2022 [facility]    Historical Information   Oncology History   Bladder cancer ([facility] [address] )   2018 Initial Diagnosis    Bladder cancer ([facility] [address] )     10/12/2021 Biopsy    Urine, Cystoscopic (ThinPrep):  - High grade urothelial carcinoma (HGUC)  See Note       2021 Surgery    TRANSURETHRAL RESECTION OF BLADDER TUMOR (TURBT  A  Bladder, "Bladder tumor left floor of bladder," Resection:  - Papillary urothelial carcinoma, high grade  - Muscularis propria invasion present     Cancer of overlapping sites of bladder ([facility] [address] )   2021 Initial Diagnosis    Cancer of overlapping sites of bladder ([facility] [address] )     2022 -  Chemotherapy    mitoMYcin (MUTAMYCIN), 12 mg/m2 = 23 5 mg (original dose ), Intravenous, Once, 0 of 1 cycle  Dose modification: 12 mg/m2 (Cycle 1)  fluorouracil (ADRUCIL) ambulatory infusion Soln, 500 mg/m2/day = 4,900 mg (50 % of original dose 1,000 mg/m2/day), Intravenous, Over 120 hours, 0 of 1 cycle, Start date: --, End date: --  Dose modification: 500 mg/m2/day (original dose 1,000 mg/m2/day, Cycle 1, Reason: Dose modified as per discussion with consulting physician)     2022 - 2022 Chemotherapy    mitoMYcin (MUTAMYCIN), 10 mg/m2, Intravenous, Once, 0 of 1 cycle  fluorouracil (ADRUCIL) ambulatory infusion Soln, 500 mg/m2/day = 980 mg (50 % of original dose 1,000 mg/m2/day), Intravenous, Daily, 0 of 1 cycle, Start date: --, End date: --  Dose modification: 500 mg/m2/day (original dose 1,000 mg/m2/day, Cycle 1, Reason: Dose modified as per discussion with consulting physician)           Past Medical History:   Diagnosis Date    A-fib [facility] [address] )     Arthritis     Benign prostatic hyperplasia without lower urinary tract symptoms     without Urinary Obstruction    [facility]     CAD (coronary artery disease)     Cancer ([place] [address] )     Chronic obstructive lung disease ([facility]  )     Coronary arteriosclerosis     Depression     Emphysema lung ([facility]  )     GERD (gastroesophageal reflux disease)     Hyperlipidemia     Hypertension     Irregular heart beat     Myocardial infarction ([facility] )     Psoriasis     Requires supplemental oxygen     at bedtime during high humid days only    Stroke [facility])     TIA 1/2018     Past Surgical History:   Procedure Laterality Date    COLONOSCOPY      CORONARY ANGIOPLASTY  02/03/2001    PTCA of RCA    CORONARY ARTERY BYPASS GRAFT  02/07/2001    x4- [place]    HERNIA REPAIR      DC BRONCHOSCOPY,DIAGNOSTIC Left 1/7/2019    Procedure: BRONCHOSCOPY FLEXIBLE;  Surgeon: Armida Younger MD;  [facility]: [facility];   Service: Pulmonary    DC CYSTOURETHROSCOPY,FULGUR <0 5 CM LESN N/A 11/30/2021    Procedure: TRANSURETHRAL RESECTION OF BLADDER TUMOR (TURBT) with "large";  Surgeon: Debra Benjamin MD;  Location: [facility];  Service: Urology    DC CYSTOURETHROSCOPY,URETER CATHETER Bilateral 11/30/2021    Procedure: Nora Pacheco;  Surgeon: Debra Benjamin MD;  [facility]: [facility];  Service: Urology    DC Shereen Jovel Left 2/20/2018    Procedure: ENDARTERECTOMY ARTERY CAROTID WITH PATCH ANGIOPLASTY;  Surgeon: Presley Underwood MD;  [facility]; Service: Vascular    TRANSURETHRAL RESECTION OF PROSTATE         Family History   Problem Relation Age of Onset    Lung cancer Mother    Roman Joseph Cancer Mother     Other Father         sepsis       Social History   Social History     Substance and Sexual Activity   Alcohol Use Yes    Comment: special occasions only wine  Social History     Substance and Sexual Activity   Drug Use No     Social History     Tobacco Use   Smoking Status Former Smoker    Years: 1 00    Types: Cigarettes   Smokeless Tobacco Never Used   Tobacco Comment    few cigarettes when playing cards            Meds/Allergies     Current Outpatient Medications:     aspirin 81 mg chewable tablet, Chew 1 tablet (81 mg total) daily, Disp: 60 tablet, Rfl: 6    atorvastatin (LIPITOR) 40 mg tablet, Take 1 tablet (40 mg total) by mouth daily, Disp: 90 tablet, Rfl: 3    diltiazem (CARDIZEM CD) 240 mg 24 hr capsule, Take 1 capsule (240 mg total) by mouth daily, Disp: 60 capsule, Rfl: 3    furosemide (LASIX) 40 mg tablet, Take 1 tablet (40 mg total) by mouth daily, Disp: 60 tablet, Rfl: 6    bisacodyl (DULCOLAX) 10 mg suppository, Insert 1 suppository (10 mg total) into the rectum daily as needed for constipation (Patient not taking: Reported on 1/4/2022 ), Disp: 12 suppository, Rfl: 0    magnesium citrate (CITROMA) 1 745 g/30 mL oral solution, Take 296 mL by mouth once as needed (constipation) for up to 1 dose (Patient not taking: Reported on 1/3/2022 ), Disp: 296 mL, Rfl: 3    ondansetron (ZOFRAN) 8 mg tablet, Take 1 tablet (8 mg total) by mouth every 8 (eight) hours as needed for nausea or vomiting (Patient not taking: Reported on 1/3/2022 ), Disp: 20 tablet, Rfl: 0    polyethylene glycol (GOLYTELY) 4000 mL solution, Take 4,000 mL by mouth once for 1 dose, Disp: 4000 mL, Rfl: 0    SULFAMETHOXAZOLE-TRIMETHOPRIM PO, Take 1 tablet by mouth 2 (two) times a day (Patient not taking: Reported on 1/3/2022 ), Disp: , Rfl:     warfarin (COUMADIN) 5 mg tablet, take 1 tablet by mouth once daily or as directed BY COUMADIN CLINIC, Disp: 30 tablet, Rfl: 6  Allergies   Allergen Reactions    Penicillins Swelling and Itching         Review of Systems   Constitutional: Negative  HENT: Negative  Eyes:        Hearing loss   Respiratory: Negative  Cardiovascular: Negative  Gastrointestinal: Negative  Endocrine: Negative  Genitourinary: Fortune catheter   Musculoskeletal: Positive for arthralgias and back pain  Skin: Negative  Allergic/Immunologic: Negative  Neurological: Negative  Hematological: Negative  Psychiatric/Behavioral: The patient is nervous/anxious (current diagnosis)  OBJECTIVE:   /72   Temp 98 4 °F (36 9 °C)   Wt 83 kg (183 lb)   BMI 27 83 kg/m²   Performance Status: Karnofsky: 80 - Normal activity with effort; some signs or symptoms of disease    Physical Exam  Vitals and nursing note reviewed  Constitutional:       General: He is not in acute distress  Appearance: He is well-developed  Eyes:      General: No scleral icterus  Cardiovascular:      Rate and Rhythm: Normal rate and regular rhythm  Heart sounds: Murmur (holosystolic murmur throughout precordium) heard  Pulmonary:      Effort: No respiratory distress  Breath sounds: No wheezing, rhonchi or rales  Chest:   Breasts:      Right: No supraclavicular adenopathy  Left: No supraclavicular adenopathy  Abdominal:      General: There is no distension  Palpations: Abdomen is soft  Tenderness: There is no abdominal tenderness  Genitourinary:     Comments: Fortune catheter in place  Musculoskeletal:      Right lower leg: No edema  Left lower leg: No edema  Lymphadenopathy:      Cervical: No cervical adenopathy  Upper Body:      Right upper body: No supraclavicular adenopathy  Left upper body: No supraclavicular adenopathy  Lower Body: No right inguinal adenopathy   No left inguinal adenopathy  Neurological:      Mental Status: He is alert and oriented to person, place, and time  Gait: Gait normal           RESULTS  Lab Results    Chemistry        Component Value Date/Time    K 3 9 12/29/2021 0719     12/29/2021 0719    CO2 27 12/29/2021 0719    BUN 21 12/29/2021 0719    CREATININE 1 51 (H) 12/29/2021 0719        Component Value Date/Time    CALCIUM 8 0 (L) 12/29/2021 0719    ALKPHOS 83 12/29/2021 0024    AST 14 12/29/2021 0024    ALT 21 12/29/2021 0024            Lab Results   Component Value Date    WBC 7 50 12/29/2021    HGB 10 1 (L) 12/29/2021    HCT 35 2 (L) 12/29/2021    MCV 75 (L) 12/29/2021     12/29/2021         Imaging Studies  CT abdomen pelvis w wo contrast    Result Date: 12/29/2021  Narrative: CT ABDOMEN AND PELVIS WITH AND WITHOUT IV CONTRAST INDICATION:   Urinary retention  Urinary retention  pt states he has hx of bladder cancer, sharp pains in bladder that bring him to his knees, last urinated today, believes he is retaining urine Patient is an 80-year-old male with a history of bladder cancer, atrial fibrillation on Coumadin, GERD, hyperlipidemia, hypertension, CABG, TURP that presents to the emergency department with improving sharp and shooting nonradiating lower abdominal pain  for 1 day  Patient has associated symptoms of decreased urinary stream beginning with the current ED presentation of lower abdominal pain symptoms  Patient states that he is following up with Heme-Onc later today for information session on chemotherapy infusion  Patient denies palliative factors with provocative factors of pressure to lower abdomen  Patient denies not effective treatment  Patient denies fevers, chills, nausea, vomiting, diarrhea, and constipation  Patient's recent fall  or recent trauma  Patient denies sick contacts or recent travel  Patient denies chest pain and shortness of breath   The patient's entire past medical history was obtained directly from either the attending emergency room physicians notes and/or the resident/physician's assistant notes in [address] [place] [address] Rd  The information was copied and pasted directly from [facility]  Upon further review of the patient's chart in [facility], the patient is status post cystoscopy and transurethral resection of bladder tumor November 30, 2021  COMPARISON:  Several prior studies, most recent of which is a noncontrast CT abdomen pelvis December 18, 2021 and CT renal study September 24, 2021 TECHNIQUE: Initial CT of the abdomen and pelvis was performed without intravenous contrast   Subsequent dynamic CT evaluation of the abdomen and pelvis was performed after the administration of intravenous contrast in both nephrographic and delayed phases after the administration of intravenous contrast   Additional 15 minute delayed images of the urinary bladder were also obtained  Axial, sagittal, and coronal 2D reformatted images were created from the source data and submitted for interpretation  Radiation dose length product (DLP) for this visit:  1096 mGy-cm   This examination, like all CT scans performed in the [facility], was performed utilizing techniques to minimize radiation dose exposure, including the use of iterative reconstruction and automated exposure control  IV Contrast:  85 mL of iodixanol (VISIPAQUE) Enteric Contrast:  Enteric contrast was not administered  FINDINGS: ABDOMEN RIGHT KIDNEY AND URETER: There are subcentimeter low-density lesions, too small to accurately characterize, however likely cysts  No solid renal mass  No detectable urothelial mass  There is mild hydroureteronephrosis, up to the level of the urinary bladder, new from the prior study  There is delayed excretion of contrast material into the collecting system  No urinary tract calculi  No perinephric collection   LEFT KIDNEY AND URETER: There are subcentimeter low-density lesions, too small to accurately characterize, however likely represent cysts   Exophytic simple cyst in the posterior interpolar region  No solid renal mass  Delayed excretion of contrast material into the collecting system, limiting evaluation for urothelial lesions  There is moderate to severe hydroureteronephrosis, up to the level of the urinary bladder  Moderate perinephric and periureteric inflammation, up to the level of the urinary bladder, increased from the prior study  No urinary tract calculi  No perinephric collection  URINARY BLADDER: Severely distended  The urinary bladder extends above the level of the iliac crests  Inadequately evaluated due to lack of excreted contrast material into the urinary bladder  There are postoperative changes along the left lateral aspect of the urinary bladder  Moderate and increasing inflammatory changes are present surrounding the urinary bladder, predominantly along the left lateral and posterolateral margins  There is a bladder diverticulum along the posterior, left lateral margin of the urinary bladder  No calculi  LOWER CHEST: Mild emphysematous changes  No clinically significant abnormality identified in the visualized lower chest  LIVER/BILIARY TREE: One or more simple appearing hepatic cysts are again identified and are similar  One or more subcentimeter sharply circumscribed low-density hepatic lesion(s) are noted, too small to accurately characterize, but statistically most likely to represent subcentimeter hepatic cysts  No suspicious solid hepatic lesion is identified  Hepatic contours are normal   No biliary dilatation  GALLBLADDER:  There are gallstone(s) within the gallbladder, without pericholecystic inflammatory changes  SPLEEN:  Unremarkable  PANCREAS:  Unremarkable  ADRENAL GLANDS:  Unremarkable  STOMACH AND BOWEL:  Evaluation of the GI tract limited due to lack of oral contrast material  Stomach incompletely evaluated  Large hiatal hernia/partially intrathoracic stomach   Fecalization of small bowel contents, compatible with delayed small bowel transit  No evidence of small bowel obstruction  The colon is segmentally distended with feces  Normal fecal burden in the colon  ABDOMINOPELVIC CAVITY:  No ascites  No free intraperitoneal air  Again identified are subcentimeter para-aortic, retroperitoneal, mesenteric and pelvic lymph nodes  No pathologic lymphadenopathy based on CT criteria  VESSELS:  Atherosclerotic changes are present in the abdominal aorta and its branch vessels  Fusiform ectasia of the infrarenal abdominal aorta, extending into the common iliac bifurcation, similar  PELVIS REPRODUCTIVE ORGANS:  Unremarkable for patient's age  APPENDIX: No findings to suggest appendicitis  ABDOMINAL WALL/INGUINAL REGIONS:  Prior laparoscopic bilateral inguinal hernia repair  There is a recurrent, large left inguinal hernia containing fat  OSSEOUS STRUCTURES:  There are age appropriate degenerative changes  No acute fracture or destructive osseous lesion  Impression: 1  Severely distended urinary bladder, extending above the iliac crests  Postoperative changes along the left lateral and posterior, left lateral wall of the urinary bladder  2   Mild right-sided hydroureteronephrosis and moderate to severe left-sided hydroureteronephrosis as described above  There is delayed excretion into the collecting systems bilaterally, likely related to vesicoureteral reflux from severely distended urinary bladder  There is worsening left-sided perinephric and periureteric inflammation  3   Additional, stable incidental findings as described above  Recommend follow-up urology consultation  The study was marked in [facility] for immediate notification  Workstation performed: [id number]     CT abdomen pelvis wo contrast    Result Date: 12/18/2021  Narrative: CT ABDOMEN AND PELVIS WITHOUT IV CONTRAST INDICATION:   Flank pain, kidney stone suspected left flank pain   COMPARISON:  September 24, 2021 TECHNIQUE:  CT examination of the abdomen and pelvis was performed without intravenous contrast   Axial, sagittal, and coronal 2D reformatted images were created from the source data and submitted for interpretation  Radiation dose length product (DLP) for this visit:  326 mGy-cm   This examination, like all CT scans performed in the [facility], was performed utilizing techniques to minimize radiation dose exposure, including the use of iterative reconstruction and automated exposure control  Enteric contrast was not administered  FINDINGS: Study is limited due to lack of intravenous and oral contrast to evaluate for solid abdominal organs and vascular structures  ABDOMEN LOWER CHEST:  Large hiatal hernia is seen  LIVER/BILIARY TREE:  Small hypodense lesions in the liver, too small to characterize  GALLBLADDER:  There are gallstone(s) within the gallbladder, without pericholecystic inflammatory changes  SPLEEN:  Unremarkable  PANCREAS:  Unremarkable  ADRENAL GLANDS:  Unremarkable  KIDNEYS/URETERS:  Moderate left-sided hydroureteronephrosis without evidence of obstructing stone  STOMACH AND BOWEL:  Unremarkable  APPENDIX:  No findings to suggest appendicitis  ABDOMINOPELVIC CAVITY:  No ascites  No pneumoperitoneum  No lymphadenopathy  VESSELS:  Unremarkable for patient's age  PELVIS REPRODUCTIVE ORGANS:  Unremarkable for patient's age  URINARY BLADDER:  Unchanged infiltrating lesion in the urinary bladder involving the posterior left lateral wall with perivascular infiltration and retraction of the bladder wall  ABDOMINAL WALL/INGUINAL REGIONS:  Postsurgical changes from prior hernia repair in the lower abdominal wall  OSSEOUS STRUCTURES:  No acute fracture or destructive osseous lesion  Impression: Unchanged infiltrating lesion in the urinary bladder involving the posterior left lateral wall with perivesicular infiltration infraction of the bladder wall  This lesion was better evaluated on the prior study    Limited evaluation of this lesion due to  lack of intravenous contrast  Moderate left-sided hydroureteronephrosis without evidence of obstructing stone  Findings are likely due to the above process  The study was marked in [facility] for immediate notification  Workstation performed: [id number]     XR chest pa & lateral    Result Date: 1/1/2022  Narrative: CHEST INDICATION:   C67 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  COMPARISON:  Chest radiograph from 8/13/2016, abdomen CT from 12/29/2021, chest CT from 7/7/2020  EXAM PERFORMED/VIEWS:  XR CHEST PA & LATERAL  DUAL ENERGY SUBTRACTION  FINDINGS: Normal heart size, CABG  Moderate hiatal hernia  Redemonstration of 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, greater on the left, with superior retraction of both nani  No acute pulmonary disease  No effusion or pneumothorax  Osseous structures appear within normal limits for patient age  Impression: No acute cardiopulmonary disease  Workstation performed: [id number]     XR abdomen 1 view kub    Result Date: 12/13/2021  Narrative: ABDOMEN INDICATION:   K59 09: Other constipation C67 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  COMPARISON:  None VIEWS:  AP supine Images: 3 FINDINGS: There is a nonobstructive bowel gas pattern  No discernible free air on this supine study  Upright or left lateral decubitus imaging is more sensitive to detect subtle free air in the appropriate setting  Prior lower abdominal and pelvic mesh hernia repair  Gallstones are noted, discussed on a prior CT scan  No renal nor ureteral calculi apparent  Visualized lung bases are clear  Hiatal hernia noted  Degenerative changes lumbar spine  Impression: Cholelithiasis  No renal nor ureteral calculi  Nonobstructive bowel gas pattern  Hiatal hernia  Workstation performed: [id number]       Pathology:   11/30/2021 10:53     Status: Final result     Visible to patient: Yes (not seen)     Dx:  Malignant neoplasm of urinary bladder        1 Result Note     1 Patient Communication    Component    Case Report   Surgical Pathology Report                         Case: [id number]                                    Authorizing Provider: Navin Martinez MD        Collected:           11/30/2021 1053               Ordering Location:     [address] [place] [address] Received:            11/30/2021 1147                                      Operating Room                                                                Pathologist:           Loren Best MD                                                            Specimen:    Urinary Bladder, Bladder Tumor Left Floor of Bladder                                       Final Diagnosis   A  Bladder, "Bladder tumor left floor of bladder," Resection:  - Papillary urothelial carcinoma, high grade  - Muscularis propria invasion present   Electronically signed by Loren Best MD on 12/6/2021 at 1003   Note    No definitive lymphovascular invasion is identified with D2-40 staining       Dr Vicki Hampton notified via [employer] by Dr Brinda Espinosa on 12 6 2021 at 8:49am                ASSESSMENT  1  Cancer of overlapping sites of bladder [facility])  Ambulatory referral to Radiation Oncology     Cancer Staging  T2N0M0, stage II Urothelial Carcinoma      PLAN/DISCUSSION  Adriana Patel is a 80y o  year old male with multiple co-morbidities who was recently diagnosed with at least 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  Imaging demonstrates subcentimeter pelvic and para-aortic lymph nodes, but these were not pathologically enlarged and there was no definite metastatic disease  He suffered urinary retention and Fortune Catheter was placed with improvement on 12/29/21  Despite his age and comorbidities he has a very good performance status  He is not a surgical candidate  As the patient is not a surgical candidate, I agree with definitive concurrent chemoradiation    To improve tolerability and given what is consistent with bladder confined disease we would plan bladder only radiation  Goal dose would be 55-64Gy in 20-32 fractions depending on normal tissue tolerance and clinical tolerance  I feel that this would offer the patient benefit in terms of local control  Ideally, this would be durable and extend survival     The rationale and potential benefits, as well as the risks and acute and late side effects and potential toxicities of radiation were discussed with the patient, his daughter, and granddaughter Antwan Ornelas on the phone) at length  Side effects discusseed included, but were not limited to: fatigue, acute and chronic radiation cystitis, diarrhea, bowel injury and radiation proctitis  They were given the opportunity to ask questions and all questions were answered to their satisfaction  They wished to pursue the recommended treatment plan  EGD and colonoscopy tomorrow  As long as there is no immediate concerns, then we will plan to move forward with CT planning and coordinate with Medical Oncology and Urology to begin treatment  Current plan is to keep Fortune Catheter in place for treatment  Karo Armenta MD  1/5/2022,1:38 PM      Portions of the record may have been created with voice recognition software  Occasional wrong word or "sound a like" substitutions may have occurred due to the inherent limitations of voice recognition software  Read the chart carefully and recognize, using context, where substitutions have occurred

## 2022-01-05 NOTE — PROGRESS NOTES
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 diagnosed via urinary specimen on 10/12/2021 cytology and later definitive muscle invasive biopsy of the bladder 11/30/2021  He is not a surgical candidate  He has a history of CAD, Afib, Stage III kidney disease CHF, GERD, HTN, COPD, CABG, TURP  He initially presented to urology in September, 2021 with gross hematuria  He has a distant history of BPH with TURP many years ago  He had several negative surveillance cystoscopies and elected to stop surveillance in 2017  He had last seen Dr Augusto Rm in 2018 but refused cystoscopy at that time  9/22/2021 CT head: Headache: No acute intracranial abnormality  9/24/2021 CT renal protocol: No solid renal mass  No evidence of ureteric obstruction  Renal cysts and hepatic cysts  There is broad-based area of lobular and nodular bladder wall thickening involving its the posterior and left lateral wall along with perivesical infiltration suspicious for a bladder neoplasia  A small perivesical lymph node measuring 6 mm is noted in image 123 series 3, new  Small left external iliac lymph node, measuring 7 mm, new, seen in image 120 series 3  Small left para-aortic and left common iliac lymph node do not meet the criteria for pathologic enlargement on the bases of size but are new from the previous study of March 11, 2021  The study was marked in [facility] for significant notification  10/12/2021 Urine, Cystoscopic (ThinPrep):  - High grade urothelial carcinoma (HGUC)  See Note  11/30/2021 A  Bladder, "Bladder tumor left floor of bladder," Resection:  - Papillary urothelial carcinoma, high grade  - Muscularis propria invasion present    12/13/2021 To Ed with abdominal pain: inguinal hernia  12/13/2021 KUB: Cholelithiasis  No renal nor ureteral calculi  Nonobstructive bowel gas pattern  Hiatal hernia  12/15/2021 Urology: Recovering well   Refer to Lynnette and HemOnc  Left sided inguinal hernia is reducible  Will refer to general surgery  F/U 3 months  12/18/2021 CT abd/pelvis: Unchanged infiltrating lesion in the urinary bladder involving the posterior left lateral wall with perivesicular infiltration infraction of the bladder wall  This lesion was better evaluated on the prior study  Limited evaluation of this lesion due to   lack of intravenous contrast    Moderate left-sided hydroureteronephrosis without evidence of obstructing stone  Findings are likely due to the above process    12/29/2021 To ED with urinary retention    12/29/2021 CT ap:  Severely distended urinary bladder, extending above the iliac crests  Postoperative changes along the left lateral and posterior, left lateral wall of the urinary bladder  2   Mild right-sided hydroureteronephrosis and moderate to severe left-sided hydroureteronephrosis as described above  There is delayed excretion into the collecting systems bilaterally, likely related to vesicoureteral reflux from severely distended   urinary bladder  There is worsening left-sided perinephric and periureteric inflammation  3   Additional, stable incidental findings as described above  [facility]-FU (During days 1-5 and 16-20 of RT) + Mitomycin (day 1 of each cycle)  1/3/2021 GI: abdominal pain, dysphagia and constipation  Plan EGD and colonoscopy    1/5/2022 Urology  1/6/2022 Colonoscopy/EGD  1/17/2022 Chemo  1/20/2022 [facility]        Oncology History   Bladder cancer ([facility] [address] )   4/27/2018 Initial Diagnosis    Bladder cancer ([address] )     10/12/2021 Biopsy    Urine, Cystoscopic (ThinPrep):  - High grade urothelial carcinoma (HGUC)  See Note       11/30/2021 Surgery    TRANSURETHRAL RESECTION OF BLADDER TUMOR (TURBT  A  Bladder, "Bladder tumor left floor of bladder," Resection:  - Papillary urothelial carcinoma, high grade  - Muscularis propria invasion present     Cancer of overlapping sites of bladder ([address] ) 12/13/2021 Initial Diagnosis    Cancer of overlapping sites of bladder ([facility] )     1/17/2022 -  Chemotherapy    mitoMYcin (MUTAMYCIN), 12 mg/m2 = 23 5 mg (original dose ), Intravenous, Once, 0 of 1 cycle  Dose modification: 12 mg/m2 (Cycle 1)  fluorouracil (ADRUCIL) ambulatory infusion Soln, 500 mg/m2/day = 4,900 mg (50 % of original dose 1,000 mg/m2/day), Intravenous, Over 120 hours, 0 of 1 cycle, Start date: --, End date: --  Dose modification: 500 mg/m2/day (original dose 1,000 mg/m2/day, Cycle 1, Reason: Dose modified as per discussion with consulting physician)     1/19/2022 - 1/19/2022 Chemotherapy    mitoMYcin (MUTAMYCIN), 10 mg/m2, Intravenous, Once, 0 of 1 cycle  fluorouracil (ADRUCIL) ambulatory infusion Soln, 500 mg/m2/day = 980 mg (50 % of original dose 1,000 mg/m2/day), Intravenous, Daily, 0 of 1 cycle, Start date: --, End date: --  Dose modification: 500 mg/m2/day (original dose 1,000 mg/m2/day, Cycle 1, Reason: Dose modified as per discussion with consulting physician)         Review of Systems:  Review of Systems   Constitutional: Negative  HENT: Negative  Eyes:        Hearing loss   Respiratory: Negative  Cardiovascular: Negative  Gastrointestinal: Negative  Endocrine: Negative  Genitourinary: Fortune catheter   Musculoskeletal: Positive for arthralgias and back pain  Skin: Negative  Allergic/Immunologic: Negative  Neurological: Negative  Hematological: Negative  Psychiatric/Behavioral: The patient is nervous/anxious (current diagnosis)  Clinical Trial: no            Pain assessment: 0    PFT N/A    Imaging:No images are attached to the encounter       Prior Radiation no    Teaching NIH book, side effects, SIM    MST yes    Implantable Devices [facility], pacemaker, pain stimulator) scheduled for port 1/14/22    Hip Replacement no    Covid Vaccine Status yes    Health Maintenance   Topic Date Due    COVID-19 Vaccine (1) Never done    DTaP,Tdap,and Td Vaccines (1 - Tdap) Never done    Fall Risk  09/27/2022    Medicare Annual Wellness Visit (AWV)  09/27/2022    Depression Screening  01/03/2023    BMI: Followup Plan  01/03/2023    BMI: Adult  01/04/2023    Pneumococcal Vaccine: 65+ Years  Completed    Influenza Vaccine  Completed    HIB Vaccine  Aged Out    Hepatitis B Vaccine  Aged Out    IPV Vaccine  Aged Out    Hepatitis A Vaccine  Aged Out    Meningococcal ACWY Vaccine  Aged Out    HPV Vaccine  Aged Out       Past Medical History:   Diagnosis Date    A-fib ([facility] )     Arthritis     Benign prostatic hyperplasia without lower urinary tract symptoms     without Urinary Obstruction    [facility]     CAD (coronary artery disease)     Cancer ([place] [address] )     Chronic obstructive lung disease ([place] [address] )     Coronary arteriosclerosis     Depression     Emphysema lung ([place] [address] )     GERD (gastroesophageal reflux disease)     Hyperlipidemia     Hypertension     Irregular heart beat     Myocardial infarction ([facility] )     Psoriasis     Requires supplemental oxygen     at bedtime during high humid days only    Stroke [facility])     TIA 1/2018       Past Surgical History:   Procedure Laterality Date    COLONOSCOPY      CORONARY ANGIOPLASTY  02/03/2001    PTCA of RCA    CORONARY ARTERY BYPASS GRAFT  02/07/2001    x4- [place]    HERNIA REPAIR      AL BRONCHOSCOPY,DIAGNOSTIC Left 1/7/2019    Procedure: BRONCHOSCOPY FLEXIBLE;  Surgeon: Audie Bee MD;  [facility];   Service: Pulmonary    AL CYSTOURETHROSCOPY,FULGUR <0 5 CM LESN N/A 11/30/2021    Procedure: TRANSURETHRAL RESECTION OF BLADDER TUMOR (TURBT) with "large";  Surgeon: Alvarado Ho MD;  [facility]: [facility];  Service: Urology    AL CYSTOURETHROSCOPY,URETER CATHETER Bilateral 11/30/2021    Procedure: Saida Edmonds;  Surgeon: Alvarado Ho MD;  [facility];  Service: Urology    AL THROMBOENDARTECTMY Andrea Combs Left 2/20/2018    Procedure: ENDARTERECTOMY ARTERY CAROTID WITH PATCH ANGIOPLASTY;  Surgeon: Leanne Belcher MD;  [facility];  Service: Vascular    TRANSURETHRAL RESECTION OF PROSTATE         Family History   Problem Relation Age of Onset    Lung cancer Mother     Cancer Mother     Other Father         sepsis       Social History     Tobacco Use    Smoking status: Former Smoker     Years: 1 00     Types: Cigarettes    Smokeless tobacco: Never Used    Tobacco comment: few cigarettes when playing cards  Vaping Use    Vaping Use: Never used   Substance Use Topics    Alcohol use: Yes     Comment: special occasions only wine        Drug use: No          Current Outpatient Medications:     aspirin 81 mg chewable tablet, Chew 1 tablet (81 mg total) daily, Disp: 60 tablet, Rfl: 6    atorvastatin (LIPITOR) 40 mg tablet, Take 1 tablet (40 mg total) by mouth daily, Disp: 90 tablet, Rfl: 3    bisacodyl (DULCOLAX) 10 mg suppository, Insert 1 suppository (10 mg total) into the rectum daily as needed for constipation (Patient not taking: Reported on 1/4/2022 ), Disp: 12 suppository, Rfl: 0    diltiazem (CARDIZEM CD) 240 mg 24 hr capsule, Take 1 capsule (240 mg total) by mouth daily, Disp: 60 capsule, Rfl: 3    furosemide (LASIX) 40 mg tablet, Take 1 tablet (40 mg total) by mouth daily, Disp: 60 tablet, Rfl: 6    magnesium citrate (CITROMA) 1 745 g/30 mL oral solution, Take 296 mL by mouth once as needed (constipation) for up to 1 dose (Patient not taking: Reported on 1/3/2022 ), Disp: 296 mL, Rfl: 3    ondansetron (ZOFRAN) 8 mg tablet, Take 1 tablet (8 mg total) by mouth every 8 (eight) hours as needed for nausea or vomiting (Patient not taking: Reported on 1/3/2022 ), Disp: 20 tablet, Rfl: 0    polyethylene glycol (GOLYTELY) 4000 mL solution, Take 4,000 mL by mouth once for 1 dose, Disp: 4000 mL, Rfl: 0    SULFAMETHOXAZOLE-TRIMETHOPRIM PO, Take 1 tablet by mouth 2 (two) times a day (Patient not taking: Reported on 1/3/2022 ), Disp: , Rfl:     warfarin (COUMADIN) 5 mg tablet, take 1 tablet by mouth once daily or as directed BY COUMADIN CLINIC, Disp: 30 tablet, Rfl: 6    Allergies   Allergen Reactions    Penicillins Swelling and Itching        There were no vitals filed for this visit

## 2022-01-05 NOTE — TELEPHONE ENCOUNTER
Spoke to Subha Torres PA-C (Urology)  Will plan for Razia Sands to maintain Fortune for duration of Radiation Therapy

## 2022-01-05 NOTE — TELEPHONE ENCOUNTER
Per review of patient's last CT, he had bilateral hydronephrosis secondary to distended urinary bladder  This has been a recurrent issue for the patient in the past  Will plan to maintain archibald catheter during patient's active treatment  Ultimately, patient will still require f/u US to ensure hydronephrosis has resolved with archibald in place  This has already been ordered  Please help patient coordinate US and AP office follow up for ongoing discussion  Will need continued routine RN visits for archibald management and exchanges   Thank you

## 2022-01-06 ENCOUNTER — TELEPHONE (OUTPATIENT)
Dept: GASTROENTEROLOGY | Facility: CLINIC | Age: 82
End: 2022-01-06

## 2022-01-06 ENCOUNTER — HOSPITAL ENCOUNTER (OUTPATIENT)
Dept: PERIOP | Facility: HOSPITAL | Age: 82
Setting detail: OUTPATIENT SURGERY
Discharge: HOME | End: 2022-01-06
Attending: INTERNAL MEDICINE | Admitting: INTERNAL MEDICINE
Payer: MEDICARE

## 2022-01-06 ENCOUNTER — ANESTHESIA (OUTPATIENT)
Dept: PERIOP | Facility: HOSPITAL | Age: 82
End: 2022-01-06

## 2022-01-06 ENCOUNTER — ANESTHESIA EVENT (OUTPATIENT)
Dept: PERIOP | Facility: HOSPITAL | Age: 82
End: 2022-01-06

## 2022-01-06 VITALS
RESPIRATION RATE: 13 BRPM | BODY MASS INDEX: 27.43 KG/M2 | HEIGHT: 68 IN | DIASTOLIC BLOOD PRESSURE: 66 MMHG | SYSTOLIC BLOOD PRESSURE: 134 MMHG | HEART RATE: 72 BPM | WEIGHT: 181 LBS | OXYGEN SATURATION: 95 % | TEMPERATURE: 99.1 F

## 2022-01-06 DIAGNOSIS — R10.84 GENERALIZED ABDOMINAL PAIN: ICD-10-CM

## 2022-01-06 DIAGNOSIS — Z86.010 HISTORY OF COLON POLYPS: ICD-10-CM

## 2022-01-06 DIAGNOSIS — K29.60 EROSIVE GASTRITIS: ICD-10-CM

## 2022-01-06 DIAGNOSIS — K22.2 ESOPHAGEAL STRICTURE: Primary | ICD-10-CM

## 2022-01-06 DIAGNOSIS — R19.4 CHANGE IN BOWEL HABITS: ICD-10-CM

## 2022-01-06 DIAGNOSIS — D50.9 MICROCYTIC ANEMIA: ICD-10-CM

## 2022-01-06 PROBLEM — Z97.8 FOLEY CATHETER IN PLACE PRIOR TO ARRIVAL: Status: ACTIVE | Noted: 2022-01-06

## 2022-01-06 PROCEDURE — 43248 EGD GUIDE WIRE INSERTION: CPT | Performed by: INTERNAL MEDICINE

## 2022-01-06 PROCEDURE — 45385 COLONOSCOPY W/LESION REMOVAL: CPT | Performed by: INTERNAL MEDICINE

## 2022-01-06 PROCEDURE — 45380 COLONOSCOPY AND BIOPSY: CPT | Performed by: INTERNAL MEDICINE

## 2022-01-06 PROCEDURE — 88305 TISSUE EXAM BY PATHOLOGIST: CPT | Performed by: PATHOLOGY

## 2022-01-06 PROCEDURE — 43239 EGD BIOPSY SINGLE/MULTIPLE: CPT | Performed by: INTERNAL MEDICINE

## 2022-01-06 RX ORDER — PANTOPRAZOLE SODIUM 40 MG/1
40 TABLET, DELAYED RELEASE ORAL EVERY MORNING
Qty: 60 TABLET | Refills: 5 | Status: SHIPPED | OUTPATIENT
Start: 2022-01-06

## 2022-01-06 RX ORDER — GLYCOPYRROLATE 0.2 MG/ML
INJECTION INTRAMUSCULAR; INTRAVENOUS AS NEEDED
Status: DISCONTINUED | OUTPATIENT
Start: 2022-01-06 | End: 2022-01-06

## 2022-01-06 RX ORDER — LIDOCAINE HYDROCHLORIDE 20 MG/ML
INJECTION, SOLUTION EPIDURAL; INFILTRATION; INTRACAUDAL; PERINEURAL AS NEEDED
Status: DISCONTINUED | OUTPATIENT
Start: 2022-01-06 | End: 2022-01-06

## 2022-01-06 RX ORDER — PROPOFOL 10 MG/ML
INJECTION, EMULSION INTRAVENOUS AS NEEDED
Status: DISCONTINUED | OUTPATIENT
Start: 2022-01-06 | End: 2022-01-06

## 2022-01-06 RX ORDER — SODIUM CHLORIDE, SODIUM LACTATE, POTASSIUM CHLORIDE, CALCIUM CHLORIDE 600; 310; 30; 20 MG/100ML; MG/100ML; MG/100ML; MG/100ML
INJECTION, SOLUTION INTRAVENOUS CONTINUOUS PRN
Status: DISCONTINUED | OUTPATIENT
Start: 2022-01-06 | End: 2022-01-06

## 2022-01-06 RX ADMIN — SODIUM CHLORIDE, SODIUM LACTATE, POTASSIUM CHLORIDE, AND CALCIUM CHLORIDE: .6; .31; .03; .02 INJECTION, SOLUTION INTRAVENOUS at 09:56

## 2022-01-06 RX ADMIN — LIDOCAINE HYDROCHLORIDE 80 MG: 20 INJECTION, SOLUTION EPIDURAL; INFILTRATION; INTRACAUDAL; PERINEURAL at 09:57

## 2022-01-06 RX ADMIN — PROPOFOL 100 MG: 10 INJECTION, EMULSION INTRAVENOUS at 10:06

## 2022-01-06 RX ADMIN — GLYCOPYRROLATE 0.2 MG: 0.2 INJECTION, SOLUTION INTRAMUSCULAR; INTRAVENOUS at 09:57

## 2022-01-06 RX ADMIN — PROPOFOL 20 MG: 10 INJECTION, EMULSION INTRAVENOUS at 10:13

## 2022-01-06 RX ADMIN — PROPOFOL 100 MG: 10 INJECTION, EMULSION INTRAVENOUS at 09:57

## 2022-01-06 NOTE — ANESTHESIA PREPROCEDURE EVALUATION
Procedure:  EGD  COLONOSCOPY    Relevant Problems   CARDIO   (+) Atrial fibrillation (HCC)   (+) Atrial flutter (HCC)   (+) CAD (coronary atherosclerotic disease)   (+) Hyperlipidemia   (+) Hypertension   (+) S/P CABG x 4      GI/HEPATIC   (+) GERD (gastroesophageal reflux disease)      /RENAL   (+) Stage 3a chronic kidney disease (HCC)      MUSCULOSKELETAL   (+) Acute left-sided low back pain with left-sided sciatica   (+) Arthritis   (+) Back pain   (+) 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   (+) DDD (degenerative disc disease), lumbar   (+) Sciatica of right side      NEURO/PSYCH   (+) History of stroke      PULMONARY   (+) COPD, severe (HCC)   (+) Centrilobular emphysema ([address] )      Other   (+) Bladder cancer (HCC)   (+) Chronic diastolic heart failure ([facility] )   (+) Fortune catheter in place prior to arrival (placed 12/29 in setting of severe urinary retention with bilateral hydronephrosis, no active UTI)   (+) Sensorineural hearing loss (SNHL) of both ears     EKG 9/2021: NSR, RBBB, occasional PVCs    TTE 11/2021:    Left Ventricle: Left ventricular cavity size is normal  The left ventricular ejection fraction is 60%  Systolic function is normal  Wall motion is normal  Diastolic function is normal     Aortic Valve: There is mild stenosis    Mitral Valve: There is mild annular calcification  There is mild regurgitation  Off coumadin x 2 months     Physical Exam    Airway    Mallampati score: II  TM Distance: >3 FB  Neck ROM: full     Dental       Cardiovascular      Pulmonary      Other Findings        Anesthesia Plan  ASA Score- 3     Anesthesia Type- IV sedation with anesthesia with ASA Monitors           Additional Monitors:   Airway Plan:     Comment: Recent labs personally reviewed:  Lab Results       Component                Value               Date                       WBC                      7 50                12/29/2021                 HGB                      10 1 (L) 12/29/2021                 PLT                      369                 12/29/2021            Lab Results       Component                Value               Date                       K                        3 9                 12/29/2021                 BUN                      21                  12/29/2021                 CREATININE               1 51 (H)            12/29/2021            Lab Results       Component                Value               Date                       PTT                      30                  12/29/2021             Lab Results       Component                Value               Date                       INR                      0 96                12/29/2021              Blood type A    I, Ryan Powell MD, have personally seen and evaluated the patient prior to anesthetic care  I have reviewed the pre-anesthetic record, medical history, allergies, medications and any other medical records if appropriate to the anesthetic care  If a CRNA is involved in the case, I have reviewed the CRNA assessment, if present, and agree  Patient consented for IV Sedation, general anesthesia as back up  Discussed risks of aspiration, IV infiltration, indications for conversion to general anesthesia  All questions and concerns addressed          Plan Factors-Exercise tolerance (METS): >4 METS  Chart reviewed  EKG reviewed  Imaging results reviewed  Existing labs reviewed  Patient summary reviewed  Patient is not a current smoker  Patient did not smoke on day of surgery  Obstructive sleep apnea risk education given perioperatively  Induction- intravenous  Postoperative Plan-     Informed Consent- Anesthetic plan and risks discussed with patient  I personally reviewed this patient with the CRNA  Discussed and agreed on the Anesthesia Plan with the CRNA  Machelle Blackwell

## 2022-01-06 NOTE — INTERVAL H&P NOTE
H&P reviewed  After examining the patient I find no changes in the patients condition since the H&P had been written      Vitals:    01/06/22 0940   BP: 154/78   Pulse: 100   Resp: 18   Temp: 97 5 °F (36 4 °C)   SpO2: 98%

## 2022-01-06 NOTE — ANESTHESIA POSTPROCEDURE EVALUATION
Post-Op Assessment Note    CV Status:  Stable    Pain management: adequate     Mental Status:  Sleepy   Hydration Status:  Euvolemic   PONV Controlled:  Controlled   Airway Patency:  Patent      Post Op Vitals Reviewed: Yes      Staff: CRNA         No complications documented      /56 (01/06/22 1035)    Temp 99 1 °F (37 3 °C) (01/06/22 1035)    Pulse 68 (01/06/22 1035)   Resp 19 (01/06/22 1035)    SpO2 99 % (01/06/22 1035)

## 2022-01-10 ENCOUNTER — TELEPHONE (OUTPATIENT)
Dept: INTERVENTIONAL RADIOLOGY/VASCULAR | Facility: HOSPITAL | Age: 82
End: 2022-01-10

## 2022-01-10 ENCOUNTER — RADIATION THERAPY TREATMENT (OUTPATIENT)
Dept: RADIATION ONCOLOGY | Facility: HOSPITAL | Age: 82
End: 2022-01-10
Attending: RADIOLOGY
Payer: MEDICARE

## 2022-01-10 PROCEDURE — 77334 RADIATION TREATMENT AID(S): CPT | Performed by: STUDENT IN AN ORGANIZED HEALTH CARE EDUCATION/TRAINING PROGRAM

## 2022-01-10 PROCEDURE — 77399 UNLISTED PX MED RADJ PHYSICS: CPT | Performed by: STUDENT IN AN ORGANIZED HEALTH CARE EDUCATION/TRAINING PROGRAM

## 2022-01-11 DIAGNOSIS — C67.8 CANCER OF OVERLAPPING SITES OF BLADDER (HCC): Primary | ICD-10-CM

## 2022-01-14 ENCOUNTER — HOSPITAL ENCOUNTER (OUTPATIENT)
Dept: NON INVASIVE DIAGNOSTICS | Facility: HOSPITAL | Age: 82
Discharge: HOME | DRG: 694 | End: 2022-01-14
Attending: INTERNAL MEDICINE | Admitting: RADIOLOGY
Payer: MEDICARE

## 2022-01-14 VITALS
WEIGHT: 181 LBS | OXYGEN SATURATION: 94 % | SYSTOLIC BLOOD PRESSURE: 109 MMHG | TEMPERATURE: 97.5 F | HEIGHT: 68 IN | HEART RATE: 62 BPM | RESPIRATION RATE: 13 BRPM | DIASTOLIC BLOOD PRESSURE: 57 MMHG | BODY MASS INDEX: 27.43 KG/M2

## 2022-01-14 DIAGNOSIS — C67.8 CANCER OF OVERLAPPING SITES OF BLADDER (HCC): ICD-10-CM

## 2022-01-14 PROCEDURE — 77001 FLUOROGUIDE FOR VEIN DEVICE: CPT

## 2022-01-14 PROCEDURE — 77001 FLUOROGUIDE FOR VEIN DEVICE: CPT | Performed by: RADIOLOGY

## 2022-01-14 PROCEDURE — 36561 INSERT TUNNELED CV CATH: CPT

## 2022-01-14 PROCEDURE — 36561 INSERT TUNNELED CV CATH: CPT | Performed by: RADIOLOGY

## 2022-01-14 PROCEDURE — C1788 PORT, INDWELLING, IMP: HCPCS

## 2022-01-14 PROCEDURE — 99152 MOD SED SAME PHYS/QHP 5/>YRS: CPT | Performed by: RADIOLOGY

## 2022-01-14 PROCEDURE — 76937 US GUIDE VASCULAR ACCESS: CPT

## 2022-01-14 PROCEDURE — 76937 US GUIDE VASCULAR ACCESS: CPT | Performed by: RADIOLOGY

## 2022-01-14 RX ORDER — FENTANYL CITRATE 50 UG/ML
INJECTION, SOLUTION INTRAMUSCULAR; INTRAVENOUS CODE/TRAUMA/SEDATION MEDICATION
Status: COMPLETED | OUTPATIENT
Start: 2022-01-14 | End: 2022-01-14

## 2022-01-14 RX ORDER — SODIUM CHLORIDE 9 MG/ML
75 INJECTION, SOLUTION INTRAVENOUS CONTINUOUS
Status: DISCONTINUED | OUTPATIENT
Start: 2022-01-14 | End: 2022-01-15 | Stop reason: HOSPADM

## 2022-01-14 RX ORDER — MIDAZOLAM HYDROCHLORIDE 2 MG/2ML
INJECTION, SOLUTION INTRAMUSCULAR; INTRAVENOUS CODE/TRAUMA/SEDATION MEDICATION
Status: COMPLETED | OUTPATIENT
Start: 2022-01-14 | End: 2022-01-14

## 2022-01-14 RX ADMIN — VANCOMYCIN HYDROCHLORIDE 1250 MG: 5 INJECTION, POWDER, LYOPHILIZED, FOR SOLUTION INTRAVENOUS at 09:29

## 2022-01-14 RX ADMIN — FENTANYL CITRATE 50 MCG: 50 INJECTION, SOLUTION INTRAMUSCULAR; INTRAVENOUS at 09:35

## 2022-01-14 RX ADMIN — MIDAZOLAM HYDROCHLORIDE 1 MG: 1 INJECTION, SOLUTION INTRAMUSCULAR; INTRAVENOUS at 09:50

## 2022-01-14 RX ADMIN — MIDAZOLAM HYDROCHLORIDE 1 MG: 1 INJECTION, SOLUTION INTRAMUSCULAR; INTRAVENOUS at 09:35

## 2022-01-14 RX ADMIN — Medication 10 ML: at 10:17

## 2022-01-14 RX ADMIN — FENTANYL CITRATE 50 MCG: 50 INJECTION, SOLUTION INTRAMUSCULAR; INTRAVENOUS at 09:50

## 2022-01-14 RX ADMIN — SODIUM CHLORIDE 75 ML/HR: 0.9 INJECTION, SOLUTION INTRAVENOUS at 09:07

## 2022-01-14 NOTE — H&P
Interventional Radiology Preprocedure Note    History/Indication for procedure:    Juan Peñaloza is a 80 y o  male with history of urothelial carcinoma    Relevant past medical history:    Past Medical History:   Diagnosis Date    [facility]     Arthritis     Benign prostatic hyperplasia without lower urinary tract symptoms     without Urinary Obstruction    Bladder cancer ([facility]     CAD (coronary artery disease)     Cancer (HCC)     Chronic obstructive lung disease (HCC)     Coronary arteriosclerosis     Depression     Emphysema lung (HCC)     GERD (gastroesophageal reflux disease)     Hyperlipidemia     Hypertension     Irregular heart beat     Myocardial infarction (HCC)     Psoriasis     Requires supplemental oxygen     at bedtime during high humid days only    Stroke [facility])     TIA 1/2018     Patient Active Problem List   Diagnosis    CAD (coronary atherosclerotic disease)    Hypertension    Hyperlipemia    GERD (gastroesophageal reflux disease)    History of stroke    Sciatica of right side    Hyperlipidemia    Centrilobular emphysema (HCC)    Arthritis    Stenosis of left carotid artery    S/P CABG x 4    Acute left-sided low back pain with left-sided sciatica    Back pain    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    Vision changes    Urinary retention due to benign prostatic hyperplasia    Bladder cancer ([facility]    CKD (chronic kidney disease), stage II    Atrial flutter (HCC)    Irregular heart beat    Localized edema    COPD, severe ([facility] )    Bronchiectasis with acute lower respiratory infection ([facility] )    Abnormal CT of the chest    Tinnitus aurium, bilateral    Sensorineural hearing loss (SNHL) of both ears    Medicare annual wellness visit, subsequent    DDD (degenerative disc disease), lumbar    Chronic diastolic heart failure ([facility] )    Encounter to discuss test results    Atrial fibrillation (HCC)    Other constipation    Overgrown toenails    Recurrent unilateral inguinal hernia    Left lower quadrant abdominal pain    History of bilateral inguinal hernia repair    Cancer of overlapping sites of bladder [facility]    Stage 3a chronic kidney disease ([facility] )    Fortune catheter in place prior to arrival       /76   Pulse 76   Temp 98 4 °F (36 9 °C) (Oral)   Resp 16   Ht 5' 8" (1 727 m)   Wt 82 1 kg (181 lb)   SpO2 97%   BMI 27 52 kg/m²     Medications:    Inpatient Medications:     Scheduled Medications:  Current Facility-Administered Medications   Medication Dose Route Frequency Provider Last Rate    sodium chloride  75 mL/hr Intravenous Continuous Kayleen Antoine MD 75 mL/hr (01/14/22 5922)    vancomycin  15 mg/kg Intravenous Once Kayleen Antoine MD 1,250 mg (01/14/22 0929)       Infusions:  sodium chloride, 75 mL/hr, Last Rate: 75 mL/hr (01/14/22 0907)        PRN:      Outpatient Medications:  Current Outpatient Medications on File Prior to Encounter   Medication Sig Dispense Refill    aspirin 81 mg chewable tablet Chew 1 tablet (81 mg total) daily 60 tablet 6    atorvastatin (LIPITOR) 40 mg tablet Take 1 tablet (40 mg total) by mouth daily 90 tablet 3    diltiazem (CARDIZEM CD) 240 mg 24 hr capsule Take 1 capsule (240 mg total) by mouth daily 60 capsule 3    furosemide (LASIX) 40 mg tablet Take 1 tablet (40 mg total) by mouth daily 60 tablet 6    bisacodyl (DULCOLAX) 10 mg suppository Insert 1 suppository (10 mg total) into the rectum daily as needed for constipation (Patient not taking: Reported on 1/4/2022 ) 12 suppository 0    [START ON 1/17/2022] fluorouracil 4,900 mg in CADD infusion pump Infuse 4,900 mg (500 mg/m2/day x 1 96 m2) into a venous catheter over 120 hours for 5 days  Do not start before January 17, 2022  1 each 0    magnesium citrate (CITROMA) 1 745 g/30 mL oral solution Take 296 mL by mouth once as needed (constipation) for up to 1 dose (Patient not taking: Reported on 1/3/2022 ) 296 mL 3    ondansetron (ZOFRAN) 8 mg tablet Take 1 tablet (8 mg total) by mouth every 8 (eight) hours as needed for nausea or vomiting (Patient not taking: Reported on 1/3/2022 ) 20 tablet 0    pantoprazole (PROTONIX) 40 mg tablet Take 1 tablet (40 mg total) by mouth every morning 60 tablet 5    polyethylene glycol (GOLYTELY) 4000 mL solution Take 4,000 mL by mouth once for 1 dose 4000 mL 0    SULFAMETHOXAZOLE-TRIMETHOPRIM PO Take 1 tablet by mouth 2 (two) times a day (Patient not taking: Reported on 1/3/2022 )      [DISCONTINUED] warfarin (COUMADIN) 5 mg tablet take 1 tablet by mouth once daily or as directed BY COUMADIN CLINIC 30 tablet 6     No current facility-administered medications on file prior to encounter  Allergies   Allergen Reactions    Penicillins Swelling and Itching       Anticoagulants: none    ASA classification: ASA 2 - Patient with mild systemic disease with no functional limitations    Airway Assessment: II (hard and soft palate, upper portion of tonsils anduvula visible)    Relevant family history: None    Relevant review of systems: None    Prior sedation/anesthesia: yes    Can the patient lie flat? Yes     Does patient have sleep apnea?  No    If yes, does patient use CPAP? not applicable    NPO Status: yes    Labs:   CBC with diff:   Lab Results   Component Value Date    WBC 7 50 12/29/2021    HGB 10 1 (L) 12/29/2021    HCT 35 2 (L) 12/29/2021    MCV 75 (L) 12/29/2021     12/29/2021    MCH 21 5 (L) 12/29/2021    MCHC 28 7 (L) 12/29/2021    RDW 16 2 (H) 12/29/2021    MPV 9 8 12/29/2021    NRBC 0 12/29/2021     BMP/CMP:  Lab Results   Component Value Date    K 3 9 12/29/2021     12/29/2021    CO2 27 12/29/2021    BUN 21 12/29/2021    CREATININE 1 51 (H) 12/29/2021    CALCIUM 8 0 (L) 12/29/2021    AST 14 12/29/2021    ALT 21 12/29/2021    ALKPHOS 83 12/29/2021    EGFR 42 12/29/2021   ,     Coags:   Lab Results   Component Value Date    PTT 30 12/29/2021    INR 0 96 12/29/2021   , Relevant imaging studies:   None    Directed physical examination:  No apparent distress  AO x3  Normal respiratory effort  Regular rate and rhythm    Assessment/Plan:   Chest port placement  Sedation/Anesthesia plan: Moderate sedation will be used as needed for procedure      Consent with alternatives to the procedure, risks and benefits have been explained and discussed with the patient/patient's family: yes

## 2022-01-14 NOTE — DISCHARGE INSTRUCTIONS
Implanted Venous Access Port     WHAT YOU NEED TO KNOW:   An implanted venous access port is a device used to give treatments and take blood  It may also be called a central venous access device (CVAD)  The port is a small container that is placed under your skin, usually in your upper chest  The port is attached to a catheter that enters a large vein  DISCHARGE INSTRUCTIONS:   Resume your normal diet  Small sips of flat soda will help with mild nausea  Prevent an infection:   · Wash your hands often  Use soap and water  Clean your hands before and after you care for your port  Remind everyone who cares for your port to wash their hands  · Check your skin for infection every day  Look for redness, swelling, or fluid oozing from the port site  Care for your port:   1  You may shower beginning 48 hours after procedure  2  Change dressing if it becomes wet  3  Remove dressing after 24 hours  Leave glue in place  4  It is normal for some bruising to occur  5  Use Tylenol for pain  6  Limit use of arm on the side that your port was placed  Lift nothing heavier than 5 pounds for 1 week, and then gradually increase activity as tolerated  7  DO NOT apply ointment, lotion or cream to port site until incision is healed  Allow glue to fall off  DO NOT attempt to peel glue from skin even it it begins to flake  8, After the port incision is healed you may swim, bathe  Notify the Interventional Radiologist if you have any of the followin  Fever above 101 F    2  Increased redness or swelling after 1st day  3  Increased pain after 1st day  4  Any sign of infection (drainage from port site, skin separation, hot to touch)  5  Persistent nausea or vomiting  Contact Interventional Radiology at [phone] [facility] PATIENTS: Contact Interventional Radiology at [phone]) ([address]: Contact Interventional Radiology at [phone])

## 2022-01-14 NOTE — BRIEF OP NOTE (RAD/CATH)
INTERVENTIONAL RADIOLOGY PROCEDURE NOTE    Date: 1/14/2022    Procedure: IR PORT PLACEMENT    Preoperative diagnosis:   1  Cancer of overlapping sites of bladder [facility])         Postoperative diagnosis: Same  Surgeon: Sue Nova MD     Assistant: None  No qualified resident was available  Blood loss: Minimal    Specimens: None     Findings: Successful chest port placement    Complications: None immediate      Anesthesia: conscious sedation

## 2022-01-15 ENCOUNTER — HOSPITAL ENCOUNTER (INPATIENT)
Facility: HOSPITAL | Age: 82
LOS: 9 days | Discharge: HOME HEALTH | DRG: 694 | End: 2022-01-24
Attending: EMERGENCY MEDICINE | Admitting: INTERNAL MEDICINE
Payer: MEDICARE

## 2022-01-15 ENCOUNTER — APPOINTMENT (OUTPATIENT)
Dept: LAB | Facility: CLINIC | Age: 82
DRG: 694 | End: 2022-01-15
Payer: MEDICARE

## 2022-01-15 ENCOUNTER — APPOINTMENT (INPATIENT)
Dept: CT IMAGING | Facility: HOSPITAL | Age: 82
DRG: 694 | End: 2022-01-15
Payer: MEDICARE

## 2022-01-15 ENCOUNTER — APPOINTMENT (EMERGENCY)
Dept: RADIOLOGY | Facility: HOSPITAL | Age: 82
DRG: 694 | End: 2022-01-15
Payer: MEDICARE

## 2022-01-15 DIAGNOSIS — Z97.8 FOLEY CATHETER IN PLACE PRIOR TO ARRIVAL: ICD-10-CM

## 2022-01-15 DIAGNOSIS — R77.8 ELEVATED TROPONIN: ICD-10-CM

## 2022-01-15 DIAGNOSIS — N19 RENAL FAILURE: Primary | ICD-10-CM

## 2022-01-15 DIAGNOSIS — J81.1 PULMONARY EDEMA: ICD-10-CM

## 2022-01-15 DIAGNOSIS — I48.11 LONGSTANDING PERSISTENT ATRIAL FIBRILLATION (HCC): ICD-10-CM

## 2022-01-15 DIAGNOSIS — C67.8 MALIGNANT NEOPLASM OF OVERLAPPING SITES OF BLADDER (HCC): ICD-10-CM

## 2022-01-15 DIAGNOSIS — H93.13 TINNITUS AURIUM, BILATERAL: ICD-10-CM

## 2022-01-15 DIAGNOSIS — C67.8 CANCER OF OVERLAPPING SITES OF BLADDER (HCC): ICD-10-CM

## 2022-01-15 DIAGNOSIS — N17.9 AKI (ACUTE KIDNEY INJURY) (HCC): ICD-10-CM

## 2022-01-15 DIAGNOSIS — N18.31 STAGE 3A CHRONIC KIDNEY DISEASE (HCC): ICD-10-CM

## 2022-01-15 DIAGNOSIS — T83.091A OBSTRUCTION OF FOLEY CATHETER, INITIAL ENCOUNTER (HCC): ICD-10-CM

## 2022-01-15 PROBLEM — I50.33 ACUTE ON CHRONIC DIASTOLIC CONGESTIVE HEART FAILURE (HCC): Status: ACTIVE | Noted: 2020-01-28

## 2022-01-15 PROBLEM — R82.90 ABNORMAL URINALYSIS: Status: ACTIVE | Noted: 2022-01-15

## 2022-01-15 PROBLEM — J90 PLEURAL EFFUSION ON LEFT: Status: ACTIVE | Noted: 2022-01-15

## 2022-01-15 PROBLEM — E87.1 HYPONATREMIA: Status: ACTIVE | Noted: 2022-01-15

## 2022-01-15 LAB
2HR DELTA HS TROPONIN: 56 NG/L
ALBUMIN SERPL BCP-MCNC: 3 G/DL (ref 3.5–5)
ALBUMIN SERPL BCP-MCNC: 3.1 G/DL (ref 3.5–5)
ALP SERPL-CCNC: 82 U/L (ref 46–116)
ALP SERPL-CCNC: 88 U/L (ref 46–116)
ALT SERPL W P-5'-P-CCNC: 34 U/L (ref 12–78)
ALT SERPL W P-5'-P-CCNC: 35 U/L (ref 12–78)
ANION GAP SERPL CALCULATED.3IONS-SCNC: 13 MMOL/L (ref 4–13)
ANION GAP SERPL CALCULATED.3IONS-SCNC: 9 MMOL/L (ref 4–13)
AST SERPL W P-5'-P-CCNC: 29 U/L (ref 5–45)
AST SERPL W P-5'-P-CCNC: 37 U/L (ref 5–45)
BACTERIA UR QL AUTO: ABNORMAL /HPF
BASOPHILS # BLD AUTO: 0.03 THOUSANDS/ΜL (ref 0–0.1)
BASOPHILS # BLD AUTO: 0.03 THOUSANDS/ΜL (ref 0–0.1)
BASOPHILS NFR BLD AUTO: 0 % (ref 0–1)
BASOPHILS NFR BLD AUTO: 0 % (ref 0–1)
BILIRUB SERPL-MCNC: 0.81 MG/DL (ref 0.2–1)
BILIRUB SERPL-MCNC: 1.17 MG/DL (ref 0.2–1)
BILIRUB UR QL STRIP: NEGATIVE
BUN SERPL-MCNC: 46 MG/DL (ref 5–25)
BUN SERPL-MCNC: 47 MG/DL (ref 5–25)
CALCIUM ALBUM COR SERPL-MCNC: 8.8 MG/DL (ref 8.3–10.1)
CALCIUM ALBUM COR SERPL-MCNC: 9 MG/DL (ref 8.3–10.1)
CALCIUM SERPL-MCNC: 8.1 MG/DL (ref 8.3–10.1)
CALCIUM SERPL-MCNC: 8.2 MG/DL (ref 8.3–10.1)
CARDIAC TROPONIN I PNL SERPL HS: 387 NG/L
CARDIAC TROPONIN I PNL SERPL HS: 443 NG/L
CHLORIDE SERPL-SCNC: 95 MMOL/L (ref 100–108)
CHLORIDE SERPL-SCNC: 99 MMOL/L (ref 100–108)
CLARITY UR: ABNORMAL
CO2 SERPL-SCNC: 23 MMOL/L (ref 21–32)
CO2 SERPL-SCNC: 25 MMOL/L (ref 21–32)
COLOR UR: YELLOW
CREAT SERPL-MCNC: 8.01 MG/DL (ref 0.6–1.3)
CREAT SERPL-MCNC: 8.05 MG/DL (ref 0.6–1.3)
EOSINOPHIL # BLD AUTO: 0.1 THOUSAND/ΜL (ref 0–0.61)
EOSINOPHIL # BLD AUTO: 0.1 THOUSAND/ΜL (ref 0–0.61)
EOSINOPHIL NFR BLD AUTO: 1 % (ref 0–6)
EOSINOPHIL NFR BLD AUTO: 1 % (ref 0–6)
ERYTHROCYTE [DISTWIDTH] IN BLOOD BY AUTOMATED COUNT: 18.7 % (ref 11.6–15.1)
ERYTHROCYTE [DISTWIDTH] IN BLOOD BY AUTOMATED COUNT: 18.8 % (ref 11.6–15.1)
FLUAV RNA RESP QL NAA+PROBE: NEGATIVE
FLUBV RNA RESP QL NAA+PROBE: NEGATIVE
GFR SERPL CREATININE-BSD FRML MDRD: 5 ML/MIN/1.73SQ M
GFR SERPL CREATININE-BSD FRML MDRD: 5 ML/MIN/1.73SQ M
GLUCOSE SERPL-MCNC: 105 MG/DL (ref 65–140)
GLUCOSE SERPL-MCNC: 98 MG/DL (ref 65–140)
GLUCOSE UR STRIP-MCNC: NEGATIVE MG/DL
HCT VFR BLD AUTO: 31.6 % (ref 36.5–49.3)
HCT VFR BLD AUTO: 32.3 % (ref 36.5–49.3)
HGB BLD-MCNC: 9.3 G/DL (ref 12–17)
HGB BLD-MCNC: 9.4 G/DL (ref 12–17)
HGB UR QL STRIP.AUTO: ABNORMAL
IMM GRANULOCYTES # BLD AUTO: 0.04 THOUSAND/UL (ref 0–0.2)
IMM GRANULOCYTES # BLD AUTO: 0.04 THOUSAND/UL (ref 0–0.2)
IMM GRANULOCYTES NFR BLD AUTO: 0 % (ref 0–2)
IMM GRANULOCYTES NFR BLD AUTO: 1 % (ref 0–2)
KETONES UR STRIP-MCNC: NEGATIVE MG/DL
LEUKOCYTE ESTERASE UR QL STRIP: ABNORMAL
LYMPHOCYTES # BLD AUTO: 0.74 THOUSANDS/ΜL (ref 0.6–4.47)
LYMPHOCYTES # BLD AUTO: 0.91 THOUSANDS/ΜL (ref 0.6–4.47)
LYMPHOCYTES NFR BLD AUTO: 10 % (ref 14–44)
LYMPHOCYTES NFR BLD AUTO: 10 % (ref 14–44)
MCH RBC QN AUTO: 21.5 PG (ref 26.8–34.3)
MCH RBC QN AUTO: 21.6 PG (ref 26.8–34.3)
MCHC RBC AUTO-ENTMCNC: 29.1 G/DL (ref 31.4–37.4)
MCHC RBC AUTO-ENTMCNC: 29.4 G/DL (ref 31.4–37.4)
MCV RBC AUTO: 74 FL (ref 82–98)
MCV RBC AUTO: 74 FL (ref 82–98)
MONOCYTES # BLD AUTO: 0.68 THOUSAND/ΜL (ref 0.17–1.22)
MONOCYTES # BLD AUTO: 0.7 THOUSAND/ΜL (ref 0.17–1.22)
MONOCYTES NFR BLD AUTO: 8 % (ref 4–12)
MONOCYTES NFR BLD AUTO: 9 % (ref 4–12)
NEUTROPHILS # BLD AUTO: 6.09 THOUSANDS/ΜL (ref 1.85–7.62)
NEUTROPHILS # BLD AUTO: 7.36 THOUSANDS/ΜL (ref 1.85–7.62)
NEUTS SEG NFR BLD AUTO: 79 % (ref 43–75)
NEUTS SEG NFR BLD AUTO: 81 % (ref 43–75)
NITRITE UR QL STRIP: NEGATIVE
NON-SQ EPI CELLS URNS QL MICRO: ABNORMAL /HPF
NRBC BLD AUTO-RTO: 0 /100 WBCS
NRBC BLD AUTO-RTO: 0 /100 WBCS
NT-PROBNP SERPL-MCNC: 4470 PG/ML
PH UR STRIP.AUTO: 5 [PH]
PLATELET # BLD AUTO: 189 THOUSANDS/UL (ref 149–390)
PLATELET # BLD AUTO: 267 THOUSANDS/UL (ref 149–390)
PMV BLD AUTO: 10.5 FL (ref 8.9–12.7)
PMV BLD AUTO: 11 FL (ref 8.9–12.7)
POTASSIUM SERPL-SCNC: 4.9 MMOL/L (ref 3.5–5.3)
POTASSIUM SERPL-SCNC: 4.9 MMOL/L (ref 3.5–5.3)
PROT SERPL-MCNC: 6.8 G/DL (ref 6.4–8.2)
PROT SERPL-MCNC: 7 G/DL (ref 6.4–8.2)
PROT UR STRIP-MCNC: ABNORMAL MG/DL
RBC # BLD AUTO: 4.3 MILLION/UL (ref 3.88–5.62)
RBC # BLD AUTO: 4.38 MILLION/UL (ref 3.88–5.62)
RBC #/AREA URNS AUTO: ABNORMAL /HPF
RSV RNA RESP QL NAA+PROBE: NEGATIVE
SARS-COV-2 RNA RESP QL NAA+PROBE: NEGATIVE
SODIUM SERPL-SCNC: 131 MMOL/L (ref 136–145)
SODIUM SERPL-SCNC: 133 MMOL/L (ref 136–145)
SP GR UR STRIP.AUTO: 1.02 (ref 1–1.03)
UROBILINOGEN UR QL STRIP.AUTO: 0.2 E.U./DL
WBC # BLD AUTO: 7.68 THOUSAND/UL (ref 4.31–10.16)
WBC # BLD AUTO: 9.14 THOUSAND/UL (ref 4.31–10.16)
WBC #/AREA URNS AUTO: ABNORMAL /HPF

## 2022-01-15 PROCEDURE — 0241U HB NFCT DS VIR RESP RNA 4 TRGT: CPT | Performed by: PHYSICIAN ASSISTANT

## 2022-01-15 PROCEDURE — 82570 ASSAY OF URINE CREATININE: CPT | Performed by: FAMILY MEDICINE

## 2022-01-15 PROCEDURE — 83935 ASSAY OF URINE OSMOLALITY: CPT | Performed by: FAMILY MEDICINE

## 2022-01-15 PROCEDURE — 85025 COMPLETE CBC W/AUTO DIFF WBC: CPT | Performed by: EMERGENCY MEDICINE

## 2022-01-15 PROCEDURE — 85025 COMPLETE CBC W/AUTO DIFF WBC: CPT

## 2022-01-15 PROCEDURE — 83880 ASSAY OF NATRIURETIC PEPTIDE: CPT | Performed by: FAMILY MEDICINE

## 2022-01-15 PROCEDURE — 84300 ASSAY OF URINE SODIUM: CPT | Performed by: FAMILY MEDICINE

## 2022-01-15 PROCEDURE — 71046 X-RAY EXAM CHEST 2 VIEWS: CPT

## 2022-01-15 PROCEDURE — 36415 COLL VENOUS BLD VENIPUNCTURE: CPT

## 2022-01-15 PROCEDURE — 80053 COMPREHEN METABOLIC PANEL: CPT | Performed by: EMERGENCY MEDICINE

## 2022-01-15 PROCEDURE — 81001 URINALYSIS AUTO W/SCOPE: CPT | Performed by: EMERGENCY MEDICINE

## 2022-01-15 PROCEDURE — 83930 ASSAY OF BLOOD OSMOLALITY: CPT | Performed by: FAMILY MEDICINE

## 2022-01-15 PROCEDURE — 93005 ELECTROCARDIOGRAM TRACING: CPT

## 2022-01-15 PROCEDURE — 99223 1ST HOSP IP/OBS HIGH 75: CPT | Performed by: FAMILY MEDICINE

## 2022-01-15 PROCEDURE — G1004 CDSM NDSC: HCPCS

## 2022-01-15 PROCEDURE — 99285 EMERGENCY DEPT VISIT HI MDM: CPT

## 2022-01-15 PROCEDURE — 87086 URINE CULTURE/COLONY COUNT: CPT | Performed by: EMERGENCY MEDICINE

## 2022-01-15 PROCEDURE — 84484 ASSAY OF TROPONIN QUANT: CPT | Performed by: EMERGENCY MEDICINE

## 2022-01-15 PROCEDURE — 74176 CT ABD & PELVIS W/O CONTRAST: CPT

## 2022-01-15 PROCEDURE — 80053 COMPREHEN METABOLIC PANEL: CPT

## 2022-01-15 PROCEDURE — 99291 CRITICAL CARE FIRST HOUR: CPT | Performed by: EMERGENCY MEDICINE

## 2022-01-15 RX ORDER — PANTOPRAZOLE SODIUM 40 MG/1
40 TABLET, DELAYED RELEASE ORAL
Status: DISCONTINUED | OUTPATIENT
Start: 2022-01-16 | End: 2022-01-24 | Stop reason: HOSPADM

## 2022-01-15 RX ORDER — OXYCODONE HYDROCHLORIDE 5 MG/1
5 TABLET ORAL EVERY 6 HOURS PRN
Status: DISCONTINUED | OUTPATIENT
Start: 2022-01-15 | End: 2022-01-24 | Stop reason: HOSPADM

## 2022-01-15 RX ORDER — DILTIAZEM HYDROCHLORIDE 240 MG/1
240 CAPSULE, COATED, EXTENDED RELEASE ORAL DAILY
Status: DISCONTINUED | OUTPATIENT
Start: 2022-01-16 | End: 2022-01-24 | Stop reason: HOSPADM

## 2022-01-15 RX ORDER — ASPIRIN 81 MG/1
81 TABLET, CHEWABLE ORAL DAILY
Status: DISCONTINUED | OUTPATIENT
Start: 2022-01-16 | End: 2022-01-24 | Stop reason: HOSPADM

## 2022-01-15 RX ORDER — OXYCODONE HYDROCHLORIDE 5 MG/1
2.5 TABLET ORAL EVERY 6 HOURS PRN
Status: DISCONTINUED | OUTPATIENT
Start: 2022-01-15 | End: 2022-01-24 | Stop reason: HOSPADM

## 2022-01-15 RX ORDER — ATORVASTATIN CALCIUM 40 MG/1
40 TABLET, FILM COATED ORAL DAILY
Status: DISCONTINUED | OUTPATIENT
Start: 2022-01-16 | End: 2022-01-24 | Stop reason: HOSPADM

## 2022-01-15 RX ORDER — HEPARIN SODIUM 5000 [USP'U]/ML
5000 INJECTION, SOLUTION INTRAVENOUS; SUBCUTANEOUS EVERY 8 HOURS SCHEDULED
Status: DISCONTINUED | OUTPATIENT
Start: 2022-01-15 | End: 2022-01-16

## 2022-01-15 RX ORDER — HYDROMORPHONE HCL/PF 1 MG/ML
0.5 SYRINGE (ML) INJECTION EVERY 4 HOURS PRN
Status: DISCONTINUED | OUTPATIENT
Start: 2022-01-15 | End: 2022-01-24 | Stop reason: HOSPADM

## 2022-01-15 RX ORDER — FUROSEMIDE 10 MG/ML
40 INJECTION INTRAMUSCULAR; INTRAVENOUS ONCE
Status: COMPLETED | OUTPATIENT
Start: 2022-01-15 | End: 2022-01-15

## 2022-01-15 RX ADMIN — SODIUM CHLORIDE 500 ML: 0.9 INJECTION, SOLUTION INTRAVENOUS at 21:57

## 2022-01-15 RX ADMIN — OXYCODONE HYDROCHLORIDE 5 MG: 5 TABLET ORAL at 23:55

## 2022-01-15 RX ADMIN — CEFTRIAXONE SODIUM 1000 MG: 10 INJECTION, POWDER, FOR SOLUTION INTRAVENOUS at 22:00

## 2022-01-15 RX ADMIN — FUROSEMIDE 40 MG: 10 INJECTION, SOLUTION INTRAMUSCULAR; INTRAVENOUS at 21:57

## 2022-01-15 RX ADMIN — HEPARIN SODIUM 5000 UNITS: 5000 INJECTION INTRAVENOUS; SUBCUTANEOUS at 23:55

## 2022-01-16 LAB
4HR DELTA HS TROPONIN: 167 NG/L
ALBUMIN SERPL BCP-MCNC: 3.2 G/DL (ref 3.5–5)
ALP SERPL-CCNC: 94 U/L (ref 46–116)
ALT SERPL W P-5'-P-CCNC: 45 U/L (ref 12–78)
ANION GAP SERPL CALCULATED.3IONS-SCNC: 10 MMOL/L (ref 4–13)
ANION GAP SERPL CALCULATED.3IONS-SCNC: 12 MMOL/L (ref 4–13)
APTT PPP: 33 SECONDS (ref 23–37)
APTT PPP: 67 SECONDS (ref 23–37)
APTT PPP: 74 SECONDS (ref 23–37)
AST SERPL W P-5'-P-CCNC: 38 U/L (ref 5–45)
BILIRUB SERPL-MCNC: 0.55 MG/DL (ref 0.2–1)
BUN SERPL-MCNC: 39 MG/DL (ref 5–25)
BUN SERPL-MCNC: 45 MG/DL (ref 5–25)
CALCIUM ALBUM COR SERPL-MCNC: 9.4 MG/DL (ref 8.3–10.1)
CALCIUM SERPL-MCNC: 8 MG/DL (ref 8.3–10.1)
CALCIUM SERPL-MCNC: 8.8 MG/DL (ref 8.3–10.1)
CARDIAC TROPONIN I PNL SERPL HS: 554 NG/L
CHLORIDE SERPL-SCNC: 100 MMOL/L (ref 100–108)
CHLORIDE SERPL-SCNC: 100 MMOL/L (ref 100–108)
CO2 SERPL-SCNC: 25 MMOL/L (ref 21–32)
CO2 SERPL-SCNC: 30 MMOL/L (ref 21–32)
CREAT SERPL-MCNC: 5.88 MG/DL (ref 0.6–1.3)
CREAT SERPL-MCNC: 7.16 MG/DL (ref 0.6–1.3)
CREAT UR-MCNC: 123 MG/DL
ERYTHROCYTE [DISTWIDTH] IN BLOOD BY AUTOMATED COUNT: 19.1 % (ref 11.6–15.1)
GFR SERPL CREATININE-BSD FRML MDRD: 6 ML/MIN/1.73SQ M
GFR SERPL CREATININE-BSD FRML MDRD: 8 ML/MIN/1.73SQ M
GLUCOSE SERPL-MCNC: 98 MG/DL (ref 65–140)
GLUCOSE SERPL-MCNC: 98 MG/DL (ref 65–140)
HCT VFR BLD AUTO: 33.7 % (ref 36.5–49.3)
HGB BLD-MCNC: 9.8 G/DL (ref 12–17)
INR PPP: 1.05 (ref 0.84–1.19)
MAGNESIUM SERPL-MCNC: 2.4 MG/DL (ref 1.6–2.6)
MCH RBC QN AUTO: 21.2 PG (ref 26.8–34.3)
MCHC RBC AUTO-ENTMCNC: 29.1 G/DL (ref 31.4–37.4)
MCV RBC AUTO: 73 FL (ref 82–98)
OSMOLALITY UR/SERPL-RTO: 289 MMOL/KG (ref 282–298)
OSMOLALITY UR: 261 MMOL/KG
PLATELET # BLD AUTO: 298 THOUSANDS/UL (ref 149–390)
PMV BLD AUTO: 10.5 FL (ref 8.9–12.7)
POTASSIUM SERPL-SCNC: 4 MMOL/L (ref 3.5–5.3)
POTASSIUM SERPL-SCNC: 4.1 MMOL/L (ref 3.5–5.3)
PROT SERPL-MCNC: 7.5 G/DL (ref 6.4–8.2)
PROTHROMBIN TIME: 13.3 SECONDS (ref 11.6–14.5)
RBC # BLD AUTO: 4.62 MILLION/UL (ref 3.88–5.62)
SODIUM 24H UR-SCNC: 21 MOL/L
SODIUM SERPL-SCNC: 137 MMOL/L (ref 136–145)
SODIUM SERPL-SCNC: 140 MMOL/L (ref 136–145)
WBC # BLD AUTO: 8.27 THOUSAND/UL (ref 4.31–10.16)

## 2022-01-16 PROCEDURE — 84484 ASSAY OF TROPONIN QUANT: CPT | Performed by: EMERGENCY MEDICINE

## 2022-01-16 PROCEDURE — 85730 THROMBOPLASTIN TIME PARTIAL: CPT | Performed by: INTERNAL MEDICINE

## 2022-01-16 PROCEDURE — 93005 ELECTROCARDIOGRAM TRACING: CPT

## 2022-01-16 PROCEDURE — 83735 ASSAY OF MAGNESIUM: CPT | Performed by: INTERNAL MEDICINE

## 2022-01-16 PROCEDURE — 85610 PROTHROMBIN TIME: CPT | Performed by: PHYSICIAN ASSISTANT

## 2022-01-16 PROCEDURE — 80048 BASIC METABOLIC PNL TOTAL CA: CPT | Performed by: FAMILY MEDICINE

## 2022-01-16 PROCEDURE — 85730 THROMBOPLASTIN TIME PARTIAL: CPT | Performed by: HOSPITALIST

## 2022-01-16 PROCEDURE — 99223 1ST HOSP IP/OBS HIGH 75: CPT | Performed by: INTERNAL MEDICINE

## 2022-01-16 PROCEDURE — 99232 SBSQ HOSP IP/OBS MODERATE 35: CPT | Performed by: HOSPITALIST

## 2022-01-16 PROCEDURE — 80053 COMPREHEN METABOLIC PANEL: CPT | Performed by: INTERNAL MEDICINE

## 2022-01-16 PROCEDURE — 85730 THROMBOPLASTIN TIME PARTIAL: CPT | Performed by: PHYSICIAN ASSISTANT

## 2022-01-16 PROCEDURE — 85027 COMPLETE CBC AUTOMATED: CPT | Performed by: INTERNAL MEDICINE

## 2022-01-16 RX ORDER — FUROSEMIDE 10 MG/ML
40 INJECTION INTRAMUSCULAR; INTRAVENOUS ONCE
Status: DISCONTINUED | OUTPATIENT
Start: 2022-01-16 | End: 2022-01-16

## 2022-01-16 RX ORDER — FUROSEMIDE 10 MG/ML
40 INJECTION INTRAMUSCULAR; INTRAVENOUS ONCE
Status: COMPLETED | OUTPATIENT
Start: 2022-01-16 | End: 2022-01-16

## 2022-01-16 RX ORDER — HEPARIN SODIUM 10000 [USP'U]/100ML
3-20 INJECTION, SOLUTION INTRAVENOUS
Status: DISCONTINUED | OUTPATIENT
Start: 2022-01-16 | End: 2022-01-16 | Stop reason: SDUPTHER

## 2022-01-16 RX ORDER — HEPARIN SODIUM 10000 [USP'U]/100ML
3-20 INJECTION, SOLUTION INTRAVENOUS
Status: DISCONTINUED | OUTPATIENT
Start: 2022-01-16 | End: 2022-01-23

## 2022-01-16 RX ORDER — HEPARIN SODIUM 1000 [USP'U]/ML
4000 INJECTION, SOLUTION INTRAVENOUS; SUBCUTANEOUS ONCE
Status: COMPLETED | OUTPATIENT
Start: 2022-01-16 | End: 2022-01-16

## 2022-01-16 RX ADMIN — ATORVASTATIN CALCIUM 40 MG: 40 TABLET, FILM COATED ORAL at 09:12

## 2022-01-16 RX ADMIN — OXYCODONE HYDROCHLORIDE 5 MG: 5 TABLET ORAL at 17:30

## 2022-01-16 RX ADMIN — PANTOPRAZOLE SODIUM 40 MG: 40 TABLET, DELAYED RELEASE ORAL at 05:21

## 2022-01-16 RX ADMIN — ASPIRIN 81 MG: 81 TABLET, CHEWABLE ORAL at 09:12

## 2022-01-16 RX ADMIN — CEFTRIAXONE SODIUM 1000 MG: 10 INJECTION, POWDER, FOR SOLUTION INTRAVENOUS at 22:14

## 2022-01-16 RX ADMIN — OXYCODONE HYDROCHLORIDE 5 MG: 5 TABLET ORAL at 09:12

## 2022-01-16 RX ADMIN — HEPARIN SODIUM AND DEXTROSE 12 UNITS/KG/HR: 10000; 5 INJECTION INTRAVENOUS at 03:02

## 2022-01-16 RX ADMIN — DILTIAZEM HYDROCHLORIDE 240 MG: 240 CAPSULE, COATED, EXTENDED RELEASE ORAL at 09:12

## 2022-01-16 RX ADMIN — HEPARIN SODIUM 4000 UNITS: 1000 INJECTION INTRAVENOUS; SUBCUTANEOUS at 03:00

## 2022-01-16 RX ADMIN — HEPARIN SODIUM AND DEXTROSE 12 UNITS/KG/HR: 10000; 5 INJECTION INTRAVENOUS at 22:42

## 2022-01-16 RX ADMIN — FUROSEMIDE 40 MG: 10 INJECTION, SOLUTION INTRAMUSCULAR; INTRAVENOUS at 02:09

## 2022-01-16 NOTE — PLAN OF CARE
Problem: Potential for Falls  Goal: Patient will remain free of falls  Description: INTERVENTIONS:  - Educate patient/family on patient safety including physical limitations  - Instruct patient to call for assistance with activity   - Consult OT/PT to assist with strengthening/mobility   - Keep Call bell within reach  - Keep bed low and locked with side rails adjusted as appropriate  - Keep care items and personal belongings within reach  - Initiate and maintain comfort rounds  - Make Fall Risk Sign visible to staff  - Offer Toileting every 2 Hours, in advance of need  - Initiate/Maintain alarm  - Obtain necessary fall risk management equipment:   - Apply yellow socks and bracelet for high fall risk patients  - Consider moving patient to room near nurses station  Outcome: Progressing     Problem: PAIN - ADULT  Goal: Verbalizes/displays adequate comfort level or baseline comfort level  Description: Interventions:  - Encourage patient to monitor pain and request assistance  - Assess pain using appropriate pain scale  - Administer analgesics based on type and severity of pain and evaluate response  - Implement non-pharmacological measures as appropriate and evaluate response  - Consider cultural and social influences on pain and pain management  - Notify physician/advanced practitioner if interventions unsuccessful or patient reports new pain  Outcome: Progressing     Problem: SAFETY ADULT  Goal: Patient will remain free of falls  Description: INTERVENTIONS:  - Educate patient/family on patient safety including physical limitations  - Instruct patient to call for assistance with activity   - Consult OT/PT to assist with strengthening/mobility   - Keep Call bell within reach  - Keep bed low and locked with side rails adjusted as appropriate  - Keep care items and personal belongings within reach  - Initiate and maintain comfort rounds  - Make Fall Risk Sign visible to staff  - Offer Toileting every 2 Hours, in advance of need  - Initiate/Maintain alarm  - Obtain necessary fall risk management equipment:   - Apply yellow socks and bracelet for high fall risk patients  - Consider moving patient to room near nurses station  Outcome: Progressing  Goal: Maintain or return to baseline ADL function  Description: INTERVENTIONS:  -  Assess patient's ability to carry out ADLs; assess patient's baseline for ADL function and identify physical deficits which impact ability to perform ADLs (bathing, care of mouth/teeth, toileting, grooming, dressing, etc )  - Assess/evaluate cause of self-care deficits   - Assess range of motion  - Assess patient's mobility; develop plan if impaired  - Assess patient's need for assistive devices and provide as appropriate  - Encourage maximum independence but intervene and supervise when necessary  - Involve family in performance of ADLs  - Assess for home care needs following discharge   - Consider OT consult to assist with ADL evaluation and planning for discharge  - Provide patient education as appropriate  Outcome: Progressing  Goal: Maintains/Returns to pre admission functional level  Description: INTERVENTIONS:  - Perform BMAT or MOVE assessment daily    - Set and communicate daily mobility goal to care team and patient/family/caregiver  - Collaborate with rehabilitation services on mobility goals if consulted  - Perform Range of Motion 2 times a day  - Reposition patient every 2 hours    - Dangle patient 2 times a day  - Stand patient 2 times a day  - Ambulate patient 2 times a day  - Out of bed to chair 2 times a day   - Out of bed for meals 2 times a day  - Out of bed for toileting  - Record patient progress and toleration of activity level   Outcome: Progressing

## 2022-01-16 NOTE — ED PROVIDER NOTES
History  Chief Complaint   Patient presents with    Urinary Catheter Problem     pt has had archibald catheter for the past couple weeks and as of today and states not to have urine output since around 1100 this morning   Foot Swelling     pt's grandaughter states "i think hes retaining he can barely get his feet in his shoes"      Patient is an 70-year-old male past medical history of hypertension, hyperlipidemia, CAD, CHF, atrial fibrillation on aspirin, CVA, COPD, carotid stenosis, CKD stage 3 GERD presenting Archibald catheter problem  Granddaughter at bedside states that patient had Archibald placed 3 weeks ago after prostatic resection secondary to urinary retention and has not had output into his Archibald catheter since 11:00 a m , roughly 8-1/2 hours ago  States that in the waiting room with began having severe suprapubic pain which resolved after he was able to pass urine into his Archibald catheter  States that it was sharp and nonradiating but has now fully resolved  He denies any hematuria, fevers or current abdominal pain and denies any nausea or vomiting  Granddaughter also notes that he has been appearing to be increasingly short of breath with exertion today and has had increased leg swelling for the past week  She patient denies any chest pain, dizziness, nausea vomiting, cough or fevers and granddaughter and patient states that he is eating and drinking normally  Prior to Admission Medications   Prescriptions Last Dose Informant Patient Reported? Taking?    SULFAMETHOXAZOLE-TRIMETHOPRIM PO  Self Yes No   Sig: Take 1 tablet by mouth 2 (two) times a day   Patient not taking: Reported on 1/3/2022    aspirin 81 mg chewable tablet  Self No No   Sig: Chew 1 tablet (81 mg total) daily   atorvastatin (LIPITOR) 40 mg tablet  Self No No   Sig: Take 1 tablet (40 mg total) by mouth daily   bisacodyl (DULCOLAX) 10 mg suppository  Self No No   Sig: Insert 1 suppository (10 mg total) into the rectum daily as needed for constipation   Patient not taking: Reported on 1/4/2022    diltiazem (CARDIZEM CD) 240 mg 24 hr capsule  Self No No   Sig: Take 1 capsule (240 mg total) by mouth daily   fluorouracil 4,900 mg in CADD infusion pump   No No   Sig: Infuse 4,900 mg (500 mg/m2/day x 1 96 m2) into a venous catheter over 120 hours for 5 days  Do not start before January 17, 2022    furosemide (LASIX) 40 mg tablet  Self No No   Sig: Take 1 tablet (40 mg total) by mouth daily   magnesium citrate (CITROMA) 1 745 g/30 mL oral solution  Self No No   Sig: Take 296 mL by mouth once as needed (constipation) for up to 1 dose   Patient not taking: Reported on 1/3/2022    ondansetron (ZOFRAN) 8 mg tablet  Self No No   Sig: Take 1 tablet (8 mg total) by mouth every 8 (eight) hours as needed for nausea or vomiting   Patient not taking: Reported on 1/3/2022    pantoprazole (PROTONIX) 40 mg tablet   No No   Sig: Take 1 tablet (40 mg total) by mouth every morning   polyethylene glycol (GOLYTELY) 4000 mL solution  Self No No   Sig: Take 4,000 mL by mouth once for 1 dose      Facility-Administered Medications: None       Past Medical History:   Diagnosis Date    A-fib ([place] [address] )     Arthritis     Benign prostatic hyperplasia without lower urinary tract symptoms     without Urinary Obstruction    [facility] )     CAD (coronary artery disease)     Cancer (HCC)     Chronic obstructive lung disease (HCC)     Coronary arteriosclerosis     Depression     Emphysema lung ([facility] )     GERD (gastroesophageal reflux disease)     Hyperlipidemia     Hypertension     Irregular heart beat     Myocardial infarction ([facility] )     Psoriasis     Requires supplemental oxygen     at bedtime during high humid days only    Stroke [facility])     TIA 1/2018       Past Surgical History:   Procedure Laterality Date    COLONOSCOPY      CORONARY ANGIOPLASTY  02/03/2001    PTCA of RCA    CORONARY ARTERY BYPASS GRAFT  02/07/2001    x4- [place]    HERNIA REPAIR  IR PORT PLACEMENT  1/14/2022    OH BRONCHOSCOPY,DIAGNOSTIC Left 1/7/2019    Procedure: Mendy Bustamante;  Surgeon: Jessica Granados MD;  [facility]; Service: Pulmonary    OH CYSTOURETHROSCOPY,FULGUR <0 5 CM LESN N/A 11/30/2021    Procedure: TRANSURETHRAL RESECTION OF BLADDER TUMOR (TURBT) with "large";  Surgeon: Sam Garcia MD;  [facility];  Service: Urology    OH CYSTOURETHROSCOPY,URETER CATHETER Bilateral 11/30/2021    Procedure: [place];  Surgeon: Sam Garcia MD;  [facility];  Service: Urology    OH Yen Dys INCIS Left 2/20/2018    Procedure: ENDARTERECTOMY ARTERY CAROTID WITH PATCH ANGIOPLASTY;  Surgeon: Marlon Valente MD;  [facility];  Service: Vascular    TRANSURETHRAL RESECTION OF PROSTATE         Family History   Problem Relation Age of Onset    Lung cancer Mother     Cancer Mother     Other Father         sepsis     I have reviewed and agree with the history as documented  E-Cigarette/Vaping    E-Cigarette Use Never User      E-Cigarette/Vaping Substances    Nicotine No     THC No     CBD No     Flavoring No     Other No     Unknown No      Social History     Tobacco Use    Smoking status: Former Smoker     Years: 1 00     Types: Cigarettes    Smokeless tobacco: Never Used    Tobacco comment: few cigarettes when playing cards  Vaping Use    Vaping Use: Never used   Substance Use Topics    Alcohol use: Yes     Comment: special occasions only wine   Drug use: No       Review of Systems   All other systems reviewed and are negative  Physical Exam  Physical Exam  Vitals reviewed  Constitutional:       General: He is not in acute distress  Appearance: Normal appearance  He is not ill-appearing  HENT:      Mouth/Throat:      Mouth: Mucous membranes are moist    Eyes:      Conjunctiva/sclera: Conjunctivae normal    Cardiovascular:      Rate and Rhythm: Normal rate and regular rhythm  Heart sounds: Normal heart sounds  Pulmonary:      Effort: Pulmonary effort is normal       Breath sounds: Normal breath sounds  Abdominal:      General: Abdomen is flat  Palpations: Abdomen is soft  Tenderness: There is no abdominal tenderness  There is no right CVA tenderness or left CVA tenderness  Genitourinary:     Penis: Normal     Musculoskeletal:         General: No swelling  Normal range of motion  Cervical back: Neck supple  Comments: Trace lower extremity edema bilaterally   Skin:     General: Skin is warm and dry  Neurological:      General: No focal deficit present  Mental Status: He is alert     Psychiatric:         Mood and Affect: Mood normal          Vital Signs  ED Triage Vitals   Temperature Pulse Respirations Blood Pressure SpO2   01/15/22 1740 01/15/22 1740 01/15/22 1740 01/15/22 1740 01/15/22 1740   97 7 °F (36 5 °C) 82 18 153/70 97 %      Temp Source Heart Rate Source Patient Position - Orthostatic VS BP Location FiO2 (%)   01/15/22 1740 01/15/22 1740 01/15/22 1740 01/15/22 1740 --   Temporal Monitor Sitting Left arm       Pain Score       01/15/22 2355       5           Vitals:    01/17/22 0237 01/17/22 0924 01/17/22 1201 01/17/22 1552   BP: 130/62 138/64 124/81 133/66   Pulse: 66 70 62 65   Patient Position - Orthostatic VS:             Visual Acuity      ED Medications  Medications   aspirin chewable tablet 81 mg (81 mg Oral Given 1/17/22 0922)   atorvastatin (LIPITOR) tablet 40 mg (40 mg Oral Given 1/17/22 0922)   diltiazem (CARDIZEM CD) 24 hr capsule 240 mg (240 mg Oral Given 1/17/22 0922)   pantoprazole (PROTONIX) EC tablet 40 mg (40 mg Oral Given 1/17/22 0623)   oxyCODONE (ROXICODONE) IR tablet 5 mg (5 mg Oral Given 1/17/22 1808)     Or   oxyCODONE (ROXICODONE) IR tablet 2 5 mg ( Oral See Alternative 1/17/22 1808)   HYDROmorphone (DILAUDID) injection 0 5 mg (has no administration in time range)   ceftriaxone (ROCEPHIN) 1 g/50 mL in dextrose IVPB (1,000 mg Intravenous New Bag 1/17/22 2059)   heparin (porcine) 25,000 units in D5W 250 mL infusion (premix) (12 Units/kg/hr × 80 kg (Order-Specific) Intravenous Restarted 1/17/22 1055)   sodium chloride 0 9 % bolus 500 mL (500 mL Intravenous New Bag 1/15/22 2157)   furosemide (LASIX) injection 40 mg (40 mg Intravenous Given 1/15/22 2157)   ceftriaxone (ROCEPHIN) 1 g/50 mL in dextrose IVPB (0 mg Intravenous Stopped 1/15/22 2230)   furosemide (LASIX) injection 40 mg (40 mg Intravenous Given 1/16/22 0209)   heparin (porcine) injection 4,000 Units (4,000 Units Intravenous Given 1/16/22 0300)   furosemide (LASIX) injection 40 mg (40 mg Intravenous Given 1/17/22 1159)       Diagnostic Studies  Results Reviewed     Procedure Component Value Units Date/Time    Urine culture [[id number]] Collected: 01/15/22 2019    Lab Status: Final result Specimen: Urine, Clean Catch Updated: 01/17/22 0901     Urine Culture >100,000 cfu/ml     Osmolality, urine [[id number]]  (Normal) Collected: 01/15/22 2019    Lab Status: Final result Specimen: Urine, Clean Catch Updated: 01/16/22 1350     Osmolality, Ur 261 mmol/KG     Osmolality-"If this is regarding a toxic alcohol, STOP  Test is not routinely indicated   Please consult medical  on call for further guidance " [[id number]]  (Normal) Collected: 01/15/22 2015    Lab Status: Final result Specimen: Blood from Arm, Right Updated: 01/16/22 1347     Osmolality Serum 289 mmol/KG     Sodium, urine, random [[id number]] Collected: 01/15/22 2019    Lab Status: Final result Specimen: Urine, Clean Catch Updated: 01/16/22 1345     Sodium, Ur 21    Creatinine, urine, random [[id number]] Collected: 01/15/22 2019    Lab Status: Final result Specimen: Urine, Clean Catch Updated: 01/16/22 1345     Creatinine, Ur 123 0 mg/dL     HS Troponin I 4hr [[id number]]  (Abnormal) Collected: 01/16/22 0052    Lab Status: Final result Specimen: Blood from Arm, Right Updated: 01/16/22 0156     hs TnI 4hr 554 ng/L      Delta 4hr hsTnI 167 ng/L     Basic metabolic panel [[id number]]  (Abnormal) Collected: 01/16/22 0052    Lab Status: Final result Specimen: Blood from Arm, Right Updated: 01/16/22 0130     Sodium 137 mmol/L      Potassium 4 0 mmol/L      Chloride 100 mmol/L      CO2 25 mmol/L      ANION GAP 12 mmol/L      BUN 45 mg/dL      Creatinine 7 16 mg/dL      Glucose 98 mg/dL      Calcium 8 0 mg/dL      eGFR 6 ml/min/1 73sq m     Narrative:      [facility] guidelines for Chronic Kidney Disease (CKD):     Stage 1 with normal or high GFR (GFR > 90 mL/min/1 73 square meters)    Stage 2 Mild CKD (GFR = 60-89 mL/min/1 73 square meters)    Stage 3A Moderate CKD (GFR = 45-59 mL/min/1 73 square meters)    Stage 3B Moderate CKD (GFR = 30-44 mL/min/1 73 square meters)    Stage 4 Severe CKD (GFR = 15-29 mL/min/1 73 square meters)    Stage 5 End Stage CKD (GFR <15 mL/min/1 73 square meters)  Note: GFR calculation is accurate only with a steady state creatinine    HS Troponin I 2hr [[id number]]  (Abnormal) Collected: 01/15/22 2242    Lab Status: Final result Specimen: Blood from Arm, Left Updated: 01/15/22 2343     hs TnI 2hr 443 ng/L      Delta 2hr hsTnI 56 ng/L     COVID/FLU/RSV [[id number]]  (Normal) Collected: 01/15/22 2202    Lab Status: Final result Specimen: Nares from Nose Updated: 01/15/22 2251     SARS-CoV-2 Negative     INFLUENZA A PCR Negative     INFLUENZA B PCR Negative     RSV PCR Negative    Narrative:      FOR PEDIATRIC PATIENTS - copy/paste COVID Guidelines URL to browser: [email or web address]    SARS-CoV-2 assay is a Nucleic Acid Amplification assay intended for the  qualitative detection of nucleic acid from SARS-CoV-2 in nasopharyngeal  swabs  Results are for the presumptive identification of SARS-CoV-2 RNA      Positive results are indicative of infection with SARS-CoV-2, the virus  causing COVID-19, but do not rule out bacterial infection or co-infection  with other viruses  Laboratories within the [place] and its  territories are required to report all positive results to the appropriate  public health authorities  Negative results do not preclude SARS-CoV-2  infection and should not be used as the sole basis for treatment or other  patient management decisions  Negative results must be combined with  clinical observations, patient history, and epidemiological information  This test has not been FDA cleared or approved  This test has been authorized by FDA under an Emergency Use Authorization  (EUA)  This test is only authorized for the duration of time the  declaration that circumstances exist justifying the authorization of the  emergency use of an in vitro diagnostic tests for detection of SARS-CoV-2  virus and/or diagnosis of COVID-19 infection under section 564(b)(1) of  the Act, 21 U  S C  407UIP-6(B)(8), unless the authorization is terminated  or revoked sooner  The test has been validated but independent review by FDA  and CLIA is pending  Test performed using [employer] Gene[employer]pert: This RT-PCR assay targets N2,  a region unique to SARS-CoV-2  A conserved region in the E-gene was chosen  for pan-Sarbecovirus detection which includes SARS-CoV-2      NT-BNP PRO [[id number]]  (Abnormal) Collected: 01/15/22 2015    Lab Status: Final result Specimen: Blood from Arm, Right Updated: 01/15/22 2237     NT-proBNP 4,470 pg/mL     HS Troponin 0hr (reflex protocol) [[id number]]  (Abnormal) Collected: 01/15/22 2015    Lab Status: Final result Specimen: Blood from Arm, Right Updated: 01/15/22 2102     hs TnI 0hr 387 ng/L     Comprehensive metabolic panel [[id number]]  (Abnormal) Collected: 01/15/22 2015    Lab Status: Final result Specimen: Blood from Arm, Right Updated: 01/15/22 2039     Sodium 131 mmol/L      Potassium 4 9 mmol/L      Chloride 95 mmol/L      CO2 23 mmol/L      ANION GAP 13 mmol/L      BUN 47 mg/dL      Creatinine 8 01 mg/dL      Glucose 105 mg/dL      Calcium 8 2 mg/dL      Corrected Calcium 9 0 mg/dL      AST 37 U/L      ALT 35 U/L      Alkaline Phosphatase 82 U/L      Total Protein 6 8 g/dL      Albumin 3 0 g/dL      Total Bilirubin 0 81 mg/dL      eGFR 5 ml/min/1 73sq m     Narrative:      National Kid[employer] Disease Foundation guidelines for Chronic Kidney Disease (CKD):     Stage 1 with normal or high GFR (GFR > 90 mL/min/1 73 square meters)    Stage 2 Mild CKD (GFR = 60-89 mL/min/1 73 square meters)    Stage 3A Moderate CKD (GFR = 45-59 mL/min/1 73 square meters)    Stage 3B Moderate CKD (GFR = 30-44 mL/min/1 73 square meters)    Stage 4 Severe CKD (GFR = 15-29 mL/min/1 73 square meters)    Stage 5 End Stage CKD (GFR <15 mL/min/1 73 square meters)  Note: GFR calculation is accurate only with a steady state creatinine    Urine Microscopic [[id number]]  (Abnormal) Collected: 01/15/22 2019    Lab Status: Final result Specimen: Urine, Clean Catch Updated: 01/15/22 2038     RBC, UA 10-20 /hpf      WBC, UA Innumerable /hpf      Epithelial Cells Occasional /hpf      Bacteria, UA Occasional /hpf     UA w Reflex to Microscopic w Reflex to Culture [[id number]]  (Abnormal) Collected: 01/15/22 2019    Lab Status: Final result Specimen: Urine, Clean Catch Updated: 01/15/22 2032     Color, UA Yellow     Clarity, UA Cloudy     Specific [place], UA 1 020     pH, UA 5 0     Leukocytes, UA Large     Nitrite, UA Negative     Protein, UA 30 (1+) mg/dl      Glucose, UA Negative mg/dl      Ketones, UA Negative mg/dl      Urobilinogen, UA 0 2 E U /dl      Bilirubin, UA Negative     Blood, UA Moderate    CBC and differential [[id number]]  (Abnormal) Collected: 01/15/22 2015    Lab Status: Final result Specimen: Blood from Arm, Right Updated: 01/15/22 2029     WBC 7 68 Thousand/uL      RBC 4 30 Million/uL      Hemoglobin 9 3 g/dL      Hematocrit 31 6 %      MCV 74 fL      MCH 21 6 pg      MCHC 29 4 g/dL      RDW 18 8 %      MPV 10 5 fL      Platelets 467 Thousands/uL nRBC 0 /100 WBCs      Neutrophils Relative 79 %      Immat GRANS % 1 %      Lymphocytes Relative 10 %      Monocytes Relative 9 %      Eosinophils Relative 1 %      Basophils Relative 0 %      Neutrophils Absolute 6 09 Thousands/µL      Immature Grans Absolute 0 04 Thousand/uL      Lymphocytes Absolute 0 74 Thousands/µL      Monocytes Absolute 0 68 Thousand/µL      Eosinophils Absolute 0 10 Thousand/µL      Basophils Absolute 0 03 Thousands/µL                  CT abdomen pelvis wo contrast   Final Result by Mitchell Wynne MD (01/16 0010)      1  Decompressed urinary bladder with Fortune catheter in place  There is now diffuse wall thickening of the urinary bladder with extensive perivesical fat stranding compatible with cystitis  Small volume of nondependent intravesical air may be related to    instrumentation or infection  2   No significant change in moderate left and mild right hydroureteronephrosis  There is increased perinephric and periureteric inflammation bilaterally, likely on the basis of infection  3   Large hiatal hernia  4   New small bilateral pleural effusions  The study was marked in [facility] for immediate notification              Workstation performed: [id number]         XR chest 2 views   ED Interpretation by Niki Virgen DO (01/15 2050)   Pulmonary edema with left-sided effusion      Final Result by Rhoda Mckeon MD (01/16 1008)      No new consolidation   Blunting of the both CP angle due to small effusion as seen on the recent CT   Hiatal hernia            Workstation performed: [id number]         IR nephrostomy tube placement    (Results Pending)              Procedures  ECG 12 Lead Documentation Only    Date/Time: 1/15/2022 8:37 PM  Performed by: Niki Virgen DO  Authorized by: Niki Virgen DO     ECG reviewed by me, the ED Provider: yes    Patient location:  ED  Previous ECG:     Previous ECG:  Compared to current    Similarity:  No change  Interpretation:     Interpretation: normal    Rate:     ECG rate assessment: normal    Rhythm:     Rhythm: sinus rhythm    Ectopy:     Ectopy: none    QRS:     QRS axis:  Right    QRS intervals: Wide  Conduction:     Conduction: abnormal      Abnormal conduction: incomplete RBBB    ST segments:     ST segments:  Normal  T waves:     T waves: inverted      Inverted:  V2 and V3    CriticalCare Time  Performed by: Lars Ly DO  Authorized by: Lars Ly DO     Critical care provider statement:     Critical care time (minutes):  30    Critical care was necessary to treat or prevent imminent or life-threatening deterioration of the following conditions:  Cardiac failure and renal failure    Critical care was time spent personally by me on the following activities:  Obtaining history from patient or surrogate, development of treatment plan with patient or surrogate, discussions with consultants, evaluation of patient's response to treatment, examination of patient, interpretation of cardiac output measurements, ordering and performing treatments and interventions, ordering and review of laboratory studies, ordering and review of radiographic studies, re-evaluation of patient's condition and review of old charts             ED Course  ED Course as of 01/17/22 2225   Sat Supa 15, 2022   2108 Patient with pulmonary edema on chest x-ray, large DULCE/renal failure, elevated troponin, have given and fluids, Lasix, antibiotics for potential UTI and will admit  SBIRT 22yo+      Most Recent Value   SBIRT (24 yo +)    In order to provide better care to our patients, we are screening all of our patients for alcohol and drug use  Would it be okay to ask you these screening questions? Yes Filed at: 01/15/2022 1921   Initial Alcohol Screen: US AUDIT-C     1  How often do you have a drink containing alcohol? 0 Filed at: 01/15/2022 1921   2   How many drinks containing alcohol do you have on a typical day you are drinking? 0 Filed at: 01/15/2022 1921   3b  FEMALE Any Age, or MALE 65+: How often do you have 4 or more drinks on one occassion? 0 Filed at: 01/15/2022 1921   Audit-C Score 0 Filed at: 01/15/2022 1921   SHANA: How many times in the past year have you    Used an illegal drug or used a prescription medication for non-medical reasons? Never Filed at: 01/15/2022 1921        AMANDA Risk Score      Most Recent Value   Age >= 72 1 Filed at: 01/16/2022 0131   Known CAD (stenosis >= 50%) 1 Filed at: 01/16/2022 0131   Recent (<=24 hrs) Service Angina 0 Filed at: 01/16/2022 0131   ST Deviation >= 0 5 mm 0 Filed at: 01/16/2022 0131   3+ CAD Risk Factors (FHx, HTN, HLP, DM, Smoker) 0 Filed at: 01/16/2022 0131   Aspirin Use Past 7 Days 1 Filed at: 01/16/2022 0131   Elevated Cardiac Markers 1 Filed at: 01/16/2022 0131   AMANDA Risk Score (Calculated) 4 Filed at: 01/16/2022 0131                  MDM  Number of Diagnoses or Management Options  Obstruction of Fortune catheter, initial encounter [facility])  Pulmonary edema  Renal failure  Diagnosis management comments: Patient is an 80-year-old male past medical history of hypertension, hyperlipidemia, CAD, CHF, atrial fibrillation, CKD stage 2, CABG, CVA, COPD, GERD, carotid stenosis presenting with previously blocked Fortune catheter and shortness of breath  Patient is well-appearing bedside stable vitals and in no acute distress  He has lungs clear to auscultation trace lower extremity edema and has clear yellow urine draining into Fortune catheter with no abdominal tenderness currently  As granddaughter notes increasing exertional shortness of breath will obtain cardiac workup and urinalysis and continue to monitor patient's Fortune output        Disposition  Final diagnoses:   Renal failure   Pulmonary edema   Obstruction of Fortune catheter, initial encounter [facility])     Time reflects when diagnosis was documented in both MDM as applicable and the Disposition within this note     Time User Action Codes Description Comment    1/15/2022  9:25 PM Eleni Creed Add [N19] Renal failure     1/15/2022  9:26 PM Eleni Creed Add [J81 1] Pulmonary edema     1/15/2022  9:26 PM Eleni Creed Add [Q00 653J] Obstruction of Fortune catheter, initial encounter ([facility] )     1/16/2022  1:37 AM Escobarmirian Pankaj Add [R77 8] Elevated troponin     1/16/2022 12:13 PM Ventura Harper Add [N17 9] DULCE (acute kidney injury) ([facility] ) on CKD 3     1/16/2022 12:13 PM Ventura Harper Add [C67 8] Cancer of overlapping sites of bladder [facility])       ED Disposition     ED Disposition Condition Date/Time Comment    Admit Stable Sat Supa 15, 2022  9:25 PM Case was discussed with Vipul Boucher and the patient's admission status was agreed to be Admission Status: inpatient status to the service of Dr Rudy Riley   Follow-up Information    None         Current Discharge Medication List      CONTINUE these medications which have NOT CHANGED    Details   aspirin 81 mg chewable tablet Chew 1 tablet (81 mg total) daily  Qty: 60 tablet, Refills: 6    Associated Diagnoses: Typical atrial flutter (HCC)      atorvastatin (LIPITOR) 40 mg tablet Take 1 tablet (40 mg total) by mouth daily  Qty: 90 tablet, Refills: 3    Associated Diagnoses: Hyperlipidemia, unspecified hyperlipidemia type      bisacodyl (DULCOLAX) 10 mg suppository Insert 1 suppository (10 mg total) into the rectum daily as needed for constipation  Qty: 12 suppository, Refills: 0    Associated Diagnoses: Constipation, unspecified constipation type      diltiazem (CARDIZEM CD) 240 mg 24 hr capsule Take 1 capsule (240 mg total) by mouth daily  Qty: 60 capsule, Refills: 3    Associated Diagnoses: Atrial fibrillation, unspecified type (HCC)      fluorouracil 4,900 mg in CADD infusion pump Infuse 4,900 mg (500 mg/m2/day x 1 96 m2) into a venous catheter over 120 hours for 5 days  Do not start before January 17, 2022    Qty: 1 each, Refills: 0    Associated Diagnoses: 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)      furosemide (LASIX) 40 mg tablet Take 1 tablet (40 mg total) by mouth daily  Qty: 60 tablet, Refills: 6    Associated Diagnoses: Typical atrial flutter (HCC)      magnesium citrate (CITROMA) 1 745 g/30 mL oral solution Take 296 mL by mouth once as needed (constipation) for up to 1 dose  Qty: 296 mL, Refills: 3    Associated Diagnoses: Constipation, unspecified constipation type      ondansetron (ZOFRAN) 8 mg tablet Take 1 tablet (8 mg total) by mouth every 8 (eight) hours as needed for nausea or vomiting  Qty: 20 tablet, Refills: 0    Associated Diagnoses: 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)      pantoprazole (PROTONIX) 40 mg tablet Take 1 tablet (40 mg total) by mouth every morning  Qty: 60 tablet, Refills: 5    Associated Diagnoses: Erosive gastritis; Esophageal stricture      polyethylene glycol (GOLYTELY) 4000 mL solution Take 4,000 mL by mouth once for 1 dose  Qty: 4000 mL, Refills: 0    Associated Diagnoses: Change in bowel habits      SULFAMETHOXAZOLE-TRIMETHOPRIM PO Take 1 tablet by mouth 2 (two) times a day             No discharge procedures on file      PDMP Review       Value Time User    PDMP Reviewed  Yes 12/29/2021  4:43 AM Kenan Spivey MD          ED Provider  Electronically Signed by           Michell García DO  01/17/22 [id number]

## 2022-01-16 NOTE — ASSESSMENT & PLAN NOTE
· Concern for UTI but patient has a Fortune catheter  Follow urine cultures    · Received 1 dose ceftriaxone in the ED, recent urine culture was negative  · Continue ceftriaxone until urine cultures have resulted

## 2022-01-16 NOTE — ASSESSMENT & PLAN NOTE
· Pt with h/o bladder cancer and archibald, scheduled for OP chemo  · CT abd pelvis non contrast   1   Decompressed urinary bladder with Archibald catheter in place   There is now diffuse wall thickening of the urinary bladder with extensive perivesical fat stranding compatible with cystitis  Small volume of nondependent intravesical air may be related to    instrumentation or infection  2   No significant change in moderate left and mild right hydroureteronephrosis  Antonetta Balsam is increased perinephric and periureteric inflammation bilaterally, likely on the basis of infection  3   Large hiatal hernia  4   New small bilateral pleural effusions  · Received IV lasix and IV fluids in the ER  · Pt has a archibald and he did not have any urine output from 11am to 5 pm when he felt severe pressure in his suprapubic area and he started having UO     · There is also a concern of fluid overload and chf exacerbation (diastolic)  · Nephrology consult appreciated, cont with diuresis  · Urology consulted as may need stenting vs PCN

## 2022-01-16 NOTE — H&P
[address] [facility]  H&P- Antonia Reasons 1940, 80 y o  male MRN: [id number]  Unit/Bed#: ED 22 Encounter: [id number]  Primary Care Provider: Israel Tyler MD   Date and time admitted to hospital: 1/15/2022  7:10 PM    * DULCE (acute kidney injury) ([facility] ) on CKD 3  Assessment & Plan  · Pt with h/o bladder cancer and archibald, scheduled for OP chemo  · Obtain a stat CT abd pelvis non contrast   · Received IV lasix and IV fluids in the ER  · Pt has a archibald and he did not have any urine output from 11am to 5 pm when he felt severe pressure in his suprapubic area and he started having UO  · There is also a concern of fluid overload and chf exacerbation (diastolic)  · Nephrology consult    Acute on chronic diastolic congestive heart failure (HCC)  Assessment & Plan  Wt Readings from Last 3 Encounters:   01/15/22 82 1 kg (181 lb)   01/14/22 82 1 kg (181 lb)   01/06/22 82 1 kg (181 lb)     · Elevated NT pro BNP, b/l worsening pedal edema and SOB, no hypoxia  · Got IV lasix 40mg in the ER  · Strict Is Os  · Low sodium diet   · Follow urine studies        Pleural effusion on left  Assessment & Plan  · Wet read XR chest, final report pending  · Underlying CHF exacerbation  · Will need diuresis once obstruction ruled out on CT abd pelvis    Bladder cancer [facility])  Assessment & Plan  · Has a port and plan was to start chemo starting this Monday  · [address] OP urology  · Has a archibald  · Checking CT scan abd pelvis for obstruction    Atrial fibrillation ([facility] )  Assessment & Plan  · Rate controlled  · Cont cardizem home dose    Abnormal urinalysis  Assessment & Plan  · Concern for UTI but patient has a Archibald catheter  Follow urine cultures    · Received 1 dose ceftriaxone in the ED, recent urine culture was negative  · Continue ceftriaxone until urine cultures have resulted    Hyponatremia  Assessment & Plan  · Likely from fluid overload  · Will benefit from lasix  · Follow up on urine studies  · Has received lasix and IV fluids by the ER    COPD, severe ([facility] )  Assessment & Plan  · Stable currently      Back pain  Assessment & Plan  · Counseled to not take advil at home  · Start PO oxy for now     S/P CABG x 4  Assessment & Plan  · Elevated troponin could be likely from DULCE/CKD vs CHF exac  · Trend troponin  · EKG shows no new change compared to previous EKG: RBBB with T inversion V1-2  NSR    CAD (coronary atherosclerotic disease)  Assessment & Plan  - no chest pain  - cont ASA statin     VTE Pharmacologic Prophylaxis: VTE Score: 7 High Risk (Score >/= 5) - Pharmacological DVT Prophylaxis Ordered: heparin  Sequential Compression Devices Ordered  Code Status: Level 1 - Full Code   Discussion with family: Updated  (Granddaughter) via phone  Anticipated Length of Stay: Patient will be admitted on an inpatient basis with an anticipated length of stay of greater than 2 midnights secondary to Need for further imaging to rule out obstruction, treatment for acute kidney injury, treatment for acute CHF  Total Time for Visit, including Counseling / Coordination of Care: 45 minutes Greater than 50% of this total time spent on direct patient counseling and coordination of care  Chief Complaint:  Shortness of breath, no urine output for 6 hours    History of Present Illness: Juan Peñaloza is a 80 y o  male with a PMH of bladder cancer, diastolic CHF, CAD status post CABG x4, AFib who presents with worsening shortness of breath and pedal edema  Patient has a history of bladder cancer and has a Fortune catheter  He endorses that he did not have any urine output from 11:00 a m  To 5:00 p m  Today  Around 5:00 p m  He felt a lot of pressure in the suprapubic area and was able to pass urine  Denies any orthopnea, weight gain but does endorse bilateral worsening pedal edema  As per the granddaughter at bedside she has noticed worsening exertional dyspnea as well    Patient's creatinine in the ER was 8 in his baseline is around 1 2-1 5  Patient had a port placed recently for chemo for bladder cancer and was supposed to start it this Monday    Review of Systems:  Review of Systems   Constitutional: Negative for chills and fever  HENT: Negative for ear pain and sore throat  Eyes: Negative for pain and visual disturbance  Respiratory: Positive for shortness of breath  Negative for cough and chest tightness  Cardiovascular: Positive for leg swelling  Negative for chest pain and palpitations  Gastrointestinal: Negative for abdominal pain and vomiting  Genitourinary: Positive for decreased urine volume  Negative for dysuria, flank pain, frequency and hematuria  No urine output for 6 hours   Musculoskeletal: Negative for arthralgias and back pain  Skin: Negative for color change and rash  Neurological: Negative for seizures and syncope  Psychiatric/Behavioral: Negative for agitation, behavioral problems and confusion  All other systems reviewed and are negative        Past Medical and Surgical History:   Past Medical History:   Diagnosis Date    A-fib [facility])     Arthritis     Benign prostatic hyperplasia without lower urinary tract symptoms     without Urinary Obstruction    Bladder cancer ([facility] )     CAD (coronary artery disease)     Cancer (HCC)     Chronic obstructive lung disease (HCC)     Coronary arteriosclerosis     Depression     Emphysema lung (HCC)     GERD (gastroesophageal reflux disease)     Hyperlipidemia     Hypertension     Irregular heart beat     Myocardial infarction ([facility] )     Psoriasis     Requires supplemental oxygen     at bedtime during high humid days only    Stroke [facility])     TIA 1/2018       Past Surgical History:   Procedure Laterality Date    COLONOSCOPY      CORONARY ANGIOPLASTY  02/03/2001    PTCA of RCA    CORONARY ARTERY BYPASS GRAFT  02/07/2001    x4- [place]    HERNIA REPAIR      IR PORT PLACEMENT  1/14/2022    AZ BRONCHOSCOPY,DIAGNOSTIC Left 1/7/2019 Procedure: BRONCHOSCOPY FLEXIBLE;  Surgeon: Cinthia Guevara MD;  Location: [facility]; Service: Pulmonary    NM CYSTOURETHROSCOPY,FULGUR <0 5 CM LESN N/A 11/30/2021    Procedure: TRANSURETHRAL RESECTION OF BLADDER TUMOR (TURBT) with "large";  Surgeon: Katerina Mehta MD;  Location: [facility];  Service: Urology    NM CYSTOURETHROSCOPY,URETER CATHETER Bilateral 11/30/2021    Procedure: Burnadette Crea;  Surgeon: Katerina Mehta MD;  Location: [facility];  Service: Urology    NM Jestine Israel Left 2/20/2018    Procedure: ENDARTERECTOMY ARTERY CAROTID WITH PATCH ANGIOPLASTY;  Surgeon: Hima Longo MD;  Location: [facility];  Service: Vascular    TRANSURETHRAL RESECTION OF PROSTATE         Meds/Allergies:  Prior to Admission medications    Medication Sig Start Date End Date Taking?  Authorizing Provider   aspirin 81 mg chewable tablet Chew 1 tablet (81 mg total) daily 7/29/19   Samira Strauss MD   atorvastatin (LIPITOR) 40 mg tablet Take 1 tablet (40 mg total) by mouth daily 6/29/21   Elliott Bashir PA-C   bisacodyl (DULCOLAX) 10 mg suppository Insert 1 suppository (10 mg total) into the rectum daily as needed for constipation  Patient not taking: Reported on 1/4/2022 12/18/21   Jomar Gutiérrez DO   diltiazem (CARDIZEM CD) 240 mg 24 hr capsule Take 1 capsule (240 mg total) by mouth daily 7/7/21   Samira Strauss MD   fluorouracil 4,900 mg in CADD infusion pump Infuse 4,900 mg (500 mg/m2/day x 1 96 m2) into a venous catheter over 120 hours for 5 days  Do not start before January 17, 2022 1/17/22 1/22/22  Liban Gonzalez MD   furosemide (LASIX) 40 mg tablet Take 1 tablet (40 mg total) by mouth daily 5/27/21   Patrick Valencia PA-C   magnesium citrate (CITROMA) 1 745 g/30 mL oral solution Take 296 mL by mouth once as needed (constipation) for up to 1 dose  Patient not taking: Reported on 1/3/2022  12/18/21   Jomar Ellis DO   ondansetron (ZOFRAN) 8 mg tablet Take 1 tablet (8 mg total) by mouth every 8 (eight) hours as needed for nausea or vomiting  Patient not taking: Reported on 1/3/2022  12/29/21   Annette Hammond MD   pantoprazole (PROTONIX) 40 mg tablet Take 1 tablet (40 mg total) by mouth every morning 1/6/22   Sue Saldana MD   polyethylene glycol (GOLYTELY) 4000 mL solution Take 4,000 mL by mouth once for 1 dose 1/3/22 1/4/22  Galina Caal PA-C   SULFAMETHOXAZOLE-TRIMETHOPRIM PO Take 1 tablet by mouth 2 (two) times a day  Patient not taking: Reported on 1/3/2022     Historical Provider, MD     I have reviewed home medications with patient personally  Allergies: Allergies   Allergen Reactions    Penicillins Swelling and Itching       Social History:  Marital Status:      Substance Use History:   Social History     Substance and Sexual Activity   Alcohol Use Yes    Comment: special occasions only wine  Social History     Tobacco Use   Smoking Status Former Smoker    Years: 1 00    Types: Cigarettes   Smokeless Tobacco Never Used   Tobacco Comment    few cigarettes when playing cards  Social History     Substance and Sexual Activity   Drug Use No       Family History:  Family History   Problem Relation Age of Onset    Lung cancer Mother     Cancer Mother     Other Father         sepsis       Physical Exam:     Vitals:   Blood Pressure: 153/70 (01/15/22 1740)  Pulse: 82 (01/15/22 1740)  Temperature: 97 7 °F (36 5 °C) (01/15/22 1740)  Temp Source: Temporal (01/15/22 1740)  Respirations: 18 (01/15/22 1740)  Weight - Scale: 82 1 kg (181 lb) (01/15/22 1740)  SpO2: 97 % (01/15/22 1740)    Physical Exam General- Awake, alert and oriented x 3, looks comfortable  HEENT- Normocephalic, atraumatic, oral mucosa- moist  Neck- Supple, No carotid bruit, no JVD  CVS- Normal S1/ S2, Regular rate and rhythm, systolic 3/6 murmur, bilateral +2 pitting, right chest port  Respiratory system-diminished air entry at the left lower base, good effort, no crackles  Abdomen- Soft, ? Chronic distended, no tenderness, Bowel sound- present 4 quads  Genitourinary-Fortune catheter, No suprapubic tenderness, No CVA tenderness  Skin- No new bruise or rash  Musculoskeletal- No gross deformity  Psych- No acute psychosis  CNS- CN II- XII grossly intact, No acute focal neurologic deficit noted      Additional Data:     Lab Results:  Results from last 7 days   Lab Units 01/15/22  2015   WBC Thousand/uL 7 68   HEMOGLOBIN g/dL 9 3*   HEMATOCRIT % 31 6*   PLATELETS Thousands/uL 189   NEUTROS PCT % 79*   LYMPHS PCT % 10*   MONOS PCT % 9   EOS PCT % 1     Results from last 7 days   Lab Units 01/15/22  2015   SODIUM mmol/L 131*   POTASSIUM mmol/L 4 9   CHLORIDE mmol/L 95*   CO2 mmol/L 23   BUN mg/dL 47*   CREATININE mg/dL 8 01*   ANION GAP mmol/L 13   CALCIUM mg/dL 8 2*   ALBUMIN g/dL 3 0*   TOTAL BILIRUBIN mg/dL 0 81   ALK PHOS U/L 82   ALT U/L 35   AST U/L 37   GLUCOSE RANDOM mg/dL 105                       Imaging: Personally reviewed the following imaging: chest xray  XR chest 2 views   ED Interpretation by Shalini Hampton DO (01/15 2050)   Pulmonary edema with left-sided effusion      CT abdomen pelvis wo contrast    (Results Pending)       EKG and Other Studies Reviewed on Admission:   · EKG: Normal sinus rhythm, right bundle branch, previously mention T-wave inversions in V1 V2     ** Please Note: This note has been constructed using a voice recognition system   **

## 2022-01-16 NOTE — ASSESSMENT & PLAN NOTE
· Elevated troponin could be likely from DULCE/CKD vs CHF exac  · Trend troponin  · Cardiology was consulted  · EKG shows no new change compared to previous EKG: RBBB with T inversion V1-2   NSR

## 2022-01-16 NOTE — ASSESSMENT & PLAN NOTE
Wt Readings from Last 3 Encounters:   01/15/22 84 7 kg (186 lb 11 7 oz)   01/14/22 82 1 kg (181 lb)   01/06/22 82 1 kg (181 lb)     · Elevated NT pro BNP, b/l worsening pedal edema and SOB, no hypoxia  Diuresis as per nephrology, improving , received 80 mg IV lasix thus far

## 2022-01-16 NOTE — ASSESSMENT & PLAN NOTE
· Elevated troponin could be likely from DULCE/CKD vs CHF exac  · Trend troponin  · EKG shows no new change compared to previous EKG: RBBB with T inversion V1-2   NSR

## 2022-01-16 NOTE — ASSESSMENT & PLAN NOTE
· Wet read XR chest, final report pending  · Underlying CHF exacerbation  · Will need diuresis once obstruction ruled out on CT abd pelvis

## 2022-01-16 NOTE — ASSESSMENT & PLAN NOTE
· Has a port and plan was to start chemo starting this Monday  · [address] OP urology - have consulted them  · Has a archibald

## 2022-01-16 NOTE — PLAN OF CARE
Patient is A&Ox4, VSS, Afebrile  Room air  Fortune with clear yellow urine  Patient arrived to unit with elevated troponin's and elevated BUN/CR, MD reached ordered EKG, IV lasix, telemetry and initiated Heparin gtt at 12 units/kg/hr  Next pTT due at 9am  Pt denies chest pain but reports chronic back pain  See eMAR  Safety maintained, bed alarm on, call bell within reach       Problem: PAIN - ADULT  Goal: Verbalizes/displays adequate comfort level or baseline comfort level  Description: Interventions:  - Encourage patient to monitor pain and request assistance  - Assess pain using appropriate pain scale  - Administer analgesics based on type and severity of pain and evaluate response  - Implement non-pharmacological measures as appropriate and evaluate response  - Consider cultural and social influences on pain and pain management  - Notify physician/advanced practitioner if interventions unsuccessful or patient reports new pain  Outcome: Progressing     Problem: SAFETY ADULT  Goal: Patient will remain free of falls  Description: INTERVENTIONS:  - Educate patient/family on patient safety including physical limitations  - Instruct patient to call for assistance with activity   - Consult OT/PT to assist with strengthening/mobility   - Keep Call bell within reach  - Keep bed low and locked with side rails adjusted as appropriate  - Keep care items and personal belongings within reach  - Initiate and maintain comfort rounds  - Make Fall Risk Sign visible to staff  - Offer Toileting every 2Hours, in advance of need  - Initiate/Maintain bed alarm  - Obtain necessary fall risk management equipment:   - Apply yellow socks and bracelet for high fall risk patients  - Consider moving patient to room near nurses station  Outcome: Progressing

## 2022-01-16 NOTE — CONSULTS
Consultation - Cardiology   Clayton Nath 80 y o  male MRN: [id number]  Unit/Bed#: -57 Encounter: [id number]  01/16/22  5:01 PM    Assessment/ Plan:  1  Nonischemic myocardial injury   - HS troponin 378, 443, 554   - elevated in the setting of DULCE related to hydronephrosis and elevated NT proBNP  - patient denies any chest pain  - ECG shows sinus rhythm with sinus arrhythmia    2  DULCE due to obstructive uropathy  - creatinine 8 05 on admission, currently 5 88  - patient is diuresing significantly  - management per Nephrology, follows urology outpatient for pallor cancer    3  Lower extremity edema  - patient exhibits minimal lower extremity edema related to congestion from obstructive uropathy  - restart home dose of 40 mg of Lasix daily when cleared by Nephrology  4  Chronic HFpEF  - EF 60% on 11/15/2021   - patient chronically on 40 mg of Lasix daily, restarted when cleared by Nephrology  -no ACEI/ARB due to impaired renal function, no beta-blocker due to emphysema  5  CAD status post CABG x4 in 2001 (LIMA to LAD, SVG to RCA, sequential SVG to diagonal/OM 1)  - no beta-blocker due to emphysema   -continue aspirin 81 mg daily, atorvastatin 40 mg daily  -patient denies any chest pain at this time    6  PAF   -patient currently in normal sinus rhythm   -continue diltiazem 240 mg daily  - patient on Coumadin for AC at home, currently on heparin drip, restart Coumadin when appropriate per primary team         History of Present Illness   Physician Requesting Consult: Nessa Dickey MD    Reason for Consult / Principal Problem:  Elevated troponin  HPI: Clayton Nath is a 80y o  year old male with past medical history of CAD status post CABG x4 in 2001, paroxysmal AFib in on Coumadin, diastolic heart failure, hypertension, hyperlipidemia, emphysema, and bladder cancer  who presents with decreased urine output  Patient states that he noticed there was not much urinary output in his leg bag  He is undergoing management for bladder cancer and has chronic archibald catheter in place  Patient also endorsed some chronic shortness of breath  In the emergency department, he was found to have hydronephrosis and creatinine of 8  Patient notes that he had a recent port placement and is planning on starting chemotherapy on Monday for bladder cancer  Patient received one time dose of lasix in the ED and significantly diuresed  Patient reports feeling well and denies any chest pain or palpitations  He reports adherence to his medications  He endorses chronic shortness of breath related to his emphysema  Inpatient consult to Cardiology  Consult performed by: MYLENE Rocha  Consult ordered by: Shanique Lopez PA-C          EKG:  Sinus rhythm with sinus arrhythmia    Echo 11/15/2021    Left Ventricle: Left ventricular cavity size is normal  The left ventricular ejection fraction is 60%  Systolic function is normal  Wall motion is normal  Diastolic function is normal     Aortic Valve: There is mild stenosis    Mitral Valve: There is mild annular calcification  There is mild regurgitation          Review of Systems   Constitutional: Negative for chills, diaphoresis and fever  Respiratory: Positive for shortness of breath  Negative for cough and chest tightness  Cardiovascular: Negative for chest pain, palpitations and leg swelling  Gastrointestinal: Negative for abdominal distention, blood in stool, nausea and vomiting  Genitourinary: Positive for difficulty urinating  Musculoskeletal: Negative for arthralgias and back pain  Neurological: Negative for dizziness, syncope, light-headedness and headaches  Psychiatric/Behavioral: Negative for agitation and confusion  The patient is not nervous/anxious          Historical Information   Past Medical History:   Diagnosis Date    A-fib [facility])     Arthritis     Benign prostatic hyperplasia without lower urinary tract symptoms     without Urinary Obstruction    Bladder cancer (HCC)     CAD (coronary artery disease)     Cancer (HCC)     Chronic obstructive lung disease (HCC)     Coronary arteriosclerosis     Depression     Emphysema lung (HCC)     GERD (gastroesophageal reflux disease)     Hyperlipidemia     Hypertension     Irregular heart beat     Myocardial infarction ([address] )     Psoriasis     Requires supplemental oxygen     at bedtime during high humid days only    Stroke [facility])     TIA 1/2018     Past Surgical History:   Procedure Laterality Date    COLONOSCOPY      CORONARY ANGIOPLASTY  02/03/2001    PTCA of RCA    CORONARY ARTERY BYPASS GRAFT  02/07/2001    x4- [place]    HERNIA REPAIR      IR PORT PLACEMENT  1/14/2022    AR BRONCHOSCOPY,DIAGNOSTIC Left 1/7/2019    Procedure: BRONCHOSCOPY FLEXIBLE;  Surgeon: Joaquin Kaufman MD;  [facility]; Service: Pulmonary    AR CYSTOURETHROSCOPY,FULGUR <0 5 CM LESN N/A 11/30/2021    Procedure: TRANSURETHRAL RESECTION OF BLADDER TUMOR (TURBT) with "large";  Surgeon: Shira Ronquillo MD;  [facility];  Service: Urology    AR CYSTOURETHROSCOPY,URETER CATHETER Bilateral 11/30/2021    Procedure: Susa Ovens;  Surgeon: Shira Ronquillo MD;  [facility];  Service: Urology    AR Lainedavid Hercasey Left 2/20/2018    Procedure: ENDARTERECTOMY ARTERY CAROTID WITH PATCH ANGIOPLASTY;  Surgeon: Dasia Stewart MD;  [facility];  Service: Vascular    TRANSURETHRAL RESECTION OF PROSTATE       Social History     Substance and Sexual Activity   Alcohol Use Yes    Comment: special occasions only wine  Social History     Substance and Sexual Activity   Drug Use No     Social History     Tobacco Use   Smoking Status Former Smoker    Years: 1 00    Types: Cigarettes   Smokeless Tobacco Never Used   Tobacco Comment    few cigarettes when playing cards          Family History:   Family History   Problem Relation Age of Onset    Lung cancer Mother    Pamella Mare Cancer Mother     Other Father         sepsis       Meds/Allergies   all current active meds have been reviewed and current meds:   Current Facility-Administered Medications   Medication Dose Route Frequency    aspirin chewable tablet 81 mg  81 mg Oral Daily    atorvastatin (LIPITOR) tablet 40 mg  40 mg Oral Daily    ceftriaxone (ROCEPHIN) 1 g/50 mL in dextrose IVPB  1,000 mg Intravenous Q24H    diltiazem (CARDIZEM CD) 24 hr capsule 240 mg  240 mg Oral Daily    heparin (porcine) 25,000 units in D5W 250 mL infusion (premix)  3-20 Units/kg/hr (Order-Specific) Intravenous Titrated    HYDROmorphone (DILAUDID) injection 0 5 mg  0 5 mg Intravenous Q4H PRN    oxyCODONE (ROXICODONE) IR tablet 5 mg  5 mg Oral Q6H PRN    Or    oxyCODONE (ROXICODONE) IR tablet 2 5 mg  2 5 mg Oral Q6H PRN    pantoprazole (PROTONIX) EC tablet 40 mg  40 mg Oral Early Morning     Allergies   Allergen Reactions    Penicillins Swelling and Itching       Objective   Vitals: Blood pressure 110/63, pulse 78, temperature 97 9 °F (36 6 °C), resp   rate 18, height 5' 8" (1 727 m), weight 84 7 kg (186 lb 11 7 oz), SpO2 91 % , Body mass index is 28 39 kg/m² ,   Orthostatic Blood Pressures      Most Recent Value   Blood Pressure 110/63 filed at 01/16/2022 1433   Patient Position - Orthostatic VS Lying filed at 01/15/2022 0700          Systolic (17MZX), NLR:053 , Min:110 , AUQ:331     Diastolic (42REK), OKL:26, Min:58, Max:88        Intake/Output Summary (Last 24 hours) at 1/16/2022 1701  Last data filed at 1/16/2022 0900  Gross per 24 hour   Intake 259 44 ml   Output 5965 ml   Net -5705 56 ml       Invasive Devices  Report    Central Venous Catheter Line            Port A Cath 01/14/22 Right Internal jugular 2 days          Peripheral Intravenous Line            Peripheral IV 01/15/22 Right Arm <1 day          Drain            Urethral Catheter Non-latex 16 Fr  18 days                    Physical Exam:  Physical Exam  Vitals and nursing note reviewed  Constitutional:       Appearance: He is well-developed  HENT:      Head: Normocephalic and atraumatic  Eyes:      Conjunctiva/sclera: Conjunctivae normal    Cardiovascular:      Rate and Rhythm: Normal rate and regular rhythm  Heart sounds: S1 normal and S2 normal  Murmur heard  Pulmonary:      Effort: Pulmonary effort is normal  No respiratory distress  Breath sounds: Normal breath sounds  Abdominal:      Palpations: Abdomen is soft  Tenderness: There is no abdominal tenderness  Genitourinary:     Comments: Urinary catheter in place  Musculoskeletal:      Cervical back: Neck supple  Right lower leg: Edema present  Left lower leg: Edema present  Skin:     General: Skin is warm and dry  Neurological:      Mental Status: He is alert and oriented to person, place, and time     Psychiatric:         Mood and Affect: Mood normal          Behavior: Behavior normal           Lab Results:     Cardiac Profile:   Results from last 7 days   Lab Units 01/16/22  0052 01/15/22  2242 01/15/22  2015   HS TNI 0HR ng/L  --   --  387*   HS TNI 2HR ng/L  --  443*  --    HSTNI D2 ng/L  --  56  --    HS TNI 4HR ng/L 554*  --   --    HSTNI D4 ng/L 167  --   --    NT-PRO BNP pg/mL  --   --  4,470*       CBC with diff:   Results from last 7 days   Lab Units 01/16/22  0927 01/15/22  2015 01/15/22  1045   WBC Thousand/uL 8 27 7 68 9 14   HEMOGLOBIN g/dL 9 8* 9 3* 9 4*   HEMATOCRIT % 33 7* 31 6* 32 3*   MCV fL 73* 74* 74*   PLATELETS Thousands/uL 298 189 267   MCH pg 21 2* 21 6* 21 5*   MCHC g/dL 29 1* 29 4* 29 1*   RDW % 19 1* 18 8* 18 7*   MPV fL 10 5 10 5 11 0   NRBC AUTO /100 WBCs  --  0 0         CMP:   Results from last 7 days   Lab Units 01/16/22  0927 01/16/22  0052 01/15/22  2015 01/15/22  1045   POTASSIUM mmol/L 4 1 4 0 4 9 4 9   CHLORIDE mmol/L 100 100 95* 99*   CO2 mmol/L 30 25 23 25   BUN mg/dL 39* 45* 47* 46*   CREATININE mg/dL 5 88* 7 16* 8 01* 8 05*   CALCIUM mg/dL 8 8 8 0* 8 2* 8 1*   AST U/L 38  --  37 29   ALT U/L 45  --  35 34   ALK PHOS U/L 94  --  82 88   EGFR ml/min/1 73sq m 8 6 5 5

## 2022-01-16 NOTE — ASSESSMENT & PLAN NOTE
· Pt with h/o bladder cancer and archibald, scheduled for OP chemo  · Obtain a stat CT abd pelvis non contrast   · Received IV lasix and IV fluids in the ER  · Pt has a archibald and he did not have any urine output from 11am to 5 pm when he felt severe pressure in his suprapubic area and he started having UO     · There is also a concern of fluid overload and chf exacerbation (diastolic)  · Nephrology consult

## 2022-01-16 NOTE — ASSESSMENT & PLAN NOTE
· Has a port and plan was to start chemo starting this Monday  · [address] OP urology  · Has a archibald  · Checking CT scan abd pelvis for obstruction

## 2022-01-16 NOTE — PROGRESS NOTES
[facility]  Progress Note - Sin Bell 1940, 80 y o  male MRN: [id number]  Unit/Bed#: -77 Encounter: [id number]  Primary Care Provider: Katarina Andrade MD   Date and time admitted to hospital: 1/15/2022  7:10 PM    Abnormal urinalysis  Assessment & Plan  · Concern for UTI but patient has a Archibald catheter  Follow urine cultures  · Received 1 dose ceftriaxone in the ED, recent urine culture was negative  · Continue ceftriaxone until urine cultures have resulted    Pleural effusion on left  Assessment & Plan  · Wet read XR chest, improved with diuresis    Hyponatremia  Assessment & Plan  · Likely from fluid overload  · Resolved with diuresis      Atrial fibrillation (HCC)  Assessment & Plan  · Rate controlled  · Cont cardizem home dose    Acute on chronic diastolic congestive heart failure (HCC)  Assessment & Plan  Wt Readings from Last 3 Encounters:   01/15/22 84 7 kg (186 lb 11 7 oz)   01/14/22 82 1 kg (181 lb)   01/06/22 82 1 kg (181 lb)     · Elevated NT pro BNP, b/l worsening pedal edema and SOB, no hypoxia  Diuresis as per nephrology, improving , received 80 mg IV lasix thus far       COPD, severe ([facility] )  Assessment & Plan  · Stable currently      Bladder cancer [facility])  Assessment & Plan  · Has a port and plan was to start chemo starting this Monday  · [address] OP urology - have consulted them  · Has a archibald      Back pain  Assessment & Plan  · Counseled to not take advil at home  · Start PO oxy for now     S/P CABG x 4  Assessment & Plan  · Elevated troponin could be likely from DULCE/CKD vs CHF exac  · Trend troponin  · Cardiology was consulted  · EKG shows no new change compared to previous EKG: RBBB with T inversion V1-2   NSR    CAD (coronary atherosclerotic disease)  Assessment & Plan  - no chest pain  - cont ASA statin     * DULCE (acute kidney injury) ([facility] ) on CKD 3  Assessment & Plan  · Pt with h/o bladder cancer and archibald, scheduled for OP chemo  · CT abd pelvis non contrast   1   Decompressed urinary bladder with Archibald catheter in place  Greg Crawford is now diffuse wall thickening of the urinary bladder with extensive perivesical fat stranding compatible with cystitis  Small volume of nondependent intravesical air may be related to    instrumentation or infection  2   No significant change in moderate left and mild right hydroureteronephrosis  Greg Crawford is increased perinephric and periureteric inflammation bilaterally, likely on the basis of infection  3   Large hiatal hernia  4   New small bilateral pleural effusions  · Received IV lasix and IV fluids in the ER  · Pt has a archibald and he did not have any urine output from 11am to 5 pm when he felt severe pressure in his suprapubic area and he started having UO  · There is also a concern of fluid overload and chf exacerbation (diastolic)  · Nephrology consult appreciated, cont with diuresis  · Urology consulted as may need stenting vs PCN         VTE Pharmacologic Prophylaxis:   Pharmacologic: Heparin Drip    Patient Centered Rounds: I have performed bedside rounds with nursing staff today  Discussions with Specialists or Other Care Team Provider: nephrology    Current Length of Stay: 1 day(s)    Current Patient Status: Inpatient        Code Status: Level 1 - Full Code      Subjective:   Patient reports significant improvement in his le edema  Denies dypsnea, denies chest pain     Patient is seen and examined at bedside  All other ROS are negative  Objective:     Vitals:   Temp (24hrs), Av 2 °F (36 8 °C), Min:97 7 °F (36 5 °C), Max:98 4 °F (36 9 °C)    Temp:  [97 7 °F (36 5 °C)-98 4 °F (36 9 °C)] 98 3 °F (36 8 °C)  HR:  [80-86] 83  Resp:  [17-18] 17  BP: (116-157)/(58-88) 116/58  SpO2:  [94 %-99 %] 94 %  Body mass index is 28 39 kg/m²  Input and Output Summary (last 24 hours):        Intake/Output Summary (Last 24 hours) at 2022 1220  Last data filed at 2022 0520  Gross per 24 hour   Intake 259 44 ml Output 4665 ml   Net -4405 56 ml       Physical Exam:        GEN: No acute distress, comfortable  HEEENT: No JVD, PERRLA, no scleral icterus  RESP: Lungs clear to auscultation bilaterally  CV: RRR, +X1/X1 with systolic murmur  ABD: SOFT NON TENDER, POSITIVE BOWEL SOUNDS, NO DISTENTION  [place] in place, no suprapubic tender  PSYCH: CALM  NEURO: A X O X 3, NO FOCAL DEFICITS  SKIN: NO RASH  EXTREM: bilateral pitting edema    Additional Data:     Labs:    Results from last 7 days   Lab Units 01/16/22  0927 01/15/22  2015 01/15/22  2015   WBC Thousand/uL 8 27   < > 7 68   HEMOGLOBIN g/dL 9 8*   < > 9 3*   HEMATOCRIT % 33 7*   < > 31 6*   PLATELETS Thousands/uL 298   < > 189   NEUTROS PCT %  --   --  79*   LYMPHS PCT %  --   --  10*   MONOS PCT %  --   --  9   EOS PCT %  --   --  1    < > = values in this interval not displayed  Results from last 7 days   Lab Units 01/16/22 0927   SODIUM mmol/L 140   POTASSIUM mmol/L 4 1   CHLORIDE mmol/L 100   CO2 mmol/L 30   BUN mg/dL 39*   CREATININE mg/dL 5 88*   ANION GAP mmol/L 10   CALCIUM mg/dL 8 8   ALBUMIN g/dL 3 2*   TOTAL BILIRUBIN mg/dL 0 55   ALK PHOS U/L 94   ALT U/L 45   AST U/L 38   GLUCOSE RANDOM mg/dL 98     Results from last 7 days   Lab Units 01/16/22  0220   INR  1 05                       * I Have Reviewed All Lab Data Listed Above  Imaging:     Results for orders placed during the hospital encounter of 10/07/18    XR chest 1 view portable    Narrative  CHEST    INDICATION:   sob  COMPARISON:  Chest x-ray dated 1/13/2018    EXAM PERFORMED/VIEWS:  XR CHEST PORTABLE      FINDINGS:    No pneumothorax is seen  Some biapical pleural/parenchymal scarring is again seen, worse on the left  Paucity of lung markings in the bilateral upper lung fields is noted, suspicious for emphysematous changes  There is a large dense ill-defined opacity in the left lower lung field, suspicious for infection in the appropriate clinical setting    Lungs otherwise are grossly clear  Median sternotomy wires and metallic mediastinal clips are redemonstrated  The cardiac and mediastinal contours are stable in appearance  Visualized bones appear intact  Impression  Superimposed on chronic pleural/parenchymal and emphysematous changes, there is a large dense ill-defined opacity in the left lower lung field, suspicious for infection in the appropriate clinical setting  Correlation with the patient's symptoms and  laboratory values recommended  Other nonacute findings as above  Workstation performed: [id number]    Results for orders placed during the hospital encounter of 01/15/22    XR chest 2 views    Narrative  CHEST    INDICATION:   sob      COMPARISON:  December 30, 2021 CT from January 15, 2022    EXAM PERFORMED/VIEWS:  XR CHEST PA & LATERAL  Images: 3    FINDINGS:  Right-sided central venous line with tip in the SVC    Heart and mediastinum remain unchanged  Bilateral apical scarring and retraction of the nani as seen on the previous study  No new consolidation seen  Bilateral effusion seen with pleural thickening as seen on the previous study  Hiatal hernia seen  Left base is obscured, unchanged due to prominent cardiophrenic angle fat  Postsurgical changes from prior median sternotomy    Impression  No new consolidation  Blunting of the both CP angle due to small effusion as seen on the recent CT  Hiatal hernia        Workstation performed: [id number]      *I have reviewed all imaging reports listed above      Recent Cultures (last 7 days):           Last 24 Hours Medication List:   Current Facility-Administered Medications   Medication Dose Route Frequency Provider Last Rate    aspirin  81 mg Oral Daily Fly Way MD      atorvastatin  40 mg Oral Daily Fly Way MD      cefTRIAXone  1,000 mg Intravenous Q24H Fly Way MD      diltiazem  240 mg Oral Daily Fly Way MD      heparin (porcine)  3-20 Units/kg/hr (Order-Specific) Intravenous Titrated Bruna Romero, [place] 12 Units/kg/hr (01/16/22 1017)    HYDROmorphone  0 5 mg Intravenous Q4H PRN Mandeep Zuniga MD      oxyCODONE  5 mg Oral Q6H PRN Mandeep Zuniga MD      Or   Marta Thakkar oxyCODONE  2 5 mg Oral Q6H PRN Mandeep Zuniga MD      pantoprazole  40 mg Oral Early Morning Mandeep Zuniga MD          Today, Patient Was Seen By: Gonzalez Mac MD    ** Please Note: Dictation voice to text software may have been used in the creation of this document   **

## 2022-01-16 NOTE — ASSESSMENT & PLAN NOTE
· Likely from fluid overload  · Will benefit from lasix  · Follow up on urine studies  · Has received lasix and IV fluids by the ER

## 2022-01-16 NOTE — ASSESSMENT & PLAN NOTE
Wt Readings from Last 3 Encounters:   01/15/22 82 1 kg (181 lb)   01/14/22 82 1 kg (181 lb)   01/06/22 82 1 kg (181 lb)     · Elevated NT pro BNP, b/l worsening pedal edema and SOB, no hypoxia  · Got IV lasix 40mg in the ER  · Strict Is Os  · Low sodium diet   · Follow urine studies

## 2022-01-17 ENCOUNTER — HOSPITAL ENCOUNTER (OUTPATIENT)
Dept: INFUSION CENTER | Facility: CLINIC | Age: 82
Discharge: HOME | End: 2022-01-17

## 2022-01-17 DIAGNOSIS — D50.9 IRON DEFICIENCY ANEMIA, UNSPECIFIED IRON DEFICIENCY ANEMIA TYPE: ICD-10-CM

## 2022-01-17 DIAGNOSIS — C67.8 CANCER OF OVERLAPPING SITES OF BLADDER (HCC): Primary | ICD-10-CM

## 2022-01-17 LAB
ALBUMIN SERPL BCP-MCNC: 2.4 G/DL (ref 3.5–5)
ALP SERPL-CCNC: 68 U/L (ref 46–116)
ALT SERPL W P-5'-P-CCNC: 44 U/L (ref 12–78)
ANION GAP SERPL CALCULATED.3IONS-SCNC: 9 MMOL/L (ref 4–13)
APTT PPP: 80 SECONDS (ref 23–37)
AST SERPL W P-5'-P-CCNC: 35 U/L (ref 5–45)
BACTERIA UR CULT: NORMAL
BASOPHILS # BLD AUTO: 0.03 THOUSANDS/ΜL (ref 0–0.1)
BASOPHILS NFR BLD AUTO: 1 % (ref 0–1)
BILIRUB SERPL-MCNC: 0.45 MG/DL (ref 0.2–1)
BUN SERPL-MCNC: 41 MG/DL (ref 5–25)
CALCIUM ALBUM COR SERPL-MCNC: 9.2 MG/DL (ref 8.3–10.1)
CALCIUM SERPL-MCNC: 7.9 MG/DL (ref 8.3–10.1)
CHLORIDE SERPL-SCNC: 102 MMOL/L (ref 100–108)
CO2 SERPL-SCNC: 29 MMOL/L (ref 21–32)
CREAT SERPL-MCNC: 5.25 MG/DL (ref 0.6–1.3)
EOSINOPHIL # BLD AUTO: 0.16 THOUSAND/ΜL (ref 0–0.61)
EOSINOPHIL NFR BLD AUTO: 3 % (ref 0–6)
ERYTHROCYTE [DISTWIDTH] IN BLOOD BY AUTOMATED COUNT: 19.1 % (ref 11.6–15.1)
GFR SERPL CREATININE-BSD FRML MDRD: 9 ML/MIN/1.73SQ M
GLUCOSE SERPL-MCNC: 90 MG/DL (ref 65–140)
HCT VFR BLD AUTO: 27.8 % (ref 36.5–49.3)
HGB BLD-MCNC: 8.1 G/DL (ref 12–17)
IMM GRANULOCYTES # BLD AUTO: 0.03 THOUSAND/UL (ref 0–0.2)
IMM GRANULOCYTES NFR BLD AUTO: 1 % (ref 0–2)
INR PPP: 1.07 (ref 0.84–1.19)
LYMPHOCYTES # BLD AUTO: 0.66 THOUSANDS/ΜL (ref 0.6–4.47)
LYMPHOCYTES NFR BLD AUTO: 11 % (ref 14–44)
MCH RBC QN AUTO: 21.3 PG (ref 26.8–34.3)
MCHC RBC AUTO-ENTMCNC: 29.1 G/DL (ref 31.4–37.4)
MCV RBC AUTO: 73 FL (ref 82–98)
MONOCYTES # BLD AUTO: 0.67 THOUSAND/ΜL (ref 0.17–1.22)
MONOCYTES NFR BLD AUTO: 11 % (ref 4–12)
NEUTROPHILS # BLD AUTO: 4.43 THOUSANDS/ΜL (ref 1.85–7.62)
NEUTS SEG NFR BLD AUTO: 73 % (ref 43–75)
NRBC BLD AUTO-RTO: 0 /100 WBCS
PLATELET # BLD AUTO: 223 THOUSANDS/UL (ref 149–390)
PMV BLD AUTO: 10.5 FL (ref 8.9–12.7)
POTASSIUM SERPL-SCNC: 4.1 MMOL/L (ref 3.5–5.3)
PROT SERPL-MCNC: 5.9 G/DL (ref 6.4–8.2)
PROTHROMBIN TIME: 13.5 SECONDS (ref 11.6–14.5)
RBC # BLD AUTO: 3.81 MILLION/UL (ref 3.88–5.62)
SODIUM SERPL-SCNC: 140 MMOL/L (ref 136–145)
WBC # BLD AUTO: 5.98 THOUSAND/UL (ref 4.31–10.16)

## 2022-01-17 PROCEDURE — NC001 PR NO CHARGE: Performed by: STUDENT IN AN ORGANIZED HEALTH CARE EDUCATION/TRAINING PROGRAM

## 2022-01-17 PROCEDURE — 99232 SBSQ HOSP IP/OBS MODERATE 35: CPT | Performed by: INTERNAL MEDICINE

## 2022-01-17 PROCEDURE — 85610 PROTHROMBIN TIME: CPT | Performed by: NURSE PRACTITIONER

## 2022-01-17 PROCEDURE — 85025 COMPLETE CBC W/AUTO DIFF WBC: CPT | Performed by: HOSPITALIST

## 2022-01-17 PROCEDURE — 80053 COMPREHEN METABOLIC PANEL: CPT | Performed by: HOSPITALIST

## 2022-01-17 PROCEDURE — 99233 SBSQ HOSP IP/OBS HIGH 50: CPT | Performed by: INTERNAL MEDICINE

## 2022-01-17 PROCEDURE — 85730 THROMBOPLASTIN TIME PARTIAL: CPT | Performed by: INTERNAL MEDICINE

## 2022-01-17 PROCEDURE — 99222 1ST HOSP IP/OBS MODERATE 55: CPT | Performed by: NURSE PRACTITIONER

## 2022-01-17 RX ORDER — FUROSEMIDE 40 MG/1
40 TABLET ORAL DAILY
Status: DISCONTINUED | OUTPATIENT
Start: 2022-01-17 | End: 2022-01-17

## 2022-01-17 RX ORDER — FUROSEMIDE 10 MG/ML
40 INJECTION INTRAMUSCULAR; INTRAVENOUS ONCE
Status: COMPLETED | OUTPATIENT
Start: 2022-01-17 | End: 2022-01-17

## 2022-01-17 RX ADMIN — OXYCODONE HYDROCHLORIDE 5 MG: 5 TABLET ORAL at 18:08

## 2022-01-17 RX ADMIN — ASPIRIN 81 MG: 81 TABLET, CHEWABLE ORAL at 09:22

## 2022-01-17 RX ADMIN — FUROSEMIDE 40 MG: 10 INJECTION, SOLUTION INTRAMUSCULAR; INTRAVENOUS at 11:59

## 2022-01-17 RX ADMIN — DILTIAZEM HYDROCHLORIDE 240 MG: 240 CAPSULE, COATED, EXTENDED RELEASE ORAL at 09:22

## 2022-01-17 RX ADMIN — ATORVASTATIN CALCIUM 40 MG: 40 TABLET, FILM COATED ORAL at 09:22

## 2022-01-17 RX ADMIN — PANTOPRAZOLE SODIUM 40 MG: 40 TABLET, DELAYED RELEASE ORAL at 06:23

## 2022-01-17 RX ADMIN — CEFTRIAXONE SODIUM 1000 MG: 10 INJECTION, POWDER, FOR SOLUTION INTRAVENOUS at 20:59

## 2022-01-17 RX ADMIN — HEPARIN SODIUM AND DEXTROSE 12 UNITS/KG/HR: 10000; 5 INJECTION INTRAVENOUS at 23:55

## 2022-01-17 NOTE — NURSING NOTE
Pt refuses chair alarm to be put under him as the bed alarm doesn't work, pt made aware it's for safety purposes but still declines

## 2022-01-17 NOTE — CONSULTS
UROLOGY CONSULTATION NOTE     Patient Identifiers: Noman Erazo (MRN [id number])  Service Requesting Consultation: Internal Medicine  Service Providing Consultation:  Urology, MYLENE Hendrickson    Date of Service: 1/17/2022  Consults    Reason for Consultation: Hydronephrosis    ASSESSMENT/PLAN:     Bladder Cancer  · Status post cystoscopy, transurethral resection of large bladder tumor greater than 8 cm 11/30/2021  · Scheduled to start chemotherapy today, status post insertion of port  · Continue with outpatient medical oncology and urology evaluation as scheduled    Bilateral Hydronephrosis  · Will consult IR for insertion of bilateral nephrostomy tubes-patient in agreement with plan  · Attempting to contact [facility] for discussion and management of heparin drip- TT sent to Dr Celia Acosta  · Continue with Fortune catheter  · Nephrology following  · Continue to follow trend of BMP-renal function      History of Present Illness: Noman Erazo is a 80 y o  old male patient known to our service, Dr Guilherme Khan, with a history of bladder cancer status post cystoscopy and transurethral resection of large bladder tumor greater than 8 cm 11/30/2021  CT of abdomen pelvis performed 01/15/2022 reveals no change to moderate bilateral hydronephrosis and he continues acute kidney injury  Most recent BUN creatinine performed 12/17/2022 resulted 41/5 25  On evaluation in the bedside this morning, patient awake alert oriented x3 sitting at the bedside chair eating breakfast   He denies pain and discomfort  He reports mild issues with gas pains otherwise is asymptomatic  He denies suprapubic abdominal pain and flank pain  Fortune catheter continues to drain clear yellow urine  He is afebrile      Past Medical, Past Surgical History:     Past Medical History:   Diagnosis Date    A-fib [facility])     Arthritis     Benign prostatic hyperplasia without lower urinary tract symptoms     without Urinary Obstruction    Bladder cancer ([facility] )  CAD (coronary artery disease)     Cancer (HCC)     Chronic obstructive lung disease (HCC)     Coronary arteriosclerosis     Depression     Emphysema lung (HCC)     GERD (gastroesophageal reflux disease)     Hyperlipidemia     Hypertension     Irregular heart beat     Myocardial infarction ([address] )     Psoriasis     Requires supplemental oxygen     at bedtime during high humid days only    Stroke [facility])     TIA 1/2018   :    Past Surgical History:   Procedure Laterality Date    COLONOSCOPY      CORONARY ANGIOPLASTY  02/03/2001    PTCA of RCA    CORONARY ARTERY BYPASS GRAFT  02/07/2001    x4- [place]    HERNIA REPAIR      IR PORT PLACEMENT  1/14/2022    CA BRONCHOSCOPY,DIAGNOSTIC Left 1/7/2019    Procedure: BRONCHOSCOPY FLEXIBLE;  Surgeon: Camille Betancourt MD;  [facility]: [facility];   Service: Pulmonary    CA CYSTOURETHROSCOPY,FULGUR <0 5 CM LESN N/A 11/30/2021    Procedure: TRANSURETHRAL RESECTION OF BLADDER TUMOR (TURBT) with "large";  Surgeon: Christian Monk MD;  Location: [facility];  Service: Urology    CA CYSTOURETHROSCOPY,URETER CATHETER Bilateral 11/30/2021    Procedure: Exmore Brilliant;  Surgeon: Christian Monk MD;  [facility]: [facility];  Service: Urology    CA Vero Fredis INCIS Left 2/20/2018    Procedure: ENDARTERECTOMY ARTERY CAROTID WITH PATCH ANGIOPLASTY;  Surgeon: Leanne Belcher MD;  [facility];  Service: Vascular    TRANSURETHRAL RESECTION OF PROSTATE     :    Medications, Allergies:     Current Facility-Administered Medications   Medication Dose Route Frequency    aspirin chewable tablet 81 mg  81 mg Oral Daily    atorvastatin (LIPITOR) tablet 40 mg  40 mg Oral Daily    ceftriaxone (ROCEPHIN) 1 g/50 mL in dextrose IVPB  1,000 mg Intravenous Q24H    diltiazem (CARDIZEM CD) 24 hr capsule 240 mg  240 mg Oral Daily    heparin (porcine) 25,000 units in D5W 250 mL infusion (premix)  3-20 Units/kg/hr (Order-Specific) Intravenous Titrated    HYDROmorphone (DILAUDID) injection 0 5 mg  0 5 mg Intravenous Q4H PRN    oxyCODONE (ROXICODONE) IR tablet 5 mg  5 mg Oral Q6H PRN    Or    oxyCODONE (ROXICODONE) IR tablet 2 5 mg  2 5 mg Oral Q6H PRN    pantoprazole (PROTONIX) EC tablet 40 mg  40 mg Oral Early Morning       Allergies: Allergies   Allergen Reactions    Penicillins Swelling and Itching   :    Social and Family History:   Social History:   Social History     Tobacco Use    Smoking status: Former Smoker     Years: 1 00     Types: Cigarettes    Smokeless tobacco: Never Used    Tobacco comment: few cigarettes when playing cards  Vaping Use    Vaping Use: Never used   Substance Use Topics    Alcohol use: Yes     Comment: special occasions only wine   Drug use: No        Social History     Tobacco Use   Smoking Status Former Smoker    Years: 1 00    Types: Cigarettes   Smokeless Tobacco Never Used   Tobacco Comment    few cigarettes when playing cards  Family History:  Family History   Problem Relation Age of Onset    Lung cancer Mother     Cancer Mother     Other Father         sepsis   :     Review of Systems:     General: Fever, chills, or night sweats: negative  Cardiac: Negative for chest pain  Pulmonary: Negative for shortness of breath  Gastrointestinal: Abdominal pain negative  Nausea, vomiting, or diarrhea negative,  Genitourinary: See HPI above  Patient does not have hematuria  All other systems queried were negative  Physical Exam:   General: Patient is pleasant and in NAD  Awake and alert  /62   Pulse 66   Temp 98 9 °F (37 2 °C)   Resp 17   Ht 5' 8" (1 727 m)   Wt 84 7 kg (186 lb 11 7 oz)   SpO2 90%   BMI 28 39 kg/m² Temp (24hrs), Av 4 °F (36 9 °C), Min:97 9 °F (36 6 °C), Max:98 9 °F (37 2 °C)  current; Temperature: 98 9 °F (37 2 °C)  I/O last 24 hours:   In: 240 [P O :240]  Out: 2275 [Urine:2275]  Skin: warm, dry, intact  Cardiac: S1S2, HRR, Peripheral edema: negative  Pulmonary: Non-labored breathing  Abdomen: Soft, non-tender, non-distended  No surgical scars  No masses, tenderness, hernias noted  Musculoskeletal: AROM with no joint deformity or tenderness  Neurology: alert, oriented x3, affect appropriate, no focal neurological deficits, moves all extremities well and no involuntary movements  Genitourinary: Negative CVA tenderness, negative suprapubic tenderness  ARCHIBALD: in place draining clear yellow urine        Labs:     Lab Results   Component Value Date    HGB 8 1 (L) 01/17/2022    HCT 27 8 (L) 01/17/2022    WBC 5 98 01/17/2022     01/17/2022   ]    Lab Results   Component Value Date    K 4 1 01/17/2022     01/17/2022    CO2 29 01/17/2022    BUN 41 (H) 01/17/2022    CREATININE 5 25 (H) 01/17/2022    CALCIUM 7 9 (L) 01/17/2022   ]    Imaging:   I personally reviewed the images and report of the following studies, and reviewed them with the patient:        CT ABDOMEN AND PELVIS WITHOUT IV CONTRAST     INDICATION:   Bladder-neck obstruction  looking for obstruction in the urinary tract causing DULCE  pt has archibald and bladder ca      COMPARISON:  12/29/2021      TECHNIQUE:  CT examination of the abdomen and pelvis was performed without intravenous contrast   Axial, sagittal, and coronal 2D reformatted images were created from the source data and submitted for interpretation       Radiation dose length product (DLP) for this visit:  674 mGy-cm   This examination, like all CT scans performed in the [facility], was performed utilizing techniques to minimize radiation dose exposure, including the use of iterative   reconstruction and automated exposure control       Enteric contrast was not administered       FINDINGS:     ABDOMEN     LOWER CHEST:  Small bilateral pleural effusions, new since the prior study  Large hiatal hernia      LIVER/BILIARY TREE:  Stable tiny hepatic cysts and subcentimeter hepatic hypodensities too small to accurately characterize    No suspicious solid hepatic lesion is identified  Hepatic contours are normal   No biliary dilatation      GALLBLADDER:  There are gallstone(s) within the gallbladder, without pericholecystic inflammatory changes      SPLEEN:  Unremarkable      PANCREAS:  Unremarkable      ADRENAL GLANDS:  Unremarkable      KIDNEYS/URETERS:  No significant change in moderate left and mild right hydroureteronephrosis up to level of the bladder  There is increased bilateral perinephric and periureteric inflammation when compared to the prior study  Bilateral ureteral wall   thickening noted  Stable left renal exophytic cyst      STOMACH AND BOWEL:  Fecalization of small bowel contents consistent with slow transit  No bowel obstruction      APPENDIX:  No findings to suggest appendicitis      ABDOMINOPELVIC CAVITY:  No ascites  No pneumoperitoneum  Stable subcentimeter mesenteric, retroperitoneal, and pelvic lymph nodes      VESSELS:  Atherosclerotic changes are present  Fusiform ectasia of the infrarenal abdominal aorta extending to the common iliac bifurcation as before      PELVIS     REPRODUCTIVE ORGANS:  Unremarkable for patient's age      URINARY BLADDER:  Decompressed with a Fortune catheter in place  Small volume of nondependent intravesical air  Postoperative changes with contour deformity along the posterior left lateral wall again seen  There is now diffuse bladder wall thickening   with extensive perivesical fat stranding      ABDOMINAL WALL/INGUINAL REGIONS:  Surgical changes of prior bilateral inguinal hernia repair  Recurrent prominent fat-containing left inguinal hernia is reidentified      OSSEOUS STRUCTURES:  No acute fracture or destructive osseous lesion      IMPRESSION:     1  Decompressed urinary bladder with Fortune catheter in place  There is now diffuse wall thickening of the urinary bladder with extensive perivesical fat stranding compatible with cystitis   Small volume of nondependent intravesical air may be related to   instrumentation or infection  2   No significant change in moderate left and mild right hydroureteronephrosis  There is increased perinephric and periureteric inflammation bilaterally, likely on the basis of infection  3   Large hiatal hernia  4   New small bilateral pleural effusions          Thank you for allowing me to participate in this patients care  Please do not hesitate to call with any additional questions      MYLENE Stahl

## 2022-01-17 NOTE — ASSESSMENT & PLAN NOTE
· Pt with h/o bladder cancer and archibald, scheduled for OP chemo  · CT abd pelvis non contrast   1   Decompressed urinary bladder with Archibald catheter in place   There is now diffuse wall thickening of the urinary bladder with extensive perivesical fat stranding compatible with cystitis  Small volume of nondependent intravesical air may be related to    instrumentation or infection  2   No significant change in moderate left and mild right hydroureteronephrosis  Antonetta Balsam is increased perinephric and periureteric inflammation bilaterally, likely on the basis of infection  3   Large hiatal hernia  4   New small bilateral pleural effusions  · Received IV lasix and IV fluids in the ER  · Pt has a archibald and he did not have any urine output from 11am to 5 pm when he felt severe pressure in his suprapubic area and he started having UO  · There is also a concern of fluid overload and chf exacerbation (diastolic)  · Nephrology consult appreciated, their input appreciated  · Urology consulted    Percutaneous nephrostomy tubes planned for tomorrow by Interventional Radiology

## 2022-01-17 NOTE — CONSULTS
e-Consult (IP[facility])  - Interventional Radiology  Celena Solorzano 80 y o  male MRN: [id number]  Unit/Bed#: -02 Encounter: [id number]    IR has been consulted to evaluate the patient, determine the appropriate procedure, and whether or not a procedure can and should be performed regarding the care of Celena Solorzano  We were consulted by urology concerning this patient, and to possibly perform a b/l PCNs if medically appropriate for the patient  Consults  01/17/22      Assessment/Recommendation:   80 yom PMH bladder ca, p/w worsening L>R hydronephrosis, DULCE on CKD  IR consulted for b/l PCNs  We'll plan on b/l PCN placement tomorrow pending anesthesia availability  Please make NPO at midnight  Total time spent in review of data, discussion with requesting provider and rendering advice was 10 minutes  Patient or appropriate family member was verbally informed by urology of this consultative service on their behalf to provide more timely access to specialty care in lieu of an in person consultation  Verbal consent was obtained  Thank you for allowing Interventional Radiology to participate in the care of Celena Solorzano  Please don't hesitate to call or TigerText us with any questions       Chuy Tovar MD

## 2022-01-17 NOTE — ASSESSMENT & PLAN NOTE
· Rate controlled  · Cont cardizem home dose  Blood pressure 133/66, pulse 65, temperature 98 2 °F (36 8 °C), resp  rate 19, height 5' 8" (1 727 m), weight 84 7 kg (186 lb 11 7 oz), SpO2 (!) 89 %

## 2022-01-17 NOTE — PROGRESS NOTES
[address] [facility]  Progress Note - Gena Emery 1940, 80 y o  male MRN: [id number]  Unit/Bed#: -91 Encounter: [id number]  Primary Care Provider: Cathy Clifford MD   Date and time admitted to hospital: 1/15/2022  7:10 PM    Pleural effusion on left  Assessment & Plan  · Wet read XR chest, improved with diuresis  · Likely from chronic diastolic heart failure    Hyponatremia  Assessment & Plan  · Likely from fluid overload  · Resolved with diuresis  Atrial fibrillation ([facility] )  Assessment & Plan  · Rate controlled  · Cont cardizem home dose  Blood pressure 133/66, pulse 65, temperature 98 2 °F (36 8 °C), resp  rate 19, height 5' 8" (1 727 m), weight 84 7 kg (186 lb 11 7 oz), SpO2 (!) 89 %  Acute on chronic diastolic congestive heart failure (HCC)  Assessment & Plan  Wt Readings from Last 3 Encounters:   01/15/22 84 7 kg (186 lb 11 7 oz)   01/14/22 82 1 kg (181 lb)   01/06/22 82 1 kg (181 lb)     · Elevated NT pro BNP, b/l worsening pedal edema and SOB, no hypoxia  · Cardiology input appreciated and they suggest that it is chronic diastolic heart failure  The fluid overload is due to obstructive uropathy and not due to acute on chronic diastolic congestive heart failure  Diuresis as per nephrology, improving , no more intravenous diuretics for now         COPD, severe ([facility] )  Assessment & Plan  · Stable currently  No wheezing      S/P CABG x 4  Assessment & Plan  · Elevated troponin could be likely from DULCE/CKD vs CHF exac  · Cardiology was consulted  Input appreciated  No active coronary syndrome  · EKG shows no new change compared to previous EKG: RBBB with T inversion V1-2   NSR    CAD (coronary atherosclerotic disease)  Assessment & Plan  - no chest pain  - continue ASA statin     * DULCE (acute kidney [facility]) ([facility] ) on CKD 3  Assessment & Plan  · Pt with h/o bladder cancer and archibald, scheduled for OP chemo  · CT abd pelvis non contrast   1   Decompressed urinary bladder with Archibald catheter in place  Ami Reap is now diffuse wall thickening of the urinary bladder with extensive perivesical fat stranding compatible with cystitis  Small volume of nondependent intravesical air may be related to    instrumentation or infection  2   No significant change in moderate left and mild right hydroureteronephrosis  Ami Reap is increased perinephric and periureteric inflammation bilaterally, likely on the basis of infection  3   Large hiatal hernia  4   New small bilateral pleural effusions  · Received IV lasix and IV fluids in the ER  · Pt has a archibald and he did not have any urine output from 11am to 5 pm when he felt severe pressure in his suprapubic area and he started having UO  · There is also a concern of fluid overload and chf exacerbation (diastolic)  · Nephrology consult appreciated, their input appreciated  · Urology consulted  Percutaneous nephrostomy tubes planned for tomorrow by Interventional Radiology      TE Pharmacologic Prophylaxis: Heparin    Patient Centered Rounds: I have performed bedside rounds with nursing staff today  Discussions with Specialists or Other Care Team Provider:  Cardiology, Nephrology, Urology and Interventional Radiology  Education and Discussions with Family / Patient:  Patient and granddaughter    Current Length of Stay: 2 day(s)  Current Patient Status: Inpatient     Certification Statement: The patient will continue to require additional inpatient hospital stay due to DULCE (acute kidney injury) [facility])  Discharge Plan / Estimated Discharge Date:  2-3 days    Code Status: Level 1 - Full Code  ______________________________________________________________________________    Subjective:   Patient seen and examined by me  No chest pain, palpitations or diaphoresis at this point of time  No nausea or vomiting  Does have weakness of the weakness is generalized  No fever or chills at this point of time    States that his appetite and weakness is slightly better today compared with yesterday  Objective:   Vitals: Blood pressure 133/66, pulse 65, temperature 98 2 °F (36 8 °C), resp  rate 19, height 5' 8" (1 727 m), weight 84 7 kg (186 lb 11 7 oz), SpO2 (!) 89 %      Physical Exam:   General appearance: alert, appears stated age and cooperative  Constitutional- looks a little weak  HEENT - atraumatic and normocephalic  Neck- supple  Skin - no fresh rash  Extremities no fresh focal deformities  Cardiovascular- S1-S2 heard  Respiratory- bilateral air entry present, no crackles or rhonchi  Skin - no fresh rash  Abdomen - normal bowel sounds present, no rebound tenderness  CNS- No fresh focal deficits  Psych- no acute psychosis     Additional Data:   Labs:  Results from last 7 days   Lab Units 01/17/22  0608 01/16/22  0927 01/16/22  0220 01/15/22  2015 01/15/22  1045   WBC Thousand/uL 5 98 8 27  --  7 68 9 14   HEMOGLOBIN g/dL 8 1* 9 8*  --  9 3* 9 4*   HEMATOCRIT % 27 8* 33 7*  --  31 6* 32 3*   MCV fL 73* 73*  --  74* 74*   PLATELETS Thousands/uL 223 298  --  189 267   INR  1 07  --  1 05  --   --      Results from last 7 days   Lab Units 01/17/22  0608 01/16/22  0927 01/16/22  0052 01/15/22  2015 01/15/22  1045   SODIUM mmol/L 140 140 137 131* 133*   POTASSIUM mmol/L 4 1 4 1 4 0 4 9 4 9   CHLORIDE mmol/L 102 100 100 95* 99*   CO2 mmol/L 29 30 25 23 25   ANION GAP mmol/L 9 10 12 13 9   BUN mg/dL 41* 39* 45* 47* 46*   CREATININE mg/dL 5 25* 5 88* 7 16* 8 01* 8 05*   CALCIUM mg/dL 7 9* 8 8 8 0* 8 2* 8 1*   ALBUMIN g/dL 2 4* 3 2*  --  3 0* 3 1*   TOTAL BILIRUBIN mg/dL 0 45 0 55  --  0 81 1 17*   ALK PHOS U/L 68 94  --  82 88   ALT U/L 44 45  --  35 34   AST U/L 35 38  --  37 29   EGFR ml/min/1 73sq m 9 8 6 5 5   GLUCOSE RANDOM mg/dL 90 98 98 105 98     Results from last 7 days   Lab Units 01/16/22  0927   MAGNESIUM mg/dL 2 4         Results from last 7 days   Lab Units 01/15/22  2015   NT-PRO BNP pg/mL 4,470*                      * I Have Reviewed All Lab Data Listed Above  Cultures:   Results from last 7 days   Lab Units 01/15/22  2202 01/15/22  2019   URINE CULTURE   --  >100,000 cfu/ml    INFLUENZA A PCR  Negative  --      Results from last 7 days   Lab Units 01/15/22  2202   INFLUENZA A PCR  Negative   INFLUENZA B PCR  Negative   RSV PCR  Negative         Imaging:  Imaging Reports Reviewed Today Include:   CT abdomen pelvis wo contrast    Result Date: 1/16/2022  Impression: 1  Decompressed urinary bladder with Fortune catheter in place  There is now diffuse wall thickening of the urinary bladder with extensive perivesical fat stranding compatible with cystitis  Small volume of nondependent intravesical air may be related to  instrumentation or infection  2   No significant change in moderate left and mild right hydroureteronephrosis  There is increased perinephric and periureteric inflammation bilaterally, likely on the basis of infection  3   Large hiatal hernia  4   New small bilateral pleural effusions  The study was marked in [facility] for immediate notification   Workstation performed: [id number]     XR chest 2 views    Result Date: 1/16/2022  Impression: No new consolidation Blunting of the both CP angle due to small effusion as seen on the recent CT Hiatal hernia Workstation performed: [id number]     Scheduled Meds:  Current Facility-Administered Medications   Medication Dose Route Frequency Provider Last Rate    aspirin  81 mg Oral Daily Estefanía Kern MD      atorvastatin  40 mg Oral Daily Estefanía Kern MD      cefTRIAXone  1,000 mg Intravenous Q24H Estefanía Kern MD 1,000 mg (01/16/22 2214)    diltiazem  240 mg Oral Daily Estefanía Kern MD      heparin (porcine)  3-20 Units/kg/hr (Order-Specific) Intravenous Titrated GABRIEL Gomez 12 Units/kg/hr (01/17/22 1055)    HYDROmorphone  0 5 mg Intravenous Q4H PRN Estefanía Kern MD      oxyCODONE  5 mg Oral Q6H PRN Estefanía Kern MD      Or    oxyCODONE  2 5 mg Oral Q6H PRN Estefanía Kern MD      pantoprazole  40 mg Oral Early Morning MD Klever Mac MD [address] Internal Medicine    ** Please Note: This note has been constructed using a voice recognition system   **

## 2022-01-17 NOTE — ASSESSMENT & PLAN NOTE
Wt Readings from Last 3 Encounters:   01/15/22 84 7 kg (186 lb 11 7 oz)   01/14/22 82 1 kg (181 lb)   01/06/22 82 1 kg (181 lb)     · Elevated NT pro BNP, b/l worsening pedal edema and SOB, no hypoxia  · Cardiology input appreciated and they suggest that it is chronic diastolic heart failure    The fluid overload is due to obstructive uropathy and not due to acute on chronic diastolic congestive heart failure  Diuresis as per nephrology, improving , no more intravenous diuretics for now

## 2022-01-17 NOTE — PLAN OF CARE
Problem: Potential for Falls  Goal: Patient will remain free of falls  Description: INTERVENTIONS:  - Educate patient/family on patient safety including physical limitations  - Instruct patient to call for assistance with activity   - Consult OT/PT to assist with strengthening/mobility   - Keep Call bell within reach  - Keep bed low and locked with side rails adjusted as appropriate  - Keep care items and personal belongings within reach  - Initiate and maintain comfort rounds  - Make Fall Risk Sign visible to staff  - Offer Toileting every    Hours, in advance of need  - Initiate/Maintain   alarm  - Obtain necessary fall risk management equipment:     - Apply yellow socks and bracelet for high fall risk patients  - Consider moving patient to room near nurses station  Outcome: Progressing     Problem: PAIN - ADULT  Goal: Verbalizes/displays adequate comfort level or baseline comfort level  Description: Interventions:  - Encourage patient to monitor pain and request assistance  - Assess pain using appropriate pain scale  - Administer analgesics based on type and severity of pain and evaluate response  - Implement non-pharmacological measures as appropriate and evaluate response  - Consider cultural and social influences on pain and pain management  - Notify physician/advanced practitioner if interventions unsuccessful or patient reports new pain  Outcome: Progressing     Problem: INFECTION - ADULT  Goal: Absence or prevention of progression during hospitalization  Description: INTERVENTIONS:  - Assess and monitor for signs and symptoms of infection  - Monitor lab/diagnostic results  - Monitor all insertion sites, i e  indwelling lines, tubes, and drains  - Monitor endotracheal if appropriate and nasal secretions for changes in amount and color  - [place] appropriate cooling/warming therapies per order  - Administer medications as ordered  - Instruct and encourage patient and family to use good hand hygiene technique  - Identify and instruct in appropriate isolation precautions for identified infection/condition  Outcome: Progressing  Goal: Absence of fever/infection during neutropenic period  Description: INTERVENTIONS:  - Monitor WBC    Outcome: Progressing     Problem: SAFETY ADULT  Goal: Patient will remain free of falls  Description: INTERVENTIONS:  - Educate patient/family on patient safety including physical limitations  - Instruct patient to call for assistance with activity   - Consult OT/PT to assist with strengthening/mobility   - Keep Call bell within reach  - Keep bed low and locked with side rails adjusted as appropriate  - Keep care items and personal belongings within reach  - Initiate and maintain comfort rounds  - Make Fall Risk Sign visible to staff  - Offer Toileting every    Hours, in advance of need  - Initiate/Maintain   alarm  - Obtain necessary fall risk management equipment:     - Apply yellow socks and bracelet for high fall risk patients  - Consider moving patient to room near nurses station  Outcome: Progressing  Goal: Maintain or return to baseline ADL function  Description: INTERVENTIONS:  -  Assess patient's ability to carry out ADLs; assess patient's baseline for ADL function and identify physical deficits which impact ability to perform ADLs (bathing, care of mouth/teeth, toileting, grooming, dressing, etc )  - Assess/evaluate cause of self-care deficits   - Assess range of motion  - Assess patient's mobility; develop plan if impaired  - Assess patient's need for assistive devices and provide as appropriate  - Encourage maximum independence but intervene and supervise when necessary  - Involve family in performance of ADLs  - Assess for home care needs following discharge   - Consider OT consult to assist with ADL evaluation and planning for discharge  - Provide patient education as appropriate  Outcome: Progressing  Goal: Maintains/Returns to pre admission functional level  Description: INTERVENTIONS:  - Perform BMAT or MOVE assessment daily    - Set and communicate daily mobility goal to care team and patient/family/caregiver  - Collaborate with rehabilitation services on mobility goals if consulted  - Perform Range of Motion    times a day  - Reposition patient every    hours    - Dangle patient    times a day  - Stand patient    times a day  - Ambulate patient    times a day  - Out of bed to chair    times a day   - Out of bed for meals    times a day  - Out of bed for toileting  - Record patient progress and toleration of activity level   Outcome: Progressing     Problem: Prexisting or High Potential for Compromised Skin Integrity  Goal: Skin integrity is maintained or improved  Description: INTERVENTIONS:  - Identify patients at risk for skin breakdown  - Assess and monitor skin integrity  - Assess and monitor nutrition and hydration status  - Monitor labs   - Assess for incontinence   - Turn and reposition patient  - Assist with mobility/ambulation  - Relieve pressure over bony prominences  - Avoid friction and shearing  - Provide appropriate hygiene as needed including keeping skin clean and dry  - Evaluate need for skin moisturizer/barrier cream  - Collaborate with interdisciplinary team   - Patient/family teaching  - Consider wound care consult   Outcome: Progressing     Problem: MOBILITY - ADULT  Goal: Maintain or return to baseline ADL function  Description: INTERVENTIONS:  -  Assess patient's ability to carry out ADLs; assess patient's baseline for ADL function and identify physical deficits which impact ability to perform ADLs (bathing, care of mouth/teeth, toileting, grooming, dressing, etc )  - Assess/evaluate cause of self-care deficits   - Assess range of motion  - Assess patient's mobility; develop plan if impaired  - Assess patient's need for assistive devices and provide as appropriate  - Encourage maximum independence but intervene and supervise when necessary  - Involve family in performance of ADLs  - Assess for home care needs following discharge   - Consider OT consult to assist with ADL evaluation and planning for discharge  - Provide patient education as appropriate  Outcome: Progressing  Goal: Maintains/Returns to pre admission functional level  Description: INTERVENTIONS:  - Perform BMAT or MOVE assessment daily    - Set and communicate daily mobility goal to care team and patient/family/caregiver  - Collaborate with rehabilitation services on mobility goals if consulted  - Perform Range of Motion    times a day  - Reposition patient every    hours    - Dangle patient    times a day  - Stand patient    times a day  - Ambulate patient    times a day  - Out of bed to chair    times a day   - Out of bed for meals  times a day  - Out of bed for toileting  - Record patient progress and toleration of activity level   Outcome: Progressing

## 2022-01-17 NOTE — PROGRESS NOTES
Cardiology Progress Note - Antonia Saini 80 y o  male MRN: [id number]    Unit/Bed#: -02 Encounter: [id number]      Assessment/Plan:  1  Nonischemic myocardial injury, peak troponin 554, elevated in the setting of acute kidney injury related to hydronephrosis and elevated BNP  2  Acute kidney injury due to obstructive uropathy, creatinine on admission 8 0, creatinine today 5 2  Net -6 4 L  Continue to monitor  Management per Nephrology/[facility] Hospitalist    3  Lower extremity edema will restart home Lasix dose, will give 1 IV dose prior  4  Chronic HFpEF, EF 60% in November 2021  Restart home Lasix  No Ace inhibitors/Arb due to impaired renal function  No beta-blockers given emphysema  5  CAD status post CABG x4 in 2001 (lima to LAD, SVG to RCA, sequential SVG to diagonal/OM 1)  Continue aspirin, Lipitor  Currently without chest pain  No beta-blockers due to emphysema    6  Paroxysmal atrial fibrillation, maintaining sinus rhythm  Continue Cardizem  Patient is on Coumadin at home; currently on heparin drip for planned nephrostomy tubes  Restart Coumadin when okay by [facility] Hospitalist   Goal INR 2-3       7  History of bladder cancer  Patient can tolerate chemotherapy from a cardiac standpoint  Management per [facility] Hospitalist and Hematology-Oncology    Patient is stable from a cardiac standpoint, will sign off  Call if needed  Subjective:   Patient seen and examined  No significant events overnight  I wish I could pee  Denies chest pain    Objective:     Vitals: Blood pressure 138/64, pulse 70, temperature 99 °F (37 2 °C), resp  rate 17, height 5' 8" (1 727 m), weight 84 7 kg (186 lb 11 7 oz), SpO2 93 %  , Body mass index is 28 39 kg/m² ,   Orthostatic Blood Pressures      Most Recent Value   Blood Pressure 138/64 filed at 01/17/2022 6565   Patient Position - Orthostatic VS Lying filed at 01/15/2022 8699 Intake/Output Summary (Last 24 hours) at 1/17/2022 1108  Last data filed at 1/17/2022 0300  Gross per 24 hour   Intake 240 ml   Output 975 ml   Net -735 ml         Physical Exam:    GEN: Claduia Robertson appears well, alert and oriented x 3, pleasant and cooperative   HEENT: pupils equal, round, and reactive to light; extraocular muscles intact  NECK: supple, no carotid bruits   HEART: regular rhythm, normal S1 and S2, no murmurs, clicks, gallops or rubs   LUNGS: clear to auscultation bilaterally; no wheezes, rales, or rhonchi   ABDOMEN: normal bowel sounds, soft, no tenderness, no distention  EXTREMITIES: +1 edema, peripheral pulses normal; no clubbing, cyanosis      Medications:      Current Facility-Administered Medications:     aspirin chewable tablet 81 mg, 81 mg, Oral, Daily, Anders Noble MD, 81 mg at 01/17/22 0194    atorvastatin (LIPITOR) tablet 40 mg, 40 mg, Oral, Daily, Anders Noble MD, 40 mg at 01/17/22 6908    ceftriaxone (ROCEPHIN) 1 g/50 mL in dextrose IVPB, 1,000 mg, Intravenous, Q24H, Anders Noble MD, Last Rate: 100 mL/hr at 01/16/22 2214, 1,000 mg at 01/16/22 2214    diltiazem (CARDIZEM CD) 24 hr capsule 240 mg, 240 mg, Oral, Daily, Anders Noble MD, 240 mg at 01/17/22 6610    furosemide (LASIX) injection 40 mg, 40 mg, Intravenous, Once, Kerry Juarez PA-C    furosemide (LASIX) tablet 40 mg, 40 mg, Oral, Daily, Fior Whittaker PA-C    heparin (porcine) 25,000 units in D5W 250 mL infusion (premix), 3-20 Units/kg/hr (Order-Specific), Intravenous, Titrated, Kierra Montes PA-C, Last Rate: 9 6 mL/hr at 01/17/22 1055, 12 Units/kg/hr at 01/17/22 1055    HYDROmorphone (DILAUDID) injection 0 5 mg, 0 5 mg, Intravenous, Q4H PRN, Anders Noble MD    oxyCODONE (ROXICODONE) IR tablet 5 mg, 5 mg, Oral, Q6H PRN, 5 mg at 01/16/22 1730 **OR** oxyCODONE (ROXICODONE) IR tablet 2 5 mg, 2 5 mg, Oral, Q6H PRN, Anders Noble MD    pantoprazole (PROTONIX) EC tablet 40 mg, 40 mg, Oral, Early Morning, Anders Noble MD, 40 mg at 01/17/22 7385     Labs & Results:        Results from last 7 days   Lab Units 01/17/22  0608 01/16/22  0927 01/15/22  2015   WBC Thousand/uL 5 98 8 27 7 68   HEMOGLOBIN g/dL 8 1* 9 8* 9 3*   HEMATOCRIT % 27 8* 33 7* 31 6*   PLATELETS Thousands/uL 223 298 189         Results from last 7 days   Lab Units 01/17/22  0608 01/16/22  0927 01/16/22  0052 01/15/22  2015 01/15/22  2015   POTASSIUM mmol/L 4 1 4 1 4 0   < > 4 9   CHLORIDE mmol/L 102 100 100   < > 95*   CO2 mmol/L 29 30 25   < > 23   BUN mg/dL 41* 39* 45*   < > 47*   CREATININE mg/dL 5 25* 5 88* 7 16*   < > 8 01*   CALCIUM mg/dL 7 9* 8 8 8 0*   < > 8 2*   ALK PHOS U/L 68 94  --   --  82   ALT U/L 44 45  --   --  35   AST U/L 35 38  --   --  37    < > = values in this interval not displayed  Results from last 7 days   Lab Units 01/17/22  0608 01/16/22  1550 01/16/22  0927 01/16/22  0220 01/16/22  0220   INR  1 07  --   --   --  1 05   PTT seconds 80* 74* 67*   < > 33    < > = values in this interval not displayed       Results from last 7 days   Lab Units 01/16/22 0927   MAGNESIUM mg/dL 2 4

## 2022-01-17 NOTE — ASSESSMENT & PLAN NOTE
· Elevated troponin could be likely from DULCE/CKD vs CHF exac  · Cardiology was consulted  Input appreciated  No active coronary syndrome  · EKG shows no new change compared to previous EKG: RBBB with T inversion V1-2   NSR

## 2022-01-17 NOTE — PLAN OF CARE
Problem: Potential for Falls  Goal: Patient will remain free of falls  Description: INTERVENTIONS:  - Educate patient/family on patient safety including physical limitations  - Instruct patient to call for assistance with activity   - Consult OT/PT to assist with strengthening/mobility   - Keep Call bell within reach  - Keep bed low and locked with side rails adjusted as appropriate  - Keep care items and personal belongings within reach  - Initiate and maintain comfort rounds  - Make Fall Risk Sign visible to staff  - Offer Toileting every  Hours, in advance of need  - Initiate/Maintain alarm  - Obtain necessary fall risk management equipment:   - Apply yellow socks and bracelet for high fall risk patients  - Consider moving patient to room near nurses station  Outcome: Progressing     Problem: PAIN - ADULT  Goal: Verbalizes/displays adequate comfort level or baseline comfort level  Description: Interventions:  - Encourage patient to monitor pain and request assistance  - Assess pain using appropriate pain scale  - Administer analgesics based on type and severity of pain and evaluate response  - Implement non-pharmacological measures as appropriate and evaluate response  - Consider cultural and social influences on pain and pain management  - Notify physician/advanced practitioner if interventions unsuccessful or patient reports new pain  Outcome: Progressing     Problem: INFECTION - ADULT  Goal: Absence or prevention of progression during hospitalization  Description: INTERVENTIONS:  - Assess and monitor for signs and symptoms of infection  - Monitor lab/diagnostic results  - Monitor all insertion sites, i e  indwelling lines, tubes, and drains  - Monitor endotracheal if appropriate and nasal secretions for changes in amount and color  - [place] appropriate cooling/warming therapies per order  - Administer medications as ordered  - Instruct and encourage patient and family to use good hand hygiene technique  - Identify and instruct in appropriate isolation precautions for identified infection/condition  Outcome: Progressing  Goal: Absence of fever/infection during neutropenic period  Description: INTERVENTIONS:  - Monitor WBC    Outcome: Progressing     Problem: SAFETY ADULT  Goal: Patient will remain free of falls  Description: INTERVENTIONS:  - Educate patient/family on patient safety including physical limitations  - Instruct patient to call for assistance with activity   - Consult OT/PT to assist with strengthening/mobility   - Keep Call bell within reach  - Keep bed low and locked with side rails adjusted as appropriate  - Keep care items and personal belongings within reach  - Initiate and maintain comfort rounds  - Make Fall Risk Sign visible to staff  - Offer Toileting every  Hours, in advance of need  - Initiate/Maintain alarm  - Obtain necessary fall risk management equipment:   - Apply yellow socks and bracelet for high fall risk patients  - Consider moving patient to room near nurses station  Outcome: Progressing  Goal: Maintain or return to baseline ADL function  Description: INTERVENTIONS:  -  Assess patient's ability to carry out ADLs; assess patient's baseline for ADL function and identify physical deficits which impact ability to perform ADLs (bathing, care of mouth/teeth, toileting, grooming, dressing, etc )  - Assess/evaluate cause of self-care deficits   - Assess range of motion  - Assess patient's mobility; develop plan if impaired  - Assess patient's need for assistive devices and provide as appropriate  - Encourage maximum independence but intervene and supervise when necessary  - Involve family in performance of ADLs  - Assess for home care needs following discharge   - Consider OT consult to assist with ADL evaluation and planning for discharge  - Provide patient education as appropriate  Outcome: Progressing  Goal: Maintains/Returns to pre admission functional level  Description: INTERVENTIONS:  - Perform BMAT or MOVE assessment daily    - Set and communicate daily mobility goal to care team and patient/family/caregiver  - Collaborate with rehabilitation services on mobility goals if consulted  - Perform Range of Motion  times a day  - Reposition patient every  hours    - Dangle patient  times a day  - Stand patient  times a day  - Ambulate patient  times a day  - Out of bed to chair  times a day   - Out of bed for meal times a day  - Out of bed for toileting  - Record patient progress and toleration of activity level   Outcome: Progressing     Problem: Prexisting or High Potential for Compromised Skin Integrity  Goal: Skin integrity is maintained or improved  Description: INTERVENTIONS:  - Identify patients at risk for skin breakdown  - Assess and monitor skin integrity  - Assess and monitor nutrition and hydration status  - Monitor labs   - Assess for incontinence   - Turn and reposition patient  - Assist with mobility/ambulation  - Relieve pressure over bony prominences  - Avoid friction and shearing  - Provide appropriate hygiene as needed including keeping skin clean and dry  - Evaluate need for skin moisturizer/barrier cream  - Collaborate with interdisciplinary team   - Patient/family teaching  - Consider wound care consult   Outcome: Progressing     Problem: MOBILITY - ADULT  Goal: Maintain or return to baseline ADL function  Description: INTERVENTIONS:  -  Assess patient's ability to carry out ADLs; assess patient's baseline for ADL function and identify physical deficits which impact ability to perform ADLs (bathing, care of mouth/teeth, toileting, grooming, dressing, etc )  - Assess/evaluate cause of self-care deficits   - Assess range of motion  - Assess patient's mobility; develop plan if impaired  - Assess patient's need for assistive devices and provide as appropriate  - Encourage maximum independence but intervene and supervise when necessary  - Involve family in performance of ADLs  - Assess for home care needs following discharge   - Consider OT consult to assist with ADL evaluation and planning for discharge  - Provide patient education as appropriate  Outcome: Progressing  Goal: Maintains/Returns to pre admission functional level  Description: INTERVENTIONS:  - Perform BMAT or MOVE assessment daily    - Set and communicate daily mobility goal to care team and patient/family/caregiver  - Collaborate with rehabilitation services on mobility goals if consulted  - Perform Range of Motion  times a day  - Reposition patient every  hours    - Dangle patient  times a day  - Stand patient  times a day  - Ambulate patient  times a day  - Out of bed to chair  times a day   - Out of bed for meals  times a day  - Out of bed for toileting  - Record patient progress and toleration of activity level   Outcome: Progressing

## 2022-01-18 ENCOUNTER — APPOINTMENT (INPATIENT)
Dept: NON INVASIVE DIAGNOSTICS | Facility: HOSPITAL | Age: 82
DRG: 694 | End: 2022-01-18
Attending: STUDENT IN AN ORGANIZED HEALTH CARE EDUCATION/TRAINING PROGRAM
Payer: MEDICARE

## 2022-01-18 ENCOUNTER — ANESTHESIA EVENT (INPATIENT)
Dept: NON INVASIVE DIAGNOSTICS | Facility: HOSPITAL | Age: 82
DRG: 694 | End: 2022-01-18
Payer: MEDICARE

## 2022-01-18 LAB
ANION GAP SERPL CALCULATED.3IONS-SCNC: 9 MMOL/L (ref 4–13)
APTT PPP: 72 SECONDS (ref 23–37)
ATRIAL RATE: 75 BPM
ATRIAL RATE: 76 BPM
BUN SERPL-MCNC: 42 MG/DL (ref 5–25)
CALCIUM SERPL-MCNC: 8.3 MG/DL (ref 8.3–10.1)
CHLORIDE SERPL-SCNC: 102 MMOL/L (ref 100–108)
CO2 SERPL-SCNC: 30 MMOL/L (ref 21–32)
CREAT SERPL-MCNC: 5.72 MG/DL (ref 0.6–1.3)
ERYTHROCYTE [DISTWIDTH] IN BLOOD BY AUTOMATED COUNT: 19.3 % (ref 11.6–15.1)
FLUAV RNA RESP QL NAA+PROBE: NEGATIVE
FLUBV RNA RESP QL NAA+PROBE: NEGATIVE
GFR SERPL CREATININE-BSD FRML MDRD: 8 ML/MIN/1.73SQ M
GLUCOSE SERPL-MCNC: 96 MG/DL (ref 65–140)
HCT VFR BLD AUTO: 28.9 % (ref 36.5–49.3)
HGB BLD-MCNC: 8.6 G/DL (ref 12–17)
MCH RBC QN AUTO: 21.8 PG (ref 26.8–34.3)
MCHC RBC AUTO-ENTMCNC: 29.8 G/DL (ref 31.4–37.4)
MCV RBC AUTO: 73 FL (ref 82–98)
P AXIS: 58 DEGREES
P AXIS: 68 DEGREES
P AXIS: 84 DEGREES
P AXIS: 86 DEGREES
PLATELET # BLD AUTO: 238 THOUSANDS/UL (ref 149–390)
PMV BLD AUTO: 10.2 FL (ref 8.9–12.7)
POTASSIUM SERPL-SCNC: 4.2 MMOL/L (ref 3.5–5.3)
PR INTERVAL: 130 MS
PR INTERVAL: 134 MS
PR INTERVAL: 144 MS
PR INTERVAL: 144 MS
QRS AXIS: 149 DEGREES
QRS AXIS: 149 DEGREES
QRS AXIS: 66 DEGREES
QRS AXIS: 68 DEGREES
QRSD INTERVAL: 116 MS
QRSD INTERVAL: 118 MS
QT INTERVAL: 382 MS
QT INTERVAL: 386 MS
QT INTERVAL: 424 MS
QT INTERVAL: 426 MS
QTC INTERVAL: 426 MS
QTC INTERVAL: 431 MS
QTC INTERVAL: 475 MS
QTC INTERVAL: 477 MS
RBC # BLD AUTO: 3.94 MILLION/UL (ref 3.88–5.62)
RSV RNA RESP QL NAA+PROBE: NEGATIVE
SARS-COV-2 RNA RESP QL NAA+PROBE: NEGATIVE
SODIUM SERPL-SCNC: 141 MMOL/L (ref 136–145)
T WAVE AXIS: 16 DEGREES
T WAVE AXIS: 67 DEGREES
T WAVE AXIS: 69 DEGREES
T WAVE AXIS: 9 DEGREES
VENTRICULAR RATE: 75 BPM
VENTRICULAR RATE: 76 BPM
WBC # BLD AUTO: 6.53 THOUSAND/UL (ref 4.31–10.16)

## 2022-01-18 PROCEDURE — 50433 PLMT NEPHROURETERAL CATHETER: CPT

## 2022-01-18 PROCEDURE — 99233 SBSQ HOSP IP/OBS HIGH 50: CPT | Performed by: INTERNAL MEDICINE

## 2022-01-18 PROCEDURE — 85027 COMPLETE CBC AUTOMATED: CPT | Performed by: INTERNAL MEDICINE

## 2022-01-18 PROCEDURE — 80048 BASIC METABOLIC PNL TOTAL CA: CPT | Performed by: INTERNAL MEDICINE

## 2022-01-18 PROCEDURE — C1769 GUIDE WIRE: HCPCS

## 2022-01-18 PROCEDURE — 50433 PLMT NEPHROURETERAL CATHETER: CPT | Performed by: STUDENT IN AN ORGANIZED HEALTH CARE EDUCATION/TRAINING PROGRAM

## 2022-01-18 PROCEDURE — 0241U HB NFCT DS VIR RESP RNA 4 TRGT: CPT | Performed by: INTERNAL MEDICINE

## 2022-01-18 PROCEDURE — 0T9030Z DRAINAGE OF RIGHT KIDNEY WITH DRAINAGE DEVICE, PERCUTANEOUS APPROACH: ICD-10-PCS | Performed by: STUDENT IN AN ORGANIZED HEALTH CARE EDUCATION/TRAINING PROGRAM

## 2022-01-18 PROCEDURE — BT141ZZ FLUOROSCOPY OF KIDNEYS, URETERS AND BLADDER USING LOW OSMOLAR CONTRAST: ICD-10-PCS | Performed by: STUDENT IN AN ORGANIZED HEALTH CARE EDUCATION/TRAINING PROGRAM

## 2022-01-18 PROCEDURE — 93010 ELECTROCARDIOGRAM REPORT: CPT | Performed by: INTERNAL MEDICINE

## 2022-01-18 PROCEDURE — C2617 STENT, NON-COR, TEM W/O DEL: HCPCS

## 2022-01-18 PROCEDURE — 99232 SBSQ HOSP IP/OBS MODERATE 35: CPT | Performed by: INTERNAL MEDICINE

## 2022-01-18 PROCEDURE — 99024 POSTOP FOLLOW-UP VISIT: CPT | Performed by: STUDENT IN AN ORGANIZED HEALTH CARE EDUCATION/TRAINING PROGRAM

## 2022-01-18 PROCEDURE — C1894 INTRO/SHEATH, NON-LASER: HCPCS

## 2022-01-18 PROCEDURE — 0T9130Z DRAINAGE OF LEFT KIDNEY WITH DRAINAGE DEVICE, PERCUTANEOUS APPROACH: ICD-10-PCS | Performed by: STUDENT IN AN ORGANIZED HEALTH CARE EDUCATION/TRAINING PROGRAM

## 2022-01-18 PROCEDURE — 85730 THROMBOPLASTIN TIME PARTIAL: CPT | Performed by: INTERNAL MEDICINE

## 2022-01-18 PROCEDURE — 99232 SBSQ HOSP IP/OBS MODERATE 35: CPT | Performed by: NURSE PRACTITIONER

## 2022-01-18 RX ORDER — KETAMINE HCL IN NACL, ISO-OSM 100MG/10ML
SYRINGE (ML) INJECTION AS NEEDED
Status: DISCONTINUED | OUTPATIENT
Start: 2022-01-18 | End: 2022-01-18

## 2022-01-18 RX ORDER — LIDOCAINE WITH 8.4% SOD BICARB 0.9%(10ML)
SYRINGE (ML) INJECTION CODE/TRAUMA/SEDATION MEDICATION
Status: COMPLETED | OUTPATIENT
Start: 2022-01-18 | End: 2022-01-18

## 2022-01-18 RX ORDER — SODIUM CHLORIDE 9 MG/ML
10 INJECTION INTRAVENOUS DAILY
Qty: 1200 ML | Refills: 0 | Status: SHIPPED | OUTPATIENT
Start: 2022-01-18 | End: 2022-02-28 | Stop reason: HOSPADM

## 2022-01-18 RX ORDER — PHENYLEPHRINE HCL IN 0.9% NACL 1 MG/10 ML
SYRINGE (ML) INTRAVENOUS AS NEEDED
Status: DISCONTINUED | OUTPATIENT
Start: 2022-01-18 | End: 2022-01-18

## 2022-01-18 RX ORDER — PROPOFOL 10 MG/ML
INJECTION, EMULSION INTRAVENOUS CONTINUOUS PRN
Status: DISCONTINUED | OUTPATIENT
Start: 2022-01-18 | End: 2022-01-18

## 2022-01-18 RX ORDER — PROPOFOL 10 MG/ML
INJECTION, EMULSION INTRAVENOUS AS NEEDED
Status: DISCONTINUED | OUTPATIENT
Start: 2022-01-18 | End: 2022-01-18

## 2022-01-18 RX ORDER — LEVOFLOXACIN 5 MG/ML
500 INJECTION, SOLUTION INTRAVENOUS ONCE
Status: COMPLETED | OUTPATIENT
Start: 2022-01-18 | End: 2022-01-18

## 2022-01-18 RX ORDER — SODIUM CHLORIDE 9 MG/ML
75 INJECTION, SOLUTION INTRAVENOUS CONTINUOUS
Status: DISCONTINUED | OUTPATIENT
Start: 2022-01-18 | End: 2022-01-20

## 2022-01-18 RX ORDER — FENTANYL CITRATE 50 UG/ML
INJECTION, SOLUTION INTRAMUSCULAR; INTRAVENOUS AS NEEDED
Status: DISCONTINUED | OUTPATIENT
Start: 2022-01-18 | End: 2022-01-18

## 2022-01-18 RX ADMIN — Medication 10 MG: at 14:22

## 2022-01-18 RX ADMIN — FENTANYL CITRATE 25 MCG: 50 INJECTION, SOLUTION INTRAMUSCULAR; INTRAVENOUS at 14:04

## 2022-01-18 RX ADMIN — Medication 10 MG: at 14:11

## 2022-01-18 RX ADMIN — ASPIRIN 81 MG: 81 TABLET, CHEWABLE ORAL at 08:37

## 2022-01-18 RX ADMIN — SODIUM CHLORIDE 75 ML/HR: 9 INJECTION, SOLUTION INTRAVENOUS at 08:38

## 2022-01-18 RX ADMIN — PROPOFOL 30 MG: 10 INJECTION, EMULSION INTRAVENOUS at 13:57

## 2022-01-18 RX ADMIN — Medication 100 MCG: at 14:24

## 2022-01-18 RX ADMIN — Medication 7 ML: at 14:30

## 2022-01-18 RX ADMIN — Medication 7 ML: at 14:11

## 2022-01-18 RX ADMIN — IOHEXOL 40 ML: 350 INJECTION, SOLUTION INTRAVENOUS at 14:46

## 2022-01-18 RX ADMIN — LEVOFLOXACIN: 5 INJECTION, SOLUTION INTRAVENOUS at 14:00

## 2022-01-18 RX ADMIN — DILTIAZEM HYDROCHLORIDE 240 MG: 240 CAPSULE, COATED, EXTENDED RELEASE ORAL at 08:36

## 2022-01-18 RX ADMIN — OXYCODONE HYDROCHLORIDE 2.5 MG: 5 TABLET ORAL at 18:38

## 2022-01-18 RX ADMIN — Medication 100 MCG: at 14:15

## 2022-01-18 RX ADMIN — Medication 10 MG: at 13:57

## 2022-01-18 RX ADMIN — HYDROMORPHONE HYDROCHLORIDE 0.5 MG: 1 INJECTION, SOLUTION INTRAMUSCULAR; INTRAVENOUS; SUBCUTANEOUS at 22:37

## 2022-01-18 RX ADMIN — ATORVASTATIN CALCIUM 40 MG: 40 TABLET, FILM COATED ORAL at 15:54

## 2022-01-18 RX ADMIN — FENTANYL CITRATE 25 MCG: 50 INJECTION, SOLUTION INTRAMUSCULAR; INTRAVENOUS at 14:22

## 2022-01-18 RX ADMIN — PROPOFOL 50 MCG/KG/MIN: 10 INJECTION, EMULSION INTRAVENOUS at 13:57

## 2022-01-18 NOTE — PROGRESS NOTES
Pt scheduled for IR procedure today at 1400  Heparin was stopped at 1000  Received a call from IR at 0983 to bring pt down at 1300

## 2022-01-18 NOTE — PROGRESS NOTES
Interventional Radiology Preprocedure Note    History/Indication for procedure: Cnidy Lewis is a 80 y o  male with a PMH of bladder cancer resulting in urinary obstruction who presents for b/l PCN/PCNU insertion      Relevant past medical history:    Past Medical History:   Diagnosis Date    A-fib ([address] )     Arthritis     Benign prostatic hyperplasia without lower urinary tract symptoms     without Urinary Obstruction    Bladder cancer (HCC)     CAD (coronary artery disease)     Cancer (HCC)     Chronic obstructive lung disease (HCC)     Coronary arteriosclerosis     Depression     Emphysema lung (HCC)     GERD (gastroesophageal reflux disease)     Hyperlipidemia     Hypertension     Irregular heart beat     Myocardial infarction (HCC)     Psoriasis     Requires supplemental oxygen     at bedtime during high humid days only    Stroke [facility])     TIA 1/2018     Patient Active Problem List   Diagnosis    CAD (coronary atherosclerotic disease)    Hypertension    Hyperlipemia    GERD (gastroesophageal reflux disease)    History of stroke    Sciatica of right side    Hyperlipidemia    Centrilobular emphysema (HCC)    Arthritis    Stenosis of left carotid artery    S/P CABG x 4    Acute left-sided low back pain with left-sided sciatica    Back pain    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    Vision changes    Urinary retention due to benign prostatic hyperplasia    Bladder cancer ([address] )    CKD (chronic kidney disease), stage II    Atrial flutter (HCC)    Irregular heart beat    Localized edema    COPD, severe ([address] )    Bronchiectasis with acute lower respiratory infection ([address] )    Abnormal CT of the chest    Tinnitus aurium, bilateral    Sensorineural hearing loss (SNHL) of both ears    Medicare annual wellness visit, subsequent    DDD (degenerative disc disease), lumbar    Acute on chronic diastolic congestive heart failure ([address] )    Encounter to discuss test results    Atrial fibrillation (HCC)    Other constipation    Overgrown toenails    Recurrent unilateral inguinal hernia    Left lower quadrant abdominal pain    History of bilateral inguinal hernia repair    Cancer of overlapping sites of bladder ([facility] )    Stage 3a chronic kidney disease ([facility] )    Fortune catheter in place prior to arrival    DULCE (acute kidney injury) ([facility] ) on CKD 3    Hyponatremia    Pleural effusion on left    Abnormal urinalysis       /66   Pulse 68   Temp 99 4 °F (37 4 °C) (Oral)   Resp 17   Ht 5' 8" (1 727 m)   Wt 84 7 kg (186 lb 11 7 oz)   SpO2 96%   BMI 28 39 kg/m²     Medications:    Inpatient Medications:     Scheduled Medications:  Current Facility-Administered Medications   Medication Dose Route Frequency Provider Last Rate    aspirin  81 mg Oral Daily Cara George MD      atorvastatin  40 mg Oral Daily Cara George MD      cefTRIAXone  1,000 mg Intravenous Q24H Cara George MD 1,000 mg (01/17/22 2059)    diltiazem  240 mg Oral Daily Cara George MD      heparin (porcine)  3-20 Units/kg/hr (Order-Specific) Intravenous Titrated Rishi Suh PA-C Stopped (01/18/22 1000)    HYDROmorphone  0 5 mg Intravenous Q4H PRN Cara George MD      oxyCODONE  5 mg Oral Q6H PRN Cara George MD      Or    oxyCODONE  2 5 mg Oral Q6H PRN Cara George MD      pantoprazole  40 mg Oral Early Morning Cara George MD      sodium chloride  75 mL/hr Intravenous Continuous Luann Nettles MD 75 mL/hr (01/18/22 0671)       Infusions:  heparin (porcine), 3-20 Units/kg/hr (Order-Specific), Last Rate: Stopped (01/18/22 1000)  sodium chloride, 75 mL/hr, Last Rate: 75 mL/hr (01/18/22 5760)        PRN:  HYDROmorphone    oxyCODONE **OR** oxyCODONE    Outpatient Medications:  No current facility-administered medications on file prior to encounter       Current Outpatient Medications on File Prior to Encounter   Medication Sig Dispense Refill    aspirin 81 mg chewable tablet Chew 1 tablet (81 mg total) daily 60 tablet 6    atorvastatin (LIPITOR) 40 mg tablet Take 1 tablet (40 mg total) by mouth daily 90 tablet 3    bisacodyl (DULCOLAX) 10 mg suppository Insert 1 suppository (10 mg total) into the rectum daily as needed for constipation (Patient not taking: Reported on 1/4/2022 ) 12 suppository 0    diltiazem (CARDIZEM CD) 240 mg 24 hr capsule Take 1 capsule (240 mg total) by mouth daily 60 capsule 3    fluorouracil 4,900 mg in CADD infusion pump Infuse 4,900 mg (500 mg/m2/day x 1 96 m2) into a venous catheter over 120 hours for 5 days  Do not start before January 17, 2022  1 each 0    furosemide (LASIX) 40 mg tablet Take 1 tablet (40 mg total) by mouth daily 60 tablet 6    magnesium citrate (CITROMA) 1 745 g/30 mL oral solution Take 296 mL by mouth once as needed (constipation) for up to 1 dose (Patient not taking: Reported on 1/3/2022 ) 296 mL 3    ondansetron (ZOFRAN) 8 mg tablet Take 1 tablet (8 mg total) by mouth every 8 (eight) hours as needed for nausea or vomiting (Patient not taking: Reported on 1/3/2022 ) 20 tablet 0    pantoprazole (PROTONIX) 40 mg tablet Take 1 tablet (40 mg total) by mouth every morning 60 tablet 5    polyethylene glycol (GOLYTELY) 4000 mL solution Take 4,000 mL by mouth once for 1 dose 4000 mL 0    SULFAMETHOXAZOLE-TRIMETHOPRIM PO Take 1 tablet by mouth 2 (two) times a day (Patient not taking: Reported on 1/3/2022 )         Allergies   Allergen Reactions    Penicillins Swelling and Itching       Anticoagulants: ASA    ASA classification: ASA 3 - Patient with moderate systemic disease with functional limitations    Airway Assessment: III (soft and hard palate and base of uvula visible)    Relevant family history: None    Relevant review of systems: None    Prior sedation/anesthesia: yes    Can the patient lie flat?  Yes     NPO Status: yes    Labs:   CBC with diff:   Lab Results   Component Value Date    WBC 6 53 01/18/2022    HGB 8 6 (L) 01/18/2022    HCT 28 9 (L) 01/18/2022    MCV 73 (L) 01/18/2022     01/18/2022    MCH 21 8 (L) 01/18/2022    MCHC 29 8 (L) 01/18/2022    RDW 19 3 (H) 01/18/2022    MPV 10 2 01/18/2022    NRBC 0 01/17/2022     BMP/CMP:  Lab Results   Component Value Date    K 4 2 01/18/2022     01/18/2022    CO2 30 01/18/2022    BUN 42 (H) 01/18/2022    CREATININE 5 72 (H) 01/18/2022    CALCIUM 8 3 01/18/2022    AST 35 01/17/2022    ALT 44 01/17/2022    ALKPHOS 68 01/17/2022    EGFR 8 01/18/2022   ,     Coags:   Lab Results   Component Value Date    PTT 72 (H) 01/18/2022    INR 1 07 01/17/2022   ,   Results from last 7 days   Lab Units 01/18/22  0452 01/17/22  0608 01/16/22  1550 01/16/22  0927 01/16/22  0220   PTT seconds 72* 80* 74* 67* 33   INR   --  1 07  --   --  1 05        Relevant imaging studies:   Reviewed  Directed physical examination:  I agree with the physical exam performed on 1/17/22 and there are no additional changes  Assessment/Plan:   B/L PCNs vs  PCNUs  Sedation/Anesthesia plan:  MAC will be used as needed for procedure  Consent with alternatives to the procedure, risks and benefits have been explained and discussed with the patient/patient's family: yes

## 2022-01-18 NOTE — ASSESSMENT & PLAN NOTE
· Concern for UTI but patient has a Fortune catheter  · Received 1 dose ceftriaxone in the ED, recent urine culture was negative  · Urine culture with no growth  · Will discontinue ceftriaxone

## 2022-01-18 NOTE — PROGRESS NOTES
[facility]  Progress Note - Claudia Robertson 1940, 80 y o  male MRN: [id number]  Unit/Bed#: MS [id number] Encounter: [id number]  Primary Care Provider: Rolando Kan MD   Date and time admitted to hospital: 1/15/2022  7:10 PM    * DULCE (acute kidney injury) ([facility] ) on CKD 3  Assessment & Plan  · Pt with h/o bladder cancer and archibald, scheduled for OP chemo  · CT abd pelvis non contrast   1   Decompressed urinary bladder with Archibald catheter in place  Eriberto Best is now diffuse wall thickening of the urinary bladder with extensive perivesical fat stranding compatible with cystitis  Small volume of nondependent intravesical air may be related to    instrumentation or infection  2   No significant change in moderate left and mild right hydroureteronephrosis  Eriberto Best is increased perinephric and periureteric inflammation bilaterally, likely on the basis of infection  3   Large hiatal hernia  4   New small bilateral pleural effusions  · Received IV lasix and IV fluids in the ER  · Pt has a archibald and he did not have any urine output from 11am to 5 pm when he felt severe pressure in his suprapubic area and he started having UO  · Nephrology following  · Urology following  · Plan for percutaneous nephrostomy tube placement today    Bladder cancer [facility])  Assessment & Plan  · Has a port and plan was to start chemo starting this Monday  · [facility] OP urology - have consulted them  · Has a archibald      Acute on chronic diastolic congestive heart failure ([facility] )  Assessment & Plan  Wt Readings from Last 3 Encounters:   01/15/22 84 7 kg (186 lb 11 7 oz)   01/14/22 82 1 kg (181 lb)   01/06/22 82 1 kg (181 lb)     · Elevated NT pro BNP, b/l worsening pedal edema and SOB, no hypoxia  · Cardiology input appreciated and they suggest that it is chronic diastolic heart failure    The fluid overload is due to obstructive uropathy and not due to acute on chronic diastolic congestive heart failure  · Diuresis as per nephrology, improving , no more intravenous diuretics for now         Abnormal urinalysis  Assessment & Plan  · Concern for UTI but patient has a Fortune catheter  · Received 1 dose ceftriaxone in the ED, recent urine culture was negative  · Urine culture with no growth  · Will discontinue ceftriaxone    Pleural effusion on left  Assessment & Plan  · Wet read XR chest, improved with diuresis  · Likely from chronic diastolic heart failure    Hyponatremia  Assessment & Plan  resolved at this time      Atrial fibrillation ([address] )  Assessment & Plan  · Rate controlled  · Cont cardizem home dose  COPD, severe ([address] )  Assessment & Plan  · Stable currently  No wheezing      Back pain  Assessment & Plan  · Counseled to not take advil at home  · Start PO oxy for now     S/P CABG x 4  Assessment & Plan  · Elevated troponin could be likely from DULCE/CKD vs CHF exac  · Cardiology was consulted  Input appreciated  No active coronary syndrome  · EKG shows no new change compared to previous EKG: RBBB with T inversion V1-2  NSR    CAD (coronary atherosclerotic disease)  Assessment & Plan  - no chest pain  - continue ASA statin         VTE Pharmacologic Prophylaxis: VTE Score: 7 High Risk (Score >/= 5) - Pharmacological DVT Prophylaxis Ordered: heparin drip  Sequential Compression Devices Ordered  Patient Centered Rounds: I performed bedside rounds with nursing staff today  Discussions with Specialists or Other Care Team Provider:  Urology, Cardiology, Case Management    Education and Discussions with Family / Patient: Attempted to update  (grandchild) via phone  Left voicemail  Time Spent for Care: 30 minutes  More than 50% of total time spent on counseling and coordination of care as described above      Current Length of Stay: 3 day(s)  Current Patient Status: Inpatient   Certification Statement: The patient will continue to require additional inpatient hospital stay due to Acute kidney injury  Discharge Plan: Anticipate discharge in 48-72 hrs to home  Code Status: Level 1 - Full Code    Subjective:   Patient resting comfortably on examination  Patient had no overnight events or complaints on exam     Objective:     Vitals:   Temp (24hrs), Av 6 °F (37 °C), Min:98 2 °F (36 8 °C), Max:99 4 °F (37 4 °C)    Temp:  [98 2 °F (36 8 °C)-99 4 °F (37 4 °C)] 99 4 °F (37 4 °C)  HR:  [65-71] 68  Resp:  [17-19] 17  BP: (130-142)/(65-71) 142/66  SpO2:  [89 %-96 %] 96 %  Body mass index is 28 39 kg/m²  Input and Output Summary (last 24 hours): Intake/Output Summary (Last 24 hours) at 2022 1440  Last data filed at 2022 1430  Gross per 24 hour   Intake 732 16 ml   Output 2675 ml   Net -1942 84 ml       Physical Exam:   Physical Exam  Vitals and nursing note reviewed  Constitutional:       General: He is not in acute distress  Appearance: He is well-developed  HENT:      Head: Normocephalic and atraumatic  Eyes:      General: No scleral icterus  Conjunctiva/sclera: Conjunctivae normal    Cardiovascular:      Rate and Rhythm: Normal rate and regular rhythm  Heart sounds: Normal heart sounds  No murmur heard  No friction rub  No gallop  Pulmonary:      Effort: Pulmonary effort is normal  No respiratory distress  Breath sounds: Normal breath sounds  No wheezing or rales  Abdominal:      General: Bowel sounds are normal  There is no distension  Palpations: Abdomen is soft  Tenderness: There is no abdominal tenderness  Musculoskeletal:         General: Normal range of motion  Skin:     General: Skin is warm  Findings: No rash  Neurological:      Mental Status: He is alert and oriented to person, place, and time            Additional Data:     Labs:  Results from last 7 days   Lab Units 22  0452 22  0608 22  0608   WBC Thousand/uL 6 53   < > 5 98   HEMOGLOBIN g/dL 8 6*   < > 8 1*   HEMATOCRIT % 28 9*   < > 27 8*   PLATELETS Thousands/uL 238   < > 223   NEUTROS PCT %  --   --  73   LYMPHS PCT %  --   --  11*   MONOS PCT %  --   --  11   EOS PCT %  --   --  3    < > = values in this interval not displayed  Results from last 7 days   Lab Units 01/18/22  0452 01/17/22  0608 01/17/22  0608   SODIUM mmol/L 141   < > 140   POTASSIUM mmol/L 4 2   < > 4 1   CHLORIDE mmol/L 102   < > 102   CO2 mmol/L 30   < > 29   BUN mg/dL 42*   < > 41*   CREATININE mg/dL 5 72*   < > 5 25*   ANION GAP mmol/L 9   < > 9   CALCIUM mg/dL 8 3   < > 7 9*   ALBUMIN g/dL  --   --  2 4*   TOTAL BILIRUBIN mg/dL  --   --  0 45   ALK PHOS U/L  --   --  68   ALT U/L  --   --  44   AST U/L  --   --  35   GLUCOSE RANDOM mg/dL 96   < > 90    < > = values in this interval not displayed  Results from last 7 days   Lab Units 01/17/22  0608   INR  1 07                   Lines/Drains:  Invasive Devices  Report    Central Venous Catheter Line            Port A Cath 01/14/22 Right Internal jugular 4 days          Peripheral Intravenous Line            Peripheral IV 01/15/22 Right Arm 2 days    Peripheral IV 01/16/22 Right Forearm 1 day          Drain            Urethral Catheter Non-latex 16 Fr  20 days    Percutaneous Nephroureteral Tube (PCNU) Left 1 8 5 Fr 26 Cm <1 day    Percutaneous Nephroureteral Tube (PCNU) Right 2 8 5 Fr 24 Cm <1 day              Urinary Catheter:  Goal for removal: N/A - Chronic Fortune         Central Line:  Goal for removal: Will discontinue when peripheral access obtained  Imaging: No pertinent imaging reviewed      Recent Cultures (last 7 days):   Results from last 7 days   Lab Units 01/15/22  2019   URINE CULTURE  >100,000 cfu/ml        Last 24 Hours Medication List:   Current Facility-Administered Medications   Medication Dose Route Frequency Provider Last Rate    aspirin  81 mg Oral Daily Gigi Arteaga MD      atorvastatin  40 mg Oral Daily Gigi Arteaga MD      diltiazem  240 mg Oral Daily Gigi Arteaga MD      heparin (porcine)  3-20 Units/kg/hr (Order-Specific) Intravenous Titrated [employer] Stopped (01/18/22 1000)    HYDROmorphone  0 5 mg Intravenous Q4H PRN Katlyn Ravi MD      lidocaine 1% buffered   Infiltration Code/Trauma/Sedation Med Valentino Barn, MD      oxyCODONE  5 mg Oral Q6H PRN Katlyn Ravi MD      Or   [facility] oxyCODONE  2 5 mg Oral Q6H PRN Katlyn Ravi MD      pantoprazole  40 mg Oral Early Morning Katlyn Ravi MD      sodium chloride  75 mL/hr Intravenous Continuous Valentino Barn, MD 75 mL/hr (01/18/22 2217)     Facility-Administered Medications Ordered in Other Encounters   Medication Dose Route Frequency Provider Last Rate    fentanyl citrate (PF)   Intravenous PRN Jones Felt, CRNA      Ketamine HCl   Intravenous PRN Jones Felt, CRNA      phenylephrine   Intravenous PRN Jones Felt, CRNA      propofol   Intravenous Continuous PRN Jones Felt, CRNA 40 mcg/kg/min (01/18/22 1428)    propofol   Intravenous PRN Jones Felt, CRNA          Today, Patient Was Seen By: Eloisa Angelucci, DO    **Please Note: This note may have been constructed using a voice recognition system  **

## 2022-01-18 NOTE — PLAN OF CARE
Problem: Potential for Falls  Goal: Patient will remain free of falls  Description: INTERVENTIONS:  - Educate patient/family on patient safety including physical limitations  - Instruct patient to call for assistance with activity   - Consult OT/PT to assist with strengthening/mobility   - Keep Call bell within reach  - Keep bed low and locked with side rails adjusted as appropriate  - Keep care items and personal belongings within reach  - Initiate and maintain comfort rounds  - Make Fall Risk Sign visible to staff  -- Initiate/Maintain alarm  - Obtain necessary fall risk management equipment  - Apply yellow socks and bracelet for high fall risk patients  - Consider moving patient to room near nurses station  Outcome: Progressing     Problem: PAIN - ADULT  Goal: Verbalizes/displays adequate comfort level or baseline comfort level  Description: Interventions:  - Encourage patient to monitor pain and request assistance  - Assess pain using appropriate pain scale  - Administer analgesics based on type and severity of pain and evaluate response  - Implement non-pharmacological measures as appropriate and evaluate response  - Consider cultural and social influences on pain and pain management  - Notify physician/advanced practitioner if interventions unsuccessful or patient reports new pain  Outcome: Progressing     Problem: INFECTION - ADULT  Goal: Absence or prevention of progression during hospitalization  Description: INTERVENTIONS:  - Assess and monitor for signs and symptoms of infection  - Monitor lab/diagnostic results  - Monitor all insertion sites, i e  indwelling lines, tubes, and drains  - Monitor endotracheal if appropriate and nasal secretions for changes in amount and color  - [place] appropriate cooling/warming therapies per order  - Administer medications as ordered  - Instruct and encourage patient and family to use good hand hygiene technique  - Identify and instruct in appropriate isolation precautions for identified infection/condition  Outcome: Progressing     Problem: SAFETY ADULT  Goal: Patient will remain free of falls  Description: INTERVENTIONS:  - Educate patient/family on patient safety including physical limitations  - Instruct patient to call for assistance with activity   - Consult OT/PT to assist with strengthening/mobility   - Keep Call bell within reach  - Keep bed low and locked with side rails adjusted as appropriate  - Keep care items and personal belongings within reach  - Initiate and maintain comfort rounds  - Make Fall Risk Sign visible to staff  - Offer Toileting, in advance of need  - Initiate/Maintain alarm  - Obtain necessary fall risk management equipment  - Apply yellow socks and bracelet for high fall risk patients  - Consider moving patient to room near nurses station  Outcome: Progressing     Problem: MOBILITY - ADULT  Goal: Maintain or return to baseline ADL function  Description: INTERVENTIONS:  -  Assess patient's ability to carry out ADLs; assess patient's baseline for ADL function and identify physical deficits which impact ability to perform ADLs (bathing, care of mouth/teeth, toileting, grooming, dressing, etc )  - Assess/evaluate cause of self-care deficits   - Assess range of motion  - Assess patient's mobility; develop plan if impaired  - Assess patient's need for assistive devices and provide as appropriate  - Encourage maximum independence but intervene and supervise when necessary  - Involve family in performance of ADLs  - Assess for home care needs following discharge   - Consider OT consult to assist with ADL evaluation and planning for discharge  - Provide patient education as appropriate  Outcome: Progressing

## 2022-01-18 NOTE — ANESTHESIA PREPROCEDURE EVALUATION
Procedure:  IR NEPHROSTOMY TUBE PLACEMENT    Relevant Problems   CARDIO   (+) Acute on chronic diastolic congestive heart failure (HCC)   (+) Atrial fibrillation (HCC)   (+) Atrial flutter (HCC)   (+) CAD (coronary atherosclerotic disease)   (+) Hyperlipemia   (+) Hyperlipidemia   (+) Hypertension   (+) S/P CABG x 4      GI/HEPATIC   (+) GERD (gastroesophageal reflux disease)      /RENAL   (+) DULCE (acute [facility]) ([facility] ) on CKD 3   (+) Stage 3a chronic kidney disease (HCC)      MUSCULOSKELETAL   (+) Arthritis   (+) Back pain      NEURO/PSYCH   (+) History of stroke      PULMONARY   (+) COPD, severe (HCC)   (+) Centrilobular emphysema (HCC)   (+) Pleural effusion on left      Other   (+) Bladder cancer ([facility] )   per cardiology eval   Impression:   1  DULCE on CKD  2  Obstructive uropathy/bilateral hydronephrosis  3  Bladder CA  4  Paroxysmal atrial flutter s/p DCCV   5  CAD s/p CABG ×4 (2/2001)  6  Chronic HFpEF  7  Hx of CVA  8  Hx of Left CEA   9  Severe COPD     No oxygen at home  Does not use inhalers  Pt says he has been off coumadin for 2 months  Heparin gtt  Troponin 554, nonischemic per cardiology  Small bilateral pleural effusions on CT    Mild AS, normal EF on last echo  Physical Exam    Airway    Mallampati score: III  TM Distance: >3 FB  Neck ROM: full     Dental   Comment: Denies loose teeth  Edentulous upper  Few remaining lower teeth,     Cardiovascular  Cardiovascular exam normal    Pulmonary  Pulmonary exam normal     Other Findings  Portions of exam deferred due to low yield and/or unknown COVID status      Anesthesia Plan  ASA Score- 4     Anesthesia Type- IV sedation with anesthesia with ASA Monitors  Additional Monitors:   Airway Plan:           Plan Factors-    Chart reviewed  Existing labs reviewed  Patient summary reviewed  Patient is not a current smoker  Induction- intravenous      Postoperative Plan-     Informed Consent- Anesthetic plan and risks discussed with patient  I personally reviewed this patient with the CRNA  Discussed and agreed on the Anesthesia Plan with the CRNA  Ashlee Lizama

## 2022-01-18 NOTE — ANESTHESIA POSTPROCEDURE EVALUATION
Post-Op Assessment Note    CV Status:  Stable  Pain Score: 0    Pain management: adequate     Mental Status:  Alert and awake   Hydration Status:  Euvolemic   PONV Controlled:  Controlled   Airway Patency:  Patent      Post Op Vitals Reviewed: Yes      Staff: CRNA         No complications documented      /58 (01/18/22 1452)    Temp 97 7 °F (36 5 °C) (01/18/22 1452)    Pulse 59 (01/18/22 1452)   Resp 17 (01/18/22 1452)    SpO2 98 % (01/18/22 1452)

## 2022-01-18 NOTE — ASSESSMENT & PLAN NOTE
Wt Readings from Last 3 Encounters:   01/15/22 84 7 kg (186 lb 11 7 oz)   01/14/22 82 1 kg (181 lb)   01/06/22 82 1 kg (181 lb)     · Elevated NT pro BNP, b/l worsening pedal edema and SOB, no hypoxia  · Cardiology input appreciated and they suggest that it is chronic diastolic heart failure    The fluid overload is due to obstructive uropathy and not due to acute on chronic diastolic congestive heart failure  · Diuresis as per nephrology, improving , no more intravenous diuretics for now

## 2022-01-18 NOTE — ASSESSMENT & PLAN NOTE
· Pt with h/o bladder cancer and archibald, scheduled for OP chemo  · CT abd pelvis non contrast   1   Decompressed urinary bladder with Archibald catheter in place   There is now diffuse wall thickening of the urinary bladder with extensive perivesical fat stranding compatible with cystitis  Small volume of nondependent intravesical air may be related to    instrumentation or infection  2   No significant change in moderate left and mild right hydroureteronephrosis  Adore Climes is increased perinephric and periureteric inflammation bilaterally, likely on the basis of infection  3   Large hiatal hernia  4   New small bilateral pleural effusions  · Received IV lasix and IV fluids in the ER  · Pt has a archibald and he did not have any urine output from 11am to 5 pm when he felt severe pressure in his suprapubic area and he started having UO     · Nephrology following  · Urology following  · Plan for percutaneous nephrostomy tube placement today

## 2022-01-18 NOTE — PLAN OF CARE
Problem: Potential for Falls  Goal: Patient will remain free of falls  Description: INTERVENTIONS:  - Educate patient/family on patient safety including physical limitations  - Instruct patient to call for assistance with activity   - Consult OT/PT to assist with strengthening/mobility   - Keep Call bell within reach  - Keep bed low and locked with side rails adjusted as appropriate  - Keep care items and personal belongings within reach  - Initiate and maintain comfort rounds  - Make Fall Risk Sign visible to staff  - Offer Toileting every 2 Hours, in advance of need  - Initiate/Maintain alarms  - Obtain necessary fall risk management equipment:   - Apply yellow socks and bracelet for high fall risk patients  - Consider moving patient to room near nurses station  Outcome: Progressing

## 2022-01-18 NOTE — CASE MANAGEMENT
Case Management Discharge Planning Note    Patient name Juan Poll  Location [address] [id number] MRN [id number]  : 1940 Date 2022       Current Admission Date: 1/15/2022  Current Admission Diagnosis:DULCE (acute kidney injury) [facility]) on CKD 3   Patient Active Problem List    Diagnosis Date Noted    DULCE (acute kidney injury) ([facility] ) on CKD 3 01/15/2022    Hyponatremia 01/15/2022    Pleural effusion on left 01/15/2022    Abnormal urinalysis 01/15/2022    Fortune catheter in place prior to arrival 2022    Stage 3a chronic kidney disease ([facility] ) 2022    Recurrent unilateral inguinal hernia 2021    Left lower quadrant abdominal pain 2021    History of bilateral inguinal hernia repair 2021    Cancer of overlapping sites of bladder ([facility] ) 2021    Other constipation 2021    Overgrown toenails 2021    Atrial fibrillation ([facility] ) 2021    Encounter to discuss test results 10/12/2021    Acute on chronic diastolic congestive heart failure ([address] ) 2020    DDD (degenerative disc disease), lumbar 10/30/2019    Medicare annual wellness visit, subsequent 2019    Tinnitus aurium, bilateral 2019    Sensorineural hearing loss (SNHL) of both ears 2019    Abnormal CT of the chest 2019    COPD, severe ([address] ) 2018    Bronchiectasis with acute lower respiratory infection ([address] ) 2018    Localized edema 10/15/2018    Irregular heart beat     Atrial flutter ([address] ) 10/07/2018    CKD (chronic kidney disease), stage II 2018    Urinary retention due to benign prostatic hyperplasia 2018    Bladder cancer ([address] ) 2018    S/P CABG x 4 2018    Stenosis of left carotid artery 2018    GERD (gastroesophageal reflux disease) 2018    History of stroke 2018    Sciatica of right side 2018    Vision changes 2018    Hyperlipidemia     Centrilobular emphysema (HCC)     Arthritis  Back pain 01/24/2017    Acute left-sided low back pain with left-sided sciatica 10/25/2016    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 10/25/2016    CAD (coronary atherosclerotic disease) 08/13/2016    Hypertension 08/13/2016    Hyperlipemia 08/13/2016      LOS (days): 3  Geometric Mean LOS (GMLOS) (days): 3 30  Days to GMLOS:0 6     OBJECTIVE:  Risk of Unplanned Readmission Score: 26         Current admission status: Inpatient   Preferred Pharmacy:   [address] [place] [address] [facility] [address]  Phone: [phone] Fax: [phone]    Primary Care Provider: Ines Ho MD    Primary Insurance: MEDICARE  Secondary Insurance: [facility]    DISCHARGE DETAILS:    Additional Comments: CM attempted to meet with pt at bedside to complete initial assessment however pt not in his room at the time  CM department to follow

## 2022-01-18 NOTE — PROGRESS NOTES
Cardiology Progress Note - Claudia Robertson 80 y o  male MRN: [id number]    Unit/Bed#: -02 Encounter: [id number]      Assessment/Plan:  1  Nonischemic myocardial injury, peak troponin 554, elevated in the setting of acute kidney injury, related to hydronephrosis and elevated BNP  2  Acute kidney injury due to obstructive uropathy, creatinine on admission 8 0, creatinine today 5 7  Net -7 6 L  continue to monitor  Management per Nephrology/[facility] Internal Medicine Hospitalist    3  Lower extremity edema, restart Lasix  4  Chronic HFpEF, EF 60% November 2021  Restarted on Lasix  No Ace inhibitor/Arb given renal function  No beta-blockers given emphysema  5  CAD status post CABG x4 in 2001, continue aspirin, Lipitor  Currently without chest pain  6  Paroxysmal atrial fibrillation, maintaining sinus rhythm  Continue Cardizem  Patient was on Coumadin at home currently on hold  Continue heparin drip  Goal INR 2-3  Restart Coumadin when okay by [facility] Internal Medicine Hospitalist    7  History of bladder cancer, patient can tolerate chemotherapy from a cardiac standpoint  Management per [facility] Internal Medicine Hospitalist/hematology oncology    Patient is stable from a cardiac standpoint  Will sign off  Call if needed  Subjective:   Patient seen and examined  No significant events overnight  Denies chest pain    Objective:     Vitals: Blood pressure 130/65, pulse 71, temperature 98 4 °F (36 9 °C), resp  rate 17, height 5' 8" (1 727 m), weight 84 7 kg (186 lb 11 7 oz), SpO2 96 %  , Body mass index is 28 39 kg/m² ,   Orthostatic Blood Pressures      Most Recent Value   Blood Pressure 130/65 filed at 01/18/2022 1924   Patient Position - Orthostatic VS Lying filed at 01/15/2022 2345            Intake/Output Summary (Last 24 hours) at 1/18/2022 0844  Last data filed at 1/18/2022 0100  Gross per 24 hour   Intake --   Output 1875 ml   Net -1875 ml         Physical Exam:    GEN: Pao Stephen Michaelle Awe appears well, alert and oriented x 3, pleasant and cooperative   HEENT: pupils equal, round, and reactive to light; extraocular muscles intact  NECK: supple, no carotid bruits   HEART: regular rhythm, normal S1 and S2, no murmurs, clicks, gallops or rubs   LUNGS: clear to auscultation bilaterally; no wheezes, rales, or rhonchi   ABDOMEN: normal bowel sounds, soft, no tenderness, no distention  EXTREMITIES: +trace edema, peripheral pulses normal; no clubbing, cyanosis      Medications:      Current Facility-Administered Medications:     aspirin chewable tablet 81 mg, 81 mg, Oral, Daily, Fredis Pham MD, 81 mg at 01/18/22 0837    atorvastatin (LIPITOR) tablet 40 mg, 40 mg, Oral, Daily, Fredis Pham MD, 40 mg at 01/17/22 5257    ceftriaxone (ROCEPHIN) 1 g/50 mL in dextrose IVPB, 1,000 mg, Intravenous, Q24H, Fredis Pham MD, Last Rate: 100 mL/hr at 01/17/22 2059, 1,000 mg at 01/17/22 2059    diltiazem (CARDIZEM CD) 24 hr capsule 240 mg, 240 mg, Oral, Daily, Fredis Pham MD, 240 mg at 01/18/22 0836    heparin (porcine) 25,000 units in D5W 250 mL infusion (premix), 3-20 Units/kg/hr (Order-Specific), Intravenous, Titrated, Karen Stafford PA-C, Last Rate: 9 6 mL/hr at 01/18/22 0559, 12 Units/kg/hr at 01/18/22 0559    HYDROmorphone (DILAUDID) injection 0 5 mg, 0 5 mg, Intravenous, Q4H PRN, Fredis Pham MD    oxyCODONE (ROXICODONE) IR tablet 5 mg, 5 mg, Oral, Q6H PRN, 5 mg at 01/17/22 1808 **OR** oxyCODONE (ROXICODONE) IR tablet 2 5 mg, 2 5 mg, Oral, Q6H PRN, Fredis Pham MD    pantoprazole (PROTONIX) EC tablet 40 mg, 40 mg, Oral, Early Morning, Fredis Pham MD, 40 mg at 01/17/22 5217    sodium chloride 0 9 % infusion, 75 mL/hr, Intravenous, Continuous, Annabelle Lee MD, Last Rate: 75 mL/hr at 01/18/22 0838, 75 mL/hr at 01/18/22 0838     Labs & Results:        Results from last 7 days   Lab Units 01/18/22  0452 01/17/22  0608 01/16/22  0927   WBC Thousand/uL 6 53 5 98 8 27   HEMOGLOBIN g/dL 8 6* 8 1* 9 8* HEMATOCRIT % 28 9* 27 8* 33 7*   PLATELETS Thousands/uL 238 223 298         Results from last 7 days   Lab Units 01/18/22  0452 01/17/22  0608 01/16/22  0927 01/16/22  0052 01/15/22  2015   POTASSIUM mmol/L 4 2 4 1 4 1   < > 4 9   CHLORIDE mmol/L 102 102 100   < > 95*   CO2 mmol/L 30 29 30   < > 23   BUN mg/dL 42* 41* 39*   < > 47*   CREATININE mg/dL 5 72* 5 25* 5 88*   < > 8 01*   CALCIUM mg/dL 8 3 7 9* 8 8   < > 8 2*   ALK PHOS U/L  --  68 94  --  82   ALT U/L  --  44 45  --  35   AST U/L  --  35 38  --  37    < > = values in this interval not displayed  Results from last 7 days   Lab Units 01/18/22  0452 01/17/22  0608 01/16/22  1550 01/16/22  0927 01/16/22  0220   INR   --  1 07  --   --  1 05   PTT seconds 72* 80* 74*   < > 33    < > = values in this interval not displayed       Results from last 7 days   Lab Units 01/16/22 0927   MAGNESIUM mg/dL 2 4

## 2022-01-18 NOTE — PROGRESS NOTES
Progress Note - Adriana Patel 80 y o  male MRN: [id number]    Unit/Bed#: -25 Encounter: [id number]      Assessment:  Adriana Patel is an 59-year-old male known to our office for evaluation of hematuria with radiographic and cystoscopic examination demonstrating bladder tumor, status post TURBT 11/30  Intraoperative finding consistent with extremely large bladder tumor burden involving left floor of the bladder and crossing over midline and involving multiple bladder diverticuli  Ureteral orifices could not be identified at the time of procedure  Pathology demonstrated high-grade urothelial carcinoma  He has had refractory urinary retention with multiple office visits for catheter insertion  He is currently under the care of medical-oncology, status post Port-A-Cath insertion for initiation of systemic chemotherapy  Unfortunately, patient is now admitted with acute kidney injury and CT demonstrating persistent bilateral hydronephrosis  His history is compounded by a significant cardiovascular disease including nonischemic myocardial injury, CAD status post CABG, PA F and CHF requiring anticoagulation  Recent urine culture contaminated  Plan:  1  Continue medical optimization  2  Appreciate IR involvement for placement of bilateral PCNs  3  Heparin infusion  Hold per IR preprocedure parameters  4  Once PCNs are in place, maintain to straight drainage and flush per IR protocol  5  Can remove Fortune catheter once creatinine meters  6  Will follow along  Subjective:   Denies fever, chills, flank, abdominal or suprapubic pain  Objective:     Vitals: Blood pressure 130/65, pulse 71, temperature 98 4 °F (36 9 °C), resp  rate 17, height 5' 8" (1 727 m), weight 84 7 kg (186 lb 11 7 oz), SpO2 96 %  ,Body mass index is 28 39 kg/m²        Intake/Output Summary (Last 24 hours) at 1/18/2022 0819  Last data filed at 1/18/2022 0100  Gross per 24 hour   Intake --   Output 1875 ml   Net -1875 ml Physical Exam: General appearance: alert and oriented, in no acute distress, appears stated age, cooperative and no distress  Head: Normocephalic, without obvious abnormality, atraumatic  Neck: no adenopathy, no carotid bruit, no JVD, supple, symmetrical, trachea midline and thyroid not enlarged, symmetric, no tenderness/mass/nodules  Lungs: clear to auscultation bilaterally  Heart: regular rate and rhythm, S1, S2 normal, no murmur, click, rub or gallop  Abdomen: soft, non-tender; bowel sounds normal; no masses,  no organomegaly  Extremities: extremities normal, warm and well-perfused; no cyanosis, clubbing, or edema  Pulses: 2+ and symmetric  Neurologic: Grossly normal  Fortune--grossly yellow urine     Invasive Devices  Report    Central Venous Catheter Line            Port A Cath 01/14/22 Right Internal jugular 3 days          Peripheral Intravenous Line            Peripheral IV 01/15/22 Right Arm 2 days    Peripheral IV 01/16/22 Right Forearm 1 day          Drain            Urethral Catheter Non-latex 16 Fr  20 days              Lab Results   Component Value Date    WBC 6 53 01/18/2022    HGB 8 6 (L) 01/18/2022    HCT 28 9 (L) 01/18/2022    MCV 73 (L) 01/18/2022     01/18/2022       Lab Results   Component Value Date    SODIUM 141 01/18/2022    K 4 2 01/18/2022     01/18/2022    CO2 30 01/18/2022    BUN 42 (H) 01/18/2022    CREATININE 5 72 (H) 01/18/2022    GLUC 96 01/18/2022    CALCIUM 8 3 01/18/2022       Lab, Imaging and other studies: I have personally reviewed pertinent reports

## 2022-01-18 NOTE — BRIEF OP NOTE (RAD/CATH)
INTERVENTIONAL RADIOLOGY PROCEDURE NOTE    Date: 1/18/2022    Procedure: IR NEPHROSTOMY TUBE PLACEMENT    Preoperative diagnosis:   1  Renal failure    2  Pulmonary edema    3  Obstruction of Fortune catheter, initial encounter ([facility] [address] )    4  Elevated troponin    5  [facility] on CKD 3    6  Cancer of overlapping sites of bladder [facility])         Postoperative diagnosis: Same  Surgeon: Winslow Habermann, MD     Assistant: None  No qualified resident was available  Blood loss: 5 ml    Specimens: none  Findings: b/l PCNU placement  Bags were placed to drainage tonight  They can be capped tomorrow  Complications: None immediate      Anesthesia: MAC sedation

## 2022-01-18 NOTE — PROGRESS NOTES
NEPHROLOGY PROGRESS NOTE    Patient: Chadwick Torres               Sex: male          DOA: 1/15/2022  7:10 PM   YOB: 1940        Age:  80 y o         LOS:  LOS: 3 days       HPI     Patient with acute kidney injury due to obstructive uropathy    SUBJECTIVE     Patient urine take well though urine output is decreasing    Patient denies any acute complaint    Does not appear short of breath    No chest pain no palpitation    No nausea no vomiting    CURRENT MEDICATIONS       Current Facility-Administered Medications:     aspirin chewable tablet 81 mg, 81 mg, Oral, Daily, Junie Lebron MD, 81 mg at 01/18/22 0837    atorvastatin (LIPITOR) tablet 40 mg, 40 mg, Oral, Daily, Junie Lebron MD, 40 mg at 01/17/22 0037    diltiazem (CARDIZEM CD) 24 hr capsule 240 mg, 240 mg, Oral, Daily, Junie Lebron MD, 240 mg at 01/18/22 0836    heparin (porcine) 25,000 units in D5W 250 mL infusion (premix), 3-20 Units/kg/hr (Order-Specific), Intravenous, Titrated, Skribit, PA-C, Stopped at 01/18/22 1000    HYDROmorphone (DILAUDID) injection 0 5 mg, 0 5 mg, Intravenous, Q4H PRN, Junie Lebron MD    oxyCODONE (ROXICODONE) IR tablet 5 mg, 5 mg, Oral, Q6H PRN, 5 mg at 01/17/22 1808 **OR** oxyCODONE (ROXICODONE) IR tablet 2 5 mg, 2 5 mg, Oral, Q6H PRN, Junie Lebron MD    pantoprazole (PROTONIX) EC tablet 40 mg, 40 mg, Oral, Early Morning, Junie Lebron MD, 40 mg at 01/17/22 2976    sodium chloride 0 9 % infusion, 75 mL/hr, Intravenous, Continuous, Arlette Rojo MD, Last Rate: 75 mL/hr at 01/18/22 9903, Restarted at 01/18/22 1350    Facility-Administered Medications Ordered in Other Encounters:     fentanyl citrate (PF) 100 MCG/2ML, , Intravenous, PRN, Tori Sink, CRNA, 25 mcg at 01/18/22 1422    Ketamine HCl, , Intravenous, PRN, Tori Sink, CRNA, 10 mg at 01/18/22 1422    phenylephrine 1,000 mcg/10 mL prefilled syringe, , Intravenous, PRN, Tori Sink, CRNA, 100 mcg at 01/18/22 1424    propofol (DIPRIVAN) 1000 mg in 100 mL infusion (premix), , Intravenous, Continuous PRN, Daniel Bee, CRNA, Stopped at 01/18/22 1442    propofol (DIPRIVAN) 200 MG/20ML bolus injection, , Intravenous, PRN, Daniel Bee, CRNA, 30 mg at 01/18/22 1357    OBJECTIVE     Current Weight: Weight - Scale: 84 7 kg (186 lb 11 7 oz)  Vitals:    01/18/22 1312   BP: 142/66   Pulse: 68   Resp: 17   Temp: 99 4 °F (37 4 °C)   SpO2: 96%       Intake/Output Summary (Last 24 hours) at 1/18/2022 1442  Last data filed at 1/18/2022 1442  Gross per 24 hour   Intake 1032 16 ml   Output 2675 ml   Net -1642 84 ml       PHYSICAL EXAMINATION     Physical Exam  Constitutional:       General: He is not in acute distress  Appearance: He is well-developed  HENT:      Head: Normocephalic  Eyes:      General: No scleral icterus  Conjunctiva/sclera: Conjunctivae normal    Neck:      Vascular: No JVD  Cardiovascular:      Rate and Rhythm: Normal rate  Heart sounds: Normal heart sounds  Pulmonary:      Effort: Pulmonary effort is normal       Breath sounds: No wheezing  Abdominal:      Palpations: Abdomen is soft  Tenderness: There is no abdominal tenderness  Musculoskeletal:         General: Normal range of motion  Cervical back: Neck supple  Skin:     General: Skin is warm  Findings: No rash  Neurological:      Mental Status: He is alert and oriented to person, place, and time     Psychiatric:         Behavior: Behavior normal           LAB RESULTS     Results from last 7 days   Lab Units 01/18/22  0452 01/17/22  0608 01/16/22  0927 01/16/22  0052 01/15/22  2015 01/15/22  1045   WBC Thousand/uL 6 53 5 98 8 27  --  7 68 9 14   HEMOGLOBIN g/dL 8 6* 8 1* 9 8*  --  9 3* 9 4*   HEMATOCRIT % 28 9* 27 8* 33 7*  --  31 6* 32 3*   PLATELETS Thousands/uL 238 223 298  --  189 267   POTASSIUM mmol/L 4 2 4 1 4 1 4 0 4 9 4 9   CHLORIDE mmol/L 102 102 100 100 95* 99*   CO2 mmol/L 30 29 30 25 23 25   BUN mg/dL 42* 41* 39* 45* 47* 46*   CREATININE mg/dL 5 72* 5 25* 5 88* 7 16* 8 01* 8 05*   EGFR ml/min/1 73sq m 8 9 8 6 5 5   CALCIUM mg/dL 8 3 7 9* 8 8 8 0* 8 2* 8 1*   MAGNESIUM mg/dL  --   --  2 4  --   --   --        RADIOLOGY RESULTS      Results for orders placed during the hospital encounter of 10/07/18    XR chest 1 view portable    Narrative  CHEST    INDICATION:   sob  COMPARISON:  Chest x-ray dated 1/13/2018    EXAM PERFORMED/VIEWS:  XR CHEST PORTABLE      FINDINGS:    No pneumothorax is seen  Some biapical pleural/parenchymal scarring is again seen, worse on the left  Paucity of lung markings in the bilateral upper lung fields is noted, suspicious for emphysematous changes  There is a large dense ill-defined opacity in the left lower lung field, suspicious for infection in the appropriate clinical setting  Lungs otherwise are grossly clear  Median sternotomy wires and metallic mediastinal clips are redemonstrated  The cardiac and mediastinal contours are stable in appearance  Visualized bones appear intact  Impression  Superimposed on chronic pleural/parenchymal and emphysematous changes, there is a large dense ill-defined opacity in the left lower lung field, suspicious for infection in the appropriate clinical setting  Correlation with the patient's symptoms and  laboratory values recommended  Other nonacute findings as above  Workstation performed: [id number]    Results for orders placed during the hospital encounter of 01/15/22    XR chest 2 views    Narrative  CHEST    INDICATION:   sob      COMPARISON:  December 30, 2021 CT from January 15, 2022    EXAM PERFORMED/VIEWS:  XR CHEST PA & LATERAL  Images: 3    FINDINGS:  Right-sided central venous line with tip in the SVC    Heart and mediastinum remain unchanged  Bilateral apical scarring and retraction of the nani as seen on the previous study  No new consolidation seen  Bilateral effusion seen with pleural thickening as seen on the previous study  Hiatal hernia seen  Left base is obscured, unchanged due to prominent cardiophrenic angle fat  Postsurgical changes from prior median sternotomy    Impression  No new consolidation  Blunting of the both CP angle due to small effusion as seen on the recent CT  Hiatal hernia        Workstation performed: [id number]    Results for orders placed during the hospital encounter of 07/07/20    CT chest without contrast    Narrative  CT CHEST WITHOUT IV CONTRAST    INDICATION:   R93 [id number]: Abnormal findings on diagnostic imaging of other specified body structures  COMPARISON:  None  TECHNIQUE: CT examination of the chest was performed without intravenous contrast   Axial, sagittal, and coronal 2D reformatted images were created from the source data and submitted for interpretation  Radiation dose length product (DLP) for this visit:  246 mGy-cm   This examination, like all CT scans performed in the [facility], was performed utilizing techniques to minimize radiation dose exposure, including the use of iterative  reconstruction and automated exposure control  FINDINGS:    LUNGS: Biapical fibrotic changes are noted within the left greater than right with the traction located changes, elevation of the left hilum and elevation of the left diaphragm  PLEURA:  Left apical pleural thickening noted  Focal air-containing area was noted in the left apex which is showing progressive there is optional   The amount of the left apical air is decreased as compared to previous study from December 6, 2019  Emphysema seen  No new consolidation seen    HEART/GREAT VESSELS:  Coronary artery calcification seen        MEDIASTINUM AND JOSE ARMANDO:  Large hiatal hernia seen    CHEST WALL AND LOWER NECK:   Unremarkable      VISUALIZED STRUCTURES IN THE UPPER ABDOMEN:  Spleen, adrenal glands appear unremarkable  Left renal cyst seen  Cholelithiasis seen  A rounded hypodensity seen in the left hepatic lobe at the junction of the right hepatic lobe, stable suggest cyst  Rounded hypodensity seen in the segment 5, stable    OSSEOUS STRUCTURES:  No acute fracture or destructive osseous lesion  Postsurgical changes from prior median sternotomy    Impression  Progressive is resorption of air in the left apex  Residual pleural thickening persists    Bilateral upper lobe fibrotic changes left greater than right with elevation of the hilum    No new consolidation    Emphysema    Large hiatal hernia  Cholelithiasis      Workstation performed: [id number]    Results for orders placed during the hospital encounter of 12/29/21    CT abdomen pelvis w wo contrast    Narrative  CT ABDOMEN AND PELVIS WITH AND WITHOUT IV CONTRAST    INDICATION:   Urinary retention  Urinary retention  pt states he has hx of bladder cancer, sharp pains in bladder that bring him to his knees, last urinated today, believes he is retaining urine    Patient is an 51-year-old male with a history of bladder cancer, atrial fibrillation on Coumadin, GERD, hyperlipidemia, hypertension, CABG, TURP that presents to the emergency department with improving sharp and shooting nonradiating lower abdominal pain  for 1 day  Patient has associated symptoms of decreased urinary stream beginning with the current ED presentation of lower abdominal pain symptoms  Patient states that he is following up with Heme-Onc later today for information session on  chemotherapy infusion  Patient denies palliative factors with provocative factors of pressure to lower abdomen  Patient denies not effective treatment  Patient denies fevers, chills, nausea, vomiting, diarrhea, and constipation  Patient's recent fall  or recent trauma  Patient denies sick contacts or recent travel  Patient denies chest pain and shortness of breath      The patient's entire past medical history was obtained directly from either the attending emergency room physicians notes and/or the resident/physician's assistant notes in Epic  The information was copied and pasted directly from [facility]  Upon further review of the patient's chart in [facility], the patient is status post cystoscopy and transurethral resection of bladder tumor November 30, 2021  COMPARISON:  Several prior studies, most recent of which is a noncontrast CT abdomen pelvis December 18, 2021 and CT renal study September 24, 2021    TECHNIQUE: Initial CT of the abdomen and pelvis was performed without intravenous contrast   Subsequent dynamic CT evaluation of the abdomen and pelvis was performed after the administration of intravenous contrast in both nephrographic and delayed  phases after the administration of intravenous contrast   Additional 15 minute delayed images of the urinary bladder were also obtained  Axial, sagittal, and coronal 2D reformatted images were created from the source data and submitted for  interpretation  Radiation dose length product (DLP) for this visit:  1096 mGy-cm   This examination, like all CT scans performed in the [facility], was performed utilizing techniques to minimize radiation dose exposure, including the use of iterative  reconstruction and automated exposure control  IV Contrast:  85 mL of iodixanol (VISIPAQUE)  Enteric Contrast:  Enteric contrast was not administered  FINDINGS:  ABDOMEN  RIGHT KIDNEY AND URETER:  There are subcentimeter low-density lesions, too small to accurately characterize, however likely cysts  No solid renal mass  No detectable urothelial mass  There is mild hydroureteronephrosis, up to the level of the urinary bladder, new from the prior study  There is delayed excretion of contrast material into the collecting system  No urinary tract calculi  No perinephric collection  LEFT KIDNEY AND URETER:  There are subcentimeter low-density lesions, too small to accurately characterize, however likely represent cysts    Exophytic simple cyst in the posterior interpolar region  No solid renal mass  Delayed excretion of contrast material into the collecting system, limiting evaluation for urothelial lesions  There is moderate to severe hydroureteronephrosis, up to the level of the urinary bladder  Moderate perinephric and periureteric inflammation, up to the level of the urinary bladder, increased from the prior study  No urinary tract calculi  No perinephric collection  URINARY BLADDER:  Severely distended  The urinary bladder extends above the level of the iliac crests  Inadequately evaluated due to lack of excreted contrast material into the urinary bladder  There are postoperative changes along the left lateral aspect of the urinary bladder  Moderate and increasing inflammatory changes are present surrounding the urinary bladder, predominantly along the left lateral and posterolateral margins  There is a  bladder diverticulum along the posterior, left lateral margin of the urinary bladder  No calculi  LOWER CHEST: Mild emphysematous changes  No clinically significant abnormality identified in the visualized lower chest         LIVER/BILIARY TREE:  One or more simple appearing hepatic cysts are again identified and are similar  One or more subcentimeter sharply circumscribed low-density hepatic lesion(s) are noted, too small to accurately characterize, but statistically most likely to represent subcentimeter hepatic cysts  No suspicious solid hepatic lesion is identified  Hepatic contours are normal   No biliary dilatation  GALLBLADDER:  There are gallstone(s) within the gallbladder, without pericholecystic inflammatory changes  SPLEEN:  Unremarkable  PANCREAS:  Unremarkable  ADRENAL GLANDS:  Unremarkable  STOMACH AND BOWEL:  Evaluation of the GI tract limited due to lack of oral contrast material     Stomach incompletely evaluated    Large hiatal hernia/partially intrathoracic stomach  Fecalization of small bowel contents, compatible with delayed small bowel transit  No evidence of small bowel obstruction  The colon is segmentally distended with feces  Normal fecal burden in the colon  ABDOMINOPELVIC CAVITY:  No ascites  No free intraperitoneal air  Again identified are subcentimeter para-aortic, retroperitoneal, mesenteric and pelvic lymph nodes  No pathologic lymphadenopathy based on CT criteria  VESSELS:  Atherosclerotic changes are present in the abdominal aorta and its branch vessels  Fusiform ectasia of the infrarenal abdominal aorta, extending into the common iliac bifurcation, similar  PELVIS  REPRODUCTIVE ORGANS:  Unremarkable for patient's age  APPENDIX: No findings to suggest appendicitis  ABDOMINAL WALL/INGUINAL REGIONS:  Prior laparoscopic bilateral inguinal hernia repair  There is a recurrent, large left inguinal hernia containing fat  OSSEOUS STRUCTURES:  There are age appropriate degenerative changes  No acute fracture or destructive osseous lesion  Impression  1  Severely distended urinary bladder, extending above the iliac crests  Postoperative changes along the left lateral and posterior, left lateral wall of the urinary bladder  2   Mild right-sided hydroureteronephrosis and moderate to severe left-sided hydroureteronephrosis as described above  There is delayed excretion into the collecting systems bilaterally, likely related to vesicoureteral reflux from severely distended  urinary bladder  There is worsening left-sided perinephric and periureteric inflammation  3   Additional, stable incidental findings as described above  Recommend follow-up urology consultation  The study was marked in [facility] for immediate notification        Workstation performed: [id number]    Results for orders placed during the hospital encounter of 01/15/22    CT abdomen pelvis wo contrast    Narrative  CT ABDOMEN AND PELVIS WITHOUT IV CONTRAST    INDICATION:   Bladder-neck obstruction  looking for obstruction in the urinary tract causing DULCE  pt has archibald and bladder ca  COMPARISON:  12/29/2021  TECHNIQUE:  CT examination of the abdomen and pelvis was performed without intravenous contrast   Axial, sagittal, and coronal 2D reformatted images were created from the source data and submitted for interpretation  Radiation dose length product (DLP) for this visit:  674 mGy-cm   This examination, like all CT scans performed in the [facility], was performed utilizing techniques to minimize radiation dose exposure, including the use of iterative  reconstruction and automated exposure control  Enteric contrast was not administered  FINDINGS:    ABDOMEN    LOWER CHEST:  Small bilateral pleural effusions, new since the prior study  Large hiatal hernia  LIVER/BILIARY TREE:  Stable tiny hepatic cysts and subcentimeter hepatic hypodensities too small to accurately characterize  No suspicious solid hepatic lesion is identified  Hepatic contours are normal   No biliary dilatation  GALLBLADDER:  There are gallstone(s) within the gallbladder, without pericholecystic inflammatory changes  SPLEEN:  Unremarkable  PANCREAS:  Unremarkable  ADRENAL GLANDS:  Unremarkable  KIDNEYS/URETERS:  No significant change in moderate left and mild right hydroureteronephrosis up to level of the bladder  There is increased bilateral perinephric and periureteric inflammation when compared to the prior study  Bilateral ureteral wall  thickening noted  Stable left renal exophytic cyst     STOMACH AND BOWEL:  Fecalization of small bowel contents consistent with slow transit  No bowel obstruction  APPENDIX:  No findings to suggest appendicitis  ABDOMINOPELVIC CAVITY:  No ascites  No pneumoperitoneum  Stable subcentimeter mesenteric, retroperitoneal, and pelvic lymph nodes      VESSELS:  Atherosclerotic changes are present  Fusiform ectasia of the infrarenal abdominal aorta extending to the common iliac bifurcation as before  PELVIS    REPRODUCTIVE ORGANS:  Unremarkable for patient's age  URINARY BLADDER:  Decompressed with a Fortune catheter in place  Small volume of nondependent intravesical air  Postoperative changes with contour deformity along the posterior left lateral wall again seen  There is now diffuse bladder wall thickening  with extensive perivesical fat stranding  ABDOMINAL WALL/INGUINAL REGIONS:  Surgical changes of prior bilateral inguinal hernia repair  Recurrent prominent fat-containing left inguinal hernia is reidentified  OSSEOUS STRUCTURES:  No acute fracture or destructive osseous lesion  Impression  1  Decompressed urinary bladder with Fortune catheter in place  There is now diffuse wall thickening of the urinary bladder with extensive perivesical fat stranding compatible with cystitis  Small volume of nondependent intravesical air may be related to  instrumentation or infection  2   No significant change in moderate left and mild right hydroureteronephrosis  There is increased perinephric and periureteric inflammation bilaterally, likely on the basis of infection  3   Large hiatal hernia  4   New small bilateral pleural effusions  The study was marked in [facility] for immediate notification  Workstation performed: [id number]    No results found for this or any previous visit  PLAN / RECOMMENDATIONS      Acute kidney injury:  Due to obstructive uropathy  Does have Fortune catheter with good urine output but creatinine is increasing  For possible nephrostomy tube today as patient has a bladder cancer and maybe intravesical obstruction  Will monitor closely    At present patient is quite asymptomatic    Bladder cancer:  Being monitored by urologist    Obstructive uropathy:  Will be getting bilateral nephrostomy tube today as part of the treatment    Coronary artery disease:  Patient had bypass in the past at present asymptomatic    Cardiomyopathy:  Will need diuretic but holding at this point but would monitor closely    Harmony Flores MD  Nephrology  1/18/2022        Portions of the record may have been created with voice recognition software  Occasional wrong word or "sound a like" substitutions may have occurred due to the inherent limitations of voice recognition software  Read the chart carefully and recognize, using context, where substitutions have occurred

## 2022-01-19 LAB
ANION GAP SERPL CALCULATED.3IONS-SCNC: 10 MMOL/L (ref 4–13)
APTT PPP: 61 SECONDS (ref 23–37)
APTT PPP: 70 SECONDS (ref 23–37)
BASOPHILS # BLD AUTO: 0.02 THOUSANDS/ΜL (ref 0–0.1)
BASOPHILS NFR BLD AUTO: 0 % (ref 0–1)
BUN SERPL-MCNC: 24 MG/DL (ref 5–25)
CALCIUM SERPL-MCNC: 8.5 MG/DL (ref 8.3–10.1)
CHLORIDE SERPL-SCNC: 104 MMOL/L (ref 100–108)
CO2 SERPL-SCNC: 28 MMOL/L (ref 21–32)
CREAT SERPL-MCNC: 2.55 MG/DL (ref 0.6–1.3)
EOSINOPHIL # BLD AUTO: 0.12 THOUSAND/ΜL (ref 0–0.61)
EOSINOPHIL NFR BLD AUTO: 2 % (ref 0–6)
ERYTHROCYTE [DISTWIDTH] IN BLOOD BY AUTOMATED COUNT: 19.5 % (ref 11.6–15.1)
GFR SERPL CREATININE-BSD FRML MDRD: 22 ML/MIN/1.73SQ M
GLUCOSE SERPL-MCNC: 108 MG/DL (ref 65–140)
HCT VFR BLD AUTO: 30.1 % (ref 36.5–49.3)
HGB BLD-MCNC: 8.7 G/DL (ref 12–17)
IMM GRANULOCYTES # BLD AUTO: 0.02 THOUSAND/UL (ref 0–0.2)
IMM GRANULOCYTES NFR BLD AUTO: 0 % (ref 0–2)
LYMPHOCYTES # BLD AUTO: 0.77 THOUSANDS/ΜL (ref 0.6–4.47)
LYMPHOCYTES NFR BLD AUTO: 13 % (ref 14–44)
MCH RBC QN AUTO: 21.5 PG (ref 26.8–34.3)
MCHC RBC AUTO-ENTMCNC: 28.9 G/DL (ref 31.4–37.4)
MCV RBC AUTO: 75 FL (ref 82–98)
MONOCYTES # BLD AUTO: 0.55 THOUSAND/ΜL (ref 0.17–1.22)
MONOCYTES NFR BLD AUTO: 9 % (ref 4–12)
NEUTROPHILS # BLD AUTO: 4.42 THOUSANDS/ΜL (ref 1.85–7.62)
NEUTS SEG NFR BLD AUTO: 76 % (ref 43–75)
NRBC BLD AUTO-RTO: 0 /100 WBCS
PLATELET # BLD AUTO: 269 THOUSANDS/UL (ref 149–390)
PMV BLD AUTO: 10 FL (ref 8.9–12.7)
POTASSIUM SERPL-SCNC: 4 MMOL/L (ref 3.5–5.3)
RBC # BLD AUTO: 4.04 MILLION/UL (ref 3.88–5.62)
SODIUM SERPL-SCNC: 142 MMOL/L (ref 136–145)
WBC # BLD AUTO: 5.9 THOUSAND/UL (ref 4.31–10.16)

## 2022-01-19 PROCEDURE — 85730 THROMBOPLASTIN TIME PARTIAL: CPT | Performed by: STUDENT IN AN ORGANIZED HEALTH CARE EDUCATION/TRAINING PROGRAM

## 2022-01-19 PROCEDURE — 85025 COMPLETE CBC W/AUTO DIFF WBC: CPT | Performed by: INTERNAL MEDICINE

## 2022-01-19 PROCEDURE — 99232 SBSQ HOSP IP/OBS MODERATE 35: CPT | Performed by: INTERNAL MEDICINE

## 2022-01-19 PROCEDURE — 99232 SBSQ HOSP IP/OBS MODERATE 35: CPT | Performed by: NURSE PRACTITIONER

## 2022-01-19 PROCEDURE — 80048 BASIC METABOLIC PNL TOTAL CA: CPT | Performed by: INTERNAL MEDICINE

## 2022-01-19 PROCEDURE — 85730 THROMBOPLASTIN TIME PARTIAL: CPT | Performed by: INTERNAL MEDICINE

## 2022-01-19 PROCEDURE — 99233 SBSQ HOSP IP/OBS HIGH 50: CPT | Performed by: INTERNAL MEDICINE

## 2022-01-19 RX ORDER — AMOXICILLIN 250 MG
1 CAPSULE ORAL
Status: DISCONTINUED | OUTPATIENT
Start: 2022-01-19 | End: 2022-01-19

## 2022-01-19 RX ORDER — WARFARIN SODIUM 7.5 MG/1
7.5 TABLET ORAL
Status: DISCONTINUED | OUTPATIENT
Start: 2022-01-19 | End: 2022-01-21

## 2022-01-19 RX ORDER — BISACODYL 10 MG
10 SUPPOSITORY, RECTAL RECTAL DAILY PRN
Status: DISCONTINUED | OUTPATIENT
Start: 2022-01-19 | End: 2022-01-24 | Stop reason: HOSPADM

## 2022-01-19 RX ADMIN — OXYCODONE HYDROCHLORIDE 5 MG: 5 TABLET ORAL at 21:31

## 2022-01-19 RX ADMIN — DOCUSATE SODIUM AND SENNOSIDES 1 TABLET: 8.6; 5 TABLET, FILM COATED ORAL at 13:23

## 2022-01-19 RX ADMIN — ASPIRIN 81 MG: 81 TABLET, CHEWABLE ORAL at 08:54

## 2022-01-19 RX ADMIN — OXYCODONE HYDROCHLORIDE 5 MG: 5 TABLET ORAL at 01:41

## 2022-01-19 RX ADMIN — BISACODYL 10 MG: 10 SUPPOSITORY RECTAL at 14:55

## 2022-01-19 RX ADMIN — SODIUM CHLORIDE 75 ML/HR: 9 INJECTION, SOLUTION INTRAVENOUS at 14:55

## 2022-01-19 RX ADMIN — DILTIAZEM HYDROCHLORIDE 240 MG: 240 CAPSULE, COATED, EXTENDED RELEASE ORAL at 08:54

## 2022-01-19 RX ADMIN — PANTOPRAZOLE SODIUM 40 MG: 40 TABLET, DELAYED RELEASE ORAL at 05:47

## 2022-01-19 RX ADMIN — HEPARIN SODIUM AND DEXTROSE 12 UNITS/KG/HR: 10000; 5 INJECTION INTRAVENOUS at 13:24

## 2022-01-19 RX ADMIN — WARFARIN SODIUM 7.5 MG: 7.5 TABLET ORAL at 17:43

## 2022-01-19 RX ADMIN — SODIUM CHLORIDE 75 ML/HR: 9 INJECTION, SOLUTION INTRAVENOUS at 01:06

## 2022-01-19 RX ADMIN — ATORVASTATIN CALCIUM 40 MG: 40 TABLET, FILM COATED ORAL at 08:54

## 2022-01-19 NOTE — PROGRESS NOTES
[facility]  Progress Note - Munir Layton 1940, 80 y o  male MRN: [id number]  Unit/Bed#: -72 Encounter: [id number]  Primary Care Provider: Mohan Oliva MD   Date and time admitted to hospital: 1/15/2022  7:10 PM    * DULCE (acute kidney injury) ([facility] [address] ) on CKD 3  Assessment & Plan  · Pt with h/o bladder cancer and archibald, scheduled for OP chemo  · CT abd pelvis non contrast   1   Decompressed urinary bladder with Archibald catheter in place  Shereen Flax is now diffuse wall thickening of the urinary bladder with extensive perivesical fat stranding compatible with cystitis  Small volume of nondependent intravesical air may be related to    instrumentation or infection  2   No significant change in moderate left and mild right hydroureteronephrosis  Shereen Flax is increased perinephric and periureteric inflammation bilaterally, likely on the basis of infection  3   Large hiatal hernia  4   New small bilateral pleural effusions  · Nephrology following  · Urology following  · 1/18 percutaneous nephrostomy tube placement  · Continue to monitor urine output  · Creatinine markedly improved today    Atrial fibrillation (HCC)  Assessment & Plan  · Rate controlled  · Currently on heparin drip  · Will start warfarin tonight  · Will continue to monitor INR  · Cont cardizem home dose  Bladder cancer [facility])  Assessment & Plan  · Has a port and plan was to start chemo starting this Monday  · Urology following  · Has a archibald      Acute on chronic diastolic congestive heart failure ([facility] [address] )  Assessment & Plan  Wt Readings from Last 3 Encounters:   01/15/22 84 7 kg (186 lb 11 7 oz)   01/14/22 82 1 kg (181 lb)   01/06/22 82 1 kg (181 lb)     · Elevated NT pro BNP, b/l worsening pedal edema and SOB, no hypoxia  · Cardiology input appreciated and they suggest that it is chronic diastolic heart failure    The fluid overload is due to obstructive uropathy and not due to acute on chronic diastolic congestive heart failure  · Diuresis as per nephrology        Abnormal urinalysis  Assessment & Plan  · Concern for UTI but patient has a Fortune catheter  · Received 1 dose ceftriaxone in the ED, recent urine culture was negative  · Urine culture with no growth  · Will discontinue ceftriaxone    Pleural effusion on left  Assessment & Plan  · Wet read XR chest, improved with diuresis  · Likely from chronic diastolic heart failure    Hyponatremia  Assessment & Plan  resolved at this time      COPD, severe ([address] )  Assessment & Plan  · Stable currently  No wheezing      Back pain  Assessment & Plan  · Counseled to not take advil at home  · Start PO oxy for now     S/P CABG x 4  Assessment & Plan  · Elevated troponin could be likely from DULCE/CKD vs CHF exac  · Cardiology was consulted  Input appreciated  No active coronary syndrome  · EKG shows no new change compared to previous EKG: RBBB with T inversion V1-2  NSR    CAD (coronary atherosclerotic disease)  Assessment & Plan  - no chest pain  - continue ASA statin         VTE Pharmacologic Prophylaxis: VTE Score: 7 High Risk (Score >/= 5) - Pharmacological DVT Prophylaxis Ordered: heparin drip  Sequential Compression Devices Ordered  Patient Centered Rounds: I performed bedside rounds with nursing staff today  Discussions with Specialists or Other Care Team Provider:  Nephrology, Urology    Education and Discussions with Family / Patient: Attempted to update  (grandchild) via phone  Unable to contact  Time Spent for Care: 30 minutes  More than 50% of total time spent on counseling and coordination of care as described above  Current Length of Stay: 4 day(s)  Current Patient Status: Inpatient   Certification Statement: The patient will continue to require additional inpatient hospital stay due to acute kidney injury   Discharge Plan: Anticipate discharge in >72 hrs to home      Code Status: Level 1 - Full Code    Subjective:   Patient resting comfortably on examination  Patient had no complaints or issues on examination  Objective:     Vitals:   Temp (24hrs), Av 2 °F (36 8 °C), Min:98 °F (36 7 °C), Max:98 3 °F (36 8 °C)    Temp:  [98 °F (36 7 °C)-98 3 °F (36 8 °C)] 98 3 °F (36 8 °C)  HR:  [69-79] 74  Resp:  [16-18] 18  BP: (155-163)/(66-92) 157/92  SpO2:  [91 %-93 %] 91 %  Body mass index is 28 39 kg/m²  Input and Output Summary (last 24 hours): Intake/Output Summary (Last 24 hours) at 2022 1703  Last data filed at 2022 1324  Gross per 24 hour   Intake 1880 ml   Output 4365 ml   Net -2485 ml       Physical Exam:   Physical Exam  Vitals and nursing note reviewed  Constitutional:       General: He is not in acute distress  Appearance: He is well-developed  HENT:      Head: Normocephalic and atraumatic  Eyes:      General: No scleral icterus  Conjunctiva/sclera: Conjunctivae normal    Cardiovascular:      Rate and Rhythm: Normal rate and regular rhythm  Heart sounds: Normal heart sounds  No murmur heard  No friction rub  No gallop  Pulmonary:      Effort: Pulmonary effort is normal  No respiratory distress  Breath sounds: Normal breath sounds  No wheezing or rales  Abdominal:      General: Bowel sounds are normal  There is no distension  Palpations: Abdomen is soft  Tenderness: There is no abdominal tenderness  Musculoskeletal:         General: Normal range of motion  Skin:     General: Skin is warm  Findings: No rash  Neurological:      Mental Status: He is alert and oriented to person, place, and time            Additional Data:     Labs:  Results from last 7 days   Lab Units 22  0701   WBC Thousand/uL 5 90   HEMOGLOBIN g/dL 8 7*   HEMATOCRIT % 30 1*   PLATELETS Thousands/uL 269   NEUTROS PCT % 76*   LYMPHS PCT % 13*   MONOS PCT % 9   EOS PCT % 2     Results from last 7 days   Lab Units 22  0701 22  0452 22  0608   SODIUM mmol/L 142   < > 140   POTASSIUM mmol/L 4 0   < > 4  1   CHLORIDE mmol/L 104   < > 102   CO2 mmol/L 28   < > 29   BUN mg/dL 24   < > 41*   CREATININE mg/dL 2 55*   < > 5 25*   ANION GAP mmol/L 10   < > 9   CALCIUM mg/dL 8 5   < > 7 9*   ALBUMIN g/dL  --   --  2 4*   TOTAL BILIRUBIN mg/dL  --   --  0 45   ALK PHOS U/L  --   --  68   ALT U/L  --   --  44   AST U/L  --   --  35   GLUCOSE RANDOM mg/dL 108   < > 90    < > = values in this interval not displayed  Results from last 7 days   Lab Units 01/17/22  0608   INR  1 07                   Lines/Drains:  Invasive Devices  Report    Central Venous Catheter Line            Port A Cath 01/14/22 Right Internal jugular 5 days          Peripheral Intravenous Line            Peripheral IV 01/16/22 Right Forearm 2 days    Peripheral IV 01/19/22 Left Forearm <1 day          Drain            Urethral Catheter Non-latex 16 Fr  21 days    Percutaneous Nephroureteral Tube (PCNU) Left 1 8 5 Fr 26 Cm 1 day    Percutaneous Nephroureteral Tube (PCNU) Right 2 8 5 Fr 24 Cm 1 day              Urinary Catheter:  Goal for removal: Remove after 48 hrs of I/O monitoring         Central Line:  Goal for removal: Will discontinue when peripheral access obtained  Imaging: No pertinent imaging reviewed      Recent Cultures (last 7 days):   Results from last 7 days   Lab Units 01/15/22  2019   URINE CULTURE  >100,000 cfu/ml        Last 24 Hours Medication List:   Current Facility-Administered Medications   Medication Dose Route Frequency Provider Last Rate    aspirin  81 mg Oral Daily Annette Bedoya MD      atorvastatin  40 mg Oral Daily Annette Bedoya MD      bisacodyl  10 mg Rectal Daily PRN Cely Ta DO      diltiazem  240 mg Oral Daily Annette Bedoya MD      heparin (porcine)  3-20 Units/kg/hr (Order-Specific) Intravenous Titrated Kam Dumont PA-C 12 Units/kg/hr (01/19/22 1324)    HYDROmorphone  0 5 mg Intravenous Q4H PRN Annette Bedoya MD      oxyCODONE  5 mg Oral Q6H PRN Annette Bedoya MD      Or    oxyCODONE  2 5 mg Oral Q6H PRN Jaden Nunez MD      pantoprazole  40 mg Oral Early Morning Jaden Nunez MD      sodium chloride  75 mL/hr Intravenous Continuous Aylin Orozco MD 75 mL/hr (01/19/22 7615)    warfarin  7 5 mg Oral Daily (warfarin) Moises Irizarry DO          Today, Patient Was Seen By: Moises Irizarry DO    **Please Note: This note may have been constructed using a voice recognition system  **

## 2022-01-19 NOTE — ASSESSMENT & PLAN NOTE
· Pt with h/o bladder cancer and archibald, scheduled for OP chemo  · CT abd pelvis non contrast   1   Decompressed urinary bladder with Archibald catheter in place   There is now diffuse wall thickening of the urinary bladder with extensive perivesical fat stranding compatible with cystitis  Small volume of nondependent intravesical air may be related to    instrumentation or infection  2   No significant change in moderate left and mild right hydroureteronephrosis  Dellie Siskin is increased perinephric and periureteric inflammation bilaterally, likely on the basis of infection  3   Large hiatal hernia  4   New small bilateral pleural effusions     · Nephrology following  · Urology following  · 1/18 percutaneous nephrostomy tube placement  · Continue to monitor urine output  · Creatinine markedly improved today

## 2022-01-19 NOTE — ASSESSMENT & PLAN NOTE
· Concern for UTI but patient has a [place] catheter  · Received 1 dose ceftriaxone in the ED, recent urine culture was negative  · Urine culture with no growth  · Will discontinue ceftriaxone

## 2022-01-19 NOTE — ASSESSMENT & PLAN NOTE
· Rate controlled  · Currently on heparin drip with plan to bridge to warfarin  · Warfarin 7 5 mg daily  · INR 1 13  · Will continue to monitor with daily INR  · Cont cardizem home dose

## 2022-01-19 NOTE — ASSESSMENT & PLAN NOTE
Wt Readings from Last 3 Encounters:   01/15/22 84 7 kg (186 lb 11 7 oz)   01/14/22 82 1 kg (181 lb)   01/06/22 82 1 kg (181 lb)     · Elevated NT pro BNP, b/l worsening pedal edema and SOB, no hypoxia  · Cardiology input appreciated and they suggest that it is chronic diastolic heart failure    The fluid overload is due to obstructive uropathy and not due to acute on chronic diastolic congestive heart failure  · Diuresis as per nephrology

## 2022-01-19 NOTE — PLAN OF CARE
Problem: Potential for Falls  Goal: Patient will remain free of falls  Description: INTERVENTIONS:  - Educate patient/family on patient safety including physical limitations  - Instruct patient to call for assistance with activity   - Consult OT/PT to assist with strengthening/mobility   - Keep Call bell within reach  - Keep bed low and locked with side rails adjusted as appropriate  - Keep care items and personal belongings within reach  - Initiate and maintain comfort rounds  - Make Fall Risk Sign visible to staff  - Offer Toileting every 2 Hours, in advance of need  - Initiate/Maintain bed alarm  - Apply yellow socks and bracelet for high fall risk patients  - Consider moving patient to room near nurses station  Outcome: Progressing     Problem: PAIN - ADULT  Goal: Verbalizes/displays adequate comfort level or baseline comfort level  Description: Interventions:  - Encourage patient to monitor pain and request assistance  - Assess pain using appropriate pain scale  - Administer analgesics based on type and severity of pain and evaluate response  - Implement non-pharmacological measures as appropriate and evaluate response  - Consider cultural and social influences on pain and pain management  - Notify physician/advanced practitioner if interventions unsuccessful or patient reports new pain  Outcome: Progressing     Problem: INFECTION - ADULT  Goal: Absence or prevention of progression during hospitalization  Description: INTERVENTIONS:  - Assess and monitor for signs and symptoms of infection  - Monitor lab/diagnostic results  - Monitor all insertion sites  - Monitor secretions for changes in amount and color  - Administer medications as ordered  - Instruct and encourage patient and family to use good hand hygiene technique  Outcome: Progressing  Goal: Absence of fever/infection during neutropenic period  Description: INTERVENTIONS:  - Monitor WBC    Outcome: Progressing     Problem: SAFETY ADULT  Goal: Patient will remain free of falls  Description: INTERVENTIONS:  - Educate patient/family on patient safety including physical limitations  - Instruct patient to call for assistance with activity   - Consult OT/PT to assist with strengthening/mobility   - Keep Call bell within reach  - Keep bed low and locked with side rails adjusted as appropriate  - Keep care items and personal belongings within reach  - Initiate and maintain comfort rounds  - Make Fall Risk Sign visible to staff  - Offer Toileting every 2 Hours, in advance of need  - Initiate/Maintain bed alarm  - Apply yellow socks and bracelet for high fall risk patients  - Consider moving patient to room near nurses station  Outcome: Progressing  Goal: Maintain or return to baseline ADL function  Description: INTERVENTIONS:  -  Assess patient's ability to carry out ADLs; assess patient's baseline for ADL function and identify physical deficits which impact ability to perform ADLs (bathing, care of mouth/teeth, toileting, grooming, dressing, etc )  - Assess/evaluate cause of self-care deficits   - Assess range of motion  - Assess patient's mobility; develop plan if impaired  - Assess patient's need for assistive devices and provide as appropriate  - Encourage maximum independence but intervene and supervise when necessary  - Involve family in performance of ADLs  - Assess for home care needs following discharge   - Consider OT consult to assist with ADL evaluation and planning for discharge  - Provide patient education as appropriate  Outcome: Progressing  Goal: Maintains/Returns to pre admission functional level  Description: INTERVENTIONS:  - Perform BMAT or MOVE assessment daily    - Set and communicate daily mobility goal to care team and patient/family/caregiver  - Collaborate with rehabilitation services on mobility goals if consulted  - Reposition patient every 2 hours    - Record patient progress and toleration of activity level   Outcome: Progressing Problem: MOBILITY - ADULT  Goal: Maintain or return to baseline ADL function  Description: INTERVENTIONS:  -  Assess patient's ability to carry out ADLs; assess patient's baseline for ADL function and identify physical deficits which impact ability to perform ADLs (bathing, care of mouth/teeth, toileting, grooming, dressing, etc )  - Assess/evaluate cause of self-care deficits   - Assess range of motion  - Assess patient's mobility; develop plan if impaired  - Assess patient's need for assistive devices and provide as appropriate  - Encourage maximum independence but intervene and supervise when necessary  - Involve family in performance of ADLs  - Assess for home care needs following discharge   - Consider OT consult to assist with ADL evaluation and planning for discharge  - Provide patient education as appropriate  Outcome: Progressing  Goal: Maintains/Returns to pre admission functional level  Description: INTERVENTIONS:  - Perform BMAT or MOVE assessment daily    - Set and communicate daily mobility goal to care team and patient/family/caregiver  - Collaborate with rehabilitation services on mobility goals if consulted  - Reposition patient every 2 hours    - Record patient progress and toleration of activity level   Outcome: Progressing

## 2022-01-19 NOTE — ASSESSMENT & PLAN NOTE
· Wet read XR chest, improved with diuresis  · Likely from chronic diastolic heart failure Own business retail fishing

## 2022-01-19 NOTE — PROGRESS NOTES
Progress Note - Lissa Goldsmith 80 y o  male MRN: [id number]    Unit/Bed#: -06 Encounter: [id number]      Assessment:  Lissa Goldsmith is an 45-year-old male known to our office for evaluation of hematuria with radiographic and cystoscopic examination demonstrating bladder tumor, status post TURBT 11/30  Intraoperative finding consistent with extremely large bladder tumor burden involving left floor of the bladder and crossing over midline and involving multiple bladder diverticuli  Ureteral orifices could not be identified at the time of procedure  Pathology demonstrated high-grade urothelial carcinoma  He has had refractory urinary retention with multiple office visits for catheter insertion  He is currently under the care of medical-oncology, status post Port-A-Cath insertion for initiation of systemic chemotherapy  Unfortunately, patient is now admitted with acute kidney injury and CT demonstrating persistent bilateral hydronephrosis  His history is compounded by a significant cardiovascular disease including nonischemic myocardial injury, CAD status post CABG, PA F and CHF requiring anticoagulation  Recent urine culture contaminated  He is now postprocedure day 1 IR insertion of bilateral percutaneous nephroureteral stents  These are to straight drainage  There has been interval improvement with creatinine down to 2 5 from 5 25  Baseline between 1 2--1  Seven per review of EMR    Plan:  1  Continue medical optimization  2  Appreciate IR involvement for placement of bilateral PCNs  3  Anticoagulation per Internal Medicine and/or Cardiology  4  Maintain PCNUs to gravity and Fortune catheter for maximal drainage and given history of refractory retention  Subjective:   Denies fever, chills, flank, abdominal or suprapubic pain  Objective:     Vitals: Blood pressure 157/92, pulse 74, temperature 98 3 °F (36 8 °C), resp   rate 18, height 5' 8" (1 727 m), weight 84 7 kg (186 lb 11 7 oz), SpO2 93 % ,Body mass index is 28 39 kg/m²  Intake/Output Summary (Last 24 hours) at 1/19/2022 0929  Last data filed at 1/19/2022 0900  Gross per 24 hour   Intake 2922 16 ml   Output 4460 ml   Net -1537 84 ml       Physical Exam:   General appearance: alert and oriented, in no acute distress, appears stated age, cooperative, no distress, pale and Poor historian and poor health literacy  Head: Normocephalic, without obvious abnormality, atraumatic  Neck: no adenopathy, no carotid bruit, no JVD, supple, symmetrical, trachea midline and thyroid not enlarged, symmetric, no tenderness/mass/nodules  Lungs: diminished breath sounds  Heart: regular rate and rhythm, S1, S2 normal, no murmur, click, rub or gallop  Abdomen: soft, non-tender; bowel sounds normal; no masses,  no organomegaly  Extremities: extremities normal, warm and well-perfused; no cyanosis, clubbing, or edema  Pulses: 2+ and symmetric  Neurologic: Grossly normal  Bilateral PCNUs--gravity--clear light blush/melon colored  Fortune--damien        Invasive Devices  Report    Central Venous Catheter Line            Port A Cath 01/14/22 Right Internal jugular 4 days          Peripheral Intravenous Line            Peripheral IV 01/16/22 Right Forearm 2 days    Peripheral IV 01/19/22 Left Forearm <1 day          Drain            Urethral Catheter Non-latex 16 Fr  21 days    Percutaneous Nephroureteral Tube (PCNU) Left 1 8 5 Fr 26 Cm <1 day    Percutaneous Nephroureteral Tube (PCNU) Right 2 8 5 Fr 24 Cm <1 day              Lab Results   Component Value Date    WBC 5 90 01/19/2022    HGB 8 7 (L) 01/19/2022    HCT 30 1 (L) 01/19/2022    MCV 75 (L) 01/19/2022     01/19/2022       Lab Results   Component Value Date    SODIUM 142 01/19/2022    K 4 0 01/19/2022     01/19/2022    CO2 28 01/19/2022    BUN 24 01/19/2022    CREATININE 2 55 (H) 01/19/2022    GLUC 108 01/19/2022    CALCIUM 8 5 01/19/2022       Lab, Imaging and other studies: I have personally reviewed pertinent reports

## 2022-01-19 NOTE — ASSESSMENT & PLAN NOTE
· Pt with h/o bladder cancer and archibald, scheduled for OP chemo  · CT abd pelvis non contrast   1   Decompressed urinary bladder with Archibald catheter in place   There is now diffuse wall thickening of the urinary bladder with extensive perivesical fat stranding compatible with cystitis  Small volume of nondependent intravesical air may be related to    instrumentation or infection  2   No significant change in moderate left and mild right hydroureteronephrosis  Roman Fontan is increased perinephric and periureteric inflammation bilaterally, likely on the basis of infection  3   Large hiatal hernia  4   New small bilateral pleural effusions     · Nephrology following  · Urology following  · 1/18 percutaneous nephrostomy tube placement  · Continue to monitor urine output  · Creatinine markedly improved today

## 2022-01-19 NOTE — PROGRESS NOTES
NEPHROLOGY PROGRESS NOTE    Patient: Cindy Lewis               Sex: male          DOA: 1/15/2022  7:10 PM   YOB: 1940        Age:  80 y o         LOS:  LOS: 4 days       HPI     Patient came with acute kidney injury and decreased urine output    SUBJECTIVE     Status post bilateral nephrostomy tube with improving urine output and improving kidney function    Patient overall feeling quite well    Denies any acute complaint    No chest pain no palpitation or shortness of breath    No nausea no vomiting    CURRENT MEDICATIONS       Current Facility-Administered Medications:     aspirin chewable tablet 81 mg, 81 mg, Oral, Daily, Addis Hull MD, 81 mg at 01/19/22 0854    atorvastatin (LIPITOR) tablet 40 mg, 40 mg, Oral, Daily, Addis Hull MD, 40 mg at 01/19/22 0854    bisacodyl (DULCOLAX) rectal suppository 10 mg, 10 mg, Rectal, Daily PRN, Noni Diez DO, 10 mg at 01/19/22 1455    diltiazem (CARDIZEM CD) 24 hr capsule 240 mg, 240 mg, Oral, Daily, Addis Hull MD, 240 mg at 01/19/22 0854    heparin (porcine) 25,000 units in D5W 250 mL infusion (premix), 3-20 Units/kg/hr (Order-Specific), Intravenous, Titrated, Kelton Bang PA-C, Last Rate: 9 6 mL/hr at 01/19/22 1324, 12 Units/kg/hr at 01/19/22 1324    HYDROmorphone (DILAUDID) injection 0 5 mg, 0 5 mg, Intravenous, Q4H PRN, Addis Hull MD, 0 5 mg at 01/18/22 2237    oxyCODONE (ROXICODONE) IR tablet 5 mg, 5 mg, Oral, Q6H PRN, 5 mg at 01/19/22 0141 **OR** oxyCODONE (ROXICODONE) IR tablet 2 5 mg, 2 5 mg, Oral, Q6H PRN, Addis Hull MD, 2 5 mg at 01/18/22 1838    pantoprazole (PROTONIX) EC tablet 40 mg, 40 mg, Oral, Early Morning, Addis Hull MD, 40 mg at 01/19/22 0547    sodium chloride 0 9 % infusion, 75 mL/hr, Intravenous, Continuous, Elton Medrano MD, Last Rate: 75 mL/hr at 01/19/22 1455, 75 mL/hr at 01/19/22 1455    warfarin (COUMADIN) tablet 7 5 mg, 7 5 mg, Oral, Daily (warfarin), Noni Diez DO    OBJECTIVE     Current Weight: Weight - Scale: 84 7 kg (186 lb 11 7 oz)  Vitals:    01/19/22 1200   BP:    Pulse:    Resp:    Temp:    SpO2: 91%       Intake/Output Summary (Last 24 hours) at 1/19/2022 1537  Last data filed at 1/19/2022 1324  Gross per 24 hour   Intake 1890 ml   Output 4365 ml   Net -2475 ml       PHYSICAL EXAMINATION     Physical Exam  Constitutional:       General: He is not in acute distress  Appearance: He is well-developed  HENT:      Head: Normocephalic  Eyes:      General: No scleral icterus  Conjunctiva/sclera: Conjunctivae normal    Neck:      Vascular: No JVD  Cardiovascular:      Rate and Rhythm: Normal rate  Heart sounds: Normal heart sounds  Pulmonary:      Effort: Pulmonary effort is normal       Breath sounds: No wheezing  Abdominal:      Palpations: Abdomen is soft  Tenderness: There is no abdominal tenderness  Musculoskeletal:         General: Normal range of motion  Cervical back: Neck supple  Skin:     General: Skin is warm  Findings: No rash  Neurological:      Mental Status: He is alert and oriented to person, place, and time     Psychiatric:         Behavior: Behavior normal           LAB RESULTS     Results from last 7 days   Lab Units 01/19/22  0701 01/18/22  0452 01/17/22  0608 01/16/22  0927 01/16/22  0052 01/15/22  2015 01/15/22  1045   WBC Thousand/uL 5 90 6 53 5 98 8 27  --  7 68 9 14   HEMOGLOBIN g/dL 8 7* 8 6* 8 1* 9 8*  --  9 3* 9 4*   HEMATOCRIT % 30 1* 28 9* 27 8* 33 7*  --  31 6* 32 3*   PLATELETS Thousands/uL 269 238 223 298  --  189 267   POTASSIUM mmol/L 4 0 4 2 4 1 4 1 4 0 4 9 4 9   CHLORIDE mmol/L 104 102 102 100 100 95* 99*   CO2 mmol/L 28 30 29 30 25 23 25   BUN mg/dL 24 42* 41* 39* 45* 47* 46*   CREATININE mg/dL 2 55* 5 72* 5 25* 5 88* 7 16* 8 01* 8 05*   EGFR ml/min/1 73sq m 22 8 9 8 6 5 5   CALCIUM mg/dL 8 5 8 3 7 9* 8 8 8 0* 8 2* 8 1*   MAGNESIUM mg/dL  --   --   --  2 4  --   --   --        RADIOLOGY RESULTS      Results for orders placed during the hospital encounter of 10/07/18    XR chest 1 view portable    Narrative  CHEST    INDICATION:   sob  COMPARISON:  Chest x-ray dated 1/13/2018    EXAM PERFORMED/VIEWS:  XR CHEST PORTABLE      FINDINGS:    No pneumothorax is seen  Some biapical pleural/parenchymal scarring is again seen, worse on the left  Paucity of lung markings in the bilateral upper lung fields is noted, suspicious for emphysematous changes  There is a large dense ill-defined opacity in the left lower lung field, suspicious for infection in the appropriate clinical setting  Lungs otherwise are grossly clear  Median sternotomy wires and metallic mediastinal clips are redemonstrated  The cardiac and mediastinal contours are stable in appearance  Visualized bones appear intact  Impression  Superimposed on chronic pleural/parenchymal and emphysematous changes, there is a large dense ill-defined opacity in the left lower lung field, suspicious for infection in the appropriate clinical setting  Correlation with the patient's symptoms and  laboratory values recommended  Other nonacute findings as above  Workstation performed: [id number]    Results for orders placed during the hospital encounter of 01/15/22    XR chest 2 views    Narrative  CHEST    INDICATION:   sob      COMPARISON:  December 30, 2021 CT from January 15, 2022    EXAM PERFORMED/VIEWS:  XR CHEST PA & LATERAL  Images: 3    FINDINGS:  Right-sided central venous line with tip in the SVC    Heart and mediastinum remain unchanged  Bilateral apical scarring and retraction of the nani as seen on the previous study  No new consolidation seen  Bilateral effusion seen with pleural thickening as seen on the previous study  Hiatal hernia seen  Left base is obscured, unchanged due to prominent cardiophrenic angle fat  Postsurgical changes from prior median sternotomy    Impression  No new consolidation  Blunting of the both CP angle due to small effusion as seen on the recent CT  Hiatal hernia        Workstation performed: [id number]    Results for orders placed during the hospital encounter of 07/07/20    CT chest without contrast    Narrative  CT CHEST WITHOUT IV CONTRAST    INDICATION:   R93 [id number]: Abnormal findings on diagnostic imaging of other specified body structures  COMPARISON:  None  TECHNIQUE: CT examination of the chest was performed without intravenous contrast   Axial, sagittal, and coronal 2D reformatted images were created from the source data and submitted for interpretation  Radiation dose length product (DLP) for this visit:  246 mGy-cm   This examination, like all CT scans performed in the [facility], was performed utilizing techniques to minimize radiation dose exposure, including the use of iterative  reconstruction and automated exposure control  FINDINGS:    LUNGS: Biapical fibrotic changes are noted within the left greater than right with the traction located changes, elevation of the left hilum and elevation of the left diaphragm  PLEURA:  Left apical pleural thickening noted  Focal air-containing area was noted in the left apex which is showing progressive there is optional   The amount of the left apical air is decreased as compared to previous study from December 6, 2019  Emphysema seen  No new consolidation seen    HEART/GREAT VESSELS:  Coronary artery calcification seen        MEDIASTINUM AND JOSE ARMANDO:  Large hiatal hernia seen    CHEST WALL AND LOWER NECK:   Unremarkable  VISUALIZED STRUCTURES IN THE UPPER ABDOMEN:  Spleen, adrenal glands appear unremarkable  Left renal cyst seen  Cholelithiasis seen  A rounded hypodensity seen in the left hepatic lobe at the junction of the right hepatic lobe, stable suggest cyst  Rounded hypodensity seen in the segment 5, stable    OSSEOUS STRUCTURES:  No acute fracture or destructive osseous lesion    Postsurgical changes from prior median sternotomy    Impression  Progressive is resorption of air in the left apex  Residual pleural thickening persists    Bilateral upper lobe fibrotic changes left greater than right with elevation of the hilum    No new consolidation    Emphysema    Large hiatal hernia  Cholelithiasis      Workstation performed: [id number]    Results for orders placed during the hospital encounter of 12/29/21    CT abdomen pelvis w wo contrast    Narrative  CT ABDOMEN AND PELVIS WITH AND WITHOUT IV CONTRAST    INDICATION:   Urinary retention  Urinary retention  pt states he has hx of bladder cancer, sharp pains in bladder that bring him to his knees, last urinated today, believes he is retaining urine    Patient is an 49-year-old male with a history of bladder cancer, atrial fibrillation on Coumadin, GERD, hyperlipidemia, hypertension, CABG, TURP that presents to the emergency department with improving sharp and shooting nonradiating lower abdominal pain  for 1 day  Patient has associated symptoms of decreased urinary stream beginning with the current ED presentation of lower abdominal pain symptoms  Patient states that he is following up with Heme-Onc later today for information session on  chemotherapy infusion  Patient denies palliative factors with provocative factors of pressure to lower abdomen  Patient denies not effective treatment  Patient denies fevers, chills, nausea, vomiting, diarrhea, and constipation  Patient's recent fall  or recent trauma  Patient denies sick contacts or recent travel  Patient denies chest pain and shortness of breath  The patient's entire past medical history was obtained directly from either the attending emergency room physicians notes and/or the resident/physician's assistant notes in [address] [place] [address]  The information was copied and pasted directly from [facility]        Upon further review of the patient's chart in [facility], the patient is status post cystoscopy and transurethral resection of bladder tumor November 30, 2021  COMPARISON:  Several prior studies, most recent of which is a noncontrast CT abdomen pelvis December 18, 2021 and CT renal study September 24, 2021    TECHNIQUE: Initial CT of the abdomen and pelvis was performed without intravenous contrast   Subsequent dynamic CT evaluation of the abdomen and pelvis was performed after the administration of intravenous contrast in both nephrographic and delayed  phases after the administration of intravenous contrast   Additional 15 minute delayed images of the urinary bladder were also obtained  Axial, sagittal, and coronal 2D reformatted images were created from the source data and submitted for  interpretation  Radiation dose length product (DLP) for this visit:  1096 mGy-cm   This examination, like all CT scans performed in the [facility], was performed utilizing techniques to minimize radiation dose exposure, including the use of iterative  reconstruction and automated exposure control  IV Contrast:  85 mL of iodixanol (VISIPAQUE)  Enteric Contrast:  Enteric contrast was not administered  FINDINGS:  ABDOMEN  RIGHT KIDNEY AND URETER:  There are subcentimeter low-density lesions, too small to accurately characterize, however likely cysts  No solid renal mass  No detectable urothelial mass  There is mild hydroureteronephrosis, up to the level of the urinary bladder, new from the prior study  There is delayed excretion of contrast material into the collecting system  No urinary tract calculi  No perinephric collection  LEFT KIDNEY AND URETER:  There are subcentimeter low-density lesions, too small to accurately characterize, however likely represent cysts  Exophytic simple cyst in the posterior interpolar region  No solid renal mass  Delayed excretion of contrast material into the collecting system, limiting evaluation for urothelial lesions      There is moderate to severe hydroureteronephrosis, up to the level of the urinary bladder  Moderate perinephric and periureteric inflammation, up to the level of the urinary bladder, increased from the prior study  No urinary tract calculi  No perinephric collection  URINARY BLADDER:  Severely distended  The urinary bladder extends above the level of the iliac crests  Inadequately evaluated due to lack of excreted contrast material into the urinary bladder  There are postoperative changes along the left lateral aspect of the urinary bladder  Moderate and increasing inflammatory changes are present surrounding the urinary bladder, predominantly along the left lateral and posterolateral margins  There is a  bladder diverticulum along the posterior, left lateral margin of the urinary bladder  No calculi  LOWER CHEST: Mild emphysematous changes  No clinically significant abnormality identified in the visualized lower chest         LIVER/BILIARY TREE:  One or more simple appearing hepatic cysts are again identified and are similar  One or more subcentimeter sharply circumscribed low-density hepatic lesion(s) are noted, too small to accurately characterize, but statistically most likely to represent subcentimeter hepatic cysts  No suspicious solid hepatic lesion is identified  Hepatic contours are normal   No biliary dilatation  GALLBLADDER:  There are gallstone(s) within the gallbladder, without pericholecystic inflammatory changes  SPLEEN:  Unremarkable  PANCREAS:  Unremarkable  ADRENAL GLANDS:  Unremarkable  STOMACH AND BOWEL:  Evaluation of the GI tract limited due to lack of oral contrast material     Stomach incompletely evaluated  Large hiatal hernia/partially intrathoracic stomach  Fecalization of small bowel contents, compatible with delayed small bowel transit  No evidence of small bowel obstruction  The colon is segmentally distended with feces    Normal fecal burden in the colon         ABDOMINOPELVIC CAVITY:  No ascites  No free intraperitoneal air  Again identified are subcentimeter para-aortic, retroperitoneal, mesenteric and pelvic lymph nodes  No pathologic lymphadenopathy based on CT criteria  VESSELS:  Atherosclerotic changes are present in the abdominal aorta and its branch vessels  Fusiform ectasia of the infrarenal abdominal aorta, extending into the common iliac bifurcation, similar  PELVIS  REPRODUCTIVE ORGANS:  Unremarkable for patient's age  APPENDIX: No findings to suggest appendicitis  ABDOMINAL WALL/INGUINAL REGIONS:  Prior laparoscopic bilateral inguinal hernia repair  There is a recurrent, large left inguinal hernia containing fat  OSSEOUS STRUCTURES:  There are age appropriate degenerative changes  No acute fracture or destructive osseous lesion  Impression  1  Severely distended urinary bladder, extending above the iliac crests  Postoperative changes along the left lateral and posterior, left lateral wall of the urinary bladder  2   Mild right-sided hydroureteronephrosis and moderate to severe left-sided hydroureteronephrosis as described above  There is delayed excretion into the collecting systems bilaterally, likely related to vesicoureteral reflux from severely distended  urinary bladder  There is worsening left-sided perinephric and periureteric inflammation  3   Additional, stable incidental findings as described above  Recommend follow-up urology consultation  The study was marked in [facility] for immediate notification  Workstation performed: [id number]    Results for orders placed during the hospital encounter of 01/15/22    CT abdomen pelvis wo contrast    Narrative  CT ABDOMEN AND PELVIS WITHOUT IV CONTRAST    INDICATION:   Bladder-neck obstruction  looking for obstruction in the urinary tract causing DULEC  pt has archibald and bladder ca  COMPARISON:  12/29/2021      TECHNIQUE:  CT examination of the abdomen and pelvis was performed without intravenous contrast   Axial, sagittal, and coronal 2D reformatted images were created from the source data and submitted for interpretation  Radiation dose length product (DLP) for this visit:  674 mGy-cm   This examination, like all CT scans performed in the [facility], was performed utilizing techniques to minimize radiation dose exposure, including the use of iterative  reconstruction and automated exposure control  Enteric contrast was not administered  FINDINGS:    ABDOMEN    LOWER CHEST:  Small bilateral pleural effusions, new since the prior study  Large hiatal hernia  LIVER/BILIARY TREE:  Stable tiny hepatic cysts and subcentimeter hepatic hypodensities too small to accurately characterize  No suspicious solid hepatic lesion is identified  Hepatic contours are normal   No biliary dilatation  GALLBLADDER:  There are gallstone(s) within the gallbladder, without pericholecystic inflammatory changes  SPLEEN:  Unremarkable  PANCREAS:  Unremarkable  ADRENAL GLANDS:  Unremarkable  KIDNEYS/URETERS:  No significant change in moderate left and mild right hydroureteronephrosis up to level of the bladder  There is increased bilateral perinephric and periureteric inflammation when compared to the prior study  Bilateral ureteral wall  thickening noted  Stable left renal exophytic cyst     STOMACH AND BOWEL:  Fecalization of small bowel contents consistent with slow transit  No bowel obstruction  APPENDIX:  No findings to suggest appendicitis  ABDOMINOPELVIC CAVITY:  No ascites  No pneumoperitoneum  Stable subcentimeter mesenteric, retroperitoneal, and pelvic lymph nodes  VESSELS:  Atherosclerotic changes are present  Fusiform ectasia of the infrarenal abdominal aorta extending to the common iliac bifurcation as before  PELVIS    REPRODUCTIVE ORGANS:  Unremarkable for patient's age      URINARY BLADDER: Decompressed with a Fortune catheter in place  Small volume of nondependent intravesical air  Postoperative changes with contour deformity along the posterior left lateral wall again seen  There is now diffuse bladder wall thickening  with extensive perivesical fat stranding  ABDOMINAL WALL/INGUINAL REGIONS:  Surgical changes of prior bilateral inguinal hernia repair  Recurrent prominent fat-containing left inguinal hernia is reidentified  OSSEOUS STRUCTURES:  No acute fracture or destructive osseous lesion  Impression  1  Decompressed urinary bladder with Fortune catheter in place  There is now diffuse wall thickening of the urinary bladder with extensive perivesical fat stranding compatible with cystitis  Small volume of nondependent intravesical air may be related to  instrumentation or infection  2   No significant change in moderate left and mild right hydroureteronephrosis  There is increased perinephric and periureteric inflammation bilaterally, likely on the basis of infection  3   Large hiatal hernia  4   New small bilateral pleural effusions  The study was marked in [facility] for immediate notification  Workstation performed: [id number]    No results found for this or any previous visit  PLAN / RECOMMENDATIONS      Acute kidney injury:  Improving with nephrostomy tube  Advised p o  Hydration at this point    Obstructive uropathy:  Patient does history of bladder cancer  Being monitored by urologist    Anemia:  Likely related to the cancer  Overall stable    Will follow    Lupe Catalan MD  Nephrology  1/19/2022        Portions of the record may have been created with voice recognition software  Occasional wrong word or "sound a like" substitutions may have occurred due to the inherent limitations of voice recognition software  Read the chart carefully and recognize, using context, where substitutions have occurred

## 2022-01-19 NOTE — ASSESSMENT & PLAN NOTE
· Has a port and plan was to start chemo starting this Monday  · Urology following  · Has a archibald

## 2022-01-19 NOTE — ASSESSMENT & PLAN NOTE
· Rate controlled  · Currently on heparin drip  · Will start warfarin tonight  · Will continue to monitor INR  · Cont cardizem home dose

## 2022-01-20 PROBLEM — K59.00 CONSTIPATION: Status: ACTIVE | Noted: 2021-12-09

## 2022-01-20 LAB
ANION GAP SERPL CALCULATED.3IONS-SCNC: 8 MMOL/L (ref 4–13)
APTT PPP: 82 SECONDS (ref 23–37)
BASOPHILS # BLD AUTO: 0.02 THOUSANDS/ΜL (ref 0–0.1)
BASOPHILS NFR BLD AUTO: 0 % (ref 0–1)
BUN SERPL-MCNC: 15 MG/DL (ref 5–25)
CALCIUM SERPL-MCNC: 8.3 MG/DL (ref 8.3–10.1)
CHLORIDE SERPL-SCNC: 104 MMOL/L (ref 100–108)
CO2 SERPL-SCNC: 27 MMOL/L (ref 21–32)
CREAT SERPL-MCNC: 1.63 MG/DL (ref 0.6–1.3)
EOSINOPHIL # BLD AUTO: 0.17 THOUSAND/ΜL (ref 0–0.61)
EOSINOPHIL NFR BLD AUTO: 4 % (ref 0–6)
ERYTHROCYTE [DISTWIDTH] IN BLOOD BY AUTOMATED COUNT: 19.3 % (ref 11.6–15.1)
GFR SERPL CREATININE-BSD FRML MDRD: 38 ML/MIN/1.73SQ M
GLUCOSE SERPL-MCNC: 95 MG/DL (ref 65–140)
HCT VFR BLD AUTO: 24.7 % (ref 36.5–49.3)
HCT VFR BLD AUTO: 28.7 % (ref 36.5–49.3)
HGB BLD-MCNC: 7.1 G/DL (ref 12–17)
HGB BLD-MCNC: 8.3 G/DL (ref 12–17)
IMM GRANULOCYTES # BLD AUTO: 0.02 THOUSAND/UL (ref 0–0.2)
IMM GRANULOCYTES NFR BLD AUTO: 0 % (ref 0–2)
INR PPP: 1.13 (ref 0.84–1.19)
LYMPHOCYTES # BLD AUTO: 0.73 THOUSANDS/ΜL (ref 0.6–4.47)
LYMPHOCYTES NFR BLD AUTO: 15 % (ref 14–44)
MCH RBC QN AUTO: 21.5 PG (ref 26.8–34.3)
MCHC RBC AUTO-ENTMCNC: 28.7 G/DL (ref 31.4–37.4)
MCV RBC AUTO: 75 FL (ref 82–98)
MONOCYTES # BLD AUTO: 0.65 THOUSAND/ΜL (ref 0.17–1.22)
MONOCYTES NFR BLD AUTO: 13 % (ref 4–12)
NEUTROPHILS # BLD AUTO: 3.33 THOUSANDS/ΜL (ref 1.85–7.62)
NEUTS SEG NFR BLD AUTO: 68 % (ref 43–75)
NRBC BLD AUTO-RTO: 0 /100 WBCS
PLATELET # BLD AUTO: 219 THOUSANDS/UL (ref 149–390)
PMV BLD AUTO: 9.9 FL (ref 8.9–12.7)
POTASSIUM SERPL-SCNC: 4 MMOL/L (ref 3.5–5.3)
PROTHROMBIN TIME: 14.1 SECONDS (ref 11.6–14.5)
RBC # BLD AUTO: 3.3 MILLION/UL (ref 3.88–5.62)
SODIUM SERPL-SCNC: 139 MMOL/L (ref 136–145)
WBC # BLD AUTO: 4.92 THOUSAND/UL (ref 4.31–10.16)

## 2022-01-20 PROCEDURE — 80048 BASIC METABOLIC PNL TOTAL CA: CPT | Performed by: INTERNAL MEDICINE

## 2022-01-20 PROCEDURE — 85610 PROTHROMBIN TIME: CPT | Performed by: INTERNAL MEDICINE

## 2022-01-20 PROCEDURE — 85025 COMPLETE CBC W/AUTO DIFF WBC: CPT | Performed by: INTERNAL MEDICINE

## 2022-01-20 PROCEDURE — 99233 SBSQ HOSP IP/OBS HIGH 50: CPT | Performed by: INTERNAL MEDICINE

## 2022-01-20 PROCEDURE — 99232 SBSQ HOSP IP/OBS MODERATE 35: CPT | Performed by: NURSE PRACTITIONER

## 2022-01-20 PROCEDURE — 85018 HEMOGLOBIN: CPT | Performed by: INTERNAL MEDICINE

## 2022-01-20 PROCEDURE — 85014 HEMATOCRIT: CPT | Performed by: INTERNAL MEDICINE

## 2022-01-20 PROCEDURE — 99232 SBSQ HOSP IP/OBS MODERATE 35: CPT | Performed by: INTERNAL MEDICINE

## 2022-01-20 PROCEDURE — 85730 THROMBOPLASTIN TIME PARTIAL: CPT | Performed by: INTERNAL MEDICINE

## 2022-01-20 RX ORDER — MAGNESIUM CARB/ALUMINUM HYDROX 105-160MG
296 TABLET,CHEWABLE ORAL ONCE
Status: COMPLETED | OUTPATIENT
Start: 2022-01-20 | End: 2022-01-20

## 2022-01-20 RX ADMIN — HEPARIN SODIUM AND DEXTROSE 12 UNITS/KG/HR: 10000; 5 INJECTION INTRAVENOUS at 09:36

## 2022-01-20 RX ADMIN — DILTIAZEM HYDROCHLORIDE 240 MG: 240 CAPSULE, COATED, EXTENDED RELEASE ORAL at 09:16

## 2022-01-20 RX ADMIN — ASPIRIN 81 MG: 81 TABLET, CHEWABLE ORAL at 09:16

## 2022-01-20 RX ADMIN — OXYCODONE HYDROCHLORIDE 5 MG: 5 TABLET ORAL at 20:23

## 2022-01-20 RX ADMIN — SODIUM CHLORIDE 75 ML/HR: 9 INJECTION, SOLUTION INTRAVENOUS at 02:48

## 2022-01-20 RX ADMIN — MAGNESIUM CITRATE 296 ML: 1.75 LIQUID ORAL at 14:34

## 2022-01-20 RX ADMIN — WARFARIN SODIUM 7.5 MG: 7.5 TABLET ORAL at 18:17

## 2022-01-20 RX ADMIN — PANTOPRAZOLE SODIUM 40 MG: 40 TABLET, DELAYED RELEASE ORAL at 06:36

## 2022-01-20 RX ADMIN — ATORVASTATIN CALCIUM 40 MG: 40 TABLET, FILM COATED ORAL at 09:16

## 2022-01-20 NOTE — PROGRESS NOTES
[address] [facility]  Progress Note - Monica Nugent 1940, 80 y o  male MRN: [id number]  Unit/Bed#: -66 Encounter: [id number]  Primary Care Provider: Andreia Friend MD   Date and time admitted to hospital: 1/15/2022  7:10 PM    * DULCE (acute kidney injury) ([facility] [address] ) on CKD 3  Assessment & Plan  · Pt with h/o bladder cancer and archibald, scheduled for OP chemo  · CT abd pelvis non contrast   1   Decompressed urinary bladder with Archibald catheter in place  Sonamarash Bustamante is now diffuse wall thickening of the urinary bladder with extensive perivesical fat stranding compatible with cystitis  Small volume of nondependent intravesical air may be related to    instrumentation or infection  2   No significant change in moderate left and mild right hydroureteronephrosis  Sonam Bustamante is increased perinephric and periureteric inflammation bilaterally, likely on the basis of infection  3   Large hiatal hernia  4   New small bilateral pleural effusions  · Nephrology following  · Urology following  · 1/18 percutaneous nephrostomy tube placement  · Continue to monitor urine output  · Creatinine markedly improved today    Atrial fibrillation (HCC)  Assessment & Plan  · Rate controlled  · Currently on heparin drip with plan to bridge to warfarin  · Warfarin 7 5 mg daily  · INR 1 13  · Will continue to monitor with daily INR  · Cont cardizem home dose  Bladder cancer [facility])  Assessment & Plan  · Has a port and plan was to start chemo starting this Monday  · Urology following  · Has a archibald      Acute on chronic diastolic congestive heart failure ([facility] [address] )  Assessment & Plan  Wt Readings from Last 3 Encounters:   01/15/22 84 7 kg (186 lb 11 7 oz)   01/14/22 82 1 kg (181 lb)   01/06/22 82 1 kg (181 lb)     · Elevated NT pro BNP, b/l worsening pedal edema and SOB, no hypoxia  · Cardiology input appreciated and they suggest that it is chronic diastolic heart failure    The fluid overload is due to obstructive uropathy and not due to acute on chronic diastolic congestive heart failure  · Diuresis as per nephrology        Abnormal urinalysis  Assessment & Plan  · Concern for UTI but patient has a Fortune catheter  · Received 1 dose ceftriaxone in the ED, recent urine culture was negative  · Urine culture with no growth  · Will discontinue ceftriaxone    Pleural effusion on left  Assessment & Plan  · Wet read XR chest, improved with diuresis  · Likely from chronic diastolic heart failure    Hyponatremia  Assessment & Plan  resolved at this time      Constipation  Assessment & Plan  · Patient complaining of constipation on exam  · States he normally takes a clear liquid; not sure what this medication is  · Patient's family is currently looking at medications and will place order once they have told me what medication is    COPD, severe ([address] )  Assessment & Plan  · Stable currently  No wheezing      Back pain  Assessment & Plan  · Counseled to not take advil at home  · Start PO oxy for now     S/P CABG x 4  Assessment & Plan  · Elevated troponin could be likely from DULCE/CKD vs CHF exac  · Cardiology was consulted  Input appreciated  No active coronary syndrome  · EKG shows no new change compared to previous EKG: RBBB with T inversion V1-2  NSR    CAD (coronary atherosclerotic disease)  Assessment & Plan  - no chest pain  - continue ASA statin         VTE Pharmacologic Prophylaxis: VTE Score: 7 High Risk (Score >/= 5) - Pharmacological DVT Prophylaxis Ordered: heparin drip  Sequential Compression Devices Ordered  Patient Centered Rounds: I performed bedside rounds with nursing staff today  Discussions with Specialists or Other Care Team Provider:  nephrology, Urology, case management    Education and Discussions with Family / Patient: Updated  (grandchild) via phone  Time Spent for Care: 30 minutes  More than 50% of total time spent on counseling and coordination of care as described above      Current Length of Stay: 5 day(s)  Current Patient Status: Inpatient   Certification Statement: The patient will continue to require additional inpatient hospital stay due to Heparin bridge to Coumadin  Discharge Plan: Anticipate discharge in 48-72 hrs to home  Code Status: Level 1 - Full Code    Subjective:   Patient resting comfortably on examination  Patient complaining of constipation on exam and states he normally takes a clear liquid but is unsure what the name of the medication is  Patient had no other complaints on exam    Objective:     Vitals:   Temp (24hrs), Av 8 °F (36 6 °C), Min:97 8 °F (36 6 °C), Max:97 8 °F (36 6 °C)    Temp:  [97 8 °F (36 6 °C)] 97 8 °F (36 6 °C)  HR:  [73-86] 86  Resp:  [18] 18  BP: (133-150)/(82-84) 133/82  SpO2:  [93 %-97 %] 97 %  Body mass index is 28 39 kg/m²  Input and Output Summary (last 24 hours): Intake/Output Summary (Last 24 hours) at 2022 1329  Last data filed at 2022 0915  Gross per 24 hour   Intake 240 ml   Output 2750 ml   Net -2510 ml       Physical Exam:   Physical Exam  Vitals and nursing note reviewed  Constitutional:       General: He is not in acute distress  Appearance: He is well-developed  HENT:      Head: Normocephalic and atraumatic  Eyes:      General: No scleral icterus  Conjunctiva/sclera: Conjunctivae normal    Cardiovascular:      Rate and Rhythm: Normal rate and regular rhythm  Heart sounds: Normal heart sounds  No murmur heard  No friction rub  No gallop  Pulmonary:      Effort: Pulmonary effort is normal  No respiratory distress  Breath sounds: Normal breath sounds  No wheezing or rales  Abdominal:      General: Bowel sounds are normal  There is no distension  Palpations: Abdomen is soft  Tenderness: There is no abdominal tenderness  Comments: Percutaneous nephrostomy tubes in place on back   Musculoskeletal:         General: Normal range of motion  Skin:     General: Skin is warm        Findings: No rash    Neurological:      Mental Status: He is alert and oriented to person, place, and time  Additional Data:     Labs:  Results from last 7 days   Lab Units 01/20/22  0927 01/20/22  0503 01/20/22  0503   WBC Thousand/uL  --   --  4 92   HEMOGLOBIN g/dL 8 3*   < > 7 1*   HEMATOCRIT % 28 7*   < > 24 7*   PLATELETS Thousands/uL  --   --  219   NEUTROS PCT %  --   --  68   LYMPHS PCT %  --   --  15   MONOS PCT %  --   --  13*   EOS PCT %  --   --  4    < > = values in this interval not displayed  Results from last 7 days   Lab Units 01/20/22  0503 01/18/22  0452 01/17/22  0608   SODIUM mmol/L 139   < > 140   POTASSIUM mmol/L 4 0   < > 4 1   CHLORIDE mmol/L 104   < > 102   CO2 mmol/L 27   < > 29   BUN mg/dL 15   < > 41*   CREATININE mg/dL 1 63*   < > 5 25*   ANION GAP mmol/L 8   < > 9   CALCIUM mg/dL 8 3   < > 7 9*   ALBUMIN g/dL  --   --  2 4*   TOTAL BILIRUBIN mg/dL  --   --  0 45   ALK PHOS U/L  --   --  68   ALT U/L  --   --  44   AST U/L  --   --  35   GLUCOSE RANDOM mg/dL 95   < > 90    < > = values in this interval not displayed  Results from last 7 days   Lab Units 01/20/22  0503   INR  1 13                   Lines/Drains:  Invasive Devices  Report    Central Venous Catheter Line            Port A Cath 01/14/22 Right Internal jugular 6 days          Peripheral Intravenous Line            Peripheral IV 01/16/22 Right Forearm 3 days    Peripheral IV 01/19/22 Left Forearm 1 day          Drain            Urethral Catheter Non-latex 16 Fr  22 days    Percutaneous Nephroureteral Tube (PCNU) Left 1 8 5 Fr 26 Cm 1 day    Percutaneous Nephroureteral Tube (PCNU) Right 2 8 5 Fr 24 Cm 1 day              Urinary Catheter:  Goal for removal: Remove after 48 hrs of I/O monitoring         Central Line:  Goal for removal: Will discontinue when peripheral access obtained  Imaging: No pertinent imaging reviewed      Recent Cultures (last 7 days):   Results from last 7 days   Lab Units 01/15/22  2019 URINE CULTURE  >100,000 cfu/ml        Last 24 Hours Medication List:   Current Facility-Administered Medications   Medication Dose Route Frequency Provider Last Rate    aspirin  81 mg Oral Daily Laura Handley MD      atorvastatin  40 mg Oral Daily Laura Handley MD      bisacodyl  10 mg Rectal Daily PRN Dede Sutton DO      diltiazem  240 mg Oral Daily Laura Handley MD      heparin (porcine)  3-20 Units/kg/hr (Order-Specific) Intravenous Titrated Ramana Núñez PA-C 12 Units/kg/hr (01/20/22 5755)    HYDROmorphone  0 5 mg Intravenous Q4H PRN Laura Handley MD      oxyCODONE  5 mg Oral Q6H PRN Laura Handley MD      Or   Michelle Baltazar oxyCODONE  2 5 mg Oral Q6H PRN Laura Handley MD      pantoprazole  40 mg Oral Early Morning Laura Handley MD      warfarin  7 5 mg Oral Daily (warfarin) Dede Sutton DO          Today, Patient Was Seen By: Dede Sutton DO    **Please Note: This note may have been constructed using a voice recognition system  **

## 2022-01-20 NOTE — PROGRESS NOTES
Progress Note - Parth Serrano 80 y o  male MRN: [id number]    Unit/Bed#: -98 Encounter: [id number]      Assessment:  Parth Serrano is an 80-year-old male known to our office for evaluation of hematuria with radiographic and cystoscopic examination demonstrating bladder tumor, status post TURBT 11/30  Intraoperative finding consistent with extremely large bladder tumor burden involving left floor of the bladder and crossing over midline and involving multiple bladder diverticuli  Ureteral orifices could not be identified at the time of procedure  Pathology demonstrated high-grade urothelial carcinoma  He has had refractory urinary retention with multiple office visits for catheter insertion  He is currently under the care of medical-oncology, status post Port-A-Cath insertion for initiation of systemic chemotherapy  Unfortunately, patient is now admitted with acute kidney injury and CT demonstrating persistent bilateral hydronephrosis  His history is compounded by a significant cardiovascular disease including nonischemic myocardial injury, CAD status post CABG, PA F and CHF requiring anticoagulation  Recent urine culture contaminated  He is now postprocedure day 2 IR insertion of bilateral percutaneous nephroureteral stents  These are to straight drainage  There has been interval progressive improvement with creatinine down to 2 5 from 5 25--2 5--1 63 today  Baseline between 1 2--1     3 1 L urinary output 1/18  Will 2 7 L in the past 24 hours  Fortunately, hemoglobin is 8 3  Plan:  1  Continue medical optimization  2  Patient is experiencing postobstructive diuresis  Replete fluids  3  Coumadin resumed  Patient is still subtherapeutic with INR 1 13  Heparin infusion on going  Please keep within therapeutic window  Monitor for any further bleeding  4  Maintain all urinary drains  Keep Fortune in for history of retention  5  Clamp trial as an outpatient        Subjective:   Denies fever, chills, flank, abdominal or suprapubic pain  Objective:     Vitals: Blood pressure 133/82, pulse 86, temperature 97 8 °F (36 6 °C), resp  rate 18, height 5' 8" (1 727 m), weight 84 7 kg (186 lb 11 7 oz), SpO2 97 %  ,Body mass index is 28 39 kg/m²  Intake/Output Summary (Last 24 hours) at 1/20/2022 1019  Last data filed at 1/20/2022 0915  Gross per 24 hour   Intake 240 ml   Output 3215 ml   Net -2975 ml       Physical Exam:   General appearance: alert, appears stated age, cooperative and no distress  Head: Normocephalic, without obvious abnormality, atraumatic  Neck: no adenopathy, no carotid bruit, no JVD, supple, symmetrical, trachea midline and thyroid not enlarged, symmetric, no tenderness/mass/nodules  Lungs: diminished breath sounds  Heart: regular rate and rhythm, S1, S2 normal, no murmur, click, rub or gallop  Abdomen: soft, non-tender; bowel sounds normal; no masses,  no organomegaly  Extremities: extremities normal, warm and well-perfused; no cyanosis, clubbing, or edema  Pulses: 2+ and symmetric  Neurologic: Grossly normal  Fortune--clear damien    Bilateral PCNUs--shannan tinged        Invasive Devices  Report    Central Venous Catheter Line            Port A Cath 01/14/22 Right Internal jugular 6 days          Peripheral Intravenous Line            Peripheral IV 01/16/22 Right Forearm 3 days    Peripheral IV 01/19/22 Left Forearm 1 day          Drain            Urethral Catheter Non-latex 16 Fr  22 days    Percutaneous Nephroureteral Tube (PCNU) Left 1 8 5 Fr 26 Cm 1 day    Percutaneous Nephroureteral Tube (PCNU) Right 2 8 5 Fr 24 Cm 1 day              Lab Results   Component Value Date    WBC 4 92 01/20/2022    HGB 8 3 (L) 01/20/2022    HCT 28 7 (L) 01/20/2022    MCV 75 (L) 01/20/2022     01/20/2022       Lab Results   Component Value Date    SODIUM 139 01/20/2022    K 4 0 01/20/2022     01/20/2022    CO2 27 01/20/2022    BUN 15 01/20/2022    CREATININE 1 63 (H) 01/20/2022    GLUC 95 01/20/2022    CALCIUM 8 3 01/20/2022       Lab, Imaging and other studies: I have personally reviewed pertinent reports

## 2022-01-20 NOTE — PLAN OF CARE
Problem: PAIN - ADULT  Goal: Verbalizes/displays adequate comfort level or baseline comfort level  Description: Interventions:  - Encourage patient to monitor pain and request assistance  - Assess pain using appropriate pain scale  - Administer analgesics based on type and severity of pain and evaluate response  - Implement non-pharmacological measures as appropriate and evaluate response  - Consider cultural and social influences on pain and pain management  - Notify physician/advanced practitioner if interventions unsuccessful or patient reports new pain  Outcome: Progressing     Problem: INFECTION - ADULT  Goal: Absence or prevention of progression during hospitalization  Description: INTERVENTIONS:  - Assess and monitor for signs and symptoms of infection  - Monitor lab/diagnostic results  - Monitor all insertion sites, i e  indwelling lines, tubes, and drains  - Monitor endotracheal if appropriate and nasal secretions for changes in amount and color  - [place] appropriate cooling/warming therapies per order  - Administer medications as ordered  - Instruct and encourage patient and family to use good hand hygiene technique  - Identify and instruct in appropriate isolation precautions for identified infection/condition  Outcome: Progressing     Problem: Prexisting or High Potential for Compromised Skin Integrity  Goal: Skin integrity is maintained or improved  Description: INTERVENTIONS:  - Identify patients at risk for skin breakdown  - Assess and monitor skin integrity  - Assess and monitor nutrition and hydration status  - Monitor labs   - Assess for incontinence   - Turn and reposition patient  - Assist with mobility/ambulation  - Relieve pressure over bony prominences  - Avoid friction and shearing  - Provide appropriate hygiene as needed including keeping skin clean and dry  - Evaluate need for skin moisturizer/barrier cream  - Collaborate with interdisciplinary team   - Patient/family teaching  - Consider wound care consult   Outcome: Progressing     Problem: MOBILITY - ADULT  Goal: Maintain or return to baseline ADL function  Description: INTERVENTIONS:  -  Assess patient's ability to carry out ADLs; assess patient's baseline for ADL function and identify physical deficits which impact ability to perform ADLs (bathing, care of mouth/teeth, toileting, grooming, dressing, etc )  - Assess/evaluate cause of self-care deficits   - Assess range of motion  - Assess patient's mobility; develop plan if impaired  - Assess patient's need for assistive devices and provide as appropriate  - Encourage maximum independence but intervene and supervise when necessary  - Involve family in performance of ADLs  - Assess for home care needs following discharge   - Consider OT consult to assist with ADL evaluation and planning for discharge  - Provide patient education as appropriate  Outcome: Progressing

## 2022-01-20 NOTE — PLAN OF CARE
Problem: Potential for Falls  Goal: Patient will remain free of falls  Description: INTERVENTIONS:  - Educate patient/family on patient safety including physical limitations  - Instruct patient to call for assistance with activity   - Consult OT/PT to assist with strengthening/mobility   - Keep Call bell within reach  - Keep bed low and locked with side rails adjusted as appropriate  - Keep care items and personal belongings within reach  - Initiate and maintain comfort rounds  - Make Fall Risk Sign visible to staff  - Offer Toileting every  Hours, in advance of need  - Initiate/Maintain alarm  - Obtain necessary fall risk management equipment:   - Apply yellow socks and bracelet for high fall risk patients  - Consider moving patient to room near nurses station  Outcome: Progressing     Problem: PAIN - ADULT  Goal: Verbalizes/displays adequate comfort level or baseline comfort level  Description: Interventions:  - Encourage patient to monitor pain and request assistance  - Assess pain using appropriate pain scale  - Administer analgesics based on type and severity of pain and evaluate response  - Implement non-pharmacological measures as appropriate and evaluate response  - Consider cultural and social influences on pain and pain management  - Notify physician/advanced practitioner if interventions unsuccessful or patient reports new pain  Outcome: Progressing     Problem: INFECTION - ADULT  Goal: Absence or prevention of progression during hospitalization  Description: INTERVENTIONS:  - Assess and monitor for signs and symptoms of infection  - Monitor lab/diagnostic results  - Monitor all insertion sites, i e  indwelling lines, tubes, and drains  - Monitor endotracheal if appropriate and nasal secretions for changes in amount and color  - [place] appropriate cooling/warming therapies per order  - Administer medications as ordered  - Instruct and encourage patient and family to use good hand hygiene technique  - Identify and instruct in appropriate isolation precautions for identified infection/condition  Outcome: Progressing  Goal: Absence of fever/infection during neutropenic period  Description: INTERVENTIONS:  - Monitor WBC    Outcome: Progressing     Problem: SAFETY ADULT  Goal: Patient will remain free of falls  Description: INTERVENTIONS:  - Educate patient/family on patient safety including physical limitations  - Instruct patient to call for assistance with activity   - Consult OT/PT to assist with strengthening/mobility   - Keep Call bell within reach  - Keep bed low and locked with side rails adjusted as appropriate  - Keep care items and personal belongings within reach  - Initiate and maintain comfort rounds  - Make Fall Risk Sign visible to staff  - Offer Toileting every  Hours, in advance of need  - Initiate/Maintain alarm  - Obtain necessary fall risk management equipment:   - Apply yellow socks and bracelet for high fall risk patients  - Consider moving patient to room near nurses station  Outcome: Progressing  Goal: Maintain or return to baseline ADL function  Description: INTERVENTIONS:  -  Assess patient's ability to carry out ADLs; assess patient's baseline for ADL function and identify physical deficits which impact ability to perform ADLs (bathing, care of mouth/teeth, toileting, grooming, dressing, etc )  - Assess/evaluate cause of self-care deficits   - Assess range of motion  - Assess patient's mobility; develop plan if impaired  - Assess patient's need for assistive devices and provide as appropriate  - Encourage maximum independence but intervene and supervise when necessary  - Involve family in performance of ADLs  - Assess for home care needs following discharge   - Consider OT consult to assist with ADL evaluation and planning for discharge  - Provide patient education as appropriate  Outcome: Progressing  Goal: Maintains/Returns to pre admission functional level  Description: INTERVENTIONS:  - Perform BMAT or MOVE assessment daily    - Set and communicate daily mobility goal to care team and patient/family/caregiver  - Collaborate with rehabilitation services on mobility goals if consulted  - Perform Range of Motion  times a day  - Reposition patient every  hours    - Dangle patient  times a day  - Stand patient  times a day  - Ambulate patient  times a day  - Out of bed to chair  times a day   - Out of bed for meal times a day  - Out of bed for toileting  - Record patient progress and toleration of activity level   Outcome: Progressing     Problem: Prexisting or High Potential for Compromised Skin Integrity  Goal: Skin integrity is maintained or improved  Description: INTERVENTIONS:  - Identify patients at risk for skin breakdown  - Assess and monitor skin integrity  - Assess and monitor nutrition and hydration status  - Monitor labs   - Assess for incontinence   - Turn and reposition patient  - Assist with mobility/ambulation  - Relieve pressure over bony prominences  - Avoid friction and shearing  - Provide appropriate hygiene as needed including keeping skin clean and dry  - Evaluate need for skin moisturizer/barrier cream  - Collaborate with interdisciplinary team   - Patient/family teaching  - Consider wound care consult   Outcome: Progressing     Problem: MOBILITY - ADULT  Goal: Maintain or return to baseline ADL function  Description: INTERVENTIONS:  -  Assess patient's ability to carry out ADLs; assess patient's baseline for ADL function and identify physical deficits which impact ability to perform ADLs (bathing, care of mouth/teeth, toileting, grooming, dressing, etc )  - Assess/evaluate cause of self-care deficits   - Assess range of motion  - Assess patient's mobility; develop plan if impaired  - Assess patient's need for assistive devices and provide as appropriate  - Encourage maximum independence but intervene and supervise when necessary  - Involve family in performance of ADLs  - Assess for home care needs following discharge   - Consider OT consult to assist with ADL evaluation and planning for discharge  - Provide patient education as appropriate  Outcome: Progressing  Goal: Maintains/Returns to pre admission functional level  Description: INTERVENTIONS:  - Perform BMAT or MOVE assessment daily    - Set and communicate daily mobility goal to care team and patient/family/caregiver  - Collaborate with rehabilitation services on mobility goals if consulted  - Perform Range of Motion  times a day  - Reposition patient every  hours    - Dangle patient  times a day  - Stand patient  times a day  - Ambulate patient  times a day  - Out of bed to chair  times a day   - Out of bed for meals  times a day  - Out of bed for toileting  - Record patient progress and toleration of activity level   Outcome: Progressing

## 2022-01-20 NOTE — ASSESSMENT & PLAN NOTE
· Patient complaining of constipation on exam  · States he normally takes a clear liquid; not sure what this medication is  · Patient's family is currently looking at medications and will place order once they have told me what medication is

## 2022-01-20 NOTE — PROGRESS NOTES
NEPHROLOGY PROGRESS NOTE    Patient: Lissa Goldsmith               Sex: male          DOA: 1/15/2022  7:10 PM   YOB: 1940        Age:  80 y o         LOS:  LOS: 5 days       HPI     Patient obstructive uropathy due to bladder cancer    SUBJECTIVE     Status post bilateral nephrostomy tube which is draining quite well    Complaining of constipation and abdominal discomfort    No chest pain no palpitation    CURRENT MEDICATIONS       Current Facility-Administered Medications:     aspirin chewable tablet 81 mg, 81 mg, Oral, Daily, Adrian Lombardi MD, 81 mg at 01/20/22 9783    atorvastatin (LIPITOR) tablet 40 mg, 40 mg, Oral, Daily, Adrian Lombardi MD, 40 mg at 01/20/22 3153    bisacodyl (DULCOLAX) rectal suppository 10 mg, 10 mg, Rectal, Daily PRN, Pasha Mckay DO, 10 mg at 01/19/22 1455    diltiazem (CARDIZEM CD) 24 hr capsule 240 mg, 240 mg, Oral, Daily, Adrian Lombardi MD, 240 mg at 01/20/22 0916    heparin (porcine) 25,000 units in D5W 250 mL infusion (premix), 3-20 Units/kg/hr (Order-Specific), Intravenous, Titrated, Meryle Sloop, PA-C, Last Rate: 9 6 mL/hr at 01/20/22 0936, 12 Units/kg/hr at 01/20/22 0936    HYDROmorphone (DILAUDID) injection 0 5 mg, 0 5 mg, Intravenous, Q4H PRN, Adrian Lombardi MD, 0 5 mg at 01/18/22 2237    oxyCODONE (ROXICODONE) IR tablet 5 mg, 5 mg, Oral, Q6H PRN, 5 mg at 01/19/22 2131 **OR** oxyCODONE (ROXICODONE) IR tablet 2 5 mg, 2 5 mg, Oral, Q6H PRN, Adrian Lombardi MD, 2 5 mg at 01/18/22 1838    pantoprazole (PROTONIX) EC tablet 40 mg, 40 mg, Oral, Early Morning, Adrian Lombardi MD, 40 mg at 01/20/22 0636    warfarin (COUMADIN) tablet 7 5 mg, 7 5 mg, Oral, Daily (warfarin), Pasha Mckay DO, 7 5 mg at 01/19/22 1743    OBJECTIVE     Current Weight: Weight - Scale: 84 7 kg (186 lb 11 7 oz)  Vitals:    01/20/22 0928   BP: 133/82   Pulse: 86   Resp: 18   Temp:    SpO2: 97%       Intake/Output Summary (Last 24 hours) at 1/20/2022 1508  Last data filed at 1/20/2022 1410  Gross per 24 hour   Intake 240 ml   Output 3300 ml   Net -3060 ml       PHYSICAL EXAMINATION     Physical Exam  Constitutional:       General: He is not in acute distress  Appearance: He is well-developed  HENT:      Head: Normocephalic  Eyes:      General: No scleral icterus  Conjunctiva/sclera: Conjunctivae normal    Neck:      Vascular: No JVD  Cardiovascular:      Rate and Rhythm: Normal rate  Heart sounds: Normal heart sounds  Pulmonary:      Effort: Pulmonary effort is normal       Breath sounds: No wheezing  Abdominal:      Palpations: Abdomen is soft  Tenderness: There is no abdominal tenderness  Musculoskeletal:         General: Normal range of motion  Cervical back: Neck supple  Skin:     General: Skin is warm  Findings: No rash  Neurological:      Mental Status: He is alert and oriented to person, place, and time  Psychiatric:         Behavior: Behavior normal           LAB RESULTS     Results from last 7 days   Lab Units 01/20/22  0927 01/20/22  0503 01/19/22  0701 01/18/22  0452 01/17/22  0531 01/16/22  0927 01/16/22  0052 01/15/22  2015 01/15/22  1045 01/15/22  1045   WBC Thousand/uL  --  4 92 5 90 6 53 5 98 8 27  --  7 68  --  9 14   HEMOGLOBIN g/dL 8 3* 7 1* 8 7* 8 6* 8 1* 9 8*  --  9 3*   < > 9 4*   HEMATOCRIT % 28 7* 24 7* 30 1* 28 9* 27 8* 33 7*  --  31 6*   < > 32 3*   PLATELETS Thousands/uL  --  219 269 238 223 298  --  189  --  267   POTASSIUM mmol/L  --  4 0 4 0 4 2 4 1 4 1 4 0 4 9   < > 4 9   CHLORIDE mmol/L  --  104 104 102 102 100 100 95*   < > 99*   CO2 mmol/L  --  27 28 30 29 30 25 23   < > 25   BUN mg/dL  --  15 24 42* 41* 39* 45* 47*   < > 46*   CREATININE mg/dL  --  1 63* 2 55* 5 72* 5 25* 5 88* 7 16* 8 01*   < > 8 05*   EGFR ml/min/1 73sq m  --  38 22 8 9 8 6 5   < > 5   CALCIUM mg/dL  --  8 3 8 5 8 3 7 9* 8 8 8 0* 8 2*   < > 8 1*   MAGNESIUM mg/dL  --   --   --   --   --  2 4  --   --   --   --     < > = values in this interval not displayed  RADIOLOGY RESULTS      Results for orders placed during the hospital encounter of 10/07/18    XR chest 1 view portable    Narrative  CHEST    INDICATION:   sob  COMPARISON:  Chest x-ray dated 1/13/2018    EXAM PERFORMED/VIEWS:  XR CHEST PORTABLE      FINDINGS:    No pneumothorax is seen  Some biapical pleural/parenchymal scarring is again seen, worse on the left  Paucity of lung markings in the bilateral upper lung fields is noted, suspicious for emphysematous changes  There is a large dense ill-defined opacity in the left lower lung field, suspicious for infection in the appropriate clinical setting  Lungs otherwise are grossly clear  Median sternotomy wires and metallic mediastinal clips are redemonstrated  The cardiac and mediastinal contours are stable in appearance  Visualized bones appear intact  Impression  Superimposed on chronic pleural/parenchymal and emphysematous changes, there is a large dense ill-defined opacity in the left lower lung field, suspicious for infection in the appropriate clinical setting  Correlation with the patient's symptoms and  laboratory values recommended  Other nonacute findings as above  Workstation performed: [id number]    Results for orders placed during the hospital encounter of 01/15/22    XR chest 2 views    Narrative  CHEST    INDICATION:   sob      COMPARISON:  December 30, 2021 CT from January 15, 2022    EXAM PERFORMED/VIEWS:  XR CHEST PA & LATERAL  Images: 3    FINDINGS:  Right-sided central venous line with tip in the SVC    Heart and mediastinum remain unchanged  Bilateral apical scarring and retraction of the nani as seen on the previous study  No new consolidation seen  Bilateral effusion seen with pleural thickening as seen on the previous study  Hiatal hernia seen  Left base is obscured, unchanged due to prominent cardiophrenic angle fat  Postsurgical changes from prior median sternotomy    Impression  No new consolidation  Blunting of the both CP angle due to small effusion as seen on the recent CT  Hiatal hernia        Workstation performed: [id number]    Results for orders placed during the hospital encounter of 07/07/20    CT chest without contrast    Narrative  CT CHEST WITHOUT IV CONTRAST    INDICATION:   [id number]: Abnormal findings on diagnostic imaging of other specified body structures  COMPARISON:  None  TECHNIQUE: CT examination of the chest was performed without intravenous contrast   Axial, sagittal, and coronal 2D reformatted images were created from the source data and submitted for interpretation  Radiation dose length product (DLP) for this visit:  246 mGy-cm   This examination, like all CT scans performed in the [facility], was performed utilizing techniques to minimize radiation dose exposure, including the use of iterative  reconstruction and automated exposure control  FINDINGS:    LUNGS: Biapical fibrotic changes are noted within the left greater than right with the traction located changes, elevation of the left hilum and elevation of the left diaphragm  PLEURA:  Left apical pleural thickening noted  Focal air-containing area was noted in the left apex which is showing progressive there is optional   The amount of the left apical air is decreased as compared to previous study from December 6, 2019  Emphysema seen  No new consolidation seen    HEART/GREAT VESSELS:  Coronary artery calcification seen        MEDIASTINUM AND JOSE ARMANDO:  Large hiatal hernia seen    CHEST WALL AND LOWER NECK:   Unremarkable      VISUALIZED STRUCTURES IN THE UPPER ABDOMEN:  Spleen, adrenal glands appear unremarkable  Left renal cyst seen  Cholelithiasis seen  A rounded hypodensity seen in the left hepatic lobe at the junction of the right hepatic lobe, stable suggest cyst  Rounded hypodensity seen in the segment 5, stable    OSSEOUS STRUCTURES:  No acute fracture or destructive osseous lesion  Postsurgical changes from prior median sternotomy    Impression  Progressive is resorption of air in the left apex  Residual pleural thickening persists    Bilateral upper lobe fibrotic changes left greater than right with elevation of the hilum    No new consolidation    Emphysema    Large hiatal hernia  Cholelithiasis      Workstation performed: [id number]    Results for orders placed during the hospital encounter of 12/29/21    CT abdomen pelvis w wo contrast    Narrative  CT ABDOMEN AND PELVIS WITH AND WITHOUT IV CONTRAST    INDICATION:   Urinary retention  Urinary retention  pt states he has hx of bladder cancer, sharp pains in bladder that bring him to his knees, last urinated today, believes he is retaining urine    Patient is an 35-year-old male with a history of bladder cancer, atrial fibrillation on Coumadin, GERD, hyperlipidemia, hypertension, CABG, TURP that presents to the emergency department with improving sharp and shooting nonradiating lower abdominal pain  for 1 day  Patient has associated symptoms of decreased urinary stream beginning with the current ED presentation of lower abdominal pain symptoms  Patient states that he is following up with Heme-Onc later today for information session on  chemotherapy infusion  Patient denies palliative factors with provocative factors of pressure to lower abdomen  Patient denies not effective treatment  Patient denies fevers, chills, nausea, vomiting, diarrhea, and constipation  Patient's recent fall  or recent trauma  Patient denies sick contacts or recent travel  Patient denies chest pain and shortness of breath  The patient's entire past medical history was obtained directly from either the attending emergency room physicians notes and/or the resident/physician's assistant notes in [employer]  The information was copied and pasted directly from [facility]        Upon further review of the patient's chart in [facility], the patient is status post cystoscopy and transurethral resection of bladder tumor November 30, 2021  COMPARISON:  Several prior studies, most recent of which is a noncontrast CT abdomen pelvis December 18, 2021 and CT renal study September 24, 2021    TECHNIQUE: Initial CT of the abdomen and pelvis was performed without intravenous contrast   Subsequent dynamic CT evaluation of the abdomen and pelvis was performed after the administration of intravenous contrast in both nephrographic and delayed  phases after the administration of intravenous contrast   Additional 15 minute delayed images of the urinary bladder were also obtained  Axial, sagittal, and coronal 2D reformatted images were created from the source data and submitted for  interpretation  Radiation dose length product (DLP) for this visit:  1096 mGy-cm   This examination, like all CT scans performed in the [facility], was performed utilizing techniques to minimize radiation dose exposure, including the use of iterative  reconstruction and automated exposure control  IV Contrast:  85 mL of iodixanol (VISIPAQUE)  Enteric Contrast:  Enteric contrast was not administered  FINDINGS:  ABDOMEN  RIGHT KIDNEY AND URETER:  There are subcentimeter low-density lesions, too small to accurately characterize, however likely cysts  No solid renal mass  No detectable urothelial mass  There is mild hydroureteronephrosis, up to the level of the urinary bladder, new from the prior study  There is delayed excretion of contrast material into the collecting system  No urinary tract calculi  No perinephric collection  LEFT KIDNEY AND URETER:  There are subcentimeter low-density lesions, too small to accurately characterize, however likely represent cysts  Exophytic simple cyst in the posterior interpolar region  No solid renal mass      Delayed excretion of contrast material into the collecting system, limiting evaluation for urothelial lesions  There is moderate to severe hydroureteronephrosis, up to the level of the urinary bladder  Moderate perinephric and periureteric inflammation, up to the level of the urinary bladder, increased from the prior study  No urinary tract calculi  No perinephric collection  URINARY BLADDER:  Severely distended  The urinary bladder extends above the level of the iliac crests  Inadequately evaluated due to lack of excreted contrast material into the urinary bladder  There are postoperative changes along the left lateral aspect of the urinary bladder  Moderate and increasing inflammatory changes are present surrounding the urinary bladder, predominantly along the left lateral and posterolateral margins  There is a  bladder diverticulum along the posterior, left lateral margin of the urinary bladder  No calculi  LOWER CHEST: Mild emphysematous changes  No clinically significant abnormality identified in the visualized lower chest         LIVER/BILIARY TREE:  One or more simple appearing hepatic cysts are again identified and are similar  One or more subcentimeter sharply circumscribed low-density hepatic lesion(s) are noted, too small to accurately characterize, but statistically most likely to represent subcentimeter hepatic cysts  No suspicious solid hepatic lesion is identified  Hepatic contours are normal   No biliary dilatation  GALLBLADDER:  There are gallstone(s) within the gallbladder, without pericholecystic inflammatory changes  SPLEEN:  Unremarkable  PANCREAS:  Unremarkable  ADRENAL GLANDS:  Unremarkable  STOMACH AND BOWEL:  Evaluation of the GI tract limited due to lack of oral contrast material     Stomach incompletely evaluated  Large hiatal hernia/partially intrathoracic stomach  Fecalization of small bowel contents, compatible with delayed small bowel transit  No evidence of small bowel obstruction      The colon is segmentally distended with feces  Normal fecal burden in the colon  ABDOMINOPELVIC CAVITY:  No ascites  No free intraperitoneal air  Again identified are subcentimeter para-aortic, retroperitoneal, mesenteric and pelvic lymph nodes  No pathologic lymphadenopathy based on CT criteria  VESSELS:  Atherosclerotic changes are present in the abdominal aorta and its branch vessels  Fusiform ectasia of the infrarenal abdominal aorta, extending into the common iliac bifurcation, similar  PELVIS  REPRODUCTIVE ORGANS:  Unremarkable for patient's age  APPENDIX: No findings to suggest appendicitis  ABDOMINAL WALL/INGUINAL REGIONS:  Prior laparoscopic bilateral inguinal hernia repair  There is a recurrent, large left inguinal hernia containing fat  OSSEOUS STRUCTURES:  There are age appropriate degenerative changes  No acute fracture or destructive osseous lesion  Impression  1  Severely distended urinary bladder, extending above the iliac crests  Postoperative changes along the left lateral and posterior, left lateral wall of the urinary bladder  2   Mild right-sided hydroureteronephrosis and moderate to severe left-sided hydroureteronephrosis as described above  There is delayed excretion into the collecting systems bilaterally, likely related to vesicoureteral reflux from severely distended  urinary bladder  There is worsening left-sided perinephric and periureteric inflammation  3   Additional, stable incidental findings as described above  Recommend follow-up urology consultation  The study was marked in [facility] for immediate notification  Workstation performed: [id number]    Results for orders placed during the hospital encounter of 01/15/22    CT abdomen pelvis wo contrast    Narrative  CT ABDOMEN AND PELVIS WITHOUT IV CONTRAST    INDICATION:   Bladder-neck obstruction  looking for obstruction in the urinary tract causing DULCE   pt has archibald and bladder ca     COMPARISON:  12/29/2021  TECHNIQUE:  CT examination of the abdomen and pelvis was performed without intravenous contrast   Axial, sagittal, and coronal 2D reformatted images were created from the source data and submitted for interpretation  Radiation dose length product (DLP) for this visit:  674 mGy-cm   This examination, like all CT scans performed in the [facility], was performed utilizing techniques to minimize radiation dose exposure, including the use of iterative  reconstruction and automated exposure control  Enteric contrast was not administered  FINDINGS:    ABDOMEN    LOWER CHEST:  Small bilateral pleural effusions, new since the prior study  Large hiatal hernia  LIVER/BILIARY TREE:  Stable tiny hepatic cysts and subcentimeter hepatic hypodensities too small to accurately characterize  No suspicious solid hepatic lesion is identified  Hepatic contours are normal   No biliary dilatation  GALLBLADDER:  There are gallstone(s) within the gallbladder, without pericholecystic inflammatory changes  SPLEEN:  Unremarkable  PANCREAS:  Unremarkable  ADRENAL GLANDS:  Unremarkable  KIDNEYS/URETERS:  No significant change in moderate left and mild right hydroureteronephrosis up to level of the bladder  There is increased bilateral perinephric and periureteric inflammation when compared to the prior study  Bilateral ureteral wall  thickening noted  Stable left renal exophytic cyst     STOMACH AND BOWEL:  Fecalization of small bowel contents consistent with slow transit  No bowel obstruction  APPENDIX:  No findings to suggest appendicitis  ABDOMINOPELVIC CAVITY:  No ascites  No pneumoperitoneum  Stable subcentimeter mesenteric, retroperitoneal, and pelvic lymph nodes  VESSELS:  Atherosclerotic changes are present  Fusiform ectasia of the infrarenal abdominal aorta extending to the common iliac bifurcation as before      PELVIS    REPRODUCTIVE ORGANS:  Unremarkable for patient's age  URINARY BLADDER:  Decompressed with a Fortune catheter in place  Small volume of nondependent intravesical air  Postoperative changes with contour deformity along the posterior left lateral wall again seen  There is now diffuse bladder wall thickening  with extensive perivesical fat stranding  ABDOMINAL WALL/INGUINAL REGIONS:  Surgical changes of prior bilateral inguinal hernia repair  Recurrent prominent fat-containing left inguinal hernia is reidentified  OSSEOUS STRUCTURES:  No acute fracture or destructive osseous lesion  Impression  1  Decompressed urinary bladder with Fortune catheter in place  There is now diffuse wall thickening of the urinary bladder with extensive perivesical fat stranding compatible with cystitis  Small volume of nondependent intravesical air may be related to  instrumentation or infection  2   No significant change in moderate left and mild right hydroureteronephrosis  There is increased perinephric and periureteric inflammation bilaterally, likely on the basis of infection  3   Large hiatal hernia  4   New small bilateral pleural effusions  The study was marked in [facility] for immediate notification  Workstation performed: [id number]    No results found for this or any previous visit  PLAN / RECOMMENDATIONS      Acute kidney injury:  Due to urinary obstruction  Improving with nephrostomy tube    Obstructive uropathy:  Due to bladder cancer  Status post bilateral nephrostomy tube and improving    Constipation:  Getting make citrate as part of the treatment    Will continue to monitor    Sherly Silva MD  Nephrology  1/20/2022        Portions of the record may have been created with voice recognition software  Occasional wrong word or "sound a like" substitutions may have occurred due to the inherent limitations of voice recognition software   Read the chart carefully and recognize, using context, where substitutions have occurred

## 2022-01-20 NOTE — CASE MANAGEMENT
Case Management Assessment & Discharge Planning Note    Patient name Rehana Fletcher  Location [address] [id number] MRN [id number]  : 1940 Date 2022       Current Admission Date: 1/15/2022  Current Admission Diagnosis:DULCE (acute kidney injury) [facility]) on CKD 3   Patient Active Problem List    Diagnosis Date Noted    [facility] ) on CKD 3 01/15/2022    Hyponatremia 01/15/2022    Pleural effusion on left 01/15/2022    Abnormal urinalysis 01/15/2022    Fortune catheter in place prior to arrival 2022    Stage 3a chronic kidney disease ([facility] ) 2022    Recurrent unilateral inguinal hernia 2021    Left lower quadrant abdominal pain 2021    History of bilateral inguinal hernia repair 2021    Cancer of overlapping sites of bladder ([facility] ) 2021    Constipation 2021    Overgrown toenails 2021    Atrial fibrillation [facility] ) 2021    Encounter to discuss test results 10/12/2021    Acute on chronic diastolic congestive heart failure ([facility] ) 2020    DDD (degenerative disc disease), lumbar 10/30/2019    Medicare annual wellness visit, subsequent 2019    Tinnitus aurium, bilateral 2019    Sensorineural hearing loss (SNHL) of both ears 2019    Abnormal CT of the chest 2019    COPD, severe ([address] ) 2018    Bronchiectasis with acute lower respiratory infection ([facility] ) 2018    Localized edema 10/15/2018    Irregular heart beat     Atrial flutter ([address] ) 10/07/2018    CKD (chronic kidney disease), stage II 2018    Urinary retention due to benign prostatic hyperplasia 2018    Bladder cancer ([address] ) 2018    S/P CABG x 4 2018    Stenosis of left carotid artery 2018    GERD (gastroesophageal reflux disease) 2018    History of stroke 2018    Sciatica of right side 2018    Vision changes 2018    Hyperlipidemia     Centrilobular emphysema ([facility] )     Arthritis     Back pain 01/24/2017    Acute left-sided low back pain with left-sided sciatica 10/25/2016    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 10/25/2016    CAD (coronary atherosclerotic disease) 08/13/2016    Hypertension 08/13/2016    Hyperlipemia 08/13/2016      LOS (days): 5  Geometric Mean LOS (GMLOS) (days): 3 30  Days to GMLOS:-1 5     OBJECTIVE:    Risk of Unplanned Readmission Score: 24   Bundled Patient Payment:  (The pt is not a Bundle)     Current admission status: Inpatient       Preferred Pharmacy:   [facility] [facility] [address]  Phone: [phone] Fax: [phone]    Primary Care Provider: Claudetta Harry, MD    Primary Insurance: MEDICARE  Secondary Insurance: [facility]    ASSESSMENT:  Active Health Care Agents    There are no active Health Care Agents on file  Obs Notice Signed:  (The pt is not OBS)    Readmission Root Cause  30 Day Readmission: No (The pt is not a 30 day re-admit)    Patient Information  Admitted from[de-identified] Home  Mental Status: Alert  During Assessment patient was accompanied by: Not accompanied during assessment  Assessment information provided by[de-identified]  (grand dtr)  Primary Caregiver: Family  Caregiver's Name[de-identified] Laura Ortiz Relationship to Patient[de-identified] Family Member  Caregiver's Telephone Number[de-identified] [phone]  Support Systems: Family members  [place] of Residence: [place] [address] do you live in?: [facility] entry access options   Select all that apply : No steps to enter home  Type of Current Residence: 2 story home  Upon entering residence, is there a bedroom on the main floor (no further steps)?: Yes  Upon entering residence, is there a bathroom on the main floor (no further steps)?: Yes  In the last 12 months, was there a time when you were not able to pay the mortgage or rent on time?: No  In the last 12 months, how many places have you lived?: 1  In the last 12 months, was there a time when you did not have a steady place to sleep or slept in a shelter (including now)?: No  Homeless/housing insecurity resource given?: N/A  Living Arrangements: Other (Comment)  Is patient a ?: No    Activities of Daily Living Prior to Admission  Functional Status: Independent  Completes ADLs independently?: No  Level of ADL dependence: Assistance  Ambulates independently?: No  Level of ambulatory dependence: Assistance  Does patient use assisted devices?: Yes  Assisted Devices (DME) used: Stair Chair/Glide (rolling walker and scooter)  Does patient currently own DME?: Yes  What DME does the patient currently own?: Stair Chair/Glide (Rolling walker and Scooter)  Does patient have a history of Outpatient Therapy (PT/OT)?: No  Does the patient have a history of Short-Term Rehab?: No  Does patient have a history of HHC?: Yes ([facility])  Does patient currently have [email or web address]?: No         Patient Information Continued  Income Source: Pension/long-term  Does patient have prescription coverage?: Yes  Within the past 12 months, you worried that your food would run out before you got the money to buy more : Never true  Within the past 12 months, the food you bought just didnt last and you didnt have money to get more : Never true  Food insecurity resource given?: N/A  Does patient receive dialysis treatments?: No  Does patient have a history of substance abuse?: No  Does patient have a history of Mental Health Diagnosis?: No    PHQ 2/9 Screening   Reviewed PHQ 2/9 Depression Screening Score?: Yes    Means of Transportation  Means of Transport to Appts[de-identified] Family transport  In the past 12 months, has lack of transportation kept you from medical appointments or from getting medications?: No  In the past 12 months, has lack of transportation kept you from meetings, work, or from getting things needed for daily living?: No  Was application for public transport provided?: N/A        DISCHARGE DETAILS:    Discharge planning discussed with[de-identified] CM spoke with the pt grand dtr and she states that the pt has not been doing very well lately and his health has decompensated over the past month  She states that the pt is alone in the home sun-wednesday and she is concerned  She states that he may need LTC  She would like the pt to go to [address] [place] [address] Street  cm informed her that she would keep [facility] as he first choice but there would need to be referrals sent in a 50 mile radius and he may be accepted outside of the area  The pt grand dtr was informed by the CM that she is able to ask the accepting facilities to transfer him to [facility] [address] once the pt is medically stable

## 2022-01-21 PROBLEM — K59.00 CONSTIPATION: Status: RESOLVED | Noted: 2021-12-09 | Resolved: 2022-01-21

## 2022-01-21 PROBLEM — E87.1 HYPONATREMIA: Status: RESOLVED | Noted: 2022-01-15 | Resolved: 2022-01-21

## 2022-01-21 LAB
ANION GAP SERPL CALCULATED.3IONS-SCNC: 11 MMOL/L (ref 4–13)
APTT PPP: 44 SECONDS (ref 23–37)
APTT PPP: 50 SECONDS (ref 23–37)
BUN SERPL-MCNC: 15 MG/DL (ref 5–25)
CALCIUM SERPL-MCNC: 8.7 MG/DL (ref 8.3–10.1)
CHLORIDE SERPL-SCNC: 101 MMOL/L (ref 100–108)
CO2 SERPL-SCNC: 25 MMOL/L (ref 21–32)
CREAT SERPL-MCNC: 1.67 MG/DL (ref 0.6–1.3)
ERYTHROCYTE [DISTWIDTH] IN BLOOD BY AUTOMATED COUNT: 20.2 % (ref 11.6–15.1)
GFR SERPL CREATININE-BSD FRML MDRD: 37 ML/MIN/1.73SQ M
GLUCOSE SERPL-MCNC: 94 MG/DL (ref 65–140)
HCT VFR BLD AUTO: 31.4 % (ref 36.5–49.3)
HGB BLD-MCNC: 9.2 G/DL (ref 12–17)
INR PPP: 1.23 (ref 0.84–1.19)
MCH RBC QN AUTO: 21.9 PG (ref 26.8–34.3)
MCHC RBC AUTO-ENTMCNC: 29.3 G/DL (ref 31.4–37.4)
MCV RBC AUTO: 75 FL (ref 82–98)
PLATELET # BLD AUTO: 303 THOUSANDS/UL (ref 149–390)
PMV BLD AUTO: 9.8 FL (ref 8.9–12.7)
POTASSIUM SERPL-SCNC: 3.7 MMOL/L (ref 3.5–5.3)
PROTHROMBIN TIME: 15 SECONDS (ref 11.6–14.5)
RBC # BLD AUTO: 4.21 MILLION/UL (ref 3.88–5.62)
SODIUM SERPL-SCNC: 137 MMOL/L (ref 136–145)
WBC # BLD AUTO: 7.01 THOUSAND/UL (ref 4.31–10.16)

## 2022-01-21 PROCEDURE — 99232 SBSQ HOSP IP/OBS MODERATE 35: CPT | Performed by: INTERNAL MEDICINE

## 2022-01-21 PROCEDURE — 97167 OT EVAL HIGH COMPLEX 60 MIN: CPT

## 2022-01-21 PROCEDURE — 80048 BASIC METABOLIC PNL TOTAL CA: CPT | Performed by: INTERNAL MEDICINE

## 2022-01-21 PROCEDURE — 85730 THROMBOPLASTIN TIME PARTIAL: CPT | Performed by: INTERNAL MEDICINE

## 2022-01-21 PROCEDURE — 97163 PT EVAL HIGH COMPLEX 45 MIN: CPT

## 2022-01-21 PROCEDURE — 85027 COMPLETE CBC AUTOMATED: CPT | Performed by: INTERNAL MEDICINE

## 2022-01-21 PROCEDURE — 85610 PROTHROMBIN TIME: CPT | Performed by: INTERNAL MEDICINE

## 2022-01-21 RX ORDER — WARFARIN SODIUM 5 MG/1
5 TABLET ORAL
Status: DISCONTINUED | OUTPATIENT
Start: 2022-01-21 | End: 2022-01-24 | Stop reason: HOSPADM

## 2022-01-21 RX ADMIN — OXYCODONE HYDROCHLORIDE 5 MG: 5 TABLET ORAL at 21:25

## 2022-01-21 RX ADMIN — DILTIAZEM HYDROCHLORIDE 240 MG: 240 CAPSULE, COATED, EXTENDED RELEASE ORAL at 08:50

## 2022-01-21 RX ADMIN — ASPIRIN 81 MG: 81 TABLET, CHEWABLE ORAL at 08:50

## 2022-01-21 RX ADMIN — PANTOPRAZOLE SODIUM 40 MG: 40 TABLET, DELAYED RELEASE ORAL at 05:11

## 2022-01-21 RX ADMIN — ATORVASTATIN CALCIUM 40 MG: 40 TABLET, FILM COATED ORAL at 08:50

## 2022-01-21 RX ADMIN — HEPARIN SODIUM AND DEXTROSE 14 UNITS/KG/HR: 10000; 5 INJECTION INTRAVENOUS at 08:50

## 2022-01-21 RX ADMIN — WARFARIN SODIUM 5 MG: 5 TABLET ORAL at 17:32

## 2022-01-21 NOTE — PLAN OF CARE
Problem: PHYSICAL THERAPY ADULT  Goal: Performs mobility at highest level of function for planned discharge setting  See evaluation for individualized goals  Description: Treatment/Interventions: Functional transfer training,LE strengthening/ROM,Therapeutic exercise,Endurance training,Patient/family training,Equipment eval/education,Bed mobility,Gait training,Spoke to nursing,OT  Equipment Recommended: Betty Lawrence       See flowsheet documentation for full assessment, interventions and recommendations  Note: Prognosis: Good  Problem List: Decreased strength,Impaired balance,Decreased mobility,Decreased safety awareness,Pain,Decreased endurance  Assessment: Pt is 80 y o  male seen for PT evaluation s/p admit to [facility] on 1/15/2022 w/ DULCE (acute kidney injury) ([facility] )  PT consulted to assess pt's functional mobility and d/c needs  Order placed for PT eval and tx, w/ up and OOB as tolerated order  Comorbidities affecting pt's physical performance at time of assessment include: Abnormal urinalysis, Pleural effusion on left, Hyponatremia, COPD, Bladder cancer, history of back pain  PTA, pt was independent w/ all functional mobility w/ no AD (SPC as needed), ambulates community distances, lives w/ family in two level house with first floor set-up and works part time  Personal factors affecting pt at time of IE include: ambulating w/ assistive device, inability to navigate level surfaces w/o external assistance, unable to perform dynamic tasks in community, impulsivity, inability to perform current job functions and inability to perform IADLs  Please find objective findings from PT assessment regarding body systems outlined above with impairments and limitations including weakness, impaired balance, decreased endurance, gait deviations, pain, decreased activity tolerance, decreased functional mobility tolerance, decreased safety awareness and fall risk   The following objective measures performed on IE also reveal limitations: Barthel Index: 55/100, Modified Ander: 4 (moderate/severe disability) and AM-PAC 6-Clicks: 35/01  Pt's clinical presentation is currently unstable/unpredictable seen in pt's presentation of ongoing medical management/monitoring, fatigue impacting overall mobility status and need for input for mobility technique/safety  Pt to benefit from continued PT tx to address deficits as defined above and maximize level of functional independent mobility and consistency  From PT/mobility standpoint, recommendation at time of d/c would be home with home health rehabilitation pending progress in order to facilitate return to PLOF  Barriers to Discharge: None        PT Discharge Recommendation: Home with home health rehabilitation          See flowsheet documentation for full assessment

## 2022-01-21 NOTE — ASSESSMENT & PLAN NOTE
· Rate controlled  · Currently on heparin drip with plan to bridge to warfarin  · Warfarin 5 mg daily  · INR 1 23  · Will continue to monitor with daily INR  · Cont cardizem home dose

## 2022-01-21 NOTE — OCCUPATIONAL THERAPY NOTE
Occupational Therapy Evaluation Note        Patient Name: Arturo Arellano  [id number] Date: 1/21/2022 01/21/22 1206   OT Last Visit   OT Visit Date 01/21/22   Note Type   Note type Evaluation   Restrictions/Precautions   Weight Bearing Precautions Per Order No   Braces or Orthoses Other (Comment)  (none per pt)   Other Precautions Impulsive; Chair Alarm; Bed Alarm; Fall Risk;Multiple lines;Pain   Pain Assessment   Pain Assessment Tool 0-10   Pain Score 4   Pain Location/Orientation Location: Back;Orientation: Bilateral;Location: Hip  ("tips of fingers", "arthritis")   Pain Onset/Description Onset: Ongoing   Hospital Pain Intervention(s) Repositioned; Ambulation/increased activity   Home Living   Type of [facility] Two level;Performs ADLs on one level; Able to live on main level with bedroom/bathroom; Other (Comment)  (0 ARLENE)   Bathroom Shower/Tub Walk-in shower  (Access to walk-in and tub shower per pt)   Bathroom Toilet Raised   Bathroom Equipment Shower chair;Grab bars in [address] Cane;Electric scooter  (Patient reports that he is primarily ambulatory with no AD)   Additional Comments Patient reports intermittent use of cane for community distances   Prior Function   Level of [place] Independent with ADLs and functional mobility; Needs assistance with IADLs   Lives With Family  (Granddaughter and family)   Receives Help From Family   ADL Assistance Independent   IADLs Needs assistance   Falls in the last 6 months 0   Vocational Part time employment   Lifestyle   Autonomy Per patient report, he lives with his family in a two-story home with 0 ARLENE and a first-floor setup  At baseline, patient reports that he is independent in ADLs and receives assistance for IADLs  At baseline, patient reports that he is primarily ambulates independently with no AD, intermittent use of cane vs electric scooter for community mobility     Reciprocal Relationships Supportive family   Service to Others 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)   Psychosocial   Psychosocial (WDL) X   Patient Behaviors/Mood Irritable; Wandering   Ability to Express Feelings Able to express   Ability to Express Needs Able to express   Ability to Express Thoughts Able to express   Ability to Understand Others Understands   ADL   Eating Assistance 6  Modified independent   Grooming Assistance 5  Supervision/Setup   UB Bathing Assistance 5  Supervision/Setup   LB Bathing Assistance 4  Minimal Assistance   UB Dressing Assistance 5  Supervision/Setup   LB [place] 4  Minimal Assistance   Additional Comments ADL assist levels based on pt's functional performance during OT evaluation   Bed Mobility   Additional Comments Patient received seated EOB upon OT arrival; at end of session: pt returned seated EOB w/ all needs within reach   Transfers   Sit to Stand 5  Supervision   Additional items Assist x 1; Increased time required;Verbal cues; Impulsive   Stand to Sit 5  Supervision   Additional items Assist x 1; Increased time required;Verbal cues   Additional Comments Performed functional transfers using RW  Patient with noted impulsivity/decreased safety awareness during functional transfers/transitional movements  Functional Mobility   Functional Mobility 4  Minimal assistance   Additional Comments x1; Ambulation through pt room and hallway with no overt LOB  Patient with noted decreased safety awareness during functional mobility, requiring minimal to moderate verbal cueing for appropriate sequencing with walker     Additional items Rolling walker   Balance   Static Sitting Good   Dynamic Sitting Fair +   Static Standing Fair   Dynamic Standing Fair -   Ambulatory Poor +   Activity Tolerance   Activity Tolerance Patient limited by fatigue   Medical Staff Made Aware PT Vi[place]   Nurse Made Aware VIVIAN Hernandez confirmed pt appropriate for therapy and made aware of session outcomes   RUE Assessment   RUE Assessment WFL  (AROM grossly WFL; strength 4/5 distally)   LUE Assessment   LUE Assessment WFL  (AROM grossly WFL; strength 4/5 distally)   Hand Function   Gross Motor Coordination Impaired  (Impaired finger to nose test)   Fine Motor Coordination Functional   Sensation   Light Touch No apparent deficits  (BUEs)   Vision-Basic Assessment   Current Vision Wears glasses all the time  (Bifocals)   Cognition   Overall Cognitive Status   (Questionable- further assessment required)   Arousal/Participation Alert; Responsive   Attention Attends with cues to redirect   Orientation Level Oriented to person;Oriented to place;Oriented to situation  (Oriented to day of week and year, "February")   Memory   (Further assessment required)   Following Commands Follows all commands and directions without difficulty   Comments Patient agreeable to OT evaluation   Assessment   Limitation Decreased ADL status; Decreased Safe judgement during ADL;Decreased endurance;Decreased self-care trans;Decreased high-level ADLs   Prognosis Good   Assessment Patient is a 80 y o  male seen for OT evaluation s/p admit to [id number] on 1/15/2022 w/DULCE (acute kidney injury) ([facility] )  Commorbidities affecting patient's functional performance at time of assessment include: atrial fibrillation, bladder cancer, acute on chronic diastolic CHF, abnormal urinalysis, pleural effusion on left, hyponatremia, constipation, severe COPD, back pain, CAD, and s/p CABG x4   Patient  has a past medical history of A-fib ([address] ), Arthritis, Benign prostatic hyperplasia without lower urinary tract symptoms, Bladder cancer ([address] ), CAD (coronary artery disease), Cancer ([address] ), Chronic obstructive lung disease ([address] ), Coronary arteriosclerosis, Depression, Emphysema lung ([address] ), GERD (gastroesophageal reflux disease), Hyperlipidemia, Hypertension, Irregular heart beat, Myocardial infarction ([facility] ), Psoriasis, Requires supplemental oxygen, and Stroke ([facility] )  Orders placed for OT evaluation and treatment  Performed at least two patient identifiers during session including name and wristband  Prior to admission, patient was living with his granddaughter and family in a two-story home with 0 ARLENE and a first-floor setup  At baseline, patient reports that he is independent in ADLs and receives assistance for IADLs  Personal factors affecting patient at time of initial evaluation include: difficulty performing ADLs and difficulty performing IADLs  Upon evaluation, patient requires supervision and set up assist for UB ADLs, minimal assist for LB ADLs, transfers and functional ambulation in room and bathroom with close supervision and minimal  assist, with the use of Rolling Walker  Patient is alert, oriented to name,, oriented to place, and oriented to situation, oriented to year  Occupational performance is affected by the following deficits: impulsive behavior, degenerative arthritic joint changes, impaired gross motor coordination, dynamic sit/ stand balance deficit with poor standing tolerance time for self care and functional mobility, decreased activity tolerance, decreased safety awareness and increased pain, and weakness  Patient to benefit from continued Occupational Therapy treatment while in the hospital to address deficits as defined above and maximize level of functional independence with ADLs and functional mobility  Occupational Performance areas to address include: bathing/ shower, dressing, toilet hygiene, transfer to all surfaces, functional mobility, community mobility, emergency response, health maintenance, IADLs: safety procedures and Leisure Participation  From OT standpoint, recommendation at time of d/c would be Home with family support and Home OT       Goals   Patient Goals to return home   Plan   Treatment Interventions ADL retraining;Functional transfer training; Endurance training;Continued evaluation;Patient/family training; Compensatory technique education; Activityengagement; Energy conservation;Equipment evaluation/education   Goal Expiration Date 01/31/22   OT Treatment Day 0   OT Frequency 2-3x/wk   Recommendation   OT Discharge Recommendation Home with home health rehabilitation   Additional Comments  The patient's raw score on the AM-PAC Daily Activity inpatient short form is 19, standardized score is 40 22, greater than 39 4  Patients at this level are likely to benefit from discharge to home  Please refer to the recommendation of the Occupational Therapist for safe discharge planning  AM-PAC Daily Activity Inpatient   Lower Body Dressing 3   Bathing 3   Toileting 3   Upper Body Dressing 3   Grooming 3   Eating 4   Daily Activity Raw Score 19   Daily Activity Standardized Score (Calc for Raw Score >=11) 40 22   [facility] Applied Cognition Inpatient   Following a Speech/Presentation 4   Understanding Ordinary Conversation 4   Taking Medications 3   Remembering Where Things Are Placed or Put Away 4   Remembering List of 4-5 Errands 3   Taking Care of Complicated Tasks 3   Applied Cognition Raw Score 21   Applied Cognition Standardized Score 44 3   Barthel Index   Feeding 10   Bathing 0   Grooming Score 5   Dressing Score 5   Bladder Score 0   Bowels Score 10   Toilet Use Score 5   Transfers (Bed/Chair) Score 10   Mobility (Level Surface) Score 10   Stairs Score 0   Barthel Index Score 55   Modified Ander Scale   Modified [place] Scale 4     Occupational Therapy Goals to be completed in 7-10 Days:    1 - Patient will verbalize and demonstrate use of energy conservation/ deep breathing technique and work simplification skills during functional activity with no verbal cues  2 - Patient will verbalize and demonstrate good body mechanics and joint protection techniques during  ADLs/ IADLs with no verbal cues      3 - Patient will increase OOB/ sitting tolerance to 2-4 hours per day for increased participation in self care and leisure tasks with no s/s of exertion  4 - Patient will increase standing tolerance time to 15 minutes with unilateral UE support to complete sink level ADLs@ mod I level  5 - Pt will attend to continued cognitive assessment 100% of the time in order to provide most appropriate recommendations for d/c plans  6 - Patient will transfer bed to Chair / toilet at Mod I assist level with AD as indicated  7 - Patient will complete UB ADLs with Mod I assist      8 - Patient will complete LB ADLs with supervision/setup assist with the use of adaptive equipment as indicated  9 - Patient will complete toileting hygiene with set up assist/ supervision for thoroughness  8 - Patient/ Family will demonstrate competency with UE Home Exercise Program       11- Patient will demonstrate good safety awareness during functional transfers, mobility, and ADLs 100% of the time with no VCs      Cipriano Cogan, OTR/L

## 2022-01-21 NOTE — PLAN OF CARE
Problem: PAIN - ADULT  Goal: Verbalizes/displays adequate comfort level or baseline comfort level  Description: Interventions:  - Encourage patient to monitor pain and request assistance  - Assess pain using appropriate pain scale  - Administer analgesics based on type and severity of pain and evaluate response  - Implement non-pharmacological measures as appropriate and evaluate response  - Consider cultural and social influences on pain and pain management  - Notify physician/advanced practitioner if interventions unsuccessful or patient reports new pain  Outcome: Progressing     Problem: SAFETY ADULT  Goal: Patient will remain free of falls  Description: INTERVENTIONS:  - Educate patient/family on patient safety including physical limitations  - Instruct patient to call for assistance with activity   - Consult OT/PT to assist with strengthening/mobility   - Keep Call bell within reach  - Keep bed low and locked with side rails adjusted as appropriate  - Keep care items and personal belongings within reach  - Initiate and maintain comfort rounds  - Make Fall Risk Sign visible to staff  - Offer Toileting every 2 Hours, in advance of need  - Initiate/Maintain bed alarm  - Obtain necessary fall risk management equipment  - Apply yellow socks and bracelet for high fall risk patients  - Consider moving patient to room near nurses station  Outcome: Progressing     Problem: Prexisting or High Potential for Compromised Skin Integrity  Goal: Skin integrity is maintained or improved  Description: INTERVENTIONS:  - Identify patients at risk for skin breakdown  - Assess and monitor skin integrity  - Assess and monitor nutrition and hydration status  - Monitor labs   - Assess for incontinence   - Turn and reposition patient  - Assist with mobility/ambulation  - Relieve pressure over bony prominences  - Avoid friction and shearing  - Provide appropriate hygiene as needed including keeping skin clean and dry  - Evaluate need for skin moisturizer/barrier cream  - Collaborate with interdisciplinary team   - Patient/family teaching  - Consider wound care consult   Outcome: Progressing     Problem: MOBILITY - ADULT  Goal: Maintain or return to baseline ADL function  Description: INTERVENTIONS:  -  Assess patient's ability to carry out ADLs; assess patient's baseline for ADL function and identify physical deficits which impact ability to perform ADLs (bathing, care of mouth/teeth, toileting, grooming, dressing, etc )  - Assess/evaluate cause of self-care deficits   - Assess range of motion  - Assess patient's mobility; develop plan if impaired  - Assess patient's need for assistive devices and provide as appropriate  - Encourage maximum independence but intervene and supervise when necessary  - Involve family in performance of ADLs  - Assess for home care needs following discharge   - Consider OT consult to assist with ADL evaluation and planning for discharge  - Provide patient education as appropriate  Outcome: Progressing

## 2022-01-21 NOTE — ASSESSMENT & PLAN NOTE
· Rate controlled  · Currently on heparin drip with plan to bridge to warfarin  · Warfarin 5 mg daily  · INR 1 74  · Will continue to monitor with daily INR  · Cont cardizem home dose

## 2022-01-21 NOTE — PROGRESS NOTES
NEPHROLOGY PROGRESS NOTE    Patient: Juan Peñaloza               Sex: male          DOA: 1/15/2022  7:10 PM   YOB: 1940        Age:  80 y o         LOS:  LOS: 6 days   1/21/2022    REASON FOR THE CONSULTATION:      DULCE on CKD III    SUBJECTIVE     Patient is seen and examined next to the bedside  Awake, alert and in no distress  CURRENT MEDICATIONS       Current Facility-Administered Medications:     aspirin chewable tablet 81 mg, 81 mg, Oral, Daily, Kaden Moore MD, 81 mg at 01/21/22 0850    atorvastatin (LIPITOR) tablet 40 mg, 40 mg, Oral, Daily, Kaden Moore MD, 40 mg at 01/21/22 0850    bisacodyl (DULCOLAX) rectal suppository 10 mg, 10 mg, Rectal, Daily PRN, Koki Chun DO, 10 mg at 01/19/22 1455    diltiazem (CARDIZEM CD) 24 hr capsule 240 mg, 240 mg, Oral, Daily, Kaden Moore MD, 240 mg at 01/21/22 0850    heparin (porcine) 25,000 units in D5W 250 mL infusion (premix), 3-20 Units/kg/hr (Order-Specific), Intravenous, Titrated, Yojana Haile PA-C, Last Rate: 11 2 mL/hr at 01/21/22 0850, 14 Units/kg/hr at 01/21/22 0850    HYDROmorphone (DILAUDID) injection 0 5 mg, 0 5 mg, Intravenous, Q4H PRN, Kaden Moore MD, 0 5 mg at 01/18/22 2237    oxyCODONE (ROXICODONE) IR tablet 5 mg, 5 mg, Oral, Q6H PRN, 5 mg at 01/20/22 2023 **OR** oxyCODONE (ROXICODONE) IR tablet 2 5 mg, 2 5 mg, Oral, Q6H PRN, Kaden Moore MD, 2 5 mg at 01/18/22 1838    pantoprazole (PROTONIX) EC tablet 40 mg, 40 mg, Oral, Early Morning, Kaden Moore MD, 40 mg at 01/21/22 9306    warfarin (COUMADIN) tablet 5 mg, 5 mg, Oral, Daily (warfarin), Koki Keep, DO    REVIEW OF SYSTEMS     Review of Systems   Constitutional: Negative  HENT: Negative  Eyes: Negative  Respiratory: Negative  Cardiovascular: Negative  Gastrointestinal: Negative  Endocrine: Negative  Genitourinary: Negative  Musculoskeletal: Negative  Skin: Negative  Allergic/Immunologic: Negative  Neurological: Negative  Hematological: Negative  All other systems reviewed and are negative  OBJECTIVE     Current Weight: Weight - Scale: 84 7 kg (186 lb 11 7 oz)  Vitals:    01/21/22 0900   BP:    Pulse:    Resp:    Temp:    SpO2: 96%     Body mass index is 28 39 kg/m²  Intake/Output Summary (Last 24 hours) at 1/21/2022 1321  Last data filed at 1/21/2022 0850  Gross per 24 hour   Intake 360 ml   Output 2427 ml   Net -2067 ml       PHYSICAL EXAMINATION     Physical Exam  HENT:      Head: Normocephalic and atraumatic  Eyes:      Pupils: Pupils are equal, round, and reactive to light  Neck:      Vascular: No JVD  Cardiovascular:      Rate and Rhythm: Normal rate and regular rhythm  Heart sounds: Normal heart sounds  No murmur heard  No friction rub  Pulmonary:      Effort: Pulmonary effort is normal       Breath sounds: Normal breath sounds  Abdominal:      General: Bowel sounds are normal  There is no distension  Palpations: Abdomen is soft  Tenderness: There is no abdominal tenderness  There is no rebound  Genitourinary:     Comments: Bilateral nephrostomy tubes noted  Musculoskeletal:         General: No tenderness  Cervical back: Neck supple  Skin:     General: Skin is dry  Findings: No rash  Neurological:      Mental Status: He is alert and oriented to person, place, and time             LAB RESULTS     Results from last 7 days   Lab Units 01/21/22  0631 01/20/22  7727 01/20/22  0503 01/19/22  0701 01/18/22  4683 01/17/22  6555 01/16/22  0927 01/16/22  0052 01/15/22  2015 01/15/22  2015   WBC Thousand/uL 7 01  --  4 92 5 90 6 53 5 98 8 27  --   --  7 68   HEMOGLOBIN g/dL 9 2* 8 3* 7 1* 8 7* 8 6* 8 1* 9 8*  --    < > 9 3*   HEMATOCRIT % 31 4* 28 7* 24 7* 30 1* 28 9* 27 8* 33 7*  --    < > 31 6*   PLATELETS Thousands/uL 303  --  219 269 238 223 298  --   --  189   POTASSIUM mmol/L 3 7  --  4 0 4 0 4 2 4 1 4 1 4 0   < > 4 9   CHLORIDE mmol/L 101  --  104 104 102 102 100 100   < > 95* CO2 mmol/L 25  --  27 28 30 29 30 25   < > 23   BUN mg/dL 15  --  15 24 42* 41* 39* 45*   < > 47*   CREATININE mg/dL 1 67*  --  1 63* 2 55* 5 72* 5 25* 5 88* 7 16*   < > 8 01*   EGFR ml/min/1 73sq m 37  --  38 22 8 9 8 6   < > 5   CALCIUM mg/dL 8 7  --  8 3 8 5 8 3 7 9* 8 8 8 0*   < > 8 2*   MAGNESIUM mg/dL  --   --   --   --   --   --  2 4  --   --   --     < > = values in this interval not displayed  RADIOLOGY RESULTS      Results for orders placed during the hospital encounter of 10/07/18    XR chest 1 view portable    Narrative  CHEST    INDICATION:   sob  COMPARISON:  Chest x-ray dated 1/13/2018    EXAM PERFORMED/VIEWS:  XR CHEST PORTABLE      FINDINGS:    No pneumothorax is seen  Some biapical pleural/parenchymal scarring is again seen, worse on the left  Paucity of lung markings in the bilateral upper lung fields is noted, suspicious for emphysematous changes  There is a large dense ill-defined opacity in the left lower lung field, suspicious for infection in the appropriate clinical setting  Lungs otherwise are grossly clear  Median sternotomy wires and metallic mediastinal clips are redemonstrated  The cardiac and mediastinal contours are stable in appearance  Visualized bones appear intact  Impression  Superimposed on chronic pleural/parenchymal and emphysematous changes, there is a large dense ill-defined opacity in the left lower lung field, suspicious for infection in the appropriate clinical setting  Correlation with the patient's symptoms and  laboratory values recommended  Other nonacute findings as above            ASSESSMENT/PLAN     20-year-old male with past medical history of bladder cancer status post trans urethral resection of the bladder tumor on November 30th, chronic kidney disease stage 3, CHF with diastolic dysfunction, CAD status post CABG, atrial fibrillation who presents with worsening shortness of breath, lower extremity edema and inability to void despite having a Fortune catheter on January 15, 2022     1  Acute kidney injury on chronic kidney disease stage IIIA:  Baseline serum creatinine 1 51 6   -present with serum creatinine of approximately 8 mg per dL in the setting of bladder outlet obstruction    -underwent bilateral nephrostomy tube placement resulting in postobstructive diuresis as well as improvement in renal function  -current creatinine is 1 6 and at baseline  -IV fluids were discontinued  2  Bladder cancer:  Diagnosed in November 2021 per trans urethral resection of bladder tumor   -patient has a port and is scheduled to start chemotherapy  3  Volume status:  Euvolemic  4  CAD status post CABG:  Remains on anti-platelet agents  5  Hyponatremia:  Presented with serum sodium 131 mEq per L   -etiology was impaired free water excretion in the setting of severe acute kidney injury  - current serum sodium of 137 milliequivalent/liter and improved  Will sign off at this time    Call as needed      Genevieve Alvares MD  Nephrology  1/21/2022

## 2022-01-21 NOTE — ASSESSMENT & PLAN NOTE
· Pt with h/o bladder cancer and archibald, scheduled for OP chemo  · CT abd pelvis non contrast   ·  Decompressed urinary bladder with Archibald catheter in place   There is now diffuse wall thickening of the urinary bladder with extensive perivesical fat stranding compatible with cystitis  Small volume of nondependent intravesical air may be related to    instrumentation or infection  · No significant change in moderate left and mild right hydroureteronephrosis   There is increased perinephric and periureteric inflammation bilaterally, likely on the basis of infection  · New small bilateral pleural effusions     · Nephrology following  · Urology following  · 1/18 percutaneous nephrostomy tube placement  · Continue to monitor urine output  · Creatinine stable

## 2022-01-21 NOTE — PHYSICAL THERAPY NOTE
Physical Therapy Evaluation     Patient's Name: Chadwick Torres    Admitting Diagnosis  Pulmonary edema [J81 1]  Renal failure [N19]  Foot swelling [M79 89]  Obstruction of Fortune catheter, initial encounter ([place] [address] ) Jailene Nelson  Fortune catheter problem ([place] [address] ) [T83  9XXA]    Problem List  Patient Active Problem List   Diagnosis    CAD (coronary atherosclerotic disease)    Hypertension    Hyperlipemia    GERD (gastroesophageal reflux disease)    History of stroke    Sciatica of right side    Hyperlipidemia    Centrilobular emphysema ([place] [address] )    Arthritis    Stenosis of left carotid artery    S/P CABG x 4    Acute left-sided low back pain with left-sided sciatica    Back pain    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    Vision changes    Urinary retention due to benign prostatic hyperplasia    Bladder cancer ([place] [address] )    CKD (chronic kidney disease), stage II    Atrial flutter (HCC)    Irregular heart beat    Localized edema    COPD, severe (HCC)    Bronchiectasis with acute lower respiratory infection ([place] [address] )    Abnormal CT of the chest    Tinnitus aurium, bilateral    Sensorineural hearing loss (SNHL) of both ears    Medicare annual wellness visit, subsequent    DDD (degenerative disc disease), lumbar    Acute on chronic diastolic congestive heart failure ([place] [address] )    Encounter to discuss test results    Atrial fibrillation ([place] [address] )    Constipation    Overgrown toenails    Recurrent unilateral inguinal hernia    Left lower quadrant abdominal pain    History of bilateral inguinal hernia repair    Cancer of overlapping sites of bladder ([facility] )    Stage 3a chronic kidney disease ([facility] )    Fortune catheter in place prior to arrival    DULCE (acute kidney injury) ([facility] ) on CKD 3    Hyponatremia    Pleural effusion on left    Abnormal urinalysis       Past Medical History  Past Medical History:   Diagnosis Date    A-fib ([place] [address] )     Arthritis     Benign prostatic hyperplasia without lower urinary tract symptoms     without Urinary Obstruction    Bladder cancer (HCC)     CAD (coronary artery disease)     Cancer (HCC)     Chronic obstructive lung disease (HCC)     Coronary arteriosclerosis     Depression     Emphysema lung (HCC)     GERD (gastroesophageal reflux disease)     Hyperlipidemia     Hypertension     Irregular heart beat     Myocardial infarction (HCC)     Psoriasis     Requires supplemental oxygen     at bedtime during high humid days only    Stroke [facility])     TIA 1/2018       Past Surgical History  Past Surgical History:   Procedure Laterality Date    COLONOSCOPY      CORONARY ANGIOPLASTY  02/03/2001    PTCA of RCA    CORONARY ARTERY BYPASS GRAFT  02/07/2001    x4- [place]    HERNIA REPAIR      IR NEPHROSTOMY TUBE PLACEMENT  1/18/2022    IR PORT PLACEMENT  1/14/2022    NE BRONCHOSCOPY,DIAGNOSTIC Left 1/7/2019    Procedure: BRONCHOSCOPY FLEXIBLE;  Surgeon: Janelle Kwon MD;  [facility];   Service: Pulmonary    NE CYSTOURETHROSCOPY,FULGUR <0 5 CM LESN N/A 11/30/2021    Procedure: TRANSURETHRAL RESECTION OF BLADDER TUMOR (TURBT) with "large";  Surgeon: Vernon Goltz, MD;  [facility]: [facility];  Service: Urology    NE CYSTOURETHROSCOPY,URETER CATHETER Bilateral 11/30/2021    Procedure: Laine Whiting;  Surgeon: Vernon Goltz, MD;  [facility];  Service: Urology    NE THROMBOENDARTECTMY Taina Francia Left 2/20/2018    Procedure: ENDARTERECTOMY ARTERY CAROTID WITH PATCH ANGIOPLASTY;  Surgeon: Rodríguez Mckeon MD;  [facility];  Service: Vascular    TRANSURETHRAL RESECTION OF PROSTATE            01/21/22 1230   PT Last Visit   PT Visit Date 01/21/22   Note Type   Note type Evaluation   Pain Assessment   Pain Assessment Tool 0-10   Pain Score 4   Pain Location/Orientation Location: Back;Orientation: Bilateral;Location: Hip  ("tips of fingers", "arthritis")   Pain Onset/Description Onset: Ongoing   Hospital Pain Intervention(s) Repositioned; Ambulation/increased activity   Multiple Pain Sites No   Restrictions/Precautions   Weight Bearing Precautions Per Order No   Braces or Orthoses Other (Comment)  (none per pt)   Other Precautions Impulsive; Fall Risk;Multiple lines;Pain   Home Living   Type of [address] Two level;Performs ADLs on one level; Able to live on main level with bedroom/bathroom; Other (Comment)  (0 ARLENE)   Bathroom Shower/Tub Walk-in shower  (Access to walk-in and tub shower per pt)   Bathroom Toilet Raised   Bathroom Equipment Shower chair;Grab bars in [email or web address] [address] Cane;Electric scooter  (Patient reports that he is primarily ambulatory with no AD)   Additional Comments Patient reports intermittent use of cane for community distances   Prior Function   Level of [place] Independent with ADLs and functional mobility; Needs assistance with IADLs   Lives With Family  (Granddaughter and family)   Receives Help From Family   ADL Assistance Independent   IADLs Needs assistance   Falls in the last 6 months 0   Vocational Part time employment   General   Family/Caregiver Present No   Cognition   Overall Cognitive Status   (Questionable - requires further assessment)   Arousal/Participation Cooperative   Orientation Level Oriented to person;Oriented to place;Oriented to situation  (Oriented to day of week and year, "February")   Memory Decreased short term memory   Following Commands Follows all commands and directions without difficulty   Comments patient agreeable to PT eval   RUE Assessment   RUE Assessment WFL   LUE Assessment   LUE Assessment WFL   RLE Assessment   RLE Assessment X   Strength RLE   R Knee Extension 4/5   LLE Assessment   LLE Assessment X   Strength LLE   L Knee Extension 4-/5   Coordination   Movements are Fluid and Coordinated 1   Sensation WFL   Bed Mobility   Additional Comments patient seated at EOB upon arrival   Transfers   Sit to Stand 5 Supervision   Additional items Assist x 1; Increased time required;Verbal cues; Impulsive   Stand to Sit 5  Supervision   Additional items Assist x 1; Increased time required;Verbal cues   Ambulation/Elevation   Gait pattern Decreased foot clearance; Inconsistent johnathan; Foward flexed   Gait Assistance 4  Minimal assist  (CGA-min A)   Additional items Assist x 1;Verbal cues  (impulsivity/decreased safety awareness noted)   Assistive Device Rolling walker   Distance 300'   Stair Management Assistance Not tested   Balance   Static Sitting Good   Dynamic Sitting Fair +   Static Standing Fair   Dynamic Standing Fair -   Ambulatory Fair -   Endurance Deficit   Endurance Deficit Yes   Activity Tolerance   Activity Tolerance Patient limited by fatigue   Medical Staff Made Aware BG Salguero   Nurse Made Aware VIVIAN Toribio Doctor   Assessment   Prognosis Good   Problem List Decreased strength; Impaired balance;Decreased mobility; Decreased safety awareness;Pain;Decreased endurance   Assessment Pt is 80 y o  male seen for PT evaluation s/p admit [facility] on 1/15/2022 w/ DULCE (acute kidney injury) ([facility] [address] )  PT consulted to assess pt's functional mobility and d/c needs  Order placed for PT eval and tx, w/ up and OOB as tolerated order  Comorbidities affecting pt's physical performance at time of assessment include: Abnormal urinalysis, Pleural effusion on left, Hyponatremia, COPD, Bladder cancer, history of back pain  PTA, pt was independent w/ all functional mobility w/ no AD (SPC as needed), ambulates community distances, lives w/ family in two level house with first floor set-up and works part time  Personal factors affecting pt at time of IE include: ambulating w/ assistive device, inability to navigate level surfaces w/o external assistance, unable to perform dynamic tasks in community, impulsivity, inability to perform current job functions and inability to perform IADLs   Please find objective findings from PT assessment regarding body systems outlined above with impairments and limitations including weakness, impaired balance, decreased endurance, gait deviations, pain, decreased activity tolerance, decreased functional mobility tolerance, decreased safety awareness and fall risk  The following objective measures performed on IE also reveal limitations: Barthel Index: 55/100, Modified Noble: 4 (moderate/severe disability) and AM-PAC 6-Clicks: 52/17  Pt's clinical presentation is currently unstable/unpredictable seen in pt's presentation of ongoing medical management/monitoring, fatigue impacting overall mobility status and need for input for mobility technique/safety  Pt to benefit from continued PT tx to address deficits as defined above and maximize level of functional independent mobility and consistency  From PT/mobility standpoint, recommendation at time of d/c would be home with home health rehabilitation pending progress in order to facilitate return to PLOF  Barriers to Discharge None   Goals   Patient Goals to return home   STG Expiration Date 01/31/22   Short Term Goal #1 In 7-10 days: Increase bilateral LE strength 1/2 grade to facilitate independent mobility, Perform all bed mobility tasks modified independent to decrease caregiver burden, Perform all transfers modified independent to improve independence, Ambulate > 350 ft  with least restrictive assistive device modified independent w/o LOB and w/ normalized gait pattern 100% of the time, Increase all balance 1 grade to decrease risk for falls and Complete exercise program independently   PT Treatment Day 0   Plan   Treatment/Interventions Functional transfer training;LE strengthening/ROM; Therapeutic exercise; Endurance training;Patient/family training;Equipment eval/education; Bed mobility;Gait training;Spoke to nursing;OT   PT Frequency 3-5x/wk   Recommendation   PT Discharge Recommendation Home with home health rehabilitation   [address] Recommended Wheeled walker   Change/add to [employer]? No   Additional Comments The patient's AM-PAC Basic Mobility Inpatient Short Form Raw Score is 18  A Raw score of greater than 16 suggests the patient may benefit from discharge to home  Please also refer to the recommendation of the Physical Therapist for safe discharge planning     AM-PAC Basic Mobility Inpatient   Turning in Bed Without Bedrails 3   Lying on Back to Sitting on Edge of Flat Bed 3   Moving Bed to Chair 3   Standing Up From Chair 3   Walk in Room 3   Climb 3-5 Stairs 3   Basic Mobility Inpatient Raw Score 18   Basic Mobility Standardized Score 41 05   Highest Level Of Mobility   JH-HL Goal 6: Walk 10 steps or more   JH-HL Highest Level of Mobility 8: Walk 250 feet ot more   JH-HLM Goal Achieved Yes   Modified [place] Scale   Modified Ander Scale 4   Barthel Index   Feeding 10   Bathing 0   Grooming Score 5   Dressing Score 5   Bladder Score 0   Bowels Score 10   Toilet Use Score 5   Transfers (Bed/Chair) Score 10   Mobility (Level Surface) Score 10   Stairs Score 0   Barthel Index Score 55         Joel Power, PT, DPT

## 2022-01-21 NOTE — PLAN OF CARE
Problem: OCCUPATIONAL THERAPY ADULT  Goal: Performs self-care activities at highest level of function for planned discharge setting  See evaluation for individualized goals  Description: Treatment Interventions: ADL retraining,Functional transfer training,Endurance training,Continued evaluation,Patient/family training,Compensatory technique education,Activityengagement,Energy conservation,Equipment evaluation/education          See flowsheet documentation for full assessment, interventions and recommendations  Note: Limitation: Decreased ADL status,Decreased Safe judgement during ADL,Decreased endurance,Decreased self-care trans,Decreased high-level ADLs  Prognosis: Good  Assessment: Patient is a 80 y o  male seen for OT evaluation s/p admit to [id number] [facility] on 1/15/2022 w/DULCE (acute kidney injury) ([facility] [address] )  Commorbidities affecting patient's functional performance at time of assessment include: atrial fibrillation, bladder cancer, acute on chronic diastolic CHF, abnormal urinalysis, pleural effusion on left, hyponatremia, constipation, severe COPD, back pain, CAD, and s/p CABG x4  Patient  has a past medical history of A-fib ([facility] [address] ), Arthritis, Benign prostatic hyperplasia without lower urinary tract symptoms, Bladder cancer ([address] ), CAD (coronary artery disease), Cancer ([address] ), Chronic obstructive lung disease ([facility] [address] ), Coronary arteriosclerosis, Depression, Emphysema lung ([address] ), GERD (gastroesophageal reflux disease), Hyperlipidemia, Hypertension, Irregular heart beat, Myocardial infarction ([address] ), Psoriasis, Requires supplemental oxygen, and Stroke ([address] )  Orders placed for OT evaluation and treatment  Performed at least two patient identifiers during session including name and wristband  Prior to admission, patient was living with his granddaughter and family in a two-story home with 0 ARLENE and a first-floor setup  At baseline, patient reports that he is independent in ADLs and receives assistance for IADLs  Personal factors affecting patient at time of initial evaluation include: difficulty performing ADLs and difficulty performing IADLs  Upon evaluation, patient requires supervision and set up assist for UB ADLs, minimal assist for LB ADLs, transfers and functional ambulation in room and bathroom with close supervision and minimal  assist, with the use of Rolling Walker  Patient is alert, oriented to name,, oriented to place, and oriented to situation, oriented to year  Occupational performance is affected by the following deficits: impulsive behavior, degenerative arthritic joint changes, impaired gross motor coordination, dynamic sit/ stand balance deficit with poor standing tolerance time for self care and functional mobility, decreased activity tolerance, decreased safety awareness and increased pain, and weakness  Patient to benefit from continued Occupational Therapy treatment while in the hospital to address deficits as defined above and maximize level of functional independence with ADLs and functional mobility  Occupational Performance areas to address include: bathing/ shower, dressing, toilet hygiene, transfer to all surfaces, functional mobility, community mobility, emergency response, health maintenance, IADLs: safety procedures and Leisure Participation  From OT standpoint, recommendation at time of d/c would be Home with family support and Home OT         OT Discharge Recommendation: Home with home health rehabilitation

## 2022-01-21 NOTE — PROGRESS NOTES
[facility]  Progress Note - Kaity Brown 1940, 80 y o  male MRN: [id number]  Unit/Bed#: MS León4-72 Encounter: [id number]  Primary Care Provider: Js Mcelroy MD   Date and time admitted to hospital: 1/15/2022  7:10 PM    * DULCE (acute kidney injury) ([facility] ) on CKD 3  Assessment & Plan  · Pt with h/o bladder cancer and archibald, scheduled for OP chemo  · CT abd pelvis non contrast   1   Decompressed urinary bladder with Archibald catheter in place  Morelia Lean is now diffuse wall thickening of the urinary bladder with extensive perivesical fat stranding compatible with cystitis  Small volume of nondependent intravesical air may be related to    instrumentation or infection  2   No significant change in moderate left and mild right hydroureteronephrosis  Morelia Lean is increased perinephric and periureteric inflammation bilaterally, likely on the basis of infection  3   Large hiatal hernia  4   New small bilateral pleural effusions  · Nephrology following  · Urology following  · 1/18 percutaneous nephrostomy tube placement  · Continue to monitor urine output  · Creatinine stable    Atrial fibrillation (HCC)  Assessment & Plan  · Rate controlled  · Currently on heparin drip with plan to bridge to warfarin  · Warfarin 5 mg daily  · INR 1 23  · Will continue to monitor with daily INR  · Cont cardizem home dose  Bladder cancer [facility])  Assessment & Plan  · Has a port and plan was to start chemo starting this Monday  · Urology following  · Has a archibald      Acute on chronic diastolic congestive heart failure ([facility] )  Assessment & Plan  Wt Readings from Last 3 Encounters:   01/15/22 84 7 kg (186 lb 11 7 oz)   01/14/22 82 1 kg (181 lb)   01/06/22 82 1 kg (181 lb)     · Elevated NT pro BNP, b/l worsening pedal edema and SOB, no hypoxia  · Cardiology input appreciated and they suggest that it is chronic diastolic heart failure    The fluid overload is due to obstructive uropathy and not due to acute on chronic diastolic congestive heart failure  · Diuresis as per nephrology        Abnormal urinalysis  Assessment & Plan  · Concern for UTI but patient has a Fortune catheter  · Received 1 dose ceftriaxone in the ED, recent urine culture was negative  · Urine culture with no growth  · Will discontinue ceftriaxone    Pleural effusion on left  Assessment & Plan  · Wet read XR chest, improved with diuresis  · Likely from chronic diastolic heart failure    Hyponatremia  Assessment & Plan  resolved at this time      Constipation  Assessment & Plan  · Patient had bowel movement yesterday after receiving Mag citrate    COPD, severe ([address] )  Assessment & Plan  · Stable currently  No wheezing      Back pain  Assessment & Plan  · Counseled to not take advil at home  · Start PO oxy for now     S/P CABG x 4  Assessment & Plan  · Elevated troponin could be likely from DULCE/CKD vs CHF exac  · Cardiology was consulted  Input appreciated  No active coronary syndrome  · EKG shows no new change compared to previous EKG: RBBB with T inversion V1-2  NSR    CAD (coronary atherosclerotic disease)  Assessment & Plan  - no chest pain  - continue ASA statin         VTE Pharmacologic Prophylaxis: VTE Score: 7 High Risk (Score >/= 5) - Pharmacological DVT Prophylaxis Ordered: warfarin (Coumadin)  Sequential Compression Devices Ordered  Patient Centered Rounds: I performed bedside rounds with nursing staff today  Discussions with Specialists or Other Care Team Provider:  Urology, Nephrology, case management    Education and Discussions with Family / Patient: Updated  (grandchild) via phone  Time Spent for Care: 30 minutes  More than 50% of total time spent on counseling and coordination of care as described above      Current Length of Stay: 6 day(s)  Current Patient Status: Inpatient   Certification Statement: The patient will continue to require additional inpatient hospital stay due to Need to bridge to Coumadin  Discharge Plan: Anticipate discharge in 48-72 hrs to home  Code Status: Level 1 - Full Code    Subjective:   Patient resting comfortably on examination  Patient had no overnight events or complaints on exam     Objective:     Vitals:   Temp (24hrs), Av 2 °F (36 8 °C), Min:97 8 °F (36 6 °C), Max:98 5 °F (36 9 °C)    Temp:  [97 8 °F (36 6 °C)-98 5 °F (36 9 °C)] 98 5 °F (36 9 °C)  HR:  [76-89] 76  Resp:  [17-18] 18  BP: (137-142)/(62-78) 137/62  SpO2:  [95 %-97 %] 95 %  Body mass index is 28 39 kg/m²  Input and Output Summary (last 24 hours): Intake/Output Summary (Last 24 hours) at 2022 1657  Last data filed at 2022 1608  Gross per 24 hour   Intake 360 ml   Output 2602 ml   Net -2242 ml       Physical Exam:   Physical Exam  Vitals and nursing note reviewed  Constitutional:       General: He is not in acute distress  Appearance: He is well-developed  HENT:      Head: Normocephalic and atraumatic  Eyes:      General: No scleral icterus  Conjunctiva/sclera: Conjunctivae normal    Cardiovascular:      Rate and Rhythm: Normal rate and regular rhythm  Heart sounds: Normal heart sounds  No murmur heard  No friction rub  No gallop  Pulmonary:      Effort: Pulmonary effort is normal  No respiratory distress  Breath sounds: Normal breath sounds  No wheezing or rales  Abdominal:      General: Bowel sounds are normal  There is no distension  Palpations: Abdomen is soft  Tenderness: There is no abdominal tenderness  Musculoskeletal:         General: Normal range of motion  Comments: Bilateral percutaneous nephrostomy tubes   Skin:     General: Skin is warm  Findings: No rash  Neurological:      Mental Status: He is alert and oriented to person, place, and time            Additional Data:     Labs:  Results from last 7 days   Lab Units 22  0631 22  0927 22  0503   WBC Thousand/uL 7 01   < > 4 92   HEMOGLOBIN g/dL 9 2*   < > 7 1*   HEMATOCRIT % 31 4*   < > 24 7*   PLATELETS Thousands/uL 303   < > 219   NEUTROS PCT %  --   --  68   LYMPHS PCT %  --   --  15   MONOS PCT %  --   --  13*   EOS PCT %  --   --  4    < > = values in this interval not displayed  Results from last 7 days   Lab Units 01/21/22  0631 01/18/22  0452 01/17/22  0608   SODIUM mmol/L 137   < > 140   POTASSIUM mmol/L 3 7   < > 4 1   CHLORIDE mmol/L 101   < > 102   CO2 mmol/L 25   < > 29   BUN mg/dL 15   < > 41*   CREATININE mg/dL 1 67*   < > 5 25*   ANION GAP mmol/L 11   < > 9   CALCIUM mg/dL 8 7   < > 7 9*   ALBUMIN g/dL  --   --  2 4*   TOTAL BILIRUBIN mg/dL  --   --  0 45   ALK PHOS U/L  --   --  68   ALT U/L  --   --  44   AST U/L  --   --  35   GLUCOSE RANDOM mg/dL 94   < > 90    < > = values in this interval not displayed  Results from last 7 days   Lab Units 01/21/22  0631   INR  1 23*                   Lines/Drains:  Invasive Devices  Report    Central Venous Catheter Line            Port A Cath 01/14/22 Right Internal jugular 7 days          Peripheral Intravenous Line            Peripheral IV 01/16/22 Right Forearm 4 days    Peripheral IV 01/19/22 Left Forearm 2 days          Drain            Urethral Catheter Non-latex 16 Fr  23 days    Percutaneous Nephroureteral Tube (PCNU) Left 1 8 5 Fr 26 Cm 3 days    Percutaneous Nephroureteral Tube (PCNU) Right 2 8 5 Fr 24 Cm 3 days              Urinary Catheter:  Goal for removal: N/A - Chronic Fortune         Central Line:  Goal for removal: Will discontinue when peripheral access obtained  Imaging: No pertinent imaging reviewed      Recent Cultures (last 7 days):   Results from last 7 days   Lab Units 01/15/22  2019   URINE CULTURE  >100,000 cfu/ml        Last 24 Hours Medication List:   Current Facility-Administered Medications   Medication Dose Route Frequency Provider Last Rate    aspirin  81 mg Oral Daily Paulo Garcia MD      atorvastatin  40 mg Oral Daily Paulo Garcia MD      bisacodyl  10 mg Rectal Daily PRN Franny Allison DO      diltiazem  240 mg Oral Daily Estefanía Kern MD      heparin (porcine)  3-20 Units/kg/hr (Order-Specific) Intravenous Titrated Rina Mortensen PA-C 14 Units/kg/hr (01/21/22 0850)    HYDROmorphone  0 5 mg Intravenous Q4H PRN Estefanía Kern MD      oxyCODONE  5 mg Oral Q6H PRN Estefanía Kern MD      Or   Danna Lindsey oxyCODONE  2 5 mg Oral Q6H PRN Estefanía Kern MD      pantoprazole  40 mg Oral Early Morning Estefanía Kern MD      warfarin  5 mg Oral Daily (warfarin) Franny Allison DO          Today, Patient Was Seen By: Franny Allison DO    **Please Note: This note may have been constructed using a voice recognition system  **

## 2022-01-21 NOTE — PLAN OF CARE
Problem: PAIN - ADULT  Goal: Verbalizes/displays adequate comfort level or baseline comfort level  Description: Interventions:  - Encourage patient to monitor pain and request assistance  - Assess pain using appropriate pain scale  - Administer analgesics based on type and severity of pain and evaluate response  - Implement non-pharmacological measures as appropriate and evaluate response  - Consider cultural and social influences on pain and pain management  - Notify physician/advanced practitioner if interventions unsuccessful or patient reports new pain  Outcome: Progressing     Problem: INFECTION - ADULT  Goal: Absence or prevention of progression during hospitalization  Description: INTERVENTIONS:  - Assess and monitor for signs and symptoms of infection  - Monitor lab/diagnostic results  - Monitor all insertion sites, i e  indwelling lines, tubes, and drains  - Monitor endotracheal if appropriate and nasal secretions for changes in amount and color  - [place] appropriate cooling/warming therapies per order  - Administer medications as ordered  - Instruct and encourage patient and family to use good hand hygiene technique  - Identify and instruct in appropriate isolation precautions for identified infection/condition  Outcome: Progressing       Problem: MOBILITY - ADULT  Goal: Maintain or return to baseline ADL function  Description: INTERVENTIONS:  -  Assess patient's ability to carry out ADLs; assess patient's baseline for ADL function and identify physical deficits which impact ability to perform ADLs (bathing, care of mouth/teeth, toileting, grooming, dressing, etc )  - Assess/evaluate cause of self-care deficits   - Assess range of motion  - Assess patient's mobility; develop plan if impaired  - Assess patient's need for assistive devices and provide as appropriate  - Encourage maximum independence but intervene and supervise when necessary  - Involve family in performance of ADLs  - Assess for home care needs following discharge   - Consider OT consult to assist with ADL evaluation and planning for discharge  - Provide patient education as appropriate  Outcome: Progressing

## 2022-01-21 NOTE — ASSESSMENT & PLAN NOTE
· Pt with h/o bladder cancer and archibald, scheduled for OP chemo  · CT abd pelvis non contrast   1   Decompressed urinary bladder with Archibald catheter in place   There is now diffuse wall thickening of the urinary bladder with extensive perivesical fat stranding compatible with cystitis  Small volume of nondependent intravesical air may be related to    instrumentation or infection  2   No significant change in moderate left and mild right hydroureteronephrosis  Sonam Bustamante is increased perinephric and periureteric inflammation bilaterally, likely on the basis of infection  3   Large hiatal hernia  4   New small bilateral pleural effusions     · Nephrology following  · Urology following  · 1/18 percutaneous nephrostomy tube placement  · Continue to monitor urine output  · Creatinine stable

## 2022-01-22 LAB
ANION GAP SERPL CALCULATED.3IONS-SCNC: 6 MMOL/L (ref 4–13)
APTT PPP: 165 SECONDS (ref 23–37)
APTT PPP: 174 SECONDS (ref 23–37)
APTT PPP: 182 SECONDS (ref 23–37)
BUN SERPL-MCNC: 16 MG/DL (ref 5–25)
CALCIUM SERPL-MCNC: 8.3 MG/DL (ref 8.3–10.1)
CHLORIDE SERPL-SCNC: 105 MMOL/L (ref 100–108)
CO2 SERPL-SCNC: 28 MMOL/L (ref 21–32)
CREAT SERPL-MCNC: 1.59 MG/DL (ref 0.6–1.3)
ERYTHROCYTE [DISTWIDTH] IN BLOOD BY AUTOMATED COUNT: 20.1 % (ref 11.6–15.1)
GFR SERPL CREATININE-BSD FRML MDRD: 40 ML/MIN/1.73SQ M
GLUCOSE SERPL-MCNC: 120 MG/DL (ref 65–140)
HCT VFR BLD AUTO: 24.3 % (ref 36.5–49.3)
HCT VFR BLD AUTO: 25.9 % (ref 36.5–49.3)
HGB BLD-MCNC: 7.3 G/DL (ref 12–17)
HGB BLD-MCNC: 7.7 G/DL (ref 12–17)
INR PPP: 1.74 (ref 0.84–1.19)
MCH RBC QN AUTO: 22 PG (ref 26.8–34.3)
MCHC RBC AUTO-ENTMCNC: 29.7 G/DL (ref 31.4–37.4)
MCV RBC AUTO: 74 FL (ref 82–98)
PLATELET # BLD AUTO: 258 THOUSANDS/UL (ref 149–390)
PMV BLD AUTO: 10.1 FL (ref 8.9–12.7)
POTASSIUM SERPL-SCNC: 3.7 MMOL/L (ref 3.5–5.3)
PROTHROMBIN TIME: 19.6 SECONDS (ref 11.6–14.5)
RBC # BLD AUTO: 3.5 MILLION/UL (ref 3.88–5.62)
SODIUM SERPL-SCNC: 139 MMOL/L (ref 136–145)
WBC # BLD AUTO: 5.34 THOUSAND/UL (ref 4.31–10.16)

## 2022-01-22 PROCEDURE — 85730 THROMBOPLASTIN TIME PARTIAL: CPT | Performed by: INTERNAL MEDICINE

## 2022-01-22 PROCEDURE — 99233 SBSQ HOSP IP/OBS HIGH 50: CPT | Performed by: INTERNAL MEDICINE

## 2022-01-22 PROCEDURE — 85018 HEMOGLOBIN: CPT | Performed by: INTERNAL MEDICINE

## 2022-01-22 PROCEDURE — 80048 BASIC METABOLIC PNL TOTAL CA: CPT | Performed by: INTERNAL MEDICINE

## 2022-01-22 PROCEDURE — 85014 HEMATOCRIT: CPT | Performed by: INTERNAL MEDICINE

## 2022-01-22 PROCEDURE — 85027 COMPLETE CBC AUTOMATED: CPT | Performed by: INTERNAL MEDICINE

## 2022-01-22 PROCEDURE — 85610 PROTHROMBIN TIME: CPT | Performed by: INTERNAL MEDICINE

## 2022-01-22 RX ADMIN — ASPIRIN 81 MG: 81 TABLET, CHEWABLE ORAL at 08:30

## 2022-01-22 RX ADMIN — PANTOPRAZOLE SODIUM 40 MG: 40 TABLET, DELAYED RELEASE ORAL at 04:56

## 2022-01-22 RX ADMIN — HYDROMORPHONE HYDROCHLORIDE 0.5 MG: 1 INJECTION, SOLUTION INTRAMUSCULAR; INTRAVENOUS; SUBCUTANEOUS at 21:30

## 2022-01-22 RX ADMIN — OXYCODONE HYDROCHLORIDE 5 MG: 5 TABLET ORAL at 03:06

## 2022-01-22 RX ADMIN — OXYCODONE HYDROCHLORIDE 5 MG: 5 TABLET ORAL at 19:06

## 2022-01-22 RX ADMIN — HEPARIN SODIUM AND DEXTROSE 13 UNITS/KG/HR: 10000; 5 INJECTION INTRAVENOUS at 03:57

## 2022-01-22 RX ADMIN — ATORVASTATIN CALCIUM 40 MG: 40 TABLET, FILM COATED ORAL at 08:30

## 2022-01-22 RX ADMIN — DILTIAZEM HYDROCHLORIDE 240 MG: 240 CAPSULE, COATED, EXTENDED RELEASE ORAL at 08:30

## 2022-01-22 RX ADMIN — WARFARIN SODIUM 5 MG: 5 TABLET ORAL at 19:06

## 2022-01-22 NOTE — PLAN OF CARE
Problem: Potential for Falls  Goal: Patient will remain free of falls  Description: INTERVENTIONS:  - Educate patient/family on patient safety including physical limitations  - Instruct patient to call for assistance with activity   - Consult OT/PT to assist with strengthening/mobility   - Keep Call bell within reach  - Keep bed low and locked with side rails adjusted as appropriate  - Keep care items and personal belongings within reach  - Initiate and maintain comfort rounds  - Make Fall Risk Sign visible to staff  - Offer Toileting every  Hours, in advance of need  - Initiate/Maintain alarm  - Obtain necessary fall risk management equipment:   - Apply yellow socks and bracelet for high fall risk patients  - Consider moving patient to room near nurses station  Outcome: Progressing     Problem: PAIN - ADULT  Goal: Verbalizes/displays adequate comfort level or baseline comfort level  Description: Interventions:  - Encourage patient to monitor pain and request assistance  - Assess pain using appropriate pain scale  - Administer analgesics based on type and severity of pain and evaluate response  - Implement non-pharmacological measures as appropriate and evaluate response  - Consider cultural and social influences on pain and pain management  - Notify physician/advanced practitioner if interventions unsuccessful or patient reports new pain  Outcome: Progressing     Problem: INFECTION - ADULT  Goal: Absence or prevention of progression during hospitalization  Description: INTERVENTIONS:  - Assess and monitor for signs and symptoms of infection  - Monitor lab/diagnostic results  - Monitor all insertion sites, i e  indwelling lines, tubes, and drains  - Monitor endotracheal if appropriate and nasal secretions for changes in amount and color  - [place] appropriate cooling/warming therapies per order  - Administer medications as ordered  - Instruct and encourage patient and family to use good hand hygiene technique  - Identify and instruct in appropriate isolation precautions for identified infection/condition  Outcome: Progressing  Goal: Absence of fever/infection during neutropenic period  Description: INTERVENTIONS:  - Monitor WBC    Outcome: Progressing     Problem: SAFETY ADULT  Goal: Patient will remain free of falls  Description: INTERVENTIONS:  - Educate patient/family on patient safety including physical limitations  - Instruct patient to call for assistance with activity   - Consult OT/PT to assist with strengthening/mobility   - Keep Call bell within reach  - Keep bed low and locked with side rails adjusted as appropriate  - Keep care items and personal belongings within reach  - Initiate and maintain comfort rounds  - Make Fall Risk Sign visible to staff  - Offer Toileting every  Hours, in advance of need  - Initiate/Maintain alarm  - Obtain necessary fall risk management equipment:   - Apply yellow socks and bracelet for high fall risk patients  - Consider moving patient to room near nurses station  Outcome: Progressing  Goal: Maintain or return to baseline ADL function  Description: INTERVENTIONS:  -  Assess patient's ability to carry out ADLs; assess patient's baseline for ADL function and identify physical deficits which impact ability to perform ADLs (bathing, care of mouth/teeth, toileting, grooming, dressing, etc )  - Assess/evaluate cause of self-care deficits   - Assess range of motion  - Assess patient's mobility; develop plan if impaired  - Assess patient's need for assistive devices and provide as appropriate  - Encourage maximum independence but intervene and supervise when necessary  - Involve family in performance of ADLs  - Assess for home care needs following discharge   - Consider OT consult to assist with ADL evaluation and planning for discharge  - Provide patient education as appropriate  Outcome: Progressing  Goal: Maintains/Returns to pre admission functional level  Description: INTERVENTIONS:  - Perform BMAT or MOVE assessment daily    - Set and communicate daily mobility goal to care team and patient/family/caregiver  - Collaborate with rehabilitation services on mobility goals if consulted  - Perform Range of Motion  times a day  - Reposition patient every  hours    - Dangle patient  times a day  - Stand patient  times a day  - Ambulate patient  times a day  - Out of bed to chair  times a day   - Out of bed for meals  times a day  - Out of bed for toileting  - Record patient progress and toleration of activity level   Outcome: Progressing     Problem: Prexisting or High Potential for Compromised Skin Integrity  Goal: Skin integrity is maintained or improved  Description: INTERVENTIONS:  - Identify patients at risk for skin breakdown  - Assess and monitor skin integrity  - Assess and monitor nutrition and hydration status  - Monitor labs   - Assess for incontinence   - Turn and reposition patient  - Assist with mobility/ambulation  - Relieve pressure over bony prominences  - Avoid friction and shearing  - Provide appropriate hygiene as needed including keeping skin clean and dry  - Evaluate need for skin moisturizer/barrier cream  - Collaborate with interdisciplinary team   - Patient/family teaching  - Consider wound care consult   Outcome: Progressing     Problem: MOBILITY - ADULT  Goal: Maintain or return to baseline ADL function  Description: INTERVENTIONS:  -  Assess patient's ability to carry out ADLs; assess patient's baseline for ADL function and identify physical deficits which impact ability to perform ADLs (bathing, care of mouth/teeth, toileting, grooming, dressing, etc )  - Assess/evaluate cause of self-care deficits   - Assess range of motion  - Assess patient's mobility; develop plan if impaired  - Assess patient's need for assistive devices and provide as appropriate  - Encourage maximum independence but intervene and supervise when necessary  - Involve family in performance of ADLs  - Assess for home care needs following discharge   - Consider OT consult to assist with ADL evaluation and planning for discharge  - Provide patient education as appropriate  Outcome: Progressing  Goal: Maintains/Returns to pre admission functional level  Description: INTERVENTIONS:  - Perform BMAT or MOVE assessment daily    - Set and communicate daily mobility goal to care team and patient/family/caregiver  - Collaborate with rehabilitation services on mobility goals if consulted  - Perform Range of Motion  times a day  - Reposition patient every  hours    - Dangle patient  times a day  - Stand patient  times a day  - Ambulate patient  times a day  - Out of bed to chair  times a day   - Out of bed for meals  times a day  - Out of bed for toileting  - Record patient progress and toleration of activity level   Outcome: Progressing

## 2022-01-22 NOTE — PLAN OF CARE
Problem: PAIN - ADULT  Goal: Verbalizes/displays adequate comfort level or baseline comfort level  Description: Interventions:  - Encourage patient to monitor pain and request assistance  - Assess pain using appropriate pain scale  - Administer analgesics based on type and severity of pain and evaluate response  - Implement non-pharmacological measures as appropriate and evaluate response  - Consider cultural and social influences on pain and pain management  - Notify physician/advanced practitioner if interventions unsuccessful or patient reports new pain  Outcome: Progressing     Problem: MOBILITY - ADULT  Goal: Maintain or return to baseline ADL function  Description: INTERVENTIONS:  -  Assess patient's ability to carry out ADLs; assess patient's baseline for ADL function and identify physical deficits which impact ability to perform ADLs (bathing, care of mouth/teeth, toileting, grooming, dressing, etc )  - Assess/evaluate cause of self-care deficits   - Assess range of motion  - Assess patient's mobility; develop plan if impaired  - Assess patient's need for assistive devices and provide as appropriate  - Encourage maximum independence but intervene and supervise when necessary  - Involve family in performance of ADLs  - Assess for home care needs following discharge   - Consider OT consult to assist with ADL evaluation and planning for discharge  - Provide patient education as appropriate  Outcome: Progressing

## 2022-01-22 NOTE — PROGRESS NOTES
[address] [facility]  Progress Note - Rubens Banks 1940, 80 y o  male MRN: [id number]  Unit/Bed#: MS Srinivasan-29 Encounter: [id number]  Primary Care Provider: Chris Fernandez MD   Date and time admitted to hospital: 1/15/2022  7:10 PM    * DULCE (acute kidney injury) ([facility] [address] ) on CKD 3  Assessment & Plan  · Pt with h/o bladder cancer and archibald, scheduled for OP chemo  · CT abd pelvis non contrast   ·  Decompressed urinary bladder with Archibald catheter in place  Greg Crawford is now diffuse wall thickening of the urinary bladder with extensive perivesical fat stranding compatible with cystitis  Small volume of nondependent intravesical air may be related to    instrumentation or infection  · No significant change in moderate left and mild right hydroureteronephrosis   There is increased perinephric and periureteric inflammation bilaterally, likely on the basis of infection  · New small bilateral pleural effusions  · Nephrology following  · Urology following  · 1/18 percutaneous nephrostomy tube placement  · Continue to monitor urine output  · Creatinine stable    Atrial fibrillation (HCC)  Assessment & Plan  · Rate controlled  · Currently on heparin drip with plan to bridge to warfarin  · Warfarin 5 mg daily  · INR 1 74  · Will continue to monitor with daily INR  · Cont cardizem home dose  Bladder cancer [facility])  Assessment & Plan  · Has a port and plan was to start chemo starting this Monday  · Urology following  · Has a archibald      Acute on chronic diastolic congestive heart failure ([facility] [address] )  Assessment & Plan  Wt Readings from Last 3 Encounters:   01/15/22 84 7 kg (186 lb 11 7 oz)   01/14/22 82 1 kg (181 lb)   01/06/22 82 1 kg (181 lb)     · Elevated NT pro BNP, b/l worsening pedal edema and SOB, no hypoxia  · Cardiology input appreciated and they suggest that it is chronic diastolic heart failure    The fluid overload is due to obstructive uropathy and not due to acute on chronic diastolic congestive heart failure  · Diuresis as per nephrology        Abnormal urinalysis  Assessment & Plan  · Concern for UTI but patient has a Fortune catheter  · Received 1 dose ceftriaxone in the ED, recent urine culture was negative  · Urine culture with no growth  · Will discontinue ceftriaxone    Pleural effusion on left  Assessment & Plan  · Wet read XR chest, improved with diuresis  · Likely from chronic diastolic heart failure    COPD, severe ([address] )  Assessment & Plan  · Stable currently  No wheezing      Back pain  Assessment & Plan  · Counseled to not take advil at home  · Start PO oxy for now     S/P CABG x 4  Assessment & Plan  · Elevated troponin could be likely from DULCE/CKD vs CHF exac  · Cardiology was consulted  Input appreciated  No active coronary syndrome  · EKG shows no new change compared to previous EKG: RBBB with T inversion V1-2  NSR    CAD (coronary atherosclerotic disease)  Assessment & Plan  no chest pain  continue ASA statin     Hyponatremia-resolved as of 1/21/2022  Assessment & Plan  resolved at this time      Constipation-resolved as of 1/21/2022  Assessment & Plan  · Patient had bowel movement yesterday after receiving Mag citrate  · Resolved        VTE Pharmacologic Prophylaxis: VTE Score: 7 High Risk (Score >/= 5) - Pharmacological DVT Prophylaxis Ordered: warfarin (Coumadin)  Sequential Compression Devices Ordered  Patient Centered Rounds: I performed bedside rounds with nursing staff today  Discussions with Specialists or Other Care Team Provider:  Case management, Nephrology    Education and Discussions with Family / Patient: Updated  (Grandchild) at bedside  Time Spent for Care: 30 minutes  More than 50% of total time spent on counseling and coordination of care as described above      Current Length of Stay: 7 day(s)  Current Patient Status: Inpatient   Certification Statement: The patient will continue to require additional inpatient hospital stay due to Heparin bridge to Coumadin  Discharge Plan: Anticipate discharge in 24-48 hrs to home with home services  Code Status: Level 1 - Full Code    Subjective:   Patient resting comfortably on examination  Patient had no overnight events or complaints on exam     Objective:     Vitals:   Temp (24hrs), Av 2 °F (36 8 °C), Min:97 8 °F (36 6 °C), Max:98 5 °F (36 9 °C)    Temp:  [97 8 °F (36 6 °C)-98 5 °F (36 9 °C)] 97 8 °F (36 6 °C)  HR:  [76-87] 87  Resp:  [17-18] 18  BP: ()/(61-79) 117/79  SpO2:  [95 %-97 %] 97 %  Body mass index is 28 39 kg/m²  Input and Output Summary (last 24 hours): Intake/Output Summary (Last 24 hours) at 2022 1136  Last data filed at 2022 0801  Gross per 24 hour   Intake 1528 69 ml   Output 2540 ml   Net -1011 31 ml       Physical Exam:   Physical Exam  Vitals and nursing note reviewed  Constitutional:       General: He is not in acute distress  Appearance: He is well-developed  HENT:      Head: Normocephalic and atraumatic  Eyes:      General: No scleral icterus  Conjunctiva/sclera: Conjunctivae normal    Cardiovascular:      Rate and Rhythm: Normal rate and regular rhythm  Heart sounds: Normal heart sounds  No murmur heard  No friction rub  No gallop  Pulmonary:      Effort: Pulmonary effort is normal  No respiratory distress  Breath sounds: Normal breath sounds  No wheezing or rales  Abdominal:      General: Bowel sounds are normal  There is no distension  Palpations: Abdomen is soft  Tenderness: There is no abdominal tenderness  Musculoskeletal:         General: Normal range of motion  Skin:     General: Skin is warm  Findings: No rash  Neurological:      Mental Status: He is alert and oriented to person, place, and time            Additional Data:     Labs:  Results from last 7 days   Lab Units 22  0512 22  0927 22  0503   WBC Thousand/uL 5 34   < > 4 92   HEMOGLOBIN g/dL 7 7*   < > 7 1*   HEMATOCRIT % 25 9*   < > 24 7*   PLATELETS Thousands/uL 258   < > 219   NEUTROS PCT %  --   --  68   LYMPHS PCT %  --   --  15   MONOS PCT %  --   --  13*   EOS PCT %  --   --  4    < > = values in this interval not displayed  Results from last 7 days   Lab Units 01/22/22  0512 01/18/22  0452 01/17/22  0608   SODIUM mmol/L 139   < > 140   POTASSIUM mmol/L 3 7   < > 4 1   CHLORIDE mmol/L 105   < > 102   CO2 mmol/L 28   < > 29   BUN mg/dL 16   < > 41*   CREATININE mg/dL 1 59*   < > 5 25*   ANION GAP mmol/L 6   < > 9   CALCIUM mg/dL 8 3   < > 7 9*   ALBUMIN g/dL  --   --  2 4*   TOTAL BILIRUBIN mg/dL  --   --  0 45   ALK PHOS U/L  --   --  68   ALT U/L  --   --  44   AST U/L  --   --  35   GLUCOSE RANDOM mg/dL 120   < > 90    < > = values in this interval not displayed  Results from last 7 days   Lab Units 01/22/22  0512   INR  1 74*                   Lines/Drains:  Invasive Devices  Report    Central Venous Catheter Line            Port A Cath 01/14/22 Right Internal jugular 8 days          Peripheral Intravenous Line            Peripheral IV 01/21/22 Dorsal (posterior); Right Forearm <1 day          Drain            Urethral Catheter Non-latex 16 Fr  24 days    Percutaneous Nephroureteral Tube (PCNU) Left 1 8 5 Fr 26 Cm 3 days    Percutaneous Nephroureteral Tube (PCNU) Right 2 8 5 Fr 24 Cm 3 days              Urinary Catheter:  Goal for removal: N/A - Chronic Fortune         Central Line:  Goal for removal: Will discontinue when peripheral access obtained  Imaging: No pertinent imaging reviewed      Recent Cultures (last 7 days):   Results from last 7 days   Lab Units 01/15/22  2019   URINE CULTURE  >100,000 cfu/ml        Last 24 Hours Medication List:   Current Facility-Administered Medications   Medication Dose Route Frequency Provider Last Rate    aspirin  81 mg Oral Daily Katlyn Ravi MD      atorvastatin  40 mg Oral Daily Katlyn Ravi MD      bisacodyl  10 mg Rectal Daily PRN Eloisa Angelucci, DO      diltiazem  240 mg Oral Daily Gigi Arteaga MD      heparin (porcine)  3-20 Units/kg/hr (Order-Specific) Intravenous Titrated Giulia Jones PA-C Stopped (01/22/22 1115)    HYDROmorphone  0 5 mg Intravenous Q4H PRN Gigi Arteaga MD      oxyCODONE  5 mg Oral Q6H PRN Gigi Arteaga MD      Or   Sweetie Needs oxyCODONE  2 5 mg Oral Q6H PRN Gigi Arteaga MD      pantoprazole  40 mg Oral Early Morning Gigi Arteaga MD      warfarin  5 mg Oral Daily (warfarin) José Michael DO          Today, Patient Was Seen By: José Michael DO    **Please Note: This note may have been constructed using a voice recognition system  **

## 2022-01-23 LAB
ANION GAP SERPL CALCULATED.3IONS-SCNC: 6 MMOL/L (ref 4–13)
APTT PPP: 66 SECONDS (ref 23–37)
APTT PPP: 95 SECONDS (ref 23–37)
BUN SERPL-MCNC: 15 MG/DL (ref 5–25)
CALCIUM SERPL-MCNC: 8.4 MG/DL (ref 8.3–10.1)
CHLORIDE SERPL-SCNC: 103 MMOL/L (ref 100–108)
CO2 SERPL-SCNC: 28 MMOL/L (ref 21–32)
CREAT SERPL-MCNC: 1.42 MG/DL (ref 0.6–1.3)
ERYTHROCYTE [DISTWIDTH] IN BLOOD BY AUTOMATED COUNT: 20.1 % (ref 11.6–15.1)
GFR SERPL CREATININE-BSD FRML MDRD: 45 ML/MIN/1.73SQ M
GLUCOSE SERPL-MCNC: 91 MG/DL (ref 65–140)
HCT VFR BLD AUTO: 26.6 % (ref 36.5–49.3)
HGB BLD-MCNC: 7.8 G/DL (ref 12–17)
INR PPP: 2.01 (ref 0.84–1.19)
MCH RBC QN AUTO: 21.9 PG (ref 26.8–34.3)
MCHC RBC AUTO-ENTMCNC: 29.3 G/DL (ref 31.4–37.4)
MCV RBC AUTO: 75 FL (ref 82–98)
PLATELET # BLD AUTO: 260 THOUSANDS/UL (ref 149–390)
PMV BLD AUTO: 9.8 FL (ref 8.9–12.7)
POTASSIUM SERPL-SCNC: 4 MMOL/L (ref 3.5–5.3)
PROTHROMBIN TIME: 21.8 SECONDS (ref 11.6–14.5)
RBC # BLD AUTO: 3.56 MILLION/UL (ref 3.88–5.62)
SODIUM SERPL-SCNC: 137 MMOL/L (ref 136–145)
WBC # BLD AUTO: 5.28 THOUSAND/UL (ref 4.31–10.16)

## 2022-01-23 PROCEDURE — 80048 BASIC METABOLIC PNL TOTAL CA: CPT | Performed by: INTERNAL MEDICINE

## 2022-01-23 PROCEDURE — 97116 GAIT TRAINING THERAPY: CPT

## 2022-01-23 PROCEDURE — 99232 SBSQ HOSP IP/OBS MODERATE 35: CPT | Performed by: INTERNAL MEDICINE

## 2022-01-23 PROCEDURE — 85610 PROTHROMBIN TIME: CPT | Performed by: INTERNAL MEDICINE

## 2022-01-23 PROCEDURE — 85730 THROMBOPLASTIN TIME PARTIAL: CPT | Performed by: INTERNAL MEDICINE

## 2022-01-23 PROCEDURE — 85027 COMPLETE CBC AUTOMATED: CPT | Performed by: INTERNAL MEDICINE

## 2022-01-23 RX ADMIN — ASPIRIN 81 MG: 81 TABLET, CHEWABLE ORAL at 08:05

## 2022-01-23 RX ADMIN — PANTOPRAZOLE SODIUM 40 MG: 40 TABLET, DELAYED RELEASE ORAL at 06:07

## 2022-01-23 RX ADMIN — HEPARIN SODIUM AND DEXTROSE 5 UNITS/KG/HR: 10000; 5 INJECTION INTRAVENOUS at 08:13

## 2022-01-23 RX ADMIN — ATORVASTATIN CALCIUM 40 MG: 40 TABLET, FILM COATED ORAL at 08:05

## 2022-01-23 RX ADMIN — DILTIAZEM HYDROCHLORIDE 240 MG: 240 CAPSULE, COATED, EXTENDED RELEASE ORAL at 08:04

## 2022-01-23 RX ADMIN — OXYCODONE HYDROCHLORIDE 5 MG: 5 TABLET ORAL at 17:24

## 2022-01-23 RX ADMIN — WARFARIN SODIUM 5 MG: 5 TABLET ORAL at 17:25

## 2022-01-23 NOTE — CASE MANAGEMENT
Case Management Discharge Planning Note    Patient name Antonia Reasons  Location [address] 416/-50 MRN [id number]  : 1940 Date 2022       Current Admission Date: 1/15/2022  Current Admission Diagnosis:DULCE (acute kidney injury) [facility]) on CKD 3   Patient Active Problem List    Diagnosis Date Noted    DULCE (acute kidney injury) ([facility] [address] ) on CKD 3 01/15/2022    Pleural effusion on left 01/15/2022    Abnormal urinalysis 01/15/2022    Fortune catheter in place prior to arrival 2022    Stage 3a chronic kidney disease ([address] ) 2022    Recurrent unilateral inguinal hernia 2021    Left lower quadrant abdominal pain 2021    History of bilateral inguinal hernia repair 2021    Cancer of overlapping sites of bladder ([facility] [address] ) 2021    Overgrown toenails 2021    Atrial fibrillation ([facility] [address] ) 2021    Encounter to discuss test results 10/12/2021    Acute on chronic diastolic congestive heart failure ([address] ) 2020    DDD (degenerative disc disease), lumbar 10/30/2019    Medicare annual wellness visit, subsequent 2019    Tinnitus aurium, bilateral 2019    Sensorineural hearing loss (SNHL) of both ears 2019    Abnormal CT of the chest 2019    COPD, severe ([address] ) 2018    Bronchiectasis with acute lower respiratory infection ([address] ) 2018    Localized edema 10/15/2018    Irregular heart beat     Atrial flutter ([address] ) 10/07/2018    CKD (chronic kidney disease), stage II 2018    Urinary retention due to benign prostatic hyperplasia 2018    Bladder cancer ([address] ) 2018    S/P CABG x 4 2018    Stenosis of left carotid artery 2018    GERD (gastroesophageal reflux disease) 2018    History of stroke 2018    Sciatica of right side 2018    Vision changes 2018    Hyperlipidemia     Centrilobular emphysema ([address] )     Arthritis     Back pain 2017    Acute left-sided low back pain with left-sided sciatica 10/25/2016    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 10/25/2016    CAD (coronary atherosclerotic disease) 2016    Hypertension 2016    Hyperlipemia 2016      LOS (days): 8  Geometric Mean LOS (GMLOS) (days): 3 30  Days to GMLOS:-4 2     OBJECTIVE:  Risk of Unplanned Readmission Score: 25   Bundled Patient Payment:  (The pt is not a Bundle)     Current admission status: Inpatient   [facility]   [facility] [address]  Phone: [phone] Fax: [phone]    Primary Care Provider: Pia Baltazar MD    Primary Insurance: MEDICARE  Secondary Insurance: [facility]    DISCHARGE DETAILS:    Discharge planning discussed with[de-identified] cm spoke w granddaughter Camden Santamaria and she is agreeable to [facility], but feels that STR would be better  she is concerned "legally" if he " at home" she desired for it to be charted that she would like him to go to STR  cm explained that PT rec home  cm spoke w MD and decision for PT to re-eval today per granddaugher concerns   [facility] referrals sent and PT to re-eval                           [facility]  [address] [facility]         Is the patient interested in [facility] at discharge?: Yes  [address] requested[de-identified] [address] [place] [address] [address] Provider[de-identified] PCP  Homebound Criteria Met[de-identified] Requires the Assistance of Another Person for Safe Ambulation or to Leave the Home  Supporting Clincal Findings[de-identified] Limited Endurance

## 2022-01-23 NOTE — PLAN OF CARE
Problem: PHYSICAL THERAPY ADULT  Goal: Performs mobility at highest level of function for planned discharge setting  See evaluation for individualized goals  Description: Treatment/Interventions: Functional transfer training,LE strengthening/ROM,Therapeutic exercise,Endurance training,Patient/family training,Equipment eval/education,Bed mobility,Gait training,Spoke to nursing,Spoke to case management  Equipment Recommended: Brunilda Henderson       See flowsheet documentation for full assessment, interventions and recommendations  Outcome: Progressing  Note: Prognosis: Good  Problem List: Decreased strength,Impaired balance,Decreased mobility,Decreased safety awareness,Pain,Decreased endurance  Assessment: Pt seen for PT treatment session this date with interventions consisting of gait training w/ emphasis on improving pt's ability to ambulate level surfaces x 300' with CGA progressing to supervision (no LOB identified) provided by therapist with RW  Pt agreeable to PT treatment session upon arrival, pt found supine in bed w/ HOB elevated, in no apparent distress, A&O x 4 and responsive  In comparison to previous session, pt with improvements in community distance gait tolerance without LOB observed  Post session: pt seated OOB in recliner, chair alarm engaged, all needs in reach and RN notified of session findings/recommendations  Continue to recommend home with home health rehabilitation at time of d/c in order to maximize pt's functional independence and safety w/ mobility  Pt continues to be functioning below baseline level, and remains limited 2* factors listed above  PT will continue to see pt during current hospitalization in order to address the deficits listed above and provide interventions consistent w/ POC in effort to achieve STGs  Barriers to Discharge: None        PT Discharge Recommendation: Home with home health rehabilitation          See flowsheet documentation for full assessment

## 2022-01-23 NOTE — CASE MANAGEMENT
Case Management Discharge Planning Note    Patient name Munir Layton  Location [address] 416/-66 MRN [id number]  : 1940 Date 2022       Current Admission Date: 1/15/2022  Current Admission Diagnosis:DULCE (acute kidney injury) [facility]) on CKD 3   Patient Active Problem List    Diagnosis Date Noted    DULCE (acute kidney injury) ([facility] [address] ) on CKD 3 01/15/2022    Pleural effusion on left 01/15/2022    Abnormal urinalysis 01/15/2022    Fortune catheter in place prior to arrival 2022    Stage 3a chronic kidney disease ([address] ) 2022    Recurrent unilateral inguinal hernia 2021    Left lower quadrant abdominal pain 2021    History of bilateral inguinal hernia repair 2021    Cancer of overlapping sites of bladder ([facility] [address] ) 2021    Overgrown toenails 2021    Atrial fibrillation ([facility] [address] ) 2021    Encounter to discuss test results 10/12/2021    Acute on chronic diastolic congestive heart failure ([address] ) 2020    DDD (degenerative disc disease), lumbar 10/30/2019    Medicare annual wellness visit, subsequent 2019    Tinnitus aurium, bilateral 2019    Sensorineural hearing loss (SNHL) of both ears 2019    Abnormal CT of the chest 2019    COPD, severe ([address] ) 2018    Bronchiectasis with acute lower respiratory infection ([address] ) 2018    Localized edema 10/15/2018    Irregular heart beat     Atrial flutter ([address] ) 10/07/2018    CKD (chronic kidney disease), stage II 2018    Urinary retention due to benign prostatic hyperplasia 2018    Bladder cancer ([address] ) 2018    S/P CABG x 4 2018    Stenosis of left carotid artery 2018    GERD (gastroesophageal reflux disease) 2018    History of stroke 2018    Sciatica of right side 2018    Vision changes 2018    Hyperlipidemia     Centrilobular emphysema ([address] )     Arthritis     Back pain 2017    Acute left-sided low back pain with left-sided sciatica 10/25/2016    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 10/25/2016    CAD (coronary atherosclerotic disease) 2016    Hypertension 2016    Hyperlipemia 2016      LOS (days): 8  Geometric Mean LOS (GMLOS) (days): 3 30  Days to GMLOS:-4 2     OBJECTIVE:  Risk of Unplanned Readmission Score: 25   Bundled Patient Payment:  (The pt is not a Bundle)     Current admission status: Inpatient   Preferred Pharmacy:   [facility] [address] [facility] [address]  Phone: [phone] Fax: [phone]    Primary Care Provider: Marquez Solis MD    Primary Insurance: MEDICARE  Secondary Insurance: [facility]    DISCHARGE DETAILS:    Discharge planning discussed with[de-identified] cm spoke w granddaughter César Acuna and she is agreeable to [facility], but feels that STR would be better  she is concerned "legally" if he " at home" she desired for it to be charted that she would like him to go to STR  cm explained that PT rec home  cm spoke w MD and decision for PT to re-eval today per granddaugher concerns   [facility] referrals sent and PT to re-eval

## 2022-01-23 NOTE — ASSESSMENT & PLAN NOTE
· Rate controlled  · Warfarin 5 mg daily  · INR 2 01 within goal; repeat INR as outpatient  · Cont cardizem home dose

## 2022-01-23 NOTE — ASSESSMENT & PLAN NOTE
Wt Readings from Last 3 Encounters:   01/15/22 84 7 kg (186 lb 11 7 oz)   01/14/22 82 1 kg (181 lb)   01/06/22 82 1 kg (181 lb)     · Elevated NT pro BNP, b/l worsening pedal edema and SOB, no hypoxia  · Cardiology input appreciated and they suggest that it is chronic diastolic heart failure  The fluid overload is due to obstructive uropathy and not due to acute on chronic diastolic congestive heart failure  · Diuresis as per nephrology  · Currently not on any

## 2022-01-23 NOTE — CASE MANAGEMENT
Case Management Discharge Planning Note    Patient name Vernadine Showers  Location [address] 416/-38 MRN [id number]  : 1940 Date 2022       Current Admission Date: 1/15/2022  Current Admission Diagnosis:DULCE (acute kidney injury) [facility]) on CKD 3   Patient Active Problem List    Diagnosis Date Noted    DULCE (acute kidney injury) ([facility] [address] ) on CKD 3 01/15/2022    Pleural effusion on left 01/15/2022    Abnormal urinalysis 01/15/2022    Fortune catheter in place prior to arrival 2022    Stage 3a chronic kidney disease ([facility] [address] ) 2022    Recurrent unilateral inguinal hernia 2021    Left lower quadrant abdominal pain 2021    History of bilateral inguinal hernia repair 2021    Cancer of overlapping sites of bladder ([facility] [address] ) 2021    Overgrown toenails 2021    Atrial fibrillation ([facility] [address] ) 2021    Encounter to discuss test results 10/12/2021    Acute on chronic diastolic congestive heart failure ([address] ) 2020    DDD (degenerative disc disease), lumbar 10/30/2019    Medicare annual wellness visit, subsequent 2019    Tinnitus aurium, bilateral 2019    Sensorineural hearing loss (SNHL) of both ears 2019    Abnormal CT of the chest 2019    COPD, severe ([address] ) 2018    Bronchiectasis with acute lower respiratory infection ([address] ) 2018    Localized edema 10/15/2018    Irregular heart beat     Atrial flutter ([address] ) 10/07/2018    CKD (chronic kidney disease), stage II 2018    Urinary retention due to benign prostatic hyperplasia 2018    Bladder cancer ([address] ) 2018    S/P CABG x 4 2018    Stenosis of left carotid artery 2018    GERD (gastroesophageal reflux disease) 2018    History of stroke 2018    Sciatica of right side 2018    Vision changes 2018    Hyperlipidemia     Centrilobular emphysema ([address] )     Arthritis     Back pain 2017    Acute left-sided low back pain with left-sided sciatica 10/25/2016    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 10/25/2016    CAD (coronary atherosclerotic disease) 08/13/2016    Hypertension 08/13/2016    Hyperlipemia 08/13/2016      LOS (days): 8  Geometric Mean LOS (GMLOS) (days): 3 30  Days to GMLOS:-4 3     OBJECTIVE:  Risk of Unplanned Readmission Score: 25   Bundled Patient Payment:  (The pt is not a Bundle)     Current admission status: Inpatient   Preferred Pharmacy:   [address] [place] [address] [facility] [address]  Phone: [phone] Fax: [phone]    Primary Care Provider: Katarina Andrade MD    Primary Insurance: MEDICARE  Secondary Insurance: [facility]    DISCHARGE DETAILS:    Discharge planning discussed with[de-identified] Valarie Whiting accepting  PT re-eval today cont to recommend home PT  cm spoke w grand daughter again who is agreeable to pt going home w [facility]  lisa spoke w her about private pay aides--but she stated she could not afford   cm provided her w resources for department of aging to see if pt will qualify for any services                           Isadora Name[de-identified] [address]

## 2022-01-23 NOTE — PROGRESS NOTES
[facility]  Progress Note - Parth Serrano 1940, 80 y o  male MRN: [id number]  Unit/Bed#: MS [id number] Encounter: [id number]  Primary Care Provider: Ibrahima Blum MD   Date and time admitted to hospital: 1/15/2022  7:10 PM    * DULCE (acute kidney injury) ([facility] [address] ) on CKD 3  Assessment & Plan  · Pt with h/o bladder cancer and archibald, scheduled for OP chemo  · CT abd pelvis non contrast   ·  Decompressed urinary bladder with Archibald catheter in place  Moisés Payne is now diffuse wall thickening of the urinary bladder with extensive perivesical fat stranding compatible with cystitis  Small volume of nondependent intravesical air may be related to    instrumentation or infection  · No significant change in moderate left and mild right hydroureteronephrosis   There is increased perinephric and periureteric inflammation bilaterally, likely on the basis of infection  · New small bilateral pleural effusions  · Nephrology following  · Urology following  · 1/18 percutaneous nephrostomy tube placement  · Continue to monitor urine output  · Creatinine stable at 1 42 today  Continues to trend down  Atrial fibrillation (HCC)  Assessment & Plan  · Rate controlled  · Currently on heparin drip with plan to bridge to warfarin  · Warfarin 5 mg daily  · INR 2 01  · Will continue to monitor with daily INR  · Cont cardizem home dose  Bladder cancer [facility])  Assessment & Plan  · Has a port and plan was to start chemo starting this Monday  · Urology following  · Has a archibald      Acute on chronic diastolic congestive heart failure ([facility] [address] )  Assessment & Plan  Wt Readings from Last 3 Encounters:   01/15/22 84 7 kg (186 lb 11 7 oz)   01/14/22 82 1 kg (181 lb)   01/06/22 82 1 kg (181 lb)     · Elevated NT pro BNP, b/l worsening pedal edema and SOB, no hypoxia  · Cardiology input appreciated and they suggest that it is chronic diastolic heart failure    The fluid overload is due to obstructive uropathy and not due to acute on chronic diastolic congestive heart failure  · Diuresis as per nephrology  · Currently not on any  Abnormal urinalysis  Assessment & Plan  · Concern for UTI but patient has a Fortune catheter  · Received 1 dose ceftriaxone in the ED, recent urine culture was negative  · Urine culture with no growth  · IV Ceftriaxone discontinued  Pleural effusion on left  Assessment & Plan  · Wet read XR chest, improved with diuresis  · Likely from chronic diastolic heart failure    Back pain  Assessment & Plan  · Counseled to not take advil at home  · Start PO oxy for now     S/P CABG x 4  Assessment & Plan  · Elevated troponin could be likely from DULCE/CKD vs CHF exac  · Cardiology was consulted  Input appreciated  No active coronary syndrome  · EKG shows no new change compared to previous EKG: RBBB with T inversion V1-2  NSR    VTE Pharmacologic Prophylaxis: VTE Score: 7 High Risk (Score >/= 5) - Pharmacological DVT Prophylaxis Ordered: warfarin (Coumadin)  Sequential Compression Devices Ordered  Patient Centered Rounds: I performed bedside rounds with nursing staff today  Discussions with Specialists or Other Care Team Provider:  Case management    Education and Discussions with Family / Patient: Updated  (Granddaughter) via phone  Time Spent for Care: 30 minutes  More than 50% of total time spent on counseling and coordination of care as described above  Current Length of Stay: 8 day(s)  Current Patient Status: Inpatient   Certification Statement: The patient will continue to require additional inpatient hospital stay due to Medical management  Discharge Plan: Anticipate discharge tomorrow to home  Code Status: Level 1 - Full Code    Subjective:   Patient resting comfortably on examination    Patient had no overnight events or complaints on exam     Objective:     Vitals:   Temp (24hrs), Av °F (36 7 °C), Min:97 8 °F (36 6 °C), Max:98 2 °F (36 8 °C)    Temp:  [97 8 °F (36 6 °C)-98 2 °F (36 8 °C)] 98 1 °F (36 7 °C)  HR:  [61-96] 96  Resp:  [15-16] 16  BP: (119-152)/(57-82) 152/82  SpO2:  [92 %-98 %] 98 %  Body mass index is 28 39 kg/m²  Input and Output Summary (last 24 hours): Intake/Output Summary (Last 24 hours) at 1/23/2022 1215  Last data filed at 1/23/2022 0601  Gross per 24 hour   Intake 600 ml   Output 2475 ml   Net -1875 ml       Physical Exam:   Physical Exam  Vitals and nursing note reviewed  Constitutional:       General: He is not in acute distress  Appearance: He is well-developed  HENT:      Head: Normocephalic and atraumatic  Eyes:      General: No scleral icterus  Conjunctiva/sclera: Conjunctivae normal    Cardiovascular:      Rate and Rhythm: Normal rate and regular rhythm  Heart sounds: Normal heart sounds  No murmur heard  No friction rub  No gallop  Pulmonary:      Effort: Pulmonary effort is normal  No respiratory distress  Breath sounds: Normal breath sounds  No wheezing or rales  Abdominal:      General: Bowel sounds are normal  There is no distension  Palpations: Abdomen is soft  Tenderness: There is no abdominal tenderness  Musculoskeletal:         General: Normal range of motion  Comments: Bilateral nephrostomy tubes in place   Skin:     General: Skin is warm  Findings: No rash  Neurological:      Mental Status: He is alert and oriented to person, place, and time  Additional Data:     Labs:  Results from last 7 days   Lab Units 01/23/22  0458 01/20/22  0927 01/20/22  0503   WBC Thousand/uL 5 28   < > 4 92   HEMOGLOBIN g/dL 7 8*   < > 7 1*   HEMATOCRIT % 26 6*   < > 24 7*   PLATELETS Thousands/uL 260   < > 219   NEUTROS PCT %  --   --  68   LYMPHS PCT %  --   --  15   MONOS PCT %  --   --  13*   EOS PCT %  --   --  4    < > = values in this interval not displayed       Results from last 7 days   Lab Units 01/23/22 0458 01/18/22  0452 01/17/22  0608   SODIUM mmol/L 137   < > 140   POTASSIUM mmol/L 4 0 < > 4 1   CHLORIDE mmol/L 103   < > 102   CO2 mmol/L 28   < > 29   BUN mg/dL 15   < > 41*   CREATININE mg/dL 1 42*   < > 5 25*   ANION GAP mmol/L 6   < > 9   CALCIUM mg/dL 8 4   < > 7 9*   ALBUMIN g/dL  --   --  2 4*   TOTAL BILIRUBIN mg/dL  --   --  0 45   ALK PHOS U/L  --   --  68   ALT U/L  --   --  44   AST U/L  --   --  35   GLUCOSE RANDOM mg/dL 91   < > 90    < > = values in this interval not displayed  Results from last 7 days   Lab Units 01/23/22  0913   INR  2 01*                   Lines/Drains:  Invasive Devices  Report    Central Venous Catheter Line            Port A Cath 01/14/22 Right Internal jugular 9 days          Peripheral Intravenous Line            Peripheral IV 01/21/22 Dorsal (posterior); Right Forearm 1 day          Drain            Urethral Catheter Non-latex 16 Fr  25 days    Percutaneous Nephroureteral Tube (PCNU) Left 1 8 5 Fr 26 Cm 4 days    Percutaneous Nephroureteral Tube (PCNU) Right 2 8 5 Fr 24 Cm 4 days              Urinary Catheter:  Goal for removal: N/A - Chronic Fortune         Central Line:  Goal for removal: Will discontinue when peripheral access obtained  Imaging: No pertinent imaging reviewed      Recent Cultures (last 7 days):         Last 24 Hours Medication List:   Current Facility-Administered Medications   Medication Dose Route Frequency Provider Last Rate    aspirin  81 mg Oral Daily Doris Guillaume MD      atorvastatin  40 mg Oral Daily oDris Guillaume MD      bisacodyl  10 mg Rectal Daily PRN Dulce Plascencia DO      diltiazem  240 mg Oral Daily Doris Guillaume MD      HYDROmorphone  0 5 mg Intravenous Q4H PRN Doris Guillaume MD      oxyCODONE  5 mg Oral Q6H PRN Doris Guillaume MD      Or   Brianda Starr oxyCODONE  2 5 mg Oral Q6H PRN Doris Guillaume MD      pantoprazole  40 mg Oral Early Morning Doris Guillaume MD      warfarin  5 mg Oral Daily (warfarin) Dulce Plascencia DO          Today, Patient Was Seen By: Dulce Plascencia DO    **Please Note: This note may have been constructed using a voice recognition system  **

## 2022-01-23 NOTE — ASSESSMENT & PLAN NOTE
· Concern for UTI but patient has a Fortune catheter  · Received 1 dose ceftriaxone in the ED, recent urine culture was negative  · Urine culture with no growth  · IV Ceftriaxone discontinued

## 2022-01-23 NOTE — ASSESSMENT & PLAN NOTE
· Rate controlled  · Currently on heparin drip with plan to bridge to warfarin  · Warfarin 5 mg daily  · INR 2 01  · Will continue to monitor with daily INR  · Cont cardizem home dose

## 2022-01-23 NOTE — PHYSICAL THERAPY NOTE
Physical Therapy Treatment Note       01/23/22 1106   PT Last Visit   PT Visit Date 01/23/22   Note Type   Note Type Treatment   Pain Assessment   Pain Assessment Tool FLACC   Pain Score No Pain   Pain Rating: FLACC (Rest) - Face 0   Pain Rating: FLACC (Rest) - Legs 0   Pain Rating: FLACC (Rest) - Activity 0   Pain Rating: FLACC (Rest) - Cry 0   Pain Rating: FLACC (Rest) - Consolability 0   Score: FLACC (Rest) 0   Pain Rating: FLACC (Activity) - Face 1   Pain Rating: FLACC (Activity) - Legs 0   Pain Rating: FLACC (Activity) - Activity 0   Pain Rating: FLACC (Activity) - Cry 1   Pain Rating: FLACC (Activity) - Consolability 0   Score: FLACC (Activity) 2   Restrictions/Precautions   Weight Bearing Precautions Per Order No   Braces or Orthoses Other (Comment)  (none at baseline)   Other Precautions Bed Alarm; Chair Alarm; Impulsive; Fall Risk   General   Chart Reviewed Yes   Response to Previous Treatment Patient with no complaints from previous session  Family/Caregiver Present No   Cognition   Overall Cognitive Status WFL   Arousal/Participation Alert; Responsive   Attention Within functional limits   Orientation Level Oriented X4   Memory Within functional limits   Following Commands Follows all commands and directions without difficulty   Comments patient agreeable to PT treatment   Subjective   Subjective "My granddaughter is coming later to take me for a walk"   Bed Mobility   Supine to Sit 4  Minimal assistance  (CGA)   Additional items Assist x 1; Increased time required;Verbal cues;HOB elevated   Transfers   Sit to Stand 6  Modified independent   Stand to Sit 5  Supervision   Additional items Assist x 1; Increased time required;Verbal cues;Armrests   Ambulation/Elevation   Gait pattern Inconsistent johnathan; [place]oward flexed   Gait Assistance 5  Supervision  (CGA progressing to supervision; no LOB identified)   Additional items Assist x 1;Verbal cues   Assistive Device Rolling walker  (patient reports "I don't need this")   Distance 300'   Stair Management Assistance Not tested  (patient does not need to navigate stairs at home)   Balance   Static Sitting Good   Dynamic Sitting Fair +   Static Standing Fair +   Dynamic Standing Fair   Ambulatory Fair   Endurance Deficit   Endurance Deficit Yes   Activity Tolerance   Activity Tolerance Patient limited by fatigue   Medical Staff Made Aware CM Dolores Holland made aware of session outcomes   Nurse Made Aware VIVIAN Gaming made aware of session outcomes   Assessment   Prognosis Good   Problem List Decreased strength; Impaired balance;Decreased mobility; Decreased safety awareness;Pain;Decreased endurance   Assessment Pt seen for PT treatment session this date with interventions consisting of gait training w/ emphasis on improving pt's ability to ambulate level surfaces x 300' with CGA progressing to supervision (no LOB identified) provided by therapist with RW  Pt agreeable to PT treatment session upon arrival, pt found supine in bed w/ HOB elevated, in no apparent distress, A&O x 4 and responsive  In comparison to previous session, pt with improvements in community distance gait tolerance without LOB observed  Post session: pt seated OOB in recliner, chair alarm engaged, all needs in reach and RN notified of session findings/recommendations  Continue to recommend home with home health rehabilitation at time of d/c in order to maximize pt's functional independence and safety w/ mobility  Pt continues to be functioning below baseline level, and remains limited 2* factors listed above  PT will continue to see pt during current hospitalization in order to address the deficits listed above and provide interventions consistent w/ POC in effort to achieve STGs  Barriers to Discharge None   Goals   Patient Goals to return home with home nursing   STG Expiration Date 01/31/22   PT Treatment Day 1   Plan   Treatment/Interventions Functional transfer training;LE strengthening/ROM; Therapeutic exercise; Endurance training;Patient/family training;Equipment eval/education; Bed mobility;Gait training;Spoke to nursing;Spoke to case management   Progress Progressing toward goals   PT Frequency 3-5x/wk   Recommendation   PT Discharge Recommendation Home with home health rehabilitation   Equipment Recommended [place] walker   Change/add to Funzio? No   Additional Comments The patient's AM-PAC Basic Mobility Inpatient Short Form Raw Score is 18  A Raw score of greater than 16 suggests the patient may benefit from discharge to home  Please also refer to the recommendation of the Physical Therapist for safe discharge planning  AM-PAC Basic Mobility Inpatient   Turning in Bed Without Bedrails 3   Lying on Back to Sitting on Edge of Flat Bed 3   Moving Bed to Chair 3   Standing Up From Chair 3   Walk in Room 3   Climb 3-5 Stairs 3   Basic Mobility Inpatient Raw Score 18   Basic Mobility Standardized Score 41 05   Highest Level Of Mobility   JH-HLM Goal 6: Walk 10 steps or more   JH-HLM Highest Level of Mobility 8: Walk 250 feet ot more   JH-HLM Goal Achieved Yes   Education   Education Provided Mobility training;Assistive device   Patient Demonstrates verbal understanding;Reinforcement needed   End of Consult   Patient Position at End of Consult Bedside chair;Bed/Chair alarm activated; All needs within reach             Eddye Elsa, PT, DPT    Time of PT treatment session: 0544-5361 (total treatment time = 22 minutes)

## 2022-01-23 NOTE — ASSESSMENT & PLAN NOTE
· Pt with h/o bladder cancer and archibald, scheduled for OP chemo  · CT abd pelvis non contrast   ·  Decompressed urinary bladder with Archibald catheter in place   There is now diffuse wall thickening of the urinary bladder with extensive perivesical fat stranding compatible with cystitis  Small volume of nondependent intravesical air may be related to    instrumentation or infection  · No significant change in moderate left and mild right hydroureteronephrosis   There is increased perinephric and periureteric inflammation bilaterally, likely on the basis of infection  · New small bilateral pleural effusions     · Outpatient follow-up Urology  · Outpatient follow-up with oncology  · Outpatient follow-up with Interventional Radiology  · 1/18 percutaneous nephrostomy tube placement  · Creatinine stable

## 2022-01-23 NOTE — ASSESSMENT & PLAN NOTE
· Pt with h/o bladder cancer and archibald, scheduled for OP chemo  · CT abd pelvis non contrast   ·  Decompressed urinary bladder with Archibald catheter in place   There is now diffuse wall thickening of the urinary bladder with extensive perivesical fat stranding compatible with cystitis  Small volume of nondependent intravesical air may be related to    instrumentation or infection  · No significant change in moderate left and mild right hydroureteronephrosis   There is increased perinephric and periureteric inflammation bilaterally, likely on the basis of infection  · New small bilateral pleural effusions  · Nephrology following  · Urology following  · 1/18 percutaneous nephrostomy tube placement  · Continue to monitor urine output  · Creatinine stable at 1 42 today  Continues to trend down

## 2022-01-23 NOTE — ASSESSMENT & PLAN NOTE
· Concern for UTI but patient has a Fortune catheter  · Received 1 dose ceftriaxone in the ED, recent urine culture was negative  · Urine culture with no growth

## 2022-01-24 VITALS
BODY MASS INDEX: 28.3 KG/M2 | WEIGHT: 186.73 LBS | RESPIRATION RATE: 18 BRPM | SYSTOLIC BLOOD PRESSURE: 145 MMHG | HEIGHT: 68 IN | HEART RATE: 85 BPM | TEMPERATURE: 98.1 F | OXYGEN SATURATION: 96 % | DIASTOLIC BLOOD PRESSURE: 76 MMHG

## 2022-01-24 PROCEDURE — 99239 HOSP IP/OBS DSCHRG MGMT >30: CPT | Performed by: INTERNAL MEDICINE

## 2022-01-24 RX ORDER — OXYCODONE HYDROCHLORIDE 5 MG/1
5 TABLET ORAL EVERY 6 HOURS PRN
Qty: 15 TABLET | Refills: 0 | Status: CANCELLED | OUTPATIENT
Start: 2022-01-24 | End: 2022-02-03

## 2022-01-24 RX ORDER — WARFARIN SODIUM 5 MG/1
TABLET ORAL
Qty: 30 TABLET | Refills: 0 | Status: SHIPPED | OUTPATIENT
Start: 2022-01-24 | End: 2022-02-28 | Stop reason: HOSPADM

## 2022-01-24 RX ORDER — OXYCODONE HYDROCHLORIDE 5 MG/1
5 TABLET ORAL EVERY 6 HOURS PRN
Qty: 15 TABLET | Refills: 0 | Status: SHIPPED | OUTPATIENT
Start: 2022-01-24 | End: 2022-01-24

## 2022-01-24 RX ORDER — OXYCODONE HYDROCHLORIDE 5 MG/1
5 TABLET ORAL EVERY 6 HOURS PRN
Qty: 15 TABLET | Refills: 0 | Status: SHIPPED | OUTPATIENT
Start: 2022-01-24 | End: 2022-02-03

## 2022-01-24 RX ADMIN — PANTOPRAZOLE SODIUM 40 MG: 40 TABLET, DELAYED RELEASE ORAL at 05:30

## 2022-01-24 RX ADMIN — ATORVASTATIN CALCIUM 40 MG: 40 TABLET, FILM COATED ORAL at 08:43

## 2022-01-24 RX ADMIN — OXYCODONE HYDROCHLORIDE 5 MG: 5 TABLET ORAL at 08:43

## 2022-01-24 RX ADMIN — ASPIRIN 81 MG: 81 TABLET, CHEWABLE ORAL at 08:43

## 2022-01-24 RX ADMIN — DILTIAZEM HYDROCHLORIDE 240 MG: 240 CAPSULE, COATED, EXTENDED RELEASE ORAL at 08:43

## 2022-01-24 RX ADMIN — OXYCODONE HYDROCHLORIDE 5 MG: 5 TABLET ORAL at 14:22

## 2022-01-24 NOTE — PLAN OF CARE
Problem: Potential for Falls  Goal: Patient will remain free of falls  Description: INTERVENTIONS:  - Educate patient/family on patient safety including physical limitations  - Instruct patient to call for assistance with activity   - Consult OT/PT to assist with strengthening/mobility   - Keep Call bell within reach  - Keep bed low and locked with side rails adjusted as appropriate  - Keep care items and personal belongings within reach  - Initiate and maintain comfort rounds  - Make Fall Risk Sign visible to staff  - Offer Toileting every  Hours, in advance of need  - Initiate/Maintain alarm  - Obtain necessary fall risk management equipment:   - Apply yellow socks and bracelet for high fall risk patients  - Consider moving patient to room near nurses station  Outcome: Progressing     Problem: PAIN - ADULT  Goal: Verbalizes/displays adequate comfort level or baseline comfort level  Description: Interventions:  - Encourage patient to monitor pain and request assistance  - Assess pain using appropriate pain scale  - Administer analgesics based on type and severity of pain and evaluate response  - Implement non-pharmacological measures as appropriate and evaluate response  - Consider cultural and social influences on pain and pain management  - Notify physician/advanced practitioner if interventions unsuccessful or patient reports new pain  Outcome: Progressing     Problem: INFECTION - ADULT  Goal: Absence or prevention of progression during hospitalization  Description: INTERVENTIONS:  - Assess and monitor for signs and symptoms of infection  - Monitor lab/diagnostic results  - Monitor all insertion sites, i e  indwelling lines, tubes, and drains  - Monitor endotracheal if appropriate and nasal secretions for changes in amount and color  - [place] appropriate cooling/warming therapies per order  - Administer medications as ordered  - Instruct and encourage patient and family to use good hand hygiene technique  - Identify and instruct in appropriate isolation precautions for identified infection/condition  Outcome: Progressing  Goal: Absence of fever/infection during neutropenic period  Description: INTERVENTIONS:  - Monitor WBC    Outcome: Progressing     Problem: SAFETY ADULT  Goal: Patient will remain free of falls  Description: INTERVENTIONS:  - Educate patient/family on patient safety including physical limitations  - Instruct patient to call for assistance with activity   - Consult OT/PT to assist with strengthening/mobility   - Keep Call bell within reach  - Keep bed low and locked with side rails adjusted as appropriate  - Keep care items and personal belongings within reach  - Initiate and maintain comfort rounds  - Make Fall Risk Sign visible to staff  - Offer Toileting every  Hours, in advance of need  - Initiate/Maintain alarm  - Obtain necessary fall risk management equipment:   - Apply yellow socks and bracelet for high fall risk patients  - Consider moving patient to room near nurses station  Outcome: Progressing  Goal: Maintain or return to baseline ADL function  Description: INTERVENTIONS:  -  Assess patient's ability to carry out ADLs; assess patient's baseline for ADL function and identify physical deficits which impact ability to perform ADLs (bathing, care of mouth/teeth, toileting, grooming, dressing, etc )  - Assess/evaluate cause of self-care deficits   - Assess range of motion  - Assess patient's mobility; develop plan if impaired  - Assess patient's need for assistive devices and provide as appropriate  - Encourage maximum independence but intervene and supervise when necessary  - Involve family in performance of ADLs  - Assess for home care needs following discharge   - Consider OT consult to assist with ADL evaluation and planning for discharge  - Provide patient education as appropriate  Outcome: Progressing  Goal: Maintains/Returns to pre admission functional level  Description: INTERVENTIONS:  - Perform BMAT or MOVE assessment daily    - Set and communicate daily mobility goal to care team and patient/family/caregiver  - Collaborate with rehabilitation services on mobility goals if consulted  - Perform Range of Motion  times a day  - Reposition patient ever hours    - Dangle patient  times a day  - Stand patient  times a day  - Ambulate patient  times a day  - Out of bed to chair  times a day   - Out of bed for meals  times a day  - Out of bed for toileting  - Record patient progress and toleration of activity level   Outcome: Progressing     Problem: Prexisting or High Potential for Compromised Skin Integrity  Goal: Skin integrity is maintained or improved  Description: INTERVENTIONS:  - Identify patients at risk for skin breakdown  - Assess and monitor skin integrity  - Assess and monitor nutrition and hydration status  - Monitor labs   - Assess for incontinence   - Turn and reposition patient  - Assist with mobility/ambulation  - Relieve pressure over bony prominences  - Avoid friction and shearing  - Provide appropriate hygiene as needed including keeping skin clean and dry  - Evaluate need for skin moisturizer/barrier cream  - Collaborate with interdisciplinary team   - Patient/family teaching  - Consider wound care consult   Outcome: Progressing     Problem: MOBILITY - ADULT  Goal: Maintain or return to baseline ADL function  Description: INTERVENTIONS:  -  Assess patient's ability to carry out ADLs; assess patient's baseline for ADL function and identify physical deficits which impact ability to perform ADLs (bathing, care of mouth/teeth, toileting, grooming, dressing, etc )  - Assess/evaluate cause of self-care deficits   - Assess range of motion  - Assess patient's mobility; develop plan if impaired  - Assess patient's need for assistive devices and provide as appropriate  - Encourage maximum independence but intervene and supervise when necessary  - Involve family in performance of ADLs  - Assess for home care needs following discharge   - Consider OT consult to assist with ADL evaluation and planning for discharge  - Provide patient education as appropriate  Outcome: Progressing  Goal: Maintains/Returns to pre admission functional level  Description: INTERVENTIONS:  - Perform BMAT or MOVE assessment daily    - Set and communicate daily mobility goal to care team and patient/family/caregiver  - Collaborate with rehabilitation services on mobility goals if consulted  - Perform Range of Motion times a day  - Reposition patient every  hours    - Dangle patient  times a day  - Stand patient  times a day  - Ambulate patient  times a day  - Out of bed to chair  times a day   - Out of bed for meals  times a day  - Out of bed for toileting  - Record patient progress and toleration of activity level   Outcome: Progressing

## 2022-01-24 NOTE — DISCHARGE INSTRUCTIONS
Follow-up with Urology, Interventional Radiology, and primary oncologist  Follow-up with primary care provider  Medications sent to pharmacy    Nephrostomy Tube Care     WHAT YOU NEED TO KNOW:   A nephrostomy tube is a catheter (thin plastic tube) that is inserted through your skin and into your kidney  The nephrostomy tube drains urine from your kidney into a collecting bag outside your body  You may need a nephrostomy tube when something is blocking the normal flow of urine  A nephrostomy tube may be used for a short or a long period of time  The nephrostomy tube comes out of your back, so you will need someone to help care for your nephrostomy tube  DISCHARGE INSTRUCTIONS:      How to clean the skin around the nephrostomy tube and change the bandage:  Since the nephrostomy tube comes out of your back, you will not be able to care for it by yourself  Ask someone to follow the general directions below to check and care for your nephrostomy tube  Gather the items you will need  Disposable (single use) under-pad, and a clean washcloth  ¨ Plain soap, warm water, and new medical gloves  ¨ Sterile gauze bandages  ¨ Clear adhesive dressing or medical tape  ¨ Skin barrier  ¨ Protective skin film  ¨ Trash bag  · Remove the old bandage, and check the tube entry site  ¨ Have the patient lie on his side with the nephrostomy tube entry site facing up  Place the under-pad where it will catch drainage as you are working with the nephrostomy tube  ¨ Wash your hands with soap and water  Put on new medical gloves  ¨ Gently remove the old bandage, without pulling on the tube  Do this by holding the skin beside the tube with one hand  With the other hand, gently remove sticky tape and the skin barrier by pulling in the same direction as hair growth  Do not touch the side of the bandage that is placed over or around the tube  Throw the bandage and skin barrier away in a trash bag    ¨ Look for signs of infection, such as skin redness and swelling  Report any skin changes to healthcare providers  ¨ Clean the tube entry site  ¨ Hold the tube in place to keep it from being pulled out while you are cleaning around it  ¨ You will need to clean the area twice  For the first cleaning, wet a new gauze bandage with soap and water  Begin at the entry site of the tube  Wipe the skin in circles, moving away from the entry site  Remove blood and any other material with the gauze  Do this as often as needed  Use a new gauze bandage each time you clean the area, moving away from the entry site  ¨ For the second cleaning, wet a new gauze bandage with water  Begin at the entry site of the tube  Wipe the skin in circles, moving away from the entry site  Use a new gauze bandage each time you clean the area, moving away from the entry site  ¨ Gently pat the skin with a clean washcloth to dry it  · Apply the skin barrier and bandages  ¨ Roll up a bandage to make it thick, and place it under  the place where the tube enters the skin  Place it to support the tube, and stop it from kinking or bending  Tape the bandage in place, and apply more bandages if directed by a healthcare provider  ¨ Bring the tubing forward to the front and tape it to the skin  Do not stretch the tube tight, because this may pull the nephrostomy tube out  How often to change the bandage  Change the bandage around the tube, every other day  If your bandages  get dirty or wet, change them right away, and as often as needed  If your nephrostomy tube is to be used for a long period of time, the tube needs to be changed every 2 to 3 months  Healthcare providers will tell you when you need to make an appointment to have your tube changed  How to care for the urine drainage bag:   · Ask if you need to measure and write down how much urine is in the bag before you empty it  Drain urine out of the drainage bag when it is ½ to ? full   Open the spout at the bottom of the bag to empty the urine into the toilet  · You may need to detach the drainage bag from the nephrostomy tube to change it    If so, attach a new drainage bag tightly to the nephrostomy tube  ·   How to prevent problems with your nephrostomy tube:   · Change bandages, directed  This helps to prevent infection  Throw away or clean your drainage bag as directed by your healthcare provider  · Wipe the connecting ends of the drainage bag with alcohol before you reconnect the bag to the tube  This helps prevent infection  Keep the tube taped to your skin and connected to a drainage bag placed below the level of your kidneys  This helps prevent urine from backing up into your kidneys  You may wear a small drainage bag strapped to your leg to let you move around more easily  · Check the catheter to be sure it is in place after you change your clothes or do other activities  Do not wear tight clothing over the tube  Place the tubing over your thigh rather than under it when you are sitting down  Be sure that nothing is pulling on the nephrostomy tube when you move around  · Change positions if you see little or no urine in your drainage bag  Check to see if the urine tube is twisted or bent  Be sure that you are not sitting or lying on the tube  If there are no kinks and there is little or no urine in the drainage bag, tell your healthcare provider  · Flush out the tube as directed  Some tubes get flushed one time a day with 10 mls of NSS You will be given a prescription for the flushes  To flush the nephrostomy tube, clean both connections with alcohol swap  Twist off the drainage bag tube and twist the saline syringe into the nephrostomy tube and flush briskly  Remove the syringe and twist the drainage bag tube back into the nephrostomy tube  · Keep the site covered while you shower  Tape a piece of clear adhesive plastic over the dressing to keep it dry while you shower   Do not take tub baths  Contact Interventional Radiology at [phone] Abdiel PATIENTS: Contact Interventional Radiology at [phone]) Chrystal Martinez PATIENTS: Contact Interventional Radiology at [phone]) if:  · The skin around the nephrostomy tube is red, swollen, itches, or has a rash  · You have a fever greater than 101 or chills  · You have lower back or hip pain  · There are changes in how your urine looks or smells  · You have little or no urine draining from the nephrostomy tube  · You have nausea and are vomiting  · The black chris on your tube has moved, or the tube is longer than when it was put in    · You have questions or concerns about your condition or care  · The nephrostomy tube comes out completely  · There is blood, pus, or a bad smell coming from the place where the tube enters your skin  · Urine is leaking around the tube  The following pharmacies carry the flush syringes  [facility]                     [facility]       [address]  Phone [phone]            Phone [phone]  [address] & [facility]                      [address]                                 [phone]  Phone [phone]            Phone [phone]    [facility]                                                                         [facility] [phone]  [address]   Phone [phone]

## 2022-01-24 NOTE — CASE MANAGEMENT
Case Management Discharge Planning Note    Patient name Lissa Goldsmith  Location [address] [id number] MRN [id number]  : 1940 Date 2022       Current Admission Date: 1/15/2022  Current Admission Diagnosis:DULCE (acute kidney injury) [facility]) on CKD 3   Patient Active Problem List    Diagnosis Date Noted    DULCE (acute kidney injury) ([facility] ) on CKD 3 01/15/2022    Pleural effusion on left 01/15/2022    Abnormal urinalysis 01/15/2022    Fortune catheter in place prior to arrival 2022    Stage 3a chronic kidney disease ([facility] ) 2022    Recurrent unilateral inguinal hernia 2021    Left lower quadrant abdominal pain 2021    History of bilateral inguinal hernia repair 2021    Cancer of overlapping sites of bladder ([facility] ) 2021    Overgrown toenails 2021    Atrial fibrillation ([facility] ) 2021    Encounter to discuss test results 10/12/2021    Acute on chronic diastolic congestive heart failure ([address] ) 2020    DDD (degenerative disc disease), lumbar 10/30/2019    Medicare annual wellness visit, subsequent 2019    Tinnitus aurium, bilateral 2019    Sensorineural hearing loss (SNHL) of both ears 2019    Abnormal CT of the chest 2019    COPD, severe ([address] ) 2018    Bronchiectasis with acute lower respiratory infection ([address] ) 2018    Localized edema 10/15/2018    Irregular heart beat     Atrial flutter ([address] ) 10/07/2018    CKD (chronic kidney disease), stage II 2018    Urinary retention due to benign prostatic hyperplasia 2018    Bladder cancer ([address] ) 2018    S/P CABG x 4 2018    Stenosis of left carotid artery 2018    GERD (gastroesophageal reflux disease) 2018    History of stroke 2018    Sciatica of right side 2018    Vision changes 2018    Hyperlipidemia     Centrilobular emphysema ([address] )     Arthritis     Back pain 2017    Acute left-sided low back pain with left-sided sciatica 10/25/2016    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 10/25/2016    CAD (coronary atherosclerotic disease) 08/13/2016    Hypertension 08/13/2016    Hyperlipemia 08/13/2016      LOS (days): 9  Geometric Mean LOS (GMLOS) (days): 3 30  Days to GMLOS:-5 4     OBJECTIVE:  Risk of Unplanned Readmission Score: 25   Bundled Patient Payment:  (The pt is not a Bundle)     Current admission status: Inpatient   Preferred Pharmacy:   [facility] [place] [address] [facility] [address]  Phone: [phone] Fax: [phone]    Primary Care Provider: Israel Tyler MD    Primary Insurance: MEDICARE  Secondary Insurance: [facility]    DISCHARGE DETAILS:      [address] Needed[de-identified] Evaluate Functional Status and Safety,Gait/ADL Training,Strengthening/Theraputic Exercises to Improve Function         Other Referral/Resources/Interventions Provided:  Interventions: [facility]  Referral Comments: The pt has been accepted with [facility]  CM was informed the pt is medically stable to dc  CM called the pt dtr, Jeff Lynch [phone] and she states that she will come to  the pt and transport him home    Cm updated the treatment team

## 2022-01-24 NOTE — DISCHARGE SUMMARY
[address] [facility]  Discharge- Rehana Fletcher 1940, 80 y o  male MRN: [id number]  Unit/Bed#: -68 Encounter: [id number]  Primary Care Provider: Joana Berman MD   Date and time admitted to hospital: 1/15/2022  7:10 PM    * [facility]) [facility] ) on CKD 3  Assessment & Plan  · Pt with h/o bladder cancer and archibald, scheduled for OP chemo  · CT abd pelvis non contrast   ·  Decompressed urinary bladder with Archibald catheter in place  Adore Rivas is now diffuse wall thickening of the urinary bladder with extensive perivesical fat stranding compatible with cystitis  Small volume of nondependent intravesical air may be related to    instrumentation or infection  · No significant change in moderate left and mild right hydroureteronephrosis   There is increased perinephric and periureteric inflammation bilaterally, likely on the basis of infection  · New small bilateral pleural effusions  · Outpatient follow-up Urology  · Outpatient follow-up with oncology  · Outpatient follow-up with Interventional Radiology  · 1/18 percutaneous nephrostomy tube placement  · Creatinine stable    Atrial fibrillation ([facility] )  Assessment & Plan  · Rate controlled  · Warfarin 5 mg daily  · INR 2 01 within goal; repeat INR as outpatient  · Cont cardizem home dose  Bladder cancer [facility])  Assessment & Plan  · Urology following  · Has a archibald      Acute on chronic diastolic congestive heart failure (HCC)  Assessment & Plan  Wt Readings from Last 3 Encounters:   01/15/22 84 7 kg (186 lb 11 7 oz)   01/14/22 82 1 kg (181 lb)   01/06/22 82 1 kg (181 lb)     · Elevated NT pro BNP, b/l worsening pedal edema and SOB, no hypoxia  · Cardiology input appreciated and they suggest that it is chronic diastolic heart failure  The fluid overload is due to obstructive uropathy and not due to acute on chronic diastolic congestive heart failure  · Diuresis as per nephrology  · Currently not on any        Abnormal urinalysis  Assessment & Plan  · Concern for UTI but patient has a Fortune catheter  · Received 1 dose ceftriaxone in the ED, recent urine culture was negative  · Urine culture with no growth    Pleural effusion on left  Assessment & Plan  · Wet read XR chest, improved with diuresis  · Likely from chronic diastolic heart failure    Back pain  Assessment & Plan  · Counseled to not take advil at home  · Oxycodone on discharge    S/P CABG x 4  Assessment & Plan  · Elevated troponin could be likely from DULCE/CKD vs CHF exac  · Cardiology was consulted  Input appreciated  No active coronary syndrome  · EKG shows no new change compared to previous EKG: RBBB with T inversion V1-2  NSR      Medical Problems             Resolved Problems  Date Reviewed: 1/23/2022          Resolved    Constipation 1/21/2022     Resolved by  MYLENE Davis    Hyponatremia 1/21/2022     Resolved by  Sally Tamayo, [address]              Discharging Physician / Practitioner: Heri Dawn DO  PCP: Anthony Villagran MD  Admission Date:   Admission Orders (From admission, onward)     Ordered        01/15/22 2126  Inpatient Admission  Once                      Discharge Date: 01/24/22    Consultations During Hospital Stay:  · Neurology  · Interventional Radiology  · Cardiology  · Nephrology    Procedures Performed:   · Percutaneous nephrostomy tube placement bilaterally    Complications:  none    Reason for Admission:  Shortness of breath    Hospital Course: Cindy Zurita is a 80 y o  male patient who originally presented to the hospital on 1/15/2022 due to shortness of breath  Patient has history of bladder cancer and was thought to have possible outlet obstruction  Patient had Fortune placed and bladder noted to be decompressed  Patient seen by urology team and also seen by Interventional Radiology as patient needed percutaneous nephrostomy tubes placed  Patient's Coumadin was discontinued for procedure    Patient was then restarted on Coumadin with heparin bridge and had goal INR of 2-3  Patient will need to follow-up INR with primary cardiologist as an outpatient as they are dosing his Coumadin regimen  Patient also seen by Nephrology team given acute kidney injury and had improvement in kidney function after nephrostomy tubes were placed to help decompress  Patient's hemoglobins were stable during hospitalization and will have repeat hemoglobin and INR as an outpatient to follow up with primary care provider and Cardiology  Patient also seen by Physical therapy and Occupational therapy 2 times given concern from family about need for rehab  Patient was not recommended for rehab and will be sent home with home health care  Patient must follow up with primary care provider, Cardiology, Urology, Oncology, and Interventional Radiology  Did have discussion with patient and also patient's granddaughter who were in agreement with this plan  Please see above list of diagnoses and related plan for additional information  Condition at Discharge: stable    Discharge Day Visit / Exam:   Subjective:  Patient resting comfortably on examination  Patient had no overnight events or complaints  Vitals: Blood Pressure: 145/76 (01/24/22 0655)  Pulse: 85 (01/24/22 0655)  Temperature: 98 1 °F (36 7 °C) (01/24/22 0655)  Temp Source: Axillary (01/23/22 2238)  Respirations: 18 (01/24/22 0655)  Height: 5' 8" (172 7 cm) (01/15/22 2326)  Weight - Scale: 84 7 kg (186 lb 11 7 oz) (01/15/22 2326)  SpO2: 96 % (01/24/22 0655)     Exam:   Physical Exam  Vitals and nursing note reviewed  Constitutional:       General: He is not in acute distress  Appearance: He is well-developed  HENT:      Head: Normocephalic and atraumatic  Eyes:      General: No scleral icterus  Conjunctiva/sclera: Conjunctivae normal    Cardiovascular:      Rate and Rhythm: Normal rate and regular rhythm  Heart sounds: Normal heart sounds  No murmur heard  No friction rub  No gallop  Pulmonary:      Effort: Pulmonary effort is normal  No respiratory distress  Breath sounds: Normal breath sounds  No wheezing or rales  Abdominal:      General: Bowel sounds are normal  There is no distension  Palpations: Abdomen is soft  Tenderness: There is no abdominal tenderness  Musculoskeletal:         General: Normal range of motion  Comments: Patient with bilateral nephrostomy tubes in place  no erythema or oozing around the site   Skin:     General: Skin is warm  Findings: No rash  Neurological:      Mental Status: He is alert and oriented to person, place, and time  Discussion with Family: Updated  (grandchild) via phone  Discharge instructions/Information to patient and family:   See after visit summary for information provided to patient and family  Provisions for Follow-Up Care:  See after visit summary for information related to follow-up care and any pertinent home health orders  Disposition:   Home with VNA Services (Reminder: Complete face to face encounter)    Planned Readmission: no     Discharge Statement:  I spent 40 minutes discharging the patient  This time was spent on the day of discharge  I had direct contact with the patient on the day of discharge  Greater than 50% of the total time was spent examining patient, answering all patient questions, arranging and discussing plan of care with patient as well as directly providing post-discharge instructions  Additional time then spent on discharge activities  Discharge Medications:  See after visit summary for reconciled discharge medications provided to patient and/or family        **Please Note: This note may have been constructed using a voice recognition system**

## 2022-01-25 ENCOUNTER — TRANSITIONAL CARE MANAGEMENT (OUTPATIENT)
Dept: INTERNAL MEDICINE CLINIC | Facility: OTHER | Age: 82
End: 2022-01-25

## 2022-01-25 ENCOUNTER — TELEPHONE (OUTPATIENT)
Dept: INTERNAL MEDICINE CLINIC | Facility: OTHER | Age: 82
End: 2022-01-25

## 2022-01-25 RX ORDER — SODIUM CHLORIDE 0.9 % (FLUSH) 0.9 %
10 SYRINGE (ML) INJECTION DAILY
Qty: 200 ML | Refills: 0 | Status: SHIPPED | OUTPATIENT
Start: 2022-01-25 | End: 2022-02-28 | Stop reason: HOSPADM

## 2022-01-25 NOTE — TELEPHONE ENCOUNTER
Good afternoon,    Katarina from [facility] is calling to let us know that the patient will be on their services as of today for home care

## 2022-01-26 ENCOUNTER — TELEPHONE (OUTPATIENT)
Dept: INTERNAL MEDICINE CLINIC | Facility: OTHER | Age: 82
End: 2022-01-26

## 2022-01-26 NOTE — TELEPHONE ENCOUNTER
SONYA-- pt missed VNA visit today he was not home  And not answering  He does get daily flush in nephrostomy tube but will not be getting one today  They will be back tomorrow/

## 2022-01-27 ENCOUNTER — OFFICE VISIT (OUTPATIENT)
Dept: INTERNAL MEDICINE CLINIC | Age: 82
End: 2022-01-27
Payer: MEDICARE

## 2022-01-27 VITALS
OXYGEN SATURATION: 98 % | HEART RATE: 73 BPM | BODY MASS INDEX: 26.6 KG/M2 | TEMPERATURE: 98.4 F | SYSTOLIC BLOOD PRESSURE: 126 MMHG | HEIGHT: 68 IN | WEIGHT: 175.5 LBS | DIASTOLIC BLOOD PRESSURE: 60 MMHG

## 2022-01-27 DIAGNOSIS — F41.9 ANXIETY: ICD-10-CM

## 2022-01-27 DIAGNOSIS — J43.2 CENTRILOBULAR EMPHYSEMA (HCC): ICD-10-CM

## 2022-01-27 DIAGNOSIS — Z93.6 NEPHROSTOMY STATUS (HCC): ICD-10-CM

## 2022-01-27 DIAGNOSIS — N18.31 STAGE 3A CHRONIC KIDNEY DISEASE (HCC): ICD-10-CM

## 2022-01-27 DIAGNOSIS — I25.810 ATHEROSCLEROSIS OF CORONARY ARTERY BYPASS GRAFT OF NATIVE HEART WITHOUT ANGINA PECTORIS: ICD-10-CM

## 2022-01-27 DIAGNOSIS — K21.9 GASTROESOPHAGEAL REFLUX DISEASE WITHOUT ESOPHAGITIS: Primary | ICD-10-CM

## 2022-01-27 DIAGNOSIS — F11.20 CONTINUOUS OPIOID DEPENDENCE (HCC): ICD-10-CM

## 2022-01-27 PROBLEM — D64.9 NORMOCYTIC ANEMIA: Chronic | Status: ACTIVE | Noted: 2022-01-27

## 2022-01-27 PROBLEM — D50.9 IDA (IRON DEFICIENCY ANEMIA): Status: ACTIVE | Noted: 2022-01-27

## 2022-01-27 PROCEDURE — 99496 TRANSJ CARE MGMT HIGH F2F 7D: CPT | Performed by: INTERNAL MEDICINE

## 2022-01-27 RX ORDER — ALPRAZOLAM 0.25 MG/1
0.25 TABLET ORAL 2 TIMES DAILY PRN
Qty: 30 TABLET | Refills: 0 | Status: SHIPPED | OUTPATIENT
Start: 2022-01-27 | End: 2022-02-28 | Stop reason: HOSPADM

## 2022-01-27 NOTE — PROGRESS NOTES
Assessment/Plan:    1  Carcinoma of urinary bladder  Being followed by urologist   Will start chemotherapy next week    2  Bilateral nephrostomy tube  Sites appears normal   Continue with daily irrigation as suggested by urologist     3  Anxiety  Patient appears more apprehensive and irritated today  Also refusing home health services  Will start patient on Xanax 0 25 mg b i d  4  COPD  Continue with present inhaler    5  Coronary artery disease  Being followed by cardiologist   Stable  Continue with present regimen    6  Chronic atrial fibrillation  Rate is well controlled  Continue with present regimen  Patient is on Coumadin  Diagnoses and all orders for this visit:    Gastroesophageal reflux disease without esophagitis  -     CBC; Future    Centrilobular emphysema (HCC)    Atherosclerosis of coronary artery bypass graft of native heart without angina pectoris    Stage 3a chronic kidney disease (HCC)  -     CBC; Future  -     Comprehensive metabolic panel; Future    Nephrostomy status (HCC)    Anxiety  -     ALPRAZolam (XANAX) 0 25 mg tablet; Take 1 tablet (0 25 mg total) by mouth 2 (two) times a day as needed for anxiety    Continuous opioid dependence (HCC)          Subjective:          Patient ID: Arleth  is a 80 y o  male  TCM Call (since 12/27/2021)     Date and time call was made  1/25/2022  1:45 PM    Hospital care reviewed  Records reviewed        Patient was hospitialized at  [facility]        Date of Admission  01/15/22    Date of discharge  01/24/22    Diagnosis  DULCE on CKD3    Disposition  Home    Current Symptoms  None      TCM Call (since 12/27/2021)     Post hospital issues  Reduced activity    Scheduled for follow up?   Yes    Did you obtain your prescribed medications  Yes    Do you need help managing your prescriptions or medications  No    Is transportation to your appointment needed  Yes    Specify why  has a ride     I have advised the patient to call PCP with any new or worsening symptoms  Pecolia Batch CMA          Patient was admitted to hospital with urinary retention and underwent bilateral nephrostomy tube insertion  And will get chemotherapy for urinary bladder cancer next week  The following portions of the patient's history were reviewed and updated as appropriate: allergies, current medications, past family history, past medical history, past social history, past surgical history and problem list     Review of Systems   Constitutional: Positive for fatigue  Negative for fever  HENT: Negative for congestion, ear discharge, ear pain, postnasal drip, sinus pressure, sore throat, tinnitus and trouble swallowing  Eyes: Negative for discharge, itching and visual disturbance  Respiratory: Negative for cough and shortness of breath  Cardiovascular: Negative for chest pain and palpitations  Gastrointestinal: Negative for abdominal pain, diarrhea, nausea and vomiting  Endocrine: Negative for cold intolerance and polyuria  Genitourinary: Negative for difficulty urinating, dysuria and urgency  Musculoskeletal: Negative for arthralgias and neck pain  Skin: Negative for rash  Allergic/Immunologic: Negative for environmental allergies  Neurological: Negative for dizziness, weakness and headaches  Psychiatric/Behavioral: Negative for agitation and behavioral problems  The patient is nervous/anxious            Past Medical History:   Diagnosis Date    A-fib [facility])     Arthritis     Benign prostatic hyperplasia without lower urinary tract symptoms     without Urinary Obstruction    Bladder cancer (HCC)     CAD (coronary artery disease)     Cancer (HCC)     Chronic obstructive lung disease (HCC)     Constipation 12/9/2021    Coronary arteriosclerosis     Depression     Emphysema lung (HCC)     GERD (gastroesophageal reflux disease)     Hyperlipidemia     Hypertension     Hyponatremia 1/15/2022    Irregular heart beat     Myocardial infarction ([address] )     Psoriasis     Requires supplemental oxygen     at bedtime during high humid days only    Stroke [facility])     TIA 1/2018         Current Outpatient Medications:     aspirin 81 mg chewable tablet, Chew 1 tablet (81 mg total) daily, Disp: 60 tablet, Rfl: 6    atorvastatin (LIPITOR) 40 mg tablet, Take 1 tablet (40 mg total) by mouth daily, Disp: 90 tablet, Rfl: 3    bisacodyl (DULCOLAX) 10 mg suppository, Insert 1 suppository (10 mg total) into the rectum daily as needed for constipation, Disp: 12 suppository, Rfl: 0    diltiazem (CARDIZEM CD) 240 mg 24 hr capsule, Take 1 capsule (240 mg total) by mouth daily, Disp: 60 capsule, Rfl: 3    fluorouracil 4,900 mg in CADD infusion pump, Infuse 4,900 mg (500 mg/m2/day x 1 96 m2) into a venous catheter over 120 hours for 5 days, Disp: 1 each, Rfl: 0    magnesium citrate (CITROMA) 1 745 g/30 mL oral solution, Take 296 mL by mouth once as needed (constipation) for up to 1 dose, Disp: 296 mL, Rfl: 3    oxyCODONE (ROXICODONE) 5 immediate release tablet, Take 1 tablet (5 mg total) by mouth every 6 (six) hours as needed for severe pain for up to 10 days Max Daily Amount: 20 mg, Disp: 15 tablet, Rfl: 0    pantoprazole (PROTONIX) 40 mg tablet, Take 1 tablet (40 mg total) by mouth every morning, Disp: 60 tablet, Rfl: 5    sodium chloride 0 9 % SOLN, Infuse 10 mL into a venous catheter in the morning, Disp: 200 mL, Rfl: 0    warfarin (COUMADIN) 5 mg tablet, Take 5 mg daily, Disp: 30 tablet, Rfl: 0    ALPRAZolam (XANAX) 0 25 mg tablet, Take 1 tablet (0 25 mg total) by mouth 2 (two) times a day as needed for anxiety, Disp: 30 tablet, Rfl: 0    polyethylene glycol (GOLYTELY) 4000 mL solution, Take 4,000 mL by mouth once for 1 dose (Patient not taking: Reported on 1/27/2022 ), Disp: 4000 mL, Rfl: 0    sodium chloride, PF, 0 9 %, 10 mL by Intracatheter route daily Intracatheter flushing daily, Disp: 1200 mL, Rfl: 0    sodium chloride, PF, 0 9 %, 10 mL by Intracatheter route daily Intracatheter flushing daily, Disp: 1200 mL, Rfl: 0    Allergies   Allergen Reactions    Penicillins Swelling and Itching       Social History   Past Surgical History:   Procedure Laterality Date    COLONOSCOPY      CORONARY ANGIOPLASTY  02/03/2001    PTCA of RCA    CORONARY ARTERY BYPASS GRAFT  02/07/2001    x4- [facility]    HERNIA REPAIR      IR NEPHROSTOMY TUBE PLACEMENT  1/18/2022    IR PORT PLACEMENT  1/14/2022    ND BRONCHOSCOPY,DIAGNOSTIC Left 1/7/2019    Procedure: Marcia Mccarthy;  Surgeon: Reinaldo Valles MD;  [facility]; Service: Pulmonary    ND CYSTOURETHROSCOPY,FULGUR <0 5 CM LESN N/A 11/30/2021    Procedure: TRANSURETHRAL RESECTION OF BLADDER TUMOR (TURBT) with "large";  Surgeon: Luis Todd MD;  [facility];  Service: Urology    ND CYSTOURETHROSCOPY,URETER CATHETER Bilateral 11/30/2021    Procedure: Reina Rachel;  Surgeon: Luis Todd MD;  [facility];  Service: Urology    ND Viveca Isreal Left 2/20/2018    Procedure: ENDARTERECTOMY ARTERY CAROTID WITH PATCH ANGIOPLASTY;  Surgeon: Mendel Angel, MD;  [facility];  Service: Vascular    TRANSURETHRAL RESECTION OF PROSTATE       Family History   Problem Relation Age of Onset    Lung cancer Mother     Cancer Mother     Other Father         sepsis       Objective:  /60 (BP Location: Left arm, Patient Position: Sitting, Cuff Size: Standard)   Pulse 73   Temp 98 4 °F (36 9 °C) (Tympanic)   Ht 5' 8" (1 727 m)   Wt 79 6 kg (175 lb 8 oz)   SpO2 98%   BMI 26 68 kg/m²   Body mass index is 26 68 kg/m²  Physical Exam  Constitutional:       Appearance: He is well-developed  HENT:      Head: Normocephalic  Right Ear: External ear normal       Left Ear: External ear normal       Nose: No rhinorrhea  Mouth/Throat:      Pharynx: No posterior oropharyngeal erythema  Eyes:      General: No scleral icterus       Pupils: Pupils are equal, round, and reactive to light  Neck:      Thyroid: No thyromegaly  Trachea: No tracheal deviation  Cardiovascular:      Rate and Rhythm: Normal rate  Rhythm irregular  Heart sounds: Normal heart sounds  Pulmonary:      Effort: Pulmonary effort is normal  No respiratory distress  Breath sounds: Normal breath sounds  Chest:      Chest wall: No tenderness  Abdominal:      General: Bowel sounds are normal       Palpations: Abdomen is soft  There is no mass  Tenderness: There is no abdominal tenderness  Musculoskeletal:         General: Normal range of motion  Cervical back: Normal range of motion and neck supple  Right lower leg: No edema  Left lower leg: No edema  Lymphadenopathy:      Cervical: No cervical adenopathy  Skin:     General: Skin is warm  Comments: Bilateral nephrostomy tube present  Skin appears normal at insertion site  No sign of infection  Neurological:      Mental Status: He is alert and oriented to person, place, and time  Cranial Nerves: No cranial nerve deficit  Psychiatric:         Mood and Affect: Mood normal          Behavior: Behavior normal       Comments: Patient appears little bit irritated today

## 2022-01-27 NOTE — TELEPHONE ENCOUNTER
Patients recent admission to [facility], he had 2 nephrostomy tubes place by IR and it appears IR is the one managing them in terms of out patient follow up    Called [facility] nurse and relayed that info and passed along Dr Reg Henry number with IR

## 2022-01-27 NOTE — TELEPHONE ENCOUNTER
Yamilet Watson from [facility] called to say the patient refused to let her flush his nephrostomy tube  He said she hurt him yesterday and it wasn't even her  He ran to his room and won't come out   Yamilet Watson can be reached at [phone]

## 2022-01-28 ENCOUNTER — APPOINTMENT (OUTPATIENT)
Dept: LAB | Facility: HOSPITAL | Age: 82
End: 2022-01-28
Payer: MEDICARE

## 2022-01-28 ENCOUNTER — ANTICOAG VISIT (OUTPATIENT)
Dept: CARDIOLOGY CLINIC | Facility: CLINIC | Age: 82
End: 2022-01-28

## 2022-01-28 DIAGNOSIS — C67.8 CANCER OF OVERLAPPING SITES OF BLADDER (HCC): ICD-10-CM

## 2022-01-28 DIAGNOSIS — C67.8 MALIGNANT NEOPLASM OF OVERLAPPING SITES OF BLADDER (HCC): ICD-10-CM

## 2022-01-28 DIAGNOSIS — D64.9 NORMOCYTIC ANEMIA: ICD-10-CM

## 2022-01-28 DIAGNOSIS — K21.9 GASTROESOPHAGEAL REFLUX DISEASE WITHOUT ESOPHAGITIS: ICD-10-CM

## 2022-01-28 DIAGNOSIS — N18.31 STAGE 3A CHRONIC KIDNEY DISEASE (HCC): ICD-10-CM

## 2022-01-28 DIAGNOSIS — D50.9 IRON DEFICIENCY ANEMIA, UNSPECIFIED IRON DEFICIENCY ANEMIA TYPE: ICD-10-CM

## 2022-01-28 DIAGNOSIS — I48.11 LONGSTANDING PERSISTENT ATRIAL FIBRILLATION (HCC): ICD-10-CM

## 2022-01-28 LAB
ALBUMIN SERPL BCP-MCNC: 3.3 G/DL (ref 3.5–5)
ALP SERPL-CCNC: 87 U/L (ref 46–116)
ALT SERPL W P-5'-P-CCNC: 26 U/L (ref 12–78)
ANION GAP SERPL CALCULATED.3IONS-SCNC: 7 MMOL/L (ref 4–13)
AST SERPL W P-5'-P-CCNC: 16 U/L (ref 5–45)
BASOPHILS # BLD AUTO: 0.05 THOUSANDS/ΜL (ref 0–0.1)
BASOPHILS NFR BLD AUTO: 1 % (ref 0–1)
BILIRUB SERPL-MCNC: 0.76 MG/DL (ref 0.2–1)
BUN SERPL-MCNC: 20 MG/DL (ref 5–25)
CALCIUM ALBUM COR SERPL-MCNC: 9.8 MG/DL (ref 8.3–10.1)
CALCIUM SERPL-MCNC: 9.2 MG/DL (ref 8.3–10.1)
CHLORIDE SERPL-SCNC: 100 MMOL/L (ref 100–108)
CO2 SERPL-SCNC: 27 MMOL/L (ref 21–32)
CREAT SERPL-MCNC: 1.64 MG/DL (ref 0.6–1.3)
EOSINOPHIL # BLD AUTO: 0.16 THOUSAND/ΜL (ref 0–0.61)
EOSINOPHIL NFR BLD AUTO: 2 % (ref 0–6)
ERYTHROCYTE [DISTWIDTH] IN BLOOD BY AUTOMATED COUNT: 20.2 % (ref 11.6–15.1)
GFR SERPL CREATININE-BSD FRML MDRD: 38 ML/MIN/1.73SQ M
GLUCOSE P FAST SERPL-MCNC: 111 MG/DL (ref 65–99)
HCT VFR BLD AUTO: 30.7 % (ref 36.5–49.3)
HGB BLD-MCNC: 8.8 G/DL (ref 12–17)
IMM GRANULOCYTES # BLD AUTO: 0.04 THOUSAND/UL (ref 0–0.2)
IMM GRANULOCYTES NFR BLD AUTO: 1 % (ref 0–2)
LYMPHOCYTES # BLD AUTO: 1.15 THOUSANDS/ΜL (ref 0.6–4.47)
LYMPHOCYTES NFR BLD AUTO: 13 % (ref 14–44)
MCH RBC QN AUTO: 21.3 PG (ref 26.8–34.3)
MCHC RBC AUTO-ENTMCNC: 28.7 G/DL (ref 31.4–37.4)
MCV RBC AUTO: 74 FL (ref 82–98)
MONOCYTES # BLD AUTO: 0.59 THOUSAND/ΜL (ref 0.17–1.22)
MONOCYTES NFR BLD AUTO: 7 % (ref 4–12)
NEUTROPHILS # BLD AUTO: 6.84 THOUSANDS/ΜL (ref 1.85–7.62)
NEUTS SEG NFR BLD AUTO: 76 % (ref 43–75)
NRBC BLD AUTO-RTO: 0 /100 WBCS
PLATELET # BLD AUTO: 437 THOUSANDS/UL (ref 149–390)
PMV BLD AUTO: 9.1 FL (ref 8.9–12.7)
POTASSIUM SERPL-SCNC: 4.5 MMOL/L (ref 3.5–5.3)
PROT SERPL-MCNC: 7.7 G/DL (ref 6.4–8.2)
RBC # BLD AUTO: 4.13 MILLION/UL (ref 3.88–5.62)
SODIUM SERPL-SCNC: 134 MMOL/L (ref 136–145)
WBC # BLD AUTO: 8.83 THOUSAND/UL (ref 4.31–10.16)

## 2022-01-28 PROCEDURE — 85025 COMPLETE CBC W/AUTO DIFF WBC: CPT

## 2022-01-28 PROCEDURE — 77399 UNLISTED PX MED RADJ PHYSICS: CPT | Performed by: RADIOLOGY

## 2022-01-28 PROCEDURE — 80053 COMPREHEN METABOLIC PANEL: CPT

## 2022-01-28 NOTE — PROGRESS NOTES
Called pts granddaughter, Bel Crooked to remind her of pts need for blood work prior to Mondays appointment  She reported that pt will be getting labs done before radiation today  She had questions regarding pts treatment regimen and schedule  Spoke with Alice Lombardo RN who confirmed pts treatment is correct as ordered  Attempted to call back pts granddaughter to clarify pts appointments and plan, no answer

## 2022-01-31 ENCOUNTER — TELEPHONE (OUTPATIENT)
Dept: CARDIOLOGY CLINIC | Facility: CLINIC | Age: 82
End: 2022-01-31

## 2022-01-31 ENCOUNTER — HOSPITAL ENCOUNTER (OUTPATIENT)
Dept: INFUSION CENTER | Facility: CLINIC | Age: 82
Discharge: HOME | End: 2022-01-31
Payer: MEDICARE

## 2022-01-31 VITALS
RESPIRATION RATE: 19 BRPM | HEART RATE: 69 BPM | TEMPERATURE: 97.9 F | BODY MASS INDEX: 27.61 KG/M2 | WEIGHT: 171.8 LBS | HEIGHT: 66 IN | DIASTOLIC BLOOD PRESSURE: 61 MMHG | OXYGEN SATURATION: 100 % | SYSTOLIC BLOOD PRESSURE: 140 MMHG

## 2022-01-31 DIAGNOSIS — D50.9 IRON DEFICIENCY ANEMIA, UNSPECIFIED IRON DEFICIENCY ANEMIA TYPE: ICD-10-CM

## 2022-01-31 DIAGNOSIS — C67.8 CANCER OF OVERLAPPING SITES OF BLADDER (HCC): Primary | ICD-10-CM

## 2022-01-31 PROCEDURE — 96367 TX/PROPH/DG ADDL SEQ IV INF: CPT

## 2022-01-31 PROCEDURE — 96375 TX/PRO/DX INJ NEW DRUG ADDON: CPT

## 2022-01-31 PROCEDURE — 96409 CHEMO IV PUSH SNGL DRUG: CPT

## 2022-01-31 PROCEDURE — G0498 CHEMO EXTEND IV INFUS W/PUMP: HCPCS

## 2022-01-31 RX ORDER — SODIUM CHLORIDE 9 MG/ML
20 INJECTION, SOLUTION INTRAVENOUS ONCE
Status: COMPLETED | OUTPATIENT
Start: 2022-01-31 | End: 2022-01-31

## 2022-01-31 RX ADMIN — FERRIC CARBOXYMALTOSE INJECTION 750 MG: 50 INJECTION, SOLUTION INTRAVENOUS at 09:33

## 2022-01-31 RX ADMIN — SODIUM CHLORIDE 20 ML/HR: 9 INJECTION, SOLUTION INTRAVENOUS at 08:59

## 2022-01-31 RX ADMIN — DEXAMETHASONE SODIUM PHOSPHATE: 10 INJECTION, SOLUTION INTRAMUSCULAR; INTRAVENOUS at 08:59

## 2022-01-31 RX ADMIN — MITOMYCIN 11.8 MG: 20 INJECTION, POWDER, LYOPHILIZED, FOR SOLUTION INTRAVENOUS at 10:10

## 2022-01-31 NOTE — TELEPHONE ENCOUNTER
Rec'd a call from [facility]  States pt never got my message on how to take his Coumadin so she was calling on his behalf  I did tell her that he was to take 7 5mg on Sun, 5mg the rest, but since he did not get the message to have him take the 7 5mg today, 5mg the rest and retest in 1 week  Will update peter

## 2022-01-31 NOTE — PROGRESS NOTES
Patient presents for chemotherapy offering no complaints, patient tolerated treatment without incident  Per Kieran Caal RN, per Dr Gwyn Fuller, [facility] to treat with creatinine of 1 64  CADD pump connected and running, reviewed CADD with patient and daughter Yahir MCCLENDON printed  Next appointment reviewed

## 2022-02-01 ENCOUNTER — TELEPHONE (OUTPATIENT)
Dept: HEMATOLOGY ONCOLOGY | Facility: HOSPITAL | Age: 82
End: 2022-02-01

## 2022-02-01 ENCOUNTER — TELEPHONE (OUTPATIENT)
Dept: SURGICAL ONCOLOGY | Facility: CLINIC | Age: 82
End: 2022-02-01

## 2022-02-01 DIAGNOSIS — R30.1 PAINFUL BLADDER SPASM: ICD-10-CM

## 2022-02-01 DIAGNOSIS — N32.89 BLADDER SPASM: Primary | ICD-10-CM

## 2022-02-01 RX ORDER — DIPHENHYDRAMINE HYDROCHLORIDE 12.5 MG/1
12.5 BAR, CHEWABLE ORAL 4 TIMES DAILY PRN
Qty: 30 TABLET | Refills: 0 | Status: SHIPPED | OUTPATIENT
Start: 2022-02-01 | End: 2022-02-28 | Stop reason: HOSPADM

## 2022-02-01 NOTE — TELEPHONE ENCOUNTER
Spoke with daughter Lynn Granados in regards to his pain  He has complaints of bladder pain  Spoke with Dr Bella Correia who said he may prescribe something to address such as a muscle/bladder relaxer

## 2022-02-01 NOTE — PROGRESS NOTES
Pt called our RN reporting painful bladder spasm and associated pain  Bentyl is contra-indicated to prescribe due to hx recurrent unilateral inguinal hernia and risk for complications from this medicine  Will opt for another anti-spasmodic medicine in the meantime until patient is able to see Urology and Palliative Care (Benadryl)      Basilia Wong MD

## 2022-02-03 ENCOUNTER — APPOINTMENT (OUTPATIENT)
Dept: RADIATION ONCOLOGY | Facility: CLINIC | Age: 82
End: 2022-02-03
Attending: RADIOLOGY
Payer: MEDICARE

## 2022-02-03 PROCEDURE — 77386 HB NTSTY MODUL RAD TX DLVR CPLX: CPT | Performed by: RADIOLOGY

## 2022-02-03 PROCEDURE — 77301 RADIOTHERAPY DOSE PLAN IMRT: CPT | Performed by: RADIOLOGY

## 2022-02-03 PROCEDURE — 77338 DESIGN MLC DEVICE FOR IMRT: CPT | Performed by: RADIOLOGY

## 2022-02-03 PROCEDURE — 77300 RADIATION THERAPY DOSE PLAN: CPT | Performed by: RADIOLOGY

## 2022-02-04 ENCOUNTER — PROCEDURE VISIT (OUTPATIENT)
Dept: UROLOGY | Facility: CLINIC | Age: 82
End: 2022-02-04
Payer: MEDICARE

## 2022-02-04 ENCOUNTER — APPOINTMENT (OUTPATIENT)
Dept: RADIATION ONCOLOGY | Facility: CLINIC | Age: 82
End: 2022-02-04
Attending: RADIOLOGY
Payer: MEDICARE

## 2022-02-04 ENCOUNTER — HOSPITAL ENCOUNTER (INPATIENT)
Facility: HOSPITAL | Age: 82
LOS: 13 days | DRG: 871 | End: 2022-02-17
Attending: EMERGENCY MEDICINE | Admitting: INTERNAL MEDICINE
Payer: MEDICARE

## 2022-02-04 ENCOUNTER — APPOINTMENT (EMERGENCY)
Dept: CT IMAGING | Facility: HOSPITAL | Age: 82
DRG: 871 | End: 2022-02-04
Payer: MEDICARE

## 2022-02-04 VITALS
HEART RATE: 103 BPM | BODY MASS INDEX: 27.48 KG/M2 | SYSTOLIC BLOOD PRESSURE: 116 MMHG | DIASTOLIC BLOOD PRESSURE: 58 MMHG | HEIGHT: 66 IN | WEIGHT: 171 LBS

## 2022-02-04 DIAGNOSIS — R41.82 ALTERED MENTAL STATUS: Primary | ICD-10-CM

## 2022-02-04 DIAGNOSIS — Z01.89 ENCOUNTER FOR ASSESSMENT OF DECISION-MAKING CAPACITY: ICD-10-CM

## 2022-02-04 DIAGNOSIS — R78.81 BACTEREMIA: ICD-10-CM

## 2022-02-04 DIAGNOSIS — A41.52: ICD-10-CM

## 2022-02-04 DIAGNOSIS — N39.0 UTI (URINARY TRACT INFECTION): ICD-10-CM

## 2022-02-04 DIAGNOSIS — C67.9 BLADDER CANCER (HCC): ICD-10-CM

## 2022-02-04 DIAGNOSIS — R93.89 ABNORMAL CT OF THE CHEST: ICD-10-CM

## 2022-02-04 DIAGNOSIS — C67.9 MALIGNANT NEOPLASM OF URINARY BLADDER, UNSPECIFIED SITE (HCC): Primary | ICD-10-CM

## 2022-02-04 PROBLEM — A41.9 SEPSIS (HCC): Status: ACTIVE | Noted: 2022-02-04

## 2022-02-04 PROBLEM — R79.1 SUPRATHERAPEUTIC INR: Status: ACTIVE | Noted: 2022-02-04

## 2022-02-04 PROBLEM — D64.9 ANEMIA: Status: ACTIVE | Noted: 2022-01-27

## 2022-02-04 LAB
ALBUMIN SERPL BCP-MCNC: 3 G/DL (ref 3.5–5)
ALP SERPL-CCNC: 73 U/L (ref 46–116)
ALT SERPL W P-5'-P-CCNC: 17 U/L (ref 12–78)
ANION GAP SERPL CALCULATED.3IONS-SCNC: 6 MMOL/L (ref 4–13)
APTT PPP: 94 SECONDS (ref 23–37)
AST SERPL W P-5'-P-CCNC: 15 U/L (ref 5–45)
BACTERIA UR QL AUTO: ABNORMAL /HPF
BASOPHILS # BLD MANUAL: 0 THOUSAND/UL (ref 0–0.1)
BASOPHILS NFR MAR MANUAL: 0 % (ref 0–1)
BILIRUB DIRECT SERPL-MCNC: 0.29 MG/DL (ref 0–0.2)
BILIRUB SERPL-MCNC: 1.83 MG/DL (ref 0.2–1)
BILIRUB UR QL STRIP: NEGATIVE
BUN SERPL-MCNC: 23 MG/DL (ref 5–25)
CALCIUM SERPL-MCNC: 8.6 MG/DL (ref 8.3–10.1)
CARDIAC TROPONIN I PNL SERPL HS: 37 NG/L
CHLORIDE SERPL-SCNC: 95 MMOL/L (ref 100–108)
CLARITY UR: ABNORMAL
CO2 SERPL-SCNC: 26 MMOL/L (ref 21–32)
COLOR UR: YELLOW
CREAT SERPL-MCNC: 1.41 MG/DL (ref 0.6–1.3)
EOSINOPHIL # BLD MANUAL: 0 THOUSAND/UL (ref 0–0.4)
EOSINOPHIL NFR BLD MANUAL: 0 % (ref 0–6)
ERYTHROCYTE [DISTWIDTH] IN BLOOD BY AUTOMATED COUNT: 20.3 % (ref 11.6–15.1)
FLUAV RNA RESP QL NAA+PROBE: NEGATIVE
FLUBV RNA RESP QL NAA+PROBE: NEGATIVE
GFR SERPL CREATININE-BSD FRML MDRD: 46 ML/MIN/1.73SQ M
GLUCOSE SERPL-MCNC: 139 MG/DL (ref 65–140)
GLUCOSE UR STRIP-MCNC: NEGATIVE MG/DL
HCT VFR BLD AUTO: 26.9 % (ref 36.5–49.3)
HGB BLD-MCNC: 7.8 G/DL (ref 12–17)
HGB UR QL STRIP.AUTO: ABNORMAL
INR PPP: 4.26 (ref 0.84–1.19)
KETONES UR STRIP-MCNC: NEGATIVE MG/DL
LACTATE SERPL-SCNC: 1 MMOL/L (ref 0.5–2)
LACTATE SERPL-SCNC: 2.4 MMOL/L (ref 0.5–2)
LEUKOCYTE ESTERASE UR QL STRIP: ABNORMAL
LYMPHOCYTES # BLD AUTO: 0.36 THOUSAND/UL (ref 0.6–4.47)
LYMPHOCYTES # BLD AUTO: 2 % (ref 14–44)
MCH RBC QN AUTO: 21.4 PG (ref 26.8–34.3)
MCHC RBC AUTO-ENTMCNC: 29 G/DL (ref 31.4–37.4)
MCV RBC AUTO: 74 FL (ref 82–98)
MICROCYTES BLD QL AUTO: PRESENT
MONOCYTES # BLD AUTO: 0 THOUSAND/UL (ref 0–1.22)
MONOCYTES NFR BLD: 0 % (ref 4–12)
NEUTROPHILS # BLD MANUAL: 17.59 THOUSAND/UL (ref 1.85–7.62)
NEUTS BAND NFR BLD MANUAL: 4 % (ref 0–8)
NEUTS SEG NFR BLD AUTO: 94 % (ref 43–75)
NITRITE UR QL STRIP: POSITIVE
NON-SQ EPI CELLS URNS QL MICRO: ABNORMAL /HPF
OVALOCYTES BLD QL SMEAR: PRESENT
PH UR STRIP.AUTO: 6.5 [PH]
PLATELET # BLD AUTO: 317 THOUSANDS/UL (ref 149–390)
PLATELET BLD QL SMEAR: ADEQUATE
PMV BLD AUTO: 9.7 FL (ref 8.9–12.7)
POTASSIUM SERPL-SCNC: 4.5 MMOL/L (ref 3.5–5.3)
PROT SERPL-MCNC: 6.6 G/DL (ref 6.4–8.2)
PROT UR STRIP-MCNC: ABNORMAL MG/DL
PROTHROMBIN TIME: 38.7 SECONDS (ref 11.6–14.5)
RBC # BLD AUTO: 3.64 MILLION/UL (ref 3.88–5.62)
RBC #/AREA URNS AUTO: ABNORMAL /HPF
RSV RNA RESP QL NAA+PROBE: NEGATIVE
SARS-COV-2 RNA RESP QL NAA+PROBE: NEGATIVE
SODIUM SERPL-SCNC: 127 MMOL/L (ref 136–145)
SP GR UR STRIP.AUTO: 1.02 (ref 1–1.03)
UROBILINOGEN UR QL STRIP.AUTO: 0.2 E.U./DL
WBC # BLD AUTO: 17.95 THOUSAND/UL (ref 4.31–10.16)
WBC #/AREA URNS AUTO: ABNORMAL /HPF

## 2022-02-04 PROCEDURE — 85007 BL SMEAR W/DIFF WBC COUNT: CPT | Performed by: EMERGENCY MEDICINE

## 2022-02-04 PROCEDURE — 99223 1ST HOSP IP/OBS HIGH 75: CPT | Performed by: INTERNAL MEDICINE

## 2022-02-04 PROCEDURE — 36415 COLL VENOUS BLD VENIPUNCTURE: CPT | Performed by: EMERGENCY MEDICINE

## 2022-02-04 PROCEDURE — 87186 SC STD MICRODIL/AGAR DIL: CPT | Performed by: EMERGENCY MEDICINE

## 2022-02-04 PROCEDURE — 96365 THER/PROPH/DIAG IV INF INIT: CPT

## 2022-02-04 PROCEDURE — 77014 CHG CT GUIDANCE PLACEMENT RAD THERAPY FIELDS: CPT | Performed by: RADIOLOGY

## 2022-02-04 PROCEDURE — 85610 PROTHROMBIN TIME: CPT | Performed by: EMERGENCY MEDICINE

## 2022-02-04 PROCEDURE — 84484 ASSAY OF TROPONIN QUANT: CPT | Performed by: EMERGENCY MEDICINE

## 2022-02-04 PROCEDURE — 87040 BLOOD CULTURE FOR BACTERIA: CPT | Performed by: EMERGENCY MEDICINE

## 2022-02-04 PROCEDURE — 99285 EMERGENCY DEPT VISIT HI MDM: CPT | Performed by: EMERGENCY MEDICINE

## 2022-02-04 PROCEDURE — 85027 COMPLETE CBC AUTOMATED: CPT | Performed by: EMERGENCY MEDICINE

## 2022-02-04 PROCEDURE — 87077 CULTURE AEROBIC IDENTIFY: CPT | Performed by: EMERGENCY MEDICINE

## 2022-02-04 PROCEDURE — 80048 BASIC METABOLIC PNL TOTAL CA: CPT | Performed by: EMERGENCY MEDICINE

## 2022-02-04 PROCEDURE — 93005 ELECTROCARDIOGRAM TRACING: CPT

## 2022-02-04 PROCEDURE — 77386 HB NTSTY MODUL RAD TX DLVR CPLX: CPT | Performed by: RADIOLOGY

## 2022-02-04 PROCEDURE — 51702 INSERT TEMP BLADDER CATH: CPT

## 2022-02-04 PROCEDURE — 99285 EMERGENCY DEPT VISIT HI MDM: CPT

## 2022-02-04 PROCEDURE — 81001 URINALYSIS AUTO W/SCOPE: CPT | Performed by: EMERGENCY MEDICINE

## 2022-02-04 PROCEDURE — 0241U HB NFCT DS VIR RESP RNA 4 TRGT: CPT | Performed by: EMERGENCY MEDICINE

## 2022-02-04 PROCEDURE — 83605 ASSAY OF LACTIC ACID: CPT | Performed by: EMERGENCY MEDICINE

## 2022-02-04 PROCEDURE — 85730 THROMBOPLASTIN TIME PARTIAL: CPT | Performed by: EMERGENCY MEDICINE

## 2022-02-04 PROCEDURE — 80076 HEPATIC FUNCTION PANEL: CPT | Performed by: EMERGENCY MEDICINE

## 2022-02-04 PROCEDURE — 87086 URINE CULTURE/COLONY COUNT: CPT | Performed by: EMERGENCY MEDICINE

## 2022-02-04 PROCEDURE — 70450 CT HEAD/BRAIN W/O DYE: CPT

## 2022-02-04 PROCEDURE — G1004 CDSM NDSC: HCPCS

## 2022-02-04 RX ORDER — PANTOPRAZOLE SODIUM 40 MG/1
40 TABLET, DELAYED RELEASE ORAL EVERY MORNING
Status: DISCONTINUED | OUTPATIENT
Start: 2022-02-05 | End: 2022-02-17 | Stop reason: HOSPADM

## 2022-02-04 RX ORDER — DILTIAZEM HYDROCHLORIDE 240 MG/1
240 CAPSULE, COATED, EXTENDED RELEASE ORAL DAILY
Status: DISCONTINUED | OUTPATIENT
Start: 2022-02-05 | End: 2022-02-17 | Stop reason: HOSPADM

## 2022-02-04 RX ORDER — ATORVASTATIN CALCIUM 40 MG/1
40 TABLET, FILM COATED ORAL DAILY
Status: DISCONTINUED | OUTPATIENT
Start: 2022-02-05 | End: 2022-02-17 | Stop reason: HOSPADM

## 2022-02-04 RX ORDER — ONDANSETRON 2 MG/ML
4 INJECTION INTRAMUSCULAR; INTRAVENOUS EVERY 6 HOURS PRN
Status: DISCONTINUED | OUTPATIENT
Start: 2022-02-04 | End: 2022-02-17 | Stop reason: HOSPADM

## 2022-02-04 RX ORDER — ACETAMINOPHEN 325 MG/1
650 TABLET ORAL ONCE
Status: COMPLETED | OUTPATIENT
Start: 2022-02-04 | End: 2022-02-04

## 2022-02-04 RX ORDER — OXYCODONE HYDROCHLORIDE 5 MG/1
5 TABLET ORAL EVERY 6 HOURS PRN
Status: DISCONTINUED | OUTPATIENT
Start: 2022-02-04 | End: 2022-02-05

## 2022-02-04 RX ORDER — ALPRAZOLAM 0.25 MG/1
0.25 TABLET ORAL 2 TIMES DAILY PRN
Status: DISCONTINUED | OUTPATIENT
Start: 2022-02-04 | End: 2022-02-05

## 2022-02-04 RX ORDER — ACETAMINOPHEN 325 MG/1
650 TABLET ORAL EVERY 4 HOURS PRN
Status: DISCONTINUED | OUTPATIENT
Start: 2022-02-04 | End: 2022-02-09

## 2022-02-04 RX ORDER — ASPIRIN 81 MG/1
81 TABLET, CHEWABLE ORAL DAILY
Status: DISCONTINUED | OUTPATIENT
Start: 2022-02-05 | End: 2022-02-08

## 2022-02-04 RX ORDER — ACETAMINOPHEN 325 MG/1
650 TABLET ORAL EVERY 6 HOURS PRN
Status: DISCONTINUED | OUTPATIENT
Start: 2022-02-04 | End: 2022-02-04

## 2022-02-04 RX ORDER — BISACODYL 10 MG
10 SUPPOSITORY, RECTAL RECTAL DAILY PRN
Status: DISCONTINUED | OUTPATIENT
Start: 2022-02-04 | End: 2022-02-06

## 2022-02-04 RX ORDER — SODIUM CHLORIDE 9 MG/ML
50 INJECTION, SOLUTION INTRAVENOUS CONTINUOUS
Status: DISCONTINUED | OUTPATIENT
Start: 2022-02-04 | End: 2022-02-05

## 2022-02-04 RX ADMIN — VANCOMYCIN HYDROCHLORIDE 1250 MG: 5 INJECTION, POWDER, LYOPHILIZED, FOR SOLUTION INTRAVENOUS at 20:19

## 2022-02-04 RX ADMIN — SODIUM CHLORIDE 1000 ML: 0.9 INJECTION, SOLUTION INTRAVENOUS at 18:14

## 2022-02-04 RX ADMIN — SODIUM CHLORIDE, SODIUM LACTATE, POTASSIUM CHLORIDE, AND CALCIUM CHLORIDE 1400 ML: .6; .31; .03; .02 INJECTION, SOLUTION INTRAVENOUS at 22:11

## 2022-02-04 RX ADMIN — OXYCODONE HYDROCHLORIDE 5 MG: 5 TABLET ORAL at 20:10

## 2022-02-04 RX ADMIN — CEFEPIME HYDROCHLORIDE 2000 MG: 2 INJECTION, POWDER, FOR SOLUTION INTRAVENOUS at 18:23

## 2022-02-04 RX ADMIN — ALPRAZOLAM 0.25 MG: 0.25 TABLET ORAL at 21:09

## 2022-02-04 RX ADMIN — ACETAMINOPHEN 650 MG: 325 TABLET, FILM COATED ORAL at 18:23

## 2022-02-04 NOTE — ED PROVIDER NOTES
History  Chief Complaint   Patient presents with    Altered Mental Status     Pt came in EMS reporting hes coming from home and has not been acting himself  Last chemo treatment was today  Pt combative  Blood in urine  81 yo male undergoing chemo and radiation for bladder CA who presents to ED with several days or progressively worsening confusion  Granddaughter provides history  Pt is confused and therefore unable to provide a complete history  Prior to Admission Medications   Prescriptions Last Dose Informant Patient Reported? Taking? ALPRAZolam (XANAX) 0 25 mg tablet   No No   Sig: Take 1 tablet (0 25 mg total) by mouth 2 (two) times a day as needed for anxiety   aspirin 81 mg chewable tablet  Self No No   Sig: Chew 1 tablet (81 mg total) daily   atorvastatin (LIPITOR) 40 mg tablet  Self No No   Sig: Take 1 tablet (40 mg total) by mouth daily   bisacodyl (DULCOLAX) 10 mg suppository  Self No No   Sig: Insert 1 suppository (10 mg total) into the rectum daily as needed for constipation   diltiazem (CARDIZEM CD) 240 mg 24 hr capsule  Self No No   Sig: Take 1 capsule (240 mg total) by mouth daily   diphenhydrAMINE (BENADRYL) 12 5 MG chewable tablet   No No   Sig: Chew 1 tablet (12 5 mg total) 4 (four) times a day as needed (Bladder spasm)   fluorouracil 4,900 mg in CADD infusion pump   No No   Sig: Infuse 4,900 mg (500 mg/m2/day x 1 96 m2) into a venous catheter over 120 hours for 5 days   magnesium citrate (CITROMA) 1 745 g/30 mL oral solution  Self No No   Sig: Take 296 mL by mouth once as needed (constipation) for up to 1 dose   naloxone (NARCAN) 4 mg/0 1 mL nasal spray   No No   Sig: Administer 1 spray into a nostril  If no response after 2-3 minutes, give another dose in the other nostril using a new spray     ondansetron (Zofran ODT) 8 mg disintegrating tablet   No No   Sig: Take 1 tablet (8 mg total) by mouth every 8 (eight) hours as needed for nausea or vomiting   oxyCODONE (ROXICODONE) 5 immediate release tablet   No No   Sig: Take 1 tablet (5 mg total) by mouth every 6 (six) hours as needed for severe pain for up to 10 days Max Daily Amount: 20 mg   oxyCODONE (Roxicodone) 5 immediate release tablet   No No   Sig: Take 1 tablet (5 mg total) by mouth every 6 (six) hours as needed for moderate pain Max Daily Amount: 20 mg   pantoprazole (PROTONIX) 40 mg tablet   No No   Sig: Take 1 tablet (40 mg total) by mouth every morning   polyethylene glycol (GOLYTELY) 4000 mL solution  Self No No   Sig: Take 4,000 mL by mouth once for 1 dose   Patient not taking: Reported on 1/27/2022    sodium chloride 0 9 % SOLN   No No   Sig: Infuse 10 mL into a venous catheter in the morning   sodium chloride, PF, 0 9 %   No No   Sig: 10 mL by Intracatheter route daily Intracatheter flushing daily   sodium chloride, PF, 0 9 %   No No   Sig: 10 mL by Intracatheter route daily Intracatheter flushing daily   warfarin (COUMADIN) 5 mg tablet   No No   Sig: Take 5 mg daily      Facility-Administered Medications: None       Past Medical History:   Diagnosis Date    [facility]     Arthritis     Benign prostatic hyperplasia without lower urinary tract symptoms     without Urinary Obstruction    Bladder cancer (HCC)     CAD (coronary artery disease)     Cancer (HCC)     Chronic obstructive lung disease (HCC)     Constipation 12/9/2021    Coronary arteriosclerosis     Depression     Emphysema lung (HCC)     GERD (gastroesophageal reflux disease)     Hyperlipidemia     Hypertension     Hyponatremia 1/15/2022    Irregular heart beat     Myocardial infarction (HCC)     Psoriasis     Requires supplemental oxygen     at bedtime during high humid days only    Stroke [facility])     TIA 1/2018       Past Surgical History:   Procedure Laterality Date    COLONOSCOPY      CORONARY ANGIOPLASTY  02/03/2001    PTCA of RCA    CORONARY ARTERY BYPASS GRAFT  02/07/2001    x4- [facility]    HERNIA REPAIR      IR NEPHROSTOMY TUBE PLACEMENT  1/18/2022    IR PORT PLACEMENT  1/14/2022    ND BRONCHOSCOPY,DIAGNOSTIC Left 1/7/2019    Procedure: Monisha Coy;  Surgeon: Paulino Rene MD;  [facility]; Service: Pulmonary    ND CYSTOURETHROSCOPY,FULGUR <0 5 CM LESN N/A 11/30/2021    Procedure: TRANSURETHRAL RESECTION OF BLADDER TUMOR (TURBT) with "large";  Surgeon: Shruthi Moore MD;  [facility];  Service: Urology    ND CYSTOURETHROSCOPY,URETER CATHETER Bilateral 11/30/2021    Procedure: Bincesare Degilmer;  Surgeon: Shruthi Moore MD;  [facility];  Service: Urology    ND Ameya Corpus INCIS Left 2/20/2018    Procedure: ENDARTERECTOMY ARTERY CAROTID WITH PATCH ANGIOPLASTY;  Surgeon: Edwin Gandhi MD;  [facility];  Service: Vascular    TRANSURETHRAL RESECTION OF PROSTATE         Family History   Problem Relation Age of Onset    Lung cancer Mother     Cancer Mother     Other Father         sepsis     I have reviewed and agree with the history as documented  E-Cigarette/Vaping    E-Cigarette Use Never User      E-Cigarette/Vaping Substances    Nicotine No     THC No     CBD No     Flavoring No     Other No     Unknown No      Social History     Tobacco Use    Smoking status: Former Smoker     Years: 1 00     Types: Cigarettes    Smokeless tobacco: Never Used    Tobacco comment: few cigarettes when playing cards  Vaping Use    Vaping Use: Never used   Substance Use Topics    Alcohol use: Yes     Comment: special occasions only wine   Drug use: No       Review of Systems   Unable to perform ROS: Mental status change       Physical Exam  Physical Exam  Vitals and nursing note reviewed  Constitutional:       General: He is not in acute distress  Appearance: Normal appearance  He is well-developed  He is not ill-appearing, toxic-appearing or diaphoretic  HENT:      Head: Normocephalic and atraumatic     Eyes:      Conjunctiva/sclera: Conjunctivae normal  Pupils: Pupils are equal, round, and reactive to light  Neck:      Vascular: No JVD  Cardiovascular:      Rate and Rhythm: Normal rate and regular rhythm  Heart sounds: Normal heart sounds  No murmur heard  No friction rub  No gallop  Pulmonary:      Effort: Pulmonary effort is normal  No respiratory distress  Breath sounds: Normal breath sounds  No stridor  No wheezing or rales  Abdominal:      General: There is no distension  Palpations: Abdomen is soft  Tenderness: There is no abdominal tenderness  There is no guarding or rebound  Comments: Hematuria noted in archibald catheter bag  L perc nephrostomy tube draining clear urine  Musculoskeletal:         General: No tenderness, deformity or signs of injury  Normal range of motion  Cervical back: Normal range of motion and neck supple  No rigidity or tenderness  Right lower leg: No edema  Left lower leg: No edema  Skin:     General: Skin is warm and dry  Capillary Refill: Capillary refill takes less than 2 seconds  Neurological:      General: No focal deficit present  Mental Status: He is alert  He is disoriented  Cranial Nerves: No cranial nerve deficit  Sensory: No sensory deficit  Motor: No weakness or abnormal muscle tone        Coordination: Coordination normal          Vital Signs  ED Triage Vitals [02/04/22 1750]   Temp Pulse Respirations Blood Pressure SpO2   -- 91 18 114/85 96 %      Temp src Heart Rate Source Patient Position - Orthostatic VS BP Location FiO2 (%)   -- Monitor -- -- --      Pain Score       --           Vitals:    02/04/22 1750   BP: 114/85   Pulse: 91         Visual Acuity      ED Medications  Medications   sodium chloride 0 9 % bolus 1,000 mL (has no administration in time range)       Diagnostic Studies  Results Reviewed     Procedure Component Value Units Date/Time    CBC and differential [[id number]] Collected: 02/04/22 1755    Lab Status: No result Specimen: Blood from Arm, Left     Protime-INR [[id number]] Collected: 02/04/22 1755    Lab Status: No result Specimen: Blood from Arm, Left     APTT [[id number]] Collected: 02/04/22 1755    Lab Status: No result Specimen: Blood from Arm, Left     COVID/FLU/RSV - 2 hour TAT [[id number]] Collected: 02/04/22 1755    Lab Status: No result Specimen: Nares from Nose     Blood culture #1 [[id number]] Collected: 02/04/22 1755    Lab Status: No result Specimen: Blood from Arm, Right     Blood culture #2 [[id number]] Collected: 02/04/22 1755    Lab Status: No result Specimen: Blood from Arm, Left     Urine culture [[id number]] Collected: 02/04/22 1755    Lab Status: No result Specimen: Urine, Indwelling Fortune Catheter     Basic metabolic panel [[id number]] Collected: 02/04/22 1755    Lab Status: No result Specimen: Blood from Arm, Left     Hepatic function panel [[id number]] Collected: 02/04/22 1755    Lab Status: No result Specimen: Blood from Arm, Left     Lactic acid [[id number]] Collected: 02/04/22 1755    Lab Status: No result Specimen: Blood from Arm, Left     HS Troponin 0hr (reflex protocol) [[id number]] Collected: 02/04/22 1755    Lab Status: No result Specimen: Blood from Arm, Left     UA (URINE) with reflex to Scope [[id number]] Collected: 02/04/22 1754    Lab Status: No result Specimen: Urine, Indwelling Fortune Catheter                  CT head without contrast    (Results Pending)              Procedures  ECG 12 Lead Documentation Only    Date/Time: 2/4/2022 6:00 PM  Performed by: Alisson Dunaway MD  Authorized by: Alisson Dunaway MD     Indications / Diagnosis:  Confusion  ECG reviewed by me, the ED Provider: yes    Patient location:  ED  Interpretation:     Interpretation: normal    Rate:     ECG rate:  90    ECG rate assessment: normal    Rhythm:     Rhythm: sinus rhythm    Ectopy:     Ectopy: none    Comments:      RBBB  Unchanged as compared to prior ekg                ED Course MDM    Disposition  Final diagnoses:   None     ED Disposition     None      Follow-up Information    None         Patient's Medications   Discharge Prescriptions    No medications on file       No discharge procedures on file      PDMP Review       Value Time User    PDMP Reviewed  Yes 12/29/2021  4:43 AM Danyell Metcalf MD          ED Provider  Electronically Signed by           Peyman Henriquez MD  02/04/22 8994

## 2022-02-04 NOTE — PROGRESS NOTES
2/4/2022  Hossein Rosas is a 80 y o  male  [id number]    Diagnosis:  Chief Complaint     Malignant neoplasm of urinary bladder, unspecified site [facility]          Patient presents for routine archibald change managed by Dr Sophy Saldana:  -Patient to follow up as scheduled archibald change  -patient to follow up as scheduled with Dr Jayla Caballero  Patient instructed to call with any questions or concerns in the meantime  Vitals:    02/04/22 1038   BP: 116/58   Pulse: 103   Weight: 77 6 kg (171 lb)   Height: 5' 6" (1 676 m)           Procedure:    Universal Protocol:  Consent: Verbal consent obtained  Risks and benefits: risks, benefits and alternatives were discussed  Consent given by: patient  Patient understanding: patient states understanding of the procedure being performed  Patient consent: the patient's understanding of the procedure matches consent given  Procedure consent: procedure consent matches procedure scheduled  Patient identity confirmed: verbally with patient      Bladder catheterization    Date/Time: 2/4/2022 12:45 PM  Performed by: Fish Denny  Authorized by: Jesus Ferrer MD     Consent:     Consent given by:  Patient  Universal protocol:     Procedure explained and questions answered to patient or proxy's satisfaction: yes      Patient identity confirmed:  Verbally with patient  Pre-procedure details:     Procedure purpose:  Therapeutic    Preparation: Patient was prepped and draped in usual sterile fashion    Anesthesia (see MAR for exact dosages): Anesthesia method:  None  Procedure details:     Bladder irrigation: no      Catheter insertion:  Indwelling    Approach: natural orifice      Catheter type:  Latex and Archibald    Catheter size:  18 Fr    Number of attempts:  1    Successful placement: yes      Urine characteristics:  Clear  Post-procedure details:     Patient tolerance of procedure:   Tolerated well, no immediate complications  Comments:      Removed archibald after deflation of intact balloon  Inserted new archibadl after patient was prepped and draped  Archibald balloon inflated with 10mL sterile water   Stat-lock and leg bag attached            Bertha Mcarthur LPN

## 2022-02-05 LAB
ANION GAP SERPL CALCULATED.3IONS-SCNC: 8 MMOL/L (ref 4–13)
BASOPHILS # BLD AUTO: 0.02 THOUSANDS/ΜL (ref 0–0.1)
BASOPHILS NFR BLD AUTO: 0 % (ref 0–1)
BUN SERPL-MCNC: 21 MG/DL (ref 5–25)
CALCIUM SERPL-MCNC: 8.5 MG/DL (ref 8.3–10.1)
CHLORIDE SERPL-SCNC: 99 MMOL/L (ref 100–108)
CO2 SERPL-SCNC: 25 MMOL/L (ref 21–32)
CREAT SERPL-MCNC: 1.29 MG/DL (ref 0.6–1.3)
EOSINOPHIL # BLD AUTO: 0 THOUSAND/ΜL (ref 0–0.61)
EOSINOPHIL NFR BLD AUTO: 0 % (ref 0–6)
ERYTHROCYTE [DISTWIDTH] IN BLOOD BY AUTOMATED COUNT: 20.5 % (ref 11.6–15.1)
GFR SERPL CREATININE-BSD FRML MDRD: 51 ML/MIN/1.73SQ M
GLUCOSE SERPL-MCNC: 113 MG/DL (ref 65–140)
HCT VFR BLD AUTO: 25.3 % (ref 36.5–49.3)
HGB BLD-MCNC: 7.7 G/DL (ref 12–17)
IMM GRANULOCYTES # BLD AUTO: 0.35 THOUSAND/UL (ref 0–0.2)
IMM GRANULOCYTES NFR BLD AUTO: 2 % (ref 0–2)
LYMPHOCYTES # BLD AUTO: 0.45 THOUSANDS/ΜL (ref 0.6–4.47)
LYMPHOCYTES NFR BLD AUTO: 3 % (ref 14–44)
MCH RBC QN AUTO: 22.6 PG (ref 26.8–34.3)
MCHC RBC AUTO-ENTMCNC: 30.4 G/DL (ref 31.4–37.4)
MCV RBC AUTO: 74 FL (ref 82–98)
MONOCYTES # BLD AUTO: 0.39 THOUSAND/ΜL (ref 0.17–1.22)
MONOCYTES NFR BLD AUTO: 2 % (ref 4–12)
NEUTROPHILS # BLD AUTO: 15.11 THOUSANDS/ΜL (ref 1.85–7.62)
NEUTS SEG NFR BLD AUTO: 93 % (ref 43–75)
NRBC BLD AUTO-RTO: 0 /100 WBCS
PLATELET # BLD AUTO: 218 THOUSANDS/UL (ref 149–390)
PMV BLD AUTO: 9.5 FL (ref 8.9–12.7)
POTASSIUM SERPL-SCNC: 4.2 MMOL/L (ref 3.5–5.3)
RBC # BLD AUTO: 3.4 MILLION/UL (ref 3.88–5.62)
SODIUM SERPL-SCNC: 132 MMOL/L (ref 136–145)
WBC # BLD AUTO: 16.32 THOUSAND/UL (ref 4.31–10.16)

## 2022-02-05 PROCEDURE — 85025 COMPLETE CBC W/AUTO DIFF WBC: CPT

## 2022-02-05 PROCEDURE — 80048 BASIC METABOLIC PNL TOTAL CA: CPT

## 2022-02-05 PROCEDURE — 99232 SBSQ HOSP IP/OBS MODERATE 35: CPT | Performed by: NURSE PRACTITIONER

## 2022-02-05 PROCEDURE — 84300 ASSAY OF URINE SODIUM: CPT

## 2022-02-05 PROCEDURE — 36415 COLL VENOUS BLD VENIPUNCTURE: CPT

## 2022-02-05 PROCEDURE — 83935 ASSAY OF URINE OSMOLALITY: CPT

## 2022-02-05 RX ORDER — OLANZAPINE 10 MG/1
2.5 INJECTION, POWDER, LYOPHILIZED, FOR SOLUTION INTRAMUSCULAR ONCE
Status: DISCONTINUED | OUTPATIENT
Start: 2022-02-05 | End: 2022-02-12

## 2022-02-05 RX ORDER — ACETAMINOPHEN 650 MG/1
650 SUPPOSITORY RECTAL ONCE
Status: COMPLETED | OUTPATIENT
Start: 2022-02-05 | End: 2022-02-05

## 2022-02-05 RX ORDER — MAGNESIUM HYDROXIDE/ALUMINUM HYDROXICE/SIMETHICONE 120; 1200; 1200 MG/30ML; MG/30ML; MG/30ML
30 SUSPENSION ORAL EVERY 4 HOURS PRN
Status: DISCONTINUED | OUTPATIENT
Start: 2022-02-05 | End: 2022-02-17 | Stop reason: HOSPADM

## 2022-02-05 RX ADMIN — VANCOMYCIN HYDROCHLORIDE 1500 MG: 1 INJECTION, POWDER, LYOPHILIZED, FOR SOLUTION INTRAVENOUS at 18:14

## 2022-02-05 RX ADMIN — ACETAMINOPHEN 650 MG: 325 TABLET, FILM COATED ORAL at 14:41

## 2022-02-05 RX ADMIN — CEFEPIME HYDROCHLORIDE 2000 MG: 2 INJECTION, POWDER, FOR SOLUTION INTRAVENOUS at 20:08

## 2022-02-05 RX ADMIN — ACETAMINOPHEN 650 MG: 325 TABLET, FILM COATED ORAL at 22:55

## 2022-02-05 RX ADMIN — SODIUM CHLORIDE 50 ML/HR: 9 INJECTION, SOLUTION INTRAVENOUS at 00:52

## 2022-02-05 RX ADMIN — ALUMINA, MAGNESIA, AND SIMETHICONE ORAL SUSPENSION REGULAR STRENGTH 30 ML: 1200; 1200; 120 SUSPENSION ORAL at 18:32

## 2022-02-05 RX ADMIN — CEFEPIME HYDROCHLORIDE 2000 MG: 2 INJECTION, POWDER, FOR SOLUTION INTRAVENOUS at 06:06

## 2022-02-05 RX ADMIN — ACETAMINOPHEN 650 MG: 650 SUPPOSITORY RECTAL at 00:45

## 2022-02-05 NOTE — ASSESSMENT & PLAN NOTE
Lab Results   Component Value Date    EGFR 51 02/05/2022    EGFR 46 02/04/2022    EGFR 38 01/28/2022    CREATININE 1 29 02/05/2022    CREATININE 1 41 (H) 02/04/2022    CREATININE 1 64 (H) 01/28/2022     · Stable with trend as noted above; appears to be improving from recent DULCE, recent baseline difficult to establish  · Avoid nephrotoxic agents  · Discontinue IVF on 2/5  · Monitor on an as needed basis

## 2022-02-05 NOTE — ED NOTES
Patient attempting to get out of bed  Patient stating "I need to sit up and get out of this bed " Patient made aware at this time he is not stable to get out of bed  Patient readjusted in bed, bed alarm placed for safety  Patient continues to state "I need my therapy, I cannot miss my treatment " Provider made aware        Marianne Narayan RN  02/05/22 [id number]

## 2022-02-05 NOTE — ASSESSMENT & PLAN NOTE
· Currently complaining of back pain  · Is prescribed Oxycodone 5mg q6h prn as outpatient, will continue for time being as patient is complaining of pain, however may be cause of AMS as above if AMS not improving with SEPSIS treatment   · Continue home oxy and monitor mental status

## 2022-02-05 NOTE — ASSESSMENT & PLAN NOTE
· Currently rate controlled  · INR 4 26; protime 38 7  · Continue home Cardizem, holding home warfarin in the setting of supratherapeutic INR  · Monitor HR overnight and trend INR

## 2022-02-05 NOTE — ED NOTES
Patient requesting to speak to doctor repeatedly states "I have to go for my chemo treatment!  " Patient redirected and made aware Provider was notified        Jose Cruz Acosta RN  02/05/22 1200

## 2022-02-05 NOTE — ASSESSMENT & PLAN NOTE
· Patient has archibald catheter and L perc nephrostomy tube in the setting of bladder CA as above   · Both producing urine   · Archibald was replaced today  · Archibald with small amount of bloody output, monitor in the setting of supratherapeutic INR  · Continue archibald and nephrostomy care

## 2022-02-05 NOTE — ASSESSMENT & PLAN NOTE
· Currently prescribed coumadin 5mg daily  · INR 4 26  · Will hold home coumadin for now  · Trend INR

## 2022-02-05 NOTE — ASSESSMENT & PLAN NOTE
· Currently complaining of back pain; resolved on 2/5 as patient denies   · Prescribed Oxycodone 5mg q6h prn as outpatient  · Home Xanax and oxycodone currently on hold given AMS

## 2022-02-05 NOTE — ASSESSMENT & PLAN NOTE
· CT head negative for acute process  · Not hypoglycemic, see hyponatremia as below  · Possibly in the setting of SEPSIS UTI as above vs systemic chemotherapy vs outpatient narcotic use   · Monitor mentation and see SEPSIS A/P as above

## 2022-02-05 NOTE — PLAN OF CARE
Problem: Potential for Falls  Goal: Patient will remain free of falls  Description: INTERVENTIONS:  - Educate patient/family on patient safety including physical limitations  - Instruct patient to call for assistance with activity   - Consult OT/PT to assist with strengthening/mobility   - Keep Call bell within reach  - Keep bed low and locked with side rails adjusted as appropriate  - Keep care items and personal belongings within reach  - Initiate and maintain comfort rounds  - Make Fall Risk Sign visible to staff  - Offer Toileting every 2 Hours, in advance of need  - Initiate/Maintain bed alarm  - Obtain necessary fall risk management equipment: chair alarm  - Apply yellow socks and bracelet for high fall risk patients  - Consider moving patient to room near nurses station  Outcome: Progressing     Problem: MOBILITY - ADULT  Goal: Maintain or return to baseline ADL function  Description: INTERVENTIONS:  -  Assess patient's ability to carry out ADLs; assess patient's baseline for ADL function and identify physical deficits which impact ability to perform ADLs (bathing, care of mouth/teeth, toileting, grooming, dressing, etc )  - Assess/evaluate cause of self-care deficits   - Assess range of motion  - Assess patient's mobility; develop plan if impaired  - Assess patient's need for assistive devices and provide as appropriate  - Encourage maximum independence but intervene and supervise when necessary  - Involve family in performance of ADLs  - Assess for home care needs following discharge   - Consider OT consult to assist with ADL evaluation and planning for discharge  - Provide patient education as appropriate  Outcome: Progressing  Goal: Maintains/Returns to pre admission functional level  Description: INTERVENTIONS:  - Perform BMAT or MOVE assessment daily    - Set and communicate daily mobility goal to care team and patient/family/caregiver     - Collaborate with rehabilitation services on mobility goals if consulted  - Perform Range of Motion 3 times a day  - Reposition patient every 3 hours    - Dangle patient 3 times a day  - Stand patient 3 times a day  - Ambulate patient 3 times a day  - Out of bed to chair 3 times a day   - Out of bed for meals 3 times a day  - Out of bed for toileting  - Record patient progress and toleration of activity level   Outcome: Progressing     Problem: PAIN - ADULT  Goal: Verbalizes/displays adequate comfort level or baseline comfort level  Description: Interventions:  - Encourage patient to monitor pain and request assistance  - Assess pain using appropriate pain scale  - Administer analgesics based on type and severity of pain and evaluate response  - Implement non-pharmacological measures as appropriate and evaluate response  - Consider cultural and social influences on pain and pain management  - Notify physician/advanced practitioner if interventions unsuccessful or patient reports new pain  Outcome: Progressing     Problem: INFECTION - ADULT  Goal: Absence or prevention of progression during hospitalization  Description: INTERVENTIONS:  - Assess and monitor for signs and symptoms of infection  - Monitor lab/diagnostic results  - Monitor all insertion sites, i e  indwelling lines, tubes, and drains  - Monitor endotracheal if appropriate and nasal secretions for changes in amount and color  - [place] appropriate cooling/warming therapies per order  - Administer medications as ordered  - Instruct and encourage patient and family to use good hand hygiene technique  - Identify and instruct in appropriate isolation precautions for identified infection/condition  Outcome: Progressing  Goal: Absence of fever/infection during neutropenic period  Description: INTERVENTIONS:  - Monitor WBC    Outcome: Progressing     Problem: SAFETY ADULT  Goal: Patient will remain free of falls  Description: INTERVENTIONS:  - Educate patient/family on patient safety including physical limitations  - Instruct patient to call for assistance with activity   - Consult OT/PT to assist with strengthening/mobility   - Keep Call bell within reach  - Keep bed low and locked with side rails adjusted as appropriate  - Keep care items and personal belongings within reach  - Initiate and maintain comfort rounds  - Make Fall Risk Sign visible to staff  - Offer Toileting every 2 Hours, in advance of need  - Initiate/Maintain bed alarm  - Obtain necessary fall risk management equipment: chair alarm  - Apply yellow socks and bracelet for high fall risk patients  - Consider moving patient to room near nurses station  Outcome: Progressing  Goal: Maintain or return to baseline ADL function  Description: INTERVENTIONS:  -  Assess patient's ability to carry out ADLs; assess patient's baseline for ADL function and identify physical deficits which impact ability to perform ADLs (bathing, care of mouth/teeth, toileting, grooming, dressing, etc )  - Assess/evaluate cause of self-care deficits   - Assess range of motion  - Assess patient's mobility; develop plan if impaired  - Assess patient's need for assistive devices and provide as appropriate  - Encourage maximum independence but intervene and supervise when necessary  - Involve family in performance of ADLs  - Assess for home care needs following discharge   - Consider OT consult to assist with ADL evaluation and planning for discharge  - Provide patient education as appropriate  Outcome: Progressing  Goal: Maintains/Returns to pre admission functional level  Description: INTERVENTIONS:  - Perform BMAT or MOVE assessment daily    - Set and communicate daily mobility goal to care team and patient/family/caregiver  - Collaborate with rehabilitation services on mobility goals if consulted  - Perform Range of Motion 3 times a day  - Reposition patient every 3 hours    - Dangle patient 3 times a day  - Stand patient 3 times a day  - Ambulate patient 3 times a day  - Out of bed to chair 3 times a day   - Out of bed for meals 3 times a day  - Out of bed for toileting  - Record patient progress and toleration of activity level   Outcome: Progressing     Problem: DISCHARGE PLANNING  Goal: Discharge to home or other facility with appropriate resources  Description: INTERVENTIONS:  - Identify barriers to discharge w/patient and caregiver  - Arrange for needed discharge resources and transportation as appropriate  - Identify discharge learning needs (meds, wound care, etc )  - Arrange for interpretive services to assist at discharge as needed  - Refer to Case Management Department for coordinating discharge planning if the patient needs post-hospital services based on physician/advanced practitioner order or complex needs related to functional status, cognitive ability, or social support system  Outcome: Progressing     Problem: Knowledge Deficit  Goal: Patient/family/caregiver demonstrates understanding of disease process, treatment plan, medications, and discharge instructions  Description: Complete learning assessment and assess knowledge base    Interventions:  - Provide teaching at level of understanding  - Provide teaching via preferred learning methods  Outcome: Progressing     Problem: GENITOURINARY - ADULT  Goal: Urinary catheter remains patent  Description: INTERVENTIONS:  - Assess patency of urinary catheter  - If patient has a chronic archibald, consider changing catheter if non-functioning  - Follow guidelines for intermittent irrigation of non-functioning urinary catheter  Outcome: Progressing     Problem: SKIN/TISSUE INTEGRITY - ADULT  Goal: Incision(s), wounds(s) or drain site(s) healing without S/S of infection  Description: INTERVENTIONS  - Assess and document dressing, incision, wound bed, drain sites and surrounding tissue  - Provide patient and family education  - Perform skin care/dressing changes every as ordered  Outcome: Progressing     Problem: HEMATOLOGIC - ADULT  Goal: Maintains hematologic stability  Description: INTERVENTIONS  - Assess for signs and symptoms of bleeding or hemorrhage  - Monitor labs  - Administer supportive blood products/factors as ordered and appropriate  Outcome: Progressing

## 2022-02-05 NOTE — ASSESSMENT & PLAN NOTE
· Currently rate controlled  · INR 4 26  · Continue home Cardizem, holding home warfarin in the setting of supratherapeutic INR  · Monitor HR overnight and trend INR

## 2022-02-05 NOTE — ASSESSMENT & PLAN NOTE
· Sodium 127  · Possibly in the setting of dehydration with granddaughter reporting decreased oral intake 2/2 chemo/radiation  · Will check urine sodium and urine osmolality  · Continuous gentle IV NS hydration overnight   · AM BMP

## 2022-02-05 NOTE — ASSESSMENT & PLAN NOTE
· With archibald catheter and L perc nephrostomy tube in the setting of bladder CA as above   · Both producing urine   · Archibald was replaced on day of admission   · Archibald with small amount of bloody output, monitor in the setting of supratherapeutic INR  · Continue archibald and nephrostomy care

## 2022-02-05 NOTE — ASSESSMENT & PLAN NOTE
Patient with history of bladder cancer, currently on systemic chemotherapy, experiencing AMS so accurate ROS unable to be obtained from patient  Granddaughter reports several days of progressive confusion and weakness and was instructed to take patient to ED by infusion staff today for these symptoms  /56   Pulse 85   Temp 99 6 °F (37 6 °C) (Oral)   Resp 20   Wt 77 6 kg (171 lb 1 2 oz)   SpO2 96%   BMI 27 61 kg/m²     · Meets sepsis criteria on admission (SIRS + UTI)  · UA suggestive of UTI; cultures pending  · Was 103  1F in ED, currently 99 6F; /56 HR85  · WBC 17 95; ANC WNL;  Lactic acid 2 4; COVID negative   · ED gave cefepime + vanco, Tylenol, and 2 4L IVF bolus  · Possible chronic colonization in the setting of archibald catheter and nephrostomy; however in the setting of AMS and meeting SEPSIS criteria will treat  · Patient has allergy to PCN's, but appears to be without issue on cefepime, monitor for allergic s/s   · Continue cefepime + vanco, trend WBC and follow up with pending infectious labs/cultures

## 2022-02-05 NOTE — ASSESSMENT & PLAN NOTE
· CT head negative for acute process  · Not hypoglycemic, see hyponatremia as below  · Possibly in the setting of sepsis/UTI as above vs systemic chemotherapy vs outpatient narcotic use   · Narcotics and benzos prescribed OP on hold   · IM Zyprexa ordered X1  · Persistently refusing medications   · Delirium precautions   · Supportive measures

## 2022-02-05 NOTE — PROGRESS NOTES
[facility]  Progress Note - Rehana Fletcher 1940, 80 y o  male MRN: [id number]  Unit/Bed#: -Mitch Encounter: [id number]  Primary Care Provider: Joana Berman MD   Date and time admitted to hospital: 2/4/2022  5:41 PM    * Sepsis [facility])  Assessment & Plan  Background: With hx of bladder cancer, currently on systemic chemotherapy, brought in by  Granddaughter who reported several days of progressive confusion and weakness and was instructed to take patient to ED by infusion staff today for these symptoms  · Meets sepsis criteria on admission (SIRS + UTI)  · UA suggestive of UTI; cultures pending (possible colonization however)   · 103 1F in ED; afebrile since   · WBC 17 95; ANC WNL;  Lactic acid 2 4; COVID negative   · Lactic since cleared S/P fluid boluses  · WBC trending down appropriately   · S/P cefepime + vanco in ED; continue pending above cultures   · Possible chronic colonization in the setting of archibald catheter and nephrostomy; however in the setting of AMS and meeting SEPSIS criteria will treat as above  · Blood cultures pending   · Fortunately has remained hemodynamically stable     Bladder cancer [facility])  Assessment & Plan  · 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, diagnosed 10/12/2021  · Follows with heme onc and urology as an outpatient  · Receives chemo and radiation; last received on day of admission  · Continue outpatient heme onc and urology f/u upon discharge     Altered mental status  Assessment & Plan  · CT head negative for acute process  · Not hypoglycemic, see hyponatremia as below  · Possibly in the setting of sepsis/UTI as above vs systemic chemotherapy vs outpatient narcotic use   · Narcotics and benzos prescribed OP on hold   · IM Zyprexa ordered X1  · Persistently refusing medications   · Delirium precautions   · Supportive measures     Supratherapeutic INR  Assessment & Plan  · Currently prescribed coumadin 5mg daily  · INR 4 26  · Will hold home coumadin for now; possibly to resume at decreased dose pending result   · Repeat INR pending  · Continue to trend     Lab Results   Component Value Date    INR 4 26 (H) 02/04/2022    INR 1 88 (H) 01/28/2022         Archibald catheter in place prior to arrival  Assessment & Plan  · With archibald catheter and L perc nephrostomy tube in the setting of bladder CA as above   · Both producing urine   · Archibald was replaced on day of admission   · Archibald with small amount of bloody output, monitor in the setting of supratherapeutic INR  · Continue archibald and nephrostomy care     Hyponatremia  Assessment & Plan  · Sodium 127; further trend as below   · Possibly in the setting of dehydration with granddaughter reporting decreased oral intake 2/2 chemo/radiation  · Urine sodium and urine osmolality pending   · Improved with IVF; suspect secondary to poor PO intake   · AM BMP    Lab Results   Component Value Date    SODIUM 132 (L) 02/05/2022    SODIUM 127 (L) 02/04/2022    SODIUM 134 (L) 01/28/2022         Anemia  Assessment & Plan  · HGB 7 8; appears to be chronic issue with baseline around 8-9  · Small amount of bloody urine in archibald bag   · Further trend as noted below  · Monitor closely given supratherapeutic INR    Lab Results   Component Value Date    HGB 7 7 (L) 02/05/2022    HGB 7 8 (L) 02/04/2022    HGB 8 8 (L) 01/28/2022         Stage 3a chronic kidney disease [facility])  Assessment & Plan  Lab Results   Component Value Date    EGFR 51 02/05/2022    EGFR 46 02/04/2022    EGFR 38 01/28/2022    CREATININE 1 29 02/05/2022    CREATININE 1 41 (H) 02/04/2022    CREATININE 1 64 (H) 01/28/2022     · Stable with trend as noted above; appears to be improving from recent DULCE, recent baseline difficult to establish  · Avoid nephrotoxic agents  · Discontinue IVF on 2/5  · Monitor on an as needed basis     Atrial fibrillation (HCC)  Assessment & Plan  · Currently rate controlled  · INR 4 26  · Continue home Cardizem, holding home warfarin in the setting of supratherapeutic INR  · Monitor HR overnight and trend INR    COPD, severe (HCC)  Assessment & Plan  · 96%O2 RA; no SOB complaints  · Monitor O2 overnight     Back pain  Assessment & Plan  · Currently complaining of back pain; resolved on  as patient denies   · Prescribed Oxycodone 5mg q6h prn as outpatient  · Home Xanax and oxycodone currently on hold given AMS    Hyperlipidemia  Assessment & Plan  · Continue home atorvastatin    Hypertension  Assessment & Plan  · Blood pressure acceptable with SBP in the 100-130s   · Continue home Cardizem  · Monitor BP per unit protocol    CAD (coronary atherosclerotic disease)  Assessment & Plan  · S/p CABG x4  · Denies chest pain; Troponin 37  · Continue home ASA and statin, holding home coumadin in the setting of supratherapeutic INR         VTE Pharmacologic Prophylaxis: VTE Score: 6 on hold given supratherapeutic INR     Patient Centered Rounds: I performed bedside rounds with nursing staff today  Discussions with Specialists or Other Care Team Provider: nursing staff and CM     Education and Discussions with Family / Patient: Attempted to update  (granddaughter Elpidio Benitez ) via phone  Left voicemail  Time Spent for Care: 30 minutes  More than 50% of total time spent on counseling and coordination of care as described above  Current Length of Stay: 1 day(s)  Current Patient Status: Inpatient   Certification Statement: The patient will continue to require additional inpatient hospital stay due to IV abx, pending cultures, and therapy evaluations   Discharge Plan: Anticipate discharge in 48-72 hrs to pending therapy evaluations     Code Status: Level 1 - Full Code    Subjective:   Patient extremely agitated and requesting to leave  He is very confused and denies and N/V/D, back pain, CP and just wants to leave       Objective:     Vitals:   Temp (24hrs), Av 3 °F (37 9 °C), Min:98 3 °F (36 8 °C), Max:103 1 °F (39 5 °C)    Temp:  [98 3 °F (36 8 °C)-103 1 °F (39 5 °C)] 98 3 °F (36 8 °C)  HR:  [72-96] 92  Resp:  [18-25] 20  BP: ()/(50-85) 105/85  SpO2:  [93 %-98 %] 98 %  Body mass index is 27 61 kg/m²  Input and Output Summary (last 24 hours): Intake/Output Summary (Last 24 hours) at 2/5/2022 1309  Last data filed at 2/5/2022 0401  Gross per 24 hour   Intake 1400 ml   Output 840 ml   Net 560 ml       Physical Exam:   Physical Exam  Constitutional:       Appearance: He is not ill-appearing or diaphoretic  Skin:     General: Skin is warm  Capillary Refill: Capillary refill takes less than 2 seconds  Neurological:      Mental Status: He is alert  He is disoriented  Psychiatric:         Mood and Affect: Affect is angry  Behavior: Behavior is uncooperative and agitated  Additional Data:     Labs:  Results from last 7 days   Lab Units 02/05/22  0520 02/04/22 1755 02/04/22  1755   WBC Thousand/uL 16 32*   < > 17 95*   HEMOGLOBIN g/dL 7 7*   < > 7 8*   HEMATOCRIT % 25 3*   < > 26 9*   PLATELETS Thousands/uL 218   < > 317   BANDS PCT %  --   --  4   NEUTROS PCT % 93*  --   --    LYMPHS PCT % 3*  --   --    LYMPHO PCT %  --   --  2*   MONOS PCT % 2*  --   --    MONO PCT %  --   --  0*   EOS PCT % 0  --  0    < > = values in this interval not displayed  Results from last 7 days   Lab Units 02/05/22  0719 02/04/22 1755 02/04/22  1755   SODIUM mmol/L 132*   < > 127*   POTASSIUM mmol/L 4 2   < > 4 5   CHLORIDE mmol/L 99*   < > 95*   CO2 mmol/L 25   < > 26   BUN mg/dL 21   < > 23   CREATININE mg/dL 1 29   < > 1 41*   ANION GAP mmol/L 8   < > 6   CALCIUM mg/dL 8 5   < > 8 6   ALBUMIN g/dL  --   --  3 0*   TOTAL BILIRUBIN mg/dL  --   --  1 83*   ALK PHOS U/L  --   --  73   ALT U/L  --   --  17   AST U/L  --   --  15   GLUCOSE RANDOM mg/dL 113   < > 139    < > = values in this interval not displayed       Results from last 7 days   Lab Units 02/04/22  1755   INR  4 26*             Results from last 7 days   Lab Units 02/04/22 2034 02/04/22  1755   LACTIC ACID mmol/L 1 0 2 4*       Lines/Drains:  Invasive Devices  Report    Central Venous Catheter Line            Port A Cath 01/14/22 Right Internal jugular 22 days          Peripheral Intravenous Line            Peripheral IV 02/04/22 Left Antecubital <1 day          Line            Pump Device Continuous ambulatory delivery device Right Chest 5 days          Drain            Urethral Catheter Non-latex 16 Fr  38 days    Percutaneous Nephroureteral Tube (PCNU) Left 1 8 5 Fr 26 Cm 17 days    Percutaneous Nephroureteral Tube (PCNU) Right 2 8 5 Fr 24 Cm 17 days              Urinary Catheter:  Goal for removal: N/A - Chronic [place]         Central Line:  Goal for removal: No longer needed  Will place order to discontinue  N/A - Chronic PICC             Imaging: Reviewed radiology reports from this admission including: chest xray, abdominal/pelvic CT and CT head    Recent Cultures (last 7 days):   Results from last 7 days   Lab Units 02/04/22  1755   BLOOD CULTURE  Received in Microbiology Lab  Culture in Progress  Received in Microbiology Lab  Culture in Progress         Last 24 Hours Medication List:   Current Facility-Administered Medications   Medication Dose Route Frequency Provider Last Rate    acetaminophen  650 mg Oral Q4H PRN Josiah Adam PA-C      aspirin  81 mg Oral Daily [place], [place]      atorvastatin  40 mg Oral Daily [place], [place]      bisacodyl  10 mg Rectal Daily PRN [facility]      cefepime  2,000 mg Intravenous Q12H [place], [place] Stopped (02/05/22 1719)   Danna Lindsey diltiazem  240 mg Oral Daily [place], [place]      diphenhydrAMINE  12 5 mg Oral Q4H PRN Josiah Adam PA-C      OLANZapine  2 5 mg Intramuscular Once [place], CRNP      ondansetron  4 mg Intravenous Q6H PRN [facility]      pantoprazole  40 mg Oral QAM [place], [place]      vancomycin  20 mg/kg Intravenous Q24H Josiah Adam PA-C          Today, Patient Was Seen By: Joyce Bence, CRNP    **Please Note: This note may have been constructed using a voice recognition system  **

## 2022-02-05 NOTE — ASSESSMENT & PLAN NOTE
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

## 2022-02-05 NOTE — ASSESSMENT & PLAN NOTE
· S/p CABG x4  · Denies chest pain; Troponin 37  · Continue home ASA and statin, holding home coumadin in the setting of supratherapeutic INR

## 2022-02-05 NOTE — H&P
[address] [facility]  H&P- Munir Layton 1940, 80 y o  male MRN: [id number]  Unit/Bed#: ED 25 Encounter: [id number]  Primary Care Provider: Mohan Oliva MD   Date and time admitted to hospital: 2/4/2022  5:41 PM    * Sepsis [facility])  Assessment & Plan  Patient with history of bladder cancer, currently on systemic chemotherapy, experiencing AMS so accurate ROS unable to be obtained from patient  Granddaughter reports several days of progressive confusion and weakness and was instructed to take patient to ED by infusion staff today for these symptoms  /56   Pulse 85   Temp 99 6 °F (37 6 °C) (Oral)   Resp 20   Wt 77 6 kg (171 lb 1 2 oz)   SpO2 96%   BMI 27 61 kg/m²     · Meets sepsis criteria on admission (SIRS + UTI)  · UA suggestive of UTI; cultures pending  · Was 103  1F in ED, currently 99 6F; /56 HR85  · WBC 17 95; ANC WNL;  Lactic acid 2 4; COVID negative   · ED gave cefepime + vanco, Tylenol, and 2 4L IVF bolus  · Possible chronic colonization in the setting of archibald catheter and nephrostomy; however in the setting of AMS and meeting SEPSIS criteria will treat  · Patient has allergy to PCN's, but appears to be without issue on cefepime, monitor for allergic s/s   · Continue cefepime + vanco, trend WBC and follow up with pending infectious labs/cultures     Altered mental status  Assessment & Plan  · CT head negative for acute process  · Not hypoglycemic, see hyponatremia as below  · Possibly in the setting of SEPSIS UTI as above vs systemic chemotherapy vs outpatient narcotic use   · Monitor mentation and see SEPSIS A/P as above     Hyponatremia  Assessment & Plan  · Sodium 127  · Possibly in the setting of dehydration with granddaughter reporting decreased oral intake 2/2 chemo/radiation  · Will check urine sodium and urine osmolality  · Continuous gentle IV NS hydration overnight   · AM BMP    Bladder cancer (HCC)  Assessment & Plan  · Has 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, diagnosed 10/12/2021  · Follows with heme onc and urology as an outpatient  · Receives chemo and radiation; last received today  · Continue outpatient heme onc and urology f/u    Stage 3a chronic kidney disease [facility])  Assessment & Plan  Lab Results   Component Value Date    EGFR 46 02/04/2022    EGFR 38 01/28/2022    EGFR 45 01/23/2022    CREATININE 1 41 (H) 02/04/2022    CREATININE 1 64 (H) 01/28/2022    CREATININE 1 42 (H) 01/23/2022     · Creatinine 1 41; appears to be improving from recent DULCE, recent baseline difficult to establish  · Avoid nephrotoxic agents  · Continuous gentle IVF hydration overnight   · AM BMP    Atrial fibrillation (HCC)  Assessment & Plan  · Currently rate controlled  · INR 4 26; protime 38 7  · Continue home Cardizem, holding home warfarin in the setting of supratherapeutic INR  · Monitor HR overnight and trend INR    CAD (coronary atherosclerotic disease)  Assessment & Plan  · S/p CABG x4  · Denies chest pain; Troponin 37  · Continue home ASA and statin, holding home coumadin in the setting of supratherapeutic INR     Archibald catheter in place prior to arrival  Assessment & Plan  · Patient has archibald catheter and L perc nephrostomy tube in the setting of bladder CA as above   · Both producing urine   · Archibald was replaced today  · Archibald with small amount of bloody output, monitor in the setting of supratherapeutic INR  · Continue archibald and nephrostomy care     Back pain  Assessment & Plan  · Currently complaining of back pain  · Is prescribed Oxycodone 5mg q6h prn as outpatient, will continue for time being as patient is complaining of pain, however may be cause of AMS as above if AMS not improving with SEPSIS treatment   · Continue home oxy and monitor mental status     Anemia  Assessment & Plan  · HGB 7 8; appears to be chronic issue with baseline around 8-9  · Small amount of bloody urine in archibald bag   · Monitor bloody output from archibald, and for other sources of bleeding  · Trend CBC    Supratherapeutic INR  Assessment & Plan  · Currently prescribed coumadin 5mg daily  · INR 4 26  · Will hold home coumadin for now  · Trend INR    Hypertension  Assessment & Plan  · /56 HR 85  · Continue home Cardizem  · Monitor BP per unit protocol    COPD, severe (HCC)  Assessment & Plan  · 96%O2 RA; no SOB complaints  · Monitor O2 overnight     Hyperlipidemia  Assessment & Plan  · Continue home atorvastatin    VTE Pharmacologic Prophylaxis: VTE Score: 6 High Risk (Score >/= 5) - Pharmacological DVT Prophylaxis Contraindicated  Sequential Compression Devices Ordered  (Holding home coumadin temporarily)  Code Status: Level 1 - Full Code Level 1 Full Code   Discussion with family: Updated  (granddaughter ) at bedside  Anticipated Length of Stay: Patient will be admitted on an inpatient basis with an anticipated length of stay of greater than 2 midnights secondary to SEPSIS and AMS  Total Time for Visit, including Counseling / Coordination of Care: 60 minutes Greater than 50% of this total time spent on direct patient counseling and coordination of care  Chief Complaint: AMS    History of Present Illness: Gena Emery is a 80 y o  male with a PMH of CAD s/p CABG x4, CKD, Afib, COPD, stroke hx, bladder cancer hx, HTN, and HLP who presents with AMS  Patient with history of bladder cancer, currently on systemic chemotherapy, experiencing AMS so accurate ROS unable to be obtained from patient  Granddaughter reports several days of progressive confusion and weakness and was instructed to take patient to ED by infusion staff today for these symptoms  All questions answered at the bedside to the family's satisfaction      Review of Systems:  Review of Systems   Unable to perform ROS: Mental status change       Past Medical and Surgical History:   Past Medical History:   Diagnosis Date    A-fib ([facility] [address] )     Arthritis     Benign prostatic hyperplasia without lower urinary tract symptoms     without Urinary Obstruction    Bladder cancer (HCC)     CAD (coronary artery disease)     Cancer (HCC)     Chronic obstructive lung disease (HCC)     Constipation 12/9/2021    Coronary arteriosclerosis     Depression     Emphysema lung (HCC)     GERD (gastroesophageal reflux disease)     Hyperlipidemia     Hypertension     Hyponatremia 1/15/2022    Irregular heart beat     Myocardial infarction (HCC)     Psoriasis     Requires supplemental oxygen     at bedtime during high humid days only    Stroke [facility])     TIA 1/2018       Past Surgical History:   Procedure Laterality Date    COLONOSCOPY      CORONARY ANGIOPLASTY  02/03/2001    PTCA of RCA    CORONARY ARTERY BYPASS GRAFT  02/07/2001    x4- [place]    HERNIA REPAIR      IR NEPHROSTOMY TUBE PLACEMENT  1/18/2022    IR PORT PLACEMENT  1/14/2022    SC BRONCHOSCOPY,DIAGNOSTIC Left 1/7/2019    Procedure: BRONCHOSCOPY FLEXIBLE;  Surgeon: Rosy Thibodeaux MD;  Location[facility]; Service: Pulmonary    SC CYSTOURETHROSCOPY,FULGUR <0 5 CM LESN N/A 11/30/2021    Procedure: TRANSURETHRAL RESECTION OF BLADDER TUMOR (TURBT) with "large";  Surgeon: Evans Suh MD;  Location: [facility];  Service: Urology    SC CYSTOURETHROSCOPY,URETER CATHETER Bilateral 11/30/2021    Procedure: Charlotte Mixer;  Surgeon: Evans Suh MD;  Location: [facility];  Service: Urology    SC Flavio Masters Left 2/20/2018    Procedure: ENDARTERECTOMY ARTERY CAROTID WITH PATCH ANGIOPLASTY;  Surgeon: Chance Calix MD;  Location: [facility];  Service: Vascular    TRANSURETHRAL RESECTION OF PROSTATE         Meds/Allergies:  Prior to Admission medications    Medication Sig Start Date End Date Taking?  Authorizing Provider   ALPRAZolam Morganmarc Viramontesa) 0 25 mg tablet Take 1 tablet (0 25 mg total) by mouth 2 (two) times a day as needed for anxiety 1/27/22   Joana Berman MD   aspirin 81 mg chewable tablet Chew 1 tablet (81 mg total) daily 7/29/19   Dann Ewing MD   atorvastatin (LIPITOR) 40 mg tablet Take 1 tablet (40 mg total) by mouth daily 6/29/21   Moustapha Cowan PA-C   bisacodyl (DULCOLAX) 10 mg suppository Insert 1 suppository (10 mg total) into the rectum daily as needed for constipation 12/18/21   Darin Phebe Mcburney, DO   diltiazem (CARDIZEM CD) 240 mg 24 hr capsule Take 1 capsule (240 mg total) by mouth daily 7/7/21   Dann Ewing MD   diphenhydrAMINE (BENADRYL) 12 5 MG chewable tablet Chew 1 tablet (12 5 mg total) 4 (four) times a day as needed (Bladder spasm) 2/1/22   Joseph Rivers MD   fluorouracil 4,900 mg in CADD infusion pump Infuse 4,900 mg (500 mg/m2/day x 1 96 m2) into a venous catheter over 120 hours for 5 days 1/31/22 2/5/22  Joseph Rivers MD   magnesium citrate (CITROMA) 1 745 g/30 mL oral solution Take 296 mL by mouth once as needed (constipation) for up to 1 dose 12/18/21   Jomar Ellis DO   naloxone (NARCAN) 4 mg/0 1 mL nasal spray Administer 1 spray into a nostril  If no response after 2-3 minutes, give another dose in the other nostril using a new spray   2/3/22   Jacquelin Thomas MD   ondansetron (Zofran ODT) 8 mg disintegrating tablet Take 1 tablet (8 mg total) by mouth every 8 (eight) hours as needed for nausea or vomiting 1/27/22   Joseph Rivers MD   oxyCODONE (ROXICODONE) 5 immediate release tablet Take 1 tablet (5 mg total) by mouth every 6 (six) hours as needed for severe pain for up to 10 days Max Daily Amount: 20 mg 1/24/22 2/3/22  Leah Hernandez DO   oxyCODONE (Roxicodone) 5 immediate release tablet Take 1 tablet (5 mg total) by mouth every 6 (six) hours as needed for moderate pain Max Daily Amount: 20 mg 2/3/22   Jacquelin Thomas MD   pantoprazole (PROTONIX) 40 mg tablet Take 1 tablet (40 mg total) by mouth every morning 1/6/22   Lexii Reese MD   polyethylene glycol (GOLYTELY) 4000 mL solution Take 4,000 mL by mouth once for 1 dose  Patient not taking: Reported on 1/27/2022  1/3/22 1/27/22  Yanelis Pittman PA-C   sodium chloride 0 9 % SOLN Infuse 10 mL into a venous catheter in the morning 1/25/22   Estevan Alves DO   sodium chloride, PF, 0 9 % 10 mL by Intracatheter route daily Intracatheter flushing daily 1/18/22 5/18/22  Elaine Garcia MD   sodium chloride, PF, 0 9 % 10 mL by Intracatheter route daily Intracatheter flushing daily 1/18/22 5/18/22  Elaine Garcia MD   warfarin (COUMADIN) 5 mg tablet Take 5 mg daily 1/24/22   Estevan Alves DO     I have reviewed home medications with patient family member  Allergies: Allergies   Allergen Reactions    Penicillins Swelling and Itching       Social History:  Marital Status:    Occupation: N/A  Patient Pre-hospital Living Situation: Home  Patient Pre-hospital Level of Mobility: walks (was recommended patient use walker on previous admission per granddaughter)  Substance Use History:   Social History     Substance and Sexual Activity   Alcohol Use Yes    Comment: special occasions only wine  Social History     Tobacco Use   Smoking Status Former Smoker    Years: 1 00    Types: Cigarettes   Smokeless Tobacco Never Used   Tobacco Comment    few cigarettes when playing cards  Social History     Substance and Sexual Activity   Drug Use No       Family History:  Family History   Problem Relation Age of Onset    Lung cancer Mother     Cancer Mother     Other Father         sepsis       Physical Exam:     Vitals:   Blood Pressure: 102/55 (02/04/22 2030)  Pulse: 74 (02/04/22 2030)  Temperature: 99 6 °F (37 6 °C) (02/04/22 1912)  Temp Source: Oral (02/04/22 1912)  Respirations: 20 (02/04/22 2030)  Weight - Scale: 77 6 kg (171 lb 1 2 oz) (02/04/22 1750)  SpO2: 95 % (02/04/22 2030)    Physical Exam  Vitals and nursing note reviewed  Constitutional:       General: He is not in acute distress  Appearance: Normal appearance  HENT:      Head: Normocephalic and atraumatic        Right Ear: External ear normal       Left Ear: External ear normal       Nose: Nose normal       Mouth/Throat:      Mouth: Mucous membranes are moist    Eyes:      Pupils: Pupils are equal, round, and reactive to light  Cardiovascular:      Rate and Rhythm: Normal rate and regular rhythm  Pulses: Normal pulses  Heart sounds: Normal heart sounds  No murmur heard  Pulmonary:      Effort: Pulmonary effort is normal  No respiratory distress  Breath sounds: Normal breath sounds  No wheezing or rales  Chest:      Chest wall: No tenderness  Abdominal:      General: Bowel sounds are normal  There is no distension  Palpations: Abdomen is soft  There is no mass  Tenderness: There is no abdominal tenderness  There is no guarding  Genitourinary:     Comments: L perc nephrostomy in place, archibald catheter in place with small volume bloody urine in bag  Musculoskeletal:         General: No swelling or tenderness  Cervical back: Normal range of motion and neck supple  No rigidity or tenderness  Right lower leg: No edema  Left lower leg: No edema  Skin:     General: Skin is warm and dry  Capillary Refill: Capillary refill takes less than 2 seconds  Findings: No lesion or rash  Neurological:      General: No focal deficit present  Mental Status: He is alert  He is disoriented     Psychiatric:         Mood and Affect: Mood normal           Additional Data:     Lab Results:  Results from last 7 days   Lab Units 02/04/22  1755   WBC Thousand/uL 17 95*   HEMOGLOBIN g/dL 7 8*   HEMATOCRIT % 26 9*   PLATELETS Thousands/uL 317   BANDS PCT % 4   LYMPHO PCT % 2*   MONO PCT % 0*   EOS PCT % 0     Results from last 7 days   Lab Units 02/04/22  1755   SODIUM mmol/L 127*   POTASSIUM mmol/L 4 5   CHLORIDE mmol/L 95*   CO2 mmol/L 26   BUN mg/dL 23   CREATININE mg/dL 1 41*   ANION GAP mmol/L 6   CALCIUM mg/dL 8 6   ALBUMIN g/dL 3 0*   TOTAL BILIRUBIN mg/dL 1 83*   ALK PHOS U/L 73   ALT U/L 17   AST U/L 15   GLUCOSE RANDOM mg/dL 139     Results from last 7 days   Lab Units 02/04/22  1755   INR  4 26*             Results from last 7 days   Lab Units 02/04/22  2034 02/04/22  1755   LACTIC ACID mmol/L 1 0 2 4*       Imaging: Reviewed radiology reports from this admission including: CT head  CT head without contrast   Final Result by Marybel Núñez MD (02/04 1824)      No acute intracranial abnormality  Mild chronic microangiopathy  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

## 2022-02-05 NOTE — PROGRESS NOTES
Vancomycin Assessment    Lenny Fleming is a 80 y o  male who is currently receiving vancomycin 1250mg IV Q12H for   sepsis   Relevant clinical data and objective history reviewed:  Creatinine   Date Value Ref Range Status   02/04/2022 1 41 (H) 0 60 - 1 30 mg/dL Final     Comment:     Standardized to IDMS reference method   01/28/2022 1 64 (H) 0 60 - 1 30 mg/dL Final     Comment:     Standardized to IDMS reference method   01/23/2022 1 42 (H) 0 60 - 1 30 mg/dL Final     Comment:     Standardized to IDMS reference method     /56 (BP Location: Right arm)   Pulse 92   Temp 100 5 °F (38 1 °C) (Oral)   Resp 18   Wt 77 6 kg (171 lb 1 2 oz)   SpO2 94%   BMI 27 61 kg/m²   No intake/output data recorded  Lab Results   Component Value Date/Time    BUN 23 02/04/2022 05:55 PM    WBC 17 95 (H) 02/04/2022 05:55 PM    HGB 7 8 (L) 02/04/2022 05:55 PM    HCT 26 9 (L) 02/04/2022 05:55 PM    MCV 74 (L) 02/04/2022 05:55 PM     02/04/2022 05:55 PM     Temp Readings from Last 3 Encounters:   02/04/22 100 5 °F (38 1 °C) (Oral)   01/31/22 97 9 °F (36 6 °C) (Temporal)   01/27/22 98 4 °F (36 9 °C)     Vancomycin Days of Therapy: 1    Assessment/Plan  The patient is currently on vancomycin utilizing scheduled dosing based on actual body weight  Baseline risks associated with therapy include: pre-existing renal impairment, concomitant nephrotoxic medications, and advanced age  The patient is currently receiving 1250mg IV Q12H and after clinical evaluation will be changed to 1500mg IV Q24H  Pharmacy will also follow closely for s/sx of nephrotoxicity, infusion reactions, and appropriateness of therapy  BMP and CBC will be ordered per protocol  Plan for trough as patient approaches steady state, prior to the 4th  dose at approximately 1530 on 2/7/22  Due to infection severity, will target a trough of 15-20 (appropriate for most indications)     Pharmacy will continue to follow the patients culture results and clinical progress daily      Memo Prescott, Pharmacist

## 2022-02-05 NOTE — ASSESSMENT & PLAN NOTE
· Blood pressure acceptable with SBP in the 100-130s   · Continue home Cardizem  · Monitor BP per unit protocol

## 2022-02-05 NOTE — ASSESSMENT & PLAN NOTE
· Currently prescribed coumadin 5mg daily  · INR 4 26  · Will hold home coumadin for now; possibly to resume at decreased dose pending result   · Repeat INR pending  · Continue to trend     Lab Results   Component Value Date    INR 4 26 (H) 02/04/2022    INR 1 88 (H) 01/28/2022

## 2022-02-05 NOTE — ASSESSMENT & PLAN NOTE
· HGB 7 8; appears to be chronic issue with baseline around 8-9  · Small amount of bloody urine in archibald bag   · Monitor bloody output from archibald, and for other sources of bleeding  · Trend CBC

## 2022-02-05 NOTE — SEPSIS NOTE
Sepsis Note   Celena Solorzano 80 y o  male MRN: [id number]  Unit/Bed#: ED 25 Encounter: [id number]       qSOFA     Row Name 02/04/22 2315 02/04/22 2300 02/04/22 2133 02/04/22 2130 02/04/22 2030    Altered mental status GCS < 15 -- -- 0 -- --    Respiratory Rate > / =22 0 0 -- 0 0    Systolic BP < / =593 0 0 -- 1 0    Q Sofa Score 0 0 0 1 0    Row Name 02/04/22 1930 02/04/22 1830 02/04/22 1815 02/04/22 1810 02/04/22 1750    Altered mental status GCS < 15 -- -- -- 0 --    Respiratory Rate > / =22 0 0 1 -- 0    Systolic BP < / =456 1 0 0 -- 0    Q Sofa Score 1 0 1 0 0               Initial Sepsis Screening     Row Name 02/04/22 1911                Is the patient's history suggestive of a new or worsening infection? --        Suspected source of infection urinary tract infection  -TC        Are two or more of the following signs & symptoms of infection both present and new to the patient? --        Indicate SIRS criteria Hyperthemia > 38 3C (100 9F); Tachycardia > 90 bpm  -TC        If the answer is yes to both questions, suspicion of sepsis is present --        If severe sepsis is present AND tissue hypoperfusion perists in the hour after fluid resuscitation or lactate > 4, the patient meets criteria for SEPTIC SHOCK --        Are any of the following organ dysfunction criteria present within 6 hours of suspected infection and SIRS criteria that are NOT considered to be chronic conditions? Yes  -TC        Organ dysfunction INR > 1 5 or aPTT > 60 secs; Lactate > 2 0 mmol/L  -TC        Date of presentation of severe sepsis 02/04/22  -TC        Time of presentation of severe sepsis 1911  -TC        Tissue hypoperfusion persists in the hour after crystalloid fluid administration, evidenced, by either: --        Was hypotension present within one hour of the conclusion of crystalloid fluid administration? --        Date of presentation of septic shock --        Time of presentation of septic shock --              User Key (r) = Recorded By, (t) = Taken By, (c) = Cosigned By    [address] Name Provider Salbador Uriostegui MD Physician                Patient has received full 30cc/kg fluid administration  Updated by nursing, Temp 100 5F; BP11/56 HR92  Patient due for additional Tylenol dosing, patient uncooperative with oral Tylenol at this time  Rectal Tylenol ordered, will continue vitals monitoring and recheck temperature      Gem Burks PA-C

## 2022-02-05 NOTE — ASSESSMENT & PLAN NOTE
· HGB 7 8; appears to be chronic issue with baseline around 8-9  · Small amount of bloody urine in archibald bag   · Further trend as noted below  · Monitor closely given supratherapeutic INR    Lab Results   Component Value Date    HGB 7 7 (L) 02/05/2022    HGB 7 8 (L) 02/04/2022    HGB 8 8 (L) 01/28/2022

## 2022-02-05 NOTE — ASSESSMENT & PLAN NOTE
· Sodium 127; further trend as below   · Possibly in the setting of dehydration with granddaughter reporting decreased oral intake 2/2 chemo/radiation  · Urine sodium and urine osmolality pending   · Improved with IVF; suspect secondary to poor PO intake   · AM BMP    Lab Results   Component Value Date    SODIUM 132 (L) 02/05/2022    SODIUM 127 (L) 02/04/2022    SODIUM 134 (L) 01/28/2022

## 2022-02-05 NOTE — ASSESSMENT & PLAN NOTE
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

## 2022-02-06 ENCOUNTER — APPOINTMENT (INPATIENT)
Dept: CT IMAGING | Facility: HOSPITAL | Age: 82
DRG: 871 | End: 2022-02-06
Payer: MEDICARE

## 2022-02-06 PROBLEM — A41.50 SEPSIS DUE TO GRAM-NEGATIVE BACTERIA (HCC): Status: ACTIVE | Noted: 2022-02-04

## 2022-02-06 PROBLEM — R78.81 BACTEREMIA: Status: ACTIVE | Noted: 2022-02-06

## 2022-02-06 LAB
ANION GAP SERPL CALCULATED.3IONS-SCNC: 8 MMOL/L (ref 4–13)
BUN SERPL-MCNC: 21 MG/DL (ref 5–25)
CALCIUM SERPL-MCNC: 8.3 MG/DL (ref 8.3–10.1)
CHLORIDE SERPL-SCNC: 101 MMOL/L (ref 100–108)
CO2 SERPL-SCNC: 25 MMOL/L (ref 21–32)
CREAT SERPL-MCNC: 1.16 MG/DL (ref 0.6–1.3)
ERYTHROCYTE [DISTWIDTH] IN BLOOD BY AUTOMATED COUNT: 20.4 % (ref 11.6–15.1)
GFR SERPL CREATININE-BSD FRML MDRD: 58 ML/MIN/1.73SQ M
GLUCOSE SERPL-MCNC: 101 MG/DL (ref 65–140)
HCT VFR BLD AUTO: 25.2 % (ref 36.5–49.3)
HGB BLD-MCNC: 7.5 G/DL (ref 12–17)
INR PPP: 2.66 (ref 0.84–1.19)
MCH RBC QN AUTO: 22 PG (ref 26.8–34.3)
MCHC RBC AUTO-ENTMCNC: 29.8 G/DL (ref 31.4–37.4)
MCV RBC AUTO: 74 FL (ref 82–98)
OSMOLALITY UR: 565 MMOL/KG
PLATELET # BLD AUTO: 243 THOUSANDS/UL (ref 149–390)
PMV BLD AUTO: 10 FL (ref 8.9–12.7)
POTASSIUM SERPL-SCNC: 3.5 MMOL/L (ref 3.5–5.3)
PROCALCITONIN SERPL-MCNC: 5.32 NG/ML
PROTHROMBIN TIME: 27 SECONDS (ref 11.6–14.5)
RBC # BLD AUTO: 3.41 MILLION/UL (ref 3.88–5.62)
SODIUM 24H UR-SCNC: 29 MOL/L
SODIUM SERPL-SCNC: 134 MMOL/L (ref 136–145)
WBC # BLD AUTO: 8.13 THOUSAND/UL (ref 4.31–10.16)

## 2022-02-06 PROCEDURE — NC001 PR NO CHARGE: Performed by: RADIOLOGY

## 2022-02-06 PROCEDURE — 74177 CT ABD & PELVIS W/CONTRAST: CPT

## 2022-02-06 PROCEDURE — G1004 CDSM NDSC: HCPCS

## 2022-02-06 PROCEDURE — 84145 PROCALCITONIN (PCT): CPT | Performed by: INTERNAL MEDICINE

## 2022-02-06 PROCEDURE — 99232 SBSQ HOSP IP/OBS MODERATE 35: CPT | Performed by: NURSE PRACTITIONER

## 2022-02-06 PROCEDURE — 99223 1ST HOSP IP/OBS HIGH 75: CPT | Performed by: INTERNAL MEDICINE

## 2022-02-06 PROCEDURE — 85610 PROTHROMBIN TIME: CPT | Performed by: INTERNAL MEDICINE

## 2022-02-06 PROCEDURE — 71260 CT THORAX DX C+: CPT

## 2022-02-06 PROCEDURE — 80048 BASIC METABOLIC PNL TOTAL CA: CPT | Performed by: NURSE PRACTITIONER

## 2022-02-06 PROCEDURE — 87040 BLOOD CULTURE FOR BACTERIA: CPT | Performed by: NURSE PRACTITIONER

## 2022-02-06 PROCEDURE — 85027 COMPLETE CBC AUTOMATED: CPT | Performed by: NURSE PRACTITIONER

## 2022-02-06 RX ORDER — HEPARIN SODIUM 5000 [USP'U]/ML
5000 INJECTION, SOLUTION INTRAVENOUS; SUBCUTANEOUS EVERY 8 HOURS SCHEDULED
Status: DISCONTINUED | OUTPATIENT
Start: 2022-02-06 | End: 2022-02-17 | Stop reason: HOSPADM

## 2022-02-06 RX ORDER — POLYETHYLENE GLYCOL 3350 17 G/17G
17 POWDER, FOR SOLUTION ORAL DAILY
Status: DISCONTINUED | OUTPATIENT
Start: 2022-02-06 | End: 2022-02-17 | Stop reason: HOSPADM

## 2022-02-06 RX ORDER — MAGNESIUM CARB/ALUMINUM HYDROX 105-160MG
296 TABLET,CHEWABLE ORAL ONCE
Status: COMPLETED | OUTPATIENT
Start: 2022-02-06 | End: 2022-02-06

## 2022-02-06 RX ORDER — SODIUM PHOSPHATE, DIBASIC AND SODIUM PHOSPHATE, MONOBASIC 7; 19 G/133ML; G/133ML
1 ENEMA RECTAL ONCE AS NEEDED
Status: DISCONTINUED | OUTPATIENT
Start: 2022-02-06 | End: 2022-02-17 | Stop reason: HOSPADM

## 2022-02-06 RX ORDER — AMOXICILLIN 250 MG
2 CAPSULE ORAL 2 TIMES DAILY
Status: DISCONTINUED | OUTPATIENT
Start: 2022-02-06 | End: 2022-02-17 | Stop reason: HOSPADM

## 2022-02-06 RX ORDER — LIDOCAINE 50 MG/G
1 PATCH TOPICAL DAILY
Status: DISCONTINUED | OUTPATIENT
Start: 2022-02-07 | End: 2022-02-17 | Stop reason: HOSPADM

## 2022-02-06 RX ORDER — BISACODYL 10 MG
10 SUPPOSITORY, RECTAL RECTAL DAILY PRN
Status: DISCONTINUED | OUTPATIENT
Start: 2022-02-06 | End: 2022-02-17 | Stop reason: HOSPADM

## 2022-02-06 RX ADMIN — HEPARIN SODIUM 5000 UNITS: 5000 INJECTION INTRAVENOUS; SUBCUTANEOUS at 22:44

## 2022-02-06 RX ADMIN — CEFEPIME HYDROCHLORIDE 2000 MG: 2 INJECTION, POWDER, FOR SOLUTION INTRAVENOUS at 17:13

## 2022-02-06 RX ADMIN — SENNOSIDES AND DOCUSATE SODIUM 2 TABLET: 50; 8.6 TABLET ORAL at 09:42

## 2022-02-06 RX ADMIN — ACETAMINOPHEN 650 MG: 325 TABLET, FILM COATED ORAL at 22:44

## 2022-02-06 RX ADMIN — PANTOPRAZOLE SODIUM 40 MG: 40 TABLET, DELAYED RELEASE ORAL at 08:48

## 2022-02-06 RX ADMIN — CEFEPIME HYDROCHLORIDE 2000 MG: 2 INJECTION, POWDER, FOR SOLUTION INTRAVENOUS at 06:03

## 2022-02-06 RX ADMIN — SENNOSIDES AND DOCUSATE SODIUM 2 TABLET: 50; 8.6 TABLET ORAL at 17:12

## 2022-02-06 RX ADMIN — POLYETHYLENE GLYCOL 3350 17 G: 17 POWDER, FOR SOLUTION ORAL at 09:42

## 2022-02-06 RX ADMIN — ACETAMINOPHEN 650 MG: 325 TABLET, FILM COATED ORAL at 17:12

## 2022-02-06 RX ADMIN — ASPIRIN 81 MG: 81 TABLET, CHEWABLE ORAL at 08:48

## 2022-02-06 RX ADMIN — ATORVASTATIN CALCIUM 40 MG: 40 TABLET, FILM COATED ORAL at 08:49

## 2022-02-06 RX ADMIN — IOHEXOL 100 ML: 350 INJECTION, SOLUTION INTRAVENOUS at 15:45

## 2022-02-06 RX ADMIN — DILTIAZEM HYDROCHLORIDE 240 MG: 240 CAPSULE, COATED, EXTENDED RELEASE ORAL at 08:48

## 2022-02-06 RX ADMIN — ACETAMINOPHEN 650 MG: 325 TABLET, FILM COATED ORAL at 06:03

## 2022-02-06 RX ADMIN — MAGNESIUM CITRATE 296 ML: 1.75 LIQUID ORAL at 09:42

## 2022-02-06 NOTE — ASSESSMENT & PLAN NOTE
· Currently rate controlled  · Continue home Cardizem, holding home warfarin in the setting of supratherapeutic INR  · Monitor with routine vitals

## 2022-02-06 NOTE — CONSULTS
Consultation - Infectious Disease   Lenny Fleming 80 y o  male MRN: [id number]  Unit/Bed#: -01 Encounter: [id number]      IMPRESSION & RECOMMENDATIONS:   1  Sepsis, POA  Patient met sepsis criteria on admission with fever, tachycardia and leukocytosis  Sources are 2 and 3  No other obvious sources on exam   Clinical parameters improving  Antibiotics adjusted as below  Continue to trend fever curve/vitals  Repeat CBC/chemistry tomorrow  Imaging as below  Follow-up repeat blood cultures  Follow-up culture susceptibilities  Additional supportive care as per primary  Additional interventions pending clinical course    2  Pseudomonal bacteremia  Two of 2 blood cultures from admission have isolated Pseudomonas  I suspect that the source is primarily problem 3  Patient had recent catheter exchange but was also receiving chemotherapy which may have led to his bacteremia  Patient has a port in place but no other intravascular devices  Unfortunately cannot be certain that his port has been impacted during this episode of bacteremia and it is actively being used here and for chemotherapy  Repeat cultures are pending  Fortunately patient is hemodynamically stable and his exam is otherwise nonfocal   Prior cultures unremarkable  Patient however has had fluoroquinolone use in the past and so isolate may be resistant/lack oral options  Continue cefepime 2 g every 12 hours, renally dose adjusted  Discontinue vancomycin  Continue to trend fever curve/vitals  Repeat CBC/chemistry tomorrow  Follow-up pending blood cultures for clearance  Recommend CT chest, abdomen and pelvis, rule out ectopic foci of infection  Maintain current Fortune catheter  IR evaluation for PCNs/drain check  Follow-up 2D echo ordered for completeness    Would recommend port removal as a precaution, given pseudomonal BSI  Patient may ultimately require PICC line placement if no oral options/to avoid FQs  Would not place PICC line until repeat cultures negative at 72 hours  Port replacement as per Oncology, in the future/outpatient  Additional interventions pending clinical course  Tentative plan if workup negative is for 2 weeks of antibiotic therapy  3  Complicated urinary tract infection with Pseudomonas  Likely source of the above  Recently underwent Fortune catheter exchange 2 days prior to admission and reportedly had hematuria on admission  Currently catheters draining clear urine  Urine culture also with Pseudomonas  Continue antibiotics as above  Continue to trend fever curve/vitals  Maintain current catheter  Additional imaging as above  Recommend follow-up with Urology as outpatient    4  Acute encephalopathy and hyponatremia  Acutely confused on admission which I suspect is multifactorial given issues above  Sodium improving  Mental status also seems to be closer to baseline as patient is answering questions appropriately  CT head unremarkable  Monitor mental status closely  Antibiotics as above  Low threshold for repeat imaging  Continue to trend fever curve/WBC    5  Chronic kidney disease stage 3  Creatinine appeared to be close to prior baseline on admission  This does however affect antibiotic dosing  Cefepime dosing as above  Will further dose adjust antibiotic as needed  Fluid hydration as per primary  Will avoid nephrotoxic agent  Repeat chemistry tomorrow    6  Invasive bladder cancer with chronic port  Patient recently on chemotherapy and has multiple catheters in place along with port  Course complicated now by bacteremia  Again cannot rule out patient's port being impacted by this bacteremia  Ideally would remove port particularly with pseudomonal bacteremia  Antibiotics as above  Recommend port removal as above  Ongoing follow-up by Oncology as outpatient  Eventual report replacement as per Oncology as outpatient    7  AFib with elevated INR  Ongoing anticoagulation and care as per primary      Above plan discussed in detail with the patient  Above plan discussed in detail with primary service advanced practitioner  Above plan discussed with patient's granddaughter Purvi Fontanez over the phone  ID consult service will continue to follow  HISTORY OF PRESENT ILLNESS:  Reason for Consult:  Gram-negative bacteremia    HPI: Tonya Khan is a 80y o  year old male with atrial fibrillation, coronary artery disease status post stenting, COPD, hypertension, previous stroke, 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, BPH with lower urinary tract symptoms status post TURP  Patient was diagnosed in October with 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 with urine cytology and then later definitive biopsy at the end of November  Patient was deemed to not be a surgical candidate for cystectomy  The patient is receiving a combination of chemotherapy along with radiation therapy  He has a chronic Fortune catheter in place and had bilateral nephrostomy tube placement for obstructive changes seen on imaging  Patient has a chronic port in place  He was most recently on chemotherapy on 01/31 via CADD pump  Reportedly patient was having some episodes of bladder spasms thereafter  He underwent Fortune catheter change on 02/04  Notes mention the patient developed progressive fatigue/weakness and then confusion  He was recommended to go to the emergency department for evaluation  Patient on evaluation in the emergency department was combative and agitated and was also noted to have blood in his Fortune catheter back  Percutaneous nephrostomies were draining clear  White count was 17  Creatinine was 1 4  Liver enzymes unremarkable  CT of the head was largely unremarkable and had chronic left maxillary changes which were seen back in September    Patient was noted to have an abnormal UA and given findings of changes in the Fortune catheter bag he was felt to have a UTI and so empiric antibiotics were started and he was admitted for further workup  Blood cultures later returned positive  Noted to have episodes of confusion overnight  Fever curve and white count significantly improved since admission  Urine and blood cultures reviewed thus far and repeat cultures pending  Previous IR tube placement procedure note reviewed and both tubes were placed on 01/18  Patient's other vitals are stable  Creatinine has down trended since admission  Chronic anemia noted on CBC  Patient has not been seen by our service previously on chart review  Recent discharge summary reviewed where patient required placement of various urinary catheters in the setting of acute kidney injury  Prior cultures reviewed and urine cultures for the most part only with mixed contaminant  Recent antibiotic administrations reviewed and patient has received fluoroquinolone in the recent past   Recent outpatient oncology visit reviewed  No recent labs/cultures or office visits appreciated on review of care everywhere  Reviewed recent  images from prior CT scans and no devices appreciated other than sternotomy wires on imaging  Patient's surgical history reviewed in detail  We are consulted at this time for further assistance with management  On evaluation, the patient is hard of hearing but he is aware that he is in the hospital   He believes that it is from a urine infection  He does not recall the events surrounding him coming into the hospital   He wishes that he was a surgical candidate  He currently denies having any vomiting, chest pain, shortness of breath, back pain, pain surrounding his catheter sites or Fortune catheter  He denies any pain around his port site but he seems to understand if we may need to remove it  He continues to state that he wants to be able to live out his life as best as possible  He reports some nausea as he is being given medicine for constipation    He requested that I update his granddaughter  No other complaints offered  REVIEW OF SYSTEMS:  A complete 12 point system-based review of systems is negative other than that noted in the HPI  PAST MEDICAL HISTORY:  Past Medical History:   Diagnosis Date    A-fib [facility])     Arthritis     Benign prostatic hyperplasia without lower urinary tract symptoms     without Urinary Obstruction    Bladder cancer ([facility] [address] )     CAD (coronary artery disease)     Cancer (HCC)     Chronic obstructive lung disease (HCC)     Constipation 12/9/2021    Coronary arteriosclerosis     Depression     Emphysema lung (HCC)     GERD (gastroesophageal reflux disease)     Hyperlipidemia     Hypertension     Hyponatremia 1/15/2022    Irregular heart beat     Myocardial infarction (HCC)     Psoriasis     Requires supplemental oxygen     at bedtime during high humid days only    Stroke [facility])     TIA 1/2018     Past Surgical History:   Procedure Laterality Date    COLONOSCOPY      CORONARY ANGIOPLASTY  02/03/2001    PTCA of RCA    CORONARY ARTERY BYPASS GRAFT  02/07/2001    x4- [place]    HERNIA REPAIR      IR NEPHROSTOMY TUBE PLACEMENT  1/18/2022    IR PORT PLACEMENT  1/14/2022    NM BRONCHOSCOPY,DIAGNOSTIC Left 1/7/2019    Procedure: BRONCHOSCOPY FLEXIBLE;  Surgeon: Andreia Delong MD;  [facility];   Service: Pulmonary    NM CYSTOURETHROSCOPY,FULGUR <0 5 CM LESN N/A 11/30/2021    Procedure: TRANSURETHRAL RESECTION OF BLADDER TUMOR (TURBT) with "large";  Surgeon: Zahraa Orellana MD;  [facility];  Service: Urology    NM CYSTOURETHROSCOPY,URETER CATHETER Bilateral 11/30/2021    Procedure: Naina Macias;  Surgeon: Zahraa Orellana MD;  [facility];  Service: Urology    NM Shane Maicol Left 2/20/2018    Procedure: ENDARTERECTOMY ARTERY CAROTID WITH PATCH ANGIOPLASTY;  Surgeon: Diana Nguyen MD;  [facility];  Service: Vascular    TRANSURETHRAL RESECTION OF PROSTATE         FAMILY HISTORY:  Non-contributory    SOCIAL HISTORY:  Social History   Social History     Substance and Sexual Activity   Alcohol Use Yes    Comment: special occasions only wine  Social History     Substance and Sexual Activity   Drug Use No     Social History     Tobacco Use   Smoking Status Former Smoker    Years: 1 00    Types: Cigarettes   Smokeless Tobacco Never Used   Tobacco Comment    few cigarettes when playing cards  ALLERGIES:  Allergies   Allergen Reactions    Penicillins Swelling and Itching       MEDICATIONS:  All current active medications have been reviewed  PHYSICAL EXAM:  Temp:  [98 3 °F (36 8 °C)-99 °F (37 2 °C)] 98 7 °F (37 1 °C)  HR:  [86-96] 91  Resp:  [19-20] 19  BP: (105-126)/(59-85) 126/65  SpO2:  [96 %-98 %] 98 %  Temp (24hrs), Av 6 °F (37 °C), Min:98 3 °F (36 8 °C), Max:99 °F (37 2 °C)  Current: Temperature: 98 7 °F (37 1 °C)    Intake/Output Summary (Last 24 hours) at 2022 1010  Last data filed at 2022 1805  Gross per 24 hour   Intake 460 ml   Output 2250 ml   Net -1790 ml       General Appearance:  Appearing well, nontoxic, and in no distress; hard of hearing but is able to provide appropriate history  Head:  Normocephalic, without obvious abnormality, atraumatic   Eyes:  Conjunctiva pink and sclera anicteric, both eyes   Nose: Nares normal, mucosa normal, no drainage   Throat: Oropharynx moist without lesions   Neck: Supple, symmetrical, no adenopathy, no tenderness/mass/nodules   Back:   Symmetric, no curvature, ROM normal, no CVA tenderness; no spinal or paraspinal muscle tenderness to palpation  Percutaneous nephrostomy tubes draining clear yellow urine  Lungs:   Clear to auscultation bilaterally, respirations unlabored on room air   Chest Wall:  No tenderness or deformity; port site is currently accessed and does not appear abnormal externally     Heart:  RRR; no murmur, rub or gallop appreciated   Abdomen:   Soft, non-tender, non-distended, positive bowel sounds; no suprapubic discomfort  Extremities: No cyanosis, clubbing; mild nonpitting edema lower extremities bilaterally   Skin: No rashes or lesions  No draining wounds noted  Lymph nodes: Cervical, supraclavicular nodes normal   Neurologic: Alert and oriented times 3, patient is able to sit up with some assistance and using a walker  He is freely moving his upper and lower extremities while sitting up in the chair on his own  LABS, IMAGING, & OTHER STUDIES:  Lab Results:  I have personally reviewed pertinent labs  Results from last 7 days   Lab Units 02/06/22 0453 02/05/22  0520 02/04/22  1755   WBC Thousand/uL 8 13 16 32* 17 95*   HEMOGLOBIN g/dL 7 5* 7 7* 7 8*   PLATELETS Thousands/uL 243 218 317     Results from last 7 days   Lab Units 02/06/22  0453 02/05/22  0719 02/04/22  1755   POTASSIUM mmol/L 3 5   < > 4 5   CHLORIDE mmol/L 101   < > 95*   CO2 mmol/L 25   < > 26   BUN mg/dL 21   < > 23   CREATININE mg/dL 1 16   < > 1 41*   EGFR ml/min/1 73sq m 58   < > 46   CALCIUM mg/dL 8 3   < > 8 6   AST U/L  --   --  15   ALT U/L  --   --  17   ALK PHOS U/L  --   --  73    < > = values in this interval not displayed  Results from last 7 days   Lab Units 02/04/22  1755   BLOOD CULTURE  Pseudomonas aeruginosa*   GRAM STAIN RESULT  Gram negative rods*  Gram negative rods*   URINE CULTURE  >100,000 cfu/ml Pseudomonas aeruginosa*       Imaging Studies:   I have personally reviewed pertinent imaging study reports and images in PACS  Other Studies:   I have personally reviewed pertinent reports

## 2022-02-06 NOTE — PLAN OF CARE
Problem: Potential for Falls  Goal: Patient will remain free of falls  Description: INTERVENTIONS:  - Educate patient/family on patient safety including physical limitations  - Instruct patient to call for assistance with activity   - Consult OT/PT to assist with strengthening/mobility   - Keep Call bell within reach  - Keep bed low and locked with side rails adjusted as appropriate  - Keep care items and personal belongings within reach  - Initiate and maintain comfort rounds  - Make Fall Risk Sign visible to staff  - Offer Toileting every 2 Hours, in advance of need  - Initiate/Maintain bed alarm  - Obtain necessary fall risk management equipment: chair alarm  - Apply yellow socks and bracelet for high fall risk patients  - Consider moving patient to room near nurses station  Outcome: Progressing     Problem: MOBILITY - ADULT  Goal: Maintain or return to baseline ADL function  Description: INTERVENTIONS:  -  Assess patient's ability to carry out ADLs; assess patient's baseline for ADL function and identify physical deficits which impact ability to perform ADLs (bathing, care of mouth/teeth, toileting, grooming, dressing, etc )  - Assess/evaluate cause of self-care deficits   - Assess range of motion  - Assess patient's mobility; develop plan if impaired  - Assess patient's need for assistive devices and provide as appropriate  - Encourage maximum independence but intervene and supervise when necessary  - Involve family in performance of ADLs  - Assess for home care needs following discharge   - Consider OT consult to assist with ADL evaluation and planning for discharge  - Provide patient education as appropriate  Outcome: Progressing  Goal: Maintains/Returns to pre admission functional level  Description: INTERVENTIONS:  - Perform BMAT or MOVE assessment daily    - Set and communicate daily mobility goal to care team and patient/family/caregiver     - Collaborate with rehabilitation services on mobility goals if consulted  - Perform Range of Motion 3 times a day  - Reposition patient every 3 hours    - Dangle patient 3 times a day  - Stand patient 3 times a day  - Ambulate patient 3 times a day  - Out of bed to chair 3 times a day   - Out of bed for meals 3 times a day  - Out of bed for toileting  - Record patient progress and toleration of activity level   Outcome: Progressing     Problem: PAIN - ADULT  Goal: Verbalizes/displays adequate comfort level or baseline comfort level  Description: Interventions:  - Encourage patient to monitor pain and request assistance  - Assess pain using appropriate pain scale  - Administer analgesics based on type and severity of pain and evaluate response  - Implement non-pharmacological measures as appropriate and evaluate response  - Consider cultural and social influences on pain and pain management  - Notify physician/advanced practitioner if interventions unsuccessful or patient reports new pain  Outcome: Progressing     Problem: INFECTION - ADULT  Goal: Absence or prevention of progression during hospitalization  Description: INTERVENTIONS:  - Assess and monitor for signs and symptoms of infection  - Monitor lab/diagnostic results  - Monitor all insertion sites, i e  indwelling lines, tubes, and drains  - Monitor endotracheal if appropriate and nasal secretions for changes in amount and color  - [place] appropriate cooling/warming therapies per order  - Administer medications as ordered  - Instruct and encourage patient and family to use good hand hygiene technique  - Identify and instruct in appropriate isolation precautions for identified infection/condition  Outcome: Progressing  Goal: Absence of fever/infection during neutropenic period  Description: INTERVENTIONS:  - Monitor WBC    Outcome: Progressing     Problem: SAFETY ADULT  Goal: Patient will remain free of falls  Description: INTERVENTIONS:  - Educate patient/family on patient safety including physical limitations  - Instruct patient to call for assistance with activity   - Consult OT/PT to assist with strengthening/mobility   - Keep Call bell within reach  - Keep bed low and locked with side rails adjusted as appropriate  - Keep care items and personal belongings within reach  - Initiate and maintain comfort rounds  - Make Fall Risk Sign visible to staff  - Offer Toileting every 2 Hours, in advance of need  - Initiate/Maintain bed alarm  - Obtain necessary fall risk management equipment: chair alarm  - Apply yellow socks and bracelet for high fall risk patients  - Consider moving patient to room near nurses station  Outcome: Progressing  Goal: Maintain or return to baseline ADL function  Description: INTERVENTIONS:  -  Assess patient's ability to carry out ADLs; assess patient's baseline for ADL function and identify physical deficits which impact ability to perform ADLs (bathing, care of mouth/teeth, toileting, grooming, dressing, etc )  - Assess/evaluate cause of self-care deficits   - Assess range of motion  - Assess patient's mobility; develop plan if impaired  - Assess patient's need for assistive devices and provide as appropriate  - Encourage maximum independence but intervene and supervise when necessary  - Involve family in performance of ADLs  - Assess for home care needs following discharge   - Consider OT consult to assist with ADL evaluation and planning for discharge  - Provide patient education as appropriate  Outcome: Progressing  Goal: Maintains/Returns to pre admission functional level  Description: INTERVENTIONS:  - Perform BMAT or MOVE assessment daily    - Set and communicate daily mobility goal to care team and patient/family/caregiver  - Collaborate with rehabilitation services on mobility goals if consulted  - Perform Range of Motion 3 times a day  - Reposition patient every 3 hours    - Dangle patient 3 times a day  - Stand patient 3 times a day  - Ambulate patient 3 times a day  - Out of bed to chair 3 times a day   - Out of bed for meals 3 times a day  - Out of bed for toileting  - Record patient progress and toleration of activity level   Outcome: Progressing     Problem: DISCHARGE PLANNING  Goal: Discharge to home or other facility with appropriate resources  Description: INTERVENTIONS:  - Identify barriers to discharge w/patient and caregiver  - Arrange for needed discharge resources and transportation as appropriate  - Identify discharge learning needs (meds, wound care, etc )  - Arrange for interpretive services to assist at discharge as needed  - Refer to Case Management Department for coordinating discharge planning if the patient needs post-hospital services based on physician/advanced practitioner order or complex needs related to functional status, cognitive ability, or social support system  Outcome: Progressing     Problem: Knowledge Deficit  Goal: Patient/family/caregiver demonstrates understanding of disease process, treatment plan, medications, and discharge instructions  Description: Complete learning assessment and assess knowledge base    Interventions:  - Provide teaching at level of understanding  - Provide teaching via preferred learning methods  Outcome: Progressing     Problem: GENITOURINARY - ADULT  Goal: Urinary catheter remains patent  Description: INTERVENTIONS:  - Assess patency of urinary catheter  - If patient has a chronic archibald, consider changing catheter if non-functioning  - Follow guidelines for intermittent irrigation of non-functioning urinary catheter  Outcome: Progressing     Problem: SKIN/TISSUE INTEGRITY - ADULT  Goal: Incision(s), wounds(s) or drain site(s) healing without S/S of infection  Description: INTERVENTIONS  - Assess and document dressing, incision, wound bed, drain sites and surrounding tissue  - Provide patient and family education  - Perform skin care/dressing changes every as ordered  Outcome: Progressing     Problem: HEMATOLOGIC - ADULT  Goal: Maintains hematologic stability  Description: INTERVENTIONS  - Assess for signs and symptoms of bleeding or hemorrhage  - Monitor labs  - Administer supportive blood products/factors as ordered and appropriate  Outcome: Progressing     Problem: Prexisting or High Potential for Compromised Skin Integrity  Goal: Skin integrity is maintained or improved  Description: INTERVENTIONS:  - Identify patients at risk for skin breakdown  - Assess and monitor skin integrity  - Assess and monitor nutrition and hydration status  - Monitor labs   - Assess for incontinence   - Turn and reposition patient  - Assist with mobility/ambulation  - Relieve pressure over bony prominences  - Avoid friction and shearing  - Provide appropriate hygiene as needed including keeping skin clean and dry  - Evaluate need for skin moisturizer/barrier cream  - Collaborate with interdisciplinary team   - Patient/family teaching  - Consider wound care consult   Outcome: Progressing

## 2022-02-06 NOTE — PROGRESS NOTES
[address] [facility]  Progress Note - Cindy Orf 1940, 80 y o  male MRN: [id number]  Unit/Bed#: -01 Encounter: [id number]  Primary Care Provider: Liudmila Trejo MD   Date and time admitted to hospital: 2/4/2022  5:41 PM    * Sepsis due to gram-negative bacteria [facility])  Assessment & Plan  Background: With hx of bladder cancer, currently on systemic chemotherapy, brought in by  Granddaughter who reported several days of progressive confusion and weakness and was instructed to take patient to ED by infusion staff today for these symptoms  · Meets sepsis criteria on admission (SIRS + UTI)  · UA suggestive of UTI; cultures + for pseudomonas   · 103 1F in ED; afebrile since   · WBC 17 95; ANC WNL;  Lactic acid 2 4; COVID negative   · Lactic since cleared S/P fluid boluses  · WBC trending down appropriately   · S/P cefepime + vanco in ED; continue pending above cultures   · Possible chronic colonization in the setting of archibald catheter and nephrostomy; however in the setting of AMS and meeting SEPSIS criteria will treat as above  · Blood cultures + for pseudomonas in 2/2 cultures   · Fortunately has remained hemodynamically stable   · Infectious disease consulted; input as noted below  · IR consulted for neph tube check/possible exchange as well as Port-A-Cath removal to be done on 2/7  · Echo ordered  · CT chest abdomen pelvis ordered with contrast  · Repeat cultures ordered  · Will have PICC line placed when repeat cultures negative at 48-72 hours  · Discontinue vancomycin; continue cefepime monotherapy  · Discussed replacement of Port-A-Cath with another Port-A-Cath versus PICC with primary hematologist(Dr Migue Douglass) oncologist and cleared to put in PICC following clearance of blood cultures    Bladder cancer [facility])  Assessment & Plan  · 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, diagnosed 10/12/2021  · Follows with heme onc and urology as an outpatient  · Lexis Choi chemo and radiation; last received on day of admission  · Continue outpatient heme onc and urology f/u upon discharge     Altered mental status  Assessment & Plan  · CT head negative for acute process  · Not hypoglycemic, see hyponatremia as below  · Possibly in the setting of sepsis/UTI as above vs systemic chemotherapy vs outpatient narcotic use   · Narcotics and benzos prescribed OP on hold   · IM Zyprexa ordered X1  · Persistently refusing medications   · Delirium precautions   · Supportive measures     Supratherapeutic INR  Assessment & Plan  · Currently prescribed coumadin 5mg daily  · INR 4 26  · Will hold home coumadin for now; possibly to resume at decreased dose pending result   · Continue to trend   · Given patient is going for IR procedure on 2/7 will continue to hold coumadin and place on DVT prophylaxis with heparin subQ for now     Lab Results   Component Value Date    INR 2 66 (H) 02/06/2022    INR 4 26 (H) 02/04/2022         Archibald catheter in place prior to arrival  Assessment & Plan  · With archibald catheter and L perc nephrostomy tube in the setting of bladder CA as above   · Both producing urine   · Archibald was replaced on day of admission   · Archibald with small amount of bloody output, monitor in the setting of supratherapeutic INR  · Continue archibald and nephrostomy care     Hyponatremia  Assessment & Plan  · Sodium 127; further trend as below   · Possibly in the setting of dehydration with granddaughter reporting decreased oral intake 2/2 chemo/radiation  · Urine sodium and urine osmolality pending   · Improved with IVF; suspect secondary to poor PO intake   · Stable as noted below     Lab Results   Component Value Date    SODIUM 134 (L) 02/06/2022    SODIUM 132 (L) 02/05/2022    SODIUM 127 (L) 02/04/2022         Anemia  Assessment & Plan  · HGB 7 8; appears to be chronic issue with baseline around 8-9  · Small amount of bloody urine in archibald bag   · Further trend as noted below   · Would hold off on transfusion unless Hgb<7  · Monitor closely given supratherapeutic INR    Lab Results   Component Value Date    HGB 7 5 (L) 02/06/2022    HGB 7 7 (L) 02/05/2022    HGB 7 8 (L) 02/04/2022         Stage 3a chronic kidney disease [facility])  Assessment & Plan  Lab Results   Component Value Date    EGFR 58 02/06/2022    EGFR 51 02/05/2022    EGFR 46 02/04/2022    CREATININE 1 16 02/06/2022    CREATININE 1 29 02/05/2022    CREATININE 1 41 (H) 02/04/2022     · Stable; appears to be improving from recent DULCE, recent baseline difficult to establish  · Avoid nephrotoxic agents  · Discontinued IVF on 2/5  · Monitor status post IV contrast on 2/6    Atrial fibrillation (HCC)  Assessment & Plan  · Currently rate controlled  · Continue home Cardizem, holding home warfarin in the setting of supratherapeutic INR  · Monitor with routine vitals     COPD, severe (HCC)  Assessment & Plan  · 96%O2 RA; no SOB complaints  · Monitor O2 overnight     Back pain  Assessment & Plan  · Currently complaining of back pain; resolved on 2/5 as patient denies   · Prescribed Oxycodone 5mg q6h prn as outpatient  · Home Xanax and oxycodone currently on hold given AMS    Hyperlipidemia  Assessment & Plan  · Continue home atorvastatin    Hypertension  Assessment & Plan  · Blood pressure acceptable with SBP in the 100-130s   · Continue home Cardizem  · Monitor BP per unit protocol    CAD (coronary atherosclerotic disease)  Assessment & Plan  · S/p CABG x4  · Denies chest pain; Troponin 37  · Continue home ASA and statin, holding home coumadin in the setting of supratherapeutic INR         VTE Pharmacologic Prophylaxis: VTE Score: 6 High Risk (Score >/= 5) - Pharmacological DVT Prophylaxis Ordered: heparin  Sequential Compression Devices Ordered  Patient Centered Rounds: I performed bedside rounds with nursing staff today    Discussions with Specialists or Other Care Team Provider: nursing staff and case management as well as ID, IR    Education and Discussions with Family / Patient: Updated  (monisha Abdul ) via phone  Time Spent for Care: 30 minutes  More than 50% of total time spent on counseling and coordination of care as described above  Current Length of Stay: 2 day(s)  Current Patient Status: Inpatient   Certification Statement: The patient will continue to require additional inpatient hospital stay due to IV antibiotics, IR intervention, and ongoing workup as above  Discharge Plan: Anticipate discharge in >72 hrs to Pending physical therapy and occupational therapy evaluations however highly suspect rehab facility  Code Status: Level 1 - Full Code    Subjective:   Patient denies any ongoing pain however does endorse constipation associated with nausea  Requesting enema     Objective:     Vitals:   Temp (24hrs), Av 7 °F (37 1 °C), Min:98 5 °F (36 9 °C), Max:99 °F (37 2 °C)    Temp:  [98 5 °F (36 9 °C)-99 °F (37 2 °C)] 98 7 °F (37 1 °C)  HR:  [86-91] 91  Resp:  [19-20] 19  BP: (117-126)/(59-65) 126/65  SpO2:  [96 %-98 %] 98 %  Body mass index is 27 61 kg/m²  Input and Output Summary (last 24 hours): Intake/Output Summary (Last 24 hours) at 2022 1314  Last data filed at 2022 1138  Gross per 24 hour   Intake 460 ml   Output 2625 ml   Net -2165 ml       Physical Exam:   Physical Exam     Additional Data:     Labs:  Results from last 7 days   Lab Units 22  0453 22  0520 22  0520 22  1755 22  1755   WBC Thousand/uL 8 13   < > 16 32*   < > 17 95*   HEMOGLOBIN g/dL 7 5*   < > 7 7*   < > 7 8*   HEMATOCRIT % 25 2*   < > 25 3*   < > 26 9*   PLATELETS Thousands/uL 243   < > 218   < > 317   BANDS PCT %  --   --   --   --  4   NEUTROS PCT %  --   --  93*  --   --    LYMPHS PCT %  --   --  3*  --   --    LYMPHO PCT %  --   --   --   --  2*   MONOS PCT %  --   --  2*  --   --    MONO PCT %  --   --   --   --  0*   EOS PCT %  --   --  0  --  0    < > = values in this interval not displayed  Results from last 7 days   Lab Units 02/06/22  0453 02/05/22  0719 02/04/22  1755   SODIUM mmol/L 134*   < > 127*   POTASSIUM mmol/L 3 5   < > 4 5   CHLORIDE mmol/L 101   < > 95*   CO2 mmol/L 25   < > 26   BUN mg/dL 21   < > 23   CREATININE mg/dL 1 16   < > 1 41*   ANION GAP mmol/L 8   < > 6   CALCIUM mg/dL 8 3   < > 8 6   ALBUMIN g/dL  --   --  3 0*   TOTAL BILIRUBIN mg/dL  --   --  1 83*   ALK PHOS U/L  --   --  73   ALT U/L  --   --  17   AST U/L  --   --  15   GLUCOSE RANDOM mg/dL 101   < > 139    < > = values in this interval not displayed       Results from last 7 days   Lab Units 02/06/22  0954   INR  2 66*             Results from last 7 days   Lab Units 02/06/22 0453 02/04/22 2034 02/04/22  1755   LACTIC ACID mmol/L  --  1 0 2 4*   PROCALCITONIN ng/ml 5 32*  --   --        Lines/Drains:  Invasive Devices  Report    Central Venous Catheter Line            Port A Cath 01/14/22 Right Internal jugular 23 days          Line            Pump Device Continuous ambulatory delivery device Right Chest 6 days          Drain            Urethral Catheter Non-latex 16 Fr  39 days    Percutaneous Nephroureteral Tube (PCNU) Left 1 8 5 Fr 26 Cm 18 days    Percutaneous Nephroureteral Tube (PCNU) Right 2 8 5 Fr 24 Cm 18 days              Urinary Catheter:  Goal for removal: N/A- Discharging with Fortune         Central Line:  Goal for removal: N/A - Discharging with PICC for IV ABX/medications             Imaging: Reviewed radiology reports from this admission including: Awaiting CT scan results    Recent Cultures (last 7 days):   Results from last 7 days   Lab Units 02/04/22  1755   BLOOD CULTURE  Pseudomonas aeruginosa*   GRAM STAIN RESULT  Gram negative rods*  Gram negative rods*   URINE CULTURE  >100,000 cfu/ml Pseudomonas aeruginosa*       Last 24 Hours Medication List:   Current Facility-Administered Medications   Medication Dose Route Frequency Provider Last Rate    acetaminophen  650 mg Oral Q4H PRN Darby Prudent Rene peoples PA-C      aluminum-magnesium hydroxide-simethicone  30 mL Oral Q4H PRN MYLENE Ford      aspirin  81 mg Oral Daily [facility] Healthcare[facility]rie [place]      atorvastatin  40 mg Oral Daily Chong cherie [place], [place]      bisacodyl  10 mg Rectal Daily PRN MYLENE Clay      cefepime  2,000 mg Intravenous Q12H [facility] [place] 2,000 mg (02/06/22 0603)    diltiazem  240 mg Oral Daily Shane Shin PA-C      heparin (porcine)  5,000 Units Subcutaneous [facility] MYLENE Boyer      OLANZapine  2 5 mg Intramuscular Once MYLENE Clay      ondansetron  4 mg Intravenous Q6H PRN Shane [facility] [place]      pantoprazole  40 mg Oral QAM Shane [facility] [place]      polyethylene glycol  17 g Oral Daily MYLENE Clay      senna-docusate sodium  2 tablet Oral BID MYLENE oByer      sodium phosphate-biphosphate  1 enema Rectal Once PRN MYLENE Clay          Today, Patient Was Seen By: MYLENE Clay    **Please Note: This note may have been constructed using a voice recognition system  **

## 2022-02-06 NOTE — CONSULTS
Interventional Radiology  Consultation 2/6/2022     Consults  Claudia Robertson   1940   [id number]      Assessment/Plan:  80year old male with history of bladder cancer admitted with AMS and found to be bacteremic  ID seen and evaluated patient and is requesting port removal  The primary team is concerned his nephrostomy tubes may be the source and are requesting bilateral tube check  The tubes were placed less than a month ago, 1/18/22  Plan for tube check tomorrow and possible exchange (I will leave this decision to the performing doctor)  Also will plan to removed port      Medical Problems             Problem List     * (Principal) Sepsis ([facility] [address] )    CAD (coronary atherosclerotic disease)    Hypertension    Hyperlipemia    GERD (gastroesophageal reflux disease)    History of stroke    Sciatica of right side    Hyperlipidemia    Overview Signed 3/1/2018 12:38 PM by MYLENE Carlos     Description: MIXED         Centrilobular emphysema ([address] )    Arthritis    Stenosis of left carotid artery    S/P CABG x 4    Overview Signed 1/30/2018  6:44 PM by Wilfrid Cisneros DO     2001 CABG x 4 (LIMA to LAD; SVG to RCA; sequential SVG  Diagonal and OM1)         Acute left-sided low back pain with left-sided sciatica    Back pain    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    Vision changes    Urinary retention due to benign prostatic hyperplasia    Bladder cancer ([facility] [address] )    CKD (chronic kidney disease), stage II    Lab Results   Component Value Date    EGFR 58 02/06/2022    EGFR 51 02/05/2022    EGFR 46 02/04/2022    CREATININE 1 16 02/06/2022    CREATININE 1 29 02/05/2022    CREATININE 1 41 (H) 02/04/2022         Atrial flutter (HCC)    Irregular heart beat    Localized edema    COPD, severe (HCC)    Bronchiectasis with acute lower respiratory infection ([address] )    Abnormal CT of the chest    Tinnitus aurium, bilateral    Sensorineural hearing loss (SNHL) of both ears    Medicare annual wellness visit, subsequent    DDD (degenerative disc disease), lumbar    Acute on chronic diastolic congestive heart failure ([facility] [address] )    Wt Readings from Last 3 Encounters:   02/05/22 77 6 kg (171 lb 1 2 oz)   02/04/22 77 6 kg (171 lb)   01/31/22 77 9 kg (171 lb 12 8 oz)                 Encounter to discuss test results    Atrial fibrillation ([facility] )    Overgrown toenails    Recurrent unilateral inguinal hernia    Left lower quadrant abdominal pain    History of bilateral inguinal hernia repair    Cancer of overlapping sites of bladder ([facility] )    Stage 3a chronic kidney disease ([facility] )    Lab Results   Component Value Date    EGFR 58 02/06/2022    EGFR 51 02/05/2022    EGFR 46 02/04/2022    CREATININE 1 16 02/06/2022    CREATININE 1 29 02/05/2022    CREATININE 1 41 (H) 02/04/2022         Fortune catheter in place prior to arrival    DULCE (acute kidney injury) ([facility] ) on CKD 3    Hyponatremia    Pleural effusion on left    Abnormal urinalysis    Nephrostomy status (HCC)    Anxiety    Continuous opioid dependence (HCC)    Anemia    FRANCY (iron deficiency anemia)    Altered mental status    Supratherapeutic INR    Bacteremia                  Subjective:     Patient ID: Shahla Service is a 80 y o  male  History of Present Illness  80year old male with history of bladder cancer admitted with AMS and found to be bacteremic  ID seen and evaluated patient and is requesting port removal  The primary team is concerned his nephrostomy tubes may be the source and are requesting bilateral tube check  The tubes were placed less than a month ago, 1/18/22       Review of Systems  As above    Past Medical History:   Diagnosis Date    A-fib [facility])     Arthritis     Benign prostatic hyperplasia without lower urinary tract symptoms     without Urinary Obstruction    Bladder cancer (HCC)     CAD (coronary artery disease)     Cancer (HCC)     Chronic obstructive lung disease (HCC)     Constipation 12/9/2021    Coronary arteriosclerosis     Depression  Emphysema lung (HCC)     GERD (gastroesophageal reflux disease)     Hyperlipidemia     Hypertension     Hyponatremia 1/15/2022    Irregular heart beat     Myocardial infarction (HCC)     Psoriasis     Requires supplemental oxygen     at bedtime during high humid days only    Stroke [facility])     TIA 1/2018        Past Surgical History:   Procedure Laterality Date    COLONOSCOPY      CORONARY ANGIOPLASTY  02/03/2001    PTCA of RCA    CORONARY ARTERY BYPASS GRAFT  02/07/2001    x4- [place]    HERNIA REPAIR      IR NEPHROSTOMY TUBE PLACEMENT  1/18/2022    IR PORT PLACEMENT  1/14/2022    IL BRONCHOSCOPY,DIAGNOSTIC Left 1/7/2019    Procedure: BRONCHOSCOPY FLEXIBLE;  Surgeon: Cinthia Guevara MD;  Location: [facility]; Service: Pulmonary    IL CYSTOURETHROSCOPY,FULGUR <0 5 CM LESN N/A 11/30/2021    Procedure: TRANSURETHRAL RESECTION OF BLADDER TUMOR (TURBT) with "large";  Surgeon: Katerina Mehta MD;  Location: [facility];  Service: Urology    IL CYSTOURETHROSCOPY,URETER CATHETER Bilateral 11/30/2021    Procedure: Katey Mata;  Surgeon: Katerina Mehta MD;  [facility];  Service: Urology    IL Jestine Israel Left 2/20/2018    Procedure: ENDARTERECTOMY ARTERY CAROTID WITH PATCH ANGIOPLASTY;  Surgeon: Hima Longo MD;  [facility];  Service: Vascular    TRANSURETHRAL RESECTION OF PROSTATE          Social History     Tobacco Use   Smoking Status Former Smoker    Years: 1 00    Types: Cigarettes   Smokeless Tobacco Never Used   Tobacco Comment    few cigarettes when playing cards  Social History     Substance and Sexual Activity   Alcohol Use Yes    Comment: special occasions only wine            Social History     Substance and Sexual Activity   Drug Use No        Allergies   Allergen Reactions    Penicillins Swelling and Itching       Current Facility-Administered Medications   Medication Dose Route Frequency Provider Last Rate Last Admin    acetaminophen (TYLENOL) tablet 650 mg  650 mg Oral Q4H PRN Zachary Mirtonia PA-C   650 mg at 02/06/22 0603    aluminum-magnesium hydroxide-simethicone (MYLANTA) oral suspension 30 mL  30 mL Oral Q4H PRN MYLENE Talavera   30 mL at 02/05/22 1832    aspirin chewable tablet 81 mg  81 mg Oral Daily Zachary Mirtonia, PA-C   81 mg at 02/06/22 0848    atorvastatin (LIPITOR) tablet 40 mg  40 mg Oral Daily Zachary Mires, PA-C   40 mg at 02/06/22 0873    bisacodyl (DULCOLAX) rectal suppository 10 mg  10 mg Rectal Daily PRN MYLENE Boyer        cefepime (MAXIPIME) 2 g/50 mL dextrose IVPB  2,000 mg Intravenous Q12H Zachary Mirtonia, PA-C 100 mL/hr at 02/06/22 0603 2,000 mg at 02/06/22 0603    diltiazem (CARDIZEM CD) 24 hr capsule 240 mg  240 mg Oral Daily Zachary Mires, PA-C   240 mg at 02/06/22 0848    OLANZapine (ZyPREXA) IM injection 2 5 mg  2 5 mg [facility]amuscular Once [facility]        ondansetron [facility]) injection 4 mg  4 mg Intravenous Q6H PRN Zachary Mires PA-C        pantoprazole (PROTONIX) EC tablet 40 mg  40 mg Oral QAM Kaminiminnie Bach AndrewGABRIEL   40 mg at 02/06/22 0848    polyethylene glycol (MIRALAX) packet 17 g  17 g Oral Daily MYLENE Boyer   17 g at 02/06/22 5139    senna-docusate sodium (SENOKOT S) 8 6-50 mg per tablet 2 tablet  2 tablet Oral BID [facility]   2 tablet at 02/06/22 7557    sodium phosphate-biphosphate (FLEET) enema 1 enema  1 enema Rectal Once PRN MYLENE Boyer              Objective:    Vitals:    02/05/22 1306 02/05/22 1500 02/05/22 2300 02/06/22 0715   BP: 105/85 117/59 126/60 126/65   BP Location: Left arm Right arm Right arm Left arm   Pulse: 92 87 86 91   Resp: 20 19 20 19   Temp: 98 3 °F (36 8 °C) 99 °F (37 2 °C) 98 5 °F (36 9 °C) 98 7 °F (37 1 °C)   TempSrc: Oral Oral Oral Oral   SpO2:  96% 96% 98%   Weight: 77 6 kg (171 lb 1 2 oz)      Height: 5' 6" (1 676 m)           Physical Exam  Not performed    No results found for: BNP   Lab Results   Component Value Date    WBC 8 13 02/06/2022    HGB 7 5 (L) 02/06/2022    HCT 25 2 (L) 02/06/2022    MCV 74 (L) 02/06/2022     02/06/2022     Lab Results   Component Value Date    INR 2 66 (H) 02/06/2022    INR 4 26 (H) 02/04/2022    INR 1 88 (H) 01/28/2022    PROTIME 27 0 (H) 02/06/2022    PROTIME 38 7 (H) 02/04/2022    PROTIME 20 7 (H) 01/28/2022     Lab Results   Component Value Date    PTT 94 (H) 02/04/2022         I have personally reviewed pertinent imaging and laboratory results  Code Status: Level 1 - Full Code  Advance Directive and Living Will:      Power of :    POLST:      IR has been consulted to evaluate the patient, determine the appropriate procedure, and whether or not a procedure can and should be performed  Thank you for allowing me to participate in the care of Vanesa Mason  Please don't hesitate to call, text, email, or TigerText with any questions  This text is generated with voice recognition software  There may be translation, syntax,  or grammatical errors  If you have any questions, please contact the dictating provider

## 2022-02-06 NOTE — ASSESSMENT & PLAN NOTE
· Sodium 127; further trend as below   · Possibly in the setting of dehydration with granddaughter reporting decreased oral intake 2/2 chemo/radiation  · Urine sodium and urine osmolality pending   · Improved with IVF; suspect secondary to poor PO intake   · Stable as noted below     Lab Results   Component Value Date    SODIUM 134 (L) 02/06/2022    SODIUM 132 (L) 02/05/2022    SODIUM 127 (L) 02/04/2022

## 2022-02-06 NOTE — ASSESSMENT & PLAN NOTE
Lab Results   Component Value Date    EGFR 58 02/06/2022    EGFR 51 02/05/2022    EGFR 46 02/04/2022    CREATININE 1 16 02/06/2022    CREATININE 1 29 02/05/2022    CREATININE 1 41 (H) 02/04/2022     · Stable; appears to be improving from recent DULCE, recent baseline difficult to establish  · Avoid nephrotoxic agents  · Discontinued IVF on 2/5  · Monitor status post IV contrast on 2/6

## 2022-02-06 NOTE — PROGRESS NOTES
150 ml blood found in urine bag and urethral catheter found to be in urethra and not in bladder during CT scan with radiology technician  Ambrocio Virk notified of findings  Urethral catheter balloon deflated and urethral catheter advanced further to reach inside of bladder  Balloon reinflated without resistance  Minor amount of blood appeared to release around catheter and stop immediately when balloon was deflated  Patient reported some pain when advancing catheter but pain resolved after intervention was completed

## 2022-02-06 NOTE — ASSESSMENT & PLAN NOTE
Background: With hx of bladder cancer, currently on systemic chemotherapy, brought in by  Granddaughter who reported several days of progressive confusion and weakness and was instructed to take patient to ED by infusion staff today for these symptoms  · Meets sepsis criteria on admission (SIRS + UTI)  · UA suggestive of UTI; cultures + for pseudomonas   · 103 1F in ED; afebrile since   · WBC 17 95; ANC WNL;  Lactic acid 2 4; COVID negative   · Lactic since cleared S/P fluid boluses  · WBC trending down appropriately   · S/P cefepime + vanco in ED; continue pending above cultures   · Possible chronic colonization in the setting of archibald catheter and nephrostomy; however in the setting of AMS and meeting SEPSIS criteria will treat as above  · Blood cultures + for pseudomonas in 2/2 cultures   · Fortunately has remained hemodynamically stable   · Infectious disease consulted; input as noted below  · IR consulted for neph tube check/possible exchange as well as Port-A-Cath removal to be done on 2/7  · Echo ordered  · CT chest abdomen pelvis ordered with contrast  · Repeat cultures ordered  · Will have PICC line placed when repeat cultures negative at 48-72 hours  · Discontinue vancomycin; continue cefepime monotherapy  · Discussed replacement of Port-A-Cath with another Port-A-Cath versus PICC with primary hematologist(Dr Elida Loaiza) oncologist and cleared to put in PICC following clearance of blood cultures

## 2022-02-07 ENCOUNTER — TELEPHONE (OUTPATIENT)
Dept: INFUSION CENTER | Facility: CLINIC | Age: 82
End: 2022-02-07

## 2022-02-07 ENCOUNTER — APPOINTMENT (INPATIENT)
Dept: NON INVASIVE DIAGNOSTICS | Facility: HOSPITAL | Age: 82
DRG: 871 | End: 2022-02-07
Attending: RADIOLOGY
Payer: MEDICARE

## 2022-02-07 ENCOUNTER — APPOINTMENT (INPATIENT)
Dept: NON INVASIVE DIAGNOSTICS | Facility: HOSPITAL | Age: 82
DRG: 871 | End: 2022-02-07
Payer: MEDICARE

## 2022-02-07 ENCOUNTER — APPOINTMENT (OUTPATIENT)
Dept: RADIATION ONCOLOGY | Facility: CLINIC | Age: 82
End: 2022-02-07
Attending: RADIOLOGY
Payer: MEDICARE

## 2022-02-07 PROBLEM — G93.41 METABOLIC ENCEPHALOPATHY: Status: ACTIVE | Noted: 2022-02-04

## 2022-02-07 LAB
ABO GROUP BLD: NORMAL
ANION GAP SERPL CALCULATED.3IONS-SCNC: 7 MMOL/L (ref 4–13)
AORTIC VALVE MEAN VELOCITY: 13.8 M/S
APICAL FOUR CHAMBER EJECTION FRACTION: 63 %
AV AREA BY CONTINUOUS VTI: 1.7 CM2
AV AREA PEAK VELOCITY: 2 CM2
AV LVOT MEAN GRADIENT: 3 MMHG
AV LVOT PEAK GRADIENT: 8 MMHG
AV MEAN GRADIENT: 9 MMHG
AV PEAK GRADIENT: 20 MMHG
AV VALVE AREA: 1.71 CM2
BACTERIA BLD CULT: ABNORMAL
BACTERIA UR CULT: ABNORMAL
BASOPHILS # BLD AUTO: 0.02 THOUSANDS/ΜL (ref 0–0.1)
BASOPHILS NFR BLD AUTO: 1 % (ref 0–1)
BLD GP AB SCN SERPL QL: POSITIVE
BLOOD GROUP ANTIBODIES SERPL: NORMAL
BUN SERPL-MCNC: 20 MG/DL (ref 5–25)
CALCIUM SERPL-MCNC: 7.9 MG/DL (ref 8.3–10.1)
CHLORIDE SERPL-SCNC: 102 MMOL/L (ref 100–108)
CO2 SERPL-SCNC: 26 MMOL/L (ref 21–32)
CREAT SERPL-MCNC: 1.16 MG/DL (ref 0.6–1.3)
DOP CALC AO VTI: 42.2 CM
DOP CALC LVOT AREA: 3.14 CM2
DOP CALC LVOT DIAMETER: 2 CM
DOP CALC LVOT PEAK VEL VTI: 23 CM
DOP CALC LVOT PEAK VEL: 1.39 M/S
DOP CALC LVOT STROKE INDEX: 38.3 ML/M2
DOP CALC LVOT STROKE VOLUME: 72.22 CM3
EOSINOPHIL # BLD AUTO: 0.13 THOUSAND/ΜL (ref 0–0.61)
EOSINOPHIL NFR BLD AUTO: 3 % (ref 0–6)
ERYTHROCYTE [DISTWIDTH] IN BLOOD BY AUTOMATED COUNT: 20.7 % (ref 11.6–15.1)
GFR SERPL CREATININE-BSD FRML MDRD: 58 ML/MIN/1.73SQ M
GLUCOSE SERPL-MCNC: 102 MG/DL (ref 65–140)
GRAM STN SPEC: ABNORMAL
HCT VFR BLD AUTO: 22.6 % (ref 36.5–49.3)
HCT VFR BLD AUTO: 23.7 % (ref 36.5–49.3)
HCT VFR BLD AUTO: 24 % (ref 36.5–49.3)
HGB BLD-MCNC: 6.7 G/DL (ref 12–17)
HGB BLD-MCNC: 7 G/DL (ref 12–17)
HGB BLD-MCNC: 7 G/DL (ref 12–17)
IMM GRANULOCYTES # BLD AUTO: 0.06 THOUSAND/UL (ref 0–0.2)
IMM GRANULOCYTES NFR BLD AUTO: 2 % (ref 0–2)
INR PPP: 1.95 (ref 0.84–1.19)
LYMPHOCYTES # BLD AUTO: 0.34 THOUSANDS/ΜL (ref 0.6–4.47)
LYMPHOCYTES NFR BLD AUTO: 9 % (ref 14–44)
MCH RBC QN AUTO: 21.8 PG (ref 26.8–34.3)
MCHC RBC AUTO-ENTMCNC: 29.6 G/DL (ref 31.4–37.4)
MCV RBC AUTO: 74 FL (ref 82–98)
MONOCYTES # BLD AUTO: 0.2 THOUSAND/ΜL (ref 0.17–1.22)
MONOCYTES NFR BLD AUTO: 5 % (ref 4–12)
NEUTROPHILS # BLD AUTO: 3.26 THOUSANDS/ΜL (ref 1.85–7.62)
NEUTS SEG NFR BLD AUTO: 80 % (ref 43–75)
NRBC BLD AUTO-RTO: 0 /100 WBCS
PLATELET # BLD AUTO: 216 THOUSANDS/UL (ref 149–390)
PMV BLD AUTO: 9.4 FL (ref 8.9–12.7)
POTASSIUM SERPL-SCNC: 3.2 MMOL/L (ref 3.5–5.3)
PROCALCITONIN SERPL-MCNC: 4.95 NG/ML
PROTHROMBIN TIME: 21.3 SECONDS (ref 11.6–14.5)
RBC # BLD AUTO: 3.07 MILLION/UL (ref 3.88–5.62)
RH BLD: NEGATIVE
SL CV LV EF: 55
SODIUM SERPL-SCNC: 135 MMOL/L (ref 136–145)
SPECIMEN EXPIRATION DATE: NORMAL
TR MAX PG: 27 MMHG
TRICUSPID VALVE PEAK REGURGITATION VELOCITY: 2.61 M/S
WBC # BLD AUTO: 4.01 THOUSAND/UL (ref 4.31–10.16)

## 2022-02-07 PROCEDURE — 0JPT3XZ REMOVAL OF TUNNELED VASCULAR ACCESS DEVICE FROM TRUNK SUBCUTANEOUS TISSUE AND FASCIA, PERCUTANEOUS APPROACH: ICD-10-PCS | Performed by: RADIOLOGY

## 2022-02-07 PROCEDURE — 36590 REMOVAL TUNNELED CV CATH: CPT | Performed by: STUDENT IN AN ORGANIZED HEALTH CARE EDUCATION/TRAINING PROGRAM

## 2022-02-07 PROCEDURE — 85610 PROTHROMBIN TIME: CPT | Performed by: PHYSICIAN ASSISTANT

## 2022-02-07 PROCEDURE — 93325 DOPPLER ECHO COLOR FLOW MAPG: CPT | Performed by: INTERNAL MEDICINE

## 2022-02-07 PROCEDURE — 93321 DOPPLER ECHO F-UP/LMTD STD: CPT | Performed by: INTERNAL MEDICINE

## 2022-02-07 PROCEDURE — 84145 PROCALCITONIN (PCT): CPT | Performed by: INTERNAL MEDICINE

## 2022-02-07 PROCEDURE — 86921 COMPATIBILITY TEST INCUBATE: CPT

## 2022-02-07 PROCEDURE — 86901 BLOOD TYPING SEROLOGIC RH(D): CPT | Performed by: PHYSICIAN ASSISTANT

## 2022-02-07 PROCEDURE — NC001 PR NO CHARGE: Performed by: STUDENT IN AN ORGANIZED HEALTH CARE EDUCATION/TRAINING PROGRAM

## 2022-02-07 PROCEDURE — 85014 HEMATOCRIT: CPT | Performed by: PHYSICIAN ASSISTANT

## 2022-02-07 PROCEDURE — 99152 MOD SED SAME PHYS/QHP 5/>YRS: CPT | Performed by: STUDENT IN AN ORGANIZED HEALTH CARE EDUCATION/TRAINING PROGRAM

## 2022-02-07 PROCEDURE — 80048 BASIC METABOLIC PNL TOTAL CA: CPT

## 2022-02-07 PROCEDURE — 99152 MOD SED SAME PHYS/QHP 5/>YRS: CPT

## 2022-02-07 PROCEDURE — 97163 PT EVAL HIGH COMPLEX 45 MIN: CPT

## 2022-02-07 PROCEDURE — 93308 TTE F-UP OR LMTD: CPT

## 2022-02-07 PROCEDURE — 93325 DOPPLER ECHO COLOR FLOW MAPG: CPT

## 2022-02-07 PROCEDURE — 36590 REMOVAL TUNNELED CV CATH: CPT

## 2022-02-07 PROCEDURE — 99233 SBSQ HOSP IP/OBS HIGH 50: CPT | Performed by: INTERNAL MEDICINE

## 2022-02-07 PROCEDURE — 85018 HEMOGLOBIN: CPT | Performed by: PHYSICIAN ASSISTANT

## 2022-02-07 PROCEDURE — 86900 BLOOD TYPING SEROLOGIC ABO: CPT | Performed by: PHYSICIAN ASSISTANT

## 2022-02-07 PROCEDURE — 93308 TTE F-UP OR LMTD: CPT | Performed by: INTERNAL MEDICINE

## 2022-02-07 PROCEDURE — 97167 OT EVAL HIGH COMPLEX 60 MIN: CPT

## 2022-02-07 PROCEDURE — 02PY33Z REMOVAL OF INFUSION DEVICE FROM GREAT VESSEL, PERCUTANEOUS APPROACH: ICD-10-PCS | Performed by: RADIOLOGY

## 2022-02-07 PROCEDURE — 93321 DOPPLER ECHO F-UP/LMTD STD: CPT

## 2022-02-07 PROCEDURE — 85025 COMPLETE CBC W/AUTO DIFF WBC: CPT | Performed by: NURSE PRACTITIONER

## 2022-02-07 PROCEDURE — 86922 COMPATIBILITY TEST ANTIGLOB: CPT

## 2022-02-07 PROCEDURE — 99232 SBSQ HOSP IP/OBS MODERATE 35: CPT | Performed by: PHYSICIAN ASSISTANT

## 2022-02-07 PROCEDURE — 86870 RBC ANTIBODY IDENTIFICATION: CPT | Performed by: PHYSICIAN ASSISTANT

## 2022-02-07 PROCEDURE — 86850 RBC ANTIBODY SCREEN: CPT | Performed by: PHYSICIAN ASSISTANT

## 2022-02-07 PROCEDURE — 99153 MOD SED SAME PHYS/QHP EA: CPT

## 2022-02-07 PROCEDURE — 87070 CULTURE OTHR SPECIMN AEROBIC: CPT | Performed by: PHYSICIAN ASSISTANT

## 2022-02-07 RX ORDER — MIDAZOLAM HYDROCHLORIDE 2 MG/2ML
INJECTION, SOLUTION INTRAMUSCULAR; INTRAVENOUS CODE/TRAUMA/SEDATION MEDICATION
Status: COMPLETED | OUTPATIENT
Start: 2022-02-07 | End: 2022-02-07

## 2022-02-07 RX ORDER — FERROUS SULFATE 325(65) MG
325 TABLET ORAL 2 TIMES DAILY WITH MEALS
Status: DISCONTINUED | OUTPATIENT
Start: 2022-02-08 | End: 2022-02-17 | Stop reason: HOSPADM

## 2022-02-07 RX ORDER — FENTANYL CITRATE 50 UG/ML
INJECTION, SOLUTION INTRAMUSCULAR; INTRAVENOUS CODE/TRAUMA/SEDATION MEDICATION
Status: COMPLETED | OUTPATIENT
Start: 2022-02-07 | End: 2022-02-07

## 2022-02-07 RX ORDER — POTASSIUM CHLORIDE 20 MEQ/1
40 TABLET, EXTENDED RELEASE ORAL 2 TIMES DAILY
Status: COMPLETED | OUTPATIENT
Start: 2022-02-07 | End: 2022-02-07

## 2022-02-07 RX ADMIN — POTASSIUM CHLORIDE 40 MEQ: 1500 TABLET, EXTENDED RELEASE ORAL at 12:15

## 2022-02-07 RX ADMIN — Medication 10 ML: at 16:00

## 2022-02-07 RX ADMIN — PHYTONADIONE 5 MG: 10 INJECTION, EMULSION INTRAMUSCULAR; INTRAVENOUS; SUBCUTANEOUS at 12:18

## 2022-02-07 RX ADMIN — MIDAZOLAM HYDROCHLORIDE 1 MG: 1 INJECTION, SOLUTION INTRAMUSCULAR; INTRAVENOUS at 15:51

## 2022-02-07 RX ADMIN — POTASSIUM CHLORIDE 40 MEQ: 1500 TABLET, EXTENDED RELEASE ORAL at 17:50

## 2022-02-07 RX ADMIN — ACETAMINOPHEN 650 MG: 325 TABLET, FILM COATED ORAL at 12:15

## 2022-02-07 RX ADMIN — CEFEPIME HYDROCHLORIDE 2000 MG: 2 INJECTION, POWDER, FOR SOLUTION INTRAVENOUS at 06:53

## 2022-02-07 RX ADMIN — ACETAMINOPHEN 650 MG: 325 TABLET, FILM COATED ORAL at 21:44

## 2022-02-07 RX ADMIN — HEPARIN SODIUM 5000 UNITS: 5000 INJECTION INTRAVENOUS; SUBCUTANEOUS at 06:53

## 2022-02-07 RX ADMIN — FENTANYL CITRATE 50 MCG: 50 INJECTION, SOLUTION INTRAMUSCULAR; INTRAVENOUS at 15:51

## 2022-02-07 RX ADMIN — SENNOSIDES AND DOCUSATE SODIUM 2 TABLET: 50; 8.6 TABLET ORAL at 17:50

## 2022-02-07 RX ADMIN — CEFEPIME HYDROCHLORIDE 2000 MG: 2 INJECTION, POWDER, FOR SOLUTION INTRAVENOUS at 18:22

## 2022-02-07 RX ADMIN — HEPARIN SODIUM 5000 UNITS: 5000 INJECTION INTRAVENOUS; SUBCUTANEOUS at 14:13

## 2022-02-07 NOTE — ASSESSMENT & PLAN NOTE
Lab Results   Component Value Date    EGFR 58 02/07/2022    EGFR 58 02/06/2022    EGFR 51 02/05/2022    CREATININE 1 16 02/07/2022    CREATININE 1 16 02/06/2022    CREATININE 1 29 02/05/2022     · Stable; appears to be improving from recent DULCE, recent baseline difficult to establish  · Avoid nephrotoxic agents  · Discontinued IVF on 2/5  · Monitor status post IV contrast on 2/6

## 2022-02-07 NOTE — PROGRESS NOTES
Progress Note - Infectious Disease   Monica Nugent 80 y o  male MRN: [id number]  Unit/Bed#: -01 Encounter: [id number]      Impression/Plan:  1  Sepsis, POA  Patient met sepsis criteria on admission with fever, tachycardia and leukocytosis  Sources are 2 and 3  No other obvious sources on exam   Clinical parameters improving  Antibiotics as below  Continue to trend fever curve/vitals  Repeat CBC/chemistry tomorrow  Follow-up repeat blood cultures  Additional supportive care as per primary  Additional interventions pending clinical course     2  Pseudomonal bacteremia  Two of 2 blood cultures from admission have isolated Pseudomonas  I suspect that the source is primarily problem 3  Patient had recent catheter exchange but was also receiving chemotherapy which may have led to his bacteremia  Patient has a port in place but no other intravascular devices  Unfortunately cannot be certain that his port has been impacted during this episode of bacteremia and it is actively being used here and for chemotherapy  Repeat cultures are pending  Fortunately patient is hemodynamically stable and his exam is otherwise nonfocal   Prior cultures unremarkable  Susceptibilities reviewed  Side effects reviewed with patient's granddaughter and she would favor IV therapy instead of a quinolone  CT imaging as below  Continue cefepime 2 g every 12 hours, renally dose adjusted  Continue to trend fever curve/vitals  Repeat CBC/chemistry tomorrow  Follow-up pending blood cultures for clearance  Repeat cultures ordered for tomorrow  Fortune catheter repositioned today, maintain  IR evaluation for PCNs drain check and port removal  Follow-up 2D echo ordered for completeness    Plan for PICC line placement once repeat cultures negative at 72 hours  Continued central line needs after antibiotics as per Oncology  Additional interventions pending clinical course  Tentative plan for total 2 weeks of antibiotic therapy from culture clearance  Will arrange follow-up/scripts closer to discharge     3  Pyelonephritis with Pseudomonas  Likely source of the above  Recently underwent Fortune catheter exchange 2 days prior to admission and reportedly had hematuria on admission  Urine culture also with Pseudomonas  CT reviewed and catheter repositioned and images concerning for pyelonephritis  Continue antibiotics as above  Continue to trend fever curve/vitals  Maintain current catheter  Recommend follow-up with Urology as outpatient     4  Acute encephalopathy and hyponatremia  Acutely confused on admission which I suspect is multifactorial given issues above  Sodium improving  Mental status also seems to be closer to baseline as patient is answering questions appropriately  CT head unremarkable  Monitor mental status closely  Antibiotics as above  Low threshold for repeat imaging  Continue to trend fever curve/WBC     5  Chronic kidney disease stage 3  Creatinine appeared to be close to prior baseline on admission  This does however affect antibiotic dosing  Cefepime dosing as above  Will further dose adjust antibiotic as needed  Fluid hydration as per primary  Will avoid nephrotoxic agent  Repeat chemistry tomorrow     6  Invasive bladder cancer with chronic port  Patient recently on chemotherapy and has multiple catheters in place along with port  Course complicated now by bacteremia  Again cannot rule out patient's port being impacted by this bacteremia  Family/patient agreeable to removal   Antibiotics as above  Recommend port removal as above  Ongoing follow-up by Oncology as outpatient  Plans for PICC line placement as above     7  AFib with elevated INR  Ongoing anticoagulation and care as per primary  Above plan discussed in detail with patient, his daughter, his granddaughter and primary service  ID consult service will continue to follow      Antibiotics:  Cefepime 4    Subjective:  Patient currently denies having any nausea vomiting, chest pain or shortness of breath  He is very frustrated about not being able to eat  He continues to state that he just wants them to cut his bladder out  He does not seem to grasp fully the extent of his disease  Daughter is at bedside and seems to be questioning even if chemotherapy should be continued  The patient continues to state that he will do whatever he is told as long as it gives him a few more days on this earth  Discussed with patient's granddaughter over the phone  We reviewed side effects associated with fluoroquinolones  We reviewed his most recent CT imaging with a pleural lesion noted  She reports that she feels her grandfather would want to continue chemotherapy regardless and she would favor using IV antibiotic over a fluoroquinolone  She believes that once he feels better he will not take his antibiotics  She is debating between home infusion versus rehab as she is concerned they may not be able to handle him at home  She plans to update us with her decisions  Objective:  Vitals:  Temp:  [98 2 °F (36 8 °C)-98 6 °F (37 °C)] 98 2 °F (36 8 °C)  HR:  [73-91] 73  Resp:  [18-20] 20  BP: (101-117)/(59-61) 117/61  SpO2:  [99 %-100 %] 99 %  Temp (24hrs), Av 4 °F (36 9 °C), Min:98 2 °F (36 8 °C), Max:98 6 °F (37 °C)  Current: Temperature: 98 2 °F (36 8 °C)    Physical Exam:   General Appearance:  Alert, interactive, nontoxic, no acute distress  Patient is hard of hearing  He also often does not listen and stay focused during conversation  Throat: Oropharynx moist without lesions  Lungs:   Clear to auscultation bilaterally; no wheezes, rhonchi or rales; respirations unlabored on room air   Heart:  RRR; no murmur, rub or gallop appreciated   Abdomen:   Soft, non-tender, non-distended, positive bowel sounds  PCNs draining clear urine  Lower back with blood again  Fortune catheter reposition today     Extremities: No clubbing, cyanosis or edema   Skin: No new rashes or lesions  No new draining wounds noted  Labs, Imaging, & Other studies:   All pertinent labs and imaging studies were personally reviewed  Results from last 7 days   Lab Units 02/07/22  0912 02/07/22  0705 02/06/22  0453 02/05/22  0520 02/05/22  0520   WBC Thousand/uL  --  4 01* 8 13  --  16 32*   HEMOGLOBIN g/dL 7 0* 6 7* 7 5*   < > 7 7*   PLATELETS Thousands/uL  --  216 243  --  218    < > = values in this interval not displayed  Results from last 7 days   Lab Units 02/07/22  0705 02/05/22  0719 02/04/22  1755   POTASSIUM mmol/L 3 2*   < > 4 5   CHLORIDE mmol/L 102   < > 95*   CO2 mmol/L 26   < > 26   BUN mg/dL 20   < > 23   CREATININE mg/dL 1 16   < > 1 41*   EGFR ml/min/1 73sq m 58   < > 46   CALCIUM mg/dL 7 9*   < > 8 6   AST U/L  --   --  15   ALT U/L  --   --  17   ALK PHOS U/L  --   --  73    < > = values in this interval not displayed  Results from last 7 days   Lab Units 02/06/22  1000 02/04/22  1755   BLOOD CULTURE  Received in Microbiology Lab  Culture in Progress  Received in Microbiology Lab  Culture in Progress   Pseudomonas aeruginosa*  Pseudomonas aeruginosa*   GRAM STAIN RESULT   --  Gram negative rods*  Gram negative rods*   URINE CULTURE   --  >100,000 cfu/ml Pseudomonas aeruginosa*

## 2022-02-07 NOTE — OCCUPATIONAL THERAPY NOTE
Occupational Therapy Evaluation Note        Patient Name: Celena Solorzano  NZVZK'M Date: 2/7/2022 02/07/22 0742   OT Last Visit   OT Visit Date 02/07/22   Note Type   Note type Evaluation   Restrictions/Precautions   Weight Bearing Precautions Per Order No   Braces or Orthoses Other (Comment)  (none per pt)   Other Precautions Chair Alarm; Bed Alarm;Multiple lines; Fall Risk;Pain   Pain Assessment   Pain Assessment Tool 0-10   Pain Score 5   Pain Location/Orientation Location: Buttocks   Pain Onset/Description Descriptor: [facility] Pain Intervention(s) Repositioned; Ambulation/increased activity   Home Living   Type of [address] [place] [address] Two level;Performs ADLs on one level; Able to live on main level with bedroom/bathroom; Other (Comment)  (0 ARLENE)   Bathroom Shower/Tub Tub/shower unit  (Access to walk-in and tub shower per pt)   Bathroom Toilet Raised   Bathroom Equipment Shower chair;Grab bars in [employer] [address];Electric scooter   Additional Comments Patient reports that he is primarily ambulatory with no AD within the home environment   Prior Function   Level of [place] Independent with ADLs and functional mobility; Needs assistance with IADLs   Lives With Family  (Granddaughter and family)   Receives Help From Family   ADL Assistance Independent   IADLs Needs assistance   Falls in the last 6 months 0  ("I never fall")   Vocational Part time employment   Comments Home setup obtained via previous OT evaluation performed on 1/21/22, patient confirmed information at time of IE   Lifestyle   Autonomy Per chart review and patient report, he lives with his granddaughter and family in a two-story home with 0 ARLENE and a first-floor setup  At baseline, patient reports that he is independent in ADLs and is primarily ambulatory with no AD within the home environment     Reciprocal Relationships Family   Service 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   Psychosocial   Psychosocial (WDL) WDL   ADL   Eating Assistance 6  Modified independent   Eating Deficit NPO   Grooming Assistance 5  Supervision/Setup   UB Bathing Assistance 4  Minimal Assistance   LB Bathing Assistance 4  Minimal Assistance   UB Dressing Assistance 5  Supervision/Setup   LB Dressing Assistance 4  Minimal Assistance   Toileting Assistance  5  Supervision/Setup   Functional Assistance 4  Minimal Assistance   Additional Comments ADL assist levels based on pt's functional performance during OT evaluation   Bed Mobility   Supine to Sit 4  Minimal assistance   Additional items Assist x 1;HOB elevated; Increased time required;Verbal cues   Additional Comments Patient received lying supine in bed upon OT arrival; at end of session: pt seated OOB to recliner chair w/ all needs within reach and chair alarm activated   Transfers   Sit to Stand 4  Minimal assistance  (CGA)   Additional items Assist x 1; Increased time required;Verbal cues   Stand to Sit 4  Minimal assistance  (CGA)   Additional items Assist x 1; Increased time required;Verbal cues;Armrests   Toilet transfer 4  Minimal assistance  (CGA)   Additional items Assist x 1; Increased time required;Verbal cues;Standard toilet  (Pt reports that commode is "too high")   Additional Comments Performed functional transfers using RW  Functional Mobility   Functional Mobility 4  Minimal assistance   Additional Comments CGA x1; Ambulation to and from bathroom with no overt LOB or SOB  Patient denies dizziness throughout session, reports "I'm feeling great today"     Additional items Rolling walker   Balance   Static Sitting Fair +   Dynamic Sitting Fair   Static Standing Fair   Dynamic Standing Fair -   Ambulatory Poor +   Activity Tolerance   Activity Tolerance Patient limited by fatigue   Medical Staff Made Aware PT student Argentina Lopez; Per PT conversation with MEDARDO Lisa Never via [facility] Text, patient appropriate for therapy evaluations as long as pt is asymptomatic (patient present as asymptomatic t/o evaluation), MEDARDO Le made aware of session outcomes  Nurse Made Aware Discussed case with RN Oziel Villasenor, made aware of session outcomes   RUE Assessment   RUE Assessment X  (AROM WFL; strength grossly 4-/5 distally)   LUE Assessment   LUE Assessment X  (AROM WFL; strength grossly 4-/5 distally)   Hand Function   Gross Motor Coordination Impaired  (Impaired finger to nose test)   Fine Motor Coordination   (Further assessment required)   Sensation   Light Touch No apparent deficits  (BUEs)   Additional Comments Patient denies diminished sensation t/o BUEs   Vision-Basic Assessment   Current Vision Wears glasses all the time  (Bifocals)   Cognition   Overall Cognitive Status   (Questionable- further assessment required)   Arousal/Participation Alert; Responsive; Cooperative   Attention Attends with cues to redirect   Orientation Level Oriented to person;Oriented to place; Disoriented to situation  (Oriented to month and year)   Memory   (Further assessment required)   Following Commands Follows all commands and directions without difficulty   Comments Patient agreeable to OT evaluation  Patient to benefit from further cognitive screening/evaluation during hospital admission  Assessment   Limitation Decreased ADL status; Decreased UE strength;Decreased Safe judgement during ADL;Decreased endurance;Decreased self-care trans;Decreased high-level ADLs   Prognosis Good   Assessment Patient is a 80 y o  male seen for OT evaluation s/p admit to [address] [facility] on 2/4/2022 w/Sepsis due to gram-negative bacteria ([facility] [address] )  Commorbidities affecting patient's functional performance at time of assessment include: bladder cancer, AMS, supratherapeutic INR, archibald catheter in place prior to arrival, hyponatremia, anemia, stage 3a CKD, atrial fibrillation, severe COPD, back pain, hyperlipidemia, HTN, and CAD   Patient  has a past medical history of A-fib ([facility] [address] ), Arthritis, Benign prostatic hyperplasia without lower urinary tract symptoms, Bladder cancer ([facility] [address] ), CAD (coronary artery disease), Cancer ([facility] [address] ), Chronic obstructive lung disease ([facility] [address] ), Constipation (12/9/2021), Coronary arteriosclerosis, Depression, Emphysema lung ([facility] [address] ), GERD (gastroesophageal reflux disease), Hyperlipidemia, Hypertension, Hyponatremia (1/15/2022), Irregular heart beat, Myocardial infarction ([facility] [address] ), Psoriasis, Requires supplemental oxygen, and St[facility] ([facility] )  Orders placed for OT evaluation and treatment  Performed at least two patient identifiers during session including name and wristband  Prior to admission, patient was living with his family in a two-story home with 0 ARLENE and a first-floor setup  At baseline, patient reports that he is independent in ADLs and receives assistance for IADLs  Personal factors affecting patient at time of initial evaluation include: difficulty performing ADLs and difficulty performing IADLs  Upon evaluation, patient requires supervision, set up and minimal assist for UB ADLs, minimal assist for LB ADLs, transfers and functional ambulation in room and bathroom with contact guard assist, with the use of [address] [facility] [address]  Patient is alert, oriented to name, and oriented to place, and oriented to month and year  Occupational performance is affected by the following deficits: attention span, decreased muscle strength, degenerative arthritic joint changes, impaired gross motor coordination, dynamic sit/ stand balance deficit with poor standing tolerance time for self care and functional mobility, decreased activity tolerance, decreased safety awareness and increased pain  Patient to benefit from continued Occupational Therapy treatment while in the hospital to address deficits as defined above and maximize level of functional independence with ADLs and functional mobility   Occupational Performance areas to address include: grooming , bathing/ shower, dressing, toilet hygiene, transfer to all surfaces, functional mobility, emergency response, health maintenance, IADLs: safety procedures and Leisure Participation  From OT standpoint, recommendation at time of d/c would be Home with family support and Home OT  Goals   Patient Goals none expressed at time of IE   Plan   Treatment Interventions ADL retraining;Functional transfer training;UE strengthening/ROM; Endurance training;Patient/family training;Equipment evaluation/education; Compensatory technique education;Continued evaluation; Activityengagement; Energy conservation   Goal Expiration Date 02/17/22   OT Treatment Day 0   OT Frequency 2-3x/wk   Recommendation   OT Discharge Recommendation Home with home health rehabilitation   Additional Comments  The patient's raw score on the AM-PAC Daily Activity inpatient short form is 19, standardized score is 40 22, greater than 39 4  Patients at this level are likely to benefit from discharge to home  Please refer to the recommendation of the Occupational Therapist for safe discharge planning     AM-PAC Daily Activity Inpatient   Lower Body Dressing 3   Bathing 3   Toileting 3   Upper Body Dressing 3   Grooming 3   Eating 4   Daily Activity Raw Score 19   Daily Activity Standardized Score (Calc for Raw Score >=11) 40 22   AM-[facility] Applied Cognition Inpatient   Following a Speech/Presentation 4   Understanding Ordinary Conversation 4   Taking Medications 3   Remembering Where Things Are Placed or Put Away 4   Remembering List of 4-5 Errands 3   Taking Care of Complicated Tasks 3   Applied Cognition Raw Score 21   Applied Cognition Standardized Score 44 3   Barthel Index   Feeding 0  (NPO)   Bathing 0   Grooming Score 5   Dressing Score 5   Bladder Score 0   Bowels Score 10   Toilet Use Score 5   Transfers (Bed/Chair) Score 10   Mobility (Level Surface) Score 0   Stairs Score 0   Barthel Index Score 35   Modified [place] Scale   Modified [place] Scale 4     Occupational Therapy Goals to be completed in 7-10 Days:    1 - Patient will verbalize and demonstrate use of energy conservation/ deep breathing technique and work simplification skills during functional activity with no verbal cues  2 - Patient will verbalize and demonstrate good body mechanics and joint protection techniques during  ADLs/ IADLs with no verbal cues  3 - Patient will increase OOB/ sitting tolerance to 2-4 hours per day for increased participation in self care and leisure tasks with no s/s of exertion  4 - Patient will increase standing tolerance time to 10 minutes with unilateral UE support to complete sink level ADLs@ mod I level  5 - Patient will demonstrate good safety awareness during functional transfers, mobility, and ADLs 100% of the time with no VCs  6 - Patient will transfer bed to Chair / toilet at [address] level with AD as indicated      7 - Patient will complete UB ADLs with Mod I assist      8 - Patient will complete LB ADLs with supervision/setup assist with the use of adaptive equipment as indicated      9 - Patient will complete toileting hygiene with mod I assist/ supervision for thoroughness      10 - Patient/ Family will demonstrate competency with UE Home Exercise Program        11- Pt will attend to continued cognitive assessment 100% of the time in order to provide most appropriate recommendations for d/c plans      Lisa Reid OTR/L

## 2022-02-07 NOTE — PROGRESS NOTES
Interventional Radiology Preprocedure Note    History/Indication for procedure:    Claudia Robertson is a 80 y o  male with a PMH of bacteremia who presents for port removal     Relevant past medical history:    Past Medical History:   Diagnosis Date    A-fib ([facility] [address] )     Arthritis     Benign prostatic hyperplasia without lower urinary tract symptoms     without Urinary Obstruction    Bladder cancer ([address] )     CAD (coronary artery disease)     Cancer (HCC)     Chronic obstructive lung disease ([address] )     Constipation 12/9/2021    Coronary arteriosclerosis     Depression     Emphysema lung (HCC)     GERD (gastroesophageal reflux disease)     Hyperlipidemia     Hypertension     Hyponatremia 1/15/2022    Irregular heart beat     Myocardial infarction (HCC)     Psoriasis     Requires supplemental oxygen     at bedtime during high humid days only    Stroke [facility])     TIA 1/2018     Patient Active Problem List   Diagnosis    CAD (coronary atherosclerotic disease)    Hypertension    Hyperlipemia    GERD (gastroesophageal reflux disease)    History of stroke    Sciatica of right side    Hyperlipidemia    Centrilobular emphysema (HCC)    Arthritis    Stenosis of left carotid artery    S/P CABG x 4    Acute left-sided low back pain with left-sided sciatica    Back pain    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    Vision changes    Urinary retention due to benign prostatic hyperplasia    Bladder cancer ([address] )    CKD (chronic kidney disease), stage II    Atrial flutter (HCC)    Irregular heart beat    Localized edema    COPD, severe ([address] )    Bronchiectasis with acute lower respiratory infection ([address] )    Abnormal CT of the chest    Tinnitus aurium, bilateral    Sensorineural hearing loss (SNHL) of both ears    Medicare annual wellness visit, subsequent    DDD (degenerative disc disease), lumbar    Acute on chronic diastolic congestive heart failure ([address] )    Encounter to discuss test results    Atrial fibrillation ([facility] )    Overgrown toenails    Recurrent unilateral inguinal hernia    Left lower quadrant abdominal pain    History of bilateral inguinal hernia repair    Cancer of overlapping sites of bladder ([facility] )    Stage 3a chronic kidney disease ([facility] )    Fortune catheter in place prior to arrival    DULCE (acute kidney injury) ([facility] ) on CKD 3    Hyponatremia    Pleural effusion on left    Abnormal urinalysis    Nephrostomy status (HCC)    Anxiety    Continuous opioid dependence (HCC)    Anemia    FRANCY (iron deficiency anemia)    Sepsis due to gram-negative bacteria (HCC)    Altered mental status    Supratherapeutic INR    Bacteremia       /61   Pulse 73   Temp 98 2 °F (36 8 °C)   Resp 20   Ht 5' 6" (1 676 m)   Wt 78 kg (172 lb)   SpO2 99%   BMI 27 76 kg/m²     Medications:    Inpatient Medications:     Scheduled Medications:  Current Facility-Administered Medications   Medication Dose Route Frequency Provider Last Rate    acetaminophen  650 mg Oral Q4H PRN Eduard Disla PA-C      aluminum-magnesium hydroxide-simethicone  30 mL Oral Q4H PRN MYLENE Parra      aspirin  81 mg Oral Daily [facility] Satish peoples      atorvastatin  40 mg Oral Daily [facility] Services[facility]      bisacodyl  10 mg Rectal Daily PRN MYLENE Clay      cefepime  2,000 mg Intravenous Q12H Eduard Drummond [place] GABRIEL peoples 2,000 mg (02/07/22 0653)    diltiazem  240 mg Oral Daily Juana Khalil PA-C      heparin (porcine)  5,000 Units Subcutaneous Q8H [facility]      lidocaine  1 patch Topical Daily Payal Serrato PA-C      OLANZapine  2 5 mg Intramuscular Once MYLENE Clay      ondansetron  4 mg Intravenous Q6H PRN Eduard Disla PA-C      pantoprazole  40 mg Oral QAM [facility] GABRIEL peoples      polyethylene glycol  17 g Oral Daily Christin jones, [address]      potassium chloride  40 mEq Oral BID Sony Hagen PA-C      senna-docusate sodium  2 tablet Oral BID MYLENE Boyer      sodium phosphate-biphosphate  1 enema Rectal Once PRN MYLENE Boyer         Infusions:       PRN:    acetaminophen    aluminum-magnesium hydroxide-simethicone    bisacodyl    ondansetron    sodium phosphate-biphosphate    Outpatient Medications:  No current facility-administered medications on file prior to encounter       Current Outpatient Medications on File Prior to Encounter   Medication Sig Dispense Refill    ALPRAZolam (XANAX) 0 25 mg tablet Take 1 tablet (0 25 mg total) by mouth 2 (two) times a day as needed for anxiety 30 tablet 0    aspirin 81 mg chewable tablet Chew 1 tablet (81 mg total) daily 60 tablet 6    atorvastatin (LIPITOR) 40 mg tablet Take 1 tablet (40 mg total) by mouth daily 90 tablet 3    bisacodyl (DULCOLAX) 10 mg suppository Insert 1 suppository (10 mg total) into the rectum daily as needed for constipation 12 suppository 0    diltiazem (CARDIZEM CD) 240 mg 24 hr capsule Take 1 capsule (240 mg total) by mouth daily 60 capsule 3    magnesium citrate (CITROMA) 1 745 g/30 mL oral solution Take 296 mL by mouth once as needed (constipation) for up to 1 dose 296 mL 3    ondansetron (Zofran ODT) 8 mg disintegrating tablet Take 1 tablet (8 mg total) by mouth every 8 (eight) hours as needed for nausea or vomiting 20 tablet 0    oxyCODONE (Roxicodone) 5 immediate release tablet Take 1 tablet (5 mg total) by mouth every 6 (six) hours as needed for moderate pain Max Daily Amount: 20 mg 40 tablet 0    pantoprazole (PROTONIX) 40 mg tablet Take 1 tablet (40 mg total) by mouth every morning 60 tablet 5    warfarin (COUMADIN) 5 mg tablet Take 5 mg daily 30 tablet 0    diphenhydrAMINE (BENADRYL) 12 5 MG chewable tablet Chew 1 tablet (12 5 mg total) 4 (four) times a day as needed (Bladder spasm) (Patient not taking: Reported on 2/6/2022 ) 30 tablet 0    naloxone (NARCAN) 4 mg/0 1 mL nasal spray Administer 1 spray into a nostril  If no response after 2-3 minutes, give another dose in the other nostril using a new spray  (Patient not taking: Reported on 2/6/2022 ) 1 each 1    polyethylene glycol (GOLYTELY) 4000 mL solution Take 4,000 mL by mouth once for 1 dose (Patient not taking: Reported on 1/27/2022 ) 4000 mL 0    sodium chloride 0 9 % SOLN Infuse 10 mL into a venous catheter in the morning (Patient not taking: Reported on 2/6/2022 ) 200 mL 0    sodium chloride, PF, 0 9 % 10 mL by Intracatheter route daily Intracatheter flushing daily (Patient not taking: Reported on 2/6/2022 ) 1200 mL 0    sodium chloride, PF, 0 9 % 10 mL by Intracatheter route daily Intracatheter flushing daily (Patient not taking: Reported on 2/6/2022 ) 1200 mL 0       Allergies   Allergen Reactions    Penicillins Swelling and Itching       Anticoagulants: warfarin    ASA classification: ASA 3 - Patient with moderate systemic disease with functional limitations    Airway Assessment: II (hard and soft palate, upper portion of tonsils anduvula visible)    Relevant family history: None    Relevant review of systems: None    Prior sedation/anesthesia: yes    Can the patient lie flat?  Yes     NPO Status: yes    Labs:   CBC with diff:   Lab Results   Component Value Date    WBC 4 01 (L) 02/07/2022    HGB 7 0 (L) 02/07/2022    HCT 24 0 (L) 02/07/2022    MCV 74 (L) 02/07/2022     02/07/2022    MCH 21 8 (L) 02/07/2022    MCHC 29 6 (L) 02/07/2022    RDW 20 7 (H) 02/07/2022    MPV 9 4 02/07/2022    NRBC 0 02/07/2022     BMP/CMP:  Lab Results   Component Value Date    K 3 2 (L) 02/07/2022     02/07/2022    CO2 26 02/07/2022    BUN 20 02/07/2022    CREATININE 1 16 02/07/2022    CALCIUM 7 9 (L) 02/07/2022    AST 15 02/04/2022    ALT 17 02/04/2022    ALKPHOS 73 02/04/2022    EGFR 58 02/07/2022   ,     Coags:   Lab Results   Component Value Date    PTT 94 (H) 02/04/2022    INR 1 95 (H) 02/07/2022   ,   Results from last 7 days   Lab Units 02/07/22  1424 02/06/22  0954 02/04/22  1755   PTT seconds  --   --  94*   INR  1 95* 2 66* 4 26*        Relevant imaging studies:   Reviewed  Directed physical examination:  I agree with the physical exam performed on 2/6/22 and there are no additional changes  Assessment/Plan:   Port removal     Sedation/Anesthesia plan: Moderate sedation will be used as needed for procedure  Consent with alternatives to the procedure, risks and benefits have been explained and discussed with the patient/patient's family: yes

## 2022-02-07 NOTE — ASSESSMENT & PLAN NOTE
Background: With hx of bladder cancer, currently on systemic chemotherapy, brought in by Granddaughter who reported several days of progressive confusion and weakness and was instructed to take patient to ED by infusion staff for these symptoms  · Meets sepsis criteria on admission (SIRS + UTI)  · UA suggestive of UTI; urine and blood cultures + for pseudomonas   · 103 1F in ED; afebrile since   · WBC 17 95; ANC WNL;  Lactic acid 2 4; COVID negative   · Lactic since cleared S/P fluid boluses  · WBC trending down appropriately   · S/P cefepime + vanco in ED; continue cefepime at this time  · Possible chronic colonization in the setting of archibald catheter and nephrostomy; however in the setting of AMS and meeting SEPSIS criteria will treat as above  · Infectious disease consulted; input as noted below  · IR consulted for neph tube check/possible exchange (though on CT scan appears to be in good position with no hydro thus no nephro tube intervention planned) and Port-A-Cath removal to be done today 2/7 pending INR   · Echo ordered  · CT chest abdomen pelvis ordered with contrast - report pending   · Repeat cultures ordered  · Will have PICC line placed when repeat cultures negative at 48-72 hours  · Discontinue vancomycin; continue cefepime monotherapy  · Discussed replacement of Port-A-Cath with another Port-A-Cath versus PICC with primary hematologist(Dr Azalea Bueno) oncologist and cleared to put in PICC following clearance of blood cultures

## 2022-02-07 NOTE — BRIEF OP NOTE (RAD/CATH)
INTERVENTIONAL RADIOLOGY PROCEDURE NOTE    Date: 2/7/2022    Procedure: port removal    Preoperative diagnosis:   1  Altered mental status    2  UTI (urinary tract infection)    3  Bladder cancer (HCC)         Postoperative diagnosis: Same  Surgeon: Chin Ruiz MD     Assistant: None  No qualified resident was available  Blood loss: 5 ml    Specimens: sent to lab     Findings: Port removal   Sent for culture  Complications: None immediate      Anesthesia: conscious sedation

## 2022-02-07 NOTE — PLAN OF CARE
Problem: PHYSICAL THERAPY ADULT  Goal: Performs mobility at highest level of function for planned discharge setting  See evaluation for individualized goals  Description: Treatment/Interventions: Functional transfer training,LE strengthening/ROM,Therapeutic exercise,Endurance training,Cognitive reorientation,Patient/family training,Bed mobility,Gait training,Spoke to nursing,OT,Spoke to advanced practitioner          See flowsheet documentation for full assessment, interventions and recommendations  Note: Prognosis: Good  Problem List: Decreased strength,Decreased endurance,Impaired balance,Decreased mobility,Decreased cognition,Decreased safety awareness,Pain  Assessment: Pt is an 80year old male who presents with sepsis due to gram-negative bacteria on 2/4/22 at [address] [place] [address]  PT orders received for evaluation and treat with an up with assist order  Pt has a PMH of CAD, hypertension, back pain, bladder cancer, COPD, A-fib, stage 3 chronic kidney disease, archibald catheter, hyponatremia, and anemia  Prior to IE the pt was independent with ADLs and functional mobility  The pt received assistance with his IADLs from his family  The pt ambulated household distances without the use of an AD  The pt lives in a 2-story home with 0 ARLENE and is able to live on the main floor  At IE the patient presents with the following impairments including; increased pain, decreased LE strength, decreased endurance, decreased mobility, impaired balance, impaired cognition, gait deviations, decreased activity tolerance, and increased fall risk  The pt presents with increased difficulty when performing bed mobility, sit to stand transfers, and ambulation  The pt requires verbal cues and min A x1 for bed mobility and ambustion and min A /CG A for sit to stand transfers in order to ensure patient safety  The pt currently requires a RW for all functional mobility to ensure pt safety   The pt scores a 17 on the AM-PAC and a 40/100 on the Barthel index  The patient presents as an unstable-unpredictable complexity case due to above impairments and need for continued medical management  PT recommendation at time of d/c is for home with HHPT pending progress to help pt return to PLOF  Barriers to Discharge: Other (Comment) (decline in functional mobility)        PT Discharge Recommendation: Home with home health rehabilitation          See flowsheet documentation for full assessment

## 2022-02-07 NOTE — ASSESSMENT & PLAN NOTE
· Appears to be chronic issue with baseline around 8-9  · Did have blood in archibald bag, now resolved  · Would hold off on transfusion unless Hgb <7 - additionally   · Monitor closely given supratherapeutic INR    Lab Results   Component Value Date    HGB 6 7 (LL) 02/07/2022    HGB 7 5 (L) 02/06/2022    HGB 7 7 (L) 02/05/2022

## 2022-02-07 NOTE — PHYSICAL THERAPY NOTE
Physical Therapy Evaluation     Patient's Name: Nixon Lopez    Admitting Diagnosis  UTI (urinary tract infection) [N39 0]  Bladder cancer ([facility] [address] ) [C67 9]  Altered mental status [R41 82]    Problem List  Patient Active Problem List   Diagnosis    CAD (coronary atherosclerotic disease)    Hypertension    Hyperlipemia    GERD (gastroesophageal reflux disease)    History of stroke    Sciatica of right side    Hyperlipidemia    Centrilobular emphysema ([facility] [address] )    Arthritis    Stenosis of left carotid artery    S/P CABG x 4    Acute left-sided low back pain with left-sided sciatica    Back pain    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    Vision changes    Urinary retention due to benign prostatic hyperplasia    Bladder cancer ([facility] [address] )    CKD (chronic kidney disease), stage II    Atrial flutter (HCC)    Irregular heart beat    Localized edema    COPD, severe ([facility] [address] )    Bronchiectasis with acute lower respiratory infection ([facility] )    Abnormal CT of the chest    Tinnitus aurium, bilateral    Sensorineural hearing loss (SNHL) of both ears    Medicare annual wellness visit, subsequent    DDD (degenerative disc disease), lumbar    Acute on chronic diastolic congestive heart failure ([facility] [address] )    Encounter to discuss test results    Atrial fibrillation ([facility] [address] )    Overgrown toenails    Recurrent unilateral inguinal hernia    Left lower quadrant abdominal pain    History of bilateral inguinal hernia repair    Cancer of overlapping sites of bladder ([facility] [address] )    Stage 3a chronic kidney disease ([facility] [address] )    Fortune catheter in place prior to arrival    DULCE (acute kidney injury) ([facility] [address] ) on CKD 3    Hyponatremia    Pleural effusion on left    Abnormal urinalysis    Nephrostomy status (HCC)    Anxiety    Continuous opioid dependence (HCC)    Anemia    FRANCY (iron deficiency anemia)    Sepsis due to gram-negative bacteria (HCC)    Altered mental status    Supratherapeutic INR    Bacteremia Past Medical History  Past Medical History:   Diagnosis Date    A-fib [facility])     Arthritis     Benign prostatic hyperplasia without lower urinary tract symptoms     without Urinary Obstruction    Bladder cancer ([facility] [address] )     CAD (coronary artery disease)     Cancer (HCC)     Chronic obstructive lung disease (HCC)     Constipation 12/9/2021    Coronary arteriosclerosis     Depression     Emphysema lung (HCC)     GERD (gastroesophageal reflux disease)     Hyperlipidemia     Hypertension     Hyponatremia 1/15/2022    Irregular heart beat     Myocardial infarction (HCC)     Psoriasis     Requires supplemental oxygen     at bedtime during high humid days only    Stroke [facility])     TIA 1/2018     Past Surgical History  Past Surgical History:   Procedure Laterality Date    COLONOSCOPY      CORONARY ANGIOPLASTY  02/03/2001    PTCA of RCA    CORONARY ARTERY BYPASS GRAFT  02/07/2001    x4- [place]    HERNIA REPAIR      IR NEPHROSTOMY TUBE PLACEMENT  1/18/2022    IR PORT PLACEMENT  1/14/2022    IN BRONCHOSCOPY,DIAGNOSTIC Left 1/7/2019    Procedure: BRONCHOSCOPY FLEXIBLE;  Surgeon: Zandra Moulotn MD;  [facility];   Service: Pulmonary    IN CYSTOURETHROSCOPY,FULGUR <0 5 CM LESN N/A 11/30/2021    Procedure: TRANSURETHRAL RESECTION OF BLADDER TUMOR (TURBT) with "large";  Surgeon: Alvin Hirsch MD;  [facility];  Service: Urology    IN CYSTOURETHROSCOPY,URETER CATHETER Bilateral 11/30/2021    Procedure: Grecia Prado;  Surgeon: Avlin Hirsch MD;  [facility];  Service: Urology    IN THROMBOENDARTECTMY Natacha Esters Left 2/20/2018    Procedure: ENDARTERECTOMY ARTERY CAROTID WITH PATCH ANGIOPLASTY;  Surgeon: Escobar Arredondo MD;  [facility];  Service: Vascular    TRANSURETHRAL RESECTION OF PROSTATE            02/07/22 0806   PT Last Visit   PT Visit Date 02/07/22   Note Type   Note type Evaluation   Pain Assessment   Pain Assessment Tool 0-10   Pain Score 5   Pain Location/Orientation Location: Buttocks   Pain Onset/Description Descriptor: [facility] Pain Intervention(s) Repositioned; Ambulation/increased activity; Environmental changes   Restrictions/Precautions   Weight Bearing Precautions Per Order No   Braces or Orthoses Other (Comment)  (None per pt )   Other Precautions Chair Alarm; Bed Alarm;Multiple lines; Fall Risk;Pain;Cognitive   Home Living   Type of [address] Two level;Performs ADLs on one level; Able to live on main level with bedroom/bathroom; Other (Comment)  (0 ARLENE)   Bathroom Shower/Tub Tub/shower unit  (Access to walk-in and tub shower per pt)   Bathroom Toilet Raised   Bathroom Equipment Shower chair;Grab bars in [employer] [address];Electric scooter   Additional Comments Patient reports that he is primarily ambulatory with no AD within the home environment   Prior Function   Level of [address] with ADLs and functional mobility   Lives With Family  (Granddaughter and family)   Receives Help From Family   ADL Assistance Independent   IADLs Needs assistance   Falls in the last 6 months 0   Vocational Part time employment   Comments Home setup obtained via previous PT evaluation performed on 1/21/22, patient confirmed information at time of IE   General   Family/Caregiver Present No   Cognition   Overall Cognitive Status   (Questionable )   Arousal/Participation Alert   Attention Attends with cues to redirect   Orientation Level Oriented to person;Oriented to place; Disoriented to situation  (Oriented to month and year)   Memory Other (Comment)  (Requiring further assessment )   Following Commands Follows all commands and directions without difficulty   Comments Pt was agreeable to PT evalaution    RUE Assessment   RUE Assessment   (Defer to OT assessment )   LUE Assessment   LUE Assessment   (Defer to OT assessment )   RLE Assessment   RLE Assessment X   Strength RLE   RLE Overall Strength 3+/5   LLE Assessment   LLE Assessment X   Strength LLE   LLE Overall Strength 3+/5   Light Touch   RLE Light Touch Grossly intact   LLE Light Touch Grossly intact   Bed Mobility   Supine to Sit 4  Minimal assistance   Additional items Assist x 1;HOB elevated; Increased time required;Verbal cues;LE management   Transfers   Sit to Stand 4  Minimal assistance  (CGA)   Additional items Assist x 1; Increased time required;Verbal cues   Stand to Sit 4  Minimal assistance  (CGA)   Additional items Assist x 1; Increased time required;Verbal cues   Ambulation/Elevation   Gait pattern Decreased toe off;Decreased heel strike;Decreased hip extension; Excessively slow; Step to;Short stride; Shuffling; Foward flexed;Decreased foot clearance   Gait Assistance 4  Minimal assist   Additional items Assist x 1;Verbal cues   Assistive Device Rolling walker   Distance 20 feet   Stair Management Assistance Not tested   Balance   Static Sitting Fair +   Dynamic Sitting Fair   Static Standing Fair -   Dynamic Standing Poor +   Ambulatory Poor +   Endurance Deficit   Endurance Deficit Yes   Endurance Deficit Description Decreased activity tolerance    Activity Tolerance   Activity Tolerance Patient limited by fatigue   Medical Staff Made Aware PT Navdeep William; Per PT conversation with MEDARDO Hammond via [facility], patient appropriate for therapy evaluations as long as pt is asymptomatic (patient present as asymptomatic t/o evaluation), MEDARDO Hammond made aware of session outcomes  Nurse Made Aware VIVIAN Toribio Doctor was made aware of session outcomes, Post session the pt was seated in the recliner and was in NAD, The pt's belongings were within reach and chair alarm was activated    Assessment   Prognosis Good   Problem List Decreased strength;Decreased endurance; Impaired balance;Decreased mobility; Decreased cognition;Decreased safety awareness;Pain   Assessment Pt is an 80year old male who presents with sepsis due to gram-negative bacteria on 2/4/22 at [facility]  PT orders received for evaluation and treat with an up with assist order  Pt has a PMH of CAD, hypertension, back pain, bladder cancer, COPD, A-fib, stage 3 chronic kidney disease, archibald catheter, hyponatremia, and anemia  Prior to IE the pt was independent with ADLs and functional mobility  The pt received assistance with his IADLs from his family  The pt ambulated household distances without the use of an AD  The pt lives in a 2-story home with 0 ARLENE and is able to live on the main floor  At  the patient presents with the following impairments including; increased pain, decreased LE strength, decreased endurance, decreased mobility, impaired balance, impaired cognition, gait deviations, decreased activity tolerance, and increased fall risk  The pt presents with increased difficulty when performing bed mobility, sit to stand transfers, and ambulation  The pt requires verbal cues and min A x1 for bed mobility and ambustion and min A /CG A for sit to stand transfers in order to ensure patient safety  The pt currently requires a RW for all functional mobility to ensure pt safety  The pt scores a 17 on the AM-PAC and a 40/100 on the Barthel index  The patient presents as an unstable-unpredictable complexity case due to above impairments and need for continued medical management  PT recommendation at time of d/c is for home with HHPT pending progress to help pt return to PLOF  Barriers to Discharge Other (Comment)  (decline in functional mobility)   Goals   Patient Goals None expressed during the session    STG Expiration Date 02/17/22   Short Term Goal #1 In 7 to 10 days: Pt will increase strength by 1/2 a grade in order to increase ease with transfers, Pt will be able to perform bed mobility with supervision in order to decrease caregiver burden, Pt will be able to perform transfers with supervision to improve mobility   Pt will be able to ambulate >100 feet with a RW and supervision in order to increase independence navigating household distances, pt will improve gross balance by 1/2 a grade to decrease risk of falls  Plan   Treatment/Interventions Functional transfer training;LE strengthening/ROM; Therapeutic exercise; Endurance training;Cognitive reorientation;Patient/family training;Bed mobility;Gait training;Spoke to nursing;OT;Spoke to advanced practitioner   PT Frequency 3-5x/wk   Recommendation   PT Discharge Recommendation Home with home health rehabilitation   AM-PAC Basic Mobility Inpatient   Turning in Bed Without Bedrails 3   Lying on Back to Sitting on Edge of Flat Bed 3   Moving Bed to Chair 3   Standing Up From Chair 3   Walk in Room 3   Climb 3-5 Stairs 2   Basic Mobility Inpatient Raw Score 17   Basic Mobility Standardized Score 39 67   Highest Level Of Mobility   -[facility] Goal 5: Stand one or more mins   -[facility] Highest Level of Mobility 6: Walk 10 steps or more   -HL Goal Achieved Yes   Modified Mobile Scale   Modified Ander Scale 4   Barthel Index   Feeding 5  (NPO)   Bathing 0   Grooming Score 5   Dressing Score 5   Bladder Score 0   Bowels Score 10   Toilet Use Score 5   Transfers (Bed/Chair) Score 10   Mobility (Level Surface) Score 0   Stairs Score 0   Barthel Index Score 40       PT Evaluation Time: 7102-1153    Amy Espino, SPT

## 2022-02-07 NOTE — ASSESSMENT & PLAN NOTE
· Currently prescribed coumadin 5mg daily  · INR 4 26  · Will hold home coumadin for now; possibly to resume at decreased dose pending result   · Continue to trend   · Given patient is going for IR procedure on 2/7 will continue to hold coumadin and place on DVT prophylaxis with heparin subQ for now

## 2022-02-07 NOTE — ASSESSMENT & PLAN NOTE
· Sodium 127; further trend as below   · Possibly in the setting of dehydration with granddaughter reporting decreased oral intake 2/2 chemo/radiation  · Urine sodium 29, Urine Osmo 565  · Improved with IVF; suspect secondary to poor PO intake   · Sodium level improved to 135

## 2022-02-07 NOTE — PROGRESS NOTES
[address] [facility] Progress Note Julio Miguel 1940, 80 y o  male MRN: [id number]  Unit/Bed#: -01 Encounter: [id number]  Primary Care Provider: Wesley Potter MD   Date and time admitted to hospital: 2/4/2022  5:41 PM    * Sepsis due to gram-negative bacteria [facility])  Assessment & Plan  Background: With hx of bladder cancer, currently on systemic chemotherapy, brought in by Granddaughter who reported several days of progressive confusion and weakness and was instructed to take patient to ED by infusion staff for these symptoms  · Meets sepsis criteria on admission (SIRS + UTI)  · UA suggestive of UTI; urine and blood cultures + for pseudomonas   · 103 1F in ED; afebrile since   · WBC 17 95; ANC WNL;  Lactic acid 2 4; COVID negative   · Lactic since cleared S/P fluid boluses  · WBC trending down appropriately   · S/P cefepime + vanco in ED; continue cefepime at this time  · Possible chronic colonization in the setting of archibald catheter and nephrostomy; however in the setting of AMS and meeting SEPSIS criteria will treat as above  · Infectious disease consulted; input as noted below  · IR consulted for neph tube check/possible exchange (though on CT scan appears to be in good position with no hydro thus no nephro tube intervention planned) and Port-A-Cath removal to be done today 2/7 pending INR   · Echo ordered  · CT chest abdomen pelvis ordered with contrast - report pending   · Repeat cultures ordered  · Will have PICC line placed when repeat cultures negative at 48-72 hours  · Discontinue vancomycin; continue cefepime monotherapy  · Discussed replacement of Port-A-Cath with another Port-A-Cath versus PICC with primary hematologist(Dr Barrera Wan) oncologist and cleared to put in PICC following clearance of blood cultures    Archibald catheter in place prior to arrival  Assessment & Plan  · With archibald catheter and L perc nephrostomy tube in the setting of bladder CA as above   · Both producing urine   · Archibald was replaced on day of admission   · Archibald with small amount of bloody output, monitor in the setting of supratherapeutic INR  · Continue archibald and nephrostomy care     Bladder cancer [facility])  Assessment & Plan  · 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, diagnosed 10/12/2021  · Follows with heme onc and urology as an outpatient  · Receives chemo and radiation; last received on day of admission  · Continue outpatient heme onc and urology f/u upon discharge     Altered mental status  Assessment & Plan  · CT head negative for acute process  · Not hypoglycemic, see hyponatremia as below  · Possibly in the setting of sepsis/UTI as above vs systemic chemotherapy vs outpatient narcotic use   · Narcotics and benzos prescribed OP on hold; had received IM Zyprexa X1  · Persistently refusing medications in setting of NPO status  · Delirium precautions   · Supportive measures     Anemia  Assessment & Plan  · Appears to be chronic issue with baseline around 8-9  · Did have blood in archibald bag, now resolved  · Would hold off on transfusion unless Hgb <7 - additionally   · Monitor closely given supratherapeutic INR    Lab Results   Component Value Date    HGB 6 7 (LL) 02/07/2022    HGB 7 5 (L) 02/06/2022    HGB 7 7 (L) 02/05/2022         CAD (coronary atherosclerotic disease)  Assessment & Plan  · S/p CABG x4  · Denies chest pain; Troponin 37  · Continue home ASA and statin, holding home coumadin in the setting of supratherapeutic INR     Hypertension  Assessment & Plan  · Blood pressure acceptable with SBP in the 100-130s   · Continue home Cardizem  · Monitor BP per unit protocol    Supratherapeutic INR  Assessment & Plan  · Currently prescribed coumadin 5mg daily  · INR 4 26  · Will hold home coumadin for now; possibly to resume at decreased dose pending result   · Continue to trend   · Given patient is going for IR procedure on 2/7 will continue to hold coumadin and place on DVT prophylaxis with heparin subQ for now       Hyponatremia  Assessment & Plan  · Sodium 127; further trend as below   · Possibly in the setting of dehydration with granddaughter reporting decreased oral intake 2/2 chemo/radiation  · Urine sodium 29, Urine Osmo 565  · Improved with IVF; suspect secondary to poor PO intake   · Sodium level improved to 135  Stage 3a chronic kidney disease [facility])  Assessment & Plan  Lab Results   Component Value Date    EGFR 58 02/07/2022    EGFR 58 02/06/2022    EGFR 51 02/05/2022    CREATININE 1 16 02/07/2022    CREATININE 1 16 02/06/2022    CREATININE 1 29 02/05/2022     · Stable; appears to be improving from recent DULCE, recent baseline difficult to establish  · Avoid nephrotoxic agents  · Discontinued IVF on 2/5  · Monitor status post IV contrast on 2/6    Atrial fibrillation (HCC)  Assessment & Plan  · Currently rate controlled  · Continue home Cardizem, holding home warfarin in the setting of supratherapeutic INR  · Monitor with routine vitals     COPD, severe (HCC)  Assessment & Plan  · 96%O2 RA; no SOB complaints  · Monitor O2 overnight     Back pain  Assessment & Plan  · Currently complaining of back pain; resolved on 2/5 as patient denies   · Prescribed Oxycodone 5mg q6h prn as outpatient  · Home Xanax and oxycodone currently on hold given AMS    Hyperlipidemia  Assessment & Plan  · Continue home atorvastatin    VTE Pharmacologic Prophylaxis: VTE Score: 6 High Risk (Score >/= 5) - Pharmacological DVT Prophylaxis Contraindicated  Sequential Compression Devices Ordered  Patient Centered Rounds: I performed bedside rounds with nursing staff today  Discussions with Specialists or Other Care Team Provider: IR, ID, CM     Education and Discussions with Family / Patient: Updated  (granddaughter Pierce Cole) via phone  Time Spent for Care: 30 minutes  More than 50% of total time spent on counseling and coordination of care as described above      Current Length of Stay: 3 day(s)  Current Patient Status: Inpatient   Certification Statement: The patient will continue to require additional inpatient hospital stay due to pending port removal  Discharge Plan: Anticipate discharge in 48-72 hrs to discharge location to be determined pending rehab evaluations  Code Status: Level 1 - Full Code    Subjective:   Patient seen this morning, all he wants to do is eat  Denies any pain  Seems to be a little confused still  I spoke to his granddaughter by phone, she is also concerned that patient has not had a full meal since prior to yesterday, and had enema yesterday to help with constipation  Explained to patient as well as granddaughter by phone regarding the need for Port-A-Cath removal secondary to bacteremia  Pending repeat INR today, patient to have removal this afternoon  Objective:     Vitals:   Temp (24hrs), Av 4 °F (36 9 °C), Min:98 2 °F (36 8 °C), Max:98 6 °F (37 °C)    Temp:  [98 2 °F (36 8 °C)-98 6 °F (37 °C)] 98 2 °F (36 8 °C)  HR:  [73-91] 73  Resp:  [18-20] 20  BP: (101-117)/(59-61) 117/61  SpO2:  [99 %-100 %] 99 %  Body mass index is 27 76 kg/m²  Input and Output Summary (last 24 hours): Intake/Output Summary (Last 24 hours) at 2022 1254  Last data filed at 2022 0900  Gross per 24 hour   Intake 120 ml   Output 1625 ml   Net -1505 ml       Physical Exam:   Physical Exam  Vitals and nursing note reviewed  Constitutional:       General: He is not in acute distress  Appearance: He is not toxic-appearing  Cardiovascular:      Rate and Rhythm: Normal rate  Heart sounds: Normal heart sounds  No murmur heard  Pulmonary:      Effort: Pulmonary effort is normal  No respiratory distress  Breath sounds: Normal breath sounds  Abdominal:      General: Bowel sounds are normal  There is no distension  Palpations: Abdomen is soft  Genitourinary:     Comments: PCNU and archibald noted  Neurological:      Mental Status: He is alert   He is disoriented  Psychiatric:         Mood and Affect: Mood normal            Additional Data:     Labs:  Results from last 7 days   Lab Units 02/07/22  0912 02/07/22  0705 02/07/22  0705 02/05/22  0520 02/04/22  1755   WBC Thousand/uL  --   --  4 01*   < > 17 95*   HEMOGLOBIN g/dL 7 0*   < > 6 7*   < > 7 8*   HEMATOCRIT % 23 7*   < > 22 6*   < > 26 9*   PLATELETS Thousands/uL  --   --  216   < > 317   BANDS PCT %  --   --   --   --  4   NEUTROS PCT %  --   --  80*   < >  --    LYMPHS PCT %  --   --  9*   < >  --    LYMPHO PCT %  --   --   --   --  2*   MONOS PCT %  --   --  5   < >  --    MONO PCT %  --   --   --   --  0*   EOS PCT %  --   --  3   < > 0    < > = values in this interval not displayed  Results from last 7 days   Lab Units 02/07/22  0705 02/05/22 0719 02/04/22  1755   SODIUM mmol/L 135*   < > 127*   POTASSIUM mmol/L 3 2*   < > 4 5   CHLORIDE mmol/L 102   < > 95*   CO2 mmol/L 26   < > 26   BUN mg/dL 20   < > 23   CREATININE mg/dL 1 16   < > 1 41*   ANION GAP mmol/L 7   < > 6   CALCIUM mg/dL 7 9*   < > 8 6   ALBUMIN g/dL  --   --  3 0*   TOTAL BILIRUBIN mg/dL  --   --  1 83*   ALK PHOS U/L  --   --  73   ALT U/L  --   --  17   AST U/L  --   --  15   GLUCOSE RANDOM mg/dL 102   < > 139    < > = values in this interval not displayed       Results from last 7 days   Lab Units 02/06/22  0954   INR  2 66*             Results from last 7 days   Lab Units 02/07/22  0705 02/06/22 0453 02/04/22 2034 02/04/22  1755   LACTIC ACID mmol/L  --   --  1 0 2 4*   PROCALCITONIN ng/ml 4 95* 5 32*  --   --        Lines/Drains:  Invasive Devices  Report    Central Venous Catheter Line            Port A Cath 01/14/22 Right Internal jugular 24 days          Line            Pump Device Continuous ambulatory delivery device Right Chest 7 days          Drain            Urethral Catheter Non-latex 16 Fr  40 days    Percutaneous Nephroureteral Tube (PCNU) Left 1 8 5 Fr 26 Cm 19 days    Percutaneous Nephroureteral Tube (PCNU) Right 2 8 5 Fr 24 Cm 19 days              Urinary Catheter:  Goal for removal: N/A - Chronic Fortune         Central Line:  Goal for removal: Port removal today due to bacteremia             Imaging: No pertinent imaging reviewed  Recent Cultures (last 7 days):   Results from last 7 days   Lab Units 02/06/22  1000 02/04/22  1755   BLOOD CULTURE  Received in Microbiology Lab  Culture in Progress  Received in Microbiology Lab  Culture in Progress   Pseudomonas aeruginosa*  Pseudomonas aeruginosa*   GRAM STAIN RESULT   --  Gram negative rods*  Gram negative rods*   URINE CULTURE   --  >100,000 cfu/ml Pseudomonas aeruginosa*       Last 24 Hours Medication List:   Current Facility-Administered Medications   Medication Dose Route Frequency Provider Last Rate    acetaminophen  650 mg Oral Q4H PRN Shane Shin PA-C      aluminum-magnesium hydroxide-simethicone  30 mL Oral Q4H PRN MYLENE Ford      aspirin  81 mg Oral Daily [place], [place]      atorvastatin  40 mg Oral Daily [place], [place]      bisacodyl  10 mg Rectal Daily PRN MYLENE Clay      cefepime  2,000 mg Intravenous Q12H [facility] GABRIEL peoples 2,000 mg (02/07/22 0653)    diltiazem  240 mg Oral Daily Shane Shin PA-C      heparin (porcine)  5,000 Units Subcutaneous [facility] MYLENE Crane      lidocaine  1 patch Topical Daily Payal Serrato PA-C      OLANZapine  2 5 mg Intramuscular Once MYLENE Clay      ondansetron  4 mg Intravenous Q6H PRN Chong cherie peoples PA-C      pantoprazole  40 mg Oral QAM Chong [facility] Rene peoples PA-C      polyethylene glycol  17 g Oral Daily Christin jones, [address]      potassium chloride  40 mEq Oral BID Malinda Kerr PA-C      senna-docusate sodium  2 tablet Oral BID MYLENE Boyer      sodium phosphate-biphosphate  1 enema Rectal Once PRN MYLENE Clay          Today, Patient Was Seen By: Chuck Pena PA-C    **Please Note: This note may have been constructed using a voice recognition system  **

## 2022-02-07 NOTE — PLAN OF CARE
Problem: OCCUPATIONAL THERAPY ADULT  Goal: Performs self-care activities at highest level of function for planned discharge setting  See evaluation for individualized goals  Description: Treatment Interventions: ADL retraining,Functional transfer training,UE strengthening/ROM,Endurance training,Patient/family training,Equipment evaluation/education,Compensatory technique education,Continued evaluation,Activityengagement,Energy conservation          See flowsheet documentation for full assessment, interventions and recommendations  Note: Limitation: Decreased ADL status,Decreased UE strength,Decreased Safe judgement during ADL,Decreased endurance,Decreased self-care trans,Decreased high-level ADLs  Prognosis: Good  Assessment: Patient is a 80 y o  male seen for OT evaluation s/p admit to [id number] [facility] on 2/4/2022 w/Sepsis due to gram-negative bacteria ([facility] [address] )  Commorbidities affecting patient's functional performance at time of assessment include: bladder cancer, AMS, supratherapeutic INR, archibald catheter in place prior to arrival, hyponatremia, anemia, stage 3a CKD, atrial fibrillation, severe COPD, back pain, hyperlipidemia, HTN, and CAD  Patient  has a past medical history of A-fib ([address] ), Arthritis, Benign prostatic hyperplasia without lower urinary tract symptoms, Bladder cancer ([address] ), CAD (coronary artery disease), Cancer ([address] ), Chronic obstructive lung disease ([address] ), Constipation (12/9/2021), Coronary arteriosclerosis, Depression, Emphysema lung ([address] ), GERD (gastroesophageal reflux disease), Hyperlipidemia, Hypertension, Hyponatremia (1/15/2022), Irregular heart beat, Myocardial infarction ([address] ), Psoriasis, Requires supplemental oxygen, and Stroke ([address] )  Orders placed for OT evaluation and treatment  Performed at least two patient identifiers during session including name and wristband  Prior to admission, patient was living with his family in a two-story home with 0 ARLENE and a first-floor setup   At baseline, patient reports that he is independent in ADLs and receives assistance for IADLs  Personal factors affecting patient at time of initial evaluation include: difficulty performing ADLs and difficulty performing IADLs  Upon evaluation, patient requires supervision, set up and minimal assist for UB ADLs, minimal assist for LB ADLs, transfers and functional ambulation in room and bathroom with contact guard assist, with the use of [address]  Patient is alert, oriented to name, and oriented to place, and oriented to month and year  Occupational performance is affected by the following deficits: attention span, decreased muscle strength, degenerative arthritic joint changes, impaired gross motor coordination, dynamic sit/ stand balance deficit with poor standing tolerance time for self care and functional mobility, decreased activity tolerance, decreased safety awareness and increased pain  Patient to benefit from continued Occupational Therapy treatment while in the hospital to address deficits as defined above and maximize level of functional independence with ADLs and functional mobility  Occupational Performance areas to address include: grooming , bathing/ shower, dressing, toilet hygiene, transfer to all surfaces, functional mobility, emergency response, health maintenance, IADLs: safety procedures and Leisure Participation  From OT standpoint, recommendation at time of d/c would be Home with family support and Home OT         OT Discharge Recommendation: Home with home health rehabilitation

## 2022-02-08 ENCOUNTER — APPOINTMENT (OUTPATIENT)
Dept: RADIATION ONCOLOGY | Facility: CLINIC | Age: 82
End: 2022-02-08
Attending: RADIOLOGY
Payer: MEDICARE

## 2022-02-08 PROBLEM — E87.1 HYPONATREMIA: Status: RESOLVED | Noted: 2022-01-15 | Resolved: 2022-02-08

## 2022-02-08 PROBLEM — R79.1 SUPRATHERAPEUTIC INR: Status: RESOLVED | Noted: 2022-02-04 | Resolved: 2022-02-08

## 2022-02-08 LAB
ANION GAP SERPL CALCULATED.3IONS-SCNC: 8 MMOL/L (ref 4–13)
ATRIAL RATE: 90 BPM
BACTERIA BLD CULT: ABNORMAL
BUN SERPL-MCNC: 20 MG/DL (ref 5–25)
CALCIUM SERPL-MCNC: 8.5 MG/DL (ref 8.3–10.1)
CHLORIDE SERPL-SCNC: 104 MMOL/L (ref 100–108)
CO2 SERPL-SCNC: 25 MMOL/L (ref 21–32)
CREAT SERPL-MCNC: 1.18 MG/DL (ref 0.6–1.3)
ERYTHROCYTE [DISTWIDTH] IN BLOOD BY AUTOMATED COUNT: 22 % (ref 11.6–15.1)
GFR SERPL CREATININE-BSD FRML MDRD: 57 ML/MIN/1.73SQ M
GLUCOSE SERPL-MCNC: 117 MG/DL (ref 65–140)
GRAM STN SPEC: ABNORMAL
HCT VFR BLD AUTO: 27.3 % (ref 36.5–49.3)
HGB BLD-MCNC: 8.3 G/DL (ref 12–17)
INR PPP: 1.22 (ref 0.84–1.19)
MCH RBC QN AUTO: 22.7 PG (ref 26.8–34.3)
MCHC RBC AUTO-ENTMCNC: 30.4 G/DL (ref 31.4–37.4)
MCV RBC AUTO: 75 FL (ref 82–98)
P AXIS: 99 DEGREES
PLATELET # BLD AUTO: 263 THOUSANDS/UL (ref 149–390)
PMV BLD AUTO: 9.2 FL (ref 8.9–12.7)
POTASSIUM SERPL-SCNC: 4 MMOL/L (ref 3.5–5.3)
PR INTERVAL: 138 MS
PROTHROMBIN TIME: 14.9 SECONDS (ref 11.6–14.5)
QRS AXIS: 67 DEGREES
QRSD INTERVAL: 122 MS
QT INTERVAL: 354 MS
QTC INTERVAL: 433 MS
RBC # BLD AUTO: 3.66 MILLION/UL (ref 3.88–5.62)
SODIUM SERPL-SCNC: 137 MMOL/L (ref 136–145)
T WAVE AXIS: 78 DEGREES
VENTRICULAR RATE: 90 BPM
WBC # BLD AUTO: 4.93 THOUSAND/UL (ref 4.31–10.16)

## 2022-02-08 PROCEDURE — 93010 ELECTROCARDIOGRAM REPORT: CPT | Performed by: INTERNAL MEDICINE

## 2022-02-08 PROCEDURE — 85027 COMPLETE CBC AUTOMATED: CPT | Performed by: PHYSICIAN ASSISTANT

## 2022-02-08 PROCEDURE — 99233 SBSQ HOSP IP/OBS HIGH 50: CPT | Performed by: INTERNAL MEDICINE

## 2022-02-08 PROCEDURE — 99223 1ST HOSP IP/OBS HIGH 75: CPT | Performed by: INTERNAL MEDICINE

## 2022-02-08 PROCEDURE — 87040 BLOOD CULTURE FOR BACTERIA: CPT | Performed by: INTERNAL MEDICINE

## 2022-02-08 PROCEDURE — NC001 PR NO CHARGE: Performed by: RADIOLOGY

## 2022-02-08 PROCEDURE — 80048 BASIC METABOLIC PNL TOTAL CA: CPT | Performed by: PHYSICIAN ASSISTANT

## 2022-02-08 PROCEDURE — 85610 PROTHROMBIN TIME: CPT | Performed by: PHYSICIAN ASSISTANT

## 2022-02-08 PROCEDURE — 99232 SBSQ HOSP IP/OBS MODERATE 35: CPT | Performed by: PHYSICIAN ASSISTANT

## 2022-02-08 RX ORDER — WARFARIN SODIUM 5 MG/1
5 TABLET ORAL
Status: DISCONTINUED | OUTPATIENT
Start: 2022-02-08 | End: 2022-02-08

## 2022-02-08 RX ADMIN — ACETAMINOPHEN 650 MG: 325 TABLET, FILM COATED ORAL at 21:06

## 2022-02-08 RX ADMIN — PANTOPRAZOLE SODIUM 40 MG: 40 TABLET, DELAYED RELEASE ORAL at 09:02

## 2022-02-08 RX ADMIN — FERROUS SULFATE TAB 325 MG (65 MG ELEMENTAL FE) 325 MG: 325 (65 FE) TAB at 09:02

## 2022-02-08 RX ADMIN — POLYETHYLENE GLYCOL 3350 17 G: 17 POWDER, FOR SOLUTION ORAL at 09:01

## 2022-02-08 RX ADMIN — HEPARIN SODIUM 5000 UNITS: 5000 INJECTION INTRAVENOUS; SUBCUTANEOUS at 09:04

## 2022-02-08 RX ADMIN — ASPIRIN 81 MG: 81 TABLET, CHEWABLE ORAL at 09:01

## 2022-02-08 RX ADMIN — SENNOSIDES AND DOCUSATE SODIUM 2 TABLET: 50; 8.6 TABLET ORAL at 18:27

## 2022-02-08 RX ADMIN — FERROUS SULFATE TAB 325 MG (65 MG ELEMENTAL FE) 325 MG: 325 (65 FE) TAB at 18:27

## 2022-02-08 RX ADMIN — HEPARIN SODIUM 5000 UNITS: 5000 INJECTION INTRAVENOUS; SUBCUTANEOUS at 21:06

## 2022-02-08 RX ADMIN — CEFEPIME HYDROCHLORIDE 2000 MG: 2 INJECTION, POWDER, FOR SOLUTION INTRAVENOUS at 06:23

## 2022-02-08 RX ADMIN — SENNOSIDES AND DOCUSATE SODIUM 2 TABLET: 50; 8.6 TABLET ORAL at 09:02

## 2022-02-08 RX ADMIN — ATORVASTATIN CALCIUM 40 MG: 40 TABLET, FILM COATED ORAL at 09:01

## 2022-02-08 RX ADMIN — DILTIAZEM HYDROCHLORIDE 240 MG: 240 CAPSULE, COATED, EXTENDED RELEASE ORAL at 09:02

## 2022-02-08 RX ADMIN — ACETAMINOPHEN 650 MG: 325 TABLET, FILM COATED ORAL at 04:25

## 2022-02-08 RX ADMIN — CEFEPIME HYDROCHLORIDE 2000 MG: 2 INJECTION, POWDER, FOR SOLUTION INTRAVENOUS at 18:25

## 2022-02-08 RX ADMIN — HEPARIN SODIUM 5000 UNITS: 5000 INJECTION INTRAVENOUS; SUBCUTANEOUS at 13:03

## 2022-02-08 NOTE — ASSESSMENT & PLAN NOTE
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

## 2022-02-08 NOTE — ASSESSMENT & PLAN NOTE
· S/p CABG x4  · Denies chest pain; Troponin 37  · Continue home ASA and statin Area L Indication Text: Tumors in this location are included in Area L (trunk and extremities).  Mohs surgery is indicated for larger tumors, or tumors with aggressive histologic features, in these anatomic locations.

## 2022-02-08 NOTE — CONSULTS
Consultation - Cardiology   Claudia Robertson 80 y o  male MRN: [id number]  Unit/Bed#: -01 Encounter: [id number]  02/08/22  8:48 AM    Assessment:  1  Abnormal echocardiogram  2  Pseudomonas bacteremia  3  Pyelonephritis   4  Bladder CA on chemotherapy  5  Obstructive uropathy/bilateral hydronephrosis  6  Paroxysmal atrial flutter s/p DCCV on warfarin   7  CAD s/p CABG x4 (2/2001)  8  Chronic HFpEF  9  CVA  10  Carotid stenosis s/p L CEA   11  Severe COPD  12  Chronic anemia   13  CKD    Plan:  RENEE tomorrow to evaluate for endocarditis; TTE shows possible AV/MV vegetation as well as a mobile RA echodensity  Spoke to patients granddaughter Lily Robles who is his acting POA, she reports patient is not able to make sound medical decisions for the procedure  We discussed the risks and benefits with her in detail and is agreeable to have the procedure tomorrow  Will place NPO after midnight  Continue home meds: ASA, atorvastatin, Cardizem CD and warfarin  Continue with goal HgB >7; transfuse PRN as per primary team  Recommend keeping K>4 and Mg>2   Place on telemetry to assess for advanced heart block     Diagnostics:  Blood cultures:   2/4/22: Pseudomonas aeruginosa positive x2  2/6/22: Negative x 24 hours  2/8/22: Pending     TTE 2/7/22:    Left Ventricle: Left ventricular cavity size is normal  The left ventricular ejection fraction is 55%  Systolic function is normal     Left Ventricle: Diastolic function is mildly abnormal, consistent with grade I (abnormal) relaxation    Right Atrium: There is a possible small, pedunculated, highly mobile mass  Clinical correlation recommended    Aortic Valve: The leaflets are moderately thickened  The leaflets are severely calcified  The leaflets exhibit normal mobility  A vegetation cannot be ruled out  Recommend RENEE, if clinically indicated    Mitral Valve: There is a possible moderately sized, mobile vegetation present  No significant regurgitatin   Recommend RENEE, if clinically indicated    EK22: NSR, RBBB, when compared with ECG of 2022 01:38, RBBB is now present  Criteria for inferior-posterior infarct are no longer present  History of Present Illness   Physician Requesting Consult: Migdalia Colon MD  Reason for Consult / Principal Problem: Evaluation for RENEE  HPI: Abdullahi Brunner is a 80y o  year old male who presents with fever, altered mental status and fatigue  2/2 +BC for pseudomonas  Inpatient TTE showed possible MV/AV vegetations  Cardiology was consulted to facilitate RENEE to further assess  He denies any acute cardiac complaints at present time  Inpatient consult to Cardiology  Consult performed by: Alberto Coyne PA-C  Consult ordered by: Amira Garcia PA-C        Review of Systems   Constitutional: Negative for appetite change, chills, diaphoresis, fatigue and fever  Respiratory: Negative for cough, chest tightness and shortness of breath  Cardiovascular: Negative for chest pain, palpitations and leg swelling  Gastrointestinal: Negative for diarrhea, nausea and vomiting  Endocrine: Negative for cold intolerance and heat intolerance  Genitourinary: Negative for difficulty urinating, dysuria and enuresis  Musculoskeletal: Negative for arthralgias, back pain and gait problem  Allergic/Immunologic: Negative for environmental allergies and food allergies  Neurological: Negative for dizziness, facial asymmetry and headaches  Hematological: Negative for adenopathy  Does not bruise/bleed easily  Psychiatric/Behavioral: Negative for agitation, behavioral problems and confusion         Historical Information   Past Medical History:   Diagnosis Date    A-fib [facility])     Arthritis     Benign prostatic hyperplasia without lower urinary tract symptoms     without Urinary Obstruction    Bladder cancer (HCC)     CAD (coronary artery disease)     Cancer (HCC)     Chronic obstructive lung disease ([facility] [address] )     Constipation 2021    Coronary arteriosclerosis     Depression     Emphysema lung (HCC)     GERD (gastroesophageal reflux disease)     Hyperlipidemia     Hypertension     Hyponatremia 1/15/2022    Irregular heart beat     Myocardial infarction (HCC)     Psoriasis     Requires supplemental oxygen     at bedtime during high humid days only    Stroke [facility])     TIA 1/2018     Past Surgical History:   Procedure Laterality Date    COLONOSCOPY      CORONARY ANGIOPLASTY  02/03/2001    PTCA of RCA    CORONARY ARTERY BYPASS GRAFT  02/07/2001    x4- [place]    HERNIA REPAIR      IR NEPHROSTOMY TUBE PLACEMENT  1/18/2022    IR PORT PLACEMENT  1/14/2022    IR PORT REMOVAL  2/7/2022    OK BRONCHOSCOPY,DIAGNOSTIC Left 1/7/2019    Procedure: BRONCHOSCOPY FLEXIBLE;  Surgeon: Rudy Bernard MD;  [facility]; Service: Pulmonary    OK CYSTOURETHROSCOPY,FULGUR <0 5 CM LESN N/A 11/30/2021    Procedure: TRANSURETHRAL RESECTION OF BLADDER TUMOR (TURBT) with "large";  Surgeon: Efrain Chowdhury MD;  [facility];  Service: Urology    OK CYSTOURETHROSCOPY,URETER CATHETER Bilateral 11/30/2021    Procedure: Oneill Solange;  Surgeon: Efrain Chowdhury MD;  [facility];  Service: Urology    OK Zara Lowing Left 2/20/2018    Procedure: ENDARTERECTOMY ARTERY CAROTID WITH PATCH ANGIOPLASTY;  Surgeon: Remington Almaraz MD;  [facility];  Service: Vascular    TRANSURETHRAL RESECTION OF PROSTATE       Social History     Substance and Sexual Activity   Alcohol Use Yes    Comment: special occasions only wine  Social History     Substance and Sexual Activity   Drug Use No     Social History     Tobacco Use   Smoking Status Former Smoker    Years: 1 00    Types: Cigarettes   Smokeless Tobacco Never Used   Tobacco Comment    few cigarettes when playing cards          Family History:   Family History   Problem Relation Age of Onset    Lung cancer Mother     Cancer Mother    Jocelyne Nine Other Father sepsis       Meds/Allergies   current meds:   Current Facility-Administered Medications   Medication Dose Route Frequency    acetaminophen (TYLENOL) tablet 650 mg  650 mg Oral Q4H PRN    aluminum-magnesium hydroxide-simethicone (MYLANTA) oral suspension 30 mL  30 mL Oral Q4H PRN    aspirin chewable tablet 81 mg  81 mg Oral Daily    atorvastatin (LIPITOR) tablet 40 mg  40 mg Oral Daily    bisacodyl (DULCOLAX) rectal suppository 10 mg  10 mg Rectal Daily PRN    cefepime (MAXIPIME) 2 g/50 mL dextrose IVPB  2,000 mg Intravenous Q12H    diltiazem (CARDIZEM CD) 24 hr capsule 240 mg  240 mg Oral Daily    ferrous sulfate tablet 325 mg  325 mg Oral BID With Meals    heparin (porcine) subcutaneous injection 5,000 Units  5,000 Units Subcutaneous Q8H [facility]    lidocaine (LIDODERM) 5 % patch 1 patch  1 patch Topical Daily    OLANZapine (ZyPREXA) IM injection 2 5 mg  2 5 mg Intramuscular Once    ondansetron (ZOFRAN) injection 4 mg  4 mg Intravenous Q6H PRN    pantoprazole (PROTONIX) EC tablet 40 mg  40 mg Oral QAM    polyethylene glycol (MIRALAX) packet 17 g  17 g Oral Daily    senna-docusate sodium (SENOKOT S) 8 6-50 mg per tablet 2 tablet  2 tablet Oral BID    sodium phosphate-biphosphate (FLEET) enema 1 enema  1 enema Rectal Once PRN    warfarin (COUMADIN) tablet 5 mg  5 mg Oral Daily (warfarin)     Allergies   Allergen Reactions    Penicillins Swelling and Itching       Objective   Vitals: Blood pressure 136/65, pulse 95, temperature 98 2 °F (36 8 °C), resp  rate 18, height 5' 6" (1 676 m), weight 78 kg (172 lb), SpO2 99 %  , Body mass index is 27 76 kg/m² ,   Orthostatic Blood Pressures      Most Recent Value   Blood Pressure 136/65 filed at 02/08/2022 0700   Patient Position - Orthostatic VS Lying filed at 02/06/2022 3276          Systolic (52LYB), DLV:759 , Min:117 , MACARIO:350     Diastolic (50NRQ), OFA:93, Min:61, Max:74        Intake/Output Summary (Last 24 hours) at 2/8/2022 0848  Last data filed at 2/8/2022 0700  Gross per 24 hour   Intake 360 ml   Output 1925 ml   Net -1565 ml       Invasive Devices  Report    Peripheral Intravenous Line            Peripheral IV 02/07/22 Right Antecubital <1 day          Line            Pump Device Continuous ambulatory delivery device Right Chest 7 days          Drain            Urethral Catheter Non-latex 16 Fr  41 days    Percutaneous Nephroureteral Tube (PCNU) Left 1 8 5 Fr 26 Cm 20 days    Percutaneous Nephroureteral Tube (PCNU) Right 2 8 5 Fr 24 Cm 20 days                Physical Exam:  GEN: Alert and oriented x 3, in no acute distress  Well appearing and well nourished  HEENT: Sclera anicteric, conjunctivae pink, mucous membranes moist  Oropharynx clear  NECK: Supple, no carotid bruits, no significant JVD  Trachea midline, no thyromegaly  HEART: +8/2 systolic murmur  Regular rhythm, normal S1 and S2, no clicks, gallops or rubs  PMI nondisplaced, no thrills  LUNGS: Clear to auscultation bilaterally; no wheezes, rales, or rhonchi  No increased work of breathing or signs of respiratory distress  ABDOMEN: Soft, nontender, nondistended, normoactive bowel sounds  EXTREMITIES: Skin warm and well perfused, no clubbing, cyanosis, or edema  NEURO: No focal findings  Normal speech  Mood and affect normal    SKIN: Normal without suspicious lesions on exposed skin      Lab Results:   Troponins:       CBC with diff:   Results from last 7 days   Lab Units 02/08/22  0751 02/07/22  1424 02/07/22  0912 02/07/22  0705 02/06/22  0453 02/05/22  0520 02/04/22  1755   WBC Thousand/uL 4 93  --   --  4 01* 8 13 16 32* 17 95*   HEMOGLOBIN g/dL 8 3* 7 0* 7 0* 6 7* 7 5* 7 7* 7 8*   HEMATOCRIT % 27 3* 24 0* 23 7* 22 6* 25 2* 25 3* 26 9*   MCV fL 75*  --   --  74* 74* 74* 74*   PLATELETS Thousands/uL 263  --   --  216 243 218 317   MCH pg 22 7*  --   --  21 8* 22 0* 22 6* 21 4*   MCHC g/dL 30 4*  --   --  29 6* 29 8* 30 4* 29 0*   RDW % 22 0*  --   --  20 7* 20 4* 20 5* 20 3*   MPV fL 9 2  -- --  9 4 10 0 9 5 9 7   NRBC AUTO /100 WBCs  --   --   --  0  --  0  --          CMP:   Results from last 7 days   Lab Units 02/08/22  0751 02/07/22  0705 02/06/22  0453 02/05/22  0719 02/04/22  1755   POTASSIUM mmol/L 4 0 3 2* 3 5 4 2 4 5   CHLORIDE mmol/L 104 102 101 99* 95*   CO2 mmol/L 25 26 25 25 26   BUN mg/dL 20 20 21 21 23   CREATININE mg/dL 1 18 1 16 1 16 1 29 1 41*   CALCIUM mg/dL 8 5 7 9* 8 3 8 5 8 6   AST U/L  --   --   --   --  15   ALT U/L  --   --   --   --  17   ALK PHOS U/L  --   --   --   --  73   EGFR ml/min/1 73sq m 57 58 58 51 46

## 2022-02-08 NOTE — ASSESSMENT & PLAN NOTE
· Appears to be chronic issue with baseline around 8-9  · Did have blood in archibald bag, now resolved  · Would hold off on transfusion unless Hgb <7   · Monitor closely given supratherapeutic INR initially   · Start iron supplement given FRANCY    Lab Results   Component Value Date    HGB 8 3 (L) 02/08/2022    HGB 7 0 (L) 02/07/2022    HGB 7 0 (L) 02/07/2022     Lab Results   Component Value Date    IRON 16 (L) 12/29/2021    FERRITIN 6 (L) 12/29/2021    TIBC 341 12/29/2021    CONCFE 5 (L) 12/29/2021

## 2022-02-08 NOTE — ASSESSMENT & PLAN NOTE
· With archibald catheter and bilat perc nephrostomy tube in the setting of bladder CA as above   · Both producing urine   · Archibald was replaced on day of admission, malpositioned with balloon in urethra, since repositioned   · Continue archibald and nephrostomy care

## 2022-02-08 NOTE — ASSESSMENT & PLAN NOTE
Lab Results   Component Value Date    EGFR 57 02/08/2022    EGFR 58 02/07/2022    EGFR 58 02/06/2022    CREATININE 1 18 02/08/2022    CREATININE 1 16 02/07/2022    CREATININE 1 16 02/06/2022     · Stable; appears to be improving from recent DULCE, recent baseline difficult to establish  · Avoid nephrotoxic agents  · Discontinued IVF on 2/5  · Monitor status post IV contrast on 2/6 - remains stable

## 2022-02-08 NOTE — ASSESSMENT & PLAN NOTE
Background: With hx of bladder cancer, currently on systemic chemotherapy, brought in by Granddaughter who reported several days of progressive confusion and weakness and was instructed to take patient to ED by infusion staff for these symptoms  · Meets sepsis criteria on admission (SIRS + UTI)  · UA suggestive of UTI; urine and blood cultures + for pseudomonas   · 103 1F in ED; afebrile since   · WBC 17 95; ANC WNL;  Lactic acid 2 4; COVID negative   · Lactic since cleared S/P fluid boluses  · WBC normalized  · S/P cefepime + vanco in ED; continue cefepime at this time    · Infectious disease consulted; input as noted below  · Port-A-Cath removed 2/7   · TTE 2/7 showing possible mitral valve vegetation  · Will need 6 weeks IV antibiotics   · Consult cardiology to weigh risks/benefits of RENEE  · Consider palliative care consultation  · CT chest/abdomen/pelvis 2/6 showing pyelonephritis   · Repeat cultures 2/8 pending   · Will have PICC line placed when repeat cultures negative at 72 hours    · Previously discussed replacement of Port-A-Cath with another Port-A-Cath versus PICC with primary hematologist (Dr Barrera Wan) oncologist and cleared to put in PICC following clearance of blood cultures

## 2022-02-08 NOTE — ASSESSMENT & PLAN NOTE
· CT head negative for acute process  · Not hypoglycemic, see hyponatremia as below  · Possibly in the setting of sepsis/UTI as above vs systemic chemotherapy vs outpatient narcotic use   · Narcotics and benzos prescribed OP on hold; had received IM Zyprexa X1  · Delirium precautions   · Supportive measures   · Stable, "feisty" but appears at baseline

## 2022-02-08 NOTE — PROGRESS NOTES
[address] [facility] Progress Note Adelia Fu 1940, 80 y o  male MRN: [id number]  Unit/Bed#: -01 Encounter: [id number]  Primary Care Provider: Joana Berman MD   Date and time admitted to hospital: 2/4/2022  5:41 PM    * Sepsis due to gram-negative bacteria [facility])  Assessment & Plan  Background: With hx of bladder cancer, currently on systemic chemotherapy, brought in by Granddaughter who reported several days of progressive confusion and weakness and was instructed to take patient to ED by infusion staff for these symptoms  · Meets sepsis criteria on admission (SIRS + UTI)  · UA suggestive of UTI; urine and blood cultures + for pseudomonas   · 103 1F in ED; afebrile since   · WBC 17 95; ANC WNL;  Lactic acid 2 4; COVID negative   · Lactic since cleared S/P fluid boluses  · WBC normalized  · S/P cefepime + vanco in ED; continue cefepime at this time    · Infectious disease consulted; input as noted below  · Port-A-Cath removed 2/7   · TTE 2/7 showing possible mitral valve vegetation  · Will need 6 weeks IV antibiotics   · Consult cardiology to weigh risks/benefits of RENEE  · Consider palliative care consultation  · CT chest/abdomen/pelvis 2/6 showing pyelonephritis   · Repeat cultures 2/8 pending   · Will have PICC line placed when repeat cultures negative at 72 hours    · Previously discussed replacement of Port-A-Cath with another Port-A-Cath versus PICC with primary hematologist (Dr Augie Jimenez) oncologist and cleared to put in PICC following clearance of blood cultures    Archibald catheter in place prior to arrival  Assessment & Plan  · With archibald catheter and bilat perc nephrostomy tube in the setting of bladder CA as above   · Both producing urine   · Archibald was replaced on day of admission, malpositioned with balloon in urethra, since repositioned   · Continue archibald and nephrostomy care     Bladder cancer [facility])  Assessment & Plan  · 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, diagnosed 10/12/2021  · Follows with heme onc and urology as an outpatient  · Receives chemo and radiation; last received on day of admission  · Continue outpatient heme onc and urology f/u upon discharge   · CT c/a/p from 2/6 unfortunately shows possible new right apical pleural-based mass      Metabolic encephalopathy  Assessment & Plan  · CT head negative for acute process  · Not hypoglycemic, see hyponatremia as below  · Possibly in the setting of sepsis/UTI as above vs systemic chemotherapy vs outpatient narcotic use   · Narcotics and benzos prescribed OP on hold; had received IM Zyprexa X1  · Delirium precautions   · Supportive measures   · Stable, "feisty" but appears at baseline     Anemia  Assessment & Plan  · Appears to be chronic issue with baseline around 8-9  · Did have blood in archibald bag, now resolved  · Would hold off on transfusion unless Hgb <7   · Monitor closely given supratherapeutic INR initially   · Start iron supplement given FRANCY    Lab Results   Component Value Date    HGB 8 3 (L) 02/08/2022    HGB 7 0 (L) 02/07/2022    HGB 7 0 (L) 02/07/2022     Lab Results   Component Value Date    IRON 16 (L) 12/29/2021    FERRITIN 6 (L) 12/29/2021    TIBC 341 12/29/2021    CONCFE 5 (L) 12/29/2021       CAD (coronary atherosclerotic disease)  Assessment & Plan  · S/p CABG x4  · Denies chest pain; Troponin 37  · Continue home ASA and statin    Hypertension  Assessment & Plan  · Blood pressure acceptable with SBP in the 100-130s   · Continue home Cardizem  · Monitor BP per unit protocol    Stage 3a chronic kidney disease [facility])  Assessment & Plan  Lab Results   Component Value Date    EGFR 57 02/08/2022    EGFR 58 02/07/2022    EGFR 58 02/06/2022    CREATININE 1 18 02/08/2022    CREATININE 1 16 02/07/2022    CREATININE 1 16 02/06/2022     · Stable; appears to be improving from recent DULCE, recent baseline difficult to establish  · Avoid nephrotoxic agents  · Discontinued IVF on 2/5  · Monitor status post IV contrast on 2/6 - remains stable    Atrial fibrillation ([address] )  Assessment & Plan  · Currently rate controlled  · Continue home Cardizem, monitor with routine vitals   · Restart coumadin today 2/8   Recent Labs     02/06/22  0954 02/07/22  1424 02/08/22  0511   INR 2 66* 1 95* 1 22*       COPD, severe (HCC)  Assessment & Plan  · 96% O2 RA; no SOB complaints  · Monitor O2 overnight     Back pain  Assessment & Plan  · Presented complaining of back pain; resolved on 2/5 ? Related to pyelo  · Prescribed Oxycodone 5mg q6h prn as outpatient  · Home Xanax and oxycodone currently on hold given AMS    Hyponatremia-resolved as of 2/8/2022  Assessment & Plan  · Sodium 127; further trend as below   · Possibly in the setting of dehydration with granddaughter reporting decreased oral intake 2/2 chemo/radiation  · Urine sodium 29, Urine Osmo 565  · Improved with IVF; suspect secondary to poor PO intake   · Sodium level improved to 137  VTE Pharmacologic Prophylaxis: VTE Score: 6 High Risk (Score >/= 5) - Pharmacological DVT Prophylaxis Ordered: warfarin (Coumadin)  Sequential Compression Devices Ordered  Patient Centered Rounds: I performed bedside rounds with nursing staff today  Discussions with Specialists or Other Care Team Provider: IDKELVIN     Education and Discussions with Family / Patient: attempted to call Johan Beatty, granddaughter - no answer  will try again later  Time Spent for Care: 20 minutes  More than 50% of total time spent on counseling and coordination of care as described above  Current Length of Stay: 4 day(s)  Current Patient Status: Inpatient   Certification Statement: The patient will continue to require additional inpatient hospital stay due to pending cleared blood cultures, needs PICC placement, cards consulted for ? RENEE  Discharge Plan: Anticipate discharge in >72 hrs to rehab facility      Code Status: Level 1 - Full Code    Subjective:   Patient seen with cardiology this morning  He is upset that his cardiologist is not here and did not do his echo  Denies chest pain or shortness of breath  No back pain today  Per nursing, no acute issues overnight  Objective:     Vitals:   No data recorded  HR:  [73-95] 95  Resp:  [16-20] 18  BP: (117-168)/(61-74) 136/65  SpO2:  [97 %-100 %] 99 %  Body mass index is 27 76 kg/m²  Input and Output Summary (last 24 hours): Intake/Output Summary (Last 24 hours) at 2/8/2022 0844  Last data filed at 2/8/2022 0700  Gross per 24 hour   Intake 360 ml   Output 1925 ml   Net -1565 ml       Physical Exam:   Physical Exam  Vitals and nursing note reviewed  Constitutional:       General: He is not in acute distress  Appearance: He is ill-appearing  He is not toxic-appearing  Cardiovascular:      Rate and Rhythm: Normal rate  Rhythm irregularly irregular  Pulmonary:      Effort: Pulmonary effort is normal  No respiratory distress  Breath sounds: Normal breath sounds  No wheezing  Abdominal:      General: Bowel sounds are normal       Palpations: Abdomen is soft  Genitourinary:     Comments: bilat PCNs draining yellow urine; archibald back was just emptied   Musculoskeletal:      Right lower leg: No edema  Left lower leg: No edema  Neurological:      Mental Status: He is alert     Psychiatric:         Mood and Affect: Mood normal          Behavior: Behavior normal            Additional Data:     Labs:  Results from last 7 days   Lab Units 02/08/22  0751 02/07/22  0912 02/07/22  0705 02/05/22  0520 02/04/22  1755   WBC Thousand/uL 4 93   < > 4 01*   < > 17 95*   HEMOGLOBIN g/dL 8 3*   < > 6 7*   < > 7 8*   HEMATOCRIT % 27 3*   < > 22 6*   < > 26 9*   PLATELETS Thousands/uL 263   < > 216   < > 317   BANDS PCT %  --   --   --   --  4   NEUTROS PCT %  --   --  80*   < >  --    LYMPHS PCT %  --   --  9*   < >  --    LYMPHO PCT %  --   --   --   --  2*   MONOS PCT %  --   --  5   < >  --    MONO PCT %  --   -- --   --  0*   EOS PCT %  --   --  3   < > 0    < > = values in this interval not displayed  Results from last 7 days   Lab Units 02/08/22  0751 02/05/22  0719 02/04/22  1755   SODIUM mmol/L 137   < > 127*   POTASSIUM mmol/L 4 0   < > 4 5   CHLORIDE mmol/L 104   < > 95*   CO2 mmol/L 25   < > 26   BUN mg/dL 20   < > 23   CREATININE mg/dL 1 18   < > 1 41*   ANION GAP mmol/L 8   < > 6   CALCIUM mg/dL 8 5   < > 8 6   ALBUMIN g/dL  --   --  3 0*   TOTAL BILIRUBIN mg/dL  --   --  1 83*   ALK PHOS U/L  --   --  73   ALT U/L  --   --  17   AST U/L  --   --  15   GLUCOSE RANDOM mg/dL 117   < > 139    < > = values in this interval not displayed  Results from last 7 days   Lab Units 02/08/22  0511   INR  1 22*             Results from last 7 days   Lab Units 02/07/22  0705 02/06/22  0453 02/04/22 2034 02/04/22  1755   LACTIC ACID mmol/L  --   --  1 0 2 4*   PROCALCITONIN ng/ml 4 95* 5 32*  --   --        Lines/Drains:  Invasive Devices  Report    Peripheral Intravenous Line            Peripheral IV 02/07/22 Right Antecubital <1 day          Line            Pump Device Continuous ambulatory delivery device Right Chest 7 days          Drain            Urethral Catheter Non-latex 16 Fr  41 days    Percutaneous Nephroureteral Tube (PCNU) Left 1 8 5 Fr 26 Cm 20 days    Percutaneous Nephroureteral Tube (PCNU) Right 2 8 5 Fr 24 Cm 20 days              Urinary Catheter:  Goal for removal: N/A - Chronic Fortune         Imaging: Reviewed radiology reports from this admission including: ECHO    Recent Cultures (last 7 days):   Results from last 7 days   Lab Units 02/06/22  1000 02/04/22  1755   BLOOD CULTURE  No Growth at 24 hrs  No Growth at 24 hrs   Pseudomonas aeruginosa*  Pseudomonas aeruginosa*   GRAM STAIN RESULT   --  Gram negative rods*  Gram negative rods*   URINE CULTURE   --  >100,000 cfu/ml Pseudomonas aeruginosa*       Last 24 Hours Medication List:   Current Facility-Administered Medications   Medication Dose Route Frequency Provider Last Rate    acetaminophen  650 mg Oral Q4H PRN [facility] HealthGABRIEL      aluminum-magnesium hydroxide-simethicone  30 mL Oral Q4H PRN MYLENE Talavera      aspirin  81 mg Oral Daily [place], [place]      atorvastatin  40 mg Oral Daily [place], [place]      bisacodyl  10 mg Rectal Daily PRN MYLENE Clay      cefepime  2,000 mg Intravenous Q12H Zachary Christy PA-C 2,000 mg (02/08/22 5649)    diltiazem  240 mg Oral Daily [place], [place]      ferrous sulfate  325 mg Oral BID With Meals Arnold Vernon PA-C      heparin (porcine)  5,000 Units Subcutaneous Q8H [address] [place] Trimble, CRNP      lidocaine  1 patch Topical Daily Payal Serrato PA-C      OLANZapine  2 5 mg Intramuscular Once MYLENE Clay      ondansetron  4 mg Intravenous Q6H PRN [facility] GABRIEL peoples      pantoprazole  40 mg Oral QAM K[facility] GABRIEL peoples      polyethylene glycol  17 g Oral Daily MYLENE Clay      senna-docusate sodium  2 tablet Oral BID Deana MYLENE Calderon      sodium phosphate-biphosphate  1 enema Rectal Once PRN MYLENE Clay      warfarin  5 mg Oral Daily (warfarin) Arnold Vernon PA-C          Today, Patient Was Seen By: Arnold Vernon PA-C    **Please Note: This note may have been constructed using a voice recognition system  **

## 2022-02-08 NOTE — PROGRESS NOTES
Progress Note - Infectious Disease   Juan Jh 80 y o  male MRN: [id number]  Unit/Bed#: -01 Encounter: [id number]      Impression/Plan:  1  Sepsis, POA   Patient met sepsis criteria on admission with fever, tachycardia and leukocytosis   Sources are 2 and 3  No other obvious sources on exam   Clinical parameters improving    Antibiotics as below  Continue to trend fever curve/vitals  Repeat CBC/chemistry tomorrow  Follow-up repeat blood cultures  Additional supportive care as per primary  Additional interventions pending clinical course     2  Pseudomonal bacteremia and abnormal echo   Two of 2 blood cultures from admission have isolated Pseudomonas   I suspect that the source is primarily problem 3  Patient had recent catheter exchange but was also receiving chemotherapy which may have led to his bacteremia   Patient has a port in place but no other intravascular devices  Port removed with IR on 02/07 and cultures pending  Repeat cultures so far without growth  Susceptibilities reviewed  Exam otherwise nonfocal   Family would like to avoid fluoroquinolones as per discussion yesterday  Unfortunately patient is now noted to have an abnormal 2D echo with question of a vegetation  Antibiotic dosing/choice reviewed with clinical ID pharmacist   Continue cefepime 2 g every 12 hours, renally dose adjusted  Continue to trend fever curve/vitals  Repeat CBC/chemistry tomorrow  Follow-up pending blood cultures for clearance  Maintain urinary catheter  Follow-up port tip culture  Cardiology consult, recommend transesophageal echo    Patient will require eventual PICC line placement  Additional interventions pending clinical course  Will arrange follow-up/scripts closer to discharge  Possible 6 week antibiotic course if transesophageal echo does show vegetation       3  Pyelonephritis with Pseudomonas   Likely source of the above   Recently underwent Fortune catheter exchange 2 days prior to admission and reportedly had hematuria on admission  Urine culture also with Pseudomonas  CT reviewed and catheter repositioned and images concerning for pyelonephritis  Continue antibiotics as above  Continue to trend fever curve/vitals  Maintain current catheter  Recommend follow-up with Urology as outpatient     4  Acute encephalopathy and hyponatremia   Acutely confused on admission which I suspect is multifactorial given issues above   Sodium improving   Mental status also seems to be closer to baseline as patient is answering questions appropriately   CT head unremarkable  Monitor mental status closely  Antibiotics as above  Low threshold for repeat imaging  Continue to trend fever curve/WBC     5  Chronic kidney disease stage 3  Creatinine appeared to be close to prior baseline on admission   This does however affect antibiotic dosing  Cefepime dosing as above  Will further dose adjust antibiotic as needed  Fluid hydration as per primary  Will avoid nephrotoxic agent  Repeat chemistry tomorrow     6  Invasive bladder cancer with chronic port   Patient recently on chemotherapy and has multiple catheters in place along with port   Course complicated now by bacteremia  Port removed on 2/7  Antibiotics as above  Follow-up port tip cultures  Ongoing follow-up by Oncology as outpatient  Plans for PICC line placement as above     7   AFib with elevated INR   Ongoing anticoagulation and care as per primary      Above plan discussed briefly with the patient and in more detail with primary service      ID consult service will continue to follow      Antibiotics:  Cefepime 5    Subjective:  Patient currently denies having any nausea, vomiting, chest pain or shortness of breaths  He states that he was feeling fine up until coming to this hospital where he developed all these issues with infections  He denies significant pain at his previous port site      Objective:  Vitals:  HR:  [75-95] 95  Resp:  [16-20] 18  BP: (136-168)/(64-74) 136/65  SpO2:  [97 %-100 %] 99 %  No data recorded  Current: Temperature: 98 2 °F (36 8 °C)    Physical Exam:   General Appearance:  Alert, interactive, nontoxic, no acute distress  Chronically ill-appearing and elderly  Hard of hearing at baseline  Throat: Oropharynx moist without lesions  Lungs:   Clear to auscultation bilaterally; no wheezes, rhonchi or rales; respirations unlabored on room air   Heart:  RRR; no murmur, rub or gallop appreciated   Abdomen:   Soft, non-tender, non-distended, positive bowel sounds  Extremities: No clubbing, cyanosis or edema   Skin: No new rashes or lesions  No new draining wounds noted  Prior Port site appears unremarkable  Labs, Imaging, & Other studies:   All pertinent labs and imaging studies were personally reviewed  Results from last 7 days   Lab Units 02/08/22  0751 02/07/22  1424 02/07/22  0912 02/07/22  0705 02/07/22  0705 02/06/22  0453 02/06/22  0453   WBC Thousand/uL 4 93  --   --   --  4 01*  --  8 13   HEMOGLOBIN g/dL 8 3* 7 0* 7 0*   < > 6 7*   < > 7 5*   PLATELETS Thousands/uL 263  --   --   --  216  --  243    < > = values in this interval not displayed  Results from last 7 days   Lab Units 02/08/22  0751 02/05/22  0719 02/04/22  1755   POTASSIUM mmol/L 4 0   < > 4 5   CHLORIDE mmol/L 104   < > 95*   CO2 mmol/L 25   < > 26   BUN mg/dL 20   < > 23   CREATININE mg/dL 1 18   < > 1 41*   EGFR ml/min/1 73sq m 57   < > 46   CALCIUM mg/dL 8 5   < > 8 6   AST U/L  --   --  15   ALT U/L  --   --  17   ALK PHOS U/L  --   --  73    < > = values in this interval not displayed  Results from last 7 days   Lab Units 02/08/22  0504 02/06/22  1000 02/04/22  1755   BLOOD CULTURE  Received in Microbiology Lab  Culture in Progress  Received in Microbiology Lab  Culture in Progress  No Growth at 24 hrs  No Growth at 24 hrs   Pseudomonas aeruginosa*  Pseudomonas aeruginosa*   GRAM STAIN RESULT   --   --  Gram negative rods*  Gram negative rods*   URINE CULTURE   --   --  >100,000 cfu/ml Pseudomonas aeruginosa*

## 2022-02-08 NOTE — TELEMEDICINE
e-Consult (IPC)  - Interventional Radiology  Haroon Jackson 80 y o  male MRN: [id number]  Unit/Bed#: -01 Encounter: [id number]    IR has been consulted to evaluate the patient, determine the appropriate procedure, and whether or not a procedure can and should be performed regarding the care of Haroon Jackson  We were consulted by SLIM concerning new right apical mass, and to possibly perform a percutaneous biopsy if medically appropriate for the patient  IP Consult to IR  Consult performed by: Lit Griffin MD  Consult ordered by: Iqra Cowan PA-C        02/08/22      Assessment/Recommendation:   49-year-old male with a history of bladder cancer admitted for sepsis  Patient is still undergoing chemo and radiation  Patient had his port removed yesterday for sepsis, and may have a mitral valve vegetation  Patient had a CT scan on February 6 which demonstrated a new right apical pleural based mass  Biopsy has been requested  Will attempt to do the biopsy tomorrow if the patient is stable and able to tolerate it, as it will require him to be in a prone position  Total time spent in review of data, discussion with requesting provider and rendering advice was 15 minutes  Patient or appropriate family member was verbally informed by SLIM of this consultative service on their behalf to provide more timely access to specialty care in lieu of an in person consultation  Verbal consent was obtained  Thank you for allowing Interventional Radiology to participate in the care of Haroon Jackson  Please don't hesitate to call or TigerText us with any questions       Lit Griffin MD

## 2022-02-08 NOTE — CASE MANAGEMENT
Case Management Discharge Planning Note    Patient name Javier Tapia  Location [address] 425/-[id number] MRN [id number]  : 1940 Date 2022       Current Admission Date: 2022  Current Admission Diagnosis:Sepsis due to gram-negative bacteria [facility])   Patient Active Problem List    Diagnosis Date Noted    Bacteremia 2022    Sepsis [facility] to gram-negative bacteria [facility] )     Metabolic encephalopathy 86/10/3480    Nephrostomy status ([facility] ) 2022    Anxiety 2022    Continuous opioid dependence ([facility] ) 2022    Anemia 2022    FRANCY (iron deficiency anemia) 2022    DULCE (acute kidney injury) ([facility] ) on CKD 3 01/15/2022    Pleural effusion on left 01/15/2022    Abnormal urinalysis 01/15/2022    Fortune catheter in place prior to arrival 2022    Stage 3a chronic kidney disease ([facility] ) 2022    Recurrent unilateral inguinal hernia 2021    Left lower quadrant abdominal pain 2021    History of bilateral inguinal hernia repair 2021    Cancer of overlapping sites of bladder ([facility] ) 2021    Overgrown toenails 2021    Atrial fibrillation ([facility] ) 2021    Encounter to discuss test results 10/12/2021    Acute on chronic diastolic congestive heart failure ([facility] ) 2020    DDD (degenerative disc disease), lumbar 10/30/2019    Medicare annual wellness visit, subsequent 2019    Tinnitus aurium, bilateral 2019    Sensorineural hearing loss (SNHL) of both ears 2019    Abnormal CT of the chest 2019    COPD, severe ([address] ) 2018    Bronchiectasis with acute lower respiratory infection ([facility] ) 2018    Localized edema 10/15/2018    Irregular heart beat     Atrial flutter ([facility] ) 10/07/2018    CKD (chronic kidney disease), stage II 2018    Urinary retention due to benign prostatic hyperplasia 2018    Bladder cancer ([facility] ) 2018    S/P CABG x 4 2018    Stenosis of left carotid artery 01/14/2018    GERD (gastroesophageal reflux disease) 01/13/2018    History of stroke 01/13/2018    Sciatica of right side 01/13/2018    Vision changes 01/13/2018    Hyperlipidemia     Centrilobular emphysema (HCC)     Arthritis     Back pain 01/24/2017    Acute left-sided low back pain with left-sided sciatica 10/25/2016    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 10/25/2016    CAD (coronary atherosclerotic disease) 08/13/2016    Hypertension 08/13/2016    Hyperlipemia 08/13/2016      LOS (days): 4  Geometric Mean LOS (GMLOS) (days): 3 50  Days to GMLOS:-0 2     OBJECTIVE:  Risk of Unplanned Readmission Score: 48         Current admission status: Inpatient   Preferred Pharmacy:   [address] [facility], [address]  [address] [address]  Phone: [phone] Fax: [phone]    Primary Care Provider: Joana Berman MD    Primary Insurance: MEDICARE  Secondary Insurance: [facility]    DISCHARGE DETAILS:    Discharge planning discussed with[de-identified] [facility]     Comments - Freedom of Choice: Patient is current for HF and Therapy with [facility]   Referral sent for [facility]                               Other Referral/Resources/Interventions Provided:  Interventions: [facility]  Referral Comments: [facility]

## 2022-02-08 NOTE — ASSESSMENT & PLAN NOTE
· Currently rate controlled  · Continue home Cardizem, monitor with routine vitals   · Restart coumadin today 2/8   Recent Labs     02/06/22  0954 02/07/22  1424 02/08/22  0511   INR 2 66* 1 95* 1 22*

## 2022-02-08 NOTE — ASSESSMENT & PLAN NOTE
· Sodium 127; further trend as below   · Possibly in the setting of dehydration with granddaughter reporting decreased oral intake 2/2 chemo/radiation  · Urine sodium 29, Urine Osmo 565  · Improved with IVF; suspect secondary to poor PO intake   · Sodium level improved to 137

## 2022-02-08 NOTE — ASSESSMENT & PLAN NOTE
· Presented complaining of back pain; resolved on 2/5 ?  Related to pyelo  · Prescribed Oxycodone 5mg q6h prn as outpatient  · Home Xanax and oxycodone currently on hold given AMS

## 2022-02-08 NOTE — PLAN OF CARE
Problem: PAIN - ADULT  Goal: Verbalizes/displays adequate comfort level or baseline comfort level  Description: Interventions:  - Encourage patient to monitor pain and request assistance  - Assess pain using appropriate pain scale  - Administer analgesics based on type and severity of pain and evaluate response  - Implement non-pharmacological measures as appropriate and evaluate response  - Consider cultural and social influences on pain and pain management  - Notify physician/advanced practitioner if interventions unsuccessful or patient reports new pain  Outcome: Progressing     Problem: INFECTION - ADULT  Goal: Absence or prevention of progression during hospitalization  Description: INTERVENTIONS:  - Assess and monitor for signs and symptoms of infection  - Monitor lab/diagnostic results  - Monitor all insertion sites, i e  indwelling lines, tubes, and drains  - Monitor endotracheal if appropriate and nasal secretions for changes in amount and color  - Joffre appropriate cooling/warming therapies per order  - Administer medications as ordered  - Instruct and encourage patient and family to use good hand hygiene technique  - Identify and instruct in appropriate isolation precautions for identified infection/condition  Outcome: Progressing

## 2022-02-09 ENCOUNTER — ANESTHESIA (INPATIENT)
Dept: NON INVASIVE DIAGNOSTICS | Facility: HOSPITAL | Age: 82
DRG: 871 | End: 2022-02-09
Payer: MEDICARE

## 2022-02-09 ENCOUNTER — APPOINTMENT (OUTPATIENT)
Dept: RADIATION ONCOLOGY | Facility: CLINIC | Age: 82
End: 2022-02-09
Payer: MEDICARE

## 2022-02-09 ENCOUNTER — APPOINTMENT (OUTPATIENT)
Dept: RADIATION ONCOLOGY | Facility: CLINIC | Age: 82
End: 2022-02-09
Attending: RADIOLOGY
Payer: MEDICARE

## 2022-02-09 PROBLEM — A41.52 SEPSIS DUE TO PSEUDOMONAS AERUGINOSA (HCC): Status: ACTIVE | Noted: 2022-02-04

## 2022-02-09 LAB
ANION GAP SERPL CALCULATED.3IONS-SCNC: 8 MMOL/L (ref 4–13)
BUN SERPL-MCNC: 16 MG/DL (ref 5–25)
CALCIUM SERPL-MCNC: 8.1 MG/DL (ref 8.3–10.1)
CHLORIDE SERPL-SCNC: 105 MMOL/L (ref 100–108)
CO2 SERPL-SCNC: 25 MMOL/L (ref 21–32)
CREAT SERPL-MCNC: 1.1 MG/DL (ref 0.6–1.3)
ERYTHROCYTE [DISTWIDTH] IN BLOOD BY AUTOMATED COUNT: 22.5 % (ref 11.6–15.1)
GFR SERPL CREATININE-BSD FRML MDRD: 62 ML/MIN/1.73SQ M
GLUCOSE SERPL-MCNC: 101 MG/DL (ref 65–140)
GLUCOSE SERPL-MCNC: 107 MG/DL (ref 65–140)
GLUCOSE SERPL-MCNC: 95 MG/DL (ref 65–140)
HCT VFR BLD AUTO: 23.7 % (ref 36.5–49.3)
HCT VFR BLD AUTO: 25 % (ref 36.5–49.3)
HGB BLD-MCNC: 7 G/DL (ref 12–17)
HGB BLD-MCNC: 7.1 G/DL (ref 12–17)
INR PPP: 1.16 (ref 0.84–1.19)
MCH RBC QN AUTO: 22.5 PG (ref 26.8–34.3)
MCHC RBC AUTO-ENTMCNC: 29.5 G/DL (ref 31.4–37.4)
MCV RBC AUTO: 76 FL (ref 82–98)
PLATELET # BLD AUTO: 228 THOUSANDS/UL (ref 149–390)
PMV BLD AUTO: 9.8 FL (ref 8.9–12.7)
POTASSIUM SERPL-SCNC: 3.9 MMOL/L (ref 3.5–5.3)
PROTHROMBIN TIME: 14.4 SECONDS (ref 11.6–14.5)
RBC # BLD AUTO: 3.11 MILLION/UL (ref 3.88–5.62)
SL CV LV EF: 60
SODIUM SERPL-SCNC: 138 MMOL/L (ref 136–145)
WBC # BLD AUTO: 4.79 THOUSAND/UL (ref 4.31–10.16)

## 2022-02-09 PROCEDURE — 85014 HEMATOCRIT: CPT | Performed by: NURSE PRACTITIONER

## 2022-02-09 PROCEDURE — 85027 COMPLETE CBC AUTOMATED: CPT | Performed by: PHYSICIAN ASSISTANT

## 2022-02-09 PROCEDURE — 93325 DOPPLER ECHO COLOR FLOW MAPG: CPT | Performed by: INTERNAL MEDICINE

## 2022-02-09 PROCEDURE — 93312 ECHO TRANSESOPHAGEAL: CPT

## 2022-02-09 PROCEDURE — 93320 DOPPLER ECHO COMPLETE: CPT | Performed by: INTERNAL MEDICINE

## 2022-02-09 PROCEDURE — 85018 HEMOGLOBIN: CPT | Performed by: NURSE PRACTITIONER

## 2022-02-09 PROCEDURE — 99232 SBSQ HOSP IP/OBS MODERATE 35: CPT | Performed by: NURSE PRACTITIONER

## 2022-02-09 PROCEDURE — 85610 PROTHROMBIN TIME: CPT | Performed by: PHYSICIAN ASSISTANT

## 2022-02-09 PROCEDURE — 99233 SBSQ HOSP IP/OBS HIGH 50: CPT | Performed by: INTERNAL MEDICINE

## 2022-02-09 PROCEDURE — 99232 SBSQ HOSP IP/OBS MODERATE 35: CPT | Performed by: INTERNAL MEDICINE

## 2022-02-09 PROCEDURE — 93312 ECHO TRANSESOPHAGEAL: CPT | Performed by: INTERNAL MEDICINE

## 2022-02-09 PROCEDURE — 80048 BASIC METABOLIC PNL TOTAL CA: CPT | Performed by: PHYSICIAN ASSISTANT

## 2022-02-09 PROCEDURE — 82948 REAGENT STRIP/BLOOD GLUCOSE: CPT

## 2022-02-09 RX ORDER — ONDANSETRON 2 MG/ML
4 INJECTION INTRAMUSCULAR; INTRAVENOUS ONCE AS NEEDED
Status: CANCELLED | OUTPATIENT
Start: 2022-02-09

## 2022-02-09 RX ORDER — ASPIRIN 81 MG/1
81 TABLET ORAL DAILY
Status: DISCONTINUED | OUTPATIENT
Start: 2022-02-09 | End: 2022-02-17 | Stop reason: HOSPADM

## 2022-02-09 RX ORDER — OXYCODONE HYDROCHLORIDE 5 MG/1
5 TABLET ORAL EVERY 6 HOURS PRN
Status: DISCONTINUED | OUTPATIENT
Start: 2022-02-09 | End: 2022-02-17 | Stop reason: HOSPADM

## 2022-02-09 RX ORDER — HYDROMORPHONE HCL/PF 1 MG/ML
0.4 SYRINGE (ML) INJECTION
Status: CANCELLED | OUTPATIENT
Start: 2022-02-09

## 2022-02-09 RX ORDER — METOCLOPRAMIDE HYDROCHLORIDE 5 MG/ML
10 INJECTION INTRAMUSCULAR; INTRAVENOUS ONCE AS NEEDED
Status: CANCELLED | OUTPATIENT
Start: 2022-02-09

## 2022-02-09 RX ORDER — PROMETHAZINE HYDROCHLORIDE 25 MG/ML
12.5 INJECTION, SOLUTION INTRAMUSCULAR; INTRAVENOUS ONCE AS NEEDED
Status: CANCELLED | OUTPATIENT
Start: 2022-02-09

## 2022-02-09 RX ORDER — OXYCODONE HYDROCHLORIDE 5 MG/1
2.5 TABLET ORAL EVERY 6 HOURS PRN
Status: DISCONTINUED | OUTPATIENT
Start: 2022-02-09 | End: 2022-02-17 | Stop reason: HOSPADM

## 2022-02-09 RX ORDER — PROPOFOL 10 MG/ML
INJECTION, EMULSION INTRAVENOUS AS NEEDED
Status: DISCONTINUED | OUTPATIENT
Start: 2022-02-09 | End: 2022-02-09

## 2022-02-09 RX ORDER — SODIUM CHLORIDE, SODIUM LACTATE, POTASSIUM CHLORIDE, CALCIUM CHLORIDE 600; 310; 30; 20 MG/100ML; MG/100ML; MG/100ML; MG/100ML
50 INJECTION, SOLUTION INTRAVENOUS CONTINUOUS
Status: CANCELLED | OUTPATIENT
Start: 2022-02-09

## 2022-02-09 RX ORDER — ACETAMINOPHEN 325 MG/1
975 TABLET ORAL EVERY 8 HOURS SCHEDULED
Status: DISCONTINUED | OUTPATIENT
Start: 2022-02-09 | End: 2022-02-17 | Stop reason: HOSPADM

## 2022-02-09 RX ORDER — LIDOCAINE HYDROCHLORIDE 10 MG/ML
0.5 INJECTION, SOLUTION EPIDURAL; INFILTRATION; INTRACAUDAL; PERINEURAL ONCE AS NEEDED
Status: DISCONTINUED | OUTPATIENT
Start: 2022-02-09 | End: 2022-02-17 | Stop reason: HOSPADM

## 2022-02-09 RX ORDER — SODIUM CHLORIDE, SODIUM LACTATE, POTASSIUM CHLORIDE, CALCIUM CHLORIDE 600; 310; 30; 20 MG/100ML; MG/100ML; MG/100ML; MG/100ML
INJECTION, SOLUTION INTRAVENOUS CONTINUOUS PRN
Status: DISCONTINUED | OUTPATIENT
Start: 2022-02-09 | End: 2022-02-09

## 2022-02-09 RX ORDER — ALBUTEROL SULFATE 2.5 MG/3ML
2.5 SOLUTION RESPIRATORY (INHALATION) ONCE AS NEEDED
Status: CANCELLED | OUTPATIENT
Start: 2022-02-09

## 2022-02-09 RX ORDER — FENTANYL CITRATE/PF 50 MCG/ML
50 SYRINGE (ML) INJECTION
Status: CANCELLED | OUTPATIENT
Start: 2022-02-09

## 2022-02-09 RX ORDER — LIDOCAINE HYDROCHLORIDE 20 MG/ML
INJECTION, SOLUTION EPIDURAL; INFILTRATION; INTRACAUDAL; PERINEURAL AS NEEDED
Status: DISCONTINUED | OUTPATIENT
Start: 2022-02-09 | End: 2022-02-09

## 2022-02-09 RX ADMIN — ATORVASTATIN CALCIUM 40 MG: 40 TABLET, FILM COATED ORAL at 08:23

## 2022-02-09 RX ADMIN — ASPIRIN 81 MG: 81 TABLET, COATED ORAL at 16:17

## 2022-02-09 RX ADMIN — SODIUM CHLORIDE, SODIUM LACTATE, POTASSIUM CHLORIDE, AND CALCIUM CHLORIDE: .6; .31; .03; .02 INJECTION, SOLUTION INTRAVENOUS at 10:16

## 2022-02-09 RX ADMIN — ACETAMINOPHEN 975 MG: 325 TABLET, FILM COATED ORAL at 22:29

## 2022-02-09 RX ADMIN — CEFEPIME HYDROCHLORIDE 2000 MG: 2 INJECTION, POWDER, FOR SOLUTION INTRAVENOUS at 06:10

## 2022-02-09 RX ADMIN — HEPARIN SODIUM 5000 UNITS: 5000 INJECTION INTRAVENOUS; SUBCUTANEOUS at 22:28

## 2022-02-09 RX ADMIN — SENNOSIDES AND DOCUSATE SODIUM 2 TABLET: 50; 8.6 TABLET ORAL at 17:48

## 2022-02-09 RX ADMIN — PANTOPRAZOLE SODIUM 40 MG: 40 TABLET, DELAYED RELEASE ORAL at 08:23

## 2022-02-09 RX ADMIN — LIDOCAINE HYDROCHLORIDE 100 MG: 20 INJECTION, SOLUTION EPIDURAL; INFILTRATION; INTRACAUDAL; PERINEURAL at 10:23

## 2022-02-09 RX ADMIN — HEPARIN SODIUM 5000 UNITS: 5000 INJECTION INTRAVENOUS; SUBCUTANEOUS at 06:10

## 2022-02-09 RX ADMIN — DICLOFENAC SODIUM 2 G: 10 GEL TOPICAL at 22:28

## 2022-02-09 RX ADMIN — FERROUS SULFATE TAB 325 MG (65 MG ELEMENTAL FE) 325 MG: 325 (65 FE) TAB at 08:23

## 2022-02-09 RX ADMIN — ACETAMINOPHEN 975 MG: 325 TABLET, FILM COATED ORAL at 13:09

## 2022-02-09 RX ADMIN — CEFEPIME HYDROCHLORIDE 2000 MG: 2 INJECTION, POWDER, FOR SOLUTION INTRAVENOUS at 17:48

## 2022-02-09 RX ADMIN — HEPARIN SODIUM 5000 UNITS: 5000 INJECTION INTRAVENOUS; SUBCUTANEOUS at 13:09

## 2022-02-09 RX ADMIN — FERROUS SULFATE TAB 325 MG (65 MG ELEMENTAL FE) 325 MG: 325 (65 FE) TAB at 16:17

## 2022-02-09 RX ADMIN — DICLOFENAC SODIUM 2 G: 10 GEL TOPICAL at 16:17

## 2022-02-09 RX ADMIN — OXYCODONE HYDROCHLORIDE 5 MG: 5 TABLET ORAL at 13:09

## 2022-02-09 RX ADMIN — PROPOFOL 50 MG: 10 INJECTION, EMULSION INTRAVENOUS at 10:23

## 2022-02-09 RX ADMIN — DILTIAZEM HYDROCHLORIDE 240 MG: 240 CAPSULE, COATED, EXTENDED RELEASE ORAL at 08:23

## 2022-02-09 NOTE — PROGRESS NOTES
[facility]  Progress Note - Adriana Resides 1940, 80 y o  male MRN: [id number]  Unit/Bed#: -Mitch Encounter: [id number]  Primary Care Provider: Tim Ponce MD   Date and time admitted to hospital: 2/4/2022  5:41 PM    * Sepsis due to gram-negative bacteria [facility])  Assessment & Plan  Background: With hx of bladder cancer, currently on systemic chemotherapy, brought in by Granddaughter who reported several days of progressive confusion and weakness and was instructed to take patient to ED by infusion staff for these symptoms  · Meets sepsis criteria on admission (SIRS + UTI)  · UA suggestive of UTI; urine and blood cultures + for pseudomonas   · 103 1F in ED; afebrile since   · WBC 17 95; ANC WNL;  Lactic acid 2 4; COVID negative   · Lactic since cleared S/P fluid boluses  · WBC normalized  · Infectious disease consulted; input as noted below  · Port-A-Cath removed 2/7   · TTE 2/7 showing possible mitral valve vegetation  · Continue Cefepime 2 gm every 12 hours  · Plan for RENEE 2/9 to rule out Endocarditis  · Will likely need 6 weeks IV antibiotics/Will need PICC once repeat blood cultures negative x 72 hrs  · Repeat blood cultures from 2/8 pending  · CT chest/abdomen/pelvis 2/6 showing pyelonephritis   · Of note, previously discussed replacement of Port-A-Cath with another Port-A-Cath versus PICC with primary hematologist (Dr Rod Ruff) oncologist and cleared to put in PICC following clearance of blood cultures    Metabolic encephalopathy  Assessment & Plan  · CT head negative for acute process  · Not hypoglycemic, see hyponatremia as below  · Possibly in the setting of sepsis/UTI as above vs systemic chemotherapy vs outpatient narcotic use   · Narcotics and benzos prescribed OP on hold; had received IM Zyprexa X1  · Delirium precautions   · Supportive measures   · Stable, "feisty" but appears at baseline     Anemia  Assessment & Plan  · Appears to be chronic issue with baseline around 8-9  · Did have blood in archibald bag, now resolved  · Would hold off on transfusion unless Hgb <7   · Monitor closely given supratherapeutic INR initially   · Start iron supplement given FRANCY    Lab Results   Component Value Date    HGB 7 0 (L) 02/09/2022    HGB 8 3 (L) 02/08/2022    HGB 7 0 (L) 02/07/2022     Lab Results   Component Value Date    IRON 16 (L) 12/29/2021    FERRITIN 6 (L) 12/29/2021    TIBC 341 12/29/2021    CONCFE 5 (L) 12/29/2021       Archibald catheter in place prior to arrival  Assessment & Plan  · With archibald catheter and bilat perc nephrostomy tube in the setting of bladder CA as above   · Both producing urine   · Archibald was replaced on day of admission, malpositioned with balloon in urethra, since repositioned   · Continue archibald and nephrostomy care     Stage 3a chronic kidney disease [facility])  Assessment & Plan  Lab Results   Component Value Date    EGFR 62 02/09/2022    EGFR 57 02/08/2022    EGFR 58 02/07/2022    CREATININE 1 10 02/09/2022    CREATININE 1 18 02/08/2022    CREATININE 1 16 02/07/2022     · Stable; appears to be improving from recent DULCE, recent baseline difficult to establish  · Avoid nephrotoxic agents  · Discontinued IVF on 2/5  · Monitor status post IV contrast on 2/6 - remains stable    Atrial fibrillation (HCC)  Assessment & Plan  · Currently rate controlled  · Continue home Cardizem, monitor with routine vitals   · Plan to restart coumadin today after IR biopsy    Recent Labs     02/07/22  1424 02/08/22  0511 02/09/22  0428   INR 1 95* 1 22* 1 16       COPD, severe (HCC)  Assessment & Plan  · 96% O2 RA; no SOB complaints  · Monitor O2 overnight     Bladder cancer (HCC)  Assessment & Plan  · 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 Related to pyelo  · Prescribed Oxycodone 5mg q6h prn as outpatient  · Home Xanax and oxycodone currently on hold given AMS    Hypertension  Assessment & Plan  · Blood pressure acceptable with SBP in the 100-130s   · Continue home Cardizem  · Monitor BP per unit protocol    CAD (coronary atherosclerotic disease)  Assessment & Plan  · S/p CABG x4  · Denies chest pain; Troponin 37  · Continue home ASA and statin      VTE Pharmacologic Prophylaxis: VTE Score: 6 High Risk (Score >/= 5) - Pharmacological DVT Prophylaxis Ordered: heparin  Sequential Compression Devices Ordered  Patient Centered Rounds: I performed bedside rounds with nursing staff today  Discussions with Specialists or Other Care Team Provider: Case management, IR    Education and Discussions with Family / Patient: Updated  (Granddaughter, Christian Silva) via phone  Time Spent for Care: 20 minutes  More than 50% of total time spent on counseling and coordination of care as described above  Current Length of Stay: 5 day(s)  Current Patient Status: Inpatient   Certification Statement: The patient will continue to require additional inpatient hospital stay due to ongoing treatment in setting of sepsis/bacteremia, waiting to determine length of abx treatment needed  Discharge Plan: Anticipate discharge in >72 hrs to home with home services  Code Status: Level 1 - Full Code    Subjective:   CC: I have absolutely no pain  Patient resting in bed, sleeping  Denies complaints of pain at this time, states that the gel and the pain medication is helping  Denies shortness of breath, fever, or chills  Discussed plan for biopsy as early as tomorrow but could extend until Monday      Objective:     Vitals:   Temp (24hrs), Av °F (36 7 °C), Min:97 °F (36 1 °C), Max:98 7 °F (37 1 °C)    Temp:  [97 °F (36 1 °C)-98 7 °F (37 1 °C)] 97 °F (36 1 °C)  HR:  [67-95] 72  Resp:  [17-18] 18  BP: (103-132)/() 103/59  SpO2:  [98 %-100 %] 100 %  Body mass index is 27 75 kg/m²  Input and Output Summary (last 24 hours): Intake/Output Summary (Last 24 hours) at 2/9/2022 1345  Last data filed at 2/9/2022 1245  Gross per 24 hour   Intake 790 ml   Output 2650 ml   Net -1860 ml       Physical Exam:   Physical Exam  Vitals and nursing note reviewed  Constitutional:       General: He is not in acute distress  Cardiovascular:      Rate and Rhythm: Normal rate  Pulses: Normal pulses  Heart sounds: Normal heart sounds  Pulmonary:      Effort: Pulmonary effort is normal       Breath sounds: Normal breath sounds  Abdominal:      General: Bowel sounds are normal       Palpations: Abdomen is soft  Musculoskeletal:         General: Normal range of motion  Right lower leg: No edema  Left lower leg: No edema  Skin:     General: Skin is warm and dry  Capillary Refill: Capillary refill takes less than 2 seconds  Coloration: Skin is pale  Neurological:      Mental Status: He is alert and oriented to person, place, and time  Comments: Answered my questions appropriately   Psychiatric:         Mood and Affect: Mood normal           Additional Data:     Labs:  Results from last 7 days   Lab Units 02/09/22  0428 02/07/22  0912 02/07/22  0705 02/05/22  0520 02/04/22  1755   WBC Thousand/uL 4 79   < > 4 01*   < > 17 95*   HEMOGLOBIN g/dL 7 0*   < > 6 7*   < > 7 8*   HEMATOCRIT % 23 7*   < > 22 6*   < > 26 9*   PLATELETS Thousands/uL 228   < > 216   < > 317   BANDS PCT %  --   --   --   --  4   NEUTROS PCT %  --   --  80*   < >  --    LYMPHS PCT %  --   --  9*   < >  --    LYMPHO PCT %  --   --   --   --  2*   MONOS PCT %  --   --  5   < >  --    MONO PCT %  --   --   --   --  0*   EOS PCT %  --   --  3   < > 0    < > = values in this interval not displayed       Results from last 7 days   Lab Units 02/09/22  0428 02/05/22  0719 02/04/22  1755   SODIUM mmol/L 138   < > 127*   POTASSIUM mmol/L 3 9   < > 4 5   CHLORIDE mmol/L 105   < > 95*   CO2 mmol/L 25   < > 26   BUN mg/dL 16   < > 23   CREATININE mg/dL 1 10   < > 1 41*   ANION GAP mmol/L 8   < > 6   CALCIUM mg/dL 8 1*   < > 8 6   ALBUMIN g/dL  --   --  3 0*   TOTAL BILIRUBIN mg/dL  --   --  1 83*   ALK PHOS U/L  --   --  73   ALT U/L  --   --  17   AST U/L  --   --  15   GLUCOSE RANDOM mg/dL 95   < > 139    < > = values in this interval not displayed  Results from last 7 days   Lab Units 02/09/22  0428   INR  1 16             Results from last 7 days   Lab Units 02/07/22  0705 02/06/22  0453 02/04/22  2034 02/04/22  1755   LACTIC ACID mmol/L  --   --  1 0 2 4*   PROCALCITONIN ng/ml 4 95* 5 32*  --   --        Lines/Drains:  Invasive Devices  Report    Peripheral Intravenous Line            Peripheral IV 02/07/22 Right Antecubital 1 day          Line            Pump Device Continuous ambulatory delivery device Right Chest 9 days          Drain            Urethral Catheter Non-latex 16 Fr  42 days    Percutaneous Nephroureteral Tube (PCNU) Left 1 8 5 Fr 26 Cm 21 days    Percutaneous Nephroureteral Tube (PCNU) Right 2 8 5 Fr 24 Cm 21 days              Urinary Catheter:  Goal for removal: N/A - Chronic Fortune           Telemetry:  Telemetry Orders (From admission, onward)             48 Hour Telemetry Monitoring  Continuous x 48 hours        References:    Telemetry Guidelines   Question:  Reason for 48 Hour Telemetry  Answer:  Arrhythmias Requiring Medical Therapy (eg  SVT, Vtach/fib, Bradycardia, Uncontrolled A-fib)                 Telemetry Reviewed: Normal Sinus Rhythm  Indication for Continued Telemetry Use: Arrthymias requiring medical therapy             Imaging: No pertinent imaging reviewed      Recent Cultures (last 7 days):   Results from last 7 days   Lab Units 02/08/22  0504 02/06/22  1000 02/04/22  1755   BLOOD CULTURE No Growth at 24 hrs  No Growth at 24 hrs  No Growth at 48 hrs  Pseudomonas aeruginosa*  Pseudomonas aeruginosa*   GRAM STAIN RESULT   --  Gram negative rods* Gram negative rods*  Gram negative rods*   URINE CULTURE   --   --  >100,000 cfu/ml Pseudomonas aeruginosa*       Last 24 Hours Medication List:   Current Facility-Administered Medications   Medication Dose Route Frequency Provider Last Rate    acetaminophen  975 mg Oral Q8H [address] MYLENE Santoyo      aluminum-magnesium hydroxide-simethicone  30 mL Oral Q4H PRN MYLENE Olea      [facility]  40 mg Oral Daily [facility]      bisacodyl  10 mg Rectal Daily PRN MYLENE Clay      cefepime  2,000 mg Intravenous Q12H [facility] 2,000 mg (02/09/22 0610)    Diclofenac Sodium  2 g Topical 4x Daily MYLENE Shaikh      diltiazem  240 mg Oral Daily [place]      ferrous sulfate  325 mg Oral BID With Meals Samina Rae PA-C      heparin (porcine)  5,000 Units Subcutaneous Q8H [address] MYLENE Rice      lidocaine  1 patch Topical Daily Payal Serrato PA-C      lidocaine (PF)  0 5 mL Infiltration Once PRN Sissy Kawasaki, MD      OLANZapine  2 5 mg Intramuscular Once MYLENE Clay      ondansetron  4 mg Intravenous Q6H PRN [facility]      oxyCODONE  2 5 mg Oral Q6H PRN MYLENE Shaikh      oxyCODONE  5 mg Oral Q6H PRN MYLENE Shaikh      pantoprazole  40 mg Oral QAM [facility]      polyethylene glycol  17 g Oral Daily MYLENE Clay      senna-docusate sodium  2 tablet Oral BID MYLENE Boyer      sodium phosphate-biphosphate  1 enema Rectal Once PRN MYLENE Clay          Today, Patient Was Seen By: MYLENE Shaikh    **Please Note: This note may have been constructed using a voice recognition system  **

## 2022-02-09 NOTE — ANESTHESIA POSTPROCEDURE EVALUATION
Post-Op Assessment Note    CV Status:  Stable    Pain management: adequate     Mental Status:  Alert and awake   PONV Controlled:  None       Staff: CRNA         No complications documented      BP  123/65   Temp      Pulse  70   Resp 16   SpO2   100

## 2022-02-09 NOTE — ANESTHESIA PREPROCEDURE EVALUATION
Procedure:  RENEE    Relevant Problems   CARDIO   (+) Acute on chronic diastolic congestive heart failure (HCC)   (+) Atrial fibrillation (HCC)   (+) Atrial flutter (HCC)   (+) CAD (coronary atherosclerotic disease)   (+) Hyperlipemia   (+) Hyperlipidemia   (+) Hypertension   (+) S/P CABG x 4   (+) Stenosis of left carotid artery      GI/HEPATIC   (+) GERD (gastroesophageal reflux disease)      /RENAL   (+) DULCE (acute kidney injury) ([facility] [address] ) on CKD 3   (+) CKD (chronic kidney disease), stage II   (+) Stage 3a chronic kidney disease (HCC)      HEMATOLOGY   (+) Anemia   (+) FRANCY (iron deficiency anemia)      MUSCULOSKELETAL   (+) Acute left-sided low back pain with left-sided sciatica   (+) Arthritis   (+) Back pain   (+) 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   (+) DDD (degenerative disc disease), lumbar   (+) Sciatica of right side      NEURO/PSYCH   (+) Anxiety   (+) Continuous opioid dependence (HCC)   (+) History of stroke      PULMONARY   (+) COPD, severe (HCC)   (+) Centrilobular emphysema (HCC)   (+) Pleural effusion on left      Other   (+) Bladder cancer (HCC)   (+) Bronchiectasis with acute lower respiratory infection (HCC)   (+) Irregular heart beat   (+) Recurrent unilateral inguinal hernia   (+) Sensorineural hearing loss (SNHL) of both ears   (+) Tinnitus aurium, bilateral   (+) Urinary retention due to benign prostatic hyperplasia   (+) Vision changes        Physical Exam    Airway    Mallampati score: II  TM Distance: >3 FB  Neck ROM: full     Dental   No notable dental hx     Cardiovascular  Cardiovascular exam normal    Pulmonary  Pulmonary exam normal Breath sounds clear to auscultation,     Other Findings      Normal sinus rhythm  Right bundle branch block     Confirmed by Geeta Nesbitt ([id number]) on 2/8/2022 10:52:38 PM    Left Ventricle Left ventricular cavity size is normal  Wall thickness is normal  The left ventricular ejection fraction is 55%   Systolic function is normal  Wall motion is normal  Diastolic function is mildly abnormal, consistent with grade I (abnormal) relaxation  Right Ventricle Right ventricular cavity size is normal  Systolic function is normal  Wall thickness is normal    Left Atrium The atrium is normal in size  Right Atrium The atrium is normal in size  There is a possible small, pedunculated, highly mobile mass  Aortic Valve The aortic valve was not well visualized  The leaflets are moderately thickened  The leaflets are severely calcified  The leaflets exhibit normal mobility  A vegetation cannot be ruled out  Recommend RENEE, if clinically indicated  There is no evidence of regurgitation  There is no evidence of stenosis  Mitral Valve The mitral valve has normal structure and function  The mitral valve was not well visualized  There is no evidence of regurgitation  There is no evidence of stenosis  There is a possible moderately sized, mobile vegetation present  No significant regurgitatin  Recommend RENEE, if clinicaaly indicated   Tricuspid Valve The tricuspid valve was not well visualized  Tricuspid valve structure is normal  There is trace regurgitation  There is no evidence of stenosis  Pulmonic Valve The pulmonic valve was not well visualized  Pulmonic valve structure is normal  There is no evidence of regurgitation  There is no evidence of stenosis  Ascending Aorta The aortic root is normal in size  IVC/SVC The inferior vena cava is normal in size  Pericardium There is no pericardial effusion  Anesthesia Plan  ASA Score- 4     Anesthesia Type- IV sedation with anesthesia with ASA Monitors  Additional Monitors:   Airway Plan:     Comment: Phone consent obtained from patient's daughter, Fam Valdivia [phone]  Plan Factors-    Chart reviewed  EKG reviewed  Imaging results reviewed  Existing labs reviewed  Patient summary reviewed  Patient is not a current smoker    Patient instructed to abstain from smoking on day of procedure  Patient did not smoke on day of surgery  Obstructive sleep apnea risk education given perioperatively  Induction- intravenous  Postoperative Plan-     Informed Consent- Anesthetic plan and risks discussed with patient  I personally reviewed this patient with the CRNA  Discussed and agreed on the Anesthesia Plan with the CRNA           Lab Results   Component Value Date    WBC 4 79 02/09/2022    HGB 7 0 (L) 02/09/2022    HCT 23 7 (L) 02/09/2022    MCV 76 (L) 02/09/2022     02/09/2022     Lab Results   Component Value Date    CALCIUM 8 1 (L) 02/09/2022    K 3 9 02/09/2022    CO2 25 02/09/2022     02/09/2022    BUN 16 02/09/2022    CREATININE 1 10 02/09/2022     Lab Results   Component Value Date    INR 1 16 02/09/2022    INR 1 22 (H) 02/08/2022    INR 1 95 (H) 02/07/2022    PROTIME 14 4 02/09/2022    PROTIME 14 9 (H) 02/08/2022    PROTIME 21 3 (H) 02/07/2022     Lab Results   Component Value Date    PTT 94 (H) 02/04/2022     Type and Screen:  A    Discussed with pt the benefits/alternatives and risks or General Anesthesia including breathing tube remaining in place if not strong enough, PONV, damage to lips and teeth, sore throat, eye injury or blindness    I, Dr Len Marina, the attending physician, have personally seen and evaluated the patient prior to anesthetic care  I have reviewed the pre-anesthetic record, and other medical records if appropriate to the anesthetic care  If a CRNA is involved in the case, I have reviewed the CRNA assessment, if present, and agree  The patient is in a suitable condition to proceed with my formulated anesthetic plan

## 2022-02-09 NOTE — PLAN OF CARE
Problem: Potential for Falls  Goal: Patient will remain free of falls  Description: INTERVENTIONS:  - Educate patient/family on patient safety including physical limitations  - Instruct patient to call for assistance with activity   - Consult OT/PT to assist with strengthening/mobility   - Keep Call bell within reach  - Keep bed low and locked with side rails adjusted as appropriate  - Keep care items and personal belongings within reach  - Initiate and maintain comfort rounds  - Make Fall Risk Sign visible to staff  - Offer Toileting every 2 Hours, in advance of need  - Initiate/Maintain 2alarm  - Obtain necessary fall risk management equipment: 2  - Apply yellow socks and bracelet for high fall risk patients  - Consider moving patient to room near nurses station  Outcome: Progressing     Problem: MOBILITY - ADULT  Goal: Maintain or return to baseline ADL function  Description: INTERVENTIONS:  -  Assess patient's ability to carry out ADLs; assess patient's baseline for ADL function and identify physical deficits which impact ability to perform ADLs (bathing, care of mouth/teeth, toileting, grooming, dressing, etc )  - Assess/evaluate cause of self-care deficits   - Assess range of motion  - Assess patient's mobility; develop plan if impaired  - Assess patient's need for assistive devices and provide as appropriate  - Encourage maximum independence but intervene and supervise when necessary  - Involve family in performance of ADLs  - Assess for home care needs following discharge   - Consider OT consult to assist with ADL evaluation and planning for discharge  - Provide patient education as appropriate  Outcome: Progressing  Goal: Maintains/Returns to pre admission functional level  Description: INTERVENTIONS:  - Perform BMAT or MOVE assessment daily    - Set and communicate daily mobility goal to care team and patient/family/caregiver     - Collaborate with rehabilitation services on mobility goals if consulted  - Perform Range of Motion 2 times a day  - Reposition patient every 3 hours    - Dangle patient 3 times a day  - Stand patient 3 times a day  - Ambulate patient 3 times a day  - Out of bed to chair 3 times a day   - Out of bed for meals 3 times a day  - Out of bed for toileting  - Record patient progress and toleration of activity level   Outcome: Progressing     Problem: PAIN - ADULT  Goal: Verbalizes/displays adequate comfort level or baseline comfort level  Description: Interventions:  - Encourage patient to monitor pain and request assistance  - Assess pain using appropriate pain scale  - Administer analgesics based on type and severity of pain and evaluate response  - Implement non-pharmacological measures as appropriate and evaluate response  - Consider cultural and social influences on pain and pain management  - Notify physician/advanced practitioner if interventions unsuccessful or patient reports new pain  Outcome: Progressing     Problem: INFECTION - ADULT  Goal: Absence or prevention of progression during hospitalization  Description: INTERVENTIONS:  - Assess and monitor for signs and symptoms of infection  - Monitor lab/diagnostic results  - Monitor all insertion sites, i e  indwelling lines, tubes, and drains  - Monitor endotracheal if appropriate and nasal secretions for changes in amount and color  - [place] appropriate cooling/warming therapies per order  - Administer medications as ordered  - Instruct and encourage patient and family to use good hand hygiene technique  - Identify and instruct in appropriate isolation precautions for identified infection/condition  Outcome: Progressing  Goal: Absence of fever/infection during neutropenic period  Description: INTERVENTIONS:  - Monitor WBC    Outcome: Progressing     Problem: SAFETY ADULT  Goal: Patient will remain free of falls  Description: INTERVENTIONS:  - Educate patient/family on patient safety including physical limitations  - Instruct patient to call for assistance with activity   - Consult OT/PT to assist with strengthening/mobility   - Keep Call bell within reach  - Keep bed low and locked with side rails adjusted as appropriate  - Keep care items and personal belongings within reach  - Initiate and maintain comfort rounds  - Make Fall Risk Sign visible to staff  - Offer Toileting every 3 Hours, in advance of need  - Initiate/Maintain 3alarm  - Obtain necessary fall risk management equipment: 3  - Apply yellow socks and bracelet for high fall risk patients  - Consider moving patient to room near nurses station  Outcome: Progressing  Goal: Maintain or return to baseline ADL function  Description: INTERVENTIONS:  -  Assess patient's ability to carry out ADLs; assess patient's baseline for ADL function and identify physical deficits which impact ability to perform ADLs (bathing, care of mouth/teeth, toileting, grooming, dressing, etc )  - Assess/evaluate cause of self-care deficits   - Assess range of motion  - Assess patient's mobility; develop plan if impaired  - Assess patient's need for assistive devices and provide as appropriate  - Encourage maximum independence but intervene and supervise when necessary  - Involve family in performance of ADLs  - Assess for home care needs following discharge   - Consider OT consult to assist with ADL evaluation and planning for discharge  - Provide patient education as appropriate  Outcome: Progressing  Goal: Maintains/Returns to pre admission functional level  Description: INTERVENTIONS:  - Perform BMAT or MOVE assessment daily    - Set and communicate daily mobility goal to care team and patient/family/caregiver  - Collaborate with rehabilitation services on mobility goals if consulted  - Perform Range of Motion 2 times a day  - Reposition patient every 2 hours    - Dangle patient 2 times a day  - Stand patient 2 times a day  - Ambulate patient 2 times a day  - Out of bed to chair 2 times a day   - Out of bed for meals 2 times a day  - Out of bed for toileting  - Record patient progress and toleration of activity level   Outcome: Progressing     Problem: DISCHARGE PLANNING  Goal: Discharge to home or other facility with appropriate resources  Description: INTERVENTIONS:  - Identify barriers to discharge w/patient and caregiver  - Arrange for needed discharge resources and transportation as appropriate  - Identify discharge learning needs (meds, wound care, etc )  - Arrange for interpretive services to assist at discharge as needed  - Refer to Case Management Department for coordinating discharge planning if the patient needs post-hospital services based on physician/advanced practitioner order or complex needs related to functional status, cognitive ability, or social support system  Outcome: Progressing     Problem: Knowledge Deficit  Goal: Patient/family/caregiver demonstrates understanding of disease process, treatment plan, medications, and discharge instructions  Description: Complete learning assessment and assess knowledge base    Interventions:  - Provide teaching at level of understanding  - Provide teaching via preferred learning methods  Outcome: Progressing     Problem: GENITOURINARY - ADULT  Goal: Urinary catheter remains patent  Description: INTERVENTIONS:  - Assess patency of urinary catheter  - If patient has a chronic archibald, consider changing catheter if non-functioning  - Follow guidelines for intermittent irrigation of non-functioning urinary catheter  Outcome: Progressing     Problem: SKIN/TISSUE INTEGRITY - ADULT  Goal: Incision(s), wounds(s) or drain site(s) healing without S/S of infection  Description: INTERVENTIONS  - Assess and document dressing, incision, wound bed, drain sites and surrounding tissue  - Provide patient and family education  - Perform skin care/dressing changes every 2  Outcome: Progressing     Problem: HEMATOLOGIC - ADULT  Goal: Maintains hematologic stability  Description: INTERVENTIONS  - Assess for signs and symptoms of bleeding or hemorrhage  - Monitor labs  - Administer supportive blood products/factors as ordered and appropriate  Outcome: Progressing     Problem: Prexisting or High Potential for Compromised Skin Integrity  Goal: Skin integrity is maintained or improved  Description: INTERVENTIONS:  - Identify patients at risk for skin breakdown  - Assess and monitor skin integrity  - Assess and monitor nutrition and hydration status  - Monitor labs   - Assess for incontinence   - Turn and reposition patient  - Assist with mobility/ambulation  - Relieve pressure over bony prominences  - Avoid friction and shearing  - Provide appropriate hygiene as needed including keeping skin clean and dry  - Evaluate need for skin moisturizer/barrier cream  - Collaborate with interdisciplinary team   - Patient/family teaching  - Consider wound care consult   Outcome: Progressing

## 2022-02-09 NOTE — ASSESSMENT & PLAN NOTE
· Presented complaining of back pain; resolved on 2/5 ?  Related to pyelo  · Prescribed Oxycodone 5mg q6h prn as outpatient  · Will restart Oxycodone; geriatric dosing  · 2 5 mg every 6 hours as needed for moderate pain  · 5 mg every 6 hours as needed for severe pain  · Voltaren gel to back  · Change Tylenol to 975 mg every 8 hours ATC  · Aqua K

## 2022-02-09 NOTE — ASSESSMENT & PLAN NOTE
· Appears to be chronic issue with baseline around 8-9  · Did have blood in archibald bag, now resolved  · Would hold off on transfusion unless Hgb <7   · Monitor closely given supratherapeutic INR initially   · Start iron supplement given FRANCY    Lab Results   Component Value Date    HGB 7 0 (L) 02/09/2022    HGB 8 3 (L) 02/08/2022    HGB 7 0 (L) 02/07/2022     Lab Results   Component Value Date    IRON 16 (L) 12/29/2021    FERRITIN 6 (L) 12/29/2021    TIBC 341 12/29/2021    CONCFE 5 (L) 12/29/2021

## 2022-02-09 NOTE — ASSESSMENT & PLAN NOTE
Background: With hx of bladder cancer, currently on systemic chemotherapy, brought in by Granddaughter who reported several days of progressive confusion and weakness and was instructed to take patient to ED by infusion staff for these symptoms  · Meets sepsis criteria on admission (SIRS + UTI)  · UA suggestive of UTI; urine and blood cultures + for pseudomonas   · 103 1F in ED; afebrile since   · WBC 17 95; ANC WNL;  Lactic acid 2 4; COVID negative   · Lactic since cleared S/P fluid boluses  · WBC normalized  · Infectious disease consulted; input as noted below  · Port-A-Cath removed 2/7   · TTE 2/7 showing possible mitral valve vegetation  · Continue Cefepime 2 gm every 12 hours  · Plan for RENEE 2/9 to rule out Endocarditis  · Will likely need 6 weeks IV antibiotics/Will need PICC once repeat blood cultures negative x 72 hrs  · Repeat blood cultures from 2/8 pending  · CT chest/abdomen/pelvis 2/6 showing pyelonephritis   · Of note, previously discussed replacement of Port-A-Cath with another Port-A-Cath versus PICC with primary hematologist (Dr Reyna Part) oncologist and cleared to put in PICC following clearance of blood cultures

## 2022-02-09 NOTE — PROGRESS NOTES
Cardiology Progress Note - Shahla Service 80 y o  male MRN: [id number]    Unit/Bed#: -01 Encounter: [id number]      Assessment/Plan: 1  Right sided endocarditis  Patient underwent RENEE this morning  IV abx per ID  Patient is a poor surgical candidate given age and multiple comorbidities  2  Pseudomonas bacteremia  IV antibiotics per ID    3  Pyelonephritis  Management per primary team     4  Bladder cancer and chemotherapy  Management per primary team     5  Obstructive uropathy/bilateral hydronephrosis  6  PAF status post DCCV on Coumadin  Currently sinus rhythm, continue diltiazem   Coumadin on hold due to acute anemia, restart as appropriate  7  CAD status post CABG x4 (2/2001)  Continue atorvastatin and aspirin    8  Chronic HFpEF  EF 55%  Not on BB due to hypotension or chronic diuretics    9  CVA  10  Carotid stenosis status post left CEA  11  Severe COPD  12  Chronic anemia  13  CKD    Will see as needed  Please reach out with any questions or concerns  Subjective:   Patient seen and examined  No significant events overnight  Patient underwent RENEE today showing right sided endocarditis  Objective:     Vitals: Blood pressure 103/59, pulse 72, temperature (!) 97 °F (36 1 °C), temperature source Temporal, resp  rate 18, height 5' 6" (1 676 m), weight 78 kg (171 lb 15 3 oz), SpO2 100 %  , Body mass index is 27 75 kg/m² ,   Orthostatic Blood Pressures      Most Recent Value   Blood Pressure 103/59 filed at 02/09/2022 1052   Patient Position - Orthostatic VS Lying filed at 02/09/2022 0700            Intake/Output Summary (Last 24 hours) at 2/9/2022 1459  Last data filed at 2/9/2022 1245  Gross per 24 hour   Intake 470 ml   Output 2650 ml   Net -2180 ml         Physical Exam:  Physical Exam  Vitals and nursing note reviewed  Constitutional:       General: He is not in acute distress  Appearance: He is well-developed  He is ill-appearing  HENT:      Head: Normocephalic and atraumatic  Eyes:      Conjunctiva/sclera: Conjunctivae normal    Cardiovascular:      Rate and Rhythm: Normal rate and regular rhythm  Heart sounds: Normal heart sounds  No murmur heard  Pulmonary:      Effort: Pulmonary effort is normal  No respiratory distress  Breath sounds: Normal breath sounds  Abdominal:      Palpations: Abdomen is soft  Tenderness: There is no abdominal tenderness  Musculoskeletal:      Cervical back: Neck supple  Right lower leg: No edema  Left lower leg: No edema  Skin:     General: Skin is warm and dry  Neurological:      Mental Status: He is alert  He is disoriented                Medications:      Current Facility-Administered Medications:     acetaminophen (TYLENOL) tablet 975 mg, 975 mg, Oral, Q8H [address], MYLENE Brown, 975 mg at 02/09/22 1309    aluminum-magnesium hydroxide-simethicone (MYLANTA) oral suspension 30 mL, 30 mL, Oral, Q4H PRN, MYLENE Badillo, 30 mL at 02/05/22 1832    atorvastatin (LIPITOR) tablet 40 mg, 40 mg, Oral, Daily, Kaden Santiago PA-C, 40 mg at 02/09/22 2185    bisacodyl (DULCOLAX) rectal suppository 10 mg, 10 mg, Rectal, Daily PRN, MYLENE Boyer    cefepime (MAXIPIME) 2 g/50 mL dextrose IVPB, 2,000 mg, Intravenous, Q12H, Kaden Santiago PA-C, Last Rate: 100 mL/hr at 02/09/22 0610, 2,000 mg at 02/09/22 0610    Diclofenac Sodium (VOLTAREN) 1 % topical gel 2 g, 2 g, Topical, 4x Daily, MYLENE Brown    diltiazem (CARDIZEM CD) 24 hr capsule 240 mg, 240 mg, Oral, Daily, Kaden Santiago PA-C, 240 mg at 02/09/22 7530    ferrous sulfate tablet 325 mg, 325 mg, Oral, BID With Meals, Agustin Day PA-C, 325 mg at 02/09/22 3602    heparin (porcine) subcutaneous injection 5,000 Units, 5,000 Units, Subcutaneous, Q8H [address], MYLENE Boyer, 5,000 Units at 02/09/22 1309    lidocaine (LIDODERM) 5 % patch 1 patch, 1 patch, Topical, Daily, Payal Serrato PA-C    lidocaine (PF) (XYLOCAINE-MPF) 1 % injection 0 5 mL, 0 5 mL, Infiltration, Once PRN, Valeria Brandt MD    OLANZapine (ZyPREXA) IM injection 2 5 mg, 2 5 mg, Intramuscular, Once, MYLENE Clay    [facility]ron [facility] injection 4 mg, 4 mg, Intravenous, Q6H PRN, Philip Serrano PA-C    oxyCODONE (ROXICODONE) IR tablet 2 5 mg, 2 5 mg, Oral, Q6H PRN, MYLENE Rivera    oxyCODONE (ROXICODONE) IR tablet 5 mg, 5 mg, Oral, Q6H PRN, MYLENE Rivera, 5 mg at 02/09/22 1309    pantoprazole (PROTONIX) EC tablet 40 mg, 40 mg, Oral, QAM, Philip Serrano PA-C, 40 mg at 02/09/22 2981    polyethylene glycol (MIRALAX) packet 17 g, 17 g, Oral, Daily, MYLENE Boyer, 17 g at 02/08/22 0901    senna-docusate sodium (SENOKOT S) 8 6-50 mg per tablet 2 tablet, 2 tablet, Oral, BID, MYLENE Boyer, 2 tablet at 02/08/22 1827    sodium phosphate-biphosphate (FLEET) enema 1 enema, 1 enema, Rectal, Once PRN, MYLENE Clay     Labs & Results:     Results from last 7 days   Lab Units 02/04/22  1755   HS TNI 0HR ng/L 37     Results from last 7 days   Lab Units 02/09/22  0428 02/08/22  0751 02/07/22  1424 02/07/22  0912 02/07/22  0705   WBC Thousand/uL 4 79 4 93  --   --  4 01*   HEMOGLOBIN g/dL 7 0* 8 3* 7 0*   < > 6 7*   HEMATOCRIT % 23 7* 27 3* 24 0*   < > 22 6*   PLATELETS Thousands/uL 228 263  --   --  216    < > = values in this interval not displayed  Results from last 7 days   Lab Units 02/09/22  0428 02/08/22  0751 02/07/22  0705 02/05/22  0719 02/04/22  1755   POTASSIUM mmol/L 3 9 4 0 3 2*   < > 4 5   CHLORIDE mmol/L 105 104 102   < > 95*   CO2 mmol/L 25 25 26   < > 26   BUN mg/dL 16 20 20   < > 23   CREATININE mg/dL 1 10 1 18 1 16   < > 1 41*   CALCIUM mg/dL 8 1* 8 5 7 9*   < > 8 6   ALK PHOS U/L  --   --   --   --  73   ALT U/L  --   --   --   --  17   AST U/L  --   --   --   --  15    < > = values in this interval not displayed       Results from last 7 days   Lab Units 22  0428 22  0511 22  1424 22  0954 22  1755   INR  1 16 1 22* 1 95*   < > 4 26*   PTT seconds  --   --   --   --  94*    < > = values in this interval not displayed  Vitals: Blood pressure 103/59, pulse 72, temperature (!) 97 °F (36 1 °C), temperature source Temporal, resp  rate 18, height 5' 6" (1 676 m), weight 78 kg (171 lb 15 3 oz), SpO2 100 %  , Body mass index is 27 75 kg/m² ,   Orthostatic Blood Pressures      Most Recent Value   Blood Pressure 103/59 filed at 2022 1052   Patient Position - Orthostatic VS Lying filed at 2022 4053          Systolic (50JAC), QBL:685 , Min:103 , IB     Diastolic (14MGX), TY:51, Min:41, Max:108        Intake/Output Summary (Last 24 hours) at 2022 1459  Last data filed at 2022 1245  Gross per 24 hour   Intake 470 ml   Output 2650 ml   Net -2180 ml       Invasive Devices  Report    Peripheral Intravenous Line            Peripheral IV 22 Right Antecubital 1 day          Line            Pump Device Continuous ambulatory delivery device Right Chest 9 days          Drain            Urethral Catheter Non-latex 16 Fr  42 days    Percutaneous Nephroureteral Tube (PCNU) Left 1 8 5 Fr 26 Cm 22 days    Percutaneous Nephroureteral Tube (PCNU) Right 2 8 5 Fr 24 Cm 22 days                  EKG: Normal sinus rhythm, RBBB    Telemetry:  Telemetry Orders (From admission, onward)             48 Hour Telemetry Monitoring  Continuous x 48 hours        References:    Telemetry Guidelines   Question:  Reason for 48 Hour Telemetry  Answer:  Arrhythmias Requiring Medical Therapy (eg  SVT, Vtach/fib, Bradycardia, Uncontrolled A-fib)                 Telemetry Reviewed: Normal Sinus Rhythm      BP Readings from Last 3 Encounters:   22 103/59   22 116/58   22 140/61      Wt Readings from Last 3 Encounters:   22 78 kg (171 lb 15 3 oz)   22 77 6 kg (171 lb)   22 77 9 kg (171 lb 12 8 oz)

## 2022-02-09 NOTE — ASSESSMENT & PLAN NOTE
Lab Results   Component Value Date    EGFR 62 02/09/2022    EGFR 57 02/08/2022    EGFR 58 02/07/2022    CREATININE 1 10 02/09/2022    CREATININE 1 18 02/08/2022    CREATININE 1 16 02/07/2022     · Stable; appears to be improving from recent DULCE, recent baseline difficult to establish  · Avoid nephrotoxic agents  · Discontinued IVF on 2/5  · Monitor status post IV contrast on 2/6 - remains stable

## 2022-02-09 NOTE — PROGRESS NOTES
Progress Note - Infectious Disease   Cristofernaraine Showers 80 y o  male MRN: [id number]  Unit/Bed#: -01 Encounter: [id number]      Impression/Plan:  1  Sepsis, POA   Patient met sepsis criteria on admission with fever, tachycardia and leukocytosis   Sources are 2 and 3  No other obvious sources on exam   Clinical parameters improving    Antibiotics as below  Continue to trend fever curve/vitals  Repeat CBC/chemistry tomorrow  Follow-up repeat blood cultures  Additional supportive care as per primary  Additional interventions pending clinical course     2  Pseudomonal bacteremia and right-sided cardiac vegetation   Two of 2 blood cultures from admission have isolated Pseudomonas   I suspect that the source is primarily problem 3  Patient had recent catheter exchange but was also receiving chemotherapy which may have led to his bacteremia   Patient has a port in place but no other intravascular devices  Port removed with IR on 02/07 and cultures pending  Repeat blood cultures with delayed growth  Susceptibilities reviewed  Patient now making mention of lower back pain  Family would like to avoid fluoroquinolones her prior discussion  Patient will now require prolonged course of antibiotic and there is concern for recurrent/relapsing of disease with further chemotherapy  Will need to discuss next steps with family  Discussed with Cardiology and patient is not a surgical candidate    Continue cefepime 2 g every 12 hours, renally dose adjusted  Continue to trend fever curve/vitals  Repeat CBC/chemistry tomorrow  Follow-up pending blood cultures for clearance  Repeat blood cultures ordered for tomorrow  Maintain urinary catheters  Follow-up port tip culture  Cardiology follow-up as outpatient and surveillance echos post treatment  PICC line placement once cultures clear  With reported back pain obtain MRI T and L-spine with contrast  Additional interventions pending clinical course  Will arrange follow-up/scripts closer to discharge  May need to consider Oncology/palliative care evaluation after discussing the above with family         3  Pyelonephritis with Pseudomonas   Likely source of the above   Recently underwent Fortune catheter exchange 2 days prior to admission and reportedly had hematuria on admission  Urine culture also with Pseudomonas   CT reviewed and catheter repositioned and images concerning for pyelonephritis  Continue antibiotics as above  Continue to trend fever curve/vitals  Maintain current catheter  Recommend follow-up with Urology as outpatient     4  Acute encephalopathy and hyponatremia   Acutely confused on admission which I suspect is multifactorial given issues above   Sodium improving   Mental status also seems to be closer to baseline as patient is answering questions appropriately   CT head unremarkable  Monitor mental status closely  Antibiotics as above  Low threshold for repeat imaging  Continue to trend fever curve/WBC     5  Chronic kidney disease stage 3  Creatinine appeared to be close to prior baseline on admission   This does however affect antibiotic dosing  Cefepime dosing as above  Will further dose adjust antibiotic as needed  Fluid hydration as per primary  Will avoid nephrotoxic agent  Repeat chemistry tomorrow     6  Invasive bladder cancer with chronic port   Patient recently on chemotherapy and has multiple catheters in place along with port   Course complicated now by bacteremia  Port removed on 2/7  Antibiotics as above  Follow-up port tip cultures  Ongoing follow-up by Oncology as outpatient  Plans for PICC line placement as above     7   AFib with elevated INR   Ongoing anticoagulation and care as per primary      Above plan discussed briefly with the patient  Above plan discussed in detail with primary and Cardiology    Attempted to contact patient's granddaughter, no answer at number provided      ID consult service will continue to follow      Antibiotics:  Cefepime 6    Subjective:  Patient has no fever, chills, sweats; no nausea, vomiting, diarrhea; no cough, shortness of breath  No new symptoms  Again patient is very hard of hearing and so obtaining history is very difficult  He mentions now feeling much better with lidocaine on his back  He has difficulty quantifying how long he has been having back pain for  Patient noted to have right-sided vegetation on transesophageal echo  Discussed with Cardiology  Objective:  Vitals:  Temp:  [97 °F (36 1 °C)-98 7 °F (37 1 °C)] 97 8 °F (36 6 °C)  HR:  [65-80] 65  Resp:  [16-18] 16  BP: ()/() 96/54  SpO2:  [97 %-100 %] 97 %  Temp (24hrs), Av 9 °F (36 6 °C), Min:97 °F (36 1 °C), Max:98 7 °F (37 1 °C)  Current: Temperature: 97 8 °F (36 6 °C)    Physical Exam:   General Appearance:  Alert, interactive, nontoxic, no acute distress  Throat: Oropharynx moist without lesions  Lungs:   Clear to auscultation bilaterally; no wheezes, rhonchi or rales; respirations unlabored on room air   Heart:  RRR; no murmur, rub or gallop appreciated   Abdomen:   Soft, non-tender, non-distended, positive bowel sounds  Extremities: No clubbing, cyanosis or edema; patient has some mild tenderness when I am palpating down the lumbar spine  Skin: No new rashes or lesions  No new draining wounds noted  Labs, Imaging, & Other studies:   All pertinent labs and imaging studies were personally reviewed  Results from last 7 days   Lab Units 22  1636 22  0428 22  0751 22  0912 22  0705   WBC Thousand/uL  --  4 79 4 93  --  4 01*   HEMOGLOBIN g/dL 7 1* 7 0* 8 3*   < > 6 7*   PLATELETS Thousands/uL  --  228 263  --  216    < > = values in this interval not displayed       Results from last 7 days   Lab Units 22  0428 22  0719 22  1755   POTASSIUM mmol/L 3 9   < > 4 5   CHLORIDE mmol/L 105   < > 95*   CO2 mmol/L 25   < > 26   BUN mg/dL 16   < > 23 CREATININE mg/dL 1 10   < > 1 41*   EGFR ml/min/1 73sq m 62   < > 46   CALCIUM mg/dL 8 1*   < > 8 6   AST U/L  --   --  15   ALT U/L  --   --  17   ALK PHOS U/L  --   --  73    < > = values in this interval not displayed  Results from last 7 days   Lab Units 02/08/22  0504 02/06/22  1000 02/04/22  1755   BLOOD CULTURE  No Growth at 24 hrs  No Growth at 24 hrs  No Growth at 72 hrs   Pseudomonas aeruginosa*  Pseudomonas aeruginosa*   GRAM STAIN RESULT   --  Gram negative rods* Gram negative rods*  Gram negative rods*   URINE CULTURE   --   --  >100,000 cfu/ml Pseudomonas aeruginosa*

## 2022-02-09 NOTE — ASSESSMENT & PLAN NOTE
· Currently rate controlled  · Continue home Cardizem, monitor with routine vitals   · Continue with SQ Heparin for now; Coumadin on hold for IR biopsy    Recent Labs     02/07/22  1424 02/08/22  0511 02/09/22  0428   INR 1 95* 1 22* 1 16

## 2022-02-10 ENCOUNTER — APPOINTMENT (INPATIENT)
Dept: MRI IMAGING | Facility: HOSPITAL | Age: 82
DRG: 871 | End: 2022-02-10
Payer: MEDICARE

## 2022-02-10 ENCOUNTER — APPOINTMENT (OUTPATIENT)
Dept: RADIATION ONCOLOGY | Facility: CLINIC | Age: 82
End: 2022-02-10
Attending: RADIOLOGY
Payer: MEDICARE

## 2022-02-10 LAB
BACTERIA CATH TIP CULT: NO GROWTH
BACTERIA CATH TIP CULT: NORMAL
ERYTHROCYTE [DISTWIDTH] IN BLOOD BY AUTOMATED COUNT: 24.3 % (ref 11.6–15.1)
HCT VFR BLD AUTO: 26.5 % (ref 36.5–49.3)
HGB BLD-MCNC: 7.7 G/DL (ref 12–17)
INR PPP: 1.13 (ref 0.84–1.19)
MCH RBC QN AUTO: 22.6 PG (ref 26.8–34.3)
MCHC RBC AUTO-ENTMCNC: 29.1 G/DL (ref 31.4–37.4)
MCV RBC AUTO: 78 FL (ref 82–98)
PLATELET # BLD AUTO: 230 THOUSANDS/UL (ref 149–390)
PMV BLD AUTO: 9.2 FL (ref 8.9–12.7)
PROTHROMBIN TIME: 14 SECONDS (ref 11.6–14.5)
RBC # BLD AUTO: 3.41 MILLION/UL (ref 3.88–5.62)
WBC # BLD AUTO: 5.08 THOUSAND/UL (ref 4.31–10.16)

## 2022-02-10 PROCEDURE — 99232 SBSQ HOSP IP/OBS MODERATE 35: CPT | Performed by: INTERNAL MEDICINE

## 2022-02-10 PROCEDURE — 99232 SBSQ HOSP IP/OBS MODERATE 35: CPT | Performed by: NURSE PRACTITIONER

## 2022-02-10 PROCEDURE — 85610 PROTHROMBIN TIME: CPT | Performed by: PHYSICIAN ASSISTANT

## 2022-02-10 PROCEDURE — 85027 COMPLETE CBC AUTOMATED: CPT | Performed by: NURSE PRACTITIONER

## 2022-02-10 PROCEDURE — 87040 BLOOD CULTURE FOR BACTERIA: CPT | Performed by: INTERNAL MEDICINE

## 2022-02-10 PROCEDURE — 99222 1ST HOSP IP/OBS MODERATE 55: CPT | Performed by: INTERNAL MEDICINE

## 2022-02-10 RX ADMIN — HEPARIN SODIUM 5000 UNITS: 5000 INJECTION INTRAVENOUS; SUBCUTANEOUS at 05:54

## 2022-02-10 RX ADMIN — DICLOFENAC SODIUM 2 G: 10 GEL TOPICAL at 09:31

## 2022-02-10 RX ADMIN — DILTIAZEM HYDROCHLORIDE 240 MG: 240 CAPSULE, COATED, EXTENDED RELEASE ORAL at 09:31

## 2022-02-10 RX ADMIN — SENNOSIDES AND DOCUSATE SODIUM 2 TABLET: 50; 8.6 TABLET ORAL at 17:47

## 2022-02-10 RX ADMIN — ACETAMINOPHEN 975 MG: 325 TABLET, FILM COATED ORAL at 21:03

## 2022-02-10 RX ADMIN — HEPARIN SODIUM 5000 UNITS: 5000 INJECTION INTRAVENOUS; SUBCUTANEOUS at 15:43

## 2022-02-10 RX ADMIN — FERROUS SULFATE TAB 325 MG (65 MG ELEMENTAL FE) 325 MG: 325 (65 FE) TAB at 17:43

## 2022-02-10 RX ADMIN — CEFEPIME HYDROCHLORIDE 2000 MG: 2 INJECTION, POWDER, FOR SOLUTION INTRAVENOUS at 05:50

## 2022-02-10 RX ADMIN — LIDOCAINE 5% 1 PATCH: 700 PATCH TOPICAL at 09:33

## 2022-02-10 RX ADMIN — PANTOPRAZOLE SODIUM 40 MG: 40 TABLET, DELAYED RELEASE ORAL at 09:31

## 2022-02-10 RX ADMIN — CEFEPIME HYDROCHLORIDE 2000 MG: 2 INJECTION, POWDER, FOR SOLUTION INTRAVENOUS at 17:48

## 2022-02-10 RX ADMIN — HEPARIN SODIUM 5000 UNITS: 5000 INJECTION INTRAVENOUS; SUBCUTANEOUS at 21:03

## 2022-02-10 RX ADMIN — ATORVASTATIN CALCIUM 40 MG: 40 TABLET, FILM COATED ORAL at 09:31

## 2022-02-10 NOTE — ASSESSMENT & PLAN NOTE
· Appears to be chronic issue with baseline around 8-9  · Did have blood in archibald bag, now resolved  · Would hold off on transfusion unless Hgb <7   · Monitor closely given supratherapeutic INR initially   · Continue iron supplement given FRANCY

## 2022-02-10 NOTE — ASSESSMENT & PLAN NOTE
· Currently rate controlled  · Continue home Cardizem, monitor with routine vitals   · Continue with SQ Heparin for now; Coumadin on hold for IR biopsy    Recent Labs     02/08/22  0511 02/09/22  0428 02/10/22  0459   INR 1 22* 1 16 1 13

## 2022-02-10 NOTE — PROGRESS NOTES
[facility]  Progress Note - Rubens Banks 1940, 80 y o  male MRN: [id number]  Unit/Bed#: -01 Encounter: [id number]  Primary Care Provider: Chris Fernandez MD   Date and time admitted to hospital: 2/4/2022  5:41 PM    * Sepsis due to Pseudomonas aeruginosa [facility])  Assessment & Plan  · Brought in by Granddaughter who reported several days of progressive confusion and weakness and was instructed to take patient to ED by infusion staff for these symptoms  · Met sepsis criteria on admission, as evidenced by leukocytosis, hyperthermia  Lactic acid 2 4  · UA suggestive of UTI; urine and blood cultures + for pseudomonas   · Source of sepsis related to right-sided cardiac vegetation/pyelonephritis  · Infectious disease consulted; input as noted below  · Port-A-Cath removed 2/7   · RENEE completed on 2/9; noted to have right-sided vegetation  Patient is not a surgical candidate  · Continue Cefepime 2 gm every 12 hours  · Plan for PICC line once cultures are negative   Will need 6 weeks IV antibiotics  · Repeat blood cultures from 2/8 negative x 24 hrs  · Of note, previously discussed replacement of Port-A-Cath with another Port-A-Cath versus PICC with primary hematologist (Dr Qian Plasencia) oncologist and cleared to put in PICC following clearance of blood cultures    Metabolic encephalopathy  Assessment & Plan  · CT head negative for acute process  · Not hypoglycemic, see hyponatremia as below  · Possibly in the setting of sepsis/UTI as above vs systemic chemotherapy vs outpatient narcotic use   · Narcotics and benzos prescribed OP on hold; had received IM Zyprexa X1  · Delirium precautions   · Supportive measures   · Stable, "feisty" but appears at baseline    Anemia  Assessment & Plan  · Appears to be chronic issue with baseline around 8-9  · Did have blood in archibald bag, now resolved  · Would hold off on transfusion unless Hgb <7   · Monitor closely given supratherapeutic INR initially · Continue iron supplement given FRANCY    Archibald catheter in place prior to arrival  Assessment & Plan  · With archibald catheter and bilat perc nephrostomy tube in the setting of bladder CA as above   · Both producing urine   · Archibald was replaced on day of admission, malpositioned with balloon in urethra, since repositioned   · Continue archibald and nephrostomy care     Stage 3a chronic kidney disease [facility])  Assessment & Plan  Lab Results   Component Value Date    EGFR 62 02/09/2022    EGFR 57 02/08/2022    EGFR 58 02/07/2022    CREATININE 1 10 02/09/2022    CREATININE 1 18 02/08/2022    CREATININE 1 16 02/07/2022     · Stable; appears to be improving from recent DULCE, recent baseline difficult to establish  · Avoid nephrotoxic agents  · Discontinued IVF on 2/5  · Monitor status post IV contrast on 2/6 - remains stable    Atrial fibrillation (HCC)  Assessment & Plan  · Currently rate controlled  · Continue home Cardizem, monitor with routine vitals   · Continue with SQ Heparin for now; Coumadin on hold for IR biopsy    Recent Labs     02/08/22  0511 02/09/22  0428 02/10/22  0459   INR 1 22* 1 16 1 13       COPD, severe (HCC)  Assessment & Plan  · 96% O2 RA; no SOB complaints  · Monitor O2 overnight     Bladder cancer (HCC)  Assessment & Plan  · 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, diagnosed 10/12/2021  · Follows with heme onc and urology as an outpatient  · Receives chemo and radiation; last received on day of admission  · Continue outpatient heme onc and urology f/u upon discharge   · CT c/a/p from 2/6 unfortunately shows possible new right apical pleural-based mass  · IR biopsy pending      Back pain  Assessment & Plan  · Presented complaining of back pain;  · Prescribed Oxycodone 5mg q6h prn as outpatient  · Continue Oxycodone; geriatric dosing  · 2 5 mg every 6 hours as needed for moderate pain  · 5 mg every 6 hours as needed for severe pain  · Voltaren gel to back  · Change Tylenol to 975 mg every 8 hours ATC  · Aqua K  · ID recommending MRI Lumbar/Thoracic Spine to rule out osteomyelitis, ordered    Hypertension  Assessment & Plan  · Blood pressure acceptable with SBP in the 100-130s   · Continue home Cardizem  · Monitor BP per unit protocol    CAD (coronary atherosclerotic disease)  Assessment & Plan  · S/p CABG x4  · Denies chest pain; Troponin 37  · Continue home ASA and statin      VTE Pharmacologic Prophylaxis: VTE Score: 6 High Risk (Score >/= 5) - Pharmacological DVT Prophylaxis Ordered: heparin  Sequential Compression Devices Ordered  Patient Centered Rounds: I performed bedside rounds with nursing staff today  Discussions with Specialists or Other Care Team Provider: Case Management, ID, Oncology    Education and Discussions with Family / Patient: ID updated granddaughter with plan of care  Time Spent for Care: 20 minutes  More than 50% of total time spent on counseling and coordination of care as described above  Current Length of Stay: 6 day(s)  Current Patient Status: Inpatient   Certification Statement: The patient will continue to require additional inpatient hospital stay due to ongoing treatment in setting of sepsis due to pseudomonas;needs PICC line, likely monday  Discharge Plan: Anticipate discharge in >72 hrs to discharge location to be determined pending rehab evaluations  Code Status: Level 1 - Full Code    Subjective:   CC: "I don't have any pain right now "    Patient resting in bed, watching TV  Denies complaints of active pain at this time, reports that Tylenol and Voltaren gel  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  Discussed plan of getting an MRI of his back to rule out osteomyelitis  Patient also refusing to work with OT for cognition evaluation  Otherwise, he is updated on plan of care thus far including PICC line placement once blood cultures are negative x 72 hours  He verbalizes understanding of this      Objective:     Vitals: Temp (24hrs), Av 3 °F (36 8 °C), Min:97 °F (36 1 °C), Max:98 9 °F (37 2 °C)    Temp:  [97 °F (36 1 °C)-98 9 °F (37 2 °C)] 98 6 °F (37 °C)  HR:  [65-82] 82  Resp:  [16-18] 18  BP: ()/() 149/71  SpO2:  [82 %-100 %] 82 %  Body mass index is 27 75 kg/m²  Input and Output Summary (last 24 hours): Intake/Output Summary (Last 24 hours) at 2/10/2022 0836  Last data filed at 2/10/2022 0601  Gross per 24 hour   Intake 940 ml   Output 1975 ml   Net -1035 ml       Physical Exam:   Physical Exam  Vitals and nursing note reviewed  Constitutional:       General: He is not in acute distress  Cardiovascular:      Rate and Rhythm: Normal rate  Pulses: Normal pulses  Heart sounds: Normal heart sounds  Pulmonary:      Effort: Pulmonary effort is normal       Breath sounds: Normal breath sounds  Abdominal:      General: Bowel sounds are normal       Palpations: Abdomen is soft  Musculoskeletal:         General: Normal range of motion  Right lower leg: No edema  Left lower leg: No edema  Skin:     General: Skin is warm and dry  Capillary Refill: Capillary refill takes less than 2 seconds  Neurological:      Mental Status: He is alert and oriented to person, place, and time     Psychiatric:         Mood and Affect: Mood normal           Additional Data:     Labs:  Results from last 7 days   Lab Units 22  1636 22  0428 22  0428 22  0912 22  0705 22  0520 22  1755   WBC Thousand/uL  --   --  4 79   < > 4 01*   < > 17 95*   HEMOGLOBIN g/dL 7 1*   < > 7 0*   < > 6 7*   < > 7 8*   HEMATOCRIT % 25 0*   < > 23 7*   < > 22 6*   < > 26 9*   PLATELETS Thousands/uL  --   --  228   < > 216   < > 317   BANDS PCT %  --   --   --   --   --   --  4   NEUTROS PCT %  --   --   --   --  80*   < >  --    LYMPHS PCT %  --   --   --   --  9*   < >  --    LYMPHO PCT %  --   --   --   --   --   --  2*   MONOS PCT %  --   --   --   --  5   < >  --    MONO PCT %  --   --   --   --   --   --  0*   EOS PCT %  --   --   --   --  3   < > 0    < > = values in this interval not displayed  Results from last 7 days   Lab Units 02/09/22  0428 02/05/22  0719 02/04/22  1755   SODIUM mmol/L 138   < > 127*   POTASSIUM mmol/L 3 9   < > 4 5   CHLORIDE mmol/L 105   < > 95*   CO2 mmol/L 25   < > 26   BUN mg/dL 16   < > 23   CREATININE mg/dL 1 10   < > 1 41*   ANION GAP mmol/L 8   < > 6   CALCIUM mg/dL 8 1*   < > 8 6   ALBUMIN g/dL  --   --  3 0*   TOTAL BILIRUBIN mg/dL  --   --  1 83*   ALK PHOS U/L  --   --  73   ALT U/L  --   --  17   AST U/L  --   --  15   GLUCOSE RANDOM mg/dL 95   < > 139    < > = values in this interval not displayed       Results from last 7 days   Lab Units 02/10/22  0459   INR  1 13     Results from last 7 days   Lab Units 02/09/22  2135 02/09/22  1614   POC GLUCOSE mg/dl 101 107         Results from last 7 days   Lab Units 02/07/22  0705 02/06/22  0453 02/04/22  2034 02/04/22  1755   LACTIC ACID mmol/L  --   --  1 0 2 4*   PROCALCITONIN ng/ml 4 95* 5 32*  --   --        Lines/Drains:  Invasive Devices  Report    Peripheral Intravenous Line            Peripheral IV 02/07/22 Right Antecubital 2 days          Line            Pump Device Continuous ambulatory delivery device Right Chest 9 days          Drain            Urethral Catheter Non-latex 16 Fr  43 days    Percutaneous Nephroureteral Tube (PCNU) Left 1 8 5 Fr 26 Cm 22 days    Percutaneous Nephroureteral Tube (PCNU) Right 2 8 5 Fr 24 Cm 22 days              Urinary Catheter:  Goal for removal: N/A - Chronic Fortune           Telemetry:  Telemetry Orders (From admission, onward)             48 Hour Telemetry Monitoring  Continuous x 48 hours        Expiring   References:    Telemetry Guidelines   Question:  Reason for 48 Hour Telemetry  Answer:  Arrhythmias Requiring Medical Therapy (eg  SVT, Vtach/fib, Bradycardia, Uncontrolled A-fib)                 Telemetry Reviewed: Normal Sinus Rhythm  Indication for Continued Telemetry Use: No indication for continued use  Will discontinue  Imaging: No pertinent imaging reviewed  Recent Cultures (last 7 days):   Results from last 7 days   Lab Units 02/08/22  0504 02/06/22  1000 02/04/22  1755   BLOOD CULTURE  No Growth at 24 hrs  No Growth at 24 hrs  No Growth at 72 hrs   Pseudomonas aeruginosa*  Pseudomonas aeruginosa*   GRAM STAIN RESULT   --  Gram negative rods* Gram negative rods*  Gram negative rods*   URINE CULTURE   --   --  >100,000 cfu/ml Pseudomonas aeruginosa*       Last 24 Hours Medication List:   Current Facility-Administered Medications   Medication Dose Route Frequency Provider Last Rate    acetaminophen  975 mg Oral Q8H [facility] MYLEEN Santoyo      aluminum-magnesium hydroxide-simethicone  30 mL Oral Q4H PRN Regina Baumgarten, CRNP      aspirin  81 mg Oral Daily [place], [address]      atorvastatin  40 mg Oral Daily [place], [place]      bisacodyl  10 mg Rectal Daily PRN MYLENE Horowitz      cefepime  2,000 mg Intravenous Q12H [facility], PA-C 2,000 mg (02/10/22 0550)    Diclofenac Sodium  2 g Topical 4x Daily MYLENE Rawls      diltiazem  240 mg Oral Daily [place], [place]      ferrous sulfate  325 mg Oral BID With Meals Ana Luisa Olivia PA-C      heparin (porcine)  5,000 Units Subcutaneous Q8H [facility] [place], CRNP      lidocaine  1 patch Topical Daily Payal Serrato PA-C      lidocaine (PF)  0 5 mL Infiltration Once PRN Susi Onofre MD      OLANZapine  2 5 mg Intramuscular Once MYLENE Horowitz      ondansetron  4 mg Intravenous Q6H [facility]      oxyCODONE  2 5 mg Oral Q6H PRN MYLENE Rawls      oxyCODONE  5 mg Oral Q6H PRN MYLENE Rawls      pantoprazole  40 mg Oral QAM [facility]      polyethylene glycol  17 g Oral Daily MYLENE Horowitz      senna-docusate sodium  2 tablet Oral BID MYLENE Rice      sodium phosphate-biphosphate  1 enema Rectal Once PRN MYLENE Rogers          Today, Patient Was Seen By: MYLENE Gupta    **Please Note: This note may have been constructed using a voice recognition system  **

## 2022-02-10 NOTE — OCCUPATIONAL THERAPY NOTE
Occupational Therapy Cancellation Note        Patient Name: Rehana Fletcher  [id number] Date: 2/10/2022       02/10/22 0915   OT Last Visit   OT Visit Date 02/10/22   Note Type   Note Type Treatment   Cancel Reasons Other  (Patient refusal)     Chart review performed, RN confirmed pt appropriate for cognitive evaluation  Attempted to see patient to administer cognitive assessment [facility]), began assessment, however patient with increased agitation and irritability, stating "I'm not stupid"  Took pen from therapist and scribbled on paper  Patient stated "I'm offended, I'm going to tell my granddaughter and the doctor about this " OT cognitive evaluation cancelled at this time due to patient refusal and increased patient irritability  Lidia Frey made aware of patient refusal to participate  OT will continue to follow patient and provide skilled intervention as appropriate      Stephania Garza OTR/L

## 2022-02-10 NOTE — ASSESSMENT & PLAN NOTE
· Presented complaining of back pain;  · Prescribed Oxycodone 5mg q6h prn as outpatient  · Continue Oxycodone; geriatric dosing  · 2 5 mg every 6 hours as needed for moderate pain  · 5 mg every 6 hours as needed for severe pain  · Voltaren gel to back  · Change Tylenol to 975 mg every 8 hours ATC  · Edith K  · ID recommending MRI Lumbar/Thoracic Spine to rule out osteomyelitis, ordered

## 2022-02-10 NOTE — PLAN OF CARE
Problem: PAIN - ADULT  Goal: Verbalizes/displays adequate comfort level or baseline comfort level  Description: Interventions:  - Encourage patient to monitor pain and request assistance  - Assess pain using appropriate pain scale  - Administer analgesics based on type and severity of pain and evaluate response  - Implement non-pharmacological measures as appropriate and evaluate response  - Consider cultural and social influences on pain and pain management  - Notify physician/advanced practitioner if interventions unsuccessful or patient reports new pain  Outcome: Progressing     Problem: INFECTION - ADULT  Goal: Absence or prevention of progression during hospitalization  Description: INTERVENTIONS:  - Assess and monitor for signs and symptoms of infection  - Monitor lab/diagnostic results  - Monitor all insertion sites, i e  indwelling lines, tubes, and drains  - Monitor endotracheal if appropriate and nasal secretions for changes in amount and color  - Brandon appropriate cooling/warming therapies per order  - Administer medications as ordered  - Instruct and encourage patient and family to use good hand hygiene technique  - Identify and instruct in appropriate isolation precautions for identified infection/condition  Outcome: Progressing

## 2022-02-10 NOTE — ASSESSMENT & PLAN NOTE
· Brought in by Granddaughter who reported several days of progressive confusion and weakness and was instructed to take patient to ED by infusion staff for these symptoms  · Met sepsis criteria on admission, as evidenced by leukocytosis, hyperthermia  Lactic acid 2 4  · UA suggestive of UTI; urine and blood cultures + for pseudomonas   · Source of sepsis related to right-sided cardiac vegetation/pyelonephritis  · Infectious disease consulted; input as noted below  · Port-A-Cath removed 2/7   · RENEE completed on 2/9; noted to have right-sided vegetation  Patient is not a surgical candidate  · Continue Cefepime 2 gm every 12 hours  · Plan for PICC line once cultures are negative   Will need 6 weeks IV antibiotics  · Repeat blood cultures from 2/8 negative x 24 hrs  · Of note, previously discussed replacement of Port-A-Cath with another Port-A-Cath versus PICC with primary hematologist (Dr Kaley Gonzalez) oncologist and cleared to put in PICC following clearance of blood cultures

## 2022-02-10 NOTE — CONSULTS
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 diagnosed after a urinary specimen cytology showed high grade urothelial carcinoma  · Cystoscopy + TURBT 11/30 confirmedhigh grade muscle invasive (muscularis propria specimen noted) Papillary urothelial carcinoma   · Patient's recommended treatment was 5-FU + Mitomycin + Radiation therapy  Patient is S/P 1 treatment 1/31 with 4 radiation days per pt  · Patient had finding of pseudomonal bacteremia and right sided cardiac vegetation  2 blood cultures showed isolated pseudomonas  Port was in place and since removed  Per ID, patient is going to require prolonged course of abx  There is concern of relapsing infection with further chemotherapy   Plan/Recommendations:  o Attempted to discuss patient's cancer status with him  He deferred for now  He was expressed frustration over hospitalization  Per hospitalization notes & granddaughter, patient does not understand severity of his infections, bladder cancer  o I called patient's POA, granddaughter Federico Sun and explained in detail finding of pleural mass on CT scan and concern for possible malignancy/metastasis from bladder cancer  Given patient's ongoing pseudomonas infection, long term antibiotic need, she does not want patient to receive additional chemotherapy/radiation once he is discharged  I also explained that his treatment plan will likely change if pleural mass is metastasis from bladder cancer  For now, she would like her grandfather to continue antibiotic therapy   If her and her family want patient to be treatment focused regarding bladder cancer in the future, she will call our office to make an appt  Patient does have an appt with Dr Qian Plasencia 22 which he will likely not be able to make  I will update Dr Qian Plasencia regarding patient's case    o IR has already been consulted and a biopsy of pleural mass will likely occur this upcoming Monday  Will follow pathology  Patient understands and is in agreement with this plan  Thank you for the opportunity to participate in this patient's care  Interval History: Patient laying comfortably in bed  Is frustrated at baseline  Does not want to discuss severity of cancer status or infections  However, he does deny CP, SOB, abd pain, hematuria  ECO    History of present illness: This is an 80-year-old male with a past medical history of CAD status post CABG x4, CKD, AFib, COPD, stroke history, bladder cancer history, hypertension, hyperlipidemia who presented to the hospital with altered mental status  He has a history of bladder cancer and is currently on chemotherapy  On presentation to the hospital he could not provide history  Granddaughter stated that he had several days of progressive confusion and weakness and was instructed to bring patient to the ED  patient was admitted for sepsis, AMS, hyponatremia  We are consulted due to patient's bladder cancer and finding on CT of chest  Patient's primary oncologist is Dr Qian Plasencia  Oncology History Per Dr Liberty Finley last note 2022  - 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 diagnosed via urinary specimen 10/12/2021 cytology (showed high grade urothelial carcinoma) and later definitive muscle invasive biopsy of the bladder 2021  Pathology showed papillary urothelial carcinoma, high grade  Muscular propria invasion present  He has been deemed to not be a cystectomy candidate by urology   He is also not a candidate for platinum chemotherapy due to his kidney function       - Patient was proposed cancer therapy 5-FU, Mitomycin (50% dosage reduction due to kidney dysfunction), concurrent radiation therapy  Patient was able to only receive 1 treatment on 1/31/22      - TNM staging: [id number]  Notable Path Features: 11/30/2021 L tumor resection showing high grade muscle invasive (muscularis propria specimen noted) Papillary urothelial carcinoma     - Imaging during hospitalization  - CT chest/abd/pelvis --> pleura showed a new 4 2 x 3 6 cm right apical pleural based mass  Bladder wall thickening and trabeculation with submucosal hyperenhancement was seen likely malignancy and superimposed infection related  - 2/10/22: WBC 5 08, Hgb 7 7g/dL, MCV 78, PLT 230K  Diff 2/7 showed abs lymphocytes 0 34  otherwise unrevealing  BMP unrevealing     - 2/4/22: last CMP --> hyponatremia 127, chloride 95, Cr 1 41, Bili 1 83  During this hospitalization, Port was taken out due to infections  Review of Systems:   Review of Systems   Constitutional: Negative for activity change, appetite change, chills, diaphoresis, fatigue, fever and unexpected weight change  HENT: Negative for mouth sores and nosebleeds  Respiratory: Negative for apnea, cough, chest tightness and shortness of breath  Cardiovascular: Negative for chest pain, palpitations and leg swelling  Gastrointestinal: Negative for abdominal distention, abdominal pain, anal bleeding, blood in stool, constipation, diarrhea, nausea and vomiting  Endocrine: Negative for cold intolerance  Genitourinary: Negative for hematuria  Skin: Negative for color change and pallor  Neurological: Negative for dizziness, weakness, light-headedness and headaches  Hematological: Negative for adenopathy  Does not bruise/bleed easily           PHYSICAL EXAM:    /61 (BP Location: Right arm)   Pulse 84   Temp 98 3 °F (36 8 °C) (Oral)   Resp 18   Ht 5' 6" (1 676 m)   Wt 78 kg (171 lb 15 3 oz)   SpO2 98%   BMI 27 75 kg/m²     Physical Exam  Constitutional:       General: He is not in acute distress  Appearance: Normal appearance  He is normal weight  He is not ill-appearing, toxic-appearing or diaphoretic  HENT:      Head: Normocephalic and atraumatic  Eyes:      General: No scleral icterus  Extraocular Movements: Extraocular movements intact  Conjunctiva/sclera: Conjunctivae normal    Cardiovascular:      Rate and Rhythm: Normal rate and regular rhythm  Heart sounds: Normal heart sounds  Pulmonary:      Effort: Pulmonary effort is normal  No respiratory distress  Breath sounds: Normal breath sounds  Abdominal:      General: Bowel sounds are normal       Tenderness: There is no abdominal tenderness  Musculoskeletal:      Cervical back: Normal range of motion and neck supple  Right lower leg: No edema  Left lower leg: No edema  Lymphadenopathy:      Cervical: No cervical adenopathy  Skin:     General: Skin is warm and dry  Coloration: Skin is not jaundiced or pale  Findings: No bruising, erythema, lesion or rash  Neurological:      General: No focal deficit present  Mental Status: He is alert and oriented to person, place, and time  Mental status is at baseline  Motor: No weakness  Psychiatric:         Mood and Affect: Mood normal          Behavior: Behavior normal          Thought Content:  Thought content normal          Judgment: Judgment normal          LABS:     Recent Results (from the past 48 hour(s))   Protime-INR    Collection Time: 02/09/22  4:28 AM   Result Value Ref Range    Protime 14 4 11 6 - 14 5 seconds    INR 1 16 0 84 - 1 19   CBC    Collection Time: 02/09/22  4:28 AM   Result Value Ref Range    WBC 4 79 4 31 - 10 16 Thousand/uL    RBC 3 11 (L) 3 88 - 5 62 Million/uL    Hemoglobin 7 0 (L) 12 0 - 17 0 g/dL    Hematocrit 23 7 (L) 36 5 - 49 3 %    MCV 76 (L) 82 - 98 fL    MCH 22 5 (L) 26 8 - 34 3 pg    MCHC 29 5 (L) 31 4 - 37 4 g/dL    RDW 22 5 (H) 11 6 - 15 1 %    Platelets 242 222 - 404 Thousands/uL    MPV 9 8 8 9 - 12 7 fL   Basic metabolic panel    Collection Time: 02/09/22  4:28 AM   Result Value Ref Range    Sodium 138 136 - 145 mmol/L    Potassium 3 9 3 5 - 5 3 mmol/L    Chloride 105 100 - 108 mmol/L    CO2 25 21 - 32 mmol/L    ANION GAP 8 4 - 13 mmol/L    BUN 16 5 - 25 mg/dL    Creatinine 1 10 0 60 - 1 30 mg/dL    Glucose 95 65 - 140 mg/dL    Calcium 8 1 (L) 8 3 - 10 1 mg/dL    eGFR 62 ml/min/1 73sq m   RENEE    Collection Time: 02/09/22 10:33 AM   Result Value Ref Range    LV EF 60    Fingerstick Glucose (POCT)    Collection Time: 02/09/22  4:14 PM   Result Value Ref Range    POC Glucose 107 65 - 140 mg/dl   Hemoglobin and hematocrit, blood    Collection Time: 02/09/22  4:36 PM   Result Value Ref Range    Hemoglobin 7 1 (L) 12 0 - 17 0 g/dL    Hematocrit 25 0 (L) 36 5 - 49 3 %   Fingerstick Glucose (POCT)    Collection Time: 02/09/22  9:35 PM   Result Value Ref Range    POC Glucose 101 65 - 140 mg/dl   Blood culture    Collection Time: 02/10/22  4:53 AM    Specimen: Arm, Left; Blood   Result Value Ref Range    Blood Culture Received in Microbiology Lab  Culture in Progress  Blood culture    Collection Time: 02/10/22  4:53 AM    Specimen: Arm, Right; Blood   Result Value Ref Range    Blood Culture Received in Microbiology Lab  Culture in Progress      Protime-INR    Collection Time: 02/10/22  4:59 AM   Result Value Ref Range    Protime 14 0 11 6 - 14 5 seconds    INR 1 13 0 84 - 1 19   CBC and Platelet    Collection Time: 02/10/22  9:00 AM   Result Value Ref Range    WBC 5 08 4 31 - 10 16 Thousand/uL    RBC 3 41 (L) 3 88 - 5 62 Million/uL    Hemoglobin 7 7 (L) 12 0 - 17 0 g/dL    Hematocrit 26 5 (L) 36 5 - 49 3 %    MCV 78 (L) 82 - 98 fL    MCH 22 6 (L) 26 8 - 34 3 pg    MCHC 29 1 (L) 31 4 - 37 4 g/dL    RDW 24 3 (H) 11 6 - 15 1 %    Platelets 000 416 - 300 Thousands/uL    MPV 9 2 8 9 - 12 7 fL       CT abdomen pelvis wo contrast    Result Date: 1/16/2022  Narrative: CT ABDOMEN AND PELVIS WITHOUT IV CONTRAST INDICATION:   Bladder-neck obstruction looking for obstruction in the urinary tract causing DULCE  pt has archibald and bladder ca  COMPARISON:  12/29/2021  TECHNIQUE:  CT examination of the abdomen and pelvis was performed without intravenous contrast   Axial, sagittal, and coronal 2D reformatted images were created from the source data and submitted for interpretation  Radiation dose length product (DLP) for this visit:  674 mGy-cm   This examination, like all CT scans performed in the [facility], was performed utilizing techniques to minimize radiation dose exposure, including the use of iterative reconstruction and automated exposure control  Enteric contrast was not administered  FINDINGS: ABDOMEN LOWER CHEST:  Small bilateral pleural effusions, new since the prior study  Large hiatal hernia  LIVER/BILIARY TREE:  Stable tiny hepatic cysts and subcentimeter hepatic hypodensities too small to accurately characterize  No suspicious solid hepatic lesion is identified  Hepatic contours are normal   No biliary dilatation  GALLBLADDER:  There are gallstone(s) within the gallbladder, without pericholecystic inflammatory changes  SPLEEN:  Unremarkable  PANCREAS:  Unremarkable  ADRENAL GLANDS:  Unremarkable  KIDNEYS/URETERS:  No significant change in moderate left and mild right hydroureteronephrosis up to level of the bladder  There is increased bilateral perinephric and periureteric inflammation when compared to the prior study  Bilateral ureteral wall thickening noted  Stable left renal exophytic cyst  STOMACH AND BOWEL:  Fecalization of small bowel contents consistent with slow transit  No bowel obstruction  APPENDIX:  No findings to suggest appendicitis  ABDOMINOPELVIC CAVITY:  No ascites  No pneumoperitoneum  Stable subcentimeter mesenteric, retroperitoneal, and pelvic lymph nodes   VESSELS: Atherosclerotic changes are present  Fusiform ectasia of the infrarenal abdominal aorta extending to the common iliac bifurcation as before  PELVIS REPRODUCTIVE ORGANS:  Unremarkable for patient's age  URINARY BLADDER:  Decompressed with a Fortune catheter in place  Small volume of nondependent intravesical air  Postoperative changes with contour deformity along the posterior left lateral wall again seen  There is now diffuse bladder wall thickening with extensive perivesical fat stranding  ABDOMINAL WALL/INGUINAL REGIONS:  Surgical changes of prior bilateral inguinal hernia repair  Recurrent prominent fat-containing left inguinal hernia is reidentified  OSSEOUS STRUCTURES:  No acute fracture or destructive osseous lesion  Impression: 1  Decompressed urinary bladder with Fortune catheter in place  There is now diffuse wall thickening of the urinary bladder with extensive perivesical fat stranding compatible with cystitis  Small volume of nondependent intravesical air may be related to  instrumentation or infection  2   No significant change in moderate left and mild right hydroureteronephrosis  There is increased perinephric and periureteric inflammation bilaterally, likely on the basis of infection  3   Large hiatal hernia  4   New small bilateral pleural effusions  The study was marked in [facility] for immediate notification  Workstation performed: [id number]     XR chest 2 views    Result Date: 1/16/2022  Narrative: CHEST INDICATION:   sob   COMPARISON:  December 30, 2021 CT from January 15, 2022 EXAM PERFORMED/VIEWS:  XR CHEST PA & LATERAL Images: 3 FINDINGS:  Right-sided central venous line with tip in the SVC Heart and mediastinum remain unchanged Bilateral apical scarring and retraction of the nani as seen on the previous study No new consolidation seen Bilateral effusion seen with pleural thickening as seen on the previous study Hiatal hernia seen Left base is obscured, unchanged due to prominent cardiophrenic angle fat Postsurgical changes from prior median sternotomy     Impression: No new consolidation Blunting of the both CP angle due to small effusion as seen on the recent CT Hiatal hernia Workstation performed: [id number]     CT head without contrast    Result Date: 2/4/2022  Narrative: CT BRAIN - WITHOUT CONTRAST INDICATION:   Delirium confusion  COMPARISON:  CT head without contrast September 22, 2021  TECHNIQUE:  CT examination of the brain was performed  In addition to axial images, sagittal and coronal 2D reformatted images were created and submitted for interpretation  Radiation dose length product (DLP) for this visit:  23-14-20-09 mGy-cm   This examination, like all CT scans performed in the [facility], was performed utilizing techniques to minimize radiation dose exposure, including the use of iterative reconstruction and automated exposure control  IMAGE QUALITY:  Diagnostic  FINDINGS: PARENCHYMA: Decreased attenuation is noted in periventricular white matter demonstrating an appearance that is statistically most likely to represent mild microangiopathic change  No CT signs of acute infarction  No intracranial mass, mass effect or midline shift  No acute parenchymal hemorrhage  Age-appropriate cerebral volume loss  Arterial calcifications of carotid siphons and bilateral intradural vertebral arteries (right worse than left)  VENTRICLES AND EXTRA-AXIAL SPACES:  Normal for the patient's age  VISUALIZED ORBITS AND PARANASAL SINUSES:  Bilateral scleral calcifications  Moderate-to-severe mucosal thickening in left maxillary sinus with high-density internal material which may represent chronic allergic fungal elements or inspissated material, similar to prior exam   Mild mucosal thickening in bilateral ethmoid and right sphenoid sinuses  CALVARIUM AND EXTRACRANIAL SOFT TISSUES:  Normal      Impression: No acute intracranial abnormality  Mild chronic microangiopathy  Moderate-to-severe left maxillary sinus disease with chronic allergic fungal elements or inspissated material, similar to prior exam  The study was marked in [employer] for immediate notification  Workstation performed: [id number]     IR nephrostomy tube placement    Result Date: 1/18/2022  Narrative: PROCEDURE: Image-guided percutaneous bilateral nephroureteral catheter placement STAFF: DHIRAJ Charlton  CONTRAST: 40 mL Omnipaque into collecting system  FLUOROSCOPY TIME: 6 3 minutes  NUMBER OF IMAGES: Multiple  COMPLICATIONS: None  MEDICATIONS: Sedation was provided in the form of monitored anesthesia care by the Anesthesia service  Please see their associated records  Antibiotics: Levofloxacin 500 mg IV  INDICATION: Bladder cancer with bilateral obstructive nephropathy  PROCEDURE: Using ultrasound guidance, a 22 gauge needle was inserted into a left posterior peripheral calyx, and an antegrade pyelogram was performed  An AccuStick set was then advanced into the renal pelvis  A wire and catheter were then used to extend through the distal ureter into the urinary bladder  Over the wire, an 8 5-[place] citizen by 26 cm nephroureteral catheter was extended  Post placement nephrostogram was performed  Using ultrasound guidance, a 22 gauge needle was inserted into a right posterior peripheral calyx, and an antegrade pyelogram was performed  An AccuStick set was then advanced into the renal pelvis  A wire and catheter were then used to extend through the distal ureter into the urinary bladder  Over the wire, an 8 5-[place] citizen by 24 cm nephroureteral catheter was extended  Post placement nephrostogram was performed  FINDINGS: 1  Initial ultrasound showed bilateral renal collecting systems to be dilated  2   Final fluoroscopic image showed good positioning of both nephroureteral catheters, with the distal [place] loops in the urinary bladder and the proximal [place] loops in the renal pelvis       Impression: Bilateral nephroureteral catheter placement  PLAN: The catheters were left to gravity drainage  It can be capped tomorrow  It should be flushed daily  Workstation performed: [id number]     CT chest abdomen pelvis w contrast    Result Date: 2/7/2022  Narrative: CT CHEST, ABDOMEN AND PELVIS WITH IV CONTRAST INDICATION:   infectious source investigation  COMPARISON:  CT abdomen and pelvis 1/15/2022, CT chest 7/7/2020 TECHNIQUE: CT examination of the chest, abdomen and pelvis was performed  Axial, sagittal, and coronal 2D reformatted images were created from the source data and submitted for interpretation  Radiation dose length product (DLP) for this visit:  826 mGy-cm   This examination, like all CT scans performed in the [facility], was performed utilizing techniques to minimize radiation dose exposure, including the use of iterative reconstruction and automated exposure control  IV Contrast:  100 mL of iohexol (OMNIPAQUE) Enteric Contrast: Enteric contrast was administered  FINDINGS: CHEST LUNGS:  Emphysema and left upper lobe scarring is stable  PLEURA:  There is a new 4 2 x 3 6 cm right apical pleural-based mass  HEART/GREAT VESSELS: Heart is unremarkable for patient's age  No thoracic aortic aneurysm  MEDIASTINUM AND JOSE ARMANDO:  Unremarkable  CHEST WALL AND LOWER NECK:   Right port catheter tip is within the superior cavoatrial junction  ABDOMEN LIVER/BILIARY TREE:  Scattered cyst and hypoattenuating foci that are too small to characterize but likely represents cysts  are again seen GALLBLADDER:  There are gallstone(s) within the gallbladder, without pericholecystic inflammatory changes  SPLEEN:  Unremarkable  PANCREAS:  Unremarkable  ADRENAL GLANDS:  Unremarkable  KIDNEYS/URETERS:  Bilateral percutaneous nephrostomy catheters are in place  There are wedge-shaped areas of hypoattenuation within the kidneys, suspicious for pyelonephritis  Scattered cyst are also seen  No hydronephrosis   STOMACH AND BOWEL:  Large hiatal hernia is present  Diverticulosis without acute diverticulitis is present  APPENDIX:  A normal appendix was visualized  ABDOMINOPELVIC CAVITY:  No ascites  No pneumoperitoneum  No lymphadenopathy  VESSELS:  Unremarkable for patient's age  PELVIS REPRODUCTIVE ORGANS:  Unremarkable for patient's age  URINARY BLADDER:  The balloon of the Fortune catheter is at the base of the penile urethra  Bladder wall thickening and trabeculation with submucosal hyperenhancement is present, which may be related to combination of known malignancy as well as superimposed infection  ABDOMINAL WALL/INGUINAL REGIONS:  Mesh from ventral hernia repair is again noted  OSSEOUS STRUCTURES:  No acute fracture or destructive osseous lesion  Impression: 1  New right apical pleural-based mass  Recommend either PET/CT or tissue sampling for further workup  2   Wedge-shaped areas of hypoattenuation within the kidneys, suspicious for pyelonephritis given clinical scenario  No hydronephrosis  3   Fortune catheter balloon at the base of the penile urethra  Correlation for appropriate function recommended  4   Bladder wall thickening and trabeculation with submucosal hyperenhancement, likely related to combination of known malignancy and superimposed infection  I personally discussed this study with Earnestine Alberts on 2/7/2022 at 1:10 PM  Workstation performed: [id number]     IR port placement    Result Date: 1/14/2022  Narrative: PROCEDURE: 1  Ultrasound guided access of the right internal jugular vein 2  Chest port placement FLUOROSCOPY TIME: 39 seconds RADIATION DOSE: 3 mGy NUMBER OF IMAGES: 1 COMPLICATIONS: None  ANESTHESIA/ANALGESIA: Conscious sedation  Moderate sedation was monitored by radiology nursing staff  Monitoring of conscious sedation time totaled 30 minutes  INDICATION: C67 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 PROCEDURE: Risks, benefits and alternatives were explained to the patient   Questions were answered  Written consent was documented  The patient was brought to the interventional radiology suite and identified verbally and by wristband  The patient was placed supine on the table, and right neck and upper chest was prepped and draped in the usual sterile fashion  All elements of maximal sterile barrier technique were followed (cap, mask, sterile gown, sterile gloves, large sterile sheet, hand hygiene, and 2% chlorhexidine for cutaneous antisepsis)  Using real-time ultrasound guidance, the right internal jugular vein was accessed and a peel-away sheath was placed  Ultrasound images were saved  A subcutaneous pocket was then created using blunt dissection  A PowerPort was flushed with normal saline  The port was placed within the pocket and the catheter was then advanced under fluoroscopic guidance through the peel-away sheath to the right atrium  The catheter was attached firmly to the port  The port was accessed and aspirated freely  Saline injection was then performed and there was no leakage  The sponge count was reconciled  The port was sutured in the pocket using 2 3-0 Vicryl sutures  The incision was closed using 3-0 Vicryl interrupted suture followed by 4-0 Monocryl subarticular suture and skin glue  The port may be used immediately  FINDINGS: 1   Real-time ultrasound showed an anechoic and freely compressible right internal jugular vein  2   Final fluoroscopic image shows the chest port to be in good position  Impression: Chest port placement  Workstation performed: [id number]     IR port Removal    Result Date: 2/7/2022  Narrative: PROCEDURE: Chest port removal STAFF: HDIRAJ Gomes  FLUOROSCOPY TIME: 0 1 minutes  NUMBER OF IMAGES: 2  COMPLICATIONS: None  MEDICATIONS: Versed 1 milligrams iv  Fentanyl 50 micrograms iv  Antibiotics: The patient was already receiving antibiotics, with a current dose  Moderate sedation was monitored by radiology nursing staff    Monitoring of conscious sedation time totaled 15 minutes  INDICATION: Pseudomonas bacteremia  PROCEDURE: Through a small incision, the port was dissected free and the port and attached catheter were removed in their entirety  The sponge count was reconciled  The incision was closed in layers using absorbable suture and surgical glue  The port and catheter tip were sent for culture  Impression: Chest port removal  Workstation performed: [id number]     Echo follow up/limited w/ contrast if indicated    Result Date: 2/7/2022  Narrative: Roman Joseph  Left Ventricle: Left ventricular cavity size is normal  The left ventricular ejection fraction is 55%  Systolic function is normal    Left Ventricle: Diastolic function is mildly abnormal, consistent with grade I (abnormal) relaxation    Right Atrium: There is a possible small, pedunculated, highly mobile mass  Clinical correlation recommended   Aortic Valve: The leaflets are moderately thickened  The leaflets are severely calcified  The leaflets exhibit normal mobility  A vegetation cannot be ruled out  Recommend RENEE, if clinically indicated    Mitral Valve: There is a possible moderately sized, mobile vegetation present  No significant regurgitatin  Recommend RENEE, if clinicaaly indicated     RENEE    Result Date: 2/9/2022  Narrative: Roman Joseph  Left Ventricle: Left ventricular cavity size is normal  The left ventricular ejection fraction is 60%  Systolic function is normal    Aortic Valve: The aortic valve is trileaflet  The leaflets are mildly calcified  No vegetation    Mitral Valve: There is mild annular calcification  There is mild regurgitation    Tricuspid Valve: Tricuspid valve structure is normal  There is an echo density measuring 1 0X1 0 cm with independant motion likely c/w vegetation  This is likely attached to the moderator band  Not sure it is attached to the TV leaflet as well    Aorta: The aortic root is normal in size  Moderate atherosclerosis of the descending thoacic aorta  HISTORY:    Past Medical History:   Diagnosis Date    A-fib [facility])     Arthritis     Benign prostatic hyperplasia without lower urinary tract symptoms     without Urinary Obstruction    Bladder cancer (HCC)     CAD (coronary artery disease)     Cancer (HCC)     Chronic obstructive lung disease (HCC)     Constipation 12/9/2021    Coronary arteriosclerosis     Depression     Emphysema lung (HCC)     GERD (gastroesophageal reflux disease)     Hyperlipidemia     Hypertension     Hyponatremia 1/15/2022    Irregular heart beat     Myocardial infarction (HCC)     Psoriasis     Requires supplemental oxygen     at bedtime during high humid days only    Stroke [facility])     TIA 1/2018       Past Surgical History:   Procedure Laterality Date    COLONOSCOPY      CORONARY ANGIOPLASTY  02/03/2001    PTCA of RCA    CORONARY ARTERY BYPASS GRAFT  02/07/2001    x4- [place]    HERNIA REPAIR      IR NEPHROSTOMY TUBE PLACEMENT  1/18/2022    IR PORT PLACEMENT  1/14/2022    IR PORT REMOVAL  2/7/2022    OR BRONCHOSCOPY,DIAGNOSTIC Left 1/7/2019    Procedure: BRONCHOSCOPY FLEXIBLE;  Surgeon: Bhanu Stover MD;  [facility];   Service: Pulmonary    OR CYSTOURETHROSCOPY,FULGUR <0 5 CM LESN N/A 11/30/2021    Procedure: TRANSURETHRAL RESECTION OF BLADDER TUMOR (TURBT) with "large";  Surgeon: Fermín Love MD;  [facility];  Service: Urology    OR CYSTOURETHROSCOPY,URETER CATHETER Bilateral 11/30/2021    Procedure: Fabienne Lime;  Surgeon: Fermín Love MD;  [facility];  Service: Urology    OR Randy Jory INCIS Left 2/20/2018    Procedure: ENDARTERECTOMY ARTERY CAROTID WITH PATCH ANGIOPLASTY;  Surgeon: Flakita Gaines MD;  [facility];  Service: Vascular    TRANSURETHRAL RESECTION OF PROSTATE         Family History   Problem Relation Age of Onset    Lung cancer Mother     Cancer Mother     Other Father         sepsis       Social History     Socioeconomic History    Marital status:      Spouse name: None    Number of children: 3    Years of education: None    Highest education level: None   Occupational History    Occupation: retired    Tobacco Use    Smoking status: Former Smoker     Years: 1 00     Types: Cigarettes    Smokeless tobacco: Never Used    Tobacco comment: few cigarettes when playing cards  Vaping Use    Vaping Use: Never used   Substance and Sexual Activity    Alcohol use: Yes     Comment: special occasions only wine   Drug use: No    Sexual activity: Yes   Other Topics Concern    None   Social History Narrative    Lives with granddaughter and daughter     Caffeine use, He admits to consuming caffeine VIA coffee ( 8 servings per day), per [employer]    Martial History: Currently , per [employer]    Occupation: Retired (prior occupational:  repairman), per [facility]      Social Determinants of Health     Financial Resource Strain: Not on file   Food Insecurity: No Food Insecurity    Worried About [address] in the Last Year: Never true    [address] in the Last Year: Never true   Transportation Needs: No Transportation Needs    Lack of Transportation (Medical): No    Lack of Transportation (Non-Medical):  No   Physical Activity: Not on file   Stress: Not on file   Social Connections: Not on file   Intimate Partner Violence: Not on file   Housing Stability: Low Risk     Unable to Pay for Housing in the Last Year: No    Number of Places Lived in the Last Year: 1    Unstable Housing in the Last Year: No         Current Facility-Administered Medications:     acetaminophen (TYLENOL) tablet 975 mg, 975 mg, Oral, Q8H [facility], MYLENE Rodriguez, 975 mg at 02/09/22 2229    aluminum-magnesium hydroxide-simethicone (MYLANTA) oral suspension 30 mL, 30 mL, Oral, Q4H Veronica CABALLERO CRNP, 30 mL at 02/05/22 1832    aspirin (ECOTRIN LOW STRENGTH) EC tablet 81 mg, 81 mg, Oral, Daily, MYLENE Cervantes, 81 mg at 02/09/22 1617    atorvastatin (LIPITOR) tablet 40 mg, 40 mg, Oral, Daily, Devonte Alcaraz PA-C, 40 mg at 02/10/22 2381    bisacodyl (DULCOLAX) rectal suppository 10 mg, 10 mg, Rectal, Daily PRN, MYLENE Boyer    cefepime (MAXIPIME) 2 g/50 mL dextrose IVPB, 2,000 mg, Intravenous, Q12H, Devonte Alcaraz PA-C, Last Rate: 100 mL/hr at 02/10/22 0550, 2,000 mg at 02/10/22 0550    Diclofenac Sodium (VOLTAREN) 1 % topical gel 2 g, 2 g, Topical, 4x Daily, MYLENE Yanez, 2 g at 02/10/22 5491    diltiazem (CARDIZEM CD) 24 hr capsule 240 mg, 240 mg, Oral, Daily, Devonte Alcaraz PA-C, 240 mg at 02/10/22 6557    ferrous sulfate tablet 325 mg, 325 mg, Oral, BID With Meals, Radha Hanson PA-C, 325 mg at 02/09/22 1617    heparin (porcine) subcutaneous injection 5,000 Units, 5,000 Units, Subcutaneous, Q8H [address], MYLENE Boyer, 5,000 Units at 02/10/22 0554    lidocaine (LIDODERM) 5 % patch 1 patch, 1 patch, Topical, Daily, Payal Serrato PA-C, 1 patch at 02/10/22 0933    lidocaine (PF) (XYLOCAINE-MPF) 1 % injection 0 5 mL, 0 5 mL, Infiltration, Once PRN, Sam Felix MD    OLANZapine (ZyPREXA) IM injection 2 5 mg, 2 5 mg, Intramuscular, Once, MYLENE Leung    [facility]ron [facility]) injection 4 mg, 4 mg, Intravenous, Q6H PRN, Ron Disla PA-C    oxyCODONE (ROXICODONE) IR tablet 2 5 mg, 2 5 mg, Oral, Q6H PRN, MYLENE Yanez    oxyCODONE (ROXICODONE) IR tablet 5 mg, 5 mg, Oral, Q6H PRN, MYLENE Yanez, 5 mg at 02/09/22 1309    pantoprazole (PROTONIX) EC tablet 40 mg, 40 mg, Oral, QAM, Devonte Alcaraz PA-C, 40 mg at 02/10/22 0931    polyethylene glycol (MIRALAX) packet 17 g, 17 g, Oral, Daily, MYLENE Boyer, 17 g at 02/08/22 0901    senna-docusate sodium (SENOKOT S) 8 6-50 mg per tablet 2 tablet, 2 tablet, Oral, BID, MYLENE Boyer, 2 tablet at 02/09/22 1748    sodium phosphate-biphosphate (FLEET) enema 1 enema, 1 enema, Rectal, Once PRN, MYLENE Boyer    Medications Prior to Admission   Medication    ALPRAZolam (XANAX) 0 25 mg tablet    aspirin 81 mg chewable tablet    atorvastatin (LIPITOR) 40 mg tablet    bisacodyl (DULCOLAX) 10 mg suppository    diltiazem (CARDIZEM CD) 240 mg 24 hr capsule    magnesium citrate (CITROMA) 1 745 g/30 mL oral solution    ondansetron (Zofran ODT) 8 mg disintegrating tablet    oxyCODONE (Roxicodone) 5 immediate release tablet    pantoprazole (PROTONIX) 40 mg tablet    warfarin (COUMADIN) 5 mg tablet    diphenhydrAMINE (BENADRYL) 12 5 MG chewable tablet    [] fluorouracil 4,900 mg in CADD infusion pump    naloxone (NARCAN) 4 mg/0 1 mL nasal spray    polyethylene glycol (GOLYTELY) 4000 mL solution    sodium chloride 0 9 % SOLN    sodium chloride, PF, 0 9 %    sodium chloride, PF, 0 9 %       Allergies   Allergen Reactions    Penicillins Swelling and Itching       Labs and pertinent reports reviewed  This note has been generated by voice recognition software system  Therefore, there may be spelling, grammar, and or syntax errors  Please contact if questions arise

## 2022-02-10 NOTE — ASSESSMENT & PLAN NOTE
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

## 2022-02-10 NOTE — PROGRESS NOTES
Progress Note - Infectious Disease   Sp Rg 80 y o  male MRN: [id number]  Unit/Bed#: -01 Encounter: [id number]      Impression/Plan:  1  Sepsis, POA   Patient met sepsis criteria on admission with fever, tachycardia and leukocytosis   Sources are 2 and 3  No other obvious sources on exam   Clinical parameters improved  Antibiotics as below  Continue to trend fever curve/vitals  Repeat CBC/chemistry tomorrow  Follow-up repeat blood cultures for clearance  Additional supportive care as per primary  Additional interventions pending clinical course     2  Pseudomonal bacteremia and right-sided cardiac vegetation   Two of 2 blood cultures from admission have isolated Pseudomonas   I suspect that the source is primarily problem 3  Patient had recent catheter exchange but was also receiving chemotherapy which may have led to his bacteremia   Patient has a port in place but no other intravascular devices   Port removed with IR on 02/07 and cultures negative   Repeat blood cultures with delayed growth  Susceptibilities reviewed   Patient mentions lower back pain   Family would like to avoid fluoroquinolones per prior discussion  Patient will now require prolonged course of antibiotic and there is concern for recurrent/relapsing of disease with further chemotherapy  Patient is not surgical candidate per Cardiology  Family agreeable to palliative care evaluations     Continue cefepime 2 g every 12 hours, renally dose adjusted  Continue to trend fever curve/vitals  Repeat CBC/chemistry tomorrow  Follow-up pending blood cultures for clearance  Maintain urinary catheters  Cardiology follow-up as outpatient and surveillance echos post treatment  PICC line placement on Monday, assuming repeat cultures remain negative  Obtain MRI T and L-spine with contrast  Pleural biopsy as per primary  Will arrange follow-up/scripts closer to discharge   Recommend palliative care evaluation and goals of care discussions  Additional interventions pending clinical course        3  Pyelonephritis with Pseudomonas   Likely source of the above   Recently underwent [place] catheter exchange 2 days prior to admission and reportedly had hematuria on admission  Urine culture also with Pseudomonas   CT reviewed and catheter repositioned and images concerning for pyelonephritis  Continue antibiotics as above  Continue to trend fever curve/vitals  Maintain current catheter  Recommend follow-up with Urology as outpatient     4  Acute encephalopathy and hyponatremia   Acutely confused on admission which I suspect is multifactorial given issues above   Sodium improving   Mental status also seems to be closer to baseline   CT head unremarkable  Monitor mental status closely  Antibiotics as above  Low threshold for repeat imaging  Continue to trend fever curve/WBC  Consider neuropsych evaluation     5  Chronic kidney disease stage 3  Creatinine appeared to be close to prior baseline on admission   This does however affect antibiotic dosing  Cefepime dosing as above  Will further dose adjust antibiotic as needed  Fluid hydration as per primary  Will avoid nephrotoxic agent  Repeat chemistry tomorrow     6  Invasive bladder cancer with chronic port   Patient recently on chemotherapy and has multiple catheters in place along with port   Course complicated now by the above   Port removed on 2/7  Antibiotics as above  Consider Oncology evaluation  Recommend palliative care evaluation as above  Continue to trend fever curve/WBC     7   AFib with elevated INR   Ongoing anticoagulation and care as per primary      Above plan discussed in detail with primary service and patient's granddaughter  Unable to have full conversation of the above plan with patient at bedside      ID consult service will continue to follow      Antibiotics:  Cefepime 7    Subjective:  Patient denied having any nausea, vomiting, chest pain    I attempted to discuss with the patient his cardiac findings  He is frustrated that we are not letting him go home  I made attempts at trying to discuss patient's current imaging, course of antibiotics as well as lung findings which are all concerning  The patient unfortunately becomes easily agitated and will start yelling and it is unclear how much he truly understands of what is going on  He refused biopsy which I let the primary service now  I had a discussion with the patient's granddaughter over the phone earlier today  Given the above multiple complications, she agrees that further chemotherapy in the future may not be in the patient's best interest   With prolonged antibiotic she is interested in rehab  She is also considering a more quality focus path of care  She requested that a  see her grandfather every day which I mentioned to the primary service  She is also agreeable to palliative care evaluation, she will update her mother, and she believes conversations about goals of care would be best with family present  Again updated primary service of all of this  Objective:  Vitals:  Temp:  [97 8 °F (36 6 °C)-98 9 °F (37 2 °C)] 98 3 °F (36 8 °C)  HR:  [65-84] 84  Resp:  [16-18] 18  BP: ()/(53-71) 124/61  SpO2:  [82 %-98 %] 98 %  Temp (24hrs), Av 5 °F (36 9 °C), Min:97 8 °F (36 6 °C), Max:98 9 °F (37 2 °C)  Current: Temperature: 98 3 °F (36 8 °C)    Physical Exam:   General Appearance:  Alert, interactive, nontoxic, no acute distress  Patient becomes very agitated and will yell over provider often  Throat: Oropharynx moist without lesions  Lungs:   Clear to auscultation bilaterally; no wheezes, rhonchi or rales; respirations unlabored on room air   Heart:  RRR; no murmur, rub or gallop appreciated   Abdomen:   Soft, non-tender, non-distended, positive bowel sounds  Extremities: No clubbing, cyanosis or edema   Skin: No new rashes or lesions  No new draining wounds noted         Labs, Imaging, & Other studies:   All pertinent labs and imaging studies were personally reviewed  Results from last 7 days   Lab Units 02/10/22  0900 02/09/22  1636 02/09/22  0428 02/08/22  0751 02/08/22  0751   WBC Thousand/uL 5 08  --  4 79  --  4 93   HEMOGLOBIN g/dL 7 7* 7 1* 7 0*   < > 8 3*   PLATELETS Thousands/uL 230  --  228  --  263    < > = values in this interval not displayed  Results from last 7 days   Lab Units 02/09/22  0428 02/05/22  0719 02/04/22  1755   POTASSIUM mmol/L 3 9   < > 4 5   CHLORIDE mmol/L 105   < > 95*   CO2 mmol/L 25   < > 26   BUN mg/dL 16   < > 23   CREATININE mg/dL 1 10   < > 1 41*   EGFR ml/min/1 73sq m 62   < > 46   CALCIUM mg/dL 8 1*   < > 8 6   AST U/L  --   --  15   ALT U/L  --   --  17   ALK PHOS U/L  --   --  73    < > = values in this interval not displayed  Results from last 7 days   Lab Units 02/10/22  0453 02/08/22  0504 02/06/22  1000 02/04/22  1755   BLOOD CULTURE  Received in Microbiology Lab  Culture in Progress  Received in Microbiology Lab  Culture in Progress  No Growth at 48 hrs  No Growth at 48 hrs  Pseudomonas aeruginosa*  No Growth at 72 hrs   Pseudomonas aeruginosa*  Pseudomonas aeruginosa*   GRAM STAIN RESULT   --   --  Gram negative rods* Gram negative rods*  Gram negative rods*   URINE CULTURE   --   --   --  >100,000 cfu/ml Pseudomonas aeruginosa*

## 2022-02-11 ENCOUNTER — APPOINTMENT (INPATIENT)
Dept: MRI IMAGING | Facility: HOSPITAL | Age: 82
DRG: 871 | End: 2022-02-11
Payer: MEDICARE

## 2022-02-11 ENCOUNTER — APPOINTMENT (OUTPATIENT)
Dept: RADIATION ONCOLOGY | Facility: CLINIC | Age: 82
End: 2022-02-11
Attending: RADIOLOGY
Payer: MEDICARE

## 2022-02-11 LAB
ABO GROUP BLD BPU: NORMAL
ANION GAP SERPL CALCULATED.3IONS-SCNC: 8 MMOL/L (ref 4–13)
BACTERIA BLD CULT: ABNORMAL
BACTERIA BLD CULT: NORMAL
BPU ID: NORMAL
BUN SERPL-MCNC: 12 MG/DL (ref 5–25)
CALCIUM SERPL-MCNC: 8 MG/DL (ref 8.3–10.1)
CHLORIDE SERPL-SCNC: 105 MMOL/L (ref 100–108)
CO2 SERPL-SCNC: 24 MMOL/L (ref 21–32)
CREAT SERPL-MCNC: 1.15 MG/DL (ref 0.6–1.3)
CROSSMATCH: NORMAL
CROSSMATCH: NORMAL
ERYTHROCYTE [DISTWIDTH] IN BLOOD BY AUTOMATED COUNT: 25.4 % (ref 11.6–15.1)
GFR SERPL CREATININE-BSD FRML MDRD: 59 ML/MIN/1.73SQ M
GLUCOSE SERPL-MCNC: 94 MG/DL (ref 65–140)
GRAM STN SPEC: ABNORMAL
HCT VFR BLD AUTO: 26.1 % (ref 36.5–49.3)
HGB BLD-MCNC: 7.4 G/DL (ref 12–17)
MCH RBC QN AUTO: 22.5 PG (ref 26.8–34.3)
MCHC RBC AUTO-ENTMCNC: 28.4 G/DL (ref 31.4–37.4)
MCV RBC AUTO: 79 FL (ref 82–98)
PLATELET # BLD AUTO: 235 THOUSANDS/UL (ref 149–390)
PMV BLD AUTO: 9.7 FL (ref 8.9–12.7)
POTASSIUM SERPL-SCNC: 3.7 MMOL/L (ref 3.5–5.3)
RBC # BLD AUTO: 3.29 MILLION/UL (ref 3.88–5.62)
SODIUM SERPL-SCNC: 137 MMOL/L (ref 136–145)
UNIT DISPENSE STATUS: NORMAL
UNIT PRODUCT CODE: NORMAL
UNIT PRODUCT VOLUME: 333 ML
UNIT PRODUCT VOLUME: 350 ML
UNIT PRODUCT VOLUME: 350 ML
UNIT RH: NORMAL
WBC # BLD AUTO: 3.71 THOUSAND/UL (ref 4.31–10.16)

## 2022-02-11 PROCEDURE — 80048 BASIC METABOLIC PNL TOTAL CA: CPT | Performed by: NURSE PRACTITIONER

## 2022-02-11 PROCEDURE — 85027 COMPLETE CBC AUTOMATED: CPT | Performed by: NURSE PRACTITIONER

## 2022-02-11 PROCEDURE — 99232 SBSQ HOSP IP/OBS MODERATE 35: CPT | Performed by: INTERNAL MEDICINE

## 2022-02-11 PROCEDURE — 99232 SBSQ HOSP IP/OBS MODERATE 35: CPT | Performed by: NURSE PRACTITIONER

## 2022-02-11 RX ADMIN — CEFEPIME HYDROCHLORIDE 2000 MG: 2 INJECTION, POWDER, FOR SOLUTION INTRAVENOUS at 18:02

## 2022-02-11 RX ADMIN — DILTIAZEM HYDROCHLORIDE 240 MG: 240 CAPSULE, COATED, EXTENDED RELEASE ORAL at 08:54

## 2022-02-11 RX ADMIN — ACETAMINOPHEN 975 MG: 325 TABLET, FILM COATED ORAL at 05:52

## 2022-02-11 RX ADMIN — FERROUS SULFATE TAB 325 MG (65 MG ELEMENTAL FE) 325 MG: 325 (65 FE) TAB at 08:30

## 2022-02-11 RX ADMIN — ACETAMINOPHEN 975 MG: 325 TABLET, FILM COATED ORAL at 17:57

## 2022-02-11 RX ADMIN — FERROUS SULFATE TAB 325 MG (65 MG ELEMENTAL FE) 325 MG: 325 (65 FE) TAB at 16:00

## 2022-02-11 RX ADMIN — HEPARIN SODIUM 5000 UNITS: 5000 INJECTION INTRAVENOUS; SUBCUTANEOUS at 21:02

## 2022-02-11 RX ADMIN — PANTOPRAZOLE SODIUM 40 MG: 40 TABLET, DELAYED RELEASE ORAL at 08:54

## 2022-02-11 RX ADMIN — ATORVASTATIN CALCIUM 40 MG: 40 TABLET, FILM COATED ORAL at 08:54

## 2022-02-11 RX ADMIN — CEFEPIME HYDROCHLORIDE 2000 MG: 2 INJECTION, POWDER, FOR SOLUTION INTRAVENOUS at 05:52

## 2022-02-11 RX ADMIN — HEPARIN SODIUM 5000 UNITS: 5000 INJECTION INTRAVENOUS; SUBCUTANEOUS at 15:00

## 2022-02-11 RX ADMIN — HEPARIN SODIUM 5000 UNITS: 5000 INJECTION INTRAVENOUS; SUBCUTANEOUS at 05:52

## 2022-02-11 NOTE — ASSESSMENT & PLAN NOTE
· Presented complaining of back pain;  · Prescribed Oxycodone 5mg q6h prn as outpatient  · Continue Oxycodone; geriatric dosing  · 2 5 mg every 6 hours as needed for moderate pain  · 5 mg every 6 hours as needed for severe pain  · Voltaren gel to back  · Change Tylenol to 975 mg every 8 hours ATC  · Edith K  · ID recommending MRI Lumbar/Thoracic Spine to rule out osteomyelitis, ordered, pending for today

## 2022-02-11 NOTE — ASSESSMENT & PLAN NOTE
Lab Results   Component Value Date    EGFR 59 02/11/2022    EGFR 62 02/09/2022    EGFR 57 02/08/2022    CREATININE 1 15 02/11/2022    CREATININE 1 10 02/09/2022    CREATININE 1 18 02/08/2022     · Stable; appears to be improving from recent DULCE, recent baseline difficult to establish  · Avoid nephrotoxic agents  · Discontinued IVF on 2/5

## 2022-02-11 NOTE — PLAN OF CARE
Problem: Potential for Falls  Goal: Patient will remain free of falls  Description: INTERVENTIONS:  - Educate patient/family on patient safety including physical limitations  - Instruct patient to call for assistance with activity   - Consult OT/PT to assist with strengthening/mobility   - Keep Call bell within reach  - Keep bed low and locked with side rails adjusted as appropriate  - Keep care items and personal belongings within reach  - Initiate and maintain comfort rounds  - Make Fall Risk Sign visible to staff  - Offer Toileting every 2 Hours, in advance of need  - Initiate/Maintain bed alarm  - Obtain necessary fall risk management equipment  - Apply yellow socks and bracelet for high fall risk patients  - Consider moving patient to room near nurses station  Outcome: Progressing     Problem: MOBILITY - ADULT  Goal: Maintain or return to baseline ADL function  Description: INTERVENTIONS:  -  Assess patient's ability to carry out ADLs; assess patient's baseline for ADL function and identify physical deficits which impact ability to perform ADLs (bathing, care of mouth/teeth, toileting, grooming, dressing, etc )  - Assess/evaluate cause of self-care deficits   - Assess range of motion  - Assess patient's mobility; develop plan if impaired  - Assess patient's need for assistive devices and provide as appropriate  - Encourage maximum independence but intervene and supervise when necessary  - Involve family in performance of ADLs  - Assess for home care needs following discharge   - Consider OT consult to assist with ADL evaluation and planning for discharge  - Provide patient education as appropriate  Outcome: Progressing  Goal: Maintains/Returns to pre admission functional level  Description: INTERVENTIONS:  - Perform BMAT or MOVE assessment daily    - Set and communicate daily mobility goal to care team and patient/family/caregiver     - Collaborate with rehabilitation services on mobility goals if consulted  - Perform Range of Motion 4 times a day  - Reposition patient every 2hours    - Dangle patient 4 times a day  - Stand patient 4 times a day  - Ambulate patient 4 times a day  - Out of bed to chair 4 times a day   - Out of bed for meals 4times a day  - Out of bed for toileting  - Record patient progress and toleration of activity level   Outcome: Progressing     Problem: PAIN - ADULT  Goal: Verbalizes/displays adequate comfort level or baseline comfort level  Description: Interventions:  - Encourage patient to monitor pain and request assistance  - Assess pain using appropriate pain scale  - Administer analgesics based on type and severity of pain and evaluate response  - Implement non-pharmacological measures as appropriate and evaluate response  - Consider cultural and social influences on pain and pain management  - Notify physician/advanced practitioner if interventions unsuccessful or patient reports new pain  Outcome: Progressing     Problem: INFECTION - ADULT  Goal: Absence or prevention of progression during hospitalization  Description: INTERVENTIONS:  - Assess and monitor for signs and symptoms of infection  - Monitor lab/diagnostic results  - Monitor all insertion sites, i e  indwelling lines, tubes, and drains  - Monitor endotracheal if appropriate and nasal secretions for changes in amount and color  - [place] appropriate cooling/warming therapies per order  - Administer medications as ordered  - Instruct and encourage patient and family to use good hand hygiene technique  - Identify and instruct in appropriate isolation precautions for identified infection/condition  Outcome: Progressing  Goal: Absence of fever/infection during neutropenic period  Description: INTERVENTIONS:  - Monitor WBC    Outcome: Progressing     Problem: DISCHARGE PLANNING  Goal: Discharge to home or other facility with appropriate resources  Description: INTERVENTIONS:  - Identify barriers to discharge w/patient and caregiver  - Arrange for needed discharge resources and transportation as appropriate  - Identify discharge learning needs (meds, wound care, etc )  - Arrange for interpretive services to assist at discharge as needed  - Refer to Case Management Department for coordinating discharge planning if the patient needs post-hospital services based on physician/advanced practitioner order or complex needs related to functional status, cognitive ability, or social support system  Outcome: Progressing     Problem: Knowledge Deficit  Goal: Patient/family/caregiver demonstrates understanding of disease process, treatment plan, medications, and discharge instructions  Description: Complete learning assessment and assess knowledge base    Interventions:  - Provide teaching at level of understanding  - Provide teaching via preferred learning methods  Outcome: Progressing     Problem: SKIN/TISSUE INTEGRITY - ADULT  Goal: Incision(s), wounds(s) or drain site(s) healing without S/S of infection  Description: INTERVENTIONS  - Assess and document dressing, incision, wound bed, drain sites and surrounding tissue  - Provide patient and family education  - Perform skin care/dressing changes every shift or as needed  Outcome: Progressing     Problem: HEMATOLOGIC - ADULT  Goal: Maintains hematologic stability  Description: INTERVENTIONS  - Assess for signs and symptoms of bleeding or hemorrhage  - Monitor labs  - Administer supportive blood products/factors as ordered and appropriate  Outcome: Progressing     Problem: Prexisting or High Potential for Compromised Skin Integrity  Goal: Skin integrity is maintained or improved  Description: INTERVENTIONS:  - Identify patients at risk for skin breakdown  - Assess and monitor skin integrity  - Assess and monitor nutrition and hydration status  - Monitor labs   - Assess for incontinence   - Turn and reposition patient  - Assist with mobility/ambulation  - Relieve pressure over bony prominences  - Avoid friction and shearing  - Provide appropriate hygiene as needed including keeping skin clean and dry  - Evaluate need for skin moisturizer/barrier cream  - Collaborate with interdisciplinary team   - Patient/family teaching  - Consider wound care consult   Outcome: Progressing

## 2022-02-11 NOTE — ASSESSMENT & PLAN NOTE
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

## 2022-02-11 NOTE — ASSESSMENT & PLAN NOTE
· Currently rate controlled  · Continue home Cardizem, monitor with routine vitals   · Continue with SQ Heparin for now; Coumadin on hold for IR biopsy    Recent Labs     02/09/22  0428 02/10/22  0459   INR 1 16 1 13

## 2022-02-11 NOTE — PROGRESS NOTES
[facility]  Progress Note - Antonia Reasons 1940, 80 y o  male MRN: [id number]  Unit/Bed#: -01 Encounter: [id number]  Primary Care Provider: Israel Tyler MD   Date and time admitted to hospital: 2/4/2022  5:41 PM    * Sepsis due to Pseudomonas aeruginosa [facility])  Assessment & Plan  · Brought in by Granddaughter who reported several days of progressive confusion and weakness and was instructed to take patient to ED by infusion staff for these symptoms  · Met sepsis criteria on admission, as evidenced by leukocytosis, hyperthermia  Lactic acid 2 4  · UA suggestive of UTI; urine and blood cultures + for pseudomonas   · Source of sepsis related to right-sided cardiac vegetation/pyelonephritis  · Infectious disease consulted; input as noted below  · Port-A-Cath removed 2/7   · RENEE completed on 2/9; noted to have right-sided vegetation  Patient is not a surgical candidate  · Continue Cefepime 2 gm every 12 hours  · Plan for PICC line once cultures are negative   Will need 6 weeks IV antibiotics  · Repeat blood cultures from 2/8 negative x 48 hrs  · New blood cultures collected on 2/10  · Of note, previously discussed replacement of Port-A-Cath with another Port-A-Cath versus PICC with primary hematologist (Dr Mau Sanon) oncologist and cleared to put in PICC following clearance of blood cultures    Metabolic encephalopathy  Assessment & Plan  · CT head negative for acute process  · Not hypoglycemic, see hyponatremia as below  · Possibly in the setting of sepsis/UTI as above vs systemic chemotherapy vs outpatient narcotic use   · Narcotics and benzos prescribed OP on hold; had received IM Zyprexa X1  · Delirium precautions   · Supportive measures   · Stable, "feisty" but appears at baseline    Anemia  Assessment & Plan  · Appears to be chronic issue with baseline around 8-9  · Did have blood in archibald bag, now resolved  · Would hold off on transfusion unless Hgb <7   · Monitor closely given supratherapeutic INR initially   · Continue iron supplement given FRANCY    Archibald catheter in place prior to arrival  Assessment & Plan  · With archibald catheter and bilat perc nephrostomy tube in the setting of bladder CA as above   · Both producing urine   · Archibald was replaced on day of admission, malpositioned with balloon in urethra, since repositioned   · Continue archibald and nephrostomy care     Stage 3a chronic kidney disease [facility])  Assessment & Plan  Lab Results   Component Value Date    EGFR 59 02/11/2022    EGFR 62 02/09/2022    EGFR 57 02/08/2022    CREATININE 1 15 02/11/2022    CREATININE 1 10 02/09/2022    CREATININE 1 18 02/08/2022     · Stable; appears to be improving from recent DULCE, recent baseline difficult to establish  · Avoid nephrotoxic agents  · Discontinued IVF on 2/5    Atrial fibrillation (HCC)  Assessment & Plan  · Currently rate controlled  · Continue home Cardizem, monitor with routine vitals   · Continue with SQ Heparin for now; Coumadin on hold for IR biopsy    Recent Labs     02/09/22  0428 02/10/22  0459   INR 1 16 1 13       COPD, severe (HCC)  Assessment & Plan  · 96% O2 RA; no SOB complaints  · Stable    Bladder cancer (HCC)  Assessment & Plan  · 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, diagnosed 10/12/2021  · Follows with heme onc and urology as an outpatient  · Receives chemo and radiation; last received on day of admission  · Continue outpatient heme onc and urology f/u upon discharge   · CT c/a/p from 2/6 unfortunately shows possible new right apical pleural-based mass  · IR biopsy pending, likely Monday  · Oncology consult placed, appreciate input    Back pain  Assessment & Plan  · Presented complaining of back pain;  · Prescribed Oxycodone 5mg q6h prn as outpatient  · Continue Oxycodone; geriatric dosing  · 2 5 mg every 6 hours as needed for moderate pain  · 5 mg every 6 hours as needed for severe pain  · Voltaren gel to back  · Change Tylenol to 975 mg every 8 hours ATC  · Aqua K  · ID recommending MRI Lumbar/Thoracic Spine to rule out osteomyelitis, ordered, pending for today  Hypertension  Assessment & Plan  · Blood pressure acceptable with SBP in the 100-130s   · Continue home Cardizem  · Monitor BP per unit protocol    CAD (coronary atherosclerotic disease)  Assessment & Plan  · S/p CABG x4  · Denies chest pain; Troponin 37  · Continue home ASA and statin      VTE Pharmacologic Prophylaxis: VTE Score: 6 High Risk (Score >/= 5) - Pharmacological DVT Prophylaxis Ordered: heparin  Sequential Compression Devices Ordered  Patient Centered Rounds: I performed bedside rounds with nursing staff today  Discussions with Specialists or Other Care Team Provider: Case Management    Education and Discussions with Family / Patient: Patient declined call to   Time Spent for Care: 20 minutes  More than 50% of total time spent on counseling and coordination of care as described above  Current Length of Stay: 7 day(s)  Current Patient Status: Inpatient   Certification Statement: The patient will continue to require additional inpatient hospital stay due to ongoing treatment in setting of sepsis; will need 6 wks IV abx; needs IR biopsy on monday  Discharge Plan: Anticipate discharge in >72 hrs to discharge location to be determined pending rehab evaluations  Code Status: Level 1 - Full Code    Subjective:   CC: "I have no pain, I took two tylenol this morning "    Patient resting in the recliner chair; playing solitaire  Denies complaints of chest pain, shortness of breath, fever, or chills  Denies abdominal pain, back pain  Objective:     Vitals:   Temp (24hrs), Av 3 °F (36 8 °C), Min:97 7 °F (36 5 °C), Max:98 7 °F (37 1 °C)    Temp:  [97 7 °F (36 5 °C)-98 7 °F (37 1 °C)] 97 7 °F (36 5 °C)  HR:  [66-84] 76  Resp:  [18] 18  BP: (111-134)/(50-66) 134/66  SpO2:  [97 %-98 %] 97 %  Body mass index is 27 75 kg/m²       Input and Output Summary (last 24 hours): Intake/Output Summary (Last 24 hours) at 2/11/2022 1026  Last data filed at 2/11/2022 0843  Gross per 24 hour   Intake 500 ml   Output 1900 ml   Net -1400 ml       Physical Exam:   Physical Exam  Vitals and nursing note reviewed  Constitutional:       General: He is not in acute distress  Cardiovascular:      Rate and Rhythm: Normal rate  Pulses: Normal pulses  Heart sounds: Normal heart sounds  Pulmonary:      Effort: Pulmonary effort is normal       Breath sounds: Normal breath sounds  Abdominal:      General: Bowel sounds are normal       Palpations: Abdomen is soft  Musculoskeletal:         General: Normal range of motion  Right lower leg: No edema  Left lower leg: No edema  Skin:     General: Skin is warm and dry  Capillary Refill: Capillary refill takes less than 2 seconds  Neurological:      Mental Status: He is alert  Mental status is at baseline  Comments: Forgetful   Psychiatric:         Mood and Affect: Mood normal  Affect is labile  Additional Data:     Labs:  Results from last 7 days   Lab Units 02/11/22 0443 02/07/22  0912 02/07/22  0705 02/05/22  0520 02/04/22  1755   WBC Thousand/uL 3 71*   < > 4 01*   < > 17 95*   HEMOGLOBIN g/dL 7 4*   < > 6 7*   < > 7 8*   HEMATOCRIT % 26 1*   < > 22 6*   < > 26 9*   PLATELETS Thousands/uL 235   < > 216   < > 317   BANDS PCT %  --   --   --   --  4   NEUTROS PCT %  --   --  80*   < >  --    LYMPHS PCT %  --   --  9*   < >  --    LYMPHO PCT %  --   --   --   --  2*   MONOS PCT %  --   --  5   < >  --    MONO PCT %  --   --   --   --  0*   EOS PCT %  --   --  3   < > 0    < > = values in this interval not displayed       Results from last 7 days   Lab Units 02/11/22 0443 02/05/22  0719 02/04/22  1755   SODIUM mmol/L 137   < > 127*   POTASSIUM mmol/L 3 7   < > 4 5   CHLORIDE mmol/L 105   < > 95*   CO2 mmol/L 24   < > 26   BUN mg/dL 12   < > 23   CREATININE mg/dL 1 15   < > 1 41*   ANION GAP mmol/L 8   < > 6   CALCIUM mg/dL 8 0*   < > 8 6   ALBUMIN g/dL  --   --  3 0*   TOTAL BILIRUBIN mg/dL  --   --  1 83*   ALK PHOS U/L  --   --  73   ALT U/L  --   --  17   AST U/L  --   --  15   GLUCOSE RANDOM mg/dL 94   < > 139    < > = values in this interval not displayed  Results from last 7 days   Lab Units 02/10/22  0459   INR  1 13     Results from last 7 days   Lab Units 02/09/22  2135 02/09/22  1614   POC GLUCOSE mg/dl 101 107         Results from last 7 days   Lab Units 02/07/22  0705 02/06/22  0453 02/04/22  2034 02/04/22  1755   LACTIC ACID mmol/L  --   --  1 0 2 4*   PROCALCITONIN ng/ml 4 95* 5 32*  --   --        Lines/Drains:  Invasive Devices  Report    Peripheral Intravenous Line            Peripheral IV 02/10/22 Right;Ventral (anterior) Forearm <1 day          Line            Pump Device Continuous ambulatory delivery device Right Chest 10 days          Drain            Urethral Catheter Non-latex 16 Fr  44 days    Percutaneous Nephroureteral Tube (PCNU) Left 1 8 5 Fr 26 Cm 23 days    Percutaneous Nephroureteral Tube (PCNU) Right 2 8 5 Fr 24 Cm 23 days              Urinary Catheter:  Goal for removal: N/A - Chronic Fortune               Imaging: No pertinent imaging reviewed  Recent Cultures (last 7 days):   Results from last 7 days   Lab Units 02/10/22  0453 02/08/22  0504 02/06/22  1000 02/04/22  1755   BLOOD CULTURE  No Growth at 24 hrs  No Growth at 24 hrs  No Growth at 72 hrs  No Growth at 72 hrs  Pseudomonas aeruginosa*  No Growth After 4 Days   Pseudomonas aeruginosa*  Pseudomonas aeruginosa*   GRAM STAIN RESULT   --   --  Gram negative rods* Gram negative rods*  Gram negative rods*   URINE CULTURE   --   --   --  >100,000 cfu/ml Pseudomonas aeruginosa*       Last 24 Hours Medication List:   Current Facility-Administered Medications   Medication Dose Route Frequency Provider Last Rate    acetaminophen  975 mg Oral Q8H [address] MYLENE Peterson      aluminum-magnesium hydroxide-simethicone  30 mL Oral Q4H PRN MYLENE Kang      aspirin  81 mg Oral Daily Geoffkendrick Powers, [address]      atorvastatin  40 mg Oral Daily Manual [place], [place]      bisacodyl  10 mg Rectal Daily PRN MYLENE Tomlinson      cefepime  2,000 mg Intravenous Q12H Jenn Nava PA-C 2,000 mg (02/11/22 0552)    Diclofenac Sodium  2 g Topical 4x Daily MYLENE Garrison      diltiazem  240 mg Oral Daily Manual [place], [place]      ferrous sulfate  325 mg Oral BID With Meals Marcelene NovemberGABRIEL      heparin (porcine)  5,000 Units Subcutaneous Q8H [facility]      lidocaine  1 patch Topical Daily Payal Serrato PA-C      lidocaine (PF)  0 5 mL Infiltration Once PRN Rosy Gonzalez MD      OLANZapine  2 5 mg Intramuscular Once MYLENE Tomlinson      ondansetron  4 mg Intravenous Q6H PRN Jenn Nava PA-C      oxyCODONE  2 5 mg Oral Q6H PRN MYLENE Garrison      oxyCODONE  5 mg Oral Q6H PRN MYLENE Garrison      pantoprazole  40 mg Oral QAM [facility] Health[facility]RIEL      polyethylene glycol  17 g Oral Daily MYLENE Tomlinson      senna-docusate sodium  2 tablet Oral BID MYLENE Boyer      sodium phosphate-biphosphate  1 enema Rectal Once PRN MYLENE Tomlinson          Today, Patient Was Seen By: MYLENE Garrison    **Please Note: This note may have been constructed using a voice recognition system  **

## 2022-02-11 NOTE — ASSESSMENT & PLAN NOTE
· Brought in by Granddaughter who reported several days of progressive confusion and weakness and was instructed to take patient to ED by infusion staff for these symptoms  · Met sepsis criteria on admission, as evidenced by leukocytosis, hyperthermia  Lactic acid 2 4  · UA suggestive of UTI; urine and blood cultures + for pseudomonas   · Source of sepsis related to right-sided cardiac vegetation/pyelonephritis  · Infectious disease consulted; input as noted below  · Port-A-Cath removed 2/7   · RENEE completed on 2/9; noted to have right-sided vegetation  Patient is not a surgical candidate  · Continue Cefepime 2 gm every 12 hours  · Plan for PICC line once cultures are negative   Will need 6 weeks IV antibiotics  · Repeat blood cultures from 2/8 negative x 48 hrs  · New blood cultures collected on 2/10  · Of note, previously discussed replacement of Port-A-Cath with another Port-A-Cath versus PICC with primary hematologist (Dr Bella Correia) oncologist and cleared to put in PICC following clearance of blood cultures

## 2022-02-11 NOTE — PLAN OF CARE
Problem: Potential for Falls  Goal: Patient will remain free of falls  Description: INTERVENTIONS:  - Educate patient/family on patient safety including physical limitations  - Instruct patient to call for assistance with activity   - Consult OT/PT to assist with strengthening/mobility   - Keep Call bell within reach  - Keep bed low and locked with side rails adjusted as appropriate  - Keep care items and personal belongings within reach  - Initiate and maintain comfort rounds  - Make Fall Risk Sign visible to staff  - Offer Toileting every 2 Hours, in advance of need  - Initiate/Maintain alarm  - Obtain necessary fall risk management equipment:   - Apply yellow socks and bracelet for high fall risk patients  - Consider moving patient to room near nurses station  Outcome: Progressing     Problem: MOBILITY - ADULT  Goal: Maintain or return to baseline ADL function  Description: INTERVENTIONS:  -  Assess patient's ability to carry out ADLs; assess patient's baseline for ADL function and identify physical deficits which impact ability to perform ADLs (bathing, care of mouth/teeth, toileting, grooming, dressing, etc )  - Assess/evaluate cause of self-care deficits   - Assess range of motion  - Assess patient's mobility; develop plan if impaired  - Assess patient's need for assistive devices and provide as appropriate  - Encourage maximum independence but intervene and supervise when necessary  - Involve family in performance of ADLs  - Assess for home care needs following discharge   - Consider OT consult to assist with ADL evaluation and planning for discharge  - Provide patient education as appropriate  Outcome: Progressing  Goal: Maintains/Returns to pre admission functional level  Description: INTERVENTIONS:  - Perform BMAT or MOVE assessment daily    - Set and communicate daily mobility goal to care team and patient/family/caregiver     - Collaborate with rehabilitation services on mobility goals if consulted  - Perform Range of Motion 2 times a day  - Reposition patient every 2 hours    - Dangle patient 2 times a day  - Stand patient 2 times a day  - Ambulate patient 2 times a day  - Out of bed to chair 2 times a day   - Out of bed for meals 2 times a day  - Out of bed for toileting  - Record patient progress and toleration of activity level   Outcome: Progressing     Problem: PAIN - ADULT  Goal: Verbalizes/displays adequate comfort level or baseline comfort level  Description: Interventions:  - Encourage patient to monitor pain and request assistance  - Assess pain using appropriate pain scale  - Administer analgesics based on type and severity of pain and evaluate response  - Implement non-pharmacological measures as appropriate and evaluate response  - Consider cultural and social influences on pain and pain management  - Notify physician/advanced practitioner if interventions unsuccessful or patient reports new pain  Outcome: Progressing     Problem: INFECTION - ADULT  Goal: Absence or prevention of progression during hospitalization  Description: INTERVENTIONS:  - Assess and monitor for signs and symptoms of infection  - Monitor lab/diagnostic results  - Monitor all insertion sites, i e  indwelling lines, tubes, and drains  - Monitor endotracheal if appropriate and nasal secretions for changes in amount and color  - [place] appropriate cooling/warming therapies per order  - Administer medications as ordered  - Instruct and encourage patient and family to use good hand hygiene technique  - Identify and instruct in appropriate isolation precautions for identified infection/condition  Outcome: Progressing  Goal: Absence of fever/infection during neutropenic period  Description: INTERVENTIONS:  - Monitor WBC    Outcome: Progressing     Problem: SAFETY ADULT  Goal: Patient will remain free of falls  Description: INTERVENTIONS:  - Educate patient/family on patient safety including physical limitations  - Instruct patient to call for assistance with activity   - Consult OT/PT to assist with strengthening/mobility   - Keep Call bell within reach  - Keep bed low and locked with side rails adjusted as appropriate  - Keep care items and personal belongings within reach  - Initiate and maintain comfort rounds  - Make Fall Risk Sign visible to staff  - Offer Toileting every 2 Hours, in advance of need  - Initiate/Maintain alarm  - Obtain necessary fall risk management equipment:   - Apply yellow socks and bracelet for high fall risk patients  - Consider moving patient to room near nurses station  Outcome: Progressing  Goal: Maintain or return to baseline ADL function  Description: INTERVENTIONS:  -  Assess patient's ability to carry out ADLs; assess patient's baseline for ADL function and identify physical deficits which impact ability to perform ADLs (bathing, care of mouth/teeth, toileting, grooming, dressing, etc )  - Assess/evaluate cause of self-care deficits   - Assess range of motion  - Assess patient's mobility; develop plan if impaired  - Assess patient's need for assistive devices and provide as appropriate  - Encourage maximum independence but intervene and supervise when necessary  - Involve family in performance of ADLs  - Assess for home care needs following discharge   - Consider OT consult to assist with ADL evaluation and planning for discharge  - Provide patient education as appropriate  Outcome: Progressing  Goal: Maintains/Returns to pre admission functional level  Description: INTERVENTIONS:  - Perform BMAT or MOVE assessment daily    - Set and communicate daily mobility goal to care team and patient/family/caregiver  - Collaborate with rehabilitation services on mobility goals if consulted  - Perform Range of Motion 2 times a day  - Reposition patient every 2 hours    - Dangle patient 2 times a day  - Stand patient 2 times a day  - Ambulate patient 2 times a day  - Out of bed to chair 2 times a day   - Out of bed for meals 2 times a day  - Out of bed for toileting  - Record patient progress and toleration of activity level   Outcome: Progressing     Problem: DISCHARGE PLANNING  Goal: Discharge to home or other facility with appropriate resources  Description: INTERVENTIONS:  - Identify barriers to discharge w/patient and caregiver  - Arrange for needed discharge resources and transportation as appropriate  - Identify discharge learning needs (meds, wound care, etc )  - Arrange for interpretive services to assist at discharge as needed  - Refer to Case Management Department for coordinating discharge planning if the patient needs post-hospital services based on physician/advanced practitioner order or complex needs related to functional status, cognitive ability, or social support system  Outcome: Progressing     Problem: Knowledge Deficit  Goal: Patient/family/caregiver demonstrates understanding of disease process, treatment plan, medications, and discharge instructions  Description: Complete learning assessment and assess knowledge base    Interventions:  - Provide teaching at level of understanding  - Provide teaching via preferred learning methods  Outcome: Progressing     Problem: GENITOURINARY - ADULT  Goal: Urinary catheter remains patent  Description: INTERVENTIONS:  - Assess patency of urinary catheter  - If patient has a chronic archibald, consider changing catheter if non-functioning  - Follow guidelines for intermittent irrigation of non-functioning urinary catheter  Outcome: Progressing     Problem: SKIN/TISSUE INTEGRITY - ADULT  Goal: Incision(s), wounds(s) or drain site(s) healing without S/S of infection  Description: INTERVENTIONS  - Assess and document dressing, incision, wound bed, drain sites and surrounding tissue  - Provide patient and family education  - Perform skin care/dressing changes   Outcome: Progressing     Problem: HEMATOLOGIC - ADULT  Goal: Maintains hematologic stability  Description: INTERVENTIONS  - Assess for signs and symptoms of bleeding or hemorrhage  - Monitor labs  - Administer supportive blood products/factors as ordered and appropriate  Outcome: Progressing     Problem: Prexisting or High Potential for Compromised Skin Integrity  Goal: Skin integrity is maintained or improved  Description: INTERVENTIONS:  - Identify patients at risk for skin breakdown  - Assess and monitor skin integrity  - Assess and monitor nutrition and hydration status  - Monitor labs   - Assess for incontinence   - Turn and reposition patient  - Assist with mobility/ambulation  - Relieve pressure over bony prominences  - Avoid friction and shearing  - Provide appropriate hygiene as needed including keeping skin clean and dry  - Evaluate need for skin moisturizer/barrier cream  - Collaborate with interdisciplinary team   - Patient/family teaching  - Consider wound care consult   Outcome: Progressing

## 2022-02-11 NOTE — PLAN OF CARE
Problem: Potential for Falls  Goal: Patient will remain free of falls  Description: INTERVENTIONS:  - Educate patient/family on patient safety including physical limitations  - Instruct patient to call for assistance with activity   - Consult OT/PT to assist with strengthening/mobility   - Keep Call bell within reach  - Keep bed low and locked with side rails adjusted as appropriate  - Keep care items and personal belongings within reach  - Initiate and maintain comfort rounds  - Make Fall Risk Sign visible to staff  - Offer Toileting every 2 Hours, in advance of need  - Initiate/Maintain bedalarm  - Obtain necessary fall risk management equipment:   - Apply yellow socks and bracelet for high fall risk patients  - Consider moving patient to room near nurses station  Outcome: Progressing     Problem: MOBILITY - ADULT  Goal: Maintain or return to baseline ADL function  Description: INTERVENTIONS:  -  Assess patient's ability to carry out ADLs; assess patient's baseline for ADL function and identify physical deficits which impact ability to perform ADLs (bathing, care of mouth/teeth, toileting, grooming, dressing, etc )  - Assess/evaluate cause of self-care deficits   - Assess range of motion  - Assess patient's mobility; develop plan if impaired  - Assess patient's need for assistive devices and provide as appropriate  - Encourage maximum independence but intervene and supervise when necessary  - Involve family in performance of ADLs  - Assess for home care needs following discharge   - Consider OT consult to assist with ADL evaluation and planning for discharge  - Provide patient education as appropriate  Outcome: Progressing  Goal: Maintains/Returns to pre admission functional level  Description: INTERVENTIONS:  - Perform BMAT or MOVE assessment daily    - Set and communicate daily mobility goal to care team and patient/family/caregiver     - Collaborate with rehabilitation services on mobility goals if consulted  - Perform Range of Motion 3 times a day  - Reposition patient every 2 hours    - Dangle patient 3 times a day  - Stand patient 3 times a day  - Ambulate patient 3 times a day  - Out of bed to chair 3 times a day   - Out of bed for meals 3 times a day  - Out of bed for toileting  - Record patient progress and toleration of activity level   Outcome: Progressing     Problem: PAIN - ADULT  Goal: Verbalizes/displays adequate comfort level or baseline comfort level  Description: Interventions:  - Encourage patient to monitor pain and request assistance  - Assess pain using appropriate pain scale  - Administer analgesics based on type and severity of pain and evaluate response  - Implement non-pharmacological measures as appropriate and evaluate response  - Consider cultural and social influences on pain and pain management  - Notify physician/advanced practitioner if interventions unsuccessful or patient reports new pain  Outcome: Progressing     Problem: INFECTION - ADULT  Goal: Absence or prevention of progression during hospitalization  Description: INTERVENTIONS:  - Assess and monitor for signs and symptoms of infection  - Monitor lab/diagnostic results  - Monitor all insertion sites, i e  indwelling lines, tubes, and drains  - Monitor endotracheal if appropriate and nasal secretions for changes in amount and color  - [place] appropriate cooling/warming therapies per order  - Administer medications as ordered  - Instruct and encourage patient and family to use good hand hygiene technique  - Identify and instruct in appropriate isolation precautions for identified infection/condition  Outcome: Progressing  Goal: Absence of fever/infection during neutropenic period  Description: INTERVENTIONS:  - Monitor WBC    Outcome: Progressing     Problem: SAFETY ADULT  Goal: Patient will remain free of falls  Description: INTERVENTIONS:  - Educate patient/family on patient safety including physical limitations  - Instruct patient to call for assistance with activity   - Consult OT/PT to assist with strengthening/mobility   - Keep Call bell within reach  - Keep bed low and locked with side rails adjusted as appropriate  - Keep care items and personal belongings within reach  - Initiate and maintain comfort rounds  - Make Fall Risk Sign visible to staff  - Offer Toileting every 2 Hours, in advance of need  - Initiate/Maintain bedalarm  - Obtain necessary fall risk management equipment:   - Apply yellow socks and bracelet for high fall risk patients  - Consider moving patient to room near nurses station  Outcome: Progressing  Goal: Maintain or return to baseline ADL function  Description: INTERVENTIONS:  -  Assess patient's ability to carry out ADLs; assess patient's baseline for ADL function and identify physical deficits which impact ability to perform ADLs (bathing, care of mouth/teeth, toileting, grooming, dressing, etc )  - Assess/evaluate cause of self-care deficits   - Assess range of motion  - Assess patient's mobility; develop plan if impaired  - Assess patient's need for assistive devices and provide as appropriate  - Encourage maximum independence but intervene and supervise when necessary  - Involve family in performance of ADLs  - Assess for home care needs following discharge   - Consider OT consult to assist with ADL evaluation and planning for discharge  - Provide patient education as appropriate  Outcome: Progressing  Goal: Maintains/Returns to pre admission functional level  Description: INTERVENTIONS:  - Perform BMAT or MOVE assessment daily    - Set and communicate daily mobility goal to care team and patient/family/caregiver  - Collaborate with rehabilitation services on mobility goals if consulted  - Perform Range of Motion 3 times a day  - Reposition patient every 3 hours    - Dangle patient 3 times a day  - Stand patient 3 times a day  - Ambulate patient 3 times a day  - Out of bed to chair 3 times a day   - Out of bed for meals 3 times a day  - Out of bed for toileting  - Record patient progress and toleration of activity level   Outcome: Progressing     Problem: DISCHARGE PLANNING  Goal: Discharge to home or other facility with appropriate resources  Description: INTERVENTIONS:  - Identify barriers to discharge w/patient and caregiver  - Arrange for needed discharge resources and transportation as appropriate  - Identify discharge learning needs (meds, wound care, etc )  - Arrange for interpretive services to assist at discharge as needed  - Refer to Case Management Department for coordinating discharge planning if the patient needs post-hospital services based on physician/advanced practitioner order or complex needs related to functional status, cognitive ability, or social support system  Outcome: Progressing     Problem: Knowledge Deficit  Goal: Patient/family/caregiver demonstrates understanding of disease process, treatment plan, medications, and discharge instructions  Description: Complete learning assessment and assess knowledge base    Interventions:  - Provide teaching at level of understanding  - Provide teaching via preferred learning methods  Outcome: Progressing     Problem: GENITOURINARY - ADULT  Goal: Urinary catheter remains patent  Description: INTERVENTIONS:  - Assess patency of urinary catheter  - If patient has a chronic archibald, consider changing catheter if non-functioning  - Follow guidelines for intermittent irrigation of non-functioning urinary catheter  Outcome: Progressing     Problem: SKIN/TISSUE INTEGRITY - ADULT  Goal: Incision(s), wounds(s) or drain site(s) healing without S/S of infection  Description: INTERVENTIONS  - Assess and document dressing, incision, wound bed, drain sites and surrounding tissue  - Provide patient and family education  - Perform skin care/dressing changes every 2  Outcome: Progressing     Problem: HEMATOLOGIC - ADULT  Goal: Maintains hematologic stability  Description: INTERVENTIONS  - Assess for signs and symptoms of bleeding or hemorrhage  - Monitor labs  - Administer supportive blood products/factors as ordered and appropriate  Outcome: Progressing     Problem: Prexisting or High Potential for Compromised Skin Integrity  Goal: Skin integrity is maintained or improved  Description: INTERVENTIONS:  - Identify patients at risk for skin breakdown  - Assess and monitor skin integrity  - Assess and monitor nutrition and hydration status  - Monitor labs   - Assess for incontinence   - Turn and reposition patient  - Assist with mobility/ambulation  - Relieve pressure over bony prominences  - Avoid friction and shearing  - Provide appropriate hygiene as needed including keeping skin clean and dry  - Evaluate need for skin moisturizer/barrier cream  - Collaborate with interdisciplinary team   - Patient/family teaching  - Consider wound care consult   Outcome: Progressing

## 2022-02-11 NOTE — PROGRESS NOTES
Progress Note - Infectious Disease   Gena Emery 80 y o  male MRN: [id number]  Unit/Bed#: -01 Encounter: [id number]      Impression/Plan:  1  Sepsis, POA   Patient met sepsis criteria on admission with fever, tachycardia and leukocytosis   Sources are 2 and 3  No other obvious sources on exam   Clinical parameters improved  Antibiotics as below  Continue to trend fever curve/vitals  Repeat CBC/chemistry tomorrow  Follow-up repeat blood cultures for clearance  Additional supportive care as per primary  Additional interventions pending clinical course     2  Pseudomonal bacteremia and right-sided cardiac vegetation   Two of 2 blood cultures from admission have isolated Pseudomonas   I suspect that the source is primarily problem 3  Patient had recent catheter exchange but was also receiving chemotherapy which may have led to his bacteremia   Patient has a port in place but no other intravascular devices   Port removed with IR on 02/07 and cultures negative   Repeat blood cultures with delayed growth  Susceptibilities reviewed   Patient mentions lower back pain   Family would like to avoid fluoroquinolones per prior discussion   Patient will now require prolonged course of antibiotic and there is concern for recurrent/relapsing of disease with further chemotherapy  Patient is not surgical candidate per Cardiology  Family agreeable to palliative care evaluations     Continue cefepime 2 g every 12 hours, renally dose adjusted  Plan for total 6 weeks of therapy through 03/21/2022  Weekly labs with CBCD and chemistry while on antibiotics  Maintain urinary catheters  Cardiology follow-up as outpatient and surveillance echos post treatment  PICC line placement on Monday, assuming repeat cultures remain negative  Obtain MRI T and L-spine with contrast with reported back pain  Pleural biopsy as per primary  Family in favor of rehab placement currently  Recommend palliative care evaluation in goals of care discussions  Patient would need follow-up in the ID office 2 weeks after discharge  ID office staff notified of tentative follow-up needs  Additional interventions pending clinical course        3  Pyelonephritis with Pseudomonas   Likely source of the above   Recently underwent Fortune catheter exchange 2 days prior to admission and reportedly had hematuria on admission  Urine culture also with Pseudomonas   CT reviewed and catheter repositioned and images concerning for pyelonephritis  Continue antibiotics as above  Continue to trend fever curve/vitals  Maintain current catheter  Recommend follow-up with Urology as outpatient     4  Acute encephalopathy and hyponatremia   Acutely confused on admission which I suspect is multifactorial given issues above   Sodium improving   Mental status also seems to be closer to baseline   CT head unremarkable  Patient refused neuropsych evaluation  Monitor mental status closely  Antibiotics as above  Low threshold for repeat imaging  Continue to trend fever curve/WBC     5  Chronic kidney disease stage 3  Creatinine appeared to be close to prior baseline on admission   This does however affect antibiotic dosing  Cefepime dosing as above  Will further dose adjust antibiotic as needed  Fluid hydration as per primary  Will avoid nephrotoxic agent  Repeat chemistry tomorrow     6  Invasive bladder cancer with chronic port   Patient recently on chemotherapy and has multiple catheters in place along with port   Course complicated now by the above   Port removed on 2/7  Antibiotics as above  Oncology evaluation appreciated  Recommend palliative care evaluation as above  Continue to trend fever curve/WBC     7   AFib with elevated INR   Ongoing anticoagulation and care as per primary  8  Leukopenia  Possibly related to antibiotic  We will continue cefepime as above for now and will monitor    Will adjust antibiotic agent if cell counts fall further      Above plan discussed briefly with the patient at bedside      ID consult service will formally re-evaluate this patient again on Monday  Please contact ID attending on call if questions in the interim      Antibiotics:  Cefepime 8    Subjective:  Patient has no fever, chills, sweats; no nausea, vomiting, diarrhea; no cough, shortness of breath; no pain  No new symptoms  He still reports having some vague back discomfort  Neuropsych evaluation ordered and patient refused  MR imaging pending  Objective:  Vitals:  Temp:  [97 7 °F (36 5 °C)-98 7 °F (37 1 °C)] 97 7 °F (36 5 °C)  HR:  [66-76] 72  Resp:  [18-19] 19  BP: (111-134)/(50-66) 121/59  SpO2:  [96 %-99 %] 99 %  Temp (24hrs), Av 2 °F (36 8 °C), Min:97 7 °F (36 5 °C), Max:98 7 °F (37 1 °C)  Current: Temperature: 97 7 °F (36 5 °C)    Physical Exam:   General Appearance:  Alert, interactive, nontoxic, no acute distress  Hard of hearing  Throat: Oropharynx moist without lesions  Lungs:   Clear to auscultation bilaterally; no wheezes, rhonchi or rales; respirations unlabored on room air   Heart:  RRR; no murmur, rub or gallop appreciated   Abdomen:   Soft, non-tender, non-distended, positive bowel sounds  Extremities: No clubbing, cyanosis or edema   Skin: No new rashes or lesions  No new draining wounds noted  Labs, Imaging, & Other studies:   All pertinent labs and imaging studies were personally reviewed  Results from last 7 days   Lab Units 22  0443 02/10/22  0900 22  1636 22  0428 22  0428   WBC Thousand/uL 3 71* 5 08  --   --  4 79   HEMOGLOBIN g/dL 7 4* 7 7* 7 1*   < > 7 0*   PLATELETS Thousands/uL 235 230  --   --  228    < > = values in this interval not displayed       Results from last 7 days   Lab Units 22  0443 22  0719 22  1755   POTASSIUM mmol/L 3 7   < > 4 5   CHLORIDE mmol/L 105   < > 95*   CO2 mmol/L 24   < > 26   BUN mg/dL 12   < > 23   CREATININE mg/dL 1 15   < > 1 41*   EGFR ml/min/1 73sq m 59   < > 46 CALCIUM mg/dL 8 0*   < > 8 6   AST U/L  --   --  15   ALT U/L  --   --  17   ALK PHOS U/L  --   --  73    < > = values in this interval not displayed  Results from last 7 days   Lab Units 02/10/22  0453 02/08/22  0504 02/06/22  1000 02/04/22  1755   BLOOD CULTURE  No Growth at 24 hrs  No Growth at 24 hrs  No Growth at 72 hrs  No Growth at 72 hrs  Pseudomonas aeruginosa*  No Growth After 4 Days   Pseudomonas aeruginosa*  Pseudomonas aeruginosa*   GRAM STAIN RESULT   --   --  Gram negative rods* Gram negative rods*  Gram negative rods*   URINE CULTURE   --   --   --  >100,000 cfu/ml Pseudomonas aeruginosa*

## 2022-02-11 NOTE — QUICK NOTE
Telemetry order   Patient is rate controlled atrial fibrillation x 48hr  VSS  No other recent arrhythmias or bradycardia  Order discontinued

## 2022-02-11 NOTE — CASE MANAGEMENT
Case Management Assessment & Discharge Planning Note    Patient name Gena Emery  [address] 425/-68 MRN [id number]  : 1940 Date 2022       Current Admission Date: 2022  Current Admission Diagnosis:Sepsis due to Pseudomonas aeruginosa [facility])   Patient Active Problem List    Diagnosis Date Noted    Bacteremia 2022    [facility] to [facility]     Metabolic encephalopathy     Nephrostomy status ([facility] ) 2022    Anxiety 2022    Continuous opioid dependence ([facility] 2022    Anemia 2022    FRANCY (iron deficiency anemia) 2022    DULCE (acute kidney injury) ([facility] ) on CKD 3 01/15/2022    Pleural effusion on left 01/15/2022    Abnormal urinalysis 01/15/2022    Fortune catheter in place prior to arrival 2022    Stage 3a chronic kidney disease ([facility] ) 2022    Recurrent unilateral inguinal hernia 2021    Left lower quadrant abdominal pain 2021    History of bilateral inguinal hernia repair 2021    Cancer of overlapping sites of bladder ([facility] 2021    Overgrown toenails 2021    Atrial fibrillation ([facility] ) 2021    Encounter to discuss test results 10/12/2021    Acute on chronic diastolic congestive heart failure ([facility] ) 2020    DDD (degenerative disc disease), lumbar 10/30/2019    Medicare annual wellness visit, subsequent 2019    Tinnitus aurium, bilateral 2019    Sensorineural hearing loss (SNHL) of both ears 2019    Abnormal CT of the chest 2019    COPD, severe ([facility] ) 2018    Bronchiectasis with acute lower respiratory infection ([facility] ) 2018    Localized edema 10/15/2018    Irregular heart beat     Atrial flutter ([facility] ) 10/07/2018    CKD (chronic kidney disease), stage II 2018    Urinary retention due to benign prostatic hyperplasia 2018    Bladder cancer ([facility] 2018    S/P CABG x 4 2018    Stenosis of left carotid artery 01/14/2018    GERD (gastroesophageal reflux disease) 01/13/2018    History of stroke 01/13/2018    Sciatica of right side 01/13/2018    Vision changes 01/13/2018    Hyperlipidemia     Centrilobular emphysema (HCC)     Arthritis     Back pain 01/24/2017    Acute left-sided low back pain with left-sided sciatica 10/25/2016    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 10/25/2016    CAD (coronary atherosclerotic disease) 08/13/2016    Hypertension 08/13/2016    Hyperlipemia 08/13/2016      LOS (days): 7  Geometric Mean LOS (GMLOS) (days): 4 80  Days to GMLOS:-2 1     OBJECTIVE:  PATIENT READMITTED TO HOSPITAL  Risk of Unplanned Readmission Score: 40   Bundled Patient Payment:  (The pt is not a Bundle)     Current admission status: Inpatient       [facility] Pharm[facility]:   [facility] [address]  Phone: [phone] Fax: [phone]    Primary Care Provider: Sabas Frost MD    Primary Insurance: MEDICARE  Secondary Insurance: [facility]    ASSESSMENT:  Active Health Care Agents    There are no active Health Care Agents on file  Obs Notice Signed:  (The pt is not OBS)    Readmission Root Cause  30 Day Readmission: No (The pt is not a 30 day re-admit)    Patient Information  Admitted from[de-identified] Home  Mental Status: Confused  During Assessment patient was accompanied by: Daughter  Assessment information provided by[de-identified] Daughter  Primary Caregiver: Family  Caregiver's Name[de-identified] Yaima Caban Relationship to Patient[de-identified] Family Member  Caregiver's Telephone Number[de-identified] [phone]  Support Systems: Family members  [place] of Residence: [place] [address] [address]   Select all that apply : No steps to enter home  Type of Current Residence: 2 story home  Upon entering residence, is there a bedroom on the main floor (no further steps)?: Yes  Upon entering residence, is there a bathroom on the main floor (no further steps)?: Yes  In the last 12 months, was there a time when you were not able to pay the mortgage or rent on time?: No  In the last 12 months, how many places have you lived?: 1  In the last 12 months, was there a time when you did not have a steady place to sleep or slept in a shelter (including now)?: No  Homeless/housing insecurity resource given?: N/A  Living Arrangements: Lives w/ Daughter  Is patient a ?: No    Activities of Daily Living Prior to Admission  Functional Status: Assistance  Completes ADLs independently?: No  Level of ADL dependence: Assistance  Ambulates independently?: No  Level of ambulatory dependence: Assistance  Does patient use assisted devices?: Yes  Assisted Devices (DME) used: Rollator,Stair Chair/Glide,Other (Comment) (scooter)  Does patient currently own DME?: Yes  What DME does the patient currently own?: Rollator,Stair Chair/Glide,Other (Comment) (scooter)  Does patient have a history of Outpatient Therapy (PT/OT)?: No  Does the patient have a history of Short-Term Rehab?: No  Does patient have a history of HHC?: Yes ([facility])  Does patient currently have [place] [address]?: Yes    Current Home Health Care  Type of Current Home Care Services: Home PT,Home OT,Nurse visit  Current Home Health Agency[de-identified] [facility] Provider[de-identified] PCP    Patient Information Continued  Income Source: Pension/[employer]  Does patient have prescription coverage?: Yes  Within the past 12 months, you worried that your food would run out before you got the money to buy more : Never true  Within the past 12 months, the food you bought just didnt last and you didnt have money to get more : Never true  Food insecurity resource given?: N/A  Does patient receive dialysis treatments?: No  Does patient have a history of substance abuse?: No  Does patient have a history of Mental Health Diagnosis?: No    PHQ 2/9 Screening   Reviewed PHQ 2/9 Depression Screening Score?: Yes    Means of Transportation  Means of Transport to Appts[de-identified] Family transport  In the past 12 months, has lack of transportation kept you from medical appointments or from getting medications?: No  In the past 12 months, has lack of transportation kept you from meetings, work, or from getting things needed for daily living?: No  Was application for public transport provided?: N/A        DISCHARGE DETAILS:    Discharge planning discussed with[de-identified] pt and his dtr  Freedom of Choice: Yes  Comments - Freedom of Choice: The pt dtr and grand dtr state that the pt will need STR and maybe LTC because he is alone for several hours during the day when they leave for work  The pt dtr states that he may need LTC because he yells at her and takes out his frustrations on her  Verbally and not physically  The pt grand dtr has indicated that the pt will not be having any chemo therapy even with the potential for further cancer dx  She has asked for Paliative care to be consulted and both the dtr and grand dtr are agreeable to a family meeting  There has been a consult placed to neuro psych for the pt  The meeting can be held after the assessment is conducted  CM contacted family/caregiver?: Yes  Were Treatment Team discharge recommendations reviewed with patient/caregiver?: Yes  Did patient/caregiver verbalize understanding of patient care needs?: Yes  Were patient/caregiver advised of the risks associated with not following Treatment Team discharge recommendations?: Yes    Contacts  Patient Contacts: Kay Child  Relationship to Patient[de-identified] Family  Contact Method: Phone  Phone Number: [phone]  Reason/Outcome: Discharge [address]         Is the patient interested in Kaitlyn Ahumada at discharge?: No    DME Referral Provided  Referral made for DME?: No    Other Referral/Resources/Interventions Provided:  Interventions: SNF  Referral Comments:  The pt is active with [facility] but will need Str at dc due to 6 weeks of IV abx         Treatment Team Recommendation: SNF  Discharge Destination Plan[de-identified] SNF

## 2022-02-12 PROCEDURE — 99232 SBSQ HOSP IP/OBS MODERATE 35: CPT | Performed by: NURSE PRACTITIONER

## 2022-02-12 RX ADMIN — FERROUS SULFATE TAB 325 MG (65 MG ELEMENTAL FE) 325 MG: 325 (65 FE) TAB at 08:04

## 2022-02-12 RX ADMIN — LIDOCAINE 5% 1 PATCH: 700 PATCH TOPICAL at 08:03

## 2022-02-12 RX ADMIN — PANTOPRAZOLE SODIUM 40 MG: 40 TABLET, DELAYED RELEASE ORAL at 08:03

## 2022-02-12 RX ADMIN — CEFEPIME HYDROCHLORIDE 2000 MG: 2 INJECTION, POWDER, FOR SOLUTION INTRAVENOUS at 17:30

## 2022-02-12 RX ADMIN — HEPARIN SODIUM 5000 UNITS: 5000 INJECTION INTRAVENOUS; SUBCUTANEOUS at 21:10

## 2022-02-12 RX ADMIN — HEPARIN SODIUM 5000 UNITS: 5000 INJECTION INTRAVENOUS; SUBCUTANEOUS at 05:24

## 2022-02-12 RX ADMIN — DICLOFENAC SODIUM 2 G: 10 GEL TOPICAL at 17:30

## 2022-02-12 RX ADMIN — DILTIAZEM HYDROCHLORIDE 240 MG: 240 CAPSULE, COATED, EXTENDED RELEASE ORAL at 08:03

## 2022-02-12 RX ADMIN — SENNOSIDES AND DOCUSATE SODIUM 2 TABLET: 50; 8.6 TABLET ORAL at 08:03

## 2022-02-12 RX ADMIN — FERROUS SULFATE TAB 325 MG (65 MG ELEMENTAL FE) 325 MG: 325 (65 FE) TAB at 17:30

## 2022-02-12 RX ADMIN — ATORVASTATIN CALCIUM 40 MG: 40 TABLET, FILM COATED ORAL at 08:03

## 2022-02-12 RX ADMIN — SENNOSIDES AND DOCUSATE SODIUM 2 TABLET: 50; 8.6 TABLET ORAL at 17:29

## 2022-02-12 RX ADMIN — CEFEPIME HYDROCHLORIDE 2000 MG: 2 INJECTION, POWDER, FOR SOLUTION INTRAVENOUS at 05:28

## 2022-02-12 RX ADMIN — ACETAMINOPHEN 975 MG: 325 TABLET, FILM COATED ORAL at 21:09

## 2022-02-12 RX ADMIN — ACETAMINOPHEN 975 MG: 325 TABLET, FILM COATED ORAL at 05:24

## 2022-02-12 RX ADMIN — ACETAMINOPHEN 975 MG: 325 TABLET, FILM COATED ORAL at 13:17

## 2022-02-12 RX ADMIN — HEPARIN SODIUM 5000 UNITS: 5000 INJECTION INTRAVENOUS; SUBCUTANEOUS at 13:17

## 2022-02-12 RX ADMIN — POLYETHYLENE GLYCOL 3350 17 G: 17 POWDER, FOR SOLUTION ORAL at 08:03

## 2022-02-12 NOTE — ASSESSMENT & PLAN NOTE
· CT head negative for acute process  · Not hypoglycemic, see hyponatremia as below  · Possibly in the setting of sepsis/UTI as above vs systemic chemotherapy vs outpatient narcotic use   · Narcotics and benzos prescribed OP on hold; had received IM Zyprexa X1  · Delirium precautions   · Supportive measures   · Stable, "feisty" but appears at baseline  · Neuropsych evaluation requested to evaluate for capacity; patient refused to participate on 2/11

## 2022-02-12 NOTE — ASSESSMENT & PLAN NOTE
· Brought in by Granddaughter who reported several days of progressive confusion and weakness and was instructed to take patient to ED by infusion staff for these symptoms  · Met sepsis criteria on admission, as evidenced by leukocytosis, hyperthermia  Lactic acid 2 4  · UA suggestive of UTI; urine and blood cultures + for pseudomonas   · Source of sepsis related to right-sided cardiac vegetation/pyelonephritis  · Infectious disease consulted; input as noted below  · Port-A-Cath removed 2/7   · RENEE completed on 2/9; noted to have right-sided vegetation  Patient is not a surgical candidate  · Continue Cefepime 2 gm every 12 hours  · Plan for PICC line once cultures are negative   Will need 6 weeks IV antibiotics  · Repeat blood cultures from 2/8 negative x 72 hrs  · New blood cultures collected on 2/10, negative x 24 hrs  · Of note, previously discussed replacement of Port-A-Cath with another Port-A-Cath versus PICC with primary hematologist (Dr Edwin Lloyd) oncologist and cleared to put in PICC following clearance of blood cultures

## 2022-02-12 NOTE — PLAN OF CARE
Problem: Potential for Falls  Goal: Patient will remain free of falls  Description: INTERVENTIONS:  - Educate patient/family on patient safety including physical limitations  - Instruct patient to call for assistance with activity   - Consult OT/PT to assist with strengthening/mobility   - Keep Call bell within reach  - Keep bed low and locked with side rails adjusted as appropriate  - Keep care items and personal belongings within reach  - Initiate and maintain comfort rounds  - Make Fall Risk Sign visible to staff  - Offer Toileting every 2 Hours, in advance of need  - Initiate/Maintain bed alarm  - Obtain necessary fall risk management equipment: chair alarm  - Apply yellow socks and bracelet for high fall risk patients  - Consider moving patient to room near nurses station  Outcome: Progressing     Problem: MOBILITY - ADULT  Goal: Maintain or return to baseline ADL function  Description: INTERVENTIONS:  - Educate patient/family on patient safety including physical limitations  - Instruct patient to call for assistance with activity   - Consult OT/PT to assist with strengthening/mobility   - Keep Call bell within reach  - Keep bed low and locked with side rails adjusted as appropriate  - Keep care items and personal belongings within reach  - Initiate and maintain comfort rounds  - Make Fall Risk Sign visible to staff  - Offer Toileting every 2 Hours, in advance of need  - Initiate/Maintain bed alarm  - Obtain necessary fall risk management equipment: chair alarm  - Apply yellow socks and bracelet for high fall risk patients  - Consider moving patient to room near nurses station  Outcome: Progressing  Goal: Maintains/Returns to pre admission functional level  Description: INTERVENTIONS:  -  Assess patient's ability to carry out ADLs; assess patient's baseline for ADL function and identify physical deficits which impact ability to perform ADLs (bathing, care of mouth/teeth, toileting, grooming, dressing, etc )  - Assess/evaluate cause of self-care deficits   - Assess range of motion  - Assess patient's mobility; develop plan if impaired  - Assess patient's need for assistive devices and provide as appropriate  - Encourage maximum independence but intervene and supervise when necessary  - Involve family in performance of ADLs  - Assess for home care needs following discharge   - Consider OT consult to assist with ADL evaluation and planning for discharge  - Provide patient education as appropriate  Outcome: Progressing     Problem: PAIN - ADULT  Goal: Verbalizes/displays adequate comfort level or baseline comfort level  Description: Interventions:  - Encourage patient to monitor pain and request assistance  - Assess pain using appropriate pain scale  - Administer analgesics based on type and severity of pain and evaluate response  - Implement non-pharmacological measures as appropriate and evaluate response  - Consider cultural and social influences on pain and pain management  - Notify physician/advanced practitioner if interventions unsuccessful or patient reports new pain  Outcome: Progressing     Problem: INFECTION - ADULT  Goal: Absence or prevention of progression during hospitalization  Description: INTERVENTIONS:  - Assess and monitor for signs and symptoms of infection  - Monitor lab/diagnostic results  - Monitor all insertion sites, i e  indwelling lines, tubes, and drains  - Monitor endotracheal if appropriate and nasal secretions for changes in amount and color  - [place] appropriate cooling/warming therapies per order  - Administer medications as ordered  - Instruct and encourage patient and family to use good hand hygiene technique  - Identify and instruct in appropriate isolation precautions for identified infection/condition  Outcome: Progressing  Goal: Absence of fever/infection during neutropenic period  Description: INTERVENTIONS:  - Monitor WBC    Outcome: Progressing     Problem: SAFETY ADULT  Goal: Patient will remain free of falls  Description: INTERVENTIONS:  - Educate patient/family on patient safety including physical limitations  - Instruct patient to call for assistance with activity   - Consult OT/PT to assist with strengthening/mobility   - Keep Call bell within reach  - Keep bed low and locked with side rails adjusted as appropriate  - Keep care items and personal belongings within reach  - Initiate and maintain comfort rounds  - Make Fall Risk Sign visible to staff  - Offer Toileting every 2 Hours, in advance of need  - Initiate/Maintain bed alarm  - Obtain necessary fall risk management equipment: chair alarm  - Apply yellow socks and bracelet for high fall risk patients  - Consider moving patient to room near nurses station  Outcome: Progressing  Goal: Maintain or return to baseline ADL function  Description: INTERVENTIONS:  - Educate patient/family on patient safety including physical limitations  - Instruct patient to call for assistance with activity   - Consult OT/PT to assist with strengthening/mobility   - Keep Call bell within reach  - Keep bed low and locked with side rails adjusted as appropriate  - Keep care items and personal belongings within reach  - Initiate and maintain comfort rounds  - Make Fall Risk Sign visible to staff  - Offer Toileting every 2 Hours, in advance of need  - Initiate/Maintain bed alarm  - Obtain necessary fall risk management equipment: chair alarm  - Apply yellow socks and bracelet for high fall risk patients  - Consider moving patient to room near nurses station  Outcome: Progressing  Goal: Maintains/Returns to pre admission functional level  Description: INTERVENTIONS:  -  Assess patient's ability to carry out ADLs; assess patient's baseline for ADL function and identify physical deficits which impact ability to perform ADLs (bathing, care of mouth/teeth, toileting, grooming, dressing, etc )  - Assess/evaluate cause of self-care deficits   - Assess range of motion  - Assess patient's mobility; develop plan if impaired  - Assess patient's need for assistive devices and provide as appropriate  - Encourage maximum independence but intervene and supervise when necessary  - Involve family in performance of ADLs  - Assess for home care needs following discharge   - Consider OT consult to assist with ADL evaluation and planning for discharge  - Provide patient education as appropriate  Outcome: Progressing     Problem: DISCHARGE PLANNING  Goal: Discharge to home or other facility with appropriate resources  Description: INTERVENTIONS:  - Identify barriers to discharge w/patient and caregiver  - Arrange for needed discharge resources and transportation as appropriate  - Identify discharge learning needs (meds, wound care, etc )  - Arrange for interpretive services to assist at discharge as needed  - Refer to Case Management Department for coordinating discharge planning if the patient needs post-hospital services based on physician/advanced practitioner order or complex needs related to functional status, cognitive ability, or social support system  Outcome: Progressing     Problem: Knowledge Deficit  Goal: Patient/family/caregiver demonstrates understanding of disease process, treatment plan, medications, and discharge instructions  Description: Complete learning assessment and assess knowledge base    Interventions:  - Provide teaching at level of understanding  - Provide teaching via preferred learning methods  Outcome: Progressing     Problem: GENITOURINARY - ADULT  Goal: Urinary catheter remains patent  Description: INTERVENTIONS:  - Assess patency of urinary catheter  - If patient has a chronic archibald, consider changing catheter if non-functioning  - Follow guidelines for intermittent irrigation of non-functioning urinary catheter  Outcome: Progressing     Problem: SKIN/TISSUE INTEGRITY - ADULT  Goal: Incision(s), wounds(s) or drain site(s) healing without S/S of infection  Description: INTERVENTIONS  - Assess and document dressing, incision, wound bed, drain sites and surrounding tissue  - Provide patient and family education  - Perform skin care/dressing changes every as ordered  Outcome: Progressing     Problem: HEMATOLOGIC - ADULT  Goal: Maintains hematologic stability  Description: INTERVENTIONS  - Assess for signs and symptoms of bleeding or hemorrhage  - Monitor labs  - Administer supportive blood products/factors as ordered and appropriate  Outcome: Progressing     Problem: Prexisting or High Potential for Compromised Skin Integrity  Goal: Skin integrity is maintained or improved  Description: INTERVENTIONS:  - Identify patients at risk for skin breakdown  - Assess and monitor skin integrity  - Assess and monitor nutrition and hydration status  - Monitor labs   - Assess for incontinence   - Turn and reposition patient  - Assist with mobility/ambulation  - Relieve pressure over bony prominences  - Avoid friction and shearing  - Provide appropriate hygiene as needed including keeping skin clean and dry  - Evaluate need for skin moisturizer/barrier cream  - Collaborate with interdisciplinary team   - Patient/family teaching  - Consider wound care consult   Outcome: Progressing     Problem: Potential for Falls  Goal: Patient will remain free of falls  Description: INTERVENTIONS:  - Educate patient/family on patient safety including physical limitations  - Instruct patient to call for assistance with activity   - Consult OT/PT to assist with strengthening/mobility   - Keep Call bell within reach  - Keep bed low and locked with side rails adjusted as appropriate  - Keep care items and personal belongings within reach  - Initiate and maintain comfort rounds  - Make Fall Risk Sign visible to staff  - Offer Toileting every 2 Hours, in advance of need  - Initiate/Maintain bed alarm  - Obtain necessary fall risk management equipment: chair alarm  - Apply yellow socks and bracelet for high fall risk patients  - Consider moving patient to room near nurses station  Outcome: Progressing     Problem: MOBILITY - ADULT  Goal: Maintain or return to baseline ADL function  Description: INTERVENTIONS:  - Educate patient/family on patient safety including physical limitations  - Instruct patient to call for assistance with activity   - Consult OT/PT to assist with strengthening/mobility   - Keep Call bell within reach  - Keep bed low and locked with side rails adjusted as appropriate  - Keep care items and personal belongings within reach  - Initiate and maintain comfort rounds  - Make Fall Risk Sign visible to staff  - Offer Toileting every 2 Hours, in advance of need  - Initiate/Maintain bed alarm  - Obtain necessary fall risk management equipment: chair alarm  - Apply yellow socks and bracelet for high fall risk patients  - Consider moving patient to room near nurses station  Outcome: Progressing  Goal: Maintains/Returns to pre admission functional level  Description: INTERVENTIONS:  -  Assess patient's ability to carry out ADLs; assess patient's baseline for ADL function and identify physical deficits which impact ability to perform ADLs (bathing, care of mouth/teeth, toileting, grooming, dressing, etc )  - Assess/evaluate cause of self-care deficits   - Assess range of motion  - Assess patient's mobility; develop plan if impaired  - Assess patient's need for assistive devices and provide as appropriate  - Encourage maximum independence but intervene and supervise when necessary  - Involve family in performance of ADLs  - Assess for home care needs following discharge   - Consider OT consult to assist with ADL evaluation and planning for discharge  - Provide patient education as appropriate  Outcome: Progressing     Problem: PAIN - ADULT  Goal: Verbalizes/displays adequate comfort level or baseline comfort level  Description: Interventions:  - Encourage patient to monitor pain and request assistance  - Assess pain using appropriate pain scale  - Administer analgesics based on type and severity of pain and evaluate response  - Implement non-pharmacological measures as appropriate and evaluate response  - Consider cultural and social influences on pain and pain management  - Notify physician/advanced practitioner if interventions unsuccessful or patient reports new pain  Outcome: Progressing     Problem: INFECTION - ADULT  Goal: Absence or prevention of progression during hospitalization  Description: INTERVENTIONS:  - Assess and monitor for signs and symptoms of infection  - Monitor lab/diagnostic results  - Monitor all insertion sites, i e  indwelling lines, tubes, and drains  - Monitor endotracheal if appropriate and nasal secretions for changes in amount and color  - [place] appropriate cooling/warming therapies per order  - Administer medications as ordered  - Instruct and encourage patient and family to use good hand hygiene technique  - Identify and instruct in appropriate isolation precautions for identified infection/condition  Outcome: Progressing  Goal: Absence of fever/infection during neutropenic period  Description: INTERVENTIONS:  - Monitor WBC    Outcome: Progressing     Problem: SAFETY ADULT  Goal: Patient will remain free of falls  Description: INTERVENTIONS:  - Educate patient/family on patient safety including physical limitations  - Instruct patient to call for assistance with activity   - Consult OT/PT to assist with strengthening/mobility   - Keep Call bell within reach  - Keep bed low and locked with side rails adjusted as appropriate  - Keep care items and personal belongings within reach  - Initiate and maintain comfort rounds  - Make Fall Risk Sign visible to staff  - Offer Toileting every 2 Hours, in advance of need  - Initiate/Maintain bed alarm  - Obtain necessary fall risk management equipment: chair alarm  - Apply yellow socks and bracelet for high fall risk patients  - Consider moving patient to room near nurses station  Outcome: Progressing  Goal: Maintain or return to baseline ADL function  Description: INTERVENTIONS:  - Educate patient/family on patient safety including physical limitations  - Instruct patient to call for assistance with activity   - Consult OT/PT to assist with strengthening/mobility   - Keep Call bell within reach  - Keep bed low and locked with side rails adjusted as appropriate  - Keep care items and personal belongings within reach  - Initiate and maintain comfort rounds  - Make Fall Risk Sign visible to staff  - Offer Toileting every 2 Hours, in advance of need  - Initiate/Maintain bed alarm  - Obtain necessary fall risk management equipment: chair alarm  - Apply yellow socks and bracelet for high fall risk patients  - Consider moving patient to room near nurses station  Outcome: Progressing  Goal: Maintains/Returns to pre admission functional level  Description: INTERVENTIONS:  -  Assess patient's ability to carry out ADLs; assess patient's baseline for ADL function and identify physical deficits which impact ability to perform ADLs (bathing, care of mouth/teeth, toileting, grooming, dressing, etc )  - Assess/evaluate cause of self-care deficits   - Assess range of motion  - Assess patient's mobility; develop plan if impaired  - Assess patient's need for assistive devices and provide as appropriate  - Encourage maximum independence but intervene and supervise when necessary  - Involve family in performance of ADLs  - Assess for home care needs following discharge   - Consider OT consult to assist with ADL evaluation and planning for discharge  - Provide patient education as appropriate  Outcome: Progressing     Problem: DISCHARGE PLANNING  Goal: Discharge to home or other facility with appropriate resources  Description: INTERVENTIONS:  - Identify barriers to discharge w/patient and caregiver  - Arrange for needed discharge resources and transportation as appropriate  - Identify discharge learning needs (meds, wound care, etc )  - Arrange for interpretive services to assist at discharge as needed  - Refer to Case Management Department for coordinating discharge planning if the patient needs post-hospital services based on physician/advanced practitioner order or complex needs related to functional status, cognitive ability, or social support system  Outcome: Progressing     Problem: Knowledge Deficit  Goal: Patient/family/caregiver demonstrates understanding of disease process, treatment plan, medications, and discharge instructions  Description: Complete learning assessment and assess knowledge base    Interventions:  - Provide teaching at level of understanding  - Provide teaching via preferred learning methods  Outcome: Progressing     Problem: GENITOURINARY - ADULT  Goal: Urinary catheter remains patent  Description: INTERVENTIONS:  - Assess patency of urinary catheter  - If patient has a chronic archibald, consider changing catheter if non-functioning  - Follow guidelines for intermittent irrigation of non-functioning urinary catheter  Outcome: Progressing     Problem: SKIN/TISSUE INTEGRITY - ADULT  Goal: Incision(s), wounds(s) or drain site(s) healing without S/S of infection  Description: INTERVENTIONS  - Assess and document dressing, incision, wound bed, drain sites and surrounding tissue  - Provide patient and family education  - Perform skin care/dressing changes every as ordered  Outcome: Progressing     Problem: HEMATOLOGIC - ADULT  Goal: Maintains hematologic stability  Description: INTERVENTIONS  - Assess for signs and symptoms of bleeding or hemorrhage  - Monitor labs  - Administer supportive blood products/factors as ordered and appropriate  Outcome: Progressing     Problem: Prexisting or High Potential for Compromised Skin Integrity  Goal: Skin integrity is maintained or improved  Description: INTERVENTIONS:  - Identify patients at risk for skin breakdown  - Assess and monitor skin integrity  - Assess and monitor nutrition and hydration status  - Monitor labs   - Assess for incontinence   - Turn and reposition patient  - Assist with mobility/ambulation  - Relieve pressure over bony prominences  - Avoid friction and shearing  - Provide appropriate hygiene as needed including keeping skin clean and dry  - Evaluate need for skin moisturizer/barrier cream  - Collaborate with interdisciplinary team   - Patient/family teaching  - Consider wound care consult   Outcome: Progressing

## 2022-02-12 NOTE — PROGRESS NOTES
[address] [facility]  Progress Note - Haroon Jackson 1940, 80 y o  male MRN: [id number]  Unit/Bed#: -Mitch Encounter: [id number]  Primary Care Provider: Wesley Potter MD   Date and time admitted to hospital: 2/4/2022  5:41 PM    * Sepsis due to Pseudomonas aeruginosa [facility])  Assessment & Plan  · Brought in by Granddaughter who reported several days of progressive confusion and weakness and was instructed to take patient to ED by infusion staff for these symptoms  · Met sepsis criteria on admission, as evidenced by leukocytosis, hyperthermia  Lactic acid 2 4  · UA suggestive of UTI; urine and blood cultures + for pseudomonas   · Source of sepsis related to right-sided cardiac vegetation/pyelonephritis  · Infectious disease consulted; input as noted below  · Port-A-Cath removed 2/7   · RENEE completed on 2/9; noted to have right-sided vegetation  Patient is not a surgical candidate  · Continue Cefepime 2 gm every 12 hours  · Plan for PICC line once cultures are negative   Will need 6 weeks IV antibiotics  · Repeat blood cultures from 2/8 negative x 72 hrs  · New blood cultures collected on 2/10, negative x 24 hrs  · Of note, previously discussed replacement of Port-A-Cath with another Port-A-Cath versus PICC with primary hematologist (Dr Barrera Wan) oncologist and cleared to put in PICC following clearance of blood cultures    Metabolic encephalopathy  Assessment & Plan  · CT head negative for acute process  · Not hypoglycemic, see hyponatremia as below  · Possibly in the setting of sepsis/UTI as above vs systemic chemotherapy vs outpatient narcotic use   · Narcotics and benzos prescribed OP on hold; had received IM Zyprexa X1  · Delirium precautions   · Supportive measures   · Stable, "feisty" but appears at baseline  · Neuropsych evaluation requested to evaluate for capacity; patient refused to participate on 2/11    Anemia  Assessment & Plan  · Appears to be chronic issue with baseline around 8-9  · Did have blood in archibald bag, now resolved  · Would hold off on transfusion unless Hgb <7   · Monitor closely given supratherapeutic INR initially   · Continue iron supplement given FRANCY    Archibald catheter in place prior to arrival  Assessment & Plan  · With archibald catheter and bilat perc nephrostomy tube in the setting of bladder CA as above   · Both producing urine   · Archibald was replaced on day of admission, malpositioned with balloon in urethra, since repositioned   · Continue archibald and nephrostomy care     Stage 3a chronic kidney disease [facility])  Assessment & Plan  Lab Results   Component Value Date    EGFR 59 02/11/2022    EGFR 62 02/09/2022    EGFR 57 02/08/2022    CREATININE 1 15 02/11/2022    CREATININE 1 10 02/09/2022    CREATININE 1 18 02/08/2022     · Stable; appears to be improving from recent DULCE, recent baseline difficult to establish  · Avoid nephrotoxic agents  · Discontinued IVF on 2/5    Atrial fibrillation (HCC)  Assessment & Plan  · Currently rate controlled  · Continue home Cardizem, monitor with routine vitals   · Continue with SQ Heparin for now; Coumadin on hold for IR biopsy    Recent Labs     02/10/22  0459   INR 1 13       COPD, severe (HCC)  Assessment & Plan  · 96-99% O2 RA; no SOB complaints  · Stable    Bladder cancer ([facility] [address] )  Assessment & Plan  · 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, diagnosed 10/12/2021  · Follows with heme onc and urology as an outpatient  · Receives chemo and radiation; last received on day of admission  · Continue outpatient heme onc and urology f/u upon discharge   · CT c/a/p from 2/6 unfortunately shows possible new right apical pleural-based mass  · IR biopsy pending, likely Monday  · Oncology consult placed, appreciate input    Back pain  Assessment & Plan  · Presented complaining of back pain;  · Prescribed Oxycodone 5mg q6h prn as outpatient  · Continue Oxycodone; geriatric dosing  · 2 5 mg every 6 hours as needed for moderate pain  · 5 mg every 6 hours as needed for severe pain  · Voltaren gel to back  · Change Tylenol to 975 mg every 8 hours ATC  · Aqua K  · ID recommending MRI Lumbar/Thoracic Spine to rule out osteomyelitis, ordered, pending for today  Hypertension  Assessment & Plan  · Blood pressure acceptable with SBP in the 100-130s   · Continue home Cardizem  · Monitor BP per unit protocol    CAD (coronary atherosclerotic disease)  Assessment & Plan  · S/p CABG x4  · Denies chest pain; Troponin 37  · Continue home ASA and statin    VTE Pharmacologic Prophylaxis: VTE Score: 6 High Risk (Score >/= 5) - Pharmacological DVT Prophylaxis Ordered: heparin  Sequential Compression Devices Ordered  Patient Centered Rounds: I performed bedside rounds with nursing staff today  Discussions with Specialists or Other Care Team Provider:     Time Spent for Care: 20 minutes  More than 50% of total time spent on counseling and coordination of care as described above  Current Length of Stay: 8 day(s)  Current Patient Status: Inpatient   Certification Statement: The patient will continue to require additional inpatient hospital stay due to ongoing treatment in setting of need for PICC line placement for 6 wks of antibiotics  Discharge Plan: Anticipate discharge in 48-72 hrs to discharge location to be determined pending rehab evaluations  Code Status: Level 1 - Full Code    Subjective:   CC: "I'm doing fine"    Patient resting in the recliner chair, playing solitaire on his phone  Denies complaints of pain at this time, denies complaints of shortness of breath, fever, or chills  Feels well overall  Discussed that his granddaughter will be here later today        Objective:     Vitals:   Temp (24hrs), Av 3 °F (36 8 °C), Min:97 7 °F (36 5 °C), Max:98 9 °F (37 2 °C)    Temp:  [97 7 °F (36 5 °C)-98 9 °F (37 2 °C)] 98 9 °F (37 2 °C)  HR:  [72] 72  Resp:  [17-19] 17  BP: (113-121)/(56-59) 113/56  SpO2:  [99 %] 99 %  Body mass index is 27 75 kg/m²  Input and Output Summary (last 24 hours): Intake/Output Summary (Last 24 hours) at 2/12/2022 1104  Last data filed at 2/12/2022 0901  Gross per 24 hour   Intake 740 ml   Output 1570 ml   Net -830 ml       Physical Exam:   Physical Exam  Vitals and nursing note reviewed  Constitutional:       General: He is not in acute distress  Cardiovascular:      Rate and Rhythm: Normal rate  Pulses: Normal pulses  Heart sounds: Normal heart sounds  Pulmonary:      Effort: Pulmonary effort is normal       Breath sounds: Normal breath sounds  Abdominal:      General: Bowel sounds are normal       Palpations: Abdomen is soft  Musculoskeletal:         General: Normal range of motion  Right lower leg: No edema  Left lower leg: No edema  Skin:     General: Skin is warm and dry  Capillary Refill: Capillary refill takes less than 2 seconds  Neurological:      Mental Status: He is alert  Mental status is at baseline  Psychiatric:         Mood and Affect: Affect is labile  Additional Data:     Labs:  Results from last 7 days   Lab Units 02/11/22 0443 02/07/22  0912 02/07/22  0705   WBC Thousand/uL 3 71*   < > 4 01*   HEMOGLOBIN g/dL 7 4*   < > 6 7*   HEMATOCRIT % 26 1*   < > 22 6*   PLATELETS Thousands/uL 235   < > 216   NEUTROS PCT %  --   --  80*   LYMPHS PCT %  --   --  9*   MONOS PCT %  --   --  5   EOS PCT %  --   --  3    < > = values in this interval not displayed       Results from last 7 days   Lab Units 02/11/22  0443   SODIUM mmol/L 137   POTASSIUM mmol/L 3 7   CHLORIDE mmol/L 105   CO2 mmol/L 24   BUN mg/dL 12   CREATININE mg/dL 1 15   ANION GAP mmol/L 8   CALCIUM mg/dL 8 0*   GLUCOSE RANDOM mg/dL 94     Results from last 7 days   Lab Units 02/10/22  0459   INR  1 13     Results from last 7 days   Lab Units 02/09/22  2135 02/09/22  1614   POC GLUCOSE mg/dl 101 107         Results from last 7 days   Lab Units 02/07/22  0705 02/06/22  0453   PROCALCITONIN ng/ml 4 95* 5 32*       Lines/Drains:  Invasive Devices  Report    Peripheral Intravenous Line            Peripheral IV 02/10/22 Right;Ventral (anterior) Forearm 1 day          Line            Pump Device Continuous ambulatory delivery device Right Chest 12 days          Drain            Urethral Catheter Non-latex 16 Fr  45 days    Percutaneous Nephroureteral Tube (PCNU) Left 1 8 5 Fr 26 Cm 24 days    Percutaneous Nephroureteral Tube (PCNU) Right 2 8 5 Fr 24 Cm 24 days              Urinary Catheter:  Goal for removal: N/A- Discharging with Fortune               Imaging: No pertinent imaging reviewed  Recent Cultures (last 7 days):   Results from last 7 days   Lab Units 02/10/22  0453 02/08/22  0504 02/06/22  1000   BLOOD CULTURE  No Growth at 48 hrs  No Growth at 48 hrs  No Growth After 4 Days  No Growth After 4 Days  No Growth After 5 Days    Pseudomonas aeruginosa*   GRAM STAIN RESULT   --   --  Gram negative rods*       Last 24 Hours Medication List:   Current Facility-Administered Medications   Medication Dose Route Frequency Provider Last Rate    acetaminophen  975 mg Oral Q8H [address] MYLENE Santoyo      aluminum-magnesium hydroxide-simethicone  30 mL Oral Q4H PRN MYLENE Munguia      aspirin  81 mg Oral Daily Veldon Mat, [address]      atorvastatin  40 mg Oral Daily [place], [place]      bisacodyl  10 mg Rectal Daily PRN MYLENE Clay      cefepime  2,000 mg Intravenous Q12H [facility] 2,000 mg (02/12/22 0528)    Diclofenac Sodium  2 g Topical 4x Daily Maribel SafeMYLENE      diltiazem  240 mg Oral Daily [place], [place]      ferrous sulfate  325 mg Oral BID With Meals Linden Uriarte PA-C      heparin (porcine)  5,000 Units Subcutaneous Q8H [address] MYLENE Boyer      lidocaine  1 patch Topical Daily Payal Serrato PA-C      lidocaine (PF)  0 5 mL Infiltration Once PRN David Torres MD      ondansetron  4 mg Intravenous Q6H PRN Ariel Trujillo PA-C      oxyCODONE  2 5 mg Oral Q6H PRN MYLENE Houston      oxyCODONE  5 mg Oral Q6H PRN MYLENE Houston      pantoprazole  40 mg Oral QAM Dar[place] [facility] [place] GABRIEL peoples      polyethylene glycol  17 g Oral Daily MYLENE Clay      senna-docusate sodium  2 tablet Oral BID [facility] gotti      sodium phosphate-biphosphate  1 enema Rectal Once PRN MYLENE Clay          Today, Patient Was Seen By: MYLENE Houston    **Please Note: This note may have been constructed using a voice recognition system  **

## 2022-02-12 NOTE — ASSESSMENT & PLAN NOTE
· Currently rate controlled  · Continue home Cardizem, monitor with routine vitals   · Continue with SQ Heparin for now; Coumadin on hold for IR biopsy    Recent Labs     02/10/22  0459   INR 1 13

## 2022-02-13 LAB
ANION GAP SERPL CALCULATED.3IONS-SCNC: 9 MMOL/L (ref 4–13)
BACTERIA BLD CULT: NORMAL
BACTERIA BLD CULT: NORMAL
BUN SERPL-MCNC: 14 MG/DL (ref 5–25)
CALCIUM SERPL-MCNC: 8.2 MG/DL (ref 8.3–10.1)
CHLORIDE SERPL-SCNC: 106 MMOL/L (ref 100–108)
CO2 SERPL-SCNC: 26 MMOL/L (ref 21–32)
CREAT SERPL-MCNC: 1.14 MG/DL (ref 0.6–1.3)
ERYTHROCYTE [DISTWIDTH] IN BLOOD BY AUTOMATED COUNT: 28.3 % (ref 11.6–15.1)
GFR SERPL CREATININE-BSD FRML MDRD: 59 ML/MIN/1.73SQ M
GLUCOSE SERPL-MCNC: 113 MG/DL (ref 65–140)
HCT VFR BLD AUTO: 27.9 % (ref 36.5–49.3)
HGB BLD-MCNC: 8 G/DL (ref 12–17)
MCH RBC QN AUTO: 23.5 PG (ref 26.8–34.3)
MCHC RBC AUTO-ENTMCNC: 28.7 G/DL (ref 31.4–37.4)
MCV RBC AUTO: 82 FL (ref 82–98)
PLATELET # BLD AUTO: 228 THOUSANDS/UL (ref 149–390)
PMV BLD AUTO: 8.9 FL (ref 8.9–12.7)
POTASSIUM SERPL-SCNC: 3.8 MMOL/L (ref 3.5–5.3)
RBC # BLD AUTO: 3.4 MILLION/UL (ref 3.88–5.62)
SODIUM SERPL-SCNC: 141 MMOL/L (ref 136–145)
WBC # BLD AUTO: 4.18 THOUSAND/UL (ref 4.31–10.16)

## 2022-02-13 PROCEDURE — 99232 SBSQ HOSP IP/OBS MODERATE 35: CPT | Performed by: NURSE PRACTITIONER

## 2022-02-13 PROCEDURE — 85027 COMPLETE CBC AUTOMATED: CPT | Performed by: NURSE PRACTITIONER

## 2022-02-13 PROCEDURE — 80048 BASIC METABOLIC PNL TOTAL CA: CPT | Performed by: NURSE PRACTITIONER

## 2022-02-13 RX ADMIN — POLYETHYLENE GLYCOL 3350 17 G: 17 POWDER, FOR SOLUTION ORAL at 08:01

## 2022-02-13 RX ADMIN — SENNOSIDES AND DOCUSATE SODIUM 2 TABLET: 50; 8.6 TABLET ORAL at 17:14

## 2022-02-13 RX ADMIN — FERROUS SULFATE TAB 325 MG (65 MG ELEMENTAL FE) 325 MG: 325 (65 FE) TAB at 08:01

## 2022-02-13 RX ADMIN — CEFEPIME HYDROCHLORIDE 2000 MG: 2 INJECTION, POWDER, FOR SOLUTION INTRAVENOUS at 05:41

## 2022-02-13 RX ADMIN — PANTOPRAZOLE SODIUM 40 MG: 40 TABLET, DELAYED RELEASE ORAL at 08:01

## 2022-02-13 RX ADMIN — HEPARIN SODIUM 5000 UNITS: 5000 INJECTION INTRAVENOUS; SUBCUTANEOUS at 13:30

## 2022-02-13 RX ADMIN — ACETAMINOPHEN 975 MG: 325 TABLET, FILM COATED ORAL at 13:30

## 2022-02-13 RX ADMIN — DICLOFENAC SODIUM 2 G: 10 GEL TOPICAL at 08:02

## 2022-02-13 RX ADMIN — LIDOCAINE 5% 1 PATCH: 700 PATCH TOPICAL at 08:01

## 2022-02-13 RX ADMIN — OXYCODONE HYDROCHLORIDE 5 MG: 5 TABLET ORAL at 03:54

## 2022-02-13 RX ADMIN — FERROUS SULFATE TAB 325 MG (65 MG ELEMENTAL FE) 325 MG: 325 (65 FE) TAB at 17:14

## 2022-02-13 RX ADMIN — ATORVASTATIN CALCIUM 40 MG: 40 TABLET, FILM COATED ORAL at 08:01

## 2022-02-13 RX ADMIN — CEFEPIME HYDROCHLORIDE 2000 MG: 2 INJECTION, POWDER, FOR SOLUTION INTRAVENOUS at 17:15

## 2022-02-13 RX ADMIN — DILTIAZEM HYDROCHLORIDE 240 MG: 240 CAPSULE, COATED, EXTENDED RELEASE ORAL at 08:01

## 2022-02-13 RX ADMIN — SENNOSIDES AND DOCUSATE SODIUM 2 TABLET: 50; 8.6 TABLET ORAL at 08:01

## 2022-02-13 RX ADMIN — DICLOFENAC SODIUM 2 G: 10 GEL TOPICAL at 17:15

## 2022-02-13 RX ADMIN — ACETAMINOPHEN 975 MG: 325 TABLET, FILM COATED ORAL at 22:23

## 2022-02-13 RX ADMIN — OXYCODONE HYDROCHLORIDE 5 MG: 5 TABLET ORAL at 22:23

## 2022-02-13 NOTE — ASSESSMENT & PLAN NOTE
· Currently rate controlled  · Continue home Cardizem, monitor with routine vitals   · Continue with SQ Heparin for now; Coumadin on hold for IR biopsy    No results for input(s): INR in the last 72 hours

## 2022-02-13 NOTE — ASSESSMENT & PLAN NOTE
· Brought in by Granddaughter who reported several days of progressive confusion and weakness and was instructed to take patient to ED by infusion staff for these symptoms  · Met sepsis criteria on admission, as evidenced by leukocytosis, hyperthermia  Lactic acid 2 4  · UA suggestive of UTI; urine and blood cultures + for pseudomonas   · Source of sepsis related to right-sided cardiac vegetation/pyelonephritis  · Infectious disease consulted; input as noted below  · Port-A-Cath removed 2/7   · RENEE completed on 2/9; noted to have right-sided vegetation  Patient is not a surgical candidate  · Continue Cefepime 2 gm every 12 hours  · Plan for PICC line once cultures are negative   Will need 6 weeks IV antibiotics  · Repeat blood cultures from 2/8 negative x 4 days  · New blood cultures collected on 2/10, negative x 48 hrs  · Of note, previously discussed replacement of Port-A-Cath with another Port-A-Cath versus PICC with primary hematologist (Dr Azalea Bueno) oncologist and cleared to put in PICC following clearance of blood cultures

## 2022-02-13 NOTE — PROGRESS NOTES
[address]     Progress Note - Haroon Jackson 1940, 80 y o  male MRN: [id number]  Unit/Bed#: -Mitch Encounter: [id number]  Primary Care Provider: Wesley Potter MD   Date and time admitted to hospital: 2/4/2022  5:41 PM    * Sepsis due to Pseudomonas aeruginosa [facility])  Assessment & Plan  · Brought in by Granddaughter who reported several days of progressive confusion and weakness and was instructed to take patient to ED by infusion staff for these symptoms  · Met sepsis criteria on admission, as evidenced by leukocytosis, hyperthermia  Lactic acid 2 4  · UA suggestive of UTI; urine and blood cultures + for pseudomonas   · Source of sepsis related to right-sided cardiac vegetation/pyelonephritis  · Infectious disease consulted; input as noted below  · Port-A-Cath removed 2/7   · RENEE completed on 2/9; noted to have right-sided vegetation  Patient is not a surgical candidate  · Continue Cefepime 2 gm every 12 hours  · Plan for PICC line once cultures are negative   Will need 6 weeks IV antibiotics  · Repeat blood cultures from 2/8 negative x 4 days  · New blood cultures collected on 2/10, negative x 48 hrs  · Of note, previously discussed replacement of Port-A-Cath with another Port-A-Cath versus PICC with primary hematologist (Dr Barrera Wan) oncologist and cleared to put in PICC following clearance of blood cultures    Metabolic encephalopathy  Assessment & Plan  · CT head negative for acute process  · Not hypoglycemic, see hyponatremia as below  · Possibly in the setting of sepsis/UTI as above vs systemic chemotherapy vs outpatient narcotic use   · Narcotics and benzos prescribed OP on hold; had received IM Zyprexa X1  · Delirium precautions   · Supportive measures   · Stable, "feisty" but appears at baseline  · Neuropsych evaluation requested to evaluate for capacity; patient refused to participate on 2/11    Anemia  Assessment & Plan  · Appears to be chronic issue with baseline around 8-9  · Did have blood in archibald bag, now resolved  · Would hold off on transfusion unless Hgb <7   · Monitor closely given supratherapeutic INR initially   · Continue iron supplement given FRANCY    Archibald catheter in place prior to arrival  Assessment & Plan  · With archibald catheter and bilat perc nephrostomy tube in the setting of bladder CA as above   · Both producing urine   · Archibald was replaced on day of admission, malpositioned with balloon in urethra, since repositioned   · Continue archibald and nephrostomy care     Stage 3a chronic kidney disease [facility])  Assessment & Plan  Lab Results   Component Value Date    EGFR 59 02/13/2022    EGFR 59 02/11/2022    EGFR 62 02/09/2022    CREATININE 1 14 02/13/2022    CREATININE 1 15 02/11/2022    CREATININE 1 10 02/09/2022     · Stable; appears to be improving from recent DULCE, recent baseline difficult to establish  · Avoid nephrotoxic agents  · Discontinued IVF on 2/5    Atrial fibrillation ([facility] [address] )  Assessment & Plan  · Currently rate controlled  · Continue home Cardizem, monitor with routine vitals   · Continue with SQ Heparin for now; Coumadin on hold for IR biopsy    No results for input(s): INR in the last 72 hours      COPD, severe ([facility] [address] )  Assessment & Plan  · 96-99% O2 RA; no SOB complaints  · Stable    Bladder cancer (HCC)  Assessment & Plan  · 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, diagnosed 10/12/2021  · Follows with heme onc and urology as an outpatient  · Receives chemo and radiation; last received on day of admission  · Continue outpatient heme onc and urology f/u upon discharge   · CT c/a/p from 2/6 unfortunately shows possible new right apical pleural-based mass  · IR biopsy pending, likely Monday, 2/14  · Oncology consult placed, appreciate input    Back pain  Assessment & Plan  · Presented complaining of back pain;  · Prescribed Oxycodone 5mg q6h prn as outpatient  · Continue Oxycodone; geriatric dosing  · 2 5 mg every 6 hours as needed for moderate pain  · 5 mg every 6 hours as needed for severe pain  · Voltaren gel to back  · Change Tylenol to 975 mg every 8 hours ATC  · Aqua K  · ID recommending MRI Lumbar/Thoracic Spine to rule out osteomyelitis, ordered, pending  · Patient has been refusing/uncooperative  · Back pain resolved  Hypertension  Assessment & Plan  · Blood pressure acceptable, 110-120/50-60s  · Continue home Cardizem  · Monitor BP per unit protocol    CAD (coronary atherosclerotic disease)  Assessment & Plan  · S/p CABG x4  · Denies chest pain; Troponin 37  · Continue home ASA and statin    VTE Pharmacologic Prophylaxis: VTE Score: 6 High Risk (Score >/= 5) - Pharmacological DVT Prophylaxis Ordered: heparin  Sequential Compression Devices Ordered  Patient Centered Rounds: I performed bedside rounds with nursing staff today  Discussions with Specialists or Other Care Team Provider:     Education and Discussions with Family / Patient: Patient declined call to   Time Spent for Care: 20 minutes  More than 50% of total time spent on counseling and coordination of care as described above  Current Length of Stay: 9 day(s)  Current Patient Status: Inpatient   Certification Statement: The patient will continue to require additional inpatient hospital stay due to ongoing treatment in setting of needs PICC placed for long term abx, also needs to have IR biopsy completed;   Discharge Plan: TBD    Code Status: Level 1 - Full Code    Subjective:   Patient resting in the recliner, denies complaints of chest pain, shortness of breath, fever, or chills  Denies back pain at this time  Reviewed plan of care with patient regarding IR biopsy planned for tomorrow; patient still agreeable to this      Objective:     Vitals:   Temp (24hrs), Av 9 °F (36 6 °C), Min:97 9 °F (36 6 °C), Max:97 9 °F (36 6 °C)    Temp:  [97 9 °F (36 6 °C)] 97 9 °F (36 6 °C)  HR:  [70] 70  Resp:  [18] 18  BP: (115)/(53) 115/53  SpO2: [98 %-100 %] 98 %  Body mass index is 27 75 kg/m²  Input and Output Summary (last 24 hours): Intake/Output Summary (Last 24 hours) at 2/13/2022 1027  Last data filed at 2/13/2022 0601  Gross per 24 hour   Intake 780 ml   Output 1900 ml   Net -1120 ml       Physical Exam:   Physical Exam  Vitals and nursing note reviewed  Constitutional:       General: He is not in acute distress  Appearance: He is ill-appearing  Cardiovascular:      Rate and Rhythm: Normal rate  Pulses: Normal pulses  Heart sounds: Normal heart sounds  Pulmonary:      Effort: Pulmonary effort is normal  No tachypnea, bradypnea or respiratory distress  Breath sounds: Decreased breath sounds present  Abdominal:      General: Bowel sounds are normal       Palpations: Abdomen is soft  Musculoskeletal:         General: Normal range of motion  Right lower leg: No edema  Left lower leg: No edema  Skin:     General: Skin is warm and dry  Coloration: Skin is pale  Neurological:      Mental Status: He is alert  Mental status is at baseline  Psychiatric:         Mood and Affect: Mood normal         Additional Data:     Labs:  Results from last 7 days   Lab Units 02/13/22  0448 02/07/22  0912 02/07/22  0705   WBC Thousand/uL 4 18*   < > 4 01*   HEMOGLOBIN g/dL 8 0*   < > 6 7*   HEMATOCRIT % 27 9*   < > 22 6*   PLATELETS Thousands/uL 228   < > 216   NEUTROS PCT %  --   --  80*   LYMPHS PCT %  --   --  9*   MONOS PCT %  --   --  5   EOS PCT %  --   --  3    < > = values in this interval not displayed       Results from last 7 days   Lab Units 02/13/22  0448   SODIUM mmol/L 141   POTASSIUM mmol/L 3 8   CHLORIDE mmol/L 106   CO2 mmol/L 26   BUN mg/dL 14   CREATININE mg/dL 1 14   ANION GAP mmol/L 9   CALCIUM mg/dL 8 2*   GLUCOSE RANDOM mg/dL 113     Results from last 7 days   Lab Units 02/10/22  0459   INR  1 13     Results from last 7 days   Lab Units 02/09/22  2135 02/09/22  1614   POC GLUCOSE mg/dl 101 107 Results from last 7 days   Lab Units 02/07/22  0705   PROCALCITONIN ng/ml 4 95*       Lines/Drains:  Invasive Devices  Report    Peripheral Intravenous Line            Peripheral IV 02/10/22 Right;Ventral (anterior) Forearm 2 days          Line            Pump Device Continuous ambulatory delivery device Right Chest 12 days          Drain            Urethral Catheter Non-latex 16 Fr  46 days    Percutaneous Nephroureteral Tube (PCNU) Left 1 8 5 Fr 26 Cm 25 days    Percutaneous Nephroureteral Tube (PCNU) Right 2 8 5 Fr 24 Cm 25 days              Urinary Catheter:  Goal for removal: N/A- Discharging with Fortune           Imaging: No pertinent imaging reviewed  Recent Cultures (last 7 days):   Results from last 7 days   Lab Units 02/10/22  0453 02/08/22  0504   BLOOD CULTURE  No Growth at 72 hrs  No Growth at 72 hrs  No Growth After 5 Days  No Growth After 5 Days         Last 24 Hours Medication List:   Current Facility-Administered Medications   Medication Dose Route Frequency Provider Last Rate    acetaminophen  975 mg Oral Q8H [address] MYLENE Santoyo      aluminum-magnesium hydroxide-simethicone  30 mL Oral Q4H PRN MYLENE Aburto      aspirin  81 mg Oral Daily Erin Heaton, [address]      atorvastatin  40 mg Oral Daily [place], [place]      bisacodyl  10 mg Rectal Daily PRN MYLENE Bella      cefepime  2,000 mg Intravenous Q12H Vida Fuller PA-C 2,000 mg (02/13/22 0541)    Diclofenac Sodium  2 g Topical 4x Daily MYLENE Sanders      diltiazem  240 mg Oral Daily [place], [place]      ferrous sulfate  325 mg Oral BID With Meals Katt Ashby PA-C      heparin (porcine)  5,000 Units Subcutaneous Q8H [address] MYLENE Rice      lidocaine  1 patch Topical Daily Payal Serrato PA-C      lidocaine (PF)  0 5 mL Infiltration Once PRN Cristofer Mercer MD      ondansetron  4 mg Intravenous Q6H PRN Vida Fuller PA-C      oxyCODONE 2 5 mg Oral Q6H PRN MYLENE Green      oxyCODONE  5 mg Oral Q6H PRN MYLENE Green      pantoprazole  40 mg Oral QAM Anirudh Bandy [place]ABRI[place]      polyethylene glycol  17 g Oral Daily MYLENE Clay      senna-docusate sodium  2 tablet Oral BID MYLENE Boyer      sodium phosphate-biphosphate  1 enema Rectal Once PRN MYLENE Clay          Today, Patient Was Seen By: MYLENE Green    **Please Note: This note may have been constructed using a voice recognition system  **

## 2022-02-13 NOTE — ASSESSMENT & PLAN NOTE
· Blood pressure acceptable, 110-120/50-60s  · Continue home Cardizem  · Monitor BP per unit protocol

## 2022-02-13 NOTE — PHYSICAL THERAPY NOTE
Physical Therapy Cancellation Note    Chart review performed  At this time, PT treatment session cancelled secondary to patient pending MRI of thoracic and lumbar spine  PT to continue to follow and treat as appropriate       02/13/22 1014   PT Last Visit   PT Visit Date 02/13/22   Note Type   Note Type Treatment   Cancel Reasons Medical status  (pt pending MRI of thoracic and lumbar spine)       Don Alter, PT, DPT

## 2022-02-13 NOTE — ASSESSMENT & PLAN NOTE
· Presented complaining of back pain;  · Prescribed Oxycodone 5mg q6h prn as outpatient  · Continue Oxycodone; geriatric dosing  · 2 5 mg every 6 hours as needed for moderate pain  · 5 mg every 6 hours as needed for severe pain  · Voltaren gel to back  · Change Tylenol to 975 mg every 8 hours ATC  · Adina K  · ID recommending MRI Lumbar/Thoracic Spine to rule out osteomyelitis, ordered, pending  · Patient has been refusing/uncooperative  · Back pain resolved

## 2022-02-13 NOTE — ASSESSMENT & PLAN NOTE
Lab Results   Component Value Date    EGFR 59 02/13/2022    EGFR 59 02/11/2022    EGFR 62 02/09/2022    CREATININE 1 14 02/13/2022    CREATININE 1 15 02/11/2022    CREATININE 1 10 02/09/2022     · Stable; appears to be improving from recent DULCE, recent baseline difficult to establish  · Avoid nephrotoxic agents  · Discontinued IVF on 2/5

## 2022-02-13 NOTE — ASSESSMENT & PLAN NOTE
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

## 2022-02-13 NOTE — PLAN OF CARE
Problem: Potential for Falls  Goal: Patient will remain free of falls  Description: INTERVENTIONS:  - Educate patient/family on patient safety including physical limitations  - Instruct patient to call for assistance with activity   - Consult OT/PT to assist with strengthening/mobility   - Keep Call bell within reach  - Keep bed low and locked with side rails adjusted as appropriate  - Keep care items and personal belongings within reach  - Initiate and maintain comfort rounds  - Make Fall Risk Sign visible to staff  - Offer Toileting every 2 Hours, in advance of need  - Initiate/Maintain bed alarm  - Obtain necessary fall risk management equipment: bed   - Apply yellow socks and bracelet for high fall risk patients  - Consider moving patient to room near nurses station  Outcome: Progressing     Problem: MOBILITY - ADULT  Goal: Maintain or return to baseline ADL function  Description: INTERVENTIONS:  -  Assess patient's ability to carry out ADLs; assess patient's baseline for ADL function and identify physical deficits which impact ability to perform ADLs (bathing, care of mouth/teeth, toileting, grooming, dressing, etc )  - Assess/evaluate cause of self-care deficits   - Assess range of motion  - Assess patient's mobility; develop plan if impaired  - Assess patient's need for assistive devices and provide as appropriate  - Encourage maximum independence but intervene and supervise when necessary  - Involve family in performance of ADLs  - Assess for home care needs following discharge   - Consider OT consult to assist with ADL evaluation and planning for discharge  - Provide patient education as appropriate  Outcome: Progressing  Goal: Maintains/Returns to pre admission functional level  Description: INTERVENTIONS:  - Perform BMAT or MOVE assessment daily    - Set and communicate daily mobility goal to care team and patient/family/caregiver     - Collaborate with rehabilitation services on mobility goals if consulted  - Perform Range of Motion  times a day  - Reposition patient every 3 hours    - Dangle patient 3 times a day  - Stand patient 3 times a day  - Ambulate patient 3 times a day  - Out of bed to chair 3 times a day   - Out of bed for meals 3 times a day  - Out of bed for toileting  - Record patient progress and toleration of activity level   Outcome: Progressing     Problem: PAIN - ADULT  Goal: Verbalizes/displays adequate comfort level or baseline comfort level  Description: Interventions:  - Encourage patient to monitor pain and request assistance  - Assess pain using appropriate pain scale  - Administer analgesics based on type and severity of pain and evaluate response  - Implement non-pharmacological measures as appropriate and evaluate response  - Consider cultural and social influences on pain and pain management  - Notify physician/advanced practitioner if interventions unsuccessful or patient reports new pain  Outcome: Progressing     Problem: INFECTION - ADULT  Goal: Absence or prevention of progression during hospitalization  Description: INTERVENTIONS:  - Assess and monitor for signs and symptoms of infection  - Monitor lab/diagnostic results  - Monitor all insertion sites, i e  indwelling lines, tubes, and drains  - Monitor endotracheal if appropriate and nasal secretions for changes in amount and color  - Amanda appropriate cooling/warming therapies per order  - Administer medications as ordered  - Instruct and encourage patient and family to use good hand hygiene technique  - Identify and instruct in appropriate isolation precautions for identified infection/condition  Outcome: Progressing  Goal: Absence of fever/infection during neutropenic period  Description: INTERVENTIONS:  - Monitor WBC    Outcome: Progressing     Problem: SAFETY ADULT  Goal: Patient will remain free of falls  Description: INTERVENTIONS:  - Educate patient/family on patient safety including physical limitations  - Instruct patient to call for assistance with activity   - Consult OT/PT to assist with strengthening/mobility   - Keep Call bell within reach  - Keep bed low and locked with side rails adjusted as appropriate  - Keep care items and personal belongings within reach  - Initiate and maintain comfort rounds  - Make Fall Risk Sign visible to staff  - Offer Toileting every 3 Hours, in advance of need  - Initiate/Maintain bed alarm  - Obtain necessary fall risk management equipment: chair alarm  - Apply yellow socks and bracelet for high fall risk patients  - Consider moving patient to room near nurses station  Outcome: Progressing  Goal: Maintain or return to baseline ADL function  Description: INTERVENTIONS:  -  Assess patient's ability to carry out ADLs; assess patient's baseline for ADL function and identify physical deficits which impact ability to perform ADLs (bathing, care of mouth/teeth, toileting, grooming, dressing, etc )  - Assess/evaluate cause of self-care deficits   - Assess range of motion  - Assess patient's mobility; develop plan if impaired  - Assess patient's need for assistive devices and provide as appropriate  - Encourage maximum independence but intervene and supervise when necessary  - Involve family in performance of ADLs  - Assess for home care needs following discharge   - Consider OT consult to assist with ADL evaluation and planning for discharge  - Provide patient education as appropriate  Outcome: Progressing  Goal: Maintains/Returns to pre admission functional level  Description: INTERVENTIONS:  - Perform BMAT or MOVE assessment daily    - Set and communicate daily mobility goal to care team and patient/family/caregiver  - Collaborate with rehabilitation services on mobility goals if consulted  - Perform Range of Motion 3 times a day  - Reposition patient every 3 hours    - Dangle patient 3 times a day  - Stand patient 3 times a day  - Ambulate patient 3 times a day  - Out of bed to chair 3 times a day   - Out of bed for meals 3 times a day  - Out of bed for toileting  - Record patient progress and toleration of activity level   Outcome: Progressing     Problem: DISCHARGE PLANNING  Goal: Discharge to home or other facility with appropriate resources  Description: INTERVENTIONS:  - Identify barriers to discharge w/patient and caregiver  - Arrange for needed discharge resources and transportation as appropriate  - Identify discharge learning needs (meds, wound care, etc )  - Arrange for interpretive services to assist at discharge as needed  - Refer to Case Management Department for coordinating discharge planning if the patient needs post-hospital services based on physician/advanced practitioner order or complex needs related to functional status, cognitive ability, or social support system  Outcome: Progressing     Problem: Knowledge Deficit  Goal: Patient/family/caregiver demonstrates understanding of disease process, treatment plan, medications, and discharge instructions  Description: Complete learning assessment and assess knowledge base    Interventions:  - Provide teaching at level of understanding  - Provide teaching via preferred learning methods  Outcome: Progressing     Problem: GENITOURINARY - ADULT  Goal: Urinary catheter remains patent  Description: INTERVENTIONS:  - Assess patency of urinary catheter  - If patient has a chronic archibald, consider changing catheter if non-functioning  - Follow guidelines for intermittent irrigation of non-functioning urinary catheter  Outcome: Progressing     Problem: SKIN/TISSUE INTEGRITY - ADULT  Goal: Incision(s), wounds(s) or drain site(s) healing without S/S of infection  Description: INTERVENTIONS  - Assess and document dressing, incision, wound bed, drain sites and surrounding tissue  - Provide patient and family education  - Perform skin care/dressing changes every as ordered  Outcome: Progressing     Problem: HEMATOLOGIC - ADULT  Goal: Maintains hematologic stability  Description: INTERVENTIONS  - Assess for signs and symptoms of bleeding or hemorrhage  - Monitor labs  - Administer supportive blood products/factors as ordered and appropriate  Outcome: Progressing     Problem: Prexisting or High Potential for Compromised Skin Integrity  Goal: Skin integrity is maintained or improved  Description: INTERVENTIONS:  - Identify patients at risk for skin breakdown  - Assess and monitor skin integrity  - Assess and monitor nutrition and hydration status  - Monitor labs   - Assess for incontinence   - Turn and reposition patient  - Assist with mobility/ambulation  - Relieve pressure over bony prominences  - Avoid friction and shearing  - Provide appropriate hygiene as needed including keeping skin clean and dry  - Evaluate need for skin moisturizer/barrier cream  - Collaborate with interdisciplinary team   - Patient/family teaching  - Consider wound care consult   Outcome: Progressing

## 2022-02-14 ENCOUNTER — APPOINTMENT (OUTPATIENT)
Dept: RADIATION ONCOLOGY | Facility: CLINIC | Age: 82
End: 2022-02-14
Attending: RADIOLOGY
Payer: MEDICARE

## 2022-02-14 ENCOUNTER — APPOINTMENT (INPATIENT)
Dept: CT IMAGING | Facility: HOSPITAL | Age: 82
DRG: 871 | End: 2022-02-14
Attending: RADIOLOGY
Payer: MEDICARE

## 2022-02-14 PROCEDURE — 99232 SBSQ HOSP IP/OBS MODERATE 35: CPT | Performed by: NURSE PRACTITIONER

## 2022-02-14 PROCEDURE — 99152 MOD SED SAME PHYS/QHP 5/>YRS: CPT | Performed by: RADIOLOGY

## 2022-02-14 PROCEDURE — 32408 CORE NDL BX LNG/MED PERQ: CPT

## 2022-02-14 PROCEDURE — 88305 TISSUE EXAM BY PATHOLOGIST: CPT | Performed by: PATHOLOGY

## 2022-02-14 PROCEDURE — 88333 PATH CONSLTJ SURG CYTO XM 1: CPT | Performed by: PATHOLOGY

## 2022-02-14 PROCEDURE — 99153 MOD SED SAME PHYS/QHP EA: CPT

## 2022-02-14 PROCEDURE — 32408 CORE NDL BX LNG/MED PERQ: CPT | Performed by: RADIOLOGY

## 2022-02-14 PROCEDURE — 99152 MOD SED SAME PHYS/QHP 5/>YRS: CPT

## 2022-02-14 PROCEDURE — 99232 SBSQ HOSP IP/OBS MODERATE 35: CPT | Performed by: INTERNAL MEDICINE

## 2022-02-14 PROCEDURE — 0BBK3ZX EXCISION OF RIGHT LUNG, PERCUTANEOUS APPROACH, DIAGNOSTIC: ICD-10-PCS | Performed by: RADIOLOGY

## 2022-02-14 RX ORDER — LIDOCAINE WITH 8.4% SOD BICARB 0.9%(10ML)
SYRINGE (ML) INJECTION CODE/TRAUMA/SEDATION MEDICATION
Status: COMPLETED | OUTPATIENT
Start: 2022-02-14 | End: 2022-02-14

## 2022-02-14 RX ORDER — MIDAZOLAM HYDROCHLORIDE 2 MG/2ML
INJECTION, SOLUTION INTRAMUSCULAR; INTRAVENOUS CODE/TRAUMA/SEDATION MEDICATION
Status: COMPLETED | OUTPATIENT
Start: 2022-02-14 | End: 2022-02-14

## 2022-02-14 RX ORDER — FENTANYL CITRATE 50 UG/ML
INJECTION, SOLUTION INTRAMUSCULAR; INTRAVENOUS CODE/TRAUMA/SEDATION MEDICATION
Status: COMPLETED | OUTPATIENT
Start: 2022-02-14 | End: 2022-02-14

## 2022-02-14 RX ADMIN — ACETAMINOPHEN 975 MG: 325 TABLET, FILM COATED ORAL at 14:38

## 2022-02-14 RX ADMIN — FERROUS SULFATE TAB 325 MG (65 MG ELEMENTAL FE) 325 MG: 325 (65 FE) TAB at 11:17

## 2022-02-14 RX ADMIN — FENTANYL CITRATE 25 MCG: 50 INJECTION, SOLUTION INTRAMUSCULAR; INTRAVENOUS at 10:02

## 2022-02-14 RX ADMIN — DILTIAZEM HYDROCHLORIDE 240 MG: 240 CAPSULE, COATED, EXTENDED RELEASE ORAL at 11:12

## 2022-02-14 RX ADMIN — Medication 20 ML: at 10:02

## 2022-02-14 RX ADMIN — CEFEPIME HYDROCHLORIDE 2000 MG: 2 INJECTION, POWDER, FOR SOLUTION INTRAVENOUS at 17:37

## 2022-02-14 RX ADMIN — DICLOFENAC SODIUM 2 G: 10 GEL TOPICAL at 11:18

## 2022-02-14 RX ADMIN — LIDOCAINE 5% 1 PATCH: 700 PATCH TOPICAL at 11:13

## 2022-02-14 RX ADMIN — POLYETHYLENE GLYCOL 3350 17 G: 17 POWDER, FOR SOLUTION ORAL at 09:08

## 2022-02-14 RX ADMIN — DICLOFENAC SODIUM 2 G: 10 GEL TOPICAL at 17:37

## 2022-02-14 RX ADMIN — ATORVASTATIN CALCIUM 40 MG: 40 TABLET, FILM COATED ORAL at 11:11

## 2022-02-14 RX ADMIN — ACETAMINOPHEN 975 MG: 325 TABLET, FILM COATED ORAL at 22:18

## 2022-02-14 RX ADMIN — PANTOPRAZOLE SODIUM 40 MG: 40 TABLET, DELAYED RELEASE ORAL at 11:11

## 2022-02-14 RX ADMIN — FERROUS SULFATE TAB 325 MG (65 MG ELEMENTAL FE) 325 MG: 325 (65 FE) TAB at 17:37

## 2022-02-14 RX ADMIN — HEPARIN SODIUM 5000 UNITS: 5000 INJECTION INTRAVENOUS; SUBCUTANEOUS at 14:38

## 2022-02-14 RX ADMIN — MIDAZOLAM HYDROCHLORIDE 0.5 MG: 1 INJECTION, SOLUTION INTRAMUSCULAR; INTRAVENOUS at 10:02

## 2022-02-14 RX ADMIN — HEPARIN SODIUM 5000 UNITS: 5000 INJECTION INTRAVENOUS; SUBCUTANEOUS at 22:19

## 2022-02-14 RX ADMIN — SENNOSIDES AND DOCUSATE SODIUM 2 TABLET: 50; 8.6 TABLET ORAL at 09:08

## 2022-02-14 RX ADMIN — MIDAZOLAM HYDROCHLORIDE 0.5 MG: 1 INJECTION, SOLUTION INTRAMUSCULAR; INTRAVENOUS at 09:57

## 2022-02-14 RX ADMIN — OXYCODONE HYDROCHLORIDE 5 MG: 5 TABLET ORAL at 17:36

## 2022-02-14 RX ADMIN — OXYCODONE HYDROCHLORIDE 5 MG: 5 TABLET ORAL at 22:18

## 2022-02-14 RX ADMIN — FENTANYL CITRATE 25 MCG: 50 INJECTION, SOLUTION INTRAMUSCULAR; INTRAVENOUS at 10:09

## 2022-02-14 RX ADMIN — FENTANYL CITRATE 25 MCG: 50 INJECTION, SOLUTION INTRAMUSCULAR; INTRAVENOUS at 09:57

## 2022-02-14 RX ADMIN — SENNOSIDES AND DOCUSATE SODIUM 2 TABLET: 50; 8.6 TABLET ORAL at 17:36

## 2022-02-14 RX ADMIN — CEFEPIME HYDROCHLORIDE 2000 MG: 2 INJECTION, POWDER, FOR SOLUTION INTRAVENOUS at 05:41

## 2022-02-14 NOTE — DISCHARGE INSTRUCTIONS
Needle Biopsy of the Lung    WHAT YOU NEED TO KNOW:  A needle biopsy of the lung is a procedure to remove cells or tissue from your lung  You may have a fine needle aspiration biopsy (FNAB), or a core needle biopsy (CNB)  A FNAB is used to remove cells through a thin needle  CNB uses a thicker needle to remove lung tissue  The samples are collected and tested for inflammation, infection, or cancer  DISCHARGE INSTRUCTIONS:   Resume your normal diet  Small sips of flat soda will help with nausea  Limit your activity for 24 hours  Wound Care:      - Remove band aid in 24 hours  Contact Interventional Radiology at [phone] Abd[facility] PATIENTS: Contact Interventional Radiology at [phone]) ([address]: Contact Interventional Radiology at [phone]) if any of the following occur:    - You have a fever greater than 101*    - You cough up large amounts of bright red blood     - You have chest pain with breathing    - You have shortness of breath    -You have persistent nausea and vomiting    - You have pus, redness or swelling around your biopsy site    - You have questions or concerns about your condition or careTransesophageal Echocardiogram   WHAT YOU NEED TO KNOW:   A transesophageal echocardiogram (RENEE) is a procedure used to check for problems with your heart  It will also show any problems in the blood vessels near your heart  Sound waves are sent to the heart through a tube inserted into your throat  The sound waves show the structure and function of your heart through pictures on a monitor  DISCHARGE INSTRUCTIONS:   Rest:  Rest until you are fully awake  You may be sleepy for a while after your procedure  Do not eat or drink until you are able to swallow normally:  Start with soft foods, such as oatmeal, yogurt, or applesauce  Once you can eat soft foods easily, you may begin to eat solid foods    Follow up with your healthcare provider as directed:  Write down your questions so you remember to ask them during your visits  Contact your healthcare provider if:   · You have a fever or chills  · You taste blood in your mouth  · You have a severe sore throat or difficulty swallowing  · You have questions or concerns about your condition or care  Seek care immediately or call 911 if:   · You have any of the following signs of a heart attack:      ? Squeezing, pressure, or pain in your chest     ? and  any of the following:     § Discomfort or pain in your back, neck, jaw, stomach, or arm     § Shortness of breath    § Nausea or vomiting    § Lightheadedness or a sudden cold sweat      © Copyright [employer] 2018 Information is for End User's use only and may not be sold, redistributed or otherwise used for commercial purposes  All illustrations and images included in CareNotes® are the copyrighted property of [employer]  or Jordan Ruiz   The above information is an  only  It is not intended as medical advice for individual conditions or treatments  Talk to your doctor, nurse or pharmacist before following any medical regimen to see if it is safe and effective for you  Implanted Venous Access Port Removal    WHAT YOU NEED TO KNOW:   An implanted venous access port is a device used to give treatments and take blood  It may also be called a central venous access device (CVAD)  The port is a small container that is placed under your skin, usually in your upper chest  A port can also be placed in your arm or abdomen  The port is attached to a catheter that enters a large vein  DISCHARGE INSTRUCTIONS:   Resume your normal diet  Small sips of flat soda will help with mild nausea  Prevent an infection:     Wash your hands often  Use soap and water  Clean your hands before and  after you care for your incision  Check your skin for infection every day  Look for redness, swelling, or fluid oozing from the incision site   Dressing may come off in 24 hours  Medical glue will peel off on its own in 5 to 10 days  You may shower 24 hours after procedure  Follow up with your healthcare provider as directed  Write down your questions so you remember to ask them during your visits  Activity:  You may return to your daily activities when the area heals  Contact Interventional Radiology at [phone] Abdiel PATIENTS: Contact Interventional Radiology at [phone]) José Luis Zimmerman PATIENTS: Contact Interventional Radiology at [phone]) if:     You have a fever  You have persistent nausea  Your inciscion site is red, swollen, or draining pus  You have questions or concerns about your condition or care  Seek care immediately or call 911 if:  Blood soaks through your bandage  The skin over or around your incision breaks open  Your heart is jumping or fluttering  You have a headache, blurred vision, and feel confused  You have pain in your arm, neck, shoulder, or chest     You have trouble breathing that is getting worse over time

## 2022-02-14 NOTE — PLAN OF CARE
Problem: Potential for Falls  Goal: Patient will remain free of falls  Description: INTERVENTIONS:  - Educate patient/family on patient safety including physical limitations  - Instruct patient to call for assistance with activity   - Consult OT/PT to assist with strengthening/mobility   - Keep Call bell within reach  - Keep bed low and locked with side rails adjusted as appropriate  - Keep care items and personal belongings within reach  - Initiate and maintain comfort rounds  - Make Fall Risk Sign visible to staff  - Offer Toileting every 2 Hours, in advance of need  - Initiate/Maintain alarm  - Obtain necessary fall risk management equipment:   - Apply yellow socks and bracelet for high fall risk patients  - Consider moving patient to room near nurses station  Outcome: Progressing     Problem: MOBILITY - ADULT  Goal: Maintain or return to baseline ADL function  Description: INTERVENTIONS:  -  Assess patient's ability to carry out ADLs; assess patient's baseline for ADL function and identify physical deficits which impact ability to perform ADLs (bathing, care of mouth/teeth, toileting, grooming, dressing, etc )  - Assess/evaluate cause of self-care deficits   - Assess range of motion  - Assess patient's mobility; develop plan if impaired  - Assess patient's need for assistive devices and provide as appropriate  - Encourage maximum independence but intervene and supervise when necessary  - Involve family in performance of ADLs  - Assess for home care needs following discharge   - Consider OT consult to assist with ADL evaluation and planning for discharge  - Provide patient education as appropriate  Outcome: Progressing  Goal: Maintains/Returns to pre admission functional level  Description: INTERVENTIONS:  - Perform BMAT or MOVE assessment daily    - Set and communicate daily mobility goal to care team and patient/family/caregiver     - Collaborate with rehabilitation services on mobility goals if consulted  - Perform Range of Motion 2 times a day  - Reposition patient every 2 hours    - Dangle patient 2 times a day  - Stand patient 2 times a day  - Ambulate patient 2 times a day  - Out of bed to chair 2 times a day   - Out of bed for meals 2 times a day  - Out of bed for toileting  - Record patient progress and toleration of activity level   Outcome: Progressing     Problem: PAIN - ADULT  Goal: Verbalizes/displays adequate comfort level or baseline comfort level  Description: Interventions:  - Encourage patient to monitor pain and request assistance  - Assess pain using appropriate pain scale  - Administer analgesics based on type and severity of pain and evaluate response  - Implement non-pharmacological measures as appropriate and evaluate response  - Consider cultural and social influences on pain and pain management  - Notify physician/advanced practitioner if interventions unsuccessful or patient reports new pain  Outcome: Progressing

## 2022-02-14 NOTE — ASSESSMENT & PLAN NOTE
· Brought in by Granddaughter who reported several days of progressive confusion and weakness and was instructed to take patient to ED by infusion staff for these symptoms  · Met sepsis criteria on admission, as evidenced by leukocytosis, hyperthermia  Lactic acid 2 4  · UA suggestive of UTI; urine and blood cultures + for pseudomonas   · Source of sepsis related to right-sided cardiac vegetation/pyelonephritis  · Infectious disease consulted; input as noted below  · Port-A-Cath removed 2/7   · RENEE completed on 2/9; noted to have right-sided vegetation  Patient is not a surgical candidate  · Continue Cefepime 2 gm every 12 hours  · Plan for PICC line once cultures are negative   Will need 6 weeks IV antibiotics  · Repeat blood cultures from 2/8 negative x 5 days  · New blood cultures collected on 2/10, negative x 4 days  · Of note, previously discussed replacement of Port-A-Cath with another Port-A-Cath versus PICC with primary hematologist (Dr Edwin Lloyd) oncologist and cleared to put in PICC following clearance of blood cultures

## 2022-02-14 NOTE — BRIEF OP NOTE (RAD/CATH)
INTERVENTIONAL RADIOLOGY PROCEDURE NOTE    Date: 2/14/2022    Procedure: IR BIOPSY LUNG     Preoperative diagnosis:   1  Altered mental status    2  UTI (urinary tract infection)    3  Bladder cancer ([facility] [address] )    4  Bacteremia    5  [facility] )    6  Abnormal CT of the chest       Postoperative diagnosis: Same  Surgeon: Bashir Tomlin MD     Assistant: None  No qualified resident was available  Blood loss: minimal    Specimens: 8 core biopsies    Findings: Successful CT guided right apical core biopsy  Eight specimens taken with only bloody tissue seen  This could represent a hematoma rather than a mass  Awaiting final pathology  I DO NOT recommend another biopsy since we were in the lesion for all 8 biopsy specimens  Complications: None immediate      Anesthesia: conscious sedation

## 2022-02-14 NOTE — PROGRESS NOTES
[facility]  Progress Note - Yara [place] 1940, 80 y o  male MRN: [id number]  Unit/Bed#: -Mitch Encounter: [id number]  Primary Care Provider: Marquez Solis MD   Date and time admitted to hospital: 2/4/2022  5:41 PM    * Sepsis due to Pseudomonas aeruginosa [facility])  Assessment & Plan  · Brought in by Granddaughter who reported several days of progressive confusion and weakness and was instructed to take patient to ED by infusion staff for these symptoms  · Met sepsis criteria on admission, as evidenced by leukocytosis, hyperthermia  Lactic acid 2 4  · UA suggestive of UTI; urine and blood cultures + for pseudomonas   · Source of sepsis related to right-sided cardiac vegetation/pyelonephritis  · Infectious disease consulted; input as noted below  · Port-A-Cath removed 2/7   · RENEE completed on 2/9; noted to have right-sided vegetation  Patient is not a surgical candidate  · Continue Cefepime 2 gm every 12 hours  · Plan for PICC line once cultures are negative   Will need 6 weeks IV antibiotics  · Repeat blood cultures from 2/8 negative x 5 days  · New blood cultures collected on 2/10, negative x 4 days  · Of note, previously discussed replacement of Port-A-Cath with another Port-A-Cath versus PICC with primary hematologist (Dr Rishi Osborn) oncologist and cleared to put in PICC following clearance of blood cultures    Metabolic encephalopathy  Assessment & Plan  · CT head negative for acute process  · Not hypoglycemic, see hyponatremia as below  · Possibly in the setting of sepsis/UTI as above vs systemic chemotherapy vs outpatient narcotic use   · Narcotics and benzos prescribed OP on hold; had received IM Zyprexa X1  · Delirium precautions   · Supportive measures   · Stable, "feisty" but appears at baseline  · Neuropsych evaluation requested to evaluate for capacity; patient refused to participate on 2/11    Anemia  Assessment & Plan  · Appears to be chronic issue with baseline around 8-9  · Did have blood in archibald bag, now resolved  · Would hold off on transfusion unless Hgb <7   · Monitor closely given supratherapeutic INR initially   · Continue iron supplement given FRANCY    Archibald catheter in place prior to arrival  Assessment & Plan  · With archibald catheter and bilat perc nephrostomy tube in the setting of bladder CA as above   · Both producing urine   · Archibald was replaced on day of admission, malpositioned with balloon in urethra, since repositioned   · Continue archibald and nephrostomy care     Stage 3a chronic kidney disease [facility])  Assessment & Plan  Lab Results   Component Value Date    EGFR 59 02/13/2022    EGFR 59 02/11/2022    EGFR 62 02/09/2022    CREATININE 1 14 02/13/2022    CREATININE 1 15 02/11/2022    CREATININE 1 10 02/09/2022     · Stable; appears to be improving from recent DULCE, recent baseline difficult to establish  · Avoid nephrotoxic agents  · Discontinued IVF on 2/5    Atrial fibrillation ([facility] [address] )  Assessment & Plan  · Currently rate controlled  · Continue home Cardizem, monitor with routine vitals   · Continue with SQ Heparin for now; Coumadin on hold for IR biopsy    No results for input(s): INR in the last 72 hours      COPD, severe ([address] )  Assessment & Plan  · 96-99% O2 RA; no SOB complaints  · Stable    Bladder cancer (HCC)  Assessment & Plan  · 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, diagnosed 10/12/2021  · Follows with heme onc and urology as an outpatient  · Receives chemo and radiation; last received on day of admission  · Continue outpatient heme onc and urology f/u upon discharge   · CT c/a/p from 2/6 unfortunately shows possible new right apical pleural-based mass  · IR biopsy pending, likely Monday, 2/14  · Oncology consult placed, appreciate input    Back pain  Assessment & Plan  · Presented complaining of back pain;  · Prescribed Oxycodone 5mg q6h prn as outpatient  · Continue Oxycodone; geriatric dosing  · 2 5 mg every 6 hours as needed for moderate pain  · 5 mg every 6 hours as needed for severe pain  · Voltaren gel to back  · Change Tylenol to 975 mg every 8 hours ATC  · Aqua K  · ID recommending MRI Lumbar/Thoracic Spine to rule out osteomyelitis, ordered, pending  · Patient has been refusing/uncooperative  · Back pain resolved  Hypertension  Assessment & Plan  · Blood pressure acceptable, 110-120/50-60s  · Continue home Cardizem  · Monitor BP per unit protocol    CAD (coronary atherosclerotic disease)  Assessment & Plan  · S/p CABG x4  · Denies chest pain; Troponin 37  · Continue home ASA and statin    VTE Pharmacologic Prophylaxis: VTE Score: 6 High Risk (Score >/= 5) - Pharmacological DVT Prophylaxis Ordered: heparin  Sequential Compression Devices Ordered  Patient Centered Rounds: I performed bedside rounds with nursing staff today  Discussions with Specialists or Other Care Team Provider: Case Management    Education and Discussions with Family / Patient: Updated  (granddaughter) via phone  Time Spent for Care: 20 minutes  More than 50% of total time spent on counseling and coordination of care as described above  Current Length of Stay: 10 day(s)  Current Patient Status: Inpatient   Certification Statement: The patient will continue to require additional inpatient hospital stay due to ongoing treatment in setting of need for PICC x 6 weeks  Discharge Plan: Anticipate discharge in >72 hrs to TBD    Code Status: Level 1 - Full Code    Subjective:   Patient resting in bed, denies complaints of chest pain, shortness of breath, fever, or chills  Objective:     Vitals:   Temp (24hrs), Av °F (37 2 °C), Min:99 °F (37 2 °C), Max:99 °F (37 2 °C)    Temp:  [99 °F (37 2 °C)] 99 °F (37 2 °C)  HR:  [71-84] 84  Resp:  [18-20] 20  BP: (105-145)/(52-72) 117/55  SpO2:  [98 %-100 %] 100 %  Body mass index is 27 75 kg/m²  Input and Output Summary (last 24 hours):      Intake/Output Summary (Last 24 hours) at 2022 1150  Last data filed at 2/14/2022 0546  Gross per 24 hour   Intake 480 ml   Output 1425 ml   Net -945 ml       Physical Exam:   Physical Exam  Vitals and nursing note reviewed  Constitutional:       General: He is not in acute distress  Appearance: He is ill-appearing  Cardiovascular:      Pulses: Normal pulses  Heart sounds: Normal heart sounds  Pulmonary:      Effort: Pulmonary effort is normal  No tachypnea, bradypnea or respiratory distress  Breath sounds: Decreased breath sounds present  Abdominal:      General: Bowel sounds are normal       Palpations: Abdomen is soft  Musculoskeletal:         General: Normal range of motion  Skin:     General: Skin is warm and dry  Capillary Refill: Capillary refill takes less than 2 seconds  Coloration: Skin is pale  Neurological:      Mental Status: He is alert  Mental status is at baseline     Psychiatric:         Mood and Affect: Mood normal           Additional Data:     Labs:  Results from last 7 days   Lab Units 02/13/22  0448   WBC Thousand/uL 4 18*   HEMOGLOBIN g/dL 8 0*   HEMATOCRIT % 27 9*   PLATELETS Thousands/uL 228     Results from last 7 days   Lab Units 02/13/22  0448   SODIUM mmol/L 141   POTASSIUM mmol/L 3 8   CHLORIDE mmol/L 106   CO2 mmol/L 26   BUN mg/dL 14   CREATININE mg/dL 1 14   ANION GAP mmol/L 9   CALCIUM mg/dL 8 2*   GLUCOSE RANDOM mg/dL 113     Results from last 7 days   Lab Units 02/10/22  0459   INR  1 13     Results from last 7 days   Lab Units 02/09/22  2135 02/09/22  1614   POC GLUCOSE mg/dl 101 107               Lines/Drains:  Invasive Devices  Report    Peripheral Intravenous Line            Peripheral IV 02/10/22 Right;Ventral (anterior) Forearm 3 days          Line            Pump Device Continuous ambulatory delivery device Right Chest 14 days          Drain            Urethral Catheter Non-latex 16 Fr  47 days    Percutaneous Nephroureteral Tube (PCNU) Left 1 8 5 Fr 26 Cm 26 days    Percutaneous Nephroureteral Tube (PCNU) Right 2 8 5 Fr 24 Cm 26 days              Urinary Catheter:  Goal for removal: N/A- Discharging with Fortune            Imaging: No pertinent imaging reviewed  Recent Cultures (last 7 days):   Results from last 7 days   Lab Units 02/10/22  0453 02/08/22  0504   BLOOD CULTURE  No Growth After 4 Days  No Growth After 4 Days  No Growth After 5 Days  No Growth After 5 Days         Last 24 Hours Medication List:   Current Facility-Administered Medications   Medication Dose Route Frequency Provider Last Rate    acetaminophen  975 mg Oral Q8H [facility] MYLENE Santoyo      aluminum-magnesium hydroxide-simethicone  30 mL Oral Q4H PRN MYLENE Gonzalez      aspirin  81 mg Oral Daily Mcginnis Gals, [address]      atorvastatin  40 mg Oral Daily [place], [place]      bisacodyl  10 mg Rectal Daily PRN MYLENE Clay      cefepime  2,000 mg Intravenous Q12H Clearance BJORN QuinonesC 2,000 mg (02/14/22 0541)    Diclofenac Sodium  2 g Topical 4x Daily MYLENE Phillips      diltiazem  240 mg Oral Daily [place], [place]      ferrous sulfate  325 mg Oral BID With Meals Royal Garcia PA-C      heparin (porcine)  5,000 Units Subcutaneous Q8H [facility] MYLENE Rice      lidocaine  1 patch Topical Daily Payal Serrato PA-C      lidocaine (PF)  0 5 mL Infiltration Once PRN Kayli Wiley MD      ondansetron  4 mg Intravenous Q6H PRN Patria Quinones PA-C      oxyCODONE  2 5 mg Oral Q6H PRN MYLENE Phillips      oxyCODONE  5 mg Oral Q6H PRN MYLENE Phillips      pantoprazole  40 mg Oral [facility]      polyethylene glycol  17 g Oral Daily MYLENE Clay      senna-docusate sodium  2 tablet Oral BID MYLENE Boyer      sodium phosphate-biphosphate  1 enema Rectal Once PRN MYLENE Clay          Today, Patient Was Seen By: MYLENE Phillips    **Please Note: This note may have been constructed using a voice recognition system  **

## 2022-02-14 NOTE — PROGRESS NOTES
Progress Note - Infectious Disease   Adriana Resides 80 y o  male MRN: [id number]  Unit/Bed#: -01 Encounter: [id number]      Impression/Plan:  1  Sepsis, POA   Patient met sepsis criteria on admission with fever, tachycardia and leukocytosis   Sources are 2 and 3  No other obvious sources on exam   Clinical parameters improved  Antibiotics as below  Continue to trend fever curve/vitals  Repeat CBC/chemistry tomorrow  Additional supportive care as per primary     2  Pseudomonal bacteremia and right-sided cardiac vegetation   Two of 2 blood cultures from admission have isolated Pseudomonas   I suspect that the source is primarily problem 3  Patient had recent catheter exchange but was also receiving chemotherapy which may have led to his bacteremia   Patient has a port in place but no other intravascular devices   Port removed with IR on 02/07 and cultures negative   Repeat blood cultures with delayed growth  Susceptibilities reviewed   Patient mentions lower back pain   Family would like to avoid fluoroquinolones per prior discussion   Patient will now require prolonged course of antibiotic and there is concern for recurrent/relapsing of disease with further chemotherapy   Patient is not surgical candidate per Cardiology  Family aware of the above  Patient unfortunately refused neuropsych evaluation    Continue cefepime 2 g every 12 hours, renally dose adjusted  Plan for total 6 weeks of therapy through 03/21/2022  Weekly labs with CBCD and Cre while on antibiotics  Patient will need follow-up in the ID office 2 weeks after discharge  ID office staff notified of follow-up needs  Maintain urinary catheters  Cardiology follow-up as outpatient and surveillance echos post treatment  Patient cleared from ID standpoint for PICC line  Obtain MRI T and L-spine with contrast with reported back pain  Inflammatory markers ordered for tomorrow  Follow-up pleural biopsies for completeness  Consider palliative care evaluation on this admission  Family interested in rehab placement, based on prior conversation  Additional supportive care as per primary  Will update follow-up plans if MR imaging abnormal     3  Pyelonephritis with Pseudomonas   Likely source of the above   Recently underwent Fortune catheter exchange 2 days prior to admission and reportedly had hematuria on admission  Urine culture also with Pseudomonas   CT reviewed and catheter repositioned and images concerning for pyelonephritis  Continue antibiotics as above  Continue to trend fever curve/vitals  Maintain current catheter  Recommend follow-up with Urology as outpatient     4  Acute encephalopathy and hyponatremia   Acutely confused on admission which I suspect is multifactorial given issues above   Sodium improving   Mental status also seems to be closer to baseline and stable   CT head unremarkable  Patient refused neuropsych evaluation  Monitor mental status closely  Antibiotics as above  Continue to trend fever curve/WBC     5  Chronic kidney disease stage 3  Creatinine appeared to be close to prior baseline on admission   This does however affect antibiotic dosing  Cefepime dosing as above  Will further dose adjust antibiotic as needed  Fluid hydration as per primary  Will avoid nephrotoxic agent  Repeat chemistry tomorrow     6  Invasive bladder cancer with chronic port   Patient recently on chemotherapy and has multiple catheters in place along with port   Course complicated now by the above   Port removed on 2/7  Antibiotics as above  Oncology evaluation appreciated  Consider palliative care evaluation on this admission  Continue to trend fever curve/WBC     7   AFib with elevated INR   Ongoing anticoagulation and care as per primary      8  Leukopenia  Possibly related to antibiotic  We will continue cefepime as above for now and will monitor  Will adjust antibiotic agent if cell counts fall further    Currently stable      Above plan discussed very briefly with the patient at bedside  Above plan discussed with primary service  ID consult service will continue to follow      Antibiotics:  Cefepime 11    Subjective:  Patient currently denies having any nausea, vomiting, chest pain or shortness of breath  He is still reporting having some back discomfort but the Tylenol and oxycodone he takes every night is helping him  We discussed obtaining MRI because of his back pain  The patient was asking about potential injections in his back  I let him know that ultimately if his MRIs are unremarkable that we could potentially refer him as an outpatient to Orthopedics and Pain Management  He is on board for this possibility and agreeable to imaging at this time  Discussed with primary service  Also updated primary service about pain medication needs according to the patient  Objective:  Vitals:  Temp:  [99 °F (37 2 °C)] 99 °F (37 2 °C)  HR:  [71-84] 84  Resp:  [18-20] 20  BP: (105-145)/(52-72) 117/55  SpO2:  [98 %-100 %] 100 %  Temp (24hrs), Av °F (37 2 °C), Min:99 °F (37 2 °C), Max:99 °F (37 2 °C)  Current: Temperature: 99 °F (37 2 °C)    Physical Exam:   General Appearance:  Alert, interactive, nontoxic, no acute distress  Hard of hearing  Throat: Oropharynx moist without lesions  Lungs:   Clear to auscultation bilaterally; no wheezes, rhonchi or rales; respirations unlabored on room air   Heart:  RRR; no murmur, rub or gallop appreciated   Abdomen:   Soft, non-tender, non-distended, positive bowel sounds  Extremities: No clubbing, cyanosis or edema; patient able to point towards and seems to have back discomfort in the lower lumbar spine on palpation  Skin: No new rashes or lesions  No new draining wounds noted         Labs, Imaging, & Other studies:   All pertinent labs and imaging studies were personally reviewed  Results from last 7 days   Lab Units 02/13/22  0448 02/11/22  0443 02/10/22  0900   WBC Thousand/uL 4 18* 3 71* 5 08 HEMOGLOBIN g/dL 8 0* 7 4* 7 7*   PLATELETS Thousands/uL 228 235 230     Results from last 7 days   Lab Units 02/13/22  0448   POTASSIUM mmol/L 3 8   CHLORIDE mmol/L 106   CO2 mmol/L 26   BUN mg/dL 14   CREATININE mg/dL 1 14   EGFR ml/min/1 73sq m 59   CALCIUM mg/dL 8 2*     Results from last 7 days   Lab Units 02/10/22  0453 02/08/22  0504   BLOOD CULTURE  No Growth After 4 Days  No Growth After 4 Days  No Growth After 5 Days  No Growth After 5 Days

## 2022-02-15 ENCOUNTER — APPOINTMENT (INPATIENT)
Dept: RADIOLOGY | Facility: HOSPITAL | Age: 82
DRG: 871 | End: 2022-02-15
Payer: MEDICARE

## 2022-02-15 ENCOUNTER — APPOINTMENT (OUTPATIENT)
Dept: RADIATION ONCOLOGY | Facility: CLINIC | Age: 82
End: 2022-02-15
Attending: RADIOLOGY
Payer: MEDICARE

## 2022-02-15 LAB
ANION GAP SERPL CALCULATED.3IONS-SCNC: 10 MMOL/L (ref 4–13)
BACTERIA BLD CULT: NORMAL
BACTERIA BLD CULT: NORMAL
BASOPHILS # BLD MANUAL: 0 THOUSAND/UL (ref 0–0.1)
BASOPHILS NFR MAR MANUAL: 0 % (ref 0–1)
BUN SERPL-MCNC: 17 MG/DL (ref 5–25)
CALCIUM SERPL-MCNC: 8.9 MG/DL (ref 8.3–10.1)
CHLORIDE SERPL-SCNC: 103 MMOL/L (ref 100–108)
CO2 SERPL-SCNC: 24 MMOL/L (ref 21–32)
CREAT SERPL-MCNC: 1.27 MG/DL (ref 0.6–1.3)
CRP SERPL QL: 38.3 MG/L
EOSINOPHIL # BLD MANUAL: 0.04 THOUSAND/UL (ref 0–0.4)
EOSINOPHIL NFR BLD MANUAL: 1 % (ref 0–6)
ERYTHROCYTE [DISTWIDTH] IN BLOOD BY AUTOMATED COUNT: 30.6 % (ref 11.6–15.1)
ERYTHROCYTE [SEDIMENTATION RATE] IN BLOOD: 50 MM/HOUR (ref 0–19)
GFR SERPL CREATININE-BSD FRML MDRD: 52 ML/MIN/1.73SQ M
GLUCOSE SERPL-MCNC: 107 MG/DL (ref 65–140)
HCT VFR BLD AUTO: 29.2 % (ref 36.5–49.3)
HGB BLD-MCNC: 8.5 G/DL (ref 12–17)
INR PPP: 1.03 (ref 0.84–1.19)
LYMPHOCYTES # BLD AUTO: 0.91 THOUSAND/UL (ref 0.6–4.47)
LYMPHOCYTES # BLD AUTO: 24 % (ref 14–44)
MCH RBC QN AUTO: 24.3 PG (ref 26.8–34.3)
MCHC RBC AUTO-ENTMCNC: 29.1 G/DL (ref 31.4–37.4)
MCV RBC AUTO: 83 FL (ref 82–98)
MONOCYTES # BLD AUTO: 0.42 THOUSAND/UL (ref 0–1.22)
MONOCYTES NFR BLD: 11 % (ref 4–12)
MYELOCYTES NFR BLD MANUAL: 2 % (ref 0–1)
NEUTROPHILS # BLD MANUAL: 2.35 THOUSAND/UL (ref 1.85–7.62)
NEUTS SEG NFR BLD AUTO: 62 % (ref 43–75)
OVALOCYTES BLD QL SMEAR: PRESENT
PLATELET # BLD AUTO: 274 THOUSANDS/UL (ref 149–390)
PLATELET BLD QL SMEAR: ADEQUATE
PMV BLD AUTO: 9.2 FL (ref 8.9–12.7)
POTASSIUM SERPL-SCNC: 4.2 MMOL/L (ref 3.5–5.3)
PROTHROMBIN TIME: 13.1 SECONDS (ref 11.6–14.5)
RBC # BLD AUTO: 3.5 MILLION/UL (ref 3.88–5.62)
SODIUM SERPL-SCNC: 137 MMOL/L (ref 136–145)
WBC # BLD AUTO: 3.79 THOUSAND/UL (ref 4.31–10.16)

## 2022-02-15 PROCEDURE — 86140 C-REACTIVE PROTEIN: CPT | Performed by: INTERNAL MEDICINE

## 2022-02-15 PROCEDURE — 85027 COMPLETE CBC AUTOMATED: CPT | Performed by: HOSPITALIST

## 2022-02-15 PROCEDURE — 02HV33Z INSERTION OF INFUSION DEVICE INTO SUPERIOR VENA CAVA, PERCUTANEOUS APPROACH: ICD-10-PCS | Performed by: INTERNAL MEDICINE

## 2022-02-15 PROCEDURE — 99232 SBSQ HOSP IP/OBS MODERATE 35: CPT | Performed by: INTERNAL MEDICINE

## 2022-02-15 PROCEDURE — 36569 INSJ PICC 5 YR+ W/O IMAGING: CPT

## 2022-02-15 PROCEDURE — 97530 THERAPEUTIC ACTIVITIES: CPT

## 2022-02-15 PROCEDURE — C1751 CATH, INF, PER/CENT/MIDLINE: HCPCS

## 2022-02-15 PROCEDURE — 71045 X-RAY EXAM CHEST 1 VIEW: CPT

## 2022-02-15 PROCEDURE — 85007 BL SMEAR W/DIFF WBC COUNT: CPT | Performed by: HOSPITALIST

## 2022-02-15 PROCEDURE — 85652 RBC SED RATE AUTOMATED: CPT | Performed by: INTERNAL MEDICINE

## 2022-02-15 PROCEDURE — 85610 PROTHROMBIN TIME: CPT | Performed by: HOSPITALIST

## 2022-02-15 PROCEDURE — 97116 GAIT TRAINING THERAPY: CPT

## 2022-02-15 PROCEDURE — 80048 BASIC METABOLIC PNL TOTAL CA: CPT | Performed by: HOSPITALIST

## 2022-02-15 PROCEDURE — 99233 SBSQ HOSP IP/OBS HIGH 50: CPT | Performed by: HOSPITALIST

## 2022-02-15 RX ORDER — LANOLIN ALCOHOL/MO/W.PET/CERES
3 CREAM (GRAM) TOPICAL
Status: DISCONTINUED | OUTPATIENT
Start: 2022-02-15 | End: 2022-02-17 | Stop reason: HOSPADM

## 2022-02-15 RX ORDER — WARFARIN SODIUM 5 MG/1
5 TABLET ORAL
Status: DISCONTINUED | OUTPATIENT
Start: 2022-02-15 | End: 2022-02-17

## 2022-02-15 RX ADMIN — HEPARIN SODIUM 5000 UNITS: 5000 INJECTION INTRAVENOUS; SUBCUTANEOUS at 14:20

## 2022-02-15 RX ADMIN — OXYCODONE HYDROCHLORIDE 5 MG: 5 TABLET ORAL at 05:49

## 2022-02-15 RX ADMIN — WARFARIN SODIUM 5 MG: 5 TABLET ORAL at 23:06

## 2022-02-15 RX ADMIN — Medication 3 MG: at 23:06

## 2022-02-15 RX ADMIN — DICLOFENAC SODIUM 2 G: 10 GEL TOPICAL at 17:40

## 2022-02-15 RX ADMIN — ACETAMINOPHEN 975 MG: 325 TABLET, FILM COATED ORAL at 14:20

## 2022-02-15 RX ADMIN — DILTIAZEM HYDROCHLORIDE 240 MG: 240 CAPSULE, COATED, EXTENDED RELEASE ORAL at 08:01

## 2022-02-15 RX ADMIN — OXYCODONE HYDROCHLORIDE 5 MG: 5 TABLET ORAL at 19:04

## 2022-02-15 RX ADMIN — FERROUS SULFATE TAB 325 MG (65 MG ELEMENTAL FE) 325 MG: 325 (65 FE) TAB at 16:45

## 2022-02-15 RX ADMIN — ACETAMINOPHEN 975 MG: 325 TABLET, FILM COATED ORAL at 23:06

## 2022-02-15 RX ADMIN — LIDOCAINE 5% 1 PATCH: 700 PATCH TOPICAL at 08:00

## 2022-02-15 RX ADMIN — SENNOSIDES AND DOCUSATE SODIUM 2 TABLET: 50; 8.6 TABLET ORAL at 16:45

## 2022-02-15 RX ADMIN — POLYETHYLENE GLYCOL 3350 17 G: 17 POWDER, FOR SOLUTION ORAL at 08:00

## 2022-02-15 RX ADMIN — SENNOSIDES AND DOCUSATE SODIUM 2 TABLET: 50; 8.6 TABLET ORAL at 08:01

## 2022-02-15 RX ADMIN — PANTOPRAZOLE SODIUM 40 MG: 40 TABLET, DELAYED RELEASE ORAL at 08:01

## 2022-02-15 RX ADMIN — CEFEPIME HYDROCHLORIDE 2000 MG: 2 INJECTION, POWDER, FOR SOLUTION INTRAVENOUS at 17:41

## 2022-02-15 RX ADMIN — ACETAMINOPHEN 975 MG: 325 TABLET, FILM COATED ORAL at 05:49

## 2022-02-15 RX ADMIN — DICLOFENAC SODIUM 2 G: 10 GEL TOPICAL at 08:04

## 2022-02-15 RX ADMIN — ASPIRIN 81 MG: 81 TABLET, COATED ORAL at 08:01

## 2022-02-15 RX ADMIN — HEPARIN SODIUM 5000 UNITS: 5000 INJECTION INTRAVENOUS; SUBCUTANEOUS at 05:48

## 2022-02-15 RX ADMIN — FERROUS SULFATE TAB 325 MG (65 MG ELEMENTAL FE) 325 MG: 325 (65 FE) TAB at 08:01

## 2022-02-15 RX ADMIN — ATORVASTATIN CALCIUM 40 MG: 40 TABLET, FILM COATED ORAL at 08:01

## 2022-02-15 RX ADMIN — HEPARIN SODIUM 5000 UNITS: 5000 INJECTION INTRAVENOUS; SUBCUTANEOUS at 23:06

## 2022-02-15 RX ADMIN — CEFEPIME HYDROCHLORIDE 2000 MG: 2 INJECTION, POWDER, FOR SOLUTION INTRAVENOUS at 05:49

## 2022-02-15 NOTE — PHYSICAL THERAPY NOTE
Physical Therapy Treatment Note    Patient Name: Carolee Tafoya    Diagnosis: UTI (urinary tract infection) [N39 0]  Bladder cancer ([facility] [address] ) [C67 9]  Altered mental status [R41 82]     02/15/22 1357   PT Last Visit   PT Visit Date 02/15/22   Note Type   Note Type Treatment   Pain Assessment   Pain Assessment Tool 0-10   Pain Score 2   Pain Location/Orientation Orientation: Lower; Location: Back   Pain Onset/Description Descriptor: [facility]   Hospital Pain Intervention(s) Repositioned; Ambulation/increased activity   Restrictions/Precautions   Weight Bearing Precautions Per Order No   Other Precautions Chair Alarm; Bed Alarm;Multiple lines; Fall Risk;Pain;Hard of hearing;Cognitive   General   Chart Reviewed Yes   Additional Pertinent History MD Xiomara Cuevas cleared pt for PT   Response to Previous Treatment Patient unable to report, no changes reported from family or staff   Family/Caregiver Present No   Cognition   Overall Cognitive Status Impaired   Arousal/Participation Alert; Responsive; Cooperative   Attention Attends with cues to redirect   Orientation Level Oriented to person;Oriented to place;Oriented to situation  (oriented to month and year)   Memory Decreased short term memory;Decreased recall of recent events   Following Commands Follows all commands and directions without difficulty   Comments Pt agreeable to PT  Subjective   Subjective "I need to go to the bathroom "   Bed Mobility   Additional Comments Pt was received seated at EOB in NAD  Transfers   Sit to Stand 4  Minimal assistance  (CG assist)   Additional items Assist x 1   Stand to Sit 4  Minimal assistance  (CG assist)   Additional items Assist x 1; Increased time required;Verbal cues   Ambulation/Elevation   Gait pattern Decreased toe off;Decreased heel strike;Decreased hip extension; Short stride; Shuffling; Foward flexed   Gait Assistance   (close supervision/CG assist)   Additional items Assist x 1;Verbal cues   Assistive Device Rolling walker   Distance 330 feet   Stair Management Assistance Not tested   Balance   Static Sitting Fair +   Dynamic Sitting Fair   Static Standing Fair   Dynamic Standing Fair -   Ambulatory Fair -   Endurance Deficit   Endurance Deficit Yes   Endurance Deficit Description decreased activity tolerance   Activity Tolerance   Activity Tolerance Patient tolerated treatment well   Medical Staff Made Aware MD Vladimir Ledbetter cleared pt for PT   Nurse Made Aware Discussed case with VIVIAN Rubalcava   Assessment   Prognosis Good   Problem List Decreased strength;Decreased endurance; Impaired balance;Decreased mobility; Decreased cognition;Decreased safety awareness;Pain   Assessment Chart reviewed  Patient was received seated at EOB in NAD and agreeable to PT session  Today's PT treatment session consisted of therapeutic activity for facilitation of transitional movements and safe performance of correct technique for sit to stand transfers and gait training to promote safe and functional ambulation on level surfaces  In comparison to the previous session the patient has made progress as evident by ability to ambulate an increased distance with use of RW on level surfaces  When ambulating pt demonstrated a forward flexed trunk, with decreased johnathan, and decreased heel strike  Pt required decreased assistance when ambulating this session  Pt refused participation in therapeutic exercise at this time  Overall, patient tolerated today's session well and continues to be making progress towards achieving his STG's  Pt's STG's were updated this session  Patient's prognosis for achieving their STG's is good  PT intervention continues to be appropriate as the patient continues to be limited by pain, decreased lower extremity strength, impaired balance, decreased endurance, gait deviations, and decreased functional mobility  Continue to recommend home PT with family support   PT to continue to see patient in order to address the deficits listed above and provide interventions consistent with the POC in order to achieve STG's and optimize the patient's independence with functional mobility  Barriers to Discharge None   Goals   STG Expiration Date 02/25/22   Short Term Goal #1 In 7-10 days: Increase bilateral LE strength 1/2 grade to facilitate independent mobility, Perform all bed mobility tasks modified independent to decrease caregiver burden, Perform all transfers modified independent to improve independence, Ambulate > 150 ft  with least restrictive assistive device modified independent w/o LOB and w/ normalized gait pattern 100% of the time and Increase all balance 1/2 grade to decrease risk for falls   Plan   Treatment/Interventions Functional transfer training;LE strengthening/ROM; Therapeutic exercise; Endurance training;Patient/family training;Cognitive reorientation; Bed mobility;Gait training;Spoke to MD;Spoke to nursing;OT   Progress Progressing toward goals   PT Frequency 2-3x/wk   Recommendation   PT Discharge Recommendation Home with home health rehabilitation   [address] [place] [address] Mobility Inpatient   Turning in Bed Without Bedrails 3   Lying on Back to Sitting on Edge of Flat Bed 3   Moving Bed to Chair 3   Standing Up From Chair 3   Walk in Room 3   Climb 3-5 Stairs 3   Basic Mobility Inpatient Raw Score 18   Basic Mobility Standardized Score 41 05   Highest Level Of Mobility   JH-HLM Goal 6: Walk 10 steps or more   JH-HLM Highest Level of Mobility 8: Walk 250 feet ot more   JH-HLM Goal Achieved Yes   End of Consult   Patient Position at End of Consult Seated edge of bed;Bed/Chair alarm activated; All needs within reach     Betsy Andrade PT, DPT    Time of PT treatment session: 5074-9824  11 minutes

## 2022-02-15 NOTE — ASSESSMENT & PLAN NOTE
· Currently rate controlled  · Continue home Cardizem, monitor with routine vitals   · Continue with SQ Heparin until INR therapeutic; Coumadin will be restarted tonight, 5mg  Follow INR  No results for input(s): INR in the last 72 hours

## 2022-02-15 NOTE — ASSESSMENT & PLAN NOTE
· Brought in by Granddaughter who reported several days of progressive confusion and weakness and was instructed to take patient to ED by infusion staff for these symptoms  · Met sepsis criteria on admission, as evidenced by leukocytosis, hyperthermia  Lactic acid 2 4  · UA suggestive of UTI; urine and blood cultures + for pseudomonas   · Source of sepsis related to right-sided cardiac vegetation/pyelonephritis  · Infectious disease consulted; input as noted below  · Port-A-Cath removed 2/7   · RENEE completed on 2/9; noted to have right-sided vegetation  Patient is not a surgical candidate  · Continue Cefepime 2 gm every 12 hours  · PICC order placed   Will need 6 weeks IV antibiotics  · Repeat blood cultures from 2//10/22 negative x 5 days  · Of note, previously discussed replacement of Port-A-Cath with another Port-A-Cath versus PICC with primary hematologist (Dr Augie Jimenez) oncologist and cleared to put in PICC following clearance of blood cultures

## 2022-02-15 NOTE — CASE MANAGEMENT
Case Management Progress Note    Patient name Nolvia Stuart  Location [address] [id number] MRN [id number]  : 1940 Date 2022       LOS (days): 10  Geometric Mean LOS (GMLOS) (days): 4 80  Days to GMLOS:-5 3        OBJECTIVE:  Bundled Patient Payment:  (The pt is not a Bundle)     Current admission status: Inpatient  [facility] [address] [address] [facility] [address]  Phone: [phone] Fax: [phone]    Primary Care Provider: Faustino Jon MD    Primary Insurance: MEDICARE  Secondary Insurance: [facility]    PROGRESS NOTE:  Patient discussed in interdisciplinary rounds  Patient declined neuropsychiatry evaluation  Patient has right side vegetation on ME[address] and will need 6 weeks of ! V abx  Pt cleared by ID for PICC line  IR today for lung bx  Per SLIM expect 48H more medical management with expected discharge to Rehab  Vaccination status unknown

## 2022-02-15 NOTE — PLAN OF CARE
Problem: OCCUPATIONAL THERAPY ADULT  Goal: Performs self-care activities at highest level of function for planned discharge setting  See evaluation for individualized goals  Description: Treatment Interventions: ADL retraining,Functional transfer training,UE strengthening/ROM,Endurance training,Patient/family training,Equipment evaluation/education,Compensatory technique education,Continued evaluation,Activityengagement,Energy conservation          See flowsheet documentation for full assessment, interventions and recommendations  Note: Limitation: Decreased ADL status,Decreased UE strength,Decreased Safe judgement during ADL,Decreased endurance,Decreased self-care trans,Decreased high-level ADLs  Prognosis: Good  Assessment: Pt participated in skilled OT session today addressing the following interventions ADL retraining with proper body mechanics, Patient / Family Education, Transfer Training, Therapeutic Activity, Safety Awareness, Fall Prevention, Back safety education & training, Activity tolerance training and Standing tolerance training  Pt agreeable to OT treatment session, upon arrival patient was found alert, responsive  and in no apparent distress  Pt completed chair push up 5x2 with pacing edu due to impulsivity  Pt completed dynamic standing task with RW and min A  Pt completed static standing task at sink with min A and good balance  Pt was edu on use of RW as he carried RW around room insisting "it isn't heavy at all"  OT provided edu on pushing RW similar to a shopping cart vs carrying  Pt was unable to recall edu after 2 minutes  Pt's dtr brought him a hernandez and told him  After 3 minutes pt stated "Where did this come from?" Dtr reports pt's "memory is fine" Pt was very impulsive throughout session  OT attempted to initiate formal cognitive testing and pt became agitated with questions after orientation questions   Pt continues to be functioning below baseline level, occupational performance remains limited secondary to factors listed above and increased risk for falls and injury  From OT standpoint, recommendation at time of d/c would be home with 24/7 S and A from caregivers he is unsafe to be alone at this time  Pt could also benefit from home OT if agreeable  Pt to benefit from continued Occupational Therapy treatment while in the hospital to address deficits as defined above and maximize level of functional independence with ADLs and functional mobility       OT Discharge Recommendation: Home with home health rehabilitation  OT - OK to Discharge: Yes (when medically cleared) Yes

## 2022-02-15 NOTE — OCCUPATIONAL THERAPY NOTE
Occupational Therapy Treatment Note      Javier Bosslamine    2/15/2022    Principal Problem:    Sepsis due to Pseudomonas aeruginosa [facility])  Active Problems:    CAD (coronary atherosclerotic disease)    Primary hypertension    Back pain    Bladder cancer (HCC)    COPD, severe (HCC)    Atrial fibrillation ([facility] )    Stage 3a chronic kidney disease ([facility] )    Fortune catheter in place prior to arrival    Anemia    Metabolic encephalopathy      Past Medical History:   Diagnosis Date    A-fib ([place] [address] )     Arthritis     Benign prostatic hyperplasia without lower urinary tract symptoms     without Urinary Obstruction    Bladder cancer ([facility] )     CAD (coronary artery disease)     Cancer (HCC)     Chronic obstructive lung disease (HCC)     Constipation 12/9/2021    Coronary arteriosclerosis     Depression     Emphysema lung (HCC)     GERD (gastroesophageal reflux disease)     Hyperlipidemia     Hypertension     Hyponatremia 1/15/2022    Irregular heart beat     Myocardial infarction ([place] [address] )     Psoriasis     Requires supplemental oxygen     at bedtime during high humid days only    Stroke [facility])     TIA 1/2018       Past Surgical History:   Procedure Laterality Date    COLONOSCOPY      CORONARY ANGIOPLASTY  02/03/2001    PTCA of RCA    CORONARY ARTERY BYPASS GRAFT  02/07/2001    x4- [place]    HERNIA REPAIR      IR BIOPSY LUNG  2/14/2022    IR NEPHROSTOMY TUBE PLACEMENT  1/18/2022    IR PORT PLACEMENT  1/14/2022    IR PORT REMOVAL  2/7/2022    DE BRONCHOSCOPY,DIAGNOSTIC Left 1/7/2019    Procedure: BRONCHOSCOPY FLEXIBLE;  Surgeon: Shereen Ahn MD;  [facility];   Service: Pulmonary    DE CYSTOURETHROSCOPY,FULGUR <0 5 CM LESN N/A 11/30/2021    Procedure: TRANSURETHRAL RESECTION OF BLADDER TUMOR (TURBT) with "large";  Surgeon: Erick Thornton MD;  [facility];  Service: Urology    DE CYSTOURETHROSCOPY,URETER CATHETER Bilateral 11/30/2021    Procedure: Troy Guidry;  Surgeon: Erick Thornton MD; [facility];  Service: Urology    VT Justin Crocker INCIS Left 2/20/2018    Procedure: ENDARTERECTOMY ARTERY CAROTID WITH PATCH ANGIOPLASTY;  Surgeon: Rodríguez Mckeon MD;  [facility];  Service: Vascular    TRANSURETHRAL RESECTION OF PROSTATE        02/15/22 1158   OT Last Visit   OT Visit Date 02/15/22   Note Type   Note Type Treatment   Restrictions/Precautions   Weight Bearing Precautions Per Order No   Braces or Orthoses Other (Comment)  (none per pt)   Other Precautions Chair Alarm; Bed Alarm;Multiple lines; Fall Risk;Pain;Hard of hearing; Impulsive;Cognitive   Lifestyle   Autonomy Per chart review and patient report, he lives with his granddaughter and family in a two-story home with 0 ARLENE and a first-floor setup  At baseline, patient reports that he is independent in ADLs and is primarily ambulatory with no AD within the home environment     Reciprocal Relationships Family   Service to Others [place] [employer] at the [employer]   Pain Assessment   Pain Assessment Tool 0-10   Pain Score No Pain   ADL   Eating Assistance 6  Modified independent   Eating Deficit Increased time to complete   Grooming Assistance 5  Supervision/Setup   Grooming Deficit Increased time to complete;Supervision/safety   UB Bathing Assistance 4  Minimal Assistance   UB Bathing Deficit Increased time to complete;Supervision/safety;Verbal cueing;Steadying;Setup   LB Bathing Assistance 4  Minimal Assistance   LB Bathing Deficit Increased time to complete;Supervision/safety;Verbal cueing;Steadying;Setup   UB Dressing Assistance 4  Minimal Assistance   UB Dressing Deficit Increased time to complete;Supervision/safety;Verbal cueing;Steadying;Setup   LB Dressing Assistance 4  Minimal Assistance   LB Dressing Deficit Increased time to complete;Supervision/safety;Verbal cueing;Steadying;Setup   Toileting Assistance  4  Minimal Assistance   Toileting Deficit Increased time to complete;Supervison/safety;Verbal cueing;Steadying;Setup   Bed Mobility   Additional Comments Pt was oob to chair when OT arrived   Transfers   Sit to Stand 4  Minimal assistance  (CGA)   Additional items Assist x 1; Increased time required;Armrests; Verbal cues   Stand to Sit 4  Minimal assistance  (CGA)   Additional items Assist x 1; Armrests; Increased time required;Verbal cues   Therapeutic Excerise-Strength   UE Strength Yes   Right Upper Extremity- Strength   R Weight/Reps/Sets 5 x 2   RUE Strength Comment chair push ups   Left Upper Extremity-Strength   L Weights/Reps/Sets 5 x 2   LUE Strength Comment chair push ups   Cognition   Overall Cognitive Status Impaired   Arousal/Participation Alert; Responsive; Cooperative   Attention Attends with cues to redirect   Orientation Level Oriented to person;Oriented to place; Disoriented to situation  (Oriented to month and year)   Memory Decreased recall of precautions;Decreased recall of recent events;Decreased short term memory   Following Commands Follows all commands and directions without difficulty   Comments Pt was agreeable to OT session   Activity Tolerance   Activity Tolerance Patient tolerated treatment well   Medical Staff Made Aware yes, spoke with pt's RN who stated pt was appropriate for OT and made aware of outcomes   Assessment   Assessment Pt participated in skilled OT session today addressing the following interventions ADL retraining with proper body mechanics, Patient / Family Education, Transfer Training, Therapeutic Activity, Safety Awareness, Fall Prevention, Back safety education & training, Activity tolerance training and Standing tolerance training  Pt agreeable to OT treatment session, upon arrival patient was found alert, responsive  and in no apparent distress  Pt completed chair push up 5x2 with pacing edu due to impulsivity  Pt completed dynamic standing task with RW and min A  Pt completed static standing task at sink with min A and good balance    Pt was edu on use of RW as he carried RW around room insisting "it isn't heavy at all"  OT provided edu on pushing RW similar to a shopping cart vs carrying  Pt was unable to recall edu after 2 minutes  Pt's dtr brought him a hernandez and told him  After 3 minutes pt stated "Where did this come from?" Dtr reports pt's "memory is fine" Pt was very impulsive throughout session  OT attempted to initiate formal cognitive testing and pt became agitated with questions after orientation questions  Pt continues to be functioning below baseline level, occupational performance remains limited secondary to factors listed above and increased risk for falls and injury  From OT standpoint, recommendation at time of d/c would be home with 24/7 S and A from caregivers he is unsafe to be alone at this time  Pt could also benefit from home OT if agreeable  Pt to benefit from continued Occupational Therapy treatment while in the hospital to address deficits as defined above and maximize level of functional independence with ADLs and functional mobility  Plan   Treatment Interventions ADL retraining;Functional transfer training; Activityengagement; Energy conservation;Cardiac education;Continued evaluation; Compensatory technique education; Endurance training;Cognitive reorientation;Patient/family training;UE strengthening/ROM   Goal Expiration Date 02/17/22   OT Frequency 2-3x/wk   Recommendation   OT Discharge Recommendation Home with home health rehabilitation   OT - OK to Discharge Yes  (when medically cleared)   AM-PAC Daily Activity Inpatient   Lower Body Dressing 3   Bathing 3   Toileting 3   Upper Body Dressing 3   Grooming 3   Eating 4   Daily Activity Raw Score 19   Daily Activity Standardized Score (Calc for Raw Score >=11) 40 22   AM-PAC Applied Cognition Inpatient   Following a Speech/Presentation 4   Understanding Ordinary Conversation 4   Taking Medications 3   Remembering Where Things Are Placed or Put Away 4   Remembering List of 4-5 Errands 3   Taking Care of Complicated Tasks 3   Applied Cognition Raw Score 21   Applied Cognition Standardized Score 44 3   Barthel Index   Grooming Score 5   Dressing Score 5   Toilet Use Score 5   Transfers (Bed/Chair) Score 10   Mobility (Level Surface) Score 0     January Cowart MS OTR/L

## 2022-02-15 NOTE — PROGRESS NOTES
[facility]  Progress Note - Sp Rg 1940, 80 y o  male MRN: [id number]  Unit/Bed#: -01 Encounter: [id number]  Primary Care Provider: Bharati Coker MD   Date and time admitted to hospital: 2/4/2022  8:50 PM    Metabolic encephalopathy  Assessment & Plan  · CT head negative for acute process  · Not hypoglycemic, see hyponatremia as below  · Possibly in the setting of sepsis/UTI as above vs systemic chemotherapy vs outpatient narcotic use   · Narcotics and benzos prescribed OP on hold; had received IM Zyprexa X1  · Delirium precautions   · Supportive measures   · Stable, "feisty" but appears at baseline  · Neuropsych evaluation requested to evaluate for capacity; patient refused to participate on 2/11    Anemia  Assessment & Plan  · Appears to be chronic issue with baseline around 8-9  · Did have blood in archibald bag, now resolved  · Would hold off on transfusion unless Hgb <7   · Monitor closely given supratherapeutic INR initially   · Continue iron supplement given FRANCY    Archibald catheter in place prior to arrival  Assessment & Plan  · With archibald catheter and bilat perc nephrostomy tube in the setting of bladder CA as above   · Both producing urine   · Archibald was replaced on day of admission, malpositioned with balloon in urethra, since repositioned   · Continue archibald and nephrostomy care     Stage 3a chronic kidney disease [facility])  Assessment & Plan  Lab Results   Component Value Date    EGFR 59 02/13/2022    EGFR 59 02/11/2022    EGFR 62 02/09/2022    CREATININE 1 14 02/13/2022    CREATININE 1 15 02/11/2022    CREATININE 1 10 02/09/2022     · Stable; appears to be improving from recent DULCE, recent baseline difficult to establish  · Avoid nephrotoxic agents  · Discontinued IVF on 2/5/22    Atrial fibrillation ([facility] )  Assessment & Plan  · Currently rate controlled  · Continue home Cardizem, monitor with routine vitals   · Continue with SQ Heparin until INR therapeutic; Coumadin will be restarted tonight, 5mg  Follow INR  No results for input(s): INR in the last 72 hours  COPD, severe ([address] )  Assessment & Plan  · 96-99% O2 RA; no SOB complaints  · Stable    Bladder cancer (HCC)  Assessment & Plan  · 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, diagnosed 10/12/2021  · Follows with heme onc and urology as an outpatient  · Receives chemo and radiation; last received on day of admission  · Continue outpatient heme onc and urology f/u upon discharge   · CT c/a/p from 2/6 unfortunately shows possible new right apical pleural-based mass  · IR biopsy done 2/14/22, follow path  · Oncology consult placed, appreciate input    Back pain  Assessment & Plan  · Presented complaining of back pain  · Prescribed Oxycodone 5mg q6h prn as outpatient  · Continue Oxycodone; geriatric dosing  · 2 5 mg every 6 hours as needed for moderate pain  · 5 mg every 6 hours as needed for severe pain  · Voltaren gel to back  · Change Tylenol to 975 mg every 8 hours ATC  · Aqua K  · ID recommending MRI Lumbar/Thoracic Spine to rule out osteomyelitis, ordered, pending  · Patient has been refusing/uncooperative (including 2/15/22)  · Back pain resolved  Primary hypertension  Assessment & Plan  · Continue home Cardizem      CAD (coronary atherosclerotic disease)  Assessment & Plan  · S/p CABG x4  · Denies chest pain; Troponin 37  · Continue home ASA and statin    * Sepsis due to Pseudomonas aeruginosa [facility])  Assessment & Plan  · Brought in by Granddaughter who reported several days of progressive confusion and weakness and was instructed to take patient to ED by infusion staff for these symptoms  · Met sepsis criteria on admission, as evidenced by leukocytosis, hyperthermia    Lactic acid 2 4  · UA suggestive of UTI; urine and blood cultures + for pseudomonas   · Source of sepsis related to right-sided cardiac vegetation/pyelonephritis  · Infectious disease consulted; input as noted below  · Port-A-Cath removed    · RENEE completed on ; noted to have right-sided vegetation  Patient is not a surgical candidate  · Continue Cefepime 2 gm every 12 hours  · PICC order placed  Will need 6 weeks IV antibiotics  · Repeat blood cultures from 2//10/22 negative x 5 days  · Of note, previously discussed replacement of Port-A-Cath with another Port-A-Cath versus PICC with primary hematologist (Dr Henri Villasenor) oncologist and cleared to put in PICC following clearance of blood cultures        VTE Pharmacologic Prophylaxis: Heparin, coumadin restarted    Patient Centered Rounds: I performed bedside rounds with nursing staff today  Discussions with Specialists or Other Care Team Provider: ID    Education and Discussions with Family / Patient: Attempted to update  (granddaughter) via phone  Left voicemail  Time Spent for Care: 30 minutes  More than 50% of total time spent on counseling and coordination of care as described above  Current Length of Stay: 11 day(s)  Current Patient Status: Inpatient   Certification Statement: The patient will continue to require additional inpatient hospital stay due to PICC placement, hopefully pt will agree to MRIs  Discharge Plan: Anticipate discharge in 24-48 hrs to discharge location to be determined pending rehab evaluations  Code Status: Level 1 - Full Code    Subjective:   Pt without acute complaints, denies CP/SOB  Objective:     Vitals:   Temp (24hrs), Av 5 °F (36 9 °C), Min:98 5 °F (36 9 °C), Max:98 5 °F (36 9 °C)    Temp:  [98 5 °F (36 9 °C)] 98 5 °F (36 9 °C)  HR:  [69] 69  Resp:  [17] 17  BP: (119-123)/(55) 119/55  SpO2:  [98 %-99 %] 99 %  Body mass index is 27 75 kg/m²  Input and Output Summary (last 24 hours):      Intake/Output Summary (Last 24 hours) at 2/15/2022 1120  Last data filed at 2/15/2022 0814  Gross per 24 hour   Intake 480 ml   Output 1625 ml   Net -1145 ml       Physical Exam:   Physical Exam   Gen: NAD, AAOx3, well developed, well nourished  Eyes: EOMI, PERRLA, no scleral icterus  ENMT:   no nasal discharge, no otic discharge, moist mucous membranes  Neck:  Supple  Cardiovascular:  Regular rate and rhythm, normal S1-S2, no murmurs, rubs, or gallops  Lungs:  Clear to auscultation bilaterally, no wheezes, or rales, or rhonchi  Abdomen:  Positive bowel sounds, soft, nontender, nondistended, no palpable organomegaly   Skin:  Intact, no obvious lesions or rashes, no edema  Neuro: Cranial nerves 2-12 are intact, non-focal, moves all 4 extremities      Additional Data:     Labs:  Results from last 7 days   Lab Units 02/13/22  0448   WBC Thousand/uL 4 18*   HEMOGLOBIN g/dL 8 0*   HEMATOCRIT % 27 9*   PLATELETS Thousands/uL 228     Results from last 7 days   Lab Units 02/13/22  0448   SODIUM mmol/L 141   POTASSIUM mmol/L 3 8   CHLORIDE mmol/L 106   CO2 mmol/L 26   BUN mg/dL 14   CREATININE mg/dL 1 14   ANION GAP mmol/L 9   CALCIUM mg/dL 8 2*   GLUCOSE RANDOM mg/dL 113     Results from last 7 days   Lab Units 02/10/22  0459   INR  1 13     Results from last 7 days   Lab Units 02/09/22  2135 02/09/22  1614   POC GLUCOSE mg/dl 101 107               Lines/Drains:  Invasive Devices  Report    Peripheral Intravenous Line            Peripheral IV 02/10/22 Right;Ventral (anterior) Forearm 4 days          Line            Pump Device Continuous ambulatory delivery device Right Chest 15 days          Drain            Urethral Catheter Non-latex 16 Fr  48 days    Percutaneous Nephroureteral Tube (PCNU) Left 1 8 5 Fr 26 Cm 27 days    Percutaneous Nephroureteral Tube (PCNU) Right 2 8 5 Fr 24 Cm 27 days              Urinary Catheter:  Goal for removal: N/A - Chronic Fortune           Recent Cultures (last 7 days):   Results from last 7 days   Lab Units 02/10/22  0453   BLOOD CULTURE  No Growth After 5 Days  No Growth After 5 Days         Last 24 Hours Medication List:   Current Facility-Administered Medications   Medication Dose Route Frequency Provider Last Rate    acetaminophen  975 mg Oral [facility] MYLENE Grant      aluminum-magnesium hydroxide-simethicone  30 mL Oral Q4H PRN MYLENE Talavera      aspirin  81 mg Oral Daily Karin Whittaker, Jarvis Vázquez      atorvastatin  40 mg Oral Daily [place], [place]      bisacodyl  10 mg Rectal Daily PRN MYLENE Clay      cefepime  2,000 mg Intravenous Q12H Zachary Christy PA-C 2,000 mg (02/15/22 0549)    Diclofenac Sodium  2 g Topical 4x Daily MYLENE Grant      diltiazem  240 mg Oral Daily [place], [place]      ferrous sulfate  325 mg Oral BID With Meals Arnold Vernon PA-C      heparin (porcine)  5,000 Units Subcutaneous Q8H [address] [place], CRNP      lidocaine  1 patch Topical Daily Payal Serrato PA-C      lidocaine (PF)  0 5 mL Infiltration Once PRN Nellie Campbell MD      ondansetron  4 mg Intravenous Q6H PRN Zachary Christy PA-C      oxyCODONE  2 5 mg Oral Q6H PRN MYLENE Grant      oxyCODONE  5 mg Oral Q6H PRN MYLENE Grant      pantoprazole  40 [facility]      polyethylene glycol  17 g Oral Daily MYLENE Clay      senna-docusate sodium  2 tablet Oral BID MYLENE Boyer      sodium phosphate-biphosphate  1 enema Rectal Once PRN MYLENE Clay      warfarin  5 mg Oral HS Angela Suero MD          Today, Patient Was Seen By: Angela Suero MD    **Please Note: This note may have been constructed using a voice recognition system  **

## 2022-02-15 NOTE — ASSESSMENT & PLAN NOTE
· Presented complaining of back pain  · Prescribed Oxycodone 5mg q6h prn as outpatient  · Continue Oxycodone; geriatric dosing  · 2 5 mg every 6 hours as needed for moderate pain  · 5 mg every 6 hours as needed for severe pain  · Voltaren gel to back  · Change Tylenol to 975 mg every 8 hours ATC  · Aqua K  · ID recommending MRI Lumbar/Thoracic Spine to rule out osteomyelitis, ordered, pending  · Patient has been refusing/uncooperative (including 2/15/22)  · Back pain resolved

## 2022-02-15 NOTE — PROGRESS NOTES
Progress Note - Infectious Disease   Javier Tapia 80 y o  male MRN: [id number]  Unit/Bed#: -01 Encounter: [id number]      Impression/Plan:  1  Sepsis, POA   Patient met sepsis criteria on admission with fever, tachycardia and leukocytosis   Sources are 2 and 3  No other obvious sources on exam   Clinical parameters improved  Antibiotics as below  Continue to trend fever curve/vitals  CBC ordered for tomorrow  Additional supportive care as per primary     2  Pseudomonal bacteremia and right-sided cardiac vegetation   Two of 2 blood cultures from admission have isolated Pseudomonas   I suspect that the source is primarily problem 3  Patient had recent catheter exchange but was also receiving chemotherapy which may have led to his bacteremia   Patient had a port in place but no other intravascular devices   Port removed with IR on 02/07 and cultures negative   Repeat blood cultures with delayed growth  Susceptibilities reviewed   Patient mentions lower back pain   Family would like to avoid fluoroquinolones per prior discussion   Patient will now require prolonged course of antibiotic and there is concern for recurrent/relapsing of disease with further chemotherapy   Patient is not surgical candidate per Cardiology  Family aware of the above  Patient unfortunately refused neuropsych evaluation  MR imaging of the back pending  Sed rate and CRP reviewed    Continue cefepime 2 g every 12 hours, renally dose adjusted  Plan for total 6 weeks of therapy through 03/21/2022  Weekly labs with [facility] and Cre while on antibiotics  Patient will need follow-up in the ID office 2 weeks after discharge  ID office staff notified of tentative follow-up needs  Cardiology follow-up as outpatient and surveillance echos post treatment  Patient cleared from ID standpoint for PICC line  Obtain MRI T and L-spine with contrast with reported back pain  Follow-up pleural biopsies for completeness  Consider palliative care evaluation inpatient/outpatient  Family interested in rehab placement, based on prior conversation  Additional supportive care as per primary  Will update follow-up plans if MR imaging abnormal     3  Pyelonephritis with Pseudomonas   Likely source of the above   Recently underwent [facility] catheter exchange 2 days prior to admission and reportedly had hematuria on admission  Urine culture also with Pseudomonas   CT reviewed and catheter repositioned and images concerning for pyelonephritis  Continue antibiotics as above  Continue to trend fever curve/vitals  Maintain current catheter  Recommend follow-up with Urology as outpatient     4  Acute encephalopathy and hyponatremia   Acutely confused on admission which I suspect is multifactorial given issues above   Sodium improving   Mental status also seems to be closer to baseline and stable   CT head unremarkable   Patient refused neuropsych evaluation  Monitor mental status closely  Antibiotics as above  Continue to trend fever curve/WBC     5  Chronic kidney disease stage 3  Creatinine appeared to be close to prior baseline on admission   This does however affect antibiotic dosing  Cefepime dosing as above  Will further dose adjust antibiotic as needed  Fluid hydration as per primary  Will avoid nephrotoxic agent  Repeat chemistry tomorrow     6  Invasive bladder cancer with chronic port   Patient recently on chemotherapy and has multiple catheters in place along with port   Course complicated now by the above   Port removed on 2/7  Antibiotics as above  Oncology evaluation appreciated  Consider palliative care evaluation inpatient/outpatient  Continue to trend fever curve/WBC     7   AFib with elevated INR   Ongoing anticoagulation and care as per primary      8  Leukopenia   Possibly related to antibiotic   We will continue cefepime as above for now and will monitor   Will adjust antibiotic agent if cell counts fall further    repeat CBC with differential tomorrow ordered      Above plan discussed in detail with the patient, primary Service and Case Management      ID consult service will continue to follow      Antibiotics:  Cefepime 12    Subjective:  Patient has no fever, chills, sweats; no nausea, vomiting, diarrhea; no cough, shortness of breath  No new symptoms  He is still having back discomfort requiring pain medication  He remains agreeable to MR imaging  We discussed the need for prolonged antibiotic and that he may in fact benefit then from rehab placement for antibiotics but then also to improve his mobility and work on his back  He feels that it may not be about idea  Objective:  Vitals:  Temp:  [98 5 °F (36 9 °C)] 98 5 °F (36 9 °C)  HR:  [69-84] 69  Resp:  [17] 17  BP: (117-123)/(52-55) 119/55  SpO2:  [98 %-99 %] 99 %  Temp (24hrs), Av 5 °F (36 9 °C), Min:98 5 °F (36 9 °C), Max:98 5 °F (36 9 °C)  Current: Temperature: 98 5 °F (36 9 °C)    Physical Exam:   General Appearance:  Alert, interactive, nontoxic, no acute distress  Hard of hearing  Throat: Oropharynx moist without lesions  Lungs:   Clear to auscultation bilaterally; no wheezes, rhonchi or rales; respirations unlabored on room air   Heart:  RRR; no murmur, rub or gallop appreciated   Abdomen:   Soft, non-tender, non-distended, positive bowel sounds  Extremities: No clubbing, cyanosis or edema; patient continues to have pain on palpation in the lumbar spine  Skin: No new rashes or lesions  No new draining wounds noted         Labs, Imaging, & Other studies:   All pertinent labs and imaging studies were personally reviewed  Results from last 7 days   Lab Units 22  0448 22  0443 02/10/22  0900   WBC Thousand/uL 4 18* 3 71* 5 08   HEMOGLOBIN g/dL 8 0* 7 4* 7 7*   PLATELETS Thousands/uL 228 235 230     Results from last 7 days   Lab Units 22  0448   POTASSIUM mmol/L 3 8   CHLORIDE mmol/L 106   CO2 mmol/L 26   BUN mg/dL 14   CREATININE mg/dL 1 14   EGFR ml/min/1 73sq m 59   CALCIUM mg/dL 8 2*     Results from last 7 days   Lab Units 02/10/22  0453   BLOOD CULTURE  No Growth After 5 Days  No Growth After 5 Days

## 2022-02-15 NOTE — PROCEDURES
Insert PICC line    Date/Time: 2/15/2022 1:32 PM  Performed by: Dallas Flowers RN  Authorized by: Tyrone Murry MD     Patient location:  Bedside  Other Assisting Provider: Yes (comment) Rhina Al RN)    Consent:     Consent obtained:  Written    Consent given by:  Patient  Universal protocol:     Procedure explained and questions answered to patient or proxy's satisfaction: yes      Relevant documents present and verified: yes      Test results available and properly labeled: yes      Required blood products, implants, devices, and special equipment available: yes      Site/side marked: yes      Immediately prior to procedure, a time out was called: yes      Patient identity confirmed:  Verbally with patient and arm band  Pre-procedure details:     Hand hygiene: Hand hygiene performed prior to insertion      Sterile barrier technique: All elements of maximal sterile technique followed      Skin preparation:  ChloraPrep    Skin preparation agent: Skin preparation agent completely dried prior to procedure    Indications:     PICC line indications: long term antibiotics    Sedation:     Sedation type: Other (comment)  Anesthesia (see MAR for exact dosages):      Anesthesia method:  Local infiltration    Local anesthetic:  Lidocaine 1% w/o epi  Procedure details:     Location:  Basilic    Vessel type: vein      Laterality:  Right    Approach: percutaneous technique used      Patient position:  Flat    Procedural supplies:  Double lumen    Catheter size:  5 Fr    Landmarks identified: yes      Ultrasound guidance: yes      Ultrasound image availability:  Not saved    Sterile ultrasound techniques: Sterile gel and sterile probe covers were used      Number of attempts:  1    Successful placement: yes      Vessel of catheter tip end:  Chest Xray needed to confirm placement    Total catheter length (cm):  41    Catheter out on skin (cm):  0    Max flow rate:  5ml/sec    Arm circumference:  30  Post-procedure details:     Post-procedure:  Dressing applied and securement device placed    Assessment:  Blood return through all ports    Post-procedure complications: none      Patient tolerance of procedure: Tolerated well, no immediate complications    Observer: Yes (Inserted by GISELLA Lott RN)      [address] [place] [address] name:  Roger Jarrell RN

## 2022-02-15 NOTE — QUICK NOTE
Contacted patient's granddaughter given patient's reported lower back pain and him still having discomfort requiring Tylenol and oxycodone before going to bed  I discussed my recommendation for MR imaging, to evaluate for/rule out osteomyelitis but then also to evaluate the epidural space which is not done on CT  Primarily the goal would be to look for any disease or abnormalities that would require more than antibiotics alone  My suspicion remains for secondary seeding of the site as the patient has now had multiple complications including cardiac vegetation from his current episode of bacteremia  I updated her that the patient is agreeable when I speak to him but then later refuses the images  She reports that she is going to be visiting him this evening and will discuss it with him  I requested that she discuss with nursing if he is agreeable so that Radiology could be contacted  Additionally I let her know that the plan remains for prolonged IV antibiotic course  Ultimately we will attempt to accommodate antibiotics at home or at rehab whichever they choose/patient qualifies for  Additional questions answered

## 2022-02-15 NOTE — ASSESSMENT & PLAN NOTE
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

## 2022-02-15 NOTE — ASSESSMENT & PLAN NOTE
Lab Results   Component Value Date    EGFR 59 02/13/2022    EGFR 59 02/11/2022    EGFR 62 02/09/2022    CREATININE 1 14 02/13/2022    CREATININE 1 15 02/11/2022    CREATININE 1 10 02/09/2022     · Stable; appears to be improving from recent DULCE, recent baseline difficult to establish  · Avoid nephrotoxic agents  · Discontinued IVF on 2/5/22

## 2022-02-15 NOTE — PLAN OF CARE
Problem: Potential for Falls  Goal: Patient will remain free of falls  Description: INTERVENTIONS:  - Educate patient/family on patient safety including physical limitations  - Instruct patient to call for assistance with activity   - Consult OT/PT to assist with strengthening/mobility   - Keep Call bell within reach  - Keep bed low and locked with side rails adjusted as appropriate  - Keep care items and personal belongings within reach  - Initiate and maintain comfort rounds  - Make Fall Risk Sign visible to staff  - Offer Toileting every 2 Hours, in advance of need  - Initiate/Maintain bed alarm  - Obtain necessary fall risk management equipment: none  - Apply yellow socks and bracelet for high fall risk patients  - Consider moving patient to room near nurses station  Outcome: Progressing     Problem: MOBILITY - ADULT  Goal: Maintain or return to baseline ADL function  Description: INTERVENTIONS:  -  Assess patient's ability to carry out ADLs; assess patient's baseline for ADL function and identify physical deficits which impact ability to perform ADLs (bathing, care of mouth/teeth, toileting, grooming, dressing, etc )  - Assess/evaluate cause of self-care deficits   - Assess range of motion  - Assess patient's mobility; develop plan if impaired  - Assess patient's need for assistive devices and provide as appropriate  - Encourage maximum independence but intervene and supervise when necessary  - Involve family in performance of ADLs  - Assess for home care needs following discharge   - Consider OT consult to assist with ADL evaluation and planning for discharge  - Provide patient education as appropriate  Outcome: Progressing  Goal: Maintains/Returns to pre admission functional level  Description: INTERVENTIONS:  - Perform BMAT or MOVE assessment daily    - Set and communicate daily mobility goal to care team and patient/family/caregiver     - Collaborate with rehabilitation services on mobility goals if consulted  - Perform Range of Motion 3 times a day  - Reposition patient every 2 hours    - Dangle patient 3 times a day  - Stand patient 3 times a day  - Ambulate patient 2 times a day  - Out of bed to chair 3 times a day   - Out of bed for meals 2 times a day  - Out of bed for toileting  - Record patient progress and toleration of activity level   Outcome: Progressing     Problem: PAIN - ADULT  Goal: Verbalizes/displays adequate comfort level or baseline comfort level  Description: Interventions:  - Encourage patient to monitor pain and request assistance  - Assess pain using appropriate pain scale  - Administer analgesics based on type and severity of pain and evaluate response  - Implement non-pharmacological measures as appropriate and evaluate response  - Consider cultural and social influences on pain and pain management  - Notify physician/advanced practitioner if interventions unsuccessful or patient reports new pain  Outcome: Progressing     Problem: INFECTION - ADULT  Goal: Absence or prevention of progression during hospitalization  Description: INTERVENTIONS:  - Assess and monitor for signs and symptoms of infection  - Monitor lab/diagnostic results  - Monitor all insertion sites, i e  indwelling lines, tubes, and drains  - Monitor endotracheal if appropriate and nasal secretions for changes in amount and color  - [place] appropriate cooling/warming therapies per order  - Administer medications as ordered  - Instruct and encourage patient and family to use good hand hygiene technique  - Identify and instruct in appropriate isolation precautions for identified infection/condition  Outcome: Progressing  Goal: Absence of fever/infection during neutropenic period  Description: INTERVENTIONS:  - Monitor WBC    Outcome: Progressing     Problem: SAFETY ADULT  Goal: Patient will remain free of falls  Description: INTERVENTIONS:  - Educate patient/family on patient safety including physical limitations  - Instruct patient to call for assistance with activity   - Consult OT/PT to assist with strengthening/mobility   - Keep Call bell within reach  - Keep bed low and locked with side rails adjusted as appropriate  - Keep care items and personal belongings within reach  - Initiate and maintain comfort rounds  - Make Fall Risk Sign visible to staff  - Offer Toileting every 2 Hours, in advance of need  - Initiate/Maintain bed alarm  - Obtain necessary fall risk management equipment: none  - Apply yellow socks and bracelet for high fall risk patients  - Consider moving patient to room near nurses station  Outcome: Progressing  Goal: Maintain or return to baseline ADL function  Description: INTERVENTIONS:  -  Assess patient's ability to carry out ADLs; assess patient's baseline for ADL function and identify physical deficits which impact ability to perform ADLs (bathing, care of mouth/teeth, toileting, grooming, dressing, etc )  - Assess/evaluate cause of self-care deficits   - Assess range of motion  - Assess patient's mobility; develop plan if impaired  - Assess patient's need for assistive devices and provide as appropriate  - Encourage maximum independence but intervene and supervise when necessary  - Involve family in performance of ADLs  - Assess for home care needs following discharge   - Consider OT consult to assist with ADL evaluation and planning for discharge  - Provide patient education as appropriate  Outcome: Progressing  Goal: Maintains/Returns to pre admission functional level  Description: INTERVENTIONS:  - Perform BMAT or MOVE assessment daily    - Set and communicate daily mobility goal to care team and patient/family/caregiver     - Collaborate with rehabilitation services on mobility goals if consulted  - Out of bed for toileting  - Record patient progress and toleration of activity level   Outcome: Progressing     Problem: DISCHARGE PLANNING  Goal: Discharge to home or other facility with appropriate resources  Description: INTERVENTIONS:  - Identify barriers to discharge w/patient and caregiver  - Arrange for needed discharge resources and transportation as appropriate  - Identify discharge learning needs (meds, wound care, etc )  - Arrange for interpretive services to assist at discharge as needed  - Refer to Case Management Department for coordinating discharge planning if the patient needs post-hospital services based on physician/advanced practitioner order or complex needs related to functional status, cognitive ability, or social support system  Outcome: Progressing     Problem: Knowledge Deficit  Goal: Patient/family/caregiver demonstrates understanding of disease process, treatment plan, medications, and discharge instructions  Description: Complete learning assessment and assess knowledge base    Interventions:  - Provide teaching at level of understanding  - Provide teaching via preferred learning methods  Outcome: Progressing     Problem: GENITOURINARY - ADULT  Goal: Urinary catheter remains patent  Description: INTERVENTIONS:  - Assess patency of urinary catheter  - If patient has a chronic archibald, consider changing catheter if non-functioning  - Follow guidelines for intermittent irrigation of non-functioning urinary catheter  Outcome: Progressing     Problem: SKIN/TISSUE INTEGRITY - ADULT  Goal: Incision(s), wounds(s) or drain site(s) healing without S/S of infection  Description: INTERVENTIONS  - Assess and document dressing, incision, wound bed, drain sites and surrounding tissue  - Provide patient and family education  - Perform skin care/dressing changes every shift   Outcome: Progressing     Problem: HEMATOLOGIC - ADULT  Goal: Maintains hematologic stability  Description: INTERVENTIONS  - Assess for signs and symptoms of bleeding or hemorrhage  - Monitor labs  - Administer supportive blood products/factors as ordered and appropriate  Outcome: Progressing     Problem: Prexisting or High Potential for Compromised Skin Integrity  Goal: Skin integrity is maintained or improved  Description: INTERVENTIONS:  - Identify patients at risk for skin breakdown  - Assess and monitor skin integrity  - Assess and monitor nutrition and hydration status  - Monitor labs   - Assess for incontinence   - Turn and reposition patient  - Assist with mobility/ambulation  - Relieve pressure over bony prominences  - Avoid friction and shearing  - Provide appropriate hygiene as needed including keeping skin clean and dry  - Evaluate need for skin moisturizer/barrier cream  - Collaborate with interdisciplinary team   - Patient/family teaching  - Consider wound care consult   Outcome: Progressing

## 2022-02-15 NOTE — PLAN OF CARE
Problem: PHYSICAL THERAPY ADULT  Goal: Performs mobility at highest level of function for planned discharge setting  See evaluation for individualized goals  Description: Treatment/Interventions: Functional transfer training,LE strengthening/ROM,Therapeutic exercise,Endurance training,Cognitive reorientation,Patient/family training,Bed mobility,Gait training,Spoke to nursing,OT,Spoke to advanced practitioner          See flowsheet documentation for full assessment, interventions and recommendations  Outcome: Progressing  Note: Prognosis: Good  Problem List: Decreased strength,Decreased endurance,Impaired balance,Decreased mobility,Decreased cognition,Decreased safety awareness,Pain  Assessment: Chart reviewed  Patient was received seated at EOB in NAD and agreeable to PT session  Today's PT treatment session consisted of therapeutic activity for facilitation of transitional movements and safe performance of correct technique for sit to stand transfers and gait training to promote safe and functional ambulation on level surfaces  In comparison to the previous session the patient has made progress as evident by ability to ambulate an increased distance with use of RW on level surfaces  When ambulating pt demonstrated a forward flexed trunk, with decreased johnathan, and decreased heel strike  Pt required decreased assistance when ambulating this session  Pt refused participation in therapeutic exercise at this time  Overall, patient tolerated today's session well and continues to be making progress towards achieving his STG's  Pt's STG's were updated this session  Patient's prognosis for achieving their STG's is good  PT intervention continues to be appropriate as the patient continues to be limited by pain, decreased lower extremity strength, impaired balance, decreased endurance, gait deviations, and decreased functional mobility  Continue to recommend home PT with family support   PT to continue to see patient in order to address the deficits listed above and provide interventions consistent with the POC in order to achieve STG's and optimize the patient's independence with functional mobility  Barriers to Discharge: None     PT Discharge Recommendation: Home with home health rehabilitation     See flowsheet documentation for full assessment

## 2022-02-16 ENCOUNTER — APPOINTMENT (OUTPATIENT)
Dept: RADIATION ONCOLOGY | Facility: CLINIC | Age: 82
End: 2022-02-16
Attending: RADIOLOGY
Payer: MEDICARE

## 2022-02-16 LAB
ERYTHROCYTE [DISTWIDTH] IN BLOOD BY AUTOMATED COUNT: 31.3 % (ref 11.6–15.1)
HCT VFR BLD AUTO: 31.2 % (ref 36.5–49.3)
HGB BLD-MCNC: 8.8 G/DL (ref 12–17)
INR PPP: 1.01 (ref 0.84–1.19)
MCH RBC QN AUTO: 24 PG (ref 26.8–34.3)
MCHC RBC AUTO-ENTMCNC: 28.2 G/DL (ref 31.4–37.4)
MCV RBC AUTO: 85 FL (ref 82–98)
PLATELET # BLD AUTO: 293 THOUSANDS/UL (ref 149–390)
PMV BLD AUTO: 8.7 FL (ref 8.9–12.7)
PROTHROMBIN TIME: 12.9 SECONDS (ref 11.6–14.5)
RBC # BLD AUTO: 3.67 MILLION/UL (ref 3.88–5.62)
WBC # BLD AUTO: 3.62 THOUSAND/UL (ref 4.31–10.16)

## 2022-02-16 PROCEDURE — 85610 PROTHROMBIN TIME: CPT | Performed by: HOSPITALIST

## 2022-02-16 PROCEDURE — 99232 SBSQ HOSP IP/OBS MODERATE 35: CPT | Performed by: FAMILY MEDICINE

## 2022-02-16 PROCEDURE — 85027 COMPLETE CBC AUTOMATED: CPT | Performed by: INTERNAL MEDICINE

## 2022-02-16 RX ADMIN — LIDOCAINE 5% 1 PATCH: 700 PATCH TOPICAL at 09:50

## 2022-02-16 RX ADMIN — OXYCODONE HYDROCHLORIDE 5 MG: 5 TABLET ORAL at 20:27

## 2022-02-16 RX ADMIN — ACETAMINOPHEN 975 MG: 325 TABLET, FILM COATED ORAL at 14:11

## 2022-02-16 RX ADMIN — ACETAMINOPHEN 975 MG: 325 TABLET, FILM COATED ORAL at 06:07

## 2022-02-16 RX ADMIN — DICLOFENAC SODIUM 2 G: 10 GEL TOPICAL at 17:41

## 2022-02-16 RX ADMIN — POLYETHYLENE GLYCOL 3350 17 G: 17 POWDER, FOR SOLUTION ORAL at 09:50

## 2022-02-16 RX ADMIN — WARFARIN SODIUM 5 MG: 5 TABLET ORAL at 22:09

## 2022-02-16 RX ADMIN — FERROUS SULFATE TAB 325 MG (65 MG ELEMENTAL FE) 325 MG: 325 (65 FE) TAB at 17:39

## 2022-02-16 RX ADMIN — DICLOFENAC SODIUM 2 G: 10 GEL TOPICAL at 09:51

## 2022-02-16 RX ADMIN — CEFEPIME HYDROCHLORIDE 2000 MG: 2 INJECTION, POWDER, FOR SOLUTION INTRAVENOUS at 06:08

## 2022-02-16 RX ADMIN — DILTIAZEM HYDROCHLORIDE 240 MG: 240 CAPSULE, COATED, EXTENDED RELEASE ORAL at 09:49

## 2022-02-16 RX ADMIN — ACETAMINOPHEN 975 MG: 325 TABLET, FILM COATED ORAL at 22:09

## 2022-02-16 RX ADMIN — OXYCODONE HYDROCHLORIDE 5 MG: 5 TABLET ORAL at 14:11

## 2022-02-16 RX ADMIN — HEPARIN SODIUM 5000 UNITS: 5000 INJECTION INTRAVENOUS; SUBCUTANEOUS at 14:13

## 2022-02-16 RX ADMIN — HEPARIN SODIUM 5000 UNITS: 5000 INJECTION INTRAVENOUS; SUBCUTANEOUS at 22:09

## 2022-02-16 RX ADMIN — ATORVASTATIN CALCIUM 40 MG: 40 TABLET, FILM COATED ORAL at 09:49

## 2022-02-16 RX ADMIN — PANTOPRAZOLE SODIUM 40 MG: 40 TABLET, DELAYED RELEASE ORAL at 09:49

## 2022-02-16 RX ADMIN — SENNOSIDES AND DOCUSATE SODIUM 2 TABLET: 50; 8.6 TABLET ORAL at 09:49

## 2022-02-16 RX ADMIN — DICLOFENAC SODIUM 2 G: 10 GEL TOPICAL at 22:13

## 2022-02-16 RX ADMIN — SENNOSIDES AND DOCUSATE SODIUM 2 TABLET: 50; 8.6 TABLET ORAL at 17:39

## 2022-02-16 RX ADMIN — DICLOFENAC SODIUM 2 G: 10 GEL TOPICAL at 14:15

## 2022-02-16 RX ADMIN — FERROUS SULFATE TAB 325 MG (65 MG ELEMENTAL FE) 325 MG: 325 (65 FE) TAB at 09:49

## 2022-02-16 RX ADMIN — ASPIRIN 81 MG: 81 TABLET, COATED ORAL at 09:49

## 2022-02-16 RX ADMIN — CEFEPIME HYDROCHLORIDE 2000 MG: 2 INJECTION, POWDER, FOR SOLUTION INTRAVENOUS at 17:47

## 2022-02-16 RX ADMIN — HEPARIN SODIUM 5000 UNITS: 5000 INJECTION INTRAVENOUS; SUBCUTANEOUS at 06:07

## 2022-02-16 NOTE — PLAN OF CARE
Problem: Potential for Falls  Goal: Patient will remain free of falls  Description: INTERVENTIONS:  - Educate patient/family on patient safety including physical limitations  - Instruct patient to call for assistance with activity   - Consult OT/PT to assist with strengthening/mobility   - Keep Call bell within reach  - Keep bed low and locked with side rails adjusted as appropriate  - Keep care items and personal belongings within reach  - Initiate and maintain comfort rounds  - Make Fall Risk Sign visible to staff  - Offer Toileting every 2 Hours, in advance of need  - Initiate/Maintain bed alarm  - Obtain necessary fall risk management equipment: bed alarm  - Apply yellow socks and bracelet for high fall risk patients  - Consider moving patient to room near nurses station  Outcome: Progressing     Problem: MOBILITY - ADULT  Goal: Maintain or return to baseline ADL function  Description: INTERVENTIONS:  -  Assess patient's ability to carry out ADLs; assess patient's baseline for ADL function and identify physical deficits which impact ability to perform ADLs (bathing, care of mouth/teeth, toileting, grooming, dressing, etc )  - Assess/evaluate cause of self-care deficits   - Assess range of motion  - Assess patient's mobility; develop plan if impaired  - Assess patient's need for assistive devices and provide as appropriate  - Encourage maximum independence but intervene and supervise when necessary  - Involve family in performance of ADLs  - Assess for home care needs following discharge   - Consider OT consult to assist with ADL evaluation and planning for discharge  - Provide patient education as appropriate  Outcome: Progressing  Goal: Maintains/Returns to pre admission functional level  Description: INTERVENTIONS:  - Perform BMAT or MOVE assessment daily    - Set and communicate daily mobility goal to care team and patient/family/caregiver     - Collaborate with rehabilitation services on mobility goals if consulted  - Perform Range of Motion 4 times a day  - Reposition patient every 2 hours    - Dangle patient 3 times a day  - Stand patient 3 times a day  - Ambulate patient 3 times a day  - Out of bed to chair 3 times a day   - Out of bed for meals 3 times a day  - Out of bed for toileting  - Record patient progress and toleration of activity level   Outcome: Progressing     Problem: PAIN - ADULT  Goal: Verbalizes/displays adequate comfort level or baseline comfort level  Description: Interventions:  - Encourage patient to monitor pain and request assistance  - Assess pain using appropriate pain scale  - Administer analgesics based on type and severity of pain and evaluate response  - Implement non-pharmacological measures as appropriate and evaluate response  - Consider cultural and social influences on pain and pain management  - Notify physician/advanced practitioner if interventions unsuccessful or patient reports new pain  Outcome: Progressing     Problem: INFECTION - ADULT  Goal: Absence or prevention of progression during hospitalization  Description: INTERVENTIONS:  - Assess and monitor for signs and symptoms of infection  - Monitor lab/diagnostic results  - Monitor all insertion sites, i e  indwelling lines, tubes, and drains  - Monitor endotracheal if appropriate and nasal secretions for changes in amount and color  - [place] appropriate cooling/warming therapies per order  - Administer medications as ordered  - Instruct and encourage patient and family to use good hand hygiene technique  - Identify and instruct in appropriate isolation precautions for identified infection/condition  Outcome: Progressing  Goal: Absence of fever/infection during neutropenic period  Description: INTERVENTIONS:  - Monitor WBC    Outcome: Progressing     Problem: SAFETY ADULT  Goal: Patient will remain free of falls  Description: INTERVENTIONS:  - Educate patient/family on patient safety including physical limitations  - Instruct patient to call for assistance with activity   - Consult OT/PT to assist with strengthening/mobility   - Keep Call bell within reach  - Keep bed low and locked with side rails adjusted as appropriate  - Keep care items and personal belongings within reach  - Initiate and maintain comfort rounds  - Make Fall Risk Sign visible to staff  - Offer Toileting every 2 Hours, in advance of need  - Initiate/Maintain bed alarm  - Obtain necessary fall risk management equipment: bed alarm  - Apply yellow socks and bracelet for high fall risk patients  - Consider moving patient to room near nurses station  Outcome: Progressing  Goal: Maintain or return to baseline ADL function  Description: INTERVENTIONS:  -  Assess patient's ability to carry out ADLs; assess patient's baseline for ADL function and identify physical deficits which impact ability to perform ADLs (bathing, care of mouth/teeth, toileting, grooming, dressing, etc )  - Assess/evaluate cause of self-care deficits   - Assess range of motion  - Assess patient's mobility; develop plan if impaired  - Assess patient's need for assistive devices and provide as appropriate  - Encourage maximum independence but intervene and supervise when necessary  - Involve family in performance of ADLs  - Assess for home care needs following discharge   - Consider OT consult to assist with ADL evaluation and planning for discharge  - Provide patient education as appropriate  Outcome: Progressing  Goal: Maintains/Returns to pre admission functional level  Description: INTERVENTIONS:  - Perform BMAT or MOVE assessment daily    - Set and communicate daily mobility goal to care team and patient/family/caregiver  - Collaborate with rehabilitation services on mobility goals if consulted  - Perform Range of Motion 4 times a day  - Reposition patient every 2 hours    - Dangle patient 3 times a day  - Stand patient 3 times a day  - Ambulate patient 3 times a day  - Out of bed to chair 3 times a day   - Out of bed for meals 3 times a day  - Out of bed for toileting  - Record patient progress and toleration of activity level   Outcome: Progressing     Problem: DISCHARGE PLANNING  Goal: Discharge to home or other facility with appropriate resources  Description: INTERVENTIONS:  - Identify barriers to discharge w/patient and caregiver  - Arrange for needed discharge resources and transportation as appropriate  - Identify discharge learning needs (meds, wound care, etc )  - Arrange for interpretive services to assist at discharge as needed  - Refer to Case Management Department for coordinating discharge planning if the patient needs post-hospital services based on physician/advanced practitioner order or complex needs related to functional status, cognitive ability, or social support system  Outcome: Progressing     Problem: Knowledge Deficit  Goal: Patient/family/caregiver demonstrates understanding of disease process, treatment plan, medications, and discharge instructions  Description: Complete learning assessment and assess knowledge base    Interventions:  - Provide teaching at level of understanding  - Provide teaching via preferred learning methods  Outcome: Progressing     Problem: GENITOURINARY - ADULT  Goal: Urinary catheter remains patent  Description: INTERVENTIONS:  - Assess patency of urinary catheter  - If patient has a chronic archibald, consider changing catheter if non-functioning  - Follow guidelines for intermittent irrigation of non-functioning urinary catheter  Outcome: Progressing     Problem: SKIN/TISSUE INTEGRITY - ADULT  Goal: Incision(s), wounds(s) or drain site(s) healing without S/S of infection  Description: INTERVENTIONS  - Assess and document dressing, incision, wound bed, drain sites and surrounding tissue  - Provide patient and family education  Outcome: Progressing     Problem: HEMATOLOGIC - ADULT  Goal: Maintains hematologic stability  Description: INTERVENTIONS  - Assess for signs and symptoms of bleeding or hemorrhage  - Monitor labs  - Administer supportive blood products/factors as ordered and appropriate  Outcome: Progressing     Problem: Prexisting or High Potential for Compromised Skin Integrity  Goal: Skin integrity is maintained or improved  Description: INTERVENTIONS:  - Identify patients at risk for skin breakdown  - Assess and monitor skin integrity  - Assess and monitor nutrition and hydration status  - Monitor labs   - Assess for incontinence   - Turn and reposition patient  - Assist with mobility/ambulation  - Relieve pressure over bony prominences  - Avoid friction and shearing  - Provide appropriate hygiene as needed including keeping skin clean and dry  - Evaluate need for skin moisturizer/barrier cream  - Collaborate with interdisciplinary team   - Patient/family teaching  - Consider wound care consult   Outcome: Progressing

## 2022-02-16 NOTE — PLAN OF CARE
Problem: Potential for Falls  Goal: Patient will remain free of falls  Description: INTERVENTIONS:  - Educate patient/family on patient safety including physical limitations  - Instruct patient to call for assistance with activity   - Consult OT/PT to assist with strengthening/mobility   - Keep Call bell within reach  - Keep bed low and locked with side rails adjusted as appropriate  - Keep care items and personal belongings within reach  - Initiate and maintain comfort rounds  - Make Fall Risk Sign visible to staff  - Offer Toileting every 2 Hours, in advance of need  - Initiate/Maintain BED alarm  - Obtain necessary fall risk management equipment  - Apply yellow socks and bracelet for high fall risk patients  - Consider moving patient to room near nurses station  Outcome: Progressing     Problem: MOBILITY - ADULT  Goal: Maintain or return to baseline ADL function  Description: INTERVENTIONS:  - Educate patient/family on patient safety including physical limitations  - Instruct patient to call for assistance with activity   - Consult OT/PT to assist with strengthening/mobility   - Keep Call bell within reach  - Keep bed low and locked with side rails adjusted as appropriate  - Keep care items and personal belongings within reach  - Initiate and maintain comfort rounds  - Make Fall Risk Sign visible to staff  - Offer Toileting every 2 Hours, in advance of need  - Initiate/Maintain BED alarm  - Obtain necessary fall risk management equipment  - Apply yellow socks and bracelet for high fall risk patients  - Consider moving patient to room near nurses station  Outcome: Progressing  Goal: Maintains/Returns to pre admission functional level  Description: INTERVENTIONS:  -  Assess patient's ability to carry out ADLs; assess patient's baseline for ADL function and identify physical deficits which impact ability to perform ADLs (bathing, care of mouth/teeth, toileting, grooming, dressing, etc )  - Assess/evaluate cause of self-care deficits   - Assess range of motion  - Assess patient's mobility; develop plan if impaired  - Assess patient's need for assistive devices and provide as appropriate  - Encourage maximum independence but intervene and supervise when necessary  - Involve family in performance of ADLs  - Assess for home care needs following discharge   - Consider OT consult to assist with ADL evaluation and planning for discharge  - Provide patient education as appropriate  Outcome: Progressing     Problem: INFECTION - ADULT  Goal: Absence or prevention of progression during hospitalization  Description: INTERVENTIONS:  - Assess and monitor for signs and symptoms of infection  - Monitor lab/diagnostic results  - Monitor all insertion sites, i e  indwelling lines, tubes, and drains  - Monitor endotracheal if appropriate and nasal secretions for changes in amount and color  - [place] appropriate cooling/warming therapies per order  - Administer medications as ordered  - Instruct and encourage patient and family to use good hand hygiene technique  - Identify and instruct in appropriate isolation precautions for identified infection/condition  Outcome: Progressing  Goal: Absence of fever/infection during neutropenic period  Description: INTERVENTIONS:  - Monitor WBC    Outcome: Progressing     Problem: SAFETY ADULT  Goal: Patient will remain free of falls  Description: INTERVENTIONS:  - Educate patient/family on patient safety including physical limitations  - Instruct patient to call for assistance with activity   - Consult OT/PT to assist with strengthening/mobility   - Keep Call bell within reach  - Keep bed low and locked with side rails adjusted as appropriate  - Keep care items and personal belongings within reach  - Initiate and maintain comfort rounds  - Make Fall Risk Sign visible to staff  - Offer Toileting every 2 Hours, in advance of need  - Initiate/Maintain BEDalarm  - Obtain necessary fall risk management equipment:   - Apply yellow socks and bracelet for high fall risk patients  - Consider moving patient to room near nurses station  Outcome: Progressing  Goal: Maintain or return to baseline ADL function  Description: INTERVENTIONS:  - Educate patient/family on patient safety including physical limitations  - Instruct patient to call for assistance with activity   - Consult OT/PT to assist with strengthening/mobility   - Keep Call bell within reach  - Keep bed low and locked with side rails adjusted as appropriate  - Keep care items and personal belongings within reach  - Initiate and maintain comfort rounds  - Make Fall Risk Sign visible to staff  - Offer Toileting every 2 Hours, in advance of need  - Initiate/Maintain BED alarm  - Obtain necessary fall risk management equipment:  - Apply yellow socks and bracelet for high fall risk patients  - Consider moving patient to room near nurses station  Outcome: Progressing  Goal: Maintains/Returns to pre admission functional level  Description: INTERVENTIONS:  -  Assess patient's ability to carry out ADLs; assess patient's baseline for ADL function and identify physical deficits which impact ability to perform ADLs (bathing, care of mouth/teeth, toileting, grooming, dressing, etc )  - Assess/evaluate cause of self-care deficits   - Assess range of motion  - Assess patient's mobility; develop plan if impaired  - Assess patient's need for assistive devices and provide as appropriate  - Encourage maximum independence but intervene and supervise when necessary  - Involve family in performance of ADLs  - Assess for home care needs following discharge   - Consider OT consult to assist with ADL evaluation and planning for discharge  - Provide patient education as appropriate  Outcome: Progressing     Problem: DISCHARGE PLANNING  Goal: Discharge to home or other facility with appropriate resources  Description: INTERVENTIONS:  - Identify barriers to discharge w/patient and caregiver  - Arrange for needed discharge resources and transportation as appropriate  - Identify discharge learning needs (meds, wound care, etc )  - Arrange for interpretive services to assist at discharge as needed  - Refer to Case Management Department for coordinating discharge planning if the patient needs post-hospital services based on physician/advanced practitioner order or complex needs related to functional status, cognitive ability, or social support system  Outcome: Progressing     Problem: GENITOURINARY - ADULT  Goal: Urinary catheter remains patent  Description: INTERVENTIONS:  - Assess patency of urinary catheter  - If patient has a chronic archibald, consider changing catheter if non-functioning  - Follow guidelines for intermittent irrigation of non-functioning urinary catheter  Outcome: Progressing     Problem: Knowledge Deficit  Goal: Patient/family/caregiver demonstrates understanding of disease process, treatment plan, medications, and discharge instructions  Description: Complete learning assessment and assess knowledge base    Interventions:  - Provide teaching at level of understanding  - Provide teaching via preferred learning methods  Outcome: Not Progressing     Problem: SKIN/TISSUE INTEGRITY - ADULT  Goal: Incision(s), wounds(s) or drain site(s) healing without S/S of infection  Description: INTERVENTIONS  - Assess and document dressing, incision, wound bed, drain sites and surrounding tissue  - Provide patient and family education  - Perform skin care/dressing changes every SHIFT OR AS NEEDED   Outcome: Progressing     Problem: HEMATOLOGIC - ADULT  Goal: Maintains hematologic stability  Description: INTERVENTIONS  - Assess for signs and symptoms of bleeding or hemorrhage  - Monitor labs  - Administer supportive blood products/factors as ordered and appropriate  Outcome: Progressing     Problem: Prexisting or High Potential for Compromised Skin Integrity  Goal: Skin integrity is maintained or improved  Description: INTERVENTIONS:  - Identify patients at risk for skin breakdown  - Assess and monitor skin integrity  - Assess and monitor nutrition and hydration status  - Monitor labs   - Assess for incontinence   - Turn and reposition patient  - Assist with mobility/ambulation  - Relieve pressure over bony prominences  - Avoid friction and shearing  - Provide appropriate hygiene as needed including keeping skin clean and dry  - Evaluate need for skin moisturizer/barrier cream  - Collaborate with interdisciplinary team   - Patient/family teaching  - Consider wound care consult   Outcome: Progressing

## 2022-02-16 NOTE — ASSESSMENT & PLAN NOTE
· Currently rate controlled  · Continue home Cardizem, monitor with routine vitals   · Continue with SQ Heparin until INR therapeutic; Coumadin 5mg was restarted on 2/15, 5mg, which will be continued until INR is therapeutic     Recent Labs     02/15/22  1218 02/16/22  0606   INR 1 03 1 01

## 2022-02-16 NOTE — PROGRESS NOTES
[facility]  Progress Note - Lissa Goldsmith 1940, 80 y o  male MRN: [id number]  Unit/Bed#: -Mitch Encounter: [id number]  Primary Care Provider: Eli Lambert MD   Date and time admitted to hospital: 2/4/2022  5:41 PM    * Sepsis due to Pseudomonas aeruginosa [facility])  Assessment & Plan  · Brought in by Granddaughter who reported several days of progressive confusion and weakness and was instructed to take patient to ED by infusion staff for these symptoms  · Met sepsis criteria on admission, as evidenced by leukocytosis, hyperthermia  Lactic acid 2 4  · UA suggestive of UTI; urine and blood cultures + for pseudomonas   · Source of sepsis related to right-sided cardiac vegetation/pyelonephritis  · Infectious disease consulted; input as noted below  · Port-A-Cath removed 2/7   · RENEE completed on 2/9; noted to have right-sided vegetation  Patient is not a surgical candidate  · Continue Cefepime 2 gm every 12 hours  · PICC order placed  Will need 6 weeks IV antibiotics through 03/21/2022  · Repeat blood cultures from 2//10/22 negative x 5 days  · Of note, previously discussed replacement of Port-A-Cath with another Port-A-Cath versus PICC with primary hematologist (Dr Jeb Perez) oncologist and cleared to put in PICC following clearance of blood cultures  · Given concern for vertebral osteo and patient's refusal to undergo MRI, consider empiric treatment with antibiotics and repeat imaging in 4-6 via CT       Metabolic encephalopathy  Assessment & Plan  · CT head negative for acute process  · Not hypoglycemic, see hyponatremia as below  · Possibly in the setting of sepsis/UTI as above vs systemic chemotherapy vs outpatient narcotic use   · Narcotics and benzos prescribed OP on hold; had received IM Zyprexa X1  · Delirium precautions   · Supportive measures   · Stable, "feisty" but appears at baseline  · Neuropsych evaluation requested to evaluate for capacity; patient refused to participate on 2/11      Anemia  Assessment & Plan  · Appears to be chronic issue with baseline around 8-9  · Did have blood in archibald bag, now resolved  · Would hold off on transfusion unless Hgb <7   · Continue iron supplement given FRANCY    Stage 3a chronic kidney disease [facility])  Assessment & Plan  Lab Results   Component Value Date    EGFR 52 02/15/2022    EGFR 59 02/13/2022    EGFR 59 02/11/2022    CREATININE 1 27 02/15/2022    CREATININE 1 14 02/13/2022    CREATININE 1 15 02/11/2022     · Stable; appears to be improving from recent DULCE, recent baseline difficult to establish  · Avoid nephrotoxic agents  · Discontinued IVF on 2/5/22    Atrial fibrillation ([facility] [address] )  Assessment & Plan  · Currently rate controlled  · Continue home Cardizem, monitor with routine vitals   · Continue with SQ Heparin until INR therapeutic; Coumadin 5mg was restarted on 2/15, 5mg, which will be continued until INR is therapeutic     Recent Labs     02/15/22  1218 02/16/22  0606   INR 1 03 1 01       Back pain  Assessment & Plan  · Presented complaining of back pain  · Prescribed Oxycodone 5mg q6h prn as outpatient  · Continue Oxycodone; geriatric dosing  · 2 5 mg every 6 hours as needed for moderate pain  · 5 mg every 6 hours as needed for severe pain  · Voltaren gel to back  · Change Tylenol to 975 mg every 8 hours ATC  · Aqua K  · ID recommending MRI Lumbar/Thoracic Spine to rule out osteomyelitis, ordered, pending  · Patient has been refusing/uncooperative (including 2/15/22)        Progress Note - Teresa Tobar 80 y o  male MRN: [id number]    Unit/Bed#: -01 Encounter: [id number]        Subjective:     patient seen and examined, he's awake and alert, does seem forgetful but not confused  He's refusing MRI , states he can't tolerate closed spaces     Left voicemail for POA     Objective:     Vitals:   Vitals:    02/16/22 0700   BP: 105/75   Pulse: 76   Resp: 18   Temp: 97 8 °F (36 6 °C)   SpO2: 98%     Body mass index is 27 75 kg/m²      Intake/Output Summary (Last 24 hours) at 2/16/2022 1101  Last data filed at 2/16/2022 1011  Gross per 24 hour   Intake 900 ml   Output 2250 ml   Net -1350 ml       Physical Exam:   /75 (BP Location: Left arm)   Pulse 76   Temp 97 8 °F (36 6 °C) (Oral)   Resp 18   Ht 5' 6" (1 676 m)   Wt 78 kg (171 lb 15 3 oz)   SpO2 98%   BMI 27 75 kg/m²   General appearance: alert and oriented, in no acute distress  Head: Normocephalic, without obvious abnormality, atraumatic  Lungs: clear to auscultation bilaterally  Heart: regular rate and rhythm, S1, S2 normal, no murmur, click, rub or gallop  Abdomen: soft, non-tender; bowel sounds normal; no masses,  no organomegaly  Extremities: extremities normal, warm and well-perfused; no cyanosis, clubbing, or edema  Pulses: 2+ and symmetric  Neurologic: Grossly normal     Invasive Devices  Report    Peripherally Inserted Central Catheter Line            PICC Line 85/51/11 Right Basilic <1 day          Line            Pump Device Continuous ambulatory delivery device Right Chest 16 days          Drain            Urethral Catheter Non-latex 16 Fr  49 days    Percutaneous Nephroureteral Tube (PCNU) Left 1 8 5 Fr 26 Cm 28 days    Percutaneous Nephroureteral Tube (PCNU) Right 2 8 5 Fr 24 Cm 28 days                Results from last 7 days   Lab Units 02/16/22  0606 02/15/22  0509 02/13/22  0448   WBC Thousand/uL 3 62* 3 79* 4 18*   HEMOGLOBIN g/dL 8 8* 8 5* 8 0*   HEMATOCRIT % 31 2* 29 2* 27 9*   PLATELETS Thousands/uL 293 274 228       Results from last 7 days   Lab Units 02/15/22  0509 02/13/22  0448 02/11/22  0443   POTASSIUM mmol/L 4 2 3 8 3 7   CHLORIDE mmol/L 103 106 105   CO2 mmol/L 24 26 24   BUN mg/dL 17 14 12   CREATININE mg/dL 1 27 1 14 1 15   CALCIUM mg/dL 8 9 8 2* 8 0*       Medication Administration - last 24 hours from 02/15/2022 1101 to 02/16/2022 1101       Date/Time Order Dose Route Action Action by     02/16/2022 0949 atorvastatin (LIPITOR) tablet 40 mg 40 mg Oral Given Julia Felton, VIVIAN     02/16/2022 0949 diltiazem (CARDIZEM CD) 24 hr capsule 240 mg 240 mg Oral Given Julia Felton, VIVIAN     02/16/2022 0949 pantoprazole (PROTONIX) EC tablet 40 mg 40 mg Oral Given Julia Felton, VIVIAN     02/16/2022 2667 cefepime (MAXIPIME) 2 g/50 mL dextrose IVPB 2,000 mg Intravenous Vincent Estémadonna Lauder, VIVIAN     02/15/2022 1741 cefepime (MAXIPIME) 2 g/50 mL dextrose IVPB 2,000 mg Intravenous [facility] Miya Payne, VIVIAN     02/16/2022 0949 senna-docusate sodium (SENOKOT S) 8 6-50 mg per tablet 2 tablet 2 tablet Oral Given Julia Felton, VIVIAN     02/15/2022 1727 senna-docusate sodium (SENOKOT S) 8 6-50 mg per tablet 2 tablet   Oral Canceled Entry Miya Payne RN     02/15/2022 1645 senna-docusate sodium (SENOKOT S) 8 6-50 mg per tablet 2 tablet 2 tablet Oral Given Miya Payne RN     02/16/2022 0950 polyethylene glycol (MIRALAX) packet 17 g 17 g Oral Given Julia Felton RN     02/16/2022 5227 heparin (porcine) subcutaneous injection 5,000 Units 5,000 Units Subcutaneous Given Gwen Reese RN     02/15/2022 2306 heparin (porcine) subcutaneous injection 5,000 Units 5,000 Units Subcutaneous Given Gwen Reese RN     02/15/2022 1420 heparin (porcine) subcutaneous injection 5,000 Units 5,000 Units Subcutaneous Given Miya Payne RN     02/16/2022 0950 lidocaine (LIDODERM) 5 % patch 1 patch 1 patch Topical Medication Applied Julia Felton RN     02/15/2022 2312 lidocaine (LIDODERM) 5 % patch 1 patch 1 patch Topical Patch Removed Gwen Reese RN     02/16/2022 0949 ferrous sulfate tablet 325 mg 325 mg Oral Given Julia Felton RN     02/15/2022 1645 ferrous sulfate tablet 325 mg 325 mg Oral Given Miya Payne RN     02/16/2022 1297 Diclofenac Sodium (VOLTAREN) 1 % topical gel 2 g 2 g Topical Given Julia Felton, VIVIAN     02/15/2022 1083 Diclofenac Sodium (VOLTAREN) 1 % topical gel 2 g 2 g Topical Refused Gwen Reese RN     02/15/2022 0787 Diclofenac Sodium (VOLTAREN) 1 % topical gel 2 g 2 g Topical Given Eric Kingman, RN     02/15/2022 1304 Diclofenac Sodium (VOLTAREN) 1 % topical gel 2 g 2 g Topical Refused Eric Kingman, RN     02/15/2022 1904 oxyCODONE (ROXICODONE) IR tablet 5 mg 5 mg Oral Given Eric Kingman, RN     02/15/2022 1423 oxyCODONE (ROXICODONE) IR tablet 5 mg 5 mg Oral Not Given Eric Kingman, RN     02/16/2022 6487 acetaminophen (TYLENOL) tablet 975 mg 975 mg Oral Given Zeyad Shah, RN     02/15/2022 2306 acetaminophen (TYLENOL) tablet 975 mg 975 mg Oral Given Zeyad Shah, RN     02/15/2022 1420 acetaminophen (TYLENOL) tablet 975 mg 975 mg Oral Given Eric Halima, RN     02/16/2022 0949 aspirin (ECOTRIN LOW STRENGTH) EC tablet 81 mg 81 mg Oral Given Julia Felton, RN     02/15/2022 2306 warfarin (COUMADIN) tablet 5 mg 5 mg Oral Given Zeyad Shah, VIVIAN     02/15/2022 2306 melatonin tablet 3 mg 3 mg Oral Given Zeyad Shah RN            Lab, Imaging and other studies: I have personally reviewed pertinent reports      VTE Pharmacologic Prophylaxis: Heparin  VTE Mechanical Prophylaxis: sequential compression device     Ramos Schuster MD  2/16/2022,11:01 AM

## 2022-02-16 NOTE — CASE MANAGEMENT
Case Management Discharge Planning Note    Patient name [place]  Location [address] [id number] MRN [id number]  : 1940 Date 2022       Current Admission Date: 2022  Current Admission Diagnosis:Sepsis due to Pseudomonas aeruginosa [facility])   Patient Active Problem List    Diagnosis Date Noted    Bacteremia 2022    Sepsis due to Pseudomonas aeruginosa ([facility] [address] )     Metabolic encephalopathy     Nephrostomy status ([address] ) 2022    Anxiety 2022    Continuous opioid dependence ([facility] [address] ) 2022    Anemia 2022    FRANCY (iron deficiency anemia) 2022    DULCE (acute kidney injury) ([facility] [address] ) on CKD 3 01/15/2022    Pleural effusion on left 01/15/2022    Abnormal urinalysis 01/15/2022    Fortune catheter in place prior to arrival 2022    Stage 3a chronic kidney disease ([address] ) 2022    Recurrent unilateral inguinal hernia 2021    Left lower quadrant abdominal pain 2021    History of bilateral inguinal hernia repair 2021    Cancer of overlapping sites of bladder ([facility] [address] ) 2021    Overgrown toenails 2021    Atrial fibrillation ([facility] [address] ) 2021    Encounter to discuss test results 10/12/2021    Acute on chronic diastolic congestive heart failure ([facility] [address] ) 2020    DDD (degenerative disc disease), lumbar 10/30/2019    Medicare annual wellness visit, subsequent 2019    Tinnitus aurium, bilateral 2019    Sensorineural hearing loss (SNHL) of both ears 2019    Abnormal CT of the chest 2019    COPD, severe ([address] ) 2018    Bronchiectasis with acute lower respiratory infection ([address] ) 2018    Localized edema 10/15/2018    Irregular heart beat     Atrial flutter ([address] ) 10/07/2018    CKD (chronic kidney disease), stage II 2018    Urinary retention due to benign prostatic hyperplasia 2018    Bladder cancer ([address] ) 2018    S/P CABG x 4 2018    Stenosis of left carotid artery 01/14/2018    GERD (gastroesophageal reflux disease) 01/13/2018    History of stroke 01/13/2018    Sciatica of right side 01/13/2018    Vision changes 01/13/2018    Hyperlipidemia     Centrilobular emphysema (HCC)     Arthritis     Back pain 01/24/2017    Acute left-sided low back pain with left-sided sciatica 10/25/2016    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 10/25/2016    CAD (coronary atherosclerotic disease) 08/13/2016    Primary hypertension 08/13/2016    Hyperlipemia 08/13/2016      LOS (days): 12  Geometric Mean LOS (GMLOS) (days): 4 80  Days to GMLOS:-6 9     OBJECTIVE:  Risk of Unplanned Readmission Score: 45   Bundled Patient Payment:  (The pt is not a Bundle)     Current admission status: Inpatient   [facility]  [address] [id number]  Phone: [phone] Fax: [phone]    Primary Care Provider: Pia Baltazar MD    Primary Insurance: MEDICARE  Secondary Insurance: [facility]    DISCHARGE DETAILS:          Comments - Freedom of Choice: Current discharge plan: home with [facility], Needs IVabx, family concern noted   recommend family meeting to further discuss dcp

## 2022-02-16 NOTE — CONSULTS
Consultation - Neuropsychology/Psychology Department  Nixon Lopez 80 y o  male MRN: [id number]  Unit/Bed#: -01 Encounter: [id number]        Reason for Consultation:  Nixon Lopez is a 80y o  year old male who was referred for a Neuropsychological EXam to assess cognitive functioning and comment on capacity to make informed medical decisions        History of Present Illness  Altered mental status    Physician Requesting Consult: Andrea Alberto MD    PROBLEM LIST:  Patient Active Problem List   Diagnosis    CAD (coronary atherosclerotic disease)    Primary hypertension    Hyperlipemia    GERD (gastroesophageal reflux disease)    History of stroke    Sciatica of right side    Hyperlipidemia    Centrilobular emphysema ([address] )    Arthritis    Stenosis of left carotid artery    S/P CABG x 4    Acute left-sided low back pain with left-sided sciatica    Back pain    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    Vision changes    Urinary retention due to benign prostatic hyperplasia    Bladder cancer ([address] )    CKD (chronic kidney disease), stage II    Atrial flutter (HCC)    Irregular heart beat    Localized edema    COPD, severe (HCC)    Bronchiectasis with acute lower respiratory infection ([address] )    Abnormal CT of the chest    Tinnitus aurium, bilateral    Sensorineural hearing loss (SNHL) of both ears    Medicare annual wellness visit, subsequent    DDD (degenerative disc disease), lumbar    Acute on chronic diastolic congestive heart failure ([address] )    Encounter to discuss test results    Atrial fibrillation ([address] )    Overgrown toenails    Recurrent unilateral inguinal hernia    Left lower quadrant abdominal pain    History of bilateral inguinal hernia repair    Cancer of overlapping sites of bladder ([address] )    Stage 3a chronic kidney disease ([address] )    Fortune catheter in place prior to arrival    DULCE (acute kidney injury) ([address] ) on CKD 3    Pleural effusion on left  Abnormal urinalysis    Nephrostomy status (HCC)    Anxiety    Continuous opioid dependence (HCC)    Anemia    FRANCY (iron deficiency anemia)    Sepsis due to Pseudomonas aeruginosa (HCC)    Metabolic encephalopathy    Bacteremia         Historical Information   Past Medical History:   Diagnosis Date    A-fib ([address] )     Arthritis     Benign prostatic hyperplasia without lower urinary tract symptoms     without Urinary Obstruction    Bladder cancer (HCC)     CAD (coronary artery disease)     Cancer (HCC)     Chronic obstructive lung disease (HCC)     Constipation 12/9/2021    Coronary arteriosclerosis     Depression     Emphysema lung (HCC)     GERD (gastroesophageal reflux disease)     Hyperlipidemia     Hypertension     Hyponatremia 1/15/2022    Irregular heart beat     Myocardial infarction (HCC)     Psoriasis     Requires supplemental oxygen     at bedtime during high humid days only    Stroke [facility])     TIA 1/2018     Past Surgical History:   Procedure Laterality Date    COLONOSCOPY      CORONARY ANGIOPLASTY  02/03/2001    PTCA of RCA    CORONARY ARTERY BYPASS GRAFT  02/07/2001    x4- [place]    HERNIA REPAIR      IR BIOPSY LUNG  2/14/2022    IR NEPHROSTOMY TUBE PLACEMENT  1/18/2022    IR PORT PLACEMENT  1/14/2022    IR PORT REMOVAL  2/7/2022    ME BRONCHOSCOPY,DIAGNOSTIC Left 1/7/2019    Procedure: BRONCHOSCOPY FLEXIBLE;  Surgeon: Richard Avelar MD;  [facility];   Service: Pulmonary    ME CYSTOURETHROSCOPY,FULGUR <0 5 CM LESN N/A 11/30/2021    Procedure: TRANSURETHRAL RESECTION OF BLADDER TUMOR (TURBT) with "large";  Surgeon: Osiris Wisdom MD;  [facility]: [facility];  Service: Urology    ME CYSTOURETHROSCOPY,URETER CATHETER Bilateral 11/30/2021    Procedure: [facility];  Surgeon: Osiris Wisdom MD;  [facility];  Service: Urology    ME Mabel Hack INCIS Left 2/20/2018    Procedure: ENDARTERECTOMY ARTERY CAROTID WITH PATCH ANGIOPLASTY; Surgeon: Joaquina Perdue MD;  [facility];  Service: Vascular    TRANSURETHRAL RESECTION OF PROSTATE       Social History   Social History     Substance and Sexual Activity   Alcohol Use Yes    Comment: special occasions only wine  Social History     Substance and Sexual Activity   Drug Use No     Social History     Tobacco Use   Smoking Status Former Smoker    Years: 1 00    Types: Cigarettes   Smokeless Tobacco Never Used   Tobacco Comment    few cigarettes when playing cards        Family History:   Family History   Problem Relation Age of Onset    Lung cancer Mother     Cancer Mother     Other Father         sepsis       Meds/Allergies   current meds:   Current Facility-Administered Medications   Medication Dose Route Frequency    acetaminophen (TYLENOL) tablet 975 mg  975 mg Oral Q8H [address]    aluminum-magnesium hydroxide-simethicone (MYLANTA) oral suspension 30 mL  30 mL Oral Q4H PRN    aspirin (ECOTRIN LOW STRENGTH) EC tablet 81 mg  81 mg Oral Daily    atorvastatin (LIPITOR) tablet 40 mg  40 mg Oral Daily    bisacodyl (DULCOLAX) rectal suppository 10 mg  10 mg Rectal Daily PRN    cefepime (MAXIPIME) 2 g/50 mL dextrose IVPB  2,000 mg Intravenous Q12H    Diclofenac Sodium (VOLTAREN) 1 % topical gel 2 g  2 g Topical 4x Daily    diltiazem (CARDIZEM CD) 24 hr capsule 240 mg  240 mg Oral Daily    ferrous sulfate tablet 325 mg  325 mg Oral BID With Meals    heparin (porcine) subcutaneous injection 5,000 Units  5,000 Units Subcutaneous Q8H [address]    lidocaine (LIDODERM) 5 % patch 1 patch  1 patch Topical Daily    lidocaine (PF) (XYLOCAINE-MPF) 1 % injection 0 5 mL  0 5 mL Infiltration Once PRN    melatonin tablet 3 mg  3 mg Oral HS PRN    ondansetron (ZOFRAN) injection 4 mg  4 mg Intravenous Q6H PRN    oxyCODONE (ROXICODONE) IR tablet 2 5 mg  2 5 mg Oral Q6H PRN    oxyCODONE (ROXICODONE) IR tablet 5 mg  5 mg Oral Q6H PRN    pantoprazole (PROTONIX) EC tablet 40 mg  40 mg Oral QAM    polyethylene glycol (MIRALAX) packet 17 g  17 g Oral Daily    senna-docusate sodium (SENOKOT S) 8 6-50 mg per tablet 2 tablet  2 tablet Oral BID    sodium phosphate-biphosphate (FLEET) enema 1 enema  1 enema Rectal Once PRN    warfarin (COUMADIN) tablet 5 mg  5 mg Oral HS       Allergies   Allergen Reactions    Penicillins Swelling and Itching         Family and Social Support:   No data recorded    Behavioral Observations: Alert, oriented except for season of year; when asked why he was hospitalized he stated, "because my family wanted me here"; when asked for his medical history he stated, "I don't know" and later reported his medical health has been "good"  Patient reported he is able to return home today and care for himself; patient admitted to depressed mood and denied anxiety  Staff reported patient has been confused, aggressive, forgetful and impulsive; distractible during exam     Cognitive Examination    General Cognitive Functioning MMSE = deficits in recall and working memory; Attention/Concentration Auditory Selective Attention = Borderline; Auditory Vigilance = Impaired;     Frontal Systems/Executive Functioning Mental Flexibility/Cognitive Control = Impaired; Working Memory = Impaired Abstract Reasoning = Impaired; Generative Ability = Impaired,     Language Functioning  Phonemic Fluency = Impaired; Semantic Retrieval = Impaired; Comprehension of Complex Ideational Material = Impaired;     Memory Functioning Narrative Recall - Short Delay = Impaired;  Long Delay Narrative Recall = Impaired;     Visuo-Spatial Abilities Not Assessed    Functional Knowledge  Health & Safety Knowledge = Impaired;     Summary/Impression:   Results of Neuropsychological Exam revealed diffuse cognitive dysfunction and on a measure assessing awareness of personal health status and ability to evaluate health problems, handle medical emergencies and take safety precautions, patient performed in the IMPAIRED range of functioning  At this time, patient does not appear to have capacity to make fully informed medical decisions  Unspecified Neurocognitive Disorder

## 2022-02-16 NOTE — ASSESSMENT & PLAN NOTE
· Appears to be chronic issue with baseline around 8-9  · Did have blood in archibald bag, now resolved  · Would hold off on transfusion unless Hgb <7   · Continue iron supplement given FRANCY

## 2022-02-16 NOTE — ASSESSMENT & PLAN NOTE
· Presented complaining of back pain  · Prescribed Oxycodone 5mg q6h prn as outpatient  · Continue Oxycodone; geriatric dosing  · 2 5 mg every 6 hours as needed for moderate pain  · 5 mg every 6 hours as needed for severe pain  · Voltaren gel to back  · Change Tylenol to 975 mg every 8 hours ATC  · Aqua K  · ID recommending MRI Lumbar/Thoracic Spine to rule out osteomyelitis, ordered, pending  · Patient has been refusing/uncooperative (including 2/15/22)

## 2022-02-16 NOTE — ASSESSMENT & PLAN NOTE
Lab Results   Component Value Date    EGFR 52 02/15/2022    EGFR 59 02/13/2022    EGFR 59 02/11/2022    CREATININE 1 27 02/15/2022    CREATININE 1 14 02/13/2022    CREATININE 1 15 02/11/2022     · Stable; appears to be improving from recent DULCE, recent baseline difficult to establish  · Avoid nephrotoxic agents  · Discontinued IVF on 2/5/22

## 2022-02-16 NOTE — QUICK NOTE
Patient's chart reviewed  He is currently afebrile and his vitals are stable  He continues with leukopenia  Unfortunately patient refused MRI yesterday  Noted to have impaired capacity on neuropsych evaluation  Discussed briefly with case management and there is consideration for family meeting  At this time will continue the patient on cefepime per my prior note  Ideally would complete MR imaging of the back given multiple infectious complications related to this patient's bacteremia and the possibility for infection involving the back  Again patient may be more ideal for rehab setting for antibiotics after discussions with family previously  He was refusing placement  Additional care/disposition planning as per primary  ID consult service will formally re-evaluate this patient again tomorrow  Please contact ID attending on call if questions in the interim

## 2022-02-16 NOTE — ASSESSMENT & PLAN NOTE
· Brought in by Granddaughter who reported several days of progressive confusion and weakness and was instructed to take patient to ED by infusion staff for these symptoms  · Met sepsis criteria on admission, as evidenced by leukocytosis, hyperthermia  Lactic acid 2 4  · UA suggestive of UTI; urine and blood cultures + for pseudomonas   · Source of sepsis related to right-sided cardiac vegetation/pyelonephritis  · Infectious disease consulted; input as noted below  · Port-A-Cath removed 2/7   · RENEE completed on 2/9; noted to have right-sided vegetation  Patient is not a surgical candidate  · Continue Cefepime 2 gm every 12 hours  · PICC order placed  Will need 6 weeks IV antibiotics through 03/21/2022  · Repeat blood cultures from 2//10/22 negative x 5 days  · Of note, previously discussed replacement of Port-A-Cath with another Port-A-Cath versus PICC with primary hematologist (Dr Axel Quiroga) oncologist and cleared to put in PICC following clearance of blood cultures  · Given concern for vertebral osteo and patient's refusal to undergo MRI, consider empiric treatment with antibiotics and repeat imaging in 4-6 via CT

## 2022-02-17 ENCOUNTER — TELEPHONE (OUTPATIENT)
Dept: RADIOLOGY | Facility: HOSPITAL | Age: 82
End: 2022-02-17

## 2022-02-17 ENCOUNTER — HOSPITAL ENCOUNTER (INPATIENT)
Facility: HOSPITAL | Age: 82
LOS: 11 days | Discharge: NURSING, REHAB OR LONG-TERM CARE | DRG: 288 | End: 2022-02-28
Attending: INTERNAL MEDICINE | Admitting: INTERNAL MEDICINE
Payer: MEDICARE

## 2022-02-17 ENCOUNTER — APPOINTMENT (OUTPATIENT)
Dept: RADIATION ONCOLOGY | Facility: CLINIC | Age: 82
End: 2022-02-17
Attending: RADIOLOGY
Payer: MEDICARE

## 2022-02-17 VITALS
OXYGEN SATURATION: 97 % | BODY MASS INDEX: 27.64 KG/M2 | RESPIRATION RATE: 20 BRPM | SYSTOLIC BLOOD PRESSURE: 130 MMHG | HEART RATE: 68 BPM | HEIGHT: 66 IN | WEIGHT: 171.96 LBS | TEMPERATURE: 98.4 F | DIASTOLIC BLOOD PRESSURE: 68 MMHG

## 2022-02-17 DIAGNOSIS — M19.90 ARTHRITIS: ICD-10-CM

## 2022-02-17 DIAGNOSIS — D64.9 ANEMIA, UNSPECIFIED TYPE: ICD-10-CM

## 2022-02-17 DIAGNOSIS — I48.19 PERSISTENT ATRIAL FIBRILLATION (HCC): ICD-10-CM

## 2022-02-17 DIAGNOSIS — M46.20 VERTEBRAL OSTEOMYELITIS (HCC): Primary | ICD-10-CM

## 2022-02-17 DIAGNOSIS — K59.00 CONSTIPATION, UNSPECIFIED CONSTIPATION TYPE: ICD-10-CM

## 2022-02-17 DIAGNOSIS — R12 HEARTBURN: ICD-10-CM

## 2022-02-17 DIAGNOSIS — R41.82 ALTERED MENTAL STATUS: ICD-10-CM

## 2022-02-17 DIAGNOSIS — R78.81 BACTEREMIA: ICD-10-CM

## 2022-02-17 DIAGNOSIS — A41.52: ICD-10-CM

## 2022-02-17 DIAGNOSIS — L60.2 OVERGROWN TOENAILS: ICD-10-CM

## 2022-02-17 DIAGNOSIS — I48.11 LONGSTANDING PERSISTENT ATRIAL FIBRILLATION (HCC): ICD-10-CM

## 2022-02-17 DIAGNOSIS — Z01.89 ENCOUNTER FOR ASSESSMENT OF DECISION-MAKING CAPACITY: ICD-10-CM

## 2022-02-17 PROBLEM — N18.30 CKD (CHRONIC KIDNEY DISEASE) STAGE 3, GFR 30-59 ML/MIN (HCC): Status: ACTIVE | Noted: 2018-06-01

## 2022-02-17 PROBLEM — N12 PYELONEPHRITIS: Status: ACTIVE | Noted: 2022-02-17

## 2022-02-17 LAB
ALBUMIN SERPL BCP-MCNC: 2.4 G/DL (ref 3.5–5)
ALP SERPL-CCNC: 95 U/L (ref 46–116)
ALT SERPL W P-5'-P-CCNC: 28 U/L (ref 12–78)
ANION GAP SERPL CALCULATED.3IONS-SCNC: 5 MMOL/L (ref 4–13)
AST SERPL W P-5'-P-CCNC: 14 U/L (ref 5–45)
BASOPHILS # BLD MANUAL: 0 THOUSAND/UL (ref 0–0.1)
BASOPHILS NFR MAR MANUAL: 0 % (ref 0–1)
BILIRUB SERPL-MCNC: 0.42 MG/DL (ref 0.2–1)
BUN SERPL-MCNC: 18 MG/DL (ref 5–25)
CALCIUM ALBUM COR SERPL-MCNC: 10.1 MG/DL (ref 8.3–10.1)
CALCIUM SERPL-MCNC: 8.8 MG/DL (ref 8.3–10.1)
CHLORIDE SERPL-SCNC: 103 MMOL/L (ref 100–108)
CO2 SERPL-SCNC: 29 MMOL/L (ref 21–32)
CREAT SERPL-MCNC: 1.21 MG/DL (ref 0.6–1.3)
EOSINOPHIL # BLD MANUAL: 0.03 THOUSAND/UL (ref 0–0.4)
EOSINOPHIL NFR BLD MANUAL: 1 % (ref 0–6)
ERYTHROCYTE [DISTWIDTH] IN BLOOD BY AUTOMATED COUNT: 31.7 % (ref 11.6–15.1)
GFR SERPL CREATININE-BSD FRML MDRD: 55 ML/MIN/1.73SQ M
GLUCOSE SERPL-MCNC: 90 MG/DL (ref 65–140)
HCT VFR BLD AUTO: 28.3 % (ref 36.5–49.3)
HGB BLD-MCNC: 8.5 G/DL (ref 12–17)
HYPERCHROMIA BLD QL SMEAR: PRESENT
INR PPP: 1.09 (ref 0.84–1.19)
LYMPHOCYTES # BLD AUTO: 0.66 THOUSAND/UL (ref 0.6–4.47)
LYMPHOCYTES # BLD AUTO: 19 % (ref 14–44)
MCH RBC QN AUTO: 25.2 PG (ref 26.8–34.3)
MCHC RBC AUTO-ENTMCNC: 30 G/DL (ref 31.4–37.4)
MCV RBC AUTO: 84 FL (ref 82–98)
METAMYELOCYTES NFR BLD MANUAL: 1 % (ref 0–1)
MONOCYTES # BLD AUTO: 0.41 THOUSAND/UL (ref 0–1.22)
MONOCYTES NFR BLD: 12 % (ref 4–12)
MYELOCYTES NFR BLD MANUAL: 3 % (ref 0–1)
NEUTROPHILS # BLD MANUAL: 2.21 THOUSAND/UL (ref 1.85–7.62)
NEUTS SEG NFR BLD AUTO: 64 % (ref 43–75)
OVALOCYTES BLD QL SMEAR: PRESENT
PLATELET # BLD AUTO: 269 THOUSANDS/UL (ref 149–390)
PLATELET BLD QL SMEAR: ADEQUATE
PMV BLD AUTO: 8.9 FL (ref 8.9–12.7)
POTASSIUM SERPL-SCNC: 4.4 MMOL/L (ref 3.5–5.3)
PROT SERPL-MCNC: 6.1 G/DL (ref 6.4–8.2)
PROTHROMBIN TIME: 13.6 SECONDS (ref 11.6–14.5)
RBC # BLD AUTO: 3.37 MILLION/UL (ref 3.88–5.62)
SODIUM SERPL-SCNC: 137 MMOL/L (ref 136–145)
STOMATOCYTES BLD QL SMEAR: PRESENT
WBC # BLD AUTO: 3.45 THOUSAND/UL (ref 4.31–10.16)

## 2022-02-17 PROCEDURE — 85027 COMPLETE CBC AUTOMATED: CPT | Performed by: FAMILY MEDICINE

## 2022-02-17 PROCEDURE — 99233 SBSQ HOSP IP/OBS HIGH 50: CPT | Performed by: INTERNAL MEDICINE

## 2022-02-17 PROCEDURE — 85007 BL SMEAR W/DIFF WBC COUNT: CPT | Performed by: FAMILY MEDICINE

## 2022-02-17 PROCEDURE — 99239 HOSP IP/OBS DSCHRG MGMT >30: CPT | Performed by: STUDENT IN AN ORGANIZED HEALTH CARE EDUCATION/TRAINING PROGRAM

## 2022-02-17 PROCEDURE — 85610 PROTHROMBIN TIME: CPT | Performed by: FAMILY MEDICINE

## 2022-02-17 PROCEDURE — 80053 COMPREHEN METABOLIC PANEL: CPT | Performed by: FAMILY MEDICINE

## 2022-02-17 PROCEDURE — NC001 PR NO CHARGE: Performed by: STUDENT IN AN ORGANIZED HEALTH CARE EDUCATION/TRAINING PROGRAM

## 2022-02-17 RX ORDER — ACETAMINOPHEN 325 MG/1
975 TABLET ORAL EVERY 8 HOURS SCHEDULED
Status: CANCELLED | OUTPATIENT
Start: 2022-02-17

## 2022-02-17 RX ORDER — PANTOPRAZOLE SODIUM 40 MG/1
40 TABLET, DELAYED RELEASE ORAL EVERY MORNING
Status: DISCONTINUED | OUTPATIENT
Start: 2022-02-18 | End: 2022-02-28 | Stop reason: HOSPADM

## 2022-02-17 RX ORDER — ASPIRIN 81 MG/1
81 TABLET ORAL DAILY
Status: DISCONTINUED | OUTPATIENT
Start: 2022-02-18 | End: 2022-02-28 | Stop reason: HOSPADM

## 2022-02-17 RX ORDER — LIDOCAINE 50 MG/G
1 PATCH TOPICAL DAILY
Status: CANCELLED | OUTPATIENT
Start: 2022-02-17

## 2022-02-17 RX ORDER — LIDOCAINE 50 MG/G
1 PATCH TOPICAL DAILY
Status: DISCONTINUED | OUTPATIENT
Start: 2022-02-18 | End: 2022-02-28 | Stop reason: HOSPADM

## 2022-02-17 RX ORDER — SODIUM PHOSPHATE, DIBASIC AND SODIUM PHOSPHATE, MONOBASIC 7; 19 G/133ML; G/133ML
1 ENEMA RECTAL ONCE AS NEEDED
Status: CANCELLED | OUTPATIENT
Start: 2022-02-17

## 2022-02-17 RX ORDER — OXYCODONE HYDROCHLORIDE 5 MG/1
5 TABLET ORAL EVERY 6 HOURS PRN
Status: DISCONTINUED | OUTPATIENT
Start: 2022-02-17 | End: 2022-02-28 | Stop reason: HOSPADM

## 2022-02-17 RX ORDER — OXYCODONE HYDROCHLORIDE 5 MG/1
5 TABLET ORAL EVERY 6 HOURS PRN
Status: CANCELLED | OUTPATIENT
Start: 2022-02-17

## 2022-02-17 RX ORDER — FERROUS SULFATE 325(65) MG
325 TABLET ORAL 2 TIMES DAILY WITH MEALS
Status: DISCONTINUED | OUTPATIENT
Start: 2022-02-18 | End: 2022-02-28 | Stop reason: HOSPADM

## 2022-02-17 RX ORDER — MAGNESIUM HYDROXIDE/ALUMINUM HYDROXICE/SIMETHICONE 120; 1200; 1200 MG/30ML; MG/30ML; MG/30ML
30 SUSPENSION ORAL EVERY 4 HOURS PRN
Status: DISCONTINUED | OUTPATIENT
Start: 2022-02-17 | End: 2022-02-28 | Stop reason: HOSPADM

## 2022-02-17 RX ORDER — WARFARIN SODIUM 5 MG/1
10 TABLET ORAL
Status: DISCONTINUED | OUTPATIENT
Start: 2022-02-17 | End: 2022-02-17

## 2022-02-17 RX ORDER — BISACODYL 10 MG
10 SUPPOSITORY, RECTAL RECTAL DAILY PRN
Status: CANCELLED | OUTPATIENT
Start: 2022-02-17

## 2022-02-17 RX ORDER — ATORVASTATIN CALCIUM 40 MG/1
40 TABLET, FILM COATED ORAL DAILY
Status: CANCELLED | OUTPATIENT
Start: 2022-02-17

## 2022-02-17 RX ORDER — AMOXICILLIN 250 MG
2 CAPSULE ORAL 2 TIMES DAILY
Status: DISCONTINUED | OUTPATIENT
Start: 2022-02-18 | End: 2022-02-28 | Stop reason: HOSPADM

## 2022-02-17 RX ORDER — LIDOCAINE HYDROCHLORIDE 10 MG/ML
0.5 INJECTION, SOLUTION EPIDURAL; INFILTRATION; INTRACAUDAL; PERINEURAL ONCE AS NEEDED
Status: CANCELLED | OUTPATIENT
Start: 2022-02-17

## 2022-02-17 RX ORDER — FERROUS SULFATE 325(65) MG
325 TABLET ORAL 2 TIMES DAILY WITH MEALS
Status: CANCELLED | OUTPATIENT
Start: 2022-02-17

## 2022-02-17 RX ORDER — BISACODYL 10 MG
10 SUPPOSITORY, RECTAL RECTAL DAILY PRN
Status: DISCONTINUED | OUTPATIENT
Start: 2022-02-17 | End: 2022-02-28 | Stop reason: HOSPADM

## 2022-02-17 RX ORDER — OXYCODONE HYDROCHLORIDE 5 MG/1
2.5 TABLET ORAL EVERY 6 HOURS PRN
Status: DISCONTINUED | OUTPATIENT
Start: 2022-02-17 | End: 2022-02-28 | Stop reason: HOSPADM

## 2022-02-17 RX ORDER — ATORVASTATIN CALCIUM 40 MG/1
40 TABLET, FILM COATED ORAL DAILY
Status: DISCONTINUED | OUTPATIENT
Start: 2022-02-18 | End: 2022-02-28 | Stop reason: HOSPADM

## 2022-02-17 RX ORDER — SODIUM PHOSPHATE, DIBASIC AND SODIUM PHOSPHATE, MONOBASIC 7; 19 G/133ML; G/133ML
1 ENEMA RECTAL ONCE AS NEEDED
Status: DISCONTINUED | OUTPATIENT
Start: 2022-02-17 | End: 2022-02-18

## 2022-02-17 RX ORDER — ACETAMINOPHEN 325 MG/1
975 TABLET ORAL EVERY 8 HOURS SCHEDULED
Status: DISCONTINUED | OUTPATIENT
Start: 2022-02-18 | End: 2022-02-28 | Stop reason: HOSPADM

## 2022-02-17 RX ORDER — LIDOCAINE HYDROCHLORIDE 10 MG/ML
0.5 INJECTION, SOLUTION EPIDURAL; INFILTRATION; INTRACAUDAL; PERINEURAL ONCE AS NEEDED
Status: DISCONTINUED | OUTPATIENT
Start: 2022-02-17 | End: 2022-02-28 | Stop reason: HOSPADM

## 2022-02-17 RX ORDER — OXYCODONE HYDROCHLORIDE 5 MG/1
2.5 TABLET ORAL EVERY 6 HOURS PRN
Status: CANCELLED | OUTPATIENT
Start: 2022-02-17

## 2022-02-17 RX ORDER — POLYETHYLENE GLYCOL 3350 17 G/17G
17 POWDER, FOR SOLUTION ORAL DAILY
Status: DISCONTINUED | OUTPATIENT
Start: 2022-02-18 | End: 2022-02-28 | Stop reason: HOSPADM

## 2022-02-17 RX ORDER — LANOLIN ALCOHOL/MO/W.PET/CERES
3 CREAM (GRAM) TOPICAL
Status: DISCONTINUED | OUTPATIENT
Start: 2022-02-17 | End: 2022-02-28 | Stop reason: HOSPADM

## 2022-02-17 RX ORDER — DILTIAZEM HYDROCHLORIDE 240 MG/1
240 CAPSULE, COATED, EXTENDED RELEASE ORAL DAILY
Status: DISCONTINUED | OUTPATIENT
Start: 2022-02-18 | End: 2022-02-28 | Stop reason: HOSPADM

## 2022-02-17 RX ORDER — HEPARIN SODIUM 5000 [USP'U]/ML
5000 INJECTION, SOLUTION INTRAVENOUS; SUBCUTANEOUS EVERY 8 HOURS SCHEDULED
Status: DISCONTINUED | OUTPATIENT
Start: 2022-02-17 | End: 2022-02-18

## 2022-02-17 RX ORDER — ONDANSETRON 2 MG/ML
4 INJECTION INTRAMUSCULAR; INTRAVENOUS EVERY 6 HOURS PRN
Status: CANCELLED | OUTPATIENT
Start: 2022-02-17

## 2022-02-17 RX ORDER — WARFARIN SODIUM 5 MG/1
10 TABLET ORAL
Status: CANCELLED | OUTPATIENT
Start: 2022-02-17

## 2022-02-17 RX ORDER — PANTOPRAZOLE SODIUM 40 MG/1
40 TABLET, DELAYED RELEASE ORAL EVERY MORNING
Status: CANCELLED | OUTPATIENT
Start: 2022-02-18

## 2022-02-17 RX ORDER — AMOXICILLIN 250 MG
2 CAPSULE ORAL 2 TIMES DAILY
Status: CANCELLED | OUTPATIENT
Start: 2022-02-17

## 2022-02-17 RX ORDER — LANOLIN ALCOHOL/MO/W.PET/CERES
3 CREAM (GRAM) TOPICAL
Status: CANCELLED | OUTPATIENT
Start: 2022-02-17

## 2022-02-17 RX ORDER — ONDANSETRON 2 MG/ML
4 INJECTION INTRAMUSCULAR; INTRAVENOUS EVERY 6 HOURS PRN
Status: DISCONTINUED | OUTPATIENT
Start: 2022-02-17 | End: 2022-02-28 | Stop reason: HOSPADM

## 2022-02-17 RX ORDER — DILTIAZEM HYDROCHLORIDE 240 MG/1
240 CAPSULE, COATED, EXTENDED RELEASE ORAL DAILY
Status: CANCELLED | OUTPATIENT
Start: 2022-02-17

## 2022-02-17 RX ORDER — POLYETHYLENE GLYCOL 3350 17 G/17G
17 POWDER, FOR SOLUTION ORAL DAILY
Status: CANCELLED | OUTPATIENT
Start: 2022-02-17

## 2022-02-17 RX ORDER — MAGNESIUM HYDROXIDE/ALUMINUM HYDROXICE/SIMETHICONE 120; 1200; 1200 MG/30ML; MG/30ML; MG/30ML
30 SUSPENSION ORAL EVERY 4 HOURS PRN
Status: CANCELLED | OUTPATIENT
Start: 2022-02-17

## 2022-02-17 RX ORDER — HEPARIN SODIUM 5000 [USP'U]/ML
5000 INJECTION, SOLUTION INTRAVENOUS; SUBCUTANEOUS EVERY 8 HOURS SCHEDULED
Status: CANCELLED | OUTPATIENT
Start: 2022-02-17

## 2022-02-17 RX ORDER — ASPIRIN 81 MG/1
81 TABLET ORAL DAILY
Status: CANCELLED | OUTPATIENT
Start: 2022-02-17

## 2022-02-17 RX ORDER — WARFARIN SODIUM 5 MG/1
10 TABLET ORAL
Status: DISCONTINUED | OUTPATIENT
Start: 2022-02-17 | End: 2022-02-17 | Stop reason: HOSPADM

## 2022-02-17 RX ADMIN — SENNOSIDES AND DOCUSATE SODIUM 2 TABLET: 50; 8.6 TABLET ORAL at 17:57

## 2022-02-17 RX ADMIN — FERROUS SULFATE TAB 325 MG (65 MG ELEMENTAL FE) 325 MG: 325 (65 FE) TAB at 17:30

## 2022-02-17 RX ADMIN — CEFEPIME HYDROCHLORIDE 2000 MG: 2 INJECTION, POWDER, FOR SOLUTION INTRAVENOUS at 05:56

## 2022-02-17 RX ADMIN — DICLOFENAC SODIUM 2 G: 10 GEL TOPICAL at 08:37

## 2022-02-17 RX ADMIN — DICLOFENAC SODIUM 2 G: 10 GEL TOPICAL at 17:57

## 2022-02-17 RX ADMIN — ACETAMINOPHEN 975 MG: 325 TABLET, FILM COATED ORAL at 05:56

## 2022-02-17 RX ADMIN — ATORVASTATIN CALCIUM 40 MG: 40 TABLET, FILM COATED ORAL at 08:37

## 2022-02-17 RX ADMIN — OXYCODONE HYDROCHLORIDE 5 MG: 5 TABLET ORAL at 19:47

## 2022-02-17 RX ADMIN — HEPARIN SODIUM 5000 UNITS: 5000 INJECTION INTRAVENOUS; SUBCUTANEOUS at 22:33

## 2022-02-17 RX ADMIN — DILTIAZEM HYDROCHLORIDE 240 MG: 240 CAPSULE, COATED, EXTENDED RELEASE ORAL at 08:40

## 2022-02-17 RX ADMIN — PANTOPRAZOLE SODIUM 40 MG: 40 TABLET, DELAYED RELEASE ORAL at 08:36

## 2022-02-17 RX ADMIN — OXYCODONE HYDROCHLORIDE 5 MG: 5 TABLET ORAL at 13:45

## 2022-02-17 RX ADMIN — HEPARIN SODIUM 5000 UNITS: 5000 INJECTION INTRAVENOUS; SUBCUTANEOUS at 05:57

## 2022-02-17 RX ADMIN — ASPIRIN 81 MG: 81 TABLET, COATED ORAL at 08:36

## 2022-02-17 RX ADMIN — ACETAMINOPHEN 975 MG: 325 TABLET, FILM COATED ORAL at 19:47

## 2022-02-17 RX ADMIN — LIDOCAINE 5% 1 PATCH: 700 PATCH TOPICAL at 08:36

## 2022-02-17 RX ADMIN — CEFEPIME HYDROCHLORIDE 2000 MG: 2 INJECTION, POWDER, FOR SOLUTION INTRAVENOUS at 17:58

## 2022-02-17 RX ADMIN — HEPARIN SODIUM 5000 UNITS: 5000 INJECTION INTRAVENOUS; SUBCUTANEOUS at 13:46

## 2022-02-17 RX ADMIN — ACETAMINOPHEN 975 MG: 325 TABLET, FILM COATED ORAL at 13:47

## 2022-02-17 RX ADMIN — FERROUS SULFATE TAB 325 MG (65 MG ELEMENTAL FE) 325 MG: 325 (65 FE) TAB at 08:30

## 2022-02-17 RX ADMIN — POLYETHYLENE GLYCOL 3350 17 G: 17 POWDER, FOR SOLUTION ORAL at 08:36

## 2022-02-17 RX ADMIN — SENNOSIDES AND DOCUSATE SODIUM 2 TABLET: 50; 8.6 TABLET ORAL at 08:36

## 2022-02-17 NOTE — ASSESSMENT & PLAN NOTE
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

## 2022-02-17 NOTE — ASSESSMENT & PLAN NOTE
· Currently rate controlled  · Continue home Cardizem, monitor with routine vitals   · Increase coumadin to 10 mg today to facilitate loading  · Patient has history of chronic afib, will hold off on heparin gtt bridge to coumadin

## 2022-02-17 NOTE — ASSESSMENT & PLAN NOTE
· CT head negative for acute process  · Not hypoglycemic, see hyponatremia as below  · Possibly in the setting of sepsis/UTI as above vs systemic chemotherapy vs outpatient narcotic use   · Narcotics and benzos prescribed OP on hold; had received IM Zyprexa X1  · Delirium precautions   · Supportive measures   · Stable, "feisty" but appears at baseline  · Neuropsych evaluation requested to evaluate for capacity; patient refused to participate on 2/11- does not have capacity

## 2022-02-17 NOTE — PLAN OF CARE
Problem: Potential for Falls  Goal: Patient will remain free of falls  Description: INTERVENTIONS:  - Educate patient/family on patient safety including physical limitations  - Instruct patient to call for assistance with activity   - Consult OT/PT to assist with strengthening/mobility   - Keep Call bell within reach  - Keep bed low and locked with side rails adjusted as appropriate  - Keep care items and personal belongings within reach  - Initiate and maintain comfort rounds  - Make Fall Risk Sign visible to staff  - Offer Toileting every 2 Hours, in advance of need  - Initiate/Maintain bed alarm  - Obtain necessary fall risk management equipment  - Apply yellow socks and bracelet for high fall risk patients  - Consider moving patient to room near nurses station  Outcome: Progressing     Problem: MOBILITY - ADULT  Goal: Maintain or return to baseline ADL function  Description: INTERVENTIONS:  -  Assess patient's ability to carry out ADLs; assess patient's baseline for ADL function and identify physical deficits which impact ability to perform ADLs (bathing, care of mouth/teeth, toileting, grooming, dressing, etc )  - Assess/evaluate cause of self-care deficits   - Assess range of motion  - Assess patient's mobility; develop plan if impaired  - Assess patient's need for assistive devices and provide as appropriate  - Encourage maximum independence but intervene and supervise when necessary  - Involve family in performance of ADLs  - Assess for home care needs following discharge   - Consider OT consult to assist with ADL evaluation and planning for discharge  - Provide patient education as appropriate  Outcome: Progressing  Goal: Maintains/Returns to pre admission functional level  Description: INTERVENTIONS:  - Perform BMAT or MOVE assessment daily    - Set and communicate daily mobility goal to care team and patient/family/caregiver     - Collaborate with rehabilitation services on mobility goals if consulted  - Perform Range of Motion 4 times a day  - Reposition patient every 2 hours    - Dangle patient 4 times a day  - Stand patient 4 times a day  - Ambulate patient 4 times a day  - Out of bed to chair 4 times a day   - Out of bed for meals 4 times a day  - Out of bed for toileting  - Record patient progress and toleration of activity level   Outcome: Progressing     Problem: PAIN - ADULT  Goal: Verbalizes/displays adequate comfort level or baseline comfort level  Description: Interventions:  - Encourage patient to monitor pain and request assistance  - Assess pain using appropriate pain scale  - Administer analgesics based on type and severity of pain and evaluate response  - Implement non-pharmacological measures as appropriate and evaluate response  - Consider cultural and social influences on pain and pain management  - Notify physician/advanced practitioner if interventions unsuccessful or patient reports new pain  Outcome: Progressing     Problem: INFECTION - ADULT  Goal: Absence or prevention of progression during hospitalization  Description: INTERVENTIONS:  - Assess and monitor for signs and symptoms of infection  - Monitor lab/diagnostic results  - Monitor all insertion sites, i e  indwelling lines, tubes, and drains  - Monitor endotracheal if appropriate and nasal secretions for changes in amount and color  - [place] appropriate cooling/warming therapies per order  - Administer medications as ordered  - Instruct and encourage patient and family to use good hand hygiene technique  - Identify and instruct in appropriate isolation precautions for identified infection/condition  Outcome: Progressing  Goal: Absence of fever/infection during neutropenic period  Description: INTERVENTIONS:  - Monitor WBC    Outcome: Progressing     Problem: SAFETY ADULT  Goal: Patient will remain free of falls  Description: INTERVENTIONS:  - Educate patient/family on patient safety including physical limitations  - Instruct patient to call for assistance with activity   - Consult OT/PT to assist with strengthening/mobility   - Keep Call bell within reach  - Keep bed low and locked with side rails adjusted as appropriate  - Keep care items and personal belongings within reach  - Initiate and maintain comfort rounds  - Make Fall Risk Sign visible to staff  - Offer Toileting every 2 Hours, in advance of need  - Initiate/Maintain bed alarm  - Obtain necessary fall risk management equipment  - Apply yellow socks and bracelet for high fall risk patients  - Consider moving patient to room near nurses station  Outcome: Progressing  Goal: Maintain or return to baseline ADL function  Description: INTERVENTIONS:  -  Assess patient's ability to carry out ADLs; assess patient's baseline for ADL function and identify physical deficits which impact ability to perform ADLs (bathing, care of mouth/teeth, toileting, grooming, dressing, etc )  - Assess/evaluate cause of self-care deficits   - Assess range of motion  - Assess patient's mobility; develop plan if impaired  - Assess patient's need for assistive devices and provide as appropriate  - Encourage maximum independence but intervene and supervise when necessary  - Involve family in performance of ADLs  - Assess for home care needs following discharge   - Consider OT consult to assist with ADL evaluation and planning for discharge  - Provide patient education as appropriate  Outcome: Progressing  Goal: Maintains/Returns to pre admission functional level  Description: INTERVENTIONS:  - Perform BMAT or MOVE assessment daily    - Set and communicate daily mobility goal to care team and patient/family/caregiver  - Collaborate with rehabilitation services on mobility goals if consulted  - Perform Range of Motion 4 times a day  - Reposition patient every 2 hours    - Dangle patient 4 times a day  - Stand patient 4 times a day  - Ambulate patient 4 times a day  - Out of bed to chair 4 times a day   - Out of bed for meals 4 times a day  - Out of bed for toileting  - Record patient progress and toleration of activity level   Outcome: Progressing     Problem: DISCHARGE PLANNING  Goal: Discharge to home or other facility with appropriate resources  Description: INTERVENTIONS:  - Identify barriers to discharge w/patient and caregiver  - Arrange for needed discharge resources and transportation as appropriate  - Identify discharge learning needs (meds, wound care, etc )  - Arrange for interpretive services to assist at discharge as needed  - Refer to Case Management Department for coordinating discharge planning if the patient needs post-hospital services based on physician/advanced practitioner order or complex needs related to functional status, cognitive ability, or social support system  Outcome: Progressing

## 2022-02-17 NOTE — ASSESSMENT & PLAN NOTE
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

## 2022-02-17 NOTE — ASSESSMENT & PLAN NOTE
Lab Results   Component Value Date    EGFR 55 02/17/2022    EGFR 52 02/15/2022    EGFR 59 02/13/2022    CREATININE 1 21 02/17/2022    CREATININE 1 27 02/15/2022    CREATININE 1 14 02/13/2022     · Stable; appears to be improving from recent DULCE, recent baseline difficult to establish  · Avoid nephrotoxic agents  · Discontinued IVF on 2/5/22

## 2022-02-17 NOTE — CASE MANAGEMENT
Case Management Progress Note    Patient name Javier Tapia  Location [id number] MRN [id number]  : 1940 Date 2022       LOS (days): 12  Geometric Mean LOS (GMLOS) (days): 4 80  Days to GMLOS:-7 4        OBJECTIVE:  Bundled Patient Payment:  (The pt is not a Bundle)     Current admission status: Inpatient  Preferred Pharmacy:   [facility] [place] [id number]  Phone: [phone] Fax: [phone]    Primary Care Provider: Sabas Frost MD    Primary Insurance: MEDICARE  Secondary Insurance: [facility]    PROGRESS NOTE:  Summary impression of  Neuropsychological Exam is that patient does not appear to have capacity to make informed decisions  Family meeting needed to decide dcp

## 2022-02-17 NOTE — PROGRESS NOTES
[address] [facility]  Progress Note - Shahla Service 1940, 80 y o  male MRN: [id number]  Unit/Bed#: -Mitch Encounter: [id number]  Primary Care Provider: Starla Huerta MD   Date and time admitted to hospital: 2/4/2022  5:41 PM    Back pain  Assessment & Plan  · In setting of presumed vertebral osteomyelitis, which is unable to be confirmed since he is uncooperative with MRI  · Continue current pain control regimen, controlled  · Monitor     * Sepsis due to Pseudomonas aeruginosa [facility])  Assessment & Plan  · Brought in by Granddaughter who reported several days of progressive confusion and weakness and was instructed to take patient to ED by infusion staff for these symptoms  · Met sepsis criteria on admission, as evidenced by leukocytosis, hyperthermia  Lactic acid 2 4  · UA suggestive of UTI; urine and blood cultures + for pseudomonas   · Source of sepsis related to right-sided cardiac vegetation/pyelonephritis  · Infectious disease consulted; input as noted below  · Port-A-Cath removed 2/7   · RENEE completed on 2/9; noted to have right-sided vegetation  Patient is not a surgical candidate  · PICC order placed  Will need 6 weeks IV antibiotics through 03/21/2022  · Repeat blood cultures from 2//10/22 negative  · Of note, previously discussed replacement of Port-A-Cath with another Port-A-Cath versus PICC with primary hematologist (Dr Mary Burton) oncologist and cleared to put in PICC following clearance of blood cultures  · Given concern for vertebral osteo and patient's refusal to undergo MRI, consider empiric treatment with antibiotics and repeat imaging in 4-6 via CT     · Neuropsych evaluation determined he does not have capacity to make decisions    Bladder cancer [facility])  Assessment & Plan  · 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 [address] )  Assessment & Plan  · Currently rate controlled  · Continue home Cardizem, monitor with routine vitals   · Increase coumadin to 10 mg today to facilitate loading  · Patient has history of chronic afib, will hold off on heparin gtt bridge to coumadin    Metabolic encephalopathy  Assessment & Plan  · CT head negative for acute process  · Not hypoglycemic, see hyponatremia as below  · Possibly in the setting of sepsis/UTI as above vs systemic chemotherapy vs outpatient narcotic use   · Narcotics and benzos prescribed OP on hold; had received IM Zyprexa X1  · Delirium precautions   · Supportive measures   · Stable, "feisty" but appears at baseline  · Neuropsych evaluation requested to evaluate for capacity; patient refused to participate on 2/11- does not have capacity       Anemia  Assessment & Plan  · Stable, monitor daily     Archibald catheter in place prior to arrival  Assessment & Plan  · With archibald catheter and bilat perc nephrostomy tube in the setting of bladder CA as above   · Both producing urine   · Archibald was replaced on day of admission, malpositioned with balloon in urethra, since repositioned   · Continue archibald and nephrostomy care     Stage 3a chronic kidney disease [facility])  Assessment & Plan  Lab Results   Component Value Date    EGFR 55 02/17/2022    EGFR 52 02/15/2022    EGFR 59 02/13/2022    CREATININE 1 21 02/17/2022    CREATININE 1 27 02/15/2022    CREATININE 1 14 02/13/2022     · Stable; appears to be improving from recent DULCE, recent baseline difficult to establish  · Avoid nephrotoxic agents  · Discontinued IVF on 2/5/22    COPD, severe ([facility] [address] )  Assessment & Plan  · Not in acute exacerbation  · Stable    Primary hypertension  Assessment & Plan  · Continue home Cardizem      CAD (coronary atherosclerotic disease)  Assessment & Plan  · S/p CABG x4  · Denies chest pain; Troponin 37  · Continue home ASA and statin          VTE Pharmacologic Prophylaxis: VTE Score: 6 High Risk (Score >/= 5) - Pharmacological DVT Prophylaxis Ordered: warfarin (Coumadin)  Sequential Compression Devices Ordered  Patient Centered Rounds: I performed bedside rounds with nursing staff today  Discussions with Specialists or Other Care Team Provider: joellen    Education and Discussions with Family / Patient: Updated  (grandchild) via phone  Time Spent for Care: 30 minutes  More than 50% of total time spent on counseling and coordination of care as described above  Current Length of Stay: 13 day(s)  Current Patient Status: Inpatient   Certification Statement: The patient will continue to require additional inpatient hospital stay due to placement to rehab  Discharge Plan: Anticipate discharge in >72 hrs to rehab facility  Code Status: Level 1 - Full Code    Subjective:   No chest pain or chest palpitations  No shortness of breath  Appetite is good  Objective:     Vitals:   Temp (24hrs), Av 4 °F (36 9 °C), Min:98 2 °F (36 8 °C), Max:98 5 °F (36 9 °C)    Temp:  [98 2 °F (36 8 °C)-98 5 °F (36 9 °C)] 98 2 °F (36 8 °C)  HR:  [67-76] 67  Resp:  [18] 18  BP: (133-154)/(54-65) 136/65  SpO2:  [96 %-98 %] 96 %  Body mass index is 27 75 kg/m²  Input and Output Summary (last 24 hours): Intake/Output Summary (Last 24 hours) at 2022 1103  Last data filed at 2022 0801  Gross per 24 hour   Intake 360 ml   Output 2150 ml   Net -1790 ml       Physical Exam:   Physical Exam  Vitals and nursing note reviewed  Constitutional:       Appearance: Normal appearance  HENT:      Head: Normocephalic and atraumatic  Right Ear: External ear normal       Left Ear: External ear normal       Nose: No congestion or rhinorrhea  Mouth/Throat:      Mouth: Mucous membranes are dry  Pharynx: Oropharynx is clear  Eyes:      General:         Right eye: No discharge  Left eye: No discharge  Cardiovascular:      Rate and Rhythm: Normal rate and regular rhythm  Pulmonary:      Effort: Pulmonary effort is normal  No respiratory distress  Abdominal:      General: Abdomen is flat  Palpations: Abdomen is soft  Musculoskeletal:      Cervical back: Normal range of motion and neck supple  Right lower leg: No edema  Left lower leg: No edema  Skin:     General: Skin is warm and dry  Neurological:      General: No focal deficit present  Mental Status: He is alert  Mental status is at baseline     Psychiatric:         Mood and Affect: Mood normal          Behavior: Behavior normal           Additional Data:     Labs:  Results from last 7 days   Lab Units 02/17/22  0433   WBC Thousand/uL 3 45*   HEMOGLOBIN g/dL 8 5*   HEMATOCRIT % 28 3*   PLATELETS Thousands/uL 269   LYMPHO PCT % 19   MONO PCT % 12   EOS PCT % 1     Results from last 7 days   Lab Units 02/17/22  0433   SODIUM mmol/L 137   POTASSIUM mmol/L 4 4   CHLORIDE mmol/L 103   CO2 mmol/L 29   BUN mg/dL 18   CREATININE mg/dL 1 21   ANION GAP mmol/L 5   CALCIUM mg/dL 8 8   ALBUMIN g/dL 2 4*   TOTAL BILIRUBIN mg/dL 0 42   ALK PHOS U/L 95   ALT U/L 28   AST U/L 14   GLUCOSE RANDOM mg/dL 90     Results from last 7 days   Lab Units 02/17/22  0433   INR  1 09                   Lines/Drains:  Invasive Devices  Report    Peripherally Inserted Central Catheter Line            PICC Line 32/40/26 Right Basilic 1 day          Line            Pump Device Continuous ambulatory delivery device Right Chest 17 days          Drain            Urethral Catheter Non-latex 16 Fr  50 days    Percutaneous Nephroureteral Tube (PCNU) Left 1 8 5 Fr 26 Cm 29 days    Percutaneous Nephroureteral Tube (PCNU) Right 2 8 5 Fr 24 Cm 29 days              Urinary Catheter:  Goal for removal: archibald chronic         Central Line:  Goal for removal: N/A - Discharging with PICC for IV ABX/medications             Imaging: No pertinent imaging reviewed  Recent Cultures (last 7 days):         Last 24 Hours Medication List:   Current Facility-Administered Medications   Medication Dose Route Frequency Provider Last Rate    acetaminophen  975 mg Oral Q8H [facility] MYLENE Santoyo      aluminum-magnesium hydroxide-simethicone  30 mL Oral Q4H PRN MYLENE Clinton      aspirin  81 mg Oral Daily Cathalene Roll, [address]      atorvastatin  40 mg Oral Daily [place], [place]      bisacodyl  10 mg Rectal Daily PRN MYLENE Clay      cefepime  2,000 mg Intravenous Q12H Samina Matamoros PA-C 2,000 mg (02/17/22 0556)    Diclofenac Sodium  2 g Topical 4x Daily Hay Prudent, MYLENE      diltiazem  240 mg Oral Daily [place], [place]      ferrous sulfate  325 mg Oral BID With Meals Simone Shepherd PA-C      heparin (porcine)  5,000 Units Subcutaneous Q8H [facility] MYLENE Rice      lidocaine  1 patch Topical Daily Payal Serrato PA-C      lidocaine (PF)  0 5 mL Infiltration Once PRN Kaley Boland MD      melatonin  3 mg Oral HS PRN Alexa Lindsay PA-C      ondansetron  4 mg Intravenous Q6H PRN Samina Matamoros PA-C      oxyCODONE  2 5 mg Oral Q6H PRN Hay PrudentMYLENE      oxyCODONE  5 mg Oral Q6H PRN Hay PrudentMYLENE      pantoprazole  40 [facility]      polyethylene glycol  17 g Oral Daily MYLENE Clay      senna-docusate sodium  2 tablet Oral BID [place] MYLENE Calderon      sodium phosphate-biphosphate  1 enema Rectal Once PRN MYLENE Clay      warfarin  10 mg Oral HS Lemuel Lal MD          Today, Patient Was Seen By: Arianne Moreno MD    **Please Note: This note may have been constructed using a voice recognition system  **

## 2022-02-17 NOTE — PROGRESS NOTES
Progress Note - Infectious Disease   Lenny Fleming 80 y o  male MRN: [id number]  Unit/Bed#: -01 Encounter: [id number]      Impression/Plan:  1  Sepsis, POA   Patient met sepsis criteria on admission with fever, tachycardia and leukocytosis   Sources are 2 and 3  No other obvious sources on exam   Clinical parameters improved  Antibiotics as below  Continue to trend fever curve/vitals  CBC ordered for tomorrow  Additional supportive care as per primary     2  Pseudomonal bacteremia and right-sided endocarditis   Two of 2 blood cultures from admission isolated Pseudomonas  Source primarily problem 3  Patient had recent catheter exchange but was also receiving chemotherapy which may have led to his bacteremia   Patient had a port in place but no other intravascular devices   Port removed with IR on 02/07 and cultures negative   Repeat blood cultures with delayed growth  Susceptibilities reviewed  2D echo showed vegetation  Transesophageal echo confirmed moderator band vegetation  Family wanted to avoid fluoroquinolones  Patient not a surgical candidate per Cardiology  Risk of relapse and prolonged antibiotic course discussed with family  Patient over admission has been reporting persistent low back pain  Sed rate and CRP mildly elevated  High suspicion for bacteremia seeding the lumbar spine  Patient refusing MRI unless done with sedation  Case discussed with neurosurgery by primary service    Continue cefepime 2 g every 12 hours, renally dose adjusted  Tentative plan for total 6 weeks of therapy through 03/21/2022  Will need to update ID office staff closer to discharge  Cardiology follow-up as outpatient and surveillance echos post treatment  Follow-up pleural biopsies for completeness  Consider palliative care evaluation inpatient/outpatient  Patient currently refusing rehab placement, consider family meeting  Additional supportive care as per primary  Patient recommended for transfer for MRI with sedation per Neurosurgery     3  Pyelonephritis with Pseudomonas   Likely source of the above   Recently underwent [employer] catheter exchange 2 days prior to admission and reportedly had hematuria on admission  Urine culture also with Pseudomonas   CT reviewed and catheter repositioned and images concerning for pyelonephritis  Continue antibiotics as above  Continue to trend fever curve/vitals  Maintain current catheter  Recommend follow-up with Urology as outpatient     4  Acute encephalopathy, hyponatremia and cognitive dysfunction  Mental status returned to baseline  Neuropsych evaluation noted with impaired cognition  Patient refusing rehab placement and family unable to support antibiotics at home  Monitor mental status closely  Antibiotics as above  Continue to trend fever curve/WBC  Neuropsych evaluation appreciated  Consider family meeting/palliative care evaluation     5  Chronic kidney disease stage 3  Creatinine appeared to be close to prior baseline on admission   This does however affect antibiotic dosing  Cefepime dosing as above  Will further dose adjust antibiotic as needed  Fluid hydration as per primary  Will avoid nephrotoxic agent  Repeat chemistry tomorrow     6  Invasive bladder cancer with chronic port   Patient recently on chemotherapy and has multiple catheters in place along with port   Course complicated now by the above   Port removed on 2/7  Family unlikely to pursue further chemo as outpatient  Antibiotics as above  Oncology evaluation appreciated  Consider palliative care evaluation inpatient/outpatient  Continue to trend fever curve/WBC  Risk of infection relapse discussed in detail with family     7   AFib with elevated INR   Ongoing anticoagulation and care as per primary      8  Leukopenia   Possibly related to antibiotic   We will continue cefepime as above for now and will monitor   Will adjust antibiotic agent if cell counts fall further   Repeat CBC with differential tomorrow ordered      Above plan discussed earlier today with patient and his granddaughter  Above plan discussed later today with primary service      ID consult service will continue to follow      Antibiotics:  Cefepime 14    Subjective:  Patient has no fever, chills, sweats; no nausea, vomiting, diarrhea; no cough, shortness of breath  No new symptoms  He continues to have back pain which he states that he constantly needs to move from his position every 15 minutes or so or his pain increases  Evaluated the patient with granddaughter present  Patient at this point is refusing MRI due to issues with claustrophobia  He is agreeable to imaging if it is done with anesthesia out or in an open MRI  A pointed out to the patient today had MRIs and 2016 and 2018 but he states he never did  Neuropsych evaluation noted with impaired capacity  We discuss further reviewing case with Neurosurgery, potential CTs and scheduling imaging outpatient  Primary service discussed case with Neurosurgery and patient and instead recommended for transfer for imaging with general anesthesia  Objective:  Vitals:  Temp:  [98 2 °F (36 8 °C)] 98 2 °F (36 8 °C)  HR:  [67] 67  Resp:  [18] 18  BP: (136-154)/(54-65) 136/65  SpO2:  [96 %] 96 %  Temp (24hrs), Av 2 °F (36 8 °C), Min:98 2 °F (36 8 °C), Max:98 2 °F (36 8 °C)  Current: Temperature: 98 2 °F (36 8 °C)    Physical Exam:   General Appearance:  Alert, interactive, nontoxic, no acute distress  Throat: Oropharynx moist without lesions  Lungs:   Clear to auscultation bilaterally; no wheezes, rhonchi or rales; respirations unlabored on room air   Heart:  RRR; no murmur, rub or gallop appreciated   Abdomen:   Soft, non-tender, non-distended, positive bowel sounds  Extremities: No clubbing, cyanosis or edema; patient remains with tenderness to palpation down the lumbar spine    He is freely moving his upper and lower extremities against gravity, favors tilting forward/sitting forward due to back discomfort  Skin: No new rashes or lesions  No new draining wounds noted         Labs, Imaging, & Other studies:   All pertinent labs and imaging studies were personally reviewed  Results from last 7 days   Lab Units 02/17/22  0433 02/16/22  0606 02/15/22  0509   WBC Thousand/uL 3 45* 3 62* 3 79*   HEMOGLOBIN g/dL 8 5* 8 8* 8 5*   PLATELETS Thousands/uL 269 293 274     Results from last 7 days   Lab Units 02/17/22  0433   POTASSIUM mmol/L 4 4   CHLORIDE mmol/L 103   CO2 mmol/L 29   BUN mg/dL 18   CREATININE mg/dL 1 21   EGFR ml/min/1 73sq m 55   CALCIUM mg/dL 8 8   AST U/L 14   ALT U/L 28   ALK PHOS U/L 95

## 2022-02-17 NOTE — TELEPHONE ENCOUNTER
Awais txt sent to Dr Reggie Lackey requesting update on status of MRI spine order    Dr Reggie Lackey to speak with patient

## 2022-02-17 NOTE — ASSESSMENT & PLAN NOTE
· Currently rate controlled  · Continue home Cardizem, monitor with routine vitals  · Patient has history of chronic afib, will hold off on heparin gtt bridge to coumadin   · Increase coumadin to 10 mg today to facilitate loading

## 2022-02-17 NOTE — PLAN OF CARE
Problem: Potential for Falls  Goal: Patient will remain free of falls  Description: INTERVENTIONS:  - Educate patient/family on patient safety including physical limitations  - Instruct patient to call for assistance with activity   - Consult OT/PT to assist with strengthening/mobility   - Keep Call bell within reach  - Keep bed low and locked with side rails adjusted as appropriate  - Keep care items and personal belongings within reach  - Initiate and maintain comfort rounds  - Make Fall Risk Sign visible to staff  - Offer Toileting every  Hours, in advance of need  - Initiate/Maintain alarm  - Obtain necessary fall risk management equipment:   - Apply yellow socks and bracelet for high fall risk patients  - Consider moving patient to room near nurses station  Outcome: Progressing     Problem: MOBILITY - ADULT  Goal: Maintain or return to baseline ADL function  Description: INTERVENTIONS:  -  Assess patient's ability to carry out ADLs; assess patient's baseline for ADL function and identify physical deficits which impact ability to perform ADLs (bathing, care of mouth/teeth, toileting, grooming, dressing, etc )  - Assess/evaluate cause of self-care deficits   - Assess range of motion  - Assess patient's mobility; develop plan if impaired  - Assess patient's need for assistive devices and provide as appropriate  - Encourage maximum independence but intervene and supervise when necessary  - Involve family in performance of ADLs  - Assess for home care needs following discharge   - Consider OT consult to assist with ADL evaluation and planning for discharge  - Provide patient education as appropriate  Outcome: Progressing  Goal: Maintains/Returns to pre admission functional level  Description: INTERVENTIONS:  - Perform BMAT or MOVE assessment daily    - Set and communicate daily mobility goal to care team and patient/family/caregiver     - Collaborate with rehabilitation services on mobility goals if consulted  - Perform Range of Motion  times a day  - Reposition patient every  hours    - Dangle patient  times a day  - Stand patient  times a day  - Ambulate patient  times a day  - Out of bed to chair  times a day   - Out of bed for meals  times a day  - Out of bed for toileting  - Record patient progress and toleration of activity level   Outcome: Progressing     Problem: PAIN - ADULT  Goal: Verbalizes/displays adequate comfort level or baseline comfort level  Description: Interventions:  - Encourage patient to monitor pain and request assistance  - Assess pain using appropriate pain scale  - Administer analgesics based on type and severity of pain and evaluate response  - Implement non-pharmacological measures as appropriate and evaluate response  - Consider cultural and social influences on pain and pain management  - Notify physician/advanced practitioner if interventions unsuccessful or patient reports new pain  Outcome: Progressing     Problem: INFECTION - ADULT  Goal: Absence or prevention of progression during hospitalization  Description: INTERVENTIONS:  - Assess and monitor for signs and symptoms of infection  - Monitor lab/diagnostic results  - Monitor all insertion sites, i e  indwelling lines, tubes, and drains  - Monitor endotracheal if appropriate and nasal secretions for changes in amount and color  - [place] appropriate cooling/warming therapies per order  - Administer medications as ordered  - Instruct and encourage patient and family to use good hand hygiene technique  - Identify and instruct in appropriate isolation precautions for identified infection/condition  Outcome: Progressing  Goal: Absence of fever/infection during neutropenic period  Description: INTERVENTIONS:  - Monitor WBC    Outcome: Progressing     Problem: SAFETY ADULT  Goal: Patient will remain free of falls  Description: INTERVENTIONS:  - Educate patient/family on patient safety including physical limitations  - Instruct patient to call for assistance with activity   - Consult OT/PT to assist with strengthening/mobility   - Keep Call bell within reach  - Keep bed low and locked with side rails adjusted as appropriate  - Keep care items and personal belongings within reach  - Initiate and maintain comfort rounds  - Make Fall Risk Sign visible to staff  - Offer Toileting every  Hours, in advance of need  - Initiate/Maintain alarm  - Obtain necessary fall risk management equipment:   - Apply yellow socks and bracelet for high fall risk patients  - Consider moving patient to room near nurses station  Outcome: Progressing  Goal: Maintain or return to baseline ADL function  Description: INTERVENTIONS:  -  Assess patient's ability to carry out ADLs; assess patient's baseline for ADL function and identify physical deficits which impact ability to perform ADLs (bathing, care of mouth/teeth, toileting, grooming, dressing, etc )  - Assess/evaluate cause of self-care deficits   - Assess range of motion  - Assess patient's mobility; develop plan if impaired  - Assess patient's need for assistive devices and provide as appropriate  - Encourage maximum independence but intervene and supervise when necessary  - Involve family in performance of ADLs  - Assess for home care needs following discharge   - Consider OT consult to assist with ADL evaluation and planning for discharge  - Provide patient education as appropriate  Outcome: Progressing  Goal: Maintains/Returns to pre admission functional level  Description: INTERVENTIONS:  - Perform BMAT or MOVE assessment daily    - Set and communicate daily mobility goal to care team and patient/family/caregiver  - Collaborate with rehabilitation services on mobility goals if consulted  - Perform Range of Motion  times a day  - Reposition patient every  hours    - Dangle patient  times a day  - Stand patient  times a day  - Ambulate patient  times a day  - Out of bed to chair  times a day   - Out of bed for meals  times a day  - Out of bed for toileting  - Record patient progress and toleration of activity level   Outcome: Progressing     Problem: DISCHARGE PLANNING  Goal: Discharge to home or other facility with appropriate resources  Description: INTERVENTIONS:  - Identify barriers to discharge w/patient and caregiver  - Arrange for needed discharge resources and transportation as appropriate  - Identify discharge learning needs (meds, wound care, etc )  - Arrange for interpretive services to assist at discharge as needed  - Refer to Case Management Department for coordinating discharge planning if the patient needs post-hospital services based on physician/advanced practitioner order or complex needs related to functional status, cognitive ability, or social support system  Outcome: Progressing     Problem: Knowledge Deficit  Goal: Patient/family/caregiver demonstrates understanding of disease process, treatment plan, medications, and discharge instructions  Description: Complete learning assessment and assess knowledge base    Interventions:  - Provide teaching at level of understanding  - Provide teaching via preferred learning methods  Outcome: Progressing     Problem: GENITOURINARY - ADULT  Goal: Urinary catheter remains patent  Description: INTERVENTIONS:  - Assess patency of urinary catheter  - If patient has a chronic archibald, consider changing catheter if non-functioning  - Follow guidelines for intermittent irrigation of non-functioning urinary catheter  Outcome: Progressing     Problem: SKIN/TISSUE INTEGRITY - ADULT  Goal: Incision(s), wounds(s) or drain site(s) healing without S/S of infection  Description: INTERVENTIONS  - Assess and document dressing, incision, wound bed, drain sites and surrounding tissue  - Provide patient and family education  - Perform skin care/dressing changes every   Outcome: Progressing     Problem: HEMATOLOGIC - ADULT  Goal: Maintains hematologic stability  Description: INTERVENTIONS  - Assess for signs and symptoms of bleeding or hemorrhage  - Monitor labs  - Administer supportive blood products/factors as ordered and appropriate  Outcome: Progressing     Problem: Prexisting or High Potential for Compromised Skin Integrity  Goal: Skin integrity is maintained or improved  Description: INTERVENTIONS:  - Identify patients at risk for skin breakdown  - Assess and monitor skin integrity  - Assess and monitor nutrition and hydration status  - Monitor labs   - Assess for incontinence   - Turn and reposition patient  - Assist with mobility/ambulation  - Relieve pressure over bony prominences  - Avoid friction and shearing  - Provide appropriate hygiene as needed including keeping skin clean and dry  - Evaluate need for skin moisturizer/barrier cream  - Collaborate with interdisciplinary team   - Patient/family teaching  - Consider wound care consult   Outcome: Progressing

## 2022-02-17 NOTE — ASSESSMENT & PLAN NOTE
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

## 2022-02-17 NOTE — ASSESSMENT & PLAN NOTE
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

## 2022-02-17 NOTE — ASSESSMENT & PLAN NOTE
· Brought in by Granddaughter who reported several days of progressive confusion and weakness and was instructed to take patient to ED by infusion staff for these symptoms  · Met sepsis criteria on admission, as evidenced by leukocytosis, hyperthermia  Lactic acid 2 4  · UA suggestive of UTI; urine and blood cultures + for pseudomonas   · Source of sepsis related to right-sided cardiac vegetation/pyelonephritis  · Infectious disease consulted; input as noted below  · Port-A-Cath removed 2/7   · RENEE completed on 2/9; noted to have right-sided vegetation  Patient is not a surgical candidate  · PICC order placed  Will need 6 weeks IV antibiotics through 03/21/2022  · Repeat blood cultures from 2//10/22 negative  · Of note, previously discussed replacement of Port-A-Cath with another Port-A-Cath versus PICC with primary hematologist (Dr Augie Jimenez) oncologist and cleared to put in PICC following clearance of blood cultures  · Given concern for vertebral osteo and patient's refusal to undergo MRI, consider empiric treatment with antibiotics and repeat imaging in 4-6 via CT     · Neuropsych evaluation determined he does not have capacity to make decisions

## 2022-02-17 NOTE — DISCHARGE SUMMARY
[address] [facility]  Discharge- Adriana Resides 1940, 80 y o  male MRN: [id number]  Unit/Bed#: -01 Encounter: [id number]  Primary Care Provider: Tim Ponec MD   Date and time admitted to hospital: 2/4/2022  5:41 PM    Back pain  Assessment & Plan  · In setting of presumed vertebral osteomyelitis, which is unable to be confirmed since he is uncooperative with MRI  · Discussed with neurosurgery at \[facility]\""  Patient is willing to complete MRI under general sedation  · Purpose for MRI is to rule out severe complications from osteomyelitis that could require neurosurgery or IR guided intervention  · Accepted to \[facility]\""  NSGY recommending CT imaging of cervical, thoracic, lumbar spin while awaiting for transfer  · Ordered, follow up results     * Sepsis due to Pseudomonas aeruginosa [facility])  Assessment & Plan  · Initially presented with sepsis  · Source was likely UTI/pyelonephritis resulting in pseudomonas bacteremia complicated by endocarditis noted on RENEE, and presumed vertebral osteomyelitis (unable to complete MRI due to claustrophobia)  · Repeat blood cultures have been negative  · Port-A-cath for chemotherapy was removed  PICC line was inserted in its place  Oncology was agreeable     · Plan for 6 week course of antibiotics ending on 3/21/22  · Due to back pain and intolerance for MRI, case was discussed with neurosurgery  · See plan for back pain    Bladder cancer [facility])  Assessment & Plan  · 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, diagnosed 10/12/2021  · Follows with heme onc and urology as an outpatient  · Receives chemo and radiation; last received on day of admission  · Continue outpatient heme onc and urology f/u upon discharge   · CT c/a/p from 2/6 unfortunately shows possible new right apical pleural-based mass  · IR biopsy done 2/14/22, without evidence of malignancy   · Oncology consult placed, appreciate input    Atrial fibrillation ([facility] [address] )  Assessment & Plan  · Currently rate controlled  · Continue home Cardizem, monitor with routine vitals  · Patient has history of chronic afib, will hold off on heparin gtt bridge to coumadin   · Increase coumadin to 10 mg today to facilitate loading    Metabolic encephalopathy  Assessment & Plan  · CT head negative for acute process  · Not hypoglycemic, see hyponatremia as below  · Possibly in the setting of sepsis/UTI as above vs systemic chemotherapy vs outpatient narcotic use   · Narcotics and benzos prescribed OP on hold; had received IM Zyprexa X1  · Delirium precautions   · Supportive measures   · Stable, "feisty" but appears at baseline  · Neuropsych evaluation requested to evaluate for capacity; patient refused to participate on 2/11- does not have capacity       Anemia  Assessment & Plan  · Stable, monitor daily     Archibald catheter in place prior to arrival  Assessment & Plan  · With archibald catheter and bilat perc nephrostomy tube in the setting of bladder CA as above   · Both producing urine   · Archibald was replaced on day of admission, malpositioned with balloon in urethra, since repositioned   · Continue archibald and nephrostomy care     Stage 3a chronic kidney disease [facility])  Assessment & Plan  Lab Results   Component Value Date    EGFR 55 02/17/2022    EGFR 52 02/15/2022    EGFR 59 02/13/2022    CREATININE 1 21 02/17/2022    CREATININE 1 27 02/15/2022    CREATININE 1 14 02/13/2022     · Had DULCE on CKD, likely pre renal in setting of hypovolemia  · Now resolved  · Monitor renal function daily     COPD, severe ([address] )  Assessment & Plan  · Not in acute exacerbation  · Stable    Primary hypertension  Assessment & Plan  · Continue home Cardizem      CAD (coronary atherosclerotic disease)  Assessment & Plan  · S/p CABG x4  · Denies chest pain; Troponin 37  · Continue home ASA and statin        Medical Problems             Resolved Problems  Date Reviewed: 2/15/2022          Resolved    Hyponatremia 2/8/2022     Resolved by  Cindy Escalante LUCILLE Kerr PA-C    Supratherapeutic INR 2/8/2022     Resolved by  Radha Hanson PA-C              Discharging Physician / Practitioner: Pilar Grubbs MD  PCP: Chris Fernandez MD  Admission Date:   Admission Orders (From admission, onward)     Ordered        02/04/22 1911  INPATIENT ADMISSION  Once                      Discharge Date: 02/17/22    Consultations During Hospital Stay:  IP CONSULT TO PHARMACY  IP CONSULT TO INFECTIOUS DISEASES  INPATIENT CONSULT TO IR  IP CONSULT TO CARDIOLOGY  INPATIENT CONSULT TO IR  IP CONSULT TO ONCOLOGY  IP CONSULT TO NEUROPSYCHOLOGY  IP CONSULT TO PICC TEAM  · IP CONSULT TO NEUROPSYCHOLOGY  ·     Procedures Performed:   · imaging    Significant Findings / Test Results:   Principal Problem:    Sepsis due to Pseudomonas aeruginosa ([facility] [address] )  Active Problems:    Back pain    Bladder cancer ([facility] [address] )    Atrial fibrillation ([facility] [address] )    Metabolic encephalopathy    CAD (coronary atherosclerotic disease)    Primary hypertension    COPD, severe ([facility] [address] )    Stage 3a chronic kidney disease ([facility] [address] )    Fortune catheter in place prior to arrival    Anemia  Resolved Problems:    Hyponatremia    Supratherapeutic INR  ·   ·     Incidental Findings:   · See above      Test Results Pending at Discharge (will require follow up): · Na      Outpatient Tests Requested:  · na    Complications:  See above     Reason for Admission: sepsis     Hospital Course: Rubens Banks is a 80 y o  male patient who originally presented to the hospital on 2/4/2022 due to sepsis secondary to pyelonephritis further complicated by bacteremia with endocarditis and presumed vertebral osteomyelitis  Patient's mood has been labile, possibly in setting of baseline dementia exacerbated by multiple factors including sepsis, narcotics, and metabolic derangements  Neuropsych evaluated patient due to his constant refusal of treatment, he was deemed not to have capacity   Overall, he is stable at this time but still having complaints of back pain  ID recommended discussion with neurosurgery at [facility] for further management of back pain  Decision was made for transfer to [facility] to complete MRI under general anesthesia  PICC line in place, patient has plan for 6 week course of antibiotics  Stable for transfer  Condition at Discharge: fair    Discharge Day Visit / Exam:   * Please refer to separate progress note for these details *    Discussion with Family: Updated  (grandchild ) via phone  Discharge instructions/Information to patient and family:   See after visit summary for information provided to patient and family  Provisions for Follow-Up Care:  See after visit summary for information related to follow-up care and any pertinent home health orders  Disposition:   Other: B    Planned Readmission: B     Discharge Statement:  I spent 30+ minutes discharging the patient  This time was spent on the day of discharge  I had direct contact with the patient on the day of discharge  Greater than 50% of the total time was spent examining patient, answering all patient questions, arranging and discussing plan of care with patient as well as directly providing post-discharge instructions  Additional time then spent on discharge activities  Discharge Medications:  See after visit summary for reconciled discharge medications provided to patient and/or family        **Please Note: This note may have been constructed using a voice recognition system**

## 2022-02-17 NOTE — ASSESSMENT & PLAN NOTE
Lab Results   Component Value Date    EGFR 55 02/17/2022    EGFR 52 02/15/2022    EGFR 59 02/13/2022    CREATININE 1 21 02/17/2022    CREATININE 1 27 02/15/2022    CREATININE 1 14 02/13/2022     · Had DULCE on CKD, likely pre renal in setting of hypovolemia  · Now resolved  · Monitor renal function daily

## 2022-02-18 ENCOUNTER — APPOINTMENT (OUTPATIENT)
Dept: RADIATION ONCOLOGY | Facility: CLINIC | Age: 82
End: 2022-02-18
Attending: RADIOLOGY
Payer: MEDICARE

## 2022-02-18 LAB
ANION GAP SERPL CALCULATED.3IONS-SCNC: 6 MMOL/L (ref 4–13)
BASOPHILS # BLD AUTO: 0.03 THOUSANDS/ΜL (ref 0–0.1)
BASOPHILS NFR BLD AUTO: 1 % (ref 0–1)
BUN SERPL-MCNC: 15 MG/DL (ref 5–25)
CALCIUM SERPL-MCNC: 9.1 MG/DL (ref 8.3–10.1)
CHLORIDE SERPL-SCNC: 106 MMOL/L (ref 100–108)
CO2 SERPL-SCNC: 27 MMOL/L (ref 21–32)
CREAT SERPL-MCNC: 1.14 MG/DL (ref 0.6–1.3)
EOSINOPHIL # BLD AUTO: 0.03 THOUSAND/ΜL (ref 0–0.61)
EOSINOPHIL NFR BLD AUTO: 1 % (ref 0–6)
ERYTHROCYTE [DISTWIDTH] IN BLOOD BY AUTOMATED COUNT: 32.6 % (ref 11.6–15.1)
ERYTHROCYTE [SEDIMENTATION RATE] IN BLOOD: 29 MM/HOUR (ref 0–19)
GFR SERPL CREATININE-BSD FRML MDRD: 59 ML/MIN/1.73SQ M
GLUCOSE SERPL-MCNC: 80 MG/DL (ref 65–140)
HCT VFR BLD AUTO: 30 % (ref 36.5–49.3)
HGB BLD-MCNC: 8.8 G/DL (ref 12–17)
IMM GRANULOCYTES # BLD AUTO: 0.09 THOUSAND/UL (ref 0–0.2)
IMM GRANULOCYTES NFR BLD AUTO: 2 % (ref 0–2)
LYMPHOCYTES # BLD AUTO: 0.65 THOUSANDS/ΜL (ref 0.6–4.47)
LYMPHOCYTES NFR BLD AUTO: 17 % (ref 14–44)
MAGNESIUM SERPL-MCNC: 2.4 MG/DL (ref 1.6–2.6)
MCH RBC QN AUTO: 25.2 PG (ref 26.8–34.3)
MCHC RBC AUTO-ENTMCNC: 29.3 G/DL (ref 31.4–37.4)
MCV RBC AUTO: 86 FL (ref 82–98)
MONOCYTES # BLD AUTO: 0.55 THOUSAND/ΜL (ref 0.17–1.22)
MONOCYTES NFR BLD AUTO: 14 % (ref 4–12)
NEUTROPHILS # BLD AUTO: 2.51 THOUSANDS/ΜL (ref 1.85–7.62)
NEUTS SEG NFR BLD AUTO: 65 % (ref 43–75)
NRBC BLD AUTO-RTO: 0 /100 WBCS
PLATELET # BLD AUTO: 262 THOUSANDS/UL (ref 149–390)
PMV BLD AUTO: 9 FL (ref 8.9–12.7)
POTASSIUM SERPL-SCNC: 4.1 MMOL/L (ref 3.5–5.3)
RBC # BLD AUTO: 3.49 MILLION/UL (ref 3.88–5.62)
SODIUM SERPL-SCNC: 139 MMOL/L (ref 136–145)
WBC # BLD AUTO: 3.86 THOUSAND/UL (ref 4.31–10.16)

## 2022-02-18 PROCEDURE — 97167 OT EVAL HIGH COMPLEX 60 MIN: CPT

## 2022-02-18 PROCEDURE — 85652 RBC SED RATE AUTOMATED: CPT | Performed by: INTERNAL MEDICINE

## 2022-02-18 PROCEDURE — 83735 ASSAY OF MAGNESIUM: CPT | Performed by: STUDENT IN AN ORGANIZED HEALTH CARE EDUCATION/TRAINING PROGRAM

## 2022-02-18 PROCEDURE — 80048 BASIC METABOLIC PNL TOTAL CA: CPT | Performed by: STUDENT IN AN ORGANIZED HEALTH CARE EDUCATION/TRAINING PROGRAM

## 2022-02-18 PROCEDURE — 99233 SBSQ HOSP IP/OBS HIGH 50: CPT | Performed by: INTERNAL MEDICINE

## 2022-02-18 PROCEDURE — 99223 1ST HOSP IP/OBS HIGH 75: CPT | Performed by: INTERNAL MEDICINE

## 2022-02-18 PROCEDURE — 85025 COMPLETE CBC W/AUTO DIFF WBC: CPT | Performed by: STUDENT IN AN ORGANIZED HEALTH CARE EDUCATION/TRAINING PROGRAM

## 2022-02-18 RX ORDER — SODIUM PHOSPHATE, DIBASIC AND SODIUM PHOSPHATE, MONOBASIC 7; 19 G/133ML; G/133ML
1 ENEMA RECTAL ONCE AS NEEDED
Status: DISCONTINUED | OUTPATIENT
Start: 2022-02-18 | End: 2022-02-28 | Stop reason: HOSPADM

## 2022-02-18 RX ORDER — WARFARIN SODIUM 5 MG/1
5 TABLET ORAL
Status: DISCONTINUED | OUTPATIENT
Start: 2022-02-18 | End: 2022-02-23

## 2022-02-18 RX ADMIN — ACETAMINOPHEN 975 MG: 325 TABLET, FILM COATED ORAL at 06:01

## 2022-02-18 RX ADMIN — ASPIRIN 81 MG: 81 TABLET, COATED ORAL at 09:04

## 2022-02-18 RX ADMIN — DICLOFENAC SODIUM 2 G: 10 GEL TOPICAL at 09:05

## 2022-02-18 RX ADMIN — DICLOFENAC SODIUM 2 G: 10 GEL TOPICAL at 01:10

## 2022-02-18 RX ADMIN — HEPARIN SODIUM 5000 UNITS: 5000 INJECTION INTRAVENOUS; SUBCUTANEOUS at 06:01

## 2022-02-18 RX ADMIN — DICLOFENAC SODIUM 2 G: 10 GEL TOPICAL at 19:27

## 2022-02-18 RX ADMIN — OXYCODONE HYDROCHLORIDE 5 MG: 5 TABLET ORAL at 01:09

## 2022-02-18 RX ADMIN — DILTIAZEM HYDROCHLORIDE 240 MG: 240 CAPSULE, COATED, EXTENDED RELEASE ORAL at 09:04

## 2022-02-18 RX ADMIN — WARFARIN SODIUM 5 MG: 5 TABLET ORAL at 19:27

## 2022-02-18 RX ADMIN — OXYCODONE HYDROCHLORIDE 5 MG: 5 TABLET ORAL at 15:15

## 2022-02-18 RX ADMIN — ACETAMINOPHEN 975 MG: 325 TABLET, FILM COATED ORAL at 21:53

## 2022-02-18 RX ADMIN — ACETAMINOPHEN 650 MG: 325 TABLET, FILM COATED ORAL at 15:15

## 2022-02-18 RX ADMIN — CEFEPIME HYDROCHLORIDE 2000 MG: 2 INJECTION, POWDER, FOR SOLUTION INTRAVENOUS at 05:55

## 2022-02-18 RX ADMIN — DICLOFENAC SODIUM 2 G: 10 GEL TOPICAL at 15:15

## 2022-02-18 RX ADMIN — FERROUS SULFATE TAB 325 MG (65 MG ELEMENTAL FE) 325 MG: 325 (65 FE) TAB at 19:27

## 2022-02-18 RX ADMIN — OXYCODONE HYDROCHLORIDE 5 MG: 5 TABLET ORAL at 21:55

## 2022-02-18 RX ADMIN — SENNOSIDES AND DOCUSATE SODIUM 2 TABLET: 50; 8.6 TABLET ORAL at 19:27

## 2022-02-18 RX ADMIN — CEFEPIME HYDROCHLORIDE 2000 MG: 2 INJECTION, POWDER, FOR SOLUTION INTRAVENOUS at 19:28

## 2022-02-18 RX ADMIN — OXYCODONE HYDROCHLORIDE 5 MG: 5 TABLET ORAL at 09:04

## 2022-02-18 RX ADMIN — ATORVASTATIN CALCIUM 40 MG: 40 TABLET, FILM COATED ORAL at 09:05

## 2022-02-18 NOTE — ASSESSMENT & PLAN NOTE
Negative CTH at time or presentation   Evaluated by neuropsychiatry on 2/16 and determined not to have capacity to make medical decisions    Plan:  · Outpatient benzos and narcotics have been held since admission  · Delirium precautions and frequent redirection

## 2022-02-18 NOTE — OCCUPATIONAL THERAPY NOTE
Occupational Therapy Evaluation     Patient Name: Munir Layton  Today's Date: 2/18/2022  Problem List  Principal Problem:    Sepsis due to Pseudomonas aeruginosa [facility])  Active Problems:    CAD (coronary atherosclerotic disease)    Primary hypertension    GERD (gastroesophageal reflux disease)    Hyperlipidemia    S/P CABG x 4    Back pain    Bladder cancer (HCC)    CKD (chronic kidney disease) stage 3, GFR 30-59 ml/min ([facility])    COPD, severe (HCC)    Atrial fibrillation ([facility] )    Anemia    Metabolic encephalopathy    Bacteremia    Pyelonephritis    Past Medical History  Past Medical History:   Diagnosis Date    A-fib ([facility] )     Arthritis     Benign prostatic hyperplasia without lower urinary tract symptoms     without Urinary Obstruction    Bladder cancer ([facility] )     CAD (coronary artery disease)     Cancer (HCC)     Chronic obstructive lung disease (HCC)     Constipation 12/9/2021    Coronary arteriosclerosis     Depression     Emphysema lung (HCC)     GERD (gastroesophageal reflux disease)     Hyperlipidemia     Hypertension     Hyponatremia 1/15/2022    Irregular heart beat     Myocardial infarction (HCC)     Psoriasis     Requires supplemental oxygen     at bedtime during high humid days only    Stroke [facility])     TIA 1/2018     Past Surgical History  Past Surgical History:   Procedure Laterality Date    COLONOSCOPY      CORONARY ANGIOPLASTY  02/03/2001    PTCA of RCA    CORONARY ARTERY BYPASS GRAFT  02/07/2001    x4- [place]    HERNIA REPAIR      IR BIOPSY LUNG  2/14/2022    IR NEPHROSTOMY TUBE PLACEMENT  1/18/2022    IR PORT PLACEMENT  1/14/2022    IR PORT REMOVAL  2/7/2022    ID BRONCHOSCOPY,DIAGNOSTIC Left 1/7/2019    Procedure: BRONCHOSCOPY FLEXIBLE;  Surgeon: Armida Younger MD;  [facility]: [facility];   Service: Pulmonary    ID CYSTOURETHROSCOPY,FULGUR <0 5 CM LESN N/A 11/30/2021    Procedure: TRANSURETHRAL RESECTION OF BLADDER TUMOR (TURBT) with "large";  Surgeon: Debra Benjamin MD;  Location: [facility];  Service: Urology    ND CYSTOURETHROSCOPY,URETER CATHETER Bilateral 11/30/2021    Procedure: Festus Flash;  Surgeon: Mike Branham MD;  [facility];  Service: Urology    ND Otero Click Left 2/20/2018    Procedure: ENDARTERECTOMY ARTERY CAROTID WITH PATCH ANGIOPLASTY;  Surgeon: Angelica Zurita MD;  [facility];  Service: Vascular    TRANSURETHRAL RESECTION OF PROSTATE        02/18/22 1120   OT Last Visit   OT Visit Date 02/18/22   Note Type   Note type Evaluation   Restrictions/Precautions   Weight Bearing Precautions Per Order No   Other Precautions Cognitive; Chair Alarm; Bed Alarm; Fall Risk   Pain Assessment   Pain Assessment Tool 0-10   Pain Score No Pain  (reports no pain "If I move slowly")   Home Living   Type of [address] [place] [address] Two level; Able to live on main level with bedroom/bathroom;Stairs to enter with rails   Bathroom Shower/Tub Tub/shower unit   [employer] Raised   Bathroom Equipment Shower chair;Grab bars in [facility];Electric scooter   Prior Function   Level of [place] Independent with ADLs and functional mobility; Needs assistance with IADLs   Lives With Family   Receives Help From Family   ADL Assistance Independent   IADLs Needs assistance   Falls in the last 6 months 0   Vocational Retired   [address] and mobility w/o ad - reports he typically "furniture cruises" and only walks room to room at home - denies falls - family assists with iadls    Reciprocal Relationships supportive family who are able to assist prn   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 [employer] sedentary    [address] "I'm starving, 36hours and nothing to eat, either do the MRI or feed me"   ADL   Eating Assistance Unable to assess   Eating Deficit NPO   Grooming Assistance 5  Supervision/Setup   UB Bathing Assistance 4  Minimal Assistance   LB Bathing Assistance 4  Minimal Assistance   UB Dressing Assistance 4  Minimal Assistance   LB Dressing Assistance 4  Minimal Assistance   Toileting Assistance  4  Minimal Assistance   Functional Assistance 4  Minimal Assistance   Bed Mobility   Supine to Sit 4  Minimal assistance   Transfers   Sit to Stand 4  Minimal assistance   Stand to Sit 4  Minimal assistance   Stand pivot 4  Minimal assistance   Functional Mobility   Functional Mobility 4  Minimal assistance   Additional items Rolling walker;Hand hold assistance   Balance   Static Sitting Good   Dynamic Sitting Fair +   Static Standing Fair   Dynamic Standing Fair -   Ambulatory Fair -   Activity Tolerance   Activity Tolerance Patient limited by fatigue;Treatment limited secondary to medical complications (Comment)   RUE Assessment   RUE Assessment WFL   LUE Assessment   LUE Assessment WFL   Cognition   Overall Cognitive Status Impaired   Arousal/Participation Arousable; Cooperative   Attention Attends with cues to redirect   Orientation Level Oriented to person;Oriented to place;Oriented to situation  (general time - not specific date)   Memory Decreased short term memory;Decreased recall of recent events;Decreased recall of precautions   Following Commands Follows one step commands without difficulty   Comments per EMR pt underwent neuropsych exam on 2/16 2* consistent refusal of treatment and was found not to have capacity   Assessment   Limitation Decreased ADL status; Decreased Safe judgement during ADL;Decreased cognition;Decreased endurance;Decreased self-care trans   Prognosis Fair;Good   Assessment Pt is a 80 y o  male who was admitted to [facility] on 2/17/2022 with Sepsis due to Pseudomonas aeruginosa ([facility] [address] )   Pt's problem list also includes PMH of HTN, previous surgery, limited cognition, COPD, cancer history and CAD, GERD, hld, CABG x 4, bladder CA, CKD, afib, chronic back pain   At baseline pt was completing adls and mobility independently w/o ad - has assist for iadls  Pt lives with family in 2 story home with [address]  Currently pt requires min assist for overall ADLS and min assist for functional mobility/transfers  Pt currently presents with impairments in the following categories -difficulty performing ADLS, limited insight into deficits, compliance, decreased initiation and engagement , health management  and environment activity tolerance, endurance, standing balance/tolerance, memory, insight, safety , judgement , attention  and sequencing   These impairments, as well as pt's fatigue, pain, spinal precautions, risk for falls and home environment  limit pt's ability to safely engage in all baseline areas of occupation, includingbathing, dressing, toileting, functional mobility/transfers, social participation  and leisure activities  From OT standpoint, recommend home with family support upon D/C  OT will continue to follow to address the below stated goals  Goals   Patient Goals get something to eat    LTG Time Frame 10-14   Long Term Goal #1 refer to established goals below    Plan   Treatment Interventions ADL retraining;Functional transfer training; Endurance training;Cognitive reorientation;Patient/family training;Equipment evaluation/education; Compensatory technique education; Activityengagement   Goal Expiration Date 03/04/22   OT Frequency 2-3x/wk   Recommendation   OT Discharge Recommendation Home with home health rehabilitation   OT - OK to Discharge Yes   AM-PAC Daily Activity Inpatient   Lower Body Dressing 3   Bathing 3   Toileting 3   Upper Body Dressing 3   Grooming 3   Eating 4   Daily Activity Raw Score 19   Daily Activity Standardized Score (Calc for Raw Score >=11) 40 22   AM-PAC Applied Cognition Inpatient   Following a Speech/Presentation 3   Understanding Ordinary Conversation 4   Taking Medications 3   Remembering Where Things Are Placed or Put Away 2   Remembering List of 4-5 Errands 2   Taking Care of Complicated Tasks 2   Applied Cognition Raw Score 16   Applied Cognition Standardized Score 35 03       OCCUPATIONAL THERAPY GOALS:    *SBA adls after setup with use of AE PRN  *SBA toileting and clothing management   *SBA functional mobility and transfers to/from all surfaces with fair+ dynamic balance and safety for participation in dynamic adls and iadl tasks   *Demonstrate fair+ carryover with safe use of AD during functional tasks   *Assess DME needs   *Increase activity tolerance to 35-40 minutes for participation in adls and enjoyable activities  *Pt to participate in ongoing functional cognitive assessment with good attention/participation to assist with safe d/c recommendations    The patient's raw score on the AM-PAC Daily Activity inpatient short form is 19, standardized score is 40 22, greater than and less than 39 4  Patients at this level are likely to benefit from discharge to home  Please refer to the recommendation of the Occupational Therapist for safe discharge planning      Ada Prasad

## 2022-02-18 NOTE — H&P
INTERNAL MEDICINE RESIDENCY ADMISSION H&P     Name: Abdullahi Brunner   Age & Sex: 80 y o  male   MRN: [id number]  Unit/Bed#: [address] 633[address]01   Encounter: [id number]  Primary Care Provider: Toya Ramos MD    Code Status: Level 1 - Full Code  Admission Status: INPATIENT   Disposition: Patient requires Med/Surg    Admit to team: SOD Team C     ASSESSMENT/PLAN     Principal Problem:    Sepsis due to Pseudomonas aeruginosa ([facility] [address] )  Active Problems:    Back pain    CAD (coronary atherosclerotic disease)    Primary hypertension    GERD (gastroesophageal reflux disease)    Hyperlipidemia    S/P CABG x 4    Bladder cancer (HCC)    CKD (chronic kidney disease) stage 3, GFR 30-59 ml/min ([facility])    COPD, severe ([facility])    Atrial fibrillation ([facility] [address] )    Anemia    Metabolic encephalopathy    Bacteremia    Pyelonephritis      * Sepsis due to Pseudomonas aeruginosa [facility])  Assessment & Plan  Patient initally presented to [facility] on 2/4 with sepsis 2/2 UTI/pyelonephritis with urine and blood cultures positive for pseudomonas  RENEE done 2/9 demonstrated endocarditis  Currently with concern for vertebral osteomyelitis, and transferred to [facility] for MRI with general anesthesia 2/2 advanced dementia  Repeat blood cultures negative to date      Plan:  Chemo port removed and PICC line placed  Patient to complete 6 week course of antibiotics (through 3/21/22)  Will order MRI and neurosurgery consult as detailed under back pain  ID consulted - appreciate recommendations    Back pain  Assessment & Plan  Patient transferred to [facility] for MRI with general anesthesia in setting of back pain with presumed vertebral osteomyelitis    Plan:  MRI cervical, thoracic, and lumbar spine with anesthesia ordered  If MRI cannot be done in timely manner, will consider ordering CT scan to evalauate  Neurosurgery consulted - appreciate recommendations    Pyelonephritis  Assessment & Plan  Patient presented septic with CT findings of pyelonephritis    Bacteremia  Assessment & Plan  Patient with pseudomonas bacteremia on presentation to [facility]  Currently with repeat blood cultures no growth at 5 days  Plan:  Continue IV antibiotics as described under sepsis plan  ID consulted - appreciate recommendations    Metabolic encephalopathy  Assessment & Plan  Negative CTH at time or presentation  Evaluated by neuropsychiatry on 2/16 and determined not to have capacity to make medical decisions    Plan:  Outpatient benzos and narcotics have been held since admission  Delirium precautions and frequent redirection      Anemia  Assessment & Plan  Baseline Hgb 8-9  Currently stable    Plan:  Continue iron supplementation  Monitor daily CBC and transfuse for Hgb <7    Atrial fibrillation [facility])  Assessment & Plan  Currently rate controlled  Managed as outpatient on coumadin and cardizem    Plan:  Holding Coumadin this evening pending neurosurgery evaluation/plan  Patient had been restarted on Coumadin 5mg (home dose) on 2/15 and plan was to increase to 10mg on 2/17 to facilitate loading in setting of subtherapeutic INR  No heparin bridge initiated prior to transfer  Continue to monitor vital signs closely      COPD, severe ([address] )  Assessment & Plan  Currently stable  Not on medications as outpatient    CKD (chronic kidney disease) stage 3, GFR 30-59 ml/min [facility])  Assessment & Plan  Lab Results   Component Value Date    EGFR 55 02/17/2022    EGFR 52 02/15/2022    EGFR 59 02/13/2022    CREATININE 1 21 02/17/2022    CREATININE 1 27 02/15/2022    CREATININE 1 14 02/13/2022     Currently at baseline Cr  Plan:  Monitor I/O  Check daily BMP  Avoid nephrotoxic agents    Bladder cancer [facility])  Assessment & Plan  Patient has history of 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 diagnosed 10/12/2021  S/p placement of B/L nephrostomy tubes and with chronic archibald in place   Follows with heme-onc and urology outpatient and receiving chemo and radiation  CT CAP 2/6 with concern for new right apical pleural-based mass now s/p IR biopsy on 2/14 without evidence of malignancy    Plan:  Outpatient heme/onc and urology follow up    Hyperlipidemia  Assessment & Plan  Continue atorvastatin    GERD (gastroesophageal reflux disease)  Assessment & Plan  Continue pantoprazole    Primary hypertension  Assessment & Plan  Controlled as outpatient on Cardizem    Plan:  Continue home Cardizem 240 daily  Monitor vitals closely    CAD (coronary atherosclerotic disease)  Assessment & Plan  S/p CABG x4  Currently without chest pain  Stable    Plan:  Continue home aspirin and statin        VTE Pharmacologic Prophylaxis: Heparin  VTE Mechanical Prophylaxis: sequential compression device    CHIEF COMPLAINT   No chief complaint on file  HISTORY OF PRESENT ILLNESS     Per Dr Leslie Vu discharge summary dated 2/17:  "Helio Senior a 80 y  o  male patient who originally presented to the hospital on 2/4/2022 due to sepsis secondary to pyelonephritis further complicated by bacteremia with endocarditis and presumed vertebral osteomyelitis  Patient's mood has been labile, possibly in setting of baseline dementia exacerbated by multiple factors including sepsis, narcotics, and metabolic derangements  Neuropsych evaluated patient due to his constant refusal of treatment, he was deemed not to have capacity  Overall, he is stable at this time but still having complaints of back pain  ID recommended discussion with neurosurgery at [facility] for further management of back pain  Decision was made for transfer to [facility] to complete MRI under general anesthesia  PICC line in place, patient has plan for 6 week course of antibiotics  Stable for transfer  "     Patient originally presented to [facility] on 2/4 after outpatient medical staff recommended ED evaluation when granddaughter called to report several days of confusion and generalized weakness   He was septic on arrival, and noted to have UTI and pyelonephritis noted on CT scan  Of note, patient with recently diagnosed bladder cancer with both archibald and B/L nephrostomy tubes in place  Urine and blood cultures demonstrated pseudomonas  Patient had TTE done on  with concern for AV/MV endocarditis, and had RENEE completed  demonstrating 1x1cm TV vegetation  Chemo port removed on  by IR and PICC line was placed for long term antibiotics through 3/21/22 per ID  Patient with complaint of back pain, presumed to be 2/2 vertebral osteomyelitis  Discussed with neurosurgery who recommended transfer to [facility] for MRI under general anesthesia for evaluation and will then plan for any potential interventions needed  REVIEW OF SYSTEMS     Review of Systems   Constitutional: Negative for chills, fatigue and fever  HENT: Positive for hearing loss  Negative for ear pain, rhinorrhea, sneezing, sore throat, tinnitus and trouble swallowing  Eyes: Negative for pain and visual disturbance  Respiratory: Negative for cough and shortness of breath  Cardiovascular: Negative for chest pain, palpitations and leg swelling  Gastrointestinal: Negative for abdominal pain, constipation, diarrhea, nausea and vomiting  Endocrine: Negative for polydipsia and polyuria  Genitourinary: Positive for hematuria  Negative for dysuria  Archibald in place, no urinary complaint   Musculoskeletal: Positive for back pain  Negative for arthralgias  Skin: Negative for color change and rash  Neurological: Negative for dizziness, weakness, light-headedness and headaches  Psychiatric/Behavioral: Negative for confusion  The patient is not nervous/anxious        OBJECTIVE     Vitals:    22 2107   BP: 147/75   Pulse: 68   Resp: 18   Temp: 97 8 °F (36 6 °C)   SpO2: 96%      Temperature:   Temp (24hrs), Av 1 °F (36 7 °C), Min:97 8 °F (36 6 °C), Max:98 4 °F (36 9 °C)    Temperature: 97 8 °F (36 6 °C)  Intake & Output:  I/O     None        Weights: There is no height or weight on file to calculate BMI  Weight (last 2 days)     None        Physical Exam  Vitals reviewed  Constitutional:       General: He is awake  He is not in acute distress  Appearance: Normal appearance  He is well-developed  He is not ill-appearing, toxic-appearing or diaphoretic  HENT:      Head: Normocephalic and atraumatic  Right Ear: External ear normal       Left Ear: External ear normal       Ears:      Comments: Decreased hearing     Nose: Nose normal       Mouth/Throat:      Mouth: Mucous membranes are moist       Pharynx: Oropharynx is clear  Eyes:      General:         Right eye: No discharge  Left eye: No discharge  Conjunctiva/sclera: Conjunctivae normal       Pupils: Pupils are equal, round, and reactive to light  Cardiovascular:      Rate and Rhythm: Normal rate and regular rhythm  Heart sounds: Murmur heard  Pulmonary:      Effort: Pulmonary effort is normal  No respiratory distress  Breath sounds: Normal breath sounds  Abdominal:      General: Bowel sounds are normal  There is no distension  Palpations: Abdomen is soft  Tenderness: There is no abdominal tenderness  Musculoskeletal:         General: No swelling or tenderness  Normal range of motion  Cervical back: Normal range of motion  Right lower leg: No edema  Left lower leg: No edema  Skin:     General: Skin is warm and dry  Coloration: Skin is not jaundiced  Findings: No bruising  Neurological:      General: No focal deficit present  Mental Status: He is alert and oriented to person, place, and time  Motor: No weakness  Psychiatric:         Mood and Affect: Mood normal          Behavior: Behavior normal  Behavior is cooperative  Thought Content:  Thought content normal        PAST MEDICAL HISTORY     Past Medical History:   Diagnosis Date    A-fib ([facility] [address] )     Arthritis     Benign prostatic hyperplasia without lower urinary tract symptoms     without Urinary Obstruction    Bladder cancer (HCC)     CAD (coronary artery disease)     Cancer (HCC)     Chronic obstructive lung disease (HCC)     Constipation 12/9/2021    Coronary arteriosclerosis     Depression     Emphysema lung (HCC)     GERD (gastroesophageal reflux disease)     Hyperlipidemia     Hypertension     Hyponatremia 1/15/2022    Irregular heart beat     Myocardial infarction (HCC)     Psoriasis     Requires supplemental oxygen     at bedtime during high humid days only    Stroke [facility])     TIA 1/2018     PAST SURGICAL HISTORY     Past Surgical History:   Procedure Laterality Date    COLONOSCOPY      CORONARY ANGIOPLASTY  02/03/2001    PTCA of RCA    CORONARY ARTERY BYPASS GRAFT  02/07/2001    x4- [place]    HERNIA REPAIR      IR BIOPSY LUNG  2/14/2022    IR NEPHROSTOMY TUBE PLACEMENT  1/18/2022    IR PORT PLACEMENT  1/14/2022    IR PORT REMOVAL  2/7/2022    DC BRONCHOSCOPY,DIAGNOSTIC Left 1/7/2019    Procedure: BRONCHOSCOPY FLEXIBLE;  Surgeon: Richard Avelar MD;  Location: [facility]; Service: Pulmonary    DC CYSTOURETHROSCOPY,FULGUR <0 5 CM LESN N/A 11/30/2021    Procedure: TRANSURETHRAL RESECTION OF BLADDER TUMOR (TURBT) with "large";  Surgeon: Osiris Wisdom MD;  Location: [facility];  Service: Urology    DC CYSTOURETHROSCOPY,URETER CATHETER Bilateral 11/30/2021    Procedure: [facility];  Surgeon: Osiris Wisdom MD;  Location: [facility];  Service: Urology    DC Louann Leyva Left 2/20/2018    Procedure: ENDARTERECTOMY ARTERY CAROTID WITH PATCH ANGIOPLASTY;  Surgeon: Dane Rodriguez MD;  Location: [facility];  Service: Vascular    TRANSURETHRAL RESECTION OF PROSTATE       SOCIAL & FAMILY HISTORY     Social History     Substance and Sexual Activity   Alcohol Use Yes    Comment: special occasions only wine         Social History     Substance and Sexual Activity   Drug Use No     Social History     Tobacco Use Smoking Status Former Smoker    Years: 1 00    Types: Cigarettes   Smokeless Tobacco Never Used   Tobacco Comment    few cigarettes when playing cards  Family History   Problem Relation Age of Onset    Lung cancer Mother     Cancer Mother     Other Father         sepsis     LABORATORY DATA     Labs: I have personally reviewed pertinent reports  Results from last 7 days   Lab Units 02/17/22  0433 02/16/22  0606 02/15/22  0509   WBC Thousand/uL 3 45* 3 62* 3 79*   HEMOGLOBIN g/dL 8 5* 8 8* 8 5*   HEMATOCRIT % 28 3* 31 2* 29 2*   PLATELETS Thousands/uL 269 293 274   MONO PCT % 12  --  11      Results from last 7 days   Lab Units 02/17/22  0433 02/15/22  0509 02/13/22  0448   POTASSIUM mmol/L 4 4 4 2 3 8   CHLORIDE mmol/L 103 103 106   CO2 mmol/L 29 24 26   BUN mg/dL 18 17 14   CREATININE mg/dL 1 21 1 27 1 14   CALCIUM mg/dL 8 8 8 9 8 2*   ALK PHOS U/L 95  --   --    ALT U/L 28  --   --    AST U/L 14  --   --               Results from last 7 days   Lab Units 02/17/22  0433 02/16/22  0606 02/15/22  1218   INR  1 09 1 01 1 03             Micro:  Lab Results   Component Value Date    BLOODCX No Growth After 5 Days  02/10/2022    BLOODCX No Growth After 5 Days  02/10/2022    BLOODCX No Growth After 5 Days  02/08/2022    BLOODCX No Growth After 5 Days  02/08/2022    URINECX >100,000 cfu/ml Pseudomonas aeruginosa (A) 02/04/2022    URINECX >100,000 cfu/ml  01/15/2022    URINECX No Growth <1000 cfu/mL 12/29/2021     IMAGING & DIAGNOSTIC TESTS     Imaging: I have personally reviewed pertinent reports  No results found  EKG, Pathology, and Other Studies: I have personally reviewed pertinent reports  ALLERGIES     Allergies   Allergen Reactions    Penicillins Swelling and Itching     MEDICATIONS PRIOR TO ARRIVAL     Prior to Admission medications    Medication Sig Start Date End Date Taking?  Authorizing Provider   ALPRAZolam Blanca Fonseca 0 25 mg tablet Take 1 tablet (0 25 mg total) by mouth 2 (two) times a day as needed for anxiety 1/27/22   Js Mcelroy MD   aspirin 81 mg chewable tablet Chew 1 tablet (81 mg total) daily 7/29/19   Charmayne Begin, MD   atorvastatin (LIPITOR) 40 mg tablet Take 1 tablet (40 mg total) by mouth daily 6/29/21   Jenny Souza PA-C   bisacodyl (DULCOLAX) 10 mg suppository Insert 1 suppository (10 mg total) into the rectum daily as needed for constipation 12/18/21   Jomar Mata DO   diltiazem (CARDIZEM CD) 240 mg 24 hr capsule Take 1 capsule (240 mg total) by mouth daily 7/7/21   Charmayne Begin, MD   diphenhydrAMINE (BENADRYL) 12 5 MG chewable tablet Chew 1 tablet (12 5 mg total) 4 (four) times a day as needed (Bladder spasm)  Patient not taking: Reported on 2/6/2022 2/1/22   Annette Hammond MD   magnesium citrate (CITROMA) 1 745 g/30 mL oral solution Take 296 mL by mouth once as needed (constipation) for up to 1 dose 12/18/21   Jomar Ellis DO   naloxone (NARCAN) 4 mg/0 1 mL nasal spray Administer 1 spray into a nostril  If no response after 2-3 minutes, give another dose in the other nostril using a new spray    Patient not taking: Reported on 2/6/2022  2/3/22   Nancy Borjas MD   ondansetron (Zofran ODT) 8 mg disintegrating tablet Take 1 tablet (8 mg total) by mouth every 8 (eight) hours as needed for nausea or vomiting 1/27/22   Annette Hammond MD   oxyCODONE (Roxicodone) 5 immediate release tablet Take 1 tablet (5 mg total) by mouth every 6 (six) hours as needed for moderate pain Max Daily Amount: 20 mg 2/3/22   Nancy Borjas MD   pantoprazole (PROTONIX) 40 mg tablet Take 1 tablet (40 mg total) by mouth every morning 1/6/22   Sue Saldana MD   polyethylene glycol (GOLYTELY) 4000 mL solution Take 4,000 mL by mouth once for 1 dose  Patient not taking: Reported on 1/27/2022  1/3/22 1/27/22  Galina Caal PA-C   sodium chloride 0 9 % SOLN Infuse 10 mL into a venous catheter in the morning  Patient not taking: Reported on 2/6/2022 1/25/22 Noé Meals, DO   sodium chloride, PF, 0 9 % 10 mL by Intracatheter route daily Intracatheter flushing daily  Patient not taking: Reported on 2/6/2022 1/18/22 5/18/22  Mabel Nicholson MD   sodium chloride, PF, 0 9 % 10 mL by Intracatheter route daily Intracatheter flushing daily  Patient not taking: Reported on 2/6/2022 1/18/22 5/18/22  Mabel Nicholson MD   warfarin (COUMADIN) 5 mg tablet Take 5 mg daily 1/24/22   Noé Meals, DO     MEDICATIONS ADMINISTERED IN LAST 24 HOURS     Medication Administration - last 24 hours from 02/16/2022 2347 to 02/17/2022 2347       Date/Time Order Dose Route Action Action by     02/17/2022 2233 heparin (porcine) subcutaneous injection 5,000 Units 5,000 Units Subcutaneous Given John Nolasco RN     02/17/2022 2234 oxyCODONE (ROXICODONE) IR tablet 5 mg 0 mg Oral Return to Radha Pedro RN        CURRENT MEDICATIONS     Current Facility-Administered Medications   Medication Dose Route Frequency Provider Last Rate    [START ON 2/18/2022] acetaminophen  975 mg Oral [facility] Rafael Saunders MD      aluminum-magnesium hydroxide-simethicone  30 mL Oral Q4H PRN Rafael Saunders MD      [START ON 2/18/2022] aspirin  81 mg Oral Daily Rafael Saunders MD      [START ON 2/18/2022] atorvastatin  40 mg Oral Daily Rafael Saunders MD      bisacodyl  10 mg Rectal Daily PRN Rafael Saunders MD      [START ON 2/18/2022] cefepime  2,000 mg Intravenous Q12H Rafael Saunders MD      Diclofenac Sodium  2 g Topical 4x Daily Rafael Saunders MD      [START ON 2/18/2022] diltiazem  240 mg Oral Daily Rafael Saunders MD      [START ON 2/18/2022] ferrous sulfate  325 mg Oral BID With Meals Rafael Saunders MD      heparin (porcine)  5,000 Units Subcutaneous [facility] Rafael Saunders MD      [START ON 2/18/2022] lidocaine  1 patch Topical Daily Rafael Saunders MD      lidocaine (PF)  0 5 mL Infiltration Once PRN Rafael Saunders MD      melatonin  3 mg Oral HS PRN Florence Montero MD      ondansetron  4 mg Intravenous Q6H PRN Florence Montero MD      oxyCODONE  2 5 mg Oral Q6H PRN Florence Montero MD      oxyCODONE  5 mg Oral Q6H PRN MD Austin Mahoney ON 2/18/2022] pantoprazole  40 mg Oral QAM Florence Montero MD      [START ON 2/18/2022] polyethylene glycol  17 g Oral Daily MD Austin Mahoney ON 2/18/2022] senna-docusate sodium  2 tablet Oral BID Florence Montero MD      sodium phosphate-biphosphate  1 enema Rectal Once PRN Florence Montero MD          aluminum-magnesium hydroxide-simethicone, 30 mL, Q4H PRN  bisacodyl, 10 mg, Daily PRN  lidocaine (PF), 0 5 mL, Once PRN  melatonin, 3 mg, HS PRN  ondansetron, 4 mg, Q6H PRN  oxyCODONE, 2 5 mg, Q6H PRN  oxyCODONE, 5 mg, Q6H PRN  sodium phosphate-biphosphate, 1 enema, Once PRN        Admission Time  I spent 45 minutes admitting the patient  This involved direct patient contact where I performed a full history and physical, reviewing previous records, and reviewing laboratory and other diagnostic studies  Portions of the record may have been created with voice recognition software  Occasional wrong word or "sound a like" substitutions may have occurred due to the inherent limitations of voice recognition software    Read the chart carefully and recognize, using context, where substitutions have occurred     ==  Marissa Croft, [id number] [facility]  Internal Medicine Residency PGY-2

## 2022-02-18 NOTE — ASSESSMENT & PLAN NOTE
Controlled as outpatient on Cardizem    Plan:  · Continue home Cardizem 240 daily  · Monitor vitals closely

## 2022-02-18 NOTE — NURSING NOTE
Patient left via EMS on stretcher with transport team to [facility]  Face sheet, medical necessity and AVS given to EMS  Patient left with all belongings, including phone, , dentures, hearing aids, battery, and clothes  PICC line in place, along with archibald, nephrostomy tubes and other  Report called by previous nurse to receiving nurse  Patient given tylenol and oxycodone, okayed to give early by Eliseo Rose  Patient had no needs at time of transfer

## 2022-02-18 NOTE — ASSESSMENT & PLAN NOTE
Patient with pseudomonas bacteremia on presentation to [facility]  Currently with repeat blood cultures no growth at 5 days      Plan:  · Continue IV antibiotics as described under sepsis plan  · ID consulted - appreciate recommendations

## 2022-02-18 NOTE — CASE MANAGEMENT
Case Management Assessment & Discharge Planning Note    Patient name Teresa Tobar  Location [address] MRN [id number]  : 1940 Date 2022       Current Admission Date: 2022  Current Admission Diagnosis:Sepsis due to Pseudomonas aeruginosa [facility])   Patient Active Problem List    Diagnosis Date Noted    Pyelonephritis 2022    Bacteremia 2022    Sepsis due to Pseudomonas aeruginosa ([facility] )     Metabolic encephalopathy     Nephrostomy status ([facility] ) 2022    Anxiety 2022    Continuous opioid dependence ([facility] ) 2022    Anemia 2022    FRANCY (iron deficiency anemia) 2022    DULCE (acute kidney injury) ([facility] ) on CKD 3 01/15/2022    Pleural effusion on left 01/15/2022    Abnormal urinalysis 01/15/2022    Fortune catheter in place prior to arrival 2022    Stage 3a chronic kidney disease ([facility] ) 2022    Recurrent unilateral inguinal hernia 2021    Left lower quadrant abdominal pain 2021    History of bilateral inguinal hernia repair 2021    Cancer of overlapping sites of bladder ([facility] ) 2021    Overgrown toenails 2021    [facility]rial fibrillation [facility] 2021    Encounter to discuss test results 10/12/2021    Acute on chronic diastolic congestive heart failure ([facility] ) 2020    DDD (degenerative disc disease), lumbar 10/30/2019    Medicare annual wellness visit, subsequent 2019    Tinnitus aurium, bilateral 2019    Sensorineural hearing loss (SNHL) of both ears 2019    Abnormal CT of the chest 2019    COPD, severe ([address] ) 2018    Bronchiectasis with acute lower respiratory infection ([facility] ) 2018    Localized edema 10/15/2018    Irregular heart beat     Atrial flutter ([facility] ) 10/07/2018    CKD (chronic kidney disease) stage 3, GFR 30-59 ml/min ([facility]) 2018    Urinary retention due to benign prostatic hyperplasia 2018    Bladder cancer ([address] ) 04/27/2018    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    Stenosis of left carotid artery 01/14/2018    GERD (gastroesophageal reflux disease) 01/13/2018    History of stroke 01/13/2018    Sciatica of right side 01/13/2018    Vision changes 01/13/2018    Hyperlipidemia     Centrilobular emphysema (HCC)     Arthritis     Back pain 01/24/2017    Acute left-sided low back pain with left-sided sciatica 10/25/2016    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 10/25/2016    CAD (coronary atherosclerotic disease) 08/13/2016    Primary hypertension 08/13/2016    Hyperlipemia 08/13/2016      LOS (days): 1  Geometric Mean LOS (GMLOS) (days): 7 10  Days to GMLOS:6 3     OBJECTIVE:  PATIENT READMITTED TO HOSPITAL  Risk of Unplanned Readmission Score: 38         Current admission status: Inpatient       Preferred Pharmacy:   [address], [address]  [facility] [address] [place] [id number]  Phone: [phone] Fax: [phone]    Primary Care Provider: Liudmila Trejo MD    Primary Insurance: MEDICARE  Secondary Insurance: [facility]    ASSESSMENT:  [address] [facility]   Primary Phone: [phone] (Mobile)               Advance Directives  Does patient have a [address]?: Yes  Does patient have Advance Directives?: Yes  Advance Directives: Living will,Power of  for health care      Patient Information  Admitted from[de-identified] Home  Mental Status: Confused  During Assessment patient was accompanied by: Other-Comment (granddaughter)  Assessment information provided by[de-identified] Other - please comment (granddaughter)  Primary Caregiver: Family  Caregiver's Name[de-identified] Svetlana Rouse Relationship to Patient[de-identified] Family Member  Support Systems: Family members  [place] of Residence: [place] [address] do you live in?: [address]   Select all that apply : No steps to enter home  Type of Current Residence: 2 story home  Upon entering residence, is there a bedroom on the main floor (no further steps)?: Yes  Upon entering residence, is there a bathroom on the main floor (no further steps)?: Yes  In the last 12 months, was there a time when you were not able to pay the mortgage or rent on time?: No  In the last 12 months, how many places have you lived?: 1  In the last 12 months, was there a time when you did not have a steady place to sleep or slept in a shelter (including now)?: No  Homeless/housing insecurity resource given?: N/A  Living Arrangements: Lives w/ Daughter  Is patient a ?: No    Activities of Daily Living Prior to Admission  Functional Status: Assistance  Completes ADLs independently?: No  Level of ADL dependence: Assistance  Ambulates independently?: No  Level of ambulatory dependence: Assistance  Does patient use assisted devices?: Yes  Assisted Devices (DME) used: Rollator,Stair Chair/Glide,Other (Comment)  Does patient currently own DME?: Yes  What DME does the patient currently own?: Stair Chair/Glide,Rollator,Other (Comment)  Does patient have a history of Outpatient Therapy (PT/OT)?: No  Does the patient have a history of Short-Term Rehab?: No  Does patient have a history of HHC?: Yes  Does patient currently have [place] [id number]?: Yes    Current Home Health Care  Type of Current Home Care Services: Home PT,Home OT,Nurse visit  Current Home Health Agency[de-identified] [address] [place] [address] Provider[de-identified] PCP    Patient Information Continued  Income Source: Pension/alf  Does patient have prescription coverage?: Yes  Within the past 12 months, you worried that your food would run out before you got the money to buy more : Never true  Within the past 12 months, the food you bought just didnt last and you didnt have money to get more : Never true  Food insecurity resource given?: N/A  Does patient receive dialysis treatments?: No  Does patient have a history of substance abuse?: No  Does patient have a history of Mental Health Diagnosis?: No         Means of Transportation  Means of Transport to Appts[de-identified] Family transport  In the past 12 months, has lack of transportation kept you from medical appointments or from getting medications?: No  In the past 12 months, has lack of transportation kept you from meetings, work, or from getting things needed for daily living?: No  Was application for public transport provided?: N/A        DISCHARGE DETAILS:    Discharge planning discussed with[de-identified] Patient  Freedom of Choice: Yes  Comments - Freedom of Choice: Discussed FOC  CM contacted family/caregiver?: Yes  Were Treatment Team discharge recommendations reviewed with patient/caregiver?: Yes  Did patient/caregiver verbalize understanding of patient care needs?: Yes  Were patient/caregiver advised of the risks associated with not following Treatment Team discharge recommendations?: Yes    Contacts  Patient Contacts: Navdeep Dalal  Relationship to Patient[de-identified] Family  Contact Method: Phone  Phone Number: [phone]  Reason/Outcome: Discharge [address]         Is the patient interested in [facility] at discharge?: No    DME Referral Provided  Referral made for DME?: No    Other Referral/Resources/Interventions Provided:  Interventions: Short Term Rehab  Referral Comments: Granddaughter is requesting referral to [address] [place] [address] and [place] referral in [facility]       CM reviewed d/c planning process including the following: identifying help at home, patient preference for d/c planning needs, Discharge Lounge, [facility] Meds to Bed program, availability of treatment team to discuss questions or concerns patient and/or family may have regarding understanding medications and recognizing signs and symptoms once discharged  CM also encouraged patient to follow up with all recommended appointments after discharge   Patient advised of importance for patient and family to participate in managing patients medical well being      Patient is vaccinated for covid

## 2022-02-18 NOTE — ASSESSMENT & PLAN NOTE
Lab Results   Component Value Date    EGFR 55 02/17/2022    EGFR 52 02/15/2022    EGFR 59 02/13/2022    CREATININE 1 21 02/17/2022    CREATININE 1 27 02/15/2022    CREATININE 1 14 02/13/2022     Currently at baseline Cr    Plan:  · Monitor I/O  · Check daily BMP  · Avoid nephrotoxic agents

## 2022-02-18 NOTE — ASSESSMENT & PLAN NOTE
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

## 2022-02-18 NOTE — ASSESSMENT & PLAN NOTE
Baseline Hgb 8-9   Currently stable    Plan:  · Continue iron supplementation  · Monitor daily CBC and transfuse for Hgb <7

## 2022-02-18 NOTE — ASSESSMENT & PLAN NOTE
Patient initally presented to [facility] on 2/4 with sepsis 2/2 UTI/pyelonephritis with urine and blood cultures positive for pseudomonas  RENEE done 2/9 demonstrated endocarditis  There was concern for vertebral osteomyelitis, and transferred to [facility] for MRI with general anesthesia 2/2 advanced dementia  Repeat blood cultures negative to date  Plan:  · Source most likely urinary given b/l nephrostomy tubes, chronic archibald, and + UA  Will get MRI lumbar spine to r/u discitis, etc given complaint of back pain  · Chemo port removed and PICC line placed  · Patient to complete 6 week course of antibiotics (through 3/21/22)  · MRI performed on 02/21 with no findings suggestive of vertebral osteomyelitis  · ID following  · Pending placement to STR

## 2022-02-18 NOTE — ASSESSMENT & PLAN NOTE
Patient transferred to [facility] for MRI with general anesthesia in setting of back pain with presumed vertebral osteomyelitis    Plan:  · MRI cervical, thoracic, and lumbar spine with anesthesia completed 2/21: no evidence of discitis or osteomyelitis of thoracolumbar spine   Lumbar degeneration changes with mild/ moderate canal stenosis and foraminal narrowing, pronounced at L4-L5 on right  · Pain regimen in place  · On adequate bowel regimen to avoid drug induced constipation

## 2022-02-18 NOTE — CONSULTS
Consultation - Infectious Disease   Arleth Rome 80 y o  male MRN: [id number]  Unit/Bed#: [facility] 633-01 Encounter: [id number]      IMPRESSION & RECOMMENDATIONS:   Impression/Recommendations:  1  Sepsis  [facility]  No other appreciable source  Improved  Antibiotics as below  Continue to trend fever curve/vitals  CBC ordered for tomorrow  Additional supportive care as per primary     2   Pseudomonal bacteremia with TV endocarditis   Due to #3   Consider also secondary port infection, status post removal 02/07  Regis Galdamez blood cultures with late positive, now cleared  RENEE with vegetation on moderator band of TV  Continue cefepime 2 g every 12 hours for total 6 weeks of therapy through 03/21/2022  Check weekly CBC-diff, Cr while in IV antibiotics  D/C PICC after last dose IV antibiotics     3   Pseudomonal UTI   In setting of recent Fortune catheter exchange, malpositioned Fortune  CT also shows pyelonephritis  Continue antibiotics as above  Follow UOP closely    4  Back pain  Consider discitis, osteomyelitis in setting of bacteremia as above   Check MRI with anesthesia   Follow symptoms closely    5  Bladder cancer  With obstructive nephrolithiasis  Chronic Fortune, bilateral PCNS  On outpatient chemotherapy      6   CKD  Baseline Cr 1 1-1 2      The patient is stable from an ID standpoint  I will reassess the patient on Monday 2/21  Please call in the interim with new questions  Antibiotics:  Cefepime #15    Thank you for this consultation  We will follow along with you  HISTORY OF PRESENT ILLNESS:  Reason for Consult: Bacteremia    HPI: Arleth Rome is a 80 y o  male with MMP as listed below who [facility] with confusion  Upon presentation he was noted to meet sepsis criteria  He was diagnosed with Pseudomonal bacteremia attributed to his port which was ultimately removed  He has been receiving cefepime  His bacteremia finally cleared    He had a TTE and ultimately a RENEE which showed a vegetation on the moderator band of the TV  He continued to complain of severe back pain and was transferred to [facility] with anesthesia  We are asked to comment on further evaluation and management  In performing this consult, I have reviewed prior admission and outpatient visit records in detail  REVIEW OF SYSTEMS:  Denies fevers, chills, sweats, nausea, vomiting, or diarrhea  A complete system-based review of systems is otherwise negative  PAST MEDICAL HISTORY:  Past Medical History:   Diagnosis Date    A-fib [facility])     Arthritis     Benign prostatic hyperplasia without lower urinary tract symptoms     without Urinary Obstruction    Bladder cancer ([facility] )     CAD (coronary artery disease)     Cancer (HCC)     Chronic obstructive lung disease (HCC)     Constipation 12/9/2021    Coronary arteriosclerosis     Depression     Emphysema lung (HCC)     GERD (gastroesophageal reflux disease)     Hyperlipidemia     Hypertension     Hyponatremia 1/15/2022    Irregular heart beat     Myocardial infarction (HCC)     Psoriasis     Requires supplemental oxygen     at bedtime during high humid days only    Stroke [facility])     TIA 1/2018     Past Surgical History:   Procedure Laterality Date    COLONOSCOPY      CORONARY ANGIOPLASTY  02/03/2001    PTCA of RCA    CORONARY ARTERY BYPASS GRAFT  02/07/2001    x4- [place]    HERNIA REPAIR      IR BIOPSY LUNG  2/14/2022    IR NEPHROSTOMY TUBE PLACEMENT  1/18/2022    IR PORT PLACEMENT  1/14/2022    IR PORT REMOVAL  2/7/2022    AR BRONCHOSCOPY,DIAGNOSTIC Left 1/7/2019    Procedure: BRONCHOSCOPY FLEXIBLE;  Surgeon: Joao Thomas MD;  Location: [facility];   Service: Pulmonary    AR CYSTOURETHROSCOPY,FULGUR <0 5 CM LESN N/A 11/30/2021    Procedure: TRANSURETHRAL RESECTION OF BLADDER TUMOR (TURBT) with "large";  Surgeon: Anna Ding MD;  Location: [facility];  Service: Urology    AR CYSTOURETHROSCOPY,URETER CATHETER Bilateral 2021    Procedure: Lemuel Lax;  Surgeon: Maryam Henriquez MD;  [facility]: [facility];  Service: Urology    CT Chelsey Sahu Left 2018    Procedure: ENDARTERECTOMY ARTERY CAROTID WITH PATCH ANGIOPLASTY;  Surgeon: Moni Saunders MD;  Location:  MAIN OR;  Service: Vascular    TRANSURETHRAL RESECTION OF PROSTATE         FAMILY HISTORY:  Non-contributory    SOCIAL HISTORY:  Social History     Substance and Sexual Activity   Alcohol Use Yes    Comment: special occasions only wine  Social History     Substance and Sexual Activity   Drug Use No     Social History     Tobacco Use   Smoking Status Former Smoker    Years: 1 00    Types: Cigarettes   Smokeless Tobacco Never Used   Tobacco Comment    few cigarettes when playing cards  ALLERGIES:  Allergies   Allergen Reactions    Penicillins Swelling and Itching       MEDICATIONS:  All current active medications have been reviewed  PHYSICAL EXAM:  Vitals:  Temp:  [97 4 °F (36 3 °C)-97 9 °F (36 6 °C)] 97 9 °F (36 6 °C)  HR:  [66-68] 66  Resp:  [16-18] 18  BP: (101-147)/(60-75) 122/65  SpO2:  [96 %-98 %] 98 %  Temp (24hrs), Av 7 °F (36 5 °C), Min:97 4 °F (36 3 °C), Max:97 9 °F (36 6 °C)  Current: Temperature: 97 9 °F (36 6 °C)     Physical Exam:  General:  Well-nourished, well-developed, in no acute distress  Eyes:  Conjunctive clear with no hemorrhages or effusions  Oropharynx:  No ulcers, no lesions  Neck:  Supple, no lymphadenopathy  Lungs:  Normal respiratory excursion, no accessory muscle use  Cardiac:  Regular rate and rhythm, extremities well perfused  Abdomen:  Soft, non-tender, non-distended  Extremities:  No peripheral cyanosis, clubbing, or edema  Skin:  No rashes, no ulcers  Neurological:  Moves all four extremities spontaneously, sensation grossly intact  :  Fortune, PCNs with clear yellow urine    LABS, IMAGING, & OTHER STUDIES:  Lab Results:  I have personally reviewed pertinent labs    Results from last 7 days   Lab Units 02/18/22  0533 02/17/22  0433 02/15/22  0509   POTASSIUM mmol/L 4 1 4 4 4 2   CHLORIDE mmol/L 106 103 103   CO2 mmol/L 27 29 24   BUN mg/dL 15 18 17   CREATININE mg/dL 1 14 1 21 1 27   EGFR ml/min/1 73sq m 59 55 52   CALCIUM mg/dL 9 1 8 8 8 9   AST U/L  --  14  --    ALT U/L  --  28  --    ALK PHOS U/L  --  95  --      Results from last 7 days   Lab Units 02/18/22 0533 02/17/22  0433 02/16/22  0606   WBC Thousand/uL 3 86* 3 45* 3 62*   HEMOGLOBIN g/dL 8 8* 8 5* 8 8*   PLATELETS Thousands/uL 262 269 293           Imaging Studies:   I have personally reviewed pertinent imaging study reports and images in PACS  CT C/A/P reviewed personally Fortune in penile urethra, bilateral pyelonephritis    EKG, Pathology, and Other Studies:   I have personally reviewed pertinent reports

## 2022-02-18 NOTE — ASSESSMENT & PLAN NOTE
Currently rate controlled  Managed as outpatient on coumadin and cardizem    Plan:  · Home Coumadin 5 mg  Monitor INR   Not being bridged from heparin  · INR is now therapeutic  · Decreased warfarin to 3 mg daily on 2/24/22  · Continue diltiazem  · Patient to be discharged on warfarin 3 mg daily

## 2022-02-18 NOTE — PLAN OF CARE
Problem: OCCUPATIONAL THERAPY ADULT  Goal: Performs self-care activities at highest level of function for planned discharge setting  See evaluation for individualized goals  Description: Treatment Interventions: ADL retraining,Functional transfer training,Endurance training,Cognitive reorientation,Patient/family training,Equipment evaluation/education,Compensatory technique education,Activityengagement          See flowsheet documentation for full assessment, interventions and recommendations  Note: Limitation: Decreased ADL status,Decreased Safe judgement during ADL,Decreased cognition,Decreased endurance,Decreased self-care trans  Prognosis: Fair,Good  Assessment: Pt is a 80 y o  male who was admitted to [facility] on 2/17/2022 with Sepsis due to Pseudomonas aeruginosa ([facility] [address] )   Pt's problem list also includes PMH of HTN, previous surgery, limited cognition, COPD, cancer history and CAD, GERD, hld, CABG x 4, bladder CA, CKD, afib, chronic back pain  At baseline pt was completing adls and mobility independently w/o ad - has assist for iadls  Pt lives with family in 2 story [address]  Currently pt requires min assist for overall ADLS and min assist for functional mobility/transfers  Pt currently presents with impairments in the following categories -difficulty performing ADLS, limited insight into deficits, compliance, decreased initiation and engagement , health management  and environment activity tolerance, endurance, standing balance/tolerance, memory, insight, safety , judgement , attention  and sequencing   These impairments, as well as pt's fatigue, pain, spinal precautions, risk for falls and home environment  limit pt's ability to safely engage in all baseline areas of occupation, includingbathing, dressing, toileting, functional mobility/transfers, social participation  and leisure activities  From OT standpoint, recommend home with family support upon D/C   OT will continue to follow to address the below stated goals  OT Discharge Recommendation: Home with home health rehabilitation  OT - OK to Discharge:  Yes

## 2022-02-19 LAB
ANION GAP SERPL CALCULATED.3IONS-SCNC: 4 MMOL/L (ref 4–13)
BUN SERPL-MCNC: 16 MG/DL (ref 5–25)
CALCIUM SERPL-MCNC: 9.1 MG/DL (ref 8.3–10.1)
CHLORIDE SERPL-SCNC: 107 MMOL/L (ref 100–108)
CO2 SERPL-SCNC: 28 MMOL/L (ref 21–32)
CREAT SERPL-MCNC: 1.1 MG/DL (ref 0.6–1.3)
GFR SERPL CREATININE-BSD FRML MDRD: 62 ML/MIN/1.73SQ M
GLUCOSE SERPL-MCNC: 99 MG/DL (ref 65–140)
HCT VFR BLD AUTO: 30.9 % (ref 36.5–49.3)
HGB BLD-MCNC: 9.1 G/DL (ref 12–17)
INR PPP: 1.18 (ref 0.84–1.19)
MCH RBC QN AUTO: 25.4 PG (ref 26.8–34.3)
MCHC RBC AUTO-ENTMCNC: 29.4 G/DL (ref 31.4–37.4)
MCV RBC AUTO: 86 FL (ref 82–98)
PLATELET # BLD AUTO: 262 THOUSANDS/UL (ref 149–390)
PMV BLD AUTO: 8.8 FL (ref 8.9–12.7)
POTASSIUM SERPL-SCNC: 4.4 MMOL/L (ref 3.5–5.3)
PROTHROMBIN TIME: 14.5 SECONDS (ref 11.6–14.5)
RBC # BLD AUTO: 3.58 MILLION/UL (ref 3.88–5.62)
SODIUM SERPL-SCNC: 139 MMOL/L (ref 136–145)
WBC # BLD AUTO: 4 THOUSAND/UL (ref 4.31–10.16)

## 2022-02-19 PROCEDURE — 85610 PROTHROMBIN TIME: CPT | Performed by: STUDENT IN AN ORGANIZED HEALTH CARE EDUCATION/TRAINING PROGRAM

## 2022-02-19 PROCEDURE — 99232 SBSQ HOSP IP/OBS MODERATE 35: CPT | Performed by: INTERNAL MEDICINE

## 2022-02-19 PROCEDURE — 80048 BASIC METABOLIC PNL TOTAL CA: CPT | Performed by: STUDENT IN AN ORGANIZED HEALTH CARE EDUCATION/TRAINING PROGRAM

## 2022-02-19 PROCEDURE — 85027 COMPLETE CBC AUTOMATED: CPT | Performed by: STUDENT IN AN ORGANIZED HEALTH CARE EDUCATION/TRAINING PROGRAM

## 2022-02-19 RX ADMIN — ASPIRIN 81 MG: 81 TABLET, COATED ORAL at 08:44

## 2022-02-19 RX ADMIN — DICLOFENAC SODIUM 2 G: 10 GEL TOPICAL at 12:06

## 2022-02-19 RX ADMIN — FERROUS SULFATE TAB 325 MG (65 MG ELEMENTAL FE) 325 MG: 325 (65 FE) TAB at 08:44

## 2022-02-19 RX ADMIN — WARFARIN SODIUM 5 MG: 5 TABLET ORAL at 17:01

## 2022-02-19 RX ADMIN — SENNOSIDES AND DOCUSATE SODIUM 2 TABLET: 50; 8.6 TABLET ORAL at 08:43

## 2022-02-19 RX ADMIN — ACETAMINOPHEN 975 MG: 325 TABLET, FILM COATED ORAL at 05:19

## 2022-02-19 RX ADMIN — CEFEPIME HYDROCHLORIDE 2000 MG: 2 INJECTION, POWDER, FOR SOLUTION INTRAVENOUS at 17:09

## 2022-02-19 RX ADMIN — ACETAMINOPHEN 975 MG: 325 TABLET, FILM COATED ORAL at 14:39

## 2022-02-19 RX ADMIN — DILTIAZEM HYDROCHLORIDE 240 MG: 240 CAPSULE, COATED, EXTENDED RELEASE ORAL at 08:44

## 2022-02-19 RX ADMIN — DICLOFENAC SODIUM 2 G: 10 GEL TOPICAL at 17:01

## 2022-02-19 RX ADMIN — FERROUS SULFATE TAB 325 MG (65 MG ELEMENTAL FE) 325 MG: 325 (65 FE) TAB at 17:01

## 2022-02-19 RX ADMIN — OXYCODONE HYDROCHLORIDE 5 MG: 5 TABLET ORAL at 14:40

## 2022-02-19 RX ADMIN — DICLOFENAC SODIUM 2 G: 10 GEL TOPICAL at 08:46

## 2022-02-19 RX ADMIN — SENNOSIDES AND DOCUSATE SODIUM 2 TABLET: 50; 8.6 TABLET ORAL at 17:01

## 2022-02-19 RX ADMIN — ACETAMINOPHEN 975 MG: 325 TABLET, FILM COATED ORAL at 21:14

## 2022-02-19 RX ADMIN — CEFEPIME HYDROCHLORIDE 2000 MG: 2 INJECTION, POWDER, FOR SOLUTION INTRAVENOUS at 05:19

## 2022-02-19 RX ADMIN — OXYCODONE HYDROCHLORIDE 5 MG: 5 TABLET ORAL at 21:14

## 2022-02-19 RX ADMIN — LIDOCAINE 5% 1 PATCH: 700 PATCH TOPICAL at 08:45

## 2022-02-19 RX ADMIN — PANTOPRAZOLE SODIUM 40 MG: 40 TABLET, DELAYED RELEASE ORAL at 08:44

## 2022-02-19 RX ADMIN — BISACODYL 10 MG: 10 SUPPOSITORY RECTAL at 08:47

## 2022-02-19 RX ADMIN — POLYETHYLENE GLYCOL 3350 17 G: 17 POWDER, FOR SOLUTION ORAL at 08:43

## 2022-02-19 RX ADMIN — ATORVASTATIN CALCIUM 40 MG: 40 TABLET, FILM COATED ORAL at 08:44

## 2022-02-19 NOTE — CONSULTS
Podiatry - Consultation    Patient Information:   Cindy Lewis 80 y o  male MRN: [id number]  Unit/Bed#: [facility] 633-01 Encounter: [id number]  PCP: Liudmila Trejo MD  Date of Admission:  2/17/2022  Date of Consultation: 02/19/22  Requesting Physician: Bree Moreira MD      ASSESSMENT:    Cindy Lewis is a 80 y o  male with:    1  Onychomycosis     PLAN:     Toenails x8 were excisionally debrided using a large nipper to normal length and thickness without incident   Weight bearing as tolerated   Rest of Medical care per primary team    Podiatry signing off, thank you for the consult! Please contact with any questions or concerns  SUBJECTIVE:    History of Present Illness: Cindy Lewis is a 80 y o  male who is originally admitted 2/17/2022 due to confusion and disorientation or from UTI, blood cultures were positive for Pseudomonas with a past medical history of bladder cancer, back pain, bacteremia, metabolic encephalopathy, anemia, atrial fibrillation, COPD, CKD stage III, CAD, primary hypertension, hyperlipidemia, and GERD  We are consulted for painful, elongated toenails x 8  They state that they have difficulty applying their socks and shoes due to the elongation of the nails  They report pain with palpation and pressure within their shoe gear  They have been unable to cut their nails adequately  Patient has no further pedal complaints at this time  Review of Systems:    Constitutional: Negative  HENT: Negative  Eyes: Negative  Respiratory: Negative  Cardiovascular: Negative  Gastrointestinal: Negative  Musculoskeletal: negative  Skin: Thickened, elongated toenails  Neurological: negative  Psych: Negative       Past Medical and Surgical History:     Past Medical History:   Diagnosis Date    A-fib [facility])     Arthritis     Benign prostatic hyperplasia without lower urinary tract symptoms     without Urinary Obstruction    Bladder cancer ([facility] [address] )     CAD (coronary artery disease)     Cancer ([facility] [address] )     Chronic obstructive lung disease ([facility] [address] )     Constipation 12/9/2021    Coronary arteriosclerosis     Depression     Emphysema lung (HCC)     GERD (gastroesophageal reflux disease)     Hyperlipidemia     Hypertension     Hyponatremia 1/15/2022    Irregular heart beat     Myocardial infarction (HCC)     Psoriasis     Requires supplemental oxygen     at bedtime during high humid days only    Stroke [facility])     TIA 1/2018       Past Surgical History:   Procedure Laterality Date    COLONOSCOPY      CORONARY ANGIOPLASTY  02/03/2001    PTCA of RCA    CORONARY ARTERY BYPASS GRAFT  02/07/2001    x4- [place]    HERNIA REPAIR      IR BIOPSY LUNG  2/14/2022    IR NEPHROSTOMY TUBE PLACEMENT  1/18/2022    IR PORT PLACEMENT  1/14/2022    IR PORT REMOVAL  2/7/2022    MD BRONCHOSCOPY,DIAGNOSTIC Left 1/7/2019    Procedure: BRONCHOSCOPY FLEXIBLE;  Surgeon: Sarah Ta MD;  [facility]: [facility];   Service: Pulmonary    MD CYSTOURETHROSCOPY,FULGUR <0 5 CM LESN N/A 11/30/2021    Procedure: TRANSURETHRAL RESECTION OF BLADDER TUMOR (TURBT) with "large";  Surgeon: Nathan Turcios MD;  Location: [facility];  Service: Urology    MD CYSTOURETHROSCOPY,URETER CATHETER Bilateral 11/30/2021    Procedure: Harshal Gey;  Surgeon: Nathan Turcios MD;  [facility]: [facility];  Service: Urology    MD Dharmesh Krill INCIS Left 2/20/2018    Procedure: ENDARTERECTOMY ARTERY CAROTID WITH PATCH ANGIOPLASTY;  Surgeon: Rafia Coker MD;  [facility]: [facility];  Service: Vascular    TRANSURETHRAL RESECTION OF PROSTATE         Meds/Allergies:    Medications Prior to Admission   Medication    ALPRAZolam (XANAX) 0 25 mg tablet    aspirin 81 mg chewable tablet    atorvastatin (LIPITOR) 40 mg tablet    bisacodyl (DULCOLAX) 10 mg suppository    diltiazem (CARDIZEM CD) 240 mg 24 hr capsule    diphenhydrAMINE (BENADRYL) 12 5 MG chewable tablet    magnesium citrate (CITROMA) 1 745 g/30 mL oral solution    naloxone (NARCAN) 4 mg/0 1 mL nasal spray    ondansetron (Zofran ODT) 8 mg disintegrating tablet    oxyCODONE (Roxicodone) 5 immediate release tablet    pantoprazole (PROTONIX) 40 mg tablet    polyethylene glycol (GOLYTELY) 4000 mL solution    sodium chloride 0 9 % SOLN    sodium chloride, PF, 0 9 %    sodium chloride, PF, 0 9 %    warfarin (COUMADIN) 5 mg tablet       Allergies   Allergen Reactions    Penicillins Swelling and Itching       Social History:     Marital Status:     Substance Use History:   Social History     Substance and Sexual Activity   Alcohol Use Yes    Comment: special occasions only wine  Social History     Tobacco Use   Smoking Status Former Smoker    Years: 1 00    Types: Cigarettes   Smokeless Tobacco Never Used   Tobacco Comment    few cigarettes when playing cards  Social History     Substance and Sexual Activity   Drug Use No       Family History:    Family History   Problem Relation Age of Onset    Lung cancer Mother     Cancer Mother     Other Father         sepsis         OBJECTIVE:    Vitals:   Blood Pressure: 135/73 (02/19/22 0744)  Pulse: 86 (02/19/22 0744)  Temperature: 97 5 °F (36 4 °C) (02/19/22 0744)  Respirations: 18 (02/19/22 0744)  SpO2: 98 % (02/19/22 0744)    Physical Exam:    General Appearance: Alert, cooperative, no distress  HEENT: Head normocephalic, atraumatic, without obvious abnormality  Heart: Normal rate and rhythm  Lungs: Non-labored breathing  No respiratory distress  Abdomen: Without distension  Psychiatric: AAOx3  Lower Extremity:  Vascular:   Right DP and PT pulses are present  Left DP and PT pulses are present  CRT < 3 seconds at the digits  +0/4 edema noted at bilateral lower extremities  Pedal hair is present  Skin temperature is WNL bilaterally  Musculoskeletal:  MMT is 5/5 in all muscle compartments bilaterally  No gross deformities noted       Dermatological:  Elongated, discolored, dystrophic nails x 8 that are positive for subungual debris  Neurological:  Gross sensation is intact  Protective sensation is intact  Patient Denies numbness and/or paresthesias  Additional data:     Lab Results: I have personally reviewed pertinent labs including:    Results from last 7 days   Lab Units 02/19/22  0648 02/18/22  0533 02/18/22  0533   WBC Thousand/uL 4 00*   < > 3 86*   HEMOGLOBIN g/dL 9 1*   < > 8 8*   HEMATOCRIT % 30 9*   < > 30 0*   PLATELETS Thousands/uL 262   < > 262   NEUTROS PCT %  --   --  65   LYMPHS PCT %  --   --  17   MONOS PCT %  --   --  14*   EOS PCT %  --   --  1    < > = values in this interval not displayed  Results from last 7 days   Lab Units 02/19/22  0648 02/18/22  0533 02/17/22  0433   POTASSIUM mmol/L 4 4   < > 4 4   CHLORIDE mmol/L 107   < > 103   CO2 mmol/L 28   < > 29   BUN mg/dL 16   < > 18   CREATININE mg/dL 1 10   < > 1 21   CALCIUM mg/dL 9 1   < > 8 8   ALK PHOS U/L  --   --  95   ALT U/L  --   --  28   AST U/L  --   --  14    < > = values in this interval not displayed  Results from last 7 days   Lab Units 02/19/22  0648   INR  1 18       Cultures: I have personally reviewed pertinent cultures including:              Imaging: I have personally reviewed pertinent reports in PACS  EKG, Pathology, and Other Studies: I have personally reviewed pertinent reports  ** Please Note: Portions of the record may have been created with voice recognition software  Occasional wrong word or "sound a like" substitutions may have occurred due to the inherent limitations of voice recognition software  Read the chart carefully and recognize, using context, where substitutions have occurred   **

## 2022-02-19 NOTE — PROGRESS NOTES
INTERNAL MEDICINE RESIDENCY PROGRESS NOTE     Name: Michelle Moran   Age & Sex: 80 y o  male   MRN: [id number]  Unit/Bed#: [facility] 633-01   Encounter: [id number]  Team: SOD Team C     PATIENT INFORMATION     Name: Michelle Moran   Age & Sex: 80 y o  male   MRN: [id number]  Hospital Stay Days: 2    ASSESSMENT/PLAN     Principal Problem:    Sepsis due to Pseudomonas aeruginosa [facility])  Active Problems:    Back pain    CAD (coronary atherosclerotic disease)    Primary hypertension    GERD (gastroesophageal reflux disease)    Hyperlipidemia    S/P CABG x 4    Bladder cancer (HCC)    CKD (chronic kidney disease) stage 3, GFR 30-59 ml/min ([facility])    COPD, severe ([facility])    Atrial fibrillation ([address] )    Overgrown toenails    Anemia    Metabolic encephalopathy    Bacteremia    Pyelonephritis    * Sepsis due to Pseudomonas aeruginosa [facility])  Assessment & Plan  Plan:  · Chemo port removed and PICC line placed  · Patient to complete 6 week course of antibiotics (through 3/21/22)  · Patient to undergo MRI with anesthesia on Monday 02/21  Will consult neurosurgery with positive results  · ID consulted - appreciate recommendations    Back pain  Assessment & Plan  Patient transferred to \[facility]"" for MRI with general anesthesia in setting of back pain with presumed vertebral osteomyelitis    Plan:  · MRI cervical, thoracic, and lumbar spine with anesthesia ordered  · Will consult neurosurgery with positive results    Pyelonephritis  Assessment & Plan  Patient presented septic with CT findings of pyelonephritis    Bacteremia  Assessment & Plan  Patient with pseudomonas bacteremia on presentation to [facility]  Currently with repeat blood cultures no growth at 5 days  Plan:  · Continue IV antibiotics as described under sepsis plan  · ID consulted - appreciate recommendations    Metabolic encephalopathy  Assessment & Plan  Negative CTH at time or presentation   Evaluated by neuropsychiatry on 2/16 and determined not to have capacity to make medical decisions    Plan:  · Outpatient benzos and narcotics have been held since admission  · Delirium precautions and frequent redirection      Anemia  Assessment & Plan  Baseline Hgb 8-9  Currently stable    Plan:  · Continue iron supplementation  · Monitor daily CBC and transfuse for Hgb <7    Overgrown toenails  Assessment & Plan  Podiatry consulted    Atrial fibrillation [facility])  Assessment & Plan  Currently rate controlled  Managed as outpatient on coumadin and cardizem    Plan:  · Restart Coumadin  Monitor INR  · Continue diltiazem      COPD, severe ([address] )  Assessment & Plan  Currently stable  Not on medications as outpatient    CKD (chronic kidney disease) stage 3, GFR 30-59 ml/min [facility])  Assessment & Plan  Lab Results   Component Value Date    EGFR 55 02/17/2022    EGFR 52 02/15/2022    EGFR 59 02/13/2022    CREATININE 1 21 02/17/2022    CREATININE 1 27 02/15/2022    CREATININE 1 14 02/13/2022     Currently at baseline Cr  Plan:  · Monitor I/O  · Check daily BMP  · Avoid nephrotoxic agents    Bladder cancer [facility])  Assessment & Plan  Patient has history of 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 diagnosed 10/12/2021  S/p placement of B/L nephrostomy tubes and with chronic archibald in place  Follows with heme-onc and urology outpatient and receiving chemo and radiation  CT CAP 2/6 with concern for new right apical pleural-based mass now s/p IR biopsy on 2/14 without evidence of malignancy    Plan:  · Outpatient heme/onc and urology follow up    Hyperlipidemia  Assessment & Plan  Continue atorvastatin    GERD (gastroesophageal reflux disease)  Assessment & Plan  Continue pantoprazole    Primary hypertension  Assessment & Plan  Controlled as outpatient on Cardizem    Plan:  · Continue home Cardizem 240 daily  · Monitor vitals closely    CAD (coronary atherosclerotic disease)  Assessment & Plan  S/p CABG x4  Currently without chest pain   Stable    Plan:  · Continue home aspirin and statin      Disposition: Continue current level of inpatient care  Patient requires MRI with general anesthesia to be performed on   SUBJECTIVE     Patient seen and examined  No acute events overnight  Upon my encounter patient was sitting upright in hospital bed  States this only complaint today is that he feels constipated and wants to have a bowel movement  Explained to patient that bowel regimen is ordered and he has been refusing the last couple offers of MiraLax  Patient is agreed to take MiraLax today and notified patient that suppository is ordered if needed as well  He presently denies any fever, chills, nausea, vomiting, diarrhea, constipation, chest pain, shortness of breath  He otherwise denies any new or worsening complaints  OBJECTIVE     Vitals:    22 1553 22 2310 22 0744   BP: 122/65  134/64 135/73   Pulse: 66  65 86   Resp:    18   Temp: 97 9 °F (36 6 °C)  97 6 °F (36 4 °C) 97 5 °F (36 4 °C)   SpO2: 98% 95% 93% 98%      Temperature:   Temp (24hrs), Av 7 °F (36 5 °C), Min:97 5 °F (36 4 °C), Max:97 9 °F (36 6 °C)    Temperature: 97 5 °F (36 4 °C)  Intake & Output:  I/O        0701   0700  07 0700    P  O   240    IV Piggyback 50 50    Total Intake 50 290    Urine 575 1300    Total Output 575 1300    Net -525 -1010              Weights: There is no height or weight on file to calculate BMI  Weight (last 2 days)     None        Physical Exam  Vitals reviewed  Constitutional:       General: He is awake  He is not in acute distress  Appearance: Normal appearance  He is well-developed  He is not ill-appearing, toxic-appearing or diaphoretic  HENT:      Head: Normocephalic and atraumatic        Right Ear: External ear normal       Left Ear: External ear normal       Ears:      Comments: Decreased hearing     Nose: Nose normal       Mouth/Throat:      Mouth: Mucous membranes are moist       Pharynx: Oropharynx is clear  Eyes:      General:         Right eye: No discharge  Left eye: No discharge  Extraocular Movements: Extraocular movements intact  Conjunctiva/sclera: Conjunctivae normal       Pupils: Pupils are equal, round, and reactive to light  Cardiovascular:      Rate and Rhythm: Normal rate and regular rhythm  Pulses: Normal pulses  Heart sounds: Murmur heard  Pulmonary:      Effort: Pulmonary effort is normal  No respiratory distress  Breath sounds: Normal breath sounds  No wheezing, rhonchi or rales  Abdominal:      General: Bowel sounds are normal  There is no distension  Palpations: Abdomen is soft  Tenderness: There is no abdominal tenderness  Musculoskeletal:         General: No swelling or tenderness  Normal range of motion  Cervical back: Normal range of motion  Right lower leg: No edema  Left lower leg: No edema  Skin:     General: Skin is warm and dry  Coloration: Skin is not jaundiced  Findings: No bruising  Neurological:      General: No focal deficit present  Mental Status: He is alert and oriented to person, place, and time  Motor: No weakness  Psychiatric:         Mood and Affect: Mood normal          Behavior: Behavior normal  Behavior is cooperative  Thought Content: Thought content normal        LABORATORY DATA     Labs: I have personally reviewed pertinent reports  Results from last 7 days   Lab Units 02/19/22 0648 02/18/22  0533 02/17/22  0433 02/16/22  0606 02/15/22  0509   WBC Thousand/uL 4 00* 3 86* 3 45*   < > 3 79*   HEMOGLOBIN g/dL 9 1* 8 8* 8 5*   < > 8 5*   HEMATOCRIT % 30 9* 30 0* 28 3*   < > 29 2*   PLATELETS Thousands/uL 262 262 269   < > 274   NEUTROS PCT %  --  65  --   --   --    MONOS PCT %  --  14*  --   --   --    MONO PCT %  --   --  12  --  11    < > = values in this interval not displayed        Results from last 7 days   Lab Units 02/19/22 0648 02/18/22  0533 02/17/22  0433   POTASSIUM mmol/L 4 4 4 1 4 4   CHLORIDE mmol/L 107 106 103   CO2 mmol/L 28 27 29   BUN mg/dL 16 15 18   CREATININE mg/dL 1 10 1 14 1 21   CALCIUM mg/dL 9 1 9 1 8 8   ALK PHOS U/L  --   --  95   ALT U/L  --   --  28   AST U/L  --   --  14     Results from last 7 days   Lab Units 02/18/22  0533   MAGNESIUM mg/dL 2 4          Results from last 7 days   Lab Units 02/19/22  0648 02/17/22  0433 02/16/22  0606   INR  1 18 1 09 1 01               IMAGING & DIAGNOSTIC TESTING     Radiology Results: I have personally reviewed pertinent reports  No results found  Other Diagnostic Testing: I have personally reviewed pertinent reports      ACTIVE MEDICATIONS     Current Facility-Administered Medications   Medication Dose Route Frequency    acetaminophen (TYLENOL) tablet 975 mg  975 mg Oral Q8H [address]    aluminum-magnesium hydroxide-simethicone (MYLANTA) oral suspension 30 mL  30 mL Oral Q4H PRN    aspirin (ECOTRIN LOW STRENGTH) EC tablet 81 mg  81 mg Oral Daily    atorvastatin (LIPITOR) tablet 40 mg  40 mg Oral Daily    bisacodyl (DULCOLAX) rectal suppository 10 mg  10 mg Rectal Daily PRN    cefepime (MAXIPIME) 2,000 mg in dextrose 5 % 50 mL IVPB  2,000 mg Intravenous Q12H    Diclofenac Sodium (VOLTAREN) 1 % topical gel 2 g  2 g Topical 4x Daily    diltiazem (CARDIZEM CD) 24 hr capsule 240 mg  240 mg Oral Daily    ferrous sulfate tablet 325 mg  325 mg Oral BID With Meals    lidocaine (LIDODERM) 5 % patch 1 patch  1 patch Topical Daily    lidocaine (PF) (XYLOCAINE-MPF) 1 % injection 0 5 mL  0 5 mL Infiltration Once PRN    melatonin tablet 3 mg  3 mg Oral HS PRN    ondansetron (ZOFRAN) injection 4 mg  4 mg Intravenous Q6H PRN    oxyCODONE (ROXICODONE) IR tablet 2 5 mg  2 5 mg Oral Q6H PRN    oxyCODONE (ROXICODONE) IR tablet 5 mg  5 mg Oral Q6H PRN    pantoprazole (PROTONIX) EC tablet 40 mg  40 mg Oral QAM    polyethylene glycol (MIRALAX) packet 17 g  17 g Oral Daily    senna-docusate sodium (SENOKOT S) 8 6-50 mg per tablet 2 tablet  2 tablet Oral BID    sodium phosphate-biphosphate (FLEET) enema 1 enema  1 enema Rectal Once PRN    warfarin (COUMADIN) tablet 5 mg  5 mg Oral Daily (warfarin)       VTE Pharmacologic Prophylaxis: Warfarin (Coumadin)  VTE Mechanical Prophylaxis: sequential compression device    Portions of the record may have been created with voice recognition software  Occasional wrong word or "sound a like" substitutions may have occurred due to the inherent limitations of voice recognition software    Read the chart carefully and recognize, using context, where substitutions have occurred   ==  Priscilla Nunez DO  [address]  Internal Medicine Residency PGY-1

## 2022-02-19 NOTE — PHYSICAL THERAPY NOTE
Physical Therapy Cancellation Note    Patient's Name: Javier Tapia        02/19/22 0911   PT Last Visit   PT Visit Date 02/19/22   Note Type   Note type Evaluation; Cancelled Session   Cancel Reasons Medical status       Orders received, chart reviewed  Pt admit with sepsis due to pseudomonas aeruginosa  Noted that pt is pending MRIs of cervical, thoracic, and lumbar spine to rule out osteomyelitis  Pt is also pending neurosurgery consult  Will hold PT at this time and follow for evaluation/treatment as medically appropriate  Thank you       Mar Torres, PT, DPT

## 2022-02-19 NOTE — PLAN OF CARE
Problem: MOBILITY - ADULT  Goal: Maintain or return to baseline ADL function  Description: INTERVENTIONS:  -  Assess patient's ability to carry out ADLs; assess patient's baseline for ADL function and identify physical deficits which impact ability to perform ADLs (bathing, care of mouth/teeth, toileting, grooming, dressing, etc )  - Assess/evaluate cause of self-care deficits   - Assess range of motion  - Assess patient's mobility; develop plan if impaired  - Assess patient's need for assistive devices and provide as appropriate  - Encourage maximum independence but intervene and supervise when necessary  - Involve family in performance of ADLs  - Assess for home care needs following discharge   - Consider OT consult to assist with ADL evaluation and planning for discharge  - Provide patient education as appropriate  Outcome: Progressing  Goal: Maintains/Returns to pre admission functional level  Description: INTERVENTIONS:  - Perform BMAT or MOVE assessment daily    - Set and communicate daily mobility goal to care team and patient/family/caregiver  - Collaborate with rehabilitation services on mobility goals if consulted  - Perform Range of Motion **3* times a day  - Reposition patient every *2** hours    - Dangle patient 3 times a day  - Stand patient **3* times a day  - Ambulate patient *3** times a day  - Out of bed to chair *3** times a day   - Out of bed for meals *3** times a day  - Out of bed for toileting  - Record patient progress and toleration of activity level   Outcome: Progressing     Problem: GASTROINTESTINAL - ADULT  Goal: Minimal or absence of nausea and/or vomiting  Description: INTERVENTIONS:  - Administer IV fluids if ordered to ensure adequate hydration  - Maintain NPO status until nausea and vomiting are resolved  - Nasogastric tube if ordered  - Administer ordered antiemetic medications as needed  - Provide nonpharmacologic comfort measures as appropriate  - Advance diet as tolerated, if ordered  - Consider nutrition services referral to assist patient with adequate nutrition and appropriate food choices  Outcome: Progressing  Goal: Maintains or returns to baseline bowel function  Description: INTERVENTIONS:  - Assess bowel function  - Encourage oral fluids to ensure adequate hydration  - Administer IV fluids if ordered to ensure adequate hydration  - Administer ordered medications as needed  - Encourage mobilization and activity  - Consider nutritional services referral to assist patient with adequate nutrition and appropriate food choices  Outcome: Progressing     Problem: GENITOURINARY - ADULT  Goal: Maintains or returns to baseline urinary function  Description: INTERVENTIONS:  - Assess urinary function  - Encourage oral fluids to ensure adequate hydration if ordered  - Administer IV fluids as ordered to ensure adequate hydration  - Administer ordered medications as needed  - Offer frequent toileting  - Follow urinary retention protocol if ordered  Outcome: Progressing  Goal: Urinary catheter remains patent  Description: INTERVENTIONS:  - Assess patency of urinary catheter  - If patient has a chronic archibald, consider changing catheter if non-functioning  - Follow guidelines for intermittent irrigation of non-functioning urinary catheter  Outcome: Progressing

## 2022-02-19 NOTE — CASE MANAGEMENT
Case Management Discharge Planning Note    Patient name Vladislav Obrienr  Location [address] [id number]  : 1940 Date 2022       Current Admission Date: 2022  Current Admission Diagnosis:Sepsis due to Pseudomonas aeruginosa [facility])   Patient Active Problem List    Diagnosis Date Noted    Pyelonephritis 2022    Bacteremia 2022    Sepsis due to Pseudomonas aeruginosa ([facility] )     Metabolic encephalopathy     Nephrostomy status ([facility] ) 2022    Anxiety 2022    Continuous opioid dependence ([facility] ) 2022    Anemia 2022    FRANCY (iron deficiency anemia) 2022    DULCE (acute kidney injury) ([facility] ) on CKD 3 01/15/2022    Pleural effusion on left 01/15/2022    Abnormal urinalysis 01/15/2022    Fortune catheter in place prior to arrival 2022    Stage 3a chronic kidney disease ([address] ) 2022    Recurrent unilateral inguinal hernia 2021    Left lower quadrant abdominal pain 2021    History of bilateral inguinal hernia repair 2021    Cancer of overlapping sites of bladder ([facility] ) 2021    Overgrown toenails 2021    Atrial fibrillation ([facility] ) 2021    Encounter to discuss test results 10/12/2021    Acute on chronic diastolic congestive heart failure ([facility] ) 2020    DDD (degenerative disc disease), lumbar 10/30/2019    Medicare annual wellness visit, subsequent 2019    Tinnitus aurium, bilateral 2019    Sensorineural hearing loss (SNHL) of both ears 2019    Abnormal CT of the chest 2019    COPD, severe ([address] ) 2018    Bronchiectasis with acute lower respiratory infection ([facility] ) 2018    Localized edema 10/15/2018    Irregular heart beat     Atrial flutter ([address] ) 10/07/2018    CKD (chronic kidney disease) stage 3, GFR 30-59 ml/min ([address] ) 2018    Urinary retention due to benign prostatic hyperplasia 2018    Bladder cancer ([facility] ) 2018  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    Stenosis of left carotid artery 01/14/2018    GERD (gastroesophageal reflux disease) 01/13/2018    History of stroke 01/13/2018    Sciatica of right side 01/13/2018    Vision changes 01/13/2018    Hyperlipidemia     Centrilobular emphysema (HCC)     Arthritis     Back pain 01/24/2017    Acute left-sided low back pain with left-sided sciatica 10/25/2016    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 10/25/2016    CAD (coronary atherosclerotic disease) 08/13/2016    Primary hypertension 08/13/2016    Hyperlipemia 08/13/2016      LOS (days): 2  Geometric Mean LOS (GMLOS) (days): 7 10  Days to GMLOS:5 4     OBJECTIVE:  Risk of Unplanned Readmission Score: 39         Current admission status: Inpatient   [address] [address]  Phone: [phone] Fax: [phone]    Primary Care Provider: Arsenio Ojeda MD    Primary Insurance: MEDICARE  Secondary Insurance: [facility]    DISCHARGE DETAILS:       Pt not medically stable for d/c at this time  Pt will need IP rehab when stable  Referrals have been placed   provided locations with updates pertaining to pt's d/c time frame   Awaiting definitive answers from SNF

## 2022-02-20 LAB
ANION GAP SERPL CALCULATED.3IONS-SCNC: 6 MMOL/L (ref 4–13)
BUN SERPL-MCNC: 14 MG/DL (ref 5–25)
CALCIUM SERPL-MCNC: 9.1 MG/DL (ref 8.3–10.1)
CHLORIDE SERPL-SCNC: 106 MMOL/L (ref 100–108)
CO2 SERPL-SCNC: 27 MMOL/L (ref 21–32)
CREAT SERPL-MCNC: 1.13 MG/DL (ref 0.6–1.3)
GFR SERPL CREATININE-BSD FRML MDRD: 60 ML/MIN/1.73SQ M
GLUCOSE SERPL-MCNC: 91 MG/DL (ref 65–140)
HCT VFR BLD AUTO: 32.1 % (ref 36.5–49.3)
HGB BLD-MCNC: 9.5 G/DL (ref 12–17)
INR PPP: 1.46 (ref 0.84–1.19)
MCH RBC QN AUTO: 25.3 PG (ref 26.8–34.3)
MCHC RBC AUTO-ENTMCNC: 29.6 G/DL (ref 31.4–37.4)
MCV RBC AUTO: 86 FL (ref 82–98)
PLATELET # BLD AUTO: 256 THOUSANDS/UL (ref 149–390)
PMV BLD AUTO: 8.5 FL (ref 8.9–12.7)
POTASSIUM SERPL-SCNC: 4.3 MMOL/L (ref 3.5–5.3)
PROTHROMBIN TIME: 17.1 SECONDS (ref 11.6–14.5)
RBC # BLD AUTO: 3.75 MILLION/UL (ref 3.88–5.62)
SODIUM SERPL-SCNC: 139 MMOL/L (ref 136–145)
WBC # BLD AUTO: 4.66 THOUSAND/UL (ref 4.31–10.16)

## 2022-02-20 PROCEDURE — 99232 SBSQ HOSP IP/OBS MODERATE 35: CPT | Performed by: INTERNAL MEDICINE

## 2022-02-20 PROCEDURE — 80048 BASIC METABOLIC PNL TOTAL CA: CPT | Performed by: STUDENT IN AN ORGANIZED HEALTH CARE EDUCATION/TRAINING PROGRAM

## 2022-02-20 PROCEDURE — 85027 COMPLETE CBC AUTOMATED: CPT | Performed by: STUDENT IN AN ORGANIZED HEALTH CARE EDUCATION/TRAINING PROGRAM

## 2022-02-20 PROCEDURE — 85610 PROTHROMBIN TIME: CPT | Performed by: STUDENT IN AN ORGANIZED HEALTH CARE EDUCATION/TRAINING PROGRAM

## 2022-02-20 RX ORDER — SIMETHICONE 80 MG
80 TABLET,CHEWABLE ORAL EVERY 6 HOURS PRN
Status: DISCONTINUED | OUTPATIENT
Start: 2022-02-20 | End: 2022-02-28 | Stop reason: HOSPADM

## 2022-02-20 RX ORDER — BUTALBITAL, ACETAMINOPHEN AND CAFFEINE 50; 325; 40 MG/1; MG/1; MG/1
1 TABLET ORAL ONCE
Status: COMPLETED | OUTPATIENT
Start: 2022-02-20 | End: 2022-02-20

## 2022-02-20 RX ADMIN — ACETAMINOPHEN 650 MG: 325 TABLET, FILM COATED ORAL at 14:38

## 2022-02-20 RX ADMIN — DICLOFENAC SODIUM 2 G: 10 GEL TOPICAL at 11:35

## 2022-02-20 RX ADMIN — BUTALBITAL, ACETAMINOPHEN, AND CAFFEINE 1 TABLET: 50; 325; 40 TABLET ORAL at 01:20

## 2022-02-20 RX ADMIN — DICLOFENAC SODIUM 2 G: 10 GEL TOPICAL at 08:37

## 2022-02-20 RX ADMIN — OXYCODONE HYDROCHLORIDE 5 MG: 5 TABLET ORAL at 06:24

## 2022-02-20 RX ADMIN — DILTIAZEM HYDROCHLORIDE 240 MG: 240 CAPSULE, COATED, EXTENDED RELEASE ORAL at 08:41

## 2022-02-20 RX ADMIN — FERROUS SULFATE TAB 325 MG (65 MG ELEMENTAL FE) 325 MG: 325 (65 FE) TAB at 08:41

## 2022-02-20 RX ADMIN — POLYETHYLENE GLYCOL 3350 17 G: 17 POWDER, FOR SOLUTION ORAL at 08:40

## 2022-02-20 RX ADMIN — FERROUS SULFATE TAB 325 MG (65 MG ELEMENTAL FE) 325 MG: 325 (65 FE) TAB at 18:05

## 2022-02-20 RX ADMIN — CEFEPIME HYDROCHLORIDE 2000 MG: 2 INJECTION, POWDER, FOR SOLUTION INTRAVENOUS at 06:00

## 2022-02-20 RX ADMIN — SENNOSIDES AND DOCUSATE SODIUM 2 TABLET: 50; 8.6 TABLET ORAL at 08:41

## 2022-02-20 RX ADMIN — SENNOSIDES AND DOCUSATE SODIUM 2 TABLET: 50; 8.6 TABLET ORAL at 18:03

## 2022-02-20 RX ADMIN — OXYCODONE HYDROCHLORIDE 5 MG: 5 TABLET ORAL at 21:00

## 2022-02-20 RX ADMIN — ACETAMINOPHEN 975 MG: 325 TABLET, FILM COATED ORAL at 21:00

## 2022-02-20 RX ADMIN — DICLOFENAC SODIUM 2 G: 10 GEL TOPICAL at 18:05

## 2022-02-20 RX ADMIN — PANTOPRAZOLE SODIUM 40 MG: 40 TABLET, DELAYED RELEASE ORAL at 08:41

## 2022-02-20 RX ADMIN — CEFEPIME HYDROCHLORIDE 2000 MG: 2 INJECTION, POWDER, FOR SOLUTION INTRAVENOUS at 18:01

## 2022-02-20 RX ADMIN — LIDOCAINE 5% 1 PATCH: 700 PATCH TOPICAL at 08:37

## 2022-02-20 RX ADMIN — ACETAMINOPHEN 975 MG: 325 TABLET, FILM COATED ORAL at 06:00

## 2022-02-20 RX ADMIN — ASPIRIN 81 MG: 81 TABLET, COATED ORAL at 08:40

## 2022-02-20 RX ADMIN — WARFARIN SODIUM 5 MG: 5 TABLET ORAL at 18:03

## 2022-02-20 RX ADMIN — DICLOFENAC SODIUM 2 G: 10 GEL TOPICAL at 21:09

## 2022-02-20 RX ADMIN — ATORVASTATIN CALCIUM 40 MG: 40 TABLET, FILM COATED ORAL at 08:40

## 2022-02-20 NOTE — PROGRESS NOTES
INTERNAL MEDICINE RESIDENCY PROGRESS NOTE     Name: Hossein Overall   Age & Sex: 80 y o  male   MRN: [id number]  Unit/Bed#: [facility] 633-01   Encounter: [id number]  Team: SOD Team C     PATIENT INFORMATION     Name: Hossein Overall   Age & Sex: 80 y o  male   MRN: [id number]  Hospital Stay Days: 3    ASSESSMENT/PLAN     Principal Problem:    Sepsis due to Pseudomonas aeruginosa [facility])  Active Problems:    Back pain    Bacteremia    Pyelonephritis    CAD (coronary atherosclerotic disease)    Primary hypertension    Bladder cancer (HCC)    CKD (chronic kidney disease) stage 3, GFR 30-59 ml/min ([facility])    Metabolic encephalopathy    S/P CABG x 4    Atrial fibrillation ([facility])    GERD (gastroesophageal reflux disease)    Hyperlipidemia    COPD, severe ([facility])    Overgrown toenails    Anemia      * Sepsis due to Pseudomonas aeruginosa [facility])  Assessment & Plan  Patient initally presented to [facility] on 2/4 with sepsis 2/2 UTI/pyelonephritis with urine and blood cultures positive for pseudomonas  RENEE done 2/9 demonstrated endocarditis  Currently with concern for vertebral osteomyelitis, and transferred to [facility] for MRI with general anesthesia 2/2 advanced dementia  Repeat blood cultures negative to date  Plan:  · Source most likely urinary given b/l nephrostomy tubes, chronic archibald, and + UA  Will get MRI lumbar spine to r/u discitis, etc given complaint of back pain  · Chemo port removed and PICC line placed  · Patient to complete 6 week course of antibiotics (through 3/21/22)  · Patient to undergo MRI with anesthesia on Monday 02/21 for back pain  Will consult neurosurgery with positive results  · ID following    Pyelonephritis  Assessment & Plan  Patient presented septic with CT findings of pyelonephritis    Bacteremia  Assessment & Plan  Patient with pseudomonas bacteremia on presentation to [facility]  Currently with repeat blood cultures no growth at 5 days      Plan:  · Continue IV antibiotics as described under sepsis plan  · ID consulted - appreciate recommendations    Back pain  Assessment & Plan  Patient transferred to [facility] for MRI with general anesthesia in setting of back pain with presumed vertebral osteomyelitis    Plan:  · MRI cervical, thoracic, and lumbar spine with anesthesia ordered  · Will consult neurosurgery with positive results    Metabolic encephalopathy  Assessment & Plan  Negative CTH at time or presentation  Evaluated by neuropsychiatry on 2/16 and determined not to have capacity to make medical decisions    Plan:  · Outpatient benzos and narcotics have been held since admission  · Delirium precautions and frequent redirection      CKD (chronic kidney disease) stage 3, GFR 30-59 ml/min [facility])  Assessment & Plan  Lab Results   Component Value Date    EGFR 55 02/17/2022    EGFR 52 02/15/2022    EGFR 59 02/13/2022    CREATININE 1 21 02/17/2022    CREATININE 1 27 02/15/2022    CREATININE 1 14 02/13/2022     Currently at baseline Cr  Plan:  · Monitor I/O  · Check daily BMP  · Avoid nephrotoxic agents    Bladder cancer [facility])  Assessment & Plan  Patient has history of 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 diagnosed 10/12/2021  S/p placement of B/L nephrostomy tubes and with chronic archibald in place  Follows with heme-onc and urology outpatient and receiving chemo and radiation  CT CAP 2/6 with concern for new right apical pleural-based mass now s/p IR biopsy on 2/14 without evidence of malignancy    Plan:  · Outpatient heme/onc and urology follow up    Primary hypertension  Assessment & Plan  Controlled as outpatient on Cardizem    Plan:  · Continue home Cardizem 240 daily  · Monitor vitals closely    CAD (coronary atherosclerotic disease)  Assessment & Plan  S/p CABG x4  Currently without chest pain  Stable    Plan:  · Continue home aspirin and statin    Atrial fibrillation ([facility] )  Assessment & Plan  Currently rate controlled   Managed as outpatient on coumadin and cardizem    Plan:  · Continue home Coumadin 5 mg  Monitor INR  Not being bridged from heparin  · Continue diltiazem      GERD (gastroesophageal reflux disease)  Assessment & Plan  Continue pantoprazole    COPD, severe ([address] )  Assessment & Plan  Currently stable  Not on medications as outpatient    Hyperlipidemia  Assessment & Plan  Continue atorvastatin    Anemia  Assessment & Plan  Baseline Hgb 8-9  Currently stable    Plan:  · Continue iron supplementation  · Monitor daily CBC and transfuse for Hgb <7    Overgrown toenails  Assessment & Plan  Podiatry consulted      Disposition: MRI with anesthesia tomorrow; continue IV cefepime thru 3/21    SUBJECTIVE     Patient seen and examined  No acute events overnight  Patient had a BM yesterday after receiving suppository which did bring him relief  This am he is again having abdominal pain 2/2 gas, but is able to pass gas  Will add simethicone and give additional suppository if necessary  Patient is drinking his miralax today  Appetite is good  No acute complaints  Output from b/l nephrostomy tubes and minimal output from chronic indwelling archibald  Denies: fever/chills, SOB, CP, n/v    OBJECTIVE     Vitals:    22 1206 22 1552 22 2240 22 0729   BP: 122/85 133/74 124/59 130/57   BP Location:   Left arm    Pulse: 85 61 71 68   Resp: 20 16 16 18   Temp: 97 8 °F (36 6 °C) 97 6 °F (36 4 °C) 98 1 °F (36 7 °C) 98 3 °F (36 8 °C)   TempSrc:   Axillary    SpO2: 97% 98% 98% 98%      Temperature:   Temp (24hrs), Av °F (36 7 °C), Min:97 6 °F (36 4 °C), Max:98 3 °F (36 8 °C)    Temperature: 98 3 °F (36 8 °C)  Intake & Output:  I/O        0701   0700  07 07 07 0700    P  O  240 280     IV Piggyback 50      Total Intake 290 280     Urine 1745 1850     Stool  0     Total Output 1745 1850     Net -1455 -1570            Unmeasured Stool Occurrence  1 x         Weights: There is no height or weight on file to calculate BMI    Weight (last 2 days) None        Physical Exam  Constitutional:       General: He is not in acute distress  Appearance: He is not toxic-appearing or diaphoretic  Comments: Very hard of hearing     Cardiovascular:      Rate and Rhythm: Normal rate  Pulses: Normal pulses  Heart sounds: Murmur (systolic) heard  Pulmonary:      Effort: Pulmonary effort is normal  No respiratory distress  Abdominal:      General: Abdomen is flat  Bowel sounds are normal  There is no distension  Palpations: Abdomen is soft  Tenderness: There is no abdominal tenderness  There is no right CVA tenderness, left CVA tenderness or guarding  Musculoskeletal:      Right lower leg: No edema  Left lower leg: No edema  Skin:     General: Skin is warm  Neurological:      Mental Status: He is alert and oriented to person, place, and time  Psychiatric:         Mood and Affect: Mood normal        LABORATORY DATA     Labs: I have personally reviewed pertinent reports  Results from last 7 days   Lab Units 02/20/22  0617 02/19/22  5782 02/18/22  0533 02/17/22  0433 02/17/22  0433 02/16/22  0606 02/15/22  0509   WBC Thousand/uL 4 66 4 00* 3 86*   < > 3 45*   < > 3 79*   HEMOGLOBIN g/dL 9 5* 9 1* 8 8*   < > 8 5*   < > 8 5*   HEMATOCRIT % 32 1* 30 9* 30 0*   < > 28 3*   < > 29 2*   PLATELETS Thousands/uL 256 262 262   < > 269   < > 274   NEUTROS PCT %  --   --  65  --   --   --   --    MONOS PCT %  --   --  14*  --   --   --   --    MONO PCT %  --   --   --   --  12  --  11    < > = values in this interval not displayed        Results from last 7 days   Lab Units 02/20/22  0617 02/19/22  8253 02/18/22  0533 02/17/22  0433 02/17/22  0433   POTASSIUM mmol/L 4 3 4 4 4 1   < > 4 4   CHLORIDE mmol/L 106 107 106   < > 103   CO2 mmol/L 27 28 27   < > 29   BUN mg/dL 14 16 15   < > 18   CREATININE mg/dL 1 13 1 10 1 14   < > 1 21   CALCIUM mg/dL 9 1 9 1 9 1   < > 8 8   ALK PHOS U/L  --   --   --   --  95   ALT U/L  --   --   --   --  28 AST U/L  --   --   --   --  14    < > = values in this interval not displayed  Results from last 7 days   Lab Units 02/18/22  0533   MAGNESIUM mg/dL 2 4          Results from last 7 days   Lab Units 02/20/22  0617 02/19/22  0648 02/17/22  0433   INR  1 46* 1 18 1 09               IMAGING & DIAGNOSTIC TESTING     Radiology Results: I have personally reviewed pertinent reports  No results found  Other Diagnostic Testing: I have personally reviewed pertinent reports      ACTIVE MEDICATIONS     Current Facility-Administered Medications   Medication Dose Route Frequency    acetaminophen (TYLENOL) tablet 975 mg  975 mg Oral Q8H [address]    aluminum-magnesium hydroxide-simethicone (MYLANTA) oral suspension 30 mL  30 mL Oral Q4H PRN    aspirin (ECOTRIN LOW STRENGTH) EC tablet 81 mg  81 mg Oral Daily    atorvastatin (LIPITOR) tablet 40 mg  40 mg Oral Daily    bisacodyl (DULCOLAX) rectal suppository 10 mg  10 mg Rectal Daily PRN    cefepime (MAXIPIME) 2,000 mg in dextrose 5 % 50 mL IVPB  2,000 mg Intravenous Q12H    Diclofenac Sodium (VOLTAREN) 1 % topical gel 2 g  2 g Topical 4x Daily    diltiazem (CARDIZEM CD) 24 hr capsule 240 mg  240 mg Oral Daily    ferrous sulfate tablet 325 mg  325 mg Oral BID With Meals    lidocaine (LIDODERM) 5 % patch 1 patch  1 patch Topical Daily    lidocaine (PF) (XYLOCAINE-MPF) 1 % injection 0 5 mL  0 5 mL Infiltration Once PRN    melatonin tablet 3 mg  3 mg Oral HS PRN    ondansetron (ZOFRAN) injection 4 mg  4 mg Intravenous Q6H PRN    oxyCODONE (ROXICODONE) IR tablet 2 5 mg  2 5 mg Oral Q6H PRN    oxyCODONE (ROXICODONE) IR tablet 5 mg  5 mg Oral Q6H PRN    pantoprazole (PROTONIX) EC tablet 40 mg  40 mg Oral QAM    polyethylene glycol (MIRALAX) packet 17 g  17 g Oral Daily    senna-docusate sodium (SENOKOT S) 8 6-50 mg per tablet 2 tablet  2 tablet Oral BID    simethicone (MYLICON) chewable tablet 80 mg  80 mg Oral Q6H PRN    sodium phosphate-biphosphate (FLEET) enema 1 enema  1 enema Rectal Once PRN    warfarin (COUMADIN) tablet 5 mg  5 mg Oral Daily (warfarin)       VTE Pharmacologic Prophylaxis: Warfarin (Coumadin)  VTE Mechanical Prophylaxis: sequential compression device    Portions of the record may have been created with voice recognition software  Occasional wrong word or "sound a like" substitutions may have occurred due to the inherent limitations of voice recognition software    Read the chart carefully and recognize, using context, where substitutions have occurred   ==  Miranda Fuentes, [address] [place] [address]  Internal Medicine Residency PGY-2

## 2022-02-20 NOTE — PHYSICAL THERAPY NOTE
Physical Therapy Cancellation Note    Patient's Name: Parth Serrano        02/20/22 1235   PT Last Visit   PT Visit Date 02/20/22   Note Type   Note type Evaluation; Cancelled Session   Cancel Reasons Medical status       Orders received, chart reviewed  Pt admit with sepsis due to pseudomonas aeruginosa  Noted that pt is still pending MRIs of cervical, thoracic, and lumbar spine to rule out osteomyelitis  Pt is also pending neurosurgery consult  Will hold PT at this time and follow for evaluation/treatment as medically appropriate  Thank you       Jose Hernández, PT, DPT

## 2022-02-20 NOTE — PLAN OF CARE
Problem: MOBILITY - ADULT  Goal: Maintain or return to baseline ADL function  Description: INTERVENTIONS:  -  Assess patient's ability to carry out ADLs; assess patient's baseline for ADL function and identify physical deficits which impact ability to perform ADLs (bathing, care of mouth/teeth, toileting, grooming, dressing, etc )  - Assess/evaluate cause of self-care deficits   - Assess range of motion  - Assess patient's mobility; develop plan if impaired  - Assess patient's need for assistive devices and provide as appropriate  - Encourage maximum independence but intervene and supervise when necessary  - Involve family in performance of ADLs  - Assess for home care needs following discharge   - Consider OT consult to assist with ADL evaluation and planning for discharge  - Provide patient education as appropriate  Outcome: Progressing  Goal: Maintains/Returns to pre admission functional level  Description: INTERVENTIONS:  - Perform BMAT or MOVE assessment daily    - Set and communicate daily mobility goal to care team and patient/family/caregiver  - Collaborate with rehabilitation services on mobility goals if consulted  - Perform Range of Motion **3* times a day  - Reposition patient every **2* hours    - Dangle patient *3** times a day  - Stand patient *3** times a day  - Ambulate patient *3** times a day  - Out of bed to chair *3** times a day   - Out of bed for meals *3  Problem: GASTROINTESTINAL - ADULT  Goal: Minimal or absence of nausea and/or vomiting  Description: INTERVENTIONS:  - Administer IV fluids if ordered to ensure adequate hydration  - Maintain NPO status until nausea and vomiting are resolved  - Nasogastric tube if ordered  - Administer ordered antiemetic medications as needed  - Provide nonpharmacologic comfort measures as appropriate  - Advance diet as tolerated, if ordered  - Consider nutrition services referral to assist patient with adequate nutrition and appropriate food choices  Outcome: Progressing  Goal: Maintains or returns to baseline bowel function  Description: INTERVENTIONS:  - Assess bowel function  - Encourage oral fluids to ensure adequate hydration  - Administer IV fluids if ordered to ensure adequate hydration  - Administer ordered medications as needed  - Encourage mobilization and activity  - Consider nutritional services referral to assist patient with adequate nutrition and appropriate food choices  Outcome: Progressing     Problem: GENITOURINARY - ADULT  Goal: Maintains or returns to baseline urinary function  Description: INTERVENTIONS:  - Assess urinary function  - Encourage oral fluids to ensure adequate hydration if ordered  - Administer IV fluids as ordered to ensure adequate hydration  - Administer ordered medications as needed  - Offer frequent toileting  - Follow urinary retention protocol if ordered  Outcome: Progressing  Goal: Urinary catheter remains patent  Description: INTERVENTIONS:  - Assess patency of urinary catheter  - If patient has a chronic archibald, consider changing catheter if non-functioning  - Follow guidelines for intermittent irrigation of non-functioning urinary catheter  Outcome: Progressing     Problem: Potential for Falls  Goal: Patient will remain free of falls  Description: INTERVENTIONS:  - Educate patient/family on patient safety including physical limitations  - Instruct patient to call for assistance with activity   - Consult OT/PT to assist with strengthening/mobility   - Keep Call bell within reach  - Keep bed low and locked with side rails adjusted as appropriate  - Keep care items and personal belongings within reach  - Initiate and maintain comfort rounds  - Make Fall Risk Sign visible to staff  - Offer Toileting every *2** Hours, in advance of need  - Initiate/Maintain **bed/chair*alarm  - Obtain necessary fall risk management equipment:   - Apply yellow socks and bracelet for high fall risk patients  - Consider moving patient to room near nurses station  Outcome: Progressing     Problem: Prexisting or High Potential for Compromised Skin Integrity  Goal: Skin integrity is maintained or improved  Description: INTERVENTIONS:  - Identify patients at risk for skin breakdown  - Assess and monitor skin integrity  - Assess and monitor nutrition and hydration status  - Monitor labs   - Assess for incontinence   - Turn and reposition patient  - Assist with mobility/ambulation  - Relieve pressure over bony prominences  - Avoid friction and shearing  - Provide appropriate hygiene as needed including keeping skin clean and dry  - Evaluate need for skin moisturizer/barrier cream  - Collaborate with interdisciplinary team   - Patient/family teaching  - Consider wound care consult   Outcome: Progressing   ** times a day  - Out of bed for toileting  - Record patient progress and toleration of activity level   Outcome: Progressing

## 2022-02-21 ENCOUNTER — TELEPHONE (OUTPATIENT)
Dept: RADIOLOGY | Facility: HOSPITAL | Age: 82
End: 2022-02-21

## 2022-02-21 ENCOUNTER — ANESTHESIA EVENT (INPATIENT)
Dept: RADIOLOGY | Facility: HOSPITAL | Age: 82
DRG: 288 | End: 2022-02-21
Payer: MEDICARE

## 2022-02-21 ENCOUNTER — APPOINTMENT (OUTPATIENT)
Dept: RADIATION ONCOLOGY | Facility: CLINIC | Age: 82
End: 2022-02-21
Attending: RADIOLOGY
Payer: MEDICARE

## 2022-02-21 ENCOUNTER — APPOINTMENT (INPATIENT)
Dept: RADIOLOGY | Facility: HOSPITAL | Age: 82
DRG: 288 | End: 2022-02-21
Payer: MEDICARE

## 2022-02-21 ENCOUNTER — ANESTHESIA (INPATIENT)
Dept: RADIOLOGY | Facility: HOSPITAL | Age: 82
DRG: 288 | End: 2022-02-21
Payer: MEDICARE

## 2022-02-21 LAB
INR PPP: 1.68 (ref 0.84–1.19)
PROTHROMBIN TIME: 19 SECONDS (ref 11.6–14.5)

## 2022-02-21 PROCEDURE — 85610 PROTHROMBIN TIME: CPT | Performed by: STUDENT IN AN ORGANIZED HEALTH CARE EDUCATION/TRAINING PROGRAM

## 2022-02-21 PROCEDURE — 72148 MRI LUMBAR SPINE W/O DYE: CPT

## 2022-02-21 PROCEDURE — 99232 SBSQ HOSP IP/OBS MODERATE 35: CPT | Performed by: INTERNAL MEDICINE

## 2022-02-21 PROCEDURE — 72141 MRI NECK SPINE W/O DYE: CPT

## 2022-02-21 PROCEDURE — G1004 CDSM NDSC: HCPCS

## 2022-02-21 RX ORDER — PROPOFOL 10 MG/ML
INJECTION, EMULSION INTRAVENOUS AS NEEDED
Status: DISCONTINUED | OUTPATIENT
Start: 2022-02-21 | End: 2022-02-21

## 2022-02-21 RX ORDER — LIDOCAINE HYDROCHLORIDE 10 MG/ML
INJECTION, SOLUTION EPIDURAL; INFILTRATION; INTRACAUDAL; PERINEURAL AS NEEDED
Status: DISCONTINUED | OUTPATIENT
Start: 2022-02-21 | End: 2022-02-21

## 2022-02-21 RX ORDER — ONDANSETRON 2 MG/ML
INJECTION INTRAMUSCULAR; INTRAVENOUS AS NEEDED
Status: DISCONTINUED | OUTPATIENT
Start: 2022-02-21 | End: 2022-02-21

## 2022-02-21 RX ORDER — SODIUM CHLORIDE 9 MG/ML
INJECTION, SOLUTION INTRAVENOUS CONTINUOUS PRN
Status: DISCONTINUED | OUTPATIENT
Start: 2022-02-21 | End: 2022-02-21

## 2022-02-21 RX ORDER — PROMETHAZINE HYDROCHLORIDE 25 MG/ML
12.5 INJECTION, SOLUTION INTRAMUSCULAR; INTRAVENOUS ONCE AS NEEDED
Status: DISCONTINUED | OUTPATIENT
Start: 2022-02-21 | End: 2022-02-22

## 2022-02-21 RX ORDER — HYDROMORPHONE HCL/PF 1 MG/ML
0.4 SYRINGE (ML) INJECTION
Status: DISCONTINUED | OUTPATIENT
Start: 2022-02-21 | End: 2022-02-28 | Stop reason: HOSPADM

## 2022-02-21 RX ORDER — ONDANSETRON 2 MG/ML
4 INJECTION INTRAMUSCULAR; INTRAVENOUS ONCE AS NEEDED
Status: DISCONTINUED | OUTPATIENT
Start: 2022-02-21 | End: 2022-02-22

## 2022-02-21 RX ORDER — ALBUTEROL SULFATE 2.5 MG/3ML
2.5 SOLUTION RESPIRATORY (INHALATION) ONCE AS NEEDED
Status: DISCONTINUED | OUTPATIENT
Start: 2022-02-21 | End: 2022-02-21

## 2022-02-21 RX ORDER — FENTANYL CITRATE/PF 50 MCG/ML
50 SYRINGE (ML) INJECTION
Status: DISCONTINUED | OUTPATIENT
Start: 2022-02-21 | End: 2022-02-22

## 2022-02-21 RX ORDER — METOCLOPRAMIDE HYDROCHLORIDE 5 MG/ML
10 INJECTION INTRAMUSCULAR; INTRAVENOUS ONCE AS NEEDED
Status: DISCONTINUED | OUTPATIENT
Start: 2022-02-21 | End: 2022-02-22

## 2022-02-21 RX ORDER — EPHEDRINE SULFATE 50 MG/ML
INJECTION INTRAVENOUS AS NEEDED
Status: DISCONTINUED | OUTPATIENT
Start: 2022-02-21 | End: 2022-02-21

## 2022-02-21 RX ORDER — DEXAMETHASONE SODIUM PHOSPHATE 10 MG/ML
INJECTION, SOLUTION INTRAMUSCULAR; INTRAVENOUS AS NEEDED
Status: DISCONTINUED | OUTPATIENT
Start: 2022-02-21 | End: 2022-02-21

## 2022-02-21 RX ADMIN — LIDOCAINE HYDROCHLORIDE 50 MG: 10 INJECTION, SOLUTION EPIDURAL; INFILTRATION; INTRACAUDAL; PERINEURAL at 15:25

## 2022-02-21 RX ADMIN — CEFEPIME HYDROCHLORIDE 2000 MG: 2 INJECTION, POWDER, FOR SOLUTION INTRAVENOUS at 18:07

## 2022-02-21 RX ADMIN — ONDANSETRON 4 MG: 2 INJECTION INTRAMUSCULAR; INTRAVENOUS at 16:08

## 2022-02-21 RX ADMIN — CEFEPIME HYDROCHLORIDE 2000 MG: 2 INJECTION, POWDER, FOR SOLUTION INTRAVENOUS at 06:29

## 2022-02-21 RX ADMIN — PROPOFOL 200 MG: 10 INJECTION, EMULSION INTRAVENOUS at 15:25

## 2022-02-21 RX ADMIN — OXYCODONE HYDROCHLORIDE 5 MG: 5 TABLET ORAL at 09:35

## 2022-02-21 RX ADMIN — WARFARIN SODIUM 5 MG: 5 TABLET ORAL at 17:46

## 2022-02-21 RX ADMIN — SODIUM CHLORIDE: 9 INJECTION, SOLUTION INTRAVENOUS at 15:20

## 2022-02-21 RX ADMIN — DILTIAZEM HYDROCHLORIDE 240 MG: 240 CAPSULE, COATED, EXTENDED RELEASE ORAL at 09:34

## 2022-02-21 RX ADMIN — LIDOCAINE 5% 1 PATCH: 700 PATCH TOPICAL at 07:21

## 2022-02-21 RX ADMIN — OXYCODONE HYDROCHLORIDE 5 MG: 5 TABLET ORAL at 03:19

## 2022-02-21 RX ADMIN — ACETAMINOPHEN 975 MG: 325 TABLET, FILM COATED ORAL at 17:45

## 2022-02-21 RX ADMIN — DEXAMETHASONE SODIUM PHOSPHATE 10 MG: 10 INJECTION, SOLUTION INTRAMUSCULAR; INTRAVENOUS at 15:35

## 2022-02-21 RX ADMIN — ASPIRIN 81 MG: 81 TABLET, COATED ORAL at 09:35

## 2022-02-21 RX ADMIN — FERROUS SULFATE TAB 325 MG (65 MG ELEMENTAL FE) 325 MG: 325 (65 FE) TAB at 17:45

## 2022-02-21 RX ADMIN — OXYCODONE HYDROCHLORIDE 5 MG: 5 TABLET ORAL at 17:46

## 2022-02-21 RX ADMIN — OXYCODONE HYDROCHLORIDE 5 MG: 5 TABLET ORAL at 23:48

## 2022-02-21 RX ADMIN — ACETAMINOPHEN 975 MG: 325 TABLET, FILM COATED ORAL at 09:35

## 2022-02-21 RX ADMIN — EPHEDRINE SULFATE 15 MG: 50 INJECTION INTRAVENOUS at 15:29

## 2022-02-21 NOTE — ANESTHESIA PREPROCEDURE EVALUATION
Procedure:  MRI CERVICAL SPINE W WO CONTRAST    Relevant Problems   CARDIO   (+) Acute on chronic diastolic congestive heart failure (HCC)   (+) Atrial fibrillation (HCC)   (+) Atrial flutter (HCC)   (+) CAD (coronary atherosclerotic disease)   (+) Hyperlipemia   (+) Hyperlipidemia   (+) Primary hypertension   (+) S/P CABG x 4      GI/HEPATIC   (+) GERD (gastroesophageal reflux disease)      /RENAL   (+) DULCE (acute kidney injury) ([facility] [address] ) on CKD 3   (+) CKD (chronic kidney disease) stage 3, GFR 30-59 ml/min (HCC)   (+) Stage 3a chronic kidney disease (HCC)      HEMATOLOGY   (+) Anemia   (+) FRANCY (iron deficiency anemia)      MUSCULOSKELETAL   (+) Acute left-sided low back pain with left-sided sciatica   (+) Arthritis   (+) Back pain   (+) 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   (+) DDD (degenerative disc disease), lumbar   (+) Sciatica of right side      NEURO/PSYCH   (+) Anxiety   (+) Continuous opioid dependence (HCC)   (+) History of stroke      PULMONARY   (+) COPD, severe (HCC)   (+) Centrilobular emphysema (HCC)   (+) Pleural effusion on left     Patient initially presented to [address] [place] [address] for CT findings of pyelonephritis with pseudomonas bacteremia  Currently no growth at 5 days on appropriate abx coverage  MRI for presumed vertebral osteomyelitis    TTE 2/7/22  Interpretation Summary         Left Ventricle: Left ventricular cavity size is normal  The left ventricular ejection fraction is 55%  Systolic function is normal     Left Ventricle: Diastolic function is mildly abnormal, consistent with grade I (abnormal) relaxation    Right Atrium: There is a possible small, pedunculated, highly mobile mass  Clinical correlation recommended    Aortic Valve: The leaflets are moderately thickened  The leaflets are severely calcified  The leaflets exhibit normal mobility  A vegetation cannot be ruled out  Recommend RENEE, if clinically indicated    Mitral Valve:  There is a possible moderately sized, mobile vegetation present  No significant regurgitatin  Recommend RENEE, if clinicaaly indicated     Lab Results   Component Value Date    WBC 4 66 02/20/2022    HGB 9 5 (L) 02/20/2022    HCT 32 1 (L) 02/20/2022    MCV 86 02/20/2022     02/20/2022     Lab Results   Component Value Date    K 4 3 02/20/2022    CO2 27 02/20/2022     02/20/2022    BUN 14 02/20/2022    CREATININE 1 13 02/20/2022     Lab Results   Component Value Date    INR 1 68 (H) 02/21/2022    INR 1 46 (H) 02/20/2022    INR 1 18 02/19/2022    PROTIME 19 0 (H) 02/21/2022    PROTIME 17 1 (H) 02/20/2022    PROTIME 14 5 02/19/2022     Lab Results   Component Value Date    PTT 94 (H) 02/04/2022       No results found for: GLUCOSE    Lab Results   Component Value Date    HGBA1C 5 0 01/13/2018       Type and Screen:  A      Physical Exam    Airway    Mallampati score: II  TM Distance: >3 FB  Neck ROM: full     Dental       Cardiovascular      Pulmonary      Other Findings        Anesthesia Plan  ASA Score- 4     Anesthesia Type- general with ASA Monitors  Additional Monitors:   Airway Plan: LMA  Plan Factors-Exercise tolerance (METS): <4 METS  Chart reviewed  EKG reviewed  Imaging results reviewed  Existing labs reviewed  Patient summary reviewed  Patient is not a current smoker  Induction- intravenous  Postoperative Plan-   Planned trial extubation    Informed Consent- Anesthetic plan and risks discussed with patient and daughter  I personally reviewed this patient with the CRNA  Discussed and agreed on the Anesthesia Plan with the CRNA  Ronald Mcqueen

## 2022-02-21 NOTE — ANESTHESIA POSTPROCEDURE EVALUATION
Post-Op Assessment Note    CV Status:  Stable  Pain Score: 0    Pain management: adequate     Mental Status:  Alert and awake   Hydration Status:  Euvolemic   PONV Controlled:  Controlled   Airway Patency:  Patent      Post Op Vitals Reviewed: Yes      Staff: Anesthesiologist, CRNA         No complications documented      BP   149/77   Temp   96 9   Pulse  69   Resp   16   SpO2   99

## 2022-02-21 NOTE — PLAN OF CARE
Problem: GASTROINTESTINAL - ADULT  Goal: Minimal or absence of nausea and/or vomiting  Description: INTERVENTIONS:  - Administer IV fluids if ordered to ensure adequate hydration  - Maintain NPO status until nausea and vomiting are resolved  - Nasogastric tube if ordered  - Administer ordered antiemetic medications as needed  - Provide nonpharmacologic comfort measures as appropriate  - Advance diet as tolerated, if ordered  - Consider nutrition services referral to assist patient with adequate nutrition and appropriate food choices  Outcome: Progressing  Goal: Maintains or returns to baseline bowel function  Description: INTERVENTIONS:  - Assess bowel function  - Encourage oral fluids to ensure adequate hydration  - Administer IV fluids if ordered to ensure adequate hydration  - Administer ordered medications as needed  - Encourage mobilization and activity  - Consider nutritional services referral to assist patient with adequate nutrition and appropriate food choices  Outcome: Progressing     Problem: MOBILITY - ADULT  Goal: Maintain or return to baseline ADL function  Description: INTERVENTIONS:  -  Assess patient's ability to carry out ADLs; assess patient's baseline for ADL function and identify physical deficits which impact ability to perform ADLs (bathing, care of mouth/teeth, toileting, grooming, dressing, etc )  - Assess/evaluate cause of self-care deficits   - Assess range of motion  - Assess patient's mobility; develop plan if impaired  - Assess patient's need for assistive devices and provide as appropriate  - Encourage maximum independence but intervene and supervise when necessary  - Involve family in performance of ADLs  - Assess for home care needs following discharge   - Consider OT consult to assist with ADL evaluation and planning for discharge  - Provide patient education as appropriate  Outcome: Progressing  Goal: Maintains/Returns to pre admission functional level  Description: INTERVENTIONS:  - Perform BMAT or MOVE assessment daily    - Set and communicate daily mobility goal to care team and patient/family/caregiver  - Collaborate with rehabilitation services on mobility goals if consulted  - Perform Range of Motion 3 times a day  - Reposition patient every 2 hours    - Dangle patient 3 times a day  - Stand patient 3 times a day  - Ambulate patient 3 times a day  - Out of bed to chair 3 times a day   - Out of bed for meals 3 times a day  - Out of bed for toileting  - Record patient progress and toleration of activity level   Outcome: Progressing     Problem: GENITOURINARY - ADULT  Goal: Maintains or returns to baseline urinary function  Description: INTERVENTIONS:  - Assess urinary function  - Encourage oral fluids to ensure adequate hydration if ordered  - Administer IV fluids as ordered to ensure adequate hydration  - Administer ordered medications as needed  - Offer frequent toileting  - Follow urinary retention protocol if ordered  Outcome: Progressing  Goal: Urinary catheter remains patent  Description: INTERVENTIONS:  - Assess patency of urinary catheter  - If patient has a chronic archibald, consider changing catheter if non-functioning  - Follow guidelines for intermittent irrigation of non-functioning urinary catheter  Outcome: Progressing     Problem: Potential for Falls  Goal: Patient will remain free of falls  Description: INTERVENTIONS:  - Educate patient/family on patient safety including physical limitations  - Instruct patient to call for assistance with activity   - Consult OT/PT to assist with strengthening/mobility   - Keep Call bell within reach  - Keep bed low and locked with side rails adjusted as appropriate  - Keep care items and personal belongings within reach  - Initiate and maintain comfort rounds  - Make Fall Risk Sign visible to staff  - Offer Toileting every 2 Hours, in advance of need  - Initiate/Maintain alarm  - Obtain necessary fall risk management equipment:   - Apply yellow socks and bracelet for high fall risk patients  - Consider moving patient to room near nurses station  Outcome: Progressing     Problem: Prexisting or High Potential for Compromised Skin Integrity  Goal: Skin integrity is maintained or improved  Description: INTERVENTIONS:  - Identify patients at risk for skin breakdown  - Assess and monitor skin integrity  - Assess and monitor nutrition and hydration status  - Monitor labs   - Assess for incontinence   - Turn and reposition patient  - Assist with mobility/ambulation  - Relieve pressure over bony prominences  - Avoid friction and shearing  - Provide appropriate hygiene as needed including keeping skin clean and dry  - Evaluate need for skin moisturizer/barrier cream  - Collaborate with interdisciplinary team   - Patient/family teaching  - Consider wound care consult   Outcome: Progressing

## 2022-02-21 NOTE — PROGRESS NOTES
INTERNAL MEDICINE RESIDENCY PROGRESS NOTE     Name: Hossein Overall   Age & Sex: 80 y o  male   MRN: [id number]   Unit/Bed#: [facility] 633-01   Encounter: [id number]  Team: SOD Team C     PATIENT INFORMATION     Name: Hossein Overall   Age & Sex: 80 y o  male   MRN: [id number]  Hospital Stay Days: 4    ASSESSMENT/PLAN     Principal Problem:    Sepsis due to Pseudomonas aeruginosa [facility])  Active Problems:    Back pain    CAD (coronary atherosclerotic disease)    Primary hypertension    GERD (gastroesophageal reflux disease)    Hyperlipidemia    S/P CABG x 4    Bladder cancer ([facility])    CKD (chronic kidney disease) stage 3, GFR 30-59 ml/min ([facility])    COPD, severe ([facility])    Atrial fibrillation ([facility] [address] )    Overgrown toenails    Anemia    Metabolic encephalopathy    Bacteremia    Pyelonephritis      * Sepsis due to Pseudomonas aeruginosa [facility])  Assessment & Plan  Patient initally presented to [facility] on 2/4 with sepsis 2/2 UTI/pyelonephritis with urine and blood cultures positive for pseudomonas  RENEE done 2/9 demonstrated endocarditis  Currently with concern for vertebral osteomyelitis, and transferred to [facility] for MRI with general anesthesia 2/2 advanced dementia  Repeat blood cultures negative to date  Plan:  · Source most likely urinary given b/l nephrostomy tubes, chronic archibald, and + UA  Will get MRI lumbar spine to r/u discitis, etc given complaint of back pain  · Chemo port removed and PICC line placed  · Patient to complete 6 week course of antibiotics (through 3/21/22)  · Patient needs MRI with anesthesia for back pain  Spoke with MRI department on 02/21 and right no there are no available openings for patient  Will consider CT  Will consult neurosurgery with positive results     · ID following    Back pain  Assessment & Plan  Patient transferred to [facility] for MRI with general anesthesia in setting of back pain with presumed vertebral osteomyelitis    Plan:  · MRI cervical, thoracic, and lumbar spine with anesthesia ordered  · Will consult neurosurgery with positive results    Pyelonephritis  Assessment & Plan  Patient presented septic with CT findings of pyelonephritis    Bacteremia  Assessment & Plan  Patient with pseudomonas bacteremia on presentation to [facility]  Currently with repeat blood cultures no growth at 5 days  Plan:  · Continue IV antibiotics as described under sepsis plan  · ID consulted - appreciate recommendations    Metabolic encephalopathy  Assessment & Plan  Negative CTH at time or presentation  Evaluated by neuropsychiatry on 2/16 and determined not to have capacity to make medical decisions    Plan:  · Outpatient benzos and narcotics have been held since admission  · Delirium precautions and frequent redirection      Anemia  Assessment & Plan  Baseline Hgb 8-9  Currently stable    Plan:  · Continue iron supplementation  · Monitor daily CBC and transfuse for Hgb <7    Overgrown toenails  Assessment & Plan  Podiatry consulted    Atrial fibrillation [facility])  Assessment & Plan  Currently rate controlled  Managed as outpatient on coumadin and cardizem    Plan:  · Continue home Coumadin 5 mg  Monitor INR  Not being bridged from heparin  · Continue diltiazem      COPD, severe ([facility] [address] )  Assessment & Plan  Currently stable  Not on medications as outpatient    CKD (chronic kidney disease) stage 3, GFR 30-59 ml/min [facility])  Assessment & Plan  Lab Results   Component Value Date    EGFR 55 02/17/2022    EGFR 52 02/15/2022    EGFR 59 02/13/2022    CREATININE 1 21 02/17/2022    CREATININE 1 27 02/15/2022    CREATININE 1 14 02/13/2022     Currently at baseline Cr  Plan:  · Monitor I/O  · Check daily BMP  · Avoid nephrotoxic agents    Bladder cancer 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 diagnosed 10/12/2021  S/p placement of B/L nephrostomy tubes and with chronic archibald in place   Follows with heme-onc and urology outpatient and receiving chemo and radiation  CT CAP  with concern for new right apical pleural-based mass now s/p IR biopsy on  without evidence of malignancy    Plan:  · Outpatient heme/onc and urology follow up    Hyperlipidemia  Assessment & Plan  Continue atorvastatin    GERD (gastroesophageal reflux disease)  Assessment & Plan  Continue pantoprazole    Primary hypertension  Assessment & Plan  Controlled as outpatient on Cardizem    Plan:  · Continue home Cardizem 240 daily  · Monitor vitals closely    CAD (coronary atherosclerotic disease)  Assessment & Plan  S/p CABG x4  Currently without chest pain  Stable    Plan:  · Continue home aspirin and statin      Disposition: Patient requires MRI with general anesthesia to rule-out vertebral osteomyelitis  Working with MRI to schedule patient  Continue current level of inpatient care  Requires IV cefepime through   SUBJECTIVE     Patient seen and examined  No acute events overnight  Upon my encounter patient was lying comfortably in sleeping in his hospital bed  He states that his only complaint today is of his back pain which she states is relieved by medications  He presently denies any fever, chills, nausea vomiting, diarrhea, constipation, chest pain, shortness of breath  He otherwise denies any new or worsening complaints  OBJECTIVE     Vitals:    22 0729 22 1602 22 2221 22 0733   BP: 130/57 127/62 150/73 129/77   Pulse: 68 60 60 81   Resp: 18 16 16 18   Temp: 98 3 °F (36 8 °C) 97 6 °F (36 4 °C) 97 7 °F (36 5 °C) 98 2 °F (36 8 °C)   TempSrc:       SpO2: 98% 98% 94% 93%      Temperature:   Temp (24hrs), Av 8 °F (36 6 °C), Min:97 6 °F (36 4 °C), Max:98 2 °F (36 8 °C)    Temperature: 98 2 °F (36 8 °C)  Intake & Output:  I/O        0701   0700  07 0700    P  O  280 840    Total Intake 280 840    Urine 1850 1250    Stool 0     Total Output 1850 1250    Net -1570 -410          Unmeasured Stool Occurrence 1 x         Weights: There is no height or weight on file to calculate BMI  Weight (last 2 days)     None        Physical Exam  Constitutional:       General: He is not in acute distress  Appearance: Normal appearance  HENT:      Head: Normocephalic and atraumatic  Ears:      Comments: Hard of Hearing     Nose: Nose normal       Mouth/Throat:      Mouth: Mucous membranes are moist       Pharynx: Oropharynx is clear  Eyes:      Extraocular Movements: Extraocular movements intact  Conjunctiva/sclera: Conjunctivae normal       Pupils: Pupils are equal, round, and reactive to light  Cardiovascular:      Rate and Rhythm: Normal rate and regular rhythm  Pulses: Normal pulses  Heart sounds: Murmur heard  Pulmonary:      Effort: Pulmonary effort is normal  No respiratory distress  Breath sounds: Normal breath sounds  No wheezing, rhonchi or rales  Abdominal:      General: Abdomen is flat  Bowel sounds are normal  There is no distension  Palpations: Abdomen is soft  Tenderness: There is no abdominal tenderness  Musculoskeletal:         General: Tenderness (lower back pain) present  Normal range of motion  Right lower leg: No edema  Left lower leg: No edema  Skin:     General: Skin is warm and dry  Neurological:      General: No focal deficit present  Mental Status: He is alert and oriented to person, place, and time  Sensory: No sensory deficit  Motor: No weakness  Psychiatric:         Mood and Affect: Mood normal          Behavior: Behavior normal          Thought Content: Thought content normal        LABORATORY DATA     Labs: I have personally reviewed pertinent reports    Results from last 7 days   Lab Units 02/20/22  0617 02/19/22  5502 02/18/22  0533 02/17/22  0433 02/17/22  0433 02/16/22  0606 02/15/22  0509   WBC Thousand/uL 4 66 4 00* 3 86*   < > 3 45*   < > 3 79*   HEMOGLOBIN g/dL 9 5* 9 1* 8 8*   < > 8 5*   < > 8 5*   HEMATOCRIT % 32 1* 30 9* 30 0*   < > 28 3*   < > 29 2*   PLATELETS Thousands/uL 256 262 262   < > 269   < > 274   NEUTROS PCT %  --   --  65  --   --   --   --    MONOS PCT %  --   --  14*  --   --   --   --    MONO PCT %  --   --   --   --  12  --  11    < > = values in this interval not displayed  Results from last 7 days   Lab Units 02/20/22  0617 02/19/22  2321 02/18/22  0533 02/17/22  0433 02/17/22  0433   POTASSIUM mmol/L 4 3 4 4 4 1   < > 4 4   CHLORIDE mmol/L 106 107 106   < > 103   CO2 mmol/L 27 28 27   < > 29   BUN mg/dL 14 16 15   < > 18   CREATININE mg/dL 1 13 1 10 1 14   < > 1 21   CALCIUM mg/dL 9 1 9 1 9 1   < > 8 8   ALK PHOS U/L  --   --   --   --  95   ALT U/L  --   --   --   --  28   AST U/L  --   --   --   --  14    < > = values in this interval not displayed  Results from last 7 days   Lab Units 02/18/22  0533   MAGNESIUM mg/dL 2 4          Results from last 7 days   Lab Units 02/21/22  0655 02/20/22  0617 02/19/22  0648   INR  1 68* 1 46* 1 18               IMAGING & DIAGNOSTIC TESTING     Radiology Results: I have personally reviewed pertinent reports  No results found  Other Diagnostic Testing: I have personally reviewed pertinent reports      ACTIVE MEDICATIONS     Current Facility-Administered Medications   Medication Dose Route Frequency    acetaminophen (TYLENOL) tablet 975 mg  975 mg Oral Q8H [address]    aluminum-magnesium hydroxide-simethicone (MYLANTA) oral suspension 30 mL  30 mL Oral Q4H PRN    aspirin (ECOTRIN LOW STRENGTH) EC tablet 81 mg  81 mg Oral Daily    atorvastatin (LIPITOR) tablet 40 mg  40 mg Oral Daily    bisacodyl (DULCOLAX) rectal suppository 10 mg  10 mg Rectal Daily PRN    cefepime (MAXIPIME) 2,000 mg in dextrose 5 % 50 mL IVPB  2,000 mg Intravenous Q12H    Diclofenac Sodium (VOLTAREN) 1 % topical gel 2 g  2 g Topical 4x Daily    diltiazem (CARDIZEM CD) 24 hr capsule 240 mg  240 mg Oral Daily    ferrous sulfate tablet 325 mg  325 mg Oral BID With Meals    lidocaine (LIDODERM) 5 % patch 1 patch  1 patch Topical Daily    lidocaine (PF) (XYLOCAINE-MPF) 1 % injection 0 5 mL  0 5 mL Infiltration Once PRN    melatonin tablet 3 mg  3 mg Oral HS PRN    ondansetron (ZOFRAN) injection 4 mg  4 mg Intravenous Q6H PRN    oxyCODONE (ROXICODONE) IR tablet 2 5 mg  2 5 mg Oral Q6H PRN    oxyCODONE (ROXICODONE) IR tablet 5 mg  5 mg Oral Q6H PRN    pantoprazole (PROTONIX) EC tablet 40 mg  40 mg Oral QAM    polyethylene glycol (MIRALAX) packet 17 g  17 g Oral Daily    senna-docusate sodium (SENOKOT S) 8 6-50 mg per tablet 2 tablet  2 tablet Oral BID    simethicone (MYLICON) chewable tablet 80 mg  80 mg Oral Q6H PRN    sodium phosphate-biphosphate (FLEET) enema 1 enema  1 enema Rectal Once PRN    warfarin (COUMADIN) tablet 5 mg  5 mg Oral Daily (warfarin)       VTE Pharmacologic Prophylaxis: Warfarin (Coumadin)  VTE Mechanical Prophylaxis: sequential compression device    Portions of the record may have been created with voice recognition software  Occasional wrong word or "sound a like" substitutions may have occurred due to the inherent limitations of voice recognition software    Read the chart carefully and recognize, using context, where substitutions have occurred   ==  Joselito Howard DO  [address]  Internal Medicine Residency PGY-1

## 2022-02-21 NOTE — PROGRESS NOTES
Progress Note - Infectious Disease   Arturo Arellano 80 y o  male MRN: [id number]  Unit/Bed#: [facility] 633-01 Encounter: [id number]      Impression/Recommendations:  1  Sepsis  POA to [facility], [place]AT[place] to #2/3  No other appreciable source  Improved  Rec:  · Continue antibiotics as below  · Follow temperatures closely  · Check CBC in AM  · Supportive care as per the primary service     2   Pseudomonal bacteremia with TV endocarditis   Due to #3   Consider also secondary port infection, status post removal   Heber Titus blood cultures with late positive, now cleared  RENEE with vegetation on moderator band of TV  Rec:  · Continue cefepime for 6 weeks total through 2022  · Check weekly CBC-diff, Cr while in IV antibiotic  · D/C PICC after last dose IV antibiotics  · Outpatient follow-up with ID 2 weeks after D/C     3   Pseudomonal UTI   In setting of recent Fortune catheter exchange, malpositioned Fortune  CT also shows pyelonephritis  Rec:  · Continue antibiotics as above  · Follow UOP closely     4  Back pain  Consider discitis, osteomyelitis in setting of bacteremia as above  Rec:  · Check MRI with anesthesia this week  · Follow symptoms closely     5  Bladder cancer  With obstructive nephrolithiasis  Chronic Fortune, bilateral PCNS  On outpatient chemotherapy      6   CKD  Baseline Cr 1 1-1 2  Antibiotics:  Cefepime #18    Subjective:  Patient seen on AM rounds  Sleeping  No events noted  Offered no new complaints to SOD team earlier other than back pain  24 Hour Events:  No documented fevers, chills, sweats, nausea, vomiting, or diarrhea      Objective:  Vitals:  Temp:  [97 6 °F (36 4 °C)-98 2 °F (36 8 °C)] 98 2 °F (36 8 °C)  HR:  [60-81] 81  Resp:  [16-18] 18  BP: (127-150)/(62-77) 129/77  SpO2:  [93 %-98 %] 93 %  Temp (24hrs), Av 8 °F (36 6 °C), Min:97 6 °F (36 4 °C), Max:98 2 °F (36 8 °C)  Current: Temperature: 98 2 °F (36 8 °C)    Physical Exam:   General:  No acute distress  Psychiatric: Sleeping  Pulmonary:  Normal respiratory excursion without accessory muscle use  Abdomen:  Soft, nontender  Extremities:  No edema  Skin:  No rashes  :  Clear yellow urine left PCN, damien urine in Fortune     Lab Results:  I have personally reviewed pertinent labs  Results from last 7 days   Lab Units 02/20/22  0617 02/19/22  5815 02/18/22  0533 02/17/22  0433 02/17/22  0433   POTASSIUM mmol/L 4 3 4 4 4 1   < > 4 4   CHLORIDE mmol/L 106 107 106   < > 103   CO2 mmol/L 27 28 27   < > 29   BUN mg/dL 14 16 15   < > 18   CREATININE mg/dL 1 13 1 10 1 14   < > 1 21   EGFR ml/min/1 73sq m 60 62 59   < > 55   CALCIUM mg/dL 9 1 9 1 9 1   < > 8 8   AST U/L  --   --   --   --  14   ALT U/L  --   --   --   --  28   ALK PHOS U/L  --   --   --   --  95    < > = values in this interval not displayed  Results from last 7 days   Lab Units 02/20/22  0617 02/19/22  0648 02/18/22  0533   WBC Thousand/uL 4 66 4 00* 3 86*   HEMOGLOBIN g/dL 9 5* 9 1* 8 8*   PLATELETS Thousands/uL 256 262 262           Imaging Studies:   I have personally reviewed pertinent imaging study reports and images in PACS  EKG, Pathology, and Other Studies:   I have personally reviewed pertinent reports

## 2022-02-22 ENCOUNTER — APPOINTMENT (OUTPATIENT)
Dept: RADIATION ONCOLOGY | Facility: CLINIC | Age: 82
End: 2022-02-22
Attending: RADIOLOGY
Payer: MEDICARE

## 2022-02-22 LAB
ANION GAP SERPL CALCULATED.3IONS-SCNC: 5 MMOL/L (ref 4–13)
BUN SERPL-MCNC: 18 MG/DL (ref 5–25)
CALCIUM SERPL-MCNC: 9.6 MG/DL (ref 8.3–10.1)
CHLORIDE SERPL-SCNC: 103 MMOL/L (ref 100–108)
CO2 SERPL-SCNC: 26 MMOL/L (ref 21–32)
CREAT SERPL-MCNC: 1.09 MG/DL (ref 0.6–1.3)
GFR SERPL CREATININE-BSD FRML MDRD: 63 ML/MIN/1.73SQ M
GLUCOSE SERPL-MCNC: 143 MG/DL (ref 65–140)
HCT VFR BLD AUTO: 31.4 % (ref 36.5–49.3)
HGB BLD-MCNC: 9.9 G/DL (ref 12–17)
INR PPP: 1.99 (ref 0.84–1.19)
MCH RBC QN AUTO: 26.1 PG (ref 26.8–34.3)
MCHC RBC AUTO-ENTMCNC: 31.5 G/DL (ref 31.4–37.4)
MCV RBC AUTO: 83 FL (ref 82–98)
PLATELET # BLD AUTO: 231 THOUSANDS/UL (ref 149–390)
PMV BLD AUTO: 8.7 FL (ref 8.9–12.7)
POTASSIUM SERPL-SCNC: 4.5 MMOL/L (ref 3.5–5.3)
PROTHROMBIN TIME: 21.7 SECONDS (ref 11.6–14.5)
RBC # BLD AUTO: 3.79 MILLION/UL (ref 3.88–5.62)
SODIUM SERPL-SCNC: 134 MMOL/L (ref 136–145)
WBC # BLD AUTO: 4.39 THOUSAND/UL (ref 4.31–10.16)

## 2022-02-22 PROCEDURE — 80048 BASIC METABOLIC PNL TOTAL CA: CPT | Performed by: STUDENT IN AN ORGANIZED HEALTH CARE EDUCATION/TRAINING PROGRAM

## 2022-02-22 PROCEDURE — 85610 PROTHROMBIN TIME: CPT | Performed by: STUDENT IN AN ORGANIZED HEALTH CARE EDUCATION/TRAINING PROGRAM

## 2022-02-22 PROCEDURE — 99232 SBSQ HOSP IP/OBS MODERATE 35: CPT | Performed by: INTERNAL MEDICINE

## 2022-02-22 PROCEDURE — 97535 SELF CARE MNGMENT TRAINING: CPT

## 2022-02-22 PROCEDURE — 97162 PT EVAL MOD COMPLEX 30 MIN: CPT

## 2022-02-22 PROCEDURE — 85027 COMPLETE CBC AUTOMATED: CPT | Performed by: STUDENT IN AN ORGANIZED HEALTH CARE EDUCATION/TRAINING PROGRAM

## 2022-02-22 RX ORDER — LACTULOSE 20 G/30ML
SOLUTION ORAL
Status: CANCELLED | OUTPATIENT
Start: 2022-02-22

## 2022-02-22 RX ORDER — LACTULOSE 20 G/30ML
10 SOLUTION ORAL ONCE
Status: COMPLETED | OUTPATIENT
Start: 2022-02-22 | End: 2022-02-22

## 2022-02-22 RX ADMIN — ACETAMINOPHEN 975 MG: 325 TABLET, FILM COATED ORAL at 05:05

## 2022-02-22 RX ADMIN — ATORVASTATIN CALCIUM 40 MG: 40 TABLET, FILM COATED ORAL at 08:20

## 2022-02-22 RX ADMIN — OXYCODONE HYDROCHLORIDE 5 MG: 5 TABLET ORAL at 11:22

## 2022-02-22 RX ADMIN — POLYETHYLENE GLYCOL 3350 17 G: 17 POWDER, FOR SOLUTION ORAL at 08:20

## 2022-02-22 RX ADMIN — SENNOSIDES AND DOCUSATE SODIUM 2 TABLET: 50; 8.6 TABLET ORAL at 17:40

## 2022-02-22 RX ADMIN — ACETAMINOPHEN 975 MG: 325 TABLET, FILM COATED ORAL at 11:23

## 2022-02-22 RX ADMIN — SENNOSIDES AND DOCUSATE SODIUM 2 TABLET: 50; 8.6 TABLET ORAL at 08:22

## 2022-02-22 RX ADMIN — WARFARIN SODIUM 5 MG: 5 TABLET ORAL at 17:40

## 2022-02-22 RX ADMIN — LACTULOSE 10 G: 20 SOLUTION ORAL at 17:41

## 2022-02-22 RX ADMIN — CEFEPIME HYDROCHLORIDE 2000 MG: 2 INJECTION, POWDER, FOR SOLUTION INTRAVENOUS at 05:06

## 2022-02-22 RX ADMIN — PANTOPRAZOLE SODIUM 40 MG: 40 TABLET, DELAYED RELEASE ORAL at 05:06

## 2022-02-22 RX ADMIN — ASPIRIN 81 MG: 81 TABLET, COATED ORAL at 08:19

## 2022-02-22 RX ADMIN — CEFEPIME HYDROCHLORIDE 2000 MG: 2 INJECTION, POWDER, FOR SOLUTION INTRAVENOUS at 17:49

## 2022-02-22 RX ADMIN — LIDOCAINE 5% 1 PATCH: 700 PATCH TOPICAL at 08:20

## 2022-02-22 RX ADMIN — FERROUS SULFATE TAB 325 MG (65 MG ELEMENTAL FE) 325 MG: 325 (65 FE) TAB at 08:19

## 2022-02-22 RX ADMIN — DILTIAZEM HYDROCHLORIDE 240 MG: 240 CAPSULE, COATED, EXTENDED RELEASE ORAL at 08:20

## 2022-02-22 RX ADMIN — OXYCODONE HYDROCHLORIDE 5 MG: 5 TABLET ORAL at 05:49

## 2022-02-22 RX ADMIN — OXYCODONE HYDROCHLORIDE 5 MG: 5 TABLET ORAL at 18:48

## 2022-02-22 RX ADMIN — FERROUS SULFATE TAB 325 MG (65 MG ELEMENTAL FE) 325 MG: 325 (65 FE) TAB at 17:43

## 2022-02-22 RX ADMIN — ACETAMINOPHEN 975 MG: 325 TABLET, FILM COATED ORAL at 18:54

## 2022-02-22 RX ADMIN — ALTEPLASE 2 MG: 2.2 INJECTION, POWDER, LYOPHILIZED, FOR SOLUTION INTRAVENOUS at 11:24

## 2022-02-22 NOTE — OCCUPATIONAL THERAPY NOTE
Occupational Therapy Treatment Note:       02/22/22 1458   OT Last Visit   OT Visit Date 02/22/22   Note Type   Note Type Treatment   Restrictions/Precautions   Other Precautions Cognitive; Chair Alarm; Fall Risk;Hard of hearing   Pain Assessment   Pain Assessment Tool 0-10   Pain Score No Pain   ADL   UB Dressing Assistance 3  Moderate Assistance   UB Dressing Comments to juan alberto an around back robe / jacket   Toileting Assistance  3  Moderate Assistance   Toileting Comments to empty nephrostomy tubes   Functional Standing Tolerance   Time static balance fair during clothing adjustment   Bed Mobility   Supine to Sit 4  Minimal assistance   Additional items   (ax1, limited by confusion at resquest)   Transfers   Sit to Stand 4  Minimal assistance   Stand to Sit 4  Minimal assistance   Functional Mobility   Functional Mobility 4  Minimal assistance   Additional items   (no a d )   Cognition   Overall Cognitive Status Impaired   Arousal/Participation Arousable   Attention Attends with cues to redirect   Memory Decreased short term memory;Decreased recall of recent events;Decreased recall of precautions   Following Commands Follows one step commands without difficulty   Activity Tolerance   Activity Tolerance Patient tolerated treatment well   Assessment   Assessment pt participated in pm ot session and was seen focusing on  ub dressing using robe while seated  pt was able to assist in emptying urine from nephrostomy tubes seated eob  pt was able to perform functional mobility with min asst for proximal support, refuses rw  pt is confused in conversation  concerned that his back will hurt in 1 hr  pt was difficult to redirect at times to task requested of him  will follow focusing on goals from ie  Plan   Treatment Interventions ADL retraining;Functional transfer training; Endurance training;Patient/family training;Equipment evaluation/education; Activityengagement   Goal Expiration Date 03/04/22   OT Treatment Day 1   OT Frequency 2-3x/wk   Recommendation   OT Discharge Recommendation Home with home health rehabilitation  (pt confused needing min asst, ? family level of a available)   OT - OK to Discharge Yes   AM-PAC Daily Activity Inpatient   Lower Body Dressing 3   Bathing 3   Toileting 3   Upper Body Dressing 3   Grooming 3   Eating 4   Daily Activity Raw Score 19   Daily Activity Standardized Score (Calc for Raw Score >=11) 40 22   AM-PAC Applied Cognition Inpatient   Following a Speech/Presentation 3   Understanding Ordinary Conversation 4   Taking Medications 2   Remembering Where Things Are Placed or Put Away 2   Remembering List of 4-5 Errands 2   Taking Care of Complicated Tasks 2   Applied Cognition Raw Score 15   Applied Cognition Standardized Score 33 54   April A Joan Brunner

## 2022-02-22 NOTE — PLAN OF CARE
Problem: MOBILITY - ADULT  Goal: Maintain or return to baseline ADL function  Description: INTERVENTIONS:  -  Assess patient's ability to carry out ADLs; assess patient's baseline for ADL function and identify physical deficits which impact ability to perform ADLs (bathing, care of mouth/teeth, toileting, grooming, dressing, etc )  - Assess/evaluate cause of self-care deficits   - Assess range of motion  - Assess patient's mobility; develop plan if impaired  - Assess patient's need for assistive devices and provide as appropriate  - Encourage maximum independence but intervene and supervise when necessary  - Involve family in performance of ADLs  - Assess for home care needs following discharge   - Consider OT consult to assist with ADL evaluation and planning for discharge  - Provide patient education as appropriate  Outcome: Progressing  Goal: Maintains/Returns to pre admission functional level  Description: INTERVENTIONS:  - Perform BMAT or MOVE assessment daily    - Set and communicate daily mobility goal to care team and patient/family/caregiver  - Collaborate with rehabilitation services on mobility goals if consulted  - Perform Range of Motion 3 times a day  - Reposition patient every 2 hours    - Dangle patient 3 times a day  - Stand patient 3 times a day  - Ambulate patient 3 times a day  - Out of bed to chair 3 times a day   - Out of bed for meals 3 times a day  - Out of bed for toileting  - Record patient progress and toleration of activity level   Outcome: Progressing     Problem: GASTROINTESTINAL - ADULT  Goal: Minimal or absence of nausea and/or vomiting  Description: INTERVENTIONS:  - Administer IV fluids if ordered to ensure adequate hydration  - Maintain NPO status until nausea and vomiting are resolved  - Nasogastric tube if ordered  - Administer ordered antiemetic medications as needed  - Provide nonpharmacologic comfort measures as appropriate  - Advance diet as tolerated, if ordered  - Consider nutrition services referral to assist patient with adequate nutrition and appropriate food choices  Outcome: Progressing  Goal: Maintains or returns to baseline bowel function  Description: INTERVENTIONS:  - Assess bowel function  - Encourage oral fluids to ensure adequate hydration  - Administer IV fluids if ordered to ensure adequate hydration  - Administer ordered medications as needed  - Encourage mobilization and activity  - Consider nutritional services referral to assist patient with adequate nutrition and appropriate food choices  Outcome: Progressing     Problem: GENITOURINARY - ADULT  Goal: Maintains or returns to baseline urinary function  Description: INTERVENTIONS:  - Assess urinary function  - Encourage oral fluids to ensure adequate hydration if ordered  - Administer IV fluids as ordered to ensure adequate hydration  - Administer ordered medications as needed  - Offer frequent toileting  - Follow urinary retention protocol if ordered  Outcome: Progressing  Goal: Urinary catheter remains patent  Description: INTERVENTIONS:  - Assess patency of urinary catheter  - If patient has a chronic archibald, consider changing catheter if non-functioning  - Follow guidelines for intermittent irrigation of non-functioning urinary catheter  Outcome: Progressing     Problem: Potential for Falls  Goal: Patient will remain free of falls  Description: INTERVENTIONS:  - Educate patient/family on patient safety including physical limitations  - Instruct patient to call for assistance with activity   - Consult OT/PT to assist with strengthening/mobility   - Keep Call bell within reach  - Keep bed low and locked with side rails adjusted as appropriate  - Keep care items and personal belongings within reach  - Initiate and maintain comfort rounds  - Make Fall Risk Sign visible to staff  - Offer Toileting every 2 Hours, in advance of need  - Initiate/Maintain alarm  - Obtain necessary fall risk management equipment:   - Apply yellow socks and bracelet for high fall risk patients  - Consider moving patient to room near nurses station  Outcome: Progressing     Problem: Prexisting or High Potential for Compromised Skin Integrity  Goal: Skin integrity is maintained or improved  Description: INTERVENTIONS:  - Identify patients at risk for skin breakdown  - Assess and monitor skin integrity  - Assess and monitor nutrition and hydration status  - Monitor labs   - Assess for incontinence   - Turn and reposition patient  - Assist with mobility/ambulation  - Relieve pressure over bony prominences  - Avoid friction and shearing  - Provide appropriate hygiene as needed including keeping skin clean and dry  - Evaluate need for skin moisturizer/barrier cream  - Collaborate with interdisciplinary team   - Patient/family teaching  - Consider wound care consult   Outcome: Progressing

## 2022-02-22 NOTE — ASSESSMENT & PLAN NOTE
Patient last recorded BM on 02/26  · Scheduled Miralax 17 g daily  · Senna-docusate 2 tablet BID  · PRN bisacodyl  · lactulose 10g PO one time dose administered 2/22

## 2022-02-22 NOTE — PROGRESS NOTES
Progress Note - Infectious Disease   Claudia Robertson 80 y o  male MRN: [id number]  Unit/Bed#: [facility] 633-01 Encounter: [id number]      Impression/Recommendations:  1  Sepsis   POA to [facility]   Due to #2/3  No other appreciable source   Improved  Rec:  · Continue antibiotics as below  · Follow temperatures and WBC closely  · Supportive care as per the primary service     2   Pseudomonal bacteremia with TV endocarditis   Due to #3   Consider also secondary port infection, status post removal    Repeat blood cultures with late positive, now cleared   RENEE with vegetation on moderator band of TV  Rec:  · Continue cefepime for 6 weeks total through 2022 - will provide Rx to CM  · Check weekly CBC-diff, Cr while in IV antibiotic  · D/C PICC after last dose IV antibiotics  · Outpatient follow-up with ID 2 weeks after D/C     3   Pseudomonal UTI   In setting of recent Fortune catheter exchange, malpositioned Fortune   CT also shows pyelonephritis  Rec:  · Continue antibiotics as above  · Follow UOP closely     4   Back pain   Consider discitis, osteomyelitis in setting of bacteremia as above  MRI (noncontast) negative for infection, showing degeneration, canal stenosis which likely explains pain  Rec:  · PT/OT as tolerated  · Supportive care as per the primary service     5   Bladder cancer   With obstructive nephrolithiasis   Chronic Fortune, bilateral PCNS   On outpatient chemotherapy      6   CKD   Baseline Cr 1 1-1 2      The patient is stable from an ID standpoint  Antibiotics:  Cefepime #19    Subjective:  Patient seen on AM rounds  Very [place] which limits ROS  Has mild back pain but seems better, OOB to chair  24 Hour Events:  No documented fevers, chills, sweats, nausea, vomiting, or diarrhea  Had MRI      Objective:  Vitals:  Temp:  [96 1 °F (35 6 °C)-98 3 °F (36 8 °C)] 98 3 °F (36 8 °C)  HR:  [74-95] 76  Resp:  [15-18] 16  BP: (124-149)/(71-82) 137/78  SpO2:  [92 %-99 %] 94 %  Temp (24hrs), Av 3 °F (36 3 °C), Min:96 1 °F (35 6 °C), Max:98 3 °F (36 8 °C)  Current: Temperature: 98 3 °F (36 8 °C)    Physical Exam:   General:  No acute distress  Psychiatric:  Awake and alert  Pulmonary:  Normal respiratory excursion without accessory muscle use  Abdomen:  Soft, nontender  Extremities:  No edema, PICC intact  Skin:  No rashes    Lab Results:  I have personally reviewed pertinent labs  Results from last 7 days   Lab Units 02/22/22  0506 02/20/22  0617 02/19/22  0648 02/18/22  0533 02/17/22  0433   POTASSIUM mmol/L 4 5 4 3 4 4   < > 4 4   CHLORIDE mmol/L 103 106 107   < > 103   CO2 mmol/L 26 27 28   < > 29   BUN mg/dL 18 14 16   < > 18   CREATININE mg/dL 1 09 1 13 1 10   < > 1 21   EGFR ml/min/1 73sq m 63 60 62   < > 55   CALCIUM mg/dL 9 6 9 1 9 1   < > 8 8   AST U/L  --   --   --   --  14   ALT U/L  --   --   --   --  28   ALK PHOS U/L  --   --   --   --  95    < > = values in this interval not displayed  Results from last 7 days   Lab Units 02/22/22  0810 02/20/22  0617 02/19/22  0648   WBC Thousand/uL 4 39 4 66 4 00*   HEMOGLOBIN g/dL 9 9* 9 5* 9 1*   PLATELETS Thousands/uL 231 256 262           Imaging Studies:   I have personally reviewed pertinent imaging study reports and images in PACS  EKG, Pathology, and Other Studies:   I have personally reviewed pertinent reports

## 2022-02-22 NOTE — DISCHARGE SUMMARY
INTERNAL MEDICINE RESIDENCY DISCHARGE SUMMARY     Abdullahi Brunner   80 y o  male  MRN: [id number]  Room/Bed: [facility] 633/[facility] 633-01     [address] [place] [address]   Encounter: [id number]    Active Problems:    CAD (coronary atherosclerotic disease)    Primary hypertension    GERD (gastroesophageal reflux disease)    Hyperlipidemia    S/P CABG x 4    Back pain    Bladder cancer (HCC)    CKD (chronic kidney disease) stage 3, GFR 30-59 ml/min (HCC)    COPD, severe (HCC)    Atrial fibrillation (HCC)    Overgrown toenails    Anemia    Metabolic encephalopathy    Bacteremia    Endocarditis of tricuspid valve    Heartburn      Endocarditis of tricuspid valve  Assessment & Plan  Secondary to pseudomonas bacteremia  Patient initially presented to [facility] on 02/04 with spesis 2/2 UTI/pyelonephritis with urine and blood cutlures positive for pseudomonas  RENEE on 02/09 showed endocarditis   ID following  Continue IV cefepime for 6 weeks duration (through 03/21/22)  Outpatient weekly CBC and BMP ordered and scheduled  Patient to follow up with ID in 2 weeks    Bacteremia  Assessment & Plan  Patient with pseudomonas bacteremia on presentation to [facility]  Currently with repeat blood cultures no growth at 5 days  Plan:  Continue IV antibiotics as described under sepsis plan  ID consulted - appreciate recommendations    Metabolic encephalopathy  Assessment & Plan  Negative CTH at time or presentation  Evaluated by neuropsychiatry on 2/16 and determined not to have capacity to make medical decisions    Plan:  Outpatient benzos and narcotics have been held since admission  Delirium precautions and frequent redirection      Anemia  Assessment & Plan  Baseline Hgb 8-9   Currently stable    Plan:  Continue iron supplementation  Monitor daily CBC and transfuse for Hgb <7    Overgrown toenails  Assessment & Plan  Podiatry consulted    Atrial fibrillation [facility])  Assessment & Plan  Currently rate controlled  Managed as outpatient on coumadin and cardizem    Plan:  Home Coumadin 5 mg  Monitor INR  Not being bridged from heparin  INR is now therapeutic  Decreased warfarin to 3 mg daily on 2/24/22  Continue diltiazem  Patient to be discharged on warfarin 3 mg daily      COPD, severe ([address] )  Assessment & Plan  Currently stable  Not on medications as outpatient    CKD (chronic kidney disease) stage 3, GFR 30-59 ml/min [facility])  Assessment & Plan  Lab Results   Component Value Date    EGFR 55 02/17/2022    EGFR 52 02/15/2022    EGFR 59 02/13/2022    CREATININE 1 21 02/17/2022    CREATININE 1 27 02/15/2022    CREATININE 1 14 02/13/2022     Currently at baseline Cr    Plan:  Monitor I/O  Check daily BMP  Avoid nephrotoxic agents    Bladder cancer [facility])  Assessment & Plan  Patient has history of 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 diagnosed 10/12/2021  S/p placement of B/L nephrostomy tubes and with chronic archibald in place  Follows with heme-onc and urology outpatient and receiving chemo and radiation  CT CAP 2/6 with concern for new right apical pleural-based mass now s/p IR biopsy on 2/14 without evidence of malignancy    Plan:    Patient has a chronic archibald for this  Outpatient heme/onc and urology follow up    Back pain  Assessment & Plan  Patient transferred to [facility]"" for MRI with general anesthesia in setting of back pain with presumed vertebral osteomyelitis    Plan:  MRI cervical, thoracic, and lumbar spine with anesthesia completed 2/21: no evidence of discitis or osteomyelitis of thoracolumbar spine   Lumbar degeneration changes with mild/ moderate canal stenosis and foraminal narrowing, pronounced at L4-L5 on right  Pain regimen in place  On adequate bowel regimen to avoid drug induced constipation      Hyperlipidemia  Assessment & Plan  Continue atorvastatin    GERD (gastroesophageal reflux disease)  Assessment & Plan  Continue pantoprazole    Primary hypertension  Assessment & Plan  Controlled as outpatient on Cardizem    Plan:  Continue home Cardizem 240 daily  Monitor vitals closely    CAD (coronary atherosclerotic disease)  Assessment & Plan  S/p CABG x4  Currently without chest pain  Stable    Plan:  Continue home aspirin and statin    Pyelonephritis-resolved as of 2/28/2022  Assessment & Plan  Patient presented septic with CT findings of pyelonephritis    Constipation-resolved as of 2/28/2022  Assessment & Plan  Patient last recorded BM on 02/26  Scheduled Miralax 17 g daily  Senna-docusate 2 tablet BID  PRN bisacodyl  lactulose 10g PO one time dose administered 2/22       * Sepsis due to Pseudomonas aeruginosa ([facility])-resolved as of 2/28/2022  Assessment & Plan  Patient initally presented to [facility] on 2/4 with sepsis 2/2 UTI/pyelonephritis with urine and blood cultures positive for pseudomonas  RENEE done 2/9 demonstrated endocarditis  There was concern for vertebral osteomyelitis, and transferred to [facility] for MRI with general anesthesia 2/2 advanced dementia  Repeat blood cultures negative to date  Plan:  Source most likely urinary given b/l nephrostomy tubes, chronic archibald, and + UA  Will get MRI lumbar spine to r/u discitis, etc given complaint of back pain  Chemo port removed and PICC line placed  Patient to complete 6 week course of antibiotics (through 3/21/22)  MRI performed on 02/21 with no findings suggestive of vertebral osteomyelitis  ID following  Pending placement to [address] presented to [facility] on 2/4 with confusion and weakness  Patient was septic when arriving to the ED, and on CT scan it was determined that he had a UTI and pyelonephritis  Patient had archibald and bilateral nephrostomy tubes due to his recent bladder cancer diagnosis   Both blood and urine cultures revealed Pseudomonas aeruginosa, so a RENEE was completed for concern of endocarditis and showed a density 1 0x1 0cm on tricuspid valve likely attached to moderator band, likely vegetation  Patient had Port-A-Cath, which was administering chemotherapy, removed on 2/7, and a PICC line was placed  Long term antibiotics, cefepime, to be administered until 3/21/22 through PICC  Patient was transferred from [facility] to [facility] on 2/17 due to complaint of back pain and concern for osteomyelitis  Plan for MRI under general anesthesia, and if positive then treatment  During [facility] hospitalization, patient remained afebrile, once dropping below 36C (35 6C), pulse ranging from 60-95  Patient had complaint of painful elongated toenails, podiatry was consulted and toenail excisional debridement performed  On 2/21, MRI revealed no evidence of discitis or osteomyelitis of thoracolumbar spine  Lumbar degeneration changes with mild/ moderate canal stenosis and foraminal narrowing, pronounced at L4-5 on right  Back pain was constant for patient, slowly improving with time  Patient's pain regimen included scheduled lidocaine patch daily and Diclofenac sodium 2 g 4 times daily  Patient's Warfarin dose was slowly decreased from 5 mg to 3 mg due to PT/ INR rising, with home Warfarin 5 mg his INR was supratherapeutic  On discharge, INR was 2 83 and PT was 28 3  Plan to discharge patient with PICC, outpatient antibiotic therapy  Follow up with infectious disease in 2 weeks  On day of discharge, patient has no complaints  Patient needed to be redirected  Patient states that he is anxious for discharge due to the new rehab  Patient did not show signs of tenderness during lumbar palpation  Denies congestion, cough, sore throat  Denies chest pain, palpitations, shortness of breath  Denies abdominal pain, N/V/D/C  Denies fevers, chills, headache  Patient ready for discharge  Vitals:    02/28/22 0728   BP: 124/78   Pulse: 71   Resp: 16   Temp: 98 7 °F (37 1 °C)   SpO2: 98%     Physical Exam  Vitals reviewed     Constitutional:       General: He is not in acute distress  Appearance: He is not diaphoretic  Comments: states that he is anxious for discharge, after phone call with grandchild, less anxious   HENT:      Head: Normocephalic and atraumatic  Mouth/Throat:      Mouth: Mucous membranes are moist       Pharynx: Oropharynx is clear  Eyes:      Extraocular Movements: Extraocular movements intact  Conjunctiva/sclera: Conjunctivae normal    Cardiovascular:      Rate and Rhythm: Normal rate and regular rhythm  Pulses: Normal pulses  Heart sounds: Murmur heard  Pulmonary:      Effort: Pulmonary effort is normal       Breath sounds: Normal breath sounds  Abdominal:      General: Abdomen is flat  Palpations: Abdomen is soft  Musculoskeletal:      Cervical back: Normal range of motion  Comments: No point tenderness during spinal palpation   Skin:     General: Skin is warm and dry  Neurological:      Mental Status: He is alert     Psychiatric:         Mood and Affect: Mood normal       Comments: Tangential   Needed to be redirected                DISCHARGE INFORMATION     PCP at Discharge: Israel Tyler MD    Admitting Provider: Carmina Dukes MD  Admission Date: 2/17/2022    Discharge Provider: Israel Tyler MD  Discharge Date: 2/28/2022    [facility]  Discharge Condition: stable  Discharge with Lines: no    Discharge Diet: cardiac diet  Activity Restrictions: none  Test Results Pending at Discharge: none    Discharge Diagnoses:  Principal Problem (Resolved):    Sepsis due to Pseudomonas aeruginosa [facility])  Active Problems:    Back pain    Bacteremia    Atrial fibrillation (HCC)    CAD (coronary atherosclerotic disease)    Primary hypertension    GERD (gastroesophageal reflux disease)    Hyperlipidemia    S/P CABG x 4    Bladder cancer (HCC)    CKD (chronic kidney disease) stage 3, GFR 30-59 ml/min (HCC)    COPD, severe (HCC)    Overgrown toenails    Anemia    Metabolic encephalopathy    Endocarditis of tricuspid valve  Resolved Problems:    Pyelonephritis    Constipation      Consulting Providers:  Podiatry   Infectious Disease    Diagnostic & Therapeutic Procedures Performed:  MRI cervical spine wo contrast    Result Date: 2/21/2022  Impression: No evidence of discitis or osteomyelitis as clinically questioned  Moderate cervical degenerative change most prominent at the C3-4 level and to a lesser extent at C5-6 and C6-7  Workstation performed: [id number]     MRI lumbar spine wo contrast    Result Date: 2/21/2022  Impression: No evidence of discitis or osteomyelitis of the thoracolumbar spine  Lumbar degenerative change with mild to moderate canal stenosis and foraminal narrowing, most pronounced at L4-5 towards the right   Workstation performed: [id number]       Code Status: Level 1 - Full Code  Advance Directive & Living Will: <no information>  Power of :    POLST:      Medications:  Current Discharge Medication List        Current Discharge Medication List        Current Discharge Medication List      CONTINUE these medications which have NOT CHANGED    Details   ALPRAZolam (XANAX) 0 25 mg tablet Take 1 tablet (0 25 mg total) by mouth 2 (two) times a day as needed for anxiety  Qty: 30 tablet, Refills: 0    Associated Diagnoses: Anxiety      aspirin 81 mg chewable tablet Chew 1 tablet (81 mg total) daily  Qty: 60 tablet, Refills: 6    Associated Diagnoses: Typical atrial flutter (HCC)      atorvastatin (LIPITOR) 40 mg tablet Take 1 tablet (40 mg total) by mouth daily  Qty: 90 tablet, Refills: 3    Associated Diagnoses: Hyperlipidemia, unspecified hyperlipidemia type      bisacodyl (DULCOLAX) 10 mg suppository Insert 1 suppository (10 mg total) into the rectum daily as needed for constipation  Qty: 12 suppository, Refills: 0    Associated Diagnoses: Constipation, unspecified constipation type      diltiazem (CARDIZEM CD) 240 mg 24 hr capsule Take 1 capsule (240 mg total) by mouth daily  Qty: 60 capsule, Refills: 3    Associated Diagnoses: Atrial fibrillation, unspecified type (HCC)      diphenhydrAMINE (BENADRYL) 12 5 MG chewable tablet Chew 1 tablet (12 5 mg total) 4 (four) times a day as needed (Bladder spasm)  Qty: 30 tablet, Refills: 0    Associated Diagnoses: Painful bladder spasm      magnesium citrate (CITROMA) 1 745 g/30 mL oral solution Take 296 mL by mouth once as needed (constipation) for up to 1 dose  Qty: 296 mL, Refills: 3    Associated Diagnoses: Constipation, unspecified constipation type      naloxone (NARCAN) 4 mg/0 1 mL nasal spray Administer 1 spray into a nostril  If no response after 2-3 minutes, give another dose in the other nostril using a new spray    Qty: 1 each, Refills: 1    Associated Diagnoses: Malignant neoplasm of urinary bladder, unspecified site (HCC)      ondansetron (Zofran ODT) 8 mg disintegrating tablet Take 1 tablet (8 mg total) by mouth every 8 (eight) hours as needed for nausea or vomiting  Qty: 20 tablet, Refills: 0    Associated Diagnoses: Chemotherapy induced nausea and vomiting      oxyCODONE (Roxicodone) 5 immediate release tablet Take 1 tablet (5 mg total) by mouth every 6 (six) hours as needed for moderate pain Max Daily Amount: 20 mg  Qty: 40 tablet, Refills: 0    Associated Diagnoses: Malignant neoplasm of urinary bladder, unspecified site (HCC)      pantoprazole (PROTONIX) 40 mg tablet Take 1 tablet (40 mg total) by mouth every morning  Qty: 60 tablet, Refills: 5    Associated Diagnoses: Erosive gastritis; Esophageal stricture      polyethylene glycol (GOLYTELY) 4000 mL solution Take 4,000 mL by mouth once for 1 dose  Qty: 4000 mL, Refills: 0    Associated Diagnoses: Change in bowel habits      sodium chloride 0 9 % SOLN Infuse 10 mL into a venous catheter in the morning  Qty: 200 mL, Refills: 0    Associated Diagnoses: Renal failure      !! sodium chloride, PF, 0 9 % 10 mL by Intracatheter route daily Intracatheter flushing daily  Qty: 1200 mL, Refills: 0    Associated Diagnoses: Malignant neoplasm of overlapping sites of bladder [facility])      ! ! sodium chloride, PF, 0 9 % 10 mL by Intracatheter route daily Intracatheter flushing daily  Qty: 1200 mL, Refills: 0    Associated Diagnoses: Malignant neoplasm of overlapping sites of bladder (HCC)      warfarin (COUMADIN) 5 mg tablet Take 5 mg daily  Qty: 30 tablet, Refills: 0    Associated Diagnoses: Longstanding persistent atrial fibrillation ([address] )       ! ! - Potential duplicate medications found  Please discuss with provider  Allergies: Allergies   Allergen Reactions    Penicillins Swelling and Itching       FOLLOW-UP     PCP Outpatient Follow-up:  Rolando Kan MD    Consulting Providers Follow-up:  Infectious Disease follow up 2 weeks after discharge     Active Issues Requiring Follow-up:   Bladder cancer, outpatient heme/ onc and urology    Discharge Statement:   I spent 45 minutes minutes discharging the patient  This time was spent on the day of discharge  I had direct contact with the patient on the day of discharge  Additional documentation is required if more than 30 minutes were spent on discharge  Portions of the record may have been created with voice recognition software  Occasional wrong word or "sound a like" substitutions may have occurred due to the inherent limitations of voice recognition software  Read the chart carefully and recognize, using context, where substitutions have occurred      ---  Levy Carrizales MD   Internal Medicine Residency PGY-1  [facility]  2/28/2022 12:35 PM

## 2022-02-22 NOTE — PLAN OF CARE
Problem: OCCUPATIONAL THERAPY ADULT  Goal: Performs self-care activities at highest level of function for planned discharge setting  See evaluation for individualized goals  Description: Treatment Interventions: ADL retraining,Functional transfer training,Endurance training,Cognitive reorientation,Patient/family training,Equipment evaluation/education,Compensatory technique education,Activityengagement          See flowsheet documentation for full assessment, interventions and recommendations  Outcome: Progressing  Note: Limitation: Decreased ADL status,Decreased Safe judgement during ADL,Decreased cognition,Decreased endurance,Decreased self-care trans  Prognosis: Fair,Good  Assessment: pt participated in pm ot session and was seen focusing on  ub dressing using robe while seated  pt was able to assist in emptying urine from nephrostomy tubes seated eob  pt was able to perform functional mobility with min asst for proximal support, refuses rw  pt is confused in conversation  concerned that his back will hurt in 1 hr  pt was difficult to redirect at times to task requested of him  will follow focusing on goals from ie  OT Discharge Recommendation: Home with home health rehabilitation (pt confused needing min asst, ? family level of a available)  OT - OK to Discharge:  Yes

## 2022-02-22 NOTE — PHYSICAL THERAPY NOTE
Physical Therapy Evaluation  Patient Name: Malgorzata Singer    [id number] Date: 2/22/2022     Problem List  Principal Problem:    Sepsis due to Pseudomonas aeruginosa [facility])  Active Problems:    CAD (coronary atherosclerotic disease)    Primary hypertension    GERD (gastroesophageal reflux disease)    Hyperlipidemia    S/P CABG x 4    Back pain    Bladder cancer (HCC)    CKD (chronic kidney disease) stage 3, GFR 30-59 ml/min ([facility])    COPD, severe ([facility])    Atrial fibrillation ([facility] )    Overgrown toenails    Anemia    Metabolic encephalopathy    Bacteremia    Pyelonephritis       Past Medical History  Past Medical History:   Diagnosis Date    A-fib ([facility] )     Arthritis     Benign prostatic hyperplasia without lower urinary tract symptoms     without Urinary Obstruction    Bladder cancer ([facility] )     CAD (coronary artery disease)     Cancer (HCC)     Chronic obstructive lung disease (HCC)     Constipation 12/9/2021    Coronary arteriosclerosis     Depression     Emphysema lung (HCC)     GERD (gastroesophageal reflux disease)     Hyperlipidemia     Hypertension     Hyponatremia 1/15/2022    Irregular heart beat     Myocardial infarction (HCC)     Psoriasis     Requires supplemental oxygen     at bedtime during high humid days only    Stroke [facility])     TIA 1/2018        Past Surgical History  Past Surgical History:   Procedure Laterality Date    COLONOSCOPY      CORONARY ANGIOPLASTY  02/03/2001    PTCA of RCA    CORONARY ARTERY BYPASS GRAFT  02/07/2001    x4- [place]    HERNIA REPAIR      IR BIOPSY LUNG  2/14/2022    IR NEPHROSTOMY TUBE PLACEMENT  1/18/2022    IR PORT PLACEMENT  1/14/2022    IR PORT REMOVAL  2/7/2022    CT BRONCHOSCOPY,DIAGNOSTIC Left 1/7/2019    Procedure: BRONCHOSCOPY FLEXIBLE;  Surgeon: Alma Rosa Graham MD;  [facility]: [facility];   Service: Pulmonary    CT CYSTOURETHROSCOPY,FULGUR <0 5 CM DEANA N/A 11/30/2021    Procedure: Rosanna Alvarez RESECTION OF BLADDER TUMOR (TURBT) with "large";  Surgeon: Sam Garcia MD;  [facility]: [facility];  Service: Urology    CO CYSTOURETHROSCOPY,URETER CATHETER Bilateral 11/30/2021    Procedure: Barton Hillsmarco Manuel;  Surgeon: Sam Garcia MD;  [facility];  Service: Urology    CO THROMBOENDARTECTMY Abel Mandujano Left 2/20/2018    Procedure: ENDARTERECTOMY ARTERY CAROTID WITH PATCH ANGIOPLASTY;  Surgeon: Marlon Valente MD;  [facility];  Service: Vascular    TRANSURETHRAL RESECTION OF PROSTATE             02/22/22 1055   PT Last Visit   PT Visit Date 02/22/22   Note Type   Note type Evaluation   Pain Assessment   Pain Assessment Tool FLACC   Pain Score No Pain   Restrictions/Precautions   Weight Bearing Precautions Per Order No   Braces or Orthoses   (none per orders)   Other Precautions Cognitive; Impulsive; Chair Alarm; Bed Alarm; Fall Risk;Multiple lines;Hard of hearing   Home Living   Type of [address] [place] [address] Two level; Able to live on main level with bedroom/bathroom; Performs ADLs on one level  (0 ARLENE, first floor set up)   Bathroom Shower/Tub Tub/shower unit   Bathroom Toilet Standard   Bathroom Equipment Shower chair;Grab bars in [address];Electric scooter   Additional Comments pt poor historian and irritable to questions  above home set up obtained from chart review   Prior Function   Level of [place] Independent with ADLs and functional mobility   Lives With Daughter;Family   Receives Help From Family   ADL Assistance Independent   IADLs Needs assistance   Falls in the last 6 months 0   General   Family/Caregiver Present No   Cognition   Overall Cognitive Status Impaired   Arousal/Participation Responsive   Attention Attends with cues to redirect   Orientation Level Oriented to person;Oriented to time; Unable to assess  (pt irritable to questions)   Following Commands Follows one step commands with increased time or repetition Comments pt very [place], slightly waxes and wanes b/w being pleasant and irritable    RLE Assessment   RLE Assessment WFL   LLE Assessment   LLE Assessment WFL   Bed Mobility   Supine to Sit 5  Supervision   Sit to Supine Unable to assess   Additional Comments pt remained seated in chair with chair alarm upon conclusion of session   Transfers   Sit to Stand 5  Supervision   Additional items Increased time required;Verbal cues   Stand to Sit 5  Supervision   Additional items Verbal cues   Stand pivot 5  Supervision   Additional items Increased time required;Verbal cues   Additional Comments c RW   Ambulation/Elevation   Gait pattern Forward Flexion;Decreased foot clearance; Step through pattern   Gait Assistance 5  Supervision   Additional items Verbal cues   Assistive Device Rolling walker   Distance 300'   Ambulation/Elevation Additional Comments fwd flexed, VC for RW management   Balance   Static Sitting Good   Dynamic Sitting Fair +   Static Standing Fair   Dynamic Standing Fair -   Ambulatory Fair -   Endurance Deficit   Endurance Deficit No   Activity Tolerance   Activity Tolerance Patient tolerated treatment well   Medical Staff Made Aware CM for d/c planning   Nurse Made Aware cleared to see and mobilize   Assessment   Prognosis Good   Problem List Impaired balance;Decreased cognition; Impaired judgement;Decreased safety awareness; Impaired hearing   Assessment Pt is a 80 y o  female admitted to [facility] on 2/17/2022 with primary dx of sepsis due to pseudomonas aeruginosa  Pt with back pain and concerns for osteomyelitis  MRI of lumbar and cervical spine showed no osteomyelitis and neurosurgery consult discontinued   Pt has the following comorbidities which affect their treatment:  has a past medical history of A-fib ([address] ), Arthritis, Benign prostatic hyperplasia without lower urinary tract symptoms, Bladder cancer ([address] ), CAD (coronary artery disease), Cancer ([address] ), Chronic obstructive lung disease ([address] ), Constipation (12/9/2021), Coronary arteriosclerosis, Depression, Emphysema lung ([facility] ), GERD (gastroesophageal reflux disease), Hyperlipidemia, Hypertension, Hyponatremia (1/15/2022), Irregular heart beat, Myocardial infarction ([facility] ), Psoriasis, Requires supplemental oxygen, and Stroke ([facility] )  Pt has a moderate complexity clinical presentation due to Ongoing medical management for primary dx, Increased reliance on more restrictive AD compared to baseline, Fall risk, Increased assistance needed from caregiver at current time, Cog status, Trending lab values, Continuous pulse oximetry monitoring  , and PMH  PT was consulted to evaluate pt's functional mobility and discharge needs  Upon evaluation, patient required S for bed mobility, S for STS transfers, S for ambulation  Pt's functional impairments include: decreased balance and cog limitations  At conclusion of eval, pt remained seated in chair with chair alarm, phone, call bell, and all other personal needs within reach  The patient's AM-PAC Basic Mobility Inpatient Short Form Raw Score is 18  A Raw score of greater than 16 suggests the patient may benefit from discharge to home  Please also refer to the recommendation of the Physical Therapist for safe discharge planning  At this time, pt has no further acute care PT needs 2* being S level for all functional mobility and having a supportive family at home  Spoke with CM about confirming family can provide appropriate Assistance and supervision at time of d/c (OT cog eval pending)  Pt denies any concerns regarding their functional mobility or ability to return home safely at this time  Recommend pt continues to mobilize with nursing and restorative staff during hospital stay  Please consult with any changes in mobility status  D/C recommendations are home with appropriate supervision provided by family     Goals   Patient Goals to see family   PT Treatment Day 0   Plan   PT Frequency Other (Comment)  (d/c acute care PT) Recommendation   PT Discharge Recommendation No rehabilitation needs  (home with appropriate S from family (OT cog eval pending))   Equipment Recommended [facility] Recommended Wheeled walker   Additional Comments Spoke with CM to ensure pt's family can provide appropriate A and supervision based on OT cog eval     AM-PAC Basic Mobility Inpatient   Turning in Bed Without Bedrails 3   Lying on Back to Sitting on Edge of Flat Bed 3   Moving Bed to Chair 3   Standing Up From Chair 3   Walk in Room 3   Climb 3-5 Stairs 3   Basic Mobility Inpatient Raw Score 18   Basic Mobility Standardized Score 41 05   Highest Level Of Mobility   JH-HLM Goal 6: Walk 10 steps or more   JH-HLM Highest Level of Mobility 8: Walk 250 feet ot more   JH-HLM Goal Achieved Yes   Modified [place] Scale   Modified [place] Scale 2   Sarah Nur, PT, DPT

## 2022-02-22 NOTE — PROGRESS NOTES
INTERNAL MEDICINE RESIDENCY PROGRESS NOTE     Name: Gena Emery   Age & Sex: 80 y o  male   MRN: [id number]  Unit/Bed#: [facility] 633-01   Encounter: [id number]  Team: SOD Team C     PATIENT INFORMATION     Name: Gena Emery   Age & Sex: 80 y o  male   MRN: [id number]  Hospital Stay Days: 5    ASSESSMENT/PLAN     Principal Problem:    Sepsis due to Pseudomonas aeruginosa [facility])  Active Problems:    Back pain    CAD (coronary atherosclerotic disease)    Primary hypertension    GERD (gastroesophageal reflux disease)    Hyperlipidemia    S/P CABG x 4    Bladder cancer (HCC)    CKD (chronic kidney disease) stage 3, GFR 30-59 ml/min ([facility])    COPD, severe ([facility])    Atrial fibrillation ([facility] [address] )    Constipation    Overgrown toenails    Anemia    Metabolic encephalopathy    Bacteremia    Pyelonephritis      * Sepsis due to Pseudomonas aeruginosa [facility])  Assessment & Plan  Patient initally presented to [facility] on 2/4 with sepsis 2/2 UTI/pyelonephritis with urine and blood cultures positive for pseudomonas  RENEE done 2/9 demonstrated endocarditis  Currently with concern for vertebral osteomyelitis, and transferred to [facility] for MRI with general anesthesia 2/2 advanced dementia  Repeat blood cultures negative to date  Plan:  · Source most likely urinary given b/l nephrostomy tubes, chronic archibald, and + UA  Will get MRI lumbar spine to r/u discitis, etc given complaint of back pain  · Chemo port removed and PICC line placed  · Patient to complete 6 week course of antibiotics (through 3/21/22)  · MRI performed on 02/21 with no findings suggestive of vertebral osteomyelitis  · ID following    Back pain  Assessment & Plan  Patient transferred to [facility] for MRI with general anesthesia in setting of back pain with presumed vertebral osteomyelitis    Plan:  · MRI cervical, thoracic, and lumbar spine with anesthesia completed 2/21: no evidence of discitis or osteomyelitis of thoracolumbar spine   Lumbar degeneration changes with mild/ moderate canal stenosis and foraminal narrowing, pronounced at L4-L5 on right  · Pain regimen in place      Pyelonephritis  Assessment & Plan  Patient presented septic with CT findings of pyelonephritis    Bacteremia  Assessment & Plan  Patient with pseudomonas bacteremia on presentation to [facility]  Currently with repeat blood cultures no growth at 5 days  Plan:  · Continue IV antibiotics as described under sepsis plan  · ID consulted - appreciate recommendations    Metabolic encephalopathy  Assessment & Plan  Negative CTH at time or presentation  Evaluated by neuropsychiatry on 2/16 and determined not to have capacity to make medical decisions    Plan:  · Outpatient benzos and narcotics have been held since admission  · Delirium precautions and frequent redirection      Anemia  Assessment & Plan  Baseline Hgb 8-9  Currently stable    Plan:  · Continue iron supplementation  · Monitor daily CBC and transfuse for Hgb <7    Overgrown toenails  Assessment & Plan  Podiatry consulted    Constipation  Assessment & Plan  Patient last recorded BM on 02/19  · Scheduled Miralax 17 g daily  · Senna-docusate 2 tablet BID  · PRN bisacodyl  · Will order lactulose today    Atrial fibrillation ([facility] [address] )  Assessment & Plan  Currently rate controlled  Managed as outpatient on coumadin and cardizem    Plan:  · Continue home Coumadin 5 mg  Monitor INR  Not being bridged from heparin  · 2/22: PT: 21 7, INR: 1 99  · Continue diltiazem      COPD, severe (HCC)  Assessment & Plan  Currently stable  Not on medications as outpatient    CKD (chronic kidney disease) stage 3, GFR 30-59 ml/min [facility])  Assessment & Plan  Lab Results   Component Value Date    EGFR 55 02/17/2022    EGFR 52 02/15/2022    EGFR 59 02/13/2022    CREATININE 1 21 02/17/2022    CREATININE 1 27 02/15/2022    CREATININE 1 14 02/13/2022     Currently at baseline Cr      Plan:  · Monitor I/O  · Check daily BMP  · Avoid nephrotoxic agents    Bladder cancer [facility])  Assessment & Plan  Patient has history of 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 diagnosed 10/12/2021  S/p placement of B/L nephrostomy tubes and with chronic archibald in place  Follows with heme-onc and urology outpatient and receiving chemo and radiation  CT CAP 2/6 with concern for new right apical pleural-based mass now s/p IR biopsy on 2/14 without evidence of malignancy    Plan:  · Outpatient heme/onc and urology follow up    Hyperlipidemia  Assessment & Plan  Continue atorvastatin    GERD (gastroesophageal reflux disease)  Assessment & Plan  Continue pantoprazole    Primary hypertension  Assessment & Plan  Controlled as outpatient on Cardizem    Plan:  · Continue home Cardizem 240 daily  · Monitor vitals closely    CAD (coronary atherosclerotic disease)  Assessment & Plan  S/p CABG x4  Currently without chest pain  Stable    Plan:  · Continue home aspirin and statin      Disposition: PT/OT evaluation, constipation management, IV antibiotics, continue inpatient monitoring    SUBJECTIVE     Patient seen and examined  No acute events overnight  Today, patient is in no acute distress  Patient is sitting eating breakfast  His only complaint is his back pain  Denies fevers, chills, chest pain, shortness of breath, nausea, vomiting  Patient eager to walk around the unit with a nurse or tech  Patient needs redirection during encounter  Review of Systems   Constitutional: Negative for chills and fever  Respiratory: Negative for cough, chest tightness and shortness of breath  Cardiovascular: Negative for chest pain and palpitations  Gastrointestinal: Positive for constipation  Negative for diarrhea, nausea and vomiting  Genitourinary: Negative for difficulty urinating and dysuria  Musculoskeletal: Positive for back pain  Neurological: Negative for headaches         OBJECTIVE     Vitals:    02/21/22 1708 02/21/22 2351 02/22/22 0748 02/22/22 0823   BP: 124/72 144/80 140/82 137/78   Pulse: 74 95 90 76 Resp: 17 18 18 16   Temp:  (!) 96 1 °F (35 6 °C) (!) 97 4 °F (36 3 °C) 98 3 °F (36 8 °C)   TempSrc:  Tympanic     SpO2: 95% 92% 92% 94%      Temperature:   Temp (24hrs), Av 3 °F (36 3 °C), Min:96 1 °F (35 6 °C), Max:98 3 °F (36 8 °C)    Temperature: 98 3 °F (36 8 °C)  Intake & Output:  I/O        0701   07 07 07 07 0700    P  O  840 0 240    I V   200     IV Piggyback  100     Total Intake 840 300 240    Urine 1900 2225     Stool       Total Output 1900 2225     Net -1060 -1925 +240               Weights: There is no height or weight on file to calculate BMI  Weight (last 2 days)     None        Physical Exam  Constitutional:       General: He is not in acute distress  Appearance: He is not diaphoretic  HENT:      Head: Normocephalic  Ears:      Comments: presbycusis     Mouth/Throat:      Mouth: Mucous membranes are moist       Pharynx: Oropharynx is clear  Eyes:      Conjunctiva/sclera: Conjunctivae normal    Cardiovascular:      Rate and Rhythm: Normal rate and regular rhythm  Heart sounds: Murmur heard  Pulmonary:      Effort: Pulmonary effort is normal       Breath sounds: Normal breath sounds  Abdominal:      General: Abdomen is flat  Genitourinary:     Comments: deferred  Musculoskeletal:      Comments: Mild lumbar pain during palpation   Skin:     General: Skin is warm and dry  Neurological:      Mental Status: He is alert and oriented to person, place, and time  Psychiatric:         Mood and Affect: Mood normal       Comments: Distracted, difficult to get answers from him at times  Frequent redirection        LABORATORY DATA     Labs: I have personally reviewed pertinent reports    Results from last 7 days   Lab Units 22  0810 22  0617 22  1256 22  0533 22  0533 22  0433 22  0433   WBC Thousand/uL 4 39 4 66 4 00*   < > 3 86*   < > 3 45*   HEMOGLOBIN g/dL 9 9* 9 5* 9 1*   < > 8 8* < > 8 5*   HEMATOCRIT % 31 4* 32 1* 30 9*   < > 30 0*   < > 28 3*   PLATELETS Thousands/uL 231 256 262   < > 262   < > 269   NEUTROS PCT %  --   --   --   --  65  --   --    MONOS PCT %  --   --   --   --  14*  --   --    MONO PCT %  --   --   --   --   --   --  12    < > = values in this interval not displayed  Results from last 7 days   Lab Units 02/22/22  0506 02/20/22  0617 02/19/22  0648 02/18/22  0533 02/17/22  0433   POTASSIUM mmol/L 4 5 4 3 4 4   < > 4 4   CHLORIDE mmol/L 103 106 107   < > 103   CO2 mmol/L 26 27 28   < > 29   BUN mg/dL 18 14 16   < > 18   CREATININE mg/dL 1 09 1 13 1 10   < > 1 21   CALCIUM mg/dL 9 6 9 1 9 1   < > 8 8   ALK PHOS U/L  --   --   --   --  95   ALT U/L  --   --   --   --  28   AST U/L  --   --   --   --  14    < > = values in this interval not displayed  Results from last 7 days   Lab Units 02/18/22  0533   MAGNESIUM mg/dL 2 4          Results from last 7 days   Lab Units 02/22/22  0506 02/21/22  0655 02/20/22  0617   INR  1 99* 1 68* 1 46*               IMAGING & DIAGNOSTIC TESTING     Radiology Results: I have personally reviewed pertinent reports  MRI cervical spine wo contrast    Result Date: 2/21/2022  Impression: No evidence of discitis or osteomyelitis as clinically questioned  Moderate cervical degenerative change most prominent at the C3-4 level and to a lesser extent at C5-6 and C6-7  Workstation performed: [id number]     MRI lumbar spine wo contrast    Result Date: 2/21/2022  Impression: No evidence of discitis or osteomyelitis of the thoracolumbar spine  Lumbar degenerative change with mild to moderate canal stenosis and foraminal narrowing, most pronounced at L4-5 towards the right  Workstation performed: [id number]     Other Diagnostic Testing: I have personally reviewed pertinent reports      ACTIVE MEDICATIONS     Current Facility-Administered Medications   Medication Dose Route Frequency    acetaminophen (TYLENOL) tablet 975 mg  975 mg Oral Q8H [address]    aluminum-magnesium hydroxide-simethicone (MYLANTA) oral suspension 30 mL  30 mL Oral Q4H PRN    aspirin (ECOTRIN LOW STRENGTH) EC tablet 81 mg  81 mg Oral Daily    atorvastatin (LIPITOR) tablet 40 mg  40 mg Oral Daily    bisacodyl (DULCOLAX) rectal suppository 10 mg  10 mg Rectal Daily PRN    cefepime (MAXIPIME) 2,000 mg in dextrose 5 % 50 mL IVPB  2,000 mg Intravenous Q12H    Diclofenac Sodium (VOLTAREN) 1 % topical gel 2 g  2 g Topical 4x Daily    diltiazem (CARDIZEM CD) 24 hr capsule 240 mg  240 mg Oral Daily    ferrous sulfate tablet 325 mg  325 mg Oral BID With Meals    HYDROmorphone (DILAUDID) injection 0 4 mg  0 4 mg Intravenous Q5 Min PRN    lidocaine (LIDODERM) 5 % patch 1 patch  1 patch Topical Daily    lidocaine (PF) (XYLOCAINE-MPF) 1 % injection 0 5 mL  0 5 mL Infiltration Once PRN    melatonin tablet 3 mg  3 mg Oral HS PRN    ondansetron (ZOFRAN) injection 4 mg  4 mg Intravenous Q6H PRN    oxyCODONE (ROXICODONE) IR tablet 2 5 mg  2 5 mg Oral Q6H PRN    oxyCODONE (ROXICODONE) IR tablet 5 mg  5 mg Oral Q6H PRN    pantoprazole (PROTONIX) EC tablet 40 mg  40 mg Oral QAM    polyethylene glycol (MIRALAX) packet 17 g  17 g Oral Daily    senna-docusate sodium (SENOKOT S) 8 6-50 mg per tablet 2 tablet  2 tablet Oral BID    simethicone (MYLICON) chewable tablet 80 mg  80 mg Oral Q6H PRN    sodium phosphate-biphosphate (FLEET) enema 1 enema  1 enema Rectal Once PRN    warfarin (COUMADIN) tablet 5 mg  5 mg Oral Daily (warfarin)       VTE Pharmacologic Prophylaxis: Warfarin (Coumadin)  VTE Mechanical Prophylaxis: sequential compression device    Portions of the record may have been created with voice recognition software  Occasional wrong word or "sound a like" substitutions may have occurred due to the inherent limitations of voice recognition software    Read the chart carefully and recognize, using context, where substitutions have occurred   ==  Cris Faulkner, DO  [facility] [facility]  Internal Medicine Residency PGY-1

## 2022-02-23 ENCOUNTER — APPOINTMENT (OUTPATIENT)
Dept: RADIATION ONCOLOGY | Facility: CLINIC | Age: 82
End: 2022-02-23
Attending: RADIOLOGY
Payer: MEDICARE

## 2022-02-23 LAB
ANION GAP SERPL CALCULATED.3IONS-SCNC: 4 MMOL/L (ref 4–13)
BUN SERPL-MCNC: 25 MG/DL (ref 5–25)
CALCIUM SERPL-MCNC: 9 MG/DL (ref 8.3–10.1)
CHLORIDE SERPL-SCNC: 105 MMOL/L (ref 100–108)
CO2 SERPL-SCNC: 26 MMOL/L (ref 21–32)
CREAT SERPL-MCNC: 1.08 MG/DL (ref 0.6–1.3)
GFR SERPL CREATININE-BSD FRML MDRD: 64 ML/MIN/1.73SQ M
GLUCOSE SERPL-MCNC: 107 MG/DL (ref 65–140)
HCT VFR BLD AUTO: 29.2 % (ref 36.5–49.3)
HGB BLD-MCNC: 9.3 G/DL (ref 12–17)
INR PPP: 2.63 (ref 0.84–1.19)
MCH RBC QN AUTO: 26.3 PG (ref 26.8–34.3)
MCHC RBC AUTO-ENTMCNC: 31.8 G/DL (ref 31.4–37.4)
MCV RBC AUTO: 83 FL (ref 82–98)
PLATELET # BLD AUTO: 252 THOUSANDS/UL (ref 149–390)
PMV BLD AUTO: 8.8 FL (ref 8.9–12.7)
POTASSIUM SERPL-SCNC: 4.5 MMOL/L (ref 3.5–5.3)
PROTHROMBIN TIME: 26.8 SECONDS (ref 11.6–14.5)
RBC # BLD AUTO: 3.54 MILLION/UL (ref 3.88–5.62)
SODIUM SERPL-SCNC: 135 MMOL/L (ref 136–145)
WBC # BLD AUTO: 8.32 THOUSAND/UL (ref 4.31–10.16)

## 2022-02-23 PROCEDURE — 99232 SBSQ HOSP IP/OBS MODERATE 35: CPT | Performed by: INTERNAL MEDICINE

## 2022-02-23 PROCEDURE — 80048 BASIC METABOLIC PNL TOTAL CA: CPT | Performed by: STUDENT IN AN ORGANIZED HEALTH CARE EDUCATION/TRAINING PROGRAM

## 2022-02-23 PROCEDURE — 85027 COMPLETE CBC AUTOMATED: CPT | Performed by: STUDENT IN AN ORGANIZED HEALTH CARE EDUCATION/TRAINING PROGRAM

## 2022-02-23 PROCEDURE — 85610 PROTHROMBIN TIME: CPT | Performed by: STUDENT IN AN ORGANIZED HEALTH CARE EDUCATION/TRAINING PROGRAM

## 2022-02-23 RX ORDER — WARFARIN SODIUM 2 MG/1
4 TABLET ORAL
Status: DISCONTINUED | OUTPATIENT
Start: 2022-02-23 | End: 2022-02-24

## 2022-02-23 RX ADMIN — POLYETHYLENE GLYCOL 3350 17 G: 17 POWDER, FOR SOLUTION ORAL at 08:05

## 2022-02-23 RX ADMIN — ATORVASTATIN CALCIUM 40 MG: 40 TABLET, FILM COATED ORAL at 08:05

## 2022-02-23 RX ADMIN — OXYCODONE HYDROCHLORIDE 5 MG: 5 TABLET ORAL at 08:05

## 2022-02-23 RX ADMIN — ACETAMINOPHEN 975 MG: 325 TABLET, FILM COATED ORAL at 14:21

## 2022-02-23 RX ADMIN — OXYCODONE HYDROCHLORIDE 5 MG: 5 TABLET ORAL at 02:15

## 2022-02-23 RX ADMIN — FERROUS SULFATE TAB 325 MG (65 MG ELEMENTAL FE) 325 MG: 325 (65 FE) TAB at 17:14

## 2022-02-23 RX ADMIN — PANTOPRAZOLE SODIUM 40 MG: 40 TABLET, DELAYED RELEASE ORAL at 05:18

## 2022-02-23 RX ADMIN — ACETAMINOPHEN 975 MG: 325 TABLET, FILM COATED ORAL at 21:31

## 2022-02-23 RX ADMIN — ASPIRIN 81 MG: 81 TABLET, COATED ORAL at 08:04

## 2022-02-23 RX ADMIN — DICLOFENAC SODIUM 2 G: 10 GEL TOPICAL at 21:33

## 2022-02-23 RX ADMIN — SENNOSIDES AND DOCUSATE SODIUM 2 TABLET: 50; 8.6 TABLET ORAL at 08:05

## 2022-02-23 RX ADMIN — ACETAMINOPHEN 975 MG: 325 TABLET, FILM COATED ORAL at 02:15

## 2022-02-23 RX ADMIN — CEFEPIME HYDROCHLORIDE 2000 MG: 2 INJECTION, POWDER, FOR SOLUTION INTRAVENOUS at 05:18

## 2022-02-23 RX ADMIN — DILTIAZEM HYDROCHLORIDE 240 MG: 240 CAPSULE, COATED, EXTENDED RELEASE ORAL at 08:05

## 2022-02-23 RX ADMIN — SENNOSIDES AND DOCUSATE SODIUM 2 TABLET: 50; 8.6 TABLET ORAL at 17:14

## 2022-02-23 RX ADMIN — FERROUS SULFATE TAB 325 MG (65 MG ELEMENTAL FE) 325 MG: 325 (65 FE) TAB at 08:05

## 2022-02-23 RX ADMIN — CEFEPIME HYDROCHLORIDE 2000 MG: 2 INJECTION, POWDER, FOR SOLUTION INTRAVENOUS at 17:16

## 2022-02-23 RX ADMIN — OXYCODONE HYDROCHLORIDE 5 MG: 5 TABLET ORAL at 14:21

## 2022-02-23 RX ADMIN — WARFARIN SODIUM 4 MG: 2 TABLET ORAL at 17:14

## 2022-02-23 RX ADMIN — BISACODYL 10 MG: 10 SUPPOSITORY RECTAL at 08:15

## 2022-02-23 RX ADMIN — LIDOCAINE 5% 1 PATCH: 700 PATCH TOPICAL at 08:05

## 2022-02-23 NOTE — PLAN OF CARE
Problem: MOBILITY - ADULT  Goal: Maintain or return to baseline ADL function  Description: INTERVENTIONS:  -  Assess patient's ability to carry out ADLs; assess patient's baseline for ADL function and identify physical deficits which impact ability to perform ADLs (bathing, care of mouth/teeth, toileting, grooming, dressing, etc )  - Assess/evaluate cause of self-care deficits   - Assess range of motion  - Assess patient's mobility; develop plan if impaired  - Assess patient's need for assistive devices and provide as appropriate  - Encourage maximum independence but intervene and supervise when necessary  - Involve family in performance of ADLs  - Assess for home care needs following discharge   - Consider OT consult to assist with ADL evaluation and planning for discharge  - Provide patient education as appropriate  Outcome: Progressing  Goal: Maintains/Returns to pre admission functional level  Description: INTERVENTIONS:  - Perform BMAT or MOVE assessment daily    - Set and communicate daily mobility goal to care team and patient/family/caregiver  - Collaborate with rehabilitation services on mobility goals if consulted  - Perform Range of Motion  times a day  - Reposition patient every  hours    - Dangle patient  times a day  - Stand patient  times a day  - Ambulate patient  times a day  - Out of bed to chair  times a day   - Out of bed for meals  times a day  - Out of bed for toileting  - Record patient progress and toleration of activity level   Outcome: Progressing     Problem: GASTROINTESTINAL - ADULT  Goal: Minimal or absence of nausea and/or vomiting  Description: INTERVENTIONS:  - Administer IV fluids if ordered to ensure adequate hydration  - Maintain NPO status until nausea and vomiting are resolved  - Nasogastric tube if ordered  - Administer ordered antiemetic medications as needed  - Provide nonpharmacologic comfort measures as appropriate  - Advance diet as tolerated, if ordered  - Consider nutrition services referral to assist patient with adequate nutrition and appropriate food choices  Outcome: Progressing  Goal: Maintains or returns to baseline bowel function  Description: INTERVENTIONS:  - Assess bowel function  - Encourage oral fluids to ensure adequate hydration  - Administer IV fluids if ordered to ensure adequate hydration  - Administer ordered medications as needed  - Encourage mobilization and activity  - Consider nutritional services referral to assist patient with adequate nutrition and appropriate food choices  Outcome: Progressing     Problem: GENITOURINARY - ADULT  Goal: Maintains or returns to baseline urinary function  Description: INTERVENTIONS:  - Assess urinary function  - Encourage oral fluids to ensure adequate hydration if ordered  - Administer IV fluids as ordered to ensure adequate hydration  - Administer ordered medications as needed  - Offer frequent toileting  - Follow urinary retention protocol if ordered  Outcome: Progressing  Goal: Urinary catheter remains patent  Description: INTERVENTIONS:  - Assess patency of urinary catheter  - If patient has a chronic archibald, consider changing catheter if non-functioning  - Follow guidelines for intermittent irrigation of non-functioning urinary catheter  Outcome: Progressing     Problem: Potential for Falls  Goal: Patient will remain free of falls  Description: INTERVENTIONS:  - Educate patient/family on patient safety including physical limitations  - Instruct patient to call for assistance with activity   - Consult OT/PT to assist with strengthening/mobility   - Keep Call bell within reach  - Keep bed low and locked with side rails adjusted as appropriate  - Keep care items and personal belongings within reach  - Initiate and maintain comfort rounds  - Make Fall Risk Sign visible to staff  - Offer Toileting every  Hours, in advance of need  - Initiate/Maintain alarm  - Obtain necessary fall risk management equipment  - Apply yellow socks and bracelet for high fall risk patients  - Consider moving patient to room near nurses station  Outcome: Progressing     Problem: Prexisting or High Potential for Compromised Skin Integrity  Goal: Skin integrity is maintained or improved  Description: INTERVENTIONS:  - Identify patients at risk for skin breakdown  - Assess and monitor skin integrity  - Assess and monitor nutrition and hydration status  - Monitor labs   - Assess for incontinence   - Turn and reposition patient  - Assist with mobility/ambulation  - Relieve pressure over bony prominences  - Avoid friction and shearing  - Provide appropriate hygiene as needed including keeping skin clean and dry  - Evaluate need for skin moisturizer/barrier cream  - Collaborate with interdisciplinary team   - Patient/family teaching  - Consider wound care consult   Outcome: Progressing

## 2022-02-23 NOTE — PROGRESS NOTES
Progress Note - Infectious Disease   Gena Emery 80 y o  male MRN: [id number]  Unit/Bed#: [facility] 633-01 Encounter: [id number]      Impression/Recommendations:  1  Sepsis   POA to [facility]MO   Due to #2/3  No other appreciable source   Improved  Rec:  · Continue antibiotics as below  · Follow temperatures and WBC closely  · Supportive care as per the primary service     2   Pseudomonal bacteremia with TV endocarditis   Due to #3   Consider also secondary port infection, status post removal    Repeat blood cultures with late positive, now cleared   RENEE with vegetation on moderator band of TV  Rec:  · Continue cefepime for 6 weeks total through 2022   · Check weekly CBC-diff, Cr while in IV antibiotic  · D/C PICC after last dose IV antibiotics  · D/C planning for STR  · Outpatient follow-up with ID 2 weeks after D/C     3   Pseudomonal UTI   In setting of recent Fortune catheter exchange, malpositioned Fortune   CT also shows pyelonephritis  Rec:  · Continue antibiotics as above     4   Back pain   Likely due to degeneration, canal stenosis seen MRI (noncontast)  No evidence of discitis/OM  Rec:  · PT/OT as tolerated  · Supportive care as per the primary service     5   Bladder cancer   With obstructive nephrolithiasis   Chronic Fortune, bilateral PCNS   On outpatient chemotherapy      6   CKD   Baseline Cr 1 1-1 2      The patient is stable from an ID standpoint      Antibiotics:  Cefepime #20    Subjective:  Patient seen on AM rounds  Limited ROS due to patient confusion versus [place]  No complaints overtly offered  24 Hour Events:  No documented fevers, chills, sweats, nausea, vomiting, or diarrhea      Objective:  Vitals:  Temp:  [96 9 °F (36 1 °C)-98 °F (36 7 °C)] 98 °F (36 7 °C)  HR:  [71-75] 72  Resp:  [16-18] 18  BP: (124-130)/(73-78) 130/76  SpO2:  [95 %] 95 %  Temp (24hrs), Av 5 °F (36 4 °C), Min:96 9 °F (36 1 °C), Max:98 °F (36 7 °C)  Current: Temperature: 98 °F (36 7 °C)    Physical Exam: General:  No acute distress, sitting on OOB eating lunch, appears comfortable  Psychiatric:  Awake and alert  Pulmonary:  Normal respiratory excursion without accessory muscle use  Abdomen:  Soft, nontender  Extremities:  No edema  Skin:  No rashes    Lab Results:  I have personally reviewed pertinent labs  Results from last 7 days   Lab Units 02/23/22  0518 02/22/22  0506 02/20/22  0617 02/18/22  0533 02/17/22  0433   POTASSIUM mmol/L 4 5 4 5 4 3   < > 4 4   CHLORIDE mmol/L 105 103 106   < > 103   CO2 mmol/L 26 26 27   < > 29   BUN mg/dL 25 18 14   < > 18   CREATININE mg/dL 1 08 1 09 1 13   < > 1 21   EGFR ml/min/1 73sq m 64 63 60   < > 55   CALCIUM mg/dL 9 0 9 6 9 1   < > 8 8   AST U/L  --   --   --   --  14   ALT U/L  --   --   --   --  28   ALK PHOS U/L  --   --   --   --  95    < > = values in this interval not displayed  Results from last 7 days   Lab Units 02/23/22  0518 02/22/22  0810 02/20/22  0617   WBC Thousand/uL 8 32 4 39 4 66   HEMOGLOBIN g/dL 9 3* 9 9* 9 5*   PLATELETS Thousands/uL 252 231 256           Imaging Studies:   I have personally reviewed pertinent imaging study reports and images in PACS  EKG, Pathology, and Other Studies:   I have personally reviewed pertinent reports

## 2022-02-23 NOTE — PROGRESS NOTES
INTERNAL MEDICINE RESIDENCY PROGRESS NOTE     Name: Vladislav Deluna   Age & Sex: 80 y o  male   MRN: [id number]  Unit/Bed#: [facility] 633-01   Encounter: [id number]  Team: SOD Team C     PATIENT INFORMATION     Name: Vladislav Deluna   Age & Sex: 80 y o  male   MRN: [id number]  Hospital Stay Days: 6    ASSESSMENT/PLAN     Principal Problem:    Sepsis due to Pseudomonas aeruginosa [facility])  Active Problems:    Back pain    CAD (coronary atherosclerotic disease)    Primary hypertension    GERD (gastroesophageal reflux disease)    Hyperlipidemia    S/P CABG x 4    Bladder cancer (HCC)    CKD (chronic kidney disease) stage 3, GFR 30-59 ml/min ([facility])    COPD, severe ([facility])    Atrial fibrillation ([facility] [address] )    Constipation    Overgrown toenails    Anemia    Metabolic encephalopathy    Bacteremia    Pyelonephritis      * Sepsis due to Pseudomonas aeruginosa [facility])  Assessment & Plan  Patient initally presented to [facility] on 2/4 with sepsis 2/2 UTI/pyelonephritis with urine and blood cultures positive for pseudomonas  RENEE done 2/9 demonstrated endocarditis  Currently with concern for vertebral osteomyelitis, and transferred to \[facility]\"" for MRI with general anesthesia 2/2 advanced dementia  Repeat blood cultures negative to date  Plan:  · Source most likely urinary given b/l nephrostomy tubes, chronic archibald, and + UA  Will get MRI lumbar spine to r/u discitis, etc given complaint of back pain  · Chemo port removed and PICC line placed  · Patient to complete 6 week course of antibiotics (through 3/21/22)  · MRI performed on 02/21 with no findings suggestive of vertebral osteomyelitis  · ID following    Back pain  Assessment & Plan  Patient transferred to \[facility]\"" for MRI with general anesthesia in setting of back pain with presumed vertebral osteomyelitis    Plan:  · MRI cervical, thoracic, and lumbar spine with anesthesia completed 2/21: no evidence of discitis or osteomyelitis of thoracolumbar spine   Lumbar degeneration changes with mild/ moderate canal stenosis and foraminal narrowing, pronounced at L4-L5 on right  · Pain regimen in place      Pyelonephritis  Assessment & Plan  Patient presented septic with CT findings of pyelonephritis    Bacteremia  Assessment & Plan  Patient with pseudomonas bacteremia on presentation to [facility]  Currently with repeat blood cultures no growth at 5 days  Plan:  · Continue IV antibiotics as described under sepsis plan  · ID consulted - appreciate recommendations    Metabolic encephalopathy  Assessment & Plan  Negative CTH at time or presentation  Evaluated by neuropsychiatry on 2/16 and determined not to have capacity to make medical decisions    Plan:  · Outpatient benzos and narcotics have been held since admission  · Delirium precautions and frequent redirection      Anemia  Assessment & Plan  Baseline Hgb 8-9  Currently stable    Plan:  · Continue iron supplementation  · Monitor daily CBC and transfuse for Hgb <7    Overgrown toenails  Assessment & Plan  Podiatry consulted    Constipation  Assessment & Plan  Patient last recorded BM on 02/23  · Scheduled Miralax 17 g daily  · Senna-docusate 2 tablet BID  · PRN bisacodyl  · lactulose 10g PO one time dose administered 2/22       Atrial fibrillation ([facility] [address] )  Assessment & Plan  Currently rate controlled  Managed as outpatient on coumadin and cardizem    Plan:  · Home Coumadin 5 mg  Monitor INR  Not being bridged from heparin  · INR is now therapeutic  2/23: PT: 26 8, INR: 2 63  · Will decrease warfarin to 4 mg daily  · Continue diltiazem      COPD, severe ([facility] [address] )  Assessment & Plan  Currently stable  Not on medications as outpatient    CKD (chronic kidney disease) stage 3, GFR 30-59 ml/min [facility])  Assessment & Plan  Lab Results   Component Value Date    EGFR 55 02/17/2022    EGFR 52 02/15/2022    EGFR 59 02/13/2022    CREATININE 1 21 02/17/2022    CREATININE 1 27 02/15/2022    CREATININE 1 14 02/13/2022     Currently at baseline Cr      Plan:  · Monitor I/O  · Check daily BMP  · Avoid nephrotoxic agents    Bladder cancer [facility])  Assessment & Plan  Patient has history of 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 diagnosed 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/2021  S/p placement of B/L nephrostomy tubes and with chronic archibald in place  Follows with heme-onc and urology outpatient and receiving chemo and radiation  CT CAP  with concern for new right apical pleural-based mass now s/p IR biopsy on  without evidence of malignancy    Plan:  · Outpatient heme/onc and urology follow up    Hyperlipidemia  Assessment & Plan  Continue atorvastatin    GERD (gastroesophageal reflux disease)  Assessment & Plan  Continue pantoprazole    Primary hypertension  Assessment & Plan  Controlled as outpatient on Cardizem    Plan:  · Continue home Cardizem 240 daily  · Monitor vitals closely    CAD (coronary atherosclerotic disease)  Assessment & Plan  S/p CABG x4  Currently without chest pain  Stable    Plan:  · Continue home aspirin and statin      Disposition: IV antibiotics, working with CM for placement, continue inpatient monitoring  Otherwise stable for discharge  SUBJECTIVE     Patient seen and examined  No acute events overnight  Patient is in no acute distress  Patient was sleeping and awoke when name was called  Patient complains of lumbar back pain  Patient had BM this morning  Denies fever, chills, chest pain, shortness of breath, nausea, and vomiting  Patient is very tangential, difficult to redirect during encounter       OBJECTIVE     Vitals:    22 1357 22 0058 22 0754 22 1407   BP: 134/76 130/73 124/78 130/76   BP Location:  Left arm     Pulse: 72 75 71 72   Resp: 16 16 16 18   Temp: 98 1 °F (36 7 °C) (!) 96 9 °F (36 1 °C) 97 5 °F (36 4 °C) 98 °F (36 7 °C)   TempSrc:  Tympanic     SpO2: 94% 95% 95% 95%      Temperature:   Temp (24hrs), Av 5 °F (36 4 °C), Min:96 9 °F (36 1 °C), Max:98 °F (36 7 °C)    Temperature: 98 °F (36 7 °C)  Intake & Output:  I/O        0701  02/22 0700 02/22 0701  02/23 0700 02/23 0701  02/24 0700    P  O  0 540     I V  200      IV Piggyback 100 100     Total Intake 300 640     Urine 2225 1700 600    Stool   0    Total Output 2225 1700 600    Net -1925 -6871 -600           Unmeasured Stool Occurrence   1 x        Weights: There is no height or weight on file to calculate BMI  Weight (last 2 days)     None        Physical Exam  Constitutional:       General: He is not in acute distress  Appearance: He is not diaphoretic  HENT:      Head: Normocephalic and atraumatic  Ears:      Comments: Hard of hearing     Mouth/Throat:      Mouth: Mucous membranes are moist       Pharynx: Oropharynx is clear  Eyes:      Extraocular Movements: Extraocular movements intact  Conjunctiva/sclera: Conjunctivae normal       Pupils: Pupils are equal, round, and reactive to light  Cardiovascular:      Rate and Rhythm: Normal rate and regular rhythm  Pulses: Normal pulses  Heart sounds: Murmur heard  Pulmonary:      Effort: Pulmonary effort is normal  No respiratory distress  Breath sounds: Normal breath sounds  No wheezing, rhonchi or rales  Abdominal:      General: Abdomen is flat  Bowel sounds are normal  There is no distension  Palpations: Abdomen is soft  Tenderness: There is no abdominal tenderness  Genitourinary:     Comments: deferred  Musculoskeletal:      Right lower leg: No edema  Left lower leg: No edema  Comments: Point tenderness during palpation of lumbar spine   Skin:     General: Skin is warm and dry  Neurological:      General: No focal deficit present  Mental Status: He is alert and oriented to person, place, and time  Motor: No weakness  Psychiatric:         Mood and Affect: Mood normal       Comments: Tangential speech   Difficult to redirect        LABORATORY DATA     Labs: I have personally reviewed pertinent reports    Results from last 7 days   Lab Units 02/23/22  0518 02/22/22  0810 02/20/22  0617 02/19/22  3315 02/18/22  0533 02/17/22  0433 02/17/22  0433   WBC Thousand/uL 8 32 4 39 4 66   < > 3 86*   < > 3 45*   HEMOGLOBIN g/dL 9 3* 9 9* 9 5*   < > 8 8*   < > 8 5*   HEMATOCRIT % 29 2* 31 4* 32 1*   < > 30 0*   < > 28 3*   PLATELETS Thousands/uL 252 231 256   < > 262   < > 269   NEUTROS PCT %  --   --   --   --  65  --   --    MONOS PCT %  --   --   --   --  14*  --   --    MONO PCT %  --   --   --   --   --   --  12    < > = values in this interval not displayed  Results from last 7 days   Lab Units 02/23/22  0518 02/22/22  0506 02/20/22  0617 02/18/22  0533 02/17/22  0433   POTASSIUM mmol/L 4 5 4 5 4 3   < > 4 4   CHLORIDE mmol/L 105 103 106   < > 103   CO2 mmol/L 26 26 27   < > 29   BUN mg/dL 25 18 14   < > 18   CREATININE mg/dL 1 08 1 09 1 13   < > 1 21   CALCIUM mg/dL 9 0 9 6 9 1   < > 8 8   ALK PHOS U/L  --   --   --   --  95   ALT U/L  --   --   --   --  28   AST U/L  --   --   --   --  14    < > = values in this interval not displayed  Results from last 7 days   Lab Units 02/18/22  0533   MAGNESIUM mg/dL 2 4          Results from last 7 days   Lab Units 02/23/22  0518 02/22/22  0506 02/21/22  0655   INR  2 63* 1 99* 1 68*               IMAGING & DIAGNOSTIC TESTING     Radiology Results: I have personally reviewed pertinent reports  MRI cervical spine wo contrast    Result Date: 2/21/2022  Impression: No evidence of discitis or osteomyelitis as clinically questioned  Moderate cervical degenerative change most prominent at the C3-4 level and to a lesser extent at C5-6 and C6-7  Workstation performed: [id number]     MRI lumbar spine wo contrast    Result Date: 2/21/2022  Impression: No evidence of discitis or osteomyelitis of the thoracolumbar spine  Lumbar degenerative change with mild to moderate canal stenosis and foraminal narrowing, most pronounced at L4-5 towards the right   Workstation performed: [id number]     Other Diagnostic Testing: I have personally reviewed pertinent reports  ACTIVE MEDICATIONS     Current Facility-Administered Medications   Medication Dose Route Frequency    acetaminophen (TYLENOL) tablet 975 mg  975 mg Oral Q8H [address]    aluminum-magnesium hydroxide-simethicone (MYLANTA) oral suspension 30 mL  30 mL Oral Q4H PRN    aspirin (ECOTRIN LOW STRENGTH) EC tablet 81 mg  81 mg Oral Daily    atorvastatin (LIPITOR) tablet 40 mg  40 mg Oral Daily    bisacodyl (DULCOLAX) rectal suppository 10 mg  10 mg Rectal Daily PRN    cefepime (MAXIPIME) 2,000 mg in dextrose 5 % 50 mL IVPB  2,000 mg Intravenous Q12H    Diclofenac Sodium (VOLTAREN) 1 % topical gel 2 g  2 g Topical 4x Daily    diltiazem (CARDIZEM CD) 24 hr capsule 240 mg  240 mg Oral Daily    ferrous sulfate tablet 325 mg  325 mg Oral BID With Meals    HYDROmorphone (DILAUDID) injection 0 4 mg  0 4 mg Intravenous Q5 Min PRN    lidocaine (LIDODERM) 5 % patch 1 patch  1 patch Topical Daily    lidocaine (PF) (XYLOCAINE-MPF) 1 % injection 0 5 mL  0 5 mL Infiltration Once PRN    melatonin tablet 3 mg  3 mg Oral HS PRN    ondansetron (ZOFRAN) injection 4 mg  4 mg Intravenous Q6H PRN    oxyCODONE (ROXICODONE) IR tablet 2 5 mg  2 5 mg Oral Q6H PRN    oxyCODONE (ROXICODONE) IR tablet 5 mg  5 mg Oral Q6H PRN    pantoprazole (PROTONIX) EC tablet 40 mg  40 mg Oral QAM    polyethylene glycol (MIRALAX) packet 17 g  17 g Oral Daily    senna-docusate sodium (SENOKOT S) 8 6-50 mg per tablet 2 tablet  2 tablet Oral BID    simethicone (MYLICON) chewable tablet 80 mg  80 mg Oral Q6H PRN    sodium phosphate-biphosphate (FLEET) enema 1 enema  1 enema Rectal Once PRN    warfarin (COUMADIN) tablet 4 mg  4 mg Oral Daily (warfarin)       VTE Pharmacologic Prophylaxis: Warfarin (Coumadin)  VTE Mechanical Prophylaxis: sequential compression device    Portions of the record may have been created with voice recognition software    Occasional wrong word or "sound a like" substitutions may have occurred due to the inherent limitations of voice recognition software    Read the chart carefully and recognize, using context, where substitutions have occurred   ==  Becky Petersen, DO  [address]  Internal Medicine Residency PGY-1

## 2022-02-23 NOTE — CASE MANAGEMENT
Case Management Progress Note    Patient name Hossein Overall  Location [facility] 633/PPHP 814-72 MRN [id number]  : 1940 Date 2022       LOS (days): 6  Geometric Mean LOS (GMLOS) (days): 7 10  Days to GMLOS:1 4        OBJECTIVE:        Current admission status: Inpatient  Preferred Pharmacy:   [facility] [place]note 2[place] [address] [place] [id number]  Phone: [phone] Fax: [phone]    Primary Care Provider: Claudetta Harry, MD    Primary Insurance: MEDICARE  Secondary Insurance: [facility]    PROGRESS NOTE: Messages sent to [facility] rehab, [facility] via [facility], inquiring as to whether they can accept this patient, awaiting responses

## 2022-02-23 NOTE — RESTORATIVE TECHNICIAN NOTE
Restorative Technician Note      Patient Name: Lana Hall     Restorative Tech Visit Date: 02/23/22  Note Type: Mobility  Patient Position Upon Consult: Seated edge of bed  Activity Performed: Ambulated  Assistive Device: Other (Comment) (none)  Patient Position at End of Consult: Seated edge of bed;  All needs within reach; Bed/Chair alarm activated

## 2022-02-24 ENCOUNTER — APPOINTMENT (OUTPATIENT)
Dept: RADIATION ONCOLOGY | Facility: CLINIC | Age: 82
End: 2022-02-24
Attending: RADIOLOGY
Payer: MEDICARE

## 2022-02-24 LAB
ANION GAP SERPL CALCULATED.3IONS-SCNC: 6 MMOL/L (ref 4–13)
BUN SERPL-MCNC: 22 MG/DL (ref 5–25)
CALCIUM SERPL-MCNC: 9.6 MG/DL (ref 8.3–10.1)
CHLORIDE SERPL-SCNC: 104 MMOL/L (ref 100–108)
CO2 SERPL-SCNC: 28 MMOL/L (ref 21–32)
CREAT SERPL-MCNC: 1.08 MG/DL (ref 0.6–1.3)
GFR SERPL CREATININE-BSD FRML MDRD: 64 ML/MIN/1.73SQ M
GLUCOSE SERPL-MCNC: 102 MG/DL (ref 65–140)
GLUCOSE SERPL-MCNC: 120 MG/DL (ref 65–140)
GLUCOSE SERPL-MCNC: 74 MG/DL (ref 65–140)
GLUCOSE SERPL-MCNC: 82 MG/DL (ref 65–140)
GLUCOSE SERPL-MCNC: 98 MG/DL (ref 65–140)
HCT VFR BLD AUTO: 32.1 % (ref 36.5–49.3)
HGB BLD-MCNC: 9.9 G/DL (ref 12–17)
INR PPP: 2.92 (ref 0.84–1.19)
MCH RBC QN AUTO: 26.3 PG (ref 26.8–34.3)
MCHC RBC AUTO-ENTMCNC: 30.8 G/DL (ref 31.4–37.4)
MCV RBC AUTO: 85 FL (ref 82–98)
PLATELET # BLD AUTO: 257 THOUSANDS/UL (ref 149–390)
PMV BLD AUTO: 8.9 FL (ref 8.9–12.7)
POTASSIUM SERPL-SCNC: 4.5 MMOL/L (ref 3.5–5.3)
PROTHROMBIN TIME: 29 SECONDS (ref 11.6–14.5)
RBC # BLD AUTO: 3.76 MILLION/UL (ref 3.88–5.62)
SODIUM SERPL-SCNC: 138 MMOL/L (ref 136–145)
WBC # BLD AUTO: 6.04 THOUSAND/UL (ref 4.31–10.16)

## 2022-02-24 PROCEDURE — 97535 SELF CARE MNGMENT TRAINING: CPT

## 2022-02-24 PROCEDURE — 82948 REAGENT STRIP/BLOOD GLUCOSE: CPT

## 2022-02-24 PROCEDURE — 99232 SBSQ HOSP IP/OBS MODERATE 35: CPT | Performed by: INTERNAL MEDICINE

## 2022-02-24 PROCEDURE — 85610 PROTHROMBIN TIME: CPT | Performed by: STUDENT IN AN ORGANIZED HEALTH CARE EDUCATION/TRAINING PROGRAM

## 2022-02-24 PROCEDURE — 85027 COMPLETE CBC AUTOMATED: CPT | Performed by: STUDENT IN AN ORGANIZED HEALTH CARE EDUCATION/TRAINING PROGRAM

## 2022-02-24 PROCEDURE — 80048 BASIC METABOLIC PNL TOTAL CA: CPT | Performed by: STUDENT IN AN ORGANIZED HEALTH CARE EDUCATION/TRAINING PROGRAM

## 2022-02-24 RX ADMIN — CEFEPIME HYDROCHLORIDE 2000 MG: 2 INJECTION, POWDER, FOR SOLUTION INTRAVENOUS at 18:30

## 2022-02-24 RX ADMIN — DICLOFENAC SODIUM 2 G: 10 GEL TOPICAL at 21:26

## 2022-02-24 RX ADMIN — FERROUS SULFATE TAB 325 MG (65 MG ELEMENTAL FE) 325 MG: 325 (65 FE) TAB at 10:11

## 2022-02-24 RX ADMIN — DILTIAZEM HYDROCHLORIDE 240 MG: 240 CAPSULE, COATED, EXTENDED RELEASE ORAL at 10:11

## 2022-02-24 RX ADMIN — WARFARIN SODIUM 3 MG: 2 TABLET ORAL at 18:22

## 2022-02-24 RX ADMIN — PANTOPRAZOLE SODIUM 40 MG: 40 TABLET, DELAYED RELEASE ORAL at 05:32

## 2022-02-24 RX ADMIN — POLYETHYLENE GLYCOL 3350 17 G: 17 POWDER, FOR SOLUTION ORAL at 10:11

## 2022-02-24 RX ADMIN — OXYCODONE HYDROCHLORIDE 5 MG: 5 TABLET ORAL at 10:12

## 2022-02-24 RX ADMIN — CEFEPIME HYDROCHLORIDE 2000 MG: 2 INJECTION, POWDER, FOR SOLUTION INTRAVENOUS at 05:37

## 2022-02-24 RX ADMIN — DICLOFENAC SODIUM 2 G: 10 GEL TOPICAL at 10:15

## 2022-02-24 RX ADMIN — SENNOSIDES AND DOCUSATE SODIUM 2 TABLET: 50; 8.6 TABLET ORAL at 18:22

## 2022-02-24 RX ADMIN — OXYCODONE HYDROCHLORIDE 5 MG: 5 TABLET ORAL at 18:27

## 2022-02-24 RX ADMIN — FERROUS SULFATE TAB 325 MG (65 MG ELEMENTAL FE) 325 MG: 325 (65 FE) TAB at 18:22

## 2022-02-24 RX ADMIN — ACETAMINOPHEN 975 MG: 325 TABLET, FILM COATED ORAL at 05:32

## 2022-02-24 RX ADMIN — ACETAMINOPHEN 975 MG: 325 TABLET, FILM COATED ORAL at 21:24

## 2022-02-24 RX ADMIN — ATORVASTATIN CALCIUM 40 MG: 40 TABLET, FILM COATED ORAL at 10:11

## 2022-02-24 RX ADMIN — OXYCODONE HYDROCHLORIDE 5 MG: 5 TABLET ORAL at 00:20

## 2022-02-24 RX ADMIN — LIDOCAINE 5% 1 PATCH: 700 PATCH TOPICAL at 10:11

## 2022-02-24 RX ADMIN — DICLOFENAC SODIUM 2 G: 10 GEL TOPICAL at 12:00

## 2022-02-24 RX ADMIN — ACETAMINOPHEN 975 MG: 325 TABLET, FILM COATED ORAL at 14:38

## 2022-02-24 RX ADMIN — SENNOSIDES AND DOCUSATE SODIUM 2 TABLET: 50; 8.6 TABLET ORAL at 10:11

## 2022-02-24 RX ADMIN — ASPIRIN 81 MG: 81 TABLET, COATED ORAL at 10:11

## 2022-02-24 RX ADMIN — DICLOFENAC SODIUM 2 G: 10 GEL TOPICAL at 18:25

## 2022-02-24 NOTE — PROGRESS NOTES
INTERNAL MEDICINE RESIDENCY PROGRESS NOTE     Name: Lenny Fleming   Age & Sex: 80 y o  male   MRN: [id number]  Unit/Bed#: [facility] 633-01   Encounter: [id number]  Team: SOD Team C     PATIENT INFORMATION     Name: Lenny Fleming   Age & Sex: 80 y o  male   MRN: [id number]  Hospital Stay Days: 7    ASSESSMENT/PLAN     Principal Problem:    Sepsis due to Pseudomonas aeruginosa [facility])  Active Problems:    Back pain    CAD (coronary atherosclerotic disease)    Primary hypertension    GERD (gastroesophageal reflux disease)    Hyperlipidemia    S/P CABG x 4    Bladder cancer (HCC)    CKD (chronic kidney disease) stage 3, GFR 30-59 ml/min ([facility])    COPD, severe ([facility])    Atrial fibrillation ([facility] [address] )    Constipation    Overgrown toenails    Anemia    Metabolic encephalopathy    Bacteremia    Pyelonephritis      * Sepsis due to Pseudomonas aeruginosa [facility])  Assessment & Plan  Patient initally presented to [facility] on 2/4 with sepsis 2/2 UTI/pyelonephritis with urine and blood cultures positive for pseudomonas  RENEE done 2/9 demonstrated endocarditis  Currently with concern for vertebral osteomyelitis, and transferred to [facility] for MRI with general anesthesia 2/2 advanced dementia  Repeat blood cultures negative to date  Plan:  · Source most likely urinary given b/l nephrostomy tubes, chronic archibald, and + UA  Will get MRI lumbar spine to r/u discitis, etc given complaint of back pain  · Chemo port removed and PICC line placed  · Patient to complete 6 week course of antibiotics (through 3/21/22)  · MRI performed on 02/21 with no findings suggestive of vertebral osteomyelitis  · ID following    Back pain  Assessment & Plan  Patient transferred to [facility] for MRI with general anesthesia in setting of back pain with presumed vertebral osteomyelitis    Plan:  · MRI cervical, thoracic, and lumbar spine with anesthesia completed 2/21: no evidence of discitis or osteomyelitis of thoracolumbar spine   Lumbar degeneration changes with mild/ moderate canal stenosis and foraminal narrowing, pronounced at L4-L5 on right  · Pain regimen in place      Pyelonephritis  Assessment & Plan  Patient presented septic with CT findings of pyelonephritis    Bacteremia  Assessment & Plan  Patient with pseudomonas bacteremia on presentation to [facility]  Currently with repeat blood cultures no growth at 5 days  Plan:  · Continue IV antibiotics as described under sepsis plan  · ID consulted - appreciate recommendations    Metabolic encephalopathy  Assessment & Plan  Negative CTH at time or presentation  Evaluated by neuropsychiatry on 2/16 and determined not to have capacity to make medical decisions    Plan:  · Outpatient benzos and narcotics have been held since admission  · Delirium precautions and frequent redirection      Anemia  Assessment & Plan  Baseline Hgb 8-9  Currently stable    Plan:  · Continue iron supplementation  · Monitor daily CBC and transfuse for Hgb <7    Overgrown toenails  Assessment & Plan  Podiatry consulted    Constipation  Assessment & Plan  Patient last recorded BM on 02/23  · Scheduled Miralax 17 g daily  · Senna-docusate 2 tablet BID  · PRN bisacodyl  · lactulose 10g PO one time dose administered 2/22       Atrial fibrillation ([facility] [address] )  Assessment & Plan  Currently rate controlled  Managed as outpatient on coumadin and cardizem    Plan:  · Home Coumadin 5 mg  Monitor INR  Not being bridged from heparin  · INR is now therapeutic  2/24: PT: 29 (increasing from 26 08 on 2/23), INR: 1 92 (increasing from 2 63 on 2/23)  · Decreased warfarin to 4 mg daily on 2/23/22  · Continue diltiazem      COPD, severe ([address] )  Assessment & Plan  Currently stable   Not on medications as outpatient    CKD (chronic kidney disease) stage 3, GFR 30-59 ml/min [facility])  Assessment & Plan  Lab Results   Component Value Date    EGFR 55 02/17/2022    EGFR 52 02/15/2022    EGFR 59 02/13/2022    CREATININE 1 21 02/17/2022    CREATININE 1 27 02/15/2022    CREATININE 1 14 02/13/2022     Currently at baseline Cr  Plan:  · Monitor I/O  · Check daily BMP  · Avoid nephrotoxic agents    Bladder cancer [facility])  Assessment & Plan  Patient has history of 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 diagnosed 10/12/2021  S/p placement of B/L nephrostomy tubes and with chronic archibald in place  Follows with heme-onc and urology outpatient and receiving chemo and radiation  CT CAP 2/6 with concern for new right apical pleural-based mass now s/p IR biopsy on 2/14 without evidence of malignancy    Plan:  · Outpatient heme/onc and urology follow up    Hyperlipidemia  Assessment & Plan  Continue atorvastatin    GERD (gastroesophageal reflux disease)  Assessment & Plan  Continue pantoprazole    Primary hypertension  Assessment & Plan  Controlled as outpatient on Cardizem    Plan:  · Continue home Cardizem 240 daily  · Monitor vitals closely    CAD (coronary atherosclerotic disease)  Assessment & Plan  S/p CABG x4  Currently without chest pain  Stable    Plan:  · Continue home aspirin and statin      Disposition: IV antibiotics, working with CM for placement, continue inpatient monitoring  Otherwise, stable for discharge  SUBJECTIVE     Patient seen and examined  No acute events overnight  Patient is lying comfortably in bed  Patient complains of mild back pain  Patient is asking for pain medication  Patient rates back pain a 6-7/10 today, feels improvement in pain from yesterday to today  Patient states that physical therapy helps his back pain, says that he has learned back stretches from physical therapy  Patient had bowel movement yesterday  Patient reports no difficulties eating, drinking, or urinating  Patient was able to sleep last night  Denies fevers, chills, headaches, lightheadedness  Denies congestion, sore throat  Denies chest pain, shortness of breath, palpitations  Denies abdominal pain, nausea, vomiting, diarrhea, constipation  Denies dysuria, hematuria   Denies weakness in legs  OBJECTIVE     Vitals:    22 0754 22 1407 22 2253 22 0829   BP: 124/78 130/76 133/73 132/74   BP Location:       Pulse: 71 72 64 66   Resp: 16 18 15 16   Temp: 97 5 °F (36 4 °C) 98 °F (36 7 °C) 98 8 °F (37 1 °C) 98 7 °F (37 1 °C)   TempSrc:       SpO2: 95% 95% 94% 95%      Temperature:   Temp (24hrs), Av 5 °F (36 9 °C), Min:98 °F (36 7 °C), Max:98 8 °F (37 1 °C)    Temperature: 98 7 °F (37 1 °C)  Intake & Output:  I/O        0701   0700  0701   0700  07 0700    P  O  540 225     I V        IV Piggyback 100 50     Total Intake 640 275     Urine 1700 3375 475    Stool  0     Total Output 1700 3375 475    Net -1060 -3100 -475           Unmeasured Stool Occurrence  2 x         Weights: There is no height or weight on file to calculate BMI  Weight (last 2 days)     None        Physical Exam  Constitutional:       General: He is not in acute distress  Appearance: Normal appearance  He is not diaphoretic  HENT:      Head: Normocephalic and atraumatic  Ears:      Comments: Hard of hearing     Mouth/Throat:      Mouth: Mucous membranes are moist       Pharynx: Oropharynx is clear  Eyes:      Extraocular Movements: Extraocular movements intact  Conjunctiva/sclera: Conjunctivae normal       Pupils: Pupils are equal, round, and reactive to light  Cardiovascular:      Rate and Rhythm: Normal rate and regular rhythm  Pulses: Normal pulses  Heart sounds: Murmur (mitral valve) heard  Pulmonary:      Effort: Pulmonary effort is normal  No respiratory distress  Breath sounds: Normal breath sounds  No wheezing, rhonchi or rales  Abdominal:      General: Abdomen is flat  Bowel sounds are normal  There is no distension  Palpations: Abdomen is soft  Tenderness: There is no abdominal tenderness     Genitourinary:     Comments: deferred  Musculoskeletal:         General: Tenderness (mild lumbar point tenderness) present  Cervical back: Normal range of motion  Right lower leg: No edema  Left lower leg: No edema  Skin:     General: Skin is warm and dry  Neurological:      General: No focal deficit present  Mental Status: He is alert and oriented to person, place, and time  Motor: No weakness  Psychiatric:         Mood and Affect: Mood normal          Behavior: Behavior normal          Thought Content: Thought content normal       Comments: Less tangential today  Was able to be redirected more easily today        LABORATORY DATA     Labs: I have personally reviewed pertinent reports  Results from last 7 days   Lab Units 02/24/22  0533 02/23/22  0518 02/22/22  0810 02/19/22  0648 02/18/22  0533   WBC Thousand/uL 6 04 8 32 4 39   < > 3 86*   HEMOGLOBIN g/dL 9 9* 9 3* 9 9*   < > 8 8*   HEMATOCRIT % 32 1* 29 2* 31 4*   < > 30 0*   PLATELETS Thousands/uL 257 252 231   < > 262   NEUTROS PCT %  --   --   --   --  65   MONOS PCT %  --   --   --   --  14*    < > = values in this interval not displayed  Results from last 7 days   Lab Units 02/24/22  0533 02/23/22  0518 02/22/22  0506   POTASSIUM mmol/L 4 5 4 5 4 5   CHLORIDE mmol/L 104 105 103   CO2 mmol/L 28 26 26   BUN mg/dL 22 25 18   CREATININE mg/dL 1 08 1 08 1 09   CALCIUM mg/dL 9 6 9 0 9 6     Results from last 7 days   Lab Units 02/18/22  0533   MAGNESIUM mg/dL 2 4          Results from last 7 days   Lab Units 02/24/22  0533 02/23/22  0518 02/22/22  0506   INR  2 92* 2 63* 1 99*               IMAGING & DIAGNOSTIC TESTING     Radiology Results: I have personally reviewed pertinent reports  MRI cervical spine wo contrast    Result Date: 2/21/2022  Impression: No evidence of discitis or osteomyelitis as clinically questioned  Moderate cervical degenerative change most prominent at the C3-4 level and to a lesser extent at C5-6 and C6-7   Workstation performed: [id number]     MRI lumbar spine wo contrast    Result Date: 2/21/2022  Impression: No evidence of discitis or osteomyelitis of the thoracolumbar spine  Lumbar degenerative change with mild to moderate canal stenosis and foraminal narrowing, most pronounced at L4-5 towards the right  Workstation performed: [id number]     Other Diagnostic Testing: I have personally reviewed pertinent reports      ACTIVE MEDICATIONS     Current Facility-Administered Medications   Medication Dose Route Frequency    acetaminophen (TYLENOL) tablet 975 mg  975 mg Oral Q8H [address]    aluminum-magnesium hydroxide-simethicone (MYLANTA) oral suspension 30 mL  30 mL Oral Q4H PRN    aspirin (ECOTRIN LOW STRENGTH) EC tablet 81 mg  81 mg Oral Daily    atorvastatin (LIPITOR) tablet 40 mg  40 mg Oral Daily    bisacodyl (DULCOLAX) rectal suppository 10 mg  10 mg Rectal Daily PRN    cefepime (MAXIPIME) 2,000 mg in dextrose 5 % 50 mL IVPB  2,000 mg Intravenous Q12H    Diclofenac Sodium (VOLTAREN) 1 % topical gel 2 g  2 g Topical 4x Daily    diltiazem (CARDIZEM CD) 24 hr capsule 240 mg  240 mg Oral Daily    ferrous sulfate tablet 325 mg  325 mg Oral BID With Meals    HYDROmorphone (DILAUDID) injection 0 4 mg  0 4 mg Intravenous Q5 Min PRN    lidocaine (LIDODERM) 5 % patch 1 patch  1 patch Topical Daily    lidocaine (PF) (XYLOCAINE-MPF) 1 % injection 0 5 mL  0 5 mL Infiltration Once PRN    melatonin tablet 3 mg  3 mg Oral HS PRN    ondansetron (ZOFRAN) injection 4 mg  4 mg Intravenous Q6H PRN    oxyCODONE (ROXICODONE) IR tablet 2 5 mg  2 5 mg Oral Q6H PRN    oxyCODONE (ROXICODONE) IR tablet 5 mg  5 mg Oral Q6H PRN    pantoprazole (PROTONIX) EC tablet 40 mg  40 mg Oral QAM    polyethylene glycol (MIRALAX) packet 17 g  17 g Oral Daily    senna-docusate sodium (SENOKOT S) 8 6-50 mg per tablet 2 tablet  2 tablet Oral BID    simethicone (MYLICON) chewable tablet 80 mg  80 mg Oral Q6H PRN    sodium phosphate-biphosphate (FLEET) enema 1 enema  1 enema Rectal Once PRN    warfarin (COUMADIN) tablet 3 mg  3 mg Oral Daily (warfarin)       VTE Pharmacologic Prophylaxis: Warfarin (Coumadin)  VTE Mechanical Prophylaxis: sequential compression device    Portions of the record may have been created with voice recognition software  Occasional wrong word or "sound a like" substitutions may have occurred due to the inherent limitations of voice recognition software    Read the chart carefully and recognize, using context, where substitutions have occurred   ==  Trevon Graham, DO  [address]  Internal Medicine Residency PGY-1

## 2022-02-24 NOTE — PHYSICIAN ADVISOR
Current patient class: Inpatient  The patient is currently on Hospital Day: 8      The patient was admitted to the hospital at  8:56 PM on 2/17/22 for the following diagnosis:  Sepsis ([address] ) [A41 9]     CMS OUTLIER STAY REVIEW    After review of the relevant documentation, labs, vital signs and test results, the patient is appropriate for CONTINUED INPATIENT ADMISSION  The patient continues to remain hospitalized receiving acute medical care  The patient has surpassed the expected duration of stay, however given the clinical condition, need for further acute care management, the patient is appropriate to remain in an inpatient status  The patient still being actively managed, and does have unresolved medical issues requiring further hospitalization  This review is conducted at 20 days, to help satisfy the requirements for significant outlier stay review as per CMS  Given the current condition of this patient, the patient satisfies this review was determination for continued inpatient stay  Rationale is as follows: The patient is a 80 yrs old Male who presented to the [facility] [address] on 02/04 and was transferred to [facility] on 02/17  Patient has 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     Patient was noted to have a pseudomonal UTI as well  Patient still remains hospitalized at [facility] and has been receiving continues to receive acute ongoing inpatient medical care  He is currently awaiting disposition    The patients vitals on arrival were   ED Triage Vitals   Temperature Pulse Respirations Blood Pressure SpO2   02/17/22 2107 02/17/22 2107 02/17/22 2107 02/17/22 2107 02/17/22 2107   97 8 °F (36 6 °C) 68 18 147/75 96 %      Temp Source Heart Rate Source Patient Position - Orthostatic VS BP Location FiO2 (%)   02/19/22 2240 02/23/22 0058 02/19/22 2240 02/19/22 2240 --   Axillary Monitor Lying Left arm       Pain Score       02/17/22 2358       10 - Worst Possible Pain           Past Medical History:   Diagnosis Date    A-fib ([address] )     Arthritis     Benign prostatic hyperplasia without lower urinary tract symptoms     without Urinary Obstruction    Bladder cancer (HCC)     CAD (coronary artery disease)     Cancer (HCC)     Chronic obstructive lung disease (HCC)     Constipation 12/9/2021    Coronary arteriosclerosis     Depression     Emphysema lung (HCC)     GERD (gastroesophageal reflux disease)     Hyperlipidemia     Hypertension     Hyponatremia 1/15/2022    Irregular heart beat     Myocardial infarction (HCC)     Psoriasis     Requires supplemental oxygen     at bedtime during high humid days only    Stroke [facility])     TIA 1/2018     Past Surgical History:   Procedure Laterality Date    COLONOSCOPY      CORONARY ANGIOPLASTY  02/03/2001    PTCA of RCA    CORONARY ARTERY BYPASS GRAFT  02/07/2001    x4- [place]    HERNIA REPAIR      IR BIOPSY LUNG  2/14/2022    IR NEPHROSTOMY TUBE PLACEMENT  1/18/2022    IR PORT PLACEMENT  1/14/2022    IR PORT REMOVAL  2/7/2022    NC BRONCHOSCOPY,DIAGNOSTIC Left 1/7/2019    Procedure: BRONCHOSCOPY FLEXIBLE;  Surgeon: Karly Lizama MD;  [facility];   Service: Pulmonary    NC CYSTOURETHROSCOPY,FULGUR <0 5 CM LESN N/A 11/30/2021    Procedure: TRANSURETHRAL RESECTION OF BLADDER TUMOR (TURBT) with "large";  Surgeon: Tayla Phillip MD;  Location: [facility];  Service: Urology    NC CYSTOURETHROSCOPY,URETER CATHETER Bilateral 11/30/2021    Procedure: Rosanna Langston;  Surgeon: Tayla Phillip MD;  [facility]: [facility];  Service: Urology    NC Zhanna Gaspar Left 2/20/2018    Procedure: ENDARTERECTOMY ARTERY CAROTID WITH PATCH ANGIOPLASTY;  Surgeon: Zana Jett MD;  [facility];  Service: Vascular    TRANSURETHRAL RESECTION OF PROSTATE             Consults have been placed to:   IP CONSULT TO INFECTIOUS DISEASES  IP CONSULT TO PODIATRY    Vitals: 02/23/22 1407 02/23/22 2253 02/24/22 0829 02/24/22 1456   BP: 130/76 133/73 132/74 137/68   BP Location:       Pulse: 72 64 66    Resp: 18 15 16    Temp: 98 °F (36 7 °C) 98 8 °F (37 1 °C) 98 7 °F (37 1 °C) 98 2 °F (36 8 °C)   TempSrc:       SpO2: 95% 94% 95%        Most recent labs:    Recent Labs     02/24/22  0533   WBC 6 04   HGB 9 9*   HCT 32 1*      K 4 5   CALCIUM 9 6   BUN 22   CREATININE 1 08   INR 2 92*       Scheduled Meds:  Current Facility-Administered Medications   Medication Dose Route Frequency Provider Last Rate    acetaminophen  975 mg Oral [facility] Sussy Mauricio MD      aluminum-magnesium hydroxide-simethicone  30 mL Oral Q4H PRN Sussy Mauricio MD      aspirin  81 mg Oral Daily Sussy Mauricio MD      atorvastatin  40 mg Oral Daily Sussy Mauricio MD      bisacodyl  10 mg Rectal Daily PRN Sussy Mauricio MD      cefepime  2,000 mg Intravenous Q12H Sussy Mauricio MD 2,000 mg (02/24/22 0537)    Diclofenac Sodium  2 g Topical 4x Daily Sussy Mauricio MD      diltiazem  240 mg Oral Daily Sussy Mauricio MD      ferrous sulfate  325 mg Oral BID With Meals Sussy Mauricio MD      HYDROmorphone  0 4 mg Intravenous Q5 Min PRN Jonathan Hannon MD      lidocaine  1 patch Topical Daily Sussy Mauricio MD      lidocaine (PF)  0 5 mL Infiltration Once PRN Sussy Mauricio MD      melatonin  3 mg Oral HS PRN Sussy Mauricio MD      ondansetron  4 mg Intravenous Q6H PRN Sussy Mauricio MD      oxyCODONE  2 5 mg Oral Q6H PRN Sussy Mauricio MD      oxyCODONE  5 mg Oral Q6H PRN Sussy Mauricio MD      pantoprazole  40 mg Oral QAM Sussy Mauricio MD      polyethylene glycol  17 g Oral Daily Sussy Mauricio MD      senna-docusate sodium  2 tablet Oral BID Sussy Mauricio MD      simethicone  80 mg Oral Q6H PRN Martha Nicole DO      sodium phosphate-biphosphate  1 enema Rectal Once PRN Sussy Mauricio MD      warfarin 3 mg Oral Daily (warfarin) Mardel Minus, DO       Continuous Infusions:   PRN Meds: aluminum-magnesium hydroxide-simethicone    bisacodyl    HYDROmorphone    lidocaine (PF)    melatonin    ondansetron    oxyCODONE    oxyCODONE    simethicone    sodium phosphate-biphosphate    Surgical procedures (if appropriate):

## 2022-02-24 NOTE — OCCUPATIONAL THERAPY NOTE
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 [employer] sedentary    Pain Assessment   Pain Assessment Tool 0-10   Pain Score 10 - Worst Possible Pain   ADL   Where Assessed Other (Comment)   Toileting Assistance  5  Supervision/Setup   Transfers   Sit to Stand 5  Supervision   Additional items Assist x 1   Stand to Sit 5  Supervision   Additional items Assist x 1   Functional Mobility   Functional Mobility 4  Minimal assistance   Additional items Hand hold assistance   Toilet Transfers   Toilet Transfer From Other (Comment)   Toilet Transfer Type To and from   Toilet Transfer to Standard toilet   Toilet Transfer Technique Ambulating   Toilet Transfers Minimal assistance   Cognition   Overall Cognitive Status Impaired   Arousal/Participation Arousable   Attention Attends with cues to redirect   Orientation Level Oriented to person;Oriented to place   Memory Decreased short term memory;Decreased recall of recent events;Decreased recall of precautions   Following Commands Follows one step commands with increased time or repetition   Activity Tolerance   Activity Tolerance Patient tolerated treatment well   Assessment   Assessment Pt seen for Occupational Therapy session with focus on activity tolerance, functional transfers/mob, toileting/toilet transfers and, standing tolerance and balance for pt engagement in UB self-care tasks   Pt cleared by RN/Nasreen for pt participated in OT session  Pt presented sitting edge of bed and pt identifiers confirmed  Pt very [place] and unable to report his therapy goal 2* pt cognitive impairments  Pt reported pain and need for toileting however was unable to void  Pt nurse aware and involved  OT continues to anticipate pt to return to home with home health services  Pt able to participation in self-care task/toileting with assist   Pt return to sitting edge of bed post session, return to sitting out of bed to bedside chair post session, bed alarm activated and all needs within reach     Plan   Treatment Interventions ADL retraining   Goal Expiration Date 03/04/22   OT Treatment Day 2   OT Frequency 2-3x/wk   Recommendation   OT Discharge Recommendation Home with home health rehabilitation   AM-PAC Daily Activity Inpatient   Lower Body Dressing 3   Bathing 3   Toileting 3   Upper Body Dressing 3   Grooming 3   Eating 4   Daily Activity Raw Score 19   Daily Activity Standardized Score (Calc for Raw Score >=11) 40 22   AM-PAC Applied Cognition Inpatient   Following a Speech/Presentation 3   Understanding Ordinary Conversation 4   Taking Medications 2   Remembering Where Things Are Placed or Put Away 2   Remembering List of 4-5 Errands 2   Taking Care of Complicated Tasks 2   Applied Cognition Raw Score 15   Applied Cognition Standardized Score 33 54   Barthel Index   Feeding 5   Bathing 0   Grooming Score 5   Dressing Score 5   Bladder Score 0   Bowels Score 10   Toilet Use Score 5   Transfers (Bed/Chair) Score 10   Mobility (Level Surface) Score 0   Stairs Score 0   Barthel Index Score 40   Modified [place] Scale   Modified Ander Scale 2     Diana OJEDA/MANI

## 2022-02-24 NOTE — PROGRESS NOTES
Progress Note - Infectious Disease   Vernadine Showers 80 y o  male MRN: [id number]  Unit/Bed#: [facility] 633-01 Encounter: [id number]      Impression/Recommendations:  1  Sepsis   POA to SLMO   Due to #2/3  No other appreciable source   Improved  Rec:  · Continue antibiotics as below  · Follow temperatures and WBC closely  · Supportive care as per the primary service     2   Pseudomonal bacteremia with TV endocarditis   Due to #3   Consider also secondary port infection, status post removal    Repeat blood cultures with late positive, now cleared   RENEE with vegetation on moderator band of TV  Rec:  · Continue cefepime for 6 weeks total through 2022   · Check weekly CBC-diff, Cr while in IV antibiotic  · D/C PICC after last dose IV antibiotics  · D/C planning for STR  · Outpatient follow-up with ID 2 weeks after D/C     3   Pseudomonal UTI   In setting of recent Fortune catheter exchange, malpositioned Fortune   CT also shows pyelonephritis  Rec:  · Continue antibiotics as above     4   Back pain   Likely due to degeneration, canal stenosis seen MRI (noncontast)  No evidence of discitis/OM  Rec:  · PT/OT as tolerated  · Supportive care as per the primary service     5   Bladder cancer   With obstructive nephrolithiasis   Chronic Fortune, bilateral PCNS   On outpatient chemotherapy      6   CKD   Baseline Cr 1 1-1 2      The patient is stable from an ID standpoint      Antibiotics:  Cefepime #21    Subjective:  Patient seen on AM rounds  Limited ROS due to very [place]  Seems upset today, wanting to pray together  24 Hour Events:  No documented fevers, chills, sweats, nausea, vomiting, or diarrhea      Objective:  Vitals:  Temp:  [98 2 °F (36 8 °C)-98 8 °F (37 1 °C)] 98 2 °F (36 8 °C)  HR:  [64-66] 66  Resp:  [15-16] 16  BP: (132-137)/(68-74) 137/68  SpO2:  [94 %-95 %] 95 %  Temp (24hrs), Av 6 °F (37 °C), Min:98 2 °F (36 8 °C), Max:98 8 °F (37 1 °C)  Current: Temperature: 98 2 °F (36 8 °C)    Physical Exam: General:  No acute distress  Psychiatric:  Awake and alert  Pulmonary:  Normal respiratory excursion without accessory muscle use  Abdomen:  Soft, nontender  Extremities:  No edema  Skin:  No rashes    Lab Results:  I have personally reviewed pertinent labs  Results from last 7 days   Lab Units 02/24/22  0533 02/23/22  0518 02/22/22  0506   POTASSIUM mmol/L 4 5 4 5 4 5   CHLORIDE mmol/L 104 105 103   CO2 mmol/L 28 26 26   BUN mg/dL 22 25 18   CREATININE mg/dL 1 08 1 08 1 09   EGFR ml/min/1 73sq m 64 64 63   CALCIUM mg/dL 9 6 9 0 9 6     Results from last 7 days   Lab Units 02/24/22  0533 02/23/22  0518 02/22/22  0810   WBC Thousand/uL 6 04 8 32 4 39   HEMOGLOBIN g/dL 9 9* 9 3* 9 9*   PLATELETS Thousands/uL 257 252 231           Imaging Studies:   I have personally reviewed pertinent imaging study reports and images in PACS  EKG, Pathology, and Other Studies:   I have personally reviewed pertinent reports

## 2022-02-24 NOTE — PLAN OF CARE
Problem: OCCUPATIONAL THERAPY ADULT  Goal: Performs self-care activities at highest level of function for planned discharge setting  See evaluation for individualized goals  Description: Treatment Interventions: ADL retraining,Functional transfer training,Endurance training,Cognitive reorientation,Patient/family training,Equipment evaluation/education,Compensatory technique education,Activityengagement          See flowsheet documentation for full assessment, interventions and recommendations  Outcome: Progressing  Note: Limitation: Decreased ADL status,Decreased Safe judgement during ADL,Decreased cognition,Decreased endurance,Decreased self-care trans  Prognosis: Fair,Good  Assessment: Pt seen for Occupational Therapy session with focus on activity tolerance, functional transfers/mob, toileting/toilet transfers and, standing tolerance and balance for pt engagement in UB self-care tasks  Pt cleared by RN/Nasreen for pt participated in OT session  Pt presented sitting edge of bed and pt identifiers confirmed  Pt very [place] and unable to report his therapy goal 2* pt cognitive impairments  Pt reported pain and need for toileting however was unable to void  Pt nurse aware and involved  OT continues to anticipate pt to return to home with home health services  Pt able to participation in self-care task/toileting with assist   Pt return to sitting edge of bed post session, return to sitting out of bed to bedside chair post session, bed alarm activated and all needs within reach  OT Discharge Recommendation: Home with home health rehabilitation  OT - OK to Discharge:  Yes

## 2022-02-25 ENCOUNTER — APPOINTMENT (OUTPATIENT)
Dept: RADIATION ONCOLOGY | Facility: CLINIC | Age: 82
End: 2022-02-25
Attending: RADIOLOGY
Payer: MEDICARE

## 2022-02-25 PROBLEM — I07.9 ENDOCARDITIS OF TRICUSPID VALVE: Status: ACTIVE | Noted: 2022-02-25

## 2022-02-25 LAB
ANION GAP SERPL CALCULATED.3IONS-SCNC: 6 MMOL/L (ref 4–13)
BUN SERPL-MCNC: 23 MG/DL (ref 5–25)
CALCIUM SERPL-MCNC: 8.9 MG/DL (ref 8.3–10.1)
CHLORIDE SERPL-SCNC: 104 MMOL/L (ref 100–108)
CO2 SERPL-SCNC: 27 MMOL/L (ref 21–32)
CREAT SERPL-MCNC: 1.15 MG/DL (ref 0.6–1.3)
GFR SERPL CREATININE-BSD FRML MDRD: 59 ML/MIN/1.73SQ M
GLUCOSE SERPL-MCNC: 90 MG/DL (ref 65–140)
HCT VFR BLD AUTO: 31.2 % (ref 36.5–49.3)
HGB BLD-MCNC: 10 G/DL (ref 12–17)
INR PPP: 2.92 (ref 0.84–1.19)
MCH RBC QN AUTO: 26.6 PG (ref 26.8–34.3)
MCHC RBC AUTO-ENTMCNC: 32.1 G/DL (ref 31.4–37.4)
MCV RBC AUTO: 83 FL (ref 82–98)
PLATELET # BLD AUTO: 231 THOUSANDS/UL (ref 149–390)
PMV BLD AUTO: 9 FL (ref 8.9–12.7)
POTASSIUM SERPL-SCNC: 4 MMOL/L (ref 3.5–5.3)
PROTHROMBIN TIME: 29 SECONDS (ref 11.6–14.5)
RBC # BLD AUTO: 3.76 MILLION/UL (ref 3.88–5.62)
SODIUM SERPL-SCNC: 137 MMOL/L (ref 136–145)
WBC # BLD AUTO: 5.24 THOUSAND/UL (ref 4.31–10.16)

## 2022-02-25 PROCEDURE — 85027 COMPLETE CBC AUTOMATED: CPT | Performed by: INTERNAL MEDICINE

## 2022-02-25 PROCEDURE — 99232 SBSQ HOSP IP/OBS MODERATE 35: CPT | Performed by: INTERNAL MEDICINE

## 2022-02-25 PROCEDURE — 85610 PROTHROMBIN TIME: CPT | Performed by: INTERNAL MEDICINE

## 2022-02-25 PROCEDURE — 80048 BASIC METABOLIC PNL TOTAL CA: CPT | Performed by: INTERNAL MEDICINE

## 2022-02-25 RX ADMIN — DICLOFENAC SODIUM 2 G: 10 GEL TOPICAL at 18:05

## 2022-02-25 RX ADMIN — OXYCODONE HYDROCHLORIDE 5 MG: 5 TABLET ORAL at 10:49

## 2022-02-25 RX ADMIN — CEFEPIME HYDROCHLORIDE 1000 MG: 1 INJECTION, POWDER, FOR SOLUTION INTRAMUSCULAR; INTRAVENOUS at 19:43

## 2022-02-25 RX ADMIN — DICLOFENAC SODIUM 2 G: 10 GEL TOPICAL at 09:00

## 2022-02-25 RX ADMIN — ONDANSETRON 4 MG: 2 INJECTION INTRAMUSCULAR; INTRAVENOUS at 06:06

## 2022-02-25 RX ADMIN — FERROUS SULFATE TAB 325 MG (65 MG ELEMENTAL FE) 325 MG: 325 (65 FE) TAB at 18:03

## 2022-02-25 RX ADMIN — DICLOFENAC SODIUM 2 G: 10 GEL TOPICAL at 11:31

## 2022-02-25 RX ADMIN — ATORVASTATIN CALCIUM 40 MG: 40 TABLET, FILM COATED ORAL at 10:49

## 2022-02-25 RX ADMIN — FERROUS SULFATE TAB 325 MG (65 MG ELEMENTAL FE) 325 MG: 325 (65 FE) TAB at 10:50

## 2022-02-25 RX ADMIN — ACETAMINOPHEN 975 MG: 325 TABLET, FILM COATED ORAL at 05:53

## 2022-02-25 RX ADMIN — SENNOSIDES AND DOCUSATE SODIUM 2 TABLET: 50; 8.6 TABLET ORAL at 18:03

## 2022-02-25 RX ADMIN — ASPIRIN 81 MG: 81 TABLET, COATED ORAL at 10:49

## 2022-02-25 RX ADMIN — DILTIAZEM HYDROCHLORIDE 240 MG: 240 CAPSULE, COATED, EXTENDED RELEASE ORAL at 10:49

## 2022-02-25 RX ADMIN — PANTOPRAZOLE SODIUM 40 MG: 40 TABLET, DELAYED RELEASE ORAL at 05:53

## 2022-02-25 RX ADMIN — POLYETHYLENE GLYCOL 3350 17 G: 17 POWDER, FOR SOLUTION ORAL at 10:50

## 2022-02-25 RX ADMIN — WARFARIN SODIUM 3 MG: 2 TABLET ORAL at 18:02

## 2022-02-25 RX ADMIN — LIDOCAINE 5% 1 PATCH: 700 PATCH TOPICAL at 11:27

## 2022-02-25 RX ADMIN — CEFEPIME HYDROCHLORIDE 2000 MG: 2 INJECTION, POWDER, FOR SOLUTION INTRAVENOUS at 11:34

## 2022-02-25 RX ADMIN — SENNOSIDES AND DOCUSATE SODIUM 2 TABLET: 50; 8.6 TABLET ORAL at 10:49

## 2022-02-25 RX ADMIN — OXYCODONE HYDROCHLORIDE 5 MG: 5 TABLET ORAL at 18:04

## 2022-02-25 RX ADMIN — ACETAMINOPHEN 975 MG: 325 TABLET, FILM COATED ORAL at 18:03

## 2022-02-25 RX ADMIN — DICLOFENAC SODIUM 2 G: 10 GEL TOPICAL at 22:20

## 2022-02-25 RX ADMIN — ACETAMINOPHEN 975 MG: 325 TABLET, FILM COATED ORAL at 22:20

## 2022-02-25 RX ADMIN — BISACODYL 10 MG: 10 SUPPOSITORY RECTAL at 11:25

## 2022-02-25 NOTE — PROGRESS NOTES
Progress Note - Infectious Disease   Teresa Tobar 80 y o  male MRN: [id number]  Unit/Bed#: [facility] 633-01 Encounter: [id number]      Impression/Recommendations:  1  Sepsis   POA to SLMO   Due to #2/3  No other appreciable source   Improved  Rec:  · Continue antibiotics as below  · Follow temperatures and WBC closely  · Supportive care as per the primary service     2   Pseudomonal bacteremia with TV endocarditis   Due to #3   Consider also secondary port infection, status post removal    Repeat blood cultures with late positive, now cleared   RENEE with vegetation on moderator band of TV  Rec:  · Continue cefepime for 6 weeks total through 2022   · Check weekly CBC-diff, Cr while in IV antibiotic  · D/C PICC after last dose IV antibiotics  · D/C planning for STR  · Outpatient follow-up with ID 2 weeks after D/C - office notified     3   Pseudomonal UTI   In setting of recent Fortune catheter exchange, malpositioned Fortune   CT also shows pyelonephritis  Rec:  · Continue antibiotics as above     4   Back pain   Likely due to degeneration, canal stenosis seen MRI (noncontast)   No evidence of discitis/OM     Rec:  · PT/OT as tolerated  · Supportive care as per the primary service     5   Bladder cancer   With obstructive nephrolithiasis   Chronic Fortune, bilateral PCNS   On outpatient chemotherapy      6   CKD   Baseline Cr 1 1-1 2      The patient is stable from an ID standpoint for D/C to STR  If the patient is still here, I will reassess the patient on   Please call in the interim with new questions      Antibiotics:  Cefepime #22    Subjective:  Patient seen on AM rounds  Sleeping and not awakened  No events noted  24 Hour Events:  No documented fevers, chills, sweats, nausea, vomiting, or diarrhea      Objective:  Vitals:  Temp:  [98 2 °F (36 8 °C)-98 7 °F (37 1 °C)] 98 7 °F (37 1 °C)  HR:  [72] 72  BP: (137-145)/(68-79) 145/79  SpO2:  [97 %] 97 %  Temp (24hrs), Av 5 °F (36 9 °C), Min:98 2 °F (36 8 °C), Max:98 7 °F (37 1 °C)  Current: Temperature: 98 7 °F (37 1 °C)    Physical Exam:   General:  No acute distress  Psychiatric:  Sleeping   Pulmonary:  Normal respiratory excursion without accessory muscle use  Abdomen:  Soft, nontender  Extremities:  No edema  Skin:  No rashes    Lab Results:  I have personally reviewed pertinent labs  Results from last 7 days   Lab Units 02/25/22  0300 02/24/22  0533 02/23/22  0518   POTASSIUM mmol/L 4 0 4 5 4 5   CHLORIDE mmol/L 104 104 105   CO2 mmol/L 27 28 26   BUN mg/dL 23 22 25   CREATININE mg/dL 1 15 1 08 1 08   EGFR ml/min/1 73sq m 59 64 64   CALCIUM mg/dL 8 9 9 6 9 0     Results from last 7 days   Lab Units 02/25/22  0000 02/24/22  0533 02/23/22  0518   WBC Thousand/uL 5 24 6 04 8 32   HEMOGLOBIN g/dL 10 0* 9 9* 9 3*   PLATELETS Thousands/uL 231 257 252           Imaging Studies:   I have personally reviewed pertinent imaging study reports and images in PACS  EKG, Pathology, and Other Studies:   I have personally reviewed pertinent reports

## 2022-02-25 NOTE — CASE MANAGEMENT
Case Management Discharge Planning Note    Patient name Monica Nugent  Location [address] 633/PPHP 344-70 MRN [id number]  : 1940 Date 2022       Current Admission Date: 2022  Current Admission Diagnosis:Sepsis due to Pseudomonas aeruginosa [facility])   Patient Active Problem List    Diagnosis Date Noted    Endocarditis of tricuspid valve 2022    Pyelonephritis 2022    Bacteremia 2022    Sepsis due to Pseudomonas aeruginosa ([facility] )     Metabolic encephalopathy     Nephrostomy status ([address] ) 2022    Anxiety 2022    Continuous opioid dependence ([address] ) 2022    Anemia 2022    FRANCY (iron deficiency anemia) 2022    DULCE (acute kidney injury) ([facility] ) on CKD 3 01/15/2022    Pleural effusion on left 01/15/2022    Abnormal urinalysis 01/15/2022    Fortune catheter in place prior to arrival 2022    Stage 3a chronic kidney disease ([address] ) 2022    Recurrent unilateral inguinal hernia 2021    Left lower quadrant abdominal pain 2021    History of bilateral inguinal hernia repair 2021    Cancer of overlapping sites of bladder ([facility] ) 2021    Constipation 2021    Overgrown toenails 2021    Atrial fibrillation ([facility] ) 2021    Encounter to discuss test results 10/12/2021    Acute on chronic diastolic congestive heart failure ([address] ) 2020    DDD (degenerative disc disease), lumbar 10/30/2019    Medicare annual wellness visit, subsequent 2019    Tinnitus aurium, bilateral 2019    Sensorineural hearing loss (SNHL) of both ears 2019    Abnormal CT of the chest 2019    COPD, severe ([address] ) 2018    Bronchiectasis with acute lower respiratory infection ([address] ) 2018    Localized edema 10/15/2018    Irregular heart beat     Atrial flutter ([address] ) 10/07/2018    CKD (chronic kidney disease) stage 3, GFR 30-59 ml/min ([facility]) 2018    Urinary retention due to benign prostatic hyperplasia 04/27/2018    Bladder cancer ([facility] ) 04/27/2018    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    Stenosis of left carotid artery 01/14/2018    GERD (gastroesophageal reflux disease) 01/13/2018    History of stroke 01/13/2018    Sciatica of right side 01/13/2018    Vision changes 01/13/2018    Hyperlipidemia     Centrilobular emphysema (HCC)     Arthritis     Back pain 01/24/2017    Acute left-sided low back pain with left-sided sciatica 10/25/2016    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 10/25/2016    CAD (coronary atherosclerotic disease) 08/13/2016    Primary hypertension 08/13/2016    Hyperlipemia 08/13/2016      LOS (days): 8  Geometric Mean LOS (GMLOS) (days): 7 10  Days to GMLOS:-0 7     OBJECTIVE:  Risk of Unplanned Readmission Score: 45         Current admission status: Inpatient   Preferred Pharmacy:   [address] [facility]  [address]  [facility] [address]  Phone: [phone] Fax: [phone]    Primary Care Provider: Adeel Schmidt MD    Primary Insurance: MEDICARE  Secondary Insurance: [facility]    DISCHARGE DETAILS:    Discharge planning discussed with[de-identified] Granddaughter  Freedom of Choice: Yes  Comments - Freedom of Choice: Discussed FOC  CM contacted family/caregiver?: Yes  Were Treatment Team discharge recommendations reviewed with patient/caregiver?: Yes  Did patient/caregiver verbalize understanding of patient care needs?: Yes  Were patient/caregiver advised of the risks associated with not following Treatment Team discharge recommendations?: Yes    Contacts  Patient Contacts: Jass Reid  Relationship to Patient[de-identified] Family  Contact Method: Phone  Phone Number: [phone]  Reason/Outcome: Discharge [address]         Is the patient interested in [address] at discharge?: No    DME Referral Provided  Referral made for DME?: No    Other Referral/Resources/Interventions Provided:  Interventions: Short Term Rehab,SNF  Referral Comments: Granddaughter, Annemarie Meng, is in agreement with bed on Monday 2/28/22 at the [address] [place] [address] Drive      Discharge Destination Plan[de-identified] Short Term Rehab,SNF

## 2022-02-25 NOTE — RESTORATIVE TECHNICIAN NOTE
Restorative Technician Note      Patient Name: Carolee Tafoya     Restorative Tech Visit Date: 02/25/22  Note Type: Mobility  Patient Position Upon Consult: Bedside chair  Activity Performed: Ambulated  Assistive Device: Other (Comment) (none)  Patient Position at End of Consult: Bedside chair;  All needs within reach; Bed/Chair alarm activated

## 2022-02-25 NOTE — ASSESSMENT & PLAN NOTE
· Secondary to pseudomonas bacteremia  · Patient initially presented to [facility] on 02/04 with spesis 2/2 UTI/pyelonephritis with urine and blood cutlures positive for pseudomonas   RENEE on 02/09 showed endocarditis   · ID following  · Continue IV cefepime for 6 weeks duration (through 03/21/22)  · Outpatient weekly CBC and BMP ordered and scheduled  · Patient to follow up with ID in 2 weeks response to care/treatments:

## 2022-02-25 NOTE — CASE MANAGEMENT
Case Management Progress Note    Patient name Nolvia Stuart  Location [address] [place] [address] [address] 633/[address]HP [id number] MRN [id number]  : 1940 Date 2022       LOS (days): 8  Geometric Mean LOS (GMLOS) (days): 7 10  Days to GMLOS:-0 5        OBJECTIVE:        Current admission status: Inpatient  Preferred Pharmacy:   [facility] [address] [place] [address]  [address] [place] [id number]  Phone: [phone] Fax: [phone]    Primary Care Provider: Faustino Jon MD    Primary Insurance: MEDICARE  Secondary Insurance: [facility]    PROGRESS NOTE: Message sent to [employer] at [facility], requesting PASSR and copy of [place] vaccination card  PASSR uploaded in [place] and sent to [facility]  Complete Care at [facility] evaluating patient for potential admission

## 2022-02-25 NOTE — PROGRESS NOTES
INTERNAL MEDICINE RESIDENCY PROGRESS NOTE     Name: Nixon Lopez   Age & Sex: 80 y o  male   MRN: [id number]  Unit/Bed#: [facility] 633-01   Encounter: [id number]  Team: SOD Team C     PATIENT INFORMATION     Name: Nixon Lopez   Age & Sex: 80 y o  male   MRN: [id number]  Hospital Stay Days: 8    ASSESSMENT/PLAN     Principal Problem:    Sepsis due to Pseudomonas aeruginosa [facility])  Active Problems:    Back pain    CAD (coronary atherosclerotic disease)    Primary hypertension    GERD (gastroesophageal reflux disease)    Hyperlipidemia    S/P CABG x 4    Bladder cancer (HCC)    CKD (chronic kidney disease) stage 3, GFR 30-59 ml/min ([facility])    COPD, severe ([facility])    Atrial fibrillation ([facility] [address] )    Constipation    Overgrown toenails    Anemia    Metabolic encephalopathy    Bacteremia    Pyelonephritis    Endocarditis of tricuspid valve      * Sepsis due to Pseudomonas aeruginosa [facility])  Assessment & Plan  Patient initally presented to [facility] on 2/4 with sepsis 2/2 UTI/pyelonephritis with urine and blood cultures positive for pseudomonas  RENEE done 2/9 demonstrated endocarditis  There was concern for vertebral osteomyelitis, and transferred to [facility] for MRI with general anesthesia 2/2 advanced dementia  Repeat blood cultures negative to date  Plan:  · Source most likely urinary given b/l nephrostomy tubes, chronic archibald, and + UA  Will get MRI lumbar spine to r/u discitis, etc given complaint of back pain  · Chemo port removed and PICC line placed  · Patient to complete 6 week course of antibiotics (through 3/21/22)  · MRI performed on 02/21 with no findings suggestive of vertebral osteomyelitis  · ID following    Back pain  Assessment & Plan  Patient transferred to [facility] for MRI with general anesthesia in setting of back pain with presumed vertebral osteomyelitis    Plan:  · MRI cervical, thoracic, and lumbar spine with anesthesia completed 2/21: no evidence of discitis or osteomyelitis of thoracolumbar spine   Lumbar degeneration changes with mild/ moderate canal stenosis and foraminal narrowing, pronounced at L4-L5 on right  · Pain regimen in place      Endocarditis of tricuspid valve  Assessment & Plan  · Secondary to pseudomonas bacteremia  · Patient initially presented to [facility] on 02/04 with spesis 2/2 UTI/pyelonephritis with urine and blood cutlures positive for pseudomonas  RENEE on 02/09 showed endocarditis   · ID following  · Continue IV cefepime for 6 weeks duration (through 03/21/22)    Pyelonephritis  Assessment & Plan  Patient presented septic with CT findings of pyelonephritis    Bacteremia  Assessment & Plan  Patient with pseudomonas bacteremia on presentation to [facility]  Currently with repeat blood cultures no growth at 5 days  Plan:  · Continue IV antibiotics as described under sepsis plan  · ID consulted - appreciate recommendations    Metabolic encephalopathy  Assessment & Plan  Negative CTH at time or presentation  Evaluated by neuropsychiatry on 2/16 and determined not to have capacity to make medical decisions    Plan:  · Outpatient benzos and narcotics have been held since admission  · Delirium precautions and frequent redirection      Anemia  Assessment & Plan  Baseline Hgb 8-9  Currently stable    Plan:  · Continue iron supplementation  · Monitor daily CBC and transfuse for Hgb <7    Overgrown toenails  Assessment & Plan  Podiatry consulted    Constipation  Assessment & Plan  Patient last recorded BM on 02/23  · Scheduled Miralax 17 g daily  · Senna-docusate 2 tablet BID  · PRN bisacodyl  · lactulose 10g PO one time dose administered 2/22       Atrial fibrillation ([facility] [address] )  Assessment & Plan  Currently rate controlled  Managed as outpatient on coumadin and cardizem    Plan:  · Home Coumadin 5 mg  Monitor INR  Not being bridged from heparin  · INR is now therapeutic  · Decreased warfarin to 3 mg daily on 2/24/22  · Continue diltiazem      COPD, severe ([facility] [address] )  Assessment & Plan  Currently stable   Not on medications as outpatient    CKD (chronic kidney disease) stage 3, GFR 30-59 ml/min [facility])  Assessment & Plan  Lab Results   Component Value Date    EGFR 55 02/17/2022    EGFR 52 02/15/2022    EGFR 59 02/13/2022    CREATININE 1 21 02/17/2022    CREATININE 1 27 02/15/2022    CREATININE 1 14 02/13/2022     Currently at baseline Cr  Plan:  · Monitor I/O  · Check daily BMP  · Avoid nephrotoxic agents    Bladder cancer [facility])  Assessment & Plan  Patient has history of 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 diagnosed 10/12/2021  S/p placement of B/L nephrostomy tubes and with chronic archibald in place  Follows with heme-onc and urology outpatient and receiving chemo and radiation  CT CAP 2/6 with concern for new right apical pleural-based mass now s/p IR biopsy on 2/14 without evidence of malignancy    Plan:  · Outpatient heme/onc and urology follow up    Hyperlipidemia  Assessment & Plan  Continue atorvastatin    GERD (gastroesophageal reflux disease)  Assessment & Plan  Continue pantoprazole    Primary hypertension  Assessment & Plan  Controlled as outpatient on Cardizem    Plan:  · Continue home Cardizem 240 daily  · Monitor vitals closely    CAD (coronary atherosclerotic disease)  Assessment & Plan  S/p CABG x4  Currently without chest pain  Stable    Plan:  · Continue home aspirin and statin      Disposition: Patient remains stable for discharge  Will continue to work with CM for placement  SUBJECTIVE     Patient seen and examined  No acute events overnight  Upon my encounter patient was lying comfortably in hospital bed and sleeping  He states that he has no complaints today  He presently denies any fever, chills, nausea, vomiting, diarrhea, constipation, chest pain, shortness of breath      OBJECTIVE     Vitals:    02/23/22 2253 02/24/22 0829 02/24/22 1456 02/24/22 2221   BP: 133/73 132/74 137/68 145/79   BP Location:       Pulse: 64 66  72   Resp: 15 16     Temp: 98 8 °F (37 1 °C) 98 7 °F (37 1 °C) 98 2 °F (36 8 °C) 98 7 °F (37 1 °C)   TempSrc:       SpO2: 94% 95%  97%      Temperature:   Temp (24hrs), Av 5 °F (36 9 °C), Min:98 2 °F (36 8 °C), Max:98 7 °F (37 1 °C)    Temperature: 98 7 °F (37 1 °C)  Intake & Output:  I/O        0701   07 07 07 0701   07    P  O  225 300     IV Piggyback 50      Total Intake 275 300     Urine 3375 1950     Stool 0      Total Output 3375 1950     Net -3100 -1650            Unmeasured Stool Occurrence 2 x          Weights: There is no height or weight on file to calculate BMI  Weight (last 2 days)     None        Physical Exam  Constitutional:       General: He is not in acute distress  Appearance: Normal appearance  He is not diaphoretic  HENT:      Head: Normocephalic and atraumatic  Ears:      Comments: Hard of hearing     Mouth/Throat:      Mouth: Mucous membranes are moist       Pharynx: Oropharynx is clear  Eyes:      Extraocular Movements: Extraocular movements intact  Conjunctiva/sclera: Conjunctivae normal       Pupils: Pupils are equal, round, and reactive to light  Cardiovascular:      Rate and Rhythm: Normal rate and regular rhythm  Pulses: Normal pulses  Heart sounds: Murmur (mitral valve) heard  Pulmonary:      Effort: Pulmonary effort is normal  No respiratory distress  Breath sounds: Normal breath sounds  No wheezing, rhonchi or rales  Abdominal:      General: Abdomen is flat  Bowel sounds are normal  There is no distension  Palpations: Abdomen is soft  Tenderness: There is no abdominal tenderness  Genitourinary:     Comments: deferred  Musculoskeletal:         General: Tenderness (mild lumbar point tenderness) present  Cervical back: Normal range of motion  Right lower leg: No edema  Left lower leg: No edema  Skin:     General: Skin is warm and dry  Neurological:      General: No focal deficit present        Mental Status: He is alert and oriented to person, place, and time  Motor: No weakness  Psychiatric:         Mood and Affect: Mood normal          Behavior: Behavior normal          Thought Content: Thought content normal       Comments: Less tangential today  Was able to be redirected more easily today        LABORATORY DATA     Labs: I have personally reviewed pertinent reports  Results from last 7 days   Lab Units 02/25/22  0000 02/24/22  0533 02/23/22  0518   WBC Thousand/uL 5 24 6 04 8 32   HEMOGLOBIN g/dL 10 0* 9 9* 9 3*   HEMATOCRIT % 31 2* 32 1* 29 2*   PLATELETS Thousands/uL 231 257 252      Results from last 7 days   Lab Units 02/25/22  0300 02/24/22  0533 02/23/22  0518   POTASSIUM mmol/L 4 0 4 5 4 5   CHLORIDE mmol/L 104 104 105   CO2 mmol/L 27 28 26   BUN mg/dL 23 22 25   CREATININE mg/dL 1 15 1 08 1 08   CALCIUM mg/dL 8 9 9 6 9 0              Results from last 7 days   Lab Units 02/25/22  0000 02/24/22  0533 02/23/22  0518   INR  2 92* 2 92* 2 63*               IMAGING & DIAGNOSTIC TESTING     Radiology Results: I have personally reviewed pertinent reports  MRI cervical spine wo contrast    Result Date: 2/21/2022  Impression: No evidence of discitis or osteomyelitis as clinically questioned  Moderate cervical degenerative change most prominent at the C3-4 level and to a lesser extent at C5-6 and C6-7  Workstation performed: [id number]     MRI lumbar spine wo contrast    Result Date: 2/21/2022  Impression: No evidence of discitis or osteomyelitis of the thoracolumbar spine  Lumbar degenerative change with mild to moderate canal stenosis and foraminal narrowing, most pronounced at L4-5 towards the right  Workstation performed: [id number]     Other Diagnostic Testing: I have personally reviewed pertinent reports      ACTIVE MEDICATIONS     Current Facility-Administered Medications   Medication Dose Route Frequency    acetaminophen (TYLENOL) tablet 975 mg  975 mg Oral Q8H [address]    aluminum-magnesium hydroxide-simethicone (MYLANTA) oral suspension 30 mL  30 mL Oral Q4H PRN    aspirin (ECOTRIN LOW STRENGTH) EC tablet 81 mg  81 mg Oral Daily    atorvastatin (LIPITOR) tablet 40 mg  40 mg Oral Daily    bisacodyl (DULCOLAX) rectal suppository 10 mg  10 mg Rectal Daily PRN    cefepime (MAXIPIME) 2,000 mg in dextrose 5 % 50 mL IVPB  2,000 mg Intravenous Q12H    Diclofenac Sodium (VOLTAREN) 1 % topical gel 2 g  2 g Topical 4x Daily    diltiazem (CARDIZEM CD) 24 hr capsule 240 mg  240 mg Oral Daily    ferrous sulfate tablet 325 mg  325 mg Oral BID With Meals    HYDROmorphone (DILAUDID) injection 0 4 mg  0 4 mg Intravenous Q5 Min PRN    lidocaine (LIDODERM) 5 % patch 1 patch  1 patch Topical Daily    lidocaine (PF) (XYLOCAINE-MPF) 1 % injection 0 5 mL  0 5 mL Infiltration Once PRN    melatonin tablet 3 mg  3 mg Oral HS PRN    ondansetron (ZOFRAN) injection 4 mg  4 mg Intravenous Q6H PRN    oxyCODONE (ROXICODONE) IR tablet 2 5 mg  2 5 mg Oral Q6H PRN    oxyCODONE (ROXICODONE) IR tablet 5 mg  5 mg Oral Q6H PRN    pantoprazole (PROTONIX) EC tablet 40 mg  40 mg Oral QAM    polyethylene glycol (MIRALAX) packet 17 g  17 g Oral Daily    senna-docusate sodium (SENOKOT S) 8 6-50 mg per tablet 2 tablet  2 tablet Oral BID    simethicone (MYLICON) chewable tablet 80 mg  80 mg Oral Q6H PRN    sodium phosphate-biphosphate (FLEET) enema 1 enema  1 enema Rectal Once PRN    warfarin (COUMADIN) tablet 3 mg  3 mg Oral Daily (warfarin)       VTE Pharmacologic Prophylaxis: Warfarin (Coumadin)  VTE Mechanical Prophylaxis: sequential compression device    Portions of the record may have been created with voice recognition software  Occasional wrong word or "sound a like" substitutions may have occurred due to the inherent limitations of voice recognition software    Read the chart carefully and recognize, using context, where substitutions have occurred   ==  Fredrick Guerrero, [address] [facility] Medicine Residency PGY-1

## 2022-02-26 LAB
HCT VFR BLD AUTO: 32.7 % (ref 36.5–49.3)
HGB BLD-MCNC: 10.1 G/DL (ref 12–17)
INR PPP: 2.93 (ref 0.84–1.19)
MCH RBC QN AUTO: 26 PG (ref 26.8–34.3)
MCHC RBC AUTO-ENTMCNC: 30.9 G/DL (ref 31.4–37.4)
MCV RBC AUTO: 84 FL (ref 82–98)
PLATELET # BLD AUTO: 246 THOUSANDS/UL (ref 149–390)
PMV BLD AUTO: 9.4 FL (ref 8.9–12.7)
PROTHROMBIN TIME: 29.1 SECONDS (ref 11.6–14.5)
RBC # BLD AUTO: 3.89 MILLION/UL (ref 3.88–5.62)
WBC # BLD AUTO: 4.86 THOUSAND/UL (ref 4.31–10.16)

## 2022-02-26 PROCEDURE — 85027 COMPLETE CBC AUTOMATED: CPT | Performed by: STUDENT IN AN ORGANIZED HEALTH CARE EDUCATION/TRAINING PROGRAM

## 2022-02-26 PROCEDURE — 85610 PROTHROMBIN TIME: CPT | Performed by: STUDENT IN AN ORGANIZED HEALTH CARE EDUCATION/TRAINING PROGRAM

## 2022-02-26 PROCEDURE — 99232 SBSQ HOSP IP/OBS MODERATE 35: CPT | Performed by: INTERNAL MEDICINE

## 2022-02-26 RX ADMIN — ACETAMINOPHEN 975 MG: 325 TABLET, FILM COATED ORAL at 22:12

## 2022-02-26 RX ADMIN — DICLOFENAC SODIUM 2 G: 10 GEL TOPICAL at 17:41

## 2022-02-26 RX ADMIN — MAGNESIUM HYDROXIDE 30 ML: 400 SUSPENSION ORAL at 12:37

## 2022-02-26 RX ADMIN — CEFEPIME HYDROCHLORIDE 1000 MG: 1 INJECTION, POWDER, FOR SOLUTION INTRAMUSCULAR; INTRAVENOUS at 04:31

## 2022-02-26 RX ADMIN — ATORVASTATIN CALCIUM 40 MG: 40 TABLET, FILM COATED ORAL at 09:53

## 2022-02-26 RX ADMIN — DICLOFENAC SODIUM 2 G: 10 GEL TOPICAL at 22:14

## 2022-02-26 RX ADMIN — LIDOCAINE 5% 1 PATCH: 700 PATCH TOPICAL at 09:54

## 2022-02-26 RX ADMIN — PANTOPRAZOLE SODIUM 40 MG: 40 TABLET, DELAYED RELEASE ORAL at 05:23

## 2022-02-26 RX ADMIN — SENNOSIDES AND DOCUSATE SODIUM 2 TABLET: 50; 8.6 TABLET ORAL at 17:37

## 2022-02-26 RX ADMIN — DICLOFENAC SODIUM 2 G: 10 GEL TOPICAL at 09:54

## 2022-02-26 RX ADMIN — DILTIAZEM HYDROCHLORIDE 240 MG: 240 CAPSULE, COATED, EXTENDED RELEASE ORAL at 09:53

## 2022-02-26 RX ADMIN — ASPIRIN 81 MG: 81 TABLET, COATED ORAL at 09:53

## 2022-02-26 RX ADMIN — ACETAMINOPHEN 975 MG: 325 TABLET, FILM COATED ORAL at 15:13

## 2022-02-26 RX ADMIN — OXYCODONE HYDROCHLORIDE 5 MG: 5 TABLET ORAL at 17:42

## 2022-02-26 RX ADMIN — POLYETHYLENE GLYCOL 3350 17 G: 17 POWDER, FOR SOLUTION ORAL at 09:55

## 2022-02-26 RX ADMIN — FERROUS SULFATE TAB 325 MG (65 MG ELEMENTAL FE) 325 MG: 325 (65 FE) TAB at 09:53

## 2022-02-26 RX ADMIN — CEFEPIME HYDROCHLORIDE 1000 MG: 1 INJECTION, POWDER, FOR SOLUTION INTRAMUSCULAR; INTRAVENOUS at 12:37

## 2022-02-26 RX ADMIN — FERROUS SULFATE TAB 325 MG (65 MG ELEMENTAL FE) 325 MG: 325 (65 FE) TAB at 17:37

## 2022-02-26 RX ADMIN — DICLOFENAC SODIUM 2 G: 10 GEL TOPICAL at 12:38

## 2022-02-26 RX ADMIN — WARFARIN SODIUM 3 MG: 2 TABLET ORAL at 17:37

## 2022-02-26 RX ADMIN — CEFEPIME HYDROCHLORIDE 1000 MG: 1 INJECTION, POWDER, FOR SOLUTION INTRAMUSCULAR; INTRAVENOUS at 22:11

## 2022-02-26 RX ADMIN — SENNOSIDES AND DOCUSATE SODIUM 2 TABLET: 50; 8.6 TABLET ORAL at 09:53

## 2022-02-26 RX ADMIN — ACETAMINOPHEN 975 MG: 325 TABLET, FILM COATED ORAL at 05:23

## 2022-02-26 NOTE — PROGRESS NOTES
INTERNAL MEDICINE RESIDENCY PROGRESS NOTE     Name: Noman Erazo   Age & Sex: 80 y o  male   MRN: [id number]  Unit/Bed#: [facility] 633-01   Encounter: [id number]  Team: SOD Team C     PATIENT INFORMATION     Name: Noman Erazo   Age & Sex: 80 y o  male   MRN: [id number]  Hospital Stay Days: 9    ASSESSMENT/PLAN     Principal Problem:    Sepsis due to Pseudomonas aeruginosa [facility])  Active Problems:    Atrial fibrillation (HCC)    CAD (coronary atherosclerotic disease)    Primary hypertension    GERD (gastroesophageal reflux disease)    Hyperlipidemia    S/P CABG x 4    Back pain    Bladder cancer (HCC)    CKD (chronic kidney disease) stage 3, GFR 30-59 ml/min ([facility])    COPD, severe ([address] )    Constipation    Overgrown toenails    Anemia    Metabolic encephalopathy    Bacteremia    Pyelonephritis    Endocarditis of tricuspid valve      * Sepsis due to Pseudomonas aeruginosa [facility])  Assessment & Plan  Patient initally presented to [facility] on 2/4 with sepsis 2/2 UTI/pyelonephritis with urine and blood cultures positive for pseudomonas  RENEE done 2/9 demonstrated endocarditis  There was concern for vertebral osteomyelitis, and transferred to [facility] for MRI with general anesthesia 2/2 advanced dementia  Repeat blood cultures negative to date  Plan:  · Source most likely urinary given b/l nephrostomy tubes, chronic archibald, and + UA  Will get MRI lumbar spine to r/u discitis, etc given complaint of back pain  · Chemo port removed and PICC line placed  · Patient to complete 6 week course of antibiotics (through 3/21/22)  · MRI performed on 02/21 with no findings suggestive of vertebral osteomyelitis  · ID following  · Pending placement to STR  Atrial fibrillation [facility])  Assessment & Plan  Currently rate controlled  Managed as outpatient on coumadin and cardizem    Plan:  · Home Coumadin 5 mg  Monitor INR   Not being bridged from heparin  · INR is now therapeutic  · Decreased warfarin to 3 mg daily on 2/24/22  · Continue diltiazem      Endocarditis of tricuspid valve  Assessment & Plan  · Secondary to pseudomonas bacteremia  · Patient initially presented to [facility] on 02/04 with spesis 2/2 UTI/pyelonephritis with urine and blood cutlures positive for pseudomonas  RENEE on 02/09 showed endocarditis   · ID following  · Continue IV cefepime for 6 weeks duration (through 03/21/22)    Pyelonephritis  Assessment & Plan  Patient presented septic with CT findings of pyelonephritis    Bacteremia  Assessment & Plan  Patient with pseudomonas bacteremia on presentation to [facility]  Currently with repeat blood cultures no growth at 5 days  Plan:  · Continue IV antibiotics as described under sepsis plan  · ID consulted - appreciate recommendations    Metabolic encephalopathy  Assessment & Plan  Negative CTH at time or presentation  Evaluated by neuropsychiatry on 2/16 and determined not to have capacity to make medical decisions    Plan:  · Outpatient benzos and narcotics have been held since admission  · Delirium precautions and frequent redirection      Anemia  Assessment & Plan  Baseline Hgb 8-9  Currently stable    Plan:  · Continue iron supplementation  · Monitor daily CBC and transfuse for Hgb <7    Overgrown toenails  Assessment & Plan  Podiatry consulted    Constipation  Assessment & Plan  Patient last recorded BM on 02/23  · Scheduled Miralax 17 g daily  · Senna-docusate 2 tablet BID  · PRN bisacodyl  · lactulose 10g PO one time dose administered 2/22       COPD, severe ([address] )  Assessment & Plan  Currently stable  Not on medications as outpatient    CKD (chronic kidney disease) stage 3, GFR 30-59 ml/min [facility])  Assessment & Plan  Lab Results   Component Value Date    EGFR 55 02/17/2022    EGFR 52 02/15/2022    EGFR 59 02/13/2022    CREATININE 1 21 02/17/2022    CREATININE 1 27 02/15/2022    CREATININE 1 14 02/13/2022     Currently at baseline Cr      Plan:  · Monitor I/O  · Check daily BMP  · Avoid nephrotoxic agents    Bladder cancer [facility])  Assessment & Plan  Patient has history of 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 diagnosed 10/12/2021  S/p placement of B/L nephrostomy tubes and with chronic archibald in place  Follows with heme-onc and urology outpatient and receiving chemo and radiation  CT CAP  with concern for new right apical pleural-based mass now s/p IR biopsy on  without evidence of malignancy    Plan:  · Outpatient heme/onc and urology follow up    Back pain  Assessment & Plan  Patient transferred to [facility] for MRI with general anesthesia in setting of back pain with presumed vertebral osteomyelitis    Plan:  · MRI cervical, thoracic, and lumbar spine with anesthesia completed : no evidence of discitis or osteomyelitis of thoracolumbar spine  Lumbar degeneration changes with mild/ moderate canal stenosis and foraminal narrowing, pronounced at L4-L5 on right  · Pain regimen in place      Hyperlipidemia  Assessment & Plan  Continue atorvastatin    GERD (gastroesophageal reflux disease)  Assessment & Plan  Continue pantoprazole    Primary hypertension  Assessment & Plan  Controlled as outpatient on Cardizem    Plan:  · Continue home Cardizem 240 daily  · Monitor vitals closely    CAD (coronary atherosclerotic disease)  Assessment & Plan  S/p CABG x4  Currently without chest pain  Stable    Plan:  · Continue home aspirin and statin        Disposition:     SUBJECTIVE     Patient seen and examined  No acute events overnight       OBJECTIVE     Vitals:    22 2221 22 1401 22 2151 22 0744   BP: 145/79 140/84 142/80 136/80   Pulse: 72 74 81 84   Resp:  16 16 16   Temp: 98 7 °F (37 1 °C) 98 5 °F (36 9 °C) 98 °F (36 7 °C) 98 1 °F (36 7 °C)   TempSrc:       SpO2: 97% 97% 99% 99%      Temperature:   Temp (24hrs), Av 2 °F (36 8 °C), Min:98 °F (36 7 °C), Max:98 5 °F (36 9 °C)    Temperature: 98 1 °F (36 7 °C)  Intake & Output:  I/O        07 07 07 0701  02/27 0700    P  O  300 720     IV Piggyback       Total Intake 300 720     Urine 1950 1575     Stool  0     Total Output 1950 1575     Net -1650 -855            Unmeasured Stool Occurrence  2 x         Weights: There is no height or weight on file to calculate BMI  Weight (last 2 days)     None        Physical Exam  Constitutional:       General: He is not in acute distress  Appearance: He is well-developed  He is not diaphoretic  HENT:      Head: Normocephalic and atraumatic  Nose: Nose normal       Mouth/Throat:      Pharynx: No oropharyngeal exudate  Eyes:      General: No scleral icterus  Right eye: No discharge  Left eye: No discharge  Cardiovascular:      Rate and Rhythm: Normal rate and regular rhythm  Heart sounds: Normal heart sounds  No murmur heard  No friction rub  No gallop  Pulmonary:      Effort: Pulmonary effort is normal  No respiratory distress  Breath sounds: No stridor  No wheezing or rales  Abdominal:      General: Bowel sounds are normal  There is no distension  Palpations: Abdomen is soft  Tenderness: There is no abdominal tenderness  There is no guarding  Musculoskeletal:         General: Normal range of motion  Cervical back: Normal range of motion and neck supple  Skin:     General: Skin is warm  Findings: No erythema  Neurological:      Mental Status: He is alert and oriented to person, place, and time  LABORATORY DATA     Labs: I have personally reviewed pertinent reports    Results from last 7 days   Lab Units 02/26/22  0433 02/25/22  0000 02/24/22  0533   WBC Thousand/uL 4 86 5 24 6 04   HEMOGLOBIN g/dL 10 1* 10 0* 9 9*   HEMATOCRIT % 32 7* 31 2* 32 1*   PLATELETS Thousands/uL 246 231 257      Results from last 7 days   Lab Units 02/25/22  0300 02/24/22  0533 02/23/22  0518   POTASSIUM mmol/L 4 0 4 5 4 5   CHLORIDE mmol/L 104 104 105   CO2 mmol/L 27 28 26   BUN mg/dL 23 22 25   CREATININE mg/dL 1  15 1 08 1 08   CALCIUM mg/dL 8 9 9 6 9 0              Results from last 7 days   Lab Units 02/26/22  0433 02/25/22  0000 02/24/22  0533   INR  2 93* 2 92* 2 92*               IMAGING & DIAGNOSTIC TESTING     Radiology Results: I have personally reviewed pertinent reports  MRI cervical spine wo contrast    Result Date: 2/21/2022  Impression: No evidence of discitis or osteomyelitis as clinically questioned  Moderate cervical degenerative change most prominent at the C3-4 level and to a lesser extent at C5-6 and C6-7  Workstation performed: [id number]     MRI lumbar spine wo contrast    Result Date: 2/21/2022  Impression: No evidence of discitis or osteomyelitis of the thoracolumbar spine  Lumbar degenerative change with mild to moderate canal stenosis and foraminal narrowing, most pronounced at L4-5 towards the right  Workstation performed: [id number]     Other Diagnostic Testing: I have personally reviewed pertinent reports      ACTIVE MEDICATIONS     Current Facility-Administered Medications   Medication Dose Route Frequency    acetaminophen (TYLENOL) tablet 975 mg  975 mg Oral Q8H [address]    aluminum-magnesium hydroxide-simethicone (MYLANTA) oral suspension 30 mL  30 mL Oral Q4H PRN    aspirin (ECOTRIN LOW STRENGTH) EC tablet 81 mg  81 mg Oral Daily    atorvastatin (LIPITOR) tablet 40 mg  40 mg Oral Daily    bisacodyl (DULCOLAX) rectal suppository 10 mg  10 mg Rectal Daily PRN    cefepime (MAXIPIME) 1,000 mg in dextrose 5 % 50 mL IVPB  1,000 mg Intravenous Q8H    Diclofenac Sodium (VOLTAREN) 1 % topical gel 2 g  2 g Topical 4x Daily    diltiazem (CARDIZEM CD) 24 hr capsule 240 mg  240 mg Oral Daily    ferrous sulfate tablet 325 mg  325 mg Oral BID With Meals    HYDROmorphone (DILAUDID) injection 0 4 mg  0 4 mg Intravenous Q5 Min PRN    lidocaine (LIDODERM) 5 % patch 1 patch  1 patch Topical Daily    lidocaine (PF) (XYLOCAINE-MPF) 1 % injection 0 5 mL  0 5 mL Infiltration Once PRN    magnesium hydroxide (MILK OF MAGNESIA) oral suspension 30 mL  30 mL Oral Once    melatonin tablet 3 mg  3 mg Oral HS PRN    ondansetron (ZOFRAN) injection 4 mg  4 mg Intravenous Q6H PRN    oxyCODONE (ROXICODONE) IR tablet 2 5 mg  2 5 mg Oral Q6H PRN    oxyCODONE (ROXICODONE) IR tablet 5 mg  5 mg Oral Q6H PRN    pantoprazole (PROTONIX) EC tablet 40 mg  40 mg Oral QAM    polyethylene glycol (MIRALAX) packet 17 g  17 g Oral Daily    senna-docusate sodium (SENOKOT S) 8 6-50 mg per tablet 2 tablet  2 tablet Oral BID    simethicone (MYLICON) chewable tablet 80 mg  80 mg Oral Q6H PRN    sodium phosphate-biphosphate (FLEET) enema 1 enema  1 enema Rectal Once PRN    warfarin (COUMADIN) tablet 3 mg  3 mg Oral Daily (warfarin)       VTE Pharmacologic Prophylaxis: Warfarin (Coumadin)  VTE Mechanical Prophylaxis: sequential compression device    Portions of the record may have been created with voice recognition software  Occasional wrong word or "sound a like" substitutions may have occurred due to the inherent limitations of voice recognition software    Read the chart carefully and recognize, using context, where substitutions have occurred   ==  Juma Olson, [address] [facility]  Internal Medicine Residency PGY-3

## 2022-02-27 LAB
ANISOCYTOSIS BLD QL SMEAR: PRESENT
BASOPHILS # BLD MANUAL: 0 THOUSAND/UL (ref 0–0.1)
BASOPHILS NFR MAR MANUAL: 0 % (ref 0–1)
EOSINOPHIL # BLD MANUAL: 0.12 THOUSAND/UL (ref 0–0.4)
EOSINOPHIL NFR BLD MANUAL: 2 % (ref 0–6)
HCT VFR BLD AUTO: 33.1 % (ref 36.5–49.3)
HGB BLD-MCNC: 10.2 G/DL (ref 12–17)
HYPERCHROMIA BLD QL SMEAR: PRESENT
INR PPP: 2.75 (ref 0.84–1.19)
LYMPHOCYTES # BLD AUTO: 0.3 THOUSAND/UL (ref 0.6–4.47)
LYMPHOCYTES # BLD AUTO: 5 % (ref 14–44)
MCH RBC QN AUTO: 26.3 PG (ref 26.8–34.3)
MCHC RBC AUTO-ENTMCNC: 30.8 G/DL (ref 31.4–37.4)
MCV RBC AUTO: 85 FL (ref 82–98)
MONOCYTES # BLD AUTO: 0.48 THOUSAND/UL (ref 0–1.22)
MONOCYTES NFR BLD: 8 % (ref 4–12)
MYELOCYTES NFR BLD MANUAL: 2 % (ref 0–1)
NEUTROPHILS # BLD MANUAL: 4.76 THOUSAND/UL (ref 1.85–7.62)
NEUTS BAND NFR BLD MANUAL: 3 % (ref 0–8)
NEUTS SEG NFR BLD AUTO: 76 % (ref 43–75)
PLATELET # BLD AUTO: 258 THOUSANDS/UL (ref 149–390)
PLATELET BLD QL SMEAR: ADEQUATE
PMV BLD AUTO: 9.4 FL (ref 8.9–12.7)
POIKILOCYTOSIS BLD QL SMEAR: PRESENT
PROTHROMBIN TIME: 27.7 SECONDS (ref 11.6–14.5)
RBC # BLD AUTO: 3.88 MILLION/UL (ref 3.88–5.62)
RBC MORPH BLD: PRESENT
VARIANT LYMPHS # BLD AUTO: 4 %
WBC # BLD AUTO: 6.03 THOUSAND/UL (ref 4.31–10.16)

## 2022-02-27 PROCEDURE — 85610 PROTHROMBIN TIME: CPT | Performed by: STUDENT IN AN ORGANIZED HEALTH CARE EDUCATION/TRAINING PROGRAM

## 2022-02-27 PROCEDURE — 85027 COMPLETE CBC AUTOMATED: CPT | Performed by: STUDENT IN AN ORGANIZED HEALTH CARE EDUCATION/TRAINING PROGRAM

## 2022-02-27 PROCEDURE — 85007 BL SMEAR W/DIFF WBC COUNT: CPT | Performed by: STUDENT IN AN ORGANIZED HEALTH CARE EDUCATION/TRAINING PROGRAM

## 2022-02-27 PROCEDURE — 99232 SBSQ HOSP IP/OBS MODERATE 35: CPT | Performed by: INTERNAL MEDICINE

## 2022-02-27 RX ADMIN — OXYCODONE HYDROCHLORIDE 5 MG: 5 TABLET ORAL at 17:32

## 2022-02-27 RX ADMIN — ACETAMINOPHEN 975 MG: 325 TABLET, FILM COATED ORAL at 20:33

## 2022-02-27 RX ADMIN — DICLOFENAC SODIUM 2 G: 10 GEL TOPICAL at 13:02

## 2022-02-27 RX ADMIN — ATORVASTATIN CALCIUM 40 MG: 40 TABLET, FILM COATED ORAL at 08:31

## 2022-02-27 RX ADMIN — ACETAMINOPHEN 975 MG: 325 TABLET, FILM COATED ORAL at 04:47

## 2022-02-27 RX ADMIN — LIDOCAINE 5% 1 PATCH: 700 PATCH TOPICAL at 09:26

## 2022-02-27 RX ADMIN — ASPIRIN 81 MG: 81 TABLET, COATED ORAL at 08:31

## 2022-02-27 RX ADMIN — WARFARIN SODIUM 3 MG: 2 TABLET ORAL at 17:33

## 2022-02-27 RX ADMIN — OXYCODONE HYDROCHLORIDE 5 MG: 5 TABLET ORAL at 13:01

## 2022-02-27 RX ADMIN — SENNOSIDES AND DOCUSATE SODIUM 2 TABLET: 50; 8.6 TABLET ORAL at 17:32

## 2022-02-27 RX ADMIN — CEFEPIME HYDROCHLORIDE 1000 MG: 1 INJECTION, POWDER, FOR SOLUTION INTRAMUSCULAR; INTRAVENOUS at 13:02

## 2022-02-27 RX ADMIN — PANTOPRAZOLE SODIUM 40 MG: 40 TABLET, DELAYED RELEASE ORAL at 04:48

## 2022-02-27 RX ADMIN — FERROUS SULFATE TAB 325 MG (65 MG ELEMENTAL FE) 325 MG: 325 (65 FE) TAB at 08:31

## 2022-02-27 RX ADMIN — OXYCODONE HYDROCHLORIDE 5 MG: 5 TABLET ORAL at 04:46

## 2022-02-27 RX ADMIN — OXYCODONE HYDROCHLORIDE 5 MG: 5 TABLET ORAL at 23:09

## 2022-02-27 RX ADMIN — CEFEPIME HYDROCHLORIDE 1000 MG: 1 INJECTION, POWDER, FOR SOLUTION INTRAMUSCULAR; INTRAVENOUS at 20:33

## 2022-02-27 RX ADMIN — DILTIAZEM HYDROCHLORIDE 240 MG: 240 CAPSULE, COATED, EXTENDED RELEASE ORAL at 08:31

## 2022-02-27 RX ADMIN — DICLOFENAC SODIUM 2 G: 10 GEL TOPICAL at 08:31

## 2022-02-27 RX ADMIN — DICLOFENAC SODIUM 2 G: 10 GEL TOPICAL at 17:40

## 2022-02-27 RX ADMIN — OXYCODONE HYDROCHLORIDE 5 MG: 5 TABLET ORAL at 08:31

## 2022-02-27 RX ADMIN — POLYETHYLENE GLYCOL 3350 17 G: 17 POWDER, FOR SOLUTION ORAL at 08:31

## 2022-02-27 RX ADMIN — CEFEPIME HYDROCHLORIDE 1000 MG: 1 INJECTION, POWDER, FOR SOLUTION INTRAMUSCULAR; INTRAVENOUS at 04:46

## 2022-02-27 RX ADMIN — SENNOSIDES AND DOCUSATE SODIUM 2 TABLET: 50; 8.6 TABLET ORAL at 08:31

## 2022-02-27 RX ADMIN — FERROUS SULFATE TAB 325 MG (65 MG ELEMENTAL FE) 325 MG: 325 (65 FE) TAB at 17:32

## 2022-02-27 RX ADMIN — DICLOFENAC SODIUM 2 G: 10 GEL TOPICAL at 22:00

## 2022-02-27 NOTE — CASE MANAGEMENT
Case Management Discharge Planning Note    Patient name Cindy Zurita  Location [facility] 633/PPHP 188-97 MRN [id number]  : 1940 Date 2022       Current Admission Date: 2022  Current Admission Diagnosis:Sepsis due to Pseudomonas aeruginosa [facility])   Patient Active Problem List    Diagnosis Date Noted    Endocarditis of tricuspid valve 2022    Pyelonephritis 2022    Bacteremia 2022    Sepsis due to Pseudomonas aeruginosa ([facility] )     Metabolic encephalopathy     Nephrostomy status ([address] ) 2022    Anxiety 2022    Continuous opioid dependence ([facility] ) 2022    Anemia 2022    FRANCY (iron deficiency anemia) 2022    DULCE (acute kidney injury) ([facility] ) on CKD 3 01/15/2022    Pleural effusion on left 01/15/2022    Abnormal urinalysis 01/15/2022    Fortune catheter in place prior to arrival 2022    Stage 3a chronic kidney disease ([address] ) 2022    Recurrent unilateral inguinal hernia 2021    Left lower quadrant abdominal pain 2021    History of bilateral inguinal hernia repair 2021    Cancer of overlapping sites of bladder ([facility] ) 2021    Constipation 2021    Overgrown toenails 2021    Atrial fibrillation ([facility] ) 2021    Encounter to discuss test results 10/12/2021    Acute on chronic diastolic congestive heart failure ([address] ) 2020    DDD (degenerative disc disease), lumbar 10/30/2019    Medicare annual wellness visit, subsequent 2019    Tinnitus aurium, bilateral 2019    Sensorineural hearing loss (SNHL) of both ears 2019    Abnormal CT of the chest 2019    COPD, severe ([address] ) 2018    Bronchiectasis with acute lower respiratory infection ([address] ) 2018    Localized edema 10/15/2018    Irregular heart beat     Atrial flutter ([address] ) 10/07/2018    CKD (chronic kidney disease) stage 3, GFR 30-59 ml/min ([facility]) 2018    Urinary retention due to benign prostatic hyperplasia 04/27/2018    Bladder cancer ([facility] ) 04/27/2018    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    Stenosis of left carotid artery 01/14/2018    GERD (gastroesophageal reflux disease) 01/13/2018    History of stroke 01/13/2018    Sciatica of right side 01/13/2018    Vision changes 01/13/2018    Hyperlipidemia     Centrilobular emphysema (HCC)     Arthritis     Back pain 01/24/2017    Acute left-sided low back pain with left-sided sciatica 10/25/2016    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 10/25/2016    CAD (coronary atherosclerotic disease) 08/13/2016    Primary hypertension 08/13/2016    Hyperlipemia 08/13/2016      LOS (days): 10  Geometric Mean LOS (GMLOS) (days): 7 10  Days to GMLOS:-2 5     OBJECTIVE:  Risk of Unplanned Readmission Score: 46         Current admission status: Inpatient   Preferred Pharmacy:   [address] [place] [address]  [address] [id number]  Phone: [phone] Fax: [phone]    Primary Care Provider: Israel Tyler MD    Primary Insurance: MEDICARE  Secondary Insurance: [facility]    DISCHARGE DETAILS:    Discharge planning discussed with[de-identified] Granddaughter  Freedom of Choice: Yes  Comments - Freedom of Choice: Discussed FOC  CM contacted family/caregiver?: Yes  Were Treatment Team discharge recommendations reviewed with patient/caregiver?: Yes  Did patient/caregiver verbalize understanding of patient care needs?: Yes  Were patient/caregiver advised of the risks associated with not following Treatment Team discharge recommendations?: Yes                                                                 IMM Given (Date):: 02/27/22  IMM Given to[de-identified] Family

## 2022-02-27 NOTE — PROGRESS NOTES
INTERNAL MEDICINE RESIDENCY PROGRESS NOTE     Name: Vladislav Deluna   Age & Sex: 80 y o  male   MRN: [id number]  Unit/Bed#: [facility] 633-01   Encounter: [id number]  Team: SOD Team C     PATIENT INFORMATION     Name: Vladislav Deluna   Age & Sex: 80 y o  male   MRN: [id number]  Hospital Stay Days: 10    ASSESSMENT/PLAN     Principal Problem:    Sepsis due to Pseudomonas aeruginosa [facility])  Active Problems:    Back pain    CAD (coronary atherosclerotic disease)    Primary hypertension    GERD (gastroesophageal reflux disease)    Hyperlipidemia    S/P CABG x 4    Bladder cancer (HCC)    CKD (chronic kidney disease) stage 3, GFR 30-59 ml/min ([facility])    COPD, severe ([facility])    Atrial fibrillation ([facility] )    Constipation    Overgrown toenails    Anemia    Metabolic encephalopathy    Bacteremia    Pyelonephritis    Endocarditis of tricuspid valve      * Sepsis due to Pseudomonas aeruginosa [facility])  Assessment & Plan  Patient initally presented to [facility] on 2/[facility] with sepsis 2/2 UTI/pyelonephritis with urine and blood cultures positive for pseudomonas  RENEE done 2/9 demonstrated endocarditis  There was concern for vertebral osteomyelitis, and transferred to [facility] for MRI with general anesthesia 2/2 advanced dementia  Repeat blood cultures negative to date  Plan:  · Source most likely urinary given b/l nephrostomy tubes, chronic archibald, and + UA  Will get MRI lumbar spine to r/u discitis, etc given complaint of back pain  · Chemo port removed and PICC line placed  · Patient to complete 6 week course of antibiotics (through 3/21/22)  · MRI performed on 02/21 with no findings suggestive of vertebral osteomyelitis  · ID following  · Pending [facility] to [facility]       Back pain  Assessment & Plan  Patient transferred to [facility] for MRI with general anesthesia in setting of back pain with presumed vertebral osteomyelitis    Plan:  · MRI cervical, thoracic, and lumbar spine with anesthesia completed 2/21: no evidence of discitis or osteomyelitis of thoracolumbar spine  Lumbar degeneration changes with mild/ moderate canal stenosis and foraminal narrowing, pronounced at L4-L5 on right  · Pain regimen in place      Endocarditis of tricuspid valve  Assessment & Plan  · Secondary to pseudomonas bacteremia  · Patient initially presented to [facility] on 02/04 with spesis 2/2 UTI/pyelonephritis with urine and blood cutlures positive for pseudomonas  RENEE on 02/09 showed endocarditis   · ID following  · Continue IV cefepime for 6 weeks duration (through 03/21/22)    Pyelonephritis  Assessment & Plan  Patient presented septic with CT findings of pyelonephritis    Bacteremia  Assessment & Plan  Patient with pseudomonas bacteremia on presentation to [facility]  Currently with repeat blood cultures no growth at 5 days  Plan:  · Continue IV antibiotics as described under sepsis plan  · ID consulted - appreciate recommendations    Metabolic encephalopathy  Assessment & Plan  Negative CTH at time or presentation  Evaluated by neuropsychiatry on 2/16 and determined not to have capacity to make medical decisions    Plan:  · Outpatient benzos and narcotics have been held since admission  · Delirium precautions and frequent redirection      Anemia  Assessment & Plan  Baseline Hgb 8-9  Currently stable    Plan:  · Continue iron supplementation  · Monitor daily CBC and transfuse for Hgb <7    Overgrown toenails  Assessment & Plan  Podiatry consulted    Constipation  Assessment & Plan  Patient last recorded BM on 02/26  · Scheduled Miralax 17 g daily  · Senna-docusate 2 tablet BID  · PRN bisacodyl  · lactulose 10g PO one time dose administered 2/22       Atrial fibrillation ([facility] )  Assessment & Plan  Currently rate controlled  Managed as outpatient on coumadin and cardizem    Plan:  · Home Coumadin 5 mg  Monitor INR   Not being bridged from heparin  · INR is now therapeutic  · Decreased warfarin to 3 mg daily on 2/24/22  · Continue diltiazem      COPD, severe (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 diagnosed 10/12/2021  S/p placement of B/L nephrostomy tubes and with chronic archibald in place  Follows with heme-onc and urology outpatient and receiving chemo and radiation  CT CAP 2/6 with concern for new right apical pleural-based mass now s/p IR biopsy on 2/14 without evidence of malignancy    Plan:  · Outpatient heme/onc and urology follow up    Hyperlipidemia  Assessment & Plan  Continue atorvastatin    GERD (gastroesophageal reflux disease)  Assessment & Plan  Continue pantoprazole    Primary hypertension  Assessment & Plan  Controlled as outpatient on Cardizem    Plan:  · Continue home Cardizem 240 daily  · Monitor vitals closely    CAD (coronary atherosclerotic disease)  Assessment & Plan  S/p CABG x4  Currently without chest pain  Stable    Plan:  · Continue home aspirin and statin      Disposition: Stable for discharge  Pending placement to STR  SUBJECTIVE     Patient seen and examined  No acute events overnight  Upon my encounter patient was lying comfortably in hospital bed and sleeping  He states that his only complaint today is of his back pain, the patient is due for pain medications at time of encounter  He presently denies any fever, chills, nausea, vomiting, diarrhea, constipation, chest pain, shortness a breath  He otherwise denies any new or worsening complaints      OBJECTIVE     Vitals:    02/26/22 1451 02/26/22 1554 02/27/22 0236 22 0738   BP: 132/78 (!) 137/110 125/74 122/78   Pulse: 80  69 74   Resp: 18 16 21 16   Temp: 98 2 °F (36 8 °C) (!) 97 2 °F (36 2 °C) 97 9 °F (36 6 °C) 98 °F (36 7 °C)   TempSrc:       SpO2: 99%  97% 97%      Temperature:   Temp (24hrs), Av 8 °F (36 6 °C), Min:97 2 °F (36 2 °C), Max:98 2 °F (36 8 °C)    Temperature: 98 °F (36 7 °C)  Intake & Output:  I/O        07 07 07 07 07 07    P  O  720 705     Total Intake 720 705     Urine 1575 2525     Stool 0      Total Output 1575 2525     Net -855 -1820            Unmeasured Stool Occurrence 2 x          Weights: There is no height or weight on file to calculate BMI  Weight (last 2 days)     None        Physical Exam  Constitutional:       General: He is not in acute distress  Appearance: Normal appearance  He is not diaphoretic  HENT:      Head: Normocephalic and atraumatic  Ears:      Comments: Hard of hearing     Mouth/Throat:      Mouth: Mucous membranes are moist       Pharynx: Oropharynx is clear  Eyes:      Extraocular Movements: Extraocular movements intact  Conjunctiva/sclera: Conjunctivae normal       Pupils: Pupils are equal, round, and reactive to light  Cardiovascular:      Rate and Rhythm: Normal rate and regular rhythm  Pulses: Normal pulses  Heart sounds: Murmur (mitral valve) heard  Pulmonary:      Effort: Pulmonary effort is normal  No respiratory distress  Breath sounds: Normal breath sounds  No wheezing, rhonchi or rales  Abdominal:      General: Abdomen is flat  Bowel sounds are normal  There is no distension  Palpations: Abdomen is soft  Tenderness: There is no abdominal tenderness  Genitourinary:     Comments: deferred  Musculoskeletal:         General: Tenderness (mild lumbar point tenderness) present  Cervical back: Normal range of motion  Right lower leg: No edema  Left lower leg: No edema     Skin:     General: Skin is warm and dry  Neurological:      General: No focal deficit present  Mental Status: He is alert and oriented to person, place, and time  Motor: No weakness  Psychiatric:         Mood and Affect: Mood normal          Behavior: Behavior normal          Thought Content: Thought content normal       Comments: Less tangential today  Was able to be redirected more easily today        LABORATORY DATA     Labs: I have personally reviewed pertinent reports  Results from last 7 days   Lab Units 02/27/22  0455 02/26/22  0433 02/25/22  0000   WBC Thousand/uL 6 03 4 86 5 24   HEMOGLOBIN g/dL 10 2* 10 1* 10 0*   HEMATOCRIT % 33 1* 32 7* 31 2*   PLATELETS Thousands/uL 258 246 231   MONO PCT % 8  --   --       Results from last 7 days   Lab Units 02/25/22  0300 02/24/22  0533 02/23/22  0518   POTASSIUM mmol/L 4 0 4 5 4 5   CHLORIDE mmol/L 104 104 105   CO2 mmol/L 27 28 26   BUN mg/dL 23 22 25   CREATININE mg/dL 1 15 1 08 1 08   CALCIUM mg/dL 8 9 9 6 9 0              Results from last 7 days   Lab Units 02/26/22  0433 02/25/22  0000 02/24/22  0533   INR  2 93* 2 92* 2 92*               IMAGING & DIAGNOSTIC TESTING     Radiology Results: I have personally reviewed pertinent reports  MRI cervical spine wo contrast    Result Date: 2/21/2022  Impression: No evidence of discitis or osteomyelitis as clinically questioned  Moderate cervical degenerative change most prominent at the C3-4 level and to a lesser extent at C5-6 and C6-7  Workstation performed: [id number]     MRI lumbar spine wo contrast    Result Date: 2/21/2022  Impression: No evidence of discitis or osteomyelitis of the thoracolumbar spine  Lumbar degenerative change with mild to moderate canal stenosis and foraminal narrowing, most pronounced at L4-5 towards the right  Workstation performed: [id number]     Other Diagnostic Testing: I have personally reviewed pertinent reports      ACTIVE MEDICATIONS     Current Facility-Administered Medications Medication Dose Route Frequency    acetaminophen (TYLENOL) tablet 975 mg  975 mg Oral Q8H [address]    aluminum-magnesium hydroxide-simethicone (MYLANTA) oral suspension 30 mL  30 mL Oral Q4H PRN    aspirin (ECOTRIN LOW STRENGTH) EC tablet 81 mg  81 mg Oral Daily    atorvastatin (LIPITOR) tablet 40 mg  40 mg Oral Daily    bisacodyl (DULCOLAX) rectal suppository 10 mg  10 mg Rectal Daily PRN    cefepime (MAXIPIME) 1,000 mg in dextrose 5 % 50 mL IVPB  1,000 mg Intravenous Q8H    Diclofenac Sodium (VOLTAREN) 1 % topical gel 2 g  2 g Topical 4x Daily    diltiazem (CARDIZEM CD) 24 hr capsule 240 mg  240 mg Oral Daily    ferrous sulfate tablet 325 mg  325 mg Oral BID With Meals    HYDROmorphone (DILAUDID) injection 0 4 mg  0 4 mg Intravenous Q5 Min PRN    lidocaine (LIDODERM) 5 % patch 1 patch  1 patch Topical Daily    lidocaine (PF) (XYLOCAINE-MPF) 1 % injection 0 5 mL  0 5 mL Infiltration Once PRN    melatonin tablet 3 mg  3 mg Oral HS PRN    ondansetron (ZOFRAN) injection 4 mg  4 mg Intravenous Q6H PRN    oxyCODONE (ROXICODONE) IR tablet 2 5 mg  2 5 mg Oral Q6H PRN    oxyCODONE (ROXICODONE) IR tablet 5 mg  5 mg Oral Q6H PRN    pantoprazole (PROTONIX) EC tablet 40 mg  40 mg Oral QAM    polyethylene glycol (MIRALAX) packet 17 g  17 g Oral Daily    senna-docusate sodium (SENOKOT S) 8 6-50 mg per tablet 2 tablet  2 tablet Oral BID    simethicone (MYLICON) chewable tablet 80 mg  80 mg Oral Q6H PRN    sodium phosphate-biphosphate (FLEET) enema 1 enema  1 enema Rectal Once PRN    warfarin (COUMADIN) tablet 3 mg  3 mg Oral Daily (warfarin)       VTE Pharmacologic Prophylaxis: Warfarin (Coumadin)  VTE Mechanical Prophylaxis: sequential compression device    Portions of the record may have been created with voice recognition software  Occasional wrong word or "sound a like" substitutions may have occurred due to the inherent limitations of voice recognition software    Read the chart carefully and recognize, using context, where substitutions have occurred   ==  Joselito Howard, [facility]  Internal Medicine Residency PGY-1

## 2022-02-28 ENCOUNTER — APPOINTMENT (OUTPATIENT)
Dept: RADIATION ONCOLOGY | Facility: CLINIC | Age: 82
End: 2022-02-28
Attending: RADIOLOGY
Payer: MEDICARE

## 2022-02-28 VITALS
OXYGEN SATURATION: 98 % | SYSTOLIC BLOOD PRESSURE: 124 MMHG | HEART RATE: 71 BPM | TEMPERATURE: 98.7 F | RESPIRATION RATE: 16 BRPM | DIASTOLIC BLOOD PRESSURE: 78 MMHG

## 2022-02-28 DIAGNOSIS — I07.9 ENDOCARDITIS OF TRICUSPID VALVE: ICD-10-CM

## 2022-02-28 DIAGNOSIS — R78.81 BACTEREMIA DUE TO PSEUDOMONAS: Primary | ICD-10-CM

## 2022-02-28 DIAGNOSIS — B96.5 BACTEREMIA DUE TO PSEUDOMONAS: Primary | ICD-10-CM

## 2022-02-28 PROBLEM — K59.00 CONSTIPATION: Status: RESOLVED | Noted: 2021-12-09 | Resolved: 2022-02-28

## 2022-02-28 PROBLEM — R12 HEARTBURN: Status: ACTIVE | Noted: 2022-02-28

## 2022-02-28 PROBLEM — A41.52 SEPSIS DUE TO PSEUDOMONAS AERUGINOSA (HCC): Status: RESOLVED | Noted: 2022-02-04 | Resolved: 2022-02-28

## 2022-02-28 PROBLEM — N12 PYELONEPHRITIS: Status: RESOLVED | Noted: 2022-02-17 | Resolved: 2022-02-28

## 2022-02-28 LAB
ANION GAP SERPL CALCULATED.3IONS-SCNC: 5 MMOL/L (ref 4–13)
BUN SERPL-MCNC: 18 MG/DL (ref 5–25)
CALCIUM SERPL-MCNC: 8.8 MG/DL (ref 8.3–10.1)
CHLORIDE SERPL-SCNC: 106 MMOL/L (ref 100–108)
CO2 SERPL-SCNC: 27 MMOL/L (ref 21–32)
CREAT SERPL-MCNC: 1.21 MG/DL (ref 0.6–1.3)
FLUAV RNA RESP QL NAA+PROBE: NEGATIVE
FLUBV RNA RESP QL NAA+PROBE: NEGATIVE
GFR SERPL CREATININE-BSD FRML MDRD: 55 ML/MIN/1.73SQ M
GLUCOSE SERPL-MCNC: 102 MG/DL (ref 65–140)
HCT VFR BLD AUTO: 33.2 % (ref 36.5–49.3)
HGB BLD-MCNC: 10.3 G/DL (ref 12–17)
INR PPP: 2.83 (ref 0.84–1.19)
MCH RBC QN AUTO: 26.6 PG (ref 26.8–34.3)
MCHC RBC AUTO-ENTMCNC: 31 G/DL (ref 31.4–37.4)
MCV RBC AUTO: 86 FL (ref 82–98)
PLATELET # BLD AUTO: 216 THOUSANDS/UL (ref 149–390)
PMV BLD AUTO: 9.2 FL (ref 8.9–12.7)
POTASSIUM SERPL-SCNC: 4.2 MMOL/L (ref 3.5–5.3)
PROTHROMBIN TIME: 28.3 SECONDS (ref 11.6–14.5)
RBC # BLD AUTO: 3.87 MILLION/UL (ref 3.88–5.62)
RSV RNA RESP QL NAA+PROBE: NEGATIVE
SARS-COV-2 RNA RESP QL NAA+PROBE: NEGATIVE
SODIUM SERPL-SCNC: 138 MMOL/L (ref 136–145)
WBC # BLD AUTO: 5.41 THOUSAND/UL (ref 4.31–10.16)

## 2022-02-28 PROCEDURE — 0241U HB NFCT DS VIR RESP RNA 4 TRGT: CPT | Performed by: STUDENT IN AN ORGANIZED HEALTH CARE EDUCATION/TRAINING PROGRAM

## 2022-02-28 PROCEDURE — 85027 COMPLETE CBC AUTOMATED: CPT | Performed by: STUDENT IN AN ORGANIZED HEALTH CARE EDUCATION/TRAINING PROGRAM

## 2022-02-28 PROCEDURE — 99232 SBSQ HOSP IP/OBS MODERATE 35: CPT | Performed by: INTERNAL MEDICINE

## 2022-02-28 PROCEDURE — 99231 SBSQ HOSP IP/OBS SF/LOW 25: CPT | Performed by: INTERNAL MEDICINE

## 2022-02-28 PROCEDURE — 85610 PROTHROMBIN TIME: CPT | Performed by: STUDENT IN AN ORGANIZED HEALTH CARE EDUCATION/TRAINING PROGRAM

## 2022-02-28 PROCEDURE — 80048 BASIC METABOLIC PNL TOTAL CA: CPT | Performed by: STUDENT IN AN ORGANIZED HEALTH CARE EDUCATION/TRAINING PROGRAM

## 2022-02-28 RX ORDER — WARFARIN SODIUM 1 MG/1
3 TABLET ORAL
Qty: 30 TABLET | Refills: 1 | Status: SHIPPED | OUTPATIENT
Start: 2022-02-28

## 2022-02-28 RX ORDER — AMOXICILLIN 250 MG
2 CAPSULE ORAL 2 TIMES DAILY
Qty: 30 TABLET | Refills: 1 | Status: SHIPPED | OUTPATIENT
Start: 2022-02-28

## 2022-02-28 RX ORDER — SIMETHICONE 80 MG
80 TABLET,CHEWABLE ORAL EVERY 6 HOURS PRN
Qty: 30 TABLET | Refills: 0 | Status: SHIPPED | OUTPATIENT
Start: 2022-02-28

## 2022-02-28 RX ORDER — FERROUS SULFATE 325(65) MG
325 TABLET ORAL EVERY OTHER DAY
Qty: 30 TABLET | Refills: 0 | Status: SHIPPED | OUTPATIENT
Start: 2022-02-28

## 2022-02-28 RX ADMIN — ACETAMINOPHEN 975 MG: 325 TABLET, FILM COATED ORAL at 04:38

## 2022-02-28 RX ADMIN — DICLOFENAC SODIUM 2 G: 10 GEL TOPICAL at 10:13

## 2022-02-28 RX ADMIN — DICLOFENAC SODIUM 2 G: 10 GEL TOPICAL at 12:19

## 2022-02-28 RX ADMIN — CEFEPIME HYDROCHLORIDE 1000 MG: 1 INJECTION, POWDER, FOR SOLUTION INTRAMUSCULAR; INTRAVENOUS at 04:46

## 2022-02-28 RX ADMIN — FERROUS SULFATE TAB 325 MG (65 MG ELEMENTAL FE) 325 MG: 325 (65 FE) TAB at 10:01

## 2022-02-28 RX ADMIN — SENNOSIDES AND DOCUSATE SODIUM 2 TABLET: 50; 8.6 TABLET ORAL at 10:00

## 2022-02-28 RX ADMIN — POLYETHYLENE GLYCOL 3350 17 G: 17 POWDER, FOR SOLUTION ORAL at 10:10

## 2022-02-28 RX ADMIN — CEFEPIME HYDROCHLORIDE 1000 MG: 1 INJECTION, POWDER, FOR SOLUTION INTRAMUSCULAR; INTRAVENOUS at 11:34

## 2022-02-28 RX ADMIN — LIDOCAINE 5% 1 PATCH: 700 PATCH TOPICAL at 10:02

## 2022-02-28 RX ADMIN — ATORVASTATIN CALCIUM 40 MG: 40 TABLET, FILM COATED ORAL at 10:01

## 2022-02-28 RX ADMIN — DILTIAZEM HYDROCHLORIDE 240 MG: 240 CAPSULE, COATED, EXTENDED RELEASE ORAL at 10:01

## 2022-02-28 RX ADMIN — ASPIRIN 81 MG: 81 TABLET, COATED ORAL at 10:01

## 2022-02-28 RX ADMIN — PANTOPRAZOLE SODIUM 40 MG: 40 TABLET, DELAYED RELEASE ORAL at 04:52

## 2022-02-28 RX ADMIN — OXYCODONE HYDROCHLORIDE 5 MG: 5 TABLET ORAL at 04:38

## 2022-02-28 NOTE — CASE MANAGEMENT
Case Management Discharge Planning Note    Patient name Celena Solorzano  Location [address] [place] [address] [facility] [id number]  : 1940 Date 2022       Current Admission Date: 2022  Current Admission Diagnosis:CAD (coronary atherosclerotic disease)   Patient Active Problem List    Diagnosis Date Noted    Endocarditis of tricuspid valve 2022    Bacteremia     Metabolic encephalopathy     [facility] 2022    Anxiety 2022    Continuous opioid dependence ([facility] ) 2022    Anemia 2022    FRANCY (iron deficiency anemia) 2022    DULCE (acute kidney injury) ([facility] ) on CKD 3 01/15/2022    Pleural effusion on left 01/15/2022    Abnormal urinalysis 01/15/2022    Fortune catheter in place prior to arrival 2022    Stage 3a chronic kidney disease ([facility] ) 2022    Recurrent unilateral inguinal hernia 2021    Left lower quadrant abdominal pain 2021    History of bilateral inguinal hernia repair 2021    Cancer of overlapping sites of bladder ([facility] ) 2021    Overgrown toenails 2021    [facility] 2021    Encounter to discuss test results 10/12/2021    Acute on chronic diastolic congestive heart failure ([facility] ) 2020    DDD (degenerative disc disease), lumbar 10/30/2019    Medicare annual wellness visit, subsequent 2019    Tinnitus aurium, bilateral 2019    Sensorineural hearing loss (SNHL) of both ears 2019    Abnormal CT of the chest 2019    COPD, severe ([address] ) 2018    Bronchiectasis with acute lower respiratory infection ([facility] ) 2018    Localized edema 10/15/2018    Irregular heart beat     Atrial flutter ([facility] ) 10/07/2018    CKD (chronic kidney disease) stage 3, GFR 30-59 ml/min ([facility]) 2018    Urinary retention due to benign prostatic hyperplasia 2018    Bladder cancer ([address] ) 2018    S/P CABG x 4 2018    Stenosis of left carotid artery 01/14/2018    GERD (gastroesophageal reflux disease) 01/13/2018    History of stroke 01/13/2018    Sciatica of right side 01/13/2018    Vision changes 01/13/2018    Hyperlipidemia     Centrilobular emphysema (HCC)     Arthritis     Back pain 01/24/2017    Acute left-sided low back pain with left-sided sciatica 10/25/2016    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 10/25/2016    CAD (coronary atherosclerotic disease) 08/13/2016    Primary hypertension 08/13/2016    Hyperlipemia 08/13/2016      LOS (days): 11  Geometric Mean LOS (GMLOS) (days): 7 10  Days to GMLOS:-3 4     OBJECTIVE:  Risk of Unplanned Readmission Score: 46         Current admission status: Inpatient   Preferred Pharmacy:   [address] [facility], [address]  [facility] [place] [address]  Phone: [phone] Fax: [phone]    Primary Care Provider: Katarina Andrade MD    Primary Insurance: MEDICARE  Secondary Insurance: [facility]    DISCHARGE DETAILS:    Discharge planning discussed with[de-identified] Granddaughter  Freedom of Choice: Yes  Comments - Freedom of Choice: discussed FOC  CM contacted family/caregiver?: Yes  Were Treatment Team discharge recommendations reviewed with patient/caregiver?: Yes  Did patient/caregiver verbalize understanding of patient care needs?: Yes  Were patient/caregiver advised of the risks associated with not following Treatment Team discharge recommendations?: Yes    Contacts  Patient Contacts: Kay Child  Relationship to Patient[de-identified] Family  Contact Method: Phone  Phone Number: [phone]  Reason/Outcome: Discharge Planning       Other Referral/Resources/Interventions Provided:  Interventions: Short Term Rehab,SNF  Referral Comments: Bed available today at [facility] [place], Keegan Paniagua in agreement    Transport at Discharge : [facility]  Dispatcher Contacted: Yes  Number/Name of Dispatcher: [facility]  Transported by [employer] and Unit #): [facility]  ETA of Transport (Date): 02/28/22  ETA of Transport (Time): [address] [facility] Name, [address] : Noelle Correia at [facility]  Receiving Facility/Agency Phone Number: [phone]  Facility/Agency Fax Number: [phone]

## 2022-02-28 NOTE — PLAN OF CARE
Report given to Zap at [address]  All questions answered  All belongings sent with pt   Pt left via EMS @ [phone]

## 2022-02-28 NOTE — PLAN OF CARE
Problem: MOBILITY - ADULT  Goal: Maintain or return to baseline ADL function  Description: INTERVENTIONS:  -  Assess patient's ability to carry out ADLs; assess patient's baseline for ADL function and identify physical deficits which impact ability to perform ADLs (bathing, care of mouth/teeth, toileting, grooming, dressing, etc )  - Assess/evaluate cause of self-care deficits   - Assess range of motion  - Assess patient's mobility; develop plan if impaired  - Assess patient's need for assistive devices and provide as appropriate  - Encourage maximum independence but intervene and supervise when necessary  - Involve family in performance of ADLs  - Assess for home care needs following discharge   - Consider OT consult to assist with ADL evaluation and planning for discharge  - Provide patient education as appropriate  Outcome: Progressing  Goal: Maintains/Returns to pre admission functional level  Description: INTERVENTIONS:  - Perform BMAT or MOVE assessment daily    - Set and communicate daily mobility goal to care team and patient/family/caregiver  - Collaborate with rehabilitation services on mobility goals if consulted  - Perform Range of Motion  times a day  - Reposition patient every hours    - Dangle patient  times a day  - Stand patient  times a day  - Ambulate patient  times a day  - Out of bed to chair times a day   - Out of bed for meals  times a day  - Out of bed for toileting  - Record patient progress and toleration of activity level   Outcome: Progressing     Problem: GASTROINTESTINAL - ADULT  Goal: Minimal or absence of nausea and/or vomiting  Description: INTERVENTIONS:  - Administer IV fluids if ordered to ensure adequate hydration  - Maintain NPO status until nausea and vomiting are resolved  - Nasogastric tube if ordered  - Administer ordered antiemetic medications as needed  - Provide nonpharmacologic comfort measures as appropriate  - Advance diet as tolerated, if ordered  - Consider nutrition services referral to assist patient with adequate nutrition and appropriate food choices  Outcome: Progressing  Goal: Maintains or returns to baseline bowel function  Description: INTERVENTIONS:  - Assess bowel function  - Encourage oral fluids to ensure adequate hydration  - Administer IV fluids if ordered to ensure adequate hydration  - Administer ordered medications as needed  - Encourage mobilization and activity  - Consider nutritional services referral to assist patient with adequate nutrition and appropriate food choices  Outcome: Progressing     Problem: GENITOURINARY - ADULT  Goal: Maintains or returns to baseline urinary function  Description: INTERVENTIONS:  - Assess urinary function  - Encourage oral fluids to ensure adequate hydration if ordered  - Administer IV fluids as ordered to ensure adequate hydration  - Administer ordered medications as needed  - Offer frequent toileting  - Follow urinary retention protocol if ordered  Outcome: Progressing  Goal: Urinary catheter remains patent  Description: INTERVENTIONS:  - Assess patency of urinary catheter  - If patient has a chronic archibald, consider changing catheter if non-functioning  - Follow guidelines for intermittent irrigation of non-functioning urinary catheter  Outcome: Progressing     Problem: Potential for Falls  Goal: Patient will remain free of falls  Description: INTERVENTIONS:  - Educate patient/family on patient safety including physical limitations  - Instruct patient to call for assistance with activity   - Consult OT/PT to assist with strengthening/mobility   - Keep Call bell within reach  - Keep bed low and locked with side rails adjusted as appropriate  - Keep care items and personal belongings within reach  - Initiate and maintain comfort rounds  - Make Fall Risk Sign visible to staff  - Offer Toileting every  Hours, in advance of need  - Initiate/Maintainalarm  - Obtain necessary fall risk management equipment  - Apply yellow socks and bracelet for high fall risk patients  - Consider moving patient to room near nurses station  Outcome: Progressing     Problem: Prexisting or High Potential for Compromised Skin Integrity  Goal: Skin integrity is maintained or improved  Description: INTERVENTIONS:  - Identify patients at risk for skin breakdown  - Assess and monitor skin integrity  - Assess and monitor nutrition and hydration status  - Monitor labs   - Assess for incontinence   - Turn and reposition patient  - Assist with mobility/ambulation  - Relieve pressure over bony prominences  - Avoid friction and shearing  - Provide appropriate hygiene as needed including keeping skin clean and dry  - Evaluate need for skin moisturizer/barrier cream  - Collaborate with interdisciplinary team   - Patient/family teaching  - Consider wound care consult   Outcome: Progressing

## 2022-02-28 NOTE — CASE MANAGEMENT
Case Management Progress Note    Patient name Celena Solorzano  Location [address] [place] [address] [id number] MRN [id number]  : 1940 Date 2022       LOS (days): 11  Geometric Mean LOS (GMLOS) (days): 7 10  Days to GMLOS:-3 5        OBJECTIVE:        Current admission status: Inpatient  Preferred Pharmacy:   [facility] [place] [place] [id number]  Phone: [phone] Fax: [phone]    Primary Care Provider: An Boucher MD    Primary Insurance: MEDICARE  Secondary Insurance: [facility]    PROGRESS NOTE: TC to Rubann Ear with all updates regarding patient DC    This CM stated that I will leave a list of non skilled agencies at patient bedside for Kiko Ear to look over, as she is considering taking patient home after rehab

## 2022-02-28 NOTE — DISCHARGE INSTR - AVS FIRST PAGE
- Please discontinue PICC line with last dose of IV antibiotics which is 03/21/2022  - A follow-up outpatient INR check has been scheduled for 03/03/2022  - Patient is advised to take 3 mg of warfarin on a daily basis with regular INR monitoring  - Follow-up weekly CBC and BMP have been scheduled for the patient until 03/21/2022

## 2022-02-28 NOTE — PROGRESS NOTES
Progress Note - Infectious Disease   Celena Solorzano 80 y o  male MRN: [id number]  Unit/Bed#: [facility] 633-01 Encounter: [id number]      Impression/Recommendations:  1  Sepsis   POA to SLMO   Due to #2/3  No other appreciable source   Improved  Rec:  · Continue antibiotics as below  · Follow temperatures and WBC closely  · Supportive care as per the primary service     2   Pseudomonal bacteremia with TV endocarditis   Due to #3   Consider also secondary port infection, status post removal    Repeat blood cultures with late positive, now cleared   RENEE with vegetation on moderator band of TV  Rec:  · Continue cefepime for 6 weeks total through 2022   · Check weekly CBC-diff, Cr while in IV antibiotic  · D/C PICC after last dose IV antibiotics  · D/C planning for STR  · Outpatient follow-up with ID 2 weeks after D/C - office notified     3   Pseudomonal UTI   In setting of recent Fortune catheter exchange, malpositioned Fortune   CT also shows pyelonephritis  Rec:  · Continue antibiotics as above     4   Back pain   Likely due to degeneration, canal stenosis seen MRI (noncontast)   No evidence of discitis/OM     Rec:  · PT/OT as tolerated  · Supportive care as per the primary service     5   Bladder cancer   With obstructive nephrolithiasis   Chronic Fortune, bilateral PCNS   On outpatient chemotherapy      6   CKD   Baseline Cr 1 1-1 2      The patient is stable from an ID standpoint for D/C to STR      Antibiotics:  Cefepime #25    Subjective:  Patient seen on AM rounds  Sleeping and not awakened  No events noted  Offered no complaints to other providers earlier this AM     24 Hour Events:  No documented fevers, chills, sweats, nausea, vomiting, or diarrhea      Objective:  Vitals:  Temp:  [98 1 °F (36 7 °C)-98 9 °F (37 2 °C)] 98 7 °F (37 1 °C)  HR:  [71-78] 71  Resp:  [16] 16  BP: (124-129)/(73-82) 124/78  SpO2:  [97 %-98 %] 98 %  Temp (24hrs), Av 6 °F (37 °C), Min:98 1 °F (36 7 °C), Max:98 9 °F (37 2 °C)  Current: Temperature: 98 7 °F (37 1 °C)    Physical Exam:   General:  No acute distress  Psychiatric:  Sleeping  Pulmonary:  Normal respiratory excursion without accessory muscle use  Abdomen:  Soft, nontender  Extremities:  No edema  Skin:  No rashes    Lab Results:  I have personally reviewed pertinent labs  Results from last 7 days   Lab Units 02/28/22  0441 02/25/22  0300 02/24/22  0533   POTASSIUM mmol/L 4 2 4 0 4 5   CHLORIDE mmol/L 106 104 104   CO2 mmol/L 27 27 28   BUN mg/dL 18 23 22   CREATININE mg/dL 1 21 1 15 1 08   EGFR ml/min/1 73sq m 55 59 64   CALCIUM mg/dL 8 8 8 9 9 6     Results from last 7 days   Lab Units 02/28/22  0441 02/27/22  0455 02/26/22  0433   WBC Thousand/uL 5 41 6 03 4 86   HEMOGLOBIN g/dL 10 3* 10 2* 10 1*   PLATELETS Thousands/uL 216 258 246           Imaging Studies:   I have personally reviewed pertinent imaging study reports and images in PACS  EKG, Pathology, and Other Studies:   I have personally reviewed pertinent reports

## 2022-03-01 ENCOUNTER — TELEPHONE (OUTPATIENT)
Dept: RADIATION ONCOLOGY | Facility: CLINIC | Age: 82
End: 2022-03-01

## 2022-03-01 ENCOUNTER — TELEPHONE (OUTPATIENT)
Dept: INFECTIOUS DISEASES | Facility: CLINIC | Age: 82
End: 2022-03-01

## 2022-03-01 ENCOUNTER — TRANSITIONAL CARE MANAGEMENT (OUTPATIENT)
Dept: INTERNAL MEDICINE CLINIC | Facility: OTHER | Age: 82
End: 2022-03-01

## 2022-03-01 NOTE — TELEPHONE ENCOUNTER
Contacted [address] and spoke with Arturo Arango  Per Arturo Arango, there are no questions regarding pt abx/care plan   Faxed abx plan, weekly labs, and follow up to: [phone]

## 2022-03-01 NOTE — TELEPHONE ENCOUNTER
Juan Borrego discharged yesterday from [facility] to follow up on patient's decision about proceeding with RT  L/M [phone]  Identified myself, left the office number, and requested a call back

## 2022-03-02 ENCOUNTER — PATIENT OUTREACH (OUTPATIENT)
Dept: HEMATOLOGY ONCOLOGY | Facility: CLINIC | Age: 82
End: 2022-03-02

## 2022-03-02 NOTE — PROGRESS NOTES
Left message for pt to see how he was doing and to schedule appt  Call to pt's grand-daughter, Bel Sal  Discussed scheduling follow-up with Dr Savita Ibarra, which she was initially reluctant to do feeling that hospitalization was caused by patient's treatment of radiation and chemotherapy  Explained to her that chemotherapy and radiation can contribute, his reason for hospitalization was infection most likely, urinary tract infection that then went to his blood making him very sick  She states pt is pretty weak and currently in rehab and states bladder cancer has progressed  Informed her I am not aware of this but encouraged follow-up with Dr Savita Ibarra to discuss different options because chemotherapy may not be the only option at this point given his current state but reinforced Dr Savita Ibarra would be the one to determine this  She was agreeable to scheduling appt and appt made for 3/10/22

## 2022-03-04 ENCOUNTER — TELEPHONE (OUTPATIENT)
Dept: UROLOGY | Facility: CLINIC | Age: 82
End: 2022-03-04

## 2022-03-04 NOTE — TELEPHONE ENCOUNTER
Spoke with Nurse manager Stella Thomas and they stated they actually did not have transportation for the patient today (11am appt) and the person who called earlier [facility] gave the incorrect information  Patient will need to be rescheduled    [facility] - [phone]

## 2022-03-07 NOTE — TELEPHONE ENCOUNTER
Patient rescheduled for 3/30/22 at 1130am  Please confirm and advise this is the ONLY availability in the recommended time frame

## 2022-03-07 NOTE — TELEPHONE ENCOUNTER
Pt is in [facility] care in [place] - will need a cysto appt in [place] - please advise on availability and time frame -

## 2022-03-07 NOTE — TELEPHONE ENCOUNTER
[employer]  Left message [phone]  Identified myself and requested a call back  Offered telephone follow up with Dr Fernand Hashimoto to discuss treatment  Aylin Theodore is scheduled to follow up with Dr Ozuna Flow 3/10/22

## 2022-03-10 ENCOUNTER — TELEPHONE (OUTPATIENT)
Dept: HEMATOLOGY ONCOLOGY | Facility: CLINIC | Age: 82
End: 2022-03-10

## 2022-03-11 ENCOUNTER — OFFICE VISIT (OUTPATIENT)
Dept: INFECTIOUS DISEASES | Facility: CLINIC | Age: 82
End: 2022-03-11
Payer: MEDICARE

## 2022-03-11 ENCOUNTER — PATIENT OUTREACH (OUTPATIENT)
Dept: HEMATOLOGY ONCOLOGY | Facility: CLINIC | Age: 82
End: 2022-03-11

## 2022-03-11 VITALS
DIASTOLIC BLOOD PRESSURE: 66 MMHG | RESPIRATION RATE: 16 BRPM | TEMPERATURE: 96.4 F | SYSTOLIC BLOOD PRESSURE: 130 MMHG | OXYGEN SATURATION: 96 % | HEART RATE: 89 BPM

## 2022-03-11 DIAGNOSIS — I07.9 ENDOCARDITIS OF TRICUSPID VALVE: ICD-10-CM

## 2022-03-11 DIAGNOSIS — R78.81 BACTEREMIA: Primary | ICD-10-CM

## 2022-03-11 PROBLEM — R82.90 ABNORMAL URINALYSIS: Status: RESOLVED | Noted: 2022-01-15 | Resolved: 2022-03-11

## 2022-03-11 PROBLEM — M54.31 SCIATICA OF RIGHT SIDE: Status: RESOLVED | Noted: 2018-01-13 | Resolved: 2022-03-11

## 2022-03-11 PROBLEM — R60.0 LOCALIZED EDEMA: Status: RESOLVED | Noted: 2018-10-15 | Resolved: 2022-03-11

## 2022-03-11 PROBLEM — R10.32 LEFT LOWER QUADRANT ABDOMINAL PAIN: Status: RESOLVED | Noted: 2021-12-13 | Resolved: 2022-03-11

## 2022-03-11 PROBLEM — Z71.2 ENCOUNTER TO DISCUSS TEST RESULTS: Status: RESOLVED | Noted: 2021-10-12 | Resolved: 2022-03-11

## 2022-03-11 PROBLEM — M54.9 BACK PAIN: Status: RESOLVED | Noted: 2017-01-24 | Resolved: 2022-03-11

## 2022-03-11 PROBLEM — D64.9 ANEMIA: Status: RESOLVED | Noted: 2022-01-27 | Resolved: 2022-03-11

## 2022-03-11 PROBLEM — R93.89 ABNORMAL CT OF THE CHEST: Status: RESOLVED | Noted: 2019-01-04 | Resolved: 2022-03-11

## 2022-03-11 PROBLEM — G93.41 METABOLIC ENCEPHALOPATHY: Status: RESOLVED | Noted: 2022-02-04 | Resolved: 2022-03-11

## 2022-03-11 PROBLEM — H53.9 VISION CHANGES: Status: RESOLVED | Noted: 2018-01-13 | Resolved: 2022-03-11

## 2022-03-11 PROBLEM — N17.9 AKI (ACUTE KIDNEY INJURY) (HCC): Status: RESOLVED | Noted: 2022-01-15 | Resolved: 2022-03-11

## 2022-03-11 PROBLEM — J90 PLEURAL EFFUSION ON LEFT: Status: RESOLVED | Noted: 2022-01-15 | Resolved: 2022-03-11

## 2022-03-11 PROBLEM — N18.31 STAGE 3A CHRONIC KIDNEY DISEASE (HCC): Status: RESOLVED | Noted: 2022-01-03 | Resolved: 2022-03-11

## 2022-03-11 PROCEDURE — 99214 OFFICE O/P EST MOD 30 MIN: CPT | Performed by: INTERNAL MEDICINE

## 2022-03-11 RX ORDER — TRAMADOL HYDROCHLORIDE 50 MG/1
100 TABLET ORAL EVERY 8 HOURS PRN
COMMUNITY

## 2022-03-11 RX ORDER — FAMOTIDINE 20 MG
4000 TABLET ORAL
COMMUNITY

## 2022-03-11 RX ORDER — LIDOCAINE 50 MG/G
1 PATCH TOPICAL DAILY
COMMUNITY

## 2022-03-11 RX ORDER — ACETAMINOPHEN 325 MG/1
650 TABLET ORAL 3 TIMES DAILY PRN
COMMUNITY

## 2022-03-11 RX ORDER — BUSPIRONE HYDROCHLORIDE 5 MG/1
5 TABLET ORAL 2 TIMES DAILY
COMMUNITY

## 2022-03-11 RX ORDER — ESCITALOPRAM OXALATE 10 MG/1
10 TABLET ORAL DAILY
COMMUNITY

## 2022-03-11 NOTE — PROGRESS NOTES
Follow-Up Note - Infectious Disease   Arleth  80 y o  male MRN: [id number]      Impression/Recommendations:  1  Pseudomonal bacteremia with TV endocarditis   Due to #3  Lawayne Chimes also secondary port infection, status post removal 02/07   Repeat blood cultures with late positive, now cleared   RENEE with vegetation on moderator band of TV  Rec:  · Continue cefepime for 6 weeks total through 03/21/2022   · Check weekly CBC-diff, Cr while in IV antibiotic  · D/C PICC after last dose IV antibiotics  · Check repeat blood cultures week of 4/4/22  · RTO PRN     2   Pseudomonal UTI   In setting of recent Fortune catheter exchange, malpositioned Fortune   CT also showed pyelonephritis  Rec:  · Continue antibiotics as above     3   Bladder cancer   With obstructive nephrolithiasis   Chronic Fortune, bilateral PCNS   On outpatient chemotherapy  Rec:  · Outpatient follow-up with Urology 3/30/22     4   CKD   Baseline Cr 1 1-1 2      Antibiotics:  Cefepime #36    Subjective:  Patient is here for follow-up of IV antibiotics  ROS extremely limited by [facility]  Thinks urine is "black"  Seems to deny fevers, chills, or sweats  Denies nausea, vomiting, or diarrhea  The following portions of the patient's history were reviewed and updated as appropriate: allergies, current medications, past medical history, past social history, past surgical history and problem list     Objective:  Vitals:  /66   Pulse 89   Temp (!) 96 4 °F (35 8 °C)   Resp 16   SpO2 96%     Physical Exam:   General:  No acute distress  Eyes:  Normal lids and conjunctivae  ENT:  Normal external ears and nose  Neck:  Neck symmetric with midline trachea  Pulmonary:  Normal respiratory effort without accessory muscle use  Cardiovascular:  Regular rate and rhythm; no peripheral edema  Gastrointestinal:  No tenderness or distention  Musculoskeletal:  No digital clubbing or cyanosis  Skin:  No visible rashes;  No palpable nodules  Neurologic:  Sensation grossly intact to light touch  Psychiatric:  Alert and oriented; Normal mood  :  Dark, clear damien urine in bilateral PCN, Fortune  Lab Results:  I have personally reviewed pertinent labs  Labs 3/9/22 WBC 6 0, Plts 230, ANC 5200, Cr 0 9    Imaging Studies:   I have personally reviewed pertinent imaging study reports and images in PACS  EKG, Pathology, and Other Studies:   I have personally reviewed pertinent reports

## 2022-03-11 NOTE — LETTER
March 11, 2022     Lucia Keys [address] [facility]    Patient: Cindy Lewis   YOB: 1940   Date of Visit: 3/11/2022       Dear Dr Ty Level: Thank you for referring Cindy Lewis to me for evaluation  Below are my notes for this consultation  If you have questions, please do not hesitate to call me  I look forward to following your patient along with you  Sincerely,        Bill Laughlin MD        CC: No Recipients  Bill Laughlin MD  3/11/2022 11:44 AM  Incomplete  Follow-Up Note - Infectious Disease   Cindy Lewis 80 y o  male MRN: [id number]      Impression/Recommendations:  1  Pseudomonal bacteremia with TV endocarditis   Due to #3  Moiz Many also secondary port infection, status post removal 02/07   Repeat blood cultures with late positive, now cleared   RENEE with vegetation on moderator band of TV  Rec:  · Continue cefepime for 6 weeks total through 03/21/2022   · Check weekly CBC-diff, Cr while in IV antibiotic  · D/C PICC after last dose IV antibiotics  · Check repeat blood cultures week of 4/4/22  · RTO PRN     2   Pseudomonal UTI   In setting of recent Fortune catheter exchange, malpositioned Fortune   CT also showed pyelonephritis  Rec:  · Continue antibiotics as above     3   Bladder cancer   With obstructive nephrolithiasis   Chronic Fortune, bilateral PCNS   On outpatient chemotherapy  Rec:  · Outpatient follow-up with Urology 3/30/22     4   CKD   Baseline Cr 1 1-1 2      Antibiotics:  Cefepime #36    Subjective:  Patient is here for follow-up of IV antibiotics  ROS extremely limited by [facility]  Thinks urine is "black"  Seems to deny fevers, chills, or sweats  Denies nausea, vomiting, or diarrhea        The following portions of the patient's history were reviewed and updated as appropriate: allergies, current medications, past medical history, past social history, past surgical history and problem list     Objective:  Vitals:  /66 Pulse 89   Temp (!) 96 4 °F (35 8 °C)   Resp 16   SpO2 96%     Physical Exam:   General:  No acute distress  Eyes:  Normal lids and conjunctivae  ENT:  Normal external ears and nose  Neck:  Neck symmetric with midline trachea  Pulmonary:  Normal respiratory effort without accessory muscle use  Cardiovascular:  Regular rate and rhythm; no peripheral edema  Gastrointestinal:  No tenderness or distention  Musculoskeletal:  No digital clubbing or cyanosis  Skin:  No visible rashes; No palpable nodules  Neurologic:  Sensation grossly intact to light touch  Psychiatric:  Alert and oriented; Normal mood  :  Dark, clear damien urine in bilateral PCN, Fortune  Lab Results:  I have personally reviewed pertinent labs  Labs 3/9/22 WBC 6 0, Plts 230, ANC 5200, Cr 0 9    Imaging Studies:   I have personally reviewed pertinent imaging study reports and images in PACS  EKG, Pathology, and Other Studies:   I have personally reviewed pertinent reports

## 2022-03-11 NOTE — PROGRESS NOTES
Spoke with pt's grand-daughter and she states pt is having significant pain related to nephrosotmy tubes  Not following up with MedOnc or RadOnc and will be transitioning to Hospice    Requesting sooner urology appt to evaluate nephrosotomy tubes

## 2022-03-11 NOTE — PROGRESS NOTES
Called pt's grand-daughter, Lynn Granados, to follow-up on missed appt with Dr Francois Barcenas  Lynn Granados states pt has dementia setting in and "slipping away "  Expressed how much he has deteriorated since December  Physically in a lot of pain due to nephrostomy tubes but not able to take oxycodone due to nightmares, currently taking tramadol  Pt has expressed not wanting to end up in the hospital again and has too many needs to go back home  Kelsey expressed frustration with [facility] and transportation leading to pt missing appts despite her offering to take pt, she had been reassured they would get him there, which unfortunately has not been reliable  Discussed scheduling follow-ups with both radiation and medical oncology and she has declined stating she is currently working with [facility] in setting up Hospice  Offered to assist and connect her with our social work but she stated it's already in process and at this time does not need assistance  She would like pt to be seen by Urology sooner due to the pain r/t nephrostomy tubes  Offered to reach out to their office and stated [facility] of [facility]  Encouraged her to call with concerns or any ways we can provide support

## 2022-03-14 ENCOUNTER — APPOINTMENT (EMERGENCY)
Dept: CT IMAGING | Facility: HOSPITAL | Age: 82
DRG: 698 | End: 2022-03-14
Payer: MEDICARE

## 2022-03-14 ENCOUNTER — HOSPITAL ENCOUNTER (INPATIENT)
Facility: HOSPITAL | Age: 82
LOS: 1 days | Discharge: HOSPICE | DRG: 698 | End: 2022-03-16
Attending: EMERGENCY MEDICINE | Admitting: INTERNAL MEDICINE
Payer: MEDICARE

## 2022-03-14 ENCOUNTER — APPOINTMENT (OUTPATIENT)
Dept: INTERVENTIONAL RADIOLOGY/VASCULAR | Facility: HOSPITAL | Age: 82
DRG: 698 | End: 2022-03-14
Attending: RADIOLOGY
Payer: MEDICARE

## 2022-03-14 DIAGNOSIS — N39.0 UTI (URINARY TRACT INFECTION): Primary | ICD-10-CM

## 2022-03-14 DIAGNOSIS — T83.021A DISPLACEMENT OF FOLEY CATHETER, INITIAL ENCOUNTER (HCC): ICD-10-CM

## 2022-03-14 DIAGNOSIS — K21.9 GASTROESOPHAGEAL REFLUX DISEASE WITHOUT ESOPHAGITIS: ICD-10-CM

## 2022-03-14 DIAGNOSIS — T83.098A MALFUNCTION OF NEPHROSTOMY TUBE (HCC): ICD-10-CM

## 2022-03-14 PROBLEM — Z71.89 GOALS OF CARE, COUNSELING/DISCUSSION: Status: ACTIVE | Noted: 2022-03-14

## 2022-03-14 PROBLEM — T83.022A NEPHROSTOMY TUBE DISPLACED (HCC): Status: ACTIVE | Noted: 2022-03-14

## 2022-03-14 PROBLEM — T83.511A URINARY TRACT INFECTION ASSOCIATED WITH INDWELLING URETHRAL CATHETER (HCC): Status: ACTIVE | Noted: 2022-03-14

## 2022-03-14 LAB
ALBUMIN SERPL BCP-MCNC: 3.4 G/DL (ref 3.5–5)
ALP SERPL-CCNC: 76 U/L (ref 34–104)
ALT SERPL W P-5'-P-CCNC: 7 U/L (ref 7–52)
ANION GAP SERPL CALCULATED.3IONS-SCNC: 10 MMOL/L (ref 4–13)
APTT PPP: 64 SECONDS (ref 23–37)
AST SERPL W P-5'-P-CCNC: 11 U/L (ref 13–39)
BACTERIA UR QL AUTO: ABNORMAL /HPF
BASOPHILS # BLD AUTO: 0.03 THOUSANDS/ΜL (ref 0–0.1)
BASOPHILS NFR BLD AUTO: 1 % (ref 0–1)
BILIRUB SERPL-MCNC: 0.77 MG/DL (ref 0.2–1)
BILIRUB UR QL STRIP: NEGATIVE
BUN SERPL-MCNC: 12 MG/DL (ref 5–25)
CALCIUM ALBUM COR SERPL-MCNC: 9.5 MG/DL (ref 8.3–10.1)
CALCIUM SERPL-MCNC: 9 MG/DL (ref 8.4–10.2)
CHLORIDE SERPL-SCNC: 100 MMOL/L (ref 96–108)
CLARITY UR: CLEAR
CO2 SERPL-SCNC: 27 MMOL/L (ref 21–32)
COLOR UR: YELLOW
CREAT SERPL-MCNC: 0.99 MG/DL (ref 0.6–1.3)
EOSINOPHIL # BLD AUTO: 0.21 THOUSAND/ΜL (ref 0–0.61)
EOSINOPHIL NFR BLD AUTO: 4 % (ref 0–6)
ERYTHROCYTE [DISTWIDTH] IN BLOOD BY AUTOMATED COUNT: 25.3 % (ref 11.6–15.1)
GFR SERPL CREATININE-BSD FRML MDRD: 71 ML/MIN/1.73SQ M
GLUCOSE SERPL-MCNC: 93 MG/DL (ref 65–140)
GLUCOSE UR STRIP-MCNC: NEGATIVE MG/DL
HCT VFR BLD AUTO: 31.1 % (ref 36.5–49.3)
HGB BLD-MCNC: 10.6 G/DL (ref 12–17)
HGB UR QL STRIP.AUTO: ABNORMAL
HYALINE CASTS #/AREA URNS LPF: ABNORMAL /LPF
IMM GRANULOCYTES # BLD AUTO: 0.03 THOUSAND/UL (ref 0–0.2)
IMM GRANULOCYTES NFR BLD AUTO: 1 % (ref 0–2)
INR PPP: 2.18 (ref 0.84–1.19)
KETONES UR STRIP-MCNC: NEGATIVE MG/DL
LACTATE SERPL-SCNC: 1 MMOL/L (ref 0.5–2)
LEUKOCYTE ESTERASE UR QL STRIP: ABNORMAL
LYMPHOCYTES # BLD AUTO: 0.56 THOUSANDS/ΜL (ref 0.6–4.47)
LYMPHOCYTES NFR BLD AUTO: 12 % (ref 14–44)
MCH RBC QN AUTO: 28 PG (ref 26.8–34.3)
MCHC RBC AUTO-ENTMCNC: 34.1 G/DL (ref 31.4–37.4)
MCV RBC AUTO: 82 FL (ref 82–98)
MONOCYTES # BLD AUTO: 0.62 THOUSAND/ΜL (ref 0.17–1.22)
MONOCYTES NFR BLD AUTO: 13 % (ref 4–12)
NEUTROPHILS # BLD AUTO: 3.38 THOUSANDS/ΜL (ref 1.85–7.62)
NEUTS SEG NFR BLD AUTO: 69 % (ref 43–75)
NITRITE UR QL STRIP: POSITIVE
NON-SQ EPI CELLS URNS QL MICRO: ABNORMAL /HPF
NRBC BLD AUTO-RTO: 0 /100 WBCS
PH UR STRIP.AUTO: 7 [PH]
PLATELET # BLD AUTO: 183 THOUSANDS/UL (ref 149–390)
PMV BLD AUTO: 8.7 FL (ref 8.9–12.7)
POTASSIUM SERPL-SCNC: 3.3 MMOL/L (ref 3.5–5.3)
PROT SERPL-MCNC: 6.4 G/DL (ref 6.4–8.4)
PROT UR STRIP-MCNC: ABNORMAL MG/DL
PROTHROMBIN TIME: 23.7 SECONDS (ref 11.6–14.5)
RBC # BLD AUTO: 3.78 MILLION/UL (ref 3.88–5.62)
RBC #/AREA URNS AUTO: ABNORMAL /HPF
SODIUM SERPL-SCNC: 137 MMOL/L (ref 135–147)
SP GR UR STRIP.AUTO: 1.02 (ref 1–1.03)
UROBILINOGEN UR QL STRIP.AUTO: 0.2 E.U./DL
WBC # BLD AUTO: 4.83 THOUSAND/UL (ref 4.31–10.16)
WBC #/AREA URNS AUTO: ABNORMAL /HPF

## 2022-03-14 PROCEDURE — 50387 CHANGE NEPHROURETERAL CATH: CPT

## 2022-03-14 PROCEDURE — 74177 CT ABD & PELVIS W/CONTRAST: CPT

## 2022-03-14 PROCEDURE — 85610 PROTHROMBIN TIME: CPT | Performed by: EMERGENCY MEDICINE

## 2022-03-14 PROCEDURE — 0TP97DZ REMOVAL OF INTRALUMINAL DEVICE FROM URETER, VIA NATURAL OR ARTIFICIAL OPENING: ICD-10-PCS | Performed by: RADIOLOGY

## 2022-03-14 PROCEDURE — 50387 CHANGE NEPHROURETERAL CATH: CPT | Performed by: RADIOLOGY

## 2022-03-14 PROCEDURE — 99220 PR INITIAL OBSERVATION CARE/DAY 70 MINUTES: CPT | Performed by: INTERNAL MEDICINE

## 2022-03-14 PROCEDURE — 0T767DZ DILATION OF RIGHT URETER WITH INTRALUMINAL DEVICE, VIA NATURAL OR ARTIFICIAL OPENING: ICD-10-PCS | Performed by: RADIOLOGY

## 2022-03-14 PROCEDURE — C1769 GUIDE WIRE: HCPCS

## 2022-03-14 PROCEDURE — 87040 BLOOD CULTURE FOR BACTERIA: CPT | Performed by: EMERGENCY MEDICINE

## 2022-03-14 PROCEDURE — 87086 URINE CULTURE/COLONY COUNT: CPT | Performed by: EMERGENCY MEDICINE

## 2022-03-14 PROCEDURE — 80053 COMPREHEN METABOLIC PANEL: CPT | Performed by: EMERGENCY MEDICINE

## 2022-03-14 PROCEDURE — 99285 EMERGENCY DEPT VISIT HI MDM: CPT

## 2022-03-14 PROCEDURE — 36415 COLL VENOUS BLD VENIPUNCTURE: CPT | Performed by: EMERGENCY MEDICINE

## 2022-03-14 PROCEDURE — 85025 COMPLETE CBC W/AUTO DIFF WBC: CPT | Performed by: EMERGENCY MEDICINE

## 2022-03-14 PROCEDURE — BT111ZZ FLUOROSCOPY OF RIGHT KIDNEY USING LOW OSMOLAR CONTRAST: ICD-10-PCS | Performed by: RADIOLOGY

## 2022-03-14 PROCEDURE — 99285 EMERGENCY DEPT VISIT HI MDM: CPT | Performed by: EMERGENCY MEDICINE

## 2022-03-14 PROCEDURE — NC001 PR NO CHARGE: Performed by: RADIOLOGY

## 2022-03-14 PROCEDURE — 81001 URINALYSIS AUTO W/SCOPE: CPT | Performed by: EMERGENCY MEDICINE

## 2022-03-14 PROCEDURE — 83605 ASSAY OF LACTIC ACID: CPT | Performed by: EMERGENCY MEDICINE

## 2022-03-14 PROCEDURE — 85730 THROMBOPLASTIN TIME PARTIAL: CPT | Performed by: EMERGENCY MEDICINE

## 2022-03-14 PROCEDURE — C2617 STENT, NON-COR, TEM W/O DEL: HCPCS

## 2022-03-14 RX ORDER — DILTIAZEM HYDROCHLORIDE 240 MG/1
240 CAPSULE, COATED, EXTENDED RELEASE ORAL DAILY
Status: DISCONTINUED | OUTPATIENT
Start: 2022-03-15 | End: 2022-03-16 | Stop reason: HOSPADM

## 2022-03-14 RX ORDER — CEFEPIME HYDROCHLORIDE 1 G/50ML
1000 INJECTION, SOLUTION INTRAVENOUS EVERY 12 HOURS
Status: DISCONTINUED | OUTPATIENT
Start: 2022-03-15 | End: 2022-03-16 | Stop reason: HOSPADM

## 2022-03-14 RX ORDER — ACETAMINOPHEN 325 MG/1
650 TABLET ORAL EVERY 6 HOURS PRN
Status: DISCONTINUED | OUTPATIENT
Start: 2022-03-14 | End: 2022-03-16 | Stop reason: HOSPADM

## 2022-03-14 RX ORDER — CEFEPIME HYDROCHLORIDE 1 G/50ML
1000 INJECTION, SOLUTION INTRAVENOUS EVERY 8 HOURS
Status: DISCONTINUED | OUTPATIENT
Start: 2022-03-14 | End: 2022-03-14

## 2022-03-14 RX ORDER — ONDANSETRON 2 MG/ML
4 INJECTION INTRAMUSCULAR; INTRAVENOUS EVERY 6 HOURS PRN
Status: DISCONTINUED | OUTPATIENT
Start: 2022-03-14 | End: 2022-03-16 | Stop reason: HOSPADM

## 2022-03-14 RX ORDER — TRAMADOL HYDROCHLORIDE 50 MG/1
100 TABLET ORAL EVERY 8 HOURS PRN
Status: DISCONTINUED | OUTPATIENT
Start: 2022-03-14 | End: 2022-03-16 | Stop reason: HOSPADM

## 2022-03-14 RX ORDER — WARFARIN SODIUM 3 MG/1
3 TABLET ORAL
Status: DISCONTINUED | OUTPATIENT
Start: 2022-03-14 | End: 2022-03-16 | Stop reason: HOSPADM

## 2022-03-14 RX ORDER — ATORVASTATIN CALCIUM 40 MG/1
40 TABLET, FILM COATED ORAL DAILY
Status: DISCONTINUED | OUTPATIENT
Start: 2022-03-15 | End: 2022-03-16 | Stop reason: HOSPADM

## 2022-03-14 RX ORDER — ACETAMINOPHEN 325 MG/1
650 TABLET ORAL ONCE
Status: COMPLETED | OUTPATIENT
Start: 2022-03-14 | End: 2022-03-14

## 2022-03-14 RX ORDER — SIMETHICONE 80 MG
80 TABLET,CHEWABLE ORAL EVERY 6 HOURS PRN
Status: DISCONTINUED | OUTPATIENT
Start: 2022-03-14 | End: 2022-03-16 | Stop reason: HOSPADM

## 2022-03-14 RX ORDER — LIDOCAINE 50 MG/G
1 PATCH TOPICAL DAILY
Status: DISCONTINUED | OUTPATIENT
Start: 2022-03-15 | End: 2022-03-16 | Stop reason: HOSPADM

## 2022-03-14 RX ORDER — BUSPIRONE HYDROCHLORIDE 5 MG/1
5 TABLET ORAL 2 TIMES DAILY
Status: DISCONTINUED | OUTPATIENT
Start: 2022-03-14 | End: 2022-03-16 | Stop reason: HOSPADM

## 2022-03-14 RX ORDER — POTASSIUM CHLORIDE 20 MEQ/1
20 TABLET, EXTENDED RELEASE ORAL ONCE
Status: COMPLETED | OUTPATIENT
Start: 2022-03-14 | End: 2022-03-14

## 2022-03-14 RX ORDER — AMOXICILLIN 250 MG
2 CAPSULE ORAL 2 TIMES DAILY
Status: DISCONTINUED | OUTPATIENT
Start: 2022-03-14 | End: 2022-03-16 | Stop reason: HOSPADM

## 2022-03-14 RX ORDER — BISACODYL 10 MG
10 SUPPOSITORY, RECTAL RECTAL DAILY PRN
Status: DISCONTINUED | OUTPATIENT
Start: 2022-03-14 | End: 2022-03-16 | Stop reason: HOSPADM

## 2022-03-14 RX ORDER — LIDOCAINE WITH 8.4% SOD BICARB 0.9%(10ML)
SYRINGE (ML) INJECTION CODE/TRAUMA/SEDATION MEDICATION
Status: COMPLETED | OUTPATIENT
Start: 2022-03-14 | End: 2022-03-14

## 2022-03-14 RX ORDER — PANTOPRAZOLE SODIUM 40 MG/1
40 TABLET, DELAYED RELEASE ORAL EVERY MORNING
Status: DISCONTINUED | OUTPATIENT
Start: 2022-03-15 | End: 2022-03-16 | Stop reason: HOSPADM

## 2022-03-14 RX ORDER — ESCITALOPRAM OXALATE 10 MG/1
10 TABLET ORAL DAILY
Status: DISCONTINUED | OUTPATIENT
Start: 2022-03-15 | End: 2022-03-16 | Stop reason: HOSPADM

## 2022-03-14 RX ORDER — FERROUS SULFATE 325(65) MG
325 TABLET ORAL EVERY OTHER DAY
Status: DISCONTINUED | OUTPATIENT
Start: 2022-03-14 | End: 2022-03-16 | Stop reason: HOSPADM

## 2022-03-14 RX ORDER — ASPIRIN 81 MG/1
81 TABLET, CHEWABLE ORAL DAILY
Status: DISCONTINUED | OUTPATIENT
Start: 2022-03-15 | End: 2022-03-16 | Stop reason: HOSPADM

## 2022-03-14 RX ORDER — CIPROFLOXACIN 2 MG/ML
400 INJECTION, SOLUTION INTRAVENOUS ONCE
Status: DISCONTINUED | OUTPATIENT
Start: 2022-03-14 | End: 2022-03-14

## 2022-03-14 RX ADMIN — FERROUS SULFATE TAB 325 MG (65 MG ELEMENTAL FE) 325 MG: 325 (65 FE) TAB at 20:16

## 2022-03-14 RX ADMIN — BUSPIRONE HYDROCHLORIDE 5 MG: 5 TABLET ORAL at 20:16

## 2022-03-14 RX ADMIN — Medication 10 ML: at 16:47

## 2022-03-14 RX ADMIN — POTASSIUM CHLORIDE 20 MEQ: 1500 TABLET, EXTENDED RELEASE ORAL at 13:15

## 2022-03-14 RX ADMIN — WARFARIN SODIUM 3 MG: 3 TABLET ORAL at 20:16

## 2022-03-14 RX ADMIN — IOHEXOL 100 ML: 350 INJECTION, SOLUTION INTRAVENOUS at 12:09

## 2022-03-14 RX ADMIN — SENNOSIDES AND DOCUSATE SODIUM 2 TABLET: 50; 8.6 TABLET ORAL at 20:16

## 2022-03-14 RX ADMIN — ACETAMINOPHEN 650 MG: 325 TABLET, FILM COATED ORAL at 21:19

## 2022-03-14 RX ADMIN — ACETAMINOPHEN 650 MG: 325 TABLET, FILM COATED ORAL at 15:09

## 2022-03-14 NOTE — DISCHARGE INSTRUCTIONS
Nephrostomy Tube Care     WHAT YOU NEED TO KNOW:   A nephrostomy tube is a catheter (thin plastic tube) that is inserted through your skin and into your kidney  The nephrostomy tube drains urine from your kidney into a collecting bag outside your body  You may need a nephrostomy tube when something is blocking the normal flow of urine  A nephrostomy tube may be used for a short or a long period of time  The nephrostomy tube comes out of your back, so you will need someone to help care for your nephrostomy tube  DISCHARGE INSTRUCTIONS:      How to clean the skin around the nephrostomy tube and change the bandage:  Since the nephrostomy tube comes out of your back, you will not be able to care for it by yourself  Ask someone to follow the general directions below to check and care for your nephrostomy tube  Gather the items you will need  Disposable (single use) under-pad, and a clean washcloth  ¨ Plain soap, warm water, and new medical gloves  ¨ Sterile gauze bandages  ¨ Clear adhesive dressing or medical tape  ¨ Skin barrier  ¨ Protective skin film  ¨ Trash bag  · Remove the old bandage, and check the tube entry site  ¨ Have the patient lie on his side with the nephrostomy tube entry site facing up  Place the under-pad where it will catch drainage as you are working with the nephrostomy tube  ¨ Wash your hands with soap and water  Put on new medical gloves  ¨ Gently remove the old bandage, without pulling on the tube  Do this by holding the skin beside the tube with one hand  With the other hand, gently remove sticky tape and the skin barrier by pulling in the same direction as hair growth  Do not touch the side of the bandage that is placed over or around the tube  Throw the bandage and skin barrier away in a trash bag  ¨ Look for signs of infection, such as skin redness and swelling  Report any skin changes to healthcare providers  ¨ Clean the tube entry site      ¨ Hold the tube in place to keep it from being pulled out while you are cleaning around it  ¨ You will need to clean the area twice  For the first cleaning, wet a new gauze bandage with soap and water  Begin at the entry site of the tube  Wipe the skin in circles, moving away from the entry site  Remove blood and any other material with the gauze  Do this as often as needed  Use a new gauze bandage each time you clean the area, moving away from the entry site  ¨ For the second cleaning, wet a new gauze bandage with water  Begin at the entry site of the tube  Wipe the skin in circles, moving away from the entry site  Use a new gauze bandage each time you clean the area, moving away from the entry site  ¨ Gently pat the skin with a clean washcloth to dry it  · Apply the skin barrier and bandages  ¨ Roll up a bandage to make it thick, and place it under  the place where the tube enters the skin  Place it to support the tube, and stop it from kinking or bending  Tape the bandage in place, and apply more bandages if directed by a healthcare provider  ¨ Bring the tubing forward to the front and tape it to the skin  Do not stretch the tube tight, because this may pull the nephrostomy tube out  How often to change the bandage  Change the bandage around the tube, every other day  If your bandages  get dirty or wet, change them right away, and as often as needed  If your nephrostomy tube is to be used for a long period of time, the tube needs to be changed every 2 to 3 months  Healthcare providers will tell you when you need to make an appointment to have your tube changed  How to care for the urine drainage bag:   · Ask if you need to measure and write down how much urine is in the bag before you empty it  Drain urine out of the drainage bag when it is ½ to ? full  Open the spout at the bottom of the bag to empty the urine into the toilet  · You may need to detach the drainage bag from the nephrostomy tube to change it    If so, attach a new drainage bag tightly to the nephrostomy tube  ·   How to prevent problems with your nephrostomy tube:   · Change bandages, directed  This helps to prevent infection  Throw away or clean your drainage bag as directed by your healthcare provider  · Wipe the connecting ends of the drainage bag with alcohol before you reconnect the bag to the tube  This helps prevent infection  Keep the tube taped to your skin and connected to a drainage bag placed below the level of your kidneys  This helps prevent urine from backing up into your kidneys  You may wear a small drainage bag strapped to your leg to let you move around more easily  · Check the catheter to be sure it is in place after you change your clothes or do other activities  Do not wear tight clothing over the tube  Place the tubing over your thigh rather than under it when you are sitting down  Be sure that nothing is pulling on the nephrostomy tube when you move around  · Change positions if you see little or no urine in your drainage bag  Check to see if the urine tube is twisted or bent  Be sure that you are not sitting or lying on the tube  If there are no kinks and there is little or no urine in the drainage bag, tell your healthcare provider  · Flush out the tube as directed  Some tubes get flushed one time a day with 10 mls of NSS You will be given a prescription for the flushes  To flush the nephrostomy tube, clean both connections with alcohol swap  Twist off the drainage bag tube and twist the saline syringe into the nephrostomy tube and flush briskly  Remove the syringe and twist the drainage bag tube back into the nephrostomy tube  · Keep the site covered while you shower  Tape a piece of clear adhesive plastic over the dressing to keep it dry while you shower  Do not take tub baths      Contact Interventional Radiology at [phone] [facility] PATIENTS: Contact Interventional Radiology at [phone]) [facility] PATIENTS: Contact Interventional Radiology at [phone]) if:  · The skin around the nephrostomy tube is red, swollen, itches, or has a rash  · You have a fever greater than 101 or chills  · You have lower back or hip pain  · There are changes in how your urine looks or smells  · You have little or no urine draining from the nephrostomy tube  · You have nausea and are vomiting  · The black chris on your tube has moved, or the tube is longer than when it was put in    · You have questions or concerns about your condition or care  · The nephrostomy tube comes out completely  · There is blood, pus, or a bad smell coming from the place where the tube enters your skin  · Urine is leaking around the tube  The following pharmacies carry the flush syringes  [facility]                     [facility]       [address] [facility]                    [id number] [place] [address]  Phone [phone]            Phone [phone]  [address] & [facility]                      [address]                                 [phone]  Phone [phone]            Phone [phone]    [facility]                                                                         [facility] [phone]  [address]   Phone [phone]

## 2022-03-14 NOTE — ASSESSMENT & PLAN NOTE
Patient currently on IV cefepime, will continue the same, follow-up cultures and sensitivities  Patient only has tachycardia, does not has all the signs of sepsis

## 2022-03-14 NOTE — BRIEF OP NOTE (RAD/CATH)
INTERVENTIONAL RADIOLOGY PROCEDURE NOTE    Date: 3/14/2022    Procedure: IR NEPHROURETERAL STENT CHECK/CHANGE/REPOSITION     Preoperative diagnosis:   1  UTI (urinary tract infection)    2  Displacement of Fortune catheter, initial encounter ([facility] [address] )    3  Malfunction of nephrostomy tube (HCC)         Postoperative diagnosis: Same  Surgeon: Steve Eason MD     Assistant: None  No qualified resident was available  Blood loss:  Trace    Specimens:  None    Findings:  Existing nephroureteral stent malpositioned with proximal loop outside the collecting system  Existing catheter exchanged for a new catheter  Complications: None immediate      Anesthesia: local

## 2022-03-14 NOTE — ED PROVIDER NOTES
History  Chief Complaint   Patient presents with    Abdominal Pain     reports abdominal pain "all over"  LBM yesterday but states he feels constipated  arrives with bilateral nephrostomy tubes and archibald  currently on abx for known UTI/sepsis  last day is 3/21     To er from [facility] with concern for abd pain and bl nephrostomy tubes obstruction  Pt is being tx for uti and is on abx, started to complain of pain which lead to visit  No fever reported  No gi sx reported  Unclear when pts nephrostomy tubes plafed but NH reported no flushing and or care to them recently as per report  Prior to Admission Medications   Prescriptions Last Dose Informant Patient Reported? Taking?    Diclofenac Sodium (VOLTAREN) 1 % Not Taking at Unknown time  No No   Sig: Apply 2 g topically 4 (four) times a day   Patient not taking: Reported on 3/14/2022    Vitamin D, Cholecalciferol, 25 MCG (1000 UT) CAPS 3/14/2022 at Unknown time  Yes Yes   Sig: Take 4,000 Units by mouth     acetaminophen (TYLENOL) 325 mg tablet Not Taking at Unknown time  Yes No   Sig: Take 650 mg by mouth 3 (three) times a day as needed for mild pain   Patient not taking: Reported on 3/14/2022    aspirin 81 mg chewable tablet Not Taking at Unknown time Self No No   Sig: Chew 1 tablet (81 mg total) daily   Patient not taking: Reported on 3/14/2022    atorvastatin (LIPITOR) 40 mg tablet 3/13/2022 at Unknown time Self No Yes   Sig: Take 1 tablet (40 mg total) by mouth daily   bisacodyl (DULCOLAX) 10 mg suppository Not Taking at Unknown time Self No No   Sig: Insert 1 suppository (10 mg total) into the rectum daily as needed for constipation   Patient not taking: Reported on 3/14/2022    busPIRone (BUSPAR) 5 mg tablet 3/14/2022 at Unknown time  Yes Yes   Sig: Take 5 mg by mouth 2 (two) times a day   cefepime 1,000 mg in dextrose 5 % 50 mL IVPB 3/14/2022 at Unknown time  No Yes   Sig: Infuse 1,000 mg into a venous catheter every 8 (eight) hours for 21 days diltiazem (CARDIZEM CD) 240 mg 24 hr capsule 3/14/2022 at Unknown time Self No Yes   Sig: Take 1 capsule (240 mg total) by mouth daily   escitalopram (Lexapro) 10 mg tablet 3/14/2022 at Unknown time  Yes Yes   Sig: Take 10 mg by mouth daily   ferrous sulfate 325 (65 Fe) mg tablet 3/14/2022 at Unknown time  No Yes   Sig: Take 1 tablet (325 mg total) by mouth every other day   lidocaine (LIDODERM) 5 % 3/14/2022 at Unknown time  Yes Yes   Sig: Apply 1 patch topically daily Remove & Discard patch within 12 hours or as directed by MD   magnesium citrate (CITROMA) 1 745 g/30 mL oral solution Not Taking at Unknown time Self No No   Sig: Take 296 mL by mouth once as needed (constipation) for up to 1 dose   Patient not taking: Reported on 3/14/2022    ondansetron (Zofran ODT) 8 mg disintegrating tablet Not Taking at Unknown time  No No   Sig: Take 1 tablet (8 mg total) by mouth every 8 (eight) hours as needed for nausea or vomiting   Patient not taking: Reported on 3/14/2022    pantoprazole (PROTONIX) 40 mg tablet 3/14/2022 at Unknown time  No Yes   Sig: Take 1 tablet (40 mg total) by mouth every morning   senna-docusate sodium (SENOKOT S) 8 6-50 mg per tablet 3/14/2022 at Unknown time  No Yes   Sig: Take 2 tablets by mouth 2 (two) times a day   Patient taking differently: Take 2 tablets by mouth 2 (two) times a day     simethicone (MYLICON) 80 mg chewable tablet 3/14/2022 at Unknown time  No Yes   Sig: Chew 1 tablet (80 mg total) every 6 (six) hours as needed for flatulence   Patient taking differently: Chew 125 mg in the morning     traMADol (ULTRAM) 50 mg tablet 3/14/2022 at Unknown time  Yes Yes   Sig: Take 100 mg by mouth every 8 (eight) hours as needed for moderate pain     warfarin (COUMADIN) 1 mg tablet 3/13/2022 at Unknown time  No Yes   Sig: Take 3 tablets (3 mg total) by mouth daily      Facility-Administered Medications: None       Past Medical History:   Diagnosis Date    A-fib ([facility] )     Arthritis     Benign prostatic hyperplasia without lower urinary tract symptoms     without Urinary Obstruction    Bladder cancer (HCC)     CAD (coronary artery disease)     Cancer (HCC)     Chronic obstructive lung disease (HCC)     Constipation 12/9/2021    Coronary arteriosclerosis     Depression     Emphysema lung (HCC)     GERD (gastroesophageal reflux disease)     Hyperlipidemia     Hypertension     Hyponatremia 1/15/2022    Irregular heart beat     Myocardial infarction (HCC)     Psoriasis     Requires supplemental oxygen     at bedtime during high humid days only    Stroke [facility])     TIA 1/2018       Past Surgical History:   Procedure Laterality Date    COLONOSCOPY      CORONARY ANGIOPLASTY  02/03/2001    PTCA of RCA    CORONARY ARTERY BYPASS GRAFT  02/07/2001    x4- [place]    HERNIA REPAIR      IR BIOPSY LUNG  2/14/2022    IR NEPHROSTOMY TUBE PLACEMENT  1/18/2022    IR PORT PLACEMENT  1/14/2022    IR PORT REMOVAL  2/7/2022    TN BRONCHOSCOPY,DIAGNOSTIC Left 1/7/2019    Procedure: BRONCHOSCOPY FLEXIBLE;  Surgeon: Joao Thomas MD;  [facility]: [facility]; Service: Pulmonary    TN CYSTOURETHROSCOPY,FULGUR <0 5 CM LESN N/A 11/30/2021    Procedure: TRANSURETHRAL RESECTION OF BLADDER TUMOR (TURBT) with "large";  Surgeon: Anna Ding MD;  Location: [facility];  Service: Urology    TN CYSTOURETHROSCOPY,URETER CATHETER Bilateral 11/30/2021    Procedure: Marcey Prader;  Surgeon: Anna Ding MD;  [facility];  Service: Urology    TN Stevo Stoughton INCIS Left 2/20/2018    Procedure: ENDARTERECTOMY ARTERY CAROTID WITH PATCH ANGIOPLASTY;  Surgeon: Bard Dot MD;  [facility];  Service: Vascular    TRANSURETHRAL RESECTION OF PROSTATE         Family History   Problem Relation Age of Onset    Lung cancer Mother     Cancer Mother     Other Father         sepsis     I have reviewed and agree with the history as documented      E-Cigarette/Vaping    E-Cigarette Use Never User      E-Cigarette/Vaping Substances    Nicotine No     THC No     CBD No     Flavoring No     Other No     Unknown No      Social History     Tobacco Use    Smoking status: Former Smoker     Years: 1 00     Types: Cigarettes    Smokeless tobacco: Never Used    Tobacco comment: few cigarettes when playing cards  Vaping Use    Vaping Use: Never used   Substance Use Topics    Alcohol use: Yes     Comment: special occasions only wine   Drug use: No       Review of Systems   All other systems reviewed and are negative  Physical Exam  Physical Exam  Constitutional:       General: He is not in acute distress  Appearance: Normal appearance  He is not ill-appearing, toxic-appearing or diaphoretic  HENT:      Head: Normocephalic and atraumatic  Right Ear: External ear normal       Left Ear: External ear normal       Nose: Nose normal       Mouth/Throat:      Mouth: Mucous membranes are moist       Pharynx: No oropharyngeal exudate  Eyes:      General:         Right eye: No discharge  Left eye: No discharge  Conjunctiva/sclera: Conjunctivae normal       Pupils: Pupils are equal, round, and reactive to light  Neck:      Vascular: No JVD  Trachea: No tracheal deviation  Cardiovascular:      Rate and Rhythm: Normal rate and regular rhythm  Pulses: Normal pulses  Heart sounds: Normal heart sounds  No murmur heard  No friction rub  No gallop  Pulmonary:      Effort: Pulmonary effort is normal  No respiratory distress  Breath sounds: Normal breath sounds  No stridor  No wheezing, rhonchi or rales  Chest:      Chest wall: No tenderness  Abdominal:      General: Bowel sounds are normal  There is no distension  Palpations: Abdomen is soft  There is no mass  Tenderness: There is abdominal tenderness  There is no guarding or rebound  Hernia: No hernia is present  Comments: Diffuse abd tenderness more focal to the upper abd   No peritoneal signs  Nephrostomy tube with sediment and dark stagnant appearing urine  Musculoskeletal:         General: No swelling, tenderness, deformity or signs of injury  Normal range of motion  Cervical back: Normal range of motion and neck supple  Right lower leg: No edema  Left lower leg: No edema  Skin:     General: Skin is warm and dry  Capillary Refill: Capillary refill takes less than 2 seconds  Coloration: Skin is not jaundiced or pale  Findings: No bruising, erythema, lesion or rash  Neurological:      Mental Status: He is alert and oriented to person, place, and time  Mental status is at baseline  Sensory: No sensory deficit  Motor: No weakness or abnormal muscle tone        Deep Tendon Reflexes: Reflexes normal    Psychiatric:         Mood and Affect: Mood normal          Vital Signs  ED Triage Vitals   Temperature Pulse Respirations Blood Pressure SpO2   03/14/22 1044 03/14/22 1043 03/14/22 1043 03/14/22 1043 03/14/22 1044   98 6 °F (37 °C) 98 18 157/93 95 %      Temp Source Heart Rate Source Patient Position - Orthostatic VS BP Location FiO2 (%)   03/14/22 1044 03/14/22 1350 03/14/22 1043 03/14/22 1043 --   Oral Monitor Lying Left arm       Pain Score       03/14/22 1509       10 - Worst Possible Pain           Vitals:    03/14/22 1718 03/14/22 1735 03/15/22 0300 03/15/22 0730   BP: 152/84 152/84 170/93 146/87   Pulse: 95 95 93 99   Patient Position - Orthostatic VS: Lying   Lying         Visual Acuity      ED Medications  Medications   acetaminophen (TYLENOL) tablet 650 mg (650 mg Oral Given 3/15/22 0614)   ondansetron (ZOFRAN) injection 4 mg (has no administration in time range)   aspirin chewable tablet 81 mg (81 mg Oral Given 3/15/22 0943)   atorvastatin (LIPITOR) tablet 40 mg (40 mg Oral Given 3/15/22 0945)   bisacodyl (DULCOLAX) rectal suppository 10 mg (has no administration in time range)   busPIRone (BUSPAR) tablet 5 mg (5 mg Oral Given 3/15/22 9152)   diltiazem (CARDIZEM CD) 24 hr capsule 240 mg (240 mg Oral Given 3/15/22 0943)   escitalopram (LEXAPRO) tablet 10 mg (10 mg Oral Given 3/15/22 0944)   ferrous sulfate tablet 325 mg (325 mg Oral Given 3/14/22 2016)   lidocaine (LIDODERM) 5 % patch 1 patch (1 patch Topical Medication Applied 3/15/22 0944)   pantoprazole (PROTONIX) EC tablet 40 mg (40 mg Oral Given 3/15/22 0943)   senna-docusate sodium (SENOKOT S) 8 6-50 mg per tablet 2 tablet (2 tablets Oral Given 3/15/22 0947)   simethicone (MYLICON) chewable tablet 80 mg (has no administration in time range)   traMADol (ULTRAM) tablet 100 mg (has no administration in time range)   warfarin (COUMADIN) tablet 3 mg (3 mg Oral Given 3/14/22 2016)   cefepime (MAXIPIME) IVPB (premix in dextrose) 1,000 mg 50 mL (1,000 mg Intravenous New Bag 3/15/22 0800)   iohexol (OMNIPAQUE) 350 MG/ML injection (SINGLE-DOSE) 100 mL (100 mL Intravenous Given 3/14/22 1209)   potassium chloride (K-DUR,KLOR-CON) CR tablet 20 mEq (20 mEq Oral Given 3/14/22 1315)   acetaminophen (TYLENOL) tablet 650 mg (650 mg Oral Given 3/14/22 1509)   lidocaine 1% buffered (10 mL Infiltration Given 3/14/22 1647)   iohexol (OMNIPAQUE) 350 MG/ML injection (SINGLE-DOSE) 12 mL (0 mL Intravenous Override Pull 3/14/22 1707)       Diagnostic Studies  Results Reviewed     Procedure Component Value Units Date/Time    Basic metabolic panel [[id number]] Collected: 03/15/22 0450    Lab Status: Final result Specimen: Blood from Arm, Left Updated: 03/15/22 0715     Sodium 138 mmol/L      Potassium 3 5 mmol/L      Chloride 100 mmol/L      CO2 29 mmol/L      ANION GAP 9 mmol/L      BUN 10 mg/dL      Creatinine 0 85 mg/dL      Glucose 88 mg/dL      Calcium 9 2 mg/dL      eGFR 81 ml/min/1 73sq m     Narrative:      [place] guidelines for Chronic Kidney Disease (CKD):     Stage 1 with normal or high GFR (GFR > 90 mL/min/1 73 square meters)    Stage 2 Mild CKD (GFR = 60-89 mL/min/1 73 square meters)    Stage 3A Moderate CKD (GFR = 45-59 mL/min/1 73 square meters)    Stage 3B Moderate CKD (GFR = 30-44 mL/min/1 73 square meters)    Stage 4 Severe CKD (GFR = 15-29 mL/min/1 73 square meters)    Stage 5 End Stage CKD (GFR <15 mL/min/1 73 square meters)  Note: GFR calculation is accurate only with a steady state creatinine    Protime-INR [[id number]]  (Abnormal) Collected: 03/15/22 0536    Lab Status: Final result Specimen: Blood from Arm, Left Updated: 03/15/22 0654     Protime 21 0 seconds      INR 1 85    CBC and differential [[id number]]  (Abnormal) Collected: 03/15/22 0450    Lab Status: Final result Specimen: Blood from Arm, Left Updated: 03/15/22 0635     WBC 4 91 Thousand/uL      RBC 4 00 Million/uL      Hemoglobin 11 2 g/dL      Hematocrit 35 1 %      MCV 88 fL      MCH 28 0 pg      MCHC 31 9 g/dL      RDW 25 9 %      MPV 9 7 fL      Platelets 698 Thousands/uL      nRBC 0 /100 WBCs      Neutrophils Relative 68 %      Immat GRANS % 1 %      Lymphocytes Relative 13 %      Monocytes Relative 12 %      Eosinophils Relative 5 %      Basophils Relative 1 %      Neutrophils Absolute 3 38 Thousands/µL      Immature Grans Absolute 0 03 Thousand/uL      Lymphocytes Absolute 0 66 Thousands/µL      Monocytes Absolute 0 57 Thousand/µL      Eosinophils Absolute 0 24 Thousand/µL      Basophils Absolute 0 03 Thousands/µL     Blood culture #1 [[id number]] Collected: 03/14/22 1201    Lab Status: Preliminary result Specimen: Blood from Arm, Right Updated: 03/14/22 1601     Blood Culture Received in Microbiology Lab  Culture in Progress  Blood culture #2 [[id number]] Collected: 03/14/22 1117    Lab Status: Preliminary result Specimen: Blood from Arm, Left Updated: 03/14/22 1601     Blood Culture Received in Microbiology Lab  Culture in Progress      Protime-INR [[id number]]     Lab Status: No result Specimen: Blood     Urine Microscopic [[id number]]  (Abnormal) Collected: 03/14/22 1201    Lab Status: Final result Specimen: Urine, Indwelling Fortune Catheter Updated: 03/14/22 1329     RBC, UA 4-10 /hpf      WBC, UA 10-20 /hpf      Epithelial Cells None Seen /hpf      Bacteria, UA Occasional /hpf      Hyaline Casts, UA 0-1 /lpf     Urine culture [[id number]] Collected: 03/14/22 1201    Lab Status:  In process Specimen: Urine, Indwelling Fortune Catheter Updated: 03/14/22 1329    UA w Reflex to Microscopic w Reflex to Culture [[id number]]  (Abnormal) Collected: 03/14/22 1201    Lab Status: Final result Specimen: Urine, Indwelling Fortune Catheter Updated: 03/14/22 1252     Color, UA Yellow     Clarity, UA Clear     Specific [place], UA 1 020     pH, UA 7 0     Leukocytes, UA 1+     Nitrite, UA Positive     Protein, UA 2+ mg/dl      Glucose, UA Negative mg/dl      Ketones, UA Negative mg/dl      Urobilinogen, UA 0 2 E U /dl      Bilirubin, UA Negative     Blood, UA 2+    Protime-INR [[id number]]  (Abnormal) Collected: 03/14/22 1118    Lab Status: Final result Specimen: Blood from Arm, Left Updated: 03/14/22 1152     Protime 23 7 seconds      INR 2 18    APTT [[id number]]  (Abnormal) Collected: 03/14/22 1118    Lab Status: Final result Specimen: Blood from Arm, Left Updated: 03/14/22 1152     PTT 64 seconds     Comprehensive metabolic panel [[id number]]  (Abnormal) Collected: 03/14/22 1117    Lab Status: Final result Specimen: Blood from Arm, Left Updated: 03/14/22 1141     Sodium 137 mmol/L      Potassium 3 3 mmol/L      Chloride 100 mmol/L      CO2 27 mmol/L      ANION GAP 10 mmol/L      BUN 12 mg/dL      Creatinine 0 99 mg/dL      Glucose 93 mg/dL      Calcium 9 0 mg/dL      Corrected Calcium 9 5 mg/dL      AST 11 U/L      ALT 7 U/L      Alkaline Phosphatase 76 U/L      Total Protein 6 4 g/dL      Albumin 3 4 g/dL      Total Bilirubin 0 77 mg/dL      eGFR 71 ml/min/1 73sq m     Narrative:      [place] guidelines for Chronic Kidney Disease (CKD):     Stage 1 with normal or high GFR (GFR > 90 mL/min/1 73 square meters)    Stage 2 Mild CKD (GFR = 60-89 mL/min/1 73 square meters)    Stage 3A Moderate CKD (GFR = 45-59 mL/min/1 73 square meters)    Stage 3B Moderate CKD (GFR = 30-44 mL/min/1 73 square meters)    Stage 4 Severe CKD (GFR = 15-29 mL/min/1 73 square meters)    Stage 5 End Stage CKD (GFR <15 mL/min/1 73 square meters)  Note: GFR calculation is accurate only with a steady state creatinine    Lactic acid [[id number]]  (Normal) Collected: 03/14/22 1115    Lab Status: Final result Specimen: Blood from Arm, Left Updated: 03/14/22 1141     LACTIC ACID 1 0 mmol/L     Narrative:      Result may be elevated if tourniquet was used during collection  CBC and differential [[id number]]  (Abnormal) Collected: 03/14/22 1118    Lab Status: Final result Specimen: Blood from Arm, Left Updated: 03/14/22 1124     WBC 4 83 Thousand/uL      RBC 3 78 Million/uL      Hemoglobin 10 6 g/dL      Hematocrit 31 1 %      MCV 82 fL      MCH 28 0 pg      MCHC 34 1 g/dL      RDW 25 3 %      MPV 8 7 fL      Platelets 283 Thousands/uL      nRBC 0 /100 WBCs      Neutrophils Relative 69 %      Immat GRANS % 1 %      Lymphocytes Relative 12 %      Monocytes Relative 13 %      Eosinophils Relative 4 %      Basophils Relative 1 %      Neutrophils Absolute 3 38 Thousands/µL      Immature Grans Absolute 0 03 Thousand/uL      Lymphocytes Absolute 0 56 Thousands/µL      Monocytes Absolute 0 62 Thousand/µL      Eosinophils Absolute 0 21 Thousand/µL      Basophils Absolute 0 03 Thousands/µL                  CT abdomen pelvis with contrast   Final Result by Gabriel Leonard MD (03/14 1311)      1  Malpositioned Fortune catheter with balloon inflated in the posterior penile urethra  Recommend replacement  2   Malpositioned right nephroureterostomy catheter with proximal coil retracted out of the kidney  Distal pigtail remains in the bladder  Mild right hydronephrosis  Consider replacement        3   Thickened appearance of the urinary bladder is partially chronic, though presence of perivesicular stranding may persistent sequelae of superimposed infectious cystitis  4   Mild apparent rectosigmoid colonic wall thickening  New from prior  Correlate for clinical evidence of coloproctitis  Consider follow-up colonoscopy after resolution of acute episode of illness, unless already recently performed  The study was marked in [facility] for immediate notification  Workstation performed: [id number]         IR nephroureteral stent check/change/reposition    (Results Pending)              Procedures  Procedures         ED Course                                             MDM  Number of Diagnoses or Management Options  Diagnosis management comments: To er with abd pain, noted is bl nephrostomy tube  Ct noted  Fortune replaced / changed by nursing staff  Discussed case with urology who spoke to IR and feel spt is able to stay at this facility  discussed case with dr Kirill Majano who has accepted  Disposition  Final diagnoses:   UTI (urinary tract infection)   Displacement of Fortune catheter, initial encounter ([facility] )   Malfunction of nephrostomy tube [facility])     Time reflects when diagnosis was documented in both MDM as applicable and the Disposition within this note     Time User Action Codes Description Comment    3/14/2022  2:26 PM Nelson Noelir Add [N39 0] UTI (urinary tract infection)     3/14/2022  2:26 PM Damon Heir Add [N29 004F] Displacement of Fortune catheter, initial encounter ([facility] )     3/14/2022  2:26 PM Nelson Gamble Add [Q15 541U] Malfunction of nephrostomy tube [facility])       ED Disposition     ED Disposition Condition Date/Time Comment    Admit Stable Mon Mar 14, 2022  2:27 PM Case was discussed with ramesh  and the patient's admission status was agreed to be Admission Status: observation status to the service of Dr Kirill Majano MD Documentation      Most Recent Value   Accepting Facility Name, [address] [place] [address] [facility] Salgado RN Documentation      Most Recent Value   Accepting Facility Name, [address] [place] [address]   Level of Care Basic life support      Follow-up Information     Follow up With Specialties Details Why Contact Info    Royal Chery MD Internal Medicine Follow up  [address] [facility] Oral Bruce  Follow up  [address]   [address]  Phone: [phone]          Current Discharge Medication List      CONTINUE these medications which have NOT CHANGED    Details   atorvastatin (LIPITOR) 40 mg tablet Take 1 tablet (40 mg total) by mouth daily  Qty: 90 tablet, Refills: 3    Associated Diagnoses: Hyperlipidemia, unspecified hyperlipidemia type      busPIRone (BUSPAR) 5 mg tablet Take 5 mg by mouth 2 (two) times a day      cefepime 1,000 mg in dextrose 5 % 50 mL IVPB Infuse 1,000 mg into a venous catheter every 8 (eight) hours for 21 days  Qty: 1 each, Refills: 0    Associated Diagnoses: Sepsis due to Pseudomonas aeruginosa (HCC)      diltiazem (CARDIZEM CD) 240 mg 24 hr capsule Take 1 capsule (240 mg total) by mouth daily  Qty: 60 capsule, Refills: 3    Associated Diagnoses: Atrial fibrillation, unspecified type (HCC)      escitalopram (Lexapro) 10 mg tablet Take 10 mg by mouth daily      ferrous sulfate 325 (65 Fe) mg tablet Take 1 tablet (325 mg total) by mouth every other day  Qty: 30 tablet, Refills: 0    Associated Diagnoses: Anemia, unspecified type      lidocaine (LIDODERM) 5 % Apply 1 patch topically daily Remove & Discard patch within 12 hours or as directed by MD      pantoprazole (PROTONIX) 40 mg tablet Take 1 tablet (40 mg total) by mouth every morning  Qty: 60 tablet, Refills: 5    Associated Diagnoses: Erosive gastritis; Esophageal stricture      senna-docusate sodium (SENOKOT S) 8 6-50 mg per tablet Take 2 tablets by mouth 2 (two) times a day  Qty: 30 tablet, Refills: 1    Associated Diagnoses: Constipation, unspecified constipation type      simethicone (MYLICON) 80 mg chewable tablet Chew 1 tablet (80 mg total) every 6 (six) hours as needed for flatulence  Qty: 30 tablet, Refills: 0    Associated Diagnoses: Heartburn      traMADol (ULTRAM) 50 mg tablet Take 100 mg by mouth every 8 (eight) hours as needed for moderate pain        Vitamin D, Cholecalciferol, 25 MCG (1000 UT) CAPS Take 4,000 Units by mouth        warfarin (COUMADIN) 1 mg tablet Take 3 tablets (3 mg total) by mouth daily  Qty: 30 tablet, Refills: 1    Associated Diagnoses: Persistent atrial fibrillation (HCC)      acetaminophen (TYLENOL) 325 mg tablet Take 650 mg by mouth 3 (three) times a day as needed for mild pain      aspirin 81 mg chewable tablet Chew 1 tablet (81 mg total) daily  Qty: 60 tablet, Refills: 6    Associated Diagnoses: Typical atrial flutter (HCC)      bisacodyl (DULCOLAX) 10 mg suppository Insert 1 suppository (10 mg total) into the rectum daily as needed for constipation  Qty: 12 suppository, Refills: 0    Associated Diagnoses: Constipation, unspecified constipation type      Diclofenac Sodium (VOLTAREN) 1 % Apply 2 g topically 4 (four) times a day  Qty: 50 g, Refills: 0    Associated Diagnoses: Arthritis      magnesium citrate (CITROMA) 1 745 g/30 mL oral solution Take 296 mL by mouth once as needed (constipation) for up to 1 dose  Qty: 296 mL, Refills: 3    Associated Diagnoses: Constipation, unspecified constipation type      ondansetron (Zofran ODT) 8 mg disintegrating tablet Take 1 tablet (8 mg total) by mouth every 8 (eight) hours as needed for nausea or vomiting  Qty: 20 tablet, Refills: 0    Associated Diagnoses: Chemotherapy induced nausea and vomiting             No discharge procedures on file      PDMP Review       Value Time User    PDMP Reviewed  Yes 2/9/2022 12:38 PM Hannah Watkins, 10 [facility] Provider  Electronically Signed by           Varinder Robledo Akil Calderon MD  03/15/22 9922

## 2022-03-14 NOTE — ASSESSMENT & PLAN NOTE
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

## 2022-03-14 NOTE — SEDATION DOCUMENTATION
Pt tolerated right PCNU tube placement  New dressing, leg bag placed bilaterally  Pt transferred back to IP room

## 2022-03-14 NOTE — ASSESSMENT & PLAN NOTE
Currently rate controlled  Managed as outpatient on coumadin and cardizem  Continue Coumadin and Cardizem    Monitor PT INR

## 2022-03-14 NOTE — ASSESSMENT & PLAN NOTE
Discussed with the patient's granddaughter Brigido Bowles as per her she has a POA  She states she is discussing with hospice agency and doing paperwork at this time  I have asked her about code status, she would like him to have CPR and all treatment measures except ventilator

## 2022-03-14 NOTE — PLAN OF CARE
Problem: GENITOURINARY - ADULT  Goal: Urinary catheter remains patent  Description: INTERVENTIONS:  - Assess patency of urinary catheter  - If patient has a chronic archibald, consider changing catheter if non-functioning  - Follow guidelines for intermittent irrigation of non-functioning urinary catheter  Outcome: Progressing

## 2022-03-14 NOTE — PROGRESS NOTES
Called and spoke to grand-daughter  She stated patient never was r/s for archibald change  Patient had an appointment and there was transportation issues with the facility  Please assist in scheduling nurse visit for this week due to it being 2 weeks late  Thank you!

## 2022-03-14 NOTE — TELEMEDICINE
e-Consult ([facility])  - Interventional Radiology  Rubens Banks 80 y o  male MRN: [id number]  Unit/Bed#: -01 Encounter: [id number]    IR has been consulted to evaluate the patient, determine the appropriate procedure, and whether or not a procedure can and should be performed regarding the care of Rubens Banks  We were consulted by emergency medicine concerning malpositioning of a right nephroureteral stent, and to possibly perform a catheter exchange if medically appropriate for the patient  IP Consult to IR  Consult performed by: Familia Carpenter MD  Consult ordered by: Chago Arzola MD        03/14/22      Assessment/Recommendation:   80-year-old male with bilateral indwelling nephroureteral stents  Patient is currently on antibiotics for urosepsis with persistent symptoms  He presents to the ED today with diffuse abdominal pain  CT scan demonstrates the loop of the right nephroureteral stent is pulled back into the renal parenchyma, and there is hydronephrosis present  Will plan to exchange this catheter today  Total time spent in review of data, discussion with requesting provider and rendering advice was 10 minutes  Patient or appropriate family member was verbally informed by the emergency department of this consultative service on their behalf to provide more timely access to specialty care in lieu of an in person consultation  Verbal consent was obtained  Thank you for allowing Interventional Radiology to participate in the care of Rubens Banks  Please don't hesitate to call or TigerText us with any questions       Familia Carpenter MD

## 2022-03-14 NOTE — H&P
[address]  H&P- Mumtaz Shin 1940, 80 y o  male MRN: [id number]  Unit/Bed#: -01 Encounter: [id number]  Primary Care Provider: Blanca Kam MD   Date and time admitted to hospital: 3/14/2022 10:47 AM    * Nephrostomy tube displaced [facility])  Assessment & Plan  Malpositioned right nephroureterostomy catheter with proximal coil retracted out of the kidney  Distal pigtail remains in the bladder  Mild right hydronephrosis  Consult intervention Radiology, as per conversation between ED doctor and on-call urology team, they have reached out to intervention Radiology and they would be taking care of this thing possibly on 03/15/2022  Endocarditis of tricuspid valve  Assessment & Plan  · Secondary to pseudomonas bacteremia  · Patient initially presented to [facility] on 02/04 with spesis 2/2 UTI/pyelonephritis with urine and blood cutlures positive for pseudomonas  RENEE on 02/09 showed endocarditis   · ID following-in the outpatient setting, last follow-up was on 03/11/2022  · Continue IV cefepime for 6 weeks duration (through 03/21/22)        Urinary tract infection associated with indwelling urethral catheter [facility])  Assessment & Plan  Patient currently on IV cefepime, will continue the same, follow-up cultures and sensitivities  Patient only has tachycardia, does not has all the signs of sepsis  Bladder cancer [facility])  Assessment & Plan  Patient has history of 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 diagnosed 10/12/2021  S/p placement of B/L nephrostomy tubes and with chronic archibald in place  Follows with heme-onc and urology outpatient and receiving chemo and radiation  S/p  right apical pleural-based mass  IR biopsy on 2/14 without evidence of malignancy  Continue to follow-up with Oncology in the outpatient setting           Atrial fibrillation [facility])  Assessment & Plan  Currently rate controlled   Managed as outpatient on coumadin and cardizem  Continue Coumadin and Cardizem  Monitor PT INR       Goals of care, counseling/discussion  Assessment & Plan  Discussed with the patient's granddaughter Kiesha Hawley as per her she has a POA  She states she is discussing with hospice agency and doing paperwork at this time  I have asked her about code status, she would like him to have CPR and all treatment measures except ventilator  Fortune catheter in place prior to arrival  Assessment & Plan  Malpositioned Fortune catheter with balloon inflated in the posterior penile urethra  Patient has chronic Fortune catheter  Fortune catheter was repositioned by ED team   Likely the cause of patient's abdominal pain  S/P CABG x 4  Assessment & Plan  CAD, status post CABG, no complaints of chest pain  Centrilobular emphysema (HCC)  Assessment & Plan  COPD, currently stable, no exacerbation    History of stroke  Assessment & Plan  History of CVA, continue supportive care    VTE Prophylaxis: Warfarin (Coumadin)  / sequential compression device   Code Status:  Full code accepts all intervention except ventilator  POLST: There is no POLST form on file for this patient (pre-hospital)  Discussion with family:  Patient's granddaughter Kiesha Hawley    Anticipated Length of Stay:  Patient will be admitted on an Observation basis with an anticipated length of stay of  less than 2 midnights  Justification for Hospital Stay:  Mild positioned nephrostomy tube    Total Time for Visit, including Counseling / Coordination of Care: 70 minutes  Greater than 50% of this total time spent on direct patient counseling and coordination of care  Chief Complaint:   Abdominal pain    History of Present Illness: Abdullahi Brunner is a 80 y o  male who presents with complaints of abdominal pain, unknown duration, patient is a poor historian  Patient points to her suprapubic region, however denies any pain at the time of my examination  Denies any nausea vomiting  Denies any chest pain, shortness of breath      Per ED documentation patient was sent to the emergency department for evaluation of abdominal pain and concerns for nephrostomy tubes obstruction  No fever, no other acute events reported  Patient keeps asking for 2 tablets of Tylenol, he states he has bilateral knee pain  He is oriented to person, not oriented to time  Patient unable to give his granddaughter's name  Review of Systems:  Unable to complete review of systems as patient unable to give reliable history  Past Medical and Surgical History:     Past Medical History:   Diagnosis Date    A-fib [facility])     Arthritis     Benign prostatic hyperplasia without lower urinary tract symptoms     without Urinary Obstruction    Bladder cancer ([facility] [address] )     CAD (coronary artery disease)     Cancer (HCC)     Chronic obstructive lung disease (HCC)     Constipation 12/9/2021    Coronary arteriosclerosis     Depression     Emphysema lung (HCC)     GERD (gastroesophageal reflux disease)     Hyperlipidemia     Hypertension     Hyponatremia 1/15/2022    Irregular heart beat     Myocardial infarction (HCC)     Psoriasis     Requires supplemental oxygen     at bedtime during high humid days only    Stroke [facility])     TIA 1/2018       Past Surgical History:   Procedure Laterality Date    COLONOSCOPY      CORONARY ANGIOPLASTY  02/03/2001    PTCA of RCA    CORONARY ARTERY BYPASS GRAFT  02/07/2001    x4- [place]    HERNIA REPAIR      IR BIOPSY LUNG  2/14/2022    IR NEPHROSTOMY TUBE PLACEMENT  1/18/2022    IR PORT PLACEMENT  1/14/2022    IR PORT REMOVAL  2/7/2022    [place] BRONCHOSCOPY,DIAGNOSTIC Left 1/7/2019    Procedure: BRONCHOSCOPY FLEXIBLE;  Surgeon: Anirudh Burrell MD;  [facility];   Service: Pulmonary    [place] CYSTOURETHROSCOPY,FULGUR <0 5 CM LESN N/A 11/30/2021    Procedure: TRANSURETHRAL RESECTION OF BLADDER TUMOR (TURBT) with "large";  Surgeon: Noemi Dorantes MD;  [facility];  Service: Urology    [place] Aubrie Bull CATHETER Bilateral 11/30/2021    Procedure: Felipe Teixeira;  Surgeon: Helen Mayer MD;  [facility];  Service: Urology    [place] Mauro Morales Left 2/20/2018    Procedure: ENDARTERECTOMY ARTERY CAROTID WITH PATCH ANGIOPLASTY;  Surgeon: Camilla Prieto MD;  [facility];  Service: Vascular    TRANSURETHRAL RESECTION OF PROSTATE         Meds/Allergies:    Prior to Admission medications    Medication Sig Start Date End Date Taking?  Authorizing Provider   atorvastatin (LIPITOR) 40 mg tablet Take 1 tablet (40 mg total) by mouth daily 6/29/21  Yes Arnel Chan PA-C   busPIRone (BUSPAR) 5 mg tablet Take 5 mg by mouth 2 (two) times a day   Yes Historical Provider, MD   cefepime 1,000 mg in dextrose 5 % 50 mL IVPB Infuse 1,000 mg into a venous catheter every 8 (eight) hours for 21 days 2/28/22 3/21/22 Yes Antonia Astorga MD   diltiazem (CARDIZEM CD) 240 mg 24 hr capsule Take 1 capsule (240 mg total) by mouth daily 7/7/21  Yes Della Dias MD   escitalopram (Lexapro) 10 mg tablet Take 10 mg by mouth daily   Yes Historical Provider, MD   ferrous sulfate 325 (65 Fe) mg tablet Take 1 tablet (325 mg total) by mouth every other day 2/28/22  Yes Antonia Astorga MD   lidocaine (LIDODERM) 5 % Apply 1 patch topically daily Remove & Discard patch within 12 hours or as directed by MD   Yes Historical Provider, MD   pantoprazole (PROTONIX) 40 mg tablet Take 1 tablet (40 mg total) by mouth every morning 1/6/22  Yes Angelo Albert MD   senna-docusate sodium (SENOKOT S) 8 6-50 mg per tablet Take 2 tablets by mouth 2 (two) times a day  Patient taking differently: Take 2 tablets by mouth 2 (two) times a day   2/28/22  Yes Antonia Astorga MD   simethicone (MYLICON) 80 mg chewable tablet Chew 1 tablet (80 mg total) every 6 (six) hours as needed for flatulence  Patient taking differently: Chew 125 mg in the morning   2/28/22  Yes Antonia Astorga MD   traMADol (ULTRAM) 50 mg tablet Take 100 mg by mouth every 8 (eight) hours as needed for moderate pain     Yes Historical Provider, MD   Vitamin D, Cholecalciferol, 25 MCG (1000 UT) CAPS Take 4,000 Units by mouth     Yes Historical Provider, MD   warfarin (COUMADIN) 1 mg tablet Take 3 tablets (3 mg total) by mouth daily 2/28/22  Yes Rufus Martinez MD   acetaminophen (TYLENOL) 325 mg tablet Take 650 mg by mouth 3 (three) times a day as needed for mild pain  Patient not taking: Reported on 3/14/2022     Historical Provider, MD   aspirin 81 mg chewable tablet Chew 1 tablet (81 mg total) daily  Patient not taking: Reported on 3/14/2022  7/29/19   Preston Burnett MD   bisacodyl (DULCOLAX) 10 mg suppository Insert 1 suppository (10 mg total) into the rectum daily as needed for constipation  Patient not taking: Reported on 3/14/2022  12/18/21   Jomar Ellis DO   Diclofenac Sodium (VOLTAREN) 1 % Apply 2 g topically 4 (four) times a day  Patient not taking: Reported on 3/14/2022  2/28/22   Rufus Martinez MD   magnesium citrate (CITROMA) 1 745 g/30 mL oral solution Take 296 mL by mouth once as needed (constipation) for up to 1 dose  Patient not taking: Reported on 3/14/2022  12/18/21   Jomar Ellis DO   ondansetron (Zofran ODT) 8 mg disintegrating tablet Take 1 tablet (8 mg total) by mouth every 8 (eight) hours as needed for nausea or vomiting  Patient not taking: Reported on 3/14/2022  1/27/22   Yanet Magaña MD     I have reveiwed home medications using records provided by [facility]  Allergies: Allergies   Allergen Reactions    Penicillins Swelling and Itching       Social History:     Marital Status:      Social History     Substance and Sexual Activity   Alcohol Use Yes    Comment: special occasions only wine  Social History     Tobacco Use   Smoking Status Former Smoker    Years: 1 00    Types: Cigarettes   Smokeless Tobacco Never Used   Tobacco Comment    few cigarettes when playing cards        Social History     Substance and Sexual Activity Drug Use No       Family History:    non-contributory    Physical Exam:     Vitals:   Blood Pressure: (!) 150/102 (03/14/22 1504)  Pulse: 101 (03/14/22 1504)  Temperature: (!) 97 4 °F (36 3 °C) (03/14/22 1504)  Temp Source: Tympanic (03/14/22 1504)  Respirations: 20 (03/14/22 1504)  Weight - Scale: 69 5 kg (153 lb 3 5 oz) (03/14/22 1504)  SpO2: 96 % (03/14/22 1504)    Physical Exam    General appearance:  Patient not in acute distress  Eyes:  Pupils equal reacting to light  ENT:  Moist oral mucous membranes  CVS:  S1-S2 heard, regular rate and rhythm, no pedal edema  Chest:  Bilateral air entry present, clear to auscultation  Abdomen:  Soft, nontender, bowel sounds present, bilateral nephrostomy tubes in place  CNS:  Confused, intermittent shakiness, moving all extremities, unable to perform complete red neuro exam   No facial asymmetry noticed  Genitourinary: Fortune catheter in place  Skin:  No acute rash   psychiatric:  No psychosis  Musculoskeletal:  No joint deformities       Additional Data:     Lab Results: I have personally reviewed pertinent reports  Results from last 7 days   Lab Units 03/14/22  1118   WBC Thousand/uL 4 83   HEMOGLOBIN g/dL 10 6*   HEMATOCRIT % 31 1*   PLATELETS Thousands/uL 183   NEUTROS PCT % 69   LYMPHS PCT % 12*   MONOS PCT % 13*   EOS PCT % 4     Results from last 7 days   Lab Units 03/14/22  1117   SODIUM mmol/L 137   POTASSIUM mmol/L 3 3*   CHLORIDE mmol/L 100   CO2 mmol/L 27   BUN mg/dL 12   CREATININE mg/dL 0 99   ANION GAP mmol/L 10   CALCIUM mg/dL 9 0   ALBUMIN g/dL 3 4*   TOTAL BILIRUBIN mg/dL 0 77   ALK PHOS U/L 76   ALT U/L 7   AST U/L 11*   GLUCOSE RANDOM mg/dL 93     Results from last 7 days   Lab Units 03/14/22  1118   INR  2 18*             Results from last 7 days   Lab Units 03/14/22  1115   LACTIC ACID mmol/L 1 0       Imaging: I have personally reviewed pertinent reports        CT abdomen pelvis with contrast   Final Result by Lani Aly MD (03/14 1311) 1   Malpositioned Fortune catheter with balloon inflated in the posterior penile urethra  Recommend replacement  2   Malpositioned right nephroureterostomy catheter with proximal coil retracted out of the kidney  Distal pigtail remains in the bladder  Mild right hydronephrosis  Consider replacement  3   Thickened appearance of the urinary bladder is partially chronic, though presence of perivesicular stranding may persistent sequelae of superimposed infectious cystitis  4   Mild apparent rectosigmoid colonic wall thickening  New from prior  Correlate for clinical evidence of coloproctitis  Consider follow-up colonoscopy after resolution of acute episode of illness, unless already recently performed  The study was marked in [facility] for immediate notification  Workstation performed: [id number]         IR nephroureteral stent check/change/reposition    (Results Pending)           Allscripts / Epic Records Reviewed: Yes     ** Please Note: This note has been constructed using a voice recognition system   **

## 2022-03-14 NOTE — ASSESSMENT & PLAN NOTE
Patient has history of 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 diagnosed 10/12/2021  S/p placement of B/L nephrostomy tubes and with chronic archibald in place  Follows with heme-onc and urology outpatient and receiving chemo and radiation   S/p  right apical pleural-based mass  IR biopsy on 2/14 without evidence of malignancy  Continue to follow-up with Oncology in the outpatient setting

## 2022-03-14 NOTE — PROGRESS NOTES
L/m for grand daughter that appt for cath change was scheduled for 03/17/22 @ 36 @ [facility] office - advised that [facility] care would be called as well , but patient is currently in the ED- will monitor

## 2022-03-14 NOTE — ASSESSMENT & PLAN NOTE
Malpositioned right nephroureterostomy catheter with proximal coil retracted out of the kidney  Distal pigtail remains in the bladder  Mild right hydronephrosis  Consult intervention Radiology, as per conversation between ED doctor and on-call urology team, they have reached out to intervention Radiology and they would be taking care of this thing possibly on 03/15/2022

## 2022-03-14 NOTE — ED NOTES
Taisha Jiang from [facility] ([phone]) asking to be called with update on pt dispo, she is trying to coordinate home hospice with family in [facility]        Valerie Villagran RN  03/14/22 [id number]

## 2022-03-15 LAB
ANION GAP SERPL CALCULATED.3IONS-SCNC: 9 MMOL/L (ref 4–13)
BACTERIA UR CULT: NORMAL
BASOPHILS # BLD AUTO: 0.03 THOUSANDS/ΜL (ref 0–0.1)
BASOPHILS NFR BLD AUTO: 1 % (ref 0–1)
BUN SERPL-MCNC: 10 MG/DL (ref 5–25)
CALCIUM SERPL-MCNC: 9.2 MG/DL (ref 8.4–10.2)
CHLORIDE SERPL-SCNC: 100 MMOL/L (ref 96–108)
CO2 SERPL-SCNC: 29 MMOL/L (ref 21–32)
CREAT SERPL-MCNC: 0.85 MG/DL (ref 0.6–1.3)
EOSINOPHIL # BLD AUTO: 0.24 THOUSAND/ΜL (ref 0–0.61)
EOSINOPHIL NFR BLD AUTO: 5 % (ref 0–6)
ERYTHROCYTE [DISTWIDTH] IN BLOOD BY AUTOMATED COUNT: 25.9 % (ref 11.6–15.1)
GFR SERPL CREATININE-BSD FRML MDRD: 81 ML/MIN/1.73SQ M
GLUCOSE SERPL-MCNC: 88 MG/DL (ref 65–140)
HCT VFR BLD AUTO: 35.1 % (ref 36.5–49.3)
HGB BLD-MCNC: 11.2 G/DL (ref 12–17)
IMM GRANULOCYTES # BLD AUTO: 0.03 THOUSAND/UL (ref 0–0.2)
IMM GRANULOCYTES NFR BLD AUTO: 1 % (ref 0–2)
INR PPP: 1.85 (ref 0.84–1.19)
LYMPHOCYTES # BLD AUTO: 0.66 THOUSANDS/ΜL (ref 0.6–4.47)
LYMPHOCYTES NFR BLD AUTO: 13 % (ref 14–44)
MCH RBC QN AUTO: 28 PG (ref 26.8–34.3)
MCHC RBC AUTO-ENTMCNC: 31.9 G/DL (ref 31.4–37.4)
MCV RBC AUTO: 88 FL (ref 82–98)
MONOCYTES # BLD AUTO: 0.57 THOUSAND/ΜL (ref 0.17–1.22)
MONOCYTES NFR BLD AUTO: 12 % (ref 4–12)
NEUTROPHILS # BLD AUTO: 3.38 THOUSANDS/ΜL (ref 1.85–7.62)
NEUTS SEG NFR BLD AUTO: 68 % (ref 43–75)
NRBC BLD AUTO-RTO: 0 /100 WBCS
PLATELET # BLD AUTO: 185 THOUSANDS/UL (ref 149–390)
PMV BLD AUTO: 9.7 FL (ref 8.9–12.7)
POTASSIUM SERPL-SCNC: 3.5 MMOL/L (ref 3.5–5.3)
PROTHROMBIN TIME: 21 SECONDS (ref 11.6–14.5)
RBC # BLD AUTO: 4 MILLION/UL (ref 3.88–5.62)
SODIUM SERPL-SCNC: 138 MMOL/L (ref 135–147)
WBC # BLD AUTO: 4.91 THOUSAND/UL (ref 4.31–10.16)

## 2022-03-15 PROCEDURE — 80048 BASIC METABOLIC PNL TOTAL CA: CPT | Performed by: INTERNAL MEDICINE

## 2022-03-15 PROCEDURE — 99232 SBSQ HOSP IP/OBS MODERATE 35: CPT | Performed by: INTERNAL MEDICINE

## 2022-03-15 PROCEDURE — 85025 COMPLETE CBC W/AUTO DIFF WBC: CPT | Performed by: INTERNAL MEDICINE

## 2022-03-15 PROCEDURE — 85610 PROTHROMBIN TIME: CPT | Performed by: INTERNAL MEDICINE

## 2022-03-15 RX ADMIN — CEFEPIME HYDROCHLORIDE 1000 MG: 1 INJECTION, SOLUTION INTRAVENOUS at 08:00

## 2022-03-15 RX ADMIN — ESCITALOPRAM OXALATE 10 MG: 10 TABLET ORAL at 09:44

## 2022-03-15 RX ADMIN — ACETAMINOPHEN 650 MG: 325 TABLET, FILM COATED ORAL at 11:52

## 2022-03-15 RX ADMIN — BUSPIRONE HYDROCHLORIDE 5 MG: 5 TABLET ORAL at 20:49

## 2022-03-15 RX ADMIN — PANTOPRAZOLE SODIUM 40 MG: 40 TABLET, DELAYED RELEASE ORAL at 09:43

## 2022-03-15 RX ADMIN — SENNOSIDES AND DOCUSATE SODIUM 2 TABLET: 50; 8.6 TABLET ORAL at 17:52

## 2022-03-15 RX ADMIN — ACETAMINOPHEN 650 MG: 325 TABLET, FILM COATED ORAL at 18:50

## 2022-03-15 RX ADMIN — CEFEPIME HYDROCHLORIDE 1000 MG: 1 INJECTION, SOLUTION INTRAVENOUS at 19:48

## 2022-03-15 RX ADMIN — ACETAMINOPHEN 650 MG: 325 TABLET, FILM COATED ORAL at 06:14

## 2022-03-15 RX ADMIN — ASPIRIN 81 MG: 81 TABLET, CHEWABLE ORAL at 09:43

## 2022-03-15 RX ADMIN — DILTIAZEM HYDROCHLORIDE 240 MG: 240 CAPSULE, COATED, EXTENDED RELEASE ORAL at 09:43

## 2022-03-15 RX ADMIN — SENNOSIDES AND DOCUSATE SODIUM 2 TABLET: 50; 8.6 TABLET ORAL at 09:47

## 2022-03-15 RX ADMIN — BUSPIRONE HYDROCHLORIDE 5 MG: 5 TABLET ORAL at 09:44

## 2022-03-15 RX ADMIN — TRAMADOL HYDROCHLORIDE 100 MG: 50 TABLET, FILM COATED ORAL at 23:12

## 2022-03-15 RX ADMIN — WARFARIN SODIUM 3 MG: 3 TABLET ORAL at 17:52

## 2022-03-15 RX ADMIN — ATORVASTATIN CALCIUM 40 MG: 40 TABLET, FILM COATED ORAL at 09:45

## 2022-03-15 RX ADMIN — LIDOCAINE 5% 1 PATCH: 700 PATCH TOPICAL at 09:44

## 2022-03-15 NOTE — ASSESSMENT & PLAN NOTE
· Discussed with the patient's granddaughter Antonio wallace as per her she has a POA  · She states she is discussing with hospice agency and doing paperwork at this time  · Discussed with Jose Ramon Contreras about patient's current code status  Granddaughter said she had a lot of times think about the patient and she does not believe he would want to be resuscitated in case of an emergency  She does not wish to see her grandfather suffer    Will transition code status to level 3 DNR/DNI

## 2022-03-15 NOTE — ASSESSMENT & PLAN NOTE
· Patient with Hx of Bladder CA  · Malpositioned right nephroureterostomy catheter with proximal coil retracted out of the kidney  Distal pigtail remains in the bladder    Mild right hydronephrosis  · Interventional Radiology consulted, appreciate recommendations:   · S/p PCNU replacement from 3/14  · Monitor I/O's

## 2022-03-15 NOTE — PROGRESS NOTES
Arleth , RN     TZ    3/17/22 8:50 AM  Note  Spoke with [address] and it was confirmed per review of patient's chart, his Fortune catheter was changed on 3/14/22 while in ED    Canceled RN visit for today to change patient's catheter

## 2022-03-15 NOTE — CASE MANAGEMENT
Case Management Assessment & Discharge Planning Note    Patient name Haroon Jackson  Location [address] [id number] MRN [id number]  : 1940 Date 3/15/2022       Current Admission Date: 3/14/2022  Current Admission Diagnosis:Nephrostomy tube displaced [facility])   Patient Active Problem List    Diagnosis Date Noted    [facility]rost[facility] tube displaced ([facility] ) 2022    Urinary tract infection associated with indwelling urethral catheter ([facility] ) 2022    Goals of care, counseling/discussion 2022    Heartburn 2022    Endocarditis of tricuspid valve 2022    Bacteremia 2022    Nephrostomy status ([facility] ) 2022    Anxiety 2022    Continuous opioid dependence ([facility] ) 2022    FRANCY (iron deficiency anemia) 2022    Fortune catheter in place prior to arrival 2022    Recurrent unilateral inguinal hernia 2021    History of bilateral inguinal hernia repair 2021    Cancer of overlapping sites of bladder ([facility] ) 2021    Overgrown toenails 2021    Atrial fibrillation ([facility] ) 2021    Acute on chronic diastolic congestive heart failure ([facility] ) 2020    DDD (degenerative disc disease), lumbar 10/30/2019    Medicare annual wellness visit, subsequent 2019    Tinnitus aurium, bilateral 2019    Sensorineural hearing loss (SNHL) of both ears 2019    COPD, severe ([facility] ) 2018    Bronchiectasis with acute lower respiratory infection ([facility] ) 2018    Atrial flutter ([facility] ) 10/07/2018    CKD (chronic kidney disease) stage 3, GFR 30-59 ml/min ([facility]) 2018    Urinary retention due to benign prostatic hyperplasia 2018    Bladder cancer ([facility] ) 2018    S/P CABG x 4 2018    Stenosis of left carotid artery 2018    GERD (gastroesophageal reflux disease) 2018    History of stroke 2018    Hyperlipidemia     Centrilobular emphysema (HCC)     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 10/25/2016    CAD (coronary atherosclerotic disease) 08/13/2016    Primary hypertension 08/13/2016      LOS (days): 0  Geometric Mean LOS (GMLOS) (days):   Days to GMLOS:     OBJECTIVE:              Current admission status: Observation       Preferred Pharmacy:   [facility] [facility] [place] [address]  Lucia Professor [place] [address] [place] [id number]  Phone: [phone] Fax: [phone]    Primary Care Provider: Katarina Andrade MD    Primary Insurance: MEDICARE  Secondary Insurance: [facility]    ASSESSMENT:  [address] [facility]   Primary Phone: [phone] (Mobile)            Obs Notice Signed: 03/15/22 (REVIEWED WITH RICHARD BY PHONE )    Readmission Root Cause  30 Day Readmission: No    Patient Information  Admitted from[de-identified] Facility ([place] [address])  Mental Status: Confused  During Assessment patient was accompanied by: Not accompanied during assessment  Assessment information provided by[de-identified] Other - please comment (GRANDDAUGHTER)  Primary Caregiver:  Other (Comment)  Support Systems: [facility] SPECIALTY [facility] of Residence: [address] [place] [address] do you live in?: [place]  Type of Current Residence: Facility ([place] [address])  Upon entering residence, is there a bedroom on the main floor (no further steps)?: Yes  Upon entering residence, is there a bathroom on the main floor (no further steps)?: Yes  In the last 12 months, was there a time when you were not able to pay the mortgage or rent on time?: No  In the last 12 months, how many places have you lived?: 1  In the last 12 months, was there a time when you did not have a steady place to sleep or slept in a shelter (including now)?: No  Homeless/housing insecurity resource given?: No  Is patient a ?: No    Activities of Daily Living Prior to Admission  Functional Status: Assistance  Completes ADLs independently?: No  Level of ADL dependence: Assistance  Ambulates independently?: No  Level of ambulatory dependence: Assistance    Patient Information Continued  Income Source: SSI/SSD  Does patient have prescription coverage?: Yes  Within the past 12 months, you worried that your food would run out before you got the money to buy more : Never true  Within the past 12 months, the food you bought just didnt last and you didnt have money to get more : Never true  Food insecurity resource given?: No  Does patient receive dialysis treatments?: No  Does patient have a history of substance abuse?: No    PHQ 2/9 Screening   Reviewed PHQ 2/9 Depression Screening Score?: No    Means of Transportation  Means of Transport to Appts[de-identified] Family transport  In the past 12 months, has lack of transportation kept you from medical appointments or from getting medications?: No  In the past 12 months, has lack of transportation kept you from meetings, work, or from getting things needed for daily living?: No  Was application for public transport provided?: No    DISCHARGE DETAILS:    Discharge planning discussed with[de-identified] PATIENT'S RICHARD SANTACRUZ  [place] of Choice: Yes  Comments - Freedom of Choice: CHO[place] [facility]    CM contacted family/caregiver?: Yes  Were Treatment Team discharge recommendations reviewed with patient/caregiver?: Yes  Did patient/caregiver verbalize understanding of patient care needs?: N/A- going to facility  Were patient/caregiver advised of the risks associated with not following Treatment Team discharge recommendations?: Yes    Contacts  Patient Contacts: Helen Dixon  Relationship to Patient[de-identified] Family  Contact Method: Phone  Phone Number: [phone]  Reason/Outcome: Discharge Planning,Emergency Contact,Continuity of [facility]         Is the patient interested in [facility] at discharge?: No    DME Referral Provided  Referral made for DME?: No    Other Referral/Resources/Interventions Provided:  Interventions: Hospice  Referral Comments: CM S/W PRASHANT FROM [facility] REGARDING PATIENT  PRASHANT CONFIRMED PATIENT SIGNED ONTO HOSPICE SERVICES AT [facility] [place] PRIOR TO SEND OUT TO FANY [facility]ER[facility] REQUESTING UDPATE WHEN PATIENT IS TO BE DISCHARGED BACK TO [facility] AT [place]      Would you like to participate in our [address] service program?  : No - Declined    Treatment Team Recommendation: Facility Return,SNF,Hospice  Discharge Destination Plan[de-identified] Facility Return,SNF,Hospice (RETURN TO [address] [place] [address] )  Transport at Discharge : [address] [place] [address] Name, [address] : [address] [place], [place]  Receiving Facility/Agency Phone Number: [phone]  Facility/Agency Fax Number: [address] Tow Number: [id number]

## 2022-03-15 NOTE — ASSESSMENT & PLAN NOTE
· Malpositioned Fortune catheter with balloon inflated in the posterior penile urethra  · Chronic Fortune catheter at baseline  · Fortune catheter was repositioned by ED team   · Likely the cause of patient's abdominal pain

## 2022-03-15 NOTE — ASSESSMENT & PLAN NOTE
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

## 2022-03-15 NOTE — ASSESSMENT & PLAN NOTE
· U/A suggestive of possible UTI vs Colonization   Improved from previous admission  · Patient currently on IV cefepime, C/w while admitted  · Follow-up cultures and sensitivities  · SIRs: tachycardia and Hypothermia  · BC x2: Pending  · Monitor CBC/Fever curve  · Consider ID consultation if signs of worsening infection

## 2022-03-15 NOTE — ASSESSMENT & PLAN NOTE
· Currently rate controlled    · Continue Home Medication:  · Cardizem and Coumadin  · Monitor PT INR

## 2022-03-15 NOTE — PLAN OF CARE
Problem: Nutrition/Hydration-ADULT  Goal: Nutrient/Hydration intake appropriate for improving, restoring or maintaining nutritional needs  Description: Monitor and assess patient's nutrition/hydration status for malnutrition  Collaborate with interdisciplinary team and initiate plan and interventions as ordered  Monitor patient's weight and dietary intake as ordered or per policy  Utilize nutrition screening tool and intervene as necessary  Determine patient's food preferences and provide high-protein, high-caloric foods as appropriate       INTERVENTIONS:  - Monitor oral intake, urinary output, labs, and treatment plans  - Assess nutrition and hydration status and recommend course of action  - Evaluate amount of meals eaten  - Assist patient with eating if necessary   - Allow adequate time for meals  - Recommend/ encourage appropriate diets, oral nutritional supplements, and vitamin/mineral supplements  - Order, calculate, and assess calorie counts as needed  - Recommend, monitor, and adjust tube feedings and TPN/PPN based on assessed needs  - Assess need for intravenous fluids  - Provide specific nutrition/hydration education as appropriate  - Include patient/family/caregiver in decisions related to nutrition  3/15/2022 0704 by Gama Knight RN  Outcome: Progressing  3/14/2022 1748 by Gama Knight RN  Outcome: Progressing     Problem: Prexisting or High Potential for Compromised Skin Integrity  Goal: Skin integrity is maintained or improved  Description: INTERVENTIONS:  - Identify patients at risk for skin breakdown  - Assess and monitor skin integrity  - Assess and monitor nutrition and hydration status  - Monitor labs   - Assess for incontinence   - Turn and reposition patient  - Assist with mobility/ambulation  - Relieve pressure over bony prominences  - Avoid friction and shearing  - Provide appropriate hygiene as needed including keeping skin clean and dry  - Evaluate need for skin moisturizer/barrier cream  - Collaborate with interdisciplinary team   - Patient/family teaching  - Consider wound care consult   Outcome: Progressing     Problem: GENITOURINARY - ADULT  Goal: Maintains or returns to baseline urinary function  Description: INTERVENTIONS:  - Assess urinary function  - Encourage oral fluids to ensure adequate hydration if ordered  - Administer IV fluids as ordered to ensure adequate hydration  - Administer ordered medications as needed  - Offer frequent toileting  - Follow urinary retention protocol if ordered  Outcome: Progressing     Problem: GENITOURINARY - ADULT  Goal: Absence of urinary retention  Description: INTERVENTIONS:  - Assess patients ability to void and empty bladder  - Monitor I/O  - Bladder scan as needed  - Discuss with physician/AP medications to alleviate retention as needed  - Discuss catheterization for long term situations as appropriate  Outcome: Progressing     Problem: GENITOURINARY - ADULT  Goal: Urinary catheter remains patent  Description: INTERVENTIONS:  - Assess patency of urinary catheter  - If patient has a chronic archibald, consider changing catheter if non-functioning  - Follow guidelines for intermittent irrigation of non-functioning urinary catheter  Outcome: Progressing

## 2022-03-15 NOTE — PROGRESS NOTES
[address]  Progress Note - Vanesa Mason 1940, 80 y o  male MRN: [id number]  Unit/Bed#: -Mitch Encounter: [id number]  Primary Care Provider: Pia Baltazar MD   Date and time admitted to hospital: 3/14/2022 10:47 AM    * Nephrostomy tube displaced [facility])  Assessment & Plan  · Patient with Hx of Bladder CA  · Malpositioned right nephroureterostomy catheter with proximal coil retracted out of the kidney  Distal pigtail remains in the bladder  Mild right hydronephrosis  · Interventional Radiology consulted, appreciate recommendations:   · S/p PCNU replacement from 3/14  · Monitor I/O's      Urinary tract infection associated with indwelling urethral catheter ([address] )  Assessment & Plan  · U/A suggestive of possible UTI vs Colonization  Improved from previous admission  · Patient currently on IV cefepime, C/w while admitted  · Follow-up cultures and sensitivities  · SIRs: tachycardia and Hypothermia  · BC x2: Pending  · Monitor CBC/Fever curve  · Consider ID consultation if signs of worsening infection    Goals of care, counseling/discussion  Assessment & Plan  · Discussed with the patient's granddaughter Bert as per her she has a POA  · She states she is discussing with hospice agency and doing paperwork at this time  · Discussed with Clementine Rojo about patient's current code status  Granddaughter said she had a lot of times think about the patient and she does not believe he would want to be resuscitated in case of an emergency  She does not wish to see her grandfather suffer  Will transition code status to level 3 DNR/DNI    Fortune catheter in place prior to arrival  Assessment & Plan  · Malpositioned Fortune catheter with balloon inflated in the posterior penile urethra  · Chronic Fortune catheter at baseline  · Fortune catheter was repositioned by ED team   · Likely the cause of patient's abdominal pain      Endocarditis of tricuspid valve  Assessment & Plan  · Secondary to pseudomonas bacteremia  · Of Note, Patient initially presented to [facility] on [facility] with spesis 2/2 UTI/pyelonephritis with urine and blood cutlures positive for pseudomonas  RENEE on 02/09 showed endocarditis   · ID following in the outpatient setting, last follow-up was on 03/11/2022  · Continue IV cefepime for 6 weeks duration (through 03/21/22)    Bladder cancer [facility])  Assessment & Plan  · 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 diagnosed (10/12/2021)  · S/p placement of B/L nephrostomy tubes and with chronic archibald in place  · Currently receiving chemo and radiation  · S/p  right apical pleural-based mass biopsy with IR on 2/14-->without evidence of malignancy  · Continue outpatient follow-up with Hematology-Oncology        Atrial fibrillation [facility])  Assessment & Plan  · Currently rate controlled  · Continue Home Medication:  · Cardizem and Coumadin  · Monitor PT INR       S/P CABG x 4  Assessment & Plan  · CAD  · S/p CABG (2001)  · No acute complaints of chest pain  Centrilobular emphysema (HCC)  Assessment & Plan  · Hx COPD  · Does not appear to be in acute exacerbation    History of stroke  Assessment & Plan  · History of CVA  · Continue supportive care      VTE Pharmacologic Prophylaxis: VTE Score: 6 High Risk (Score >/= 5) - Pharmacological DVT Prophylaxis Ordered: warfarin (Coumadin)  Sequential Compression Devices Ordered  Patient Centered Rounds: I performed bedside rounds with nursing staff today  Discussions with Specialists or Other Care Team Provider: Case Management    Education and Discussions with Family / Patient: Updated  (Joaquim ELLER  ) via phone  Time Spent for Care: 30 minutes  More than 50% of total time spent on counseling and coordination of care as described above      Current Length of Stay: 0 day(s)  Current Patient Status: Observation   Certification Statement: The patient will continue to require additional inpatient hospital stay due to Reposition of Neprhostomy tube and UTI awating final culture results  Discharge Plan: Anticipate discharge in 24-48 hrs to discharge location to be determined pending rehab evaluations  Code Status: Level 1 - Full Code    Subjective:   Patient stated he feels much better  Patient denies any active chest pain, worsening shortness of breath, abdominal pain, nausea, vomiting or diarrhea  Objective:     Vitals:   Temp (24hrs), Av 2 °F (36 2 °C), Min:96 2 °F (35 7 °C), Max:97 6 °F (36 4 °C)    Temp:  [96 2 °F (35 7 °C)-97 6 °F (36 4 °C)] 96 2 °F (35 7 °C)  HR:  [] 99  Resp:  [17-20] 17  BP: (142-170)/() 146/87  SpO2:  [96 %-100 %] 97 %  Body mass index is 23 18 kg/m²  Input and Output Summary (last 24 hours): Intake/Output Summary (Last 24 hours) at 3/15/2022 1205  Last data filed at 3/15/2022 0845  Gross per 24 hour   Intake 800 ml   Output 1535 ml   Net -735 ml       Physical Exam:   Physical Exam  Vitals and nursing note reviewed  Constitutional:       General: He is not in acute distress  Appearance: Normal appearance  He is ill-appearing  HENT:      Head: Normocephalic  Nose: Nose normal  No congestion  Mouth/Throat:      Mouth: Mucous membranes are dry  Pharynx: Oropharynx is clear  Cardiovascular:      Rate and Rhythm: Normal rate  Rhythm irregular  Pulses: Normal pulses  Heart sounds: No murmur heard  Pulmonary:      Effort: Pulmonary effort is normal  No respiratory distress  Breath sounds: Decreased breath sounds present  Abdominal:      General: Bowel sounds are normal  There is no distension  Palpations: Abdomen is soft  Tenderness: There is no abdominal tenderness  Genitourinary:     Comments: B/L PCNU in place  CDI  Urine Clear/Yellow  Fortune catheter patent  CDI  Musculoskeletal:         General: Normal range of motion  Cervical back: Normal range of motion  Right lower leg: No edema  Left lower leg: No edema  Skin:     General: Skin is warm and dry  Capillary Refill: Capillary refill takes less than 2 seconds  Neurological:      Mental Status: He is alert  Motor: Weakness (B/L LE) present  Comments: Forgetful at times   Psychiatric:         Mood and Affect: Mood is anxious  Speech: Speech normal          Behavior: Behavior is cooperative  Judgment: Judgment is impulsive  Additional Data:     Labs:  Results from last 7 days   Lab Units 03/15/22  0450   WBC Thousand/uL 4 91   HEMOGLOBIN g/dL 11 2*   HEMATOCRIT % 35 1*   PLATELETS Thousands/uL 185   NEUTROS PCT % 68   LYMPHS PCT % 13*   MONOS PCT % 12   EOS PCT % 5     Results from last 7 days   Lab Units 03/15/22  0450 03/14/22  1117 03/14/22  1117   SODIUM mmol/L 138   < > 137   POTASSIUM mmol/L 3 5   < > 3 3*   CHLORIDE mmol/L 100   < > 100   CO2 mmol/L 29   < > 27   BUN mg/dL 10   < > 12   CREATININE mg/dL 0 85   < > 0 99   ANION GAP mmol/L 9   < > 10   CALCIUM mg/dL 9 2   < > 9 0   ALBUMIN g/dL  --   --  3 4*   TOTAL BILIRUBIN mg/dL  --   --  0 77   ALK PHOS U/L  --   --  76   ALT U/L  --   --  7   AST U/L  --   --  11*   GLUCOSE RANDOM mg/dL 88   < > 93    < > = values in this interval not displayed       Results from last 7 days   Lab Units 03/15/22  0536   INR  1 85*             Results from last 7 days   Lab Units 03/14/22  1115   LACTIC ACID mmol/L 1 0       Lines/Drains:  Invasive Devices  Report    Peripherally Inserted Central Catheter Line            PICC Line 29/96/18 Right Basilic 27 days          Peripheral Intravenous Line            Peripheral IV 03/14/22 Left Antecubital 1 day          Line            Pump Device Continuous ambulatory delivery device Right Chest 43 days          Drain            Nephrostomy Left -- days    Percutaneous Nephroureteral Tube (PCNU) Left 1 8 5 Fr 26 Cm 55 days    Urethral Catheter Non-latex 16 Fr  1 day    Percutaneous Nephroureteral Tube (PCNU) Right 2 8 5 Fr 24 Cm <1 day              Urinary Catheter:  Goal for removal: N/A - Chronic Fortune         Central Line:  Goal for removal: N/A - Discharging with PICC for IV ABX/medications             Imaging: Reviewed radiology reports from this admission including: abdominal/pelvic CT    Recent Cultures (last 7 days):   Results from last 7 days   Lab Units 03/14/22  1201 03/14/22  1117   BLOOD CULTURE  Received in Microbiology Lab  Culture in Progress  Received in Microbiology Lab  Culture in Progress  Last 24 Hours Medication List:   Current Facility-Administered Medications   Medication Dose Route Frequency Provider Last Rate    acetaminophen  650 mg Oral Q6H PRN [place] MD Pat      aspirin  81 mg Oral Daily Kodiakjacob Stewart MD      atorvastatin  40 mg Oral Daily K[place]jacob Stewart MD      bisacodyl  10 mg Rectal Daily PRN Nay Stewart MD      busPIRone  5 mg Oral BID [place]jacob Stewart MD      cefepime  1,000 mg Intravenous Q12H Alice Landry PA-C 1,000 mg (03/15/22 0800)    diltiazem  240 mg Oral Daily [place] MD Pat      escitalopram  10 mg Oral Daily [place] MD Pat      ferrous sulfate  325 mg Oral Every Other Day [place]jacob Stewart MD      lidocaine  1 patch Topical Daily [place]jacob Stewart MD      ondansetron  4 mg Intravenous Q6H PRN [place]jacob Stewart MD      pantoprazole  40 mg Oral QAM [place]jacob Stewart MD      senna-docusate sodium  2 tablet Oral BID [place] MD Pat      simethicone  80 mg Oral Q6H PRN K[place]jacob Stewart MD      traMADol  100 mg Oral Q8H PRN [place]jacob Stewart MD      warfarin  3 mg Oral Daily (warfarin) K[place]jacob Stewart MD          Today, Patient Was Seen By: MYLENE Andrade    **Please Note: This note may have been constructed using a voice recognition system  **

## 2022-03-15 NOTE — ASSESSMENT & PLAN NOTE
· Secondary to pseudomonas bacteremia  · Of Note, Patient initially presented to [facility] on 02/04 with spesis 2/2 UTI/pyelonephritis with urine and blood cutlures positive for pseudomonas   RENEE on 02/09 showed endocarditis   · ID following in the outpatient setting, last follow-up was on 03/11/2022  · Continue IV cefepime for 6 weeks duration (through 03/21/22)

## 2022-03-16 VITALS
BODY MASS INDEX: 23.25 KG/M2 | HEIGHT: 69 IN | WEIGHT: 156.97 LBS | OXYGEN SATURATION: 90 % | DIASTOLIC BLOOD PRESSURE: 81 MMHG | RESPIRATION RATE: 20 BRPM | HEART RATE: 83 BPM | TEMPERATURE: 97.6 F | SYSTOLIC BLOOD PRESSURE: 142 MMHG

## 2022-03-16 LAB
ANION GAP SERPL CALCULATED.3IONS-SCNC: 11 MMOL/L (ref 4–13)
BASOPHILS # BLD AUTO: 0.04 THOUSANDS/ΜL (ref 0–0.1)
BASOPHILS NFR BLD AUTO: 1 % (ref 0–1)
BUN SERPL-MCNC: 11 MG/DL (ref 5–25)
CALCIUM SERPL-MCNC: 9.2 MG/DL (ref 8.4–10.2)
CHLORIDE SERPL-SCNC: 100 MMOL/L (ref 96–108)
CO2 SERPL-SCNC: 28 MMOL/L (ref 21–32)
CREAT SERPL-MCNC: 0.92 MG/DL (ref 0.6–1.3)
EOSINOPHIL # BLD AUTO: 0.36 THOUSAND/ΜL (ref 0–0.61)
EOSINOPHIL NFR BLD AUTO: 5 % (ref 0–6)
ERYTHROCYTE [DISTWIDTH] IN BLOOD BY AUTOMATED COUNT: 25.3 % (ref 11.6–15.1)
GFR SERPL CREATININE-BSD FRML MDRD: 77 ML/MIN/1.73SQ M
GLUCOSE SERPL-MCNC: 94 MG/DL (ref 65–140)
HCT VFR BLD AUTO: 35.6 % (ref 36.5–49.3)
HGB BLD-MCNC: 11.1 G/DL (ref 12–17)
IMM GRANULOCYTES # BLD AUTO: 0.07 THOUSAND/UL (ref 0–0.2)
IMM GRANULOCYTES NFR BLD AUTO: 1 % (ref 0–2)
INR PPP: 2.01 (ref 0.84–1.19)
LYMPHOCYTES # BLD AUTO: 0.71 THOUSANDS/ΜL (ref 0.6–4.47)
LYMPHOCYTES NFR BLD AUTO: 11 % (ref 14–44)
MCH RBC QN AUTO: 27.5 PG (ref 26.8–34.3)
MCHC RBC AUTO-ENTMCNC: 31.2 G/DL (ref 31.4–37.4)
MCV RBC AUTO: 88 FL (ref 82–98)
MONOCYTES # BLD AUTO: 0.71 THOUSAND/ΜL (ref 0.17–1.22)
MONOCYTES NFR BLD AUTO: 11 % (ref 4–12)
NEUTROPHILS # BLD AUTO: 4.73 THOUSANDS/ΜL (ref 1.85–7.62)
NEUTS SEG NFR BLD AUTO: 71 % (ref 43–75)
NRBC BLD AUTO-RTO: 0 /100 WBCS
PLATELET # BLD AUTO: 233 THOUSANDS/UL (ref 149–390)
PMV BLD AUTO: 9 FL (ref 8.9–12.7)
POTASSIUM SERPL-SCNC: 3.6 MMOL/L (ref 3.5–5.3)
PROTHROMBIN TIME: 22.4 SECONDS (ref 11.6–14.5)
RBC # BLD AUTO: 4.03 MILLION/UL (ref 3.88–5.62)
SODIUM SERPL-SCNC: 139 MMOL/L (ref 135–147)
WBC # BLD AUTO: 6.62 THOUSAND/UL (ref 4.31–10.16)

## 2022-03-16 PROCEDURE — 85025 COMPLETE CBC W/AUTO DIFF WBC: CPT

## 2022-03-16 PROCEDURE — 80048 BASIC METABOLIC PNL TOTAL CA: CPT

## 2022-03-16 PROCEDURE — 99239 HOSP IP/OBS DSCHRG MGMT >30: CPT

## 2022-03-16 PROCEDURE — 85610 PROTHROMBIN TIME: CPT | Performed by: INTERNAL MEDICINE

## 2022-03-16 RX ORDER — MUSCLE RUB CREAM 100; 150 MG/G; MG/G
CREAM TOPICAL 4 TIMES DAILY PRN
Status: DISCONTINUED | OUTPATIENT
Start: 2022-03-16 | End: 2022-03-16 | Stop reason: HOSPADM

## 2022-03-16 RX ADMIN — FERROUS SULFATE TAB 325 MG (65 MG ELEMENTAL FE) 325 MG: 325 (65 FE) TAB at 09:16

## 2022-03-16 RX ADMIN — PANTOPRAZOLE SODIUM 40 MG: 40 TABLET, DELAYED RELEASE ORAL at 09:16

## 2022-03-16 RX ADMIN — ATORVASTATIN CALCIUM 40 MG: 40 TABLET, FILM COATED ORAL at 09:16

## 2022-03-16 RX ADMIN — ASPIRIN 81 MG: 81 TABLET, CHEWABLE ORAL at 09:16

## 2022-03-16 RX ADMIN — LIDOCAINE 5% 1 PATCH: 700 PATCH TOPICAL at 09:17

## 2022-03-16 RX ADMIN — ESCITALOPRAM OXALATE 10 MG: 10 TABLET ORAL at 09:17

## 2022-03-16 RX ADMIN — DILTIAZEM HYDROCHLORIDE 240 MG: 240 CAPSULE, COATED, EXTENDED RELEASE ORAL at 09:16

## 2022-03-16 RX ADMIN — SENNOSIDES AND DOCUSATE SODIUM 2 TABLET: 50; 8.6 TABLET ORAL at 09:16

## 2022-03-16 RX ADMIN — BUSPIRONE HYDROCHLORIDE 5 MG: 5 TABLET ORAL at 09:16

## 2022-03-16 RX ADMIN — CEFEPIME HYDROCHLORIDE 1000 MG: 1 INJECTION, SOLUTION INTRAVENOUS at 09:12

## 2022-03-16 RX ADMIN — ACETAMINOPHEN 650 MG: 325 TABLET, FILM COATED ORAL at 05:37

## 2022-03-16 NOTE — CASE MANAGEMENT
Case Management Discharge Planning Note    Patient name Lana Hall  Location [address] [id number] MRN [id number]  : 1940 Date 3/16/2022       Current Admission Date: 3/14/2022  Current Admission Diagnosis:Nephrostomy tube displaced [facility])   Patient Active Problem List    Diagnosis Date Noted    [facility]phrost[facility] tube displaced ([facility] ) 2022    Urinary tract infection associated with indwelling urethral catheter ([facility] ) 2022    Goals of care, counseling/discussion 2022    Heartburn 2022    Endocarditis of tricuspid valve 2022    Bacteremia 2022    Nephrostomy status ([facility] ) 2022    Anxiety 2022    Continuous opioid dependence ([facility] ) 2022    FRANCY (iron deficiency anemia) 2022    Fortune catheter in place prior to arrival 2022    Recurrent unilateral inguinal hernia 2021    History of bilateral inguinal hernia repair 2021    Cancer of overlapping sites of bladder ([facility] ) 2021    Overgrown toenails 2021    Atrial fibrillation ([facility] ) 2021    Acute on chronic diastolic congestive heart failure ([facility] ) 2020    DDD (degenerative disc disease), lumbar 10/30/2019    Medicare annual wellness visit, subsequent 2019    Tinnitus aurium, bilateral 2019    Sensorineural hearing loss (SNHL) of both ears 2019    COPD, severe ([facility] ) 2018    Bronchiectasis with acute lower respiratory infection ([facility] ) 2018    Atrial flutter ([facility] ) 10/07/2018    CKD (chronic kidney disease) stage 3, GFR 30-59 ml/min ([facility]) 2018    Urinary retention due to benign prostatic hyperplasia 2018    Bladder cancer ([facility] ) 2018    S/P CABG x 4 2018    Stenosis of left carotid artery 2018    GERD (gastroesophageal reflux disease) 2018    History of stroke 2018    Hyperlipidemia     Centrilobular emphysema (HCC)     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 10/25/2016    CAD (coronary atherosclerotic disease) 08/13/2016    Primary hypertension 08/13/2016      LOS (days): 1  Geometric Mean LOS (GMLOS) (days): 3 10  Days to GMLOS:2 3     OBJECTIVE:  Risk of Unplanned Readmission Score: 47         Current admission status: Inpatient   Preferred Pharmacy:   [address] [id number]  Phone: [phone] Fax: [phone]    Primary Care Provider: Liudmila Trejo MD    Primary Insurance: MEDICARE  Secondary Insurance: [facility]    DISCHARGE DETAILS:    Discharge planning discussed with[de-identified] PATIENT'S RICHARD SANTACRUZ  [place] of Choice: Yes  Comments - Freedom of Choice: CHOICE IS TO RETURN BACK TO [facility] [place]  CM contacted family/caregiver?: Yes  Were Treatment Team discharge recommendations reviewed with patient/caregiver?: Yes  Did patient/caregiver verbalize understanding of patient care needs?: N/A- going to facility  Were patient/caregiver advised of the risks associated with not following Treatment Team discharge recommendations?: Yes    Contacts  Patient Contacts: Roman Mckeon  Relationship to Patient[de-identified] Family  Contact Method: Phone  Phone Number: [phone]  Reason/Outcome: Discharge Planning,Emergency Contact,Continuity of [facility]         Is the patient interested in [facility] at discharge?: No    DME Referral Provided  Referral made for DME?: No    Other Referral/Resources/Interventions Provided:  Interventions: Facility Return,Hospice,SNF  Referral Comments: Jameel Gaspar at [facility] notified of D/C today      Would you like to participate in our [address] service program?  : No - Declined    Treatment Team Recommendation: Facility Return,SNF,Hospice  Discharge Destination Plan[de-identified]  ([facility] SERVICES )  Transport at Discharge : [facility]  Dispatcher Contacted: Yes  Number/Name of Dispatcher: [address] [place] [address] by [employer] and Unit #): PENDING  ETA of Transport (Date): 03/16/22  ETA of Transport (Time):  (PENDING)     IMM Given (Date):: 03/16/22  IMM Given to[de-identified] Family  IMM reviewed with patient's monisha Reidia Elders), patient's grandauclarence Beatriz Elders) agrees with discharge determination      Accepting Facility Name, [address] : [address] [place], [place]  Receiving Facility/Agency Phone Number: [phone]  Fac[facility]Agency [facility] [address] [facility]: [id number]

## 2022-03-16 NOTE — ASSESSMENT & PLAN NOTE
· U/A suggestive of possible Colonization  Improved from previous admission  · Patient currently on IV cefepime, C/w   · SIRs: tachycardia and Hypothermia  · BC x2: NGTD  Will need to follow final culture results  · Urine Cultures: No growth

## 2022-03-16 NOTE — ASSESSMENT & PLAN NOTE
· Resolved  · Malpositioned Fortune catheter with balloon inflated in the posterior penile urethra  · Chronic Fortune catheter at baseline  · Fortune catheter was repositioned by ED team   · Likely the cause of patient's abdominal pain

## 2022-03-16 NOTE — ASSESSMENT & PLAN NOTE
· Secondary to pseudomonas bacteremia  · Of Note, Patient initially presented to [facility] on 02/04 with spesis 2/2 UTI/pyelonephritis with urine and blood cutlures positive for pseudomonas   RENEE on 02/09 showed endocarditis   · ID following in the outpatient setting, last follow-up was on 03/11/2022  · Continue IV cefepime for 6 weeks duration (through 03/21/22)

## 2022-03-16 NOTE — DISCHARGE SUMMARY
[address]   Discharge- Antonia Reasons 1940, 80 y o  male MRN: [id number]  Unit/Bed#: -01 Encounter: [id number]  Primary Care Provider: Israel Tyler MD   Date and time admitted to hospital: 3/14/2022 10:47 AM    * Nephrostomy tube displaced [facility])  Assessment & Plan  · Patient with Hx of Bladder CA  · Malpositioned right nephroureterostomy catheter with proximal coil retracted out of the kidney  Distal pigtail remains in the bladder  Mild right hydronephrosis  · Interventional Radiology consulted, appreciate recommendations:   · S/p PCNU replacement from 3/14  · Patient with continued output from PCN tube  Patient stable for discharge back to facility  Urinary tract infection associated with indwelling urethral catheter ([address] )  Assessment & Plan  · U/A suggestive of possible Colonization  Improved from previous admission  · Patient currently on IV cefepime, C/w   · SIRs: tachycardia and Hypothermia  · BC x2: NGTD  Will need to follow final culture results  · Urine Cultures: No growth  Goals of care, counseling/discussion  Assessment & Plan  · Discussed with the patient's granddaughter Aaliyah Brown as per her she has a POA  · She states she is discussing with hospice agency and doing paperwork at this time  · Discussed with Concepción Pacheco about patient's current code status  Granddaughter said she had a lot of times think about the patient and she does not believe he would want to be resuscitated in case of an emergency  She does not wish to see her grandfather suffer  Continue code status of level 3 DNR/DNI    Fortune catheter in place prior to arrival  Assessment & Plan  · Resolved  · Malpositioned Fortune catheter with balloon inflated in the posterior penile urethra  · Chronic Fortune catheter at baseline  · Fortune catheter was repositioned by ED team   · Likely the cause of patient's abdominal pain      Endocarditis of tricuspid valve  Assessment & Plan  · Secondary to pseudomonas bacteremia  · Of Note, Patient initially presented to [facility] on 02/04 with spesis 2/2 UTI/pyelonephritis with urine and blood cutlures positive for pseudomonas  RENEE on 02/09 showed endocarditis   · ID following in the outpatient setting, last follow-up was on 03/11/2022  · Continue IV cefepime for 6 weeks duration (through 03/21/22)    Bladder cancer [facility])  Assessment & Plan  · 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 diagnosed (10/12/2021)  · S/p placement of B/L nephrostomy tubes and with chronic archibald in place  · Currently receiving chemo and radiation  Per granddaughter and POA, plan to Discontinue  · S/p  right apical pleural-based mass biopsy with IR on 2/14-->without evidence of malignancy  · Continue outpatient follow-up with Hematology-Oncology    Atrial fibrillation [facility])  Assessment & Plan  · Currently rate controlled  · Continue Home Medication:  · Cardizem and Coumadin       S/P CABG x 4  Assessment & Plan  · CAD  · S/p CABG (2001)  · No acute complaints of chest pain  Centrilobular emphysema ([address] )  Assessment & Plan  · Hx COPD  · Does not appear to be in acute exacerbation    History of stroke  Assessment & Plan  · History of CVA  · Continue supportive care    Medical Problems             Resolved Problems  Date Reviewed: 3/16/2022    None              Discharging Physician / Practitioner: Lieutenant Hatchet, CRNP  PCP: Tim Ponce MD  Admission Date:   Admission Orders (From admission, onward)     Ordered        03/15/22 1335  Inpatient Admission  Once            03/14/22 1427  Place in Observation  Once                      Discharge Date: 03/16/22    Consultations During Hospital Stay:  · Interventional Radiology    Procedures Performed:   · IR (3/14): Nephrostomy Tube Exchange    Significant Findings / Test Results:   IR nephroureteral stent check/change/reposition   Final Result by Cecilia Paz MD (03/15 0303)   Impression:   1   Nephrostogram demonstrates the existing catheter pulled back with its proximal loop in the renal parenchyma, and hydronephrosis is present  2  Successful exchange of 8 [place] 24 cm nephroureteral stent under fluoroscopic control  Workstation performed: [id number]         CT abdomen pelvis with contrast   Final Result by Juana Callaway MD (03/14 1311)      1  Malpositioned Fortune catheter with balloon inflated in the posterior penile urethra  Recommend replacement  2   Malpositioned right nephroureterostomy catheter with proximal coil retracted out of the kidney  Distal pigtail remains in the bladder  Mild right hydronephrosis  Consider replacement  3   Thickened appearance of the urinary bladder is partially chronic, though presence of perivesicular stranding may persistent sequelae of superimposed infectious cystitis  4   Mild apparent rectosigmoid colonic wall thickening  New from prior  Correlate for clinical evidence of coloproctitis  Consider follow-up colonoscopy after resolution of acute episode of illness, unless already recently performed  The study was marked in [facility] for immediate notification  Workstation performed: [id number]             Incidental Findings:   · As above  Test Results Pending at Discharge (will require follow up): · Final blood culture results  To be followed up with PCP  Outpatient Tests Requested:  · Recommended outpatient follow-up with PCP    Complications:  None    Reason for Admission:  Dislodged nephrostomy tube    Hospital Course: Teresa Tobar is a 80 y o  male patient who originally presented to the hospital on 3/14/2022 due to dislodged nephrostomy tube  Patient with PMH of AFib on Coumadin, bladder CA receiving chemo/radiation s/p bilateral PCNU and Fortune placement, CVA, and CAD  Of note patient was recently admitted to [facility] with sepsis secondary to UTI/pyelonephritis  Blood cultures positive for Pseudomonas  Meño showed endocarditis  Patient was discharged with IV antibiotics and Infectious Disease following for a total duration of 6 weeks (Through 3/21/2022)  Patient presented to the ED with abdominal pain  CT of the chest abdomen pelvis revealed chronic Fortune catheter in the wrong place along with right PCN you dislodged  IR was consulted and patient underwent a tube exchange on 03/14  U/a on admission was suggestive of UTI versus colonization  Urine culture resulted with no growth  With concerns of continued sepsis, repeat blood cultures were drawn  Blood cultures currently no growth to date  Patient will need to continue to follow up outpatient with PCP for final blood culture results  Patient is stable for discharge back to facility  Recommending outpatient follow-up with PCP  Discussed with granddaughter, POA  Please see above list of diagnoses and related plan for additional information  Condition at Discharge: stable    Discharge Day Visit / Exam:   Subjective:  Patient alert and oriented to person  Patient pleasantly confused  States he is tired and wishes to sleep tonight  Patient denies any acute complaints    Vitals: Blood Pressure: 142/81 (03/16/22 0737)  Pulse: 83 (03/16/22 0737)  Temperature: 97 6 °F (36 4 °C) (03/16/22 0737)  Temp Source: Tympanic (03/16/22 0737)  Respirations: 20 (03/16/22 0737)  Height: 5' 9" (175 3 cm) (03/14/22 1735)  Weight - Scale: 71 2 kg (156 lb 15 5 oz) (03/14/22 1735)  SpO2: 90 % (03/16/22 0737)  Exam:   Physical Exam  Vitals and nursing note reviewed  Constitutional:       General: He is not in acute distress  Appearance: He is ill-appearing  HENT:      Head: Normocephalic  Nose: Nose normal       Mouth/Throat:      Mouth: Mucous membranes are dry  Pharynx: Oropharynx is clear  Cardiovascular:      Rate and Rhythm: Normal rate  Rhythm irregular  Pulses: Normal pulses  Heart sounds: No murmur heard        Pulmonary: Effort: Pulmonary effort is normal  No respiratory distress  Breath sounds: Decreased breath sounds present  Abdominal:      General: Bowel sounds are normal  There is no distension  Palpations: Abdomen is soft  Tenderness: There is no abdominal tenderness  Genitourinary:     Comments: B/L PCNU in place  Dressings CDI  Clear/Yellow urine  Fortune catheter in place  CDI  Musculoskeletal:         General: Normal range of motion  Cervical back: Normal range of motion  Right lower leg: No edema  Left lower leg: No edema  Skin:     General: Skin is warm and dry  Capillary Refill: Capillary refill takes less than 2 seconds  Neurological:      Mental Status: He is alert  Mental status is at baseline  Motor: Weakness (Generalized) present  Psychiatric:         Speech: Speech normal          Behavior: Behavior is cooperative  Cognition and Memory: Cognition is impaired  Memory is impaired  Judgment: Judgment is impulsive  Discussion with Family: Updated  (Granddaughter) via phone  Discharge instructions/Information to patient and family:   See after visit summary for information provided to patient and family  Provisions for Follow-Up Care:  See after visit summary for information related to follow-up care and any pertinent home health orders  Disposition:   [facility] for [facility]    Planned Readmission: No     Discharge Statement:  I spent 60 minutes discharging the patient  This time was spent on the day of discharge  I had direct contact with the patient on the day of discharge  Greater than 50% of the total time was spent examining patient, answering all patient questions, arranging and discussing plan of care with patient as well as directly providing post-discharge instructions  Additional time then spent on discharge activities      Discharge Medications:  See after visit summary for reconciled discharge medications provided to patient and/or family        **Please Note: This note may have been constructed using a voice recognition system**

## 2022-03-16 NOTE — PLAN OF CARE
Problem: GENITOURINARY - ADULT  Goal: Absence of urinary retention  Description: INTERVENTIONS:  - Assess patients ability to void and empty bladder  - Monitor I/O  - Bladder scan as needed  - Discuss with physician/AP medications to alleviate retention as needed  - Discuss catheterization for long term situations as appropriate  Outcome: Progressing

## 2022-03-16 NOTE — INCIDENTAL FINDINGS
The following findings require follow up:  Radiographic finding   Finding: CT A/P: Mild apparent rectosigmoid colonic wall thickening  New from prior  Correlate for clinical evidence of coloproctitis    Consider follow-up colonoscopy after resolution of acute episode of illness, unless already recently performed   Follow up required: Colonoscopy   Follow up should be done within 1 month(s)    Please notify the following clinician to assist with the follow up:   Gastroenterology specialists

## 2022-03-16 NOTE — ASSESSMENT & PLAN NOTE
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

## 2022-03-16 NOTE — ASSESSMENT & PLAN NOTE
· Discussed with the patient's granddaughter Wilfredo Potts as per her she has a POA  · She states she is discussing with hospice agency and doing paperwork at this time  · Discussed with Reji Tavares about patient's current code status  Granddaughter said she had a lot of times think about the patient and she does not believe he would want to be resuscitated in case of an emergency  She does not wish to see her grandfather suffer    Continue code status of level 3 DNR/DNI

## 2022-03-16 NOTE — ASSESSMENT & PLAN NOTE
· Patient with Hx of Bladder CA  · Malpositioned right nephroureterostomy catheter with proximal coil retracted out of the kidney  Distal pigtail remains in the bladder  Mild right hydronephrosis  · Interventional Radiology consulted, appreciate recommendations:   · S/p PCNU replacement from 3/14  · Patient with continued output from PCN tube  Patient stable for discharge back to facility

## 2022-03-17 ENCOUNTER — TRANSITIONAL CARE MANAGEMENT (OUTPATIENT)
Dept: INTERNAL MEDICINE CLINIC | Facility: OTHER | Age: 82
End: 2022-03-17

## 2022-03-17 ENCOUNTER — TELEPHONE (OUTPATIENT)
Dept: UROLOGY | Facility: CLINIC | Age: 82
End: 2022-03-17

## 2022-03-17 NOTE — TELEPHONE ENCOUNTER
Spoke with [address] and it was confirmed per review of patient's chart, his Fortune catheter was changed on 3/14/22 while in ED  Canceled RN visit for today to change patient's catheter

## 2022-03-19 LAB
BACTERIA BLD CULT: NORMAL
BACTERIA BLD CULT: NORMAL

## 2022-03-22 ENCOUNTER — TELEPHONE (OUTPATIENT)
Dept: OTHER | Facility: OTHER | Age: 82
End: 2022-03-22

## 2022-03-22 NOTE — TELEPHONE ENCOUNTER
Spoke with Francisco J Overton from [facility]  Patient is on hospice  She will be speaking with family if they even want to have the cystoscopy done    She will call office back and let us know whether to cancel appt or not after speaking with family

## 2022-03-22 NOTE — TELEPHONE ENCOUNTER
Faculty calling to confirm if the patient will be having the cystoscopy procedure on 3/30/22  If he is she would like pre-procedure instructions and any orders are needed

## 2022-03-30 NOTE — PHYSICIAN ADVISOR
Current patient class: Inpatient  The patient is currently on Hospital Day: 3 at [facility]      This patient was originally admitted to the hospital under observation class  After admission the patient was reevaluated and determined to require further hospitalization  The patient was then documented to require at least a 2nd midnight in the hospital  As such the patient was then expected to satisfy the 2 midnight benchmark and was therefore eligible for inpatient admission  After review of the relevant documentation, labs, vital signs and test results, the patient is appropriate for INPATIENT ADMISSION  Admission to the hospital as an inpatient is a complex decision making process which requires the practitioner to consider the patients presenting complaint, history and physical examination and all relevant testing  With this in mind, in this case, the patient was deemed appropriate for INPATIENT ADMISSION  After review of the documentation and testing available at the time of the admission I concur with this clinical determination of medical necessity  Rationale is as follows: The patient is an 59-year-old male who presented to the hospital with abdominal pain  There were also concerns for nephrostomy tube obstruction or displacement  The patient has an extensive past medical history of bladder cancer, tricuspid endocarditis with Pseudomonas bacteremia  Imaging showed malpositioned right nephro ureterostomy catheter with proximal coil retracted out of the kidney and mild right hydronephrosis  IR was consulted and the patient was continued on intravenous cefepime  He was placed under observation class  The patient underwent replacement of PCNU  He also required repositioning of urethral catheter  There were concerns for continued sepsis and urinary tract infection versus colonization    Management was recommended for a second midnight and the patient was changed to an inpatient admission  This was reasonable based on the providers concerns and the complexity of the patient's case  The patients vitals on arrival were   ED Triage Vitals   Temperature Pulse Respirations Blood Pressure SpO2   03/14/22 1044 03/14/22 1043 03/14/22 1043 03/14/22 1043 03/14/22 1044   98 6 °F (37 °C) 98 18 157/93 95 %      Temp Source Heart Rate Source Patient Position - Orthostatic VS BP Location FiO2 (%)   03/14/22 1044 03/14/22 1350 03/14/22 1043 03/14/22 1043 --   Oral Monitor Lying Left arm       Pain Score       03/14/22 1509       10 - Worst Possible Pain           Past Medical History:   Diagnosis Date    A-fib ([facility] )     Arthritis     Benign prostatic hyperplasia without lower urinary tract symptoms     without Urinary Obstruction    Bladder cancer (HCC)     CAD (coronary artery disease)     Cancer (HCC)     Chronic obstructive lung disease (HCC)     Constipation 12/9/2021    Coronary arteriosclerosis     Depression     Emphysema lung (HCC)     GERD (gastroesophageal reflux disease)     Hyperlipidemia     Hypertension     Hyponatremia 1/15/2022    Irregular heart beat     Myocardial infarction (HCC)     Psoriasis     Requires supplemental oxygen     at bedtime during high humid days only    Stroke [facility])     TIA 1/2018     Past Surgical History:   Procedure Laterality Date    COLONOSCOPY      CORONARY ANGIOPLASTY  02/03/2001    PTCA of RCA    CORONARY ARTERY BYPASS GRAFT  02/07/2001    x4- [place]    HERNIA REPAIR      IR BIOPSY LUNG  2/14/2022    IR NEPHROSTOMY TUBE PLACEMENT  1/18/2022    IR NEPHROURETERAL STENT CHECK/CHANGE/REPOSITION  3/14/2022    IR PORT PLACEMENT  1/14/2022    IR PORT REMOVAL  2/7/2022    DE BRONCHOSCOPY,DIAGNOSTIC Left 1/7/2019    Procedure: BRONCHOSCOPY FLEXIBLE;  Surgeon: Janelle Kwon MD;  [facility]: [facility];   Service: Pulmonary    DE CYSTOURETHROSCOPY,FULGUR <0 5 CM LESN N/A 11/30/2021    Procedure: TRANSURETHRAL RESECTION OF BLADDER TUMOR (TURBT) with "large";  Surgeon: Nina Vu MD;  [facility]: [facility];  Service: Urology    MS CYSTOURETHROSCOPY,URETER CATHETER Bilateral 11/30/2021    Procedure: Magalys Wayneo;  Surgeon: Nina Vu MD;  [facility];  Service: Urology    MS Bree García INCIS Left 2/20/2018    Procedure: ENDARTERECTOMY ARTERY CAROTID WITH PATCH ANGIOPLASTY;  Surgeon: Michelle Whitney MD;  [facility];  Service: Vascular    TRANSURETHRAL RESECTION OF PROSTATE         The patient was admitted to the hospital at  1:35 PM on 3/15/22 for the following diagnosis:  UTI (urinary tract infection) [N39 0]  Abdominal pain [R10 9]  Malfunction of nephrostomy tube ([facility] [address] ) [T83 098A]  Displacement of Fortune catheter, [facility] Maryuri Shillings have been placed to:   INPATIENT CONSULT TO IR    Vitals:    03/15/22 0730 03/15/22 1536 03/15/22 2358 03/16/22 0737   BP: 146/87 109/71 154/78 142/81   BP Location: Left arm Left arm Left arm Left arm   Pulse: 99 84 80 83   Resp: 17 17 17 20   Temp: (!) 96 2 °F (35 7 °C) (!) 96 3 °F (35 7 °C) 97 8 °F (36 6 °C) 97 6 °F (36 4 °C)   TempSrc: Tympanic Tympanic Tympanic Tympanic   SpO2: 97% 94% 96% 90%   Weight:       Height:           Most recent labs:    No results for input(s): WBC, HGB, HCT, PLT, K, NA, CALCIUM, BUN, CREATININE, LIPASE, AMYLASE, INR, TROPONINI, CKTOTAL, AST, ALT, ALKPHOS, BILITOT in the last 72 hours  Scheduled Meds:  Continuous Infusions:No current facility-administered medications for this encounter  PRN Meds:      Surgical procedures (if appropriate):

## 2022-04-13 ENCOUNTER — RA CDI HCC (OUTPATIENT)
Dept: OTHER | Facility: HOSPITAL | Age: 82
End: 2022-04-13

## 2022-04-13 NOTE — PROGRESS NOTES
[facility]  coding opportunities        J44 9 and I13 0  Chart Reviewed number of suggestions sent to Provider: 2     Patients Insurance     Medicare Insurance: Medicare

## 2022-11-07 NOTE — ASSESSMENT & PLAN NOTE
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 Discharge planning.

## 2023-03-15 NOTE — ASSESSMENT & PLAN NOTE
· Has a port and plan was to start chemo starting this Monday  · Urology following  · Has a archibald no

## 2023-07-07 NOTE — TELEPHONE ENCOUNTER
Fabiano oviedo
[facility] wants a call back with instructions on warfarin medication that was sent over to them today   # [phone]
Ronald Mcqueen
S/w pharmacist  Alonso Oconnor to take 1 tab daily or as directed
Right hand 3rd 4th 5th digit dupuytren contracture release

## 2023-11-13 NOTE — ASSESSMENT & PLAN NOTE
· CT head negative for acute process  · Not hypoglycemic, see hyponatremia as below  · Possibly in the setting of sepsis/UTI as above vs systemic chemotherapy vs outpatient narcotic use   · Narcotics and benzos prescribed OP on hold; had received IM Zyprexa X1  · Delirium precautions   · Supportive measures   · Stable, "feisty" but appears at baseline pulse oximetry

## 2024-01-27 NOTE — TELEPHONE ENCOUNTER
----- Message from Adina Tse MD sent at 10/18/2018 10:48 AM EDT -----  Regarding: FW: CEA and A-Fib  Please give Al a call  I heard back from Dr Chel Raza from the vascular surgery team and they do suggest that he will need to stay on the the aspirin in addition to the Xarelto due to his history of Carotid disease  Thanks!    -Dr Leanne Shin  ----- Message -----  From: Fracisco De La Fuente MD  Sent: 10/15/2018  12:02 PM  To: Adina Tse MD  Subject: RE: CEA and A-Fib                                Rachel Benson,    My preference would be to continue an antiplatelet agent thus I would rather he continue ASA unless there is some side effect/complication from the ASA  Hardy Lauren  ----- Message -----  From: Adina Tse MD  Sent: 10/15/2018   9:48 AM  To: Fracisco De La Fuente MD  Subject: CEA and A-Fib                                    Mahendra Lauren,    I am seeing Jere Boyer in the office today  It looks like he is currently taking aspirin (Carotid disease) as well as Xarelto (A-fib with high [id number] score)  I will double check with his cardiologist as well, but is it possible for us to stop the Asa at this point and maintain Xarelto and statin only     ?    Brandon Magallon room air

## 2024-06-18 NOTE — ASSESSMENT & PLAN NOTE
Malpositioned Fortune catheter with balloon inflated in the posterior penile urethra  Patient has chronic Fortune catheter  Fortune catheter was repositioned by ED team   Likely the cause of patient's abdominal pain  Spoke with pt and scheduled her tdap injection

## (undated) DEVICE — SPECIMEN CONTAINER STERILE PEEL PACK

## (undated) DEVICE — 1200CC GUARDIAN II: Brand: GUARDIAN

## (undated) DEVICE — NEEDLE 25G X 1 1/2

## (undated) DEVICE — INTENDED FOR TISSUE SEPARATION, AND OTHER PROCEDURES THAT REQUIRE A SHARP SURGICAL BLADE TO PUNCTURE OR CUT.: Brand: BARD-PARKER SAFETY BLADES SIZE 15, STERILE

## (undated) DEVICE — UROCATCH BAG

## (undated) DEVICE — LIGACLIP MCA MULTIPLE CLIP APPLIERS, 20 MEDIUM CLIPS: Brand: LIGACLIP

## (undated) DEVICE — DRAPE PROBE NEO-PROBE/ULTRASOUND

## (undated) DEVICE — ADHESIVE SKN CLSR HISTOACRYL FLEX 0.5ML LF

## (undated) DEVICE — CHLORHEXIDINE 4PCT 4 OZ

## (undated) DEVICE — VESSEL LOOPS X-RAY DETECTABLE: Brand: DEROYAL

## (undated) DEVICE — EVACUATOR BLADDER ELLIK DISP STRL

## (undated) DEVICE — SUT MONOCRYL 4-0 PS-2 18 IN Y496G

## (undated) DEVICE — SUT SILK 2-0 18 IN A185H

## (undated) DEVICE — CHLORAPREP HI-LITE 26ML ORANGE

## (undated) DEVICE — 3M™ IOBAN™ 2 ANTIMICROBIAL INCISE DRAPE 6640EZ: Brand: IOBAN™ 2

## (undated) DEVICE — SURGICEL FIBRILLAR 1 X 2

## (undated) DEVICE — PROXIMATE PLUS MD MULTI-DIRECTIONAL RELEASE SKIN STAPLERS CONTAINS 35 STAINLESS STEEL STAPLES APPROXIMATE CLOSED DIMENSIONS: 6.9MM X 3.9MM WIDE: Brand: PROXIMATE

## (undated) DEVICE — SUT SILK 3-0 18 IN A184H

## (undated) DEVICE — GLOVE SRG BIOGEL ORTHOPEDIC 7.5

## (undated) DEVICE — Device

## (undated) DEVICE — THYROID SHEET: Brand: CONVERTORS

## (undated) DEVICE — SCD SEQUENTIAL COMPRESSION COMFORT SLEEVE MEDIUM KNEE LENGTH: Brand: KENDALL SCD

## (undated) DEVICE — PACK TUR

## (undated) DEVICE — SPONGE 4 X 4 XRAY 16 PLY STRL LF RFD

## (undated) DEVICE — CHEMOTHERAPY CONTAINER,HINGED LID, YELLOW: Brand: SHARPSAFETY

## (undated) DEVICE — INVIEW CLEAR LEGGINGS: Brand: CONVERTORS

## (undated) DEVICE — HF-RESECTION ELECTRODE PLASMALOOP LOOP, MEDIUM, 24 FR., 12°-30°, ESG TURIS: Brand: OLYMPUS

## (undated) DEVICE — BAG DECANTER

## (undated) DEVICE — SUT MONOCRYL 3-0 SH 27 IN Y416H

## (undated) DEVICE — PACK CAROTID PBDS

## (undated) DEVICE — HF-RESECTION ELECTRODE PLASMABUTTON BUTTON, 24 FR., 12°-30°, ESG TURIS: Brand: OLYMPUS

## (undated) DEVICE — GLOVE INDICATOR PI UNDERGLOVE SZ 8 BLUE

## (undated) DEVICE — REM POLYHESIVE ADULT PATIENT RETURN ELECTRODE: Brand: VALLEYLAB

## (undated) DEVICE — GUIDEWIRE STRGHT TIP 0.035 IN  SOLO PLUS

## (undated) DEVICE — INTENDED FOR TISSUE SEPARATION, AND OTHER PROCEDURES THAT REQUIRE A SHARP SURGICAL BLADE TO PUNCTURE OR CUT.: Brand: BARD-PARKER ® CARBON RIB-BACK BLADES

## (undated) DEVICE — LUBRICANT SURGILUBE TUBE 4 OZ  FLIP TOP

## (undated) DEVICE — CATH FOLEY 20FR 5ML 2 WAY SILICONE ELASTIMER

## (undated) DEVICE — 3000CC GUARDIAN II: Brand: GUARDIAN

## (undated) DEVICE — CAROTID ARTERY SHUNT KIT,RADIOPAQUE LINE, STRAIGHT: Brand: ARGYLE

## (undated) DEVICE — CATH URETERAL 5FR X 70 CM FLEX TIP POLYUR BARD

## (undated) DEVICE — SUT PROLENE 6-0 BV130 30 IN 8709H

## (undated) DEVICE — GLOVE SRG BIOGEL ECLIPSE 7.5